# Patient Record
Sex: MALE | Race: WHITE | NOT HISPANIC OR LATINO | Employment: OTHER | URBAN - METROPOLITAN AREA
[De-identification: names, ages, dates, MRNs, and addresses within clinical notes are randomized per-mention and may not be internally consistent; named-entity substitution may affect disease eponyms.]

---

## 2017-01-04 ENCOUNTER — TRANSFERRED RECORDS (OUTPATIENT)
Dept: HEALTH INFORMATION MANAGEMENT | Facility: CLINIC | Age: 67
End: 2017-01-04

## 2017-01-07 ENCOUNTER — TELEPHONE (OUTPATIENT)
Dept: NURSING | Facility: CLINIC | Age: 67
End: 2017-01-07

## 2017-01-07 NOTE — TELEPHONE ENCOUNTER
"Call Type: Triage Call    Presenting Problem: \"I had a colonoscopy on Wednesday and I haven't  had a bowel movement yet.\"  It's < 72 hrs since colonoscopy. I asked  Juwan to give it a little more time.  I also encouraged activity,  fluids and fiber in diet.  Triage Note:  Guideline Title: Constipation  Recommended Disposition: Provide Home/Self Care  Original Inclination: Wanted to speak with a nurse  Override Disposition:  Intended Action: Follow advice given  Physician Contacted: No  All other situations ?  YES  New onset of persistent constipation AND history of diabetes, stroke, multiple  sclerosis, Parkinson's or other chronic illness ? NO  Passing red, black or tarry material from rectum AND onset of new signs and  symptoms of hypovolemia ? NO  Scant rectal bleeding (bright red blood on toilet tissue or drops of blood in  toilet water) when straining at stool and not previously evaluated ? NO  No bowel movement for one week and unresponsive to home care ? NO  Associated with severe rectal pain ? NO  Nausea and vomiting ? NO  Recurrent episodes of severe constipation alternating with episodes of diarrhea  AND has not been evaluated by provider ? NO  Recent change in bowel habits lasting more than one week (frequency, amount,  timing, straining, rectal fullness) ? NO  Overuse of laxatives or enemas ? NO  Constipation AND visible hemorrhoids ? NO  Increased hardness of stool or difficulty in moving bowels ? NO  History of diverticulosis AND fever of 101.5 or higher ? NO  Related to change in diet, decreased activity, or increased stress ? NO  Constant abdominal pain or abdominal pain present for more than 3-4 hours ? NO  Poor bowel habits (does not respond to or suppresses urge to have BM) ? NO  Taking narcotic pain medication OR other medication that increases risk of  constipation (antacids [calcium or aluminum], diuretics, anticholinergics,  calcium channel blockers, iron supplements, antidepressants) ? " NO  Persistent constipation associated with tiredness, fatigue, weight gain, or cold  intolerance ? NO  Rectal bleeding (blood in or on the stool, more than scant; black tarry stools) ?  NO  Constipation began within one week of starting new prescription, nonprescription  or alternative medicine/therapy ? NO  Repeated episodes of constipation requiring home treatment within last 3 months or  longer ? NO  Pain or feeling of pressure with bowel movement at least twice within last week  AND not previously evaluated or not responding to provider recommended treatment  ? NO  Debilitated or bedridden patient having uncontrolled bowel movements AND has  history of stool impaction ? NO  History of fecal impaction within the past six months ? NO  Recent surgery, trauma, childbirth or GI procedure (such as barium enema) and  cautionary symptoms are lasting longer than defined in provider specified  discharge information ? NO  Physician Instructions:  Care Advice:

## 2017-01-16 PROBLEM — D12.4 BENIGN NEOPLASM OF DESCENDING COLON: Status: ACTIVE | Noted: 2017-01-16

## 2017-01-18 DIAGNOSIS — R91.8 PULMONARY NODULES: ICD-10-CM

## 2017-01-18 DIAGNOSIS — I10 HYPERTENSION GOAL BP (BLOOD PRESSURE) < 140/90: Primary | Chronic | ICD-10-CM

## 2017-01-23 RX ORDER — LISINOPRIL 10 MG/1
TABLET ORAL
Qty: 90 TABLET | Refills: 0 | Status: SHIPPED | OUTPATIENT
Start: 2017-01-23 | End: 2017-02-09

## 2017-01-23 NOTE — TELEPHONE ENCOUNTER
lisinopril - historical     Last Written Prescription Date: 1/20/2016  Last Fill Quantity: n/a, # refills: n/a  [place] [facility] or [facility] prescribing provider: 5/24/2016       POTASSIUM   Date Value Ref Range Status   08/21/2015 4.3 3.4 - 5.3 mmol/L Final     CREATININE   Date Value Ref Range Status   08/21/2015 0.83 0.66 - 1.25 mg/dL Final     BP Readings from Last 3 Encounters:   01/04/17 118/86   05/24/16 112/78   05/20/16 126/78     Routing refill request to provider for review/approval because:  Medication is reported/historical  Marisela Hubbard RN

## 2017-01-23 NOTE — TELEPHONE ENCOUNTER
Chart reviewed.  Rx sent to pt's preferred pharmacy.  Will need to be seen - pharmacy to inform Juwan.  Also ordered repeat chest CT to continue surveilance of pulmonary nodules.    [facility] [address] [facility], [place] - [address]    Julio Guidry MD

## 2017-01-25 ENCOUNTER — HOSPITAL ENCOUNTER (OUTPATIENT)
Dept: CT IMAGING | Facility: CLINIC | Age: 67
Discharge: HOME | End: 2017-01-25
Attending: FAMILY MEDICINE | Admitting: FAMILY MEDICINE
Payer: MEDICARE

## 2017-01-25 DIAGNOSIS — R91.8 PULMONARY NODULES: ICD-10-CM

## 2017-01-25 PROCEDURE — 71250 CT THORAX DX C-: CPT

## 2017-01-26 ENCOUNTER — MYC MEDICAL ADVICE (OUTPATIENT)
Dept: FAMILY MEDICINE | Facility: CLINIC | Age: 67
End: 2017-01-26

## 2017-01-26 NOTE — TELEPHONE ENCOUNTER
Please see encounter below along with patient's MyChart message. Please advise. Thank you.  Kaylee Akins RN, BSN  [facility]

## 2017-01-26 NOTE — TELEPHONE ENCOUNTER
Reason for Call:  Juwan is having problems with his computer and is unable to see if there was a response to his [employer] message. I advised he would need to see Dr Guidry for an appointment. Juwan said he is having a lot of anxiety because he spoke with a doctor that said they have found more nodules on his lungs. He is feeling like he can't calm down. Please call to advise.    Best phone number to reach pt at is: [phone]  Ok to leave a message with medical info? yes    Pharmacy preferred (if calling for a refill): [facility]  Patient Representative

## 2017-01-27 ENCOUNTER — TELEPHONE (OUTPATIENT)
Dept: FAMILY MEDICINE | Facility: CLINIC | Age: 67
End: 2017-01-27

## 2017-01-27 ENCOUNTER — MYC MEDICAL ADVICE (OUTPATIENT)
Dept: FAMILY MEDICINE | Facility: CLINIC | Age: 67
End: 2017-01-27

## 2017-01-27 ENCOUNTER — TELEPHONE (OUTPATIENT)
Dept: SURGERY | Facility: CLINIC | Age: 67
End: 2017-01-27

## 2017-01-27 DIAGNOSIS — F41.9 ANXIETY: Primary | ICD-10-CM

## 2017-01-27 NOTE — TELEPHONE ENCOUNTER
Dr. Guidry see this note and please advise--    Juwan called back asking for a couple of pills to help him calm down. I had discussed with him this morning as below. He said he is not sure how to do it. I explained to him again and then he said he did do it but has not heard back. He says he is getting really worked up and needs something to calm him. He was very polite and said it was not a big deal I informed him we want to help him and Dr. Guidry probably just has not gotten to the evisit yet--I informed Juwan I will send Dr. Guidry a message to let him of our discussion and that Juwan is not sure if his evisit worked or not.    Patient satisfied with response. Justa Naqvi R.N.

## 2017-01-27 NOTE — TELEPHONE ENCOUNTER
Dr. Guidry please see below from Juwan. Are you available today for an e-visit?. Juwan has also called so I'm going to reach out to him now via phone.   Justa Naqvi R.N.

## 2017-01-27 NOTE — TELEPHONE ENCOUNTER
Mychart encounter reply in another mychart encounter and I also called patient and left detailed message regarding evisit and how to do and how to make an appointment with [facility] Thoracic surgery.See other encounters for further details. Justa Naqvi R.N.

## 2017-01-27 NOTE — TELEPHONE ENCOUNTER
Juwan called me back and I advised him how to start an e-visit with Dr. Guidry regarding the medications he is requesting. Also informed him to call [facility] thoracic surgery to schedule appointment. He says this has been done and they are reviewing his information and will call him Thursday to schedule appointment. Juwan said he will set up evisit when he gets home. Justa Naqvi R.N.

## 2017-01-27 NOTE — TELEPHONE ENCOUNTER
I called Juwan and left a message and a mychart message also regarding how to follow up for nodules. [facility] thoracic surgery--phone number and website given. Advised Dr. Guidry wants him to follow up with an evisit for his medication renewal. I left instructions how to do this and advised him if he had further questions to call our office. Justa Naqvi R.N.

## 2017-01-27 NOTE — TELEPHONE ENCOUNTER
Reason for Call: Patient called because he had some questions about his nodules and was wondering if anything was set up of him at the  or if he needed to set something up.     Best phone number to reach pt at is: [phone]  Ok to leave a message with medical info? YES    Pharmacy preferred (if calling for a refill): [facility]  [employer] [facility]-Patient Representative

## 2017-01-27 NOTE — TELEPHONE ENCOUNTER
Juwan called today as recommended by his PCP for follow up of a large GGO in the RUL of his lung.      Originally had CT chest for chest pain in Jan 2016 which noted a 3 cm GGO in the right lung apex.  Follow up CT in April 2016 showed no change.  Most recent CT on 1/25/2017 showed slight interval enlargement (now 3.8 cm).    Patient is a former smoker with a 40 pack year history, quit in 1985.  PMH significant for PAULINO, HTN, GERD.    I recommended to Juwan that we review his CT scan at our pulmonary nodule conference next Friday.  I will call him with our recommendations after that.  He agrees with plan and has my contact information in case he has questions.

## 2017-01-30 ENCOUNTER — PRE VISIT (OUTPATIENT)
Dept: SURGERY | Facility: CLINIC | Age: 67
End: 2017-01-30

## 2017-01-30 RX ORDER — ALPRAZOLAM 0.5 MG
0.5 TABLET ORAL 3 TIMES DAILY PRN
Qty: 10 TABLET | Refills: 0 | Status: SHIPPED | OUTPATIENT
Start: 2017-01-30 | End: 2017-02-24

## 2017-01-30 NOTE — TELEPHONE ENCOUNTER
E-visit never started by Juwan.  I'll authorize a few Xanax for him to take.  The requested prescription(s) has/have been approved and has/have been printed and signed. This was forwarded to the patient care pool to fax to the patient's preferred pharmacy.  Please call Juwan to let him know we've done this.    [facility] [address] [facility], [place] - [address]    Jr Julio Guidry

## 2017-01-30 NOTE — TELEPHONE ENCOUNTER
1.  Date/reason for appt: 17 - Pulmonary Nodules  2.  Referring provider: Dr Julio Guidry    3.  Call to patient (Yes / No - short description): Yes - unable to reach patient, called clinic - scheduled with Amber from clinic - she will contact pt  4.  Previous care at / records requested from: [facility] - rec in Epic  5.  Recent Imagin17 CT chest - in Epic  *PFT appt prior to appt with Dr Desai on 17 - confirmed with patient*

## 2017-02-03 DIAGNOSIS — R91.1 LUNG NODULE: Primary | ICD-10-CM

## 2017-02-08 NOTE — PROGRESS NOTES
THORACIC SURGERY - NEW PATIENT OFFICE VISIT      Dear Dr. Zheng,    I saw Mr. Latham in consultation for the evaluation and treatment of a right lung apex GGO    HPI  Mr. Juwan Latham is a 66 year old male with incidentally found ground glass opacities in right lung apex during chest pain work up in January 2016. There has been a slight interval increase in size since that time. Denies chest pain, shortness of breath or weight loss.    Previsit Tests   CT chest 1/25/2017: slightly enlarging ground glass opacities of right lung apex     PFT 2/9/2017: FEV1/FVC 80%, FEV1 108%, DLCO 133%    PMH  Hypertension  Sleep apnea   Anxiety    ETOH 2 beers daily  TOB previously smoked 2ppd for 20 years    Physical examination  BMI 29  Non-contributory    From a personal perspective, he lives with two roommates who he has been friends with since high school. His son has passed away, however he has a grandchild. He works in construction. Stays very active, enrolled in a gym and tries to walk 3 miles daily.     IMPRESSION    66 year old year-old male with incidentally found right lung apex ground glass opacity.    PLAN  I spent a total of 30 minutes with Mr. Latham  more than 50% of which were spent in counseling, coordination of care, and face-to-face time. I reviewed the plan as follows:  Procedure planned: Procedure planned: RIGHT VATS/ Laparoscopic RUL wedge resection with mediastinal lymph node dissection, with possible thoracotomy.  I reviewed the indications, risks, and benefits of the procedure with Mr. Juwan Latham. We discussed the intraoperative risks of bleeding and injury to vital organs, potential postoperative complications including, but not limited to, major respiratory events, arrhythmia, bleeding, infection, reoperation, and death. I explained the anticipated hospital course (+/- 3-5 days) and postoperative recovery including pain control, chest drain management, and variable degrees of dyspnea (or  need for supplemental oxygen) and fatigue that tend to get better with time..  1. Necessary Preop Tests & Appointments:   -PAC  -Discuss with radiology regarding solid componenets of th elesion  2. Regional Anesthesia Plan: paravertebral catheter  3. Anticoagulation Plan: post op s/c heparin  4. Smoking Cessation: n/a    All questions were answered and the patient and present family were in agreement with the plan.  I appreciate the opportunity to participate in the care of your patient and will keep you updated.  Sincerely,

## 2017-02-09 ENCOUNTER — OFFICE VISIT (OUTPATIENT)
Dept: SURGERY | Facility: CLINIC | Age: 67
End: 2017-02-09
Attending: STUDENT IN AN ORGANIZED HEALTH CARE EDUCATION/TRAINING PROGRAM
Payer: MEDICARE

## 2017-02-09 VITALS
BODY MASS INDEX: 29.4 KG/M2 | HEART RATE: 64 BPM | HEIGHT: 68 IN | SYSTOLIC BLOOD PRESSURE: 127 MMHG | WEIGHT: 194 LBS | TEMPERATURE: 97.7 F | RESPIRATION RATE: 16 BRPM | DIASTOLIC BLOOD PRESSURE: 72 MMHG | OXYGEN SATURATION: 96 %

## 2017-02-09 DIAGNOSIS — R91.1 LUNG NODULE: Primary | ICD-10-CM

## 2017-02-09 DIAGNOSIS — R91.1 LUNG NODULE: ICD-10-CM

## 2017-02-09 PROCEDURE — 99212 OFFICE O/P EST SF 10 MIN: CPT | Mod: ZF

## 2017-02-09 ASSESSMENT — PAIN SCALES - GENERAL: PAINLEVEL: NO PAIN (0)

## 2017-02-09 NOTE — MR AVS SNAPSHOT
After Visit Summary   2/9/2017    Juwan Latham    MRN: [id number]           Patient Information     Date Of Birth          1950        Visit Information        Provider Department      2/9/2017 11:00 AM Maria A Desai MD [facility]        Today's Diagnoses     Lung nodule    -  1       Follow-ups after your visit        Additional Services     PAC Visit Referral (For [facility] Only)       Does this visit require an Anesthesia consult?  ERAS    H&P done by:  Other (Specify): PAC      Please be aware that coverage of these services is subject to the terms and limitations of your health insurance plan.  Call member services at your health plan with any benefit or coverage questions.      Please bring the following to your appointment:  >>   Any x-rays, CTs or MRIs which have been performed.  Contact the facility where they were done to arrange for  prior to your scheduled appointment.  Any new CT, MRI or other procedures ordered by your specialist must be performed at a [place] facility or coordinated by your clinic's referral office.    >>   List of current medications  >>   This referral request   >>   Any documents/labs given to you for this referral                  Your next 10 appointments already scheduled     Mar 03, 2017  1:00 PM CST   New Sleep Patient with Torey Vidales MD   [facility] ([facility]    [address] [facility] [address] [place] [id number] [phone]              Who to contact     If you have questions or need follow up information about today's clinic visit or your schedule please contact [facility]CER [facility] directly at [phone].  Normal or non-critical lab and imaging results will be communicated to you by MyChart, letter or phone within 4 business days after the clinic has received the results. If you do not hear from us within 7 days, please contact the clinic through [employer]  "or phone. If you have a critical or abnormal lab result, we will notify you by phone as soon as possible.  Submit refill requests through [employer] or call your pharmacy and they will forward the refill request to us. Please allow 3 business days for your refill to be completed.          Additional Information About Your Visit        [employer] Information     [employer] gives you secure access to your electronic health record. If you see a primary care provider, you can also send messages to your care team and make appointments. If you have questions, please call your primary care clinic.  If you do not have a primary care provider, please call [phone] and they will assist you.        Care EveryWhere ID     This is your Care EveryWhere ID. This could be used by other organizations to access your [place] medical records  [id number]        Your Vitals Were     Pulse Temperature Respirations Height Pulse Oximetry BMI (Body Mass Index)    64 97.7  F (36.5  C) (Oral) 16 1.721 m (5' 7.75\") 96% 29.72 kg/m2       Blood Pressure from Last 3 Encounters:   02/09/17 127/72   01/04/17 118/86   05/24/16 112/78    Weight from Last 3 Encounters:   02/09/17 88 kg (194 lb)   01/04/17 86.2 kg (190 lb)   05/24/16 87.1 kg (192 lb)              We Performed the Following     PAC Visit Referral (For [facility] Only)          Today's Medication Changes          These changes are accurate as of: 2/9/17 11:59 PM.  If you have any questions, ask your nurse or doctor.               These medicines have changed or have updated prescriptions.        Dose/Directions    lisinopril 10 MG tablet   Commonly known as:  PRINIVIL/ZESTRIL   This may have changed:  Another medication with the same name was removed. Continue taking this medication, and follow the directions you see here.   Changed by:  Julio Guidry Jr., MD        Refills:  3         Stop taking these medicines if you haven't already. Please contact your care team if you have questions.  "    sertraline 50 MG tablet   Commonly known as:  ZOLOFT   Stopped by:  Maria A Desai MD                    Primary Care Provider Office Phone # Fax #    Julio Guidry Jr., [phone]       [facility] [address] [facility] [address]        Thank you!     Thank you for choosing [facility]  for your care. Our goal is always to provide you with excellent care. Hearing back from our patients is one way we can continue to improve our services. Please take a few minutes to complete the written survey that you may receive in the mail after your visit with us. Thank you!             Your Updated Medication List - Protect others around you: Learn how to safely use, store and throw away your medicines at www.disposemymeds.org.          This list is accurate as of: 2/9/17 11:59 PM.  Always use your most recent med list.                   Brand Name Dispense Instructions for use    ALPRAZolam 0.5 MG tablet    XANAX    10 tablet    Take 1 tablet (0.5 mg) by mouth 3 times daily as needed for anxiety       lisinopril 10 MG tablet    PRINIVIL/ZESTRIL

## 2017-02-09 NOTE — LETTER
2/9/2017      RE: Juwan Latham  [address]  [place] [address]       THORACIC SURGERY - NEW PATIENT OFFICE VISIT      Dear Dr. Zheng,    I saw Mr. Latham in consultation for the evaluation and treatment of a right lung apex GGO    HPI  Mr. Juwan Latham is a 66 year old male with incidentally found ground glass opacities in right lung apex during chest pain work up in January 2016. There has been a slight interval increase in size since that time. Denies chest pain, shortness of breath or weight loss.    Previsit Tests   CT chest 1/25/2017: slightly enlarging ground glass opacities of right lung apex     PFT 2/9/2017: FEV1/FVC 80%, FEV1 108%, DLCO 133%    PMH  Hypertension  Sleep apnea   Anxiety    ETOH 2 beers daily  TOB previously smoked 2ppd for 20 years    Physical examination  BMI 29  Non-contributory    From a personal perspective, he lives with two roommates who he has been friends with since high school. His son has passed away, however he has a grandchild. He works in construction. Stays very active, enrolled in a gym and tries to walk 3 miles daily.     IMPRESSION    66 year old year-old male with incidentally found right lung apex ground glass opacity.    PLAN  I spent a total of 30 minutes with Mr. Latham  more than 50% of which were spent in counseling, coordination of care, and face-to-face time. I reviewed the plan as follows:  Procedure planned: Procedure planned: RIGHT VATS/ Laparoscopic RUL wedge resection with mediastinal lymph node dissection, with possible thoracotomy.  I reviewed the indications, risks, and benefits of the procedure with Mr. Juwan Latham. We discussed the intraoperative risks of bleeding and injury to vital organs, potential postoperative complications including, but not limited to, major respiratory events, arrhythmia, bleeding, infection, reoperation, and death. I explained the anticipated hospital course (+/- 3-5 days) and postoperative recovery  including pain control, chest drain management, and variable degrees of dyspnea (or need for supplemental oxygen) and fatigue that tend to get better with time..  1. Necessary Preop Tests & Appointments:   -PAC  -Discuss with radiology regarding solid componenets of th elesion  2. Regional Anesthesia Plan: paravertebral catheter  3. Anticoagulation Plan: post op s/c heparin  4. Smoking Cessation: n/a    All questions were answered and the patient and present family were in agreement with the plan.  I appreciate the opportunity to participate in the care of your patient and will keep you updated.  Sincerely,    Maria A Desai MD

## 2017-02-09 NOTE — Clinical Note
2/9/2017       RE: Juwan Latham  [address]  [place] [address]     Dear Colleague,    Thank you for referring your patient, Juwan Latham, to the [facility]. Please see a copy of my visit note below.    THORACIC SURGERY - NEW PATIENT OFFICE VISIT      Dear  ***,    I saw Mr. Lahtam at  *** s request in consultation for the evaluation and treatment of a ***.     HPI  M***. Juwan Latham is a 66 year old year-old male referred to the thoracic surgery clinic for enlarging ground glass opacities in her right lung apex.  Lesion was and incidental finding on workup for chest pain back in januray of 2016 and there has been an interval increase in size.    Previsit Tests     PMH    Past Medical History   Diagnosis Date     Seasonal allergies      spring and fall     Calculus of kidney 2005, 2012     Congenital single kidney      Hypertension      Anxiety      Sleep apnea      no cpap        ETOH***  TOB***    Physical examination  BMI 29  ***    From a personal perspective, ***    IMPRESSION No diagnosis found.   66 year old year-old male with ***    PLAN  I spent a total of *** minutes with Mr. Latham and ***, more than 50% of which were spent in counseling, coordination of care, and face-to-face time. I reviewed the plan as follows:  1.) Procedure planned: ***.  Necessary Preop Tests & Appointments: ***  Regional Anesthesia Plan: ***  Anticoagulation Plan: ***  Smoking Cessation: ***    All expressed questions were answered and all parties present were in agreement with the plan.  I appreciate the opportunity to participate in the care of your patient and will keep you updated.  Sincerely,    Again, thank you for allowing me to participate in the care of your patient.      Sincerely,    Maria A Desai MD

## 2017-02-09 NOTE — NURSING NOTE
"Juwan Latham is a 66 year old male who presents for:  Chief Complaint   Patient presents with     Oncology Clinic Visit     Pulmonary Nodules         Initial Vitals:  /72 mmHg  Pulse 64  Temp(Src) 97.7  F (36.5  C) (Oral)  Resp 16  Ht 1.721 m (5' 7.75\")  Wt 87.998 kg (194 lb)  BMI 29.71 kg/m2  SpO2 96% Estimated body mass index is 29.71 kg/(m^2) as calculated from the following:    Height as of this encounter: 1.721 m (5' 7.75\").    Weight as of this encounter: 87.998 kg (194 lb).. Body surface area is 2.05 meters squared. BP completed using cuff size: regular  No Pain (0) No LMP for male patient. Allergies and medications reviewed.     Medications: Medication refills not needed today.  Pharmacy [facility] entered into [employer]: [id number] [place], [place] - [address]    Comments:     6 minutes for nursing intake (face to face time)   Mary Ruffin CMA          "

## 2017-02-15 ENCOUNTER — TELEPHONE (OUTPATIENT)
Dept: SURGERY | Facility: CLINIC | Age: 67
End: 2017-02-15

## 2017-02-15 NOTE — TELEPHONE ENCOUNTER
Call to Juwan to let him know that Dr. Desai has discussed his case with our chest radiologist, Dr. Macedo who cannot rule out the possibility that there could be some solid components to the area of concern (because the cuts are only 1.5 mm rather than 1 mm). Dr. Desai spoke with Dr. Mcguire and they are in agreement that the best plan of action would be to remove the area surgically. The plan would be to start laparoscopically with the possibility of needing to do thoracoscopic entry as well. He should plan on 3-5 days in the hospital following surgery-this range is dependent on his lung healing up from surgery so the chest tube can be removed, his bowel and bladder resuming normal function following anesthesia and good pain management. We would have him see our PAC clinic to discuss pain mangagement during the entire surgical period and for them to do his pre-op H&P.    Juwan is in agreement with this plan and will await a call with appointment information for PAC and surgery.

## 2017-02-24 ENCOUNTER — OFFICE VISIT (OUTPATIENT)
Dept: SURGERY | Facility: CLINIC | Age: 67
End: 2017-02-24

## 2017-02-24 ENCOUNTER — ANESTHESIA EVENT (OUTPATIENT)
Dept: SURGERY | Facility: CLINIC | Age: 67
DRG: 165 | End: 2017-02-24
Payer: MEDICARE

## 2017-02-24 ENCOUNTER — ALLIED HEALTH/NURSE VISIT (OUTPATIENT)
Dept: SURGERY | Facility: CLINIC | Age: 67
End: 2017-02-24

## 2017-02-24 VITALS
RESPIRATION RATE: 16 BRPM | SYSTOLIC BLOOD PRESSURE: 158 MMHG | TEMPERATURE: 97.6 F | DIASTOLIC BLOOD PRESSURE: 85 MMHG | OXYGEN SATURATION: 98 % | HEART RATE: 58 BPM | BODY MASS INDEX: 29.24 KG/M2 | WEIGHT: 192.9 LBS | HEIGHT: 68 IN

## 2017-02-24 DIAGNOSIS — F41.9 ANXIETY: ICD-10-CM

## 2017-02-24 DIAGNOSIS — R91.1 INCIDENTAL LUNG NODULE, > 3MM AND < 8MM: ICD-10-CM

## 2017-02-24 DIAGNOSIS — Z01.818 PRE-OP EXAMINATION: Primary | ICD-10-CM

## 2017-02-24 LAB
ALBUMIN SERPL-MCNC: 4 G/DL (ref 3.4–5)
ALP SERPL-CCNC: 56 U/L (ref 40–150)
ALT SERPL W P-5'-P-CCNC: 69 U/L (ref 0–70)
ANION GAP SERPL CALCULATED.3IONS-SCNC: 8 MMOL/L (ref 3–14)
AST SERPL W P-5'-P-CCNC: 46 U/L (ref 0–45)
BILIRUB SERPL-MCNC: 1 MG/DL (ref 0.2–1.3)
BUN SERPL-MCNC: 11 MG/DL (ref 7–30)
CALCIUM SERPL-MCNC: 9 MG/DL (ref 8.5–10.1)
CHLORIDE SERPL-SCNC: 104 MMOL/L (ref 94–109)
CO2 SERPL-SCNC: 28 MMOL/L (ref 20–32)
CREAT SERPL-MCNC: 0.83 MG/DL (ref 0.66–1.25)
ERYTHROCYTE [DISTWIDTH] IN BLOOD BY AUTOMATED COUNT: 12.5 % (ref 10–15)
GFR SERPL CREATININE-BSD FRML MDRD: ABNORMAL ML/MIN/1.7M2
GLUCOSE SERPL-MCNC: 96 MG/DL (ref 70–99)
HBA1C MFR BLD: 5.5 % (ref 4.3–6)
HCT VFR BLD AUTO: 47.3 % (ref 40–53)
HGB BLD-MCNC: 15.8 G/DL (ref 13.3–17.7)
MCH RBC QN AUTO: 31.7 PG (ref 26.5–33)
MCHC RBC AUTO-ENTMCNC: 33.4 G/DL (ref 31.5–36.5)
MCV RBC AUTO: 95 FL (ref 78–100)
PLATELET # BLD AUTO: 164 10E9/L (ref 150–450)
POTASSIUM SERPL-SCNC: 4.2 MMOL/L (ref 3.4–5.3)
PROT SERPL-MCNC: 7.3 G/DL (ref 6.8–8.8)
RBC # BLD AUTO: 4.98 10E12/L (ref 4.4–5.9)
SODIUM SERPL-SCNC: 140 MMOL/L (ref 133–144)
WBC # BLD AUTO: 5 10E9/L (ref 4–11)

## 2017-02-24 ASSESSMENT — LIFESTYLE VARIABLES: TOBACCO_USE: 1

## 2017-02-24 NOTE — MR AVS SNAPSHOT
After Visit Summary   2/24/2017    Juwan Latham    MRN: [id number]           Patient Information     Date Of Birth          1950        Visit Information        Provider Department      2/24/2017 8:30 AM Rn, [facility]        Care Instructions      Using an Incentive Spirometer  Soon after your surgery, a nurse or therapist will teach you breathing exercises. These keep your lungs clear, strengthen your breathing muscles, and help prevent complications.  The exercises include doing a deep-breathing exercise using a device called an incentive spirometer.  To do these exercises, you will breathe in through your mouth and not your nose. The incentive spirometer only works correctly if you breathe in through your mouth.  Four steps to clear lungs     Deep breathing expands the lungs, aids circulation, and helps prevent pneumonia.   1. Exhale normally.    Relax and breathe out.  2. Place your lips tightly around the mouthpiece.    Make sure the device is upright and not tilted.  3. Inhale as much air as you can through the mouthpiece (don't breath through your nose).    Inhale slowly and deeply.    Hold your breath long enough to keep the balls or disk raised for at least 3 seconds.    If you re inhaling too quickly, your device may make a tone. If you hear this tone, inhale more slowly.  4. Repeat the exercise regularly.    Do this exercise every hour while you're awake, or as your health care provider instructs.    You will also be taught coughing exercises and be asked to do them regularly on your own.    9437-5317 [employer]. [address] [place] [address]. All rights reserved. This information is not intended as a substitute for professional medical care. Always follow your healthcare professional's instructions.  AFTER YOUR SURGERY  Breathing exercises   Breathing exercises help you recover faster. Take deep breaths and let the air  out slowly. This will:     Help you wake up after surgery.    Help prevent complications like pneumonia.  Preventing complications will help you go home sooner.   We may give you a breathing device (incentive spirometer) to encourage you to breathe deeply.   Nausea and vomiting   You may feel sick to your stomach after surgery; if so, let your nurse know.    Pain control:  After surgery, you may have pain. Our goal is to help you manage your pain. Pain medicine will help you feel comfortable enough to do activities that will help you heal.  These activities may include breathing exercises, walking and physical therapy.   To help your health care team treat your pain we will ask: 1) If you have pain  2) where it is located 3) describe your pain in your words  Methods of pain control include medications given by mouth, vein or by nerve block for some surgeries.  We may give you a pain control pump that will:  1) Deliver the medicine through a tube placed in your vein  2) Control the amount of medicine you receive  3) Allow you to push a button to deliver a dose of pain medicine  Sequential Compression Device (SCD) or Pneumo Boots:  You may need to wear SCD S on your legs or feet. These are wraps connected to a machine that pumps in air and releases it. The repeated pumping helps prevent blood clots from forming.     Enhanced Recovery After Surgery     This is a team effort, including you, to get you back on your feet, eating and drinking normally and out of the hospital as quickly as possible.  The goals are: 1) NO INFECTIONS and   2) RETURN TO NORMAL DIET    How can we achieve these goals?  1) STAY ACTIVE: Walk every day before your surgery; try to increase the amount every day.  Walk after surgery as much as you can-the nurses will help you.  Walking speeds healing and gets you home quicker, you heal better at home and have less risk of infection.     2) INCENTIVE SPIROMETER: Practice your incentive spirometer 4  times per day with 5-10 repetitions each time.  Using the incentive spirometer can strengthen your muscles between your ribs and help you have a strong cough after surgery.  A more effective cough can help prevent problems with your lungs.    3) STAY HYDRATED: Drink clear liquids up until 2 hours before your surgery. We would like you to purchase a drink such as Gatorade.  Drink this before bedtime and on the way into the hospital, drink between 8-12 ounces or until you feel hydrated.  Keeping well hydrated leads to your veins being plump, you wake up faster, and you are less likely to be nauseated. Start drinking water as soon as you can after surgery and advance to clear liquids and food as tolerated.  IV fluids contain salt, drinking fluids will minimize the amount of IV fluids you need and decrease the amount of salt you get.     4) PAIN MANAGEMENT: If we minimize the amount of opioids and narcotics, and use regional blocks (which numb the area where your surgery is) along with oral pain medications; you will have less side effects of nausea and constipation. Narcotics can slow down your bowels and cause you to stay in the hospital longer.     Our goal is to keep you comfortable; eating and drinking normally and back home safely.       Preparing for Your Surgery      Name:  Juwan Latham   MRN:  [id number]   :  1950   Today's Date:  2017     Arriving for surgery:  Surgery date:  17  Surgery time: 1220 PM  Arrival time:  1020 AM  Please come to:       [facility] [address] [place] [address]  [address]    [facility] parking is available in front of the hospital from 5:15 am to 8:00 pm    -  Stop at the Information Desk in the lobby    -   Inform the information person that you are here for surgery. An escort to 3c will be provided. If you would not like an escort, please proceed to 3C on the 3rd floor. [phone]     What can I eat or  drink?  -  You may have solid food or milk products until 8 hours prior to your surgery. 0400 AM Friday  -  You may have water, apple juice or 7up/Sprite until 2 hours prior to your surgery. 1020 AM    Which medicines can I take?  -  Do NOT take these medications in the morning, the day of surgery:  HOLD LISINOPRIL THE AM OF SURGERY.    -  Please take these medications the day of surgery:  XANAX AS NEEDED, TYLENOL AS NEEDED FOR PAIN.    How do I prepare myself?  -  Take two showers: one the night before surgery; and one the morning of surgery.         Use Scrubcare or Hibiclens to wash from neck down.  You may use your own shampoo and conditioner. No other hair products.   -  Do NOT use lotion, powder, deodorant, or antiperspirant the day of your surgery.  -  Do NOT wear any makeup, fingernail polish or jewelry.  -  Begin using Incentive Spirometer 1 week prior to surgery.  Use 4 times per day, up to 5-10 breaths each time.  Bring Incentive Spirometer to hospital.  -Do not bring your own medications to the hospital, except for inhalers and eye drops.  -  Bring your ID and insurance card.    Questions or Concerns:  If you have questions or concerns, please call the  Preoperative Assessment Center, Monday-Friday 7AM-7PM:  [phone]                        Follow-ups after your visit        Your next 10 appointments already scheduled     Feb 24, 2017  9:00 AM CST   (Arrive by 8:45 AM)   PAC EVALUATION with  Pac Kit 5   [facility] ([facility])    [address] [place] [id number] [phone]            Feb 24, 2017 10:00 AM CST   (Arrive by 9:45 AM)   PAC Anesthesia Consult with  Pac Anesthesiologist   [facility] ([facility])    [address] [place] [id number] [phone]            Feb 24, 2017 10:15 AM CST   LAB with  LAB   [facility]    [address] [facility]  1st Floor  [facility] [address] [phone]           Patient must bring picture ID.  Patient should be prepared to give a urine specimen  Please do not eat 10-12 hours before your appointment if you are coming in fasting for labs on lipids, cholesterol, or glucose (sugar).  Pregnant women should follow their Care Team instructions. Water with medications is okay. Do not drink coffee or other fluids.   If you have concerns about taking  your medications, please ask at office or if scheduling via [employer], send a message by clicking on Secure Messaging, Message Your Care Team.            Mar 03, 2017   Procedure with Maria A Desai MD   [facility] [place], Same Day Surgery (--)    [address] [facility] [address] [phone]              Future tests that were ordered for you today     Open Future Orders        Priority Expected Expires Ordered    ABO/Rh type and screen Routine 2/24/2017 3/26/2017 2/24/2017    CBC with platelets Routine 2/24/2017 3/26/2017 2/24/2017    Comprehensive metabolic panel Routine 2/24/2017 3/26/2017 2/24/2017    Hemoglobin A1c Routine 2/24/2017 3/26/2017 2/24/2017            Who to contact     Please call your clinic at [phone] to:    Ask questions about your health    Make or cancel appointments    Discuss your medicines    Learn about your test results    Speak to your doctor   If you have compliments or concerns about an experience at your clinic, or if you wish to file a complaint, please contact [facility] Patient Relations at [phone] or email us at [email or web address]         Additional Information About Your Visit        [employer] Information     [employer] gives you secure access to your electronic health record. If you see a primary care provider, you can also send messages to your care team and make appointments. If you have questions, please call your primary care clinic.  If  you do not have a primary care provider, please call [phone] and they will assist you.      [employer] is an electronic gateway that provides easy, online access to your medical records. With [employer], you can request a clinic appointment, read your test results, renew a prescription or communicate with your care team.     To access your existing account, please contact your [facility] or call [phone] for assistance.        Care EveryWhere ID     This is your Care EveryWhere ID. This could be used by other organizations to access your [place] medical records  [id number]         Blood Pressure from Last 3 Encounters:   02/09/17 127/72   01/04/17 118/86   05/24/16 112/78    Weight from Last 3 Encounters:   02/09/17 88 kg (194 lb)   01/04/17 86.2 kg (190 lb)   05/24/16 87.1 kg (192 lb)              Today, you had the following     No orders found for display       Primary Care Provider Office Phone # Fax #    Julio Guidry Jr., [phone]       [facility] [address] [facility] [address]        Thank you!     Thank you for choosing [facility] PREOPERATIVE ASSESSMENT [place]  for your care. Our goal is always to provide you with excellent care. Hearing back from our patients is one way we can continue to improve our services. Please take a few minutes to complete the written survey that you may receive in the mail after your visit with us. Thank you!             Your Updated Medication List - Protect others around you: Learn how to safely use, store and throw away your medicines at www.disposemymeds.org.          This list is accurate as of: 2/24/17  8:42 AM.  Always use your most recent med list.                   Brand Name Dispense Instructions for use    ALPRAZolam 0.5 MG tablet    XANAX    10 tablet    Take 1 tablet (0.5 mg) by mouth 3 times daily as needed for anxiety       lisinopril 10 MG tablet    PRINIVIL/ZESTRIL

## 2017-02-24 NOTE — H&P
Pre-Operative H & P     CC:  Preoperative exam to assess for increased cardiopulmonary risk while undergoing surgery and anesthesia.    Date of Encounter: 2/24/2017  Primary Care Physician:  Julio Guidry Jr.    HPI  Juwan Latham is a 66 year old male who presents for pre-operative H & P in preparation for  Laparoscopic Wedge Resection, Mediastinal Node Excision Possible Thoracoscopic; Flexible Bronchoscopy with Dr. Desai and Dr. Mcguire on 3/3/2017 at [facility] under general anesthesia with ERAS protocol.  Mr. Latham was undergoing a chest pain work up January 2016 when a 3 cm ground glass opacities in the right lung apex was incidentally found on CT scan.  Follow up CT in April 2016 showed no change.  CT scan on 1/25/2017 showed a slight interval enlargement (3.0 to 3.8 cm).  Mr. Latham's case was presented at the lung nodule conference on 1/27/17.  The recommendation was surgical excision as on imaging, the GGO appeared to be more adenocarcinoma in situ.  Additionally the yield for an IR biopsy of this GGO was thought to be very low.  Mr. Latham has a 40 pack year smoking history quitting in 1985.      Mr. Latham presents to PAC clinic and denies any chest pain, dypnea, cough, sore throat or fevers.  He does report night sweats that he has had on and off over the past year.      PAC referral for risk assessment and optimization of anesthesia with comorbid conditions of:  PAULINO; GERD; HTN; and smoking history.     History is obtained from the patient and electronic health record.     Past Medical History  Past Medical History   Diagnosis Date     Anxiety      Calculus of kidney 2005, 2012     Congenital single kidney      Hypertension      Seasonal allergies      spring and fall     Sleep apnea      no cpap       Past Surgical History  Past Surgical History   Procedure Laterality Date     Laser holmium lithotripsy ureter(s), insert stent, combined   2012     Procedure: COMBINED CYSTOSCOPY, URETEROSCOPY, LASER HOLMIUM LITHOTRIPSY URETER(S), INSERT STENT;  Cystoscopy, Right Ureteroscopy, Stone Extraction, Holmium Laser, Double J Stent Placement;  Surgeon: Nicolás Blackwell MD;  Location: [facility] OR     Colonoscopy N/A 2015     Procedure: COLONOSCOPY;  Surgeon: Murphy Jesus MD;  Location: [facility] GI     Esophagoscopy, gastroscopy, duodenoscopy (egd), combined N/A 2016     Procedure: COMBINED ESOPHAGOSCOPY, GASTROSCOPY, DUODENOSCOPY (EGD), BIOPSY SINGLE OR MULTIPLE;  Surgeon: Murphy Jesus MD;  Location:  GI       Hx of Blood transfusions/reactions: denies     Hx of abnormal bleeding or anti-platelet use: denies    Menstrual history: No LMP for male patient.    Steroid use in the last year: denies    Personal or FH with difficulty with Anesthesia:   PONV      Prior to Admission Medications  Current Outpatient Prescriptions   Medication Sig Dispense Refill     ALPRAZolam (XANAX) 0.5 MG tablet Take 1 tablet (0.5 mg) by mouth 3 times daily as needed for anxiety 10 tablet 0     lisinopril (PRINIVIL,ZESTRIL) 10 MG tablet   3       Allergies  No Known Allergies    Social History  Social History     Social History     Marital status:      Spouse name: N/A     Number of children: 1     Years of education: N/A     Occupational History     Remodeling Self      Other     Social History Main Topics     Smoking status: Former Smoker     Packs/day: 2.00     Years: 20.00     Types: Cigarettes     Quit date: 1985     Smokeless tobacco: Never Used     Alcohol use Yes      Comment: avg 3 beers per night     Drug use: No     Sexual activity: Yes     Partners: Female     Other Topics Concern     Caffeine Concern No     Sleep Concern Yes     middle/late insomnia     Exercise Yes     Seat Belt Yes     Parent/Sibling W/ Cabg, Mi Or Angioplasty Before 65f 55m? No     Social History Narrative    Dad  at age 86 from complications after hip surgery.    Mom  " at age 68 from heart condition    Siblings:  Three sisters in good health.  Brother with diabetes and overweight.        One son    One granddaughter    Occupation:  remodeling       Family History  Family History   Problem Relation Age of Onset     HEART DISEASE Mother      DIABETES Brother 65     Type 2     DIABETES Maternal Grandmother      Cancer - colorectal Brother 70     Prostate Cancer Brother 74     Hypertension No family hx of      Lipids No family hx of      ROS/MED HX    ENT/Pulmonary:     (+)sleep apnea (Patient could not tolerate the mask.), tobacco use (Quit in .  40 pack years.), Past use doesn't use CPAP , . .    Neurologic:  - neg neurologic ROS     Cardiovascular:     (+) hypertension-range: 120's/70's, ---. : . . . :. . Previous cardiac testing Echodate:results:date: results:ECG reviewed date: results: date: results:          METS/Exercise Tolerance: Comment: Works out every other day  - runs 3 miles at a time. >4 METS   Hematologic:  - neg hematologic  ROS       Musculoskeletal:  - neg musculoskeletal ROS       GI/Hepatic:     (+) GERD (GERD occurs occassionally and will take an OTC mediation.) Asymptomatic on medication, liver disease (\"fatty liver\"),       Renal/Genitourinary:  - ROS Renal section negative   (+) Nephrolithiasis ,       Endo:  - neg endo ROS       Psychiatric:     (+) psychiatric history anxiety      Infectious Disease:  - neg infectious disease ROS       Malignancy:      - no malignancy   Other:    (+) No chance of pregnancy C-spine cleared: N/A, no H/O Chronic Pain,no other significant disability          Temp: 97.6  F (36.4  C) Temp src: Oral BP: 158/85 Pulse: 58   Resp: 16 SpO2: 98 %         192 lbs 14.4 oz  5' 8\"   Body mass index is 29.33 kg/(m^2).       Physical Exam  Constitutional: Awake, alert, cooperative, npparent distress, and appears stated age and somewhat anxious.  Eyes: Pupils equal, round and reactive to light, extra ocular muscles intact, " sclera clear, conjunctiva normal.  HENT: Normocephalic, oral pharynx with moist mucus membranes, dentition in poor repair with multiple missing . No goiter appreciated.   Respiratory: Clear to auscultation bilaterally, no crackles or wheezing.  Cardiovascular: Regular rate and rhythm, normal S1 and S2, and no murmur noted.  Carotids +2, no bruits. No edema. Palpable pulses to radial  DP and PT arteries.   GI: Normal bowel sounds, soft, non-distended, non-tender, no masses palpated, no hepatosplenomegaly.    Lymph/Hematologic: No cervical lymphadenopathy and no supraclavicular lymphadenopathy.  Genitourinary:  na  Skin: Warm and dry.  No rashes at anticipated surgical site.   Musculoskeletal: Full ROM of neck. There is no redness, warmth, or swelling of the joints. Gross motor strength is normal.    Neurologic: Awake, alert, oriented to name, place and time. Cranial nerves II-XII are grossly intact. Gait is normal.   Neuropsychiatric: Calm, cooperative. Normal affect.     Labs: (personally reviewed)  Lab Results   Component Value Date    WBC 5.0 02/24/2017     Lab Results   Component Value Date    RBC 4.98 02/24/2017     Lab Results   Component Value Date    HGB 15.8 02/24/2017     Lab Results   Component Value Date    HCT 47.3 02/24/2017     Lab Results   Component Value Date    MCV 95 02/24/2017     Lab Results   Component Value Date    MCH 31.7 02/24/2017     Lab Results   Component Value Date    MCHC 33.4 02/24/2017     Lab Results   Component Value Date    RDW 12.5 02/24/2017     Lab Results   Component Value Date     02/24/2017       Last Basic Metabolic Panel:  Lab Results   Component Value Date     02/24/2017      Lab Results   Component Value Date    POTASSIUM 4.2 02/24/2017     Lab Results   Component Value Date    CHLORIDE 104 02/24/2017     Lab Results   Component Value Date    JOANNE 9.0 02/24/2017     Lab Results   Component Value Date    CO2 28 02/24/2017     Lab Results   Component Value  Date    BUN 11 02/24/2017     Lab Results   Component Value Date    CR 0.83 02/24/2017     Lab Results   Component Value Date    GLC 96 02/24/2017       Lab Results   Component Value Date    AST 46 02/24/2017     Lab Results   Component Value Date    ALT 69 02/24/2017     No results found for: BILICONJ   Lab Results   Component Value Date    BILITOTAL 1.0 02/24/2017     Lab Results   Component Value Date    ALBUMIN 4.0 02/24/2017     Lab Results   Component Value Date    PROTTOTAL 7.3 02/24/2017      Lab Results   Component Value Date    ALKPHOS 56 02/24/2017       Procedures  EKG 2/24/17 :  NSR 62 bpm     PFT's  2/9/2017: FEV1/FVC 80%, FEV1 108%, DLCO 133%    Stress Echocardiogram 1/13/2016  Interpretation Summary  Normal resting wall motion and no stress-induced wall motion abnormality.  THIS IS A NORMAL STRESS ECHOCARDIOGRAM.    CT CHEST WITHOUT CONTRAST 1/25/2017 11:14 AM  IMPRESSION:  1. Slight interval enlargement of the groundglass opacity right lung  apex when compared to prior exam. Additional follow-up in one year  recommended to confirm interval growth. No solid components are  currently evident in this area. Consultation with thoracic surgery  could be performed for further assessment as indolent malignancy such  as bronchoalveolar carcinoma can present this way. No other  groundglass opacities in either lung. No enlarged lymph nodes.  2. Fatty infiltration of the liver.    CT CHEST WITHOUT CONTRAST 4/6/2016 9:04 AM  IMPRESSION: No interval change in a 3 cm groundglass opacity in the  right lung apex or a tiny 0.3 cm nodule in the right lower lobe since  1/28/2016. A followup CT scan could be considered in nine months for  re-evaluation.    CT CHEST WITH CONTRAST 1/28/2016 8:55 AM  IMPRESSION:   1. Ill-defined 3 cm groundglass opacity in the right lung apex. This  is nonspecific with possibilities including alveolitis and fibrosis.  There is also a tiny 0.3 cm indeterminate nodule in the right  "lower  lobe. Recommend comparison with prior chest CT, if available. If not  available, a followup noncontrast CT scan could be considered in three  months for reevaluation.   2. No other findings suspicious for active pulmonary disease.      ASSESSMENT and PLAN  Juwan Latham is a 66 year old male scheduled to undergo Laparoscopic Wedge Resection, Mediastinal Node Excision Possible Thoracoscopic; Flexible Bronchoscopy with Dr. Desai and Dr. Mcguire on 3/3/2017 at [facility] under general anesthesia.      ERAS protocol    Pre-operative consideration:   Cardiology - RCRI : No serious cardiac risks.  0.4 % risk of major adverse cardiac event.       HTN - Hold ACE I DOS       Normal stress echocardiogram with EF 55-60% on 1/2016  Pulmonary - Lung nodule as outlined in HPI       PAULINO - Does not use CPAP as could not tolerate mask.       40 pack year smoking history.  Quit 1985       PFT's  2/9/2017: FEV1/FVC 80%, FEV1 108%, DLCO 133%  GI - Known PONV,  PONV score = 2.  If > 2, anti-emetic intervention recommended  Hepatic - history of elevated LFT's.  Hepatitis C is negative.  Patient reports \"fatty liver\".  LFT's WNL today.  Renal - history of kidney stones, s/p lithotripsy 2012.  No complaints today.    HEENT - Airway: appears feasible  Neuro - Anxiety, take antianxiety as prescribed  Intermittent night sweats over past year.  Worked up by PCP, Dr. Julio Guidry 5/24/2016, etiology unclear.    Anesthesia considerations:  Refer to PAC assessment in anesthesia records  Functional Status: Excellent, METS>4  PAULINO # of risks 3/8 = Intermediate risk  VTE risk: 0.5%    Arrival time, NPO, shower and medication instructions provided by nursing staff today.  Preparing For Your Surgery handout given.  Patient was discussed with Dr. Melvin.  I spent 20 minutes face to face with patient, assessing, examining, and educating.    GLORIA Aaron CNP  Preoperative Assessment " [facility]  Phone: [phone]  Fax: [phone]

## 2017-02-24 NOTE — ANESTHESIA PREPROCEDURE EVALUATION
"  Anesthesia Evaluation     . Pt has had prior anesthetic. Type: General    History of anesthetic complications  - PONV    ROS/MED HX    ENT/Pulmonary:     (+)sleep apnea (Patient could not tolerate the mask.), tobacco use (Quit in 1985.  40 pack years.), Past use doesn't use CPAP , . .    Neurologic:  - neg neurologic ROS     Cardiovascular:     (+) hypertension-range: 120's/70's, ---. : . . . :. . Previous cardiac testing Echodate:results:date: results:ECG reviewed date: results: date: results:          METS/Exercise Tolerance: Comment: Works out every other day  - runs 3 miles at a time. >4 METS   Hematologic:  - neg hematologic  ROS       Musculoskeletal:  - neg musculoskeletal ROS       GI/Hepatic:     (+) GERD (GERD occurs occassionally and will take an OTC mediation.) Asymptomatic on medication, liver disease (\"fatty liver\"),       Renal/Genitourinary:  - ROS Renal section negative   (+) Nephrolithiasis ,       Endo:  - neg endo ROS       Psychiatric:     (+) psychiatric history anxiety      Infectious Disease:  - neg infectious disease ROS       Malignancy:      - no malignancy   Other:    (+) No chance of pregnancy C-spine cleared: N/A, no H/O Chronic Pain,no other significant disability              Physical Exam  Normal systems: cardiovascular and pulmonary    Airway   Mallampati: I  TM distance: >3 FB  Neck ROM: full    Dental   (+) missing and caps  Comment: Dentition in poor repair with multiple missing molars.    Cardiovascular   Rhythm and rate: regular and normal      Pulmonary    breath sounds clear to auscultation    Other findings: Lab Results      Component                Value               Date                      WBC                      5.0                 02/24/2017            Lab Results      Component                Value               Date                      RBC                      4.98                02/24/2017            Lab Results      Component                Value               " Date                      HGB                      15.8                02/24/2017            Lab Results      Component                Value               Date                      HCT                      47.3                02/24/2017            Lab Results      Component                Value               Date                      MCV                      95                  02/24/2017            Lab Results      Component                Value               Date                      MCH                      31.7                02/24/2017            Lab Results      Component                Value               Date                      MCHC                     33.4                02/24/2017            Lab Results      Component                Value               Date                      RDW                      12.5                02/24/2017            Lab Results      Component                Value               Date                      PLT                      164                 02/24/2017              Last Basic Metabolic Panel:  Lab Results      Component                Value               Date                      NA                       140                 02/24/2017             Lab Results      Component                Value               Date                      POTASSIUM                4.2                 02/24/2017            Lab Results      Component                Value               Date                      CHLORIDE                 104                 02/24/2017            Lab Results      Component                Value               Date                      JOANNE                      9.0                 02/24/2017            Lab Results      Component                Value               Date                      CO2                      28                  02/24/2017            Lab Results      Component                Value               Date                      BUN                      11                   02/24/2017            Lab Results      Component                Value               Date                      CR                       0.83                02/24/2017            Lab Results      Component                Value               Date                      GLC                      96                  02/24/2017              Lab Results      Component                Value               Date                      AST                      46                  02/24/2017            Lab Results      Component                Value               Date                      ALT                      69                  02/24/2017            No results found for: BILICONJ   Lab Results      Component                Value               Date                      BILITOTAL                1.0                 02/24/2017            Lab Results      Component                Value               Date                      ALBUMIN                  4.0                 02/24/2017            Lab Results      Component                Value               Date                      PROTTOTAL                7.3                 02/24/2017             Lab Results      Component                Value               Date                      ALKPHOS                  56                  02/24/2017                Procedures  EKG 2/24/17 :  NSR 62 bpm     PFT's  2/9/2017: FEV1/FVC 80%, FEV1 108%, DLCO 133%    Stress Echocardiogram 1/13/2016  Interpretation Summary  Normal resting wall motion and no stress-induced wall motion abnormality.  THIS IS A NORMAL STRESS ECHOCARDIOGRAM.    CT CHEST WITHOUT CONTRAST 1/25/2017 11:14 AM  IMPRESSION:  1. Slight interval enlargement of the groundglass opacity right lung  apex when compared to prior exam. Additional follow-up in one year  recommended to confirm interval growth. No solid components are  currently evident in this area. Consultation with thoracic surgery  could be performed for further  assessment as indolent malignancy such  as bronchoalveolar carcinoma can present this way. No other  groundglass opacities in either lung. No enlarged lymph nodes.  2. Fatty infiltration of the liver.  CT CHEST WITHOUT CONTRAST 4/6/2016 9:04 AM  IMPRESSION: No interval change in a 3 cm groundglass opacity in the  right lung apex or a tiny 0.3 cm nodule in the right lower lobe since  1/28/2016. A followup CT scan could be considered in nine months for  re-evaluation.  CT CHEST WITH CONTRAST 1/28/2016 8:55 AM  IMPRESSION:   1. Ill-defined 3 cm groundglass opacity in the right lung apex. This  is nonspecific with possibilities including alveolitis and fibrosis.  There is also a tiny 0.3 cm indeterminate nodule in the right lower  lobe. Recommend comparison with prior chest CT, if available. If not  available, a followup noncontrast CT scan could be considered in three  months for reevaluation.   2. No other findings suspicious for active pulmonary disease.           PAC Discussion and Assessment    ASA Classification: 3  Case is suitable for: Pease  Anesthetic techniques and relevant risks discussed: GA with regional block for post-op pain control  Invasive monitoring and risk discussed: No  Types:   Possibility and Risk of blood transfusion discussed: Yes  NPO instructions given:   Additional anesthetic preparation and risks discussed:   Needs early admission to pre-op area:   Other:     PAC Resident/NP Anesthesia Assessment:  Juwan Latham is a 66 year old male scheduled for Laparoscopic Wedge Resection, Mediastinal Node Excision Possible Thoracoscopic; Flexible Bronchoscopy with Dr. Desai and Dr. Mcguire on 3/3/2017 at [facility] under general anesthesia with ERAS protocol.  Mr. Latham was undergoing a chest pain work up January 2016 when 3 cm ground glass opacities in the right lung apex was incidentally found on CT scan.  Follow up CT in April 2016 showed no change.   "CT scan on 1/25/2017 showed a slight interval enlargement (3.0 to 3.8 cm).  Mr. Latham's case was presented at the lung nodule conference on 1/27/17.  The recommendation was surgical excision as on imaging, the GGO appears more adenocarcinoma in situ.  Additionally the yield for an IR biopsy of this GGO was thought to be very low.  Mr. Latham has a 40 pack year smoking history quitting in 1985.    Mr. Latham presents to PAC clinic and denies any chest pain, dypnea, cough, sore throat or fever.  He does report night sweats that he has had on and off over the past year.      PAC referral for risk assessment and optimization of anesthesia with comorbid conditions of:  PAULINO; GERD; HTN; and smoking history.    Pre-operative consideration:   Cardiology - RCRI : No serious cardiac risks.  0.4 % risk of major adverse cardiac event.       HTN - Hold ACE I DOS       Normal stress echocardiogram with EF 55-60% on 1/2016  Pulmonary - Lung nodule as outlined in HPI       PAULINO - Does not use CPAP as could not tolerate mask.       40 pack year smoking history.  Quit 1985       PFT's  2/9/2017: FEV1/FVC 80%, FEV1 108%, DLCO 133%  GI - Known PONV,  PONV score = 2.  If > 2, anti-emetic intervention recommended  Hepatic - history of elevated LFT's.  Hepatitis C is negative.  Patient reports \"fatty liver\".  Today LFT's WNL.  Renal - history of kidney stones, s/p lithotripsy 2012.  No complaints today.    HEENT - Airway: appears feasible  Neuro - Anxiety, take antianxiety as prescribed  Intermittent night sweats over past year.  Worked up by PCP, Dr. Julio Guidry 5/24/2016.  Etiology unclear.    Anesthesia considerations:  Refer to PAC assessment in anesthesia records  Functional Status: Excellent, METS>4  PAULINO # of risks 3/8 = Intermediate risk  VTE risk: 0.5%    Arrival time, NPO, shower and medication instructions provided by nursing staff today.  Preparing For Your Surgery handout given.  Patient was discussed with Dr." "Olegario.  I spent 20 minutes face to face with patient, assessing, examining, and educating.        Reviewed and Signed by PAC Mid-Level Provider/Resident  Mid-Level Provider/Resident: Janae CASTRO CNP  Date: 2/24/17  Time: 9:48 am    Attending Anesthesiologist Anesthesia Assessment:  STAFF:  66 y.o. man with lung nodule disease for broch and wedge resection  by Dr. Mcguire using general anesthesia.   History summarized above.  Relevant issues are:  # Former smoker, now quit.  Adequate PFT's.  # Appropriate exercise tolerance, plus Normal stress echocardiogram with EF 55-60% on 1/2016  # Patient has \"night sweats\" of unclear etiology  #ERAS protocol  Instructions given and questions answered.   Final plans by anesthesiology team on DOS.   ---rcp      Reviewed and Signed by PAC Anesthesiologist  Anesthesiologist: rosalie  Date: 2/24  Time: 0930  Pass/Fail: Pass  Disposition:     PAC Pharmacist Assessment:        Pharmacist:   Date:   Time:      Anesthesia Plan      History & Physical Review  History and physical reviewed and following examination; no interval change.    ASA Status:  3 .    NPO Status:  > 6 hours    Plan for General and ETT with Intravenous induction. Maintenance will be Balanced.    PONV prophylaxis:  Ondansetron (or other 5HT-3) and Dexamethasone or Solumedrol  Additional equipment: Double Lumen ETT and 2nd IV      Postoperative Care  Postoperative pain management:  Oral pain medications and Multi-modal analgesia.      Consents  Anesthetic plan, risks, benefits and alternatives discussed with:  Patient..                          .  "

## 2017-02-24 NOTE — TELEPHONE ENCOUNTER
Reason for Call:  Medication or medication refill:    Do you use a [facility]?  Name of the pharmacy and phone number for the current request:  [facility] [address]    Name of the medication requested: ALPRAZolam (XANAX) 0.5 MG tablet    Other request:     Can we leave a detailed message on this number? YES    Phone number patient can be reached at: [phone]    Best Time:     Call taken on 2/24/2017 at 12:20 PM by Marisela Myers

## 2017-02-24 NOTE — PATIENT INSTRUCTIONS
Using an Incentive Spirometer  Soon after your surgery, a nurse or therapist will teach you breathing exercises. These keep your lungs clear, strengthen your breathing muscles, and help prevent complications.  The exercises include doing a deep-breathing exercise using a device called an incentive spirometer.  To do these exercises, you will breathe in through your mouth and not your nose. The incentive spirometer only works correctly if you breathe in through your mouth.  Four steps to clear lungs     Deep breathing expands the lungs, aids circulation, and helps prevent pneumonia.   1. Exhale normally.    Relax and breathe out.  2. Place your lips tightly around the mouthpiece.    Make sure the device is upright and not tilted.  3. Inhale as much air as you can through the mouthpiece (don't breath through your nose).    Inhale slowly and deeply.    Hold your breath long enough to keep the balls or disk raised for at least 3 seconds.    If you re inhaling too quickly, your device may make a tone. If you hear this tone, inhale more slowly.  4. Repeat the exercise regularly.    Do this exercise every hour while you're awake, or as your health care provider instructs.    You will also be taught coughing exercises and be asked to do them regularly on your own.    4646-6992 [employer]. [address] [place] [address], [place] [address]. All rights reserved. This information is not intended as a substitute for professional medical care. Always follow your healthcare professional's instructions.  AFTER YOUR SURGERY  Breathing exercises   Breathing exercises help you recover faster. Take deep breaths and let the air out slowly. This will:     Help you wake up after surgery.    Help prevent complications like pneumonia.  Preventing complications will help you go home sooner.   We may give you a breathing device (incentive spirometer) to encourage you to breathe deeply.   Nausea and vomiting   You may feel sick to  your stomach after surgery; if so, let your nurse know.    Pain control:  After surgery, you may have pain. Our goal is to help you manage your pain. Pain medicine will help you feel comfortable enough to do activities that will help you heal.  These activities may include breathing exercises, walking and physical therapy.   To help your health care team treat your pain we will ask: 1) If you have pain  2) where it is located 3) describe your pain in your words  Methods of pain control include medications given by mouth, vein or by nerve block for some surgeries.  We may give you a pain control pump that will:  1) Deliver the medicine through a tube placed in your vein  2) Control the amount of medicine you receive  3) Allow you to push a button to deliver a dose of pain medicine  Sequential Compression Device (SCD) or Pneumo Boots:  You may need to wear SCD S on your legs or feet. These are wraps connected to a machine that pumps in air and releases it. The repeated pumping helps prevent blood clots from forming.     Enhanced Recovery After Surgery     This is a team effort, including you, to get you back on your feet, eating and drinking normally and out of the hospital as quickly as possible.  The goals are: 1) NO INFECTIONS and   2) RETURN TO NORMAL DIET    How can we achieve these goals?  1) STAY ACTIVE: Walk every day before your surgery; try to increase the amount every day.  Walk after surgery as much as you can-the nurses will help you.  Walking speeds healing and gets you home quicker, you heal better at home and have less risk of infection.     2) INCENTIVE SPIROMETER: Practice your incentive spirometer 4 times per day with 5-10 repetitions each time.  Using the incentive spirometer can strengthen your muscles between your ribs and help you have a strong cough after surgery.  A more effective cough can help prevent problems with your lungs.    3) STAY HYDRATED: Drink clear liquids up until 2 hours before  your surgery. We would like you to purchase a drink such as Gatorade.  Drink this before bedtime and on the way into the hospital, drink between 8-12 ounces or until you feel hydrated.  Keeping well hydrated leads to your veins being plump, you wake up faster, and you are less likely to be nauseated. Start drinking water as soon as you can after surgery and advance to clear liquids and food as tolerated.  IV fluids contain salt, drinking fluids will minimize the amount of IV fluids you need and decrease the amount of salt you get.     4) PAIN MANAGEMENT: If we minimize the amount of opioids and narcotics, and use regional blocks (which numb the area where your surgery is) along with oral pain medications; you will have less side effects of nausea and constipation. Narcotics can slow down your bowels and cause you to stay in the hospital longer.     Our goal is to keep you comfortable; eating and drinking normally and back home safely.       Preparing for Your Surgery      Name:  Juwan Latham   MRN:  [id number]   :  1950   Today's Date:  2017     Arriving for surgery:  Surgery date:  17  Surgery time: 1220 PM  Arrival time:  1020 AM  Please come to:       [facility] [address] [place] [address]    -   parking is available in front of the hospital from 5:15 am to 8:00 pm    -  Stop at the Information Desk in the lobby    -   Inform the information person that you are here for surgery. An escort to  will be provided. If you would not like an escort, please proceed to 3C on the 3rd floor. [phone]     What can I eat or drink?  -  You may have solid food or milk products until 8 hours prior to your surgery. 0400 AM Friday  -  You may have water, apple juice or 7up/Sprite until 2 hours prior to your surgery. 1020 AM    Which medicines can I take?  -  Do NOT take these medications in the morning, the day of surgery:  HOLD  LISINOPRIL THE AM OF SURGERY.    -  Please take these medications the day of surgery:  XANAX AS NEEDED, TYLENOL AS NEEDED FOR PAIN.    How do I prepare myself?  -  Take two showers: one the night before surgery; and one the morning of surgery.         Use Scrubcare or Hibiclens to wash from neck down.  You may use your own shampoo and conditioner. No other hair products.   -  Do NOT use lotion, powder, deodorant, or antiperspirant the day of your surgery.  -  Do NOT wear any makeup, fingernail polish or jewelry.  -  Begin using Incentive Spirometer 1 week prior to surgery.  Use 4 times per day, up to 5-10 breaths each time.  Bring Incentive Spirometer to hospital.  -Do not bring your own medications to the hospital, except for inhalers and eye drops.  -  Bring your ID and insurance card.    Questions or Concerns:  If you have questions or concerns, please call the  Preoperative Assessment Center, Monday-Friday 7AM-7PM:  [phone]

## 2017-02-25 RX ORDER — CELECOXIB 100 MG/1
200 CAPSULE ORAL ONCE
Status: CANCELLED | OUTPATIENT
Start: 2017-02-25 | End: 2017-02-25

## 2017-02-25 RX ORDER — CHLORHEXIDINE GLUCONATE ORAL RINSE 1.2 MG/ML
15 SOLUTION DENTAL ONCE
Status: CANCELLED | OUTPATIENT
Start: 2017-02-25 | End: 2017-02-25

## 2017-02-25 RX ORDER — GABAPENTIN 300 MG/1
300 CAPSULE ORAL ONCE
Status: CANCELLED | OUTPATIENT
Start: 2017-02-25 | End: 2017-02-25

## 2017-02-25 RX ORDER — ACETAMINOPHEN 325 MG/1
975 TABLET ORAL ONCE
Status: CANCELLED | OUTPATIENT
Start: 2017-02-25 | End: 2017-02-25

## 2017-02-27 LAB — INTERPRETATION ECG - MUSE: NORMAL

## 2017-02-27 RX ORDER — ALPRAZOLAM 0.5 MG
0.5 TABLET ORAL 3 TIMES DAILY PRN
Qty: 10 TABLET | Refills: 0 | Status: SHIPPED | OUTPATIENT
Start: 2017-02-27 | End: 2017-03-28

## 2017-02-27 NOTE — TELEPHONE ENCOUNTER
Xanax      Last Written Prescription Date:  1/30/17  Last Fill Quantity: 10,   # refills: 0  Last Office Visit with [facility] prescribing provider: 5/24/16  Future Office visit:       Routing refill request to provider for review/approval because:  Drug not on the [facility] refill protocol or controlled substance  Kaylee Akins RN, BSN  [facility]

## 2017-03-03 ENCOUNTER — ANESTHESIA (OUTPATIENT)
Dept: SURGERY | Facility: CLINIC | Age: 67
DRG: 165 | End: 2017-03-03
Payer: MEDICARE

## 2017-03-03 ENCOUNTER — HOSPITAL ENCOUNTER (INPATIENT)
Facility: CLINIC | Age: 67
LOS: 2 days | Discharge: HOME | DRG: 165 | End: 2017-03-05
Attending: STUDENT IN AN ORGANIZED HEALTH CARE EDUCATION/TRAINING PROGRAM | Admitting: STUDENT IN AN ORGANIZED HEALTH CARE EDUCATION/TRAINING PROGRAM
Payer: MEDICARE

## 2017-03-03 ENCOUNTER — APPOINTMENT (OUTPATIENT)
Dept: GENERAL RADIOLOGY | Facility: CLINIC | Age: 67
DRG: 165 | End: 2017-03-03
Attending: STUDENT IN AN ORGANIZED HEALTH CARE EDUCATION/TRAINING PROGRAM
Payer: MEDICARE

## 2017-03-03 DIAGNOSIS — R91.1 LUNG NODULE: Primary | ICD-10-CM

## 2017-03-03 DIAGNOSIS — C34.90 NON-SMALL CELL LUNG CANCER (NSCLC) (H): ICD-10-CM

## 2017-03-03 LAB
ABO + RH BLD: NORMAL
ABO + RH BLD: NORMAL
BLD GP AB SCN SERPL QL: NORMAL
BLOOD BANK CMNT PATIENT-IMP: NORMAL
SPECIMEN EXP DATE BLD: NORMAL

## 2017-03-03 PROCEDURE — 07T74ZZ RESECTION OF THORAX LYMPHATIC, PERCUTANEOUS ENDOSCOPIC APPROACH: ICD-10-PCS | Performed by: STUDENT IN AN ORGANIZED HEALTH CARE EDUCATION/TRAINING PROGRAM

## 2017-03-03 PROCEDURE — 88331 PATH CONSLTJ SURG 1 BLK 1SPC: CPT | Performed by: STUDENT IN AN ORGANIZED HEALTH CARE EDUCATION/TRAINING PROGRAM

## 2017-03-03 PROCEDURE — 25000566 ZZH SEVOFLURANE, EA 15 MIN: Performed by: STUDENT IN AN ORGANIZED HEALTH CARE EDUCATION/TRAINING PROGRAM

## 2017-03-03 PROCEDURE — 25000125 ZZHC RX 250: Performed by: ANESTHESIOLOGY

## 2017-03-03 PROCEDURE — 36000064 ZZH SURGERY LEVEL 4 EA 15 ADDTL MIN - UMMC: Performed by: STUDENT IN AN ORGANIZED HEALTH CARE EDUCATION/TRAINING PROGRAM

## 2017-03-03 PROCEDURE — 25000128 H RX IP 250 OP 636: Performed by: NURSE ANESTHETIST, CERTIFIED REGISTERED

## 2017-03-03 PROCEDURE — 25000132 ZZH RX MED GY IP 250 OP 250 PS 637: Mod: GY | Performed by: NURSE PRACTITIONER

## 2017-03-03 PROCEDURE — A9270 NON-COVERED ITEM OR SERVICE: HCPCS | Mod: GY | Performed by: STUDENT IN AN ORGANIZED HEALTH CARE EDUCATION/TRAINING PROGRAM

## 2017-03-03 PROCEDURE — A9270 NON-COVERED ITEM OR SERVICE: HCPCS | Mod: GY | Performed by: NURSE PRACTITIONER

## 2017-03-03 PROCEDURE — 25000125 ZZHC RX 250: Performed by: NURSE ANESTHETIST, CERTIFIED REGISTERED

## 2017-03-03 PROCEDURE — 71000015 ZZH RECOVERY PHASE 1 LEVEL 2 EA ADDTL HR: Performed by: STUDENT IN AN ORGANIZED HEALTH CARE EDUCATION/TRAINING PROGRAM

## 2017-03-03 PROCEDURE — 25000128 H RX IP 250 OP 636: Performed by: ANESTHESIOLOGY

## 2017-03-03 PROCEDURE — 25800025 ZZH RX 258: Performed by: ANESTHESIOLOGY

## 2017-03-03 PROCEDURE — 88332 PATH CONSLTJ SURG EA ADD BLK: CPT | Performed by: STUDENT IN AN ORGANIZED HEALTH CARE EDUCATION/TRAINING PROGRAM

## 2017-03-03 PROCEDURE — 25000128 H RX IP 250 OP 636: Performed by: STUDENT IN AN ORGANIZED HEALTH CARE EDUCATION/TRAINING PROGRAM

## 2017-03-03 PROCEDURE — 37000008 ZZH ANESTHESIA TECHNICAL FEE, 1ST 30 MIN: Performed by: STUDENT IN AN ORGANIZED HEALTH CARE EDUCATION/TRAINING PROGRAM

## 2017-03-03 PROCEDURE — 81445 SO NEO GSAP 5-50DNA/DNA&RNA: CPT | Performed by: STUDENT IN AN ORGANIZED HEALTH CARE EDUCATION/TRAINING PROGRAM

## 2017-03-03 PROCEDURE — C9399 UNCLASSIFIED DRUGS OR BIOLOG: HCPCS | Performed by: NURSE ANESTHETIST, CERTIFIED REGISTERED

## 2017-03-03 PROCEDURE — 27210995 ZZH RX 272: Performed by: STUDENT IN AN ORGANIZED HEALTH CARE EDUCATION/TRAINING PROGRAM

## 2017-03-03 PROCEDURE — 12000003 ZZH R&B CRITICAL UMMC

## 2017-03-03 PROCEDURE — 25000132 ZZH RX MED GY IP 250 OP 250 PS 637: Mod: GY | Performed by: STUDENT IN AN ORGANIZED HEALTH CARE EDUCATION/TRAINING PROGRAM

## 2017-03-03 PROCEDURE — 0BBC4ZZ EXCISION OF RIGHT UPPER LUNG LOBE, PERCUTANEOUS ENDOSCOPIC APPROACH: ICD-10-PCS | Performed by: STUDENT IN AN ORGANIZED HEALTH CARE EDUCATION/TRAINING PROGRAM

## 2017-03-03 PROCEDURE — 40000940 XR CHEST PORT 1 VW

## 2017-03-03 PROCEDURE — 0BJ08ZZ INSPECTION OF TRACHEOBRONCHIAL TREE, VIA NATURAL OR ARTIFICIAL OPENING ENDOSCOPIC: ICD-10-PCS | Performed by: STUDENT IN AN ORGANIZED HEALTH CARE EDUCATION/TRAINING PROGRAM

## 2017-03-03 PROCEDURE — 25000125 ZZHC RX 250: Performed by: STUDENT IN AN ORGANIZED HEALTH CARE EDUCATION/TRAINING PROGRAM

## 2017-03-03 PROCEDURE — 27210794 ZZH OR GENERAL SUPPLY STERILE: Performed by: STUDENT IN AN ORGANIZED HEALTH CARE EDUCATION/TRAINING PROGRAM

## 2017-03-03 PROCEDURE — 37000009 ZZH ANESTHESIA TECHNICAL FEE, EACH ADDTL 15 MIN: Performed by: STUDENT IN AN ORGANIZED HEALTH CARE EDUCATION/TRAINING PROGRAM

## 2017-03-03 PROCEDURE — 88377 M/PHMTRC ALYS ISHQUANT/SEMIQ: CPT | Performed by: STUDENT IN AN ORGANIZED HEALTH CARE EDUCATION/TRAINING PROGRAM

## 2017-03-03 PROCEDURE — 71000014 ZZH RECOVERY PHASE 1 LEVEL 2 FIRST HR: Performed by: STUDENT IN AN ORGANIZED HEALTH CARE EDUCATION/TRAINING PROGRAM

## 2017-03-03 PROCEDURE — 40000171 ZZH STATISTIC PRE-PROCEDURE ASSESSMENT III: Performed by: STUDENT IN AN ORGANIZED HEALTH CARE EDUCATION/TRAINING PROGRAM

## 2017-03-03 PROCEDURE — 36000062 ZZH SURGERY LEVEL 4 1ST 30 MIN - UMMC: Performed by: STUDENT IN AN ORGANIZED HEALTH CARE EDUCATION/TRAINING PROGRAM

## 2017-03-03 PROCEDURE — 00000159 ZZHCL STATISTIC H-SEND OUTS PREP: Performed by: STUDENT IN AN ORGANIZED HEALTH CARE EDUCATION/TRAINING PROGRAM

## 2017-03-03 PROCEDURE — 88307 TISSUE EXAM BY PATHOLOGIST: CPT | Performed by: STUDENT IN AN ORGANIZED HEALTH CARE EDUCATION/TRAINING PROGRAM

## 2017-03-03 PROCEDURE — 25000128 H RX IP 250 OP 636: Performed by: CLINICAL NURSE SPECIALIST

## 2017-03-03 RX ORDER — PROCHLORPERAZINE MALEATE 5 MG
5 TABLET ORAL EVERY 6 HOURS PRN
Status: DISCONTINUED | OUTPATIENT
Start: 2017-03-03 | End: 2017-03-05 | Stop reason: HOSPADM

## 2017-03-03 RX ORDER — ONDANSETRON 2 MG/ML
INJECTION INTRAMUSCULAR; INTRAVENOUS PRN
Status: DISCONTINUED | OUTPATIENT
Start: 2017-03-03 | End: 2017-03-03

## 2017-03-03 RX ORDER — NALOXONE HYDROCHLORIDE 0.4 MG/ML
.1-.4 INJECTION, SOLUTION INTRAMUSCULAR; INTRAVENOUS; SUBCUTANEOUS
Status: DISCONTINUED | OUTPATIENT
Start: 2017-03-03 | End: 2017-03-03 | Stop reason: HOSPADM

## 2017-03-03 RX ORDER — ONDANSETRON 2 MG/ML
4 INJECTION INTRAMUSCULAR; INTRAVENOUS EVERY 6 HOURS PRN
Status: DISCONTINUED | OUTPATIENT
Start: 2017-03-03 | End: 2017-03-04

## 2017-03-03 RX ORDER — ACETAMINOPHEN 325 MG/1
650 TABLET ORAL EVERY 6 HOURS SCHEDULED
Status: DISCONTINUED | OUTPATIENT
Start: 2017-03-04 | End: 2017-03-05 | Stop reason: HOSPADM

## 2017-03-03 RX ORDER — LIDOCAINE 40 MG/G
CREAM TOPICAL
Status: DISCONTINUED | OUTPATIENT
Start: 2017-03-03 | End: 2017-03-05 | Stop reason: HOSPADM

## 2017-03-03 RX ORDER — CEFAZOLIN SODIUM 2 G/100ML
2 INJECTION, SOLUTION INTRAVENOUS
Status: COMPLETED | OUTPATIENT
Start: 2017-03-03 | End: 2017-03-03

## 2017-03-03 RX ORDER — HYDROMORPHONE HYDROCHLORIDE 1 MG/ML
.3-.5 INJECTION, SOLUTION INTRAMUSCULAR; INTRAVENOUS; SUBCUTANEOUS EVERY 5 MIN PRN
Status: DISCONTINUED | OUTPATIENT
Start: 2017-03-03 | End: 2017-03-03 | Stop reason: HOSPADM

## 2017-03-03 RX ORDER — NALOXONE HYDROCHLORIDE 0.4 MG/ML
.1-.4 INJECTION, SOLUTION INTRAMUSCULAR; INTRAVENOUS; SUBCUTANEOUS
Status: DISCONTINUED | OUTPATIENT
Start: 2017-03-03 | End: 2017-03-05 | Stop reason: HOSPADM

## 2017-03-03 RX ORDER — DEXAMETHASONE SODIUM PHOSPHATE 4 MG/ML
INJECTION, SOLUTION INTRA-ARTICULAR; INTRALESIONAL; INTRAMUSCULAR; INTRAVENOUS; SOFT TISSUE PRN
Status: DISCONTINUED | OUTPATIENT
Start: 2017-03-03 | End: 2017-03-03

## 2017-03-03 RX ORDER — SODIUM CHLORIDE, SODIUM LACTATE, POTASSIUM CHLORIDE, CALCIUM CHLORIDE 600; 310; 30; 20 MG/100ML; MG/100ML; MG/100ML; MG/100ML
INJECTION, SOLUTION INTRAVENOUS CONTINUOUS
Status: DISCONTINUED | OUTPATIENT
Start: 2017-03-03 | End: 2017-03-03 | Stop reason: HOSPADM

## 2017-03-03 RX ORDER — LIDOCAINE HYDROCHLORIDE 20 MG/ML
INJECTION, SOLUTION INFILTRATION; PERINEURAL PRN
Status: DISCONTINUED | OUTPATIENT
Start: 2017-03-03 | End: 2017-03-03

## 2017-03-03 RX ORDER — PROPOFOL 10 MG/ML
INJECTION, EMULSION INTRAVENOUS PRN
Status: DISCONTINUED | OUTPATIENT
Start: 2017-03-03 | End: 2017-03-03

## 2017-03-03 RX ORDER — CEFAZOLIN SODIUM 1 G/3ML
1 INJECTION, POWDER, FOR SOLUTION INTRAMUSCULAR; INTRAVENOUS SEE ADMIN INSTRUCTIONS
Status: DISCONTINUED | OUTPATIENT
Start: 2017-03-03 | End: 2017-03-03 | Stop reason: HOSPADM

## 2017-03-03 RX ORDER — ALPRAZOLAM 0.5 MG
0.5 TABLET ORAL 3 TIMES DAILY PRN
Status: DISCONTINUED | OUTPATIENT
Start: 2017-03-03 | End: 2017-03-05 | Stop reason: HOSPADM

## 2017-03-03 RX ORDER — PROCHLORPERAZINE 25 MG
12.5 SUPPOSITORY, RECTAL RECTAL EVERY 12 HOURS PRN
Status: DISCONTINUED | OUTPATIENT
Start: 2017-03-03 | End: 2017-03-05 | Stop reason: HOSPADM

## 2017-03-03 RX ORDER — GABAPENTIN 300 MG/1
300 CAPSULE ORAL ONCE
Status: COMPLETED | OUTPATIENT
Start: 2017-03-03 | End: 2017-03-03

## 2017-03-03 RX ORDER — EPHEDRINE SULFATE 50 MG/ML
INJECTION, SOLUTION INTRAMUSCULAR; INTRAVENOUS; SUBCUTANEOUS PRN
Status: DISCONTINUED | OUTPATIENT
Start: 2017-03-03 | End: 2017-03-03

## 2017-03-03 RX ORDER — SODIUM CHLORIDE, SODIUM LACTATE, POTASSIUM CHLORIDE, CALCIUM CHLORIDE 600; 310; 30; 20 MG/100ML; MG/100ML; MG/100ML; MG/100ML
INJECTION, SOLUTION INTRAVENOUS CONTINUOUS PRN
Status: DISCONTINUED | OUTPATIENT
Start: 2017-03-03 | End: 2017-03-03

## 2017-03-03 RX ORDER — BUPIVACAINE HYDROCHLORIDE AND EPINEPHRINE 2.5; 5 MG/ML; UG/ML
INJECTION, SOLUTION EPIDURAL; INFILTRATION; INTRACAUDAL; PERINEURAL PRN
Status: DISCONTINUED | OUTPATIENT
Start: 2017-03-03 | End: 2017-03-03 | Stop reason: HOSPADM

## 2017-03-03 RX ORDER — ACETAMINOPHEN 325 MG/1
975 TABLET ORAL ONCE
Status: COMPLETED | OUTPATIENT
Start: 2017-03-03 | End: 2017-03-03

## 2017-03-03 RX ORDER — FLUMAZENIL 0.1 MG/ML
0.2 INJECTION, SOLUTION INTRAVENOUS
Status: DISCONTINUED | OUTPATIENT
Start: 2017-03-03 | End: 2017-03-03 | Stop reason: HOSPADM

## 2017-03-03 RX ORDER — ACETAMINOPHEN 10 MG/ML
1000 INJECTION, SOLUTION INTRAVENOUS ONCE
Status: COMPLETED | OUTPATIENT
Start: 2017-03-03 | End: 2017-03-03

## 2017-03-03 RX ORDER — ONDANSETRON 4 MG/1
4 TABLET, ORALLY DISINTEGRATING ORAL EVERY 30 MIN PRN
Status: DISCONTINUED | OUTPATIENT
Start: 2017-03-03 | End: 2017-03-03 | Stop reason: HOSPADM

## 2017-03-03 RX ORDER — ONDANSETRON 4 MG/1
4 TABLET, ORALLY DISINTEGRATING ORAL EVERY 6 HOURS PRN
Status: DISCONTINUED | OUTPATIENT
Start: 2017-03-03 | End: 2017-03-04

## 2017-03-03 RX ORDER — FENTANYL CITRATE 50 UG/ML
25-50 INJECTION, SOLUTION INTRAMUSCULAR; INTRAVENOUS
Status: DISCONTINUED | OUTPATIENT
Start: 2017-03-03 | End: 2017-03-03 | Stop reason: HOSPADM

## 2017-03-03 RX ORDER — OXYCODONE HCL 5 MG/5 ML
5 SOLUTION, ORAL ORAL EVERY 4 HOURS PRN
Status: DISCONTINUED | OUTPATIENT
Start: 2017-03-03 | End: 2017-03-05

## 2017-03-03 RX ORDER — CELECOXIB 200 MG/1
200 CAPSULE ORAL ONCE
Status: COMPLETED | OUTPATIENT
Start: 2017-03-03 | End: 2017-03-03

## 2017-03-03 RX ORDER — AMOXICILLIN 250 MG
1-2 CAPSULE ORAL 2 TIMES DAILY
Status: DISCONTINUED | OUTPATIENT
Start: 2017-03-03 | End: 2017-03-05 | Stop reason: HOSPADM

## 2017-03-03 RX ORDER — CHLORHEXIDINE GLUCONATE ORAL RINSE 1.2 MG/ML
15 SOLUTION DENTAL ONCE
Status: COMPLETED | OUTPATIENT
Start: 2017-03-03 | End: 2017-03-03

## 2017-03-03 RX ORDER — ONDANSETRON 2 MG/ML
4 INJECTION INTRAMUSCULAR; INTRAVENOUS EVERY 30 MIN PRN
Status: DISCONTINUED | OUTPATIENT
Start: 2017-03-03 | End: 2017-03-03 | Stop reason: HOSPADM

## 2017-03-03 RX ADMIN — FENTANYL CITRATE 50 MCG: 50 INJECTION, SOLUTION INTRAMUSCULAR; INTRAVENOUS at 12:19

## 2017-03-03 RX ADMIN — CEFAZOLIN SODIUM 1 G: 2 INJECTION, SOLUTION INTRAVENOUS at 12:32

## 2017-03-03 RX ADMIN — ROCURONIUM BROMIDE 10 MG: 10 INJECTION INTRAVENOUS at 13:47

## 2017-03-03 RX ADMIN — ROCURONIUM BROMIDE 50 MG: 10 INJECTION INTRAVENOUS at 10:13

## 2017-03-03 RX ADMIN — FENTANYL CITRATE 50 MCG: 50 INJECTION, SOLUTION INTRAMUSCULAR; INTRAVENOUS at 12:27

## 2017-03-03 RX ADMIN — HYDROMORPHONE HYDROCHLORIDE 0.5 MG: 10 INJECTION, SOLUTION INTRAMUSCULAR; INTRAVENOUS; SUBCUTANEOUS at 16:45

## 2017-03-03 RX ADMIN — HYDROMORPHONE HYDROCHLORIDE: 10 INJECTION, SOLUTION INTRAMUSCULAR; INTRAVENOUS; SUBCUTANEOUS at 17:20

## 2017-03-03 RX ADMIN — ROCURONIUM BROMIDE 10 MG: 10 INJECTION INTRAVENOUS at 13:12

## 2017-03-03 RX ADMIN — MIDAZOLAM HYDROCHLORIDE 2 MG: 1 INJECTION, SOLUTION INTRAMUSCULAR; INTRAVENOUS at 10:00

## 2017-03-03 RX ADMIN — PHENYLEPHRINE HYDROCHLORIDE 100 MCG: 10 INJECTION, SOLUTION INTRAMUSCULAR; INTRAVENOUS; SUBCUTANEOUS at 14:47

## 2017-03-03 RX ADMIN — HYDROMORPHONE HYDROCHLORIDE 0.2 MG: 1 INJECTION, SOLUTION INTRAMUSCULAR; INTRAVENOUS; SUBCUTANEOUS at 15:56

## 2017-03-03 RX ADMIN — CEFAZOLIN SODIUM 1 G: 2 INJECTION, SOLUTION INTRAVENOUS at 14:32

## 2017-03-03 RX ADMIN — FENTANYL CITRATE 50 MCG: 50 INJECTION, SOLUTION INTRAMUSCULAR; INTRAVENOUS at 11:07

## 2017-03-03 RX ADMIN — PROCHLORPERAZINE EDISYLATE 5 MG: 5 INJECTION INTRAMUSCULAR; INTRAVENOUS at 19:50

## 2017-03-03 RX ADMIN — HYDROMORPHONE HYDROCHLORIDE 0.5 MG: 10 INJECTION, SOLUTION INTRAMUSCULAR; INTRAVENOUS; SUBCUTANEOUS at 17:21

## 2017-03-03 RX ADMIN — FENTANYL CITRATE 50 MCG: 50 INJECTION, SOLUTION INTRAMUSCULAR; INTRAVENOUS at 16:34

## 2017-03-03 RX ADMIN — ROCURONIUM BROMIDE 10 MG: 10 INJECTION INTRAVENOUS at 11:07

## 2017-03-03 RX ADMIN — Medication 5 MG: at 12:12

## 2017-03-03 RX ADMIN — FENTANYL CITRATE 50 MCG: 50 INJECTION, SOLUTION INTRAMUSCULAR; INTRAVENOUS at 11:02

## 2017-03-03 RX ADMIN — FENTANYL CITRATE 50 MCG: 50 INJECTION, SOLUTION INTRAMUSCULAR; INTRAVENOUS at 16:42

## 2017-03-03 RX ADMIN — CELECOXIB 200 MG: 200 CAPSULE ORAL at 08:23

## 2017-03-03 RX ADMIN — PHENYLEPHRINE HYDROCHLORIDE 100 MCG: 10 INJECTION, SOLUTION INTRAMUSCULAR; INTRAVENOUS; SUBCUTANEOUS at 14:24

## 2017-03-03 RX ADMIN — FENTANYL CITRATE 50 MCG: 50 INJECTION, SOLUTION INTRAMUSCULAR; INTRAVENOUS at 16:55

## 2017-03-03 RX ADMIN — HYDROMORPHONE HYDROCHLORIDE 0.3 MG: 1 INJECTION, SOLUTION INTRAMUSCULAR; INTRAVENOUS; SUBCUTANEOUS at 14:42

## 2017-03-03 RX ADMIN — LIDOCAINE HYDROCHLORIDE 100 MG: 20 INJECTION, SOLUTION INFILTRATION; PERINEURAL at 10:12

## 2017-03-03 RX ADMIN — PHENYLEPHRINE HYDROCHLORIDE 100 MCG: 10 INJECTION, SOLUTION INTRAMUSCULAR; INTRAVENOUS; SUBCUTANEOUS at 13:47

## 2017-03-03 RX ADMIN — ONDANSETRON 4 MG: 2 INJECTION INTRAMUSCULAR; INTRAVENOUS at 14:43

## 2017-03-03 RX ADMIN — ROCURONIUM BROMIDE 10 MG: 10 INJECTION INTRAVENOUS at 14:29

## 2017-03-03 RX ADMIN — OXYCODONE HYDROCHLORIDE 5 MG: 5 SOLUTION ORAL at 22:47

## 2017-03-03 RX ADMIN — SUGAMMADEX 200 MG: 100 INJECTION, SOLUTION INTRAVENOUS at 15:50

## 2017-03-03 RX ADMIN — PROPOFOL 50 MG: 10 INJECTION, EMULSION INTRAVENOUS at 10:20

## 2017-03-03 RX ADMIN — SODIUM CHLORIDE, POTASSIUM CHLORIDE, SODIUM LACTATE AND CALCIUM CHLORIDE: 600; 310; 30; 20 INJECTION, SOLUTION INTRAVENOUS at 14:36

## 2017-03-03 RX ADMIN — PHENYLEPHRINE HYDROCHLORIDE 100 MCG: 10 INJECTION, SOLUTION INTRAMUSCULAR; INTRAVENOUS; SUBCUTANEOUS at 13:08

## 2017-03-03 RX ADMIN — ACETAMINOPHEN 975 MG: 325 TABLET, FILM COATED ORAL at 08:23

## 2017-03-03 RX ADMIN — HYDROMORPHONE HYDROCHLORIDE 0.5 MG: 10 INJECTION, SOLUTION INTRAMUSCULAR; INTRAVENOUS; SUBCUTANEOUS at 17:50

## 2017-03-03 RX ADMIN — HYDROMORPHONE HYDROCHLORIDE 0.5 MG: 10 INJECTION, SOLUTION INTRAMUSCULAR; INTRAVENOUS; SUBCUTANEOUS at 17:07

## 2017-03-03 RX ADMIN — ACETAMINOPHEN 1000 MG: 10 INJECTION, SOLUTION INTRAVENOUS at 16:15

## 2017-03-03 RX ADMIN — CHLORHEXIDINE GLUCONATE 15 ML: 1.2 RINSE ORAL at 08:24

## 2017-03-03 RX ADMIN — GABAPENTIN 300 MG: 300 CAPSULE ORAL at 08:23

## 2017-03-03 RX ADMIN — ROCURONIUM BROMIDE 10 MG: 10 INJECTION INTRAVENOUS at 15:10

## 2017-03-03 RX ADMIN — PROPOFOL 150 MG: 10 INJECTION, EMULSION INTRAVENOUS at 10:12

## 2017-03-03 RX ADMIN — SODIUM CHLORIDE, POTASSIUM CHLORIDE, SODIUM LACTATE AND CALCIUM CHLORIDE: 600; 310; 30; 20 INJECTION, SOLUTION INTRAVENOUS at 10:00

## 2017-03-03 RX ADMIN — DEXAMETHASONE SODIUM PHOSPHATE 6 MG: 4 INJECTION, SOLUTION INTRAMUSCULAR; INTRAVENOUS at 12:18

## 2017-03-03 RX ADMIN — ROCURONIUM BROMIDE 20 MG: 10 INJECTION INTRAVENOUS at 12:13

## 2017-03-03 RX ADMIN — FENTANYL CITRATE 50 MCG: 50 INJECTION, SOLUTION INTRAMUSCULAR; INTRAVENOUS at 17:01

## 2017-03-03 RX ADMIN — CEFAZOLIN SODIUM 2 G: 2 INJECTION, SOLUTION INTRAVENOUS at 10:35

## 2017-03-03 RX ADMIN — ONDANSETRON 4 MG: 2 INJECTION INTRAMUSCULAR; INTRAVENOUS at 18:43

## 2017-03-03 RX ADMIN — FENTANYL CITRATE 100 MCG: 50 INJECTION, SOLUTION INTRAMUSCULAR; INTRAVENOUS at 10:20

## 2017-03-03 RX ADMIN — ROCURONIUM BROMIDE 20 MG: 10 INJECTION INTRAVENOUS at 11:02

## 2017-03-03 NOTE — ANESTHESIA POSTPROCEDURE EVALUATION
Patient: Juwan Latham    Procedure(s):  Flexible Bronchoscopy, Laparoscopic Right Upper Lobe Wedge Resection, Mediastinal Node Dissection - Wound Class: I-Clean   - Wound Class: II-Clean Contaminated    Diagnosis:Lung Nodule   Diagnosis Additional Information: No value filed.    Anesthesia Type:  General, ETT    Note:  Anesthesia Post Evaluation    Patient location during evaluation: bedside  Patient participation: Able to fully participate in evaluation  Level of consciousness: awake  Pain management: adequate  Airway patency: patent  Cardiovascular status: acceptable  Respiratory status: acceptable  Hydration status: acceptable  PONV: controlled     Anesthetic complications: None          Last vitals:  Vitals:    03/03/17 1645 03/03/17 1700 03/03/17 1715   BP: 156/88 148/83    Pulse:      Resp: 12 12 12   Temp: 36.4  C (97.6  F)     SpO2: 98% 95%          Electronically Signed By: Vik Trejo MD, MD  March 3, 2017  5:24 PM

## 2017-03-03 NOTE — IP AVS SNAPSHOT
[address] [address]    Phone:  [phone]                                       After Visit Summary   3/3/2017    Juwan Latham    MRN: [id number]           After Visit Summary Signature Page     I have received my discharge instructions, and my questions have been answered. I have discussed any challenges I see with this plan with the nurse or doctor.    ..........................................................................................................................................  Patient/Patient Representative Signature      ..........................................................................................................................................  Patient Representative Print Name and Relationship to Patient    ..................................................               ................................................  Date                                            Time    ..........................................................................................................................................  Reviewed by Signature/Title    ...................................................              ..............................................  Date                                                            Time

## 2017-03-03 NOTE — PROGRESS NOTES
"SURGERY POST-OP CHECK NOTE  3/3/2017 5:52 PM     Patient: uJwan Latham  MRN: [id number]    Subjective  No acute events postoperatively. Pain well controlled. Denies shortness of breath. Patient reports discomfort in chest from chest tube. No other complaints.      Objective  /83  Pulse 55  Temp 97.6  F (36.4  C) (Oral)  Resp 12  Ht 1.727 m (5' 7.99\")  Wt 87.8 kg (193 lb 9 oz)  SpO2 98%  BMI 29.44 kg/m2  General: AAOx4, NAD, lying in bed, appears comfortable but diaphoretic  CV: regular rate and rhythm  Pulm: breathing comfortably on 4L NC, chest   Abd: soft, appropriately tender to palpation, non-distended, incisions c/d/i with Dermabond  Extremities: warm, well-perfused without edema  Neuro: facial movement grossly symmetric, moving all extremities spontaneously without apparent deficit    UOP postop:  None recorded      Assessment/Plan  Juwan Latham is a 66 year old male POD#0 s/p right wedge resection for GOO, doing well.     Neuro: home Xanax prn     - Pain: dilaudid PCA, Tylenol liquid   CV: HDS, no issues  Pulm: satting on 4L NC, encourage IS     - chest tube placed to water seal in PACU, will get CXR in am and likely pull tomorrow am  FEN/GI:      - IVF: none     - Diet: CLD     - Bowel regimen: senna-colace     - Nausea control: prn anti-emetics  Renal: overnight MD will follow up on UOP  ID: afebrile, no leukocytosis, no abx  PPx: OOB, IS, SCDs, Lovenox to start in am, Protonix  Dispo: 7B when appropriate to discharge from PACU per Anesthesia      - - - - - - - - - - - - - - - - - -  Kayla Green MD  General Surgery PGY-1      ADDENDUM:  Evaluated patient at bedside around 10:30PM. Patient reports that pain continues to be an issue and he forgets to use PCA. Otherwise denies headache, chest pain, or shortness of breath. Has not urinated yet. Non labored respirations on 4L. Chest tube with minimal SS output, no air leak. Ordered liquid oxycodone to supplement pain regimen. Encourage void and " recheck bladder scan in 1-2 hours if still hasn't voided.    Mar Sherman MD  General Surgery PGY-1

## 2017-03-03 NOTE — PROGRESS NOTES
Spoke with Thorjennifer resident about indicating a laterality for the surgical approach on the Order for informed consent. Resident indicated that surgical approach will be through the abdomen.

## 2017-03-03 NOTE — ANESTHESIA CARE TRANSFER NOTE
Patient: Juwan Latham    Procedure(s):  Flexible Bronchoscopy, Laparoscopic Right Upper Lobe Wedge Resection, Mediastinal Node Dissection - Wound Class: I-Clean   - Wound Class: II-Clean Contaminated    Diagnosis: Lung Nodule   Diagnosis Additional Information: No value filed.    Anesthesia Type:   General, ETT     Note:  Airway :Face Mask  Patient transferred to:PACU  Comments: Oropharynx suctioned. spont resp. Sitting upright. Extubated. Exchanging well. To PACU. Report to RN at bedside.       Vitals: (Last set prior to Anesthesia Care Transfer)    CRNA VITALS  3/3/2017 1545 - 3/3/2017 1630      3/3/2017             Pulse: 73    Ht Rate: 74    SpO2: 99 %    Resp Rate (observed): 18    Resp Rate (set): 10    EKG: Sinus rhythm                Electronically Signed By: GLORIA Sanchez CRNA  March 3, 2017  4:30 PM

## 2017-03-03 NOTE — IP AVS SNAPSHOT
MRN:[id number]                      After Visit Summary   3/3/2017    Juwan Latham    MRN: [id number]           Thank you!     Thank you for choosing [facility] for your care. Our goal is always to provide you with excellent care. Hearing back from our patients is one way we can continue to improve our services. Please take a few minutes to complete the written survey that you may receive in the mail after you visit with us. Thank you!        Patient Information     Date Of Birth          1950        About your hospital stay     You were admitted on:  March 3, 2017 You last received care in the:  [address] [facility]    You were discharged on:  March 5, 2017        Reason for your hospital stay       You had a wedge resection (removal of part of the lung) for a ground glass opacity (a spot on your lung).                  Who to Call     For medical emergencies, please call 911.  For non-urgent questions about your medical care, please call your primary care provider or clinic, [phone]  For questions related to your surgery, please call your surgery clinic        Attending Provider     Provider Specialty    Maria A Desai MD Thoracic Diseases       Primary Care Provider Office Phone # Fax #    Julio Guidry Jr., [phone]       [facility] [place] [address] [address]        After Care Instructions     Activity       See discharge instructions.            Diet       You may eat a regular diet.            Discharge Instructions       THORACIC SURGERY DISCHARGE INSTRUCTIONS    ACTIVITY  If your plans upon discharge include prolonged periods of sitting (i.e a lengthy car or plane ride), it is highly beneficial to get up and walk at least once per hour to help prevent swelling and blood clots.     Activity as tolerated, no strenous activity until seen in follow-up, no lifting greater than 20 pounds for the next 6 weeks.      DRESSINGS/INCISIONS  You  may remove chest tube dressing tomorrow (3/6/2016) and bandage the site at your own discretion thereafter.  Small amounts of leakage are normal for 2-3 days after removal.  Feel free to call with questions.    You may get incisions wet 2 days after operation. Do not submerge, soak, or scrub incision or swim until seen in follow-up. You have surgical superglue (Dermabond) on your incisions; this will flake off on it's own over time.       OTHER INSTRUCTIONS  Take incentive spirometer home for continued frequent use    Stay hydrated. Take over the counter fiber (metamucil or benefiber) and stool softeners (Miralax, docusate or senna) if becoming constipated. You have also been prescribed a stool softener that you should take while taking narcotics.    No driving while taking narcotic pain medication.    Transition to ibuprofen or tylenol/acetaminophen for pain control. Do not take tylenol/acetaminophen and acetaminophen containing narcotic (e.g., percocet or vicodin) at the same time. If you have known ulcer problems, or kidney trouble (elevated creatinine) do not take the ibuprofen.      WHEN TO CALL    Call the numbers below for fever greater than 101.5, chills, increased size of incision, red skin around incision, vision changes, muscle strength changes, sensation changes, shortness of breath, or other concerns.    In emergencies, call 911    For other Questions or Concerns;   A.) During weekday working hours (Monday through Friday 8am to 4:30pm)   call [phone] and ask to speak to a clinical nurse specialist.     B.) At nights (after 4:30pm), on weekends, or if urgent call [phone] and   tell the   I would like to page job code [phone], the thoracic surgery   fellow on call, please.                   Follow-up Appointments     Adult [facility] Follow-up and recommended labs and tests       1.) Follow up with Julio Guidry Jr. in 1-2 weeks to review how you are doing after surgery.  2.)  "Follow up with a Clinical Nurse Specialist (CNS) with Thoracic Surgery will be arranged in 1 month. You will have a chest x-ray prior to this appointment. The Thoracic office will contact you to set up the appointments.  You may call [phone] if you wish to schedule before you are contacted. Please call if you have not heard regarding this appointment within 3 business days of discharge.                  Future tests that were ordered for you     Phosphorus           XR Chest 2 Views       Please schedule prior to appointment with Thoracic Surgery office.                  Pending Results     Date and Time Order Name Status Description    3/3/2017 1258 Surgical pathology exam Preliminary             Statement of Approval     Ordered          03/05/17 0716  I have reviewed and agree with all the recommendations and orders detailed in this document.  EFFECTIVE NOW     Approved and electronically signed by:  Kayla Green MD             Admission Information     Date & Time Provider Department Dept. Phone    3/3/2017 Maria A Desai MD [address] [facility] [place] [phone]      Your Vitals Were     Blood Pressure Pulse Temperature Respirations Height Weight    130/83 (BP Location: Right arm) 74 96  F (35.6  C) (Oral) 16 1.727 m (5' 7.99\") 87.8 kg (193 lb 9 oz)    Pulse Oximetry BMI (Body Mass Index)                95% 29.44 kg/m2          [facility] Information     [facility] gives you secure access to your electronic health record. If you see a primary care provider, you can also send messages to your care team and make appointments. If you have questions, please call your primary care clinic.  If you do not have a primary care provider, please call [phone] and they will assist you.        Care EveryWhere ID     This is your Care EveryWhere ID. This could be used by other organizations to access your [facility] medical records  [id number]           Review of your medicines      START taking        Dose / " Directions    acetaminophen 325 MG tablet   Commonly known as:  TYLENOL        Dose:  650 mg   Take 2 tablets (650 mg) by mouth every 6 hours Do not take more than 4,000 mg of acetaminophen (Tylenol) from all sources to prevent liver damage.   Quantity:  100 tablet   Refills:  1       ondansetron 4 MG ODT tab   Commonly known as:  ZOFRAN-ODT        Dose:  4-8 mg   Take 1-2 tablets (4-8 mg) by mouth every 6 hours as needed for nausea   Quantity:  60 tablet   Refills:  1       oxyCODONE 5 MG IR tablet   Commonly known as:  ROXICODONE        Dose:  5-10 mg   Take 1-2 tablets (5-10 mg) by mouth every 4 hours as needed for moderate to severe pain   Quantity:  20 tablet   Refills:  0       phosphorus tablet 250 mg 250 MG per tablet   Commonly known as:  K PHOS NEUTRAL        Dose:  500 mg   Take 2 tablets (500 mg) by mouth 3 times daily Take 2 tablets in the evening on Jose 3/5 and in the morning on Monday 3/6. Get your phosphorus level rechecked on Monday 3/6 at the [facility].   Quantity:  4 tablet   Refills:  0       senna-docusate 8.6-50 MG per tablet   Commonly known as:  SENOKOT-S;PERICOLACE        Dose:  1-2 tablet   Take 1-2 tablets by mouth 2 times daily   Quantity:  120 tablet   Refills:  3         CONTINUE these medicines which have NOT CHANGED        Dose / Directions    ALPRAZolam 0.5 MG tablet   Commonly known as:  XANAX   Used for:  Anxiety        Dose:  0.5 mg   Take 1 tablet (0.5 mg) by mouth 3 times daily as needed for anxiety   Quantity:  10 tablet   Refills:  0       lisinopril 10 MG tablet   Commonly known as:  PRINIVIL/ZESTRIL        Refills:  3            Where to get your medicines      These medications were sent to [facility] [place], [place] - [address]  [address] [address]     Phone:  [phone]     acetaminophen 325 MG tablet    ondansetron 4 MG ODT tab    phosphorus tablet 250 mg 250 MG per tablet    senna-docusate 8.6-50 MG per tablet          Some of these will need a paper prescription and others can be bought over the counter. Ask your nurse if you have questions.     Bring a paper prescription for each of these medications     oxyCODONE 5 MG IR tablet                Protect others around you: Learn how to safely use, store and throw away your medicines at www.disposemymeds.org.             Medication List: This is a list of all your medications and when to take them. Check marks below indicate your daily home schedule. Keep this list as a reference.      Medications           Morning Afternoon Evening Bedtime As Needed    acetaminophen 325 MG tablet   Commonly known as:  TYLENOL   Take 2 tablets (650 mg) by mouth every 6 hours Do not take more than 4,000 mg of acetaminophen (Tylenol) from all sources to prevent liver damage.   Last time this was given:  650 mg on 3/5/2017  2:00 PM                                ALPRAZolam 0.5 MG tablet   Commonly known as:  XANAX   Take 1 tablet (0.5 mg) by mouth 3 times daily as needed for anxiety                                lisinopril 10 MG tablet   Commonly known as:  PRINIVIL/ZESTRIL   Last time this was given:  10 mg on 3/5/2017  9:18 AM                                ondansetron 4 MG ODT tab   Commonly known as:  ZOFRAN-ODT   Take 1-2 tablets (4-8 mg) by mouth every 6 hours as needed for nausea   Last time this was given:  4 mg on 3/5/2017 12:30 PM                                oxyCODONE 5 MG IR tablet   Commonly known as:  ROXICODONE   Take 1-2 tablets (5-10 mg) by mouth every 4 hours as needed for moderate to severe pain                                phosphorus tablet 250 mg 250 MG per tablet   Commonly known as:  K PHOS NEUTRAL   Take 2 tablets (500 mg) by mouth 3 times daily Take 2 tablets in the evening on Jose 3/5 and in the morning on Monday 3/6. Get your phosphorus level rechecked on Monday 3/6 at the [facility].   Last time this was given:  500 mg on 3/5/2017  2:24 PM                                 senna-docusate 8.6-50 MG per tablet   Commonly known as:  SENOKOT-S;PERICOLACE   Take 1-2 tablets by mouth 2 times daily   Last time this was given:  2 tablets on 3/5/2017  9:19 AM

## 2017-03-03 NOTE — BRIEF OP NOTE
[facility], [place]    Brief Operative Note    Pre-operative diagnosis: Lung Nodule   Post-operative diagnosis Same  Procedure: Procedure(s):  Flexible Bronchoscopy, Laparoscopic Right Upper Lobe Wedge Resection, Mediastinal Node Dissection - Wound Class: I-Clean   - Wound Class: II-Clean Contaminated  Surgeon: Surgeon(s) and Role:     * Maria A Desai MD - Primary     * Angelo Mcguire MD - Assisting     * Mauricio Kelly MD - Resident - Assisting  Anesthesia: Combined General with Block   Estimated blood loss: 20cc  Drains: 24F chest tube  Specimens:   ID Type Source Tests Collected by Time Destination   A : Right Upper Lobe Wedge (Stitch marks lesion) Tissue Lung SURGICAL PATHOLOGY EXAM Maria A Desai MD 3/3/2017 12:58 PM    B : 4R Lymph Node Tissue Chest SURGICAL PATHOLOGY EXAM Maria A Desai MD 3/3/2017  1:48 PM    C : 7 Lymph Node Tissue Chest SURGICAL PATHOLOGY EXAM Angelo Mcguire MD 3/3/2017  2:23 PM      Findings:   None.  Complications: None.  Implants: 24 F chest tube

## 2017-03-04 ENCOUNTER — APPOINTMENT (OUTPATIENT)
Dept: GENERAL RADIOLOGY | Facility: CLINIC | Age: 67
DRG: 165 | End: 2017-03-04
Attending: STUDENT IN AN ORGANIZED HEALTH CARE EDUCATION/TRAINING PROGRAM
Payer: MEDICARE

## 2017-03-04 LAB
CREAT SERPL-MCNC: 0.69 MG/DL (ref 0.66–1.25)
ERYTHROCYTE [DISTWIDTH] IN BLOOD BY AUTOMATED COUNT: 13.1 % (ref 10–15)
GFR SERPL CREATININE-BSD FRML MDRD: NORMAL ML/MIN/1.7M2
HCT VFR BLD AUTO: 46.5 % (ref 40–53)
HGB BLD-MCNC: 15.2 G/DL (ref 13.3–17.7)
MCH RBC QN AUTO: 32 PG (ref 26.5–33)
MCHC RBC AUTO-ENTMCNC: 32.7 G/DL (ref 31.5–36.5)
MCV RBC AUTO: 98 FL (ref 78–100)
PLATELET # BLD AUTO: 147 10E9/L (ref 150–450)
RBC # BLD AUTO: 4.75 10E12/L (ref 4.4–5.9)
WBC # BLD AUTO: 10.6 10E9/L (ref 4–11)

## 2017-03-04 PROCEDURE — 85027 COMPLETE CBC AUTOMATED: CPT | Performed by: SURGERY

## 2017-03-04 PROCEDURE — 25000132 ZZH RX MED GY IP 250 OP 250 PS 637: Mod: GY | Performed by: STUDENT IN AN ORGANIZED HEALTH CARE EDUCATION/TRAINING PROGRAM

## 2017-03-04 PROCEDURE — A9270 NON-COVERED ITEM OR SERVICE: HCPCS | Mod: GY | Performed by: SURGERY

## 2017-03-04 PROCEDURE — 25000132 ZZH RX MED GY IP 250 OP 250 PS 637: Mod: GY | Performed by: THORACIC SURGERY (CARDIOTHORACIC VASCULAR SURGERY)

## 2017-03-04 PROCEDURE — 71020 XR CHEST 2 VW: CPT | Mod: 76

## 2017-03-04 PROCEDURE — 25000132 ZZH RX MED GY IP 250 OP 250 PS 637: Mod: GY | Performed by: SURGERY

## 2017-03-04 PROCEDURE — A9270 NON-COVERED ITEM OR SERVICE: HCPCS | Mod: GY | Performed by: THORACIC SURGERY (CARDIOTHORACIC VASCULAR SURGERY)

## 2017-03-04 PROCEDURE — 71020 XR CHEST 2 VW: CPT

## 2017-03-04 PROCEDURE — 74000 XR ABDOMEN PORT F1 VW: CPT

## 2017-03-04 PROCEDURE — A9270 NON-COVERED ITEM OR SERVICE: HCPCS | Mod: GY | Performed by: STUDENT IN AN ORGANIZED HEALTH CARE EDUCATION/TRAINING PROGRAM

## 2017-03-04 PROCEDURE — 25000128 H RX IP 250 OP 636: Performed by: STUDENT IN AN ORGANIZED HEALTH CARE EDUCATION/TRAINING PROGRAM

## 2017-03-04 PROCEDURE — 82565 ASSAY OF CREATININE: CPT | Performed by: SURGERY

## 2017-03-04 PROCEDURE — 12000008 ZZH R&B INTERMEDIATE UMMC

## 2017-03-04 PROCEDURE — 25000128 H RX IP 250 OP 636: Performed by: SURGERY

## 2017-03-04 PROCEDURE — 36415 COLL VENOUS BLD VENIPUNCTURE: CPT | Performed by: SURGERY

## 2017-03-04 RX ORDER — POLYETHYLENE GLYCOL 3350 17 G/17G
17 POWDER, FOR SOLUTION ORAL ONCE
Status: COMPLETED | OUTPATIENT
Start: 2017-03-04 | End: 2017-03-04

## 2017-03-04 RX ORDER — ONDANSETRON 4 MG/1
4-8 TABLET, ORALLY DISINTEGRATING ORAL EVERY 6 HOURS PRN
Status: DISCONTINUED | OUTPATIENT
Start: 2017-03-04 | End: 2017-03-05 | Stop reason: HOSPADM

## 2017-03-04 RX ORDER — BISACODYL 10 MG
10 SUPPOSITORY, RECTAL RECTAL ONCE
Status: COMPLETED | OUTPATIENT
Start: 2017-03-04 | End: 2017-03-04

## 2017-03-04 RX ORDER — ONDANSETRON 2 MG/ML
4-8 INJECTION INTRAMUSCULAR; INTRAVENOUS EVERY 6 HOURS PRN
Status: DISCONTINUED | OUTPATIENT
Start: 2017-03-04 | End: 2017-03-05 | Stop reason: HOSPADM

## 2017-03-04 RX ORDER — HYDROMORPHONE HYDROCHLORIDE 1 MG/ML
.3-.5 INJECTION, SOLUTION INTRAMUSCULAR; INTRAVENOUS; SUBCUTANEOUS
Status: DISCONTINUED | OUTPATIENT
Start: 2017-03-04 | End: 2017-03-05 | Stop reason: HOSPADM

## 2017-03-04 RX ADMIN — OXYCODONE HYDROCHLORIDE 5 MG: 5 SOLUTION ORAL at 05:03

## 2017-03-04 RX ADMIN — POLYETHYLENE GLYCOL 3350 17 G: 17 POWDER, FOR SOLUTION ORAL at 11:20

## 2017-03-04 RX ADMIN — PROCHLORPERAZINE EDISYLATE 5 MG: 5 INJECTION INTRAMUSCULAR; INTRAVENOUS at 17:09

## 2017-03-04 RX ADMIN — BISACODYL 10 MG: 10 SUPPOSITORY RECTAL at 11:20

## 2017-03-04 RX ADMIN — ONDANSETRON 4 MG: 2 INJECTION INTRAMUSCULAR; INTRAVENOUS at 05:57

## 2017-03-04 RX ADMIN — DOCUSATE SODIUM 286 ML: 50 LIQUID ORAL at 15:42

## 2017-03-04 RX ADMIN — ONDANSETRON 4 MG: 2 INJECTION INTRAMUSCULAR; INTRAVENOUS at 11:20

## 2017-03-04 RX ADMIN — PROCHLORPERAZINE EDISYLATE 5 MG: 5 INJECTION INTRAMUSCULAR; INTRAVENOUS at 08:32

## 2017-03-04 RX ADMIN — ENOXAPARIN SODIUM 40 MG: 40 INJECTION SUBCUTANEOUS at 08:28

## 2017-03-04 RX ADMIN — ACETAMINOPHEN 650 MG: 325 TABLET, FILM COATED ORAL at 19:37

## 2017-03-04 RX ADMIN — SENNOSIDES AND DOCUSATE SODIUM 1 TABLET: 8.6; 5 TABLET ORAL at 08:32

## 2017-03-04 RX ADMIN — OXYCODONE HYDROCHLORIDE 5 MG: 5 SOLUTION ORAL at 19:37

## 2017-03-04 RX ADMIN — SENNOSIDES AND DOCUSATE SODIUM 2 TABLET: 8.6; 5 TABLET ORAL at 19:37

## 2017-03-04 ASSESSMENT — PAIN DESCRIPTION - DESCRIPTORS: DESCRIPTORS: DISCOMFORT

## 2017-03-04 NOTE — PLAN OF CARE
"Problem: Goal Outcome Summary  Goal: Goal Outcome Summary  Outcome: No Change  /68 (BP Location: Right arm)  Pulse 55  Temp 96.3  F (35.7  C) (Oral)  Resp 14  Ht 1.727 m (5' 7.99\")  Wt 87.8 kg (193 lb 9 oz)  SpO2 99%  BMI 29.44 kg/m2   Afebrile, lethargic, arouses to voice, Ox4, c/o pain not relieved with PCA dilaudid, Dr. Sherman ordered oxy as needed,  Bladder scanned 528cc, MD notified, ordered to recheck after 0000, if pt unable to void, +BS, chest tube is water seal, site is intact, no air leak noted, 15cc of bloody output, lap sites are intact, covered with liquid bandage, had pt dangle at bed, tolerated well, encouraged use of IS, cont with POC      "

## 2017-03-04 NOTE — PROGRESS NOTES
THORACIC SURGERY PROGRESS NOTE  03/04/2017    Patient: Juwan Latham  MRN: [id number]    Subjective  No acute overnight events. Pain a little worse overnight and oxycodone added. On 4L NC. Using IS. Has not ambulating much or had BM yet. No other complaints.      Objective  Temp:  [95.9  F (35.5  C)-98.2  F (36.8  C)] 96.1  F (35.6  C)  Pulse:  [55] 55  Heart Rate:  [60-71] 65  Resp:  [12-16] 16  BP: (106-172)/(57-96) 106/61  SpO2:  [95 %-100 %] 98 %    General: AAOx4, NAD, lying in bed, appears comfortable  CV: regular rate and rhythm  Pulm: breathing comfortably on 4L NC, chest tube to water seal without air leak  Abd: soft, appropriately tender to palpation, non-distended, incisions c/d/i with Dermabond  Extremities: warm, well-perfused without edema  Neuro: facial movement grossly symmetric, moving all extremities spontaneously without apparent deficit    I/O last 3 completed shifts:  In: 1300 [I.V.:1300]  Out: 130 [Urine:110; Blood:20] + 650ml urine this am    Labs:  CBC, BMP pending    Imaging:  CXR 3/4/2017 with small right apical PTX unchanged/slightly improved (official read pending)      Assessment & Plan  Juwan Latham is a 66 year old male s/p right wedge resection for GOO 3/3/2017.    Neuro: home Xanax prn     - Pain: dilaudid PCA, oxycodone po prn, Tylenol scheduled   CV: HDS, no issues  Pulm: satting on 4L NC, encourage IS, wean O2     - R chest tube to water seal, will await final read of CXR and discussion with staff but may pull chest tube today  FEN/GI:      - IVF: none     - Diet: regular diet  - Bowel regimen: senna-colace  - Nausea control: prn anti-emetics  Renal: UOPA  ID: afebrile, no abx  PPx: OOB, IS, SCDs, Lovenox  Dispo: 7B      Patient seen and discussed with fellow and will be discussed with staff.  - - - - - - - - - - - - - - - - - -  Kayla Green MD  General Surgery PGY-1

## 2017-03-04 NOTE — PLAN OF CARE
Problem: Individualization  Goal: Patient Preferences  Afeb, vitals stable, 02 sats 99-98% on 4LPM/nc, states pain is tolerable, cont on dilaudid pca and oxycodone prn, needs encouragement and reminding to push button, abd/chest incisions dry, intact and dermabonded, CT to water seal, 0 output, drsg dry and intact, CXR done, belly soft, positive bowel sounds, lungs diminished, using IS to 750ml., 0000 voided 100cc, bladder scanned for 460cc, HO informed, 0330, voided 550cc jd urine, c/o nausea, zofran with relief, iv at tko with pca, appeared to rest/sleep between cares, trying to wean 02, now at 3LPM/nc, sats remain in mid90's

## 2017-03-04 NOTE — PROGRESS NOTES
Paged by nursing regarding belly pain and distension. Saw patient. He is POD 1 from RUL wedge resection. He reports feeling like he has to go to the bathroom. He feels bloated with abdominal discomfort. He has only gotten out of bed once for a short time since surgery. He denies chest pain/SOB.     vitals wnl  Afebrile  Wbc 10.6  Distended, tender to palpation, non peritonitc    Has received suppository, miralax, senna, enema.    Likely constipated. Discussed with fellow, recommends another enema. Will get AXR and possibly enema afterwards.    Son Koch MD  General Surgery  [phone]

## 2017-03-05 VITALS
WEIGHT: 193.56 LBS | TEMPERATURE: 96 F | SYSTOLIC BLOOD PRESSURE: 130 MMHG | HEART RATE: 74 BPM | RESPIRATION RATE: 16 BRPM | BODY MASS INDEX: 29.34 KG/M2 | HEIGHT: 68 IN | DIASTOLIC BLOOD PRESSURE: 83 MMHG | OXYGEN SATURATION: 95 %

## 2017-03-05 LAB
ANION GAP SERPL CALCULATED.3IONS-SCNC: 4 MMOL/L (ref 3–14)
BUN SERPL-MCNC: 14 MG/DL (ref 7–30)
CALCIUM SERPL-MCNC: 8.8 MG/DL (ref 8.5–10.1)
CHLORIDE SERPL-SCNC: 106 MMOL/L (ref 94–109)
CO2 SERPL-SCNC: 28 MMOL/L (ref 20–32)
CREAT SERPL-MCNC: 0.74 MG/DL (ref 0.66–1.25)
GFR SERPL CREATININE-BSD FRML MDRD: ABNORMAL ML/MIN/1.7M2
GLUCOSE SERPL-MCNC: 114 MG/DL (ref 70–99)
MAGNESIUM SERPL-MCNC: 2.2 MG/DL (ref 1.6–2.3)
PHOSPHATE SERPL-MCNC: 1.1 MG/DL (ref 2.5–4.5)
PLATELET # BLD AUTO: 142 10E9/L (ref 150–450)
POTASSIUM SERPL-SCNC: 3.8 MMOL/L (ref 3.4–5.3)
SODIUM SERPL-SCNC: 138 MMOL/L (ref 133–144)

## 2017-03-05 PROCEDURE — A9270 NON-COVERED ITEM OR SERVICE: HCPCS | Mod: GY | Performed by: STUDENT IN AN ORGANIZED HEALTH CARE EDUCATION/TRAINING PROGRAM

## 2017-03-05 PROCEDURE — 25000128 H RX IP 250 OP 636: Performed by: SURGERY

## 2017-03-05 PROCEDURE — 84100 ASSAY OF PHOSPHORUS: CPT | Performed by: SURGERY

## 2017-03-05 PROCEDURE — 25000132 ZZH RX MED GY IP 250 OP 250 PS 637: Mod: GY | Performed by: STUDENT IN AN ORGANIZED HEALTH CARE EDUCATION/TRAINING PROGRAM

## 2017-03-05 PROCEDURE — A9270 NON-COVERED ITEM OR SERVICE: HCPCS | Mod: GY | Performed by: SURGERY

## 2017-03-05 PROCEDURE — 80048 BASIC METABOLIC PNL TOTAL CA: CPT | Performed by: SURGERY

## 2017-03-05 PROCEDURE — 36415 COLL VENOUS BLD VENIPUNCTURE: CPT | Performed by: SURGERY

## 2017-03-05 PROCEDURE — 25000132 ZZH RX MED GY IP 250 OP 250 PS 637: Mod: GY | Performed by: SURGERY

## 2017-03-05 PROCEDURE — 83735 ASSAY OF MAGNESIUM: CPT | Performed by: SURGERY

## 2017-03-05 PROCEDURE — 85049 AUTOMATED PLATELET COUNT: CPT | Performed by: SURGERY

## 2017-03-05 PROCEDURE — 25000125 ZZHC RX 250: Performed by: SURGERY

## 2017-03-05 RX ORDER — OXYCODONE HYDROCHLORIDE 5 MG/1
5-10 TABLET ORAL EVERY 4 HOURS PRN
Status: DISCONTINUED | OUTPATIENT
Start: 2017-03-05 | End: 2017-03-05 | Stop reason: HOSPADM

## 2017-03-05 RX ORDER — BISACODYL 10 MG
10 SUPPOSITORY, RECTAL RECTAL DAILY PRN
Status: DISCONTINUED | OUTPATIENT
Start: 2017-03-05 | End: 2017-03-05 | Stop reason: HOSPADM

## 2017-03-05 RX ORDER — ALUMINA, MAGNESIA, AND SIMETHICONE 2400; 2400; 240 MG/30ML; MG/30ML; MG/30ML
30 SUSPENSION ORAL EVERY 4 HOURS PRN
Status: DISCONTINUED | OUTPATIENT
Start: 2017-03-05 | End: 2017-03-05 | Stop reason: HOSPADM

## 2017-03-05 RX ORDER — AMOXICILLIN 250 MG
1-2 CAPSULE ORAL 2 TIMES DAILY
Qty: 120 TABLET | Refills: 3 | Status: SHIPPED | OUTPATIENT
Start: 2017-03-05 | End: 2017-03-14

## 2017-03-05 RX ORDER — ONDANSETRON 4 MG/1
4-8 TABLET, ORALLY DISINTEGRATING ORAL EVERY 6 HOURS PRN
Qty: 60 TABLET | Refills: 1 | Status: SHIPPED | OUTPATIENT
Start: 2017-03-05 | End: 2017-03-14

## 2017-03-05 RX ORDER — LISINOPRIL 10 MG/1
10 TABLET ORAL DAILY
Status: DISCONTINUED | OUTPATIENT
Start: 2017-03-05 | End: 2017-03-05 | Stop reason: HOSPADM

## 2017-03-05 RX ORDER — OXYCODONE HYDROCHLORIDE 5 MG/1
5-10 TABLET ORAL EVERY 4 HOURS PRN
Qty: 20 TABLET | Refills: 0 | Status: SHIPPED | OUTPATIENT
Start: 2017-03-05 | End: 2017-03-14

## 2017-03-05 RX ORDER — ACETAMINOPHEN 325 MG/1
650 TABLET ORAL EVERY 6 HOURS
Qty: 100 TABLET | Refills: 1 | Status: SHIPPED
Start: 2017-03-05 | End: 2018-06-26

## 2017-03-05 RX ADMIN — ONDANSETRON 4 MG: 4 TABLET, ORALLY DISINTEGRATING ORAL at 12:30

## 2017-03-05 RX ADMIN — OXYCODONE HYDROCHLORIDE 5 MG: 5 SOLUTION ORAL at 05:40

## 2017-03-05 RX ADMIN — ONDANSETRON 4 MG: 4 TABLET, ORALLY DISINTEGRATING ORAL at 10:59

## 2017-03-05 RX ADMIN — ENOXAPARIN SODIUM 40 MG: 40 INJECTION SUBCUTANEOUS at 09:19

## 2017-03-05 RX ADMIN — PROCHLORPERAZINE MALEATE 5 MG: 5 TABLET, FILM COATED ORAL at 08:03

## 2017-03-05 RX ADMIN — SENNOSIDES AND DOCUSATE SODIUM 2 TABLET: 8.6; 5 TABLET ORAL at 09:19

## 2017-03-05 RX ADMIN — LISINOPRIL 10 MG: 10 TABLET ORAL at 09:18

## 2017-03-05 RX ADMIN — ACETAMINOPHEN 650 MG: 325 TABLET, FILM COATED ORAL at 09:18

## 2017-03-05 RX ADMIN — DIBASIC SODIUM PHOSPHATE, MONOBASIC POTASSIUM PHOSPHATE AND MONOBASIC SODIUM PHOSPHATE 500 MG: 852; 155; 130 TABLET ORAL at 14:24

## 2017-03-05 RX ADMIN — ONDANSETRON 4 MG: 2 INJECTION INTRAMUSCULAR; INTRAVENOUS at 04:26

## 2017-03-05 RX ADMIN — ACETAMINOPHEN 650 MG: 325 TABLET, FILM COATED ORAL at 14:00

## 2017-03-05 RX ADMIN — ACETAMINOPHEN 650 MG: 325 TABLET, FILM COATED ORAL at 01:14

## 2017-03-05 RX ADMIN — ALUMINUM HYDROXIDE, MAGNESIUM HYDROXIDE, AND DIMETHICONE 30 ML: 400; 400; 40 SUSPENSION ORAL at 05:04

## 2017-03-05 RX ADMIN — OXYCODONE HYDROCHLORIDE 5 MG: 5 SOLUTION ORAL at 01:14

## 2017-03-05 ASSESSMENT — PAIN DESCRIPTION - DESCRIPTORS: DESCRIPTORS: SORE

## 2017-03-05 NOTE — PLAN OF CARE
Problem: Individualization  Goal: Patient Preferences  Afeb, vitals stable, 02 95% on room air, states pain is tolerable, oxycodone x2 with some relief and scheduled tylenol, did c/o nausea, zofran with relief, c/o heart burn, maalox given, up to bathroom voiding in adequate amts, no stools this shift, abd/chest incisions dry, intact and dermabonded, CT site drsg dry and intact, lungs clear, diminished, belly round with positive bowel sounds, appeared to rest in short intervals, possible dc later today

## 2017-03-05 NOTE — PLAN OF CARE
"Problem: Goal Outcome Summary  Goal: Goal Outcome Summary  Outcome: Improving  /75 (BP Location: Right arm)  Pulse 55  Temp 97.7  F (36.5  C) (Oral)  Resp 16  Ht 1.727 m (5' 7.99\")  Wt 87.8 kg (193 lb 9 oz)  SpO2 93%  BMI 29.44 kg/m2   Afebrile, manageable pain, PCA dilaudid d/c, oral oxy and scheduled tyl given as per orders, stool softener, miralax, pink lady enema given, ambulation promoted x4, 2 BMs, this PM, voiding adequately not saving, c/o nausea zofran and compazine given with relief, lap sites are intact, CT site is intact covered with gauze tape. Cont with POC      "

## 2017-03-05 NOTE — PLAN OF CARE
Problem: Goal Outcome Summary  Goal: Goal Outcome Summary  Outcome: Adequate for Discharge Date Met:  03/05/17  Patient is afebrile, alert and oriented with stable vital sings. Ambulated during shift. Bowel movement during shift. IV removed. Phosphorous replacement therapy initiated due to low levels of 1.1. Complained of nausea in the morning, compazine administered with relief. Tolerated small breakfest and small lunch. Educated patient on slowly advancing diet and controlling nausea. Discharge orders reviewed with patient. All questions answered as appropriate.

## 2017-03-05 NOTE — DISCHARGE SUMMARY
THORACIC SURGERY DISCHARGE SUMMARY    NAME: Juwan Latham   MRN: [id number]   : 1950     DATE OF ADMISSION: 3/3/2017   DATE OF DISCHARGE: 3/5/2017    PRE/POSTOPERATIVE DIAGNOSES: Right upper lobe ground-glass opacity.     PROCEDURES PERFORMED:   1. Flexible bronchoscopy  2. Laparoscopic right upper lobe wedge resection  3. Laparoscopic-assisted thoracoscopic mediastinal lymph node dissection.     PATHOLOGY RESULTS:   **PRELIMINARY**   INTRAOPERATIVE DIAGNOSIS   PRELIMINARY INTRAOPERATIVE FROZEN SECTION DIAGNOSIS:   A. Lung wedge, right upper lobe, wedge resection:   -Adenocarcinoma, predominantly lepidic type (AFS1)   -Stapled margin uninvolved by adenocarcinoma (perpendicular sections,   AFS2)     CULTURE RESULTS: None    INTRAOPERATIVE COMPLICATIONS: None    POSTOPERATIVE COMPLICATIONS: None    DRAINS/TUBES PRESENT AT DISCHARGE: dressing changes (chest tube site with dressing still in place)    HOSPITAL COURSE: Juwan Latham is a 66 year old male who on 3/3/2017 underwent the above-named procedures.  He tolerated the operation well and postoperatively was transferred to the general post-surgical unit.  The remainder of his course was essentially uncomplicated.  His chest tube was removed on 3/4/2017 without event.  Prior to discharge, his pain was controlled well, he was able to perform ADLs and ambulate independently without difficulty, and had full return of bowel and bladder function.  On 3/5/2017, he was discharged to home in stable condition.    DAY OF DISCHARGE PHYSICAL EXAM:  Temp:  [96.2  F (35.7  C)-98.4  F (36.9  C)] 97.8  F (36.6  C)  Pulse:  [69-74] 74  Heart Rate:  [63-77] 77  Resp:  [16] 16  BP: (128-162)/(58-84) 162/78  SpO2:  [93 %-98 %] 94 %    General: AAOx4, NAD, lying in bed, appears comfortable  CV: regular rate and rhythm  Pulm: breathing comfortably on room air, prior chest tube site with dressing c/d/i, other incisions c/d/i with Dermabond  Abd: soft, appropriately tender to  palpation, non-distended, incisions c/d/i with Dermabond  Extremities: warm, well-perfused without edema  Neuro: facial movement grossly symmetric, moving all extremities spontaneously without apparent deficit    DISCHARGE INSTRUCTIONS:    Discharge Procedure Orders  XR Chest 2 Views   Standing Status: Future  Standing Exp. Date: 03/05/18   Order Comments: Please schedule prior to appointment with Thoracic Surgery office.   Order Specific Question Answer Comments   Reason for exam: f/u 1 month post right apical wedge resection      Phosphorus   Standing Status: Future  Standing Exp. Date: 05/04/17     Reason for your hospital stay   Order Comments: You had a wedge resection (removal of part of the lung) for a ground glass opacity (a spot on your lung).     Adult [facility] Follow-up and recommended labs and tests   Order Comments: 1.) Follow up with Julio Guidry Jr. in 1-2 weeks to review how you are doing after surgery.  2.) Follow up with a Clinical Nurse Specialist (CNS) with Thoracic Surgery will be arranged in 1 month. You will have a chest x-ray prior to this appointment. The Thoracic office will contact you to set up the appointments.  You may call [phone] if you wish to schedule before you are contacted. Please call if you have not heard regarding this appointment within 3 business days of discharge.     Activity   Order Comments: See discharge instructions.   Order Specific Question Answer Comments   Is discharge order? Yes      Discharge Instructions   Order Comments: THORACIC SURGERY DISCHARGE INSTRUCTIONS    ACTIVITY  If your plans upon discharge include prolonged periods of sitting (i.e a lengthy car or plane ride), it is highly beneficial to get up and walk at least once per hour to help prevent swelling and blood clots.     Activity as tolerated, no strenous activity until seen in follow-up, no lifting greater than 20 pounds for the next 6 weeks.      DRESSINGS/INCISIONS  You may remove  chest tube dressing tomorrow (3/6/2016) and bandage the site at your own discretion thereafter.  Small amounts of leakage are normal for 2-3 days after removal.  Feel free to call with questions.    You may get incisions wet 2 days after operation. Do not submerge, soak, or scrub incision or swim until seen in follow-up. You have surgical superglue (Dermabond) on your incisions; this will flake off on it's own over time.       OTHER INSTRUCTIONS  Take incentive spirometer home for continued frequent use    Stay hydrated. Take over the counter fiber (metamucil or benefiber) and stool softeners (Miralax, docusate or senna) if becoming constipated. You have also been prescribed a stool softener that you should take while taking narcotics.    No driving while taking narcotic pain medication.    Transition to ibuprofen or tylenol/acetaminophen for pain control. Do not take tylenol/acetaminophen and acetaminophen containing narcotic (e.g., percocet or vicodin) at the same time. If you have known ulcer problems, or kidney trouble (elevated creatinine) do not take the ibuprofen.      WHEN TO CALL    Call the numbers below for fever greater than 101.5, chills, increased size of incision, red skin around incision, vision changes, muscle strength changes, sensation changes, shortness of breath, or other concerns.    In emergencies, call 911    For other Questions or Concerns;  A.) During weekday working hours (Monday through Friday 8am to 4:30pm)  call [phone] and ask to speak to a clinical nurse specialist.    B.) At nights (after 4:30pm), on weekends, or if urgent call [phone] and  tell the   I would like to page job code [phone], the thoracic surgery  fellow on call, please.      Diet   Order Comments: You may eat a regular diet.   Order Specific Question Answer Comments   Is discharge order? Yes          DISCHARGE MEDICATIONS:   Current Discharge Medication List      START taking these medications     Details   oxyCODONE (ROXICODONE) 5 MG IR tablet Take 1-2 tablets (5-10 mg) by mouth every 4 hours as needed for moderate to severe pain  Qty: 20 tablet, Refills: 0    Associated Diagnoses: Lung nodule      acetaminophen (TYLENOL) 325 MG tablet Take 2 tablets (650 mg) by mouth every 6 hours Do not take more than 4,000 mg of acetaminophen (Tylenol) from all sources to prevent liver damage.  Qty: 100 tablet, Refills: 1    Associated Diagnoses: Lung nodule      ondansetron (ZOFRAN-ODT) 4 MG ODT tab Take 1-2 tablets (4-8 mg) by mouth every 6 hours as needed for nausea  Qty: 60 tablet, Refills: 1    Associated Diagnoses: Lung nodule      senna-docusate (SENOKOT-S;PERICOLACE) 8.6-50 MG per tablet Take 1-2 tablets by mouth 2 times daily  Qty: 120 tablet, Refills: 3    Associated Diagnoses: Lung nodule      phosphorus tablet 250 mg (K PHOS NEUTRAL) 250 MG per tablet Take 2 tablets (500 mg) by mouth 3 times daily Take 2 tablets in the evening on Jose 3/5 and in the morning on Monday 3/6. Get your phosphorus level rechecked on Monday 3/6 at the [facility].  Qty: 4 tablet, Refills: 0    Associated Diagnoses: Lung nodule         CONTINUE these medications which have NOT CHANGED    Details   ALPRAZolam (XANAX) 0.5 MG tablet Take 1 tablet (0.5 mg) by mouth 3 times daily as needed for anxiety  Qty: 10 tablet, Refills: 0    Associated Diagnoses: Anxiety      lisinopril (PRINIVIL,ZESTRIL) 10 MG tablet Refills: 3             HOME HEALTH CARE:  Not needed    - - - - - - - - - - - - - - - - - -  Kayla Green MD  General Surgery PGY-1

## 2017-03-05 NOTE — PROGRESS NOTES
THORACIC SURGERY PROGRESS NOTE  03/05/2017    Patient: Juwan Latham  MRN: [id number]    Subjective  No acute overnight events. Was distended and had no BMs yesterday; was given suppository and enema with 2 BMs yesterday, feeling better this morning and doesn't really want another suppository. Dilaudid PCA stopped overnight.    Feeling like he might like to go home today, but is still nauseous this morning. No vomiting, is eating a regular diet and will try some toast this am. No other complaints.      Objective  Temp: [96.2  F (35.7  C)-98.4  F (36.9  C)] 97.8  F (36.6  C)  Pulse: [69-74] 74  Heart Rate: [63-77] 77  Resp: [16] 16  BP: (128-162)/(58-84) 162/78  SpO2: [93 %-98 %] 94 %     General: AAOx4, NAD, lying in bed, appears comfortable  CV: regular rate and rhythm  Pulm: breathing comfortably on room air, prior chest tube site with dressing c/d/i, other incisions c/d/i with Dermabond  Abd: soft, appropriately tender to palpation, non-distended, incisions c/d/i with Dermabond  Extremities: warm, well-perfused without edema  Neuro: facial movement grossly symmetric, moving all extremities spontaneously without apparent deficit       I/O last 3 completed shifts:  In: 100 [P.O.:100]  Out: -     Labs:  BMP, Mg, Phos pending    Imaging:  CXR 3/4/2017 with small right apical PTX unchanged      Assessment & Plan  Juwan Latham is a 66 year old male s/p right wedge resection for GOO 3/3/2017.    Neuro: home Xanax prn     - Pain: oxycodone po prn, dilaudid IV prn, Tylenol scheduled   CV: HDS, no issues  Pulm: satting on room air, encourage IS, chest tube pulled 3/4  FEN/GI:      - IVF: none     - Diet: regular diet  - Bowel regimen: senna-colace, suppository daily prn, electrolytes pending since ROBF has been slow  - Nausea control: prn anti-emetics  Renal: UOPA  ID: afebrile, no abx  PPx: OOB, IS, SCDs, Lovenox  Dispo: 7B, discharge later today vs tomorrow pending control of patient's nausea      Patient seen and  discussed with fellow and will be discussed with staff.  - - - - - - - - - - - - - - - - - -  Kayla Green MD  General Surgery PGY-1

## 2017-03-05 NOTE — OP NOTE
Please refer to Dr. Maria A Desai's operative report for further details.      DATE OF SURGERY:  2017.        DIAGNOSIS:  Right upper lobe ground-glass opacity.      PROCEDURE PERFORMED:  Laparoscopic-assisted thoracoscopic mediastinal lymph node dissection.      ANESTHESIA:  General.      ESTIMATED BLOOD LOSS:  20 mL.      COMPLICATIONS:  None immediate.          SURGEONS:  Dr. Maria A Desai and Dr. Annette Mcguire.      Dictating as cosurgeon with Dr. Desai.  Please refer to her operative report for details.  I primarily participated in the lymph node dissection since this was challenging and there was no qualified resident support for the procedure.  This required 2 staff surgeons to be performed.      DESCRIPTION OF PROCEDURE:  Once Dr. Desai completed the wedge resection, I helped with every step of the dissection of the lymph nodes in station 4R making sure we completely emptied out the right peritracheal space as well as dissection of the subcarinal space also mostly emptying it clearly identifying the elroy, the right mainstem, the left mainstem and the esophagus.  All lymph nodes were sent for permanent pathology.         ANNETTE MCGUIRE MD             D: 2017 16:10   T: 2017 20:22   MT:       Name:     RJ BACON   MRN:      [id number]        Account:        [id number]   :      1950           Procedure Date: 2017      Document: [id number]

## 2017-03-06 ENCOUNTER — TELEPHONE (OUTPATIENT)
Dept: FAMILY MEDICINE | Facility: CLINIC | Age: 67
End: 2017-03-06

## 2017-03-06 ENCOUNTER — CARE COORDINATION (OUTPATIENT)
Dept: CARDIOLOGY | Facility: CLINIC | Age: 67
End: 2017-03-06

## 2017-03-06 ENCOUNTER — TELEPHONE (OUTPATIENT)
Dept: SURGERY | Facility: CLINIC | Age: 67
End: 2017-03-06

## 2017-03-06 NOTE — TELEPHONE ENCOUNTER
Attempted to contact patient to ensure he had gotten his Phosphorus level rechecked (it was 1.1 yesterday prior to discharge and he was given oral repletion with instructions - and order placed - to get level rechecked today). No answer, left voicemail to call the hospital and have them paget the Thoracic Surgery resident on call.    - - - - - - - - - - - - - - - - - -  Kayla Green MD  General Surgery PGY-1

## 2017-03-06 NOTE — TELEPHONE ENCOUNTER
ED / Discharge Outreach Protocol    Patient Contact    Attempt # 1    Was call answered?  No.  Left message on voicemail with information to call me back.Justa Naqvi R.N.

## 2017-03-06 NOTE — TELEPHONE ENCOUNTER
Patient discharged from [facility] for lung nodule on 3/5/2017.    Please contact patient for follow up; no appointment scheduled at this time.    ER / IP visits: 0 / 1    Care Coordination: BRIAN Adorno Loberg  -[facility]

## 2017-03-06 NOTE — PROGRESS NOTES
"[facility]  \"Hello, my name is Kaley Dorsey , and I am calling from the [facility].  I want to check in and see how you are doing, after leaving the hospital.  You may also receive a call from your Care Coordinator (care team), but I want to make sure you don t have any urgent needs.  I have a couple questions to review with you:     Post-Discharge Outreach                                                    Juwan Latham is a 66 year old male     Follow-up Appointments           [facility] Follow-up and recommended labs and tests       1.) Follow up with Julio Guidry Jr. in 1-2 weeks to review how you are doing after surgery.  2.) Follow up with a Clinical Nurse Specialist (CNS) with Thoracic Surgery will be arranged in 1 month. You will have a chest x-ray prior to this appointment. The Thoracic office will contact you to set up the appointments.                 Care Team:    Patient Care Team       Relationship Specialty Notifications Start End    Julio Guidry Jr., MD PCP - General [facility]  1/28/16     Phone: [phone] Fax: [phone]         [place] [id number]            Transition of Care Review                                                      Patient was contacted by an RN for post DC follow up so no duplicate post DC follow up call will be made, message was left for patient to call back      Telephone Encounter  Encounter Date: 3/6/2017  Justa Naqvi, RN        ED / Discharge Outreach Protocol     Patient Contact     Attempt # 1     Was call answered? No. Left message on voicemail with information to call me back.Justa Naqvi R.N.                           Plan                                                      Thanks for your time.  Your Care Coordinator may follow-up within the next couple days.  In the meantime if you have questions, concerns or problems call your care team.  "       Kaley Dorsey

## 2017-03-07 NOTE — TELEPHONE ENCOUNTER
ED / Discharge Outreach Protocol    Patient Contact    Attempt # 2    Was call answered?  No.  Left message on voicemail with information to call me back.    Kaylee Akins RN, BSN  [facility]

## 2017-03-08 LAB — COPATH REPORT: NORMAL

## 2017-03-08 NOTE — OP NOTE
DATE OF PROCEDURE:  03/03/2017.      PREOPERATIVE DIAGNOSIS:  Right upper lobe ground-glass opacity.      POSTOPERATIVE DIAGNOSIS:  Right upper lobe ground-glass opacity.      PROCEDURE PERFORMED:  Laparoscopic right upper lobe wedge resection and mediastinal lymph node dissection.      SURGEON:  Maria A Desai MD      ASSISTANT SURGEON:  Mauricio Kelly MD      COSURGEON:  Angelo Mcguire MD      DESCRIPTION OF PROCEDURE:  After obtaining informed consent, the patient was brought to the operating room and laid supine on the operating table.  After induction of general anesthesia and introduction of a double-lumen endotracheal tube, a flexible bronchoscopy was performed.  There were no significant endobronchial findings.  After this, the patient was positioned in a lazy lateral position with the right side tilted up.  The abdomen and the chest were prepped and draped in a sterile manner.  A subxiphoid incision was made and the abdomen was entered using the open technique with a 12 mm port.  After insufflation, a 5 mm port was made supraumbilically and 2 other 5 mm ports in the right subcostal margin and right chest wall in the anterior diaphragm, and used cautery to enter the chest cavity transdiaphragmatically with the 5 mm long ports.  Prior to inserting the ports trandiaphragmatically, #2 Tycron pledgeted sutures were placed around to tack the pedicle.  The subsegmental port was then tunneled in the retrosternal plane into the right chest cavity.  Using these 3 ports the nodularity in the right upper lobe was identified.  This was completely palpable and clearly the site of the nodule was found.  Using a combination of blue and purple loads on the Endo-RAIZA stapler, a large wedge of the right upper lobe was taken.  This was sent off for frozen section pathology.  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  We then proceeded to remove lymph nodes from station from level 4 and from level 7.   These were sent off for permanent pathology.  Following this, the chest cavity was inspected for hemostasis.  The ports were pulled back into the abdomen after a 24-Kinyarwanda chest tube had been inserted into the right pleural cavity and tunneled through the subxiphoid port.  The defects in the diaphragm were then closed with a pledgeted Tycron sutures.  The subxiphoid port was closed in layers with 0 Vicryl for the fascia and 3-0 Vicryl and 4-0 Monocryl for the subcutaneous layers.  The other port sites were also closed.  The patient was then recovered and transferred back to the recovery room in stable condition.         MANJU VIGIL MD             D: 2017 23:16   T: 2017 03:56   MT: [email or web address]      Name:     RJ BACON   MRN:      [id number]        Account:        [id number]   :      1950           Procedure Date: 2017      Document: [id number]

## 2017-03-08 NOTE — TELEPHONE ENCOUNTER
"ED/Discharge Protocol    \"Hi, my name is Justa Naqvi, a registered nurse, and I am calling on behalf of Dr. Guidry's office at [facility].  I am calling to follow up and see how things are going for you after your recent visit.\"    \"I see that you were in the (ER/UC/IP) on 3/3-3/5/17.    How are you doing now that you are home?\" this is hard, I'm still not feeling so good.\"    Is patient experiencing symptoms that may require a hospital visit?  No.    Discharge Instructions    \"Let's review your discharge instructions.  What is/are the follow-up recommendations?  Pt. Response: Okay--d/c instructions reviewed with patient.     \"Were you instructed to make a follow-up appointment?\"  Pt. Response: Yes.  Has appointment been made?   No.  \"Can I help you schedule that appointment?\" I'm not feeling well enough to come in now. But I want to come in.\" Appointment scheduled.  Unable to make within 7 days due to no appointment available within the 7 day follow up with PCP. Scheduled next available appointment able to with Dr. Guidry.     \"When you see the provider, I would recommend that you bring your discharge instructions with you.    Medications    \"How many new medications are you on since your hospitalization/ED visit?\"    ---5 medications given. Pain meds (pt done taking) prn meds, K+ supplement.   \"How many of your current medicines changed (dose, timing, name, etc.) while you were in the hospital/ED visit?\"   0-1  \"Do you have questions about your medications?\"   Yes, he says he is off of all meds given at d/c. He is constipated. Advised to re-start senokot and increase activity as this may provide some relief. He is not taking anything for pain but is uncomfortable. He is taking occasional Advil. Advised he can try plain Tylenol every 4 hours as needed. I encouraged him to take something before bed to help with sleep.   \"Were you newly diagnosed with heart failure, COPD, diabetes or did you have a heart " "attack?\"   No  For patients on insulin: \"Did you start on insulin in the hospital or did you have your insulin dose changed?\"   No    Medication reconciliation completed? Yes    Was MTM referral placed (*Make sure to put transitions as reason for referral)?   No    Call Summary    \"Do you have any questions or concerns about your condition or care plan at the moment?\"    Yes Constipation--see above  Triage nurse advice given: Advised to take pain medication to help increase activity and sleep. Advised OTC Melatonin can be tried to promote sleep.  Patient incisions are still sore but he says skin is not red and there does not appear to be any infections. He says he is using incentive spirometer as directed and can get it to 1000. I encouraged him to continue to use this!!! Also he is coughing up some clear phlegm in the mornings. Patient is afebrile. Getting up and increasing activity and starting to feel better but still not feeling as well as he would wish. Informed him to call us back with any questions he has between now and his follow up visit. Or to call if he feels he is feeling worse.      Patient was in ER 0/1 in the past year (assess appropriateness of ER visits.)      \"If you have questions or things don't continue to improve, we encourage you contact us through the main clinic number,  [phone].  Even if the clinic is not open, triage nurses are available 24/7 to help you.     We would like you to know that our clinic has extended hours (provide information).  We also have urgent care (provide details on closest location and hours/contact info)\"      \"Thank you for your time and take care!\"          "

## 2017-03-13 PROBLEM — R91.1 LUNG NODULE: Status: RESOLVED | Noted: 2017-03-03 | Resolved: 2017-03-13

## 2017-03-13 PROBLEM — C34.11 MALIGNANT NEOPLASM OF UPPER LOBE OF RIGHT LUNG (H): Status: ACTIVE | Noted: 2017-03-13

## 2017-03-13 LAB
DLCOUNC-%PRED-PRE: 133 %
DLCOUNC-PRE: 35.1 ML/MIN/MMHG
DLCOUNC-PRED: 26.3 ML/MIN/MMHG
ERV-%PRED-PRE: 63 %
ERV-PRE: 0.54 L
ERV-PRED: 0.85 L
EXPTIME-PRE: 9.02 SEC
FEF2575-%PRED-PRE: 135 %
FEF2575-PRE: 3.46 L/SEC
FEF2575-PRED: 2.55 L/SEC
FEFMAX-%PRED-PRE: 109 %
FEFMAX-PRE: 9.07 L/SEC
FEFMAX-PRED: 8.31 L/SEC
FEV1-%PRED-PRE: 108 %
FEV1-PRE: 3.41 L
FEV1FEV6-PRE: 82 %
FEV1FEV6-PRED: 78 %
FEV1FVC-PRE: 80 %
FEV1FVC-PRED: 77 %
FEV1SVC-PRE: 81 %
FEV1SVC-PRED: 71 %
FIFMAX-PRE: 6.5 L/SEC
FRCPLETH-%PRED-PRE: 68 %
FRCPLETH-PRE: 2.43 L
FRCPLETH-PRED: 3.52 L
FVC-%PRED-PRE: 103 %
FVC-PRE: 4.25 L
FVC-PRED: 4.1 L
IC-%PRED-PRE: 100 %
IC-PRE: 3.66 L
IC-PRED: 3.63 L
RVPLETH-%PRED-PRE: 76 %
RVPLETH-PRE: 1.89 L
RVPLETH-PRED: 2.46 L
TLCPLETH-%PRED-PRE: 91 %
TLCPLETH-PRE: 6.08 L
TLCPLETH-PRED: 6.67 L
VA-%PRED-PRE: 85 %
VA-PRE: 5.44 L
VC-%PRED-PRE: 93 %
VC-PRE: 4.2 L
VC-PRED: 4.48 L

## 2017-03-13 NOTE — PROGRESS NOTES
Mr. Latham,    -All of your labs are normal.  Your lung tests are all normal.    If you have further questions about the interpretation of your labs, [email or web address] is a good website to check out for further information.    Please contact the clinic if you have additional questions.  Thank you and we'll see you tomorrow.    Sincerely,    Julio Guidry MD

## 2017-03-14 ENCOUNTER — TEAM CONFERENCE (OUTPATIENT)
Dept: SURGERY | Facility: CLINIC | Age: 67
End: 2017-03-14

## 2017-03-14 ENCOUNTER — OFFICE VISIT (OUTPATIENT)
Dept: FAMILY MEDICINE | Facility: CLINIC | Age: 67
End: 2017-03-14
Payer: COMMERCIAL

## 2017-03-14 VITALS
DIASTOLIC BLOOD PRESSURE: 70 MMHG | WEIGHT: 187 LBS | TEMPERATURE: 98.3 F | OXYGEN SATURATION: 96 % | BODY MASS INDEX: 28.34 KG/M2 | HEART RATE: 77 BPM | SYSTOLIC BLOOD PRESSURE: 118 MMHG | HEIGHT: 68 IN

## 2017-03-14 DIAGNOSIS — C34.11 MALIGNANT NEOPLASM OF UPPER LOBE OF RIGHT LUNG (H): Primary | ICD-10-CM

## 2017-03-14 DIAGNOSIS — I10 HYPERTENSION GOAL BP (BLOOD PRESSURE) < 140/90: Chronic | ICD-10-CM

## 2017-03-14 LAB
BASOPHILS # BLD AUTO: 0 10E9/L (ref 0–0.2)
BASOPHILS NFR BLD AUTO: 0.2 %
DIFFERENTIAL METHOD BLD: NORMAL
EOSINOPHIL # BLD AUTO: 0.2 10E9/L (ref 0–0.7)
EOSINOPHIL NFR BLD AUTO: 3.1 %
ERYTHROCYTE [DISTWIDTH] IN BLOOD BY AUTOMATED COUNT: 12.5 % (ref 10–15)
HCT VFR BLD AUTO: 45 % (ref 40–53)
HGB BLD-MCNC: 15.3 G/DL (ref 13.3–17.7)
LYMPHOCYTES # BLD AUTO: 1.1 10E9/L (ref 0.8–5.3)
LYMPHOCYTES NFR BLD AUTO: 16.7 %
MCH RBC QN AUTO: 31.9 PG (ref 26.5–33)
MCHC RBC AUTO-ENTMCNC: 34 G/DL (ref 31.5–36.5)
MCV RBC AUTO: 94 FL (ref 78–100)
MONOCYTES # BLD AUTO: 0.8 10E9/L (ref 0–1.3)
MONOCYTES NFR BLD AUTO: 12.2 %
NEUTROPHILS # BLD AUTO: 4.3 10E9/L (ref 1.6–8.3)
NEUTROPHILS NFR BLD AUTO: 67.8 %
PHOSPHATE SERPL-MCNC: 3.2 MG/DL (ref 2.5–4.5)
PLATELET # BLD AUTO: 258 10E9/L (ref 150–450)
RBC # BLD AUTO: 4.79 10E12/L (ref 4.4–5.9)
WBC # BLD AUTO: 6.4 10E9/L (ref 4–11)

## 2017-03-14 PROCEDURE — G0009 ADMIN PNEUMOCOCCAL VACCINE: HCPCS | Performed by: FAMILY MEDICINE

## 2017-03-14 PROCEDURE — 84100 ASSAY OF PHOSPHORUS: CPT | Performed by: FAMILY MEDICINE

## 2017-03-14 PROCEDURE — 85025 COMPLETE CBC W/AUTO DIFF WBC: CPT | Performed by: FAMILY MEDICINE

## 2017-03-14 PROCEDURE — 90670 PCV13 VACCINE IM: CPT | Performed by: FAMILY MEDICINE

## 2017-03-14 PROCEDURE — 36415 COLL VENOUS BLD VENIPUNCTURE: CPT | Performed by: FAMILY MEDICINE

## 2017-03-14 PROCEDURE — 99495 TRANSJ CARE MGMT MOD F2F 14D: CPT | Performed by: FAMILY MEDICINE

## 2017-03-14 NOTE — PATIENT INSTRUCTIONS
You should hear from thoracic surgery about scheduling a PET scan and MRI of your brain to ensure there's no cancer anywhere else.  You will also follow up with thoracic surgery as they dictate.

## 2017-03-14 NOTE — MR AVS SNAPSHOT
After Visit Summary   3/14/2017    Juwan Latham    MRN: [id number]           Patient Information     Date Of Birth          1950        Visit Information        Provider Department      3/14/2017 1:20 PM Julio Guidry Jr., MD [facility]        Today's Diagnoses     Malignant neoplasm of upper lobe of right lung (H)    -  1    Hypertension goal BP (blood pressure) < 140/90          Care Instructions    You should hear from thoracic surgery about scheduling a PET scan and MRI of your brain to ensure there's no cancer anywhere else.  You will also follow up with thoracic surgery as they dictate.        Follow-ups after your visit        Follow-up notes from your care team     Return if symptoms worsen or fail to improve.      Your next 10 appointments already scheduled     Mar 16, 2017  9:30 AM CDT   Return Sleep Patient with Owen Davis MD   [facility] ([facility]    [address]  [facility] [address]   [phone]            Mar 21, 2017 10:30 AM CDT   PE NPET ONCOLOGY (EYES TO THIGHS) with RHPET1   [facility] PET/CT ([facility])    [address]  [address]  [facility] [id number] [phone]           Tell your doctor:   If there is any chance you may be pregnant or if you are breastfeeding.   If you have problems lying in small spaces (claustrophobia). If you do, your doctor may give you medicine to help you relax. If you have diabetes:   Have your exam early in the morning. Your blood glucose will go up as the day goes by.   Your glucose level must be 180 or less at the start of the exam. Please take any medicines you need to ensure this blood glucose level. 24 hours before your scan: Don t do any heavy exercise. (No jogging, aerobics or other workouts.) Exercise will make your pictures less accurate. 6 hours before your scan:   Stop all food and liquids  (except water).   Do not chew gum or suck on mints.   If you need to take medicine with food, you may take it with a few crackers.  Please call your Imaging Department at your exam site with any questions.            Mar 21, 2017  1:00 PM CDT   MR BRAIN W/O & W CONTRAST with RHMR1   [facility] MRI ([facility])    [address]  [facility] [address]   [phone]           Take your medicines as usual, unless your doctor tells you not to. Bring a list of your current medicines to your exam (including vitamins, minerals and over-the-counter drugs).  You will be given intravenous contrast for this exam. To prepare:   The day before your exam, drink extra fluids at least six 8-ounce glasses (unless your doctor tells you to restrict your fluids).   Have a blood test (creatinine test) within 30 days of your exam. Go to your clinic or Diagnostic Imaging Department for this test.  The MRI machine uses a strong magnet. Please wear clothes without metal (snaps, zippers). A sweatsuit works well, or we may give you a hospital gown.  Please remove any body piercings and hair extensions before you arrive. You will also remove watches, jewelry, hairpins, wallets, dentures, partial dental plates and hearing aids. You may wear contact lenses, and you may be able to wear your rings. We have a safe place to keep your personal items, but it is safer to leave them at home.   **IMPORTANT** THE INSTRUCTIONS BELOW ARE ONLY FOR THOSE PATIENTS WHO HAVE BEEN TOLD THEY WILL RECEIVE SEDATION OR GENERAL ANESTHESIA DURING THEIR MRI PROCEDURE:  IF YOU WILL RECEIVE SEDATION (take medicine to help you relax during your exam):   You must get the medicine from your doctor before you arrive. Bring the medicine to the exam. Do not take it at home.   Arrive one hour early. Bring someone who can take you home after the test. Your medicine will make you sleepy. After the exam, you may not drive, take a bus or take a taxi by  yourself.   No eating 8 hours before your exam. You may have clear liquids up until 4 hours before your exam. (Clear liquids include water, clear tea, black coffee and fruit juice without pulp.)  IF YOU WILL RECEIVE ANESTHESIA (be asleep for your exam):   Arrive 1 1/2 hours early. Bring someone who can take you home after the test. You may not drive, take a bus or take a taxi by yourself.   No eating 8 hours before your exam. You may have clear liquids up until 4 hours before your exam. (Clear liquids include water, clear tea, black coffee and fruit juice without pulp.)  Please call the Imaging Department at your exam site with any questions.              Future tests that were ordered for you today     Open Future Orders        Priority Expected Expires Ordered    NPET Oncology (Eyes to Thighs) Routine  6/12/2017 3/14/2017    MRI Brain w & w/o contrast Routine  6/12/2017 3/14/2017            Who to contact     If you have questions or need follow up information about today's clinic visit or your schedule please contact [facility] directly at [phone].  Normal or non-critical lab and imaging results will be communicated to you by [employer], letter or phone within 4 business days after the clinic has received the results. If you do not hear from us within 7 days, please contact the clinic through [employer] or phone. If you have a critical or abnormal lab result, we will notify you by phone as soon as possible.  Submit refill requests through [employer] or call your pharmacy and they will forward the refill request to us. Please allow 3 business days for your refill to be completed.          Additional Information About Your Visit        [employer] Information     [employer] gives you secure access to your electronic health record. If you see a primary care provider, you can also send messages to your care team and make appointments. If you have questions, please call your primary care clinic.  If you do not have  "a primary care provider, please call [phone] and they will assist you.        Care EveryWhere ID     This is your Care EveryWhere ID. This could be used by other organizations to access your [facility] medical records  [id number]        Your Vitals Were     Pulse Temperature Height Pulse Oximetry BMI (Body Mass Index)       77 98.3  F (36.8  C) (Oral) 5' 8\" (1.727 m) 96% 28.43 kg/m2        Blood Pressure from Last 3 Encounters:   03/14/17 118/70   03/05/17 130/83   02/24/17 158/85    Weight from Last 3 Encounters:   03/14/17 187 lb (84.8 kg)   03/03/17 193 lb 9 oz (87.8 kg)   02/24/17 192 lb 14.4 oz (87.5 kg)              Today, you had the following     No orders found for display       Primary Care Provider Office Phone # Fax #    Julio Guidry Jr., [phone]       [facility] [address] [facility] [address]        Thank you!     Thank you for choosing [facility]  for your care. Our goal is always to provide you with excellent care. Hearing back from our patients is one way we can continue to improve our services. Please take a few minutes to complete the written survey that you may receive in the mail after your visit with us. Thank you!             Your Updated Medication List - Protect others around you: Learn how to safely use, store and throw away your medicines at www.disposemymeds.org.          This list is accurate as of: 3/14/17  2:02 PM.  Always use your most recent med list.                   Brand Name Dispense Instructions for use    acetaminophen 325 MG tablet    TYLENOL    100 tablet    Take 2 tablets (650 mg) by mouth every 6 hours Do not take more than 4,000 mg of acetaminophen (Tylenol) from all sources to prevent liver damage.       ALPRAZolam 0.5 MG tablet    XANAX    10 tablet    Take 1 tablet (0.5 mg) by mouth 3 times daily as needed for anxiety       lisinopril 10 MG tablet    PRINIVIL/ZESTRIL         phosphorus tablet 250 mg 250 MG per " tablet    K PHOS NEUTRAL    4 tablet    Take 2 tablets (500 mg) by mouth 3 times daily Take 2 tablets in the evening on Jose 3/5 and in the morning on Monday 3/6. Get your phosphorus level rechecked on Monday 3/6 at the [facility].

## 2017-03-14 NOTE — NURSING NOTE
"Chief Complaint   Patient presents with     Hospital F/U     Initial /70 (BP Location: Right arm, Patient Position: Chair, Cuff Size: Adult Large)  Pulse 77  Temp 98.3  F (36.8  C) (Oral)  Ht 5' 8\" (1.727 m)  Wt 187 lb (84.8 kg)  SpO2 96%  BMI 28.43 kg/m2 Estimated body mass index is 28.43 kg/(m^2) as calculated from the following:    Height as of this encounter: 5' 8\" (1.727 m).    Weight as of this encounter: 187 lb (84.8 kg).  BP completed using cuff size large right Arm  Kristyn Edmond CMA    "

## 2017-03-14 NOTE — TELEPHONE ENCOUNTER
Thoracic Tumor Conference      Patient Name: Juwan Latham    Reason for conference discussion (brief overview): 66 year old male with newly diagnosed adenocarcinoma of the RUL. He is S/P a wedge resection where all LN were negative and the margins were negative.     Specific Question:  Proceed with completion lobectomy versus surveillance?    Pertinent Histology:  Minimally invasive well differentiated adenocarcinoma    Referring Physician: Dr. Mcguire    The patient's case was presented at the multidisciplinary conference for the above noted reason.        There was a consensus recommendation for the following actions:     The group recommends a PET/CT and brain MRI to complete staging and if those are negative, the recommendation would be surveillance with chest CT.    Case Lead:  Carla Faulkner    Interventional Radiology Staff Present: N/A

## 2017-03-14 NOTE — PROGRESS NOTES
"  SUBJECTIVE:                                                    Juwan Latham is a 66 year old male who presents to clinic today for the following health issues:        Hospital Follow-up Visit:    Hospital/Nursing Home/IP Rehab Facility: [facility]  Date of Admission: 3-3-17   Date of Discharge: 3-5-17  Reason(s) for Admission: thoracic surgery - laparoscopic wedge resection lung            Problems taking medications regularly:  None       Medication changes since discharge: None       Problems adhering to non-medication therapy:  None    Summary of hospitalization:  [facility] discharge summary reviewed  Diagnostic Tests/Treatments reviewed.  Follow up needed: thoracic surgery  Other Healthcare Providers Involved in Patient s Care:         Surgical follow-up appointment - thoracic surgery  Update since discharge: improved.     Post Discharge Medication Reconciliation: discharge medications reconciled, continue medications without change.  Plan of care communicated with patient     Coding guidelines for this visit:  Type of Medical   Decision Making Face-to-Face Visit       within 7 Days of discharge Face-to-Face Visit        within 14 days of discharge   Moderate Complexity [id number]   High Complexity [id number]                Problem list and histories reviewed & adjusted, as indicated.  Additional history: as documented    Labs reviewed in EPIC    ROS:  Constitutional, HEENT, cardiovascular, pulmonary, gi and gu systems are negative, except as otherwise noted.    OBJECTIVE:                                                    /70 (BP Location: Right arm, Patient Position: Chair, Cuff Size: Adult Large)  Pulse 77  Temp 98.3  F (36.8  C) (Oral)  Ht 5' 8\" (1.727 m)  Wt 187 lb (84.8 kg)  SpO2 96%  BMI 28.43 kg/m2  Body mass index is 28.43 kg/(m^2).  GENERAL: healthy, alert and no distress  NECK: no adenopathy, no asymmetry, masses, or scars and thyroid normal to " palpation  RESP: lungs clear to auscultation - no rales, rhonchi or wheezes  CV: regular rate and rhythm, normal S1 S2, no S3 or S4, no murmur, click or rub, no peripheral edema and peripheral pulses strong  ABDOMEN: soft, nontender, without hepatosplenomegaly or masses, bowel sounds normal and well-healed incisions  MS: no gross musculoskeletal defects noted, no edema    Diagnostic Test Results:  Results for orders placed or performed in visit on 03/14/17   Phosphorus   Result Value Ref Range    Phosphorus 3.2 2.5 - 4.5 mg/dL   CBC with platelets differential   Result Value Ref Range    WBC 6.4 4.0 - 11.0 10e9/L    RBC Count 4.79 4.4 - 5.9 10e12/L    Hemoglobin 15.3 13.3 - 17.7 g/dL    Hematocrit 45.0 40.0 - 53.0 %    MCV 94 78 - 100 fl    MCH 31.9 26.5 - 33.0 pg    MCHC 34.0 31.5 - 36.5 g/dL    RDW 12.5 10.0 - 15.0 %    Platelet Count 258 150 - 450 10e9/L    Diff Method Automated Method     % Neutrophils 67.8 %    % Lymphocytes 16.7 %    % Monocytes 12.2 %    % Eosinophils 3.1 %    % Basophils 0.2 %    Absolute Neutrophil 4.3 1.6 - 8.3 10e9/L    Absolute Lymphocytes 1.1 0.8 - 5.3 10e9/L    Absolute Monocytes 0.8 0.0 - 1.3 10e9/L    Absolute Eosinophils 0.2 0.0 - 0.7 10e9/L    Absolute Basophils 0.0 0.0 - 0.2 10e9/L        ASSESSMENT/PLAN:                                                            1. Malignant neoplasm of upper lobe of right lung (H)  Juwan was a bit confused by his pathology report, understanding that he did not have lung cancer.  I clarified for him that he indeed does have lung cancer, but that the margins that were removed were free of malignancy.  Furthermore, I advised him that his case had been reviewed today at the [facility] board at [facility].  The thought is that he needs a bit more radiographic analysis to ensure there is no evidence of metastatic disease; this will take the form of an MRI of the brain and a PET scan to be completed in about six weeks.  If these are clear, the plan is follow  "up with thoracic surgery every six months with concurrent CT scan of the chest.  No further surgery (which would be a complete right upper lobectomy) nor radiation or chemotherapy would be required.  Juwan was quite relieved to hear this and that at this point, it \"looks like we got it all\", though I emphasized that we always hesitate to use this phrase given the unpredictable nature of recurrence of malignancy.  I did recheck his phosphorus today which was low at his last check and this returned normal. His CBC is also normal.  - Phosphorus  - CBC with platelets differential  - PNEUMOCOCCAL CONJ VACCINE 13 VALENT IM (PREVNAR 13)    2. Hypertension goal BP (blood pressure) < 140/90  Blood pressure at goal. Continue lisinopril.  - Albumin Random Urine Quantitative; Future    See Patient Instructions    Jr Julio Guidry MD  [facility]      "

## 2017-03-15 NOTE — PROGRESS NOTES
Mr. Latham,    -Normal red blood cell (hgb) levels, normal white blood cell count and normal platelet levels.  -Phosphorus is now normal.      If you have further questions about the interpretation of your labs, labtestsonline.org is a good website to check out for further information.    Please contact the clinic if you have additional questions.  Thank you.    Sincerely,    Julio Guidry MD

## 2017-03-16 ENCOUNTER — OFFICE VISIT (OUTPATIENT)
Dept: SLEEP MEDICINE | Facility: CLINIC | Age: 67
End: 2017-03-16
Payer: COMMERCIAL

## 2017-03-16 VITALS
HEIGHT: 68 IN | SYSTOLIC BLOOD PRESSURE: 145 MMHG | BODY MASS INDEX: 28.33 KG/M2 | OXYGEN SATURATION: 95 % | HEART RATE: 70 BPM | WEIGHT: 186.95 LBS | DIASTOLIC BLOOD PRESSURE: 85 MMHG | RESPIRATION RATE: 12 BRPM

## 2017-03-16 DIAGNOSIS — G47.33 OSA (OBSTRUCTIVE SLEEP APNEA): Primary | ICD-10-CM

## 2017-03-16 DIAGNOSIS — F45.8 BRUXISM: ICD-10-CM

## 2017-03-16 DIAGNOSIS — E66.3 OVERWEIGHT: ICD-10-CM

## 2017-03-16 LAB — COPATH REPORT: NORMAL

## 2017-03-16 PROCEDURE — 99214 OFFICE O/P EST MOD 30 MIN: CPT | Performed by: INTERNAL MEDICINE

## 2017-03-16 NOTE — PATIENT INSTRUCTIONS
MY TREATMENT INFORMATION FOR SLEEP APNEA-  Juwan Latham    MY CONTACT NUMBERS ARE:  [phone]  DOCTOR : Owen [facility]  SLEEP CENTER :  [place]  CPAP EQUIPMENT     You have sleep apnea, auto-CPAP is prescribed for you.    You will be provided with an  auto-titrating CPAP with a pressure range of 5-15 cmH2O with heated humidity to limit nasal congestion. Adjust the heat level on humidifier to find a setting that prevents dry nose but does not cause condensation in the hose or mask. Use distilled water in the humidifier.    The CPAP has a ramp function that starts the pressure lower than your prescribed pressure and gradually increases it over a number of minutes.  This may make it easier to fall asleep.                  Try to use the CPAP every-night, all night, at least 7-8 hours each night.  Daytime naps are not advised, but use CPAP if taking naps. Many insurances require that we prove you are using the CPAP at least 4 hours on at least 70% of nights over a 30 day period. We have 90 days to meet those criteria.       Patient was advised not to drive if drowsy or sleepy.                Discussed weight management and the impact of weight gain on sleep apnea.  Let me know if you snore or feel the pressure is too high.    You can get new supplies (mask, hose and filter) for your CPAP every 3-6 months, covered by insurance. You do not need to get supplies that often, but they are available if you would like them.  You may exchange the mask once within the first month if you feel the initial mask does not fit well.  Contact your medical equipment provider for equipment issues.  Please let me know if you have any return of snoring, daytime sleepiness or poor sleep quality. We will want to make sure your CPAP is adequately treating your apnea.  There is a website called CPAP.com that has accessories that may make CPAP use easier. Please visit it at your convenience.    Our phone number is  "[phone]    Follow-up 4-6 weeks after PAP usage.  Bring your CPAP machine with you to the follow up appointment.      Frequently asked questions:  1. What is Obstructive Sleep Apnea (PAULINO)? PAULINO is the most common type of sleep apnea. Apnea literally means, \"without breath.\" It is characterized by repetitive pauses in breathing, despite continued effort to breathe, and is usually associated with a reduction in blood oxygen saturation. Apneas can last 10 to over 60 seconds. It is caused by narrowing or collapse of the upper airway as muscles relax during sleep. Severity of sleep apnea is determined by frequency of breathing events and their effect on your sleep and oxygen levels determined during sleep testing.   2. What are the consequences of PAULINO? Symptoms include: daytime sleepiness- possibly increasing the risk of falling asleep while driving, unrefreshing/restless sleep, snoring, insomnia, waking frequently to urinate, waking with heartburn or reflux, reduced concentration and memory, and morning headaches. Other health consequences may include development of high blood pressure and other cardiovascular disease in persons who are susceptible. Untreated PAULINO  can contribute to heart disease, stroke and diabetes.   3. What are the treatment options? In most situations, sleep apnea is a lifelong disease that must be managed with daily therapy. Medications are not effective for sleep apnea and surgery is generally not performed until other therapies have been tried. Therapy is usually tailored to the individual patient based on many factors including your wishes as well as severity of sleep apnea and severity of obesity. Continuous Positive Airway (CPAP) is the most reliable treatment. An oral device to hold your jaw forward is usually      IF I HAVE SLEEP APNEA.....  WHERE CAN I FIND MORE INFORMATION?    American Academy of Sleep Medicine Patient information on sleep " disorders:  http://yoursleep.aasmnet.org    THINGS I SHOULD REMEMBER  In most situations, sleep apnea is a lifelong disease that must be managed with daily therapy. Untreated disease, when severe, may result in an increased risk for an array of problems from heart disease to mood changes, car accidents and shorter lifespan.    CPAP-  WHY AND HOW?                                    Continuous positive airway pressure, or CPAP, is the most effective treatment for obstructive sleep apnea. A decision to use CPAP is a major step forward in the pursuit of a healthier life. The successful use of CPAP will help you breathe easier, sleep better and live healthier. Using CPAP can be a positive experience if you keep these lorenzana points in mind:  1. Commitment  CPAP is not a quick fix for your problem. It involves a long-term commitment to improve your sleep and your health.    2. Communication  Stay in close communication with both your sleep doctor and your CPAP supplier. Ask lots of questions and seek help when you need it.    3. Consistency  Use CPAP all night, every night and for every nap. You will receive the maximum health benefits from CPAP when you use it every time that you sleep. This will also make it easier for your body to adjust to the treatment.    4. Correction  The first machine and mask that you try may not be the best ones for you. Work with your sleep doctor and your CPAP supplier to make corrections to your equipment selection. Ask about trying a different type of machine or mask if you have ongoing problems. Make sure that your mask is a good fit and learn to use your equipment properly.    5. Challenge  Tell a family member or close friend to ask you each morning if you used your CPAP the previous night. Have someone to challenge you to give it your best effort.    6. Connection   Your adjustment to CPAP will be easier if you are able to connect with others who use the same treatment. Ask your sleep  "doctor if there is a support group in your area for people who have sleep apnea, or look for one on the Internet.    7. Comfort   Increase your level of comfort by using a saline spray, decongestant or heated humidifier if CPAP irritates your nose, mouth or throat. Use your unit's \"ramp\" setting to slowly get used to the air pressure level. There may be soft pads you can buy that will fit over your mask straps. Look on www.CPAP.com for accessories such as these straps, a pillow contoured for side-sleeping with CPAP, longer hoses, hose covers to reduce condensation, or stands to keep the hose out of your way.                                                              8. Cleaning   Clean your mask, tubing and headgear on a regular basis. Put this time in your schedule so that you don't forget to do it. Check and replace the filters for your CPAP unit and humidifier.    9. Completion   Although you are never finished with CPAP therapy, you should reward yourself by celebrating the completion of your first month of treatment. Expect this first month to be your hardest period of adjustment. It will involve some trial and error as you find the machine, mask and pressure settings that are right for you.    10. Continuation  After your first month of treatment, continue to make a daily commitment to use your CPAP all night, every night and for every nap.    CPAP-Tips to starting with success:  Begin using your CPAP for short periods of time during the day while you watch TV or read.    Use CPAP every night and for every nap. Using it less often reduces the health benefits and makes it harder for your body to get used to it.    Newer CPAP models are virtually silent; however, if you find the sound of your CPAP machine to be bothersome, place the unit under your bed to dampen the sound.     Make small adjustments to your mask, tubing, straps and headgear until you get the right fit. Tightening the mask may actually worsen " "the leak.  If it leaves significant marks on your face or irritates the bridge of your nose, it may not be the best mask for you.  Speak with the person who supplied the mask and consider trying other masks.    Use a saline nasal spray to ease mild nasal congestion. Neti-Pot or saline nasal rinses may also help. Nasal gel sprays can help reduce nasal dryness.  Biotene mouthwash can be helpful to protect your teeth if you experience frequent dry mouth.  Dry mouth may be a sign of air escaping out of your mouth or out of the mask in the case of a full face mask.  Speak with your provider if you expect that is the case.     Take a nasal decongestant to relieve more severe nasal or sinus congestion.  Do not use Afrin (oxymetazoline) nasal spray more than 3 days in a row.  Speak with your sleep doctor if your nasal congestion is chronic.    Use a heated humidifier that fits your CPAP model to enhance your breathing comfort. Adjust the heat setting up if you get a dry nose or throat, down if you get condensation in the hose or mask.  Position the CPAP lower than you so that any condensation in the hose drains back into the machine rather than towards the mask.    Try a system that uses nasal pillows if traditional masks give you problems.    Clean your mask, tubing and headgear once a week. Make sure the equipment dries fully.    Regularly check and replace the filters for your CPAP unit and humidifier.    Work closely with your sleep doctor and your CPAP supplier to make sure that you have the machine, mask and air pressure setting that works best for you.    MASK DESENSITIZATION:    you are experiencing some anxiety about trying a PAP mask or breathing with pressurized air try the following steps in sequence to get used to PAP. This is called \"desensitization\".    1.  Wear mask (disconnected from the device) for 60 minutes daily awake and use a distraction: Sit and watch TV, read, or listen to music with the mask on. " Take the mask off and put it back on, as needed. This can be in a chair in the living room, for example.    2.  When you can use the mask without taking it off for 60 minutes and without anxiety, then proceed to step 3.    3.  Attach the mask to the PAP device, and switch the unit  on  and practice breathing through the mask for one hour while watching television, reading or performing some other sedentary, distracting activity.     4.  Once you are comfortable wearing PAP for 30-60 minutes without anxiety while distracted with an activity, try to use it in bed. You can continue distraction in bed by watching TV, reading, listening to music or an audio book, etc.  The main goal is to  not think about using PAP  but pay attention to relaxing.    5. Use the PAP during scheduled one-hour naps at home.    6. Use PAP during initial 3-4 hours of nocturnal sleep.    7. Use PAP through an entire night of sleep.    Your BMI is Body mass index is 28.43 kg/(m^2).  Weight management is a personal decision.  If you are interested in exploring weight loss strategies, the following discussion covers the approaches that may be successful. Body mass index (BMI) is one way to tell whether you are at a healthy weight, overweight, or obese. It measures your weight in relation to your height.  A BMI of 18.5 to 24.9 is in the healthy range. A person with a BMI of 25 to 29.9 is considered overweight, and someone with a BMI of 30 or greater is considered obese. More than two-thirds of [place] adults are considered overweight or obese.  Being overweight or obese increases the risk for further weight gain. Excess weight may lead to heart disease and diabetes.  Creating and following plans for healthy eating and physical activity may help you improve your health.  Weight control is part of healthy lifestyle and includes exercise, emotional health, and healthy eating habits. Careful eating habits lifelong are the mainstay of weight control.  Though there are significant health benefits from weight loss, long-term weight loss with diet alone may be very difficult to achieve- studies show long-term success with dietary management in less than 10% of people. Attaining a healthy weight may be especially difficult to achieve in those with severe obesity. In some cases, medications, devices and surgical management might be considered.  What can you do?  If you are overweight or obese and are interested in methods for weight loss, you should discuss this with your provider.     Consider reducing daily calorie intake by 500 calories.     Keep a food journal.     Avoiding skipping meals, consider cutting portions instead.    Diet combined with exercise helps maintain muscle while optimizing fat loss. Strength training is particularly important for building and maintaining muscle mass. Exercise helps reduce stress, increase energy, and improves fitness. Increasing exercise without diet control, however, may not burn enough calories to loose weight.       Start walking three days a week 10-20 minutes at a time    Work towards walking thirty minutes five days a week     Eventually, increase the speed of your walking for 1-2 minutes at time    In addition, we recommend that you review healthy lifestyles and methods for weight loss available through the National Institutes of Health patient information sites:  http://win.niddk.nih.gov/publications/index.htm    And look into health and wellness programs that may be available through your health insurance provider, employer, local community center, or [employer]ina club.

## 2017-03-16 NOTE — MR AVS SNAPSHOT
After Visit Summary   3/16/2017    Juwan Latham    MRN: [id number]           Patient Information     Date Of Birth          1950        Visit Information        Provider Department      3/16/2017 9:30 AM Owen Davis MD [facility]        Today's Diagnoses     PAULINO (obstructive sleep apnea)    -  1      Care Instructions    MY TREATMENT INFORMATION FOR SLEEP APNEA-  Juwan Latham    MY CONTACT NUMBERS ARE:  [phone]  DOCTOR : Owen MCMILLAN. Ryan  SLEEP CENTER :  [place]  CPAP EQUIPMENT     You have sleep apnea, auto-CPAP is prescribed for you.    You will be provided with an  auto-titrating CPAP with a pressure range of 5-15 cmH2O with heated humidity to limit nasal congestion. Adjust the heat level on humidifier to find a setting that prevents dry nose but does not cause condensation in the hose or mask. Use distilled water in the humidifier.    The CPAP has a ramp function that starts the pressure lower than your prescribed pressure and gradually increases it over a number of minutes.  This may make it easier to fall asleep.                  Try to use the CPAP every-night, all night, at least 7-8 hours each night.  Daytime naps are not advised, but use CPAP if taking naps. Many insurances require that we prove you are using the CPAP at least 4 hours on at least 70% of nights over a 30 day period. We have 90 days to meet those criteria.       Patient was advised not to drive if drowsy or sleepy.                Discussed weight management and the impact of weight gain on sleep apnea.  Let me know if you snore or feel the pressure is too high.    You can get new supplies (mask, hose and filter) for your CPAP every 3-6 months, covered by insurance. You do not need to get supplies that often, but they are available if you would like them.  You may exchange the mask once within the first month if you feel the initial mask does not fit well.  Contact your  "medical equipment provider for equipment issues.  Please let me know if you have any return of snoring, daytime sleepiness or poor sleep quality. We will want to make sure your CPAP is adequately treating your apnea.  There is a website called CPAP.com that has accessories that may make CPAP use easier. Please visit it at your convenience.    Our phone number is [phone]    Follow-up 4-6 weeks after PAP usage.  Bring your CPAP machine with you to the follow up appointment.      Frequently asked questions:  1. What is Obstructive Sleep Apnea (PAULINO)? PAULINO is the most common type of sleep apnea. Apnea literally means, \"without breath.\" It is characterized by repetitive pauses in breathing, despite continued effort to breathe, and is usually associated with a reduction in blood oxygen saturation. Apneas can last 10 to over 60 seconds. It is caused by narrowing or collapse of the upper airway as muscles relax during sleep. Severity of sleep apnea is determined by frequency of breathing events and their effect on your sleep and oxygen levels determined during sleep testing.   2. What are the consequences of PAULINO? Symptoms include: daytime sleepiness- possibly increasing the risk of falling asleep while driving, unrefreshing/restless sleep, snoring, insomnia, waking frequently to urinate, waking with heartburn or reflux, reduced concentration and memory, and morning headaches. Other health consequences may include development of high blood pressure and other cardiovascular disease in persons who are susceptible. Untreated PAULINO  can contribute to heart disease, stroke and diabetes.   3. What are the treatment options? In most situations, sleep apnea is a lifelong disease that must be managed with daily therapy. Medications are not effective for sleep apnea and surgery is generally not performed until other therapies have been tried. Therapy is usually tailored to the individual patient based on many factors including your " wishes as well as severity of sleep apnea and severity of obesity. Continuous Positive Airway (CPAP) is the most reliable treatment. An oral device to hold your jaw forward is usually      IF I HAVE SLEEP APNEA.....  WHERE CAN I FIND MORE INFORMATION?    American Academy of Sleep Medicine Patient information on sleep disorders:  http://yoursleep.aasmnet.org    THINGS I SHOULD REMEMBER  In most situations, sleep apnea is a lifelong disease that must be managed with daily therapy. Untreated disease, when severe, may result in an increased risk for an array of problems from heart disease to mood changes, car accidents and shorter lifespan.    CPAP-  WHY AND HOW?                                    Continuous positive airway pressure, or CPAP, is the most effective treatment for obstructive sleep apnea. A decision to use CPAP is a major step forward in the pursuit of a healthier life. The successful use of CPAP will help you breathe easier, sleep better and live healthier. Using CPAP can be a positive experience if you keep these lorenzana points in mind:  1. Commitment  CPAP is not a quick fix for your problem. It involves a long-term commitment to improve your sleep and your health.    2. Communication  Stay in close communication with both your sleep doctor and your CPAP supplier. Ask lots of questions and seek help when you need it.    3. Consistency  Use CPAP all night, every night and for every nap. You will receive the maximum health benefits from CPAP when you use it every time that you sleep. This will also make it easier for your body to adjust to the treatment.    4. Correction  The first machine and mask that you try may not be the best ones for you. Work with your sleep doctor and your CPAP supplier to make corrections to your equipment selection. Ask about trying a different type of machine or mask if you have ongoing problems. Make sure that your mask is a good fit and learn to use your equipment  "properly.    5. Challenge  Tell a family member or close friend to ask you each morning if you used your CPAP the previous night. Have someone to challenge you to give it your best effort.    6. Connection   Your adjustment to CPAP will be easier if you are able to connect with others who use the same treatment. Ask your sleep doctor if there is a support group in your area for people who have sleep apnea, or look for one on the Internet.    7. Comfort   Increase your level of comfort by using a saline spray, decongestant or heated humidifier if CPAP irritates your nose, mouth or throat. Use your unit's \"ramp\" setting to slowly get used to the air pressure level. There may be soft pads you can buy that will fit over your mask straps. Look on www.CPAP.com for accessories such as these straps, a pillow contoured for side-sleeping with CPAP, longer hoses, hose covers to reduce condensation, or stands to keep the hose out of your way.                                                              8. Cleaning   Clean your mask, tubing and headgear on a regular basis. Put this time in your schedule so that you don't forget to do it. Check and replace the filters for your CPAP unit and humidifier.    9. Completion   Although you are never finished with CPAP therapy, you should reward yourself by celebrating the completion of your first month of treatment. Expect this first month to be your hardest period of adjustment. It will involve some trial and error as you find the machine, mask and pressure settings that are right for you.    10. Continuation  After your first month of treatment, continue to make a daily commitment to use your CPAP all night, every night and for every nap.    CPAP-Tips to starting with success:  Begin using your CPAP for short periods of time during the day while you watch TV or read.    Use CPAP every night and for every nap. Using it less often reduces the health benefits and makes it harder for " your body to get used to it.    Newer CPAP models are virtually silent; however, if you find the sound of your CPAP machine to be bothersome, place the unit under your bed to dampen the sound.     Make small adjustments to your mask, tubing, straps and headgear until you get the right fit. Tightening the mask may actually worsen the leak.  If it leaves significant marks on your face or irritates the bridge of your nose, it may not be the best mask for you.  Speak with the person who supplied the mask and consider trying other masks.    Use a saline nasal spray to ease mild nasal congestion. Neti-Pot or saline nasal rinses may also help. Nasal gel sprays can help reduce nasal dryness.  Biotene mouthwash can be helpful to protect your teeth if you experience frequent dry mouth.  Dry mouth may be a sign of air escaping out of your mouth or out of the mask in the case of a full face mask.  Speak with your provider if you expect that is the case.     Take a nasal decongestant to relieve more severe nasal or sinus congestion.  Do not use Afrin (oxymetazoline) nasal spray more than 3 days in a row.  Speak with your sleep doctor if your nasal congestion is chronic.    Use a heated humidifier that fits your CPAP model to enhance your breathing comfort. Adjust the heat setting up if you get a dry nose or throat, down if you get condensation in the hose or mask.  Position the CPAP lower than you so that any condensation in the hose drains back into the machine rather than towards the mask.    Try a system that uses nasal pillows if traditional masks give you problems.    Clean your mask, tubing and headgear once a week. Make sure the equipment dries fully.    Regularly check and replace the filters for your CPAP unit and humidifier.    Work closely with your sleep doctor and your CPAP supplier to make sure that you have the machine, mask and air pressure setting that works best for you.    MASK DESENSITIZATION:    you are  "experiencing some anxiety about trying a PAP mask or breathing with pressurized air try the following steps in sequence to get used to PAP. This is called \"desensitization\".    1.  Wear mask (disconnected from the device) for 60 minutes daily awake and use a distraction: Sit and watch TV, read, or listen to music with the mask on. Take the mask off and put it back on, as needed. This can be in a chair in the living room, for example.    2.  When you can use the mask without taking it off for 60 minutes and without anxiety, then proceed to step 3.    3.  Attach the mask to the PAP device, and switch the unit  on  and practice breathing through the mask for one hour while watching television, reading or performing some other sedentary, distracting activity.     4.  Once you are comfortable wearing PAP for 30-60 minutes without anxiety while distracted with an activity, try to use it in bed. You can continue distraction in bed by watching TV, reading, listening to music or an audio book, etc.  The main goal is to  not think about using PAP  but pay attention to relaxing.    5. Use the PAP during scheduled one-hour naps at home.    6. Use PAP during initial 3-4 hours of nocturnal sleep.    7. Use PAP through an entire night of sleep.    Your BMI is Body mass index is 28.43 kg/(m^2).  Weight management is a personal decision.  If you are interested in exploring weight loss strategies, the following discussion covers the approaches that may be successful. Body mass index (BMI) is one way to tell whether you are at a healthy weight, overweight, or obese. It measures your weight in relation to your height.  A BMI of 18.5 to 24.9 is in the healthy range. A person with a BMI of 25 to 29.9 is considered overweight, and someone with a BMI of 30 or greater is considered obese. More than two-thirds of American adults are considered overweight or obese.  Being overweight or obese increases the risk for further weight gain. Excess " weight may lead to heart disease and diabetes.  Creating and following plans for healthy eating and physical activity may help you improve your health.  Weight control is part of healthy lifestyle and includes exercise, emotional health, and healthy eating habits. Careful eating habits lifelong are the mainstay of weight control. Though there are significant health benefits from weight loss, long-term weight loss with diet alone may be very difficult to achieve- studies show long-term success with dietary management in less than 10% of people. Attaining a healthy weight may be especially difficult to achieve in those with severe obesity. In some cases, medications, devices and surgical management might be considered.  What can you do?  If you are overweight or obese and are interested in methods for weight loss, you should discuss this with your provider.     Consider reducing daily calorie intake by 500 calories.     Keep a food journal.     Avoiding skipping meals, consider cutting portions instead.    Diet combined with exercise helps maintain muscle while optimizing fat loss. Strength training is particularly important for building and maintaining muscle mass. Exercise helps reduce stress, increase energy, and improves fitness. Increasing exercise without diet control, however, may not burn enough calories to loose weight.       Start walking three days a week 10-20 minutes at a time    Work towards walking thirty minutes five days a week     Eventually, increase the speed of your walking for 1-2 minutes at time    In addition, we recommend that you review healthy lifestyles and methods for weight loss available through the National Institutes of Health patient information sites:  http://win.niddk.nih.gov/publications/index.htm    And look into health and wellness programs that may be available through your health insurance provider, employer, local community center, or ondina club.              Follow-ups after  your visit        Your next 10 appointments already scheduled     Mar 21, 2017  1:00 PM CDT   MR BRAIN W/O & W CONTRAST with RHMR1   [facility] ([facility])    [address]  [facility] [id number] [phone]           Take your medicines as usual, unless your doctor tells you not to. Bring a list of your current medicines to your exam (including vitamins, minerals and over-the-counter drugs).  You will be given intravenous contrast for this exam. To prepare:   The day before your exam, drink extra fluids at least six 8-ounce glasses (unless your doctor tells you to restrict your fluids).   Have a blood test (creatinine test) within 30 days of your exam. Go to your clinic or Diagnostic Imaging Department for this test.  The MRI machine uses a strong magnet. Please wear clothes without metal (snaps, zippers). A sweatsuit works well, or we may give you a hospital gown.  Please remove any body piercings and hair extensions before you arrive. You will also remove watches, jewelry, hairpins, wallets, dentures, partial dental plates and hearing aids. You may wear contact lenses, and you may be able to wear your rings. We have a safe place to keep your personal items, but it is safer to leave them at home.   **IMPORTANT** THE INSTRUCTIONS BELOW ARE ONLY FOR THOSE PATIENTS WHO HAVE BEEN TOLD THEY WILL RECEIVE SEDATION OR GENERAL ANESTHESIA DURING THEIR MRI PROCEDURE:  IF YOU WILL RECEIVE SEDATION (take medicine to help you relax during your exam):   You must get the medicine from your doctor before you arrive. Bring the medicine to the exam. Do not take it at home.   Arrive one hour early. Bring someone who can take you home after the test. Your medicine will make you sleepy. After the exam, you may not drive, take a bus or take a taxi by yourself.   No eating 8 hours before your exam. You may have clear liquids up until 4 hours before your exam. (Clear liquids include water, clear tea,  black coffee and fruit juice without pulp.)  IF YOU WILL RECEIVE ANESTHESIA (be asleep for your exam):   Arrive 1 1/2 hours early. Bring someone who can take you home after the test. You may not drive, take a bus or take a taxi by yourself.   No eating 8 hours before your exam. You may have clear liquids up until 4 hours before your exam. (Clear liquids include water, clear tea, black coffee and fruit juice without pulp.)  Please call the Imaging Department at your exam site with any questions.            Apr 11, 2017 10:30 AM CDT   PE NPET ONCOLOGY (EYES TO THIGHS) with RHPET1   [facility] PET/CT ([facility])    [address]   [facility] [address]  [facility] [address] [phone]           Tell your doctor:   If there is any chance you may be pregnant or if you are breastfeeding.   If you have problems lying in small spaces (claustrophobia). If you do, your doctor may give you medicine to help you relax. If you have diabetes:   Have your exam early in the morning. Your blood glucose will go up as the day goes by.   Your glucose level must be 180 or less at the start of the exam. Please take any medicines you need to ensure this blood glucose level. 24 hours before your scan: Don t do any heavy exercise. (No jogging, aerobics or other workouts.) Exercise will make your pictures less accurate. 6 hours before your scan:   Stop all food and liquids (except water).   Do not chew gum or suck on mints.   If you need to take medicine with food, you may take it with a few crackers.  Please call your Imaging Department at your exam site with any questions.              Who to contact     If you have questions or need follow up information about today's clinic visit or your schedule please contact [facility] directly at [phone].  Normal or non-critical lab and imaging results will be communicated to you by MyChart, letter or phone within 4 business days after the  "clinic has received the results. If you do not hear from us within 7 days, please contact the clinic through [employer] or phone. If you have a critical or abnormal lab result, we will notify you by phone as soon as possible.  Submit refill requests through [employer] or call your pharmacy and they will forward the refill request to us. Please allow 3 business days for your refill to be completed.          Additional Information About Your Visit        [employer] Information     [employer] gives you secure access to your electronic health record. If you see a primary care provider, you can also send messages to your care team and make appointments. If you have questions, please call your primary care clinic.  If you do not have a primary care provider, please call [phone] and they will assist you.        Care EveryWhere ID     This is your Care EveryWhere ID. This could be used by other organizations to access your [facility] medical records  [id number]        Your Vitals Were     Pulse Respirations Height Pulse Oximetry BMI (Body Mass Index)       70 12 1.727 m (5' 7.99\") 95% 28.43 kg/m2        Blood Pressure from Last 3 Encounters:   03/16/17 145/85   03/14/17 118/70   03/05/17 130/83    Weight from Last 3 Encounters:   03/16/17 84.8 kg (186 lb 15.2 oz)   03/14/17 84.8 kg (187 lb)   03/03/17 87.8 kg (193 lb 9 oz)              We Performed the Following     Comprehensive DME        Primary Care Provider Office Phone # Fax #    Julio Guidry Jr., [phone]       [facility] [address] [facility] [address]        Thank you!     Thank you for choosing [facility]  for your [place]. Our goal is always to provide you with excellent care. Hearing back from our patients is one way we can continue to improve our services. Please take a few minutes to complete the written survey that you may receive in the mail after your visit with us. Thank you!             Your Updated " Medication List - Protect others around you: Learn how to safely use, store and throw away your medicines at www.disposemymeds.org.          This list is accurate as of: 3/16/17 10:02 AM.  Always use your most recent med list.                   Brand Name Dispense Instructions for use    acetaminophen 325 MG tablet    TYLENOL    100 tablet    Take 2 tablets (650 mg) by mouth every 6 hours Do not take more than 4,000 mg of acetaminophen (Tylenol) from all sources to prevent liver damage.       ALPRAZolam 0.5 MG tablet    XANAX    10 tablet    Take 1 tablet (0.5 mg) by mouth 3 times daily as needed for anxiety       lisinopril 10 MG tablet    PRINIVIL/ZESTRIL         phosphorus tablet 250 mg 250 MG per tablet    K PHOS NEUTRAL    4 tablet    Take 2 tablets (500 mg) by mouth 3 times daily Take 2 tablets in the evening on Jose 3/5 and in the morning on Monday 3/6. Get your phosphorus level rechecked on Monday 3/6 at the [facility].

## 2017-03-16 NOTE — PROGRESS NOTES
[facility]  [facility] Medicine Follow-up  March 16, 2017      Name: Juwan Latham MRN# [id number]   Age: 66 year old YOB: 1950   Date of visit: March 16, 2017  Primary care provider: Julio Guidry Jr.  Home clinic: [facility]           Assessment and Plan:     1. Obstructive Sleep Apnea:  Discussed with patient his last sleep study and treatment options, he wants to retry PAP therapy again, so will prescribe Auto-PAP 5-15 cmH2O in addition to weight loss and avoiding sleeping supine position. Patient was advised to use PAP therapy for at least 7-8 hours and during naps (naps are not recommended).  Instructions given. Follow up 4-6 weeks after PAP use.    2.  Maintenance insomnia: secondary to untreated PAULINO, restart PAP therapy    3. Bruxism, follow dentist for dentist for mouth guard    4. Overweight: Encouraged patient to lose weight. Counseled regarding weight loss through diet modification and increased physical activity. Patient was given nstuctions of weight loss and advised to follow up her PCP for further weight loss interventions. Weight loss instructions given.    Orders Placed This Encounter   Procedures     Comprehensive DME       Summary Counseling:  New sleep schedule recommendation: given    Check out [email or web address]    All questions were answered.  The patient indicates understanding of the above issues and agrees with the plan set forth.           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 He was diagnosed mild PAULINO in 5/2013, and was prescribed auto-CPAP 5-15 cmH2O but he was unable to tolerated it. The machine was reclaimed because he never met the compliance goals. He had a hard time falling asleep with it. He did  not like having something on his face. so he was referred to oral appliance on 2016 but he did not gotten it because his insurance was not covering it.  He snores and tired and sleepy during the day. He denies apnea and gasping. He had also insomnia, improving, sleeping 6 hours a night but wakes up several times a night.      PREVIOUS SLEEP STUDIES:  Date: 2013  AHI: 14.6/hr  Intervention: oral device  Lowest O 2 sat: 82%    [place] Sleepiness Scale score today:     SLEEP-WAKE SCHEDULE:   Bed partner: Yes  Bedtime work-days:9  pm  Bedtime non-workdays: 10 pm  Sleep latency: 30 mins  Nocturnal awakenings: 3 times  Reason for awakenings: bathroom  OOB/Final awakenin am  Patient's estimated TST: 6 hrs  Daytime naps?  Spontaneous: Yes  Planned: Yes    Other sleep problems: maintenance insomnia, bruxism    Drowsy driving / near incidents: No    Medications that affect sleep: None            Medications:     Current Outpatient Prescriptions   Medication Sig     acetaminophen (TYLENOL) 325 MG tablet Take 2 tablets (650 mg) by mouth every 6 hours Do not take more than 4,000 mg of acetaminophen (Tylenol) from all sources to prevent liver damage.     phosphorus tablet 250 mg (K PHOS NEUTRAL) 250 MG per tablet Take 2 tablets (500 mg) by mouth 3 times daily Take 2 tablets in the evening on Jose 3/5 and in the morning on Monday 3/6. Get your phosphorus level rechecked on Monday 3/6 at the [facility].     ALPRAZolam (XANAX) 0.5 MG tablet Take 1 tablet (0.5 mg) by mouth 3 times daily as needed for anxiety     lisinopril (PRINIVIL,ZESTRIL) 10 MG tablet      No current facility-administered medications for this visit.         Allergies   Allergen Reactions     Percocet [Oxycodone-Acetaminophen] GI Disturbance     Severe constipation            Past Medical History:     Past Medical History   Diagnosis Date     Anxiety      Calculus of kidney ,      Congenital single kidney      Hypertension      Seasonal  "allergies      spring and fall     Sleep apnea      no cpap             Past Surgical History:      Past Surgical History   Procedure Laterality Date     Laser holmium lithotripsy ureter(s), insert stent, combined  9/11/2012     Procedure: COMBINED CYSTOSCOPY, URETEROSCOPY, LASER HOLMIUM LITHOTRIPSY URETER(S), INSERT STENT;  Cystoscopy, Right Ureteroscopy, Stone Extraction, Holmium Laser, Double J Stent Placement;  Surgeon: Nicolás Blackwell MD;  Location: [facility] OR     Colonoscopy N/A 2/11/2015     Procedure: COLONOSCOPY;  Surgeon: Murphy Jesus MD;  Location: [facility] GI     Esophagoscopy, gastroscopy, duodenoscopy (egd), combined N/A 5/20/2016     Procedure: COMBINED ESOPHAGOSCOPY, GASTROSCOPY, DUODENOSCOPY (EGD), BIOPSY SINGLE OR MULTIPLE;  Surgeon: Murphy Jesus MD;  Location: [facility] GI     Laparoscopic wedge resection lung Right 3/3/2017     Procedure: LAPAROSCOPIC WEDGE RESECTION LUNG;  Surgeon: Maria A Desai MD;  Location: [facility] OR     Bronchoscopy flexible N/A 3/3/2017     Procedure: BRONCHOSCOPY FLEXIBLE;  Surgeon: Maria A Desai MD;  Location: [facility] OR       Social History   Substance Use Topics     Smoking status: Former Smoker     Packs/day: 2.00     Years: 20.00     Types: Cigarettes     Quit date: 1/1/1985     Smokeless tobacco: Never Used     Alcohol use Yes      Comment: avg 3 beers per night       Family History   Problem Relation Age of Onset     HEART DISEASE Mother      DIABETES Brother 65     Type 2     DIABETES Maternal Grandmother      Cancer - colorectal Brother 70     Prostate Cancer Brother 74     Hypertension No family hx of      Lipids No family hx of             Physical Examination:   /85 (BP Location: Right arm, Patient Position: Chair, Cuff Size: Adult Regular)  Pulse 70  Resp 12  Ht 1.727 m (5' 7.99\")  Wt 84.8 kg (186 lb 15.2 oz)  SpO2 95%  BMI 28.43 kg/m2     GENERAL:  No acute distress.   SKIN:  Dry and warm.  There is no rash, lesions, ulcers or juandice at area of skin " examined.  HEENT:  Head without trauma.  Pupils round, reactive. Mallampati score: III. Clear nasal passages. No enlarged or erythematous inferior turbinates. No deviated septum.  Oropharynx: No high arched palate. No pharyngeal erythema or exudates. Elongated uvula.  Tonsils: no hypertrophy  Tongue: no macroglossia or contouring  No lateral narrowing  Dentition: fair, effects of bruxism  Dentures: None  NECK:  Supple.  There is no cervical adenopathy, no thyromegaly. No jugular venous distension.  LUNGS:  Normal respiratory effort. Lungs reveal decreased breath sounds at bases. No rales or wheezes.  HEART:  Regular rate and rhythm.  No murmur, gallops or rubs auscultated.  ABDOMEN:  Soft, bowel sounds positive.  There is no tenderness or guarding.   EXTREMITIES: No edema. Normal range of motion. No calf swelling or tenderness.  NEUROLOGIC:  Alert and oriented x3.  There is no focal deficit appreciated.           Data: All pertinent previous laboratory data reviewed     No results found for: PH, PHARTERIAL, PO2, [id number], SAT, PCO2, HCO3, BASEEXCESS, RISHI, BEB  Lab Results   Component Value Date    TSH 1.42 05/24/2016    TSH 1.24 08/21/2015     Lab Results   Component Value Date     (H) 03/05/2017    GLC 96 02/24/2017     Lab Results   Component Value Date    HGB 15.3 03/14/2017    HGB 15.2 03/04/2017     Lab Results   Component Value Date    BUN 14 03/05/2017    BUN 11 02/24/2017    CR 0.74 03/05/2017    CR 0.69 03/04/2017     No results found for: ABDELRAHMAN      Patient is discharged from sleep clinic and instructions given.        Copy to: Julio Guidry Jr., MD 3/16/2017     [facility]  [id number] [place] [id number]      Total time spent with patient: 30 minutes with this pt today in which 15 minutes was spent in counseling/coordination of care and going over sleep test results.

## 2017-03-16 NOTE — NURSING NOTE
"Chief Complaint   Patient presents with     RECHECK     f/u sleep options, cpap given back, did not get dental device       Initial /85 (BP Location: Right arm, Patient Position: Chair, Cuff Size: Adult Regular)  Pulse 70  Resp 12  Ht 1.727 m (5' 7.99\")  Wt 84.8 kg (186 lb 15.2 oz)  SpO2 95%  BMI 28.43 kg/m2 Estimated body mass index is 28.43 kg/(m^2) as calculated from the following:    Height as of this encounter: 1.727 m (5' 7.99\").    Weight as of this encounter: 84.8 kg (186 lb 15.2 oz).  Medication Reconciliation: complete     Natalie Espinosa CNA, Clinical Coordinator  "

## 2017-03-17 ENCOUNTER — DOCUMENTATION ONLY (OUTPATIENT)
Dept: SLEEP MEDICINE | Facility: CLINIC | Age: 67
End: 2017-03-17

## 2017-03-17 DIAGNOSIS — I10 HYPERTENSION GOAL BP (BLOOD PRESSURE) < 140/90: Chronic | ICD-10-CM

## 2017-03-17 PROCEDURE — 82043 UR ALBUMIN QUANTITATIVE: CPT | Performed by: FAMILY MEDICINE

## 2017-03-17 NOTE — PROGRESS NOTES
SPOKE WITH PATIENT ABOUT ORDER [facility] REC'D FROM DR. BURDICK. PATIENT WAS SETUP ON APAP 3/31/2015, BUT IT WAS RETURNED DUE TO NOT MEETING COMPLIANCE GOALS. PATIENT WILL HAVE TO REDO THE SLEEP STUDY (TITRATION STUDY) IN ORDER FOR NEW PAP TO BE BILLED. PATIENT WAS GIVEN SCHEDULING NUMBER FOR [facility].

## 2017-03-18 LAB
CREAT UR-MCNC: 97 MG/DL
MICROALBUMIN UR-MCNC: 5 MG/L
MICROALBUMIN/CREAT UR: 5.38 MG/G CR (ref 0–17)

## 2017-03-18 NOTE — PROGRESS NOTES
Mr. Latham,    -Microalbumin (urine protein) test is normal.  ADVISE: recheck annually    If you have further questions about the interpretation of your labs, [email or web address] is a good website to check out for further information.    Please contact the clinic if you have additional questions.  Thank you.    Sincerely,    Julio Guidry MD

## 2017-03-20 NOTE — PROGRESS NOTES
In order to receive new PAP machine as amita prior machine was reclaimed by his insurance, insurance wants repeat sleep study, to determine the presence of sleep apnea and titration, so will order split study. Will follow up sleep results and follow up in clinic. Patient was notified for the issue.

## 2017-03-21 ENCOUNTER — TELEPHONE (OUTPATIENT)
Dept: FAMILY MEDICINE | Facility: CLINIC | Age: 67
End: 2017-03-21

## 2017-03-21 DIAGNOSIS — C34.11 MALIGNANT NEOPLASM OF UPPER LOBE OF RIGHT LUNG (H): Primary | ICD-10-CM

## 2017-03-21 NOTE — TELEPHONE ENCOUNTER
We can do IV conscious sedation that will be more powerful than a benzodiazepine like Valium, but not as sedating as putting him completely to sleep.  This is similar to the anesthesia we use for a colonoscopy.  I've re-submitted the MRI request for Suzy who should be contacting Juwan.  Please call patient to update him on our plan.    Julio Guidry MD

## 2017-03-21 NOTE — TELEPHONE ENCOUNTER
Patient calling to report that he went for an MRI of his brain today and found out that he is claustrophobic. He was unable to complete the exam. He states that even if we gave him medication to calm him he does not think he can complete the scan. He would like to know what MD LAURIE would suggest he do next.    Patient can be reached at [phone] (detailed voicemail if OK). Please advise. Thank you.    Kaylee Akins RN, BSN  [facility]

## 2017-03-22 NOTE — TELEPHONE ENCOUNTER
I spoke with Juwan and gave him below information. He is willing to try this and thinks it will work for him. I advised him they will be calling him to schedule. I asked him to call us in a few days if he has not gotten a call to schedule so we can get him a number to call to schedule. He agreed. Justa Naqvi R.N.

## 2017-03-23 ENCOUNTER — TELEPHONE (OUTPATIENT)
Dept: GENERAL RADIOLOGY | Facility: CLINIC | Age: 67
End: 2017-03-23

## 2017-03-23 NOTE — TELEPHONE ENCOUNTER
Pt contacted and informed of need for H@P prior to MRI on Monday with IV sedation.  Pt will attempt to contact office to complete or we could reschedule appointment if needed.  Pt informed that we  Will do our best to complete exam with light sedation if approved otherwise anesthesia general would need to be ordered and pt rescheduled.  Pt extremely anxious about MRI due to recent panic attack with scan.

## 2017-03-24 ENCOUNTER — OFFICE VISIT (OUTPATIENT)
Dept: FAMILY MEDICINE | Facility: CLINIC | Age: 67
End: 2017-03-24
Payer: COMMERCIAL

## 2017-03-24 VITALS
OXYGEN SATURATION: 98 % | TEMPERATURE: 98.3 F | BODY MASS INDEX: 28.34 KG/M2 | SYSTOLIC BLOOD PRESSURE: 118 MMHG | HEIGHT: 68 IN | DIASTOLIC BLOOD PRESSURE: 78 MMHG | WEIGHT: 187 LBS | HEART RATE: 80 BPM

## 2017-03-24 DIAGNOSIS — F40.240 CLAUSTROPHOBIA: ICD-10-CM

## 2017-03-24 DIAGNOSIS — Z01.818 PREOP GENERAL PHYSICAL EXAM: Primary | ICD-10-CM

## 2017-03-24 DIAGNOSIS — E66.3 OVERWEIGHT (BMI 25.0-29.9): ICD-10-CM

## 2017-03-24 DIAGNOSIS — C34.11 MALIGNANT NEOPLASM OF UPPER LOBE OF RIGHT LUNG (H): ICD-10-CM

## 2017-03-24 DIAGNOSIS — I10 HYPERTENSION GOAL BP (BLOOD PRESSURE) < 140/90: Chronic | ICD-10-CM

## 2017-03-24 PROCEDURE — 99214 OFFICE O/P EST MOD 30 MIN: CPT | Performed by: PHYSICIAN ASSISTANT

## 2017-03-24 NOTE — MR AVS SNAPSHOT
After Visit Summary   3/24/2017    Juwan Latham    MRN: [id number]           Patient Information     Date Of Birth          1950        Visit Information        Provider Department      3/24/2017 11:40 AM Loyda Herrera PA-C [facility]        Today's Diagnoses     Preop general physical exam    -  1    Claustrophobia        Hypertension goal BP (blood pressure) < 140/90        Malignant neoplasm of upper lobe of right lung (H)        Overweight (BMI 25.0-29.9)          Care Instructions      Before Your Surgery      Call your surgeon if there is any change in your health. This includes signs of a cold or flu (such as a sore throat, runny nose, cough, rash or fever).    Do not smoke, drink alcohol or take over the counter medicine (unless your surgeon or primary care doctor tells you to) for the 24 hours before and after surgery.    If you take prescribed drugs: Follow your doctor s orders about which medicines to take and which to stop until after surgery.    Eating and drinking prior to surgery: follow the instructions from your surgeon    Take a shower or bath the night before surgery. Use the soap your surgeon gave you to gently clean your skin. If you do not have soap from your surgeon, use your regular soap. Do not shave or scrub the surgery site.  Wear clean pajamas and have clean sheets on your bed.         Follow-ups after your visit        Your next 10 appointments already scheduled     Mar 27, 2017 10:00 AM CDT   MR BRAIN W/O CONTRAST with RHMR1,  IMAGING NURSE   [facility] ([facility])    [address] [facility] [id number] [phone]           Take your medicines as usual, unless your doctor tells you not to. Bring a list of your current medicines to your exam (including vitamins, minerals and over-the-counter drugs). Also bring the results of similar scans you may have had.  Please remove any body piercings and hair  extensions before you arrive.  Follow your doctor s orders. If you do not, we may have to postpone your exam.  You will not have contrast for this exam. You do not need to do anything special to prepare.  The MRI machine uses a strong magnet. Please wear clothes without metal (snaps, zippers). A sweatsuit works well, or we may give you a hospital gown.   **IMPORTANT** THE INSTRUCTIONS BELOW ARE ONLY FOR THOSE PATIENTS WHO HAVE BEEN TOLD THEY WILL RECEIVE SEDATION OR GENERAL ANESTHESIA DURING THEIR MRI PROCEDURE:  IF YOU WILL RECEIVE SEDATION (take medicine to help you relax during your exam):   You must get the medicine from your doctor before you arrive. Bring the medicine to the exam. Do not take it at home.   Arrive one hour early. Bring someone who can take you home after the test. Your medicine will make you sleepy. After the exam, you may not drive, take a bus or take a taxi by yourself.   No eating 8 hours before your exam. You may have clear liquids up until 4 hours before your exam. (Clear liquids include water, clear tea, black coffee and fruit juice without pulp.)  IF YOU WILL RECEIVE ANESTHESIA (be asleep for your exam):   Arrive 1 1/2 hours early. Bring someone who can take you home after the test. You may not drive, take a bus or take a taxi by yourself.   No eating 8 hours before your exam. You may have clear liquids up until 4 hours before your exam. (Clear liquids include water, clear tea, black coffee and fruit juice without pulp.)   You will spend four to five hours in the recovery room.  Please call the Imaging Department at your exam site with any questions.            Apr 04, 2017  8:30 PM CDT   PSG Split with BED 5 SH SLEEP   [facility] ([facility])    [address] [address]   [phone]            Apr 11, 2017 10:30 AM CDT   PE NPET ONCOLOGY (EYES TO THIGHS) with RHPET1   [facility] PET/CT ([facility])    [address]  [address]  [facility] [address]   [phone]           Tell your doctor:   If there is any chance you may be pregnant or if you are breastfeeding.   If you have problems lying in small spaces (claustrophobia). If you do, your doctor may give you medicine to help you relax. If you have diabetes:   Have your exam early in the morning. Your blood glucose will go up as the day goes by.   Your glucose level must be 180 or less at the start of the exam. Please take any medicines you need to ensure this blood glucose level. 24 hours before your scan: Don t do any heavy exercise. (No jogging, aerobics or other workouts.) Exercise will make your pictures less accurate. 6 hours before your scan:   Stop all food and liquids (except water).   Do not chew gum or suck on mints.   If you need to take medicine with food, you may take it with a few crackers.  Please call your Imaging Department at your exam site with any questions.            Apr 12, 2017  2:00 PM CDT   Return Sleep Patient with Owen Davis MD   [facility]    [address]  [place] [place] [id number] [phone]              Who to contact     If you have questions or need follow up information about today's clinic visit or your schedule please contact [facility] directly at [phone].  Normal or non-critical lab and imaging results will be communicated to you by MyChart, letter or phone within 4 business days after the clinic has received the results. If you do not hear from us within 7 days, please contact the clinic through MyChart or phone. If you have a critical or abnormal lab result, we will notify you by phone as soon as possible.  Submit refill requests through [employer] or call your pharmacy and they will forward the refill request to us. Please allow 3 business days for your refill to be completed.          Additional  "Information About Your Visit        MyChart Information     [email or web address] gives you secure access to your electronic health record. If you see a primary care provider, you can also send messages to your care team and make appointments. If you have questions, please call your primary care clinic.  If you do not have a primary care provider, please call [phone] and they will assist you.        Care EveryWhere ID     This is your Care EveryWhere ID. This could be used by other organizations to access your [place] medical records  [id number]        Your Vitals Were     Pulse Temperature Height Pulse Oximetry BMI (Body Mass Index)       80 98.3  F (36.8  C) (Oral) 5' 8\" (1.727 m) 98% 28.43 kg/m2        Blood Pressure from Last 3 Encounters:   03/24/17 118/78   03/16/17 145/85   03/14/17 118/70    Weight from Last 3 Encounters:   03/24/17 187 lb (84.8 kg)   03/16/17 186 lb 15.2 oz (84.8 kg)   03/14/17 187 lb (84.8 kg)              Today, you had the following     No orders found for display       Primary Care Provider Office Phone # Fax #    Julio Guidry Jr., [phone]       [facility] [address] [address]        Thank you!     Thank you for choosing [facility]  for your care. Our goal is always to provide you with excellent care. Hearing back from our patients is one way we can continue to improve our services. Please take a few minutes to complete the written survey that you may receive in the mail after your visit with us. Thank you!             Your Updated Medication List - Protect others around you: Learn how to safely use, store and throw away your medicines at www.disposemymeds.org.          This list is accurate as of: 3/24/17  4:24 PM.  Always use your most recent med list.                   Brand Name Dispense Instructions for use    acetaminophen 325 MG tablet    TYLENOL    100 tablet    Take 2 tablets (650 mg) by mouth every 6 hours Do not take " more than 4,000 mg of acetaminophen (Tylenol) from all sources to prevent liver damage.       ALPRAZolam 0.5 MG tablet    XANAX    10 tablet    Take 1 tablet (0.5 mg) by mouth 3 times daily as needed for anxiety       lisinopril 10 MG tablet    PRINIVIL/ZESTRIL         phosphorus tablet 250 mg 250 MG per tablet    K PHOS NEUTRAL    4 tablet    Take 2 tablets (500 mg) by mouth 3 times daily Take 2 tablets in the evening on Jose 3/5 and in the morning on Monday 3/6. Get your phosphorus level rechecked on Monday 3/6 at the [facility].

## 2017-03-24 NOTE — PROGRESS NOTES
[facility]  [address]  [place] [address]  [phone]  Dept: [phone]    PRE-OP EVALUATION:  Today's date: 3/24/2017    Juwan Latham (: 1950) presents for pre-operative evaluation assessment as requested by  .  He requires evaluation and anesthesia risk assessment prior to undergoing surgery/procedure for treatment of claustrophobia.  Proposed procedure: MRI of Brain    Date of Surgery/ Procedure: 3/27/2017  Time of Surgery/ Procedure: 9 am  Hospital/Surgical Facility: [facility]  Fax number for surgical facility:   Primary Physician: Julio Guidry Jr.  Type of Anesthesia Anticipated: conscious sedation    Patient has a Health Care Directive or Living Will:  NO    1. NO - Do you have a history of heart attack, stroke, stent, bypass or surgery on an artery in the head, neck, heart or legs?  2. NO - Do you ever have any pain or discomfort in your chest?  3. NO - Do you have a history of  Heart Failure?  4. NO - Are you troubled by shortness of breath when: walking on the level, up a slight hill or at night?  5. NO - Do you currently have a cold, bronchitis or other respiratory infection?  6. NO - Do you have a cough, shortness of breath or wheezing?  7. NO - Do you sometimes get pains in the calves of your legs when you walk?  8. NO - Do you or anyone in your family have previous history of blood clots?  9. NO - Do you or does anyone in your family have a serious bleeding problem such as prolonged bleeding following surgeries or cuts?  10. NO - Have you ever had problems with anemia or been told to take iron pills?  11. NO - Have you had any abnormal blood loss such as black, tarry or bloody stools, or abnormal vaginal bleeding?  12. NO - Have you ever had a blood transfusion?  13. NO - Have you or any of your relatives ever had problems with anesthesia?  14. NO - Do you have sleep apnea, excessive snoring or daytime drowsiness?  15. NO - Do you have any  prosthetic heart valves?  16. NO - Do you have prosthetic joints?  17. NO - Is there any chance that you may be pregnant?      HPI:                                                      Brief HPI related to upcoming procedure: Juwan is a 67 yo former smoking male (quit 1980's) here today for pre-op. Saw his PCP last year for a sensation in his R chest and underwent CXR finding an incidental lung nodule. He tells me they repeated his CXR and saw his nodule grew in size. Thus, underwent laparoscopic wedge resection of this back in 2/2017 showing Non-small cell lung cancer. Margins were removed and free from any malignancy. To ensure he has no further metastatic disease they recommended further evaluation with PET and brain MRI. He tried to go for brain MRI, but then felt claustrophic so is here today for pre-op. His PET is scheduled for 4/11.    See problem list for active medical problems.  Problems all longstanding and stable, except as noted/documented.  See ROS for pertinent symptoms related to these conditions.                                                                                                      MEDICAL HISTORY:                                                      Patient Active Problem List    Diagnosis Date Noted     Overweight (BMI 25.0-29.9) 03/24/2017     Priority: Medium     Malignant neoplasm of upper lobe of right lung (H) 03/13/2017     Priority: Medium     Adenocarcinoma       Benign neoplasm of descending colon 01/16/2017     Priority: Medium     Seen on colonoscopy 1/4/2017       Seasonal allergies      Priority: Medium     spring and fall       Hypertension goal BP (blood pressure) < 140/90 04/28/2015     Priority: Medium     GERD (gastroesophageal reflux disease) 01/27/2015     Priority: Medium     PAULINO (obstructive sleep apnea) 05/24/2013     Priority: Medium     Anxiety 12/12/2011     Priority: Medium     History of colonic polyps 06/22/2011     Priority: Medium     Problem list name  updated by automated process. Provider to review       Advanced directives, counseling/discussion 06/03/2011     Priority: Medium     Advance Directive Problem List Overview:   Name Relationship Phone    Primary Health Care Agent            Alternative Health Care Agent          Discussed advance care planning with patient; information given to patient to review. 6/3/2011     6/7/11 Follow up to [employer] referral call placed. Patient would like to review document provided at visit, establish an agent and then will schedule facilitation if needed. Steffanie Leal LPN/Advanced Healthcare Planning Facilitator            CARDIOVASCULAR SCREENING; LDL GOAL LESS THAN 160 10/31/2010     Priority: Medium     Allergic rhinitis due to other allergen 05/28/2004     Priority: Medium     Tu score is 9 on 6-3-11        Past Medical History:   Diagnosis Date     Anxiety      Calculus of kidney 2005, 2012     Congenital single kidney      Hypertension      Seasonal allergies     spring and fall     Sleep apnea     no cpap     Past Surgical History:   Procedure Laterality Date     BRONCHOSCOPY FLEXIBLE N/A 3/3/2017    Procedure: BRONCHOSCOPY FLEXIBLE;  Surgeon: Maria A Desai MD;  Location: [facility] OR     COLONOSCOPY N/A 2/11/2015    Procedure: COLONOSCOPY;  Surgeon: Murphy Jesus MD;  Location:  GI     ESOPHAGOSCOPY, GASTROSCOPY, DUODENOSCOPY (EGD), COMBINED N/A 5/20/2016    Procedure: COMBINED ESOPHAGOSCOPY, GASTROSCOPY, DUODENOSCOPY (EGD), BIOPSY SINGLE OR MULTIPLE;  Surgeon: Murphy Jesus MD;  Location:  GI     LAPAROSCOPIC WEDGE RESECTION LUNG Right 3/3/2017    Procedure: LAPAROSCOPIC WEDGE RESECTION LUNG;  Surgeon: Maria A Desai MD;  Location: [facility] OR     LASER HOLMIUM LITHOTRIPSY URETER(S), INSERT STENT, COMBINED  9/11/2012    Procedure: COMBINED CYSTOSCOPY, URETEROSCOPY, LASER HOLMIUM LITHOTRIPSY URETER(S), INSERT STENT;  Cystoscopy, Right Ureteroscopy, Stone Extraction, Holmium Laser, Double J Stent  "Placement;  Surgeon: Nicolás Blackwell MD;  Location:  OR     Current Outpatient Prescriptions   Medication Sig Dispense Refill     acetaminophen (TYLENOL) 325 MG tablet Take 2 tablets (650 mg) by mouth every 6 hours Do not take more than 4,000 mg of acetaminophen (Tylenol) from all sources to prevent liver damage. 100 tablet 1     ALPRAZolam (XANAX) 0.5 MG tablet Take 1 tablet (0.5 mg) by mouth 3 times daily as needed for anxiety 10 tablet 0     lisinopril (PRINIVIL,ZESTRIL) 10 MG tablet   3     phosphorus tablet 250 mg (K PHOS NEUTRAL) 250 MG per tablet Take 2 tablets (500 mg) by mouth 3 times daily Take 2 tablets in the evening on Jose 3/5 and in the morning on Monday 3/6. Get your phosphorus level rechecked on Monday 3/6 at the [facility]. (Patient not taking: Reported on 3/24/2017) 4 tablet 0     OTC products: none    Allergies   Allergen Reactions     Percocet [Oxycodone-Acetaminophen] GI Disturbance     Severe constipation      Latex Allergy: NO    Social History   Substance Use Topics     Smoking status: Former Smoker     Packs/day: 2.00     Years: 20.00     Types: Cigarettes     Quit date: 1/1/1985     Smokeless tobacco: Never Used     Alcohol use Yes      Comment: avg 3 beers per night     History   Drug Use No       REVIEW OF SYSTEMS:                                                    C: NEGATIVE for fever, chills, change in weight  E/M: NEGATIVE for ear, mouth and throat problems  R: NEGATIVE for significant cough or SOB  CV: NEGATIVE for chest pain, palpitations or peripheral edema    EXAM:                                                    /78 (BP Location: Right arm, Cuff Size: Adult Large)  Pulse 80  Temp 98.3  F (36.8  C) (Oral)  Ht 5' 8\" (1.727 m)  Wt 187 lb (84.8 kg)  SpO2 98%  BMI 28.43 kg/m2  GENERAL APPEARANCE: healthy, alert and no distress  HENT: ear canals and TM's normal and nose and mouth without ulcers or lesions  RESP: lungs clear to auscultation - no rales, rhonchi or " wheezes  CV: regular rate and rhythm, normal S1 S2, no S3 or S4 and no murmur, click or rub   ABDOMEN: soft, nontender, no HSM or masses and bowel sounds normal. Does show me his well-healed incisional scars from previous.   NEURO: Normal strength and tone, sensory exam grossly normal, mentation intact and speech normal    DIAGNOSTICS:                                                    No labs or EKG required for low risk surgery, but did have EKG 2/24 showing NSR without any ST segment changes or LVH.      Recent Labs   Lab Test  03/14/17   1414  03/05/17   0905  03/04/17   0859  02/24/17   0955   09/11/12   1058   HGB  15.3   --   15.2  15.8   < >   --    PLT  258  142*  147*  164   < >   --    INR   --    --    --    --    --   1.03   NA   --   138   --   140   < >   --    POTASSIUM   --   3.8   --   4.2   < >   --    CR   --   0.74  0.69  0.83   < >   --    A1C   --    --    --   5.5   --    --     < > = values in this interval not displayed.      Results for orders placed or performed in visit on 03/17/17   Albumin Random Urine Quantitative   Result Value Ref Range    Creatinine Urine 97 mg/dL    Albumin Urine mg/L 5 mg/L    Albumin Urine mg/g Cr 5.38 0 - 17 mg/g Cr     Component      Latest Ref Rng & Units 3/14/2017   WBC      4.0 - 11.0 10e9/L 6.4   RBC Count      4.4 - 5.9 10e12/L 4.79   Hemoglobin      13.3 - 17.7 g/dL 15.3   Hematocrit      40.0 - 53.0 % 45.0   MCV      78 - 100 fl 94   MCH      26.5 - 33.0 pg 31.9   MCHC      31.5 - 36.5 g/dL 34.0   RDW      10.0 - 15.0 % 12.5   Platelet Count      150 - 450 10e9/L 258   Diff Method       Automated Method   % Neutrophils      % 67.8   % Lymphocytes      % 16.7   % Monocytes      % 12.2   % Eosinophils      % 3.1   % Basophils      % 0.2   Absolute Neutrophil      1.6 - 8.3 10e9/L 4.3   Absolute Lymphocytes      0.8 - 5.3 10e9/L 1.1   Absolute Monocytes      0.0 - 1.3 10e9/L 0.8   Absolute Eosinophils      0.0 - 0.7 10e9/L 0.2   Absolute Basophils      0.0  - 0.2 10e9/L 0.0     Component      Latest Ref Rng & Units 3/5/2017   Sodium      133 - 144 mmol/L 138   Potassium      3.4 - 5.3 mmol/L 3.8   Chloride      94 - 109 mmol/L 106   Carbon Dioxide      20 - 32 mmol/L 28   Anion Gap      3 - 14 mmol/L 4   Glucose      70 - 99 mg/dL 114 (H)   Urea Nitrogen      7 - 30 mg/dL 14   Creatinine      0.66 - 1.25 mg/dL 0.74   GFR Estimate      >60 mL/min/1.7m2 >90 . . .   GFR Estimate If Black      >60 mL/min/1.7m2 >90 . . .   Calcium      8.5 - 10.1 mg/dL 8.8   Nl hgb a1c in 2/24 as well: 5.5  IMPRESSION:                                                    Reason for surgery/procedure: R/o any metastatic disease for NSCLC  Diagnosis/reason for consult: pre-op, NSCLC, HTN, overweight, claustrophobia    The proposed surgical procedure is considered LOW risk.    REVISED CARDIAC RISK INDEX  The patient has the following serious cardiovascular risks for perioperative complications such as (MI, PE, VFib and 3  AV Block):  No serious cardiac risks  INTERPRETATION: 0 risks: Class I (very low risk - 0.4% complication rate)    The patient has the following additional risks for perioperative complications:  No identified additional risks      ICD-10-CM    1. Preop general physical exam Z01.818    2. Claustrophobia F40.240    3. Hypertension goal BP (blood pressure) < 140/90 I10    4. Malignant neoplasm of upper lobe of right lung (H) C34.11    5. Overweight (BMI 25.0-29.9) E66.3        RECOMMENDATIONS:                                                        APPROVAL GIVEN to proceed with proposed procedure, without further diagnostic evaluation.     Signed Electronically by: Loyda Herrera PA-C    Copy of this evaluation report is provided to requesting physician.    [facility] Preop Guidelines

## 2017-03-24 NOTE — NURSING NOTE
"Chief Complaint   Patient presents with     Pre-Op Exam       Initial /78 (BP Location: Right arm, Cuff Size: Adult Large)  Pulse 80  Temp 98.3  F (36.8  C) (Oral)  Ht 5' 8\" (1.727 m)  Wt 187 lb (84.8 kg)  SpO2 98%  BMI 28.43 kg/m2 Estimated body mass index is 28.43 kg/(m^2) as calculated from the following:    Height as of this encounter: 5' 8\" (1.727 m).    Weight as of this encounter: 187 lb (84.8 kg).  Medication Reconciliation: complete    "

## 2017-03-27 ENCOUNTER — HOSPITAL ENCOUNTER (OUTPATIENT)
Dept: MRI IMAGING | Facility: CLINIC | Age: 67
Discharge: HOME | End: 2017-03-27
Attending: FAMILY MEDICINE | Admitting: FAMILY MEDICINE
Payer: MEDICARE

## 2017-03-27 VITALS — DIASTOLIC BLOOD PRESSURE: 78 MMHG | SYSTOLIC BLOOD PRESSURE: 133 MMHG | HEART RATE: 79 BPM | OXYGEN SATURATION: 99 %

## 2017-03-27 DIAGNOSIS — C34.11 MALIGNANT NEOPLASM OF UPPER LOBE OF RIGHT LUNG (H): ICD-10-CM

## 2017-03-27 DIAGNOSIS — D18.00 CAVERNOUS ANGIOMA: Primary | ICD-10-CM

## 2017-03-27 LAB — COPATH REPORT: NORMAL

## 2017-03-27 PROCEDURE — A9585 GADOBUTROL INJECTION: HCPCS | Performed by: FAMILY MEDICINE

## 2017-03-27 PROCEDURE — 25500064 ZZH RX 255 OP 636: Performed by: FAMILY MEDICINE

## 2017-03-27 PROCEDURE — 70553 MRI BRAIN STEM W/O & W/DYE: CPT

## 2017-03-27 PROCEDURE — 25000128 H RX IP 250 OP 636

## 2017-03-27 PROCEDURE — 25000125 ZZHC RX 250

## 2017-03-27 RX ORDER — NALOXONE HYDROCHLORIDE 0.4 MG/ML
.1-.4 INJECTION, SOLUTION INTRAMUSCULAR; INTRAVENOUS; SUBCUTANEOUS
Status: DISCONTINUED | OUTPATIENT
Start: 2017-03-27 | End: 2017-03-28 | Stop reason: HOSPADM

## 2017-03-27 RX ORDER — GADOBUTROL 604.72 MG/ML
10 INJECTION INTRAVENOUS ONCE
Status: COMPLETED | OUTPATIENT
Start: 2017-03-27 | End: 2017-03-27

## 2017-03-27 RX ORDER — FLUMAZENIL 0.1 MG/ML
0.2 INJECTION, SOLUTION INTRAVENOUS
Status: DISCONTINUED | OUTPATIENT
Start: 2017-03-27 | End: 2017-03-28 | Stop reason: HOSPADM

## 2017-03-27 RX ORDER — FENTANYL CITRATE 50 UG/ML
INJECTION, SOLUTION INTRAMUSCULAR; INTRAVENOUS
Status: COMPLETED
Start: 2017-03-27 | End: 2017-03-27

## 2017-03-27 RX ORDER — FENTANYL CITRATE 50 UG/ML
25-50 INJECTION, SOLUTION INTRAMUSCULAR; INTRAVENOUS EVERY 5 MIN PRN
Status: DISCONTINUED | OUTPATIENT
Start: 2017-03-27 | End: 2017-03-28 | Stop reason: HOSPADM

## 2017-03-27 RX ADMIN — GADOBUTROL 8 ML: 604.72 INJECTION INTRAVENOUS at 09:29

## 2017-03-27 RX ADMIN — MIDAZOLAM 2 MG: 1 INJECTION INTRAMUSCULAR; INTRAVENOUS at 09:41

## 2017-03-27 RX ADMIN — FENTANYL CITRATE 100 MCG: 50 INJECTION, SOLUTION INTRAMUSCULAR; INTRAVENOUS at 09:42

## 2017-03-27 RX ADMIN — FENTANYL CITRATE 100 MCG: 50 INJECTION INTRAMUSCULAR; INTRAVENOUS at 09:42

## 2017-03-27 NOTE — PROGRESS NOTES
Patient discharged to home following MRI with conscious sedation. All discharge criteria were met and patient discharged to home ambulatory with family in stable condition.

## 2017-03-27 NOTE — PROGRESS NOTES
MRI with conscious sedation completed without difficulty, patient tolerated well. Fentanyl 100 mcg and versed 2 mg total given. To holding room for recovery phase of care per radiology protocol.

## 2017-03-28 DIAGNOSIS — F41.9 ANXIETY: ICD-10-CM

## 2017-03-28 RX ORDER — ALPRAZOLAM 0.5 MG
0.5 TABLET ORAL 3 TIMES DAILY PRN
Qty: 10 TABLET | Refills: 0 | Status: SHIPPED | OUTPATIENT
Start: 2017-03-28 | End: 2017-08-16

## 2017-03-28 NOTE — PROGRESS NOTES
"Mr. Latham,    Here's your MRI report from your brain.  Again, it shows a \"cavernous angioma\" which is a blood vessel that's enlarged.  Different than an aneurysm, though.  I've referred you to [facility]: [facility] [place] [phone]   [email or web address]    Please call them and make an appointment to get this cavernous angioma evaluated further.  Miguel Barry and Av are the two neurosurgeons that see most of our patients here in [place].      If you have further questions about the interpretation of your labs, [email or web address] is a good website to check out for further information.    Please contact the clinic if you have additional questions.  Thank you and good to talk with you today.    Sincerely,    Julio Guidry MD    "

## 2017-03-28 NOTE — TELEPHONE ENCOUNTER
Xanax      Last Written Prescription Date:  2/27/17  Last Fill Quantity: 10,   # refills: 0  Last Office Visit with [facility] prescribing provider: 3/24/17  Future Office visit:       Routing refill request to provider for review/approval because:  Drug not on the [facility] refill protocol or controlled substance  Kaylee Akins RN, BSN  [facility]

## 2017-03-28 NOTE — TELEPHONE ENCOUNTER
Reason for Call: Patient called to request a refill for his ALPRAZolam (XANAX) 0.5 MG tablet.     Best phone number to reach pt at is: [phone]  Ok to leave a message with medical info? YES    Pharmacy preferred (if calling for a refill): Alise Burks  [employer] [facility]-Patient Representative

## 2017-03-28 NOTE — TELEPHONE ENCOUNTER
Juwan's anxiety has expectedly increased with the finding of a cavernous angioma on his head MRI.  Is being referred to neurosurgery for further evaluation of a probable benign finding that will not require surgery.  I'll refill his Xanax, however, given this diagnosis.  The requested prescription(s) has/have been approved and has/have been printed and signed. This was forwarded to the patient care pool to fax to the patient's preferred pharmacy.  [facility] [address] [facility], [place] - [address] [facility] [address]    Jr Julio Guidry

## 2017-04-03 ENCOUNTER — OFFICE VISIT (OUTPATIENT)
Dept: NEUROSURGERY | Facility: CLINIC | Age: 67
End: 2017-04-03
Attending: NEUROLOGICAL SURGERY
Payer: COMMERCIAL

## 2017-04-03 VITALS
RESPIRATION RATE: 96 BRPM | TEMPERATURE: 97.9 F | HEIGHT: 68 IN | HEART RATE: 64 BPM | WEIGHT: 186 LBS | SYSTOLIC BLOOD PRESSURE: 145 MMHG | DIASTOLIC BLOOD PRESSURE: 82 MMHG | BODY MASS INDEX: 28.19 KG/M2

## 2017-04-03 DIAGNOSIS — Q28.3 CONGENITAL ANOMALY OF CEREBROVASCULAR SYSTEM: Primary | ICD-10-CM

## 2017-04-03 PROCEDURE — 99204 OFFICE O/P NEW MOD 45 MIN: CPT | Performed by: NEUROLOGICAL SURGERY

## 2017-04-03 PROCEDURE — 99211 OFF/OP EST MAY X REQ PHY/QHP: CPT | Performed by: NEUROLOGICAL SURGERY

## 2017-04-03 NOTE — MR AVS SNAPSHOT
After Visit Summary   4/3/2017    Juwan Latham    MRN: [id number]           Patient Information     Date Of Birth          1950        Visit Information        Provider Department      4/3/2017 9:20 AM Kwabena Mcelroy MD [facility]        Today's Diagnoses     Congenital anomaly of cerebrovascular system    -  1       Follow-ups after your visit        Your next 10 appointments already scheduled     Apr 04, 2017  8:30 PM CDT   PSG Split with BED 5 SH SLEEP   [facility] ([facility])    [address]  [facility] [address]   [phone]            Apr 07, 2017 12:30 PM CDT   (Arrive by 12:15 PM)   XR CHEST 2 VIEWS with [email or web address]   [facility] ([facility])    [address]  [address] [facility] [id number] [phone]           Please bring a list of your current medicines to your exam. (Include vitamins, minerals and over-thecounter medicines.) Leave your valuables at home.  Tell your doctor if there is a chance you may be pregnant.  You do not need to do anything special for this exam.            Apr 07, 2017  1:00 PM CDT   (Arrive by 12:45 PM)   Return Visit with GLORIA Rodriguez [facility] ([facility])    [address]  [address] [facility] [id number] [phone]            Apr 11, 2017 10:30 AM CDT   PE NPET ONCOLOGY (EYES TO THIGHS) with [id number]   [facility]/CT ([facility])    [address]  [address]  [facility] [id number] [phone]           Tell your doctor:   If there is any chance you may be pregnant or if you are breastfeeding.   If you have problems lying in small spaces (claustrophobia). If you do, your doctor may give you medicine to help you relax. If you have diabetes:   Have your exam early in the morning. Your blood  glucose will go up as the day goes by.   Your glucose level must be 180 or less at the start of the exam. Please take any medicines you need to ensure this blood glucose level. 24 hours before your scan: Don t do any heavy exercise. (No jogging, aerobics or other workouts.) Exercise will make your pictures less accurate. 6 hours before your scan:   Stop all food and liquids (except water).   Do not chew gum or suck on mints.   If you need to take medicine with food, you may take it with a few crackers.  Please call your Imaging Department at your exam site with any questions.            Apr 12, 2017  2:00 PM CDT   Return Sleep Patient with Owen Davis MD   [facility] ([facility])    [address] [place] [address] [phone]              Who to contact     If you have questions or need follow up information about today's clinic visit or your schedule please contact [facility] directly at [phone].  Normal or non-critical lab and imaging results will be communicated to you by [employer], letter or phone within 4 business days after the clinic has received the results. If you do not hear from us within 7 days, please contact the clinic through [employer] or phone. If you have a critical or abnormal lab result, we will notify you by phone as soon as possible.  Submit refill requests through [employer] or call your pharmacy and they will forward the refill request to us. Please allow 3 business days for your refill to be completed.          Additional Information About Your Visit        [employer] Information     [employer] gives you secure access to your electronic health record. If you see a primary care provider, you can also send messages to your care team and make appointments. If you have questions, please call your primary care clinic.  If you do not have a primary care provider, please call [phone] and they  "will assist you.        Care EveryWhere ID     This is your Care EveryWhere ID. This could be used by other organizations to access your [facility] medical records  [id number]        Your Vitals Were     Pulse Temperature Respirations Height BMI (Body Mass Index)       64 97.9  F (36.6  C) (Oral) 96 1.721 m (5' 7.75\") 28.49 kg/m2        Blood Pressure from Last 3 Encounters:   04/03/17 145/82   03/27/17 133/78   03/24/17 118/78    Weight from Last 3 Encounters:   04/03/17 84.4 kg (186 lb)   03/24/17 84.8 kg (187 lb)   03/16/17 84.8 kg (186 lb 15.2 oz)              Today, you had the following     No orders found for display       Primary Care Provider Office Phone # Fax #    Julio Guidry Jr., [phone]       [facility] [address] [facility] [address]        Thank you!     Thank you for choosing [facility]  for your care. Our goal is always to provide you with excellent care. Hearing back from our patients is one way we can continue to improve our services. Please take a few minutes to complete the written survey that you may receive in the mail after your visit with us. Thank you!             Your Updated Medication List - Protect others around you: Learn how to safely use, store and throw away your medicines at www.disposemymeds.org.          This list is accurate as of: 4/3/17 10:04 AM.  Always use your most recent med list.                   Brand Name Dispense Instructions for use    acetaminophen 325 MG tablet    TYLENOL    100 tablet    Take 2 tablets (650 mg) by mouth every 6 hours Do not take more than 4,000 mg of acetaminophen (Tylenol) from all sources to prevent liver damage.       ALPRAZolam 0.5 MG tablet    XANAX    10 tablet    Take 1 tablet (0.5 mg) by mouth 3 times daily as needed for anxiety       lisinopril 10 MG tablet    PRINIVIL/ZESTRIL         phosphorus tablet 250 mg 250 MG per tablet    K PHOS NEUTRAL    4 tablet    Take 2 " tablets (500 mg) by mouth 3 times daily Take 2 tablets in the evening on Jose 3/5 and in the morning on Monday 3/6. Get your phosphorus level rechecked on Monday 3/6 at the [facility].

## 2017-04-03 NOTE — PROGRESS NOTES
66-year-old male presents for evaluation of incidental finding of right frontal cavernoma.  He recently underwent surgery for resection of lung cancer.  Screening MRI of the brain was performed to evaluate for brain metastases.  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  Denies headaches or seizures.         Past Medical History:   Diagnosis Date     Anxiety      Calculus of kidney 2005, 2012     Congenital single kidney      Hypertension      Seasonal allergies     spring and fall     Sleep apnea     no cpap     Past Surgical History:   Procedure Laterality Date     BRONCHOSCOPY FLEXIBLE N/A 3/3/2017    Procedure: BRONCHOSCOPY FLEXIBLE;  Surgeon: Maria A Desai MD;  Location: [facility]     COLONOSCOPY N/A 2/11/2015    Procedure: COLONOSCOPY;  Surgeon: Murphy eJsus MD;  Location: [facility]     ESOPHAGOSCOPY, GASTROSCOPY, DUODENOSCOPY (EGD), COMBINED N/A 5/20/2016    Procedure: COMBINED ESOPHAGOSCOPY, GASTROSCOPY, DUODENOSCOPY (EGD), BIOPSY SINGLE OR MULTIPLE;  Surgeon: Murphy Jesus MD;  Location: [facility]     LAPAROSCOPIC WEDGE RESECTION LUNG Right 3/3/2017    Procedure: LAPAROSCOPIC WEDGE RESECTION LUNG;  Surgeon: Maria A Desai MD;  Location: [facility]     LASER HOLMIUM LITHOTRIPSY URETER(S), INSERT STENT, COMBINED  9/11/2012    Procedure: COMBINED CYSTOSCOPY, URETEROSCOPY, LASER HOLMIUM LITHOTRIPSY URETER(S), INSERT STENT;  Cystoscopy, Right Ureteroscopy, Stone Extraction, Holmium Laser, Double J Stent Placement;  Surgeon: Nicolás Blackwell MD;  Location: [facility]     Social History     Social History     Marital status:      Spouse name: N/A     Number of children: 1     Years of education: N/A     Occupational History     Remodeling Self      Other     Social History Main Topics     Smoking status: Former Smoker     Packs/day: 2.00     Years: 20.00     Types: Cigarettes     Quit date: 1/1/1985     Smokeless tobacco: Never Used     Alcohol  "use Yes      Comment: avg 3 beers per night     Drug use: No     Sexual activity: Yes     Partners: Female     Other Topics Concern     Caffeine Concern No     Sleep Concern Yes     middle/late insomnia     Exercise Yes     Seat Belt Yes     Parent/Sibling W/ Cabg, Mi Or Angioplasty Before 65f 55m? No     Social History Narrative    Dad  at age 86 from complications after hip surgery.    Mom  at age 68 from heart condition    Siblings:  Three sisters in good health.  Brother with diabetes and overweight.        One son    One granddaughter    Occupation:  remodeling     Family History   Problem Relation Age of Onset     HEART DISEASE Mother      DIABETES Brother 65     Type 2     DIABETES Maternal Grandmother      Cancer - colorectal Brother 70     Prostate Cancer Brother 74     Hypertension No family hx of      Lipids No family hx of         ROS: 10 point ROS neg other than the symptoms noted above in the HPI.    Physical Exam  /82 (BP Location: Right arm, Cuff Size: Adult Regular)  Pulse 64  Temp 97.9  F (36.6  C) (Oral)  Resp (!) 96  Ht 1.721 m (5' 7.75\")  Wt 84.4 kg (186 lb)  BMI 28.49 kg/m2  HEENT:  Normocephalic, atraumatic.  PERRLA.  EOM s intact.  Visual fields full to gross exam  Neck:  Supple, non-tender, without lymphadenopathy.  Heart:  No peripheral edema  Lungs:  No SOB  Abdomen:  Non-distended.   Skin:  Warm and dry.  Extremities:  No edema, cyanosis or clubbing.    NEUROLOGICAL EXAMINATION:     Mental status:  Alert and Oriented x 3, speech is fluent.  Cranial nerves:  II-XII intact.   Motor:    Shoulder Abduction:  Right:  5/5   Left:  5/5  Biceps:                      Right:  5/5   Left:  5/5  Triceps:                     Right:  5/5   Left:  5/5  Wrist Extensors:       Right:  5/5   Left:  5/5  Wrist Flexors:           Right:  5/5   Left:  5/5  interosseus :            Right:  5/5   Left:  5/5   Hip Flexor:                Right: 5/5  Left:  5/5  Hip Adductor:            "  Right:  5/5  Left:  5/5  Hip Abductor:             Right:  5/5  Left:  5/5  Gastroc Soleus:        Right:  5/5  Left:  5/5  Tib/Ant:                      Right:  5/5  Left:  5/5  EHL:                     Right:  5/5  Left:  5/5  Sensation:  Intact  Reflexes:  Negative Babinski.  Negative Clonus.  Negative Mehta's.  Coordination:  Smooth finger to nose testing.   Negative pronator drift.  Smooth tandem walking.    A/P:  66-year-old male presents for evaluation of incidental finding of right frontal cavernoma    I had a lengthy discussion with the patient, reviewing the history, symptoms and imaging  We discussed the natural history of cavernoma's, including the 1-2% per year risk of bleeding or seizures  Having said that, at this time I recommend conservative management given that this is asymptomatic  We will repeat an MRI in six months to assess for changes

## 2017-04-03 NOTE — NURSING NOTE
"Juwan Latham is a 66 year old male who presents for:  Chief Complaint   Patient presents with     Neurologic Problem     referral from Dr. Guidry, for cavernous anginoma        Initial Vitals:  There were no vitals taken for this visit. Estimated body mass index is 28.43 kg/(m^2) as calculated from the following:    Height as of 3/24/17: 5' 8\" (1.727 m).    Weight as of 3/24/17: 187 lb (84.8 kg).. There is no height or weight on file to calculate BSA. BP completed using cuff size: regular  Data Unavailable    Do you feel safe in your environment?  Yes  Do you need any refills today? No    Nursing Comments: referral from Dr. Guidry, for cavernous anginoma.  Patient rates his pain today as 0      5 min. nursing intake time  Tamanna Rothman CMA, AAS      Discharge plan:   See providers dictation   2 min. nursing discharge time  Tamanna Rothman CMA, AAS       "

## 2017-04-04 ENCOUNTER — THERAPY VISIT (OUTPATIENT)
Dept: SLEEP MEDICINE | Facility: CLINIC | Age: 67
End: 2017-04-04
Payer: COMMERCIAL

## 2017-04-04 DIAGNOSIS — G47.33 OSA (OBSTRUCTIVE SLEEP APNEA): ICD-10-CM

## 2017-04-04 PROCEDURE — 95810 POLYSOM 6/> YRS 4/> PARAM: CPT | Performed by: INTERNAL MEDICINE

## 2017-04-04 NOTE — MR AVS SNAPSHOT
After Visit Summary   4/4/2017    Juwan Latham    MRN: [id number]           Patient Information     Date Of Birth          1950        Visit Information        Provider Department      4/4/2017 8:30 PM BED 5  SLEEP [facility] [place]        Today's Diagnoses     PAULINO (obstructive sleep apnea)          Care Instructions    Diagnostic PSG completed per provider order.  Patient did not meet criteria for PAP therapy.        Follow-ups after your visit        Your next 10 appointments already scheduled     Apr 07, 2017 12:30 PM CDT   (Arrive by 12:15 PM)   XR CHEST 2 VIEWS with [email or web address]   [facility] ([facility] [place])    [address]  [address] [facility] [id number] [phone]           Please bring a list of your current medicines to your exam. (Include vitamins, minerals and over-thecounter medicines.) Leave your valuables at home.  Tell your doctor if there is a chance you may be pregnant.  You do not need to do anything special for this exam.            Apr 07, 2017  1:00 PM CDT   (Arrive by 12:45 PM)   Return Visit with GLORIA Rodriguez [facility] ([facility] [place])    [address] [place] [id number] [phone]            Apr 11, 2017 10:30 AM CDT   PE NPET ONCOLOGY (EYES TO THIGHS) with [email or web address]   [facility] PET/CT ([facility])    [address]  [address]  [facility] [id number] [phone]           Tell your doctor:   If there is any chance you may be pregnant or if you are breastfeeding.   If you have problems lying in small spaces (claustrophobia). If you do, your doctor may give you medicine to help you relax. If you have diabetes:   Have your exam early in the morning. Your blood glucose will go up as the day goes by.   Your glucose level must be 180 or less at the start of the exam. Please take any  medicines you need to ensure this blood glucose level. 24 hours before your scan: Don t do any heavy exercise. (No jogging, aerobics or other workouts.) Exercise will make your pictures less accurate. 6 hours before your scan:   Stop all food and liquids (except water).   Do not chew gum or suck on mints.   If you need to take medicine with food, you may take it with a few crackers.  Please call your Imaging Department at your exam site with any questions.            Apr 12, 2017  2:00 PM CDT   Return Sleep Patient with Owen Davis MD   [facility] ([facility])    [address]  [facility] [address] [phone]              Who to contact     If you have questions or need follow up information about today's clinic visit or your schedule please contact [facility] directly at [phone].  Normal or non-critical lab and imaging results will be communicated to you by [employer], letter or phone within 4 business days after the clinic has received the results. If you do not hear from us within 7 days, please contact the clinic through [employer] or phone. If you have a critical or abnormal lab result, we will notify you by phone as soon as possible.  Submit refill requests through "[employer] or call your pharmacy and they will forward the refill request to us. Please allow 3 business days for your refill to be completed.          Additional Information About Your Visit        [employer] Information     "[employer] gives you secure access to your electronic health record. If you see a primary care provider, you can also send messages to your care team and make appointments. If you have questions, please call your primary care clinic.  If you do not have a primary care provider, please call [phone] and they will assist you.        Care EveryWhere ID     This is your Care EveryWhere ID. This could be used by other organizations to  access your [facility] medical records  [id number]         Blood Pressure from Last 3 Encounters:   04/03/17 145/82   03/27/17 133/78   03/24/17 118/78    Weight from Last 3 Encounters:   04/03/17 84.4 kg (186 lb)   03/24/17 84.8 kg (187 lb)   03/16/17 84.8 kg (186 lb 15.2 oz)              We Performed the Following     Comprehensive Sleep Study        Primary Care Provider Office Phone # Fax #    Julio Guidry Jr., [phone]       [facility] [address]  [place] [address]        Thank you!     Thank you for choosing [facility]  for your care. Our goal is always to provide you with excellent care. Hearing back from our patients is one way we can continue to improve our services. Please take a few minutes to complete the written survey that you may receive in the mail after your visit with us. Thank you!             Your Updated Medication List - Protect others around you: Learn how to safely use, store and throw away your medicines at www.disposemymeds.org.          This list is accurate as of: 4/4/17 11:59 PM.  Always use your most recent med list.                   Brand Name Dispense Instructions for use    acetaminophen 325 MG tablet    TYLENOL    100 tablet    Take 2 tablets (650 mg) by mouth every 6 hours Do not take more than 4,000 mg of acetaminophen (Tylenol) from all sources to prevent liver damage.       ALPRAZolam 0.5 MG tablet    XANAX    10 tablet    Take 1 tablet (0.5 mg) by mouth 3 times daily as needed for anxiety       lisinopril 10 MG tablet    PRINIVIL/ZESTRIL         phosphorus tablet 250 mg 250 MG per tablet    K PHOS NEUTRAL    4 tablet    Take 2 tablets (500 mg) by mouth 3 times daily Take 2 tablets in the evening on Jose 3/5 and in the morning on Monday 3/6. Get your phosphorus level rechecked on Monday 3/6 at the [facility].

## 2017-04-05 ENCOUNTER — TELEPHONE (OUTPATIENT)
Dept: FAMILY MEDICINE | Facility: CLINIC | Age: 67
End: 2017-04-05

## 2017-04-05 NOTE — TELEPHONE ENCOUNTER
Spoke with patient, states he is having fatigue which started prior to lung nodule surgery. Went to sleep clinic yesterday for evaluation and is awaiting results. Results are available, routing to PCP to review.   Ruth Jiménez RN   [facility] - Triage

## 2017-04-05 NOTE — PROCEDURES
" SLEEP STUDY INTERPRETATION  POLYSOMNOGRAPHY REPORT      Patient: Juwan Latham  YOB: 1950  Study Date: 4/4/2017  MRN: [id number]  Referring Provider: MD Chao, Julio Eaton  Ordering Provider: MD Davis Abdullahi    Indications for Polysomnography: The patient is a 66 y old Male who is 5' 7\" and weighs 186.0 lbs.  His BMI is 29.2, Cary sleepiness scale 0.0 and neck size is 0.0.  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  A diagnostic polysomnogram was performed to re-evaluate for sleep apnea, PLMS and hypoxemia.    Polysomnogram Data:  A full night polysomnogram recorded the standard physiologic parameters including EEG, EOG, EMG, ECG, nasal and oral airflow.  Respiratory parameters of chest and abdominal movements were recorded with respiratory inductance plethysmography.  Oxygen saturation was recorded by pulse oximetry.      Sleep Architecture:  All stages of sleep were achieved except N3. There was marked sleep fragmentation and poor sleep apnea secondary to sleep disordered breathing and periodic leg movements.  The total recording time of the polysomnogram was 509.3 minutes.  The total sleep time was 331.0 minutes.  Sleep latency was increased at 72.3 minutes without the use of a sleep aid.  REM latency was 112.0 minutes.  Arousal index was increased at 38.2 arousals per hour.  Sleep efficiency was decreased at 65.0%.  Wake after sleep onset was 101.5 minutes.  The patient spent 13.4% of total sleep time in Stage N1, 67.2% in Stage N2, non applicable in Stages N3, and 19.3% in REM.  Time in REM supine was 0 minutes as there was no supine sleep during sleep study.    Respiration: Moderate obstructive sleep apnea without sleep related hypoxemia, predominantly at REM sleep, No supine sleep which may underestimate the severity of sleep disordered breathing.    Events - The polysomnogram revealed a presence of 19 " obstructive, 0 central, and 0 mixed apneas resulting in an apnea index of 3.4 events per hour.  There were 83 hypopneas resulting in a hypopnea index of 15.0 events per hour.  The combined apnea/hypopnea index was 18.5 events per hour.  The REM AHI was 30.9 events per hour.  The supine AHI was non applicable.  The RERA index was 0.4 events per hour.   The RDI was 18.9 events per hour.    Snoring - was reported as moderate to loud.    Respiratory rate and pattern - was notable for normal respiratory rate and pattern.    Sustained Sleep Associated Hypoventilation - Transcutaneous carbon dioxide monitoring was not used.    Sleep Associated Hypoxemia - (Greater than 5 minutes O2 sat below 89%) was not present.  Baseline oxygen saturation was 94.9%. Lowest oxygen saturation was 84.6%.  Time spent less than or equal to 88% was 0.5 minutes.  Time spent less than or equal to 89% was 1.1 minutes.  18.9 0.4 18.5     Movement Activity: Severe periodic leg movements during the first half of sleep study. There was no abnormal nocturnal behavior.    Periodic Limb Activity - There were 267 PLMs during the entire study. The PLM index was 48.4 movements per hour.  The PLM Arousal Index was 2.0 per hour.    REM EMG Activity - Excessive transient / sustained muscle activity was not present.    Nocturnal Behavior - Abnormal sleep related behaviors were not noted during NREM / REM sleep.     Bruxism - None apparent.    Cardiac Summary: Normal sinus rhythm throughout the night.  The average pulse rate was 61.1 bpm.  The minimum pulse rate was 48.9 bpm while the maximum pulse rate was 117.9 bpm. The rhythm is normal sinus. Arrhythmias were not noted.  Cardiac Comments: Normal Sinus Rhythm      Assessment:     Obstructive Sleep Apnea G47.33    Periodic Limb Movement Disorder G47.61      Recommendations:    Based on the presence of moderate obstructive sleep apnea and excessive daytime sleepiness, treatment could be empirically initiated  with Auto-titrating PAP therapy with a range of 8 - 16 cmH2O. Recommend clinical follow up with sleep management team.    Re-evaluation and pharmacologic therapy management of restless legs syndrome and Ferritin level in next clinic visit. The presence of periodic limb movements may be also due to untreated sleep disordered breathing.    Advise regarding the risks of drowsy driving.    Suggest optimizing sleep schedule and avoiding sleep deprivation.    Weight management (if BMI > 30).    Follow up primary care doctor as scheduled.          _____________________________________   electronically signed by:  BRODY BURDICK MD (4/5/17)   CC:  Julio Guidry MD          Range(%) Time in range (min) Time in range (%) Time in or below range (min) Time in or below range (%)   0.0 - 89.0 1.1 0.2% 1.1 0.2%   0.0 - 88.0 0.5 0.1% 0.5 0.1%      18.5 - 30.9

## 2017-04-07 ENCOUNTER — OFFICE VISIT (OUTPATIENT)
Dept: SURGERY | Facility: CLINIC | Age: 67
End: 2017-04-07
Attending: CLINICAL NURSE SPECIALIST
Payer: COMMERCIAL

## 2017-04-07 VITALS
RESPIRATION RATE: 16 BRPM | DIASTOLIC BLOOD PRESSURE: 69 MMHG | OXYGEN SATURATION: 97 % | SYSTOLIC BLOOD PRESSURE: 117 MMHG | WEIGHT: 188.5 LBS | HEART RATE: 68 BPM | TEMPERATURE: 97.1 F | BODY MASS INDEX: 28.87 KG/M2

## 2017-04-07 DIAGNOSIS — C34.11 MALIGNANT NEOPLASM OF UPPER LOBE OF RIGHT LUNG (H): Primary | ICD-10-CM

## 2017-04-07 PROBLEM — D18.02 CAVERNOUS HEMANGIOMA OF BRAIN (H): Status: ACTIVE | Noted: 2017-04-07

## 2017-04-07 PROCEDURE — 99212 OFFICE O/P EST SF 10 MIN: CPT

## 2017-04-07 ASSESSMENT — PAIN SCALES - GENERAL: PAINLEVEL: NO PAIN (0)

## 2017-04-07 NOTE — PATIENT INSTRUCTIONS
Get scheduled PET scan 4/11, we will review and contact you.   If negative, will recommend repeating chest CT in 2 months (3 months post-op) and then surveillance scans q 6 months x 2 years, then annually if unchanged.  Call our office if concerns/questions arise.

## 2017-04-07 NOTE — NURSING NOTE
"Juwan Latham is a 66 year old male who presents for:  Chief Complaint   Patient presents with     Oncology Clinic Visit     post op        Initial Vitals:  /69  Pulse 68  Temp 97.1  F (36.2  C) (Oral)  Resp 16  Wt 85.5 kg (188 lb 8 oz)  SpO2 97%  BMI 28.87 kg/m2 Estimated body mass index is 28.87 kg/(m^2) as calculated from the following:    Height as of 4/3/17: 1.721 m (5' 7.75\").    Weight as of this encounter: 85.5 kg (188 lb 8 oz).. Body surface area is 2.02 meters squared. BP completed using cuff size: regular  No Pain (0) No LMP for male patient. Allergies and medications reviewed.     Medications: MEDICATION REFILLS NEEDED TODAY.  Pharmacy name entered into [facility]: [id number] - [place], [place] - [id number]    Comments: Patient is not having any pain today.     6 minutes for nursing intake (face to face time)   Genoveva Gonzalez CMA       "

## 2017-04-07 NOTE — LETTER
"4/7/2017       RE: Juwan Latham  [address]  [facility] [address]     Dear Colleague,    Thank you for referring your patient, Juwan Latham, to the [facility]. Please see a copy of my visit note below.    REASON FOR VISIT:  Post-operative evaluation    PROCEDURES PERFORMED:  Laparoscopic right upper lobe wedge resection and mediastinal lymph node dissection.     DATE ABOVE PROCEDURES PERFORMED:  3/3/2017    SURGEON:  Dr. Maria A Desai    History of Present Illness:  Patient is a 66 year old male who had an incidentally found GGO of the right lung apex.  He underwent further workup and ultimately had the above surgery, discharging home 2 days post op with no complications.    Assessment:   Mr. Latham is here alone today.  He states that he is not taking any pain medications, having stopped a few days after surgery due to constipation and not liking how it made him feel.   He complains of fatigue but states he had this prior to surgery as well.   He goes to the gym daily and walks 2+ miles but needs to nap several times.   He has been evaluated by a sleep doctor and continues to be worked up for his sleep issues.   He feels he doesn't sleep well but it is unrelated to pain or surgery.       His CXR was reviewed and appears to have no fluid collection, no pneumothorax.   It has not been formally read by radiologist yet.  Incisions are clean and dry, no erythema noted.  Lungs are clear to ausculation.       I reviewed his pathology with him.   He is scheduled for PET scan next Tues AM at [facility].   He had a brain MRI on 3/27 that showed a 1.7 x 1.9 x 1.8 cm cavernous angioma in the inferior medial right frontal lobe.   He states that he saw Dr. Mcelroy, a neurosurgeon, last week who advised conservative treatment \"given that he is asymptomatic\".   Mr. Latham states today that he forgot to mention his headaches to Dr. Mcelroy and he worries about this finding.   He states that he has had a " general headache with nausea x 3 weeks, denies vision disturbance, said it does not favor either side but hold both temples and gestures across the top of his head and said it hurts all the time.   It does not wake him during the night.   He tried acetaminophen but it didn't change it.  Denies headaches in past.   He admits he has a lot of anxiety.          He expressed a lot of concern over the possibility of having further surgery (completion lobectomy) if PET scan indicates any further areas of concern.   He said he didn't think he'd get through the last surgery.   I explained that surveillance CT scans may be recommendation and that we would wait to see the PET findings before making any recommendations.    Plan:   Watch for formal CXR reading, call him is needed.  Review PET scan 4/11 and discuss at next Thoracic Tumor Conference.   I told him I would send a message to Dr. Mcelroy to let him know about his headaches and ask him to call you to discuss further.    Someone from our department will contact him later next week to review PET results, etc.   He verbalized agreement with this plan.   I gave him our office # to call if questions arise.    Total time:   30 minutes    GLORIA Gustafson-CNS  Dept of Thoracic Surgery

## 2017-04-07 NOTE — PROGRESS NOTES
"REASON FOR VISIT:  Post-operative evaluation    PROCEDURES PERFORMED:  Laparoscopic right upper lobe wedge resection and mediastinal lymph node dissection.     DATE ABOVE PROCEDURES PERFORMED:  3/3/2017    SURGEON:  Dr. Maria A Desai    History of Present Illness:  Patient is a 66 year old male who had an incidentally found GGO of the right lung apex.  He underwent further workup and ultimately had the above surgery, discharging home 2 days post op with no complications.    Assessment:   Mr. Latham is here alone today.  He states that he is not taking any pain medications, having stopped a few days after surgery due to constipation and not liking how it made him feel.   He complains of fatigue but states he had this prior to surgery as well.   He goes to the gym daily and walks 2+ miles but needs to nap several times.   He has been evaluated by a sleep doctor and continues to be worked up for his sleep issues.   He feels he doesn't sleep well but it is unrelated to pain or surgery.       His CXR was reviewed and appears to have no fluid collection, no pneumothorax.   It has not been formally read by radiologist yet.  Incisions are clean and dry, no erythema noted.  Lungs are clear to ausculation.       I reviewed his pathology with him.   He is scheduled for PET scan next Tues AM at [facility].   He had a brain MRI on 3/27 that showed a 1.7 x 1.9 x 1.8 cm cavernous angioma in the inferior medial right frontal lobe.   He states that he saw Dr. Mcelroy, a neurosurgeon, last week who advised conservative treatment \"given that he is asymptomatic\".   Mr. Latham states today that he forgot to mention his headaches to Dr. Mcelroy and he worries about this finding.   He states that he has had a general headache with nausea x 3 weeks, denies vision disturbance, said it does not favor either side but hold both temples and gestures across the top of his head and said it hurts all the time.   It does not wake him during the night.   " He tried acetaminophen but it didn't change it.  Denies headaches in past.   He admits he has a lot of anxiety.          He expressed a lot of concern over the possibility of having further surgery (completion lobectomy) if PET scan indicates any further areas of concern.   He said he didn't think he'd get through the last surgery.   I explained that surveillance CT scans may be recommendation and that we would wait to see the PET findings before making any recommendations.    Plan:   Watch for formal CXR reading, call him is needed.  Review PET scan 4/11 and discuss at next Thoracic Tumor Conference.   I told him I would send a message to Dr. Mcelroy to let him know about his headaches and ask him to call you to discuss further.    Someone from our department will contact him later next week to review PET results, etc.   He verbalized agreement with this plan.   I gave him our office # to call if questions arise.    Total time:   30 minutes    GLORIA Gustafson-CNS  Dept of Thoracic Surgery

## 2017-04-07 NOTE — MR AVS SNAPSHOT
After Visit Summary   4/7/2017    Juwan Latham    MRN: [id number]           Patient Information     Date Of Birth          1950        Visit Information        Provider Department      4/7/2017 1:00 PM Lanie Felder APRN [facility]        Today's Diagnoses     Malignant neoplasm of upper lobe of right lung (H)    -  1      Care Instructions    Get scheduled PET scan 4/11, we will review and contact you.   If negative, will recommend repeating chest CT in 2 months (3 months post-op) and then surveillance scans q 6 months x 2 years, then annually if unchanged.  Call our office if concerns/questions arise.        Follow-ups after your visit        Follow-up notes from your care team     Return in about 3 months (around 7/7/2017), or if symptoms worsen or fail to improve.      Your next 10 appointments already scheduled     Apr 11, 2017 10:30 AM CDT   PE NPET ONCOLOGY (EYES TO THIGHS) with [id number]   [facility] PET/CT ([facility])    [address]  [address]  [facility] [address] [phone]           Tell your doctor:   If there is any chance you may be pregnant or if you are breastfeeding.   If you have problems lying in small spaces (claustrophobia). If you do, your doctor may give you medicine to help you relax. If you have diabetes:   Have your exam early in the morning. Your blood glucose will go up as the day goes by.   Your glucose level must be 180 or less at the start of the exam. Please take any medicines you need to ensure this blood glucose level. 24 hours before your scan: Don t do any heavy exercise. (No jogging, aerobics or other workouts.) Exercise will make your pictures less accurate. 6 hours before your scan:   Stop all food and liquids (except water).   Do not chew gum or suck on mints.   If you need to take medicine with food, you may take it with a few crackers.  Please call your Imaging Department at your  exam site with any questions.            Apr 12, 2017  2:00 PM CDT   Return Sleep Patient with Owen Davis MD   [facility] ([facility])    [address] [facility] [address] [place] [address] [phone]              Who to contact     If you have questions or need follow up information about today's clinic visit or your schedule please contact [facility] directly at [phone].  Normal or non-critical lab and imaging results will be communicated to you by NIMBOXXhart, letter or phone within 4 business days after the clinic has received the results. If you do not hear from us within 7 days, please contact the clinic through MiNOWirelesst or phone. If you have a critical or abnormal lab result, we will notify you by phone as soon as possible.  Submit refill requests through [employer]Rest[employer] or call your pharmacy and they will forward the refill request to us. Please allow 3 business days for your refill to be completed.          Additional Information About Your Visit        [employer] Information     [employer] gives you secure access to your electronic health record. If you see a primary care provider, you can also send messages to your care team and make appointments. If you have questions, please call your primary care clinic.  If you do not have a primary care provider, please call [phone] and they will assist you.        Care EveryWhere ID     This is your Care EveryWhere ID. This could be used by other organizations to access your [facility] records  [id number]        Your Vitals Were     Pulse Temperature Respirations Pulse Oximetry BMI (Body Mass Index)       68 97.1  F (36.2  C) (Oral) 16 97% 28.87 kg/m2        Blood Pressure from Last 3 Encounters:   04/07/17 117/69   04/03/17 145/82   03/27/17 133/78    Weight from Last 3 Encounters:   04/07/17 85.5 kg (188 lb 8 oz)   04/03/17 84.4 kg (186 lb)   03/24/17 84.8 kg (187 lb)               Today, you had the following     No orders found for display       Primary Care Provider Office Phone # Fax #    Julio Guidry Jr., [phone]       [facility] [address] [facility] [facility] [address]        Thank you!     Thank you for choosing [facility]  for your care. Our goal is always to provide you with excellent care. Hearing back from our patients is one way we can continue to improve our services. Please take a few minutes to complete the written survey that you may receive in the mail after your visit with us. Thank you!             Your Updated Medication List - Protect others around you: Learn how to safely use, store and throw away your medicines at www.disposemymeds.org.          This list is accurate as of: 4/7/17  1:44 PM.  Always use your most recent med list.                   Brand Name Dispense Instructions for use    acetaminophen 325 MG tablet    TYLENOL    100 tablet    Take 2 tablets (650 mg) by mouth every 6 hours Do not take more than 4,000 mg of acetaminophen (Tylenol) from all sources to prevent liver damage.       ALPRAZolam 0.5 MG tablet    XANAX    10 tablet    Take 1 tablet (0.5 mg) by mouth 3 times daily as needed for anxiety       lisinopril 10 MG tablet    PRINIVIL/ZESTRIL         phosphorus tablet 250 mg 250 MG per tablet    K PHOS NEUTRAL    4 tablet    Take 2 tablets (500 mg) by mouth 3 times daily Take 2 tablets in the evening on Jose 3/5 and in the morning on Monday 3/6. Get your phosphorus level rechecked on Monday 3/6 at the [facility].

## 2017-04-07 NOTE — TELEPHONE ENCOUNTER
Was diagnosed with obstructive sleep apnea and will be undergoing trial of CPAP to treat this.  At this point, I'm assuming the obstructive sleep apnea is the cause of the fatigue.    Julio Guidry MD

## 2017-04-11 ENCOUNTER — HOSPITAL ENCOUNTER (OUTPATIENT)
Dept: PET IMAGING | Facility: CLINIC | Age: 67
Discharge: HOME | End: 2017-04-11
Attending: CLINICAL NURSE SPECIALIST | Admitting: CLINICAL NURSE SPECIALIST
Payer: COMMERCIAL

## 2017-04-11 DIAGNOSIS — C34.11 MALIGNANT NEOPLASM OF UPPER LOBE OF RIGHT LUNG (H): ICD-10-CM

## 2017-04-11 PROCEDURE — 74177 CT ABD & PELVIS W/CONTRAST: CPT

## 2017-04-11 PROCEDURE — A9552 F18 FDG: HCPCS

## 2017-04-11 PROCEDURE — 34300033 ZZH RX 343

## 2017-04-11 PROCEDURE — 25500064 ZZH RX 255 OP 636

## 2017-04-11 PROCEDURE — 71260 CT THORAX DX C+: CPT

## 2017-04-11 RX ORDER — IOPAMIDOL 755 MG/ML
20-135 INJECTION, SOLUTION INTRAVASCULAR ONCE
Status: COMPLETED | OUTPATIENT
Start: 2017-04-11 | End: 2017-04-11

## 2017-04-11 RX ADMIN — FLUDEOXYGLUCOSE F-18 15.22 MCI.: 500 INJECTION, SOLUTION INTRAVENOUS at 11:14

## 2017-04-11 RX ADMIN — IOPAMIDOL 100 ML: 755 INJECTION, SOLUTION INTRAVENOUS at 11:14

## 2017-04-12 ENCOUNTER — OFFICE VISIT (OUTPATIENT)
Dept: SLEEP MEDICINE | Facility: CLINIC | Age: 67
End: 2017-04-12
Payer: COMMERCIAL

## 2017-04-12 VITALS
WEIGHT: 188.49 LBS | BODY MASS INDEX: 28.57 KG/M2 | SYSTOLIC BLOOD PRESSURE: 124 MMHG | RESPIRATION RATE: 16 BRPM | OXYGEN SATURATION: 96 % | HEART RATE: 60 BPM | HEIGHT: 68 IN | DIASTOLIC BLOOD PRESSURE: 73 MMHG

## 2017-04-12 DIAGNOSIS — G47.61 PLMD (PERIODIC LIMB MOVEMENT DISORDER): ICD-10-CM

## 2017-04-12 DIAGNOSIS — G47.33 OSA (OBSTRUCTIVE SLEEP APNEA): Primary | ICD-10-CM

## 2017-04-12 PROCEDURE — 99214 OFFICE O/P EST MOD 30 MIN: CPT | Performed by: INTERNAL MEDICINE

## 2017-04-12 NOTE — PATIENT INSTRUCTIONS
MY TREATMENT INFORMATION FOR SLEEP APNEA-  Juwan MICHAEL Yeison    MY CONTACT NUMBERS ARE:  [phone]  DOCTOR : Owen WEEMS [facility]  SLEEP CENTER :  [place]  CPAP EQUIPMENT     You have sleep apnea, CPAP is prescribed for you.    You will be provided with an auto-titrating CPAP with a pressure range of 8-16 cmH2O with heated humidity to limit nasal congestion. Adjust the heat level on humidifier to find a setting that prevents dry nose but does not cause condensation in the hose or mask. Use distilled water in the humidifier.    The CPAP has a ramp function that starts the pressure lower than your prescribed pressure and gradually increases it over a number of minutes.  This may make it easier to fall asleep.                  Try to use the CPAP every-night, all night, at least 7-8 hours each night.  Daytime naps are not advised, but use CPAP if taking naps. Many insurances require that we prove you are using the CPAP at least 4 hours on at least 70% of nights over a 30 day period. We have 90 days to meet those criteria.       Patient was advised not to drive if drowsy or sleepy.                Discussed weight management and the impact of weight gain on sleep apnea.  Let me know if you snore or feel the pressure is too high.    You can get new supplies (mask, hose and filter) for your CPAP every 3-6 months, covered by insurance. You do not need to get supplies that often, but they are available if you would like them.  You may exchange the mask once within the first month if you feel the initial mask does not fit well.  Contact your medical equipment provider for equipment issues.  Please let me know if you have any return of snoring, daytime sleepiness or poor sleep quality. We will want to make sure your CPAP is adequately treating your apnea.  There is a website called CPAP.com that has accessories that may make CPAP use easier. Please visit it at your convenience.    Our phone number is [phone]     "Follow-up 4-6 weeks after PAP usage.  Bring your CPAP machine with you to the follow up appointment.      Frequently asked questions:  1. What is Obstructive Sleep Apnea (PAULINO)? PAULINO is the most common type of sleep apnea. Apnea literally means, \"without breath.\" It is characterized by repetitive pauses in breathing, despite continued effort to breathe, and is usually associated with a reduction in blood oxygen saturation. Apneas can last 10 to over 60 seconds. It is caused by narrowing or collapse of the upper airway as muscles relax during sleep. Severity of sleep apnea is determined by frequency of breathing events and their effect on your sleep and oxygen levels determined during sleep testing.   2. What are the consequences of PAULINO? Symptoms include: daytime sleepiness- possibly increasing the risk of falling asleep while driving, unrefreshing/restless sleep, snoring, insomnia, waking frequently to urinate, waking with heartburn or reflux, reduced concentration and memory, and morning headaches. Other health consequences may include development of high blood pressure and other cardiovascular disease in persons who are susceptible. Untreated PAULINO  can contribute to heart disease, stroke and diabetes.   3. What are the treatment options? In most situations, sleep apnea is a lifelong disease that must be managed with daily therapy. Medications are not effective for sleep apnea and surgery is generally not performed until other therapies have been tried. Therapy is usually tailored to the individual patient based on many factors including your wishes as well as severity of sleep apnea and severity of obesity. Continuous Positive Airway (CPAP) is the most reliable treatment. An oral device to hold your jaw forward is usually      IF I HAVE SLEEP APNEA.....  WHERE CAN I FIND MORE INFORMATION?    American Academy of Sleep Medicine Patient information on sleep disorders:  http://yoursleep.aasmnet.org    THINGS I SHOULD " REMEMBER  In most situations, sleep apnea is a lifelong disease that must be managed with daily therapy. Untreated disease, when severe, may result in an increased risk for an array of problems from heart disease to mood changes, car accidents and shorter lifespan.    CPAP-  WHY AND HOW?                                    Continuous positive airway pressure, or CPAP, is the most effective treatment for obstructive sleep apnea. A decision to use CPAP is a major step forward in the pursuit of a healthier life. The successful use of CPAP will help you breathe easier, sleep better and live healthier. Using CPAP can be a positive experience if you keep these lorenzana points in mind:  1. Commitment  CPAP is not a quick fix for your problem. It involves a long-term commitment to improve your sleep and your health.    2. Communication  Stay in close communication with both your sleep doctor and your CPAP supplier. Ask lots of questions and seek help when you need it.    3. Consistency  Use CPAP all night, every night and for every nap. You will receive the maximum health benefits from CPAP when you use it every time that you sleep. This will also make it easier for your body to adjust to the treatment.    4. Correction  The first machine and mask that you try may not be the best ones for you. Work with your sleep doctor and your CPAP supplier to make corrections to your equipment selection. Ask about trying a different type of machine or mask if you have ongoing problems. Make sure that your mask is a good fit and learn to use your equipment properly.    5. Challenge  Tell a family member or close friend to ask you each morning if you used your CPAP the previous night. Have someone to challenge you to give it your best effort.    6. Connection   Your adjustment to CPAP will be easier if you are able to connect with others who use the same treatment. Ask your sleep doctor if there is a support group in your area for people who  "have sleep apnea, or look for one on the Internet.    7. Comfort   Increase your level of comfort by using a saline spray, decongestant or heated humidifier if CPAP irritates your nose, mouth or throat. Use your unit's \"ramp\" setting to slowly get used to the air pressure level. There may be soft pads you can buy that will fit over your mask straps. Look on www.CPAP.com for accessories such as these straps, a pillow contoured for side-sleeping with CPAP, longer hoses, hose covers to reduce condensation, or stands to keep the hose out of your way.                                                              8. Cleaning   Clean your mask, tubing and headgear on a regular basis. Put this time in your schedule so that you don't forget to do it. Check and replace the filters for your CPAP unit and humidifier.    9. Completion   Although you are never finished with CPAP therapy, you should reward yourself by celebrating the completion of your first month of treatment. Expect this first month to be your hardest period of adjustment. It will involve some trial and error as you find the machine, mask and pressure settings that are right for you.    10. Continuation  After your first month of treatment, continue to make a daily commitment to use your CPAP all night, every night and for every nap.    CPAP-Tips to starting with success:  Begin using your CPAP for short periods of time during the day while you watch TV or read.    Use CPAP every night and for every nap. Using it less often reduces the health benefits and makes it harder for your body to get used to it.    Newer CPAP models are virtually silent; however, if you find the sound of your CPAP machine to be bothersome, place the unit under your bed to dampen the sound.     Make small adjustments to your mask, tubing, straps and headgear until you get the right fit. Tightening the mask may actually worsen the leak.  If it leaves significant marks on your face or " "irritates the bridge of your nose, it may not be the best mask for you.  Speak with the person who supplied the mask and consider trying other masks.    Use a saline nasal spray to ease mild nasal congestion. Neti-Pot or saline nasal rinses may also help. Nasal gel sprays can help reduce nasal dryness.  Biotene mouthwash can be helpful to protect your teeth if you experience frequent dry mouth.  Dry mouth may be a sign of air escaping out of your mouth or out of the mask in the case of a full face mask.  Speak with your provider if you expect that is the case.     Take a nasal decongestant to relieve more severe nasal or sinus congestion.  Do not use Afrin (oxymetazoline) nasal spray more than 3 days in a row.  Speak with your sleep doctor if your nasal congestion is chronic.    Use a heated humidifier that fits your CPAP model to enhance your breathing comfort. Adjust the heat setting up if you get a dry nose or throat, down if you get condensation in the hose or mask.  Position the CPAP lower than you so that any condensation in the hose drains back into the machine rather than towards the mask.    Try a system that uses nasal pillows if traditional masks give you problems.    Clean your mask, tubing and headgear once a week. Make sure the equipment dries fully.    Regularly check and replace the filters for your CPAP unit and humidifier.    Work closely with your sleep doctor and your CPAP supplier to make sure that you have the machine, mask and air pressure setting that works best for you.        MASK DESENSITIZATION:    If you are experiencing some anxiety about trying a PAP mask or breathing with pressurized air try the following steps in sequence to get used to PAP. This is called \"desensitization\".    1.  Wear mask (disconnected from the device) for 60 minutes daily awake and use a distraction: Sit and watch TV, read, or listen to music with the mask on. Take the mask off and put it back on, as needed. This " can be in a chair in the living room, for example.    2.  When you can use the mask without taking it off for 60 minutes and without anxiety, then proceed to step 3.    3.  Attach the mask to the PAP device, and switch the unit  on  and practice breathing through the mask for one hour while watching television, reading or performing some other sedentary, distracting activity.     4.  Once you are comfortable wearing PAP for 30-60 minutes without anxiety while distracted with an activity, try to use it in bed. You can continue distraction in bed by watching TV, reading, listening to music or an audio book, etc.  The main goal is to  not think about using PAP  but pay attention to relaxing.    5. Use the PAP during scheduled one-hour naps at home.    6. Use PAP during initial 3-4 hours of nocturnal sleep.    7. Use PAP through an entire night of sleep.    Weight Loss:    Weight management is a personal decision.  If you are interested in exploring weight loss strategies, the following discussion covers the impact on weight loss on sleep apnea and the approaches that may be successful.    Weight loss decreases severity of sleep apnea in most people with obesity. For those with mild obesity who have developed snoring with weight gain, even 15-30 pound weight loss can improve and occasionally eliminate sleep apnea.  Structured and life-long dietary and health habits are necessary to lose weight and keep healthier weight levels.     Though there may be significant health benefits from weight loss, long-term weight loss is very difficult to achieve- studies show success with dietary management in less than 10% of people. In addition, substantial weight loss may require years of dietary control and may be difficult if patients have severe obesity. In these cases, surgical management may be considered.  Finally, older individuals who have tolerated obesity without health complications may be less likely to benefit from  weight loss strategies.    Your BMI is There is no height or weight on file to calculate BMI.  Body mass index (BMI) is one way to tell whether you are at a healthy weight, overweight, or obese. It measures your weight in relation to your height.  A BMI of 18.5 to 24.9 is in the healthy range. A person with a BMI of 25 to 29.9 is considered overweight, and someone with a BMI of 30 or greater is considered obese. More than two-thirds of American adults are considered overweight or obese.  Being overweight or obese increases the risk for further weight gain. Excess weight may lead to heart disease and diabetes.  Creating and following plans for healthy eating and physical activity may help you improve your health.  Weight control is part of healthy lifestyle and includes exercise, emotional health, and healthy eating habits. Careful eating habits lifelong are the mainstay of weight control. Though there are significant health benefits from weight loss, long-term weight loss with diet alone may be very difficult to achieve- studies show long-term success with dietary management in less than 10% of people. Attaining a healthy weight may be especially difficult to achieve in those with severe obesity. In some cases, medications, devices and surgical management might be considered.  What can you do?  If you are overweight or obese and are interested in methods for weight loss, you should discuss this with your provider.     Consider reducing daily calorie intake by 500 calories.     Keep a food journal.     Avoiding skipping meals, consider cutting portions instead.    Diet combined with exercise helps maintain muscle while optimizing fat loss. Strength training is particularly important for building and maintaining muscle mass. Exercise helps reduce stress, increase energy, and improves fitness. Increasing exercise without diet control, however, may not burn enough calories to loose weight.       Start walking three days  a week 10-20 minutes at a time    Work towards walking thirty minutes five days a week     Eventually, increase the speed of your walking for 1-2 minutes at time    In addition, we recommend that you review healthy lifestyles and methods for weight loss available through the National Institutes of Health patient information sites:  http://win.niddk.nih.gov/publications/index.htm    And look into health and wellness programs that may be available through your health insurance provider, employer, local community center, or ondina club.    Weight management plan: Patient was referred to their PCP to discuss a diet and exercise plan.     Your blood pressure was checked while you were in clinic today.  Please read the guidelines below about what these numbers mean and what you should do about them.  Your systolic blood pressure is the top number.  This is the pressure when the heart is pumping.  Your diastolic blood pressure is the bottom number.  This is the pressure in between beats.  If your systolic blood pressure is less than 120 and your diastolic blood pressure is less than 80, then your blood pressure is normal. There is nothing more that you need to do about it  If your systolic blood pressure is 120-139 or your diastolic blood pressure is 80-89, your blood pressure may be higher than it should be.  You should have your blood pressure re-checked within a year by a primary care provider.  If your systolic blood pressure is 140 or greater or your diastolic blood pressure is 90 or greater, you may have high blood pressure.  High blood pressure is treatable, but if left untreated over time it can put you at risk for heart attack, stroke, or kidney failure.  You should have your blood pressure re-checked by a primary care provider within the next four weeks.    3

## 2017-04-12 NOTE — PROGRESS NOTES
Sleep Study Follow-Up Visit:    Date on this visit: 4/12/2017    ASSESSMENT / PLAN:    PAULINO (obstructive sleep apnea)  Discussed with patient the recent sleep study and treatment options, we recommend Auto-PAP 8-16 cmH2O in addition to weight loss and avoiding sleeping supine position. Patient is recommended to improve his sleep-wake schedule and good sleep hygiene. Patient was advised to use PAP therapy for at least 7-8 hours and during naps (naps are not recommended).  Instructions given. Follow up 4-6 weeks after PAP use.    PLMD (periodic limb movement disorder): patient denies RLS symptoms but he moves his legs, no treatment is needed.    All questions were answered.  The patient indicates understanding of the above issues and agrees with the plan set forth.    Orders Placed This Encounter   Procedures     Comprehensive DME       He will follow up with me in about 4-6 week(s).       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 He has restless sleep and daytime sleepiness.    PSG:   Sleep latency 72.3 minutes without sleep aid.    REM achieved.   REM latency 112 minutes.    Sleep efficiency 65%. Total sleep time 331 minutes.  Sleep architecture:  Stage 1, 13.4% (5%), stage 2, 67.2% (45-55%), stage 3, N/A% (15-20%), stage REM, 19.3% (20-25%).    AHI was 18.5/hour.   RDI 18.9/hour.    REM AHI 30.9/hour.    Supine AHI N/A..    Lowest O2 saturation:84.6%  S/He spent 1.1 minutes below 89% SpO2.   Periodic Limb Movement Index 48.4/hour.       These findings were reviewed with the patient and copy of the sleep study result was given.    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      Problem List:  Patient Active Problem List    Diagnosis Date Noted     Overweight (BMI 25.0-29.9) 03/24/2017     Priority: Medium     Malignant neoplasm of upper lobe of right lung (H) 03/13/2017     Priority: Medium     Adenocarcinoma       Seasonal allergies      Priority: Medium     spring and fall       Hypertension goal BP (blood pressure) < 140/90 04/28/2015     Priority: Medium     Cavernous hemangioma of brain (H) 04/07/2017     Found incidentally on MRI of brain which was ordered to look for mets from lung tumor  Neurosurgery consult with Dr. Mcelroy in March 2017 - recommend repeat MRI in September 2017 to assess for any changes       Benign neoplasm of descending colon 01/16/2017     Seen on colonoscopy 1/4/2017       GERD (gastroesophageal reflux disease) 01/27/2015     PAULINO (obstructive sleep apnea) 05/24/2013     Anxiety 12/12/2011     History of colonic polyps 06/22/2011     Problem list name updated by automated process. Provider to review       Advanced directives, counseling/discussion 06/03/2011     Advance Directive Problem List Overview:   Name Relationship Phone    Primary Health Care Agent            Alternative Health Care Agent          Discussed advance care planning with patient; information given to patient to review. 6/3/2011     6/7/11 Follow up to [facility] referral call placed. Patient would like to review document provided at visit, establish an agent and then will schedule facilitation if needed. Steffanie Leal LPN/Advanced Healthcare Planning Facilitator            CARDIOVASCULAR SCREENING; LDL GOAL LESS THAN 160 10/31/2010     Allergic rhinitis due to other allergen 05/28/2004     Tu score is 9 on 6-3-11               Medications:     Current Outpatient Prescriptions   Medication Sig     ALPRAZolam (XANAX) 0.5 MG tablet Take 1 tablet (0.5 mg) by mouth 3 times daily as needed for anxiety     acetaminophen (TYLENOL)  "325 MG tablet Take 2 tablets (650 mg) by mouth every 6 hours Do not take more than 4,000 mg of acetaminophen (Tylenol) from all sources to prevent liver damage.     phosphorus tablet 250 mg (K PHOS NEUTRAL) 250 MG per tablet Take 2 tablets (500 mg) by mouth 3 times daily Take 2 tablets in the evening on Jose 3/5 and in the morning on Monday 3/6. Get your phosphorus level rechecked on Monday 3/6 at the [facility].     lisinopril (PRINIVIL,ZESTRIL) 10 MG tablet      No current facility-administered medications for this visit.           PHYSICAL EXAMINATION:  /73  Pulse 60  Resp 16  Ht 1.721 m (5' 7.76\")  Wt 85.5 kg (188 lb 7.9 oz)  SpO2 96%  BMI 28.87 kg/m2          Data: All pertinent previous laboratory data reviewed     No results found for: PH, PHARTERIAL, PO2, [id number], SAT, PCO2, HCO3, BASEEXCESS, RISHI, BEB  Lab Results   Component Value Date    TSH 1.42 05/24/2016    TSH 1.24 08/21/2015     Lab Results   Component Value Date     (H) 03/05/2017    GLC 96 02/24/2017     Lab Results   Component Value Date    HGB 15.3 03/14/2017    HGB 15.2 03/04/2017     Lab Results   Component Value Date    BUN 14 03/05/2017    BUN 11 02/24/2017    CR 0.74 03/05/2017    CR 0.69 03/04/2017     No results found for: ABDELRAHMAN     Twenty-five minutes spent with patient, all of which were spent face-to-face counseling, consulting, coordinating plan of care, going over sleep test results and chart review.          Owen Davis MD 4/12/2017   [facility]  [address], [place] [address] [phone] Clinic        Copy to: Julio Guidry Jr.    "

## 2017-04-12 NOTE — MR AVS SNAPSHOT
After Visit Summary   4/12/2017    Juwan Latham    MRN: [id number]           Patient Information     Date Of Birth          1950        Visit Information        Provider Department      4/12/2017 2:00 PM Owen Davis MD [facility] [place]        Today's Diagnoses     PAULINO (obstructive sleep apnea)    -  1    PLMD (periodic limb movement disorder)          Care Instructions    MY TREATMENT INFORMATION FOR SLEEP APNEA-  Juwan Latham    MY CONTACT NUMBERS ARE:  [phone]  DOCTOR : Owen Davis  SLEEP CENTER :  [place]  CPAP EQUIPMENT     You have sleep apnea, CPAP is prescribed for you.    You will be provided with an auto-titrating CPAP with a pressure range of 8-16 cmH2O with heated humidity to limit nasal congestion. Adjust the heat level on humidifier to find a setting that prevents dry nose but does not cause condensation in the hose or mask. Use distilled water in the humidifier.    The CPAP has a ramp function that starts the pressure lower than your prescribed pressure and gradually increases it over a number of minutes.  This may make it easier to fall asleep.                  Try to use the CPAP every-night, all night, at least 7-8 hours each night.  Daytime naps are not advised, but use CPAP if taking naps. Many insurances require that we prove you are using the CPAP at least 4 hours on at least 70% of nights over a 30 day period. We have 90 days to meet those criteria.       Patient was advised not to drive if drowsy or sleepy.                Discussed weight management and the impact of weight gain on sleep apnea.  Let me know if you snore or feel the pressure is too high.    You can get new supplies (mask, hose and filter) for your CPAP every 3-6 months, covered by insurance. You do not need to get supplies that often, but they are available if you would like them.  You may exchange the mask once within the first month if you feel the initial  "mask does not fit well.  Contact your medical equipment provider for equipment issues.  Please let me know if you have any return of snoring, daytime sleepiness or poor sleep quality. We will want to make sure your CPAP is adequately treating your apnea.  There is a website called CPAP.com that has accessories that may make CPAP use easier. Please visit it at your convenience.    Our phone number is [phone]    Follow-up 4-6 weeks after PAP usage.  Bring your CPAP machine with you to the follow up appointment.      Frequently asked questions:  1. What is Obstructive Sleep Apnea (PAULINO)? PAULINO is the most common type of sleep apnea. Apnea literally means, \"without breath.\" It is characterized by repetitive pauses in breathing, despite continued effort to breathe, and is usually associated with a reduction in blood oxygen saturation. Apneas can last 10 to over 60 seconds. It is caused by narrowing or collapse of the upper airway as muscles relax during sleep. Severity of sleep apnea is determined by frequency of breathing events and their effect on your sleep and oxygen levels determined during sleep testing.   2. What are the consequences of PAULINO? Symptoms include: daytime sleepiness- possibly increasing the risk of falling asleep while driving, unrefreshing/restless sleep, snoring, insomnia, waking frequently to urinate, waking with heartburn or reflux, reduced concentration and memory, and morning headaches. Other health consequences may include development of high blood pressure and other cardiovascular disease in persons who are susceptible. Untreated PAULINO  can contribute to heart disease, stroke and diabetes.   3. What are the treatment options? In most situations, sleep apnea is a lifelong disease that must be managed with daily therapy. Medications are not effective for sleep apnea and surgery is generally not performed until other therapies have been tried. Therapy is usually tailored to the individual patient " based on many factors including your wishes as well as severity of sleep apnea and severity of obesity. Continuous Positive Airway (CPAP) is the most reliable treatment. An oral device to hold your jaw forward is usually      IF I HAVE SLEEP APNEA.....  WHERE CAN I FIND MORE INFORMATION?    American Academy of Sleep Medicine Patient information on sleep disorders:  http://yoursleep.aasmnet.org    THINGS I SHOULD REMEMBER  In most situations, sleep apnea is a lifelong disease that must be managed with daily therapy. Untreated disease, when severe, may result in an increased risk for an array of problems from heart disease to mood changes, car accidents and shorter lifespan.    CPAP-  WHY AND HOW?                                    Continuous positive airway pressure, or CPAP, is the most effective treatment for obstructive sleep apnea. A decision to use CPAP is a major step forward in the pursuit of a healthier life. The successful use of CPAP will help you breathe easier, sleep better and live healthier. Using CPAP can be a positive experience if you keep these lorenzana points in mind:  1. Commitment  CPAP is not a quick fix for your problem. It involves a long-term commitment to improve your sleep and your health.    2. Communication  Stay in close communication with both your sleep doctor and your CPAP supplier. Ask lots of questions and seek help when you need it.    3. Consistency  Use CPAP all night, every night and for every nap. You will receive the maximum health benefits from CPAP when you use it every time that you sleep. This will also make it easier for your body to adjust to the treatment.    4. Correction  The first machine and mask that you try may not be the best ones for you. Work with your sleep doctor and your CPAP supplier to make corrections to your equipment selection. Ask about trying a different type of machine or mask if you have ongoing problems. Make sure that your mask is a good fit and learn  "to use your equipment properly.    5. Challenge  Tell a family member or close friend to ask you each morning if you used your CPAP the previous night. Have someone to challenge you to give it your best effort.    6. Connection   Your adjustment to CPAP will be easier if you are able to connect with others who use the same treatment. Ask your sleep doctor if there is a support group in your area for people who have sleep apnea, or look for one on the Internet.    7. Comfort   Increase your level of comfort by using a saline spray, decongestant or heated humidifier if CPAP irritates your nose, mouth or throat. Use your unit's \"ramp\" setting to slowly get used to the air pressure level. There may be soft pads you can buy that will fit over your mask straps. Look on www.CPAP.com for accessories such as these straps, a pillow contoured for side-sleeping with CPAP, longer hoses, hose covers to reduce condensation, or stands to keep the hose out of your way.                                                              8. Cleaning   Clean your mask, tubing and headgear on a regular basis. Put this time in your schedule so that you don't forget to do it. Check and replace the filters for your CPAP unit and humidifier.    9. Completion   Although you are never finished with CPAP therapy, you should reward yourself by celebrating the completion of your first month of treatment. Expect this first month to be your hardest period of adjustment. It will involve some trial and error as you find the machine, mask and pressure settings that are right for you.    10. Continuation  After your first month of treatment, continue to make a daily commitment to use your CPAP all night, every night and for every nap.    CPAP-Tips to starting with success:  Begin using your CPAP for short periods of time during the day while you watch TV or read.    Use CPAP every night and for every nap. Using it less often reduces the health benefits and " makes it harder for your body to get used to it.    Newer CPAP models are virtually silent; however, if you find the sound of your CPAP machine to be bothersome, place the unit under your bed to dampen the sound.     Make small adjustments to your mask, tubing, straps and headgear until you get the right fit. Tightening the mask may actually worsen the leak.  If it leaves significant marks on your face or irritates the bridge of your nose, it may not be the best mask for you.  Speak with the person who supplied the mask and consider trying other masks.    Use a saline nasal spray to ease mild nasal congestion. Neti-Pot or saline nasal rinses may also help. Nasal gel sprays can help reduce nasal dryness.  Biotene mouthwash can be helpful to protect your teeth if you experience frequent dry mouth.  Dry mouth may be a sign of air escaping out of your mouth or out of the mask in the case of a full face mask.  Speak with your provider if you expect that is the case.     Take a nasal decongestant to relieve more severe nasal or sinus congestion.  Do not use Afrin (oxymetazoline) nasal spray more than 3 days in a row.  Speak with your sleep doctor if your nasal congestion is chronic.    Use a heated humidifier that fits your CPAP model to enhance your breathing comfort. Adjust the heat setting up if you get a dry nose or throat, down if you get condensation in the hose or mask.  Position the CPAP lower than you so that any condensation in the hose drains back into the machine rather than towards the mask.    Try a system that uses nasal pillows if traditional masks give you problems.    Clean your mask, tubing and headgear once a week. Make sure the equipment dries fully.    Regularly check and replace the filters for your CPAP unit and humidifier.    Work closely with your sleep doctor and your CPAP supplier to make sure that you have the machine, mask and air pressure setting that works best for you.        MASK  "DESENSITIZATION:    If you are experiencing some anxiety about trying a PAP mask or breathing with pressurized air try the following steps in sequence to get used to PAP. This is called \"desensitization\".    1.  Wear mask (disconnected from the device) for 60 minutes daily awake and use a distraction: Sit and watch TV, read, or listen to music with the mask on. Take the mask off and put it back on, as needed. This can be in a chair in the living room, for example.    2.  When you can use the mask without taking it off for 60 minutes and without anxiety, then proceed to step 3.    3.  Attach the mask to the PAP device, and switch the unit  on  and practice breathing through the mask for one hour while watching television, reading or performing some other sedentary, distracting activity.     4.  Once you are comfortable wearing PAP for 30-60 minutes without anxiety while distracted with an activity, try to use it in bed. You can continue distraction in bed by watching TV, reading, listening to music or an audio book, etc.  The main goal is to  not think about using PAP  but pay attention to relaxing.    5. Use the PAP during scheduled one-hour naps at home.    6. Use PAP during initial 3-4 hours of nocturnal sleep.    7. Use PAP through an entire night of sleep.    Weight Loss:    Weight management is a personal decision.  If you are interested in exploring weight loss strategies, the following discussion covers the impact on weight loss on sleep apnea and the approaches that may be successful.    Weight loss decreases severity of sleep apnea in most people with obesity. For those with mild obesity who have developed snoring with weight gain, even 15-30 pound weight loss can improve and occasionally eliminate sleep apnea.  Structured and life-long dietary and health habits are necessary to lose weight and keep healthier weight levels.     Though there may be significant health benefits from weight loss, long-term " weight loss is very difficult to achieve- studies show success with dietary management in less than 10% of people. In addition, substantial weight loss may require years of dietary control and may be difficult if patients have severe obesity. In these cases, surgical management may be considered.  Finally, older individuals who have tolerated obesity without health complications may be less likely to benefit from weight loss strategies.    Your BMI is There is no height or weight on file to calculate BMI.  Body mass index (BMI) is one way to tell whether you are at a healthy weight, overweight, or obese. It measures your weight in relation to your height.  A BMI of 18.5 to 24.9 is in the healthy range. A person with a BMI of 25 to 29.9 is considered overweight, and someone with a BMI of 30 or greater is considered obese. More than two-thirds of American adults are considered overweight or obese.  Being overweight or obese increases the risk for further weight gain. Excess weight may lead to heart disease and diabetes.  Creating and following plans for healthy eating and physical activity may help you improve your health.  Weight control is part of healthy lifestyle and includes exercise, emotional health, and healthy eating habits. Careful eating habits lifelong are the mainstay of weight control. Though there are significant health benefits from weight loss, long-term weight loss with diet alone may be very difficult to achieve- studies show long-term success with dietary management in less than 10% of people. Attaining a healthy weight may be especially difficult to achieve in those with severe obesity. In some cases, medications, devices and surgical management might be considered.  What can you do?  If you are overweight or obese and are interested in methods for weight loss, you should discuss this with your provider.     Consider reducing daily calorie intake by 500 calories.     Keep a food journal.      Avoiding skipping meals, consider cutting portions instead.    Diet combined with exercise helps maintain muscle while optimizing fat loss. Strength training is particularly important for building and maintaining muscle mass. Exercise helps reduce stress, increase energy, and improves fitness. Increasing exercise without diet control, however, may not burn enough calories to loose weight.       Start walking three days a week 10-20 minutes at a time    Work towards walking thirty minutes five days a week     Eventually, increase the speed of your walking for 1-2 minutes at time    In addition, we recommend that you review healthy lifestyles and methods for weight loss available through the National Institutes of Health patient information sites:  http://win.niddk.nih.gov/publications/index.htm    And look into health and wellness programs that may be available through your health insurance provider, employer, local community center, or ondina club.    Weight management plan: Patient was referred to their PCP to discuss a diet and exercise plan.     Your blood pressure was checked while you were in clinic today.  Please read the guidelines below about what these numbers mean and what you should do about them.  Your systolic blood pressure is the top number.  This is the pressure when the heart is pumping.  Your diastolic blood pressure is the bottom number.  This is the pressure in between beats.  If your systolic blood pressure is less than 120 and your diastolic blood pressure is less than 80, then your blood pressure is normal. There is nothing more that you need to do about it  If your systolic blood pressure is 120-139 or your diastolic blood pressure is 80-89, your blood pressure may be higher than it should be.  You should have your blood pressure re-checked within a year by a primary care provider.  If your systolic blood pressure is 140 or greater or your diastolic blood pressure is 90 or greater, you may  have high blood pressure.  High blood pressure is treatable, but if left untreated over time it can put you at risk for heart attack, stroke, or kidney failure.  You should have your blood pressure re-checked by a primary care provider within the next four weeks.    3        Follow-ups after your visit        Your next 10 appointments already scheduled     Jul 07, 2017  1:00 PM CDT   (Arrive by 12:45 PM)   Return Visit with GLORIA Rodriguez [facility] ([facility])    [address]  [address] [place] [address] [phone]              Who to contact     If you have questions or need follow up information about today's clinic visit or your schedule please contact [facility] directly at [phone].  Normal or non-critical lab and imaging results will be communicated to you by MyChart, letter or phone within 4 business days after the clinic has received the results. If you do not hear from us within 7 days, please contact the clinic through [facility] or phone. If you have a critical or abnormal lab result, we will notify you by phone as soon as possible.  Submit refill requests through [employer] or call your pharmacy and they will forward the refill request to us. Please allow 3 business days for your refill to be completed.          Additional Information About Your Visit        [facility] Information     [employer] gives you secure access to your electronic health record. If you see a primary care provider, you can also send messages to your care team and make appointments. If you have questions, please call your primary care clinic.  If you do not have a primary care provider, please call [phone] and they will assist you.        Care EveryWhere ID     This is your Care EveryWhere ID. This could be used by other organizations to access your [facility] medical records  [id number]        Your Vitals Were     Pulse  "Respirations Height Pulse Oximetry BMI (Body Mass Index)       60 16 1.721 m (5' 7.76\") 96% 28.87 kg/m2        Blood Pressure from Last 3 Encounters:   04/12/17 124/73   04/07/17 117/69   04/03/17 145/82    Weight from Last 3 Encounters:   04/12/17 85.5 kg (188 lb 7.9 oz)   04/07/17 85.5 kg (188 lb 8 oz)   04/03/17 84.4 kg (186 lb)              We Performed the Following     Comprehensive DME        Primary Care Provider Office Phone # Fax #    Julio Guidry Jr., [phone]       [facility] [address] [address]        Thank you!     Thank you for choosing [facility]  for your care. Our goal is always to provide you with excellent care. Hearing back from our patients is one way we can continue to improve our services. Please take a few minutes to complete the written survey that you may receive in the mail after your visit with us. Thank you!             Your Updated Medication List - Protect others around you: Learn how to safely use, store and throw away your medicines at www.disposemymeds.org.          This list is accurate as of: 4/12/17  2:26 PM.  Always use your most recent med list.                   Brand Name Dispense Instructions for use    acetaminophen 325 MG tablet    TYLENOL    100 tablet    Take 2 tablets (650 mg) by mouth every 6 hours Do not take more than 4,000 mg of acetaminophen (Tylenol) from all sources to prevent liver damage.       ALPRAZolam 0.5 MG tablet    XANAX    10 tablet    Take 1 tablet (0.5 mg) by mouth 3 times daily as needed for anxiety       lisinopril 10 MG tablet    PRINIVIL/ZESTRIL         phosphorus tablet 250 mg 250 MG per tablet    K PHOS NEUTRAL    4 tablet    Take 2 tablets (500 mg) by mouth 3 times daily Take 2 tablets in the evening on Jose 3/5 and in the morning on Monday 3/6. Get your phosphorus level rechecked on Monday 3/6 at the [facility].         "

## 2017-04-12 NOTE — NURSING NOTE
"Chief Complaint   Patient presents with     RECHECK     F/u Psg 4/4       Initial /73  Pulse 60  Resp 16  Ht 1.721 m (5' 7.76\")  Wt 85.5 kg (188 lb 7.9 oz)  SpO2 96%  BMI 28.87 kg/m2 Estimated body mass index is 28.87 kg/(m^2) as calculated from the following:    Height as of this encounter: 1.721 m (5' 7.76\").    Weight as of this encounter: 85.5 kg (188 lb 7.9 oz).  Medication Reconciliation: complete         Sissy Oscar LPN/MA  "

## 2017-04-14 ENCOUNTER — DOCUMENTATION ONLY (OUTPATIENT)
Dept: SLEEP MEDICINE | Facility: CLINIC | Age: 67
End: 2017-04-14

## 2017-04-14 NOTE — PROGRESS NOTES
Patient was offered choice of vendor and chose [facility].  Patient Juwan Latham was set up at [facility] on April 14, 2017. Patient received a Resmed AirSense 10 Auto. Pressures were set at 8-16 cm H2O.   Patient s ramp is 5 cm H2O for Auto and FLEX/EPR is EPR.  Patient received a Resmed Mask name: MIRAGE FX   Nasal mask Size Standard, heated tubing and heated humidifier.  Patient is enrolled in the STM Program and does need to meet compliance. Patient has a follow up on 5/23/2017 with Dr. Davis.    Crissy Headley

## 2017-04-21 ENCOUNTER — DOCUMENTATION ONLY (OUTPATIENT)
Dept: SLEEP MEDICINE | Facility: CLINIC | Age: 67
End: 2017-04-21

## 2017-04-21 NOTE — PROGRESS NOTES
3 DAY STM VISIT    Patient contacted for 3 day STM visit  Subjective measures:  Pt feels it is still a bit of a struggle, however he slept greater than four hours with it last night.  He feels that it is sometimes out of sync with his breathing.     Current settings:  EPAP Min Auto CPAP: 8.0 (The minimum allowable pressure in cmH2O)       EPAP Max Auto CPAP: 16.0 (The maximum allowable pressure in cmH2O)       Assessment: NIghtly usage, most nights under four hours.      Action plan: Pt to have f/u 14 day STM visit. Turned off EPR for comfort. Pt was instructed to call back if he continues to struggle with pressure issue.

## 2017-04-27 ENCOUNTER — TELEPHONE (OUTPATIENT)
Dept: SURGERY | Facility: CLINIC | Age: 67
End: 2017-04-27

## 2017-04-27 DIAGNOSIS — C34.11 MALIGNANT NEOPLASM OF UPPER LOBE OF RIGHT LUNG (H): Primary | ICD-10-CM

## 2017-04-27 NOTE — TELEPHONE ENCOUNTER
Call to Juwan regarding his PET. There does not appear to be any residual disease in the lung. Pathology showed clear margins and all sampled lymph nodes were negative for malignancy. We will continue with lung cancer surveillance every 6 months for 2 years with chest CT and annually thereafter.  There are two hypermetabolic calcified lesions in the right hepatic lobe. We are asking Dr. Go Oh, to review the PET and offer his thought about the possible etiology and opinion on wether to sample these lesions or not. I will call Juwan once I hear back from him.  There is a small area of hypermetabolism in the prostate. I will refer him to Urology for further work up and evaluation of this finding.    Juwan is in agreement with all of the above and has no questions or concerns at this time.

## 2017-04-28 ENCOUNTER — TEAM CONFERENCE (OUTPATIENT)
Dept: SURGERY | Facility: CLINIC | Age: 67
End: 2017-04-28

## 2017-04-28 ENCOUNTER — TELEPHONE (OUTPATIENT)
Dept: SURGERY | Facility: CLINIC | Age: 67
End: 2017-04-28

## 2017-04-28 DIAGNOSIS — C34.11 MALIGNANT NEOPLASM OF UPPER LOBE OF RIGHT LUNG (H): Primary | ICD-10-CM

## 2017-04-28 NOTE — TELEPHONE ENCOUNTER
Pulmonary Nodule Conference      Patient Name: Juwan Latham    Reason for conference discussion (brief overview): 66 year old male with well differentiated adenocarcinoma of the lung. He is s/p wedge resection. Pathology showed clear margins and negative lymph nodes. Brain MRI shows cavernous hemangioma for which he is seeing neurosurgery. PET/CT shows multiple areas of hypermetabolic activity. There is an area of hypermetabolism in the prostate and he will be seeing urology for evaluation of this. There are two calcified hypermetabolic lesions along the right hepatic lobe that are new compared to January 2017.     Specific Question:  Are these liver lesions concerning and if so, are they amenable to be biopsied in IR?    Pertinent Histology:  Well differentiated adenocarcinoma    Referring Physician: Dr. Maria A Desai    The patient's case was presented at the multidisciplinary conference for the above noted reason.        There was a consensus recommendation for the following actions:     The new calcified hypermetabolic lesions along the edge of the right hepatic lobe of the liver are likely an inflammatory finding related to the surgery (his surgery was laparoscopic and trans-diaphragmatic). These areas should be monitored as part of his lung cancer surveillance visits.    Case Lead:  Carla Faulnker    Interventional Radiology Staff Present: Cas East MD

## 2017-04-28 NOTE — TELEPHONE ENCOUNTER
Call to Juwan to discuss the consensus from this morning's conference. He is please to hear that everyone feels the liver findings are inflammation from his surgery. He is in agreement with the plan to continue to watch this area.    He is still waiting for a call with an appointment to see Urology for the prostate findings.

## 2017-05-01 ENCOUNTER — PRE VISIT (OUTPATIENT)
Dept: UROLOGY | Facility: CLINIC | Age: 67
End: 2017-05-01

## 2017-05-01 ENCOUNTER — DOCUMENTATION ONLY (OUTPATIENT)
Dept: SLEEP MEDICINE | Facility: CLINIC | Age: 67
End: 2017-05-01

## 2017-05-01 NOTE — PROGRESS NOTES
14 DAY STM VISIT    Subjective measures:  Pt not yet feeling benefit from therapy.  He finds the mask comfortable, only issues is with dryness of the mouth.    Assessment: Pt not meeting objective benchmarks for compliance and leak due to mouth opening.  Patient failing following subjective benchmarks: dryness or soreness  Action plan: Pt to have 30 day STM visit.  Increased humidity remotely to a setting of 5.  Advised how to increase humidity going forward on device.    Device settings:    EPAP Min Auto CPAP: 8.0 (The minimum allowable pressure in cmH2O)    EPAP Max Auto CPAP: 16.0 (The maximum allowable pressure in cmH2O)    Target  (95% of Target) 14 day average (Resmed): 12.3cm H20      Objective measures: 14 day rolling measure     % compliance greater than four hours rolling average 14 days: 28.5 %     95% OF Leak in litres Rolling Average 14 Days: 34.03 lpm last data upload        AHI Rolling Average 14 Day: 1.14  last data upload        Time mask on face 14 day average: 172 min         Objective measure goal  Compliance   Goal >70%  Leak   Goal < 10%  AHI  Goal < 5  Usage  Goal >240

## 2017-05-08 ENCOUNTER — TELEPHONE (OUTPATIENT)
Dept: FAMILY MEDICINE | Facility: CLINIC | Age: 67
End: 2017-05-08

## 2017-05-08 NOTE — TELEPHONE ENCOUNTER
Reason for Call: pt wants to discuss fatigue and PSA test     Detailed comments: Pt says he has been having the fatigue for a long while. He also wants to know what is numbers are for his PSA test.     Phone Number Patient can be reached at: Cell number on file:    Telephone Information:   Mobile [phone]       Best Time: anytime     Can we leave a detailed message on this number? YES    Call taken on 5/8/2017 at 3:45 PM by Brittney Andersen

## 2017-05-09 ENCOUNTER — OFFICE VISIT (OUTPATIENT)
Dept: FAMILY MEDICINE | Facility: CLINIC | Age: 67
End: 2017-05-09
Payer: COMMERCIAL

## 2017-05-09 VITALS
BODY MASS INDEX: 29.98 KG/M2 | HEIGHT: 67 IN | SYSTOLIC BLOOD PRESSURE: 116 MMHG | HEART RATE: 72 BPM | OXYGEN SATURATION: 98 % | DIASTOLIC BLOOD PRESSURE: 64 MMHG | WEIGHT: 191 LBS | TEMPERATURE: 98.8 F

## 2017-05-09 DIAGNOSIS — F33.0 MAJOR DEPRESSIVE DISORDER, RECURRENT EPISODE, MILD (H): ICD-10-CM

## 2017-05-09 DIAGNOSIS — R53.83 FATIGUE, UNSPECIFIED TYPE: Primary | ICD-10-CM

## 2017-05-09 PROCEDURE — 99213 OFFICE O/P EST LOW 20 MIN: CPT | Performed by: FAMILY MEDICINE

## 2017-05-09 RX ORDER — BUPROPION HYDROCHLORIDE 150 MG/1
150 TABLET ORAL EVERY MORNING
Qty: 30 TABLET | Refills: 1 | Status: SHIPPED | OUTPATIENT
Start: 2017-05-09 | End: 2017-07-03

## 2017-05-09 ASSESSMENT — ANXIETY QUESTIONNAIRES
GAD7 TOTAL SCORE: 1
1. FEELING NERVOUS, ANXIOUS, OR ON EDGE: NOT AT ALL
7. FEELING AFRAID AS IF SOMETHING AWFUL MIGHT HAPPEN: NOT AT ALL
2. NOT BEING ABLE TO STOP OR CONTROL WORRYING: NOT AT ALL
3. WORRYING TOO MUCH ABOUT DIFFERENT THINGS: NOT AT ALL
6. BECOMING EASILY ANNOYED OR IRRITABLE: SEVERAL DAYS
IF YOU CHECKED OFF ANY PROBLEMS ON THIS QUESTIONNAIRE, HOW DIFFICULT HAVE THESE PROBLEMS MADE IT FOR YOU TO DO YOUR WORK, TAKE CARE OF THINGS AT HOME, OR GET ALONG WITH OTHER PEOPLE: NOT DIFFICULT AT ALL
5. BEING SO RESTLESS THAT IT IS HARD TO SIT STILL: NOT AT ALL

## 2017-05-09 ASSESSMENT — PATIENT HEALTH QUESTIONNAIRE - PHQ9: 5. POOR APPETITE OR OVEREATING: NOT AT ALL

## 2017-05-09 NOTE — MR AVS SNAPSHOT
After Visit Summary   5/9/2017    Juwan Latham    MRN: [id number]           Patient Information     Date Of Birth          1950        Visit Information        Provider Department      5/9/2017 2:20 PM Julio Guidry Jr., MD [facility] [place]        Today's Diagnoses     Fatigue, unspecified type    -  1    Major depressive disorder, recurrent episode, mild (H)           Follow-ups after your visit        Follow-up notes from your care team     Return in about 4 weeks (around 6/6/2017) for recheck depression.      Your next 10 appointments already scheduled     May 23, 2017 10:30 AM CDT   Return Sleep Patient with Owen Davis MD   [facility] ([facility])    [address]  [facility] [address]   [phone]            Jarred 15, 2017  9:00 AM CDT   (Arrive by 8:45 AM)   New Patient Visit with Paulo Agarwal MD   [facility] ([facility])    [address] [place] [address]  4th Floor  [facility] [id number] [phone]            Oct 18, 2017 11:20 AM CDT   (Arrive by 11:05 AM)   CT CHEST W/O CONTRAST with UCCT1   [facility] ([facility])    [address] [place] [id number] [phone]           Please bring any scans or X-rays taken at other hospitals, if similar tests were done. Also bring a list of your medicines, including vitamins, minerals and over-the-counter drugs. It is safest to leave personal items at home.  Be sure to tell your doctor:   If you have any allergies.   If there s any chance you are pregnant.   If you are breastfeeding.   If you have any special needs.  You do not need to do anything special to prepare.  Please wear loose clothing, such as a sweat suit or jogging clothes. Avoid snaps, zippers and other metal. We may ask you to undress and put on  "[facility].            Oct 18, 2017 12:15 PM CDT   (Arrive by 12:00 PM)   Return Visit with GLORIA Arechiga [facility] ([facility])    [address] [facility]  [address] [facility] [address] [phone]              Who to contact     If you have questions or need follow up information about today's clinic visit or your schedule please contact [facility] [place] directly at [phone].  Normal or non-critical lab and imaging results will be communicated to you by [employer], letter or phone within 4 business days after the clinic has received the results. If you do not hear from us within 7 days, please contact the clinic through [employer] or phone. If you have a critical or abnormal lab result, we will notify you by phone as soon as possible.  Submit refill requests through [employer] or call your pharmacy and they will forward the refill request to us. Please allow 3 business days for your refill to be completed.          Additional Information About Your Visit        [employer] Information     [employer] gives you secure access to your electronic health record. If you see a primary care provider, you can also send messages to your care team and make appointments. If you have questions, please call your primary care clinic.  If you do not have a primary care provider, please call [phone] and they will assist you.        Care EveryWhere ID     This is your Care EveryWhere ID. This could be used by other organizations to access your [place] medical records  [id number]        Your Vitals Were     Pulse Temperature Height Pulse Oximetry BMI (Body Mass Index)       72 98.8  F (37.1  C) (Oral) 5' 7\" (1.702 m) 98% 29.91 kg/m2        Blood Pressure from Last 3 Encounters:   05/09/17 116/64   04/12/17 124/73   04/07/17 117/69    Weight from Last 3 Encounters:   05/09/17 191 lb (86.6 kg)   04/12/17 188 lb 7.9 oz (85.5 kg)   04/07/17 188 lb 8 oz " (85.5 kg)              Today, you had the following     No orders found for display         Today's Medication Changes          These changes are accurate as of: 5/9/17  2:45 PM.  If you have any questions, ask your nurse or doctor.               Start taking these medicines.        Dose/Directions    buPROPion 150 MG 24 hr tablet   Commonly known as:  WELLBUTRIN XL   Used for:  Major depressive disorder, recurrent episode, mild (H)   Started by:  Julio Guidry Jr., MD        Dose:  150 mg   Take 1 tablet (150 mg) by mouth every morning   Quantity:  30 tablet   Refills:  1            Where to get your medicines      These medications were sent to [id number] - [place], [place] - [address] [place] [address]  [address], [place] [address]     Phone:  [phone]     buPROPion 150 MG 24 hr tablet                Primary Care Provider Office Phone # Fax #    Julio Guidry Jr., [phone]       [facility] [address]  [place] [address]        Thank you!     Thank you for choosing [facility]  for your care. Our goal is always to provide you with excellent care. Hearing back from our patients is one way we can continue to improve our services. Please take a few minutes to complete the written survey that you may receive in the mail after your visit with us. Thank you!             Your Updated Medication List - Protect others around you: Learn how to safely use, store and throw away your medicines at www.disposemymeds.org.          This list is accurate as of: 5/9/17  2:45 PM.  Always use your most recent med list.                   Brand Name Dispense Instructions for use    acetaminophen 325 MG tablet    TYLENOL    100 tablet    Take 2 tablets (650 mg) by mouth every 6 hours Do not take more than 4,000 mg of acetaminophen (Tylenol) from all sources to prevent liver damage.       ALPRAZolam 0.5 MG tablet    XANAX    10 tablet    Take  1 tablet (0.5 mg) by mouth 3 times daily as needed for anxiety       buPROPion 150 MG 24 hr tablet    WELLBUTRIN XL    30 tablet    Take 1 tablet (150 mg) by mouth every morning       lisinopril 10 MG tablet    PRINIVIL/ZESTRIL         order for DME      Equipment ordered: RESMED Auto PAP Mask type: Nasal Settings: 8-16 CM H20  : CPAP       phosphorus tablet 250 mg 250 MG per tablet    K PHOS NEUTRAL    4 tablet    Take 2 tablets (500 mg) by mouth 3 times daily Take 2 tablets in the evening on Jose 3/5 and in the morning on Monday 3/6. Get your phosphorus level rechecked on Monday 3/6 at the [facility].

## 2017-05-09 NOTE — PROGRESS NOTES
SUBJECTIVE:                                                    Juwan Latham is a 66 year old male who presents to clinic today for the following health issues:       Followup:    Reason for visit: Fatigue   Current Status:   Please see message below along with 5/1/17 encounter from Sleep Medicine and 4/5/17 telephone encounter. Also, reviewed with MD LAURIE. Patient is not being followed by our clinic for his PSA's - he will need to contact Urology if he is wondering about recent results. Although, it looks like Urology referral was just placed on 4/27/17 but Surgery - unclear if patient has scheduled with them yet. Patient has been reporting fatigue for some time now. If still concerned, we should see him in clinic to discuss further.     I spoke with patient and he advises that he has a follow up appointment with Urology next month, but was advised by family members that he should ask about having a PSA test done. I advised patient that Urology will likely test his PSA when he sees them next month. Patient then stated that he was waiting for confirmation about his appointment seeming confused and stated he did not seem to write anything down. I advised patient of the phone number at which he can reach Urology and also gave him appointment information that is available in Epic.     As far as his continued fatigue, patient reports that he has been following up with the sleep clinic, but admits that he only wears his CPAP about 4 hours a night. He states that he finds it hard to believe that sleep apnea could be the only reason he feels so tired. Advised patient that we should see him in clinic for follow up on his symptoms. Patient was agreeable with this. Appointment scheduled with MD LAURIE for today at 2:20pm.     Patient verbalized understanding and agrees with plan.     Will call back if further questions or concerns.     Kaylee Akins RN, BSN           Problem list and histories reviewed & adjusted, as  "indicated.  Additional history: as documented    Labs reviewed in EPIC    Reviewed and updated as needed this visit by clinical staff       Reviewed and updated as needed this visit by Provider         ROS:  Constitutional, HEENT, cardiovascular, pulmonary, gi and gu systems are negative, except as otherwise noted.    OBJECTIVE:                                                    /64  Pulse 72  Temp 98.8  F (37.1  C) (Oral)  Ht 5' 7\" (1.702 m)  Wt 191 lb (86.6 kg)  SpO2 98%  BMI 29.91 kg/m2  Body mass index is 29.91 kg/(m^2).  GENERAL: healthy, alert and no distress  NECK: no adenopathy, no asymmetry, masses, or scars and thyroid normal to palpation  RESP: lungs clear to auscultation - no rales, rhonchi or wheezes  CV: regular rate and rhythm, normal S1 S2, no S3 or S4, no murmur, click or rub, no peripheral edema and peripheral pulses strong  ABDOMEN: soft, nontender, no hepatosplenomegaly, no masses and bowel sounds normal  MS: no gross musculoskeletal defects noted, no edema  PSYCH: mentation appears normal, affect normal/bright    Diagnostic Test Results:  none      ASSESSMENT/PLAN:                                                            1. Fatigue, unspecified type  Juwan is right upper lobe lung cancer certainly could explain some of his fatigue but he actually tells me he feels fairly good following this surgical removal. Review his labs shows that he's had a recent metabolic panel, CBC and thyroid that all returned normal. His PHQ-9 is mildly abnormal so I'm questioning whether some depression may actually be contributing to his fatigue.    2. Major depressive disorder, recurrent episode, mild (H)  Juwan has a history of depression as well as anxiety in the past. He feels his depression is very well managed but when I offered him the possibility of starting an antidepressant that may give him a little bit more energy was very enthusiastic about that. We will therefore start him on Wellbutrin XL " 150 mg daily. He is advised, and side effect profile and the first 10 days of therapy. Like to see him back in 4-6 weeks to see how he is doing. Would repeat his PHQ-9 at that visit.  - buPROPion (WELLBUTRIN XL) 150 MG 24 hr tablet; Take 1 tablet (150 mg) by mouth every morning  Dispense: 30 tablet; Refill: 1    See Patient Instructions    Julio Guidry Jr, MD  [facility] [place]

## 2017-05-09 NOTE — TELEPHONE ENCOUNTER
Please see message below along with 5/1/17 encounter from Sleep Medicine and 4/5/17 telephone encounter. Also, reviewed with MD LAURIE. Patient is not being followed by our clinic for his PSA's - he will need to contact Urology if he is wondering about recent results. Although, it looks like Urology referral was just placed on 4/27/17 but Surgery - unclear if patient has scheduled with them yet. Patient has been reporting fatigue for some time now. If still concerned, we should see him in clinic to discuss further.    I spoke with patient and he advises that he has a follow up appointment with Urology next month, but was advised by family members that he should ask about having a PSA test done. I advised patient that Urology will likely test his PSA when he sees them next month. Patient then stated that he was waiting for confirmation about his appointment seeming confused and stated he did not seem to write anything down. I advised patient of the phone number at which he can reach Urology and also gave him appointment information that is available in Epic.    As far as his continued fatigue, patient reports that he has been following up with the sleep clinic, but admits that he only wears his CPAP about 4 hours a night. He states that he finds it hard to believe that sleep apnea could be the only reason he feels so tired. Advised patient that we should see him in clinic for follow up on his symptoms. Patient was agreeable with this. Appointment scheduled with MD LAURIE for today at 2:20pm.    Patient verbalized understanding and agrees with plan.    Will call back if further questions or concerns.    Kaylee Akins, RN, BSN

## 2017-05-09 NOTE — NURSING NOTE
"Chief Complaint   Patient presents with     RECHECK       Initial /64  Pulse 72  Temp 98.8  F (37.1  C) (Oral)  Ht 5' 7\" (1.702 m)  Wt 191 lb (86.6 kg)  SpO2 98%  BMI 29.91 kg/m2 Estimated body mass index is 29.91 kg/(m^2) as calculated from the following:    Height as of this encounter: 5' 7\" (1.702 m).    Weight as of this encounter: 191 lb (86.6 kg).  Medication Reconciliation: complete  "

## 2017-05-10 ASSESSMENT — ANXIETY QUESTIONNAIRES: GAD7 TOTAL SCORE: 1

## 2017-05-10 ASSESSMENT — PATIENT HEALTH QUESTIONNAIRE - PHQ9: SUM OF ALL RESPONSES TO PHQ QUESTIONS 1-9: 7

## 2017-05-15 ENCOUNTER — DOCUMENTATION ONLY (OUTPATIENT)
Dept: SLEEP MEDICINE | Facility: CLINIC | Age: 67
End: 2017-05-15

## 2017-05-16 NOTE — PROGRESS NOTES
30 DAY STM VISIT    Subjective measures:   Patient things are going okay he just received a new mask today.    Assessment: Pt not meeting objective benchmarks for compliance.  Patient failing following subjective benchmarks: not feeling benefit from therapy.  Action plan: Pt to have 6 month STM visit and 2 week re-check.  Patient has a follow up visit with Dr. Davis on 5/23/2017.   Device settings:    EPAP Min Auto CPAP: 8.0 (The minimum allowable pressure in cmH2O)    EPAP Max Auto CPAP: 16.0 (The maximum allowable pressure in cmH2O)    Target EPAP (95% of Target) 14 day average (Resmed): 13.3cm H20    Objective measures: 14 day rolling measures      % compliance greater than four hours rolling average 14 days: 42.8 %     95% OF Leak in litres Rolling Average 14 Days: 55.2 lpm  last  upload     AHI Rolling Average 14 Day: 1.65 last  upload     Time mask on face 14 day average: 215 min        Objective measure goal  Compliance   Goal >70%  Leak   Goal < 10%  AHI  Goal < 5  Usage  Goal >240

## 2017-05-18 ENCOUNTER — DOCUMENTATION ONLY (OUTPATIENT)
Dept: SLEEP MEDICINE | Facility: CLINIC | Age: 67
End: 2017-05-18

## 2017-05-18 NOTE — PROGRESS NOTES
PATIENT WAS SEEN FOR MASK FITTING TODAY DUE TO MOUTH  BREATHING AND HE WAS A PREVIOUS FFM USER. DISPENSED SIMPLUS FFM IN A MEDIUM TO PATIENT AND ADVICE HIM TO CALL IF ANY QUESTIONS OR CONCERNS HE IS NOT MEETING BENCH LIN FOR COMPLIANCE.

## 2017-05-23 ENCOUNTER — OFFICE VISIT (OUTPATIENT)
Dept: SLEEP MEDICINE | Facility: CLINIC | Age: 67
End: 2017-05-23
Payer: COMMERCIAL

## 2017-05-23 VITALS
DIASTOLIC BLOOD PRESSURE: 70 MMHG | HEART RATE: 71 BPM | RESPIRATION RATE: 18 BRPM | HEIGHT: 67 IN | SYSTOLIC BLOOD PRESSURE: 120 MMHG | WEIGHT: 190.92 LBS | OXYGEN SATURATION: 94 % | BODY MASS INDEX: 29.97 KG/M2

## 2017-05-23 DIAGNOSIS — G47.33 OSA (OBSTRUCTIVE SLEEP APNEA): Primary | ICD-10-CM

## 2017-05-23 DIAGNOSIS — E66.3 OVERWEIGHT: ICD-10-CM

## 2017-05-23 PROCEDURE — 99214 OFFICE O/P EST MOD 30 MIN: CPT | Performed by: INTERNAL MEDICINE

## 2017-05-23 NOTE — PATIENT INSTRUCTIONS
MY TREATMENT INFORMATION FOR SLEEP APNEA-  Juwan Latham    MY CONTACT NUMBERS ARE:  [phone]  DOCTOR : Owen WEEMS [facility]  SLEEP CENTER :  [place]  CPAP EQUIPMENT     Your sleep apnea is controlled with auto-CPAP.   Continue use of CPAP for 7- 8 hours a night.  For mouth breathing , use chin strap if needed to relieve mouth leak  You have to meet compliance as below.    Objective measure goal  Compliance  Goal >70%  Leak   Goal < 10%  AHI  Goal < 5 events per hour   Usage  Goal >420 minutes       Follow up in 2 months.        Your BMI is Body mass index is 29.89 kg/(m^2).  Weight management is a personal decision.  If you are interested in exploring weight loss strategies, the following discussion covers the approaches that may be successful. Body mass index (BMI) is one way to tell whether you are at a healthy weight, overweight, or obese. It measures your weight in relation to your height.  A BMI of 18.5 to 24.9 is in the healthy range. A person with a BMI of 25 to 29.9 is considered overweight, and someone with a BMI of 30 or greater is considered obese. More than two-thirds of American adults are considered overweight or obese.  Being overweight or obese increases the risk for further weight gain. Excess weight may lead to heart disease and diabetes.  Creating and following plans for healthy eating and physical activity may help you improve your health.  Weight control is part of healthy lifestyle and includes exercise, emotional health, and healthy eating habits. Careful eating habits lifelong are the mainstay of weight control. Though there are significant health benefits from weight loss, long-term weight loss with diet alone may be very difficult to achieve- studies show long-term success with dietary management in less than 10% of people. Attaining a healthy weight may be especially difficult to achieve in those with severe obesity. In some cases, medications, devices and surgical management might  be considered.  What can you do?  If you are overweight or obese and are interested in methods for weight loss, you should discuss this with your provider.     Consider reducing daily calorie intake by 500 calories.     Keep a food journal.     Avoiding skipping meals, consider cutting portions instead.    Diet combined with exercise helps maintain muscle while optimizing fat loss. Strength training is particularly important for building and maintaining muscle mass. Exercise helps reduce stress, increase energy, and improves fitness. Increasing exercise without diet control, however, may not burn enough calories to loose weight.       Start walking three days a week 10-20 minutes at a time    Work towards walking thirty minutes five days a week     Eventually, increase the speed of your walking for 1-2 minutes at time    In addition, we recommend that you review healthy lifestyles and methods for weight loss available through the National Institutes of Health patient information sites:  http://win.niddk.nih.gov/publications/index.htm    And look into health and wellness programs that may be available through your health insurance provider, employer, local community center, or ondina club.    Weight management plan: Patient was referred to their PCP to discuss a diet and exercise plan.     Your blood pressure was checked while you were in clinic today.  Please read the guidelines below about what these numbers mean and what you should do about them.  Your systolic blood pressure is the top number.  This is the pressure when the heart is pumping.  Your diastolic blood pressure is the bottom number.  This is the pressure in between beats.  If your systolic blood pressure is less than 120 and your diastolic blood pressure is less than 80, then your blood pressure is normal. There is nothing more that you need to do about it  If your systolic blood pressure is 120-139 or your diastolic blood pressure is 80-89, your blood  pressure may be higher than it should be.  You should have your blood pressure re-checked within a year by a primary care provider.  If your systolic blood pressure is 140 or greater or your diastolic blood pressure is 90 or greater, you may have high blood pressure.  High blood pressure is treatable, but if left untreated over time it can put you at risk for heart attack, stroke, or kidney failure.  You should have your blood pressure re-checked by a primary care provider within the next four weeks.

## 2017-05-23 NOTE — PROGRESS NOTES
[facility] Medicine Follow-up  May 23, 2017      Name: Juwan Latham MRN# [id number]   Age: 66 year old YOB: 1950   Date of visit: May 23, 2017  Primary care provider: Julio Guidry Jr.  Home clinic: [facility] [place]             Assessment and Plan:     1. Obstructive Sleep Apnea:  - Non compliance of PAP use but improving.  - Clinical response: moderate, interface issues, improving with full face. Advised to use chin strap for mouth leak/mouth breathing.  - Recommend using CPAP every night for at least 7-8 hours each night  - Daytime naps are not advised, but use CPAP if taking naps  - Patient was advised not to drive if drowsy or sleepy.  - Follow up in sleep clinic in 2 months    2. Overweight: Encouraged patient to lose weight. Counseled regarding weight loss through diet modification and increased physical activity. Patient was given nstuctions of weight loss and advised to follow up her PCP for further weight loss interventions. Weight loss instructions given.    No orders of the defined types were placed in this encounter.      Summary Counseling:  New sleep schedule recommendation: given    Check out [email or web address].[email or web address].org/    All questions were answered.  The patient indicates understanding of the above issues and agrees with the plan set forth.           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 He was diagnosed moderate sleep apnea REM predominant on 4/4/17 and was prescribed auto-CPAP and was setup on April 14, 2017. Patient received a Resmed AirSense 10 Auto. Pressures were set at 8-16 cm H2O. Patient s ramp is 5 cm H2O for Auto and FLEX/EPR is EPR. Patient received a Resmed Mask name: MIRAGE FX Nasal mask Size  Standard, heated tubing and heated humidifier. Patient is enrolled in the STM Program and does need to meet compliance.   Today, patient is using his PAP most of the night but for few hours. When he turns and rolls the mask moves and leaks and that wakes him up at night. He is mouth breather with mouth leak. He was given chin strap but not using. His mask was changed to full face. He increased his use of CPAP use and last night he used 8 hrs.       PREVIOUS IN-LAB SLEEP STUDIES:  Date: 4/4/17  Sleep latency 72.3 minutes without sleep aid.   REM achieved. REM latency 112 minutes.   Sleep efficiency 65%. Total sleep time 331 minutes.  Sleep architecture: Stage 1, 13.4% (5%), stage 2, 67.2% (45-55%), stage 3, N/A% (15-20%), stage REM, 19.3% (20-25%).   AHI was 18.5/hour.   RDI 18.9/hour.   REM AHI 30.9/hour.   Supine AHI N/A..   Lowest O2 saturation:84.6%  S/He spent 1.1 minutes below 89% SpO2.   Periodic Limb Movement Index 48.4/hour.      CPAP Compliance:  Dates: 422/ /2017- 5/21 /2017       15 % >4hour use   Days used: 25/30  Average daily use (days used): 3.5 hrs  Leak 95 percentile: 40L/min  Residual AHI: 1.4/hr  Pressure:  8-16 cmH2O  95% percentile pressure: 13.5 cmH2O    Portis Sleepiness Scale score today: 6/24  Pre-PAP Portis Sleepiness Scale score: 8/24    PAP machine: ResMed    Interface:  Mask: full face  Chin strap: No  Leak: No  Humidity: Yes    Difficulties with therapy:    [-] Difficulty tolerating the pressure  [-] Epistaxis:   [x] Nasal congestion with nasal mask  [x] Dry mouth: with nasal mask  [x] Mouth breathing:   [-] Pain/skin breakdown:     Improvements noted with CPAP:   [-] waking up more refreshed  [-] sleeping better with less arousals  [-] nocturia improved   [-] improved energy level during the day    SLEEP-WAKE SCHEDULE: unchanged    Other sleep problems: None     Drowsy driving / near incidents: No    Medications that affect sleep: Wellbutrin and xanax as needed             Medications:     Current Outpatient Prescriptions   Medication Sig     buPROPion (WELLBUTRIN XL) 150 MG 24 hr tablet Take 1 tablet (150 mg) by mouth every morning     order for DME Equipment ordered: RESMED Auto PAP Mask type: Nasal Settings: 8-16 CM H20  : CPAP     ALPRAZolam (XANAX) 0.5 MG tablet Take 1 tablet (0.5 mg) by mouth 3 times daily as needed for anxiety     acetaminophen (TYLENOL) 325 MG tablet Take 2 tablets (650 mg) by mouth every 6 hours Do not take more than 4,000 mg of acetaminophen (Tylenol) from all sources to prevent liver damage.     phosphorus tablet 250 mg (K PHOS NEUTRAL) 250 MG per tablet Take 2 tablets (500 mg) by mouth 3 times daily Take 2 tablets in the evening on Jose 3/5 and in the morning on Monday 3/6. Get your phosphorus level rechecked on Monday 3/6 at the [facility].     lisinopril (PRINIVIL,ZESTRIL) 10 MG tablet      No current facility-administered medications for this visit.         Allergies   Allergen Reactions     Percocet [Oxycodone-Acetaminophen] GI Disturbance     Severe constipation            Past Medical History:     Past Medical History:   Diagnosis Date     Anxiety      Calculus of kidney 2005, 2012     Congenital single kidney      Hypertension      Seasonal allergies     spring and fall     Sleep apnea     no cpap             Past Surgical History:      Past Surgical History:   Procedure Laterality Date     BRONCHOSCOPY FLEXIBLE N/A 3/3/2017    Procedure: BRONCHOSCOPY FLEXIBLE;  Surgeon: Maria A Desai MD;  Location: [facility] OR     COLONOSCOPY N/A 2/11/2015    Procedure: COLONOSCOPY;  Surgeon: Murphy Jesus MD;  Location:  GI     ESOPHAGOSCOPY, GASTROSCOPY, DUODENOSCOPY (EGD), COMBINED N/A 5/20/2016    Procedure: COMBINED ESOPHAGOSCOPY, GASTROSCOPY, DUODENOSCOPY (EGD), BIOPSY SINGLE OR MULTIPLE;  Surgeon: Murphy Jesus MD;  Location:  GI     LAPAROSCOPIC WEDGE RESECTION LUNG Right 3/3/2017    Procedure: LAPAROSCOPIC WEDGE RESECTION LUNG;  Surgeon: Lloyd  "Maria A DANGELO MD;  Location: [facility] OR     LASER HOLMIUM LITHOTRIPSY URETER(S), INSERT STENT, COMBINED  9/11/2012    Procedure: COMBINED CYSTOSCOPY, URETEROSCOPY, LASER HOLMIUM LITHOTRIPSY URETER(S), INSERT STENT;  Cystoscopy, Right Ureteroscopy, Stone Extraction, Holmium Laser, Double J Stent Placement;  Surgeon: Nicolás Blackwell MD;  Location:  OR       Social History   Substance Use Topics     Smoking status: Former Smoker     Packs/day: 2.00     Years: 20.00     Types: Cigarettes     Quit date: 1/1/1985     Smokeless tobacco: Never Used     Alcohol use Yes      Comment: avg 3 beers per night       Family History   Problem Relation Age of Onset     HEART DISEASE Mother      DIABETES Brother 65     Type 2     DIABETES Maternal Grandmother      Cancer - colorectal Brother 70     Prostate Cancer Brother 74     Hypertension No family hx of      Lipids No family hx of             Physical Examination:   /70  Pulse 71  Resp 18  Ht 1.702 m (5' 7.01\")  Wt 86.6 kg (190 lb 14.7 oz)  SpO2 94%  BMI 29.89 kg/m2            Data: All pertinent previous laboratory data reviewed     No results found for: PH, PHARTERIAL, PO2, [id number], SAT, PCO2, HCO3, BASEEXCESS, RISHI, BEB  Lab Results   Component Value Date    TSH 1.42 05/24/2016    TSH 1.24 08/21/2015     Lab Results   Component Value Date     (H) 03/05/2017    GLC 96 02/24/2017     Lab Results   Component Value Date    HGB 15.3 03/14/2017    HGB 15.2 03/04/2017     Lab Results   Component Value Date    BUN 14 03/05/2017    BUN 11 02/24/2017    CR 0.74 03/05/2017    CR 0.69 03/04/2017     No results found for: ABDELRAHMAN      He will follow up with me in about 2 month(s).         Copy to: Julio Guidry Jr., MD 5/23/2017     [facility]  [address] [place] [address]       Twenty-five minutes spent with patient, all of which were spent face-to-face counseling, consulting, coordinating plan of care and " going over PAP download/sleep study and chart review.

## 2017-05-23 NOTE — NURSING NOTE
"Chief Complaint   Patient presents with     RECHECK     f/u pap       Initial /70  Pulse 71  Resp 18  Ht 1.702 m (5' 7.01\")  Wt 86.6 kg (190 lb 14.7 oz)  SpO2 94%  BMI 29.89 kg/m2 Estimated body mass index is 29.89 kg/(m^2) as calculated from the following:    Height as of this encounter: 1.702 m (5' 7.01\").    Weight as of this encounter: 86.6 kg (190 lb 14.7 oz).  Medication Reconciliation: complete         Sissy Oscar LPN/MA  "

## 2017-05-26 ENCOUNTER — TELEPHONE (OUTPATIENT)
Dept: FAMILY MEDICINE | Facility: CLINIC | Age: 67
End: 2017-05-26

## 2017-05-26 ENCOUNTER — TELEPHONE (OUTPATIENT)
Dept: SURGERY | Facility: CLINIC | Age: 67
End: 2017-05-26

## 2017-05-26 NOTE — TELEPHONE ENCOUNTER
"Call to Juwan (at the request of our triage nurse). He states he feels like he has \"liquid on his lungs\". He denies SOB or difficulty breathing it is more of a wet cough. He is afebrile. Reports that what he is coughing up is clear. He has been using his CPAP but does not feel that he is getting quality sleep and is back to being tired after he started using the CPAP. He tried to send Dr. Guidry a message via My Chart but he is having trouble logging on.  I suggested he call Dr. Guidry's office since he can't access My Chart.    His symptoms could be related to allergies given that he is not coughing up anything concerning and he is afebrile. I suggested he also contact the person who helped him get started with the CPAP to make sure that it is set up correctly and that he is cleaning it properly as this could also be causing his symptoms.     Juwan is in agreement with these recommendations and will contact Dr. Guidry's office.  "

## 2017-05-26 NOTE — TELEPHONE ENCOUNTER
Name of caller: Juwan  Relationship of Patient: Self    Reason for Call: Patient called to speak with Dr. Guidry or the nurse and stated his lungs feel like they are filling up with liquid. Patient would not let me transfer him to speak with nurse directly, only wanted me to send a message.     Best phone number to reach pt at is: [phone]  Ok to leave a message with medical info? Yes    Pharmacy preferred (if calling for a refill): [facility]

## 2017-05-26 NOTE — TELEPHONE ENCOUNTER
When Juwan lays down he  feels like his lungs fill up and he coughs. Coughs intermittently when up, coughing clear sputum. This has been happening for one month with no improvement. No fever, or no other symptoms other than he feels a bit tired and fatigued. Denies SOB. Appointment made for 5/30/17. Advised to be seen urgently or emergently over the weekend if symptoms worsen. If he begins to feel SOB I advised him to go to ED. Patient aware of when to seek medical assistance with his symptoms. Justa Naqvi R.N.

## 2017-05-30 ENCOUNTER — OFFICE VISIT (OUTPATIENT)
Dept: FAMILY MEDICINE | Facility: CLINIC | Age: 67
End: 2017-05-30

## 2017-05-30 VITALS
SYSTOLIC BLOOD PRESSURE: 122 MMHG | BODY MASS INDEX: 30.13 KG/M2 | HEIGHT: 67 IN | HEART RATE: 61 BPM | OXYGEN SATURATION: 97 % | TEMPERATURE: 98.8 F | WEIGHT: 192 LBS | DIASTOLIC BLOOD PRESSURE: 78 MMHG

## 2017-05-30 DIAGNOSIS — Z53.9 ERRONEOUS ENCOUNTER--DISREGARD: Primary | ICD-10-CM

## 2017-05-30 NOTE — NURSING NOTE
"Chief Complaint   Patient presents with     Fatigue     Cough       Initial /78  Pulse 61  Temp 98.8  F (37.1  C) (Oral)  Ht 5' 7\" (1.702 m)  Wt 192 lb (87.1 kg)  SpO2 97%  BMI 30.07 kg/m2 Estimated body mass index is 30.07 kg/(m^2) as calculated from the following:    Height as of this encounter: 5' 7\" (1.702 m).    Weight as of this encounter: 192 lb (87.1 kg).  Medication Reconciliation: complete    "

## 2017-05-30 NOTE — MR AVS SNAPSHOT
After Visit Summary   5/30/2017    Juwan Latham    MRN: [id number]           Patient Information     Date Of Birth          1950        Visit Information        Provider Department      5/30/2017 11:20 AM Loyda Herrera PA-C [facility] [place]        Today's Diagnoses     ERRONEOUS ENCOUNTER--DISREGARD    -  1       Follow-ups after your visit        Your next 10 appointments already scheduled     Jarred 15, 2017  9:00 AM CDT   (Arrive by 8:45 AM)   New Patient Visit with Paulo Agarwal MD   [facility] ([facility] [place])    [address]  4th Floor  [place] [facility] [id number] [phone]            Jul 24, 2017  8:30 AM CDT   Return Sleep Patient with Owen Davis MD   [facility] ([facility])    [address]  [facility] [id number] [phone]            Oct 18, 2017 11:20 AM CDT   (Arrive by 11:05 AM)   CT CHEST W/O CONTRAST with UCCT1   [facility] CT ([facility] [place])    [address]  1st Floor  [place] [id number] [phone]           Please bring any scans or X-rays taken at other hospitals, if similar tests were done. Also bring a list of your medicines, including vitamins, minerals and over-the-counter drugs. It is safest to leave personal items at home.  Be sure to tell your doctor:   If you have any allergies.   If there s any chance you are pregnant.   If you are breastfeeding.   If you have any special needs.  You do not need to do anything special to prepare.  Please wear loose clothing, such as a sweat suit or jogging clothes. Avoid snaps, zippers and other metal. We may ask you to undress and put on a hospital gown.            Oct 18, 2017 12:15 PM CDT   (Arrive by 12:00 PM)   Return Visit with GLORIA Arechiga [facility]  "Clinic ([facility] [place])    [address]  2nd Floor  [facility] [address] [phone]              Who to contact     If you have questions or need follow up information about today's clinic visit or your schedule please contact [facility] [place] directly at [phone].  Normal or non-critical lab and imaging results will be communicated to you by MyChart, letter or phone within 4 business days after the clinic has received the results. If you do not hear from us within 7 days, please contact the clinic through MyChart or phone. If you have a critical or abnormal lab result, we will notify you by phone as soon as possible.  Submit refill requests through [employer] or call your pharmacy and they will forward the refill request to us. Please allow 3 business days for your refill to be completed.          Additional Information About Your Visit        MyChart Information     [employer] gives you secure access to your electronic health record. If you see a primary care provider, you can also send messages to your care team and make appointments. If you have questions, please call your primary care clinic.  If you do not have a primary care provider, please call [phone] and they will assist you.        Care EveryWhere ID     This is your Care EveryWhere ID. This could be used by other organizations to access your [place] medical records  [id number]        Your Vitals Were     Pulse Temperature Height Pulse Oximetry BMI (Body Mass Index)       61 98.8  F (37.1  C) (Oral) 5' 7\" (1.702 m) 97% 30.07 kg/m2        Blood Pressure from Last 3 Encounters:   05/30/17 122/78   05/23/17 120/70   05/09/17 116/64    Weight from Last 3 Encounters:   05/30/17 192 lb (87.1 kg)   05/23/17 190 lb 14.7 oz (86.6 kg)   05/09/17 191 lb (86.6 kg)              Today, you had the following     No orders found for display       Primary Care Provider Office Phone # Fax #    Julio Guidry Jr., MD " [phone]       [facility] [address] [facility] [address]        Thank you!     Thank you for choosing [facility]  for your care. Our goal is always to provide you with excellent care. Hearing back from our patients is one way we can continue to improve our services. Please take a few minutes to complete the written survey that you may receive in the mail after your visit with us. Thank you!             Your Updated Medication List - Protect others around you: Learn how to safely use, store and throw away your medicines at www.disposemymeds.org.          This list is accurate as of: 5/30/17 11:59 PM.  Always use your most recent med list.                   Brand Name Dispense Instructions for use    acetaminophen 325 MG tablet    TYLENOL    100 tablet    Take 2 tablets (650 mg) by mouth every 6 hours Do not take more than 4,000 mg of acetaminophen (Tylenol) from all sources to prevent liver damage.       ALPRAZolam 0.5 MG tablet    XANAX    10 tablet    Take 1 tablet (0.5 mg) by mouth 3 times daily as needed for anxiety       buPROPion 150 MG 24 hr tablet    WELLBUTRIN XL    30 tablet    Take 1 tablet (150 mg) by mouth every morning       lisinopril 10 MG tablet    PRINIVIL/ZESTRIL         order for DME      Equipment ordered: RESMED Auto PAP Mask type: Nasal Settings: 8-16 CM H20  : CPAP       phosphorus tablet 250 mg 250 MG per tablet    K PHOS NEUTRAL    4 tablet    Take 2 tablets (500 mg) by mouth 3 times daily Take 2 tablets in the evening on Jose 3/5 and in the morning on Monday 3/6. Get your phosphorus level rechecked on Monday 3/6 at the [facility].

## 2017-06-06 ENCOUNTER — PRE VISIT (OUTPATIENT)
Dept: UROLOGY | Facility: CLINIC | Age: 67
End: 2017-06-06

## 2017-06-12 DIAGNOSIS — I10 HYPERTENSION GOAL BP (BLOOD PRESSURE) < 140/90: Primary | Chronic | ICD-10-CM

## 2017-06-12 NOTE — TELEPHONE ENCOUNTER
lisinopril (PRINIVIL/ZESTRIL) 10 MG tablet      Last Written Prescription Date: 1/23/2017  Last Fill Quantity: 90 tablet, # refills: 0  Last Office Visit with G, P or [facility] prescribing provider: 5/9/2017       Potassium   Date Value Ref Range Status   03/05/2017 3.8 3.4 - 5.3 mmol/L Final     Creatinine   Date Value Ref Range Status   03/05/2017 0.74 0.66 - 1.25 mg/dL Final     BP Readings from Last 3 Encounters:   05/30/17 122/78   05/23/17 120/70   05/09/17 116/64

## 2017-06-13 RX ORDER — LISINOPRIL 10 MG/1
10 TABLET ORAL DAILY
Qty: 90 TABLET | Refills: 3 | Status: SHIPPED | OUTPATIENT
Start: 2017-06-13 | End: 2017-07-17

## 2017-06-13 NOTE — TELEPHONE ENCOUNTER
Routing refill request to provider for review/approval because:  A break in medication  Carla Faye RN  [facility]

## 2017-06-13 NOTE — TELEPHONE ENCOUNTER
Chart reviewed.  Rx sent to pt's preferred pharmacy.    [facility] [address] [facility] [place] [address]    Julio Guidry MD

## 2017-06-15 ENCOUNTER — DOCUMENTATION ONLY (OUTPATIENT)
Dept: OTHER | Facility: CLINIC | Age: 67
End: 2017-06-15

## 2017-06-15 DIAGNOSIS — Z71.89 ADVANCED DIRECTIVES, COUNSELING/DISCUSSION: Chronic | ICD-10-CM

## 2017-07-03 ENCOUNTER — OFFICE VISIT (OUTPATIENT)
Dept: FAMILY MEDICINE | Facility: CLINIC | Age: 67
End: 2017-07-03
Payer: COMMERCIAL

## 2017-07-03 VITALS
SYSTOLIC BLOOD PRESSURE: 118 MMHG | TEMPERATURE: 97.6 F | DIASTOLIC BLOOD PRESSURE: 82 MMHG | HEART RATE: 62 BPM | WEIGHT: 193 LBS | OXYGEN SATURATION: 98 % | BODY MASS INDEX: 30.29 KG/M2 | HEIGHT: 67 IN

## 2017-07-03 DIAGNOSIS — J31.0 CHRONIC RHINITIS, UNSPECIFIED TYPE: ICD-10-CM

## 2017-07-03 DIAGNOSIS — R07.89 CHEST HEAVINESS: Primary | ICD-10-CM

## 2017-07-03 DIAGNOSIS — F33.0 MAJOR DEPRESSIVE DISORDER, RECURRENT EPISODE, MILD (H): ICD-10-CM

## 2017-07-03 PROCEDURE — 99214 OFFICE O/P EST MOD 30 MIN: CPT | Performed by: FAMILY MEDICINE

## 2017-07-03 RX ORDER — BUPROPION HYDROCHLORIDE 300 MG/1
300 TABLET ORAL EVERY MORNING
Qty: 90 TABLET | Refills: 1 | Status: SHIPPED | OUTPATIENT
Start: 2017-07-03 | End: 2017-08-09

## 2017-07-03 RX ORDER — FLUTICASONE PROPIONATE 50 MCG
1-2 SPRAY, SUSPENSION (ML) NASAL DAILY
Qty: 1 BOTTLE | Refills: 11 | Status: SHIPPED | OUTPATIENT
Start: 2017-07-03 | End: 2018-01-11

## 2017-07-03 NOTE — LETTER
My Depression Action Plan  Name: Juwan Latham   Date of Birth 1950  Date: 7/3/2017    My doctor: Julio Guidry Jr.   My clinic: [facility] [place]  [address]  [place] [address] [phone]          GREEN    ZONE   Good Control    What it looks like:     Things are going generally well. You have normal up s and down s. You may even feel depressed from time to time, but bad moods usually last less than a day.   What you need to do:  1. Continue to care for yourself (see self care plan)  2. Check your depression survival kit and update it as needed  3. Follow your physician s recommendations including any medication.  4. Do not stop taking medication unless you consult with your physician first.           YELLOW         ZONE Getting Worse    What it looks like:     Depression is starting to interfere with your life.     It may be hard to get out of bed; you may be starting to isolate yourself from others.    Symptoms of depression are starting to last most all day and this has happened for several days.     You may have suicidal thoughts but they are not constant.   What you need to do:     1. Call your care team, your response to treatment will improve if you keep your care team informed of your progress. Yellow periods are signs an adjustment may need to be made.     2. Continue your self-care, even if you have to fake it!    3. Talk to someone in your support network    4. Open up your depression survival kit           RED    ZONE Medical Alert - Get Help    What it looks like:     Depression is seriously interfering with your life.     You may experience these or other symptoms: You can t get out of bed most days, can t work or engage in other necessary activities, you have trouble taking care of basic hygiene, or basic responsibilities, thoughts of suicide or death that will not go away, self-injurious behavior.     What you need to do:  1. Call your care team and  request a same-day appointment. If they are not available (weekends or after hours) call your local crisis line, emergency room or 911.      Electronically signed by: Julio Guidry Jr, July 3, 2017    Depression Self Care Plan / Survival Kit    Self-Care for Depression  Here s the deal. Your body and mind are really not as separate as most people think.  What you do and think affects how you feel and how you feel influences what you do and think. This means if you do things that people who feel good do, it will help you feel better.  Sometimes this is all it takes.  There is also a place for medication and therapy depending on how severe your depression is, so be sure to consult with your medical provider and/ or Behavioral Health Consultant if your symptoms are worsening or not improving.     In order to better manage my stress, I will:    Exercise  Get some form of exercise, every day. This will help reduce pain and release endorphins, the  feel good  chemicals in your brain. This is almost as good as taking antidepressants!  This is not the same as joining a gym and then never going! (they count on that by the way ) It can be as simple as just going for a walk or doing some gardening, anything that will get you moving.      Hygiene   Maintain good hygiene (Get out of bed in the morning, Make your bed, Brush your teeth, Take a shower, and Get dressed like you were going to work, even if you are unemployed).  If your clothes don't fit try to get ones that do.    Diet  I will strive to eat foods that are good for me, drink plenty of water, and avoid excessive sugar, caffeine, alcohol, and other mood-altering substances.  Some foods that are helpful in depression are: complex carbohydrates, B vitamins, flaxseed, fish or fish oil, fresh fruits and vegetables.    Psychotherapy  I agree to participate in Individual Therapy (if recommended).    Medication  If prescribed medications, I agree to take them.  Missing  doses can result in serious side effects.  I understand that drinking alcohol, or other illicit drug use, may cause potential side effects.  I will not stop my medication abruptly without first discussing it with my provider.    Staying Connected With Others  I will stay in touch with my friends, family members, and my primary care provider/team.    Use your imagination  Be creative.  We all have a creative side; it doesn t matter if it s oil painting, sand castles, or mud pies! This will also kick up the endorphins.    Witness Beauty  (AKA stop and smell the roses) Take a look outside, even in mid-winter. Notice colors, textures. Watch the squirrels and birds.     Service to others  Be of service to others.  There is always someone else in need.  By helping others we can  get out of ourselves  and remember the really important things.  This also provides opportunities for practicing all the other parts of the program.    Humor  Laugh and be silly!  Adjust your TV habits for less news and crime-drama and more comedy.    Control your stress  Try breathing deep, massage therapy, biofeedback, and meditation. Find time to relax each day.     My support system    Clinic Contact:  Phone number:    Contact 1:  Phone number:    Contact 2:  Phone number:    Latter-day/:  Phone number:    Therapist:  Phone number:    Local crisis center:    Phone number:    Other community support:  Phone number:

## 2017-07-03 NOTE — PROGRESS NOTES
"  SUBJECTIVE:                                                    Juwan Latham is a 66 year old male who presents to clinic today for the following health issues:      CHEST PAIN     Onset: 2 months    Description:   Location:  right side  Character: heavy but states it is NOT pain or pressure   Radiation: none  Duration: constant     Intensity: mild    Progression of Symptoms:  same    Accompanying Signs & Symptoms:  Shortness of breath: no  Sweating: no  Nausea/vomiting: no  Lightheadedness: YES  Palpitations: no  Fever/Chills: no  Cough: no  Heartburn: no    History:   Family history of heart disease YES- mother  Tobacco use: YES- former    Precipitating factors:   Worse with exertion: YES- pt states working on a project will need to rest for 3 days after  Worse with deep breaths :  no  Related to food: no    Alleviating factors:  Sleep/ rest        Juwan had a fairly thorough workup for cardiac chest pain in January 2016 with a stress echo that was normal.  He then had an EKG about four months ago that was unchanged.  He also had a right upper lobe lung cancer that was treated with a wedge resection earlier this year.      Lastly, notes persistent drainage down his posterior throat.  At times this chronic rhinitis will cause his throat to get sore.  He also has a history of GERD.  He's not tried anything directly for his persistent nasal drainage, but would like to try something.    Problem list and histories reviewed & adjusted, as indicated.  Additional history: as documented    Labs reviewed in EPIC    Reviewed and updated as needed this visit by clinical staff  Allergies  Meds  Med Hx       Reviewed and updated as needed this visit by Provider         ROS:  Constitutional, HEENT, cardiovascular, pulmonary, gi and gu systems are negative, except as otherwise noted.    OBJECTIVE:     /82  Pulse 62  Temp 97.6  F (36.4  C) (Oral)  Ht 5' 7\" (1.702 m)  Wt 193 lb (87.5 kg)  SpO2 98%  BMI 30.23 " kg/m2  Body mass index is 30.23 kg/(m^2).  GENERAL: healthy, alert and no distress  EYES: Eyes grossly normal to inspection, PERRL and conjunctivae and sclerae normal  HENT: ear canals and TM's normal, nose and mouth without ulcers or lesions  NECK: no adenopathy, no asymmetry, masses, or scars and thyroid normal to palpation  RESP: lungs clear to auscultation - no rales, rhonchi or wheezes  CV: regular rate and rhythm, normal S1 S2, no S3 or S4, no murmur, click or rub, no peripheral edema and peripheral pulses strong  ABDOMEN: soft, nontender, no hepatosplenomegaly, no masses and bowel sounds normal  MS: no gross musculoskeletal defects noted, no edema  PSYCH: judgement and insight intact, appearance well groomed and maybe a little anxious?    Diagnostic Test Results:  none     ASSESSMENT/PLAN:             1. Chest heaviness  I think this is non-cardiac.  We reviewed Juwan's stress test and other cardiac testing together today.  There's no exertional component to his pain.  He's reassured about this today.    2. Major depressive disorder, recurrent episode, mild (H)  One of his main complaints is a lack of energy.  We discussed that perhaps this as well as his chest heaviness could be an indication of an undertreated depression.  He's on Wellbutrin 150 mg daily and when I offered that going up to 300 mg of Wellbutrin might give him a little more pep, he advised me that he'd like to try this.  Follow up in four weeks.   - buPROPion (WELLBUTRIN XL) 300 MG 24 hr tablet; Take 1 tablet (300 mg) by mouth every morning  Dispense: 90 tablet; Refill: 1    3. Chronic rhinitis, unspecified type  Will try nasal steroid to see if this improves his chronic postnasal drainage.   - fluticasone (FLONASE) 50 MCG/ACT spray; Spray 1-2 sprays into both nostrils daily  Dispense: 1 Bottle; Refill: 11    Over 25 minutes spent with patient today, greater than 50% in face to face counseling.   See Patient Instructions    Julio Guidry  MD [place]  [facility]

## 2017-07-03 NOTE — NURSING NOTE
"Chief Complaint   Patient presents with     Fatigue     Chest Pain       Initial /82  Pulse 62  Temp 97.6  F (36.4  C) (Oral)  Ht 5' 7\" (1.702 m)  Wt 193 lb (87.5 kg)  SpO2 98%  BMI 30.23 kg/m2 Estimated body mass index is 30.23 kg/(m^2) as calculated from the following:    Height as of this encounter: 5' 7\" (1.702 m).    Weight as of this encounter: 193 lb (87.5 kg).  Medication Reconciliation: complete  "

## 2017-07-03 NOTE — MR AVS SNAPSHOT
After Visit Summary   7/3/2017    Juwan Latham    MRN: [id number]           Patient Information     Date Of Birth          1950        Visit Information        Provider Department      7/3/2017 2:00 PM Julio Guidry Jr., MD [facility] [place]        Today's Diagnoses     Chest heaviness    -  1    Major depressive disorder, recurrent episode, mild (H)        Chronic rhinitis, unspecified type           Follow-ups after your visit        Follow-up notes from your care team     Return in about 4 weeks (around 7/31/2017) for recheck chest heaviness, fatigue and chronic nasal drip.      Your next 10 appointments already scheduled     Jul 24, 2017  8:30 AM CDT   Return Sleep Patient with Owen Davis MD   [facility] ([facility])    [address]  [place] [address]   [phone]            Aug 03, 2017  1:20 PM CDT   (Arrive by 1:05 PM)   New Patient Visit with Paulo Agarwal MD   [facility] ([facility])    [address] [place] [address]  4th Floor  [facility] [id number] [phone]            Oct 18, 2017 11:20 AM CDT   (Arrive by 11:05 AM)   CT CHEST W/O CONTRAST with UCCT1   [facility] ([facility])    [address] [place] [address]  1st Floor  [facility] [id number] [phone]           Please bring any scans or X-rays taken at other hospitals, if similar tests were done. Also bring a list of your medicines, including vitamins, minerals and over-the-counter drugs. It is safest to leave personal items at home.  Be sure to tell your doctor:   If you have any allergies.   If there s any chance you are pregnant.   If you are breastfeeding.   If you have any special needs.  You do not need to do anything special to prepare.  Please wear loose clothing, such as a sweat suit or jogging clothes.  "Avoid snaps, zippers and other metal. We may ask you to undress and put on a hospital gown.            Oct 18, 2017 12:15 PM CDT   (Arrive by 12:00 PM)   Return Visit with GLORIA Arechiga [facility] ([facility])    [address]  2nd Floor  [place] [facility] [address] [phone]              Who to contact     If you have questions or need follow up information about today's clinic visit or your schedule please contact [facility] [place] directly at [phone].  Normal or non-critical lab and imaging results will be communicated to you by [employer], letter or phone within 4 business days after the clinic has received the results. If you do not hear from us within 7 days, please contact the clinic through [employer] or phone. If you have a critical or abnormal lab result, we will notify you by phone as soon as possible.  Submit refill requests through [employer] or call your pharmacy and they will forward the refill request to us. Please allow 3 business days for your refill to be completed.          Additional Information About Your Visit        [employer] Information     [employer] gives you secure access to your electronic health record. If you see a primary care provider, you can also send messages to your care team and make appointments. If you have questions, please call your primary care clinic.  If you do not have a primary care provider, please call [phone] and they will assist you.        Care EveryWhere ID     This is your Care EveryWhere ID. This could be used by other organizations to access your [place] medical records  [id number]        Your Vitals Were     Pulse Temperature Height Pulse Oximetry BMI (Body Mass Index)       62 97.6  F (36.4  C) (Oral) 5' 7\" (1.702 m) 98% 30.23 kg/m2        Blood Pressure from Last 3 Encounters:   07/03/17 118/82   05/30/17 122/78   05/23/17 120/70    Weight from Last 3 Encounters:   07/03/17 " 193 lb (87.5 kg)   05/30/17 192 lb (87.1 kg)   05/23/17 190 lb 14.7 oz (86.6 kg)              Today, you had the following     No orders found for display         Today's Medication Changes          These changes are accurate as of: 7/3/17  2:37 PM.  If you have any questions, ask your nurse or doctor.               Start taking these medicines.        Dose/Directions    fluticasone 50 MCG/ACT spray   Commonly known as:  FLONASE   Used for:  Chronic rhinitis, unspecified type   Started by:  Julio Guidry Jr., MD        Dose:  1-2 spray   Spray 1-2 sprays into both nostrils daily   Quantity:  1 Bottle   Refills:  11         These medicines have changed or have updated prescriptions.        Dose/Directions    buPROPion 300 MG 24 hr tablet   Commonly known as:  WELLBUTRIN XL   This may have changed:    - medication strength  - how much to take   Used for:  Major depressive disorder, recurrent episode, mild (H)   Changed by:  Julio Guidry Jr., MD        Dose:  300 mg   Take 1 tablet (300 mg) by mouth every morning   Quantity:  90 tablet   Refills:  1            Where to get your medicines      These medications [facility] - [place], [place] - [address] AT [address]  [address], [place] [address]     Phone:  [phone]     buPROPion 300 MG 24 hr tablet    fluticasone 50 MCG/ACT spray                Primary Care Provider Office Phone # Fax #    Julio Guidry Jr., [phone]       [facility] [place] [address]  [place] [address]        Equal Access to Services     [facility] AH: Hadii aad ku hadasho Soomaali, waaxda luqadaha, qaybta kaalmada adeegyada, waxay carlos escoto. So [facility] [phone].    ATENCIÓN: Si habla español, tiene a gunn disposición servicios gratuitos de asistencia lingüística. Llame al [phone].    We comply with applicable federal civil rights laws and [place] laws. We do not  discriminate on the basis of race, color, national origin, age, disability sex, sexual orientation or gender identity.            Thank you!     [facility] you [facility]  [facility] your [facility]. Our goal is always to provide you with excellent care. Hearing back from our patients is one way we can continue to improve our services. Please take a few minutes to complete the written survey that you may receive in the mail after your visit with us. Thank you!             Your Updated Medication List - Protect others around you: Learn how to safely use, store and throw away your medicines at www.disposemymeds.org.          This list is accurate as of: 7/3/17  2:37 PM.  Always use your most recent med list.                   Brand Name Dispense Instructions for use Diagnosis    acetaminophen 325 MG tablet    TYLENOL    100 tablet    Take 2 tablets (650 mg) by mouth every 6 hours Do not take more than 4,000 mg of acetaminophen (Tylenol) from all sources to prevent liver damage.    Lung nodule       ALPRAZolam 0.5 MG tablet    XANAX    10 tablet    Take 1 tablet (0.5 mg) by mouth 3 times daily as needed for anxiety    Anxiety       buPROPion 300 MG 24 hr tablet    WELLBUTRIN XL    90 tablet    Take 1 tablet (300 mg) by mouth every morning    Major depressive disorder, recurrent episode, mild (H)       fluticasone 50 MCG/ACT spray    FLONASE    1 Bottle    Spray 1-2 sprays into both nostrils daily    Chronic rhinitis, unspecified type       lisinopril 10 MG tablet    PRINIVIL/ZESTRIL    90 tablet    Take 1 tablet (10 mg) by mouth daily    Hypertension goal BP (blood pressure) < 140/90       order for DME      Equipment ordered: RESMED Auto PAP Mask type: Nasal Settings: 8-16 CM H20  : CPAP        phosphorus tablet 250 mg 250 MG per tablet    K PHOS NEUTRAL    4 tablet    Take 2 tablets (500 mg) by mouth 3 times daily Take 2 tablets in the evening on Jose 3/5 and in the morning on Monday 3/6. Get your  phosphorus level rechecked on Monday 3/6 at the [facility].    Lung nodule

## 2017-07-10 ENCOUNTER — TELEPHONE (OUTPATIENT)
Dept: FAMILY MEDICINE | Facility: CLINIC | Age: 67
End: 2017-07-10

## 2017-07-10 DIAGNOSIS — R07.0 THROAT PAIN: Primary | ICD-10-CM

## 2017-07-10 NOTE — TELEPHONE ENCOUNTER
"The back of his throat feels like he is getting a cold.  I just feel \"crappy\".      Changed his Rx to 300 mg Wellbutrin and his fatigue has not changed.   Work a little and then have to rest     Started flonase one week ago- feels slightly better  Feels like he has a bubble in his throat.  No GERD symptoms.  I feel like their is \"congestion\" on my right lung.     I am really worried about this.  Is this related to his lung surgery?      Per Dr Guidry,  Refer to [facility].  Juwan asked me to send phone number to sj Agarwal RN- Triage [employer]    "

## 2017-07-10 NOTE — TELEPHONE ENCOUNTER
Reason for call:  Patient reporting a symptom    Symptom or request: Feels like right lung is full. Can feel in throat. Really nauseated right now.    Duration (how long have symptoms been present): 2 weeks.    Have you been treated for this before? Yes    Phone Number patient can be reached at:  Cell number on file:    Telephone Information:   Mobile [phone]     Best Time:  Anytime    Can we leave a detailed message on this number:  YES    Call taken on 7/10/2017 at 9:26 AM by Francheska Chen

## 2017-07-13 ENCOUNTER — TELEPHONE (OUTPATIENT)
Dept: FAMILY MEDICINE | Facility: CLINIC | Age: 67
End: 2017-07-13

## 2017-07-13 ENCOUNTER — HOSPITAL ENCOUNTER (EMERGENCY)
Facility: CLINIC | Age: 67
Discharge: HOME | End: 2017-07-13
Attending: EMERGENCY MEDICINE | Admitting: EMERGENCY MEDICINE
Payer: MEDICARE

## 2017-07-13 ENCOUNTER — APPOINTMENT (OUTPATIENT)
Dept: CT IMAGING | Facility: CLINIC | Age: 67
End: 2017-07-13
Attending: EMERGENCY MEDICINE
Payer: MEDICARE

## 2017-07-13 VITALS
RESPIRATION RATE: 13 BRPM | TEMPERATURE: 98 F | SYSTOLIC BLOOD PRESSURE: 140 MMHG | HEART RATE: 70 BPM | DIASTOLIC BLOOD PRESSURE: 88 MMHG | OXYGEN SATURATION: 99 %

## 2017-07-13 DIAGNOSIS — R53.81 MALAISE AND FATIGUE: ICD-10-CM

## 2017-07-13 DIAGNOSIS — R91.8 LUNG MASS: ICD-10-CM

## 2017-07-13 DIAGNOSIS — R07.89 ATYPICAL CHEST PAIN: ICD-10-CM

## 2017-07-13 DIAGNOSIS — R53.83 MALAISE AND FATIGUE: ICD-10-CM

## 2017-07-13 LAB
ALBUMIN SERPL-MCNC: 3.8 G/DL (ref 3.4–5)
ALBUMIN UR-MCNC: NEGATIVE MG/DL
ALP SERPL-CCNC: 60 U/L (ref 40–150)
ALT SERPL W P-5'-P-CCNC: 57 U/L (ref 0–70)
ANION GAP SERPL CALCULATED.3IONS-SCNC: 6 MMOL/L (ref 3–14)
APPEARANCE UR: CLEAR
AST SERPL W P-5'-P-CCNC: 35 U/L (ref 0–45)
BASOPHILS # BLD AUTO: 0 10E9/L (ref 0–0.2)
BASOPHILS NFR BLD AUTO: 0.4 %
BILIRUB SERPL-MCNC: 0.6 MG/DL (ref 0.2–1.3)
BILIRUB UR QL STRIP: NEGATIVE
BUN SERPL-MCNC: 9 MG/DL (ref 7–30)
CALCIUM SERPL-MCNC: 8.9 MG/DL (ref 8.5–10.1)
CHLORIDE SERPL-SCNC: 105 MMOL/L (ref 94–109)
CO2 SERPL-SCNC: 27 MMOL/L (ref 20–32)
COLOR UR AUTO: COLORLESS
CREAT SERPL-MCNC: 0.9 MG/DL (ref 0.66–1.25)
DIFFERENTIAL METHOD BLD: ABNORMAL
EOSINOPHIL # BLD AUTO: 0.1 10E9/L (ref 0–0.7)
EOSINOPHIL NFR BLD AUTO: 1.9 %
ERYTHROCYTE [DISTWIDTH] IN BLOOD BY AUTOMATED COUNT: 12.6 % (ref 10–15)
GFR SERPL CREATININE-BSD FRML MDRD: 85 ML/MIN/1.7M2
GLUCOSE SERPL-MCNC: 115 MG/DL (ref 70–99)
GLUCOSE UR STRIP-MCNC: NEGATIVE MG/DL
HCT VFR BLD AUTO: 47.4 % (ref 40–53)
HGB BLD-MCNC: 16.1 G/DL (ref 13.3–17.7)
HGB UR QL STRIP: NEGATIVE
IMM GRANULOCYTES # BLD: 0.1 10E9/L (ref 0–0.4)
IMM GRANULOCYTES NFR BLD: 1.1 %
INTERPRETATION ECG - MUSE: NORMAL
KETONES UR STRIP-MCNC: NEGATIVE MG/DL
LEUKOCYTE ESTERASE UR QL STRIP: NEGATIVE
LIPASE SERPL-CCNC: 163 U/L (ref 73–393)
LYMPHOCYTES # BLD AUTO: 0.7 10E9/L (ref 0.8–5.3)
LYMPHOCYTES NFR BLD AUTO: 12.5 %
MCH RBC QN AUTO: 31.8 PG (ref 26.5–33)
MCHC RBC AUTO-ENTMCNC: 34 G/DL (ref 31.5–36.5)
MCV RBC AUTO: 94 FL (ref 78–100)
MONOCYTES # BLD AUTO: 0.5 10E9/L (ref 0–1.3)
MONOCYTES NFR BLD AUTO: 9.3 %
NEUTROPHILS # BLD AUTO: 4 10E9/L (ref 1.6–8.3)
NEUTROPHILS NFR BLD AUTO: 74.8 %
NITRATE UR QL: NEGATIVE
NRBC # BLD AUTO: 0 10*3/UL
NRBC BLD AUTO-RTO: 0 /100
PH UR STRIP: 7 PH (ref 5–7)
PLATELET # BLD AUTO: 154 10E9/L (ref 150–450)
POTASSIUM SERPL-SCNC: 4 MMOL/L (ref 3.4–5.3)
PROT SERPL-MCNC: 7.3 G/DL (ref 6.8–8.8)
RBC # BLD AUTO: 5.07 10E12/L (ref 4.4–5.9)
RBC #/AREA URNS AUTO: <1 /HPF (ref 0–2)
SODIUM SERPL-SCNC: 138 MMOL/L (ref 133–144)
SP GR UR STRIP: 1 (ref 1–1.03)
TROPONIN I SERPL-MCNC: NORMAL UG/L (ref 0–0.04)
TSH SERPL DL<=0.005 MIU/L-ACNC: 1.82 MU/L (ref 0.4–4)
URN SPEC COLLECT METH UR: ABNORMAL
UROBILINOGEN UR STRIP-MCNC: 0 MG/DL (ref 0–2)
WBC # BLD AUTO: 5.4 10E9/L (ref 4–11)
WBC #/AREA URNS AUTO: <1 /HPF (ref 0–2)

## 2017-07-13 PROCEDURE — 25000128 H RX IP 250 OP 636: Performed by: EMERGENCY MEDICINE

## 2017-07-13 PROCEDURE — 81001 URINALYSIS AUTO W/SCOPE: CPT | Performed by: EMERGENCY MEDICINE

## 2017-07-13 PROCEDURE — 25000125 ZZHC RX 250: Performed by: EMERGENCY MEDICINE

## 2017-07-13 PROCEDURE — A9270 NON-COVERED ITEM OR SERVICE: HCPCS | Mod: GY | Performed by: EMERGENCY MEDICINE

## 2017-07-13 PROCEDURE — 25000132 ZZH RX MED GY IP 250 OP 250 PS 637: Mod: GY | Performed by: EMERGENCY MEDICINE

## 2017-07-13 PROCEDURE — 84443 ASSAY THYROID STIM HORMONE: CPT | Performed by: EMERGENCY MEDICINE

## 2017-07-13 PROCEDURE — 71260 CT THORAX DX C+: CPT

## 2017-07-13 PROCEDURE — 84484 ASSAY OF TROPONIN QUANT: CPT | Performed by: EMERGENCY MEDICINE

## 2017-07-13 PROCEDURE — 85025 COMPLETE CBC W/AUTO DIFF WBC: CPT | Performed by: EMERGENCY MEDICINE

## 2017-07-13 PROCEDURE — 99285 EMERGENCY DEPT VISIT HI MDM: CPT | Mod: 25

## 2017-07-13 PROCEDURE — 83690 ASSAY OF LIPASE: CPT | Performed by: EMERGENCY MEDICINE

## 2017-07-13 PROCEDURE — 80053 COMPREHEN METABOLIC PANEL: CPT | Performed by: EMERGENCY MEDICINE

## 2017-07-13 RX ORDER — IOPAMIDOL 755 MG/ML
500 INJECTION, SOLUTION INTRAVASCULAR ONCE
Status: COMPLETED | OUTPATIENT
Start: 2017-07-13 | End: 2017-07-13

## 2017-07-13 RX ORDER — ASPIRIN 81 MG/1
162 TABLET, CHEWABLE ORAL ONCE
Status: COMPLETED | OUTPATIENT
Start: 2017-07-13 | End: 2017-07-13

## 2017-07-13 RX ORDER — NITROGLYCERIN 0.4 MG/1
0.4 TABLET SUBLINGUAL ONCE
Status: COMPLETED | OUTPATIENT
Start: 2017-07-13 | End: 2017-07-13

## 2017-07-13 RX ADMIN — ASPIRIN 81 MG 162 MG: 81 TABLET ORAL at 10:45

## 2017-07-13 RX ADMIN — IOPAMIDOL 71 ML: 755 INJECTION, SOLUTION INTRAVENOUS at 11:35

## 2017-07-13 RX ADMIN — SODIUM CHLORIDE 86 ML: 9 INJECTION, SOLUTION INTRAVENOUS at 11:36

## 2017-07-13 RX ADMIN — NITROGLYCERIN 0.4 MG: 0.4 TABLET SUBLINGUAL at 10:46

## 2017-07-13 RX ADMIN — LIDOCAINE HYDROCHLORIDE 30 ML: 20 SOLUTION ORAL; TOPICAL at 11:03

## 2017-07-13 ASSESSMENT — ENCOUNTER SYMPTOMS
CONSTIPATION: 1
FREQUENCY: 0
DIFFICULTY URINATING: 0
VOMITING: 0
DYSURIA: 0
BLOOD IN STOOL: 0
FATIGUE: 1
HEMATURIA: 0
BACK PAIN: 0
NAUSEA: 1
COUGH: 0

## 2017-07-13 NOTE — ED AVS SNAPSHOT
[facility] Emergency Department    [address] [place] [address]    Phone:  [phone]    Fax:  [phone]                                       Juwan Latham   MRN: [id number]    Department:  [facility] Emergency Department   Date of Visit:  7/13/2017           Patient Information     Date Of Birth          1950        Your diagnoses for this visit were:     Atypical chest pain     Malaise and fatigue     Lung mass        You were seen by Dave Mosqueda MD.        Discharge Instructions       Please make an appointment to follow up with Dr. Guidry early next week for a recheck and consider further testing.    Return to the emergency department with any new or worsening symptoms such as worsening chest pain no shortness of breath fever cough black bloody stools nausea vomiting.    Continue current follow-up schedule for your prostate and in for another PET scan in October for the lung mass    Discharge Instructions  Chest Pain    You have been seen today for chest pain or discomfort.  At this time, your doctor has found no signs that your chest pain is due to a serious or life-threatening condition, (or you have declined more testing and/or admission to the hospital). However, sometimes there is a serious problem that does not show up right away. Your evaluation today may not be complete and you may need further testing and evaluation.     You need to follow-up with your regular doctor within 3 days.    Return to the Emergency Department if:    Your chest pain changes, gets worse, starts to happen more often, or comes with less activity.    You are short of breath.    You get very weak or tired.    You pass out or faint.    You have any new symptoms, like fever, cough, numb legs, or you cough up blood.    You have anything else that worries you.    Until you follow-up with your regular doctor please do the following:    Take one aspirin daily  unless you have an allergy or are told not to by your doctor.    If a stress test appointment has been made, go to the appointment.    If you have questions, contact your regular doctor.    If your doctor today has told you to follow-up with your regular doctor, it is very important that you make an appointment with your clinic and go to the appointment.  If you do not follow-up with your primary doctor, it may result in missing an important development which could result in permanent injury or disability and/or lasting pain.  If there is any problem keeping your appointment, call your doctor or return to the Emergency Department.    If you were given a prescription for medicine here today, be sure to read all of the information (including the package insert) that comes with your prescription.  This will include important information about the medicine, its side effects, and any warnings that you need to know about.  The pharmacist who fills the prescription can provide more information and answer questions you may have about the medicine.  If you have questions or concerns that the pharmacist cannot address, please call or return to the Emergency Department.       Remember that you can always come back to the Emergency Department if you are not able to see your regular doctor in the amount of time listed above, if you get any new symptoms, or if there is anything that worries you.          Weakness (Uncertain Cause)  Based on your exam today, the exact cause of your weakness is not certain. However, your weakness does not seem to be a sign of a serious illness at this time. Keep an eye on your symptoms and get medical advice as instructed below.  Home care    Rest at home today. Do not over-exert yourself.    Take any medicine as prescribed.    For the next few days, drink extra fluids (unless your healthcare provider wants you to restrict fluids for other reasons). Do not skip meals.  Follow-up care  Follow up  with your healthcare provider or as advised.  When to seek medical advice  Call your healthcare provider for any of the following    Worsening of your symptoms    Symptoms don't start getting better within 2 days    Fever of 100.4  F (38  C) or higher, or as directed by your healthcare provider     Call 911  Get emergency medical care for any of these:    Chest, arm, neck, jaw or upper back pain    Trouble breathing    Numbness or weakness of the face, one arm or one leg    Slurred speech, confusion, trouble speaking, walking or seeing    Blood in vomit or stool (black or red color)    Loss of consciousness  Date Last Reviewed: 6/10/2015    6745-6663 [employer]. [address] [place] [address]. All rights reserved. This information is not intended as a substitute for professional medical care. Always follow your healthcare professional's instructions.             Future Appointments        Provider Department Dept Phone Center    7/24/2017 8:30 AM Owen Davis MD [facility] [phone]  SLEEP    8/3/2017 1:20 PM Paulo Agarwal MD [facility] [phone] [facility]    10/18/2017 11:20 AM [facility] ROOM 1 [facility] [phone] [facility]    10/18/2017 12:15 PM GLORIA rAechiga [facility] [phone] [facility]      24 Hour Appointment Hotline       To make an appointment at any [facility], call [phone] ([phone]). If you don't have a family doctor or clinic, we will help you find one. [facility] are conveniently located to serve the needs of you and your family.             Review of your medicines      Our records show that you are taking the medicines listed below. If these are incorrect, please call your family doctor or clinic.        Dose / Directions Last dose taken    acetaminophen 325 MG tablet   Commonly  known as:  TYLENOL   Dose:  650 mg   Quantity:  100 tablet        Take 2 tablets (650 mg) by mouth every 6 hours Do not take more than 4,000 mg of acetaminophen (Tylenol) from all sources to prevent liver damage.   Refills:  1        ALPRAZolam 0.5 MG tablet   Commonly known as:  XANAX   Dose:  0.5 mg   Quantity:  10 tablet        Take 1 tablet (0.5 mg) by mouth 3 times daily as needed for anxiety   Refills:  0        buPROPion 300 MG 24 hr tablet   Commonly known as:  WELLBUTRIN XL   Dose:  300 mg   Quantity:  90 tablet        Take 1 tablet (300 mg) by mouth every morning   Refills:  1        fluticasone 50 MCG/ACT spray   Commonly known as:  FLONASE   Dose:  1-2 spray   Quantity:  1 Bottle        Spray 1-2 sprays into both nostrils daily   Refills:  11        lisinopril 10 MG tablet   Commonly known as:  PRINIVIL/ZESTRIL   Dose:  10 mg   Quantity:  90 tablet        Take 1 tablet (10 mg) by mouth daily   Refills:  3        order for DME        Equipment ordered: RESMED Auto PAP Mask type: Nasal Settings: 8-16 CM H20  : CPAP   Refills:  0        phosphorus tablet 250 mg 250 MG per tablet   Commonly known as:  K PHOS NEUTRAL   Dose:  500 mg   Quantity:  4 tablet        Take 2 tablets (500 mg) by mouth 3 times daily Take 2 tablets in the evening on Jose 3/5 and in the morning on Monday 3/6. Get your phosphorus level rechecked on Monday 3/6 at the [facility].   Refills:  0                Procedures and tests performed during your visit     CBC with platelets differential    CT Chest Pulmonary Embolism w Contrast    Comprehensive metabolic panel    EKG 12 lead    Lipase    TSH with free T4 reflex    Troponin I    UA with Microscopic      Orders Needing Specimen Collection     None      Pending Results     Date and Time Order Name Status Description    7/13/2017 1019 CT Chest Pulmonary Embolism w Contrast Preliminary             Pending Culture Results     No orders found from 7/11/2017 to 7/14/2017.            Pending  Results Instructions     If you had any lab results that were not finalized at the time of your Discharge, you can call the ED Lab Result RN at [phone]. You will be contacted by this team for any positive Lab results or changes in treatment. The nurses are available 7 days a week from 10A to 6:30P.  You can leave a message 24 hours per day and they will return your call.        Test Results From Your Hospital Stay        7/13/2017 10:45 AM      Component Results     Component Value Ref Range & Units Status    WBC 5.4 4.0 - 11.0 10e9/L Final    RBC Count 5.07 4.4 - 5.9 10e12/L Final    Hemoglobin 16.1 13.3 - 17.7 g/dL Final    Hematocrit 47.4 40.0 - 53.0 % Final    MCV 94 78 - 100 fl Final    MCH 31.8 26.5 - 33.0 pg Final    MCHC 34.0 31.5 - 36.5 g/dL Final    RDW 12.6 10.0 - 15.0 % Final    Platelet Count 154 150 - 450 10e9/L Final    Diff Method Automated Method  Final    % Neutrophils 74.8 % Final    % Lymphocytes 12.5 % Final    % Monocytes 9.3 % Final    % Eosinophils 1.9 % Final    % Basophils 0.4 % Final    % Immature Granulocytes 1.1 % Final    Nucleated RBCs 0 0 /100 Final    Absolute Neutrophil 4.0 1.6 - 8.3 10e9/L Final    Absolute Lymphocytes 0.7 (L) 0.8 - 5.3 10e9/L Final    Absolute Monocytes 0.5 0.0 - 1.3 10e9/L Final    Absolute Eosinophils 0.1 0.0 - 0.7 10e9/L Final    Absolute Basophils 0.0 0.0 - 0.2 10e9/L Final    Abs Immature Granulocytes 0.1 0 - 0.4 10e9/L Final    Absolute Nucleated RBC 0.0  Final         7/13/2017 11:05 AM      Component Results     Component Value Ref Range & Units Status    Sodium 138 133 - 144 mmol/L Final    Potassium 4.0 3.4 - 5.3 mmol/L Final    Chloride 105 94 - 109 mmol/L Final    Carbon Dioxide 27 20 - 32 mmol/L Final    Anion Gap 6 3 - 14 mmol/L Final    Glucose 115 (H) 70 - 99 mg/dL Final    Urea Nitrogen 9 7 - 30 mg/dL Final    Creatinine 0.90 0.66 - 1.25 mg/dL Final    GFR Estimate 85 >60 mL/min/1.7m2 Final    Non  GFR Calc    GFR Estimate If  Black >90   GFR Calc   >60 mL/min/1.7m2 Final    Calcium 8.9 8.5 - 10.1 mg/dL Final    Bilirubin Total 0.6 0.2 - 1.3 mg/dL Final    Albumin 3.8 3.4 - 5.0 g/dL Final    Protein Total 7.3 6.8 - 8.8 g/dL Final    Alkaline Phosphatase 60 40 - 150 U/L Final    ALT 57 0 - 70 U/L Final    AST 35 0 - 45 U/L Final         7/13/2017 11:05 AM      Component Results     Component Value Ref Range & Units Status    Lipase 163 73 - 393 U/L Final         7/13/2017 11:05 AM      Component Results     Component Value Ref Range & Units Status    Troponin I ES  0.000 - 0.045 ug/L Final    <0.015  The 99th percentile for upper reference range is 0.045 ug/L.  Troponin values in   the range of 0.045 - 0.120 ug/L may be associated with risks of adverse   clinical events.           7/13/2017 11:56 AM      Narrative     CT CHEST PULMONARY EMBOLISM WITH CONTRAST  7/13/2017 11:45 AM      HISTORY: Right anterior chest pain.  History of lung cancer with right  lung wedge resection.    TECHNIQUE: CT chest with intravenous contrast using the pulmonary  embolus protocol. Radiation dose for this scan was reduced using  automated exposure control, adjustment of the mA and/or kV according  to patient size, or iterative reconstruction technique. 71 mL  Isovue-370.    COMPARISON: PET/CT 4/11/2017.    FINDINGS: Evaluation of the pulmonary arterial system shows no  evidence of embolus. There is no aortic aneurysm or dissection. The  heart size is normal. There is no mediastinal, hilar or axillary lymph  node enlargement. There are again postoperative changes in the right  upper lobe medially. Soft tissue mass associated with these  postoperative changes has decreased in size since the previous exam,  now measuring 2.5 x 2.0 cm (previously 2.8 x 2.3 cm). There is mild  dependent atelectasis bilaterally. The lungs are otherwise clear. No  pleural effusion. No pneumothorax. Images through the upper abdomen  unremarkable for fatty infiltration  of the liver.        Impression     IMPRESSION:  1. There is no pulmonary embolus, aortic aneurysm or dissection.  2. Postoperative changes in the right upper lobe with a persistent  soft tissue mass or lung consolidation which is decreasing in size.  3. Fatty infiltration of the liver.         7/13/2017 11:00 AM      Component Results     Component Value Ref Range & Units Status    Color Urine Colorless  Final    Appearance Urine Clear  Final    Glucose Urine Negative NEG mg/dL Final    Bilirubin Urine Negative NEG Final    Ketones Urine Negative NEG mg/dL Final    Specific Gravity Urine 1.001 (L) 1.003 - 1.035 Final    Blood Urine Negative NEG Final    pH Urine 7.0 5.0 - 7.0 pH Final    Protein Albumin Urine Negative NEG mg/dL Final    Urobilinogen mg/dL 0.0 0.0 - 2.0 mg/dL Final    Nitrite Urine Negative NEG Final    Leukocyte Esterase Urine Negative NEG Final    Source Midstream Urine  Final    WBC Urine <1 0 - 2 /HPF Final    RBC Urine <1 0 - 2 /HPF Final         7/13/2017 11:10 AM      Component Results     Component Value Ref Range & Units Status    TSH 1.82 0.40 - 4.00 mU/L Final                Clinical Quality Measure: Blood Pressure Screening     Your blood pressure was checked while you were in the emergency department today. The last reading we obtained was  BP: 141/84 . Please read the guidelines below about what these numbers mean and what you should do about them.  If your systolic blood pressure (the top number) is less than 120 and your diastolic blood pressure (the bottom number) is less than 80, then your blood pressure is normal. There is nothing more that you need to do about it.  If your systolic blood pressure (the top number) is 120-139 or your diastolic blood pressure (the bottom number) is 80-89, your blood pressure may be higher than it should be. You should have your blood pressure rechecked within a year by a primary care provider.  If your systolic blood pressure (the top number) is 140  or greater or your diastolic blood pressure (the bottom number) is 90 or greater, you may have high blood pressure. High blood pressure is treatable, but if left untreated over time it can put you at risk for heart attack, stroke, or kidney failure. You should have your blood pressure rechecked by a primary care provider within the next 4 weeks.  If your provider in the emergency department today gave you specific instructions to follow-up with your doctor or provider even sooner than that, you should follow that instruction and not wait for up to 4 weeks for your follow-up visit.        Thank you for choosing [facility]       Thank you for choosing [facility] for your care. Our goal is always to provide you with excellent care. Hearing back from our patients is one way we can continue to improve our services. Please take a few minutes to complete the written survey that you may receive in the mail after you visit with us. Thank you!        [employer] Information     [facility] gives you secure access to your electronic health record. If you see a primary care provider, you can also send messages to your care team and make appointments. If you have questions, please call your primary care clinic.  If you do not have a primary care provider, please call [phone] and they will assist you.        Care EveryWhere ID     This is your Care EveryWhere ID. This could be used by other organizations to access your [facility] medical records  [id number]        Equal Access to Services     CHAMP SNIDER : Hadii carolyn Obrien, waaxda luqadaha, qaybta kaalmada loren, mynor escoto. So [facility] [phone].    ATENCIÓN: Si habla español, tiene a gunn disposición servicios gratuitos de asistencia lingüística. Llame al [phone].    We comply with applicable federal civil rights laws and [place] laws. We do not discriminate on the basis of race, color, national origin, age, disability sex, sexual  orientation or gender identity.            After Visit Summary       This is your record. Keep this with you and show to your community pharmacist(s) and doctor(s) at your next visit.

## 2017-07-13 NOTE — TELEPHONE ENCOUNTER
I have spoken with Dr. Guidry and he will call [facility] to speak with Dr. Mosqueda. Justa Naqvi R.N.

## 2017-07-13 NOTE — TELEPHONE ENCOUNTER
Reason for call:  Patient reporting a symptom    Symptom or request: The patient called saying he thinks his lung cancer is back. He is not feeling well, nauseated. He says he can feel it in his chest. He wants to talk to a nurse or Dr. Guidry as soon as possible.    Duration (how long have symptoms been present): A couple of weeks. It gets worse all the time.    Have you been treated for this before? Yes    Phone Number patient can be reached at:  Cell phone [phone]    Best Time:  Anytime    Can we leave a detailed message on this number:  YES    Call taken on 7/13/2017 at 9:07 AM by Francheska Chen

## 2017-07-13 NOTE — ED AVS SNAPSHOT
[facility] [place] Emergency Department    [address]    [facility] [address]    Phone:  [phone]    Fax:  [phone]                                       Juwan Latham   MRN: [id number]    Department:  [facility] [place] Emergency Department   Date of Visit:  7/13/2017           After Visit Summary Signature Page     I have received my discharge instructions, and my questions have been answered. I have discussed any challenges I see with this plan with the nurse or doctor.    ..........................................................................................................................................  Patient/Patient Representative Signature      ..........................................................................................................................................  Patient Representative Print Name and Relationship to Patient    ..................................................               ................................................  Date                                            Time    ..........................................................................................................................................  Reviewed by Signature/Title    ...................................................              ..............................................  Date                                                            Time

## 2017-07-13 NOTE — TELEPHONE ENCOUNTER
Dr. Guidry I have held an appointment Monday for Juwan, would you like to follow up then or push it out a day or two more?    Thanks, Justa Naqvi R.N.      Patient has been d/c from ED to home. Justa Naqvi R.N.

## 2017-07-13 NOTE — TELEPHONE ENCOUNTER
I called and spoke with Juwan, he was really nauseated during our call so I offered to call him back a little later and informed him if he feels more up to talking he can call back and I will take his call if I am available. He would like to do this. He defiantly wants a chest x ray. I am holding 1:20 Monday for Juwan with Dr. Guidry until we can talk further. Justa Naqvi R.N.

## 2017-07-13 NOTE — TELEPHONE ENCOUNTER
Call received on patient from [facility] ED--they will not speak to RN and want on call physician for Dr. Guidry. I am trying to get a hold of Dr. Guidry or on call physician to speak with ED. Justa Naqvi R.N.

## 2017-07-13 NOTE — ED PROVIDER NOTES
"  History     Chief Complaint:  Chest Pain    DYLAN Latham is a 66 year old male with prostate cancer and a history of lung cancer who presents to the emergency department today for evaluation of chest pain. Of note, the patient had a laparoscopic wedge resection of the right upper lobe of his lung on 03/03/17 to remove a malignant neoplasm and states that he has had chest pain in this area of his chest for the past week. He also notes that he has been nauseous and has had gradually worsening fatigue. He states that he used to easily be able to walk two or three miles on the treadmill before the cancer, but that if he did so today he would be fatigued for the rest of the day. Additionally, the patient notes difficulty swallowing and states that it feels like he has a \"bubble\" in his throat. He reports that eating helps alleviate his chest pain but that the pain is no better or worse with laying down, standing up, pressing on the area, or taking deep breaths. He states that the pain does not radiate to his back. He reports that the nasal drainage he had a few days ago has resolved with the use of a nasal spray. The patient also states that he took nausea medication today which helped with his nausea. He reports that he has been constipated today but denies any blood in his stool. He denies cough, vomiting, urinary symptoms, leg swelling, or recent travel. The patient states that he is concerned that his cancer has returned. Of note, the patient reports that his next check up scan for his lung cancer is in October but that he has a check up on 08/03/17 for his prostate cancer.    Allergies:  Percocet [Oxycodone-Acetaminophen]     Medications:    Wellbutrin  Flonase  Lisinopril  Xanax  Tylenol  Phosphorous    Past Medical History:    Anxiety   Calculus of kidney   Congenital single kidney   Hypertension   Seasonal allergies  Sleep apnea     Past Surgical History:    Bronchoscopy Flexible  Esophagoscopy, " gastroscopy, duodenoscopy, combined  Laparoscopic wedge resection lung  Laser holmium lithotripsy ureter(s), insert stent, combined    Family History:    Mother: Heart Disease  Brother: Type 2 Diabetes, colorectal cancer, prostate cancer  Maternal Grandmother: Diabetes    Social History:  The patient was accompanied to the ED by his sister.  Smoking Status: Former Smoker  Smokeless Tobacco: Never Used  Alcohol Use: Positive average 3 beers per night  Marital Status:       Review of Systems   Constitutional: Positive for fatigue.   Respiratory: Negative for cough.    Cardiovascular: Positive for chest pain. Negative for leg swelling.   Gastrointestinal: Positive for constipation and nausea. Negative for blood in stool and vomiting.   Genitourinary: Negative for decreased urine volume, difficulty urinating, dysuria, frequency, hematuria and urgency.   Musculoskeletal: Negative for back pain.   All other systems reviewed and are negative.    Physical Exam     Vitals:  Patient Vitals for the past 24 hrs:   BP Temp Temp src Pulse Heart Rate Resp SpO2   07/13/17 1228 140/88 - - - 75 - 98 %   07/13/17 1215 141/84 - - - 70 13 97 %   07/13/17 1200 130/82 - - - 66 12 97 %   07/13/17 1115 119/83 - - - - 27 97 %   07/13/17 1100 125/78 - - - 68 12 94 %   07/13/17 1057 - - - - 79 10 95 %   07/13/17 1052 - - - - 103 17 95 %   07/13/17 1045 - - - - 68 - -   07/13/17 1015 155/90 - - - 74 - 97 %   07/13/17 1010 (!) 158/95 - - - - - (!) 89 %   07/13/17 0959 (!) 172/91 98  F (36.7  C) Oral 70 - 20 98 %      Physical Exam   HENT:   Right Ear: External ear normal.   Left Ear: External ear normal.   Nose: Nose normal.   Eyes: Conjunctivae and lids are normal.   Neck: Neck supple. No tracheal deviation present.   Cardiovascular: Regular rhythm and intact distal pulses.    Pulmonary/Chest: Breath sounds normal. No respiratory distress. He has no wheezes. He has no rales. He exhibits tenderness (no reproducible).   Abdominal: Soft.  There is no tenderness. There is no rebound and no guarding.   Musculoskeletal:   No peripheral edema   Neurological: He is alert. No cranial nerve deficit. He exhibits normal muscle tone. Coordination normal.   MAEE, no gross focal motor or sensory deficit   Skin: Skin is warm and dry. He is not diaphoretic.   Psychiatric: He has a normal mood and affect.   Nursing note and vitals reviewed.    Emergency Department Course     ECG:  ECG taken at 1009, ECG read at 1015  Normal sinus rhythm  Normal ECG  Rate 68 bpm. WI interval 148 ms. QRS duration 88 ms. QT/QTc 382/406 ms. P-R-T axes -28 -8 58.    Imaging:  Radiology findings were communicated with the patient who voiced understanding of the findings.    CT Chest Pulmonary Embolism w Contrast  1. There is no pulmonary embolus, aortic aneurysm or dissection.  2. Postoperative changes in the right upper lobe with a persistent  soft tissue mass or lung consolidation which is decreasing in size.  3. Fatty infiltration of the liver.    Reading per radiology    Laboratory:  Laboratory findings were communicated with the patient who voiced understanding of the findings.    UA with Microscopic: Specific Gravity 1.001 (L)  CBC: WBC 5.4, HGB 16.1,   CMP: Glucose 115 (H) o/w WNL (Creatinine 0.90)  Lipase: 163  Troponin (Collected 1025): <0.015  TSH: 1.82    Interventions:  1045 Aspirin 162 mg PO  1046 Nitrostat 0.4 mg SL  1103 GI Cocktail 30 mLs PO    Emergency Department Course:  Nursing notes and vitals reviewed.  I performed an exam of the patient as documented above.   IV was inserted and blood was drawn for laboratory testing, results above.  The patient was sent for a CT Chest Pulmonary Embolism w Contrast while in the emergency department, results above.   1231 I spoke with Dr. Guidry, the patient's primary care physician from [facility], regarding patient's presentation, findings, and plan of care.  I discussed the treatment plan with the patient.  They expressed understanding of this plan and consented to discharge. They will be discharged home with instructions for care and follow up. In addition, the patient will return to the emergency department if their symptoms persist, worsen, if new symptoms arise or if there is any concern.  All questions were answered.  I personally reviewed the ECG, imaging, and laboratory results with the patient and answered all related questions prior to discharge.    Impression & Plan      Medical Decision Making:  Juwan Latham is a 66 year old gentleman with a history of lung cancer status post wedge resection here with a week of right-sided chest discomfort, generalized weakness, and fatigue. Differential here includes ACS, PE, most likely dissection pneumothorax, CHF with symptomatic pleural effusion, symptomatic anemia, hypothyroid state, certainly could be musculoskeletal, gastroesophageal in nature. Will do an ECG, routine blood work, CT scan of the chest.    Patient remained stable here in the emergency department. His overall workup was unremarkable for a significant cardiopulmonary process. Troponin was negative, which given his week of constant symptoms make an occlusive coronary process quite unlikely. His CT scan showed no PE, dissection, pneumonia, pleural effusion. He does have a soft tissue mass which overall has decreased in size from his previous PET scan. He was made aware of this and for follow up he has a PET scan in October. There was no obvious cause for his feeling of fatigue in terms of a metabolic derangement, symptomatic anemia, hypothyroid state. I think he is safe and stable for discharge home and to follow up with his primary doctor early next week. I spoke with his primary care physician, Dr. Guidry, to discuss his plan of care. The patient and his family were comfortable and in agreement with that plan.    Diagnosis:    ICD-10-CM    1. Atypical chest pain R07.89    2. Malaise and fatigue R53.81      R53.83    3. Lung mass R91.8      Disposition:   The patient is discharged to home.    Scribe Disclosure:  I, Tank Nogueira, am serving as a scribe at 10:02 AM on 7/13/2017 to document services personally performed by Dave Mosqueda MD, based on my observations and the provider's statements to me.    [facility] EMERGENCY DEPARTMENT       Dave Mosqueda MD  07/13/17 1129

## 2017-07-13 NOTE — ED NOTES
Patient with a history of lung cancer presents with right sided chest pain for the past week. He is concerned the cancer has returned. He also endorses some fatigue. He is alert and oriented, ABCs intact at this time.

## 2017-07-13 NOTE — DISCHARGE INSTRUCTIONS
Please make an appointment to follow up with Dr. Guidry early next week for a recheck and consider further testing.    Return to the emergency department with any new or worsening symptoms such as worsening chest pain no shortness of breath fever cough black bloody stools nausea vomiting.    Continue current follow-up schedule for your prostate and in for another PET scan in October for the lung mass    Discharge Instructions  Chest Pain    You have been seen today for chest pain or discomfort.  At this time, your doctor has found no signs that your chest pain is due to a serious or life-threatening condition, (or you have declined more testing and/or admission to the hospital). However, sometimes there is a serious problem that does not show up right away. Your evaluation today may not be complete and you may need further testing and evaluation.     You need to follow-up with your regular doctor within 3 days.    Return to the Emergency Department if:    Your chest pain changes, gets worse, starts to happen more often, or comes with less activity.    You are short of breath.    You get very weak or tired.    You pass out or faint.    You have any new symptoms, like fever, cough, numb legs, or you cough up blood.    You have anything else that worries you.    Until you follow-up with your regular doctor please do the following:    Take one aspirin daily unless you have an allergy or are told not to by your doctor.    If a stress test appointment has been made, go to the appointment.    If you have questions, contact your regular doctor.    If your doctor today has told you to follow-up with your regular doctor, it is very important that you make an appointment with your clinic and go to the appointment.  If you do not follow-up with your primary doctor, it may result in missing an important development which could result in permanent injury or disability and/or lasting pain.  If there is any problem keeping your  appointment, call your doctor or return to the Emergency Department.    If you were given a prescription for medicine here today, be sure to read all of the information (including the package insert) that comes with your prescription.  This will include important information about the medicine, its side effects, and any warnings that you need to know about.  The pharmacist who fills the prescription can provide more information and answer questions you may have about the medicine.  If you have questions or concerns that the pharmacist cannot address, please call or return to the Emergency Department.       Remember that you can always come back to the Emergency Department if you are not able to see your regular doctor in the amount of time listed above, if you get any new symptoms, or if there is anything that worries you.          Weakness (Uncertain Cause)  Based on your exam today, the exact cause of your weakness is not certain. However, your weakness does not seem to be a sign of a serious illness at this time. Keep an eye on your symptoms and get medical advice as instructed below.  Home care    Rest at home today. Do not over-exert yourself.    Take any medicine as prescribed.    For the next few days, drink extra fluids (unless your healthcare provider wants you to restrict fluids for other reasons). Do not skip meals.  Follow-up care  Follow up with your healthcare provider or as advised.  When to seek medical advice  Call your healthcare provider for any of the following    Worsening of your symptoms    Symptoms don't start getting better within 2 days    Fever of 100.4  F (38  C) or higher, or as directed by your healthcare provider     Call 911  Get emergency medical care for any of these:    Chest, arm, neck, jaw or upper back pain    Trouble breathing    Numbness or weakness of the face, one arm or one leg    Slurred speech, confusion, trouble speaking, walking or seeing    Blood in vomit or stool  (black or red color)    Loss of consciousness  Date Last Reviewed: 6/10/2015    4777-9545 [employer]. [address] [place] [address], [place] [address]. All rights reserved. This information is not intended as a substitute for professional medical care. Always follow your healthcare professional's instructions.

## 2017-07-13 NOTE — ED NOTES
"Patient rates chest discomfort as 3/10 after GI cocktail, no change. \"It's pretty much gone, but I'm still fatigued\".  Ready for CT.  "

## 2017-07-14 NOTE — TELEPHONE ENCOUNTER
Looks like from Dr. Mosqueda's documentation that a Follow up on Monday would be fine.  If that doesn't work for Juwan, I can certainly see him later in the week.  Please call patient to confirm appointment.    Julio Guidry MD

## 2017-07-17 ENCOUNTER — OFFICE VISIT (OUTPATIENT)
Dept: FAMILY MEDICINE | Facility: CLINIC | Age: 67
End: 2017-07-17
Payer: COMMERCIAL

## 2017-07-17 ENCOUNTER — CARE COORDINATION (OUTPATIENT)
Dept: CARE COORDINATION | Facility: CLINIC | Age: 67
End: 2017-07-17

## 2017-07-17 VITALS
BODY MASS INDEX: 30.38 KG/M2 | TEMPERATURE: 98.6 F | DIASTOLIC BLOOD PRESSURE: 70 MMHG | HEART RATE: 64 BPM | OXYGEN SATURATION: 97 % | WEIGHT: 194 LBS | SYSTOLIC BLOOD PRESSURE: 130 MMHG

## 2017-07-17 DIAGNOSIS — I10 HYPERTENSION GOAL BP (BLOOD PRESSURE) < 140/90: Chronic | ICD-10-CM

## 2017-07-17 DIAGNOSIS — F41.9 ANXIETY: ICD-10-CM

## 2017-07-17 DIAGNOSIS — G47.33 OSA (OBSTRUCTIVE SLEEP APNEA): ICD-10-CM

## 2017-07-17 DIAGNOSIS — R53.82 CHRONIC FATIGUE: Primary | ICD-10-CM

## 2017-07-17 PROCEDURE — 99214 OFFICE O/P EST MOD 30 MIN: CPT | Performed by: FAMILY MEDICINE

## 2017-07-17 NOTE — MR AVS SNAPSHOT
After Visit Summary   7/17/2017    Juwan Latham    MRN: [id number]           Patient Information     Date Of Birth          1950        Visit Information        Provider Department      7/17/2017 1:20 PM Julio Guidry Jr., MD [facility] [place]        Today's Diagnoses     Chronic fatigue    -  1    PAULINO (obstructive sleep apnea)        Hypertension goal BP (blood pressure) < 140/90           Follow-ups after your visit        Follow-up notes from your care team     Return in about 2 weeks (around 7/31/2017) for Routine Visit.      Your next 10 appointments already scheduled     Jul 24, 2017  8:30 AM CDT   [place] Patient with Owen Davis [place]   [facility] [place] [place]    [address]  [facility] [address]   [phone]            Aug 03, 2017  1:20 PM CDT   (Arrive by 1:05 PM)   New Patient Visit with Paulo Agarwal MD   [facility] ([facility])    [address] [place] [address]  4th [facility] [id number] [phone]            Oct 18, 2017 11:20 AM CDT   (Arrive by 11:05 AM)   CT CHEST W/O CONTRAST with UCCT1   [facility] ([facility])    [address] [place] [id number] [phone]           Please bring any scans or X-rays taken at other hospitals, if similar tests were done. Also bring a list of your medicines, including vitamins, minerals and over-the-counter drugs. It is safest to leave personal items at home.  Be sure to tell your doctor:   If you have any allergies.   If there s any chance you are pregnant.   If you are breastfeeding.   If you have any special needs.  You do not need to do anything special to prepare.  Please wear loose clothing, such as a sweat suit or jogging clothes. Avoid snaps, zippers and other metal. We may ask you to  undress and put on a hospital gown.            Oct 18, 2017 12:15 PM CDT   (Arrive by 12:00 PM)   Return Visit with GLORIA [facility] ([facility])    [address] [facility]  2nd [facility] [address] [phone]              Who to contact     If you have questions or need follow up information about today's clinic visit or your schedule please contact [facility] [place] directly at [phone].  Normal or non-critical lab and imaging results will be communicated to you by [employer], letter or phone within 4 business days after the clinic has received the results. If you do not hear from us within 7 days, please contact the clinic through [employer] or phone. If you have a critical or abnormal lab result, we will notify you by phone as soon as possible.  Submit refill requests through [employer] or call your pharmacy and they will forward the refill request to us. Please allow 3 business days for your refill to be completed.          Additional Information About Your Visit        [employer] Information     [employer] gives you secure access to your electronic health record. If you see a primary care provider, you can also send messages to your care team and make appointments. If you have questions, please call your primary care clinic.  If you do not have a primary care provider, please call [phone] and they will assist you.        Care EveryWhere ID     This is your Care EveryWhere ID. This could be used by other organizations to access your [place] medical records  [id number]        Your Vitals Were     Pulse Temperature Pulse Oximetry BMI (Body Mass Index)          64 98.6  F (37  C) (Oral) 97% 30.38 kg/m2         Blood Pressure from Last 3 Encounters:   07/17/17 130/70   07/13/17 140/88   07/03/17 118/82    Weight from Last 3 Encounters:   07/17/17 194 lb (88 kg)   07/03/17 193 lb (87.5 kg)   05/30/17 192 lb (87.1 kg)               Today, you had the following     No orders found for display       Primary Care Provider Office Phone # Fax #    Julio Guidry Jr., [phone]       [facility] [address]  [place] [address]        Equal Access to Services     RITAAMANDA SALOMONSARBJIT : Hadii aad ku hadasho Soomaali, waaxda luqadaha, qaybta kaalmada adeegyada, waxay idiin hayaan adeeg khmoisés lathumeme ah. So [facility] [phone].    ATENCIÓN: Si habla español, tiene a gunn disposición servicios gratuitos de asistencia lingüística. Llame al [phone].    We comply with applicable federal civil rights laws and [place] laws. We do not discriminate on the basis of race, color, national origin, age, disability sex, sexual orientation or gender identity.            Thank you!     Thank you for choosing [facility]  for your care. Our goal is always to provide you with excellent care. Hearing back from our patients is one way we can continue to improve our services. Please take a few minutes to complete the written survey that you may receive in the mail after your visit with us. Thank you!             Your Updated Medication List - Protect others around you: Learn how to safely use, store and throw away your medicines at www.disposemymeds.org.          This list is accurate as of: 7/17/17  2:03 PM.  Always use your most recent med list.                   Brand Name Dispense Instructions for use Diagnosis    acetaminophen 325 MG tablet    TYLENOL    100 tablet    Take 2 tablets (650 mg) by mouth every 6 hours Do not take more than 4,000 mg of acetaminophen (Tylenol) from all sources to prevent liver damage.    Lung nodule       ALPRAZolam 0.5 MG tablet    XANAX    10 tablet    Take 1 tablet (0.5 mg) by mouth 3 times daily as needed for anxiety    Anxiety       buPROPion 300 MG 24 hr tablet    WELLBUTRIN XL    90 tablet    Take 1 tablet (300 mg) by mouth every morning    Major depressive disorder, recurrent episode,  mild (H)       fluticasone 50 MCG/ACT spray    FLONASE    1 Bottle    Spray 1-2 sprays into both nostrils daily    Chronic rhinitis, unspecified type       order for DME      Equipment ordered: RESMED Auto PAP Mask type: Nasal Settings: 8-16 CM H20  : CPAP

## 2017-07-17 NOTE — LETTER
[facility] Home  Complex Care Plan  About Me  Patient Name:  Juwan Latham    YOB: 1950  Age:   66 year old   Seven MRN: [id number] Telephone Information:     Home Phone [phone]   Mobile [phone]       Address:    [address] [place] [address] Email address:  [email or web address]      Emergency Contact(s)  Name Relationship Lgl Grd Work Phone Home Phone Mobile Phone   1. RADHA VELOZ Sister No none [phone]   2. NIELS PARK* Relative No              Primary language:  English     needed? No   [facility]:  [phone] op. 1  Other communication barriers: No  Preferred Method of Communication:  Phone  Current living arrangement: I live in a private home  Mobility Status/ Medical Equipment: Independent  Other information to know about me:    Health Maintenance  Health Maintenance Reviewed: Up to date    My Access Plan  Medical Emergency 911   Primary Clinic Line [facility]- [phone]   24 Hour Appointment Line [phone] or  [phone] ([phone]) (toll-free)   24 Hour Nurse Line [phone] (toll-free)   Preferred Urgent Care Other   Preferred Hospital [facility]  [phone]   Preferred Pharmacy [facility] - [place] - [address] AT [facility] [address]     Behavioral Health Crisis Line The National Suicide Prevention Lifeline at 1-511.135.8844 or 911     My Care Team Members  Patient Care Team       Relationship Specialty Notifications Start End    Julio Guidry Jr., MD PCP - General Family Practice  1/28/16     Phone: [phone] Fax: [phone]         [facility] [address] [facility] [address]    Paulo Agarwal MD MD Urology  5/1/17     Phone: [phone] Fax: [phone]         [address] [address]    Love Mcdonald, RN Registered Nurse Oncology  5/1/17      Phone: [phone] Pager: [phone]        Suzy Flores, RN Clinic Care Coordinator Nurse  7/17/17     Phone: [phone] Fax: [phone]             My Care Plans  Self Management and Treatment Plan  Goals and (Comments)  Goal #1: I will work on managing my stress over the next couple of months.      20% of goal reached    Action Plans on File: Depression  Advance Care Plans/Directives Type:        My Medical and Care Information  Problem List   Patient Active Problem List   Diagnosis     Allergic rhinitis due to other allergen     CARDIOVASCULAR SCREENING; LDL GOAL LESS THAN 160     Advance Care Planning     History of colonic polyps     Anxiety     PAULINO (obstructive sleep apnea)     GERD (gastroesophageal reflux disease)     Hypertension goal BP (blood pressure) < 140/90     Seasonal allergies     Benign neoplasm of descending colon     Malignant neoplasm of upper lobe of right lung (H)     Overweight (BMI 25.0-29.9)     Cavernous hemangioma of brain (H)     Major depressive disorder, recurrent episode, mild (H)      Current Medications and Allergies:  See printed Medication Report.    Care Coordination Start Date: 07/17/17   Frequency of Care Coordination:     Form Last Updated: 07/19/2017

## 2017-07-17 NOTE — NURSING NOTE
"Chief Complaint   Patient presents with     ER F/U       Initial /70  Pulse 64  Temp 98.6  F (37  C) (Oral)  Wt 194 lb (88 kg)  SpO2 97%  BMI 30.38 kg/m2 Estimated body mass index is 30.38 kg/(m^2) as calculated from the following:    Height as of 7/3/17: 5' 7\" (1.702 m).    Weight as of this encounter: 194 lb (88 kg).  Medication Reconciliation: complete  "

## 2017-07-17 NOTE — PROGRESS NOTES
Clinic Care Coordination Contact  OUTREACH    Referral Information:  Referral Source: PCP  Reason for Contact: ED f/u  Care Conference: No     Universal Utilization:   ED Visits in last year: 1  Hospital visits in last year: 1  Last PCP appointment: 07/17/17  Missed Appointments: 0  Concerns: anxiety management  Multiple Providers or Specialists: no    Clinical Concerns:  Current Medical Concerns: Pt reports that he is doing well, has had no further chest pain.  Huddle with MD, feel that it may be anxiety/panic attack although pt does not think that was the case.  He reports that he has had panic attacks in his past and this was not a similar event.  Pt does report that he is not sleeping well, uses his CPAP and sometimes is awakened but turning and dislodging the mask. Discussed good sleep hygiene practices, pt will work on this. Pt has had cardiac work up, Lexiscan performed and MD reports that pt should be clear for at least 2 years with a negative result.  No other concerns at this time.    Current Behavioral Concerns: anxiety    Education Provided to patient: sleep hygiene and good self care   Clinical Pathway Name: None    Medication Management:  Pt reports compliance, no concerns     Functional Status:  Mobility Status: Independent   Transportation: drives      Psychosocial:  Current living arrangement: I live in a private home that I own with 2 roommates.  Financial/Insurance: no concerns, pt still works outside the home     Resources and Interventions:  Current Resources: none   Advanced Care Plans/Directives on file: No      Goals:   Goal 1 Statement: I will work on managing my stress over the next couple of months.  Goal 1 Progression Percent: 20%  Goal 1 Progression Date: 07/17/17   Upcoming appointment:  (NA)     Plan: Will follow up with pt in the next couple of weeks.  Pt in agreement with plan. Updated MD with above.

## 2017-07-17 NOTE — LETTER
Health Care Home - Access Care Plan    About Me  Patient Name:  Juwan Latham    YOB: 1950  Age:                            66 year old   Seven MRN:         [id number] Telephone Information:     Home Phone [phone]   Mobile [phone]       Address:    [address] [place] [address] Email address:  [email or web address]      Emergency Contact(s)  Name Relationship Lgl Grd Work Phone Home Phone Mobile Phone   1. RADHA VELOZ Sister No none [phone]   2. NIELS PARK* Relative No                Health Maintenance:      My Access Plan  Medical Emergency 915   Questions or concerns during clinic hours Primary Clinic Line, I will call the clinic directly:     24 Hour Appointment Line [phone] or  [phone])  (toll free)   24 Hour Nurse Line [phone] (toll free)   Questions or concerns outside clinic hours 24 Hour Appointment Line, I will call the after-hours on-call line:   [facility] [phone] or [phone] ([phone]) (toll-free)   Preferred Urgent Care     Preferred Hospital     Preferred Pharmacy [facility] - [place], [place] - [address] [facility] [address]     Behavioral Health Crisis Line The National Suicide Prevention Lifeline at 1-718.931.4634 or 911     My Care Team Members  Patient Care Team       Relationship Specialty Notifications Start End    Julio Guidry Jr., MD PCP - General Family Practice  1/28/16     Phone: [phone] Fax: [phone]         [facility] [address] [facility] [address]    Weight, Paulo Wiley MD MD Urology  5/1/17     Phone: [phone] Fax: [phone]         [facility] [address] [facility] [address]    Love Mcdonald, RN Registered Nurse Oncology  5/1/17     Phone: [phone] Pager: [phone]        Suzy Flores RN Clinic Care Coordinator Nurse  7/17/17     Phone: [phone] Fax: [phone]             My Medical and Care Information  Problem List   Patient Active Problem List   Diagnosis     Allergic rhinitis due to other allergen     CARDIOVASCULAR SCREENING; LDL GOAL LESS THAN 160     Advance Care Planning     History of colonic polyps     Anxiety     PAULINO (obstructive sleep apnea)     GERD (gastroesophageal reflux disease)     Hypertension goal BP (blood pressure) < 140/90     Seasonal allergies     Benign neoplasm of descending colon     Malignant neoplasm of upper lobe of right lung (H)     Overweight (BMI 25.0-29.9)     Cavernous hemangioma of brain (H)     Major depressive disorder, recurrent episode, mild (H)      Current Medications and Allergies:  See printed Medication Report

## 2017-07-17 NOTE — PROGRESS NOTES
"  SUBJECTIVE:                                                    Juwan Latham is a 66 year old male who presents to clinic today for the following health issues:      ED/UC Followup:    Facility:  [facility]   Date of visit: 7/13/17  Reason for visit: Chest pain and fatigue   Current Status: feeling a bit better        Juwan reports he was seen in the emergency department \"because I was very sick.\" He advises me that he was mainly fatigued. He had a CT scan of his chest that was, for the most part, unremarkable as well as normal complete metabolic panel, UA, TSH and CBC. He reports he is feeling a little bit better now, but continues to complain of fatigue. He is also stopped taking his lisinopril based on the advice of some friends and family members who are concerned that side effects are what is causing marked to be fatigued. He has not noted any improvement in his fatigue since stopping the lisinopril 4 days ago.    He also reports he has not seen a significant improvement in his energy levels following the increase of Wellbutrin from 150 mg to 300 mg that occurred about 2 weeks ago.    Problem list and histories reviewed & adjusted, as indicated.  Additional history: as documented    BP Readings from Last 3 Encounters:   07/17/17 130/70   07/13/17 140/88   07/03/17 118/82    Wt Readings from Last 3 Encounters:   07/17/17 194 lb (88 kg)   07/03/17 193 lb (87.5 kg)   05/30/17 192 lb (87.1 kg)                  Labs reviewed in EPIC    Reviewed and updated as needed this visit by clinical staff       Reviewed and updated as needed this visit by Provider         ROS:  Constitutional, HEENT, cardiovascular, pulmonary, gi and gu systems are negative, except as otherwise noted.    OBJECTIVE:     /70  Pulse 64  Temp 98.6  F (37  C) (Oral)  Wt 194 lb (88 kg)  SpO2 97%  BMI 30.38 kg/m2  Body mass index is 30.38 kg/(m^2).  GENERAL: healthy, alert and no distress  NEURO: Normal strength and tone, mentation " intact and speech normal  PSYCH: mentation appears normal, affect normal/bright    Diagnostic Test Results:  none     ASSESSMENT/PLAN:             1. Chronic fatigue  Fatigue remains an ongoing problem. I'm concerned this is indicative of an underlying mental health disorder such as undertreated anxiety or depression. Juwan feels that this is not likely the case, but admits that he's had a fairly extensive workup thus far that has been unrevealing.    2. PAULINO (obstructive sleep apnea)  He does have obstructive sleep apnea but is dutifully wearing his CPAP mask. He reports this is not very comfortable and is disruptive when he turns over at night. He has had a recent follow-up with our sleep physicians to ensure adequate fit of his mask.    3. Hypertension goal BP (blood pressure) < 140/90  Blood pressure is at goal today off of lisinopril. Given this, I have advised that we simply keep him off of his lisinopril for the time being.    Juwan has a follow-up appointment with me in about 2 weeks to reassess his fatigue in response to the elevated Wellbutrin dosage. We can recheck his blood pressure at that time and if it's still elevated, consider restarting his lisinopril. I would also consider starting an SSRI if he continues to complain of fatigue to see if boosting his serotonin may improve his fatigue levels. His evaluation otherwise has been negative thus far.      See Patient Instructions    Julio Guidry Jr, MD  [facility]

## 2017-07-25 ENCOUNTER — PRE VISIT (OUTPATIENT)
Dept: UROLOGY | Facility: CLINIC | Age: 67
End: 2017-07-25

## 2017-07-25 NOTE — TELEPHONE ENCOUNTER
New patient consult for abnormal imaging results of prostate.  Records available in [facility].

## 2017-08-03 ENCOUNTER — OFFICE VISIT (OUTPATIENT)
Dept: UROLOGY | Facility: CLINIC | Age: 67
End: 2017-08-03

## 2017-08-03 VITALS
SYSTOLIC BLOOD PRESSURE: 143 MMHG | DIASTOLIC BLOOD PRESSURE: 84 MMHG | BODY MASS INDEX: 30.01 KG/M2 | HEIGHT: 68 IN | WEIGHT: 198 LBS | HEART RATE: 60 BPM

## 2017-08-03 DIAGNOSIS — Z12.5 SCREENING FOR PROSTATE CANCER: ICD-10-CM

## 2017-08-03 DIAGNOSIS — R93.7 ABNORMAL FINDINGS ON DIAGNOSTIC IMAGING OF OTHER PARTS OF MUSCULOSKELETAL SYSTEM: ICD-10-CM

## 2017-08-03 DIAGNOSIS — Z12.5 SCREENING FOR PROSTATE CANCER: Primary | ICD-10-CM

## 2017-08-03 RX ORDER — UBIDECARENONE 75 MG
50 CAPSULE ORAL DAILY
COMMUNITY
End: 2018-01-11

## 2017-08-03 ASSESSMENT — PAIN SCALES - GENERAL: PAINLEVEL: NO PAIN (0)

## 2017-08-03 NOTE — LETTER
8/3/2017       RE: Juwan Latham  [address]  [facility] [address]     Dear Colleague,    Thank you for referring your patient, Juwan Latham, to the [facility] UROLOGY AND INST FOR PROSTATE AND UROLOGIC CANCERS at [facility]. Please see a copy of my visit note below.          Chief Complaint:    Hypermetabolism of prostate on PET           Consult or Referral:     Mr. Juwan Latham is a 66 year old male seen in consultation from Dr. Faulkner.         History of Present Illness:    Juwan Latham is a very pleasant 66 year old male with a history of lung cancer who presents with hypermetabolism of the prostate on PET. Patient was treated for a R-sided apical lung cancer with wedge resection on 3/3/17 that showed a T1a adenocarcinoma. Follow-up PET imaging on 4/11/17 was significant for a small hypermetabolic focus within the prostate gland anteriorly near the apex.    Patient had a PSA of 3.90 in 2004 and had no further PSA testing. He has no urinary symptoms and denies hematuria, incontinence, voiding dysfunction, and dysuria. He states that he has a strong stream. Of note, he has a horseshoe kidney and has a history of kidney stones. He states that he has intermittent left-sided back pain and believes that he may have another kidney stone that is forming. He denies weight loss and bone pain.         Past Medical History:     Past Medical History:   Diagnosis Date     Anxiety      Calculus of kidney 2005, 2012     Congenital single kidney      Hypertension      Seasonal allergies     spring and fall     Sleep apnea     no cpap            Past Surgical History:     Past Surgical History:   Procedure Laterality Date     BRONCHOSCOPY FLEXIBLE N/A 3/3/2017    Procedure: BRONCHOSCOPY FLEXIBLE;  Surgeon: Maria A Desai MD;  Location: [facility] OR     COLONOSCOPY N/A 2/11/2015    Procedure: COLONOSCOPY;  Surgeon: Murphy Jesus MD;  Location:  GI     ESOPHAGOSCOPY,  GASTROSCOPY, DUODENOSCOPY (EGD), COMBINED N/A 5/20/2016    Procedure: COMBINED ESOPHAGOSCOPY, GASTROSCOPY, DUODENOSCOPY (EGD), BIOPSY SINGLE OR MULTIPLE;  Surgeon: Murphy Jesus MD;  Location: [facility] GI     LAPAROSCOPIC WEDGE RESECTION LUNG Right 3/3/2017    Procedure: LAPAROSCOPIC WEDGE RESECTION LUNG;  Surgeon: Maria A Desai MD;  Location: [facility] OR     LASER HOLMIUM LITHOTRIPSY URETER(S), INSERT STENT, COMBINED  9/11/2012    Procedure: COMBINED CYSTOSCOPY, URETEROSCOPY, LASER HOLMIUM LITHOTRIPSY URETER(S), INSERT STENT;  Cystoscopy, Right Ureteroscopy, Stone Extraction, Holmium Laser, Double J Stent Placement;  Surgeon: Nicolás Blackwell MD;  Location: [facility] OR            Medications     Current Outpatient Prescriptions   Medication     ALFALFA PO     cyanocolbalamin (VITAMIN  B-12) 100 MCG tablet     VITAMIN D, CHOLECALCIFEROL, PO     buPROPion (WELLBUTRIN XL) 300 MG 24 hr tablet     fluticasone (FLONASE) 50 MCG/ACT spray     order for DME     ALPRAZolam (XANAX) 0.5 MG tablet     acetaminophen (TYLENOL) 325 MG tablet     No current facility-administered medications for this visit.             Family History:     Family History   Problem Relation Age of Onset     HEART DISEASE Mother      DIABETES Brother 65     Type 2     DIABETES Maternal Grandmother      Cancer - colorectal Brother 70     Prostate Cancer Brother 74     Hypertension No family hx of      Lipids No family hx of             Social History:     Social History     Social History     Marital status:      Spouse name: N/A     Number of children: 1     Years of education: N/A     Occupational History     Remodeling Self      Other     Social History Main Topics     Smoking status: Former Smoker     Packs/day: 2.00     Years: 20.00     Types: Cigarettes     Quit date: 1/1/1985     Smokeless tobacco: Never Used     Alcohol use Yes      Comment: avg 3 beers per night     Drug use: No     Sexual activity: Yes     Partners: Female     Other Topics  Concern     Caffeine Concern No     Sleep Concern Yes     middle/late insomnia     Exercise Yes     Seat Belt Yes     Parent/Sibling W/ Cabg, Mi Or Angioplasty Before 65f 55m? No     Social History Narrative    Dad  at age 86 from complications after hip surgery.    Mom  at age 68 from heart condition    Siblings:  Three sisters in good health.  Brother with diabetes and overweight.        One son    One granddaughter    Occupation:  remodeling            Allergies:   Percocet [oxycodone-acetaminophen]         Review of Systems:  From intake questionnaire     Negative 14 system review except as noted on HPI, nurse's note.      Labs and Pathology:    I reviewed all applicable laboratory and pathology data and went over findings with patient  Significant for   Lab Results   Component Value Date    CR 0.90 2017    CR 0.74 2017    CR 0.69 2017    CR 0.83 2017    CR 0.83 2015    CR 0.81 07/10/2015    CR 0.89 2013    CR 0.96 2012    CR 1.20 10/29/2005    CR 1.00 10/29/2005     PSA (11/3/2004): 3.90      Imaging:    I reviewed all applicable imaging and went over findings with patient.  Significant for PET/CT on 2017:  IMPRESSION:  1. Nodular soft tissue thickening adjacent to postoperative changes in  the right upper lobe of the lung demonstrates mild associated  hypermetabolic activity. This finding could be related to  postoperative healing; however, recurrent neoplasm is not entirely  excluded from this appearance. Continued attention to this finding at  followup is recommended.  2. Two hypermetabolic calcifications are noted along the lateral  aspect of the right hepatic lobe, and are new since 2017. There  is also new ill-defined hypermetabolic soft tissue thickening with  associated irregular calcification in the anterior abdominal wall just  inferior to the xiphoid process. These areas of hypermetabolic  activity and calcification are of uncertain  "etiology and would be an  unusual appearance for metastatic disease. These findings could  possibly be possibly related to the recent surgery. Please clinically  correlate. Continued attention to these findings at followup is  recommended. Ultrasound-guided tissue sampling may be possible in the  region of hypermetabolic activity inferior to the xiphoid process.  3. Small right pleural effusion.  4. Horseshoe kidney.  5. Small hypermetabolic focus within the prostate gland anteriorly  near the apex. Prostate malignancy could have this appearance, and  urologic consultation is recommended.      Outside and Past Medical records:    I spent 10 minutes reviewing outside and past medical records.       Standardized Questionnaire:      Deferred         Assessment and Plan:   Assessment:   Juwan Latham is a very pleasant 66 year old male with a history of lung cancer who presents with hypermetabolism of the prostate on PET.    Plan:  -Will check PSA today and call patient with results. Discussed the next step with the patient depending on the results. Patient expressed understanding and agrees with the plans below.   -If PSA is elevated, will call and schedule prostate biopsy   -If PSA is normal, will f/u in 6 months with repeat PSA        Orders  Orders Placed This Encounter   Procedures     PSA total and free     F/U:   After PSA result is back    Jesus Arroyo MS4    Scribe Disclosure:   I, Jesus Arroyo, am serving as a scribe; to document services personally performed by Dr. Agarwal based on data collection and the provider's statements to me.     Attestation:  This patient was seen and evaluated by me, with the medical student serving as scribe.  I have reviewed the note above and agree.  The physical exam was performed by me and the pertinant details are outlined below.          Physical Exam:     Patient is a 66 year old  male   Vitals: Blood pressure 143/84, pulse 60, height 1.727 m (5' 8\"), weight 89.8 kg " (198 lb).  General Appearance Adult: Alert, no acute distress, oriented  HENT: throat/mouth:normal, good dentition  Neck: No adenopathy,masses or thyromegaly  Lungs: no respiratory distress, or pursed lip breathing  Heart: No obvious jugular venous distension present  Abdomen: soft, nontender, no organomegaly or masses, Body mass index is 30.11 kg/(m^2).Lymphatics: No cervical or supraclavicular adenopathy  Musculoskeltal: extremities normal, no peripheral edema  Skin: no suspicious lesions or rashes  Neuro: Alert, oriented, speech and mentation normal  Psych: affect and mood normal  Gait: Normal  : BEN anodular, symmetric    Assessment: PET avid area in prostate  Plan:Will get PSA  If elevated, will schedule for biopsy of prostate    Addendum  PSA 19, will schedule for biopsy.  Again, thank you for allowing me to participate in the care of your patient.      Sincerely,    Paulo Agarwal MD

## 2017-08-03 NOTE — PROGRESS NOTES
Chief Complaint:    Hypermetabolism of prostate on PET           Consult or Referral:     Mr. Juwan Latham is a 66 year old male seen in consultation from Dr. Faulkner.         History of Present Illness:    Juwan Latham is a very pleasant 66 year old male with a history of lung cancer who presents with hypermetabolism of the prostate on PET. Patient was treated for a R-sided apical lung cancer with wedge resection on 3/3/17 that showed a T1a adenocarcinoma. Follow-up PET imaging on 4/11/17 was significant for a small hypermetabolic focus within the prostate gland anteriorly near the apex.    Patient had a PSA of 3.90 in 2004 and had no further PSA testing. He has no urinary symptoms and denies hematuria, incontinence, voiding dysfunction, and dysuria. He states that he has a strong stream. Of note, he has a horseshoe kidney and has a history of kidney stones. He states that he has intermittent left-sided back pain and believes that he may have another kidney stone that is forming. He denies weight loss and bone pain.           Past Medical History:     Past Medical History:   Diagnosis Date     Anxiety      Calculus of kidney 2005, 2012     Congenital single kidney      Hypertension      Seasonal allergies     spring and fall     Sleep apnea     no cpap            Past Surgical History:     Past Surgical History:   Procedure Laterality Date     BRONCHOSCOPY FLEXIBLE N/A 3/3/2017    Procedure: BRONCHOSCOPY FLEXIBLE;  Surgeon: Maria A Desai MD;  Location: [facility] OR     COLONOSCOPY N/A 2/11/2015    Procedure: COLONOSCOPY;  Surgeon: Murphy Jesus MD;  Location:  GI     ESOPHAGOSCOPY, GASTROSCOPY, DUODENOSCOPY (EGD), COMBINED N/A 5/20/2016    Procedure: COMBINED ESOPHAGOSCOPY, GASTROSCOPY, DUODENOSCOPY (EGD), BIOPSY SINGLE OR MULTIPLE;  Surgeon: Murphy Jesus MD;  Location:  GI     LAPAROSCOPIC WEDGE RESECTION LUNG Right 3/3/2017    Procedure: LAPAROSCOPIC WEDGE RESECTION LUNG;  Surgeon: Lloyd  Maria A DANGELO MD;  Location: [facility] OR     LASER HOLMIUM LITHOTRIPSY URETER(S), INSERT STENT, COMBINED  2012    Procedure: COMBINED CYSTOSCOPY, URETEROSCOPY, LASER HOLMIUM LITHOTRIPSY URETER(S), INSERT STENT;  Cystoscopy, Right Ureteroscopy, Stone Extraction, Holmium Laser, Double J Stent Placement;  Surgeon: Nicolás Blackwell MD;  Location: [facility] OR            Medications     Current Outpatient Prescriptions   Medication     ALFALFA PO     cyanocolbalamin (VITAMIN  B-12) 100 MCG tablet     VITAMIN D, CHOLECALCIFEROL, PO     buPROPion (WELLBUTRIN XL) 300 MG 24 hr tablet     fluticasone (FLONASE) 50 MCG/ACT spray     order for DME     ALPRAZolam (XANAX) 0.5 MG tablet     acetaminophen (TYLENOL) 325 MG tablet     No current facility-administered medications for this visit.             Family History:     Family History   Problem Relation Age of Onset     HEART DISEASE Mother      DIABETES Brother 65     Type 2     DIABETES Maternal Grandmother      Cancer - colorectal Brother 70     Prostate Cancer Brother 74     Hypertension No family hx of      Lipids No family hx of             Social History:     Social History     Social History     Marital status:      Spouse name: N/A     Number of children: 1     Years of education: N/A     Occupational History     Remodeling Self      Other     Social History Main Topics     Smoking status: Former Smoker     Packs/day: 2.00     Years: 20.00     Types: Cigarettes     Quit date: 1985     Smokeless tobacco: Never Used     Alcohol use Yes      Comment: avg 3 beers per night     Drug use: No     Sexual activity: Yes     Partners: Female     Other Topics Concern     Caffeine Concern No     Sleep Concern Yes     middle/late insomnia     Exercise Yes     Seat Belt Yes     Parent/Sibling W/ Cabg, Mi Or Angioplasty Before 65f 55m? No     Social History Narrative    Dad  at age 86 from complications after hip surgery.    Mom  at age 68 from heart condition    Siblings:   Three sisters in good health.  Brother with diabetes and overweight.        One son    One granddaughter    Occupation:  remodeling            Allergies:   Percocet [oxycodone-acetaminophen]         Review of Systems:  From intake questionnaire     Negative 14 system review except as noted on HPI, nurse's note.        Labs and Pathology:    I reviewed all applicable laboratory and pathology data and went over findings with patient  Significant for   Lab Results   Component Value Date    CR 0.90 07/13/2017    CR 0.74 03/05/2017    CR 0.69 03/04/2017    CR 0.83 02/24/2017    CR 0.83 08/21/2015    CR 0.81 07/10/2015    CR 0.89 06/25/2013    CR 0.96 09/08/2012    CR 1.20 10/29/2005    CR 1.00 10/29/2005     PSA (11/3/2004): 3.90        Imaging:    I reviewed all applicable imaging and went over findings with patient.  Significant for PET/CT on 4/11/2017:  IMPRESSION:  1. Nodular soft tissue thickening adjacent to postoperative changes in  the right upper lobe of the lung demonstrates mild associated  hypermetabolic activity. This finding could be related to  postoperative healing; however, recurrent neoplasm is not entirely  excluded from this appearance. Continued attention to this finding at  followup is recommended.  2. Two hypermetabolic calcifications are noted along the lateral  aspect of the right hepatic lobe, and are new since 1/25/2017. There  is also new ill-defined hypermetabolic soft tissue thickening with  associated irregular calcification in the anterior abdominal wall just  inferior to the xiphoid process. These areas of hypermetabolic  activity and calcification are of uncertain etiology and would be an  unusual appearance for metastatic disease. These findings could  possibly be possibly related to the recent surgery. Please clinically  correlate. Continued attention to these findings at followup is  recommended. Ultrasound-guided tissue sampling may be possible in the  region of  "hypermetabolic activity inferior to the xiphoid process.  3. Small right pleural effusion.  4. Horseshoe kidney.  5. Small hypermetabolic focus within the prostate gland anteriorly  near the apex. Prostate malignancy could have this appearance, and  urologic consultation is recommended.      Outside and Past Medical records:    I spent 10 minutes reviewing outside and past medical records.       Standardized Questionnaire:      Deferred         Assessment and Plan:     Assessment:   Juwan Latham is a very pleasant 66 year old male with a history of lung cancer who presents with hypermetabolism of the prostate on PET.    Plan:  -Will check PSA today and call patient with results. Discussed the next step with the patient depending on the results. Patient expressed understanding and agrees with the plans below.   -If PSA is elevated, will call and schedule prostate biopsy   -If PSA is normal, will f/u in 6 months with repeat PSA        Orders  Orders Placed This Encounter   Procedures     PSA total and free       F/U:   After PSA result is back    Jesus Arroyo MS4    Scribe Disclosure:   I, Jesus Arroyo, am serving as a scribe; to document services personally performed by Dr. Agarwal based on data collection and the provider's statements to me.     Attestation:  This patient was seen and evaluated by me, with the medical student serving as scribe.  I have reviewed the note above and agree.  The physical exam was performed by me and the pertinant details are outlined below.          Physical Exam:     Patient is a 66 year old  male   Vitals: Blood pressure 143/84, pulse 60, height 1.727 m (5' 8\"), weight 89.8 kg (198 lb).  General Appearance Adult: Alert, no acute distress, oriented  HENT: throat/mouth:normal, good dentition  Neck: No adenopathy,masses or thyromegaly  Lungs: no respiratory distress, or pursed lip breathing  Heart: No obvious jugular venous distension present  Abdomen: soft, nontender, no " organomegaly or masses, Body mass index is 30.11 kg/(m^2).Lymphatics: No cervical or supraclavicular adenopathy  Musculoskeltal: extremities normal, no peripheral edema  Skin: no suspicious lesions or rashes  Neuro: Alert, oriented, speech and mentation normal  Psych: affect and mood normal  Gait: Normal  : BEN anodular, symmetric    Assessment: PET avid area in prostate  Plan:Will get PSA  If elevated, will schedule for biopsy of prostate    Addendum  PSA 19, will schedule for biopsy.    Paulo Agarwal MD  Department of Urology  [facility]

## 2017-08-03 NOTE — NURSING NOTE
Chief Complaint   Patient presents with     Consult     abnormal imaging finding- possible prostate cancer     Madelaine Ramirez RN

## 2017-08-03 NOTE — PATIENT INSTRUCTIONS
PSA today.  Based on the results, we may proceed with a prostate biopsy.    It was a pleasure meeting with you today.  Thank you for allowing me and my team the privilege of caring for you today.  YOU are the reason we are here, and I truly hope we provided you with the excellent service you deserve.  Please let us know if there is anything else we can do for you so that we can be sure you are leaving completely satisfied with your care experience.      Eloina Murillo, [facility]            [place] for Prostate and Urologic Cancers  Preparation for Prostate Ultrasound and Biopsy    You have been scheduled for a prostate ultrasound with biopsies. A lubricated probe will be inserted into your rectum by your doctor. This equipment uses sound waves to produce an image or picture of the inside of the prostate gland. They will be able to measure the size of your prostate, look for any abnormalities in anatomy, and target any areas that may look suspicious for cancer before taking the samples (biopsies).  During this procedure the prostate will be injected with a numbing medication. This medication will not make you sleepy nor affect your ability to drive.    How to Prepare for the Procedure      On the day of the procedure eat and drink normally. Please do not fast or skip meals on the day of your procedure.      Please bring a list of your current medications to your appointment and take all regular medications as normal.      Do not take any of the following medications 7 days before your procedure:  - Anacin  - Bufferin  - Excedrin  - Ibuprofen  - Ecotrin   - Any other aspirin based products.   - Motrin  - Naprosyn  - Feldene  - Plavix  - Any other anti-inflammatory   medications      If you are taking any anticoagulation medications such as Coumadin, Jantoven, Warfarin, special instructions will need to be discussed.      You will be given an antibiotic the day of the procedure in the clinic.  *If patient had ? 2  prostate biopsies in the last 2 years, Gentamicin 80mg IM and Cipro 500 mg will be administered in the clinic prior to procedure*      You will need to purchase one Fleets enema (or equivalent).  This can be purchased at any pharmacy or grocery store and will be found in the laxative section. We ask that you give the enema to yourself approximately 2 hours before your appointment time.       The procedure takes about 30-45 minutes and will be done in the office. After the procedure you will be sent home with instructions.    Please call [facility]rology/[facility] with any questions or concerns at [phone], press option # 3 to speak with a nurse.      As recommended by the American Urological Association Education and Research, Inc. article of 2007.

## 2017-08-03 NOTE — MR AVS SNAPSHOT
After Visit Summary   8/3/2017    Juwan Latham    MRN: [id number]           Patient Information     Date Of Birth          1950        Visit Information        Provider Department      8/3/2017 1:20 PM Weight, Paulo Wiley MD [facility] and [facility] Prostate and Urologic C[facility]        Today's Diagnoses     Screening for prostate cancer    -  1    Abnormal findings on diagnostic imaging of other parts of musculoskeletal system           Care Instructions    PSA today.  Based on the results, we may proceed with a prostate biopsy.    It was a pleasure meeting with you today.  Thank you for allowing me and my team the privilege of caring for you today.  YOU are the reason we are here, and I truly hope we provided you with the excellent service you deserve.  Please let us know if there is anything else we can do for you so that we can be sure you are leaving completely satisfied with your care experience.      Eloina Murillo, [facility]            [place] for Prostate and Urologic Cancers  Preparation for Prostate Ultrasound and Biopsy    You have been scheduled for a prostate ultrasound with biopsies. A lubricated probe will be inserted into your rectum by your doctor. This equipment uses sound waves to produce an image or picture of the inside of the prostate gland. They will be able to measure the size of your prostate, look for any abnormalities in anatomy, and target any areas that may look suspicious for cancer before taking the samples (biopsies).  During this procedure the prostate will be injected with a numbing medication. This medication will not make you sleepy nor affect your ability to drive.    How to Prepare for the Procedure      On the day of the procedure eat and drink normally. Please do not fast or skip meals on the day of your procedure.      Please bring a list of your current medications to your appointment and take all regular medications as normal.      Do not take  any of the following medications 7 days before your procedure:  - Anacin  - Bufferin  - Excedrin  - Ibuprofen  - Ecotrin   - Any other aspirin based products.   - Motrin  - Naprosyn  - Feldene  - Plavix  - Any other anti-inflammatory   medications      If you are taking any anticoagulation medications such as Coumadin, Jantoven, Warfarin, special instructions will need to be discussed.      You will be given an antibiotic the day of the procedure in the clinic.  *If patient had ? 2 prostate biopsies in the last 2 years, Gentamicin 80mg IM and Cipro 500 mg will be administered in the clinic prior to procedure*      You will need to purchase one Fleets enema (or equivalent).  This can be purchased at any pharmacy or grocery store and will be found in the laxative section. We ask that you give the enema to yourself approximately 2 hours before your appointment time.       The procedure takes about 30-45 minutes and will be done in the office. After the procedure you will be sent home with instructions.    Please call Urology/[place] for Prostate [facility] with any questions or concerns at [phone], press option # 3 to speak with a nurse.      As recommended by the American Urological Association Education and Research, Inc. article of 2007.          Follow-ups after your visit        Your next 10 appointments already scheduled     Oct 18, 2017 11:20 AM CDT   (Arrive by 11:05 AM)   CT CHEST W/O CONTRAST with UCCT1   [facility] ([facility])    [address] [address] [phone]           Please bring any scans or X-rays taken at other hospitals, if similar tests were done. Also bring a list of your medicines, including vitamins, minerals and over-the-counter drugs. It is safest to leave personal items at home.  Be sure to tell your doctor:   If you have any allergies.   If there s any chance you are pregnant.   If you are breastfeeding.   If  you have any special needs.  You do not need to do anything special to prepare.  Please wear loose clothing, such as a sweat suit or jogging clothes. Avoid snaps, zippers and other metal. We may ask you to undress and put on a hospital gown.            Oct 18, 2017 12:15 PM CDT   (Arrive by 12:00 PM)   Return Visit with GLORIA Arechiga [facility] ([facility])    [address]  [address]  [facility] [address] [phone]            Jan 29, 2018  9:00 AM CST   Return Sleep Patient with Owen Davis MD   [facility] ([facility])    [address]  [facility] [address] [phone]              Future tests that were ordered for you today     Open Future Orders        Priority Expected Expires Ordered    PSA total and free Routine  8/3/2018 8/3/2017            Who to contact     Please call your clinic at [phone] to:    Ask questions about your health    Make or cancel appointments    Discuss your medicines    Learn about your test results    Speak to your doctor   If you have compliments or concerns about an experience at your clinic, or if you wish to file a complaint, please contact [facility] Patient Relations at [phone] or email us at [email or web address]         Additional Information About Your Visit        Data Marketplacehart Information     [employer]NO[employer] gives you secure access to your electronic health record. If you see a primary care provider, you can also send messages to your care team and make appointments. If you have questions, please call your primary care clinic.  If you do not have a primary care provider, please call [phone] and they will assist you.      INNO[employer] is an electronic gateway that provides easy, online access to your medical records. With [employer], you can request a clinic appointment, read your test  "results, renew a prescription or communicate with your care team.     To access your existing account, please contact your [facility] or call [phone] for assistance.        Care EveryWhere ID     This is your Care EveryWhere ID. This could be used by other organizations to access your [place] medical records  [id number]        Your Vitals Were     Pulse Height BMI (Body Mass Index)             60 1.727 m (5' 8\") 30.11 kg/m2          Blood Pressure from Last 3 Encounters:   08/03/17 143/84   07/17/17 130/70   07/13/17 140/88    Weight from Last 3 Encounters:   08/03/17 89.8 kg (198 lb)   07/17/17 88 kg (194 lb)   07/03/17 87.5 kg (193 lb)               Primary Care Provider Office Phone # Fax #    Julio Guidry Jr., [phone]       [facility] [address] [address]        Equal Access to Services     CHAMP SNIDER : Hadii aad ku hadasho Soomaali, waaxda luqadaha, qaybta kaalmada adeegyada, waxay idiin hayaan adeeg kharash la'oliven . So [facility] [place] [phone].    ATENCIÓN: Si habla español, tiene a gunn disposición servicios gratuitos de asistencia lingüística. Jeanette al [phone].    We comply with applicable federal civil rights laws and [place] laws. We do not discriminate on the basis of race, color, national origin, age, disability sex, sexual orientation or gender identity.            Thank you!     Thank you for choosing [facility] UROLOGY AND [facility] FOR PROSTATE AND UROLOGIC CANCERS  for your care. Our goal is always to provide you with excellent care. Hearing back from our patients is one way we can continue to improve our services. Please take a few minutes to complete the written survey that you may receive in the mail after your visit with us. Thank you!             Your Updated Medication List - Protect others around you: Learn how to safely use, store and throw away your medicines at www.disposemymeds.org.          This list is " accurate as of: 8/3/17  2:58 PM.  Always use your most recent med list.                   Brand Name Dispense Instructions for use Diagnosis    acetaminophen 325 MG tablet    TYLENOL    100 tablet    Take 2 tablets (650 mg) by mouth every 6 hours Do not take more than 4,000 mg of acetaminophen (Tylenol) from all sources to prevent liver damage.    Lung nodule       ALFALFA PO      Take by mouth daily        ALPRAZolam 0.5 MG tablet    XANAX    10 tablet    Take 1 tablet (0.5 mg) by mouth 3 times daily as needed for anxiety    Anxiety       buPROPion 300 MG 24 hr tablet    WELLBUTRIN XL    90 tablet    Take 1 tablet (300 mg) by mouth every morning    Major depressive disorder, recurrent episode, mild (H)       cyanocolbalamin 100 MCG tablet    vitamin  B-12     Take 50 mcg by mouth daily        fluticasone 50 MCG/ACT spray    FLONASE    1 Bottle    Spray 1-2 sprays into both nostrils daily    Chronic rhinitis, unspecified type       order for DME      Equipment ordered: RESMED Auto PAP Mask type: Nasal Settings: 8-16 CM H20  : CPAP        VITAMIN D (CHOLECALCIFEROL) PO      Take 1,000 Units by mouth daily

## 2017-08-05 LAB
PSA FREE MFR SERPL: 7 %
PSA FREE SERPL-MCNC: 1.3 NG/ML
PSA SERPL-MCNC: 19.6 NG/ML

## 2017-08-09 ENCOUNTER — TELEPHONE (OUTPATIENT)
Dept: FAMILY MEDICINE | Facility: CLINIC | Age: 67
End: 2017-08-09

## 2017-08-09 NOTE — TELEPHONE ENCOUNTER
I spoke with Juwan about his recent diagnosis of elevated PSA. I confirmed with him that I thought prostate biopsy was indicated and that Dr. Agarwal was very capable physician to accomplish this. This is being scheduled at the [facility] for 8/31/17. I also reassured Juwan that I thought it was okay for us to wait for this period of time before doing his biopsy.  Juwan advised me that the fatigue really didn't get any better with the Wellbutrin and that he perhaps had some side effects of shaking with this so he therefore stopped it. Medication list is updated.    Juwan tells me he feels more reassured after speaking with me and plans to proceed with biopsy of the prostate on 8/31/17 as scheduled.    Julio Guidry MD

## 2017-08-09 NOTE — TELEPHONE ENCOUNTER
Juwan says he had a PSA test done and needs a biopsy. This was done through his urologist. He is saying he needs a biopsy and urology wants to do it at the . But he is asking if he can have it done somewhere closer. I told him I can have Dr. Guidry offer an option because I am not sure if there is another location to do this closer. However he also said he was wondering if he should have it done at [facility].     He is also questioning why he didn't have a PSA done before. He said his family wants to know. He thought he had discussed this with Dr. Guidry in the past but does not remember why. I informed him I will have Dr. Guidry review his questions and we will get back to him. He says that is fine and he can be reached at the listed number in Epic.     Dr. Guidry, please advise. Justa Naqvi R.N.

## 2017-08-16 ENCOUNTER — TELEPHONE (OUTPATIENT)
Dept: UROLOGY | Facility: CLINIC | Age: 67
End: 2017-08-16

## 2017-08-16 NOTE — TELEPHONE ENCOUNTER
----- Message from Mignon Mirza RN sent at 8/16/2017 10:05 AM CDT -----  Regarding: RE: POSSIBLE SOONER APPT  We will see him at his scheduled appt for a biopsy.   No need to be seen sooner  Mignon    ----- Message -----     From: Sandra Nogueira LPN     Sent: 8/16/2017  10:02 AM       To: Mignon Mirza RN  Subject: FW: POSSIBLE SOONER APPT                         Weight patient  sandra  ----- Message -----     From: Lety Oropeza     Sent: 8/16/2017   9:22 AM       To: Urology & [facility] Triage-  Subject: POSSIBLE SOONER APPT                             Per call Filemon at [facility] to see if PT can get in sooner.  PT suffers from anxiety and has been in the ER with a panic attack recently.  With that, he is currently experiencing symptoms so they are hoping to get him in sooner.  Please call Filemon at [phone] and let her know if this is possible.    Thank you,  Lety    Please DO NOT send this message and/or reply back to sender.  Call Center Representatives DO NOT respond to messages.

## 2017-08-16 NOTE — TELEPHONE ENCOUNTER
----- Message from Lety Oropeza sent at 8/16/2017  9:22 AM CDT -----  Regarding: POSSIBLE SOONER APPT  Per call Filemon at [facility] to see if PT can get in sooner.  PT suffers from anxiety and has been in the ER with a panic attack recently.  With that, he is currently experiencing symptoms so they are hoping to get him in sooner.  Please call Filemon at [phone] and let her know if this is possible.    Thank you,  Lety    Please DO NOT send this message and/or reply back to sender.  Call Center Representatives DO NOT respond to messages.

## 2017-08-16 NOTE — PROGRESS NOTES
Xanax      Last Written Prescription Date: 3/28/17  Last Fill Quantity: 10,  # refills: 0   Last Office Visit with [facility], [facility] or [facility] prescribing provider: 7/17/17        Routing refill request to provider for review/approval because:  Drug not on the [facility], [facility] or  [facility] refill protocol or controlled substance.    See below note regarding anxiety. Is out of medication. Requesting refill.   Justa Naqvi R.N.

## 2017-08-16 NOTE — TELEPHONE ENCOUNTER
[facility]ed [facility] about his appointment it is in appropriate time. Jacquelyn Nogueira LPN Staff Nurse

## 2017-08-16 NOTE — PROGRESS NOTES
Clinic Care Coordination Contact  OUTREACH    Referral Information:  Referral Source: PCP  Reason for Contact: follow up  Care Conference: No     Universal Utilization:   ED Visits in last year: 1  Hospital visits in last year: 1  Last PCP appointment: 07/17/17  Missed Appointments: 0  Concerns: anxiety management  Multiple Providers or Specialists: no    Clinical Concerns:  Current Medical Concerns: Placed call to pt to follow up on anxiety concerns.  Pt reports that he has been doing really well and managing his stress until this week.  Pt has a prostate biopsy scheduled for Aug 31st and is very concerned about what it will show.  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 Informed pt that this is likely very slow growing, and offered to see if test could be moved up. Pt stated that he would be very interested in getting it done sooner if possible.  Writer assured pt that she will look into that for him.  Pt also reports that he is out of the xanax prescribed by his PCP and requested a refill if able. Placed call to the [facility] urology clinic, spoke with Jacquelyn.  She stated that Dr. Agarwal is only in clinic on Thursdays, so there is not a sooner appointment.  The providers do their own biopsies, so trying another MD is not an option for this. Jacquelyn stated that she could call pt if there is a cancellation prior to his appointment.  Also discussed his concern regarding the spreading of this.  She confirmed that it is likely a very slow growing tumor and stated that she would call pt and reassure him that waiting for appointment will not adversely affect him.  Current Behavioral Concerns: anxiety    Education Provided to patient: see above   Clinical Pathway Name: None    Medication Management:  No concerns     Functional Status:  Mobility Status: Independent     Transportation: no concerns      Psychosocial:  Current  living arrangement:: I live in a private home  Financial/Insurance: no concerns     Resources and Interventions:  Current Resources: none   Advanced Care Plans/Directives on file: No        Goals:   Goal 1 Statement: I will work on managing my stress over the next couple of months.  Goal 1 Progression Percent: 30%  Goal 1 Progression Date: 08/16/17   Upcoming appointment:  (NA)     Plan: Will plan to follow up with pt on xanax refill and again after his result appointment with Dr. Agarwal on 09/07.  Pt in agreement with plan.

## 2017-08-17 RX ORDER — ALPRAZOLAM 0.5 MG
0.5 TABLET ORAL 3 TIMES DAILY PRN
Qty: 10 TABLET | Refills: 0 | Status: SHIPPED | OUTPATIENT
Start: 2017-08-17 | End: 2017-08-28

## 2017-08-17 NOTE — PROGRESS NOTES
Below is noted.  Separate refill encounter is in place for refill of Xanax.  Will close encounter.    Julio Guidry MD

## 2017-08-28 DIAGNOSIS — F41.9 ANXIETY: ICD-10-CM

## 2017-08-28 RX ORDER — ALPRAZOLAM 0.5 MG
0.5 TABLET ORAL 3 TIMES DAILY PRN
Qty: 10 TABLET | Refills: 0 | Status: SHIPPED | OUTPATIENT
Start: 2017-08-28 | End: 2017-09-12

## 2017-08-28 NOTE — TELEPHONE ENCOUNTER
ALPRAZolam (XANAX) 0.5 MG tablet      Last Written Prescription Date:  8/17/2017  Last Fill Quantity: 10 tablet,   # refills: 0  Last Office Visit with [facility], [facility] or [facility] prescribing provider: 7/17/2017  Future Office visit:       Routing refill request to provider for review/approval because:  Drug not on the [facility], [facility] or [facility] refill protocol or controlled substance

## 2017-08-28 NOTE — TELEPHONE ENCOUNTER
The requested prescription(s) has/have been approved and has/have been printed and signed. This was forwarded to the patient care pool to fax to the patient's preferred pharmacy.  [facility] [address], [place] - [address] AT [facility] [address]    Julio Guidry Jr

## 2017-08-31 ENCOUNTER — OFFICE VISIT (OUTPATIENT)
Dept: UROLOGY | Facility: CLINIC | Age: 67
End: 2017-08-31

## 2017-08-31 VITALS
SYSTOLIC BLOOD PRESSURE: 146 MMHG | BODY MASS INDEX: 30.01 KG/M2 | DIASTOLIC BLOOD PRESSURE: 86 MMHG | HEIGHT: 68 IN | WEIGHT: 198 LBS | HEART RATE: 71 BPM

## 2017-08-31 DIAGNOSIS — R97.20 ELEVATED PROSTATE SPECIFIC ANTIGEN (PSA): Primary | ICD-10-CM

## 2017-08-31 DIAGNOSIS — Z12.5 SCREENING FOR PROSTATE CANCER: ICD-10-CM

## 2017-08-31 ASSESSMENT — PAIN SCALES - GENERAL: PAINLEVEL: NO PAIN (0)

## 2017-08-31 NOTE — PATIENT INSTRUCTIONS
Follow up with Dr. Agarwal to discuss results of prostate biopsy.    It was a pleasure meeting with you today.  Thank you for allowing me and my team the privilege of caring for you today.  YOU are the reason we are here, and I truly hope we provided you with the excellent service you deserve.  Please let us know if there is anything else we can do for you so that we can be sure you are leaving completely satisfied with your care experience.      Eloina Murillo, [facility]        [facility] for Prostate and Urologic Cancers  Precautions Following a Prostate Biopsy    There are four conditions that you should watch for after a prostate biopsy:    1. Excessive pain  2. Bleeding irregularities (passing numerous  dime sized  clots or if your urine looks like cranberry juice)  3. Fever of 100 degrees or more  4. If you are unable to urinate        If any of these occur, call the Urology Clinic during normal business hours (M-F, 8:00-4:30) at [phone].  If you experience a problem after normal business hours, call our 24-hour phone number at [phone] and ask for the Urology Resident on call to be paged.      If you experience any discomfort following the biopsy, you may take Tylenol.  DO NOT TAKE ASPIRIN unless specified by your physician.   If the discomfort becomes severe or uncontrolled by medication, contact the Urology Clinic or Urology Resident (after normal business hours).      Do not be alarmed if you have some blood in your stool, in your urine, or ejaculate (semen).  This occurrence is normal and may last up to three (3) or four (4) days, usually intermittently.  Blood in the ejaculate (semen) may last several weeks, up to about a dozen ejaculations.  The blood in your ejaculate may appear as brown streaks, blood tinged, and immediately following a biopsy, it may appear bright red.      If you run a fever above 100 degrees, call the Urology Clinic or Urology Resident (after normal business hours) immediately.   If you are unable to reach your physician or the Resident on call, go to the nearest emergency room.  Explain that you have had a transrectal biopsy of your prostate and what problems you are experiencing.        You should attempt to urinate following your biopsy before you leave the clinic.  If you are unable to urinate four (4) to six (6) hours after you leave the clinic, you will need to contact the Urology Clinic or the Resident on call.  If you are unable to reach your physician or the Resident on call, go to the nearest emergency room.            If you have any questions or concerns after your biopsy, feel free to contact the Urology Clinic at [phone] during M-F, 8:00-5pm business hours.  If you need to speak with someone after normal business hours, call [phone] and ask for the Resident on call to be paged.

## 2017-08-31 NOTE — MR AVS SNAPSHOT
After Visit Summary   8/31/2017    Juwan Latham    MRN: [id number]           Patient Information     Date Of Birth          1950        Visit Information        Provider Department      8/31/2017 2:20 PM Paulo Agarwal MD [facility] and [facility] for Prostate and Urologic Cancers        Care Instructions    Follow up with Dr. Agarwal to discuss results of prostate biopsy.    It was a pleasure meeting with you today.  Thank you for allowing me and my team the privilege of caring for you today.  YOU are the reason we are here, and I truly hope we provided you with the excellent service you deserve.  Please let us know if there is anything else we can do for you so that we can be sure you are leaving completely satisfied with your care experience.      Eloina Murillo, [facility] for Prostate and Urologic Cancers  Precautions Following a Prostate Biopsy    There are four conditions that you should watch for after a prostate biopsy:    1. Excessive pain  2. Bleeding irregularities (passing numerous  dime sized  clots or if your urine looks like cranberry juice)  3. Fever of 100 degrees or more  4. If you are unable to urinate        If any of these occur, call the Urology Clinic during normal business hours (M-F, 8:00-4:30) at [phone].  If you experience a problem after normal business hours, call our 24-hour phone number at [phone] and ask for the Urology Resident on call to be paged.      If you experience any discomfort following the biopsy, you may take Tylenol.  DO NOT TAKE ASPIRIN unless specified by your physician.   If the discomfort becomes severe or uncontrolled by medication, contact the Urology Clinic or Urology Resident (after normal business hours).      Do not be alarmed if you have some blood in your stool, in your urine, or ejaculate (semen).  This occurrence is normal and may last up to three (3) or four (4) days, usually intermittently.  Blood in  the ejaculate (semen) may last several weeks, up to about a dozen ejaculations.  The blood in your ejaculate may appear as brown streaks, blood tinged, and immediately following a biopsy, it may appear bright red.      If you run a fever above 100 degrees, call the Urology Clinic or Urology Resident (after normal business hours) immediately.  If you are unable to reach your physician or the Resident on call, go to the nearest emergency room.  Explain that you have had a transrectal biopsy of your prostate and what problems you are experiencing.        You should attempt to urinate following your biopsy before you leave the clinic.  If you are unable to urinate four (4) to six (6) hours after you leave the clinic, you will need to contact the Urology Clinic or the Resident on call.  If you are unable to reach your physician or the Resident on call, go to the nearest emergency room.            If you have any questions or concerns after your biopsy, feel free to contact the Urology Clinic at [phone] during M-F, 8:00-5pm business hours.  If you need to speak with someone after normal business hours, call [phone] and ask for the Resident on call to be paged.            Follow-ups after your visit        Your next 10 appointments already scheduled     Sep 07, 2017  1:20 PM CDT   (Arrive by 1:05 PM)   Return Visit with Paulo Agarwal MD   [facility] and [facility] for Prostate and Urologic Cancers ([facility])    [address] [place] [address]  4th Floor  [facility] [id number] [phone]            Oct 18, 2017 11:20 AM CDT   (Arrive by 11:05 AM)   CT CHEST W/O CONTRAST with UCCT1   [facility] ([facility])    [address]  1st Floor  [facility] [id number] [phone]           Please bring any scans or X-rays taken at other hospitals, if similar tests were done. Also bring a list of your medicines, including vitamins,  minerals and over-the-counter drugs. It is safest to leave personal items at home.  Be sure to tell your doctor:   If you have any allergies.   If there s any chance you are pregnant.   If you are breastfeeding.   If you have any special needs.  You do not need to do anything special to prepare.  Please wear loose clothing, such as a sweat suit or jogging clothes. Avoid snaps, zippers and other metal. We may ask you to undress and put on a hospital gown.            Oct 18, 2017 12:15 PM CDT   (Arrive by 12:00 PM)   Return Visit with GLORIA Arechiga [facility] ([facility])    [address]  2nd Floor  [facility] [address] [phone]            Jan 29, 2018  9:00 AM CST   Return Sleep Patient with Owen Davis MD   [facility] ([facility])    [address]  [facility] [address] [phone]              Who to contact     Please call your clinic at [phone] to:    Ask questions about your health    Make or cancel appointments    Discuss your medicines    Learn about your test results    Speak to your doctor   If you have compliments or concerns about an experience at your clinic, or if you wish to file a complaint, please contact [facility] Patient Relations at [phone] or email us at [email or web address]         Additional Information About Your Visit        [employer] Information     [employer] gives you secure access to your electronic health record. If you see a primary care provider, you can also send messages to your care team and make appointments. If you have questions, please call your primary care clinic.  If you do not have a primary care provider, please call [phone] and they will assist you.      [employer] is an electronic gateway that provides easy, online access to your medical records. With [employer], you  "can request a clinic appointment, read your test results, renew a prescription or communicate with your care team.     To access your existing account, please contact your [facility] or call [phone] for assistance.        Care EveryWhere ID     This is your Care EveryWhere ID. This could be used by other organizations to access your Counselor medical records  [id number]        Your Vitals Were     Pulse Height BMI (Body Mass Index)             71 1.727 m (5' 8\") 30.11 kg/m2          Blood Pressure from Last 3 Encounters:   08/31/17 146/86   08/03/17 143/84   07/17/17 130/70    Weight from Last 3 Encounters:   08/31/17 89.8 kg (198 lb)   08/03/17 89.8 kg (198 lb)   07/17/17 88 kg (194 lb)              Today, you had the following     No orders found for display       Primary Care Provider Office Phone # Fax #    Julio Guidry Jr., [phone] [address]  [place] [address]        Equal Access to Services     : Hadii aad ku hadasho Soomaali, waaxda luqadaha, qaybta kaalmada adeegyada, waxay idiin hayraúl mosley . So [facility] [phone].    ATENCIÓN: Si habla español, tiene a gunn disposición servicios gratuitos de asistencia lingüística. Llame al [phone].    We comply with applicable federal civil rights laws and [place] laws. We do not discriminate on the basis of race, color, national origin, age, disability sex, sexual orientation or gender identity.            Thank you!     Thank you for choosing [facility] UROLOGY AND [facility] FOR PROSTATE AND UROLOGIC CANCERS  for your care. Our goal is always to provide you with excellent care. Hearing back from our patients is one way we can continue to improve our services. Please take a few minutes to complete the written survey that you may receive in the mail after your visit with us. Thank you!             Your Updated Medication List - Protect others around you: Learn how to " safely use, store and throw away your medicines at www.disposemymeds.org.          This list is accurate as of: 8/31/17  3:50 PM.  Always use your most recent med list.                   Brand Name Dispense Instructions for use Diagnosis    acetaminophen 325 MG tablet    TYLENOL    100 tablet    Take 2 tablets (650 mg) by mouth every 6 hours Do not take more than 4,000 mg of acetaminophen (Tylenol) from all sources to prevent liver damage.    Lung nodule       ALFALFA PO      Take by mouth daily        ALPRAZolam 0.5 MG tablet    XANAX    10 tablet    Take 1 tablet (0.5 mg) by mouth 3 times daily as needed for anxiety    Anxiety       cyanocolbalamin 100 MCG tablet    vitamin  B-12     Take 50 mcg by mouth daily        fluticasone 50 MCG/ACT spray    FLONASE    1 Bottle    Spray 1-2 sprays into both nostrils daily    Chronic rhinitis, unspecified type       order for DME      Equipment ordered: RESMED Auto PAP Mask type: Nasal Settings: 8-16 CM H20  : CPAP        VITAMIN D (CHOLECALCIFEROL) PO      Take 1,000 Units by mouth daily

## 2017-08-31 NOTE — LETTER
8/31/2017     RE: Juwan Latham  [address]  [place] [address]     Dear Colleague,    Thank you for referring your patient, Juwan Latham, to the [facility]OLOGY AND [facility] FOR PROSTATE AND UROLOGIC CANCERS at [facility]. Please see a copy of my visit note below.    Juwan Latham is here for a transrectal US guided biopsy of the prostate for a significant risk of potentially lethal prostate cancer.    The risks, benefits, alternatives, and personnel involved in the procudure were discussed.  All questions were answered.  A written informed consent was obtained.      PSA 18  PET scan shows hot prostate    An enema was completed and 500 mg of Cipro was given prior to the biopsy.  After obtaining informed consent patient was placed in lateral decubitus position.  The ultrasound probe was placed in the rectum.  The prostate was numbed using ultrasound guidance with 1% lidocaine 5 mls along each nerve bundle.  The volume was measured and estimated to be 35.2 grams.  US images were used to guide the biopsies of the prostate.  12 cores were taken with 6 on each side, 2 at the base,  2 at the midgland and  2 at the apex.  The patient tolerated the procedure well.      We will follow up with the results in 7-10 days and contact patient with these results.        Paulo Agarwal MD  Department of Urology Staff  [facility]

## 2017-08-31 NOTE — PROGRESS NOTES
Juwan Latham is here for a transrectal US guided biopsy of the prostate for a significant risk of potentially lethal prostate cancer.    The risks, benefits, alternatives, and personnel involved in the procudure were discussed.  All questions were answered.  A written informed consent was obtained.      PSA 18  PET scan shows hot prostate    An enema was completed and 500 mg of Cipro was given prior to the biopsy.  After obtaining informed consent patient was placed in lateral decubitus position.  The ultrasound probe was placed in the rectum.  The prostate was numbed using ultrasound guidance with 1% lidocaine 5 mls along each nerve bundle.  The volume was measured and estimated to be 35.2 grams.  US images were used to guide the biopsies of the prostate.  12 cores were taken with 6 on each side, 2 at the base,  2 at the midgland and  2 at the apex.  The patient tolerated the procedure well.      We will follow up with the results in 7-10 days and contact patient with these results.        Paulo Agarwal MD  Department of Urology Staff  [facility]

## 2017-09-05 ENCOUNTER — PRE VISIT (OUTPATIENT)
Dept: UROLOGY | Facility: CLINIC | Age: 67
End: 2017-09-05

## 2017-09-05 LAB — COPATH REPORT: NORMAL

## 2017-09-07 ENCOUNTER — OFFICE VISIT (OUTPATIENT)
Dept: UROLOGY | Facility: CLINIC | Age: 67
End: 2017-09-07

## 2017-09-07 DIAGNOSIS — C61 MALIGNANT NEOPLASM OF PROSTATE (H): Primary | ICD-10-CM

## 2017-09-07 NOTE — PATIENT INSTRUCTIONS
Schedule appointment with Radiation Oncology (Dr. Lopez). Call Romana if you don't hear from her [phone]     Follow up with Dr. Agarwal to discuss options in 6 weeks.    It was a pleasure meeting with you today.  Thank you for allowing me and my team the privilege of caring for you today.  YOU are the reason we are here, and I truly hope we provided you with the excellent service you deserve.  Please let us know if there is anything else we can do for you so that we can be sure you are leaving completely satisfied with your care experience.      EULA Beatty

## 2017-09-07 NOTE — MR AVS SNAPSHOT
After Visit Summary   9/7/2017    Juwan Latham    MRN: [id number]           Patient Information     Date Of Birth          1950        Visit Information        Provider Department      9/7/2017 1:20 PM Paulo gAarwal MD [facility] and [facility] for Prostate and Urologic Cancers        Care Instructions    Schedule appointment with Radiation Oncology (Dr. Lopez). Call Romana if you don't hear from her [phone]     Follow up with Dr. Agarwal to discuss options in 6 weeks.    It was a pleasure meeting with you today.  Thank you for allowing me and my team the privilege of caring for you today.  YOU are the reason we are here, and I truly hope we provided you with the excellent service you deserve.  Please let us know if there is anything else we can do for you so that we can be sure you are leaving completely satisfied with your care experience.      Eloina Murillo ALEX          Follow-ups after your visit        Your next 10 appointments already scheduled     Oct 18, 2017 11:20 AM CDT   (Arrive by 11:05 AM)   CT CHEST W/O CONTRAST with UCCT1   [facility] CT ([facility])    [address] [address] [phone]           Please bring any scans or X-rays taken at other hospitals, if similar tests were done. Also bring a list of your medicines, including vitamins, minerals and over-the-counter drugs. It is safest to leave personal items at home.  Be sure to tell your doctor:   If you have any allergies.   If there s any chance you are pregnant.   If you are breastfeeding.   If you have any special needs.  You do not need to do anything special to prepare.  Please wear loose clothing, such as a sweat suit or jogging clothes. Avoid snaps, zippers and other metal. We may ask you to undress and put on a hospital gown.            Oct 18, 2017 12:15 PM CDT   (Arrive by 12:00 PM)   Return Visit with Carla Boo  GLORIA Faulkner [facility] ([facility])    [address]  2nd [address]  [facility] [id number] [phone]            Oct 19, 2017  9:20 AM CDT   (Arrive by 9:05 AM)   Return Visit with Paulo Agarwal MD   [facility] and [facility] for Prostate and Urologic Cancers ([facility])    [address]  4th [address]  [facility] [id number] [phone]            Jan 29, 2018  9:00 AM CST   Return Sleep Patient with Owen Davis MD   [facility] ([facility]    [address]  [facility] [address] [phone]              Who to contact     Please call your clinic at [phone] to:    Ask questions about your health    Make or cancel appointments    Discuss your medicines    Learn about your test results    Speak to your doctor   If you have compliments or concerns about an experience at your clinic, or if you wish to file a complaint, please contact [facility] Patient Relations at [phone] or email us at [email or web address]         Additional Information About Your Visit        [employer] Information     [employer] gives you secure access to your electronic health record. If you see a primary care provider, you can also send messages to your care team and make appointments. If you have questions, please call your primary care clinic.  If you do not have a primary care provider, please call [phone] and they will assist you.      [employer] is an electronic gateway that provides easy, online access to your medical records. With [employer], you can request a clinic appointment, read your test results, renew a prescription or communicate with your care team.     To access your existing account, please contact your [facility] Clinic or call [phone] for assistance.        Care  EveryWhere ID     This is your Care EveryWhere ID. This could be used by other organizations to access your [place] medical records  [id number]         Blood Pressure from Last 3 Encounters:   08/31/17 146/86   08/03/17 143/84   07/17/17 130/70    Weight from Last 3 Encounters:   08/31/17 89.8 kg (198 lb)   08/03/17 89.8 kg (198 lb)   07/17/17 88 kg (194 lb)              Today, you had the following     No orders found for display       Primary Care Provider Office Phone # Fax #    Julio Guidry Jr., [phone] [address]  [place] [address]        Equal Access to Services     [facility] HospitalSARBJIT : Hadii carolyn pleitez hadgatoo Sosergio, waaxda luqadaha, qaybta kaalmada adeegyada, mynor mosley . So [facility] [phone].    ATENCIÓN: Si habla español, tiene a gunn disposición servicios gratuitos de asistencia lingüística. Llame al [phone].    We comply with applicable federal civil rights laws and [place] laws. We do not discriminate on the basis of race, color, national origin, age, disability sex, sexual orientation or gender identity.            Thank you!     Thank you for choosing [facility]OLOG[facility] AND [facility] FOR PROSTATE AND UROLOGIC CANCERS  for your care. Our goal is always to provide you with excellent care. Hearing back from our patients is one way we can continue to improve our services. Please take a few minutes to complete the written survey that you may receive in the mail after your visit with us. Thank you!             Your Updated Medication List - Protect others around you: Learn how to safely use, store and throw away your medicines at www.disposemymeds.org.          This list is accurate as of: 9/7/17  1:26 PM.  Always use your most recent med list.                   Brand Name Dispense Instructions for use Diagnosis    acetaminophen 325 MG tablet    TYLENOL    100 tablet    Take 2 tablets (650 mg) by mouth every 6 hours Do not take more than 4,000 mg  of acetaminophen (Tylenol) from all sources to prevent liver damage.    Lung nodule       ALFALFA PO      Take by mouth daily        ALPRAZolam 0.5 MG tablet    XANAX    10 tablet    Take 1 tablet (0.5 mg) by mouth 3 times daily as needed for anxiety    Anxiety       cyanocolbalamin 100 MCG tablet    vitamin  B-12     Take 50 mcg by mouth daily        fluticasone 50 MCG/ACT spray    FLONASE    1 Bottle    Spray 1-2 sprays into both nostrils daily    Chronic rhinitis, unspecified type       order for DME      Equipment ordered: RESMED Auto PAP Mask type: Nasal Settings: 8-16 CM H20  : CPAP        VITAMIN D (CHOLECALCIFEROL) PO      Take 1,000 Units by mouth daily

## 2017-09-07 NOTE — PROGRESS NOTES
Chief Complaint:   Prostate cancer            Consult or Referral:     Mr. Juwan Latham is a 66 year old male seen in consultation from Dr. Faulkner.         History of Present Illness:    Juwan Latham is a very pleasant 66 year old male with a history of prostate cancer and lung cancer. He underwent a prostate biopsy on 8/31/2017 and returns today to discuss the results.     Today he reports fatigue. He states he is voiding fine and denies any urinary symptoms.       Grade Group: 1 (involves 3 cores), 2 (involves 4 cores)  Biopsy [place] Score was 3+3 (involves 3 cores), 3 + 4 (involves 4 cores)  Pathologic Stage T1c.            Past Medical History:     Past Medical History:   Diagnosis Date     Anxiety      Calculus of kidney 2005, 2012     Congenital single kidney      Hypertension      Seasonal allergies     spring and fall     Sleep apnea     no cpap            Past Surgical History:     Past Surgical History:   Procedure Laterality Date     BRONCHOSCOPY FLEXIBLE N/A 3/3/2017    Procedure: BRONCHOSCOPY FLEXIBLE;  Surgeon: Maria A Desai MD;  Location: [facility]     COLONOSCOPY N/A 2/11/2015    Procedure: COLONOSCOPY;  Surgeon: Murphy Jesus MD;  Location:  GI     ESOPHAGOSCOPY, GASTROSCOPY, DUODENOSCOPY (EGD), COMBINED N/A 5/20/2016    Procedure: COMBINED ESOPHAGOSCOPY, GASTROSCOPY, DUODENOSCOPY (EGD), BIOPSY SINGLE OR MULTIPLE;  Surgeon: Murphy Jesus MD;  Location:  GI     LAPAROSCOPIC WEDGE RESECTION LUNG Right 3/3/2017    Procedure: LAPAROSCOPIC WEDGE RESECTION LUNG;  Surgeon: Maria A Desai MD;  Location: [facility] OR     LASER HOLMIUM LITHOTRIPSY URETER(S), INSERT STENT, COMBINED  9/11/2012    Procedure: COMBINED CYSTOSCOPY, URETEROSCOPY, LASER HOLMIUM LITHOTRIPSY URETER(S), INSERT STENT;  Cystoscopy, Right Ureteroscopy, Stone Extraction, Holmium Laser, Double J Stent Placement;  Surgeon: Nicolás Blackwell MD;  Location:  OR            Medications     Current Outpatient Prescriptions    Medication     ALPRAZolam (XANAX) 0.5 MG tablet     ALFALFA PO     cyanocolbalamin (VITAMIN  B-12) 100 MCG tablet     VITAMIN D, CHOLECALCIFEROL, PO     fluticasone (FLONASE) 50 MCG/ACT spray     order for DME     acetaminophen (TYLENOL) 325 MG tablet     No current facility-administered medications for this visit.             Family History:     Family History   Problem Relation Age of Onset     HEART DISEASE Mother      DIABETES Brother 65     Type 2     DIABETES Maternal Grandmother      Cancer - colorectal Brother 70     Prostate Cancer Brother 74     Hypertension No family hx of      Lipids No family hx of             Social History:     Social History     Social History     Marital status:      Spouse name: N/A     Number of children: 1     Years of education: N/A     Occupational History     Remodeling Self      Other     Social History Main Topics     Smoking status: Former Smoker     Packs/day: 2.00     Years: 20.00     Types: Cigarettes     Quit date: 1985     Smokeless tobacco: Never Used     Alcohol use Yes      Comment: avg 3 beers per night     Drug use: No     Sexual activity: Yes     Partners: Female     Other Topics Concern     Caffeine Concern No     Sleep Concern Yes     middle/late insomnia     Exercise Yes     Seat Belt Yes     Parent/Sibling W/ Cabg, Mi Or Angioplasty Before 65f 55m? No     Social History Narrative    Dad  at age 86 from complications after hip surgery.    Mom  at age 68 from heart condition    Siblings:  Three sisters in good health.  Brother with diabetes and overweight.        One son    One granddaughter    Occupation:  remodeling            Allergies:   Percocet [oxycodone-acetaminophen]         Review of Systems:  From intake questionnaire     Negative 14 system review except as noted on HPI, nurse's note.        Labs and Pathology:    I reviewed all applicable laboratory and pathology data and went over findings with patient  Significant for  "  Lab Results   Component Value Date    CR 0.90 07/13/2017    CR 0.74 03/05/2017    CR 0.69 03/04/2017    CR 0.83 02/24/2017    CR 0.83 08/21/2015    CR 0.81 07/10/2015    CR 0.89 06/25/2013    CR 0.96 09/08/2012    CR 1.20 10/29/2005    CR 1.00 10/29/2005     PSA (11/3/2004): 3.90      Outside and Past Medical records:    I spent 10 minutes reviewing outside and past medical records.       Standardized Questionnaire:      Deferred         Physical Exam:     Patient is a 66 year old  male   Vitals: Blood pressure 143/84, pulse 60, height 1.727 m (5' 8\"), weight 89.8 kg (198 lb).  General Appearance Adult: Alert, no acute distress, oriented  HENT: throat/mouth:normal, good dentition  Neck: No adenopathy,masses or thyromegaly  Lungs: no respiratory distress, or pursed lip breathing  Heart: No obvious jugular venous distension present  Abdomen: soft, nontender, no organomegaly or masses, Body mass index is 30.11 kg/(m^2).  Lymphatics: No cervical or supraclavicular adenopathy  Musculoskeltal: extremities normal, no peripheral edema  Skin: no suspicious lesions or rashes  Neuro: Alert, oriented, speech and mentation normal  Psych: affect and mood normal  Gait: Normal  : BEN anodular, symmetric         Assessment and Plan:     We had an extensive discussion about the significance of localized, prostate cancer.      We discussed the options for treatment of a localized prostate cancer including active surveillance, brachytherapy, external beam radiation therapy, and surgical extirpation.    Furthermore, we discussed the relative merits of robotic assisted radical prostatectomy  vs. open radical prostatectomy.  We also discussed the advantages and disadvantages and roles of open surgery vs. laparoscopic (and Da Gurvinder assisted) surgery. The anticipated post-operative course was explained, including an anticipated 1 day hospital stay.    We also discussed the option of Prolaris testing prior to proceeding with any " treatment. If the results are demonstrate lower than expected risk, we will plan to try active surveillance. Patient has decided he would like to proceed with this option.    - Refer to radiation oncologist after test for a discussion of radiation options.    The risks, benefits, alternatives, and personnel involved in the procudure were discussed.  All questions were answered.  A written informed consent will be finalized on the morning of the procedure.    Scribe Disclosure:   I,Сергей Shaikh, am serving as a scribe; to document services personally performed by Paulo Agarwal MD based on data collection and the provider's statements to me.     Paulo Agarwal MD  Department of Urology  [facility]    Attestation:  This patient was seen and evaluated by me, with a scribe taking notes.  I have reviewed the note above and agree.  The physical exam was performed by me and the pertinant details are outlined below.     Patient is a 66 year old  male   Vitals: There were no vitals taken for this visit.  General Appearance Adult: Alert, no acute distress, oriented      Assessment:Intermediate risk prostate cancer     Plan:  Referral to rad onc for discussion    Prolaris test for risk stratification    Paulo Agarwal MD  Department of Urology  [facility]

## 2017-09-07 NOTE — LETTER
9/7/2017       RE: Juwan Latham  [address]  [place] [facility] [address]     Dear Colleague,    Thank you for referring your patient, Juwan Latham, to the [facility] UROLOGY AND INST FOR PROSTATE AND UROLOGIC CANCERS at [facility]. Please see a copy of my visit note below.          Chief Complaint:   Prostate cancer            Consult or Referral:     Mr. Juwan Latham is a 66 year old male seen in consultation from Dr. Faulkner.         History of Present Illness:    Juwan Latham is a very pleasant 66 year old male with a history of prostate cancer and lung cancer. He underwent a prostate biopsy on 8/31/2017 and returns today to discuss the results.     Today he reports fatigue. He states he is voiding fine and denies any urinary symptoms.       Grade Group: 1 (involves 3 cores), 2 (involves 4 cores)  Biopsy [place] Score was 3+3 (involves 3 cores), 3 + 4 (involves 4 cores)  Pathologic Stage T1c.            Past Medical History:     Past Medical History:   Diagnosis Date     Anxiety      Calculus of kidney 2005, 2012     Congenital single kidney      Hypertension      Seasonal allergies     spring and fall     Sleep apnea     no cpap            Past Surgical History:     Past Surgical History:   Procedure Laterality Date     BRONCHOSCOPY FLEXIBLE N/A 3/3/2017    Procedure: BRONCHOSCOPY FLEXIBLE;  Surgeon: Maria A Desai MD;  [facility]     COLONOSCOPY N/A 2/11/2015    Procedure: COLONOSCOPY;  Surgeon: Murphy Jesus MD;  Location:  GI     ESOPHAGOSCOPY, GASTROSCOPY, DUODENOSCOPY (EGD), COMBINED N/A 5/20/2016    Procedure: COMBINED ESOPHAGOSCOPY, GASTROSCOPY, DUODENOSCOPY (EGD), BIOPSY SINGLE OR MULTIPLE;  Surgeon: Murphy Jesus MD;  Location:  GI     LAPAROSCOPIC WEDGE RESECTION LUNG Right 3/3/2017    Procedure: LAPAROSCOPIC WEDGE RESECTION LUNG;  Surgeon: Maria A Desai MD;  Location: [facility]     LASER HOLMIUM LITHOTRIPSY URETER(S), INSERT STENT,  COMBINED  2012    Procedure: COMBINED CYSTOSCOPY, URETEROSCOPY, LASER HOLMIUM LITHOTRIPSY URETER(S), INSERT STENT;  Cystoscopy, Right Ureteroscopy, Stone Extraction, Holmium Laser, Double J Stent Placement;  Surgeon: Nicolás Blackwell MD;  Location:  OR            Medications     Current Outpatient Prescriptions   Medication     ALPRAZolam (XANAX) 0.5 MG tablet     ALFALFA PO     cyanocolbalamin (VITAMIN  B-12) 100 MCG tablet     VITAMIN D, CHOLECALCIFEROL, PO     fluticasone (FLONASE) 50 MCG/ACT spray     order for DME     acetaminophen (TYLENOL) 325 MG tablet     No current facility-administered medications for this visit.             Family History:     Family History   Problem Relation Age of Onset     HEART DISEASE Mother      DIABETES Brother 65     Type 2     DIABETES Maternal Grandmother      Cancer - colorectal Brother 70     Prostate Cancer Brother 74     Hypertension No family hx of      Lipids No family hx of             Social History:     Social History     Social History     Marital status:      Spouse name: N/A     Number of children: 1     Years of education: N/A     Occupational History     Remodeling Self      Other     Social History Main Topics     Smoking status: Former Smoker     Packs/day: 2.00     Years: 20.00     Types: Cigarettes     Quit date: 1985     Smokeless tobacco: Never Used     Alcohol use Yes      Comment: avg 3 beers per night     Drug use: No     Sexual activity: Yes     Partners: Female     Other Topics Concern     Caffeine Concern No     Sleep Concern Yes     middle/late insomnia     Exercise Yes     Seat Belt Yes     Parent/Sibling W/ Cabg, Mi Or Angioplasty Before 65f 55m? No     Social History Narrative    Dad  at age 86 from complications after hip surgery.    Mom  at age 68 from heart condition    Siblings:  Three sisters in good health.  Brother with diabetes and overweight.        One son    One granddaughter    Occupation:  remodeling  "           Allergies:   Percocet [oxycodone-acetaminophen]         Review of Systems:  From intake questionnaire     Negative 14 system review except as noted on HPI, nurse's note.        Labs and Pathology:    I reviewed all applicable laboratory and pathology data and went over findings with patient  Significant for   Lab Results   Component Value Date    CR 0.90 07/13/2017    CR 0.74 03/05/2017    CR 0.69 03/04/2017    CR 0.83 02/24/2017    CR 0.83 08/21/2015    CR 0.81 07/10/2015    CR 0.89 06/25/2013    CR 0.96 09/08/2012    CR 1.20 10/29/2005    CR 1.00 10/29/2005     PSA (11/3/2004): 3.90      Outside and Past Medical records:    I spent 10 minutes reviewing outside and past medical records.       Standardized Questionnaire:      Deferred         Physical Exam:     Patient is a 66 year old  male   Vitals: Blood pressure 143/84, pulse 60, height 1.727 m (5' 8\"), weight 89.8 kg (198 lb).  General Appearance Adult: Alert, no acute distress, oriented  HENT: throat/mouth:normal, good dentition  Neck: No adenopathy,masses or thyromegaly  Lungs: no respiratory distress, or pursed lip breathing  Heart: No obvious jugular venous distension present  Abdomen: soft, nontender, no organomegaly or masses, Body mass index is 30.11 kg/(m^2).  Lymphatics: No cervical or supraclavicular adenopathy  Musculoskeltal: extremities normal, no peripheral edema  Skin: no suspicious lesions or rashes  Neuro: Alert, oriented, speech and mentation normal  Psych: affect and mood normal  Gait: Normal  : BEN anodular, symmetric         Assessment and Plan:     We had an extensive discussion about the significance of localized, prostate cancer.      We discussed the options for treatment of a localized prostate cancer including active surveillance, brachytherapy, external beam radiation therapy, and surgical extirpation.    Furthermore, we discussed the relative merits of robotic assisted radical prostatectomy  vs. open radical " prostatectomy.  We also discussed the advantages and disadvantages and roles of open surgery vs. laparoscopic (and Da Gurvinder assisted) surgery. The anticipated post-operative course was explained, including an anticipated 1 day hospital stay.    We also discussed the option of Prolaris testing prior to proceeding with any treatment. If the results are demonstrate lower than expected risk, we will plan to try active surveillance. Patient has decided he would like to proceed with this option.    - Refer to radiation oncologist after test for a discussion of radiation options.    The risks, benefits, alternatives, and personnel involved in the procudure were discussed.  All questions were answered.  A written informed consent will be finalized on the morning of the procedure.    Scribe Disclosure:   IСергей, am serving as a scribe; to document services personally performed by Paulo Agarwal MD based on data collection and the provider's statements to me.     Paulo Agarwal MD  Department of Urology  [facility]    Attestation:  This patient was seen and evaluated by me, with a scribe taking notes.  I have reviewed the note above and agree.  The physical exam was performed by me and the pertinant details are outlined below.     Patient is a 66 year old  male   Vitals: There were no vitals taken for this visit.  General Appearance Adult: Alert, no acute distress, oriented      Assessment:Intermediate risk prostate cancer     Plan:  Referral to rad onc for discussion    Prolaris test for risk stratification    Paulo Agarwal MD  Department of Urology  [facility]

## 2017-09-07 NOTE — NURSING NOTE
"Chief Complaint   Patient presents with     RECHECK     Follow up- discuss results of prostate biopsy       Initial There were no vitals taken for this visit. Estimated body mass index is 30.11 kg/(m^2) as calculated from the following:    Height as of 8/31/17: 1.727 m (5' 8\").    Weight as of 8/31/17: 89.8 kg (198 lb).  Medication Reconciliation: complete     EULA Beatty    "

## 2017-09-08 ENCOUNTER — CARE COORDINATION (OUTPATIENT)
Dept: CARE COORDINATION | Facility: CLINIC | Age: 67
End: 2017-09-08

## 2017-09-08 DIAGNOSIS — F41.9 ANXIETY: ICD-10-CM

## 2017-09-08 PROBLEM — C61 MALIGNANT NEOPLASM OF PROSTATE (H): Status: ACTIVE | Noted: 2017-09-08

## 2017-09-08 NOTE — PROGRESS NOTES
Clinic Care Coordination Contact  [facility]/Voicemail    Referral Source: PCP  Clinical Data: Care Coordinator Outreach  Outreach attempted x 1.  Left message on voicemail with call back information and requested return call.  Plan: Care Coordinator will try to reach patient again in 1-2 business days.

## 2017-09-11 NOTE — PROGRESS NOTES
Clinic Care Coordination Contact  OUTREACH    Referral Information:  Referral Source: PCP  Reason for Contact: follow up  Care Conference: No     Universal Utilization:   ED Visits in last year: 1  Hospital visits in last year: 1  Last PCP appointment: 07/17/17  Missed Appointments: 0  Concerns: anxiety management  Multiple Providers or Specialists: urology    Clinical Concerns:  Current Medical Concerns: Pt has seen Dr. Agarwal post biopsy, is deciding between radiation and surgical intervention.  He will see radiation oncology on 09/19, and will make a final decision following that appointment.  Pt reports that he is leaning toward surgery as it will be difficult for him to do daily radiation for 6-8  Weeks.  Pt is awaiting Polaris testing results as well.  These are not available in EPIC on chart review today.  Pt reports that his anxiety has been manageable for him, has been taking xanax only once per day, less than daily.  He reports that he is sleeping fairly well most nights.  Pt denies any other concerns at this time. He has requested a refill on the xanax, reports having about 3 tablets left.  Informed pt that writer will discuss with PCP and let him know if he is not in agreement on refilling.  Pt verbalized understanding.    Current Behavioral Concerns: Anxiety, managed with medication    Education Provided to patient: continued sleep hygiene practices as discussed previously, discussed Polaris testing briefly    Clinical Pathway Name: None    Medication Management:  No new concerns, reports compliance     Functional Status:  Mobility Status: Independent   Transportation: no concerns      Psychosocial:  Current living arrangement: I live in a private home  Financial/Insurance: no concerns     Resources and Interventions:  Current Resources:  none   Advanced Care Plans/Directives on file:: No        Goals:   Goal 1 Statement: I will work on managing my stress over the next couple of months.  Goal 1  Progression Percent: 70%  Goal 1 Progression Date: 09/08/17     Upcoming appointment:  (NA)     Plan: Discussed pt request with Dr. Guidry, he is in agreement to refill for pt.  Will plan outreach again following appointment on 09/19 with radiation oncology.  Pt in agreement with plan.

## 2017-09-12 RX ORDER — ALPRAZOLAM 0.5 MG
0.5 TABLET ORAL 3 TIMES DAILY PRN
Qty: 10 TABLET | Refills: 0 | Status: SHIPPED | OUTPATIENT
Start: 2017-09-12 | End: 2017-09-26

## 2017-09-12 NOTE — PROGRESS NOTES
Filemon mentioned to me yesterday that she thought Juwan would benefit from a refill of his alprazolam.  The requested prescription(s) has/have been approved and has/have been printed and signed. This was forwarded to the patient care pool to fax to the patient's preferred pharmacy.  [id number] - [place], [place] - [id number]    Julio Guidry Jr

## 2017-09-20 ENCOUNTER — CARE COORDINATION (OUTPATIENT)
Dept: CARE COORDINATION | Facility: CLINIC | Age: 67
End: 2017-09-20

## 2017-09-25 LAB — LAB SCANNED RESULT: NORMAL

## 2017-09-26 ENCOUNTER — OFFICE VISIT (OUTPATIENT)
Dept: RADIATION ONCOLOGY | Facility: CLINIC | Age: 67
End: 2017-09-26
Attending: RADIOLOGY
Payer: MEDICARE

## 2017-09-26 VITALS
WEIGHT: 199.6 LBS | BODY MASS INDEX: 30.25 KG/M2 | SYSTOLIC BLOOD PRESSURE: 147 MMHG | HEART RATE: 80 BPM | HEIGHT: 68 IN | DIASTOLIC BLOOD PRESSURE: 80 MMHG

## 2017-09-26 DIAGNOSIS — F41.9 ANXIETY: ICD-10-CM

## 2017-09-26 DIAGNOSIS — C61 MALIGNANT NEOPLASM OF PROSTATE (H): Primary | ICD-10-CM

## 2017-09-26 PROCEDURE — 99214 OFFICE O/P EST MOD 30 MIN: CPT | Performed by: RADIOLOGY

## 2017-09-26 RX ORDER — ALPRAZOLAM 0.5 MG
0.5 TABLET ORAL 3 TIMES DAILY PRN
Qty: 10 TABLET | Refills: 0 | Status: SHIPPED | OUTPATIENT
Start: 2017-09-26 | End: 2017-10-13

## 2017-09-26 ASSESSMENT — ENCOUNTER SYMPTOMS
WEIGHT LOSS: 0
DEPRESSION: 1
BLURRED VISION: 0
NECK PAIN: 0
SHORTNESS OF BREATH: 0
HEMATURIA: 0
ABDOMINAL PAIN: 0
CONSTIPATION: 0
DIZZINESS: 0
BACK PAIN: 0
NAUSEA: 0
DYSURIA: 0
NERVOUS/ANXIOUS: 1
WHEEZING: 0
SORE THROAT: 0
VOMITING: 0
CHILLS: 0
DIARRHEA: 0
FEVER: 0
HEARTBURN: 0
COUGH: 0
FREQUENCY: 0

## 2017-09-26 NOTE — PROGRESS NOTES
RADIATION ONCOLOGY CONSULT NOTE  Date of Visit: Sep 26, 2017    Juwan Latham  MRN: [id number]  : 1950    Juwan Latham is being seen today for initial consultation at the request of Dr. Paulo Agarwal for consideration of radiation therapy for Unfavorable Intermediate Risk cT1c N0 M0, [place] 3+4=7, PSA 19.6 adenocarcinoma of the prostate. All pertinent labs, imaging, and pathology findings have been reviewed.     HISTORY OF PRESENT ILLNESS:  Mr. Latham is a 66 year old gentleman with PMHx of congenital solitary kidney and lung cancer, with a new diagnosis of prostate cancer. In brief, the patient was incidentally discovered to have a right lung apical opacity during a workup for chest pain in 2016. This opacity slowly enlarged over several serial surveillance CT scans, so he underwent VATS-assisted RUL wedge resection and mediastinal LN dissection on 3/3/2017. Ultimately, he was determined to have localized and minimally invasive well differentiated adenocarcinoma of the lung, pT1a N0 M0.    A PET scan completed on 2017 (after the patient s wedge resection) failed to show any evidence of metastatic disease, but instead revealed a small hypermetabolic focus in the anterior prostate gland near the apex (SUV 6.5). The prostate volume was estimated at 35.2g. A urology consultation was placed and the patient was seen in the office of Dr. Paulo Agarwal on 2017. Prior to this development, the patient s most recent PSA was in , and was found to be 3.9. A repeat PSA proved elevated at 19.6 and the patient subsequently underwent TRUS-guided prostate biopsy on 2017. Pathology from this procedure ([id number]) revealed [place] 3+4 disease in 4/12 cores (% involvement) and German 3+3 disease in 3/12 cores (20-95% involvement). A Prolaris test for further risk stratification was ordered, and has revealed a score of 4.2; this equates to more aggressive disease with a 10 year  PCS-mortality risk of 8.7% and 10 year metastasis risk of 5.4% with conservative management.    Today the patient states that he is doing generally well. His AUA score is 4/35, with minor contributions coming from urinary frequency and nocturia. He denies recent weight loss, back pain, chest pain, shortness breath, constipation, diarrhea or pain in any other site. The remainder the view of systems is otherwise unremarkable.    CHEMOTHERAPY HISTORY: None    PAST RADIATION THERAPY HISTORY: None    PAST MEDICAL HISTORY:  Past Medical History:   Diagnosis Date     Anxiety      Calculus of kidney 2005, 2012     Congenital single kidney      Hx of cancer of lung 03/2017    wedge resection     Hypertension      Prostate cancer (H) 08/2017     Seasonal allergies     spring and fall     Sleep apnea     no cpap     PAST SURGICAL HISTORY:  Past Surgical History:   Procedure Laterality Date     BRONCHOSCOPY FLEXIBLE N/A 3/3/2017    Procedure: BRONCHOSCOPY FLEXIBLE;  Surgeon: Maria A Desai MD;  Location: [facility]     COLONOSCOPY N/A 2/11/2015    Procedure: COLONOSCOPY;  Surgeon: Murphy Jesus MD;  Location: [facility]     ESOPHAGOSCOPY, GASTROSCOPY, DUODENOSCOPY (EGD), COMBINED N/A 5/20/2016    Procedure: COMBINED ESOPHAGOSCOPY, GASTROSCOPY, DUODENOSCOPY (EGD), BIOPSY SINGLE OR MULTIPLE;  Surgeon: Murphy Jesus MD;  Location: [facility]     LAPAROSCOPIC WEDGE RESECTION LUNG Right 3/3/2017    Procedure: LAPAROSCOPIC WEDGE RESECTION LUNG;  Surgeon: Maria A Desai MD;  Location: [facility] OR     LASER HOLMIUM LITHOTRIPSY URETER(S), INSERT STENT, COMBINED  9/11/2012    Procedure: COMBINED CYSTOSCOPY, URETEROSCOPY, LASER HOLMIUM LITHOTRIPSY URETER(S), INSERT STENT;  Cystoscopy, Right Ureteroscopy, Stone Extraction, Holmium Laser, Double J Stent Placement;  Surgeon: Nicolás Blackwell MD;  Location: [facility] OR     ALLERGIES:  Allergies as of 09/26/2017 - Bigg as Reviewed 09/26/2017   Allergen Reaction Noted     Percocet [oxycodone-acetaminophen] GI  Disturbance 2017     MEDICATIONS:  Current Outpatient Prescriptions   Medication Sig Dispense Refill     LISINOPRIL PO Take 10 mg by mouth       ALPRAZolam (XANAX) 0.5 MG tablet Take 1 tablet (0.5 mg) by mouth 3 times daily as needed for anxiety 10 tablet 0     ALFALFA PO Take by mouth daily       cyanocolbalamin (VITAMIN  B-12) 100 MCG tablet Take 50 mcg by mouth daily       VITAMIN D, CHOLECALCIFEROL, PO Take 1,000 Units by mouth daily       fluticasone (FLONASE) 50 MCG/ACT spray Spray 1-2 sprays into both nostrils daily 1 Bottle 11     order for DME Equipment ordered: RESMED Auto PAP Mask type: Nasal Settings: 8-16 CM H20  : CPAP       acetaminophen (TYLENOL) 325 MG tablet Take 2 tablets (650 mg) by mouth every 6 hours Do not take more than 4,000 mg of acetaminophen (Tylenol) from all sources to prevent liver damage. 100 tablet 1      FAMILY HISTORY:  Family History   Problem Relation Age of Onset     HEART DISEASE Mother      DIABETES Brother 65     Type 2     DIABETES Maternal Grandmother      Cancer - colorectal Brother 70     Hypertension No family hx of      Lipids No family hx of      SOCIAL HISTORY:  Social History     Social History     Marital status:      Spouse name: N/A     Number of children: 1     Years of education: N/A     Occupational History     Remodeling Self      Other     Social History Main Topics     Smoking status: Former Smoker     Packs/day: 2.00     Years: 20.00     Types: Cigarettes     Quit date: 1985     Smokeless tobacco: Never Used     Alcohol use Yes      Comment: avg 3 beers per night     Drug use: No     Sexual activity: Yes     Partners: Female     Other Topics Concern     Caffeine Concern No     Sleep Concern Yes     middle/late insomnia     Exercise Yes     Seat Belt Yes     Parent/Sibling W/ Cabg, Mi Or Angioplasty Before 65f 55m? No     Social History Narrative    Dad  at age 86 from complications after hip surgery.    Mom  at age 68 from heart  "condition    Siblings:  Three sisters in good health.  Brother with diabetes and overweight.        One son    One granddaughter    Occupation:  remodeling     REVIEW OF SYSTEMS: A 10 point review of systems was obtained. Pertinent findings are noted in the HPI and are otherwise unremarkable.     PHYSICAL EXAM:  VITALS: /80  Pulse 80  Ht 1.727 m (5' 8\")  Wt 90.5 kg (199 lb 9.6 oz)  BMI 30.35 kg/m2  GEN: Appears well, alert, oriented, and in NAD  HEENT: EOMI, normal conjunctiva, MMM, no thrush  NECK: Supple, full ROM, no cervical or clavicular lymphadenopathy  CV: RRR, no murmurs/rubs/gallops, warm and well-perfused  RESP: CTAB, no wheezes/rales/rhonchi, breathing comfortably on room air  ABDOMEN: Soft, NT, ND, bowel sounds present  : BEN unremarkable  SKIN: Normal color and turgor  NEURO: No focal deficits, CN 3-12 grossly intact, normal and symmetric motor and sensory exam in bilateral upper and lower extremities, normal gait  PSYCH: Appropriate mood and affect    ECOG PERFORMANCE STATUS: 0    PACEMAKER: None    PREGNANCY STATUS: N/A    PSA  19.6  8/3/2017  3.90 11/3/2004    IMPRESSION:  Mr. Latham is a 66 year old gentleman with a solitary kidney and Unfavorable Intermediate Risk cT1c N0 M0, German 3+4=7, PSA 19.6 adenocarcinoma of the prostate. Lopez table analysis shows that the patient's disease has a 35.7% probability of being organ confined. The risk of MARTITA is 46%, SVI is 12.5% and LNI is 5.8%. His Prolaris score is 4.2. The patient has already been seen in consultation with Dr. Agarwal, who recommended definitive treatment.    RECOMMENDATION:   We essentially agree with Dr. Agarwal's assessment and plan. Given the clinical features of the patient's prostate cancer, we also recommend that he undergo definitive treatment. The patient is relatively young and has minimal symptoms symptomatology. He is a candidate for either brachytherapy, external beam radiation therapy, or prostatectomy. " After careful discussion, and understanding the risks and benefits associated with each treatment modality, the patient has ultimately elected to pursue prostatectomy. Consistent with this decision, the patient expressed concern about the possible adverse side effects of short term ADT (which we would have recommended if he were to undergo EBRT), and was emboldened by the fact he would be able to receive salvage radiation therapy in the event that he were to have disease recurrence after surgery.    We explained our rationale, as well as the logistics, risks, benefits, and alternatives to prostatectomy. We encouraged Mr. Latham and his sister to ask questions, which we answered to the best of our ability. We will forward our recommendations to Dr. Agarwal's office, and were closely with the urology team to prepare and schedule the patient for surgery.    The patient was seen and discussed with staff, Dr. Lopez. Thank you for involving us in the care of this patient.  Please feel free to contact us with questions or concerns at any time.    Remington Cali MD-PhD PGY-2  Radiation Oncology Resident, [facility]      I saw the patient with the resident.  I agree with the resident's note and plan of care.      KAIT Lopez M.D.  Department of Radiation Oncology  [facility]

## 2017-09-26 NOTE — MR AVS SNAPSHOT
After Visit Summary   9/26/2017    Juwan Latham    MRN: [id number]           Patient Information     Date Of Birth          1950        Visit Information        Provider Department      9/26/2017 10:00 AM Jordin Lopez MD Radiation Oncology Clinic        Today's Diagnoses     Malignant neoplasm of prostate (H)    -  1       Follow-ups after your visit        Your next 10 appointments already scheduled     Oct 18, 2017 11:20 AM CDT   (Arrive by 11:05 AM)   CT CHEST W/O CONTRAST with UCCT1   [facility] [place] [facility] ([facility])    [address]  [address] [facility] [id number] [phone]           Please bring any scans or X-rays taken at other hospitals, if similar tests were done. Also bring a list of your medicines, including vitamins, minerals and over-the-counter drugs. It is safest to leave personal items at home.  Be sure to tell your doctor:   If you have any allergies.   If there s any chance you are pregnant.   If you are breastfeeding.   If you have any special needs.  You do not need to do anything special to prepare.  Please wear loose clothing, such as a sweat suit or jogging clothes. Avoid snaps, zippers and other metal. We may ask you to undress and put on a hospital gown.            Oct 18, 2017 12:15 PM CDT   (Arrive by 12:00 PM)   Return Visit with GLORIA Arechiga [facility] ([facility])    [address] [place] [address]  [address] [facility] [id number] [phone]            Oct 19, 2017  9:20 AM CDT   (Arrive by 9:05 AM)   Return Visit with Paulo Agarwal MD   [facility] ([facility])    [address]  4th Floor  [facility] [id number] [phone]            Jan 29, 2018  9:00 AM CST   Return Sleep Patient with Owen Davis MD   [facility]    [address]  [facility] [id number] [phone]              Who to contact     Please call your clinic at [phone] to:    Ask questions about your health    Make or cancel appointments    Discuss your medicines    Learn about your test results    Speak to your doctor   If you have compliments or concerns about an experience at your clinic, or if you wish to file a complaint, please contact [facility] Patient Relations at [phone] or email us at [email or web address]         Additional Information About Your Visit        Implanet Information     Implanet gives you secure access to your electronic health record. If you see a primary care provider, you can also send messages to your care team and make appointments. If you have questions, please call your primary care clinic.  If you do not have a primary care provider, please call [phone] and they will assist you.      Implanet is an electronic gateway that provides easy, online access to your medical records. With Implanet, you can request a clinic appointment, read your test results, renew a prescription or communicate with your care team.     To access your existing account, please contact your [facility] or call [phone] for assistance.        Care EveryWhere ID     This is your Care EveryWhere ID. This could be used by other organizations to access your [place] medical records  [id number]        Your Vitals Were     Pulse Height BMI (Body Mass Index)             80 1.727 m (5' 8\") 30.35 kg/m2          Blood Pressure from Last 3 Encounters:   09/26/17 147/80   08/31/17 146/86   08/03/17 143/84    Weight from Last 3 Encounters:   09/26/17 90.5 kg (199 lb 9.6 oz)   08/31/17 89.8 kg (198 lb)   08/03/17 89.8 kg (198 lb)              Today, you had the following     No orders found for display         Where to get " your medicines      Some of these will need a paper prescription and others can be bought over the counter.  Ask your nurse if you have questions.     Bring a paper prescription for each of these medications     ALPRAZolam 0.5 MG tablet          Primary Care Provider Office Phone # Fax #    Julio Guidry Jr., [phone] [address]  [place] [address]        Equal Access to Services     [facility] HospitalSARBJIT : Hadii aad ku hadasho Soomaali, waaxda luqadaha, qaybta kaalmada adeegyada, waxay idiin hayaan adeeg kharash la'aan ah. So [facility] [phone].    ATENCIÓN: Si habla espconnor, tiene a gunn disposición servicios gratuitos de asistencia lingüística. [facility] [phone].    We comply with applicable federal civil rights laws and [place] laws. We do not discriminate on the basis of race, color, national origin, age, disability, sex, sexual orientation, or gender identity.            Thank you!     Thank you for choosing RADIATION ONCOLOGY CLINIC  for your care. Our goal is always to provide you with excellent care. Hearing back from our patients is one way we can continue to improve our services. Please take a few minutes to complete the written survey that you may receive in the mail after your visit with us. Thank you!             Your Updated Medication List - Protect others around you: Learn how to safely use, store and throw away your medicines at www.disposemymeds.org.          This list is accurate as of: 9/26/17 11:59 PM.  Always use your most recent med list.                   Brand Name Dispense Instructions for use Diagnosis    acetaminophen 325 MG tablet    TYLENOL    100 tablet    Take 2 tablets (650 mg) by mouth every 6 hours Do not take more than 4,000 mg of acetaminophen (Tylenol) from all sources to prevent liver damage.    Lung nodule       ALFALFA PO      Take by mouth daily        ALPRAZolam 0.5 MG tablet    XANAX    10 tablet    Take 1 tablet (0.5 mg) by mouth 3 times daily  as needed for anxiety    Anxiety       cyanocolbalamin 100 MCG tablet    vitamin  B-12     Take 50 mcg by mouth daily        fluticasone 50 MCG/ACT spray    FLONASE    1 Bottle    Spray 1-2 sprays into both nostrils daily    Chronic rhinitis, unspecified type       LISINOPRIL PO      Take 10 mg by mouth        order for DME      Equipment ordered: RESMED Auto PAP Mask type: Nasal Settings: 8-16 CM H20  : CPAP        VITAMIN D (CHOLECALCIFEROL) PO      Take 1,000 Units by mouth daily

## 2017-09-26 NOTE — LETTER
2017       RE: Juwan Latham  [address]  [place] [address]     Dear Colleague,    Thank you for referring your patient, Juwan Latham, to the [facility]. Please see a copy of my visit note below.      HPI    INITIAL PATIENT ASSESSMENT    Diagnosis: prostate cancer    Prior radiation therapy: None    Prior chemotherapy: None    Prior hormonal therapy:No    Pain Eval:  Denies    Psychosocial  Living arrangements: with roommates  Fall Risk: independent   referral needs: Not needed    Advanced Directive: Yes - Location: chart  Implantable Cardiac Device? No    Onset of menarche:   LMP: No LMP for male patient.  Onset of menopause:   Abnormal vaginal bleeding/discharge:   Are you pregnant?   Reproductive note: 1 son who has since passed    Nurse face-to-face time: Level 3:  10 min face to face time  Review of Systems   Constitutional: Positive for malaise/fatigue. Negative for chills, fever and weight loss.   HENT: Positive for hearing loss. Negative for congestion, ear discharge, ear pain, nosebleeds, sore throat and tinnitus.    Eyes: Negative for blurred vision.   Respiratory: Negative for cough, shortness of breath and wheezing.    Cardiovascular: Negative for chest pain and leg swelling.   Gastrointestinal: Negative for abdominal pain, constipation, diarrhea, heartburn, nausea and vomiting.   Genitourinary: Negative for dysuria, frequency, hematuria and urgency.   Musculoskeletal: Negative for back pain and neck pain.   Skin: Negative for itching and rash.   Neurological: Negative for dizziness.   Endo/Heme/Allergies: Positive for environmental allergies.   Psychiatric/Behavioral: Positive for depression. The patient is nervous/anxious.                  RADIATION ONCOLOGY CONSULT NOTE  Date of Visit: Sep 26, 2017    Juwan Latham  MRN: [id number]  : 1950    Juwan Latham is being seen today for initial consultation at the request of Dr. Paulo Wiley  Stefano for consideration of radiation therapy for Unfavorable Intermediate Risk cT1c N0 M0, German 3+4=7, PSA 19.6 adenocarcinoma of the prostate. All pertinent labs, imaging, and pathology findings have been reviewed.     HISTORY OF PRESENT ILLNESS:  Mr. Latham is a 66 year old gentleman with PMHx of congenital solitary kidney and lung cancer, with a new diagnosis of prostate cancer. In brief, the patient was incidentally discovered to have a right lung apical opacity during a workup for chest pain in 1/2016. This opacity slowly enlarged over several serial surveillance CT scans, so he underwent VATS-assisted RUL wedge resection and mediastinal LN dissection on 3/3/2017. Ultimately, he was determined to have localized and minimally invasive well differentiated adenocarcinoma of the lung, pT1a N0 M0.    A PET scan completed on 4/11/2017 (after the patient s wedge resection) failed to show any evidence of metastatic disease, but instead revealed a small hypermetabolic focus in the anterior prostate gland near the apex (SUV 6.5). The prostate volume was estimated at 35.2g. A urology consultation was placed and the patient was seen in the office of Dr. Paulo Agarwal on 8/03/2017. Prior to this development, the patient s most recent PSA was in 2004, and was found to be 3.9. A repeat PSA proved elevated at 19.6 and the patient subsequently underwent TRUS-guided prostate biopsy on 8/31/2017. Pathology from this procedure ([id number]) revealed German 3+4 disease in 4/12 cores (% involvement) and German 3+3 disease in 3/12 cores (20-95% involvement). A Prolaris test for further risk stratification was ordered, and has revealed a score of 4.2; this equates to more aggressive disease with a 10 year PCS-mortality risk of 8.7% and 10 year metastasis risk of 5.4% with conservative management.    Today the patient states that he is doing generally well. His AUA score is 4/35, with minor contributions coming from  urinary frequency and nocturia. He denies recent weight loss, back pain, chest pain, shortness breath, constipation, diarrhea or pain in any other site. The remainder the view of systems is otherwise unremarkable.    CHEMOTHERAPY HISTORY: None    PAST RADIATION THERAPY HISTORY: None    PAST MEDICAL HISTORY:  Past Medical History:   Diagnosis Date     Anxiety      Calculus of kidney 2005, 2012     Congenital single kidney      Hx of cancer of lung 03/2017    wedge resection     Hypertension      Prostate cancer (H) 08/2017     Seasonal allergies     spring and fall     Sleep apnea     no cpap     PAST SURGICAL HISTORY:  Past Surgical History:   Procedure Laterality Date     BRONCHOSCOPY FLEXIBLE N/A 3/3/2017    Procedure: BRONCHOSCOPY FLEXIBLE;  Surgeon: Maria A Desai MD;  Location: [facility]     COLONOSCOPY N/A 2/11/2015    Procedure: COLONOSCOPY;  Surgeon: Murphy Jesus MD;  Location: [facility]     ESOPHAGOSCOPY, GASTROSCOPY, DUODENOSCOPY (EGD), COMBINED N/A 5/20/2016    Procedure: COMBINED ESOPHAGOSCOPY, GASTROSCOPY, DUODENOSCOPY (EGD), BIOPSY SINGLE OR MULTIPLE;  Surgeon: Murphy Jesus MD;  Location: [facility]     LAPAROSCOPIC WEDGE RESECTION LUNG Right 3/3/2017    Procedure: LAPAROSCOPIC WEDGE RESECTION LUNG;  Surgeon: Maria A Desai MD;  Location: [facility]     LASER HOLMIUM LITHOTRIPSY URETER(S), INSERT STENT, COMBINED  9/11/2012    Procedure: COMBINED CYSTOSCOPY, URETEROSCOPY, LASER HOLMIUM LITHOTRIPSY URETER(S), INSERT STENT;  Cystoscopy, Right Ureteroscopy, Stone Extraction, Holmium Laser, Double J Stent Placement;  Surgeon: Nicolás Blackwell MD;  Location: [facility]     ALLERGIES:  Allergies as of 09/26/2017 - Bigg as Reviewed 09/26/2017   Allergen Reaction Noted     Percocet [oxycodone-acetaminophen] GI Disturbance 03/02/2017     MEDICATIONS:  Current Outpatient Prescriptions   Medication Sig Dispense Refill     LISINOPRIL PO Take 10 mg by mouth       ALPRAZolam (XANAX) 0.5 MG tablet Take 1 tablet (0.5 mg) by  mouth 3 times daily as needed for anxiety 10 tablet 0     ALFALFA PO Take by mouth daily       cyanocolbalamin (VITAMIN  B-12) 100 MCG tablet Take 50 mcg by mouth daily       VITAMIN D, CHOLECALCIFEROL, PO Take 1,000 Units by mouth daily       fluticasone (FLONASE) 50 MCG/ACT spray Spray 1-2 sprays into both nostrils daily 1 Bottle 11     order for DME Equipment ordered: RESMED Auto PAP Mask type: Nasal Settings: 8-16 CM H20  : CPAP       acetaminophen (TYLENOL) 325 MG tablet Take 2 tablets (650 mg) by mouth every 6 hours Do not take more than 4,000 mg of acetaminophen (Tylenol) from all sources to prevent liver damage. 100 tablet 1      FAMILY HISTORY:  Family History   Problem Relation Age of Onset     HEART DISEASE Mother      DIABETES Brother 65     Type 2     DIABETES Maternal Grandmother      Cancer - colorectal Brother 70     Hypertension No family hx of      Lipids No family hx of      SOCIAL HISTORY:  Social History     Social History     Marital status:      Spouse name: N/A     Number of children: 1     Years of education: N/A     Occupational History     Remodeling Self      Other     Social History Main Topics     Smoking status: Former Smoker     Packs/day: 2.00     Years: 20.00     Types: Cigarettes     Quit date: 1985     Smokeless tobacco: Never Used     Alcohol use Yes      Comment: avg 3 beers per night     Drug use: No     Sexual activity: Yes     Partners: Female     Other Topics Concern     Caffeine Concern No     Sleep Concern Yes     middle/late insomnia     Exercise Yes     Seat Belt Yes     Parent/Sibling W/ Cabg, Mi Or Angioplasty Before 65f 55m? No     Social History Narrative    Dad  at age 86 from complications after hip surgery.    Mom  at age 68 from heart condition    Siblings:  Three sisters in good health.  Brother with diabetes and overweight.        One son    One granddaughter    Occupation:  remodeling     REVIEW OF SYSTEMS: A 10 point review of  "systems was obtained. Pertinent findings are noted in the HPI and are otherwise unremarkable.     PHYSICAL EXAM:  VITALS: /80  Pulse 80  Ht 1.727 m (5' 8\")  Wt 90.5 kg (199 lb 9.6 oz)  BMI 30.35 kg/m2  GEN: Appears well, alert, oriented, and in NAD  HEENT: EOMI, normal conjunctiva, MMM, no thrush  NECK: Supple, full ROM, no cervical or clavicular lymphadenopathy  CV: RRR, no murmurs/rubs/gallops, warm and well-perfused  RESP: CTAB, no wheezes/rales/rhonchi, breathing comfortably on room air  ABDOMEN: Soft, NT, ND, bowel sounds present  : BEN unremarkable  SKIN: Normal color and turgor  NEURO: No focal deficits, CN 3-12 grossly intact, normal and symmetric motor and sensory exam in bilateral upper and lower extremities, normal gait  PSYCH: Appropriate mood and affect    ECOG PERFORMANCE STATUS: 0    PACEMAKER: None    PREGNANCY STATUS: N/A    PSA  19.6  8/3/2017  3.90 11/3/2004    IMPRESSION:  Mr. Latham is a 66 year old gentleman with a solitary kidney and Unfavorable Intermediate Risk cT1c N0 M0, German 3+4=7, PSA 19.6 adenocarcinoma of the prostate. Lopez table analysis shows that the patient's disease has a 35.7% probability of being organ confined. The risk of MRATITA is 46%, SVI is 12.5% and LNI is 5.8%. His Prolaris score is 4.2. The patient has already been seen in consultation with Dr. Agarwal, who recommended definitive treatment.    RECOMMENDATION:   We essentially agree with Dr. Agarwal's assessment and plan. Given the clinical features of the patient's prostate cancer, we also recommend that he undergo definitive treatment. The patient is relatively young and has minimal symptoms symptomatology. He is a candidate for either brachytherapy, external beam radiation therapy, or prostatectomy. After careful discussion, and understanding the risks and benefits associated with each treatment modality, the patient has ultimately elected to pursue prostatectomy. Consistent with this decision, the " patient expressed concern about the possible adverse side effects of short term ADT (which we would have recommended if he were to undergo EBRT), and was emboldened by the fact he would be able to receive salvage radiation therapy in the event that he were to have disease recurrence after surgery.    We explained our rationale, as well as the logistics, risks, benefits, and alternatives to prostatectomy. We encouraged Mr. Latham and his sister to ask questions, which we answered to the best of our ability. We will forward our recommendations to Dr. Agarwal's office, and were closely with the urology team to prepare and schedule the patient for surgery.    The patient was seen and discussed with staff, Dr. Lopez. Thank you for involving us in the care of this patient.  Please feel free to contact us with questions or concerns at any time.    Remington Cali MD-PhD PGY-2  Radiation Oncology Resident, [facility]      I saw the patient with the resident.  I agree with the resident's note and plan of care.      KAIT Lopez M.D.  Department of Radiation Oncology  [facility] [place]

## 2017-09-26 NOTE — PROGRESS NOTES
HPI    INITIAL PATIENT ASSESSMENT    Diagnosis: prostate cancer    Prior radiation therapy: None    Prior chemotherapy: None    Prior hormonal therapy:No    Pain Eval:  Denies    Psychosocial  Living arrangements: with roommates  Fall Risk: independent   referral needs: Not needed    Advanced Directive: Yes - Location: chart  Implantable Cardiac Device? No    Onset of menarche:   LMP: No LMP for male patient.  Onset of menopause:   Abnormal vaginal bleeding/discharge:   Are you pregnant?   Reproductive note: 1 son who has since passed    Nurse face-to-face time: Level 3:  10 min face to face time  Review of Systems   Constitutional: Positive for malaise/fatigue. Negative for chills, fever and weight loss.   HENT: Positive for hearing loss. Negative for congestion, ear discharge, ear pain, nosebleeds, sore throat and tinnitus.    Eyes: Negative for blurred vision.   Respiratory: Negative for cough, shortness of breath and wheezing.    Cardiovascular: Negative for chest pain and leg swelling.   Gastrointestinal: Negative for abdominal pain, constipation, diarrhea, heartburn, nausea and vomiting.   Genitourinary: Negative for dysuria, frequency, hematuria and urgency.   Musculoskeletal: Negative for back pain and neck pain.   Skin: Negative for itching and rash.   Neurological: Negative for dizziness.   Endo/Heme/Allergies: Positive for environmental allergies.   Psychiatric/Behavioral: Positive for depression. The patient is nervous/anxious.

## 2017-09-26 NOTE — TELEPHONE ENCOUNTER
The requested prescription(s) has/have been approved and has/have been printed and signed. This was forwarded to the patient care pool to fax to the patient's preferred pharmacy.  [facility] [address] [facility], [place] - [address] AT [facility] [address]    Julio Guidry Jr

## 2017-09-26 NOTE — TELEPHONE ENCOUNTER
ALPRAZolam (XANAX) 0.5 MG tablet      Last Written Prescription Date:  9/12/2017  Last Fill Quantity: 10 tablet,   # refills: 0  Last Office Visit with [facility] or [facility] prescribing provider: 7/17/2017  Future Office visit:       Routing refill request to provider for review/approval because:  Drug not on the [facility] or [facility] refill protocol or controlled substance

## 2017-10-02 ENCOUNTER — MYC MEDICAL ADVICE (OUTPATIENT)
Dept: UROLOGY | Facility: CLINIC | Age: 67
End: 2017-10-02

## 2017-10-03 ENCOUNTER — TELEPHONE (OUTPATIENT)
Dept: UROLOGY | Facility: CLINIC | Age: 67
End: 2017-10-03

## 2017-10-03 NOTE — TELEPHONE ENCOUNTER
Patient requesting orders for his upcoming prostate surgery.  Saw radiation specialist and told to go forward with surgery. Jacquelyn Nogueira LPN Staff Nurse

## 2017-10-04 DIAGNOSIS — C61 MALIGNANT NEOPLASM OF PROSTATE (H): Primary | ICD-10-CM

## 2017-10-04 RX ORDER — CEFAZOLIN SODIUM 1 G/3ML
1 INJECTION, POWDER, FOR SOLUTION INTRAMUSCULAR; INTRAVENOUS SEE ADMIN INSTRUCTIONS
Status: CANCELLED | OUTPATIENT
Start: 2017-10-04

## 2017-10-13 ENCOUNTER — OFFICE VISIT (OUTPATIENT)
Dept: FAMILY MEDICINE | Facility: CLINIC | Age: 67
End: 2017-10-13
Payer: COMMERCIAL

## 2017-10-13 ENCOUNTER — PRE VISIT (OUTPATIENT)
Dept: UROLOGY | Facility: CLINIC | Age: 67
End: 2017-10-13

## 2017-10-13 VITALS
DIASTOLIC BLOOD PRESSURE: 70 MMHG | HEIGHT: 68 IN | OXYGEN SATURATION: 98 % | HEART RATE: 88 BPM | TEMPERATURE: 98.4 F | SYSTOLIC BLOOD PRESSURE: 132 MMHG | WEIGHT: 197 LBS | BODY MASS INDEX: 29.86 KG/M2

## 2017-10-13 DIAGNOSIS — Z01.818 PREOP GENERAL PHYSICAL EXAM: Primary | ICD-10-CM

## 2017-10-13 DIAGNOSIS — F41.9 ANXIETY: ICD-10-CM

## 2017-10-13 PROCEDURE — 99214 OFFICE O/P EST MOD 30 MIN: CPT | Performed by: FAMILY MEDICINE

## 2017-10-13 RX ORDER — ALPRAZOLAM 0.5 MG
0.5 TABLET ORAL 3 TIMES DAILY PRN
Qty: 20 TABLET | Refills: 0 | Status: SHIPPED | OUTPATIENT
Start: 2017-10-13 | End: 2017-11-16

## 2017-10-13 NOTE — TELEPHONE ENCOUNTER
Prostate cancer follow up.  Discuss upcoming surgery.  Records available in [facility].

## 2017-10-13 NOTE — MR AVS SNAPSHOT
After Visit Summary   10/13/2017    Juwan Latham    MRN: [id number]           Patient Information     Date Of Birth          1950        Visit Information        Provider Department      10/13/2017 10:00 AM Asad White,  [facility] [place]        Today's Diagnoses     Preop general physical exam    -  1    Anxiety          Care Instructions      Before Your Surgery      Call your surgeon if there is any change in your health. This includes signs of a cold or flu (such as a sore throat, runny nose, cough, rash or fever).    Do not smoke, drink alcohol or take over the counter medicine (unless your surgeon or primary care doctor tells you to) for the 24 hours before and after surgery.    If you take prescribed drugs: Follow your doctor s orders about which medicines to take and which to stop until after surgery.    Eating and drinking prior to surgery: follow the instructions from your surgeon    Take a shower or bath the night before surgery. Use the soap your surgeon gave you to gently clean your skin. If you do not have soap from your surgeon, use your regular soap. Do not shave or scrub the surgery site.  Wear clean pajamas and have clean sheets on your bed.           Follow-ups after your visit        Your next 10 appointments already scheduled     Oct 19, 2017  9:20 AM CDT   (Arrive by 9:05 AM)   Return Visit with Paulo Agarwal MD   [facility] ([facility])    [address] [place] [address]  4th [facility] [id number] [phone]            Nov 15, 2017 11:40 AM CST   (Arrive by 11:25 AM)   CT CHEST W/O CONTRAST with UCCT1   [facility] ([facility])    [address] [place] [id number] [phone]           Please bring any scans or X-rays taken at other hospitals, if similar tests were done. Also bring a list of your  medicines, including vitamins, minerals and over-the-counter drugs. It is safest to leave personal items at home.  Be sure to tell your doctor:   If you have any allergies.   If there s any chance you are pregnant.   If you are breastfeeding.   If you have any special needs.  You do not need to do anything special to prepare.  Please wear loose clothing, such as a sweat suit or jogging clothes. Avoid snaps, zippers and other metal. We may ask you to undress and put on a hospital gown.            Nov 15, 2017 12:15 PM CST   (Arrive by 12:00 PM)   Return Visit with GLORIA Arechiga [facility] ([facility])    [address] [facility]  2nd Floor  [facility] [address] [phone]            Dec 01, 2017   Procedure with Paulo Agarwal MD   [facility] (--)    [address] [facility] [id number] [phone]            Jan 29, 2018  9:00 AM CST   Return Sleep Patient with Owen Davis MD   [facility] ([facility])    [address]  [facility] [address] [phone]              Who to contact     If you have questions or need follow up information about today's clinic visit or your schedule please contact [facility] [place] directly at [phone].  Normal or non-critical lab and imaging results will be communicated to you by MyChart, letter or phone within 4 business days after the clinic has received the results. If you do not hear from us within 7 days, please contact the clinic through MyChart or phone. If you have a critical or abnormal lab result, we will notify you by phone as soon as possible.  Submit refill requests through [employer] or call your pharmacy and they will forward the refill request to us. Please allow 3 business days for your refill to be completed.          Additional Information About Your Visit        MyChart  "Information     Ori gives you secure access to your electronic health record. If you see a primary care provider, you can also send messages to your care team and make appointments. If you have questions, please call your primary care clinic.  If you do not have a primary care provider, please call [phone] and they will assist you.        Care EveryWhere ID     This is your Care EveryWhere ID. This could be used by other organizations to access your [facility] medical records  [id number]        Your Vitals Were     Pulse Temperature Height Pulse Oximetry BMI (Body Mass Index)       88 98.4  F (36.9  C) (Oral) 5' 8\" (1.727 m) 98% 29.95 kg/m2        Blood Pressure from Last 3 Encounters:   10/13/17 132/70   09/26/17 147/80   08/31/17 146/86    Weight from Last 3 Encounters:   10/13/17 197 lb (89.4 kg)   09/26/17 199 lb 9.6 oz (90.5 kg)   08/31/17 198 lb (89.8 kg)              Today, you had the following     No orders found for display         Where to get your medicines      Some of these will need a paper prescription and others can be bought over the counter.  Ask your nurse if you have questions.     Bring a paper prescription for each of these medications     ALPRAZolam 0.5 MG tablet          Primary Care Provider Office Phone # Fax #    Julio Guidry Jr., [phone] [address]  [place] [address]        Equal Access to Services     AMANDA [facility]: Hadii aad ku hadasho Sosoleali, waaxda luqadaha, qaybta kaalmada adeegyada, mynor escoto. So [facility] [phone].    ATENCIÓN: Si habla español, tiene a gunn disposición servicios gratuitos de asistencia lingüística. Llmckay al [phone].    We comply with applicable federal civil rights laws and [place] laws. We do not discriminate on the basis of race, color, national origin, age, disability, sex, sexual orientation, or gender identity.            Thank you!     Thank you for choosing [facility] [place]  for your care. Our goal is always to provide you with excellent care. Hearing back from our patients is one way we can continue to improve our services. Please take a few minutes to complete the written survey that you may receive in the mail after your visit with us. Thank you!             Your Updated Medication List - Protect others around you: Learn how to safely use, store and throw away your medicines at www.disposemymeds.org.          This list is accurate as of: 10/13/17 10:24 AM.  Always use your most recent med list.                   Brand Name Dispense Instructions for use Diagnosis    acetaminophen 325 MG tablet    TYLENOL    100 tablet    Take 2 tablets (650 mg) by mouth every 6 hours Do not take more than 4,000 mg of acetaminophen (Tylenol) from all sources to prevent liver damage.    Lung nodule       ALFALFA PO      Take by mouth daily        ALPRAZolam 0.5 MG tablet    XANAX    20 tablet    Take 1 tablet (0.5 mg) by mouth 3 times daily as needed for anxiety    Anxiety       cyanocolbalamin 100 MCG tablet    vitamin  B-12     Take 50 mcg by mouth daily        fluticasone 50 MCG/ACT spray    FLONASE    1 Bottle    Spray 1-2 sprays into both nostrils daily    Chronic rhinitis, unspecified type       LISINOPRIL PO      Take 10 mg by mouth        order for DME      Equipment ordered: RESMED Auto PAP Mask type: Nasal Settings: 8-16 CM H20  : CPAP        VITAMIN D (CHOLECALCIFEROL) PO      Take 1,000 Units by mouth daily

## 2017-10-13 NOTE — NURSING NOTE
"Chief Complaint   Patient presents with     Pre-Op Exam       Initial /70  Pulse 88  Temp 98.4  F (36.9  C) (Oral)  Ht 5' 8\" (1.727 m)  Wt 197 lb (89.4 kg)  SpO2 98%  BMI 29.95 kg/m2 Estimated body mass index is 29.95 kg/(m^2) as calculated from the following:    Height as of this encounter: 5' 8\" (1.727 m).    Weight as of this encounter: 197 lb (89.4 kg).  Medication Reconciliation: complete    "

## 2017-10-13 NOTE — PROGRESS NOTES
"[facility]  [address] [facility] [id number] [phone]  Dept: [phone]    PRE-OP EVALUATION:  Today's date: 10/13/2017    Juwan Latham (: 1950) presents for pre-procedural evaluation assessment as requested by Dr. Agarwal.  He requires evaluation and anesthesia risk assessment prior to undergoing surgery/procedure for PET scan - \"eyes to thighs scan\" to look for any sign of cancer metastasis.       Date of Surgery/ Procedure: 10/18/2017  Time of Surgery/ Procedure: AM  Hospital/Surgical Facility: [facility]  Fax number for surgical facility:   Primary Physician: Julio Guidry Jr.  Type of Anesthesia Anticipated: Sedation    Patient has a Health Care Directive or Living Will:  NO    1. NO - Do you have a history of heart attack, stroke, stent, bypass or surgery on an artery in the head, neck, heart or legs?  2. NO - Do you ever have any pain or discomfort in your chest?  3. NO - Do you have a history of  Heart Failure?  4. NO - Are you troubled by shortness of breath when: walking on the level, up a slight hill or at night?  5. NO - Do you currently have a cold, bronchitis or other respiratory infection?  6. NO - Do you have a cough, shortness of breath or wheezing?  7. NO - Do you sometimes get pains in the calves of your legs when you walk?  8. NO - Do you or anyone in your family have previous history of blood clots?  9. NO - Do you or does anyone in your family have a serious bleeding problem such as prolonged bleeding following surgeries or cuts?  10. NO - Have you ever had problems with anemia or been told to take iron pills?  11. NO - Have you had any abnormal blood loss such as black, tarry or bloody stools, or abnormal vaginal bleeding?  12. NO - Have you ever had a blood transfusion?  13. NO - Have you or any of your relatives ever had problems with anesthesia?  14. YES - Do you have sleep apnea, excessive snoring or daytime drowsiness?  15. NO - Do you have any " prosthetic heart valves?  16. NO - Do you have prosthetic joints?  17. NO - Is there any chance that you may be pregnant?        HPI:                                                      Brief HPI related to upcoming procedure: Juwan Latham is a 66 year old male with recent history of adenocarcinoma of right upper lobe that was resected this past March. Newly diagnosed with prostate adenocarcinoma and will be undergoing prostatectomy in December. When he last had PET scan in April, he states the procedure had to be stopped due to his claustrophobia. They are planning on repeating this scan on 10/18/17 and will be giving him sedation for the imaging.    SLEEP PROBLEM - Patient has a longstanding history of sleep apnea -- uses CPAP at night.                                                          .    MEDICAL HISTORY:                                                    Patient Active Problem List    Diagnosis Date Noted     Malignant neoplasm of prostate (H) 09/08/2017     Priority: Medium     Major depressive disorder, recurrent episode, mild (H) 05/09/2017     Priority: Medium     Cavernous hemangioma of brain (H) 04/07/2017     Priority: Medium     Found incidentally on MRI of brain which was ordered to look for mets from lung tumor  Neurosurgery consult with Dr. Mcelroy in March 2017 - recommend repeat MRI in September 2017 to assess for any changes       Overweight (BMI 25.0-29.9) 03/24/2017     Priority: Medium     Malignant neoplasm of upper lobe of right lung (H) 03/13/2017     Priority: Medium     Adenocarcinoma       Benign neoplasm of descending colon 01/16/2017     Priority: Medium     Seen on colonoscopy 1/4/2017       Seasonal allergies      Priority: Medium     spring and fall       Hypertension goal BP (blood pressure) < 140/90 04/28/2015     Priority: Medium     GERD (gastroesophageal reflux disease) 01/27/2015     Priority: Medium     PAULINO (obstructive sleep apnea) 05/24/2013     Priority: Medium      Anxiety 12/12/2011     Priority: Medium     History of colonic polyps 06/22/2011     Priority: Medium     Problem list name updated by automated process. Provider to review       Advance Care Planning 06/03/2011     Priority: Medium     Discussed advance care planning with patient; information given to patient to review. 6/3/2011   6/7/11 Follow up to [facility] [employer] referral call placed. Patient would like to review document provided at visit, establish an agent and then will schedule facilitation if needed. Steffanie Leal LPN/[facility] Planning Facilitator        CARDIOVASCULAR SCREENING; LDL GOAL LESS THAN 160 10/31/2010     Priority: Medium     Allergic rhinitis due to other allergen 05/28/2004     Priority: Medium     Tu score is 9 on 6-3-11        Past Medical History:   Diagnosis Date     Anxiety      Calculus of kidney 2005, 2012     Congenital single kidney      Hx of cancer of lung 03/2017    wedge resection     Hypertension      Prostate cancer (H) 08/2017     Seasonal allergies     spring and fall     Sleep apnea     no cpap     Past Surgical History:   Procedure Laterality Date     BRONCHOSCOPY FLEXIBLE N/A 3/3/2017    Procedure: BRONCHOSCOPY FLEXIBLE;  Surgeon: Maria A Desai MD;  Location: [facility]     COLONOSCOPY N/A 2/11/2015    Procedure: COLONOSCOPY;  Surgeon: Murphy Jesus MD;  Location: [facility] GI     ESOPHAGOSCOPY, GASTROSCOPY, DUODENOSCOPY (EGD), COMBINED N/A 5/20/2016    Procedure: COMBINED ESOPHAGOSCOPY, GASTROSCOPY, DUODENOSCOPY (EGD), BIOPSY SINGLE OR MULTIPLE;  Surgeon: Murphy Jesus MD;  Location: [facility]     LAPAROSCOPIC WEDGE RESECTION LUNG Right 3/3/2017    Procedure: LAPAROSCOPIC WEDGE RESECTION LUNG;  Surgeon: Maria A Desai MD;  Location: [facility] OR     LASER HOLMIUM LITHOTRIPSY URETER(S), INSERT STENT, COMBINED  9/11/2012    Procedure: COMBINED CYSTOSCOPY, URETEROSCOPY, LASER HOLMIUM LITHOTRIPSY URETER(S), INSERT STENT;  Cystoscopy, Right Ureteroscopy, Stone Extraction,  "Holmium Laser, Double J Stent Placement;  Surgeon: Nicolás Blackwell MD;  Location:  OR     Current Outpatient Prescriptions   Medication Sig Dispense Refill     LISINOPRIL PO Take 10 mg by mouth       ALPRAZolam (XANAX) 0.5 MG tablet Take 1 tablet (0.5 mg) by mouth 3 times daily as needed for anxiety 10 tablet 0     ALFALFA PO Take by mouth daily       cyanocolbalamin (VITAMIN  B-12) 100 MCG tablet Take 50 mcg by mouth daily       VITAMIN D, CHOLECALCIFEROL, PO Take 1,000 Units by mouth daily       fluticasone (FLONASE) 50 MCG/ACT spray Spray 1-2 sprays into both nostrils daily 1 Bottle 11     order for DME Equipment ordered: RESMED Auto PAP Mask type: Nasal Settings: 8-16 CM H20  : CPAP       acetaminophen (TYLENOL) 325 MG tablet Take 2 tablets (650 mg) by mouth every 6 hours Do not take more than 4,000 mg of acetaminophen (Tylenol) from all sources to prevent liver damage. 100 tablet 1     OTC products: vitamin B12, vitamin D    Allergies   Allergen Reactions     Percocet [Oxycodone-Acetaminophen] GI Disturbance     Severe constipation      Latex Allergy: NO    Social History   Substance Use Topics     Smoking status: Former Smoker     Packs/day: 2.00     Years: 20.00     Types: Cigarettes     Quit date: 1/1/1985     Smokeless tobacco: Never Used     Alcohol use Yes      Comment: avg 3 beers per night     History   Drug Use No       REVIEW OF SYSTEMS:                                                    C: NEGATIVE for fever, chills, change in weight  EYES: NEGATIVE for vision changes or irritation  E/M: NEGATIVE for ear, mouth and throat problems  R: NEGATIVE for significant cough or SOB  CV: NEGATIVE for chest pain, palpitations or peripheral edema  : negative for dysuria, hematuria, decreased urinary stream; is starting to notice some urinary frequency    EXAM:                                                    /70  Pulse 88  Temp 98.4  F (36.9  C) (Oral)  Ht 5' 8\" (1.727 m)  Wt 197 lb (89.4 kg)  " SpO2 98%  BMI 29.95 kg/m2  GENERAL APPEARANCE: healthy, alert and no distress  HENT: ear canals and TM's normal and nose and mouth without ulcers or lesions  RESP: lungs clear to auscultation - no rales, rhonchi or wheezes  CV: regular rate and rhythm, normal S1 S2, no S3 or S4 and no murmur, click or rub   ABDOMEN: soft, nontender, no HSM or masses and bowel sounds normal  NEURO: Normal strength and tone, sensory exam grossly normal, mentation intact and speech normal    DIAGNOSTICS:                                                    No labs or EKG required for low risk surgery (cataract, skin procedure, breast biopsy, etc)    Recent Labs   Lab Test  07/13/17   1025  03/14/17   1414  03/05/17   0905   02/24/17   0955   09/11/12   1058   HGB  16.1  15.3   --    < >  15.8   < >   --    PLT  154  258  142*   < >  164   < >   --    INR   --    --    --    --    --    --   1.03   NA  138   --   138   --   140   < >   --    POTASSIUM  4.0   --   3.8   --   4.2   < >   --    CR  0.90   --   0.74   < >  0.83   < >   --    A1C   --    --    --    --   5.5   --    --     < > = values in this interval not displayed.        IMPRESSION:                                                    Diagnosis/reason for consult: planning for sedation during PET scan due to patient's claustrophobia.    The proposed surgical procedure is considered LOW risk.    REVISED CARDIAC RISK INDEX  The patient has the following serious cardiovascular risks for perioperative complications such as (MI, PE, VFib and 3  AV Block):  No serious cardiac risks  INTERPRETATION: 0 risks: Class I (very low risk - 0.4% complication rate)    The patient has the following additional risks for periprocedural complications:  No identified additional risks      ICD-10-CM    1. Preop general physical exam Z01.818    2. Anxiety F41.9 ALPRAZolam (XANAX) 0.5 MG tablet       RECOMMENDATIONS:                                                        Obstructive Sleep Apnea (or  suspected sleep apnea)  Advise monitoring for sleep related oxygen desaturations due to history of PAULINO    Hypertension:  Generally fairly well-controlled on lisinopril. Okay for him to hold medications on day of imaging procedure. Can take afterwards.    APPROVAL GIVEN to proceed with proposed procedure, without further diagnostic evaluation       Signed Electronically by: Asad White DO    Copy of this evaluation report is provided to requesting physician.    [facility] Preop Guidelines

## 2017-10-18 ENCOUNTER — MYC MEDICAL ADVICE (OUTPATIENT)
Dept: UROLOGY | Facility: CLINIC | Age: 67
End: 2017-10-18

## 2017-10-19 ENCOUNTER — ALLIED HEALTH/NURSE VISIT (OUTPATIENT)
Dept: UROLOGY | Facility: CLINIC | Age: 67
End: 2017-10-19

## 2017-10-19 ENCOUNTER — OFFICE VISIT (OUTPATIENT)
Dept: UROLOGY | Facility: CLINIC | Age: 67
End: 2017-10-19

## 2017-10-19 VITALS
BODY MASS INDEX: 29.18 KG/M2 | HEART RATE: 61 BPM | DIASTOLIC BLOOD PRESSURE: 77 MMHG | SYSTOLIC BLOOD PRESSURE: 139 MMHG | HEIGHT: 69 IN | WEIGHT: 197 LBS

## 2017-10-19 DIAGNOSIS — C61 MALIGNANT NEOPLASM OF PROSTATE (H): Primary | ICD-10-CM

## 2017-10-19 ASSESSMENT — PAIN SCALES - GENERAL: PAINLEVEL: NO PAIN (0)

## 2017-10-19 NOTE — LETTER
"10/19/2017       RE: Juwan Latham  [address]  [facility] [place] [address]     Dear Colleague,    Thank you for referring your patient, Juwan Latham, to the [facility]OLOGY AND INST FOR PROSTATE AND UROLOGIC CANCERS at [facility]. Please see a copy of my visit note below.    PROSTATE CANCER SURVEILLANCE Now wants to pursue treatment     Juwan Latham is a very pleasant 66 year old male who presents with a history of prostate cancer.     Initial PSA: 19.6 (8/3/17)    Grade Group:1 (involves 3 cores), 2 (involves 4 cores)  Biopsy German Score was 3+3 (involves 3 cores), 3+4 (involves 4 cores)      Physical Exam:    Vitals: /77  Pulse 61  Ht 1.753 m (5' 9\")  Wt 89.4 kg (197 lb)  BMI 29.09 kg/m2  General Appearance Adult:     Assessment:  65 yo M w/ prostate cancer     Plan:    We once again discussed the significance of localized, prostate cancer.      Patient is scheduled for robot-assisted prostatectomy on 12/1/2017.     The risks, benefits, alternatives, and personnel involved in the procudure were discussed.  All questions were answered.  A written informed consent will be finalized on the morning of the procedure.      Scribe Disclosure:   I,Сергей Shaikh, am serving as a scribe; to document services personally performed by Paulo Agarwal MD based on data collection and the provider's statements to me.     Paulo Agarwal MD  Department of Urology  [facility]      Attestation:  This patient was seen and evaluated by me, with a scribe taking notes.  I have reviewed the note above and agree.  The physical exam was performed by me and the pertinant details are outlined below.     Patient is a 66 year old  male   Vitals: Blood pressure 139/77, pulse 61, height 1.753 m (5' 9\"), weight 89.4 kg (197 lb).  General Appearance Adult: Alert, no acute distress, oriented    Assessment:  65 yo M w/ prostate cancer     Plan:    We once again " discussed the significance of localized, prostate cancer.      Patient is scheduled for robot-assisted prostatectomy on 12/1/2017.     The risks, benefits, alternatives, and personnel involved in the procudure were discussed.  All questions were answered.  A written informed consent will be finalized on the morning of the procedure.      Again, thank you for allowing me to participate in the care of your patient.      Sincerely,    Paulo Agarwal MD

## 2017-10-19 NOTE — MR AVS SNAPSHOT
After Visit Summary   10/19/2017    Juwan Latham    MRN: [id number]           Patient Information     Date Of Birth          1950        Visit Information        Provider Department      10/19/2017 10:00 AM Nurse, [facility] Inst for Prostate and Urologic Cancers        Today's Diagnoses     Malignant neoplasm of prostate (H)    -  1       Follow-ups after your visit        Your next 10 appointments already scheduled     Nov 15, 2017 11:40 AM CST   (Arrive by 11:25 AM)   CT CHEST W/O CONTRAST with UCCT1   [facility] [place] [facility] ([facility] [place])    [address]  [address] [facility] [id number] [phone]           Please bring any scans or X-rays taken at other hospitals, if similar tests were done. Also bring a list of your medicines, including vitamins, minerals and over-the-counter drugs. It is safest to leave personal items at home.  Be sure to tell your doctor:   If you have any allergies.   If there s any chance you are pregnant.   If you are breastfeeding.   If you have any special needs.  You do not need to do anything special to prepare.  Please wear loose clothing, such as a sweat suit or jogging clothes. Avoid snaps, zippers and other metal. We may ask you to undress and put on a hospital gown.            Nov 15, 2017 12:15 PM CST   (Arrive by 12:00 PM)   Return Visit with GLORIA Arechiga [facility] ([facility] [place])    [address] [id number] [phone]            Dec 01, 2017   Procedure with Paulo Agarwal MD   [facility] (--)    [address] [facility] [address]   [phone]            Jan 29, 2018  9:00 AM CST   Return Sleep Patient with Owen Davis MD   [facility] ([facility])    [address]  [facility] [address] [phone]              Who to contact     Please call your clinic at [phone] to:    Ask questions about your health    Make or cancel appointments    Discuss your medicines    Learn about your test results    Speak to your doctor   If you have compliments or concerns about an experience at your clinic, or if you wish to file a complaint, please contact [facility] Patient Relations at [phone] or email us at [email or web address]         Additional Information About Your Visit        [employer] [facility] [employer] Information     [employer] gives you secure access to your electronic health record. If you see a primary care provider, you can also send messages to your care team and make appointments. If you have questions, please call your primary care clinic.  If you do not have a primary care provider, please call [phone] and they will assist you.      [employer] is an electronic gateway that provides easy, online access to your medical records. With [employer], you can request a clinic appointment, read your test results, renew a prescription or communicate with your care team.     To access your existing account, please contact your [facility] or call [phone] for assistance.        Care EveryWhere ID     This is your Care EveryWhere ID. This could be used by other organizations to access your [place] medical records  [id number]         Blood Pressure from Last 3 Encounters:   10/19/17 139/77   10/13/17 132/70   09/26/17 147/80    Weight from Last 3 Encounters:   10/19/17 89.4 kg (197 lb)   10/13/17 89.4 kg (197 lb)   09/26/17 90.5 kg (199 lb 9.6 oz)              Today, you had the following     No orders found for display       Primary Care Provider Office Phone # Fax #    Julio Guidry Jr., [phone] [address]  [place] [address]        Equal Access to Services     CHAMP DHILLON: Rohan  carolyn Obrien, waelyda luqadaha, qaybta kaalmitzy pimentel, mynor oscarin hayaameme ramirezkellen orestesmoisés laAlphonseraúl tigist. So [facility] [phone].    ATENCIÓN: Si habla bel, tiene a gunn disposición servicios gratuitos de asistencia lingüística. Caitliname al [phone].    We comply with applicable federal civil rights laws and [place] laws. We do not discriminate on the basis of race, color, national origin, age, disability, sex, sexual orientation, or gender identity.            Thank you!     Thank you for choosing [facility] UROLOGY AND [facility] FOR PROSTATE AND UROLOGIC CANCERS  for your care. Our goal is always to provide you with excellent care. Hearing back from our patients is one way we can continue to improve our services. Please take a few minutes to complete the written survey that you may receive in the mail after your visit with us. Thank you!             Your Updated Medication List - Protect others around you: Learn how to safely use, store and throw away your medicines at www.disposemymeds.org.          This list is accurate as of: 10/19/17 10:20 AM.  Always use your most recent med list.                   Brand Name Dispense Instructions for use Diagnosis    acetaminophen 325 MG tablet    TYLENOL    100 tablet    Take 2 tablets (650 mg) by mouth every 6 hours Do not take more than 4,000 mg of acetaminophen (Tylenol) from all sources to prevent liver damage.    Lung nodule       ALFALFA PO      Take by mouth daily        ALPRAZolam 0.5 MG tablet    XANAX    20 tablet    Take 1 tablet (0.5 mg) by mouth 3 times daily as needed for anxiety    Anxiety       cyanocolbalamin 100 MCG tablet    vitamin  B-12     Take 50 mcg by mouth daily        fluticasone 50 MCG/ACT spray    FLONASE    1 Bottle    Spray 1-2 sprays into both nostrils daily    Chronic rhinitis, unspecified type       LISINOPRIL PO      Take 10 mg by mouth        order for DME      Equipment ordered: RESMED Auto PAP Mask type: Nasal Settings: 8-16 CM H20  :  CPAP        VITAMIN D (CHOLECALCIFEROL) PO      Take 1,000 Units by mouth daily

## 2017-10-19 NOTE — NURSING NOTE
Pre Op Teaching Flowsheet       Pre and Post op Patient Education  Relevant Diagnosis:  Prostate cancer  Surgical procedure:  Robotic Radical Prostatectomy and Pelvic Lymph Node Dissection  Teaching Topic:  Pre and post op teaching  Person Involved in teaching: Juwan Latham    Motivation Level:  Asks Questions: Yes  Eager to Learn:  Yes  Cooperative: Yes  Receptive (willing/able to accept information):  Yes    Patient demonstrates understanding of the following:  Date of surgery:  12/17/17  Location of surgery:  [place]  History and Physical and any other testing necessary prior to surgery: Yes  Required time line for completion of History and Physical and any pre-op testing: Yes    Patient demonstrates understanding of the following:  Pre-op bowel prep:  None needed  Pre-op showering/scrub information with PCMX Soap: Yes  Blood thinner medications discussed and when to stop (if applicable):  Yes      Infection Prevention:   Patient demonstrates understanding of the following:  Surgical procedure site care taught: at time of discharge  Signs and symptoms of infection taught:  Yes      Post-op follow-up:  Discussed how to contact the hospital, nurse, and clinic scheduling staff if necessary.    Instructional materials used/given/mailed:  [place] [facility] Booklet, post op teaching sheet, Map, Soap, and arrival/location information.    Surgical instructions packet given to patient in office:  Yes.

## 2017-10-19 NOTE — NURSING NOTE
"Chief Complaint   Patient presents with     RECHECK     Prostate cancer follow up       Initial Ht 1.753 m (5' 9\")  Wt 89.4 kg (197 lb)  BMI 29.09 kg/m2 Estimated body mass index is 29.09 kg/(m^2) as calculated from the following:    Height as of this encounter: 1.753 m (5' 9\").    Weight as of this encounter: 89.4 kg (197 lb).  Medication Reconciliation: complete     EULA Beatty    "

## 2017-10-19 NOTE — PROGRESS NOTES
"PROSTATE CANCER SURVEILLANCE Now wants to pursue treatment     Juwan Latham is a very pleasant 66 year old male who presents with a history of prostate cancer.     Initial PSA: 19.6 (8/3/17)    Grade Group:1 (involves 3 cores), 2 (involves 4 cores)  Biopsy German Score was 3+3 (involves 3 cores), 3+4 (involves 4 cores)      Physical Exam:    Vitals: /77  Pulse 61  Ht 1.753 m (5' 9\")  Wt 89.4 kg (197 lb)  BMI 29.09 kg/m2  General Appearance Adult:     Assessment:  67 yo M w/ prostate cancer     Plan:    We once again discussed the significance of localized, prostate cancer.      Patient is scheduled for robot-assisted prostatectomy on 12/1/2017.     The risks, benefits, alternatives, and personnel involved in the procudure were discussed.  All questions were answered.  A written informed consent will be finalized on the morning of the procedure.      Scribe Disclosure:   IСергей, am serving as a scribe; to document services personally performed by Paulo Agarwal MD based on data collection and the provider's statements to me.     Paulo Agarwal MD  Department of Urology  [facility]      Attestation:  This patient was seen and evaluated by me, with a scribe taking notes.  I have reviewed the note above and agree.  The physical exam was performed by me and the pertinant details are outlined below.     Patient is a 66 year old  male   Vitals: Blood pressure 139/77, pulse 61, height 1.753 m (5' 9\"), weight 89.4 kg (197 lb).  General Appearance Adult: Alert, no acute distress, oriented    Assessment:  67 yo M w/ prostate cancer     Plan:    We once again discussed the significance of localized, prostate cancer.      Patient is scheduled for robot-assisted prostatectomy on 12/1/2017.     The risks, benefits, alternatives, and personnel involved in the procudure were discussed.  All questions were answered.  A written informed consent will be finalized on the morning of " the procedure.        Paulo Agarwal MD  Department of Urology  [facility]

## 2017-10-19 NOTE — MR AVS SNAPSHOT
After Visit Summary   10/19/2017    Juwan Latham    MRN: [id number]           Patient Information     Date Of Birth          1950        Visit Information        Provider Department      10/19/2017 9:20 AM Weight, Paulo Wiley MD [facility] for Prostate and Urologic Cancers        Today's Diagnoses     Malignant neoplasm of prostate (H)    -  1      Care Instructions    Follow up in 3 months with Labs.    It was a pleasure meeting with you today.  Thank you for allowing me and my team the privilege of caring for you today.  YOU are the reason we are here, and I truly hope we provided you with the excellent service you deserve.  Please let us know if there is anything else we can do for you so that we can be sure you are leaving completely satisfied with your care experience.              Follow-ups after your visit        Your next 10 appointments already scheduled     Nov 15, 2017 11:40 AM CST   (Arrive by 11:25 AM)   CT CHEST W/O CONTRAST with UCCT1   [facility] CT ([facility])    [address] [address] [phone]           Please bring any scans or X-rays taken at other hospitals, if similar tests were done. Also bring a list of your medicines, including vitamins, minerals and over-the-counter drugs. It is safest to leave personal items at home.  Be sure to tell your doctor:   If you have any allergies.   If there s any chance you are pregnant.   If you are breastfeeding.   If you have any special needs.  You do not need to do anything special to prepare.  Please wear loose clothing, such as a sweat suit or jogging clothes. Avoid snaps, zippers and other metal. We may ask you to undress and put on a hospital gown.            Nov 15, 2017 12:15 PM CST   (Arrive by 12:00 PM)   Return Visit with GLORIA Arechiga [facility] ([facility]  [facility])    [address]  2nd Floor  [facility] [address] [phone]            Dec 01, 2017   Procedure with Paulo Agarwal MD   [facility] PeriOp Services (--)    [address]  [facility] [address]   [phone]            Dec 13, 2017  1:30 PM CST   Return Visit with Paulo Agarwal MD   [facility] ([facility])    [facility]  [address]  [facility] [address] [phone]            Jan 29, 2018  9:00 AM CST   Return Sleep Patient with Owen Davis MD   [facility] ([facility]    [address]  [facility] [address] [phone]              Who to contact     Please call your clinic at [phone] to:    Ask questions about your health    Make or cancel appointments    Discuss your medicines    Learn about your test results    Speak to your doctor   If you have compliments or concerns about an experience at your clinic, or if you wish to file a complaint, please contact [facility] Patient Relations at [phone] or email us at [email or web address]         Additional Information About Your Visit        MyChart Information     CitalDoct gives you secure access to your electronic health record. If you see a primary care provider, you can also send messages to your care team and make appointments. If you have questions, please call your primary care clinic.  If you do not have a primary care provider, please call [phone] and they will assist you.      sofatutor is an electronic gateway that provides easy, online access to your medical records. With sofatutor, you can request a clinic appointment, read your test results, renew a prescription or communicate with your care team.     To access your existing account, please contact your [facility]  "or call [phone] for assistance.        Care EveryWhere ID     This is your Care EveryWhere ID. This could be used by other organizations to access your [place] medical records  [id number]        Your Vitals Were     Pulse Height BMI (Body Mass Index)             61 1.753 m (5' 9\") 29.09 kg/m2          Blood Pressure from Last 3 Encounters:   10/19/17 139/77   10/13/17 132/70   09/26/17 147/80    Weight from Last 3 Encounters:   10/19/17 89.4 kg (197 lb)   10/13/17 89.4 kg (197 lb)   09/26/17 90.5 kg (199 lb 9.6 oz)              Today, you had the following     No orders found for display       Primary Care Provider Office Phone # Fax #    Julio Guidry Jr., [phone] [address]  [place] [address]        Equal Access to Services     [facility]: Hadii aad ku hadasho Soomaali, waaxda luqadaha, qaybta kaalmada adeegyada, mynor mosley . So [facility] [phone].    ATENCIÓN: Si habla español, tiene a gunn disposición servicios gratuitos de asistencia lingüística. Llame al [phone].    We comply with applicable federal civil rights laws and [place] laws. We do not discriminate on the basis of race, color, national origin, age, disability, sex, sexual orientation, or gender identity.            Thank you!     Thank you for choosing [facility] UROLOGY AND [facility] FOR PROSTATE AND UROLOGIC CANCERS  for your care. Our goal is always to provide you with excellent care. Hearing back from our patients is one way we can continue to improve our services. Please take a few minutes to complete the written survey that you may receive in the mail after your visit with us. Thank you!             Your Updated Medication List - Protect others around you: Learn how to safely use, store and throw away your medicines at www.disposemymeds.org.          This list is accurate as of: 10/19/17 11:59 PM.  Always use your most recent med list.                   Brand Name Dispense " Instructions for use Diagnosis    acetaminophen 325 MG tablet    TYLENOL    100 tablet    Take 2 tablets (650 mg) by mouth every 6 hours Do not take more than 4,000 mg of acetaminophen (Tylenol) from all sources to prevent liver damage.    Lung nodule       ALFALFA PO      Take by mouth daily        ALPRAZolam 0.5 MG tablet    XANAX    20 tablet    Take 1 tablet (0.5 mg) by mouth 3 times daily as needed for anxiety    Anxiety       cyanocolbalamin 100 MCG tablet    vitamin  B-12     Take 50 mcg by mouth daily        fluticasone 50 MCG/ACT spray    FLONASE    1 Bottle    Spray 1-2 sprays into both nostrils daily    Chronic rhinitis, unspecified type       LISINOPRIL PO      Take 10 mg by mouth        order for DME      Equipment ordered: RESMED Auto PAP Mask type: Nasal Settings: 8-16 CM H20  : CPAP        VITAMIN D (CHOLECALCIFEROL) PO      Take 1,000 Units by mouth daily

## 2017-10-19 NOTE — PATIENT INSTRUCTIONS
Follow up in 3 months with Labs.    It was a pleasure meeting with you today.  Thank you for allowing me and my team the privilege of caring for you today.  YOU are the reason we are here, and I truly hope we provided you with the excellent service you deserve.  Please let us know if there is anything else we can do for you so that we can be sure you are leaving completely satisfied with your care experience.

## 2017-11-15 ENCOUNTER — OFFICE VISIT (OUTPATIENT)
Dept: SURGERY | Facility: CLINIC | Age: 67
End: 2017-11-15
Attending: CLINICAL NURSE SPECIALIST
Payer: MEDICARE

## 2017-11-15 DIAGNOSIS — C34.11 MALIGNANT NEOPLASM OF UPPER LOBE OF RIGHT LUNG (H): Primary | ICD-10-CM

## 2017-11-15 PROCEDURE — 99212 OFFICE O/P EST SF 10 MIN: CPT | Mod: ZF

## 2017-11-15 NOTE — LETTER
11/15/2017       RE: Juwan Latham  [address]  [facility] [address]     Dear Colleague,    Thank you for referring your patient, Juwan Latham, to the [facility]. Please see a copy of my visit note below.    THORACIC SURGERY FOLLOW UP VISIT 11/15/17    Dear Dr. Julio Guidry.    I saw Mr. Latham in follow-up today. The clinical summary follows:     PRE/POSTOP DIAGNOSIS   NSCLC   PROCEDURE   1. Flexible bronchoscopy  2. Laparoscopic right upper lobe wedge resection  3. Laparoscopic-assisted thoracoscopic mediastinal lymph node dissection.     DATE OF PROCEDURE  03/03/2017    HISTOPATHOLOGY   Minimally invasive well differentiated adenocarcinoma.  Surgical Margins negative. Lymph Nodes negative  Tumor Size 1.5 cm.  [id number]    COMPLICATIONS  None    INTERVAL STUDIES  Chest CT 11/15/17: Final report not available at time of note.  Post operative changes with a persistent right-sided soft tissue mass/consolidation--again decreased in size.         CT Chest Pulmonary Angiogram 07/13/2017: personally reviewed.  Significant report finding included: No PE. Post op changes in right lung with a persistent soft tissue mass/consolidation that is decreasing in size.       PET Scan 04/11/17: Personally reviewed  Significant findings reported included: two hypermetabolic calcifications along the lateral aspect of the right hepatic lobe, new since 01/25/2017 and a small hypermetabolic focus within the prostate.          ETOH: 3 beers nightly  TOB: Former Smoker quit 1985  BMI: There is no height or weight on file to calculate BMI.    SUBJECTIVE   The patient denies complaints or symptoms related to right upper lobe wedge resection. However, since his last visit in April 2017, he was diagnosed with prostate cancer and has been under the care of Dr. Agarwal.  His is scheduled for a Davinci Prostatectomy on 12/01/2017 and as expected has many concerns about this new diagnosis.      From a personal  perspective, the patient is here by himself.     IMPRESSION (C34.11) Malignant neoplasm of upper lobe of right lung (H)     66 year-old male with NSCLC s/p right upper lobe wedge resection.     PLAN  I spent a total of 20 minutes with Mr. Juwan Latham  more than 50% of which were spent in counseling, coordination of care, and face-to-face time. I reviewed the plan as follows:  1. NSCLC: S/P right upper lobe wedge resection of minimally invasive well differentiated adenocarcinoma. Stage [id number].   After his resection, his case was discussed in multidisciplinary Nodule and Thoracic Conferences and based on images obtained and the patient's pathologic staging, ongoing CT surveillance was recommended.  The patient's most recent CT scan done 11/15/17 does not appear to have any concerning findings for disease reoccurrence. The post operative changes with a soft tissue mass/consolidation in the right lung again appears to be decreased in size when compared with the two previous scans.  However the final report was not available at the time of this note.   The patient should follow up with a chest CT in 6 mo for ongoing surveillance of this lung cancer.  I will contact the patient if there are any changes to the recommendations for ongoing care based on the final radiology report.  2. Prostate Cancer. Diagnosed 08/31/17. He has been under the care of Dr. Agarwal for this. He is scheduled for a Davicni Prostatectomy on 12/31/17. Defer ongoing recommendations and management.     All questions were answered and the patient and present family were in agreement with the plan.  I appreciate the opportunity to participate in the care of your patient and will keep you updated.  Sincerely,  GLORIA Casey, CNP  Thoracic and Forgut Surgery  [facility]

## 2017-11-15 NOTE — MR AVS SNAPSHOT
After Visit Summary   11/15/2017    Juwan Latham    MRN: [id number]           Patient Information     Date Of Birth          1950        Visit Information        Provider Department      11/15/2017 12:15 PM Demetra Lott APRN CNP [facility]        Today's Diagnoses     Malignant neoplasm of upper lobe of right lung (H)    -  1       Follow-ups after your visit        Follow-up notes from your care team     Return in about 6 months (around 5/15/2018).      Your next 10 appointments already scheduled     Dec 01, 2017   Procedure with Paulo Agarwal MD   [facility] PeriOp Services (--)    [address]  [facility] [address]   [phone]            Dec 13, 2017  1:30 PM CST   Return Visit with Paulo Agarwal MD   [facility] ([facility])    [facility]  [address]  [facility] [id number] [phone]            Jan 29, 2018  9:00 AM CST   Return Sleep Patient with Owen Davis MD   [facility] ([facility])    [address]  [facility] [address] [phone]              Future tests that were ordered for you today     Open Future Orders        Priority Expected Expires Ordered    CT Chest w/o contrast Routine  11/15/2018 11/15/2017            Who to contact     If you have questions or need follow up information about today's clinic visit or your schedule please contact [facility] CAN[facility]ER [facility] directly at [phone].  Normal or non-critical lab and imaging results will be communicated to you by MyChart, letter or phone within 4 business days after the clinic has received the results. If you do not hear from us within 7 days, please contact the clinic through MyChart or phone. If you have a critical or abnormal lab result, we will notify you by phone as soon as  possible.  Submit refill requests through BetterPet or call your pharmacy and they will forward the refill request to us. Please allow 3 business days for your refill to be completed.          Additional Information About Your Visit        Frogmetricshart Information     BetterPet gives you secure access to your electronic health record. If you see a primary care provider, you can also send messages to your care team and make appointments. If you have questions, please call your primary care clinic.  If you do not have a primary care provider, please call [phone] and they will assist you.        Care EveryWhere ID     This is your Care EveryWhere ID. This could be used by other organizations to access your [place] medical records  [id number]         Blood Pressure from Last 3 Encounters:   10/19/17 139/77   10/13/17 132/70   09/26/17 147/80    Weight from Last 3 Encounters:   10/19/17 89.4 kg (197 lb)   10/13/17 89.4 kg (197 lb)   09/26/17 90.5 kg (199 lb 9.6 oz)               Primary Care Provider Office Phone # Fax #    Julio Guidry Jr., [phone] [address]  [place] [address]        Equal Access to Services     [facility]: Hadii aad ku hadasho Soomaali, waaxda luqadaha, qaybta kaalmada adeegyada, mynor escoto. So [facility] [phone].    ATENCIÓN: Si habla español, tiene a gunn disposición servicios gratuitos de asistencia lingüística. Jeanette al [phone].    We comply with applicable federal civil rights laws and [place] laws. We do not discriminate on the basis of race, color, national origin, age, disability, sex, sexual orientation, or gender identity.            Thank you!     Thank you for choosing [facility] CANCER [facility]  for your care. Our goal is always to provide you with excellent care. Hearing back from our patients is one way we can continue to improve our services. Please take a few minutes to complete the written survey that you  may receive in the mail after your visit with us. Thank you!             Your Updated Medication List - Protect others around you: Learn how to safely use, store and throw away your medicines at www.disposemymeds.org.          This list is accurate as of: 11/15/17  3:32 PM.  Always use your most recent med list.                   Brand Name Dispense Instructions for use Diagnosis    acetaminophen 325 MG tablet    TYLENOL    100 tablet    Take 2 tablets (650 mg) by mouth every 6 hours Do not take more than 4,000 mg of acetaminophen (Tylenol) from all sources to prevent liver damage.    Lung nodule       ALFALFA PO      Take by mouth daily        ALPRAZolam 0.5 MG tablet    XANAX    20 tablet    Take 1 tablet (0.5 mg) by mouth 3 times daily as needed for anxiety    Anxiety       cyanocolbalamin 100 MCG tablet    vitamin  B-12     Take 50 mcg by mouth daily        fluticasone 50 MCG/ACT spray    FLONASE    1 Bottle    Spray 1-2 sprays into both nostrils daily    Chronic rhinitis, unspecified type       LISINOPRIL PO      Take 10 mg by mouth        order for DME      Equipment ordered: RESMED Auto PAP Mask type: Nasal Settings: 8-16 CM H20  : CPAP        VITAMIN D (CHOLECALCIFEROL) PO      Take 1,000 Units by mouth daily

## 2017-11-15 NOTE — PROGRESS NOTES
THORACIC SURGERY FOLLOW UP VISIT 11/15/17    Dear Dr. Julio Guidry.    I saw Mr. Latham in follow-up today. The clinical summary follows:     PRE/POSTOP DIAGNOSIS   NSCLC   PROCEDURE   1. Flexible bronchoscopy  2. Laparoscopic right upper lobe wedge resection  3. Laparoscopic-assisted thoracoscopic mediastinal lymph node dissection.     DATE OF PROCEDURE  03/03/2017    HISTOPATHOLOGY   Minimally invasive well differentiated adenocarcinoma.  Surgical Margins negative. Lymph Nodes negative  Tumor Size 1.5 cm.  [id number]    COMPLICATIONS  None    INTERVAL STUDIES  Chest CT 11/15/17: Final report not available at time of note.  Post operative changes with a persistent right-sided soft tissue mass/consolidation--again decreased in size.         CT Chest Pulmonary Angiogram 07/13/2017: personally reviewed.  Significant report finding included: No PE. Post op changes in right lung with a persistent soft tissue mass/consolidation that is decreasing in size.       PET Scan 04/11/17: Personally reviewed  Significant findings reported included: two hypermetabolic calcifications along the lateral aspect of the right hepatic lobe, new since 01/25/2017 and a small hypermetabolic focus within the prostate.          ETOH: 3 beers nightly  TOB: Former Smoker quit 1985  BMI: There is no height or weight on file to calculate BMI.    SUBJECTIVE   The patient denies complaints or symptoms related to right upper lobe wedge resection. However, since his last visit in April 2017, he was diagnosed with prostate cancer and has been under the care of Dr. Agarwal.  His is scheduled for a Davinci Prostatectomy on 12/01/2017 and as expected has many concerns about this new diagnosis.      From a personal perspective, the patient is here by himself.     IMPRESSION (C34.11) Malignant neoplasm of upper lobe of right lung (H)     66 year-old male with NSCLC s/p right upper lobe wedge resection.     PLAN  I spent a total of 20 minutes with   Juwan Latham  more than 50% of which were spent in counseling, coordination of care, and face-to-face time. I reviewed the plan as follows:  1. NSCLC: S/P right upper lobe wedge resection of minimally invasive well differentiated adenocarcinoma. Stage [id number].   After his resection, his case was discussed in multidisciplinary Nodule and Thoracic Conferences and based on images obtained and the patient's pathologic staging, ongoing CT surveillance was recommended.  The patient's most recent CT scan done 11/15/17 does not appear to have any concerning findings for disease reoccurrence. The post operative changes with a soft tissue mass/consolidation in the right lung again appears to be decreased in size when compared with the two previous scans.  However the final report was not available at the time of this note.   The patient should follow up with a chest CT in 6 mo for ongoing surveillance of this lung cancer.  I will contact the patient if there are any changes to the recommendations for ongoing care based on the final radiology report.  2. Prostate Cancer. Diagnosed 08/31/17. He has been under the care of Dr. Agarwal for this. He is scheduled for a Davicni Prostatectomy on 12/31/17. Defer ongoing recommendations and management.     All questions were answered and the patient and present family were in agreement with the plan.  I appreciate the opportunity to participate in the care of your patient and will keep you updated.  Sincerely,  GLORIA Casey, CNP  Thoracic and Forgut Surgery  [facility]

## 2017-11-15 NOTE — NURSING NOTE
"Oncology Rooming Note    November 15, 2017 11:49 AM   Juwan Latham is a 66 year old male who presents for:    No chief complaint on file.    Initial Vitals: There were no vitals taken for this visit. Estimated body mass index is 29.09 kg/(m^2) as calculated from the following:    Height as of 10/19/17: 1.753 m (5' 9\").    Weight as of 10/19/17: 89.4 kg (197 lb). There is no height or weight on file to calculate BSA.  Data Unavailable Comment: Data Unavailable   No LMP for male patient.  Allergies reviewed: Yes  Medications reviewed: Yes    Medications: Medication refills not needed today.  Pharmacy name entered into Gaming for Good: [id number] [place] - [address] AT [facility] [address]    Clinical concerns: results  jensen was notified.    7  minutes for nursing intake (face to face time)     Silvina Duarte MA                "

## 2017-11-16 ENCOUNTER — TELEPHONE (OUTPATIENT)
Dept: FAMILY MEDICINE | Facility: CLINIC | Age: 67
End: 2017-11-16

## 2017-11-16 DIAGNOSIS — F41.9 ANXIETY: ICD-10-CM

## 2017-11-16 NOTE — TELEPHONE ENCOUNTER
Xanax      Last Written Prescription Date:  10/13/17  Last Fill Quantity: 20,   # refills: 0  Future Office visit:    Next 5 appointments (look out 90 days)     Dec 13, 2017  1:30 PM CST   Return Visit with Paulo Agarwal MD   [facility] ([facility])    [facility]  [address]  [facility] [address]   [phone]                   Routing refill request to provider for review/approval because:  Drug not on the FM, [facility] or [facility] refill protocol or controlled substance  Kaylee Akins RN, BSN  [facility]

## 2017-11-16 NOTE — TELEPHONE ENCOUNTER
Patient calling. He is wondering if he needs another xray prior to having his prostate surgery. Patient is scheduled for a prostatectomy on 12/1/17. I asked patient if he is to have a pre-op for this procedure and he said he did not know. Advised that he will need to contact his Urologist to get clarification on what he needs to do prior to surgery and patient stated that he does not know how to get a hold of them.    Per 10/19/17 encounter from Urology nurse - note indicates need for pre-op exam. I placed a call to Dr. Agarwal's office and spoke with triage RN, Madelaine. She reports that patient just needs the pre-op physical completed prior to surgery.    Spoke with patient and advised of above. Pre-op scheduled for 11/29/17 with MD LAURIE.  Kaylee Akins RN, BSN  [facility]

## 2017-11-17 NOTE — TELEPHONE ENCOUNTER
I have pended the requested refill(s).  Please have one of the other MDs print and sign the controlled substance prescription(s) in my absence.     Julio Guidry MD

## 2017-11-20 RX ORDER — ALPRAZOLAM 0.5 MG
0.5 TABLET ORAL 3 TIMES DAILY PRN
Qty: 20 TABLET | Refills: 0 | Status: SHIPPED | OUTPATIENT
Start: 2017-11-20 | End: 2017-12-07

## 2017-11-21 NOTE — PROGRESS NOTES
Clinic Care Coordination Contact  OUTREACH    Referral Information:  Referral Source: PCP  Reason for Contact: follow up  Care Conference: No     Universal Utilization:   ED Visits in last year: 1  Hospital visits in last year: 1  Last PCP appointment: 07/17/17  Missed Appointments: 0  Concerns: anxiety management  Multiple Providers or Specialists: urology    Clinical Concerns:  Current Medical Concerns: Pt reports that he is doing well, feels that his anxiety is well under control.  He did just receive a refill on his xanax, and is hoping to be able to keep it at a minimum.  He will be coming in for a pre-op visit on 11/29.  Pt denies any other concerns at this time.      Current Behavioral Concerns: anxiety-managed    Education Provided to patient: ongoing clinic CC   Clinical Pathway Name: None    Medication Management:  No new concerns     Functional Status:  Mobility Status: Independent   Transportation: no concerns      Psychosocial:  Current living arrangement: I live in a private home  Financial/Insurance: no concerns     Resources and Interventions:  Current Resources:none   Advanced Care Plans/Directives on file: No      Goals:   Goal 1 Statement: I will work on managing my stress over the next couple of months.  Goal 1 Supportive Steps: Pt to work on good sleep hygiene and self care.  Goal 1 Progression Percent: 80%  Goal 1 Progression Date: 11/21/17   Upcoming appointment:  (NA)     Plan: Will plan to follow up with pt following his procedure, he will call if any needs arise prior to that time.

## 2017-11-29 ENCOUNTER — OFFICE VISIT (OUTPATIENT)
Dept: FAMILY MEDICINE | Facility: CLINIC | Age: 67
End: 2017-11-29
Payer: COMMERCIAL

## 2017-11-29 VITALS
BODY MASS INDEX: 29.47 KG/M2 | TEMPERATURE: 98.2 F | HEIGHT: 69 IN | WEIGHT: 199 LBS | SYSTOLIC BLOOD PRESSURE: 132 MMHG | HEART RATE: 59 BPM | OXYGEN SATURATION: 97 % | DIASTOLIC BLOOD PRESSURE: 72 MMHG

## 2017-11-29 DIAGNOSIS — C61 MALIGNANT NEOPLASM OF PROSTATE (H): ICD-10-CM

## 2017-11-29 DIAGNOSIS — Z01.818 PREOP GENERAL PHYSICAL EXAM: Primary | ICD-10-CM

## 2017-11-29 DIAGNOSIS — I10 HYPERTENSION GOAL BP (BLOOD PRESSURE) < 140/90: Chronic | ICD-10-CM

## 2017-11-29 DIAGNOSIS — C34.11 MALIGNANT NEOPLASM OF UPPER LOBE OF RIGHT LUNG (H): ICD-10-CM

## 2017-11-29 PROCEDURE — 99214 OFFICE O/P EST MOD 30 MIN: CPT | Performed by: FAMILY MEDICINE

## 2017-11-29 ASSESSMENT — ANXIETY QUESTIONNAIRES
1. FEELING NERVOUS, ANXIOUS, OR ON EDGE: SEVERAL DAYS
2. NOT BEING ABLE TO STOP OR CONTROL WORRYING: MORE THAN HALF THE DAYS
3. WORRYING TOO MUCH ABOUT DIFFERENT THINGS: SEVERAL DAYS
GAD7 TOTAL SCORE: 9
7. FEELING AFRAID AS IF SOMETHING AWFUL MIGHT HAPPEN: NOT AT ALL
IF YOU CHECKED OFF ANY PROBLEMS ON THIS QUESTIONNAIRE, HOW DIFFICULT HAVE THESE PROBLEMS MADE IT FOR YOU TO DO YOUR WORK, TAKE CARE OF THINGS AT HOME, OR GET ALONG WITH OTHER PEOPLE: SOMEWHAT DIFFICULT
5. BEING SO RESTLESS THAT IT IS HARD TO SIT STILL: MORE THAN HALF THE DAYS
6. BECOMING EASILY ANNOYED OR IRRITABLE: SEVERAL DAYS

## 2017-11-29 ASSESSMENT — PATIENT HEALTH QUESTIONNAIRE - PHQ9
SUM OF ALL RESPONSES TO PHQ QUESTIONS 1-9: 5
5. POOR APPETITE OR OVEREATING: MORE THAN HALF THE DAYS

## 2017-11-29 NOTE — PROGRESS NOTES
[facility]  [address] [address] [phone]  Dept: [phone]    PRE-OP EVALUATION:  Today's date: 2017    Juwan Latham (: 1950) presents for pre-operative evaluation assessment as requested by Dr. Agarwal.  He requires evaluation and anesthesia risk assessment prior to undergoing surgery/procedure for treatment of Prostate .  Proposed procedure: Robotic Assisted Radical Prostetectomy and Pelvic Lymph Node Dissection      Date of Surgery/ Procedure: 17  Time of Surgery/ Procedure: 7:30 AM   Hospital/Surgical Facility: [facility]  Fax number for surgical facility: [facility]  Primary Physician: Julio Guidry Jr.  Type of Anesthesia Anticipated: General    Patient has a Health Care Directive or Living Will:  YES     1. NO - Do you have a history of heart attack, stroke, stent, bypass or surgery on an artery in the head, neck, heart or legs?  2. NO - Do you ever have any pain or discomfort in your chest?  3. NO - Do you have a history of  Heart Failure?  4. NO - Are you troubled by shortness of breath when: walking on the level, up a slight hill or at night?  5. NO - Do you currently have a cold, bronchitis or other respiratory infection?  6. NO - Do you have a cough, shortness of breath or wheezing?  7. NO - Do you sometimes get pains in the calves of your legs when you walk?  8. NO - Do you or anyone in your family have previous history of blood clots?  9. NO - Do you or does anyone in your family have a serious bleeding problem such as prolonged bleeding following surgeries or cuts?  10. NO - Have you ever had problems with anemia or been told to take iron pills?  11. NO - Have you had any abnormal blood loss such as black, tarry or bloody stools, or abnormal vaginal bleeding?  12. NO - Have you ever had a blood transfusion?  13. NO - Have you or any of your relatives ever had problems with anesthesia?  14. YES - Do you have sleep apnea, excessive snoring or  daytime drowsiness? Has a CPAP machine   15. NO - Do you have any prosthetic heart valves?  16. NO - Do you have prosthetic joints?  17. NO - Is there any chance that you may be pregnant?    HPI:                                                      Brief HPI related to upcoming procedure:     Prostate cancer- Pt is having prostatectomy on 12/1/17 with Dr. Agarwal at [facility] secondary to prostate cancer. He last saw Dr. Agarwal on 11/19/17, had elevated PSA of 18, testing showed inflammation of his prostate, cancer was localized only to his prostate. Biopsy German Score was 3+3 (involves 3 cores), 3+4 (involves 4 cores). Pt ast had EKG this past July.     Left lower quadrant abdominal pain- His left hip pain has resolved. He has had pain localized to left lower quadrant of his abdomen for past 2 days. Pain is worse when he bends over.    Lung cancer- His lung cancer is improving, he notes his breathing is better. Pt saw nurse practitioner from oncology and pulmonary division for bronchoscopy on He saw thoracic surgeon on 11/15/17, did CT scan that did not show concurrence of his lung cancer, will have another chest CT in May 2018 for reevaluation.    Left 4th trigger finger- Pt has left 4th trigger finger, exacerbated with lifting weights.      See problem list for active medical problems.  Problems all longstanding and stable, except as noted/documented.  See ROS for pertinent symptoms related to these conditions.                                                                                                  .    MEDICAL HISTORY:                                                    Patient Active Problem List    Diagnosis Date Noted     Malignant neoplasm of prostate (H) 09/08/2017     Priority: Medium     Major depressive disorder, recurrent episode, mild (H) 05/09/2017     Priority: Medium     Cavernous hemangioma of brain (H) 04/07/2017     Priority: Medium     Found incidentally on MRI of brain which was ordered  to look for mets from lung tumor  Neurosurgery consult with Dr. Mcelroy in March 2017 - recommend repeat MRI in September 2017 to assess for any changes       Overweight (BMI 25.0-29.9) 03/24/2017     Priority: Medium     Malignant neoplasm of upper lobe of right lung (H) 03/13/2017     Priority: Medium     Adenocarcinoma       Benign neoplasm of descending colon 01/16/2017     Priority: Medium     Seen on colonoscopy 1/4/2017       Seasonal allergies      Priority: Medium     spring and fall       Hypertension goal BP (blood pressure) < 140/90 04/28/2015     Priority: Medium     GERD (gastroesophageal reflux disease) 01/27/2015     Priority: Medium     PAULINO (obstructive sleep apnea) 05/24/2013     Priority: Medium     Anxiety 12/12/2011     Priority: Medium     History of colonic polyps 06/22/2011     Priority: Medium     Problem list name updated by automated process. Provider to review       Advance Care Planning 06/03/2011     Priority: Medium     Discussed advance care planning with patient; information given to patient to review. 6/3/2011   6/7/11 Follow up to [facility] referral call placed. Patient would like to review document provided at visit, establish an agent and then will schedule facilitation if needed. Steffanie Leal LPN/Advanced Healthcare Planning Facilitator        CARDIOVASCULAR SCREENING; LDL GOAL LESS THAN 160 10/31/2010     Priority: Medium     Allergic rhinitis due to other allergen 05/28/2004     Priority: Medium     Tu score is 9 on 6-3-11        Past Medical History:   Diagnosis Date     Anxiety      Arthritis     fingers, hips     Calculus of kidney 2005, 2012     Congenital single kidney      Gastroesophageal reflux disease      Hx of cancer of lung 03/2017    wedge resection     Hypertension      Prostate cancer (H) 08/2017     Seasonal allergies     spring and fall     Sleep apnea     cpap     Past Surgical History:   Procedure Laterality Date     BRONCHOSCOPY FLEXIBLE N/A  3/3/2017    Procedure: BRONCHOSCOPY FLEXIBLE;  Surgeon: Maria A Desai MD;  Location: [facility] OR     COLONOSCOPY N/A 2/11/2015    Procedure: COLONOSCOPY;  Surgeon: Murphy Jesus MD;  Location:  GI     ESOPHAGOSCOPY, GASTROSCOPY, DUODENOSCOPY (EGD), COMBINED N/A 5/20/2016    Procedure: COMBINED ESOPHAGOSCOPY, GASTROSCOPY, DUODENOSCOPY (EGD), BIOPSY SINGLE OR MULTIPLE;  Surgeon: Murphy Jesus MD;  Location: [facility] GI     LAPAROSCOPIC WEDGE RESECTION LUNG Right 3/3/2017    Procedure: LAPAROSCOPIC WEDGE RESECTION LUNG;  Surgeon: Maria A Desai MD;  Location: [facility] OR     LASER HOLMIUM LITHOTRIPSY URETER(S), INSERT STENT, COMBINED  9/11/2012    Procedure: COMBINED CYSTOSCOPY, URETEROSCOPY, LASER HOLMIUM LITHOTRIPSY URETER(S), INSERT STENT;  Cystoscopy, Right Ureteroscopy, Stone Extraction, Holmium Laser, Double J Stent Placement;  Surgeon: Nicolás Blackwell MD;  Location:  OR     Current Outpatient Prescriptions   Medication Sig Dispense Refill     B Complex-C (SUPER B COMPLEX PO) Take 1 tablet by mouth daily       ALPRAZolam (XANAX) 0.5 MG tablet Take 1 tablet (0.5 mg) by mouth 3 times daily as needed for anxiety 20 tablet 0     LISINOPRIL PO Take 10 mg by mouth daily        ALFALFA PO Take by mouth daily       cyanocolbalamin (VITAMIN  B-12) 100 MCG tablet Take 50 mcg by mouth daily       VITAMIN D, CHOLECALCIFEROL, PO Take 1,000 Units by mouth daily       fluticasone (FLONASE) 50 MCG/ACT spray Spray 1-2 sprays into both nostrils daily 1 Bottle 11     order for DME Equipment ordered: RESMED Auto PAP Mask type: Nasal Settings: 8-16 CM H20  : CPAP       acetaminophen (TYLENOL) 325 MG tablet Take 2 tablets (650 mg) by mouth every 6 hours Do not take more than 4,000 mg of acetaminophen (Tylenol) from all sources to prevent liver damage. 100 tablet 1     OTC products: None, except as noted above    Allergies   Allergen Reactions     Percocet [Oxycodone-Acetaminophen] GI Disturbance     Severe constipation      Latex  "Allergy: NO    Social History   Substance Use Topics     Smoking status: Former Smoker     Packs/day: 2.00     Years: 20.00     Types: Cigarettes     Quit date: 1/1/1985     Smokeless tobacco: Never Used     Alcohol use Yes      Comment: avg 3 beers per night     History   Drug Use No       REVIEW OF SYSTEMS:                                                    Constitutional, neuro, ENT, endocrine, pulmonary, cardiac, gastrointestinal, genitourinary, musculoskeletal, integument and psychiatric systems are negative, except as otherwise noted.    This document serves as a record of the services and decisions personally performed and made by Julio Guidry MD. It was created on his behalf by Mark Pennington, a trained medical scribe. The creation of this document is based on the provider's statements to the medical scribe.  Mark Pennington 2:02 PM November 29, 2017    EXAM:                                                    /72  Pulse 59  Temp 98.2  F (36.8  C) (Oral)  Ht 1.753 m (5' 9\")  Wt 90.3 kg (199 lb)  SpO2 97%  BMI 29.39 kg/m2    GENERAL APPEARANCE: healthy, alert and no distress     EYES: EOMI,  PERRL     HENT: ear canals and TM's normal and nose and mouth without ulcers or lesions     NECK: no adenopathy, no asymmetry, masses, or scars and thyroid normal to palpation     RESP: lungs clear to auscultation - no rales, rhonchi or wheezes     CV: regular rates and rhythm, normal S1 S2, no S3 or S4 and no murmur, click or rub     ABDOMEN:  soft, nontender, no HSM or masses and bowel sounds normal     MS: extremities normal- no gross deformities noted, no evidence of inflammation in joints, FROM in all extremities.     SKIN: no suspicious lesions or rashes     NEURO: Normal strength and tone, sensory exam grossly normal, mentation intact and speech normal     PSYCH: mentation appears normal. and affect normal/bright     LYMPHATICS: No axillary, cervical, or supraclavicular nodes    DIAGNOSTICS:         "                                              EKG: Not indicated due to non-vascular surgery and last ekg on 7/13/17 (within 30 days for CAD history or last year for cardiac risk factors)    Labs Resulted Today:   No results found for this or any previous visit (from the past 24 hour(s)).    Recent Labs   Lab Test  07/13/17   1025  03/14/17   1414  03/05/17   0905   02/24/17   0955   09/11/12   1058   HGB  16.1  15.3   --    < >  15.8   < >   --    PLT  154  258  142*   < >  164   < >   --    INR   --    --    --    --    --    --   1.03   NA  138   --   138   --   140   < >   --    POTASSIUM  4.0   --   3.8   --   4.2   < >   --    CR  0.90   --   0.74   < >  0.83   < >   --    A1C   --    --    --    --   5.5   --    --     < > = values in this interval not displayed.     IMPRESSION:                                                    Reason for surgery/procedure: Malignant neoplasm of prostate.  Diagnosis/reason for consult: Evaluation and anesthesia risk assessment. HTN, non-small cell lung cancer, anxiety.    The proposed surgical procedure is considered INTERMEDIATE risk.    REVISED CARDIAC RISK INDEX  The patient has the following serious cardiovascular risks for perioperative complications such as (MI, PE, VFib and 3  AV Block):  No serious cardiac risks  INTERPRETATION: 0 risks: Class I (very low risk - 0.4% complication rate)    The patient has the following additional risks for perioperative complications:  No identified additional risks      ICD-10-CM    1. Preop general physical exam Z01.818 Basic metabolic panel  (Ca, Cl, CO2, Creat, Gluc, K, Na, BUN)     CBC with platelets differential   2. Malignant neoplasm of prostate (H) C61 Basic metabolic panel  (Ca, Cl, CO2, Creat, Gluc, K, Na, BUN)     CBC with platelets differential   3. Malignant neoplasm of upper lobe of right lung (H) C34.11    4. Hypertension goal BP (blood pressure) < 140/90 I10 Basic metabolic panel  (Ca, Cl, CO2, Creat, Gluc, K, Na, BUN)        RECOMMENDATIONS:                                                      --Consult hospital rounder / IM to assist post-op medical management    --Patient is to take all scheduled medications on the day of surgery EXCEPT for modifications listed below.    APPROVAL GIVEN to proceed with proposed procedure, without further diagnostic evaluation     Signed Electronically by: Julio Guidry Jr, MD    Copy of this evaluation report is provided to requesting physician.    [facility] Preop Guidelines    The information in this document, created by the medical scribe for me, accurately reflects the services I personally performed and the decisions made by me. I have reviewed and approved this document for accuracy prior to leaving the patient care area.  November 29, 2017 2:03 PM

## 2017-11-29 NOTE — MR AVS SNAPSHOT
After Visit Summary   11/29/2017    Juwan Latham    MRN: [id number]           Patient Information     Date Of Birth          1950        Visit Information        Provider Department      11/29/2017 1:20 PM Julio Guidry Jr., MD [facility]        Today's Diagnoses     Preop general physical exam    -  1    Malignant neoplasm of prostate (H)        Malignant neoplasm of upper lobe of right lung (H)        Hypertension goal BP (blood pressure) < 140/90          Care Instructions      Before Your Surgery      Call your surgeon if there is any change in your health. This includes signs of a cold or flu (such as a sore throat, runny nose, cough, rash or fever).    Do not smoke, drink alcohol or take over the counter medicine (unless your surgeon or primary care doctor tells you to) for the 24 hours before and after surgery.    If you take prescribed drugs: Follow your doctor s orders about which medicines to take and which to stop until after surgery.    Eating and drinking prior to surgery: follow the instructions from your surgeon    Take a shower or bath the night before surgery. Use the soap your surgeon gave you to gently clean your skin. If you do not have soap from your surgeon, use your regular soap. Do not shave or scrub the surgery site.  Wear clean pajamas and have clean sheets on your bed.           Follow-ups after your visit        Follow-up notes from your care team     Return in about 3 weeks (around 12/20/2017) for follow up of LLQ abdominal pain and trigger finger.      Your next 10 appointments already scheduled     Dec 01, 2017   Procedure with Paulo Agarwal MD   [facility] (--)    [address] [facility] [address]   [phone]            Dec 13, 2017  1:30 PM CST   Return Visit with Paulo Agarwal MD   [facility] ([facility])    [facility]  [address]  [address]  [facility] [address]   [phone]            Jan 29, 2018  9:00 AM CST   Return Sleep Patient with Owen Davis MD   [facility] [place]    [address]  [facility] [id number] [phone]              Future tests that were ordered for you today     Open Future Orders        Priority Expected Expires Ordered    Basic metabolic panel  (Ca, Cl, CO2, Creat, Gluc, K, Na, BUN) Routine 11/30/2017 11/29/2018 11/29/2017            Who to contact     If you have questions or need follow up information about today's clinic visit or your schedule please contact [facility] [place] directly at [phone].  Normal or non-critical lab and imaging results will be communicated to you by MyChart, letter or phone within 4 business days after the clinic has received the results. If you do not hear from us within 7 days, please contact the clinic through GetMeMediahart or phone. If you have a critical or abnormal lab result, we will notify you by phone as soon as possible.  Submit refill requests through Doodle or call your pharmacy and they will forward the refill request to us. Please allow 3 business days for your refill to be completed.          Additional Information About Your Visit        Doodle Information     Doodle gives you secure access to your electronic health record. If you see a primary care provider, you can also send messages to your care team and make appointments. If you have questions, please call your primary care clinic.  If you do not have a primary care provider, please call [phone] and they will assist you.        Care EveryWhere ID     This is your Care EveryWhere ID. This could be used by other organizations to access your [facility] medical records  [id number]        Your Vitals Were     Pulse Temperature Height Pulse Oximetry BMI (Body Mass Index)       59 98.2  F (36.8  C) (Oral) 5' 9\" " (1.753 m) 97% 29.39 kg/m2        Blood Pressure from Last 3 Encounters:   11/29/17 132/72   10/19/17 139/77   10/13/17 132/70    Weight from Last 3 Encounters:   11/29/17 199 lb (90.3 kg)   10/19/17 197 lb (89.4 kg)   10/13/17 197 lb (89.4 kg)               Primary Care Provider Office Phone # Fax #    Julio Guidry Jr., [phone] [address]  [place] [address]        Equal Access to Services     [facility]: Hadii aad ku hadasho Soomaali, waaxda luqadaha, qaybta kaalmada adeegyada, mynor gonzalez hayoliven adekellen mosley . So [facility] [phone].    ATENCIÓN: Si habla español, tiene a gunn disposición servicios gratuitos de asistencia lingüística. [facility] [phone].    We comply with applicable federal civil rights laws and [place] laws. We do not discriminate on the basis of race, color, national origin, age, disability, sex, sexual orientation, or gender identity.            Thank you!     Thank you for choosing [facility]  for your care. Our goal is always to provide you with excellent care. Hearing back from our patients is one way we can continue to improve our services. Please take a few minutes to complete the written survey that you may receive in the mail after your visit with us. Thank you!             Your Updated Medication List - Protect others around you: Learn how to safely use, store and throw away your medicines at www.disposemymeds.org.          This list is accurate as of: 11/29/17  2:06 PM.  Always use your most recent med list.                   Brand Name Dispense Instructions for use Diagnosis    acetaminophen 325 MG tablet    TYLENOL    100 tablet    Take 2 tablets (650 mg) by mouth every 6 hours Do not take more than 4,000 mg of acetaminophen (Tylenol) from all sources to prevent liver damage.    Lung nodule       ALFALFA PO      Take by mouth daily        ALPRAZolam 0.5 MG tablet    XANAX    20 tablet    Take 1 tablet (0.5 mg) by mouth 3  times daily as needed for anxiety    Anxiety       cyanocolbalamin 100 MCG tablet    vitamin  B-12     Take 50 mcg by mouth daily        fluticasone 50 MCG/ACT spray    FLONASE    1 Bottle    Spray 1-2 sprays into both nostrils daily    Chronic rhinitis, unspecified type       LISINOPRIL PO      Take 10 mg by mouth daily        order for DME      Equipment ordered: RESMED Auto PAP Mask type: Nasal Settings: 8-16 CM H20  : CPAP        SUPER B COMPLEX PO      Take 1 tablet by mouth daily        VITAMIN D (CHOLECALCIFEROL) PO      Take 1,000 Units by mouth daily

## 2017-11-29 NOTE — NURSING NOTE
"Chief Complaint   Patient presents with     Pre-Op Exam       Initial /72  Pulse 59  Temp 98.2  F (36.8  C) (Oral)  Ht 5' 9\" (1.753 m)  Wt 199 lb (90.3 kg)  SpO2 97%  BMI 29.39 kg/m2 Estimated body mass index is 29.39 kg/(m^2) as calculated from the following:    Height as of this encounter: 5' 9\" (1.753 m).    Weight as of this encounter: 199 lb (90.3 kg).  Medication Reconciliation: complete  "

## 2017-11-30 ENCOUNTER — HOSPITAL ENCOUNTER (OUTPATIENT)
Dept: LAB | Facility: CLINIC | Age: 67
Discharge: HOME | DRG: 707 | End: 2017-11-30
Attending: UROLOGY | Admitting: UROLOGY
Payer: MEDICARE

## 2017-11-30 DIAGNOSIS — Z01.812 PRE-OPERATIVE LABORATORY EXAMINATION: ICD-10-CM

## 2017-11-30 DIAGNOSIS — I10 HYPERTENSION GOAL BP (BLOOD PRESSURE) < 140/90: Chronic | ICD-10-CM

## 2017-11-30 LAB
ABO + RH BLD: NORMAL
ABO + RH BLD: NORMAL
ANION GAP SERPL CALCULATED.3IONS-SCNC: 4 MMOL/L (ref 3–14)
BLD GP AB SCN SERPL QL: NORMAL
BLOOD BANK CMNT PATIENT-IMP: NORMAL
BUN SERPL-MCNC: 11 MG/DL (ref 7–30)
CALCIUM SERPL-MCNC: 8.9 MG/DL (ref 8.5–10.1)
CHLORIDE SERPL-SCNC: 103 MMOL/L (ref 94–109)
CO2 SERPL-SCNC: 29 MMOL/L (ref 20–32)
CREAT SERPL-MCNC: 0.88 MG/DL (ref 0.66–1.25)
GFR SERPL CREATININE-BSD FRML MDRD: 86 ML/MIN/1.7M2
GLUCOSE SERPL-MCNC: 116 MG/DL (ref 70–99)
HGB BLD-MCNC: 16.2 G/DL (ref 13.3–17.7)
POTASSIUM SERPL-SCNC: 4.2 MMOL/L (ref 3.4–5.3)
SODIUM SERPL-SCNC: 136 MMOL/L (ref 133–144)
SPECIMEN EXP DATE BLD: NORMAL

## 2017-11-30 ASSESSMENT — ANXIETY QUESTIONNAIRES: GAD7 TOTAL SCORE: 9

## 2017-11-30 NOTE — PROGRESS NOTES
Mr. Latham,    -Kidney function is normal (Cr, GFR), Sodium is normal, Potassium is normal, Calcium is normal, Glucose is normal (diabetes screening test).     If you have further questions about the interpretation of your labs, labtestsonline.[email or web address] is a good website to check out for further information.    Please contact the clinic if you have additional questions.  Thank you and good luck with your surgery, Juwan.    Sincerely,    Julio Guidry MD

## 2017-11-30 NOTE — H&P (VIEW-ONLY)
[facility]  [address] [address] [phone]  Dept: [phone]    PRE-OP EVALUATION:  Today's date: 2017    Juwan Latham (: 1950) presents for pre-operative evaluation assessment as requested by Dr. Agarwal.  He requires evaluation and anesthesia risk assessment prior to undergoing surgery/procedure for treatment of Prostate .  Proposed procedure: Robotic Assisted Radical Prostetectomy and Pelvic Lymph Node Dissection      Date of Surgery/ Procedure: 17  Time of Surgery/ Procedure: 7:30 AM   Hospital/Surgical Facility: [facility]  Fax number for surgical facility: [facility]  Primary Physician: Julio Guidry Jr.  Type of Anesthesia Anticipated: General    Patient has a Health Care Directive or Living Will:  YES     1. NO - Do you have a history of heart attack, stroke, stent, bypass or surgery on an artery in the head, neck, heart or legs?  2. NO - Do you ever have any pain or discomfort in your chest?  3. NO - Do you have a history of  Heart Failure?  4. NO - Are you troubled by shortness of breath when: walking on the level, up a slight hill or at night?  5. NO - Do you currently have a cold, bronchitis or other respiratory infection?  6. NO - Do you have a cough, shortness of breath or wheezing?  7. NO - Do you sometimes get pains in the calves of your legs when you walk?  8. NO - Do you or anyone in your family have previous history of blood clots?  9. NO - Do you or does anyone in your family have a serious bleeding problem such as prolonged bleeding following surgeries or cuts?  10. NO - Have you ever had problems with anemia or been told to take iron pills?  11. NO - Have you had any abnormal blood loss such as black, tarry or bloody stools, or abnormal vaginal bleeding?  12. NO - Have you ever had a blood transfusion?  13. NO - Have you or any of your relatives ever had problems with anesthesia?  14. YES - Do you have sleep apnea, excessive snoring or  daytime drowsiness? Has a CPAP machine   15. NO - Do you have any prosthetic heart valves?  16. NO - Do you have prosthetic joints?  17. NO - Is there any chance that you may be pregnant?    HPI:                                                      Brief HPI related to upcoming procedure:     Prostate cancer- Pt is having prostatectomy on 12/1/17 with Dr. Agarwal at [facility] secondary to prostate cancer. He last saw Dr. Agarwal on 11/19/17, had elevated PSA of 18, testing showed inflammation of his prostate, cancer was localized only to his prostate. Biopsy German Score was 3+3 (involves 3 cores), 3+4 (involves 4 cores). Pt ast had EKG this past July.     Left lower quadrant abdominal pain- His left hip pain has resolved. He has had pain localized to left lower quadrant of his abdomen for past 2 days. Pain is worse when he bends over.    Lung cancer- His lung cancer is improving, he notes his breathing is better. Pt saw nurse practitioner from oncology and pulmonary division for bronchoscopy on He saw thoracic surgeon on 11/15/17, did CT scan that did not show concurrence of his lung cancer, will have another chest CT in May 2018 for reevaluation.    Left 4th trigger finger- Pt has left 4th trigger finger, exacerbated with lifting weights.      See problem list for active medical problems.  Problems all longstanding and stable, except as noted/documented.  See ROS for pertinent symptoms related to these conditions.                                                                                                  .    MEDICAL HISTORY:                                                    Patient Active Problem List    Diagnosis Date Noted     Malignant neoplasm of prostate (H) 09/08/2017     Priority: Medium     Major depressive disorder, recurrent episode, mild (H) 05/09/2017     Priority: Medium     Cavernous hemangioma of brain (H) 04/07/2017     Priority: Medium     Found incidentally on MRI of brain which was ordered  to look for mets from lung tumor  Neurosurgery consult with Dr. Mcelroy in March 2017 - recommend repeat MRI in September 2017 to assess for any changes       Overweight (BMI 25.0-29.9) 03/24/2017     Priority: Medium     Malignant neoplasm of upper lobe of right lung (H) 03/13/2017     Priority: Medium     Adenocarcinoma       Benign neoplasm of descending colon 01/16/2017     Priority: Medium     Seen on colonoscopy 1/4/2017       Seasonal allergies      Priority: Medium     spring and fall       Hypertension goal BP (blood pressure) < 140/90 04/28/2015     Priority: Medium     GERD (gastroesophageal reflux disease) 01/27/2015     Priority: Medium     PAULINO (obstructive sleep apnea) 05/24/2013     Priority: Medium     Anxiety 12/12/2011     Priority: Medium     History of colonic polyps 06/22/2011     Priority: Medium     Problem list name updated by automated process. Provider to review       Advance Care Planning 06/03/2011     Priority: Medium     Discussed advance care planning with patient; information given to patient to review. 6/3/2011   6/7/11 Follow up to [facility] referral call placed. Patient would like to review document provided at visit, establish an agent and then will schedule facilitation if needed. Steffanie Leal LPN/Advanced Healthcare Planning Facilitator        CARDIOVASCULAR SCREENING; LDL GOAL LESS THAN 160 10/31/2010     Priority: Medium     Allergic rhinitis due to other allergen 05/28/2004     Priority: Medium     Tu score is 9 on 6-3-11        Past Medical History:   Diagnosis Date     Anxiety      Arthritis     fingers, hips     Calculus of kidney 2005, 2012     Congenital single kidney      Gastroesophageal reflux disease      Hx of cancer of lung 03/2017    wedge resection     Hypertension      Prostate cancer (H) 08/2017     Seasonal allergies     spring and fall     Sleep apnea     cpap     Past Surgical History:   Procedure Laterality Date     BRONCHOSCOPY FLEXIBLE N/A  3/3/2017    Procedure: BRONCHOSCOPY FLEXIBLE;  Surgeon: Maria A Desai MD;  Location: [facility] OR     COLONOSCOPY N/A 2/11/2015    Procedure: COLONOSCOPY;  Surgeon: Murphy Jesus MD;  Location:  GI     ESOPHAGOSCOPY, GASTROSCOPY, DUODENOSCOPY (EGD), COMBINED N/A 5/20/2016    Procedure: COMBINED ESOPHAGOSCOPY, GASTROSCOPY, DUODENOSCOPY (EGD), BIOPSY SINGLE OR MULTIPLE;  Surgeon: Murphy Jesus MD;  Location: [facility] GI     LAPAROSCOPIC WEDGE RESECTION LUNG Right 3/3/2017    Procedure: LAPAROSCOPIC WEDGE RESECTION LUNG;  Surgeon: Maria A Desai MD;  Location: [facility] OR     LASER HOLMIUM LITHOTRIPSY URETER(S), INSERT STENT, COMBINED  9/11/2012    Procedure: COMBINED CYSTOSCOPY, URETEROSCOPY, LASER HOLMIUM LITHOTRIPSY URETER(S), INSERT STENT;  Cystoscopy, Right Ureteroscopy, Stone Extraction, Holmium Laser, Double J Stent Placement;  Surgeon: Nicolás Blackwell MD;  Location:  OR     Current Outpatient Prescriptions   Medication Sig Dispense Refill     B Complex-C (SUPER B COMPLEX PO) Take 1 tablet by mouth daily       ALPRAZolam (XANAX) 0.5 MG tablet Take 1 tablet (0.5 mg) by mouth 3 times daily as needed for anxiety 20 tablet 0     LISINOPRIL PO Take 10 mg by mouth daily        ALFALFA PO Take by mouth daily       cyanocolbalamin (VITAMIN  B-12) 100 MCG tablet Take 50 mcg by mouth daily       VITAMIN D, CHOLECALCIFEROL, PO Take 1,000 Units by mouth daily       fluticasone (FLONASE) 50 MCG/ACT spray Spray 1-2 sprays into both nostrils daily 1 Bottle 11     order for DME Equipment ordered: RESMED Auto PAP Mask type: Nasal Settings: 8-16 CM H20  : CPAP       acetaminophen (TYLENOL) 325 MG tablet Take 2 tablets (650 mg) by mouth every 6 hours Do not take more than 4,000 mg of acetaminophen (Tylenol) from all sources to prevent liver damage. 100 tablet 1     OTC products: None, except as noted above    Allergies   Allergen Reactions     Percocet [Oxycodone-Acetaminophen] GI Disturbance     Severe constipation      Latex  "Allergy: NO    Social History   Substance Use Topics     Smoking status: Former Smoker     Packs/day: 2.00     Years: 20.00     Types: Cigarettes     Quit date: 1/1/1985     Smokeless tobacco: Never Used     Alcohol use Yes      Comment: avg 3 beers per night     History   Drug Use No       REVIEW OF SYSTEMS:                                                    Constitutional, neuro, ENT, endocrine, pulmonary, cardiac, gastrointestinal, genitourinary, musculoskeletal, integument and psychiatric systems are negative, except as otherwise noted.    This document serves as a record of the services and decisions personally performed and made by Julio Guidry MD. It was created on his behalf by Mark Pennington, a trained medical scribe. The creation of this document is based on the provider's statements to the medical scribe.  Mark Pennington 2:02 PM November 29, 2017    EXAM:                                                    /72  Pulse 59  Temp 98.2  F (36.8  C) (Oral)  Ht 1.753 m (5' 9\")  Wt 90.3 kg (199 lb)  SpO2 97%  BMI 29.39 kg/m2    GENERAL APPEARANCE: healthy, alert and no distress     EYES: EOMI,  PERRL     HENT: ear canals and TM's normal and nose and mouth without ulcers or lesions     NECK: no adenopathy, no asymmetry, masses, or scars and thyroid normal to palpation     RESP: lungs clear to auscultation - no rales, rhonchi or wheezes     CV: regular rates and rhythm, normal S1 S2, no S3 or S4 and no murmur, click or rub     ABDOMEN:  soft, nontender, no HSM or masses and bowel sounds normal     MS: extremities normal- no gross deformities noted, no evidence of inflammation in joints, FROM in all extremities.     SKIN: no suspicious lesions or rashes     NEURO: Normal strength and tone, sensory exam grossly normal, mentation intact and speech normal     PSYCH: mentation appears normal. and affect normal/bright     LYMPHATICS: No axillary, cervical, or supraclavicular nodes    DIAGNOSTICS:         "                                              EKG: Not indicated due to non-vascular surgery and last ekg on 7/13/17 (within 30 days for CAD history or last year for cardiac risk factors)    Labs Resulted Today:   No results found for this or any previous visit (from the past 24 hour(s)).    Recent Labs   Lab Test  07/13/17   1025  03/14/17   1414  03/05/17   0905   02/24/17   0955   09/11/12   1058   HGB  16.1  15.3   --    < >  15.8   < >   --    PLT  154  258  142*   < >  164   < >   --    INR   --    --    --    --    --    --   1.03   NA  138   --   138   --   140   < >   --    POTASSIUM  4.0   --   3.8   --   4.2   < >   --    CR  0.90   --   0.74   < >  0.83   < >   --    A1C   --    --    --    --   5.5   --    --     < > = values in this interval not displayed.     IMPRESSION:                                                    Reason for surgery/procedure: Malignant neoplasm of prostate.  Diagnosis/reason for consult: Evaluation and anesthesia risk assessment. HTN, non-small cell lung cancer, anxiety.    The proposed surgical procedure is considered INTERMEDIATE risk.    REVISED CARDIAC RISK INDEX  The patient has the following serious cardiovascular risks for perioperative complications such as (MI, PE, VFib and 3  AV Block):  No serious cardiac risks  INTERPRETATION: 0 risks: Class I (very low risk - 0.4% complication rate)    The patient has the following additional risks for perioperative complications:  No identified additional risks      ICD-10-CM    1. Preop general physical exam Z01.818 Basic metabolic panel  (Ca, Cl, CO2, Creat, Gluc, K, Na, BUN)     CBC with platelets differential   2. Malignant neoplasm of prostate (H) C61 Basic metabolic panel  (Ca, Cl, CO2, Creat, Gluc, K, Na, BUN)     CBC with platelets differential   3. Malignant neoplasm of upper lobe of right lung (H) C34.11    4. Hypertension goal BP (blood pressure) < 140/90 I10 Basic metabolic panel  (Ca, Cl, CO2, Creat, Gluc, K, Na, BUN)        RECOMMENDATIONS:                                                      --Consult hospital rounder / IM to assist post-op medical management    --Patient is to take all scheduled medications on the day of surgery EXCEPT for modifications listed below.    APPROVAL GIVEN to proceed with proposed procedure, without further diagnostic evaluation     Signed Electronically by: Julio Guidry Jr, MD    Copy of this evaluation report is provided to requesting physician.    [place] Preop Guidelines    The information in this document, created by the medical scribe for me, accurately reflects the services I personally performed and the decisions made by me. I have reviewed and approved this document for accuracy prior to leaving the patient care area.  November 29, 2017 2:03 PM

## 2017-12-01 ENCOUNTER — ANESTHESIA (OUTPATIENT)
Dept: SURGERY | Facility: CLINIC | Age: 67
DRG: 707 | End: 2017-12-01
Payer: MEDICARE

## 2017-12-01 ENCOUNTER — ANESTHESIA EVENT (OUTPATIENT)
Dept: SURGERY | Facility: CLINIC | Age: 67
DRG: 707 | End: 2017-12-01
Payer: MEDICARE

## 2017-12-01 ENCOUNTER — SURGERY (OUTPATIENT)
Age: 67
End: 2017-12-01
Payer: COMMERCIAL

## 2017-12-01 ENCOUNTER — HOSPITAL ENCOUNTER (INPATIENT)
Facility: CLINIC | Age: 67
LOS: 3 days | Discharge: HOME | DRG: 707 | End: 2017-12-04
Attending: UROLOGY | Admitting: UROLOGY
Payer: MEDICARE

## 2017-12-01 DIAGNOSIS — C61 MALIGNANT NEOPLASM OF PROSTATE (H): Primary | ICD-10-CM

## 2017-12-01 LAB
ANION GAP SERPL CALCULATED.3IONS-SCNC: 10 MMOL/L (ref 3–14)
BUN SERPL-MCNC: 12 MG/DL (ref 7–30)
CALCIUM SERPL-MCNC: 8.2 MG/DL (ref 8.5–10.1)
CHLORIDE SERPL-SCNC: 105 MMOL/L (ref 94–109)
CO2 SERPL-SCNC: 24 MMOL/L (ref 20–32)
CREAT SERPL-MCNC: 0.84 MG/DL (ref 0.66–1.25)
ERYTHROCYTE [DISTWIDTH] IN BLOOD BY AUTOMATED COUNT: 12.4 % (ref 10–15)
GFR SERPL CREATININE-BSD FRML MDRD: >90 ML/MIN/1.7M2
GLUCOSE SERPL-MCNC: 144 MG/DL (ref 70–99)
HCT VFR BLD AUTO: 44.1 % (ref 40–53)
HGB BLD-MCNC: 14.9 G/DL (ref 13.3–17.7)
MCH RBC QN AUTO: 31.8 PG (ref 26.5–33)
MCHC RBC AUTO-ENTMCNC: 33.8 G/DL (ref 31.5–36.5)
MCV RBC AUTO: 94 FL (ref 78–100)
PLATELET # BLD AUTO: 174 10E9/L (ref 150–450)
POTASSIUM SERPL-SCNC: 4.6 MMOL/L (ref 3.4–5.3)
RBC # BLD AUTO: 4.68 10E12/L (ref 4.4–5.9)
SODIUM SERPL-SCNC: 139 MMOL/L (ref 133–144)
WBC # BLD AUTO: 11.4 10E9/L (ref 4–11)

## 2017-12-01 PROCEDURE — 8E0W4CZ ROBOTIC ASSISTED PROCEDURE OF TRUNK REGION, PERCUTANEOUS ENDOSCOPIC APPROACH: ICD-10-PCS | Performed by: UROLOGY

## 2017-12-01 PROCEDURE — 0VBQ4ZZ EXCISION OF BILATERAL VAS DEFERENS, PERCUTANEOUS ENDOSCOPIC APPROACH: ICD-10-PCS | Performed by: UROLOGY

## 2017-12-01 PROCEDURE — 25000128 H RX IP 250 OP 636: Performed by: STUDENT IN AN ORGANIZED HEALTH CARE EDUCATION/TRAINING PROGRAM

## 2017-12-01 PROCEDURE — 88309 TISSUE EXAM BY PATHOLOGIST: CPT | Mod: 26 | Performed by: UROLOGY

## 2017-12-01 PROCEDURE — 55866 LAPS SURG PRST8ECT RPBIC RAD: CPT | Mod: GC | Performed by: UROLOGY

## 2017-12-01 PROCEDURE — 25000125 ZZHC RX 250: Performed by: UROLOGY

## 2017-12-01 PROCEDURE — 88305 TISSUE EXAM BY PATHOLOGIST: CPT | Performed by: UROLOGY

## 2017-12-01 PROCEDURE — 38571 LAPAROSCOPY LYMPHADENECTOMY: CPT | Mod: 51 | Performed by: UROLOGY

## 2017-12-01 PROCEDURE — 37000009 ZZH ANESTHESIA TECHNICAL FEE, EACH ADDTL 15 MIN: Performed by: UROLOGY

## 2017-12-01 PROCEDURE — 37000008 ZZH ANESTHESIA TECHNICAL FEE, 1ST 30 MIN: Performed by: UROLOGY

## 2017-12-01 PROCEDURE — 0VT34ZZ RESECTION OF BILATERAL SEMINAL VESICLES, PERCUTANEOUS ENDOSCOPIC APPROACH: ICD-10-PCS | Performed by: UROLOGY

## 2017-12-01 PROCEDURE — 85027 COMPLETE CBC AUTOMATED: CPT | Performed by: STUDENT IN AN ORGANIZED HEALTH CARE EDUCATION/TRAINING PROGRAM

## 2017-12-01 PROCEDURE — 27210794 ZZH OR GENERAL SUPPLY STERILE: Performed by: UROLOGY

## 2017-12-01 PROCEDURE — 12000000 ZZH R&B MED SURG/OB

## 2017-12-01 PROCEDURE — 25000128 H RX IP 250 OP 636: Performed by: UROLOGY

## 2017-12-01 PROCEDURE — 25000128 H RX IP 250 OP 636: Performed by: NURSE ANESTHETIST, CERTIFIED REGISTERED

## 2017-12-01 PROCEDURE — 25000128 H RX IP 250 OP 636: Performed by: ANESTHESIOLOGY

## 2017-12-01 PROCEDURE — 36415 COLL VENOUS BLD VENIPUNCTURE: CPT | Performed by: STUDENT IN AN ORGANIZED HEALTH CARE EDUCATION/TRAINING PROGRAM

## 2017-12-01 PROCEDURE — 88309 TISSUE EXAM BY PATHOLOGIST: CPT | Performed by: UROLOGY

## 2017-12-01 PROCEDURE — 88307 TISSUE EXAM BY PATHOLOGIST: CPT | Mod: 26 | Performed by: UROLOGY

## 2017-12-01 PROCEDURE — 40000306 ZZH STATISTIC PRE PROC ASSESS II: Performed by: UROLOGY

## 2017-12-01 PROCEDURE — S0020 INJECTION, BUPIVICAINE HYDRO: HCPCS | Performed by: UROLOGY

## 2017-12-01 PROCEDURE — 36000087 ZZH SURGERY LEVEL 8 EA 15 ADDTL MIN: Performed by: UROLOGY

## 2017-12-01 PROCEDURE — 71000013 ZZH RECOVERY PHASE 1 LEVEL 1 EA ADDTL HR: Performed by: UROLOGY

## 2017-12-01 PROCEDURE — A9270 NON-COVERED ITEM OR SERVICE: HCPCS | Mod: GY | Performed by: STUDENT IN AN ORGANIZED HEALTH CARE EDUCATION/TRAINING PROGRAM

## 2017-12-01 PROCEDURE — 88331 PATH CONSLTJ SURG 1 BLK 1SPC: CPT | Mod: 26 | Performed by: UROLOGY

## 2017-12-01 PROCEDURE — 88331 PATH CONSLTJ SURG 1 BLK 1SPC: CPT | Performed by: UROLOGY

## 2017-12-01 PROCEDURE — 25000125 ZZHC RX 250: Performed by: NURSE ANESTHETIST, CERTIFIED REGISTERED

## 2017-12-01 PROCEDURE — 88307 TISSUE EXAM BY PATHOLOGIST: CPT | Performed by: UROLOGY

## 2017-12-01 PROCEDURE — 0VT04ZZ RESECTION OF PROSTATE, PERCUTANEOUS ENDOSCOPIC APPROACH: ICD-10-PCS | Performed by: UROLOGY

## 2017-12-01 PROCEDURE — 80048 BASIC METABOLIC PNL TOTAL CA: CPT | Performed by: STUDENT IN AN ORGANIZED HEALTH CARE EDUCATION/TRAINING PROGRAM

## 2017-12-01 PROCEDURE — 36000085 ZZH SURGERY LEVEL 8 1ST 30 MIN: Performed by: UROLOGY

## 2017-12-01 PROCEDURE — 25000564 ZZH DESFLURANE, EA 15 MIN: Performed by: UROLOGY

## 2017-12-01 PROCEDURE — 71000012 ZZH RECOVERY PHASE 1 LEVEL 1 FIRST HR: Performed by: UROLOGY

## 2017-12-01 PROCEDURE — 25000132 ZZH RX MED GY IP 250 OP 250 PS 637: Mod: GY | Performed by: STUDENT IN AN ORGANIZED HEALTH CARE EDUCATION/TRAINING PROGRAM

## 2017-12-01 RX ORDER — NEOMYCIN/BACITRACIN/POLYMYXINB 3.5-400-5K
OINTMENT (GRAM) TOPICAL 2 TIMES DAILY
Status: DISCONTINUED | OUTPATIENT
Start: 2017-12-01 | End: 2017-12-04 | Stop reason: HOSPADM

## 2017-12-01 RX ORDER — ACETAMINOPHEN 325 MG/1
975 TABLET ORAL EVERY 8 HOURS
Status: DISCONTINUED | OUTPATIENT
Start: 2017-12-01 | End: 2017-12-01

## 2017-12-01 RX ORDER — OXYCODONE HYDROCHLORIDE 5 MG/1
5 TABLET ORAL EVERY 4 HOURS PRN
Qty: 30 TABLET | Refills: 0 | Status: SHIPPED | OUTPATIENT
Start: 2017-12-01 | End: 2017-12-01

## 2017-12-01 RX ORDER — DIMENHYDRINATE 50 MG/ML
25 INJECTION, SOLUTION INTRAMUSCULAR; INTRAVENOUS
Status: DISCONTINUED | OUTPATIENT
Start: 2017-12-01 | End: 2017-12-04 | Stop reason: HOSPADM

## 2017-12-01 RX ORDER — KETAMINE HYDROCHLORIDE 10 MG/ML
INJECTION, SOLUTION INTRAMUSCULAR; INTRAVENOUS PRN
Status: DISCONTINUED | OUTPATIENT
Start: 2017-12-01 | End: 2017-12-01

## 2017-12-01 RX ORDER — ALPRAZOLAM 0.5 MG
0.5 TABLET ORAL 3 TIMES DAILY PRN
Status: CANCELLED | OUTPATIENT
Start: 2017-12-01

## 2017-12-01 RX ORDER — ACETAMINOPHEN 325 MG/1
650 TABLET ORAL EVERY 4 HOURS PRN
Status: DISCONTINUED | OUTPATIENT
Start: 2017-12-01 | End: 2017-12-04 | Stop reason: HOSPADM

## 2017-12-01 RX ORDER — CEFAZOLIN SODIUM 2 G/100ML
2 INJECTION, SOLUTION INTRAVENOUS
Status: COMPLETED | OUTPATIENT
Start: 2017-12-01 | End: 2017-12-01

## 2017-12-01 RX ORDER — LABETALOL HYDROCHLORIDE 5 MG/ML
10 INJECTION, SOLUTION INTRAVENOUS
Status: DISCONTINUED | OUTPATIENT
Start: 2017-12-01 | End: 2017-12-04 | Stop reason: HOSPADM

## 2017-12-01 RX ORDER — CEFAZOLIN SODIUM 1 G/3ML
1 INJECTION, POWDER, FOR SOLUTION INTRAMUSCULAR; INTRAVENOUS SEE ADMIN INSTRUCTIONS
Status: DISCONTINUED | OUTPATIENT
Start: 2017-12-01 | End: 2017-12-01 | Stop reason: HOSPADM

## 2017-12-01 RX ORDER — LIDOCAINE 40 MG/G
CREAM TOPICAL
Status: DISCONTINUED | OUTPATIENT
Start: 2017-12-01 | End: 2017-12-04 | Stop reason: HOSPADM

## 2017-12-01 RX ORDER — KETOROLAC TROMETHAMINE 15 MG/ML
15 INJECTION, SOLUTION INTRAMUSCULAR; INTRAVENOUS EVERY 6 HOURS
Status: COMPLETED | OUTPATIENT
Start: 2017-12-01 | End: 2017-12-02

## 2017-12-01 RX ORDER — LIDOCAINE HYDROCHLORIDE 10 MG/ML
INJECTION, SOLUTION INFILTRATION; PERINEURAL PRN
Status: DISCONTINUED | OUTPATIENT
Start: 2017-12-01 | End: 2017-12-01

## 2017-12-01 RX ORDER — LIDOCAINE 40 MG/G
CREAM TOPICAL
Status: DISCONTINUED | OUTPATIENT
Start: 2017-12-01 | End: 2017-12-01 | Stop reason: HOSPADM

## 2017-12-01 RX ORDER — FLUTICASONE PROPIONATE 50 MCG
1 SPRAY, SUSPENSION (ML) NASAL DAILY
Status: DISCONTINUED | OUTPATIENT
Start: 2017-12-01 | End: 2017-12-04 | Stop reason: HOSPADM

## 2017-12-01 RX ORDER — BUPIVACAINE HYDROCHLORIDE 2.5 MG/ML
INJECTION, SOLUTION INFILTRATION; PERINEURAL PRN
Status: DISCONTINUED | OUTPATIENT
Start: 2017-12-01 | End: 2017-12-01 | Stop reason: HOSPADM

## 2017-12-01 RX ORDER — HEPARIN SODIUM 5000 [USP'U]/.5ML
5000 INJECTION, SOLUTION INTRAVENOUS; SUBCUTANEOUS EVERY 8 HOURS
Status: DISCONTINUED | OUTPATIENT
Start: 2017-12-02 | End: 2017-12-03

## 2017-12-01 RX ORDER — FENTANYL CITRATE 50 UG/ML
INJECTION, SOLUTION INTRAMUSCULAR; INTRAVENOUS PRN
Status: DISCONTINUED | OUTPATIENT
Start: 2017-12-01 | End: 2017-12-01

## 2017-12-01 RX ORDER — PROPOFOL 10 MG/ML
INJECTION, EMULSION INTRAVENOUS CONTINUOUS PRN
Status: DISCONTINUED | OUTPATIENT
Start: 2017-12-01 | End: 2017-12-01

## 2017-12-01 RX ORDER — HYDROMORPHONE HYDROCHLORIDE 1 MG/ML
.3-.5 INJECTION, SOLUTION INTRAMUSCULAR; INTRAVENOUS; SUBCUTANEOUS
Status: DISCONTINUED | OUTPATIENT
Start: 2017-12-01 | End: 2017-12-04 | Stop reason: HOSPADM

## 2017-12-01 RX ORDER — TOLTERODINE 4 MG/1
4 CAPSULE, EXTENDED RELEASE ORAL DAILY
Status: DISCONTINUED | OUTPATIENT
Start: 2017-12-01 | End: 2017-12-04 | Stop reason: HOSPADM

## 2017-12-01 RX ORDER — NEOSTIGMINE METHYLSULFATE 1 MG/ML
VIAL (ML) INJECTION PRN
Status: DISCONTINUED | OUTPATIENT
Start: 2017-12-01 | End: 2017-12-01

## 2017-12-01 RX ORDER — ACETAMINOPHEN 325 MG/1
650 TABLET ORAL EVERY 4 HOURS PRN
Status: DISCONTINUED | OUTPATIENT
Start: 2017-12-04 | End: 2017-12-01

## 2017-12-01 RX ORDER — PROPOFOL 10 MG/ML
INJECTION, EMULSION INTRAVENOUS PRN
Status: DISCONTINUED | OUTPATIENT
Start: 2017-12-01 | End: 2017-12-01

## 2017-12-01 RX ORDER — ONDANSETRON 4 MG/1
4 TABLET, ORALLY DISINTEGRATING ORAL EVERY 6 HOURS PRN
Status: DISCONTINUED | OUTPATIENT
Start: 2017-12-01 | End: 2017-12-04 | Stop reason: HOSPADM

## 2017-12-01 RX ORDER — HYDRALAZINE HYDROCHLORIDE 20 MG/ML
2.5-5 INJECTION INTRAMUSCULAR; INTRAVENOUS EVERY 10 MIN PRN
Status: DISCONTINUED | OUTPATIENT
Start: 2017-12-01 | End: 2017-12-04 | Stop reason: HOSPADM

## 2017-12-01 RX ORDER — CALCIUM CARBONATE 500 MG/1
1000 TABLET, CHEWABLE ORAL 4 TIMES DAILY PRN
Status: DISCONTINUED | OUTPATIENT
Start: 2017-12-01 | End: 2017-12-04 | Stop reason: HOSPADM

## 2017-12-01 RX ORDER — ONDANSETRON 2 MG/ML
4 INJECTION INTRAMUSCULAR; INTRAVENOUS EVERY 6 HOURS PRN
Status: DISCONTINUED | OUTPATIENT
Start: 2017-12-01 | End: 2017-12-04 | Stop reason: HOSPADM

## 2017-12-01 RX ORDER — DEXAMETHASONE SODIUM PHOSPHATE 4 MG/ML
INJECTION, SOLUTION INTRA-ARTICULAR; INTRALESIONAL; INTRAMUSCULAR; INTRAVENOUS; SOFT TISSUE PRN
Status: DISCONTINUED | OUTPATIENT
Start: 2017-12-01 | End: 2017-12-01

## 2017-12-01 RX ORDER — GLYCOPYRROLATE 0.2 MG/ML
INJECTION, SOLUTION INTRAMUSCULAR; INTRAVENOUS PRN
Status: DISCONTINUED | OUTPATIENT
Start: 2017-12-01 | End: 2017-12-01

## 2017-12-01 RX ORDER — HYDROCODONE BITARTRATE AND ACETAMINOPHEN 5; 325 MG/1; MG/1
1-2 TABLET ORAL EVERY 4 HOURS PRN
Status: DISCONTINUED | OUTPATIENT
Start: 2017-12-01 | End: 2017-12-03

## 2017-12-01 RX ORDER — ONDANSETRON 4 MG/1
4 TABLET, ORALLY DISINTEGRATING ORAL EVERY 30 MIN PRN
Status: DISCONTINUED | OUTPATIENT
Start: 2017-12-01 | End: 2017-12-01

## 2017-12-01 RX ORDER — POLYETHYLENE GLYCOL 3350 17 G/17G
1 POWDER, FOR SOLUTION ORAL DAILY
Qty: 510 G | Refills: 1 | COMMUNITY
Start: 2017-12-01 | End: 2018-01-11

## 2017-12-01 RX ORDER — OXYCODONE HYDROCHLORIDE 5 MG/1
5-10 TABLET ORAL EVERY 4 HOURS PRN
Status: DISCONTINUED | OUTPATIENT
Start: 2017-12-01 | End: 2017-12-01

## 2017-12-01 RX ORDER — ONDANSETRON 2 MG/ML
INJECTION INTRAMUSCULAR; INTRAVENOUS PRN
Status: DISCONTINUED | OUTPATIENT
Start: 2017-12-01 | End: 2017-12-01

## 2017-12-01 RX ORDER — FLUTICASONE PROPIONATE 50 MCG
1-2 SPRAY, SUSPENSION (ML) NASAL DAILY
Status: CANCELLED | OUTPATIENT
Start: 2017-12-01

## 2017-12-01 RX ORDER — SODIUM CHLORIDE, SODIUM LACTATE, POTASSIUM CHLORIDE, CALCIUM CHLORIDE 600; 310; 30; 20 MG/100ML; MG/100ML; MG/100ML; MG/100ML
INJECTION, SOLUTION INTRAVENOUS CONTINUOUS
Status: DISCONTINUED | OUTPATIENT
Start: 2017-12-01 | End: 2017-12-01 | Stop reason: HOSPADM

## 2017-12-01 RX ORDER — SODIUM CHLORIDE 9 MG/ML
INJECTION, SOLUTION INTRAVENOUS CONTINUOUS
Status: DISCONTINUED | OUTPATIENT
Start: 2017-12-01 | End: 2017-12-04 | Stop reason: HOSPADM

## 2017-12-01 RX ORDER — ONDANSETRON 2 MG/ML
4 INJECTION INTRAMUSCULAR; INTRAVENOUS EVERY 30 MIN PRN
Status: DISCONTINUED | OUTPATIENT
Start: 2017-12-01 | End: 2017-12-01

## 2017-12-01 RX ORDER — ALPRAZOLAM 0.5 MG
0.5 TABLET ORAL 3 TIMES DAILY PRN
Status: DISCONTINUED | OUTPATIENT
Start: 2017-12-02 | End: 2017-12-03

## 2017-12-01 RX ORDER — NEOMYCIN/BACITRACIN/POLYMYXINB 3.5-400-5K
OINTMENT (GRAM) TOPICAL
Status: DISCONTINUED | OUTPATIENT
Start: 2017-12-01 | End: 2017-12-04 | Stop reason: HOSPADM

## 2017-12-01 RX ORDER — LISINOPRIL 10 MG/1
10 TABLET ORAL DAILY
Status: DISCONTINUED | OUTPATIENT
Start: 2017-12-02 | End: 2017-12-04 | Stop reason: HOSPADM

## 2017-12-01 RX ORDER — LISINOPRIL 10 MG/1
10 TABLET ORAL DAILY
Status: CANCELLED | OUTPATIENT
Start: 2017-12-02

## 2017-12-01 RX ORDER — SODIUM CHLORIDE, SODIUM LACTATE, POTASSIUM CHLORIDE, CALCIUM CHLORIDE 600; 310; 30; 20 MG/100ML; MG/100ML; MG/100ML; MG/100ML
INJECTION, SOLUTION INTRAVENOUS CONTINUOUS
Status: DISCONTINUED | OUTPATIENT
Start: 2017-12-01 | End: 2017-12-04 | Stop reason: HOSPADM

## 2017-12-01 RX ORDER — AMOXICILLIN 250 MG
1-2 CAPSULE ORAL 2 TIMES DAILY
Status: DISCONTINUED | OUTPATIENT
Start: 2017-12-01 | End: 2017-12-04 | Stop reason: HOSPADM

## 2017-12-01 RX ORDER — FENTANYL CITRATE 50 UG/ML
25-50 INJECTION, SOLUTION INTRAMUSCULAR; INTRAVENOUS
Status: DISCONTINUED | OUTPATIENT
Start: 2017-12-01 | End: 2017-12-01

## 2017-12-01 RX ORDER — NALOXONE HYDROCHLORIDE 0.4 MG/ML
.1-.4 INJECTION, SOLUTION INTRAMUSCULAR; INTRAVENOUS; SUBCUTANEOUS
Status: DISCONTINUED | OUTPATIENT
Start: 2017-12-01 | End: 2017-12-04 | Stop reason: HOSPADM

## 2017-12-01 RX ORDER — HYDROCODONE BITARTRATE AND ACETAMINOPHEN 5; 325 MG/1; MG/1
1-2 TABLET ORAL EVERY 4 HOURS PRN
Qty: 30 TABLET | Refills: 0 | Status: SHIPPED | OUTPATIENT
Start: 2017-12-01 | End: 2018-01-11

## 2017-12-01 RX ADMIN — SENNOSIDES AND DOCUSATE SODIUM 1 TABLET: 8.6; 5 TABLET ORAL at 23:41

## 2017-12-01 RX ADMIN — SODIUM CHLORIDE, POTASSIUM CHLORIDE, SODIUM LACTATE AND CALCIUM CHLORIDE: 600; 310; 30; 20 INJECTION, SOLUTION INTRAVENOUS at 08:09

## 2017-12-01 RX ADMIN — ROCURONIUM BROMIDE 10 MG: 10 INJECTION INTRAVENOUS at 11:04

## 2017-12-01 RX ADMIN — ROCURONIUM BROMIDE 40 MG: 10 INJECTION INTRAVENOUS at 07:39

## 2017-12-01 RX ADMIN — KETAMINE HCL-NACL SOLN PREF SY 50 MG/5ML-0.9% (10MG/ML) 50 MG: 10 SOLUTION PREFILLED SYRINGE at 08:23

## 2017-12-01 RX ADMIN — ROCURONIUM BROMIDE 10 MG: 10 INJECTION INTRAVENOUS at 09:42

## 2017-12-01 RX ADMIN — ROCURONIUM BROMIDE 10 MG: 10 INJECTION INTRAVENOUS at 08:33

## 2017-12-01 RX ADMIN — SODIUM CHLORIDE, POTASSIUM CHLORIDE, SODIUM LACTATE AND CALCIUM CHLORIDE: 600; 310; 30; 20 INJECTION, SOLUTION INTRAVENOUS at 07:33

## 2017-12-01 RX ADMIN — KETOROLAC TROMETHAMINE 15 MG: 15 INJECTION, SOLUTION INTRAMUSCULAR; INTRAVENOUS at 16:02

## 2017-12-01 RX ADMIN — ACETAMINOPHEN 975 MG: 325 TABLET ORAL at 17:03

## 2017-12-01 RX ADMIN — ROCURONIUM BROMIDE 10 MG: 10 INJECTION INTRAVENOUS at 07:50

## 2017-12-01 RX ADMIN — TOLTERODINE 4 MG: 4 CAPSULE, EXTENDED RELEASE ORAL at 17:06

## 2017-12-01 RX ADMIN — ROCURONIUM BROMIDE 10 MG: 10 INJECTION INTRAVENOUS at 10:37

## 2017-12-01 RX ADMIN — HYDROMORPHONE HYDROCHLORIDE 1 MG: 1 INJECTION, SOLUTION INTRAMUSCULAR; INTRAVENOUS; SUBCUTANEOUS at 07:55

## 2017-12-01 RX ADMIN — KETOROLAC TROMETHAMINE 15 MG: 15 INJECTION, SOLUTION INTRAMUSCULAR; INTRAVENOUS at 23:41

## 2017-12-01 RX ADMIN — Medication 0.3 MG: at 20:40

## 2017-12-01 RX ADMIN — CEFAZOLIN SODIUM 2 G: 2 INJECTION, SOLUTION INTRAVENOUS at 07:33

## 2017-12-01 RX ADMIN — BUPIVACAINE HYDROCHLORIDE 30 ML: 2.5 INJECTION, SOLUTION INFILTRATION; PERINEURAL at 12:20

## 2017-12-01 RX ADMIN — SODIUM CHLORIDE, POTASSIUM CHLORIDE, SODIUM LACTATE AND CALCIUM CHLORIDE: 600; 310; 30; 20 INJECTION, SOLUTION INTRAVENOUS at 13:02

## 2017-12-01 RX ADMIN — ROCURONIUM BROMIDE 10 MG: 10 INJECTION INTRAVENOUS at 11:00

## 2017-12-01 RX ADMIN — GLYCOPYRROLATE 0.4 MG: 0.2 INJECTION, SOLUTION INTRAMUSCULAR; INTRAVENOUS at 12:28

## 2017-12-01 RX ADMIN — Medication 3 MG: at 12:28

## 2017-12-01 RX ADMIN — MIDAZOLAM HYDROCHLORIDE 2 MG: 1 INJECTION, SOLUTION INTRAMUSCULAR; INTRAVENOUS at 07:33

## 2017-12-01 RX ADMIN — DEXAMETHASONE SODIUM PHOSPHATE 4 MG: 4 INJECTION, SOLUTION INTRA-ARTICULAR; INTRALESIONAL; INTRAMUSCULAR; INTRAVENOUS; SOFT TISSUE at 07:39

## 2017-12-01 RX ADMIN — LIDOCAINE HYDROCHLORIDE 50 MG: 10 INJECTION, SOLUTION INFILTRATION; PERINEURAL at 07:39

## 2017-12-01 RX ADMIN — ONDANSETRON 4 MG: 2 INJECTION INTRAMUSCULAR; INTRAVENOUS at 07:39

## 2017-12-01 RX ADMIN — ONDANSETRON 4 MG: 2 INJECTION INTRAMUSCULAR; INTRAVENOUS at 20:36

## 2017-12-01 RX ADMIN — PROPOFOL 200 MG: 10 INJECTION, EMULSION INTRAVENOUS at 07:39

## 2017-12-01 RX ADMIN — SODIUM CHLORIDE: 9 INJECTION, SOLUTION INTRAVENOUS at 16:06

## 2017-12-01 RX ADMIN — GLYCOPYRROLATE 0.2 MG: 0.2 INJECTION, SOLUTION INTRAMUSCULAR; INTRAVENOUS at 07:39

## 2017-12-01 RX ADMIN — PROPOFOL 50 MCG/KG/MIN: 10 INJECTION, EMULSION INTRAVENOUS at 07:50

## 2017-12-01 RX ADMIN — FENTANYL CITRATE 100 MCG: 50 INJECTION, SOLUTION INTRAMUSCULAR; INTRAVENOUS at 07:39

## 2017-12-01 ASSESSMENT — ACTIVITIES OF DAILY LIVING (ADL)
ADLS_ACUITY_SCORE: 11
ADLS_ACUITY_SCORE: 10

## 2017-12-01 ASSESSMENT — ENCOUNTER SYMPTOMS
STRIDOR: 0
DYSRHYTHMIAS: 0
SEIZURES: 0

## 2017-12-01 ASSESSMENT — COPD QUESTIONNAIRES: COPD: 0

## 2017-12-01 ASSESSMENT — LIFESTYLE VARIABLES: TOBACCO_USE: 1

## 2017-12-01 ASSESSMENT — PAIN DESCRIPTION - DESCRIPTORS: DESCRIPTORS: STABBING

## 2017-12-01 NOTE — PROGRESS NOTES
SPIRITUAL HEALTH SERVICES Progress Note  [facility] introduced me to his sister Noel. She indicated she attends [employer]. Juwan attended [employer]. We prayed according to Juwan's expressed concerns.   remains available for patient and family as needed.          Asad Torres  Chaplain Resident  [phone]

## 2017-12-01 NOTE — OP NOTE
DATE OF SURGERY 12/01/17  PRE-PROCEDURE DIAGNOSIS: Prostate cancer   POST-PROCEDURE DIAGNOSIS: Prostate cancer   PROCEDURES PERFORMED:   1. Robotic-assisted laparoscopic radical prostatectomy  2. Bilateral pelvic lymph node dissection   SURGEON: Paulo Agarwal MD  ASSISTANTS: Meg Villarreal MD  ANESTHESIA: General endotracheal  SPECIMENS: Prostate, seminal vesicles, bilateral pelvic lymph nodes   DRAINS/TUBES: 20 Libyan Breen catheter (15 mL in balloon), 15 Libyan Jorge drain  EBL: 150 mL  INDICATIONS FOR PROCEDURE:Juwan Latham is an 66 year old year old male with German 3+4 prostate cancer. Pre-operative prostate size was 35.6 grams by TRUS and PSA was 19.6. After a discussion of all risks, benefits, and alternatives, including radiation therapy, the patient elected to proceed with robotic prostatectomy.   DESCRIPTION OF PROCEDURE: After informed consent was obtained, the patient was brought into the operating room, properly identified and placed in the supine position on the table. General anesthesia was induced, endotracheal intubation established, and IV antibiotics administered. All pressure points were carefully padded and he was secured to the table with velcro and a pink pad. A tilt test was performed.   Once the positioning was completed, we proceeded with shaving, prepping and draping the abdomen in the usual sterile fashion. A time out was performed to confirm the correct patient and procedure. A 16 Libyan Breen catheter was placed in the bladder in a sterile manner on the field.  An 12 mm  incision was made at the edge of the umbilius, and a Veress needle introduced into the abdomen through this incision. Once the abdomen was accessed, positioning was confirmed using a saline drop test, and the abdomen was insufflated with low opening pressures. Once the abdomen was completely insufflated, additional 8 mm robotic ports, 2 on the right and 1 on the left, were placed and a left-sided  assistant port lateral to the robot arm port and main camera were placed under vision. The patient was then placed in steep Trendelenburg, and the robot was docked.  We made a transverse incision posterior to the seminal vesicles and mobilized the rectum away from the posterior aspect of the prostate. The vas deferens on either side were identified and divided with electrocautery and the seminal vesicles were carefully dissected out. Once the seminal vesicles and vas deferens were disshected all the way down to the prostate and Denonvillier's fascia was incised, attention was turned to bringing the bladder down off the anterior wall of the abdomen. Incisions were made parallel to the urachus on either side and carried all the way up to the umbilicus and the urachus was divided horizontally. We then dropped down the bladder from the anterior abdominal wall and the rest of the bladder was mobilized away from the pelvic sidewalls on both sides just medial to the internal inguinal rings. The endopelvic fascia was identified and incised on both sides.   We then transversely incised the anterior bladder neck at the base of the prostate until we were able to incise and identify the Breen catheter. The catheter was then pulled out through this opening in the urethra to provide upward traction on the prostate. The posterior bladder neck was then divided. The prostatic pedicles were then divided using absorbable clips and the neurovascular bundles were then  off the left all the way to the apex and also  posteriorly off the prostate.   The dorsal vein complex was then sharply divided and oversewn using a 3-0 running V-Loc suture. We then divided the urethra distal to the prostatic apex after carefully dissecting out the apex and taking care to ensure that there was no capsular incision. The periurethral fibers were then divided as well including the rectourethralis muscles. Once the prostate was completely  resected, it was set aside in a 10 mm Endocatch bag. We then carefully inspected for and confirmed hemostasis.   We then performed a pelvic lymph node dissection removing justin tissue between the external iliac vein, the pelvic floor, and the inguinal ligament. The obturator nerves were identified and protected bilaterally. We determined that bladder neck reconstruction was not required. We completed our vesicourethral anastomosis using two 2-0 Monocryl sutures with UR-6 needles, the tail ends of which had been tied together. Both sutures were first placed at the 6 o'clock position through the bladder neck and taken through the posterior urethral plate and up the left side of the urethra and bladder to the 11 o'clock position. We then brought the right sided anastomotic suture similarly to the 1 o'clock position on the urthra where it was then reverse thrown through to the bladder side to tie the suture across the anastomosis to the opposing stitch.   A 20 Honduran final Breen catheter was then placed and the bladder was irrigated. 15 cc of sterile water were used to inflate the balloon. We tested the bladder by instilling 120 cc of saline. There was no clear evidence of leak.   A 15 Honduran Jorge drain was placed in the pelvis through the left robotic port and secured to the skin with 3-0 Nylon. We then undocked the robot and removed all the ports. The camera port incision was enlarged circumferentially around the umbilicus and the specimen retrieved through this incision and passed off the field. The fascia was reapproximated using  0 Vicryl sutures on a UR-6 needle. The skin of was closed using an interrupted subcuticular stitch with 4-0 Monocryl and Dermabond. 0.25% bupivicaine was injected for local analgesia.     The patient tolerated the procedure well.

## 2017-12-01 NOTE — IP AVS SNAPSHOT
[place] [address] [facility]    [address]    [facility] [address]    Phone:  [phone]    Fax:  [phone]                                       After Visit Summary   12/1/2017    Juwan Latham    MRN: [id number]           After Visit Summary Signature Page     I have received my discharge instructions, and my questions have been answered. I have discussed any challenges I see with this plan with the nurse or doctor.    ..........................................................................................................................................  Patient/Patient Representative Signature      ..........................................................................................................................................  Patient Representative Print Name and Relationship to Patient    ..................................................               ................................................  Date                                            Time    ..........................................................................................................................................  Reviewed by Signature/Title    ...................................................              ..............................................  Date                                                            Time

## 2017-12-01 NOTE — IP AVS SNAPSHOT
MRN:[id number]                      After Visit Summary   12/1/2017    Juwan Latham    MRN: [id number]           Thank you!     Thank you for choosing [facility] for your care. Our goal is always to provide you with excellent care. Hearing back from our patients is one way we can continue to improve our services. Please take a few minutes to complete the written survey that you may receive in the mail after you visit. If you would like to speak to someone directly about your visit please contact Patient Relations at [phone]. Thank you!          Patient Information     Date Of Birth          1950        Designated Caregiver       Most Recent Value    Caregiver    Will someone help with your care after discharge? no      About your hospital stay     You were admitted on:  December 1, 2017 You last received care in the:  [place] [address] [facility]    You were discharged on:  December 4, 2017        Reason for your hospital stay       You had surgery to remove your prostate for prostate cancer.                  Who to Call     For medical emergencies, please call 911.  For non-urgent questions about your medical care, please call your primary care provider or clinic, [phone]  For questions related to your surgery, please call your surgery clinic        Attending Provider     Provider Specialty    Weight, Paulo Wiley MD Urology       Primary Care Provider Office Phone # Fax #    Julio Guidry Jr., [phone]       When to contact your care team       - Call or return sooner than your regularly scheduled visit if you develop any of the following: fever (greater than 101.5), uncontrolled pain, uncontrolled nausea or vomiting, as well as increased redness, swelling, or drainage from your wound.                  After Care Instructions     Activity       Activity  - No strenuous exercise for 6 weeks.  - No lifting, pushing, pulling more  than 10 pounds for 6 weeks.   - Do not strain with bowel movements.  - Do not drive until you can press the brake pedal quickly and fully without pain.   - Do not operate a motor vehicle while taking narcotic pain medications.            Diet       Follow this diet upon discharge: Regular diet.            Discharge Instructions       Medications  1. PAIN:   - Use over the counter acetaminophen (tylenol) or ibuprofen (motrin) for pain, follow the instructions on the bottle.   - do not take more than 4,000mg of Tylenol (acetaminophen) from all sources in any 24 hour period.  - Do not take more than 3200 mg of ibuprofen (motrin) from all sources in any 24 hour period.  - hydrocodone-acetaminophen 5-325mg - 30 pills are prescribed for your breakthrough postoperative pain. Use this as second line if acetaminophen or ibuprofen are ineffective.   - Transition from narcotic pain medications to tylenol (acetaminophen) as you are able.  Wean yourself off all pain medications as you are able.  - Narcotics can make you constipated.  Take over the counter fiber (metamucil or benefiber) and stool softeners (miralax, docusate or senna) while taking narcotic pain medications, but stop if you develop diarrhea.  - No driving or operating machinery while taking narcotic pain medications   2. Antibiotic: none            Tubes and drains       URINATION  - Blood tinged urine is expected after urologic procedures.   - Drink plenty of fluids to keep urine clear.  - If you were discharged with a catheter, keep it secured to your leg at all times. If catheter becomes clogged, call the numbers below immediately.            Wound care and dressings       Incisions  - You may shower and get incisions wet starting 48 hrs after surgery.  - Do not scrub incisions or submerge wounds for 2 weeks or until seen in follow-up.   - Remove wound dressing 48 hours after surgery.   - If purple dermabond glue was used, avoid applying any lotions or  ointments.   - If steri-strips were used, they will fall off on their own.   - Leave incision open to air. Cover with gauze only if needed for comfort or to protect clothing from drainage.   - The stitches do not need to be removed, they will dissolve on their own.                  Follow-up Appointments     Follow-up and recommended labs and tests        Follow-up with Dr. Agarwal for catheter removal in 1 week as scheduled.                  Your next 10 appointments already scheduled     Dec 13, 2017  1:30 PM CST   Return Visit with Paulo Agarwal MD   [facility] ([facility])    [facility]  [address]  [facility] [address]   [phone]            Jan 29, 2018  9:00 AM CST   Return Sleep Patient with Owen Davis MD   [facility] ([facility]    [address]  [facility] [address]   [phone]              Pending Results     Date and Time Order Name Status Description    12/1/2017 1012 Surgical pathology exam In process             Statement of Approval     Ordered          12/04/17 0553  I have reviewed and agree with all the recommendations and orders detailed in this document.  EFFECTIVE NOW     Approved and electronically signed by:  Meg Villarreal MD           12/03/17 0906  I have reviewed and agree with all the recommendations and orders detailed in this document.  EFFECTIVE NOW     Approved and electronically signed by:  Nicolás Blackwell MD           12/02/17 1503  I have reviewed and agree with all the recommendations and orders detailed in this document.  EFFECTIVE NOW     Approved and electronically signed by:  Nicolás Blackwell MD           12/02/17 1438  I have reviewed and agree with all the recommendations and orders detailed in this document.  EFFECTIVE NOW     Approved and electronically signed by:  Nicolás Blackwell MD   "         12/02/17 0942  I have reviewed and agree with all the recommendations and orders detailed in this document.  EFFECTIVE NOW     Approved and electronically signed by:  Nicolás Blackwell MD             Admission Information     Date & Time Provider Department Dept. Phone    12/1/2017 Weight, Paulo Wiley MD [place] [address] [facility] [phone]      Your Vitals Were     Blood Pressure Pulse Temperature Respirations Height Weight    125/78 (BP Location: Left arm) 96 98.3  F (36.8  C) (Oral) 15 1.727 m (5' 8\") 89.8 kg (198 lb)    Pulse Oximetry BMI (Body Mass Index)                95% 30.11 kg/m2          MyChart Information     [employer] gives you secure access to your electronic health record. If you see a primary care provider, you can also send messages to your care team and make appointments. If you have questions, please call your primary care clinic.  If you do not have a primary care provider, please call [phone] and they will assist you.        Care EveryWhere ID     This is your Care EveryWhere ID. This could be used by other organizations to access your [place] medical records  [id number]        Equal Access to Services     CHAMP SNIDER : Hadii carolyn Obrien, ekaterina reyes, qaobdulia pimentel, mynor mosley . So [facility] [phone].    ATENCIÓN: Si habla español, tiene a gunn disposición servicios gratuitos de asistencia lingüística. Caitliname al [phone].    We comply with applicable federal civil rights laws and [place] laws. We do not discriminate on the basis of race, color, national origin, age, disability, sex, sexual orientation, or gender identity.               Review of your medicines      START taking        Dose / Directions    HYDROcodone-acetaminophen 5-325 MG per tablet   Commonly known as:  NORCO   Used for:  Malignant neoplasm of prostate (H)        Dose:  1-2 tablet   Take 1-2 tablets by mouth every 4 hours as needed " for moderate to severe pain maximum 6 tablet(s) per day   Quantity:  30 tablet   Refills:  0       ibuprofen 400 MG tablet   Commonly known as:  ADVIL/MOTRIN   Used for:  Malignant neoplasm of prostate (H)        Dose:  400 mg   Take 1 tablet (400 mg) by mouth every 8 hours   Quantity:  30 tablet   Refills:  0       ondansetron 4 MG tablet   Commonly known as:  ZOFRAN   Used for:  Malignant neoplasm of prostate (H)        Dose:  4 mg   Take 1 tablet (4 mg) by mouth every 12 hours as needed for nausea   Quantity:  20 tablet   Refills:  0       polyethylene glycol powder   Commonly known as:  MIRALAX   Used for:  Malignant neoplasm of prostate (H)        Dose:  1 capful   Take 17 g (1 capful) by mouth daily   Quantity:  510 g   Refills:  1       sulfamethoxazole-trimethoprim 400-80 MG per tablet   Commonly known as:  BACTRIM/SEPTRA   Used for:  Malignant neoplasm of prostate (H)        Dose:  1 tablet   Take 1 tablet by mouth daily   Quantity:  14 tablet   Refills:  0       traMADol 50 MG tablet   Commonly known as:  ULTRAM   Used for:  Malignant neoplasm of prostate (H)        Dose:  50 mg   Take 1 tablet (50 mg) by mouth every 8 hours as needed for pain maximum 4 tablet(s) per day   Quantity:  20 tablet   Refills:  0         CONTINUE these medicines which have NOT CHANGED        Dose / Directions    acetaminophen 325 MG tablet   Commonly known as:  TYLENOL   Used for:  Lung nodule        Dose:  650 mg   Take 2 tablets (650 mg) by mouth every 6 hours Do not take more than 4,000 mg of acetaminophen (Tylenol) from all sources to prevent liver damage.   Quantity:  100 tablet   Refills:  1       ALFALFA PO        Take by mouth daily   Refills:  0       ALPRAZolam 0.5 MG tablet   Commonly known as:  XANAX   Used for:  Anxiety        Dose:  0.5 mg   Take 1 tablet (0.5 mg) by mouth 3 times daily as needed for anxiety   Quantity:  20 tablet   Refills:  0       cyanocolbalamin 100 MCG tablet   Commonly known as:  vitamin   B-12        Dose:  50 mcg   Take 50 mcg by mouth daily   Refills:  0       fluticasone 50 MCG/ACT spray   Commonly known as:  FLONASE   Used for:  Chronic rhinitis, unspecified type        Dose:  1-2 spray   Spray 1-2 sprays into both nostrils daily   Quantity:  1 Bottle   Refills:  11       LISINOPRIL PO        Dose:  10 mg   Take 10 mg by mouth daily   Refills:  0       order for DME        Equipment ordered: RESMED Auto PAP Mask type: Nasal Settings: 8-16 CM H20  : CPAP   Refills:  0       SUPER B COMPLEX PO        Dose:  1 tablet   Take 1 tablet by mouth daily   Refills:  0       VITAMIN D (CHOLECALCIFEROL) PO        Dose:  1000 Units   Take 1,000 Units by mouth daily   Refills:  0            Where to get your medicines      These medications were sent to [facility] [address] [address] [address]     Phone:  [phone]     ibuprofen 400 MG tablet    ondansetron 4 MG tablet    sulfamethoxazole-trimethoprim 400-80 MG per tablet         Some of these will need a paper prescription and others can be bought over the counter. Ask your nurse if you have questions.     Bring a paper prescription for each of these medications     HYDROcodone-acetaminophen 5-325 MG per tablet    traMADol 50 MG tablet       You don't need a prescription for these medications     polyethylene glycol powder               ANTIBIOTIC INSTRUCTION     You've Been Prescribed an Antibiotic - Now What?  Your healthcare team thinks that you or your loved one might have an infection. Some infections can be treated with antibiotics, which are powerful, life-saving drugs. Like all medications, antibiotics have side effects and should only be used when necessary. There are some important things you should know about your antibiotic treatment.      Your healthcare team may run tests before you start taking an antibiotic.    Your team may take samples (e.g., from your blood, urine  or other areas) to run tests to look for bacteria. These test can be important to determine if you need an antibiotic at all and, if you do, which antibiotic will work best.      Within a few days, your healthcare team might change or even stop your antibiotic.    Your team may start you on an antibiotic while they are working to find out what is making you sick.    Your team might change your antibiotic because test results show that a different antibiotic would be better to treat your infection.    In some cases, once your team has more information, they learn that you do not need an antibiotic at all. They may find out that you don't have an infection, or that the antibiotic you're taking won't work against your infection. For example, an infection caused by a virus can't be treated with antibiotics. Staying on an antibiotic when you don't need it is more likely to be harmful than helpful.      You may experience side effects from your antibiotic.    Like all medications, antibiotics have side effects. Some of these can be serious.    Let you healthcare team know if you have any known allergies when you are admitted to the hospital.    One significant side effect of nearly all antibiotics is the risk of severe and sometimes deadly diarrhea caused by Clostridium difficile (C. Difficile). This occurs when a person takes antibiotics because some good germs are destroyed. Antibiotic use allows C. diificile to take over, putting patients at high risk for this serious infection.    As a patient or caregiver, it is important to understand your or your loved one's antibiotic treatment. It is especially important for caregivers to speak up when patients can't speak for themselves. Here are some important questions to ask your healthcare team.    What infection is this antibiotic treating and how do you know I have that infection?    What side effects might occur from this antibiotic?    How long will I need to take this  antibiotic?    Is it safe to take this antibiotic with other medications or supplements (e.g., vitamins) that I am taking?     Are there any special directions I need to know about taking this antibiotic? For example, should I take it with food?    How will I be monitored to know whether my infection is responding to the antibiotic?    What tests may help to make sure the right antibiotic is prescribed for me?      Information provided by:  www.cdc.gov/getsmart  U.S. Department of Health and Human Services  Centers for disease Control and Prevention  National Center for Emerging and Zoonotic Infectious Diseases  Division of Healthcare Quality Promotion         Protect others around you: Learn how to safely use, store and throw away your medicines at www.disposemymeds.org.             Medication List: This is a list of all your medications and when to take them. Check marks below indicate your daily home schedule. Keep this list as a reference.      Medications           Morning Afternoon Evening Bedtime As Needed    acetaminophen 325 MG tablet   Commonly known as:  TYLENOL   Take 2 tablets (650 mg) by mouth every 6 hours Do not take more than 4,000 mg of acetaminophen (Tylenol) from all sources to prevent liver damage.   Last time this was given:  650 mg on 12/4/2017  3:12 AM   Next Dose Due:  Any time                                    ALFALFA PO   Take by mouth daily                                   ALPRAZolam 0.5 MG tablet   Commonly known as:  XANAX   Take 1 tablet (0.5 mg) by mouth 3 times daily as needed for anxiety                                   cyanocolbalamin 100 MCG tablet   Commonly known as:  vitamin  B-12   Take 50 mcg by mouth daily                                   fluticasone 50 MCG/ACT spray   Commonly known as:  FLONASE   Spray 1-2 sprays into both nostrils daily                                   HYDROcodone-acetaminophen 5-325 MG per tablet   Commonly known as:  NORCO   Take 1-2 tablets by  mouth every 4 hours as needed for moderate to severe pain maximum 6 tablet(s) per day   Last time this was given:  1 tablet on 12/3/2017  8:32 AM                                   ibuprofen 400 MG tablet   Commonly known as:  ADVIL/MOTRIN   Take 1 tablet (400 mg) by mouth every 8 hours   Last time this was given:  400 mg on 12/4/2017  8:28 AM   Next Dose Due:  4:30pm on 12/4                                         LISINOPRIL PO   Take 10 mg by mouth daily   Last time this was given:  10 mg on 12/4/2017  8:30 AM   Next Dose Due:  12/5, morning                                   ondansetron 4 MG tablet   Commonly known as:  ZOFRAN   Take 1 tablet (4 mg) by mouth every 12 hours as needed for nausea   Next Dose Due:  No earlier than 12/4, 9:30 pm                                   order for DME   Equipment ordered: RESMED Auto PAP Mask type: Nasal Settings: 8-16 CM H20  : CPAP                                polyethylene glycol powder   Commonly known as:  MIRALAX   Take 17 g (1 capful) by mouth daily                                   sulfamethoxazole-trimethoprim 400-80 MG per tablet   Commonly known as:  BACTRIM/SEPTRA   Take 1 tablet by mouth daily                                   SUPER B COMPLEX PO   Take 1 tablet by mouth daily                                   traMADol 50 MG tablet   Commonly known as:  ULTRAM   Take 1 tablet (50 mg) by mouth every 8 hours as needed for pain maximum 4 tablet(s) per day   Last time this was given:  50 mg on 12/3/2017  5:52 AM                                   VITAMIN D (CHOLECALCIFEROL) PO   Take 1,000 Units by mouth daily                                             More Information        Discharge Instructions for Radical Prostatectomy  You had a procedure called radical prostatectomy (removal of the entire prostate and surrounding tissues). This sheet will help you know what to do following surgery.  Activity    Don t drive until your healthcare provider says it s OK. This is  usually after your catheter is removed and you are no longer taking pain medicine.    For the first 2 weeks after surgery, limit physical activity. This will allow your body to rest and heal.    Talk to your healthcare provider before going back to your normal activity level.    Don t lift anything heavier than 10 pounds until your healthcare provider says it s OK.    Avoid long car rides.    Avoid climbing stairs and strenuous exercise. Don t mow the lawn or use a vacuum .    Take naps if you feel tired.  Home care    Avoid constipation:    Eat fruits, vegetables, and whole grains.    Unless directed otherwise, drink 6 glasses to 8 glasses of water a day (enough to keep your urine light colored). This will also help keep a healthy flow of urine.    Use a laxative or a stool softener if your healthcare provider says it s OK.    Take care of your catheter. Ask for an information sheet and training before leaving the hospital:    Keep the catheter well secured.    Use either leg bags or external (straight drainage) bags, or both.    Empty your bag when it s half full. You may notice some blood in the bag. This is normal after surgery and while the catheter is in place.    Use plain soap and water to wash the catheter and the head of your penis daily, or more often if needed.    Return to your normal diet.    Shower as usual.    Be sure to finish the antibiotics that your doctor prescribed.    Be sure to take pain medicine if needed and as prescribed.    Consider wearing sweat pants while you have the catheter. They may be more comfortable than other pants.  Follow-up  Make a follow-up appointment as directed.  When to call your healthcare provider  Call your healthcare provider right away if you have any of the following:    Fever above 100.4 F (38 C) or shaking chills    Heavy bleeding, clots, or bright red blood from the catheter    Catheter that falls out or stops draining    Foul-smelling discharge from  your catheter    Redness, swelling, warmth, or pain at your incision site    Drainage, pus, or bleeding from your incision    Trouble breathing    Hives or rash    Nausea and vomiting    Diarrhea   Date Last Reviewed: 1/1/2017 2000-2017 [employer]. [address] [place] [address]. All rights reserved. This information is not intended as a substitute for professional medical care. Always follow your healthcare professional's instructions.                Discharge Instructions: Caring for Your Indwelling Urinary Catheter  You have been discharged with an indwelling urinary catheter (also called a Breen catheter). A catheter is a thin, flexible tube. An indwelling urinary catheter has two parts. The first part is a tube that drains urine from your bladder. The second part is a bag or other device that collects the urine.  The most important thing to remember is that you want to prevent infection. Always wash your hands before handling your catheter bag or tubing.  Draining the bedside bag    Wash your hands with soap and clean, running water or use an alcohol-based hand  that contains at least 60% alcohol.    Hold the drainage tube over a toilet or measuring container.    Unclamp the tube and let the bag drain.    Don t touch the tip of the drainage tube or let it touch the toilet or container.  Cleaning the drainage tube    When the bag is empty, clean the tip of the drainage tube with an alcohol wipe.    Clamp the tube.    Reinsert the tube into the pocket on the drainage bag.  Cleaning your skin and tubing    Clean the skin near the catheter with soap and water.    Wash your genital area from front to back.    Wash the catheter tubing. Always wash the catheter in the direction away from your body.    You will be told when and how to change your bag and tubing.    Don t try to remove the catheter by yourself.    You may shower with the catheter in place.  Emptying a leg  bag    Wash your hands.    Remove the stopper on the bag.    Drain the bag into the toilet or a measuring container. Don t let the tip of the drainage tube touch anything, including your fingers.    Clean the tip of the drainage tube with alcohol.    Replace the stopper.  Follow-up care  Make a follow-up appointment as directed by your healthcare provider  When to call your healthcare provider  Call your healthcare provider right away if you have any of the following:    Chills or fever above 100.4 F (38 C)    Leakage around the catheter insertion site    Increased spasms (uncontrollable twitching) in your legs, abdomen, or bladder. Occasional mild spasms are normal.    Burning in the urinary tract, penis, or genital area    Nausea and vomiting    Aching in the lower back    Cloudy or bloody (pink or red) urine, sediment or mucus in the urine, or foul-smelling urine   Date Last Reviewed: 1/1/2017 2000-2017 [employer]. [address] [place] [address]. All rights reserved. This information is not intended as a substitute for professional medical care. Always follow your healthcare professional's instructions.                Discharge Instructions: Caring for Your Leg Bag  You are going home with a urinary catheter and collection device (drainage bag) in place. One type of collection device is called a leg bag. This is a smaller drainage bag that you can wear on your leg to collect urine during the day. The bag can fit under your clothing. You can move around with greater ease when using a leg bag instead of a larger collection bag.  You were shown how to care for your catheter in the hospital. This sheet will help you remember those steps when you are at home.  Home care    Wash your hands thoroughly before and after you care for your catheter or collection device.    Gather your supplies:    Alcohol wipes    Soap and water    Towel and washcloth    Leg strap and leg bag    Use soap and  water to wash the area where your catheter enters your body. Rinse well.    Secure the bag soriano to your leg:    Position the leg band high on your thigh with the product label pointing away from your leg.    Stretch the leg band in place and fasten.    Place the catheter tubing over the bag and secure it. You may secure it with a Velcro tab or other method, depending on the product you use. Be sure to leave enough loop in the catheter above the leg band to avoid pulling on the tube.    Every 4 to 6 hours, reposition the band to prevent pressure from the elastic on your leg. You can do this by changing the bag to the other leg or by raising or lowering the leg band.    Wash the band as frequently as needed. You can hand wash and dry the leg band.    Place the bag in the bag soriano.    Clean the urine bag end of the catheter and your catheter port with an alcohol wipe.    Place a towel under the bag and port to keep urine from dripping onto your leg.    Before connecting the outlet valve at the bottom of the bag to the catheter, make sure that it is firmly closed. Flip the valve upward toward the bag; it needs to snap firmly in place. Be sure not to tug on the tubing. Be gentle.    Attach the urine bag to the end of the catheter; insert the connector snugly into the catheter port. You can avoid dribbling urine by bending the catheter tubing just below the tip and holding it while you disconnect it from the catheter. Be careful to keep the tip clean while connecting the leg bag tubing to the catheter--this keeps germs from getting into the system.    Drain the bag when it is full. To drain the bag, flip the clamp downward. Direct the flexible outlet tube to control the flow of urine. You don t have to disconnect the leg bag from the catheter to empty it. Raise your leg up to the edge of the toilet to reach the leg bag. Then you can empty the bag directly into the toilet. This way, you won t need to bend over, which  may be uncomfortable.    Keep the leg bag clean. Rinse daily with equal parts water and vinegar to reduce odor and keep the bag free of germs.    Remember to keep the drainage bag below the level of your bladder for proper drainage.  Follow-up  Make a follow-up appointment as directed by your healthcare provider.  When to call your healthcare provider  Call your healthcare provider right away if you have any of the following:    Redness, swelling, or warmth around the catheter entry site    Pus draining from your catheter entry site or into the catheter tubing and bag    Blood, clots, or floating debris in the urine    Nausea and vomiting    Shaking chills    Fever above 100.4 F (38 C)    Pain that is not relieved by medicine     Catheter that falls out or is dislodged   Date Last Reviewed: 1/1/2017 2000-2017 [employer]. [address] [place], [place] [id number]. All rights reserved. This information is not intended as a substitute for professional medical care. Always follow your healthcare professional's instructions.                Polyethylene Glycol 3350 Oral solution  What is this medicine?  POLYETHYLENE GLYCOL 3350 (nahomy ee ETH i bhavana; GLYE col) powder is a laxative used to treat constipation. It increases the amount of water in the stool. Bowel movements become easier and more frequent.  This medicine may be used for other purposes; ask your health care provider or pharmacist if you have questions.  What should I tell my health care provider before I take this medicine?  They need to know if you have any of these conditions:    a history of blockage of the stomach or intestine    current abdomen distension or pain    difficulty swallowing    diverticulitis, ulcerative colitis, or other chronic bowel disease    phenylketonuria    an unusual or allergic reaction to polyethylene glycol, other medicines, dyes, or preservatives    pregnant or trying to get pregnant    breast-feeding  How should I  use this medicine?  Take this medicine by mouth. The bottle has a measuring cap that is marked with a line. Pour the powder into the cap up to the marked line (the dose is about 1 heaping tablespoon). Add the powder in the cap to a full glass (4 to 8 ounces or 120 to 240 ml) of water, juice, soda, coffee or tea. Mix the powder well. Drink the solution. Take exactly as directed. Do not take your medicine more often than directed.  Talk to your pediatrician regarding the use of this medicine in children. Special care may be needed.  Overdosage: If you think you have taken too much of this medicine contact a poison control center or emergency room at once.  NOTE: This medicine is only for you. Do not share this medicine with others.  What if I miss a dose?  If you miss a dose, take it as soon as you can. If it is almost time for your next dose, take only that dose. Do not take double or extra doses.  What may interact with this medicine?  Interactions are not expected.  This list may not describe all possible interactions. Give your health care provider a list of all the medicines, herbs, non-prescription drugs, or dietary supplements you use. Also tell them if you smoke, drink alcohol, or use illegal drugs. Some items may interact with your medicine.  What should I watch for while using this medicine?  Do not use for more than 2 weeks without advice from your doctor or health care professional. It can take 2 to 4 days to have a bowel movement and to experience improvement in constipation. See your health care professional for any changes in bowel habits, including constipation, that are severe or last longer than three weeks.  Always take this medicine with plenty of water.  What side effects may I notice from receiving this medicine?  Side effects that you should report to your doctor or health care professional as soon as possible:    diarrhea    difficulty breathing    itching of the skin, hives, or skin  rash    severe bloating, pain, or distension of the stomach    vomiting  Side effects that usually do not require medical attention (report to your doctor or health care professional if they continue or are bothersome):    bloating or gas    lower abdominal discomfort or cramps    nausea  This list may not describe all possible side effects. Call your doctor for medical advice about side effects. You may report side effects to FDA at 9-615-GYN-1083.  Where should I keep my medicine?  Keep out of the reach of children.  Store between 15 and 30 degrees C (59 and 86 degrees F). Throw away any unused medicine after the expiration date.  NOTE:This sheet is a summary. It may not cover all possible information. If you have questions about this medicine, talk to your doctor, pharmacist, or health care provider. Copyright  2016 [employer]                Patient Education    Hydrocodone Bitartrate, Acetaminophen Oral capsule    Hydrocodone Bitartrate, Acetaminophen Oral solution    Hydrocodone Bitartrate, Acetaminophen Oral tablet  Hydrocodone Bitartrate, Acetaminophen Oral tablet  What is this medicine?  ACETAMINOPHEN; HYDROCODONE (a set a FABRICIO ad fen; shruti droe KOE done) is a pain reliever. It is used to treat moderate to severe pain.  This medicine may be used for other purposes; ask your health care provider or pharmacist if you have questions.  What should I tell my health care provider before I take this medicine?  They need to know if you have any of these conditions:    brain tumor    Crohn's disease, inflammatory bowel disease, or ulcerative colitis    drug abuse or addiction    head injury    heart or circulation problems    if you often drink alcohol    kidney disease or problems going to the bathroom    liver disease    lung disease, asthma, or breathing problems    an unusual or allergic reaction to acetaminophen, hydrocodone, other opioid analgesics, other medicines, foods, dyes, or preservatives    pregnant  or trying to get pregnant    breast-feeding  How should I use this medicine?  Take this medicine by mouth. Swallow it with a full glass of water. Follow the directions on the prescription label. If the medicine upsets your stomach, take the medicine with food or milk. Do not take more than you are told to take.  Talk to your pediatrician regarding the use of this medicine in children. This medicine is not approved for use in children.  Patients over 65 years may have a stronger reaction and need a smaller dose.  Overdosage: If you think you have taken too much of this medicine contact a poison control center or emergency room at once.  NOTE: This medicine is only for you. Do not share this medicine with others.  What if I miss a dose?  If you miss a dose, take it as soon as you can. If it is almost time for your next dose, take only that dose. Do not take double or extra doses.  What may interact with this medicine?    alcohol    antihistamines    isoniazid    medicines for depression, anxiety, or psychotic disturbances    medicines for sleep    muscle relaxants    naltrexone    narcotic medicines (opiates) for pain    phenobarbital    ritonavir    tramadol  This list may not describe all possible interactions. Give your health care provider a list of all the medicines, herbs, non-prescription drugs, or dietary supplements you use. Also tell them if you smoke, drink alcohol, or use illegal drugs. Some items may interact with your medicine.  What should I watch for while using this medicine?  Tell your doctor or health care professional if your pain does not go away, if it gets worse, or if you have new or a different type of pain. You may develop tolerance to the medicine. Tolerance means that you will need a higher dose of the medicine for pain relief. Tolerance is normal and is expected if you take the medicine for a long time.  Do not suddenly stop taking your medicine because you may develop a severe reaction.  Your body becomes used to the medicine. This does NOT mean you are addicted. Addiction is a behavior related to getting and using a drug for a non-medical reason. If you have pain, you have a medical reason to take pain medicine. Your doctor will tell you how much medicine to take. If your doctor wants you to stop the medicine, the dose will be slowly lowered over time to avoid any side effects.  You may get drowsy or dizzy when you first start taking the medicine or change doses. Do not drive, use machinery, or do anything that may be dangerous until you know how the medicine affects you. Stand or sit up slowly.  There are different types of narcotic medicines (opiates) for pain. If you take more than one type at the same time, you may have more side effects. Give your health care provider a list of all medicines you use. Your doctor will tell you how much medicine to take. Do not take more medicine than directed. Call emergency for help if you have problems breathing.  The medicine will cause constipation. Try to have a bowel movement at least every 2 to 3 days. If you do not have a bowel movement for 3 days, call your doctor or health care professional.  Too much acetaminophen can be very dangerous. Do not take Tylenol (acetaminophen) or medicines that contain acetaminophen with this medicine. Many non-prescription medicines contain acetaminophen. Always read the labels carefully.  What side effects may I notice from receiving this medicine?  Side effects that you should report to your doctor or health care professional as soon as possible:    allergic reactions like skin rash, itching or hives, swelling of the face, lips, or tongue    breathing problems    confusion    feeling faint or lightheaded, falls    stomach pain    yellowing of the eyes or skin  Side effects that usually do not require medical attention (report to your doctor or health care professional if they continue or are bothersome):    nausea,  vomiting    stomach upset  This list may not describe all possible side effects. Call your doctor for medical advice about side effects. You may report side effects to FDA at 7-904-SAP-0513.  Where should I keep my medicine?  Keep out of the reach of children. This medicine can be abused. Keep your medicine in a safe place to protect it from theft. Do not share this medicine with anyone. Selling or giving away this medicine is dangerous and against the law.  Store at room temperature between 15 and 30 degrees C (59 and 86 degrees F). Protect from light. Keep container tightly closed.  Throw away any unused medicine after the expiration date. Discard unused medicine and used packaging carefully. Pets and children can be harmed if they find used or lost packages.  NOTE: This sheet is a summary. It may not cover all possible information. If you have questions about this medicine, talk to your doctor, pharmacist, or health care provider.  NOTE:This sheet is a summary. It may not cover all possible information. If you have questions about this medicine, talk to your doctor, pharmacist, or health care provider. Copyright  2016 [employer]

## 2017-12-01 NOTE — ANESTHESIA PREPROCEDURE EVALUATION
Anesthesia Evaluation     . Pt has had prior anesthetic. Type: General    No history of anesthetic complications          ROS/MED HX    ENT/Pulmonary:     (+)sleep apnea, allergic rhinitis, tobacco use, Past use uses CPAP , . .   (-) asthma, COPD and recent URI   Neurologic:     (+)other neuro cavernous hemangioma of brain-asymptomatic   (-) seizures and CVA   Cardiovascular:     (+) Dyslipidemia, hypertension----. : . . . :. . Previous cardiac testing date:results:date: results:ECG reviewed date:7/17 results:NSR date: results:         (-) CAD, arrhythmias and valvular problems/murmurs   METS/Exercise Tolerance:     Hematologic: Comments: Lab Test        11/30/17 07/13/17 03/14/17 03/05/17 03/04/17      --          09/11/12                       1027          1025          1414          0905          0859           --           1058          WBC           --          5.4          6.4           --          10.6           < >         --           HGB          16.2         16.1         15.3          --          15.2           < >         --           MCV           --          94           94            --          98             < >         --           PLT           --          154          258          142*         147*           < >         --           INR           --           --           --           --           --           --          1.03           < > = values in this interval not displayed.                  Lab Test        11/30/17 07/13/17 03/05/17                       1027          1025          0905          NA           136          138          138           POTASSIUM    4.2          4.0          3.8           CHLORIDE     103          105          106           CO2          29           27           28            BUN          11           9            14            CR           0.88         0.90         0.74          ANIONGAP     4            6            4             JOANNE           8.9          8.9          8.8           GLC          116*         115*         114*         - neg hematologic  ROS       Musculoskeletal:  - neg musculoskeletal ROS       GI/Hepatic:     (+) GERD Asymptomatic on medication, Other GI/Hepatic fatty liver     (-) hepatitis and liver disease   Renal/Genitourinary:     (+) chronic renal disease, type: CRI, Nephrolithiasis , Pt does not require dialysis, Pt has no history of transplant,       Endo:  - neg endo ROS    (-) Type I DM, Type II DM, thyroid disease, chronic steroid usage and obesity   Psychiatric:     (+) psychiatric history depression and anxiety      Infectious Disease:  - neg infectious disease ROS       Malignancy:   (+) Malignancy History of Prostate and Lung          Other:    - neg other ROS                 Physical Exam  Normal systems: cardiovascular, pulmonary and dental    Airway   Mallampati: II  TM distance: >3 FB  Neck ROM: full    Dental     Cardiovascular   Rhythm and rate: regular and normal  (-) no friction rub, no systolic click and no murmur    Pulmonary    breath sounds clear to auscultation(-) no rhonchi, no decreased breath sounds, no wheezes, no rales and no stridor                    Anesthesia Plan      History & Physical Review  History and physical reviewed and following examination; no interval change.    ASA Status:  3 .    NPO Status:  > 8 hours    Plan for General and ETT with Intravenous induction. Maintenance will be Balanced.    PONV prophylaxis:  Ondansetron (or other 5HT-3) and Dexamethasone or Solumedrol       Postoperative Care  Postoperative pain management:  IV analgesics.      Consents  Anesthetic plan, risks, benefits and alternatives discussed with:  Patient or representative, Patient and Sibling..                          .

## 2017-12-01 NOTE — ANESTHESIA POSTPROCEDURE EVALUATION
Patient: Juwan Latham    Procedure(s):  Robotic Assisted Radical Prostatectomy and Pelvic Lymph Node Dissection  - Wound Class: II-Clean Contaminated    Diagnosis:Prostate Cancer   Diagnosis Additional Information: Prostate cancer     Anesthesia Type:  General, ETT    Note:  Anesthesia Post Evaluation    Patient location during evaluation: PACU  Patient participation: Able to fully participate in evaluation  Level of consciousness: awake  Pain management: adequate  Airway patency: patent  Cardiovascular status: acceptable  Respiratory status: acceptable  Hydration status: acceptable  PONV: controlled     Anesthetic complications: None          Last vitals:  Vitals:    12/01/17 1310 12/01/17 1315 12/01/17 1320   BP: 141/90     Pulse:      Resp: 11 12 18   Temp:      SpO2: 99% 99% 99%         Electronically Signed By: Car Markham MD  December 1, 2017  1:27 PM

## 2017-12-01 NOTE — ANESTHESIA CARE TRANSFER NOTE
Patient: Juwan Latham    Procedure(s):  Robotic Assisted Radical Prostatectomy and Pelvic Lymph Node Dissection  - Wound Class: II-Clean Contaminated    Diagnosis: Prostate Cancer   Diagnosis Additional Information: No value filed.    Anesthesia Type:   General, ETT     Note:  Airway :Face Mask  Patient transferred to:PACU  Handoff Report: Identifed the Patient, Identified the Reponsible Provider, Reviewed the pertinent medical history, Discussed the surgical course, Reviewed Intra-OP anesthesia mangement and issues during anesthesia, Set expectations for post-procedure period and Allowed opportunity for questions and acknowledgement of understanding      Vitals: (Last set prior to Anesthesia Care Transfer)    CRNA VITALS  12/1/2017 1211 - 12/1/2017 1249      12/1/2017             Pulse: 79    SpO2: 94 %    Resp Rate (observed): (!)  2                Electronically Signed By: GLORIA Moe CRNA  December 1, 2017  12:49 PM

## 2017-12-01 NOTE — DISCHARGE SUMMARY
[facility] Discharge Summary    Patient: Juwan Latham    MRN: [id number]   : 1950         Date of Admission:  2017   Date of Discharge::  2017  1:11 PM  Admitting Physician:  Paulo Agarwal*  Discharge Physician:  Meg Villarreal             Admission Diagnoses:   Prostate Cancer   Prostate cancer (H)  Past Medical History:   Diagnosis Date     Anxiety      Arthritis     fingers, hips     Calculus of kidney ,      Congenital single kidney      Gastroesophageal reflux disease      Hx of cancer of lung 2017    wedge resection     Hypertension      Prostate cancer (H) 2017     Seasonal allergies     spring and fall     Sleep apnea     cpap             Discharge Diagnosis:   Prostate Cancer      Past Medical History:   Diagnosis Date     Anxiety      Arthritis     fingers, hips     Calculus of kidney ,      Congenital single kidney      Gastroesophageal reflux disease      Hx of cancer of lung 2017    wedge resection     Hypertension      Prostate cancer (H) 2017     Seasonal allergies     spring and      Sleep apnea     cpap       Prostate cancer - Final pathological diagnosis as described in corresponding pathology report           Procedures:   Procedure(s): DAVINCI PROSTATECTOMY               Medications Prior to Admission:     Prescriptions Prior to Admission   Medication Sig Dispense Refill Last Dose     B Complex-C (SUPER B COMPLEX PO) Take 1 tablet by mouth daily   2017 at Unknown time     ALPRAZolam (XANAX) 0.5 MG tablet Take 1 tablet (0.5 mg) by mouth 3 times daily as needed for anxiety 20 tablet 0 2017 at Unknown time     LISINOPRIL PO Take 10 mg by mouth daily    2017 at Unknown time     ALFALFA PO Take by mouth daily   Past Week at Unknown time     VITAMIN D, CHOLECALCIFEROL, PO Take 1,000 Units by mouth daily   2017 at Unknown time     acetaminophen (TYLENOL) 325 MG tablet Take 2 tablets (650 mg) by mouth  every 6 hours Do not take more than 4,000 mg of acetaminophen (Tylenol) from all sources to prevent liver damage. 100 tablet 1 Past Week at Unknown time     cyanocolbalamin (VITAMIN  B-12) 100 MCG tablet Take 50 mcg by mouth daily   More than a month at Unknown time     fluticasone (FLONASE) 50 MCG/ACT spray Spray 1-2 sprays into both nostrils daily 1 Bottle 11 More than a month at Unknown time     order for DME Equipment ordered: RESMED Auto PAP Mask type: Nasal Settings: 8-16 CM H20  : CPAP   Taking             Discharge Medications:     Current Discharge Medication List      START taking these medications    Details   polyethylene glycol (MIRALAX) powder Take 17 g (1 capful) by mouth daily  Qty: 510 g, Refills: 1    Associated Diagnoses: Malignant neoplasm of prostate (H)      HYDROcodone-acetaminophen (NORCO) 5-325 MG per tablet Take 1-2 tablets by mouth every 4 hours as needed for moderate to severe pain maximum 6 tablet(s) per day  Qty: 30 tablet, Refills: 0    Associated Diagnoses: Malignant neoplasm of prostate (H)         CONTINUE these medications which have NOT CHANGED    Details   B Complex-C (SUPER B COMPLEX PO) Take 1 tablet by mouth daily      ALPRAZolam (XANAX) 0.5 MG tablet Take 1 tablet (0.5 mg) by mouth 3 times daily as needed for anxiety  Qty: 20 tablet, Refills: 0    Associated Diagnoses: Anxiety      LISINOPRIL PO Take 10 mg by mouth daily       ALFALFA PO Take by mouth daily      VITAMIN D, CHOLECALCIFEROL, PO Take 1,000 Units by mouth daily      acetaminophen (TYLENOL) 325 MG tablet Take 2 tablets (650 mg) by mouth every 6 hours Do not take more than 4,000 mg of acetaminophen (Tylenol) from all sources to prevent liver damage.  Qty: 100 tablet, Refills: 1    Associated Diagnoses: Lung nodule      cyanocolbalamin (VITAMIN  B-12) 100 MCG tablet Take 50 mcg by mouth daily      fluticasone (FLONASE) 50 MCG/ACT spray Spray 1-2 sprays into both nostrils daily  Qty: 1 Bottle, Refills: 11     Associated Diagnoses: Chronic rhinitis, unspecified type      order for DME Equipment ordered: RESMED Auto PAP Mask type: Nasal Settings: 8-16 CM H20  : CPAP            none       Consultations:   Consultation during this admission received:           Brief History of Illness:       Juwan Latham is an 66 year old year old male with German 3+4 prostate cancer. Pre-operative prostate size was 35.6 grams by TRUS and PSA was 19.6. After a discussion of all risks, benefits, and alternatives, including radiation therapy, the patient elected to proceed with robotic prostatectomy.        Hospital Course:   The patient's hospital course was unremarkable.  Juwan Latham recovered as anticipated and experienced no post-operative complications.    On POD#3 he was ambulating without assitance, tolerating the discharge diet, had pain controlled with PO medications to go home with, and requiring no IV medications or fluids. Juwan Latham was discharged home with appropriate contact information, follow-up and instructions as seen below and in the discharge paperwork.       Discharge Instructions and Follow-Up:   Discharge diet: Regular   Discharge activity: - No strenuous exercise for 4 weeks.  - No lifting, pushing, pulling more than 15 pounds for 4 weeks.   - Do not strain with bowel movements.  - Do not drive until you can press the brake pedal quickly and fully without pain.   - Do not operate a motor vehicle while taking narcotic pain medications.      Discharge follow-up: - Follow up with   as scheduled  - Call your primary care provider to touch base regarding your recent admission.   - Call or return sooner than your regularly scheduled visit if you develop any of the following:  fever, uncontrolled pain, uncontrolled nausea or vomiting, as well as increased redness, swelling, or drainage from your wound.  You may call the Urology Clinic during daytime hours at [phone].  If after hours, call [phone] and  ask to speak with the Urology resident on call.    Medications - Do not take any additional Tylenol (acetaminophen) while using Percocet or Vicodin.  - Do not take more than 4,000mg of Tylenol (acetaminophen) in any 24 hour period, as this can cause liver damage.  - Take stool softeners such as Senna while you are using narcotics, but stop if you develop diarrhea.  - Wean yourself off of narcotic pain medications.   Wound care: - You may shower and get incisions wet starting 48 hrs after surgery.  - Do not scrub incisions or submerge wounds (aka, bath, pool, hot tub, ect.) for 2 weeks.   - Remove wound dressing 48 hours after surgery.   - If purple dermabond glue was used, avoid applying any lotions or ointments.   - If steri-strips were used, they will fall off on their own.   - Leave incision open to air.  Cover with gauze only if needed for comfort or to protect clothing from drainage.              Discharge Disposition:   Discharged to home      Attestation: I have reviewed today's vital signs, notes, medications, labs and imaging.    Meg Villarreal  December 1, 2017

## 2017-12-01 NOTE — PHARMACY-ADMISSION MEDICATION HISTORY
Admission medication history interview status for this patient is complete. See [facility] admission navigator for allergy information, prior to admission medications and immunization status.     Med rec completed by pre admitting RN KEMAR Arzola        Prior to Admission medications    Medication Sig Last Dose Taking? Auth Provider   oxyCODONE IR (ROXICODONE) 5 MG tablet Take 1 tablet (5 mg) by mouth every 4 hours as needed for pain maximum 6 tablet(s) per day  Yes Weight, Paulo Wiley MD   polyethylene glycol (MIRALAX) powder Take 17 g (1 capful) by mouth daily  Yes Weight, Paulo Wiley MD   B Complex-C (SUPER B COMPLEX PO) Take 1 tablet by mouth daily 11/30/2017 at Unknown time Yes Reported, Patient   ALPRAZolam (XANAX) 0.5 MG tablet Take 1 tablet (0.5 mg) by mouth 3 times daily as needed for anxiety 11/30/2017 at Unknown time Yes Asad White, DO   LISINOPRIL PO Take 10 mg by mouth daily  11/30/2017 at Unknown time Yes Reported, Patient   ALFALFA PO Take by mouth daily Past Week at Unknown time Yes Reported, Patient   VITAMIN D, CHOLECALCIFEROL, PO Take 1,000 Units by mouth daily 11/30/2017 at Unknown time Yes Reported, Patient   acetaminophen (TYLENOL) 325 MG tablet Take 2 tablets (650 mg) by mouth every 6 hours Do not take more than 4,000 mg of acetaminophen (Tylenol) from all sources to prevent liver damage. Past Week at Unknown time Yes Kayla Green MD   cyanocolbalamin (VITAMIN  B-12) 100 MCG tablet Take 50 mcg by mouth daily More than a month at Unknown time  Reported, Patient   fluticasone (FLONASE) 50 MCG/ACT spray Spray 1-2 sprays into both nostrils daily More than a month at Unknown time  Julio Guidry Jr., MD   order for DME Equipment ordered: RESMED Auto PAP Mask type: Nasal Settings: 8-16 CM H20  : CPAP   Reported, Patient

## 2017-12-02 LAB
ANION GAP SERPL CALCULATED.3IONS-SCNC: 7 MMOL/L (ref 3–14)
BUN SERPL-MCNC: 15 MG/DL (ref 7–30)
CALCIUM SERPL-MCNC: 8.4 MG/DL (ref 8.5–10.1)
CHLORIDE SERPL-SCNC: 107 MMOL/L (ref 94–109)
CO2 SERPL-SCNC: 26 MMOL/L (ref 20–32)
CREAT FLD-MCNC: 0.9 MG/DL
CREAT SERPL-MCNC: 0.92 MG/DL (ref 0.66–1.25)
ERYTHROCYTE [DISTWIDTH] IN BLOOD BY AUTOMATED COUNT: 12.6 % (ref 10–15)
GFR SERPL CREATININE-BSD FRML MDRD: 82 ML/MIN/1.7M2
GLUCOSE SERPL-MCNC: 105 MG/DL (ref 70–99)
HCT VFR BLD AUTO: 39.4 % (ref 40–53)
HGB BLD-MCNC: 13.2 G/DL (ref 13.3–17.7)
MCH RBC QN AUTO: 31.9 PG (ref 26.5–33)
MCHC RBC AUTO-ENTMCNC: 33.5 G/DL (ref 31.5–36.5)
MCV RBC AUTO: 95 FL (ref 78–100)
PLATELET # BLD AUTO: 159 10E9/L (ref 150–450)
POTASSIUM SERPL-SCNC: 4.4 MMOL/L (ref 3.4–5.3)
RBC # BLD AUTO: 4.14 10E12/L (ref 4.4–5.9)
SODIUM SERPL-SCNC: 140 MMOL/L (ref 133–144)
SPECIMEN SOURCE FLD: NORMAL
WBC # BLD AUTO: 7.2 10E9/L (ref 4–11)

## 2017-12-02 PROCEDURE — 25000132 ZZH RX MED GY IP 250 OP 250 PS 637: Mod: GY | Performed by: STUDENT IN AN ORGANIZED HEALTH CARE EDUCATION/TRAINING PROGRAM

## 2017-12-02 PROCEDURE — A9270 NON-COVERED ITEM OR SERVICE: HCPCS | Mod: GY | Performed by: STUDENT IN AN ORGANIZED HEALTH CARE EDUCATION/TRAINING PROGRAM

## 2017-12-02 PROCEDURE — 12000000 ZZH R&B MED SURG/OB

## 2017-12-02 PROCEDURE — 85027 COMPLETE CBC AUTOMATED: CPT | Performed by: STUDENT IN AN ORGANIZED HEALTH CARE EDUCATION/TRAINING PROGRAM

## 2017-12-02 PROCEDURE — 36415 COLL VENOUS BLD VENIPUNCTURE: CPT | Performed by: STUDENT IN AN ORGANIZED HEALTH CARE EDUCATION/TRAINING PROGRAM

## 2017-12-02 PROCEDURE — 80048 BASIC METABOLIC PNL TOTAL CA: CPT | Performed by: STUDENT IN AN ORGANIZED HEALTH CARE EDUCATION/TRAINING PROGRAM

## 2017-12-02 PROCEDURE — 25000125 ZZHC RX 250: Performed by: STUDENT IN AN ORGANIZED HEALTH CARE EDUCATION/TRAINING PROGRAM

## 2017-12-02 PROCEDURE — 82570 ASSAY OF URINE CREATININE: CPT | Performed by: STUDENT IN AN ORGANIZED HEALTH CARE EDUCATION/TRAINING PROGRAM

## 2017-12-02 PROCEDURE — 25000128 H RX IP 250 OP 636: Performed by: STUDENT IN AN ORGANIZED HEALTH CARE EDUCATION/TRAINING PROGRAM

## 2017-12-02 RX ORDER — TRAMADOL HYDROCHLORIDE 50 MG/1
50 TABLET ORAL EVERY 8 HOURS PRN
Qty: 20 TABLET | Refills: 0 | Status: SHIPPED | OUTPATIENT
Start: 2017-12-02 | End: 2018-01-11

## 2017-12-02 RX ORDER — TRAMADOL HYDROCHLORIDE 50 MG/1
50 TABLET ORAL EVERY 6 HOURS PRN
Status: DISCONTINUED | OUTPATIENT
Start: 2017-12-02 | End: 2017-12-04 | Stop reason: HOSPADM

## 2017-12-02 RX ORDER — SULFAMETHOXAZOLE AND TRIMETHOPRIM 400; 80 MG/1; MG/1
1 TABLET ORAL DAILY
Qty: 14 TABLET | Refills: 0 | Status: SHIPPED | OUTPATIENT
Start: 2017-12-02 | End: 2018-01-11

## 2017-12-02 RX ADMIN — HEPARIN SODIUM 5000 UNITS: 5000 INJECTION, SOLUTION INTRAVENOUS; SUBCUTANEOUS at 08:58

## 2017-12-02 RX ADMIN — TOLTERODINE 4 MG: 4 CAPSULE, EXTENDED RELEASE ORAL at 08:58

## 2017-12-02 RX ADMIN — SODIUM CHLORIDE: 9 INJECTION, SOLUTION INTRAVENOUS at 01:40

## 2017-12-02 RX ADMIN — Medication 0.3 MG: at 22:13

## 2017-12-02 RX ADMIN — SENNOSIDES AND DOCUSATE SODIUM 2 TABLET: 8.6; 5 TABLET ORAL at 21:14

## 2017-12-02 RX ADMIN — ONDANSETRON 4 MG: 2 INJECTION INTRAMUSCULAR; INTRAVENOUS at 22:21

## 2017-12-02 RX ADMIN — HYDROCODONE BITARTRATE AND ACETAMINOPHEN 1 TABLET: 5; 325 TABLET ORAL at 21:14

## 2017-12-02 RX ADMIN — ACETAMINOPHEN 650 MG: 325 TABLET, FILM COATED ORAL at 08:58

## 2017-12-02 RX ADMIN — ONDANSETRON 4 MG: 4 TABLET, ORALLY DISINTEGRATING ORAL at 16:11

## 2017-12-02 RX ADMIN — ACETAMINOPHEN 650 MG: 325 TABLET, FILM COATED ORAL at 19:26

## 2017-12-02 RX ADMIN — KETOROLAC TROMETHAMINE 15 MG: 15 INJECTION, SOLUTION INTRAMUSCULAR; INTRAVENOUS at 12:25

## 2017-12-02 RX ADMIN — SODIUM CHLORIDE: 9 INJECTION, SOLUTION INTRAVENOUS at 11:14

## 2017-12-02 RX ADMIN — KETOROLAC TROMETHAMINE 15 MG: 15 INJECTION, SOLUTION INTRAMUSCULAR; INTRAVENOUS at 07:34

## 2017-12-02 RX ADMIN — LISINOPRIL 10 MG: 10 TABLET ORAL at 12:24

## 2017-12-02 RX ADMIN — HEPARIN SODIUM 5000 UNITS: 5000 INJECTION, SOLUTION INTRAVENOUS; SUBCUTANEOUS at 17:47

## 2017-12-02 RX ADMIN — SENNOSIDES AND DOCUSATE SODIUM 2 TABLET: 8.6; 5 TABLET ORAL at 08:58

## 2017-12-02 RX ADMIN — BACITRACIN ZINC, NEOMYCIN, POLYMYXIN B: 400; 3.5; 5 OINTMENT TOPICAL at 21:14

## 2017-12-02 ASSESSMENT — ACTIVITIES OF DAILY LIVING (ADL)
ADLS_ACUITY_SCORE: 9
ADLS_ACUITY_SCORE: 10
ADLS_ACUITY_SCORE: 9
ADLS_ACUITY_SCORE: 10

## 2017-12-02 NOTE — PROGRESS NOTES
Patient discharged to home, VINOD out, dressing C/D/I, donovan cath in place, WDL, IV out, site C/D/I. Belongings and three filled Rx with patient at time of discharge. Instructions given, questions answered.

## 2017-12-02 NOTE — PLAN OF CARE
Problem: Surgery Nonspecified (Adult)  Goal: Signs and Symptoms of Listed Potential Problems Will be Absent, Minimized or Managed (Surgery Nonspecified)  Signs and symptoms of listed potential problems will be absent, minimized or managed by discharge/transition of care (reference Surgery Nonspecified (Adult) CPG).   Outcome: No Change  VSS on 3 LPM NC. C/O abd pain this shift, nausea. Scheduled toradol, prn dilaudid IV given with improvement, prn zofran given with relief. BS+, pt reports passing gas. On clear liquid diet, tolerating overnight with PO meds. PIV with IVF at 100 ml/hr. Breen in place with good UO. C/O pain, pulling at meatus, offered catheter cares, pt refused. Bed alarms in use, pt not attempting to exit bed. Alert and oriented, able to make needs known. Continue to monitor.

## 2017-12-02 NOTE — PROGRESS NOTES
VSS. VINOD minimal. Urine clear  Hb 13.2  VINOD creat 0.9  Abdomen soft, incisions OK  P: pull VINOD       Advance diet then home later today unless continued nausea

## 2017-12-02 NOTE — PLAN OF CARE
Problem: Surgery Nonspecified (Adult)  Goal: Signs and Symptoms of Listed Potential Problems Will be Absent, Minimized or Managed (Surgery Nonspecified)  Signs and symptoms of listed potential problems will be absent, minimized or managed by discharge/transition of care (reference Surgery Nonspecified (Adult) CPG).  Outcome: No Change  Pt came from surgery 1500.  C/o of some back pain, given tylenol and toradol with improvement. Lap sites covered with dermabond, VINOD drain in place with dark bloody output.  Felt as though needed to have bowel movement. Ambulated to bathroom.  Catheter in place with tea colored urine. VSS.

## 2017-12-03 LAB — GLUCOSE BLDC GLUCOMTR-MCNC: 94 MG/DL (ref 70–99)

## 2017-12-03 PROCEDURE — A9270 NON-COVERED ITEM OR SERVICE: HCPCS | Mod: GY | Performed by: STUDENT IN AN ORGANIZED HEALTH CARE EDUCATION/TRAINING PROGRAM

## 2017-12-03 PROCEDURE — 25000128 H RX IP 250 OP 636: Performed by: ANESTHESIOLOGY

## 2017-12-03 PROCEDURE — 12000000 ZZH R&B MED SURG/OB

## 2017-12-03 PROCEDURE — 25000125 ZZHC RX 250: Performed by: STUDENT IN AN ORGANIZED HEALTH CARE EDUCATION/TRAINING PROGRAM

## 2017-12-03 PROCEDURE — 25000132 ZZH RX MED GY IP 250 OP 250 PS 637: Mod: GY | Performed by: STUDENT IN AN ORGANIZED HEALTH CARE EDUCATION/TRAINING PROGRAM

## 2017-12-03 PROCEDURE — 00000146 ZZHCL STATISTIC GLUCOSE BY METER IP

## 2017-12-03 PROCEDURE — A9270 NON-COVERED ITEM OR SERVICE: HCPCS | Mod: GY | Performed by: UROLOGY

## 2017-12-03 PROCEDURE — 25000132 ZZH RX MED GY IP 250 OP 250 PS 637: Mod: GY | Performed by: HOSPITALIST

## 2017-12-03 PROCEDURE — 25000132 ZZH RX MED GY IP 250 OP 250 PS 637: Mod: GY | Performed by: UROLOGY

## 2017-12-03 PROCEDURE — A9270 NON-COVERED ITEM OR SERVICE: HCPCS | Mod: GY | Performed by: HOSPITALIST

## 2017-12-03 PROCEDURE — 25000128 H RX IP 250 OP 636: Performed by: STUDENT IN AN ORGANIZED HEALTH CARE EDUCATION/TRAINING PROGRAM

## 2017-12-03 RX ORDER — ONDANSETRON 4 MG/1
4 TABLET, FILM COATED ORAL EVERY 12 HOURS PRN
Qty: 20 TABLET | Refills: 0 | Status: SHIPPED | OUTPATIENT
Start: 2017-12-03 | End: 2018-01-11

## 2017-12-03 RX ORDER — IBUPROFEN 400 MG/1
400 TABLET, FILM COATED ORAL EVERY 8 HOURS
Status: DISCONTINUED | OUTPATIENT
Start: 2017-12-03 | End: 2017-12-04 | Stop reason: HOSPADM

## 2017-12-03 RX ORDER — IBUPROFEN 400 MG/1
400 TABLET, FILM COATED ORAL EVERY 8 HOURS
Qty: 30 TABLET | Refills: 0 | Status: SHIPPED | OUTPATIENT
Start: 2017-12-03 | End: 2018-01-11

## 2017-12-03 RX ADMIN — LISINOPRIL 10 MG: 10 TABLET ORAL at 08:32

## 2017-12-03 RX ADMIN — ONDANSETRON 4 MG: 4 TABLET, ORALLY DISINTEGRATING ORAL at 13:07

## 2017-12-03 RX ADMIN — ACETAMINOPHEN 650 MG: 325 TABLET, FILM COATED ORAL at 19:27

## 2017-12-03 RX ADMIN — PROCHLORPERAZINE EDISYLATE 5 MG: 5 INJECTION INTRAMUSCULAR; INTRAVENOUS at 08:33

## 2017-12-03 RX ADMIN — SENNOSIDES AND DOCUSATE SODIUM 2 TABLET: 8.6; 5 TABLET ORAL at 22:24

## 2017-12-03 RX ADMIN — HEPARIN SODIUM 5000 UNITS: 5000 INJECTION, SOLUTION INTRAVENOUS; SUBCUTANEOUS at 00:57

## 2017-12-03 RX ADMIN — ACETAMINOPHEN 650 MG: 325 TABLET, FILM COATED ORAL at 01:30

## 2017-12-03 RX ADMIN — TRAMADOL HYDROCHLORIDE 50 MG: 50 TABLET, COATED ORAL at 00:57

## 2017-12-03 RX ADMIN — TOLTERODINE 4 MG: 4 CAPSULE, EXTENDED RELEASE ORAL at 08:32

## 2017-12-03 RX ADMIN — Medication: at 07:18

## 2017-12-03 RX ADMIN — TRAMADOL HYDROCHLORIDE 50 MG: 50 TABLET, COATED ORAL at 05:52

## 2017-12-03 RX ADMIN — IBUPROFEN 400 MG: 400 TABLET ORAL at 17:50

## 2017-12-03 RX ADMIN — ACETAMINOPHEN 650 MG: 325 TABLET, FILM COATED ORAL at 05:37

## 2017-12-03 RX ADMIN — ONDANSETRON 4 MG: 4 TABLET, ORALLY DISINTEGRATING ORAL at 19:06

## 2017-12-03 RX ADMIN — SENNOSIDES AND DOCUSATE SODIUM 2 TABLET: 8.6; 5 TABLET ORAL at 06:37

## 2017-12-03 RX ADMIN — ONDANSETRON 4 MG: 4 TABLET, ORALLY DISINTEGRATING ORAL at 05:37

## 2017-12-03 RX ADMIN — HYDROCODONE BITARTRATE AND ACETAMINOPHEN 1 TABLET: 5; 325 TABLET ORAL at 08:32

## 2017-12-03 RX ADMIN — HEPARIN SODIUM 5000 UNITS: 5000 INJECTION, SOLUTION INTRAVENOUS; SUBCUTANEOUS at 08:32

## 2017-12-03 ASSESSMENT — ACTIVITIES OF DAILY LIVING (ADL)
ADLS_ACUITY_SCORE: 9

## 2017-12-03 NOTE — PROGRESS NOTES
VSS. C/o nausea, pain  Alert and oriented,  Seems anxious - won't take Xanax  Abdomen soft, incisions OK  Urine clear  A: stable  P: ambulate QID       Tylenol and ibuprofen for pain. Tramadol only if needed       Home this afternoon probably

## 2017-12-03 NOTE — PLAN OF CARE
Problem: Patient Care Overview  Goal: Plan of Care/Patient Progress Review  A/Ox4, A1, VSS, LS clear, BS X1, BM X1, donovan patent and draining, abd incisions WDL, c/o pain, PRN oxy given X1 with relief, c/o nausea, IV compazine and PO zofran given with limit relief. PT states he does not fee comfortable discharging to home today, urology aware. [place] D/Jay, scheduled ibuprofen added this shift. Plan is possible discharge to home tomorrow. POC reviewed with patient, questions answered.

## 2017-12-03 NOTE — PLAN OF CARE
Problem: Patient Care Overview  Goal: Plan of Care/Patient Progress Review  Orientation: A/O x4, calls appropriately  VS stable, afebrile, reports pain to abdominal area and incision sites-noted to be red, swollen, and single site bleeding and new dressing applied. Utlilized PRN tylenol and tramadol which were effective.  LS: clear and equal  GI: + Flatus - BM. Denies N/V. BS active, abdomen distended  : Adequate urine output from donovan catheter, new securement applied due to pain and positioning  Diet: regular  PIV: saline locked  Skin: lap sites noted above, scrotal edema  Activity: SBA. Pt slept comfortably throughout shift after PRN tramadol.  BGs: 94 (post-op)  Disposition: Expected discharge today pending pain management and surgery release.

## 2017-12-03 NOTE — PROGRESS NOTES
MD Paged  Pt having pain, has taken Tylenol and one Norco. Has a prescription of Tramadol to d/c. Pt had to stay due to nausea. Possible to get Tramadol for here?

## 2017-12-04 ENCOUNTER — TELEPHONE (OUTPATIENT)
Dept: FAMILY MEDICINE | Facility: CLINIC | Age: 67
End: 2017-12-04

## 2017-12-04 VITALS
HEART RATE: 96 BPM | RESPIRATION RATE: 15 BRPM | WEIGHT: 198 LBS | SYSTOLIC BLOOD PRESSURE: 125 MMHG | TEMPERATURE: 98.3 F | OXYGEN SATURATION: 95 % | BODY MASS INDEX: 30.01 KG/M2 | DIASTOLIC BLOOD PRESSURE: 78 MMHG | HEIGHT: 68 IN

## 2017-12-04 LAB — PLATELET # BLD AUTO: 197 10E9/L (ref 150–450)

## 2017-12-04 PROCEDURE — A9270 NON-COVERED ITEM OR SERVICE: HCPCS | Mod: GY | Performed by: STUDENT IN AN ORGANIZED HEALTH CARE EDUCATION/TRAINING PROGRAM

## 2017-12-04 PROCEDURE — 25000125 ZZHC RX 250: Performed by: STUDENT IN AN ORGANIZED HEALTH CARE EDUCATION/TRAINING PROGRAM

## 2017-12-04 PROCEDURE — 25000132 ZZH RX MED GY IP 250 OP 250 PS 637: Mod: GY | Performed by: UROLOGY

## 2017-12-04 PROCEDURE — 25000132 ZZH RX MED GY IP 250 OP 250 PS 637: Mod: GY | Performed by: STUDENT IN AN ORGANIZED HEALTH CARE EDUCATION/TRAINING PROGRAM

## 2017-12-04 PROCEDURE — 36415 COLL VENOUS BLD VENIPUNCTURE: CPT | Performed by: STUDENT IN AN ORGANIZED HEALTH CARE EDUCATION/TRAINING PROGRAM

## 2017-12-04 PROCEDURE — A9270 NON-COVERED ITEM OR SERVICE: HCPCS | Mod: GY | Performed by: UROLOGY

## 2017-12-04 PROCEDURE — 85049 AUTOMATED PLATELET COUNT: CPT | Performed by: STUDENT IN AN ORGANIZED HEALTH CARE EDUCATION/TRAINING PROGRAM

## 2017-12-04 RX ADMIN — IBUPROFEN 400 MG: 400 TABLET ORAL at 01:09

## 2017-12-04 RX ADMIN — ONDANSETRON 4 MG: 4 TABLET, ORALLY DISINTEGRATING ORAL at 03:13

## 2017-12-04 RX ADMIN — ACETAMINOPHEN 650 MG: 325 TABLET, FILM COATED ORAL at 03:12

## 2017-12-04 RX ADMIN — IBUPROFEN 400 MG: 400 TABLET ORAL at 08:28

## 2017-12-04 RX ADMIN — ONDANSETRON 4 MG: 4 TABLET, ORALLY DISINTEGRATING ORAL at 09:34

## 2017-12-04 RX ADMIN — TOLTERODINE 4 MG: 4 CAPSULE, EXTENDED RELEASE ORAL at 08:28

## 2017-12-04 RX ADMIN — BACITRACIN ZINC, NEOMYCIN, POLYMYXIN B: 400; 3.5; 5 OINTMENT TOPICAL at 08:33

## 2017-12-04 RX ADMIN — LISINOPRIL 10 MG: 10 TABLET ORAL at 08:30

## 2017-12-04 ASSESSMENT — ACTIVITIES OF DAILY LIVING (ADL)
ADLS_ACUITY_SCORE: 9

## 2017-12-04 ASSESSMENT — PAIN DESCRIPTION - DESCRIPTORS: DESCRIPTORS: CRAMPING

## 2017-12-04 NOTE — TELEPHONE ENCOUNTER
Reason for Call:  Medication or medication refill:    Do you use a [facility]?  Name of the pharmacy and phone number for the current request:  [facility]     Name of the medication requested: Norco    Other request: Patient says he just had surgery. He says he didn't get any [place] from his surgeon and wants it prescribed. I asked if he wanted to make a hospital followup appointment and he declined.    Can we leave a detailed message on this number? YES    Phone number patient can be reached at: Home number on file [phone] (home)    Best Time: Anytime    Call taken on 12/4/2017 at 1:55 PM by Francheska Chen

## 2017-12-04 NOTE — TELEPHONE ENCOUNTER
See encounter below. Patient needs to call and consult with his surgeon's office if current pain medications are not working. Please call patient and advise that he needs to follow up with his surgeon. Thank you.  Kaylee Akins RN, BSN  [facility]

## 2017-12-04 NOTE — PROGRESS NOTES
Pt to D/C to home.  Pt provided with d/c instructions, including new medications, when medications were last given, and when to take them again.  Pt also informed to f/u with urology in 1 week.  Pt verbalized understanding of all d/c and f/u instructions.  All questions were answered at this time.  Copy of paperwork sent with pt.  Medication x 4 sent with pt.  Family to provide transport.  All personal belongings sent with pt.     Flu Vaccine: Denied- reaction  Pneumovax: Recieved PTA      NO NARCOTIC SCRIPT OR MED SENT WITH PT; script not in chart, [facility] pharmacy did not have it; PACU did not have it. Pt stated he did not think he would need the Norco, ok with discharging without it.

## 2017-12-04 NOTE — TELEPHONE ENCOUNTER
Called number below and spoke with pt.  He states he is really okay, but when he left the hospital he was not ready to be DC'd.  They did not fill this when he was DC'd.  He just wants to have this on hand in case he needs it.  Tonight is his first night home.  He just wants to make sure he has it if he needs it rather than waiting until he needs it and then not be able to get it.  Can we fill this for him?  He will  at the .  He states Garett Mcclendon will pick this up on his behalf.  Jayna Rider

## 2017-12-04 NOTE — TELEPHONE ENCOUNTER
After an additional conversation with pt, he has tramadol and was told these meds act the same per RN.  He does not want the Norco any longer as it constipates him and he does not want that.  He was told if his pain increases or the tramadol does not help to call his surgeon and notify them of this.  If they choose to defer prescribing to us, he can call and we will see what we can do for him.  Jayna Rider

## 2017-12-04 NOTE — PLAN OF CARE
Problem: Patient Care Overview  Goal: Plan of Care/Patient Progress Review  Outcome: Improving  Pt alert and oriented. SBA to independent in room. Pt encouraged to be independent. No appetite. VSS. CMS intact. Abdominal inc look good. C/O nausea, taking PO zofran. 5/10 pain. No narcotics for pt. Bowel active. Passing gas.   Plan to dc tomorrow.

## 2017-12-04 NOTE — PROGRESS NOTES
Urology Progress Note  12/04/2017  Admit Date: 12/1/2017    Interval History:    FABRICE overnight ,+BM, +Flatus, tolerating reg diet, ambulating, Pain controlled, some nausea, no vomiting.  Patient concerned about ability to get in and out of bed.     Physical Exam:    Temp:  [96.3  F (35.7  C)-97.9  F (36.6  C)] 96.3  F (35.7  C)  Heart Rate:  [62-88] 88  Resp:  [16-18] 16  BP: (138-144)/(82-84) 140/84  SpO2:  [95 %-96 %] 96 %    I/O last 3 completed shifts:  In: 803 [P.O.:800; I.V.:3]  Out: 1850 [Urine:1850]    UOP 1850/-      Gen: NAD, lying comfortably in bed  Pulm: NC  Abdomen: soft, ATTP, nondistended, wound c/d/i, matthew removed  : Donovan with clear urine,   Extremities: without cyanosis or edema  Neuro: no focal deficits     Labs:    CMP  Recent Labs  Lab 12/02/17  0720 12/01/17  1316 11/30/17  1027   POTASSIUM 4.4 4.6 4.2   * 144* 116*   BUN 15 12 11   CR 0.92 0.84 0.88   GFRESTIMATED 82 >90 86     CBC  Recent Labs  Lab 12/02/17  0720 12/01/17  1316 11/30/17  1027   WBC 7.2 11.4*  --    HGB 13.2* 14.9 16.2    174  --        Assessment:  Juwan Latham is a 66 year old male, POD#3 s/p RALP for Prostate Cancer     Today:    Pain: continue apap, ibuprofen, oxycodone prn    Diet: regular    : continue donovan catheter upon discharge    PPX: SCDs, SQH    Dispo: likely dc today.     Patient seen on rounds with resident team, plan to be discussed with Dr. Bolivar Villarreal MD  PGY-5 Urology

## 2017-12-04 NOTE — PLAN OF CARE
Problem: Patient Care Overview  Goal: Plan of Care/Patient Progress Review  Orientation: A/O x4  VS stable, afebrile, reports minimal pain to abd area with schedueld ibuprofen and PRN tylenol effective, PRN zofran given x1   LS: clear and equal  GI: + Flatus - BM. Denies N/V. BS active  : Adequate urine output.   Diet: reegular  PIV: saline locked  Skin: incisions CDI with bruising noted and liquid bandage closure.  Activity: Independent. Pt slept comfortably throughout shift.   Disposition: Expected discharge today.

## 2017-12-05 LAB — COPATH REPORT: NORMAL

## 2017-12-07 ENCOUNTER — CARE COORDINATION (OUTPATIENT)
Dept: CARE COORDINATION | Facility: CLINIC | Age: 67
End: 2017-12-07

## 2017-12-07 ENCOUNTER — NURSE TRIAGE (OUTPATIENT)
Dept: NURSING | Facility: CLINIC | Age: 67
End: 2017-12-07

## 2017-12-07 DIAGNOSIS — I10 HYPERTENSION GOAL BP (BLOOD PRESSURE) < 140/90: Chronic | ICD-10-CM

## 2017-12-07 DIAGNOSIS — F41.9 ANXIETY: ICD-10-CM

## 2017-12-07 RX ORDER — ALPRAZOLAM 0.5 MG
0.5 TABLET ORAL 3 TIMES DAILY PRN
Qty: 20 TABLET | Refills: 0 | Status: SHIPPED | OUTPATIENT
Start: 2017-12-07 | End: 2017-12-21

## 2017-12-07 RX ORDER — LISINOPRIL 10 MG/1
10 TABLET ORAL DAILY
Qty: 90 TABLET | Refills: 3 | Status: CANCELLED | OUTPATIENT
Start: 2017-12-07

## 2017-12-07 NOTE — TELEPHONE ENCOUNTER
Xanax      Last Written Prescription Date: 11/20/17  Last Fill Quantity: 20,  # refills: 0   Last Office Visit with [facility], [facility] or [facility] prescribing provider: 11/29/17                                         Next 5 appointments (look out 90 days)     Dec 13, 2017  1:30 PM CST   Return Visit with Paulo Agarwal MD   [facility] ([facility])    [facility]  [id number] [place] [address]  [facility] [address]  [facility] [id number] [phone]                Routing request to DO MANDA in MD LAURIE's absence.  Kaylee Akins, RN, BSN  [facility]

## 2017-12-07 NOTE — PROGRESS NOTES
Clinic Care Coordination Contact  OUTREACH    Referral Information:  Referral Source: PCP  Reason for Contact: follow up  Care Conference: No     Universal Utilization:   ED Visits in last year: 1  Hospital visits in last year: 1  Last PCP appointment: 11/29/17  Missed Appointments: 0  Concerns: anxiety management  Multiple Providers or Specialists: urology    Clinical Concerns:  Current Medical Concerns: Placed follow up call to pt following his procedure last week. He reports that his urine is pink-tinged, and that he had one small clot present but reports that his making good urine. He has his catheter in place and will go in next week to have it removed.  He has had some mild pain that is typically managed with tylenol, discussed use of hydrocodone if needed. He has had continued anxiety around this procedure and requests a refill on anxiolytic. Denies any other concerns at this time.  Current Behavioral Concerns: anxiety, managed     Education Provided to patient: post op cares, ongoing clinic CC   Clinical Pathway Name: None    Medication Management:  Pt reports that he needs a refill on alprazolam.  Discussed with Dr. White, he will send in re-order for pt.     Functional Status:  Mobility Status: Independent  Equipment Currently Used at Home: none  Transportation: no concerns      Psychosocial:  Current living arrangement:: I live in a private home  Financial/Insurance: no concerns     Resources and Interventions:  Current Resources: none   Advanced Care Plans/Directives on file:: No        Goals:   Goal 1 Statement: I will work on managing my stress over the next couple of months.  Goal 1 Supportive Steps: Pt to work on good sleep hygiene and self care.  Goal 1 Progression Percent: 90%  Goal 1 Progression Date: 12/07/17     Upcoming appointment:  (NA)     Plan: Will follow up with pt in the next couple of weeks.  Pt in agreement with plan.

## 2017-12-07 NOTE — TELEPHONE ENCOUNTER
lisinopril      Last Written Prescription Date: 6/13/17  Last Fill Quantity: 90, # refills: 3  Last Office Visit with [facility], [facility] or [facility] prescribing provider: 11/29/17  Next 5 appointments (look out 90 days)     Dec 13, 2017  1:30 PM CST   Return Visit with Paulo Agarwal MD   [facility] ([facility])    [facility]  [address]  [facility] [id number] [phone]                Patient has refills available through pharmacy.  Kaylee Akins, RN, BSN  [facility]

## 2017-12-08 NOTE — TELEPHONE ENCOUNTER
"  Reason for Disposition    Caller has URGENT question and triager unable to answer question    Additional Information    Negative: Chest pain    Negative: Difficulty breathing    Negative: Surgical incision symptoms and questions    Negative: [1] Discomfort (pain, burning or stinging) when passing urine AND [2] male    Negative: [1] Discomfort (pain, burning or stinging) when passing urine AND [2] female    Negative: Constipation    Negative: Calf pain    Negative: Dizziness is severe, or persists > 24 hours after surgery    Negative: Pain, redness, swelling, or pus at IV Site    Negative: Symptoms arising from use of a urinary catheter (Breen or Coude)    Negative: Cast problems or questions    Negative: Medication question    Negative: [1] Widespread rash AND [2] bright red, sunburn-like    Negative: [1] SEVERE headache AND [2] after spinal (epidural) anesthesia    Negative: [1] Vomiting AND [2] persists > 4 hours    Negative: [1] Vomiting AND [2] abdomen looks much more swollen than usual    Negative: [1] Drinking very little AND [2] dehydration suspected (e.g., no urine > 12 hours, very dry mouth, very lightheaded)    Negative: Patient sounds very sick or weak to the triager    Negative: Sounds like a serious complication to the triager    Negative: Fever > 100.5 F (38.1 C)    Negative: [1] SEVERE post-op pain (e.g., excruciating, pain scale 8-10) AND [2] not controlled with pain medications    Answer Assessment - Initial Assessment Questions  1. SYMPTOM: \"What's the main symptom you're concerned about?\" (e.g., pain, fever, vomiting)      Pain, feels like he needs to pee all the time, and now blood in urine red and brown, no clots  2. ONSET: \"When did ________  start?\"      2 hours ago  3. SURGERY: \"What surgery was performed?\"      12/01/17  4. DATE of SURGERY: \"When was surgery performed?\"       Radical prostatectomy   5. ANESTHESIA: \" What type of anesthesia did you have?\" (e.g., general, spinal, epidural, " "local)      general  6. PAIN: \"Is there any pain?\" If so, ask: \"How bad is it?\"  (Scale 1-10; or mild, moderate, severe)      Moderate to severe  7. FEVER: \"Do you have a fever?\" If so, ask: \"What is your temperature, how was it measured, and when did it start?\"      Denies fever  8. VOMITING: \"Is there any vomiting?\" If yes, ask: \"How many times?\"      Denies vomiting  9. BLEEDING: \"Is there any bleeding?\" If so, ask: \"How much?\" and \"Where?\"      Red and brown blood in urine, no clots  10. OTHER SYMPTOMS: \"Do you have any other symptoms?\" (e.g., drainage from wound, painful urination, constipation)        Painful, and severe urgency.    Protocols used: POST-OP SYMPTOMS AND QUESTIONS-ADULT-      Paged on call Urology provider via page violeta Morris at 7:51 pm to Call pt at [phone] regarding pain and blood in urine, post op symptoms.     Merline Martin, RN, BSN  [facility]    "

## 2017-12-13 ENCOUNTER — ONCOLOGY VISIT (OUTPATIENT)
Dept: ONCOLOGY | Facility: CLINIC | Age: 67
End: 2017-12-13
Attending: UROLOGY
Payer: MEDICARE

## 2017-12-13 VITALS
RESPIRATION RATE: 16 BRPM | HEART RATE: 63 BPM | TEMPERATURE: 96.9 F | WEIGHT: 193 LBS | BODY MASS INDEX: 29.25 KG/M2 | DIASTOLIC BLOOD PRESSURE: 66 MMHG | SYSTOLIC BLOOD PRESSURE: 116 MMHG | HEIGHT: 68 IN | OXYGEN SATURATION: 98 %

## 2017-12-13 DIAGNOSIS — C61 MALIGNANT NEOPLASM OF PROSTATE (H): Primary | ICD-10-CM

## 2017-12-13 PROCEDURE — 99211 OFF/OP EST MAY X REQ PHY/QHP: CPT

## 2017-12-13 PROCEDURE — 99024 POSTOP FOLLOW-UP VISIT: CPT | Performed by: UROLOGY

## 2017-12-13 ASSESSMENT — PAIN SCALES - GENERAL: PAINLEVEL: MILD PAIN (3)

## 2017-12-13 NOTE — NURSING NOTE
"Oncology Rooming Note    December 13, 2017 1:37 PM   Juwan Latham is a 67 year old male who presents for:    Chief Complaint   Patient presents with     Oncology Clinic Visit     Follow up     Initial Vitals: /66  Pulse 63  Temp 96.9  F (36.1  C) (Oral)  Resp 16  Ht 1.727 m (5' 8\")  Wt 87.5 kg (193 lb)  SpO2 98%  BMI 29.35 kg/m2 Estimated body mass index is 29.35 kg/(m^2) as calculated from the following:    Height as of this encounter: 1.727 m (5' 8\").    Weight as of this encounter: 87.5 kg (193 lb). Body surface area is 2.05 meters squared.  Mild Pain (3) Comment: Data Unavailable   No LMP for male patient.  Allergies reviewed: Yes  Medications reviewed: Yes    Medications: Medication refills not needed today.  Pharm[facility] into [facility]: [facility] [address] [place] [address] - [address] [facility] [address]    Clinical concerns: follow up     8 minutes for nursing intake (face to face time)     oRmana Gross CMA     DISCHARGE PLAN:  Next appointments: See patient instruction section  Departure Mode: Ambulatory  Accompanied by: self  0 minutes for nursing discharge (face to face time)   Romana Gross CMA                  "

## 2017-12-13 NOTE — LETTER
12/13/2017         RE: Juwan Latham  [address] [address] [facility] [address]        Dear Colleague,    Thank you for referring your patient, Juwan Latham, to the [facility]. Please see a copy of my visit note below.    Prostate Cancer Follow up after RRP     Juwan Latham is a very pleasant 67 year old male who presents with a history of prostate cancer.    Initial PSA:19.6  Pathologic Perry Score was 4 + 5  Grade Group:5  Biopsy German Score was 3 + 4  Pathologic Stage T2bc, N1, Mx.   Positive Margins: Yes Location:right side additional margin taken and negative  Number of Lymph Nodes Removed: 5  Number of Positive Lymph Nodes: 1  Patient is status post robotic radical prostatectomy on 12/1/2017.    Risk Group: High    Predicted progression free probability at 10 years: ~10  Predicted Cancer specific survival at 15 years: 80-90%  (J Clin Oncol. 2005 Oct 1;23(28):7002-12.  Http://nomograms.[facility].org/Prostate/PostRadicalProstatectomy.aspx)    There is no evidence of disease recurrence to date.    Physical Exam    Abd is soft, non-distended, incisions are healing well, no evidence of hernias    Tolerating a regular diet, urine is clear in the donovan    Trial of Void today was successful    PSA  19.6 pre op    We went over pathology results      Assessment [id number] prostate cancer s/p robotic radical prostatectomy on 12/1/2017 with  no evidence of disease, but substantially higher risk than previously believed    plan      Referral to PT for pelvic floor rehab      High risk for prostate cancer would refer to medical onc for adjuvant radiation and possible hormone treatment.  Lives in [place] so would probably get treatment here.      We discussed the anticipated time course for recovery of continence and the risk of PSA recurrence and the potential need for future treatment for the prostate cancer.      Follow up in 2 months    PSA (tumor marker)    Clinic Exam      Paulo  MD Stefano  Department of Urology  [facility]            Again, thank you for allowing me to participate in the care of your patient.        Sincerely,        Paulo Agarwal MD

## 2017-12-13 NOTE — PROGRESS NOTES
Prostate Cancer Follow up after RRP     Juwan Latham is a very pleasant 67 year old male who presents with a history of prostate cancer.    Initial PSA:19.6  Pathologic German Score was 4 + 5  Grade Group:5  Biopsy Trenton Score was 3 + 4  Pathologic Stage T2bc, N1, Mx.   Positive Margins: Yes Location:right side additional margin taken and negative  Number of Lymph Nodes Removed: 5  Number of Positive Lymph Nodes: 1  Patient is status post robotic radical prostatectomy on 12/1/2017.    Risk Group: High    Predicted progression free probability at 10 years: ~10  Predicted Cancer specific survival at 15 years: 80-90%  (J Clin Oncol. 2005 Oct 1;23(28):7006-12.  Http://nomograms.[facility].org/Prostate/PostRadicalProstatectomy.aspx)    There is no evidence of disease recurrence to date.    Physical Exam    Abd is soft, non-distended, incisions are healing well, no evidence of hernias    Tolerating a regular diet, urine is clear in the donovan    Trial of Void today was successful    PSA  19.6 pre op    We went over pathology results      Assessment [id number] prostate cancer s/p robotic radical prostatectomy on 12/1/2017 with  no evidence of disease, but substantially higher risk than previously believed    plan      Referral to PT for pelvic floor rehab      High risk for prostate cancer would refer to medical onc for adjuvant radiation and possible hormone treatment.  Lives in [place] so would probably get treatment here.      We discussed the anticipated time course for recovery of continence and the risk of PSA recurrence and the potential need for future treatment for the prostate cancer.      Follow up in 2 months    PSA (tumor marker)    Clinic Exam      Paulo Agarwal MD  Department of Urology  [facility]

## 2017-12-13 NOTE — PATIENT INSTRUCTIONS
PT pelvic floor referral  Scheduled on 12/29/17 @ 3:45 pm    Radiation oncology referral  Radiation Oncology will be calling Juwan either tomorrow or Friday to schedule. Fior HARRELL    Follow up with 2 months with PSA  Scheduled at [facility]. Fior HARRELL  AVS given to patient

## 2017-12-13 NOTE — NURSING NOTE
Approx 100 cc of  Normal Saline  instilled into the bladder via catheter/syringe.  Catheter then removed with out difficulty   Patient voided approx 115 cc's concentrated  Urine out  Patient tolerated procedure well   Tiffanie BEARDEN MA  Teaching done with patient verbally as to where to go or call  if unable to urinate post catheter removal-Done by Charlotte De La PazRN)

## 2017-12-13 NOTE — MR AVS SNAPSHOT
After Visit Summary   12/13/2017    Juwan Latham    MRN: [id number]           Patient Information     Date Of Birth          1950        Visit Information        Provider Department      12/13/2017 1:30 PM Weight, Paulo Wiley MD [facility] Oncology [facility]      Today's Diagnoses     Malignant neoplasm of prostate (H)    -  1      Care Instructions    PT pelvic floor referral  Scheduled on 12/29/17 @ 3:45 pm    Radiation oncology referral  Radiation Oncology will be calling Juwan either tomorrow or Friday to schedule. Fior HARRELL    Follow up with 2 months with PSA  Scheduled at [facility]. Fior HARRELL  AVS given to patient              Follow-ups after your visit        Additional Services     [facility] Physical Therapy Referral       **This order will print in the [facility] Scheduling Office**    Physical Therapy, Hand Therapy and Chiropractic Care are available through:    *[facility]  *[facility]  *[facility]    Call one number to schedule at any of the above locations: [phone].    Your provider has referred you to: Pelvic Floor physical therapy    Indication/Reason for Referral: robotic radical prostatectomy hx  Onset of Illness: 12/2017  Therapy Orders: Evaluate and Treat  Special Programs: Men's Health: Pelvic Floor Weakness/Incontinence -       [place]      Additional Comments for the Therapist or Chiropractor: none    Please be aware that coverage of these services is subject to the terms and limitations of your health insurance plan.  Call member services at your health plan with any benefit or coverage questions.      Please bring the following to your appointment:    *Your personal calendar for scheduling future appointments  *Comfortable clothing                  Your next 10 appointments already scheduled     Dec 29, 2017  4:00 PM CST   (Arrive by 3:45 PM)   [facility] Men's Health with Nino Cunningham, PT    [facility] ([facility] [place]  )    [address]  [address]  [facility] [address]   [phone]            Jan 29, 2018  9:00 AM CST   Return Sleep Patient with Owen Davis MD   [facility] ([facility])    [address]  [facility] [address]   [phone]            Feb 07, 2018  9:00 AM CST   LAB with [facility]   [facility] ([facility]    [address]  [facility] [address]   [phone]           Please do not eat 10-12 hours before your appointment if you are coming in fasting for labs on lipids, cholesterol, or glucose (sugar). This does not apply to pregnant women. Water, hot tea and black coffee (with nothing added) are okay. Do not drink other fluids, diet soda or chew gum.            Feb 14, 2018  2:30 PM CST   Return Visit with Paulo Agarwal MD   [facility] ([facility])    [facility]  [address]  [facility] [address] [phone]              Who to contact     If you have questions or need follow up information about today's clinic visit or your schedule please contact [facility] directly at [phone].  Normal or non-critical lab and imaging results will be communicated to you by MyChart, letter or phone within 4 business days after the clinic has received the results. If you do not hear from us within 7 days, please contact the clinic through [employer] or phone. If you have a critical or abnormal lab result, we will notify you by phone as soon as possible.  Submit refill requests through [employer] or call your pharmacy and they will forward the refill request to us. Please allow 3 business days for your refill to be completed.          Additional Information About Your Visit        [employer] Information     [employer] gives you secure access to your electronic health record. If  "you see a primary care provider, you can also send messages to your care team and make appointments. If you have questions, please call your primary care clinic.  If you do not have a primary care provider, please call [phone] and they will assist you.        Care EveryWhere ID     This is your Care EveryWhere ID. This could be used by other organizations to access your [facility] medical records  [id number]        Your Vitals Were     Pulse Temperature Respirations Height Pulse Oximetry BMI (Body Mass Index)    63 96.9  F (36.1  C) (Oral) 16 1.727 m (5' 8\") 98% 29.35 kg/m2       Blood Pressure from Last 3 Encounters:   12/13/17 116/66   12/04/17 125/78   11/29/17 132/72    Weight from Last 3 Encounters:   12/13/17 87.5 kg (193 lb)   12/01/17 89.8 kg (198 lb)   11/29/17 90.3 kg (199 lb)              We Performed the Following     MAMADOU Physical Therapy Referral        Primary Care Provider Office Phone # Fax #    Julio Guidry Jr., [phone] [address]  [place] [address]        Equal Access to Services     AMANDA SNIDER : Hadii aad ku hadasho Sosoleali, waaxda luqadaha, qaybta kaalmada adeegyada, mynor escoto. So [facility] [phone].    ATENCIÓN: Si habla español, tiene a gunn disposición servicios gratuitos de asistencia lingüística. [facility] [phone].    We comply with applicable federal civil rights laws and [place] laws. We do not discriminate on the basis of race, color, national origin, age, disability, sex, sexual orientation, or gender identity.            Thank you!     Thank you for choosing [facility]  for your care. Our goal is always to provide you with excellent care. Hearing back from our patients is one way we can continue to improve our services. Please take a few minutes to complete the written survey that you may receive in the mail after your visit with us. Thank you!             Your Updated Medication " List - Protect others around you: Learn how to safely use, store and throw away your medicines at www.disposemymeds.org.          This list is accurate as of: 12/13/17  3:14 PM.  Always use your most recent med list.                   Brand Name Dispense Instructions for use Diagnosis    acetaminophen 325 MG tablet    TYLENOL    100 tablet    Take 2 tablets (650 mg) by mouth every 6 hours Do not take more than 4,000 mg of acetaminophen (Tylenol) from all sources to prevent liver damage.    Lung nodule       ALFALFA PO      Take by mouth daily        ALPRAZolam 0.5 MG tablet    XANAX    20 tablet    Take 1 tablet (0.5 mg) by mouth 3 times daily as needed for anxiety    Anxiety       cyanocolbalamin 100 MCG tablet    vitamin  B-12     Take 50 mcg by mouth daily        fluticasone 50 MCG/ACT spray    FLONASE    1 Bottle    Spray 1-2 sprays into both nostrils daily    Chronic rhinitis, unspecified type       HYDROcodone-acetaminophen 5-325 MG per tablet    NORCO    30 tablet    Take 1-2 tablets by mouth every 4 hours as needed for moderate to severe pain maximum 6 tablet(s) per day    Malignant neoplasm of prostate (H)       ibuprofen 400 MG tablet    ADVIL/MOTRIN    30 tablet    Take 1 tablet (400 mg) by mouth every 8 hours    Malignant neoplasm of prostate (H)       LISINOPRIL PO      Take 10 mg by mouth daily        ondansetron 4 MG tablet    ZOFRAN    20 tablet    Take 1 tablet (4 mg) by mouth every 12 hours as needed for nausea    Malignant neoplasm of prostate (H)       order for DME      Equipment ordered: RESMED Auto PAP Mask type: Nasal Settings: 8-16 CM H20  : CPAP        polyethylene glycol powder    MIRALAX    510 g    Take 17 g (1 capful) by mouth daily    Malignant neoplasm of prostate (H)       sulfamethoxazole-trimethoprim 400-80 MG per tablet    BACTRIM/SEPTRA    14 tablet    Take 1 tablet by mouth daily    Malignant neoplasm of prostate (H)       SUPER B COMPLEX PO      Take 1 tablet by mouth daily         traMADol 50 MG tablet    ULTRAM    20 tablet    Take 1 tablet (50 mg) by mouth every 8 hours as needed for pain maximum 4 tablet(s) per day    Malignant neoplasm of prostate (H)       VITAMIN D (CHOLECALCIFEROL) PO      Take 1,000 Units by mouth daily

## 2017-12-14 ENCOUNTER — TELEPHONE (OUTPATIENT)
Dept: ONCOLOGY | Facility: CLINIC | Age: 67
End: 2017-12-14

## 2017-12-14 ENCOUNTER — TELEPHONE (OUTPATIENT)
Dept: UROLOGY | Facility: CLINIC | Age: 67
End: 2017-12-14

## 2017-12-14 ENCOUNTER — HOSPITAL ENCOUNTER (EMERGENCY)
Facility: CLINIC | Age: 67
Discharge: HOME | End: 2017-12-14
Attending: EMERGENCY MEDICINE | Admitting: EMERGENCY MEDICINE
Payer: MEDICARE

## 2017-12-14 VITALS
RESPIRATION RATE: 18 BRPM | SYSTOLIC BLOOD PRESSURE: 135 MMHG | DIASTOLIC BLOOD PRESSURE: 77 MMHG | TEMPERATURE: 97.4 F | OXYGEN SATURATION: 100 %

## 2017-12-14 DIAGNOSIS — R33.9 URINARY RETENTION: ICD-10-CM

## 2017-12-14 LAB
ALBUMIN UR-MCNC: 30 MG/DL
APPEARANCE UR: ABNORMAL
BACTERIA #/AREA URNS HPF: ABNORMAL /HPF
BILIRUB UR QL STRIP: NEGATIVE
COLOR UR AUTO: YELLOW
GLUCOSE UR STRIP-MCNC: NEGATIVE MG/DL
HGB UR QL STRIP: ABNORMAL
KETONES UR STRIP-MCNC: NEGATIVE MG/DL
LEUKOCYTE ESTERASE UR QL STRIP: NEGATIVE
MUCOUS THREADS #/AREA URNS LPF: PRESENT /LPF
NITRATE UR QL: NEGATIVE
PH UR STRIP: 5 PH (ref 5–7)
RBC #/AREA URNS AUTO: 11 /HPF (ref 0–2)
SOURCE: ABNORMAL
SP GR UR STRIP: 1.02 (ref 1–1.03)
UROBILINOGEN UR STRIP-MCNC: 4 MG/DL (ref 0–2)
WBC #/AREA URNS AUTO: 5 /HPF (ref 0–2)

## 2017-12-14 PROCEDURE — 51702 INSERT TEMP BLADDER CATH: CPT

## 2017-12-14 PROCEDURE — 96374 THER/PROPH/DIAG INJ IV PUSH: CPT

## 2017-12-14 PROCEDURE — 96361 HYDRATE IV INFUSION ADD-ON: CPT

## 2017-12-14 PROCEDURE — 25000128 H RX IP 250 OP 636: Performed by: EMERGENCY MEDICINE

## 2017-12-14 PROCEDURE — 99285 EMERGENCY DEPT VISIT HI MDM: CPT | Mod: 25

## 2017-12-14 PROCEDURE — 81001 URINALYSIS AUTO W/SCOPE: CPT | Performed by: EMERGENCY MEDICINE

## 2017-12-14 RX ORDER — HYDROMORPHONE HYDROCHLORIDE 1 MG/ML
0.5 INJECTION, SOLUTION INTRAMUSCULAR; INTRAVENOUS; SUBCUTANEOUS ONCE
Status: COMPLETED | OUTPATIENT
Start: 2017-12-14 | End: 2017-12-14

## 2017-12-14 RX ADMIN — SODIUM CHLORIDE 500 ML: 9 INJECTION, SOLUTION INTRAVENOUS at 09:00

## 2017-12-14 RX ADMIN — Medication 0.5 MG: at 09:00

## 2017-12-14 ASSESSMENT — ENCOUNTER SYMPTOMS
DIFFICULTY URINATING: 1
NAUSEA: 0
FEVER: 0
VOMITING: 0
ABDOMINAL PAIN: 1

## 2017-12-14 NOTE — ED PROVIDER NOTES
"  History     Chief Complaint:  Urinary Retention and Prostate Problem    HPI   Juwan Latham is a 67 year old male, with a history of prostate cancer amongst others as noted below and recent prostatectomy, who presents alone to the emergency department for evaluation of urinary retention and prostate problem. The patient had the Davinci Prostatectomy on 12/1, performed by Dr. Agarwal, and had his catheter removed yesterday. He reports that since the catheter removal, he was unable to void completely and only able to get \"dribbles\" out. The patient also reports pain in his lower abdomen and suprapubic area, but notes this may be due to his inability to void completely. He denies any nausea, vomiting or fever. To note, the patient took some pain medication prior to arrival, but is unable to report which one.     Allergies:  Percocet     Medications:    Xanax  Ibuprofen  Zofran  Bactrim  Tramadol  Norco  Miralax  Super B complex  Lisinopri  Alfalfa  Vitamin B12  Vitamin D  Tylenol     Past Medical History:    Anxiety  Arthritis  Calculus of kidney  Congenital single kidney  GERD  Lung cancer  Prostate cancer  Sleep apnea  Seasonal allergies    Past Surgical History:    Bronchoscopy flexible  Colonoscopy  Davinci prostatectomy  EGD, combined  Laparoscopic wedge resection lung  Laser holmium lithotripsy ureter(s), insert stent, combined    Family History:    Heart disease  Diabetes  Colorectal cancer    Social History:  The patient was accompanied to the ED alone.  Smoking Status: Former  Smokeless Tobacco: No  Alcohol Use: Yes   Marital Status:   [4]     Review of Systems   Constitutional: Negative for fever.   Gastrointestinal: Positive for abdominal pain. Negative for nausea and vomiting.   Genitourinary: Positive for decreased urine volume and difficulty urinating.   All other systems reviewed and are negative.    Physical Exam   Vitals:  Patient Vitals for the past 24 hrs:   BP Temp Temp src Heart Rate " Resp SpO2   12/14/17 1045 135/77 - - - - 100 %   12/14/17 1030 128/79 - - - - 94 %   12/14/17 1015 133/71 - - - - (!) 88 %   12/14/17 1000 135/74 - - - - 91 %   12/14/17 0945 136/77 - - - - 95 %   12/14/17 0930 146/81 - - - - 100 %   12/14/17 0915 133/79 - - - - 97 %   12/14/17 0906 - - - - - 99 %   12/14/17 0900 144/86 - - - - -   12/14/17 0833 (!) 171/109 97.4  F (36.3  C) Oral 94 28 99 %      Physical Exam  Constitutional: The patient is oriented to person, place, and time. Alert and cooperative.  HENT:   Right Ear: External ear normal.   Left Ear: External ear normal.   Nose: Nose normal.    Eyes: Conjunctivae, EOM and lids are normal.   Neck: Trachea normal. Normal range of motion. Neck supple.   Cardiovascular: Normal rate, regular rhythm, normal heart sounds, and intact distal pulses.    Pulmonary/Chest: Effort normal and breath sounds equal bilaterally. No crackles or wheezing.   Abdominal: Soft. Mild tenderness to palpation across the low abdomen and suprapubic region. No rebound and no guarding. Incision sites with mild surrounding ecchymosis. No surrounding erythema or purulent drainage.   Musculoskeletal: Normal range of motion.  No extremity tenderness or edema.  Neurological: Alert and Oriented. Moves all extremities equally.   Skin: Skin is dry. No rash noted.        Emergency Department Course     Laboratory:  Laboratory findings were communicated with the patient who voiced understanding of the findings.  UA with Microscopic: Urine Blood Small (A), Protein Albumin Urine 30 (A), Urobilinogen mg/dL 4.0 (H), WBC/HPF 5 (H), RBC/HPF 11 (H), Bacteria Few (A), Mucous Urine Present (A) o/w WNL     Interventions:  0900 0.9% NaCl Bolus 500 mL IV  0900 Dilaudid 0.5 mg IV     Emergency Department Course:  Nursing notes and vitals reviewed.  I performed an exam of the patient as documented above.   The patient provided a urine sample here in the emergency department. This was sent for laboratory testing,  findings above.     9:06 AM: I spoke with Dr. Agarwal of the Urology service regarding patient's presentation, findings, and plan of care.   10:18 AM: I reassessed the patient who notes he is feeling much better after the donovan.   10:35 AM: I spoke with Dr. Agarwal of the Urology service regarding patient's presentation, findings, and plan of care. He recommended the patient follow up with him in the next week.   10:51 AM: I discussed plan of care with patient. They are agreeable to discharge and follow up next week with urologist.     I discussed the treatment plan with the patient. They expressed understanding of this plan and consented to discharge. They will be discharged home with instructions for care and follow up. In addition, the patient will return to the emergency department if their symptoms persist, worsen, if new symptoms arise or if there is any concern.  All questions were answered.     I personally reviewed the laboratory results with the Patient and answered all related questions prior to discharge.    Impression & Plan      Medical Decision Making:  Juwan Latham is a 67 year old male, with a history of prostate cancer status post recent radical prostatectomy, who presents to the emergency department for evaluation of urinary retention. Upon presentation in the ED, the patient is non-toxic appearing. He is hypertensive, but vitals otherwise are within normal limits and stable. Throughout his ED stay, his blood pressure did improve to within normal limits. On exam, he is well appearing. He is alert and with a non-focal neurologic exam. Cardiopulmonary is unremarkable. On abdominal examination, he does endorse diffuse lower abdominal tenderness with palpation. The incision sites are clean, dry, and intact. There is no surrounding erythema or purulent drainage. The rest of his exam is as mentioned above. The patient reports that he had the donovan catheter removed yesterday and since that time, he has  been unable to fully void. He notes that he feels that he has to urinate, but cannot. Bladder scan was performed and does demonstrate 516 cc's of fluid in the bladder. Given the patient's recent radical prostatectomy, the patient was discussed with Dr. Aagrwal of urology. He did recommend a donovan catheter placement with a 16 Austrian donovan. This was performed and the patient tolerated this well without difficulty. Following this, the patient's urine drained freely and the patient notes complete resolution of his symptoms. Urinalysis is not particularly concerning for urinary tract infection. I do feel that the patient's urinary retention is secondary to his recent procedure and donovan catheter removal yesterday. I did discuss with Dr. Agarwal that the donovan catheter was placed without difficulty. He notes the patient can be discharged to home with donovan catheter in place and follow up in his clinic next week. I discussed this thoroughly with the patient and he notes understanding and is in agreement with this plan. Return instructions were given. He was stable/improved at the time of discharge.      Diagnosis:    ICD-10-CM    1. Urinary retention R33.9         Disposition:   Discharged.     Discharge Medications:  New Prescriptions    No medications on file       Scribe Disclosure:  Letitia MCMILLAN, am serving as a scribe at 8:49 AM on 12/14/2017 to document services personally performed by Jayna Youssef MD, based on my observations and the provider's statements to me.  12/14/2017   [facility] EMERGENCY DEPARTMENT       Jayna Youssef MD  12/14/17 1984

## 2017-12-14 NOTE — ED NOTES
Prostate removed recently. Catheter removed yesterday. Unable to urinate since-small amounts. Severe pain. abcs intact.

## 2017-12-14 NOTE — ED AVS SNAPSHOT
[facility] Emergency Department    [address]    [facility] [address]    Phone:  [phone]    Fax:  [phone]                                       Juwan Latham   MRN: [id number]    Department:  [facility] Emergency Department   Date of Visit:  12/14/2017           After Visit Summary Signature Page     I have received my discharge instructions, and my questions have been answered. I have discussed any challenges I see with this plan with the nurse or doctor.    ..........................................................................................................................................  Patient/Patient Representative Signature      ..........................................................................................................................................  Patient Representative Print Name and Relationship to Patient    ..................................................               ................................................  Date                                            Time    ..........................................................................................................................................  Reviewed by Signature/Title    ...................................................              ..............................................  Date                                                            Time

## 2017-12-14 NOTE — DISCHARGE INSTRUCTIONS
Please follow up with Dr. Agarwal next week. Please call his clinic to schedule this appointment.     Please return to the ED if your symptoms worsen or if you develop new or concerning symptoms.         Urinary Retention (Male)  Urinary retention is the medical term for difficulty or inability to pass urine, even though your bladder is full.  Causes  The most common cause of urinary retention in men is the bladder outlet being blocked. This can be due to an enlarged prostate gland or a bladder infection. Certain medicines can also cause this problem. This condition is more likely to occur as men get older.    This condition is treated by insertion of a catheter into the bladder to drain the urine. This provides immediate relief. The catheter may need to remain in place for a few days to prevent a recurrence. The catheter has a balloon on the tip which was inflated after insertion. This prevents the catheter from falling out.  Symptoms  Common symptoms of urinary retention include:    Pain (not experienced by everyone)    Frequent urination    Feeling that the bladder is still full after urinating    Incontinence (not being able to control the release of urine)    Swollen abdomen  Treatment  This condition is treated by inserting a tube (catheter) into the bladder to drain the urine. This provides immediate relief. The catheter may need to stay in place for a few days. The catheter has a balloon on the tip, which is inflated after insertion. This prevents the catheter from falling out.  Home care    If you were given antibiotics, take them until they are used up, or your healthcare provider tells you to stop. It is important to finish the antibiotics even though you feel better. This is to make sure your infection has cleared.    If a catheter was left in place, it is important to keep bacteria from getting into the collection bag. Do not disconnect the catheter from the collection bag.    Use a leg band to secure  the drainage tube, so it does not pull on the catheter. Drain the collection bag when it becomes full using the drain spout at the bottom of the bag.    Do not pull on or try to remove your catheter. This will injure your urethra. The catheter must be removed by a healthcare provider.  Follow-up care  Follow up with your healthcare provider, or as advised.  If a catheter was left in place, it can usually be removed within 3 to 7 days. Some conditions require the catheter to stay in longer. Your healthcare provider will tell you when to return to have the catheter removed.  When to seek medical advice  Call your healthcare provider right away if any of these occur:    Fever of 100.4 F (38 C) or higher, or as directed by your healthcare provider    Bladder or lower-abdominal pain or fullness    Abdominal swelling, nausea, vomiting, or back pain    Blood or urine leakage around the catheter    Bloody urine coming from the catheter (if a new symptom)    Weakness, dizziness, or fainting    Confusion or change in usual level of alertness    If a catheter was left in place, return if:    Catheter falls out    Catheter stops draining for 6 hours  Date Last Reviewed: 7/26/2015 2000-2017 [employer]. [address] [place] [address]. All rights reserved. This information is not intended as a substitute for professional medical care. Always follow your healthcare professional's instructions.          Breen Catheter Care    A Breen catheter is a rubber tube that is placed through the urethra (opening where urine comes out) and into the bladder. This helps drain urine from the bladder. There is a small balloon on the end of the tube that is inflated after insertion. This keeps the catheter from sliding out of the bladder.  A Breen catheter is used to treat urinary retention (unable to pass urine). It is also used when there is incontinence (loss of bladder control).  Home care    Finish taking any  prescribed antibiotic even if you are feeling better before then.    It is important to keep bacteria from getting into the collection bag. Do not disconnect the catheter from the collection bag.    Use a leg band to secure the drainage tube, so it does not pull on the catheter. Drain the collection bag when it becomes full using the drain spout at the bottom of the bag.    Do not try to pull or remove your catheter. This will injure your urethra. It must be removed by your healthcare provider or nurse.  Follow-up care  Follow up with your healthcare provider as advised for repeat urine testing and catheter removal or replacement.  When to seek medical advice  Call your healthcare provider right away if any of these occur:    Fever of 100.4 F (38 C) or higher, or as directed by your healthcare provider    Bladder pain or fullness    Abdominal swelling, nausea or vomiting, or back pain    Blood or urine leakage around the catheter    Bloody urine coming from the catheter (if a new symptom)    Catheter falls out    Catheter stops draining for 6 hours    Weakness, dizziness, or fainting  Date Last Reviewed: 10/1/2016    0328-7518 [employer]. [address] [place] [address], [place] [address]. All rights reserved. This information is not intended as a substitute for professional medical care. Always follow your healthcare professional's instructions.

## 2017-12-14 NOTE — TELEPHONE ENCOUNTER
Return call to pt who states he is wondering if it is okay to wait two months until he starts radiation?  Pt states he is a little worried about getting started with radiation right away to take care of the cancer.  Pt informed he will need time to heal and pt states Dr Agarwal also discussed this as well.  Informed pt that Radiation therapy will be calling him in the next few days to schedule consult and they will review treatment plan and time frame.  Pt also was in ED this morning for urinary retention and donovan catheter placed again.  Pt states he is feeling much better since catheter replaced.  Pt will need to follow up with Dr Agarwal in the next week and will make sure appt is scheduled. MD is not at [facility] until January so will wait to hear from the Urology clinic at the  regarding schedule.    Marquita Auguste, RN, BSN

## 2017-12-14 NOTE — TELEPHONE ENCOUNTER
Juwan was in the ED last night and had a cathater replaced after he had it taken out yesterday afternoon 12/13/17 by Dr. Agarwal.  Juwan was instructed to follow up with his doctor in a week.  Dr Agarwal isn't at our clinic until after the first of the year.  I have left a message with Love to call back and see if he can get scheduled at the [facility].  Please call Juwan and let him know when he is scheduled. Fior HARRELL

## 2017-12-14 NOTE — ED AVS SNAPSHOT
[facility]    [address]    [facility] [address]    Phone:  [phone]    Fax:  [phone]                                       Juwan Latham   MRN: [id number]    Department:  [facility]   Date of Visit:  12/14/2017           Patient Information     Date Of Birth          1950        Your diagnoses for this visit were:     Urinary retention        You were seen by Jayna Youssef MD.      Follow-up Information     Schedule an appointment as soon as possible for a visit with Paulo Agarwal MD.    Specialty:  Urology    Contact information:    [facility] [address]  [phone]          Discharge Instructions       Please follow up with Dr. Agarwal next week. Please call his clinic to schedule this appointment.     Please return to the ED if your symptoms worsen or if you develop new or concerning symptoms.         Urinary Retention (Male)  Urinary retention is the medical term for difficulty or inability to pass urine, even though your bladder is full.  Causes  The most common cause of urinary retention in men is the bladder outlet being blocked. This can be due to an enlarged prostate gland or a bladder infection. Certain medicines can also cause this problem. This condition is more likely to occur as men get older.    This condition is treated by insertion of a catheter into the bladder to drain the urine. This provides immediate relief. The catheter may need to remain in place for a few days to prevent a recurrence. The catheter has a balloon on the tip which was inflated after insertion. This prevents the catheter from falling out.  Symptoms  Common symptoms of urinary retention include:    Pain (not experienced by everyone)    Frequent urination    Feeling that the bladder is still full after urinating    Incontinence (not being able to control the release of urine)    Swollen  abdomen  Treatment  This condition is treated by inserting a tube (catheter) into the bladder to drain the urine. This provides immediate relief. The catheter may need to stay in place for a few days. The catheter has a balloon on the tip, which is inflated after insertion. This prevents the catheter from falling out.  Home care    If you were given antibiotics, take them until they are used up, or your healthcare provider tells you to stop. It is important to finish the antibiotics even though you feel better. This is to make sure your infection has cleared.    If a catheter was left in place, it is important to keep bacteria from getting into the collection bag. Do not disconnect the catheter from the collection bag.    Use a leg band to secure the drainage tube, so it does not pull on the catheter. Drain the collection bag when it becomes full using the drain spout at the bottom of the bag.    Do not pull on or try to remove your catheter. This will injure your urethra. The catheter must be removed by a healthcare provider.  Follow-up care  Follow up with your healthcare provider, or as advised.  If a catheter was left in place, it can usually be removed within 3 to 7 days. Some conditions require the catheter to stay in longer. Your healthcare provider will tell you when to return to have the catheter removed.  When to seek medical advice  Call your healthcare provider right away if any of these occur:    Fever of 100.4 F (38 C) or higher, or as directed by your healthcare provider    Bladder or lower-abdominal pain or fullness    Abdominal swelling, nausea, vomiting, or back pain    Blood or urine leakage around the catheter    Bloody urine coming from the catheter (if a new symptom)    Weakness, dizziness, or fainting    Confusion or change in usual level of alertness    If a catheter was left in place, return if:    Catheter falls out    Catheter stops draining for 6 hours  Date Last Reviewed: 7/26/2015     1191-6315 [employer]. [address] [place] [address]. All rights reserved. This information is not intended as a substitute for professional medical care. Always follow your healthcare professional's instructions.          Breen Catheter Care    A Breen catheter is a rubber tube that is placed through the urethra (opening where urine comes out) and into the bladder. This helps drain urine from the bladder. There is a small balloon on the end of the tube that is inflated after insertion. This keeps the catheter from sliding out of the bladder.  A Breen catheter is used to treat urinary retention (unable to pass urine). It is also used when there is incontinence (loss of bladder control).  Home care    Finish taking any prescribed antibiotic even if you are feeling better before then.    It is important to keep bacteria from getting into the collection bag. Do not disconnect the catheter from the collection bag.    Use a leg band to secure the drainage tube, so it does not pull on the catheter. Drain the collection bag when it becomes full using the drain spout at the bottom of the bag.    Do not try to pull or remove your catheter. This will injure your urethra. It must be removed by your healthcare provider or nurse.  Follow-up care  Follow up with your healthcare provider as advised for repeat urine testing and catheter removal or replacement.  When to seek medical advice  Call your healthcare provider right away if any of these occur:    Fever of 100.4 F (38 C) or higher, or as directed by your healthcare provider    Bladder pain or fullness    Abdominal swelling, nausea or vomiting, or back pain    Blood or urine leakage around the catheter    Bloody urine coming from the catheter (if a new symptom)    Catheter falls out    Catheter stops draining for 6 hours    Weakness, dizziness, or fainting  Date Last Reviewed: 10/1/2016    3984-7600 [employer]. [address] [place] [address],  [place] [address]. All rights reserved. This information is not intended as a substitute for professional medical care. Always follow your healthcare professional's instructions.          Future Appointments        Provider Department Dept Phone Center    12/29/2017 4:00 PM Nino Cunningham PT [facility] [place] [phone] [facility]    1/29/2018 9:00 AM Owen Davis MD [facility] [place] [phone] [facility]    2/7/2018 9:00 AM [facility] [phone] [facility]    2/14/2018 2:30 PM Paulo Agarwal MD [facility] [phone] [facility]      24 Hour Appointment Hotline       To make an appointment at any [facility], call [phone] ([phone]). If you don't have a family doctor or clinic, we will help you find one. [facility] are conveniently located to serve the needs of you and your family.             Review of your medicines      Our records show that you are taking the medicines listed below. If these are incorrect, please call your family doctor or clinic.        Dose / Directions Last dose taken    acetaminophen 325 MG tablet   Commonly known as:  TYLENOL   Dose:  650 mg   Quantity:  100 tablet        Take 2 tablets (650 mg) by mouth every 6 hours Do not take more than 4,000 mg of acetaminophen (Tylenol) from all sources to prevent liver damage.   Refills:  1        ALFALFA PO        Take by mouth daily   Refills:  0        ALPRAZolam 0.5 MG tablet   Commonly known as:  XANAX   Dose:  0.5 mg   Quantity:  20 tablet        Take 1 tablet (0.5 mg) by mouth 3 times daily as needed for anxiety   Refills:  0        cyanocolbalamin 100 MCG tablet   Commonly known as:  vitamin  B-12   Dose:  50 mcg        Take 50 mcg by mouth daily   Refills:  0        fluticasone 50 MCG/ACT spray   Commonly known as:  FLONASE   Dose:  1-2 spray   Quantity:  1 Bottle        Spray 1-2 sprays into both nostrils daily    Refills:  11        HYDROcodone-acetaminophen 5-325 MG per tablet   Commonly known as:  NORCO   Dose:  1-2 tablet   Quantity:  30 tablet        Take 1-2 tablets by mouth every 4 hours as needed for moderate to severe pain maximum 6 tablet(s) per day   Refills:  0        ibuprofen 400 MG tablet   Commonly known as:  ADVIL/MOTRIN   Dose:  400 mg   Quantity:  30 tablet        Take 1 tablet (400 mg) by mouth every 8 hours   Refills:  0        LISINOPRIL PO   Dose:  10 mg        Take 10 mg by mouth daily   Refills:  0        ondansetron 4 MG tablet   Commonly known as:  ZOFRAN   Dose:  4 mg   Quantity:  20 tablet        Take 1 tablet (4 mg) by mouth every 12 hours as needed for nausea   Refills:  0        order for DME        Equipment ordered: RESMED Auto PAP Mask type: Nasal Settings: 8-16 CM H20  : CPAP   Refills:  0        polyethylene glycol powder   Commonly known as:  MIRALAX   Dose:  1 capful   Quantity:  510 g        Take 17 g (1 capful) by mouth daily   Refills:  1        sulfamethoxazole-trimethoprim 400-80 MG per tablet   Commonly known as:  BACTRIM/SEPTRA   Dose:  1 tablet   Quantity:  14 tablet        Take 1 tablet by mouth daily   Refills:  0        SUPER B COMPLEX PO   Dose:  1 tablet        Take 1 tablet by mouth daily   Refills:  0        traMADol 50 MG tablet   Commonly known as:  ULTRAM   Dose:  50 mg   Quantity:  20 tablet        Take 1 tablet (50 mg) by mouth every 8 hours as needed for pain maximum 4 tablet(s) per day   Refills:  0        VITAMIN D (CHOLECALCIFEROL) PO   Dose:  1000 Units        Take 1,000 Units by mouth daily   Refills:  0                Procedures and tests performed during your visit     UA with Microscopic      Orders Needing Specimen Collection     None      Pending Results     No orders found from 12/12/2017 to 12/15/2017.            Pending Culture Results     No orders found from 12/12/2017 to 12/15/2017.            Pending Results Instructions     If you had any lab  results that were not finalized at the time of your Discharge, you can call the ED Lab Result RN at [phone]. You will be contacted by this team for any positive Lab results or changes in treatment. The nurses are available 7 days a week from 10A to 6:30P.  You can leave a message 24 hours per day and they will return your call.        Test Results From Your Hospital Stay        12/14/2017 10:16 AM      Component Results     Component Value Ref Range & Units Status    Color Urine Yellow  Final    Appearance Urine Slightly Cloudy  Final    Glucose Urine Negative NEG^Negative mg/dL Final    Bilirubin Urine Negative NEG^Negative Final    Ketones Urine Negative NEG^Negative mg/dL Final    Specific Gravity Urine 1.016 1.003 - 1.035 Final    Blood Urine Small (A) NEG^Negative Final    pH Urine 5.0 5.0 - 7.0 pH Final    Protein Albumin Urine 30 (A) NEG^Negative mg/dL Final    Urobilinogen mg/dL 4.0 (H) 0.0 - 2.0 mg/dL Final    Nitrite Urine Negative NEG^Negative Final    Leukocyte Esterase Urine Negative NEG^Negative Final    Source Catheterized Urine  Final    WBC Urine 5 (H) 0 - 2 /HPF Final    RBC Urine 11 (H) 0 - 2 /HPF Final    Bacteria Urine Few (A) NEG^Negative /HPF Final    Mucous Urine Present (A) NEG^Negative /LPF Final                Clinical Quality Measure: Blood Pressure Screening     Your blood pressure was checked while you were in the emergency department today. The last reading we obtained was  BP: 135/77 . Please read the guidelines below about what these numbers mean and what you should do about them.  If your systolic blood pressure (the top number) is less than 120 and your diastolic blood pressure (the bottom number) is less than 80, then your blood pressure is normal. There is nothing more that you need to do about it.  If your systolic blood pressure (the top number) is 120-139 or your diastolic blood pressure (the bottom number) is 80-89, your blood pressure may be higher than it should be.  You should have your blood pressure rechecked within a year by a primary care provider.  If your systolic blood pressure (the top number) is 140 or greater or your diastolic blood pressure (the bottom number) is 90 or greater, you may have high blood pressure. High blood pressure is treatable, but if left untreated over time it can put you at risk for heart attack, stroke, or kidney failure. You should have your blood pressure rechecked by a primary care provider within the next 4 weeks.  If your provider in the emergency department today gave you specific instructions to follow-up with your doctor or provider even sooner than that, you should follow that instruction and not wait for up to 4 weeks for your follow-up visit.        Thank you for choosing [facility]       Thank you for choosing [facility] for your care. Our goal is always to provide you with excellent care. Hearing back from our patients is one way we can continue to improve our services. Please take a few minutes to complete the written survey that you may receive in the mail after you visit with us. Thank you!        [facility] Information     [facility] gives you secure access to your electronic health record. If you see a primary care provider, you can also send messages to your care team and make appointments. If you have questions, please call your primary care clinic.  If you do not have a primary care provider, please call [phone] and they will assist you.        Care EveryWhere ID     This is your Care EveryWhere ID. This could be used by other organizations to access your [facility] medical records  [id number]        Equal Access to Services     CHAMP SNIDER : Hadii carolyn Obrien, waaxda lujayda, qaybta riteshalmamynor gamboa . So [facility] [phone].    ATENCIÓN: Si habla español, tiene a gunn disposición servicios gratuitos de asistencia lingüística. Llame al [phone].    We comply with applicable  federal civil rights laws and [place] laws. We do not discriminate on the basis of race, color, national origin, age, disability, sex, sexual orientation, or gender identity.            After Visit Summary       This is your record. Keep this with you and show to your community pharmacist(s) and doctor(s) at your next visit.

## 2017-12-14 NOTE — TELEPHONE ENCOUNTER
Pt calling and states he had surgery by Dr. Agarwal and had his cath removed yesterday and pt is calling and stating that he is in extream pain.  Pt states his pain is in his kidneys.  I suggested he go to the ER.  I told the pt we do not do pain management here.  Pt will go to the ED.  DEVON Shah CMA

## 2017-12-15 ENCOUNTER — CARE COORDINATION (OUTPATIENT)
Dept: CARE COORDINATION | Facility: CLINIC | Age: 67
End: 2017-12-15

## 2017-12-15 NOTE — PROGRESS NOTES
Clinic Care Coordination Contact  OUTREACH    Referral Information:  Referral Source: PCP  Reason for Contact: follow up  Care Conference: No     Universal Utilization:   ED Visits in last year: 1  Hospital visits in last year: 1  Last PCP appointment: 11/29/17  Missed Appointments: 0  Concerns: anxiety management  Multiple Providers or Specialists: urology    Clinical Concerns:  Current Medical Concerns: Pt seen in ED yesterday for urinary retention post donovan removal in clinic the day before. Pt reports that he was barely able to void anything, is much improved now that the donovan was replaced.  He reports good UOP, clear jd in color.  He has f/u apt with Dr. Agarwal next Thursday to remove it again.  He reports that he is feeling a little down because he was informed that they didn't get all the cancer with the surgery and now he will have to complete radiation therapy as well.  Notes reviewed from urology RN today, he should be getting a call in the next few days to set up consultation with radiation therapy.  He reports that his friend brought him a couple of marijuana brownies the other day, and stated that was the best he felt in a long time.  Cautioned pt against taking the alprazolam if he is going to eat the brownies.  Pt verbalized understanding. He declines interest in cancer support groups or antidepressants at this time.  Encouraged pt to get out and not isolate himself as he stated that he sleeps a lot right now.  He stated that he didn't want to isolate himself and hadn't thought about it like that, will work at getting out more.  Current Behavioral Concerns: see above    Education Provided to patient: ongoing clinic CC   Clinical Pathway Name: None    Medication Management:  No concerns     Functional Status:  Mobility Status: Independent  Equipment Currently Used at Home: none  Transportation: no concerns      Psychosocial:  Current living arrangement: I live in a private  home  Financial/Insurance: no concerns     Resources and Interventions:  Current Resources: none  Advanced Care Plans/Directives on file: No        Goals:   Goal 1 Statement: I will work on managing my stress over the next couple of months.  Goal 1 Supportive Steps: Pt to work on good sleep hygiene and self care.  Goal 1 Progression Percent: 90%  Goal 1 Progression Date: 12/07/17       Upcoming appointment:  (NA)     Plan: Will plan outreach to pt next week to check in after urology appointment.  Pt in agreement and will call with any needs prior to that time.

## 2017-12-18 ENCOUNTER — CARE COORDINATION (OUTPATIENT)
Dept: CARE COORDINATION | Facility: CLINIC | Age: 67
End: 2017-12-18

## 2017-12-18 ENCOUNTER — PRE VISIT (OUTPATIENT)
Dept: UROLOGY | Facility: CLINIC | Age: 67
End: 2017-12-18

## 2017-12-18 NOTE — PROGRESS NOTES
Clinic Care Coordination Contact  Care Team Conversations    Received call from pt, he is concerned that he has not yet heard from radiology. Reviewed notes with him and informed him that he should be getting a call to schedule with them this week.  Pt stated that he has a lot of anxiety about waiting a couple of months to start radiation and is concerned that the cancer will grow in that time.  Assured pt that he was told this is a very slow growing cancer and that it would not be something he would need urgently.  Offered to discuss with Dr. Guidry, pt agreeable to his input.  Huddle with MD, he reiterated that pt will be ok to wait for the call from radiology since Dr. Agarwal had told pt this was not an aggressive cancer.  Placed call back to pt and pt stated that he was reassured at this time.  Will plan to check in with pt end of week and if he has not received a call yet at that time, writer will assist in getting through to clinic for scheduling.  Pt in agreement with plan.

## 2017-12-21 ENCOUNTER — OFFICE VISIT (OUTPATIENT)
Dept: UROLOGY | Facility: CLINIC | Age: 67
End: 2017-12-21
Payer: COMMERCIAL

## 2017-12-21 VITALS
HEIGHT: 68 IN | HEART RATE: 66 BPM | BODY MASS INDEX: 28.66 KG/M2 | SYSTOLIC BLOOD PRESSURE: 124 MMHG | WEIGHT: 189.1 LBS | DIASTOLIC BLOOD PRESSURE: 72 MMHG

## 2017-12-21 DIAGNOSIS — F41.9 ANXIETY: ICD-10-CM

## 2017-12-21 DIAGNOSIS — C61 MALIGNANT NEOPLASM OF PROSTATE (H): Primary | ICD-10-CM

## 2017-12-21 ASSESSMENT — PAIN SCALES - GENERAL: PAINLEVEL: MILD PAIN (3)

## 2017-12-21 NOTE — TELEPHONE ENCOUNTER
Last Written Prescription Date:  12/7/17  Last Fill Quantity: 20,  # refills: 0   Last Office Visit with G, P or [facility] prescribing provider:  11/29/17   Future Office Visit:    Next 5 appointments (look out 90 days)     Feb 14, 2018  2:30 PM CST   Return Visit with Paulo Agarwal MD   [facility] ([facility])    [facility]  [address]  [facility] [address] [phone]

## 2017-12-21 NOTE — MR AVS SNAPSHOT
After Visit Summary   12/21/2017    Juwan Latham    MRN: [id number]           Patient Information     Date Of Birth          1950        Visit Information        Provider Department      12/21/2017 1:00 PM Paulo Agarwal MD [facility] and [facility]        Today's Diagnoses     Malignant neoplasm of prostate (H)    -  1      Care Instructions    Follow up with your primary doctor.    It was a pleasure meeting with you today.  Thank you for allowing me and my team the privilege of caring for you today.  YOU are the reason we are here, and I truly hope we provided you with the excellent service you deserve.  Please let us know if there is anything else we can do for you so that we can be sure you are leaving completely satisfied with your care experience.              Follow-ups after your visit        Your next 10 appointments already scheduled     Jan 06, 2018 10:40 AM CST   [facility] with Nino Cunningham PT   [facility] ([facility]  )    [address]  [address]  [facility] [address]   [phone]            Jan 29, 2018  9:00 AM CST   Return Sleep Patient with Owen Davis MD   [facility] ([facility])    [address]  [facility] [address] [phone]            Feb 07, 2018  9:00 AM CST   LAB with SV LAB   [facility] ([facility])    [address] [id number] [phone]           Please do not eat 10-12 hours before your appointment if you are coming in fasting for labs on lipids, cholesterol, or glucose (sugar). This does not apply to pregnant women. Water, hot tea and black coffee (with nothing added) are okay. Do not drink other fluids, diet soda or chew gum.            Feb 14, 2018  2:30 PM CST   Return Visit with Paulo Agarwal MD   [facility]  Hospital)    [facility] [address]  [address]  [place] [address]   [phone]            May 18, 2018  2:00 PM CDT   (Arrive by 1:45 PM)   CT CHEST W/O CONTRAST with UCCT2   [facility] ([facility])    [address]  1st Floor  [facility] [id number] [phone]           Please bring any scans or X-rays taken at other hospitals, if similar tests were done. Also bring a list of your medicines, including vitamins, minerals and over-the-counter drugs. It is safest to leave personal items at home.  Be sure to tell your doctor:   If you have any allergies.   If there s any chance you are pregnant.   If you are breastfeeding.   If you have any special needs.  You do not need to do anything special to prepare.  Please wear loose clothing, such as a sweat suit or jogging clothes. Avoid snaps, zippers and other metal. We may ask you to undress and put on a hospital gown.            May 18, 2018  3:00 PM CDT   (Arrive by 2:45 PM)   Return Visit with GLORIA Linton CNP   [facility] ([facility])    [address]  [address]  [facility] [address] [phone]              Who to contact     Please call your clinic at [phone] to:    Ask questions about your health    Make or cancel appointments    Discuss your medicines    Learn about your test results    Speak to your doctor   If you have compliments or concerns about an experience at your clinic, or if you wish to file a complaint, please contact [facility] Patient Relations at [phone] or email us at [email or web address]         Additional Information About Your Visit        MyChart Information     [employer] gives you secure access to your electronic health record. If you see a primary care provider, you can also send messages to your care team and make appointments. If you  "have questions, please call your primary care clinic.  If you do not have a primary care provider, please call [phone] and they will assist you.      [employer] is an electronic gateway that provides easy, online access to your medical records. With [employer], you can request a clinic appointment, read your test results, renew a prescription or communicate with your care team.     To access your existing account, please contact your [facility] or call [phone] for assistance.        Care EveryWhere ID     This is your Care EveryWhere ID. This could be used by other organizations to access your [place] medical records  [id number]        Your Vitals Were     Pulse Height BMI (Body Mass Index)             66 1.727 m (5' 8\") 28.75 kg/m2          Blood Pressure from Last 3 Encounters:   12/22/17 133/67   12/21/17 124/72   12/14/17 135/77    Weight from Last 3 Encounters:   12/21/17 85.8 kg (189 lb 1.6 oz)   12/13/17 87.5 kg (193 lb)   12/01/17 89.8 kg (198 lb)              Today, you had the following     No orders found for display         Where to get your medicines      Some of these will need a paper prescription and others can be bought over the counter.  Ask your nurse if you have questions.     Bring a paper prescription for each of these medications     ALPRAZolam 0.5 MG tablet          Primary Care Provider Office Phone # Fax #    Julio Guidry Jr., [phone] [address]  [place] [address]        Equal Access to Services     AMANDA SNIDER AH: Hadii aad ku hadasho Soomaali, waaxda luqadaha, qaybta kaalmada adeegyada, mynor mosley . So [facility] [phone].    ATENCIÓN: Si habla español, tiene a gunn disposición servicios gratuitos de asistencia lingüística. Llame al [phone].    We comply with applicable federal civil rights laws and [place] laws. We do not discriminate on the basis of race, color, national origin, " age, disability, sex, sexual orientation, or gender identity.            Thank you!     Thank you for choosing [facility] UROLOGY AND [facility] FOR PROSTATE AND UROLOGIC CANCERS  for your care. Our goal is always to provide you with excellent care. Hearing back from our patients is one way we can continue to improve our services. Please take a few minutes to complete the written survey that you may receive in the mail after your visit with us. Thank you!             Your Updated Medication List - Protect others around you: Learn how to safely use, store and throw away your medicines at www.disposemymeds.org.          This list is accurate as of: 12/21/17 11:59 PM.  Always use your most recent med list.                   Brand Name Dispense Instructions for use Diagnosis    acetaminophen 325 MG tablet    TYLENOL    100 tablet    Take 2 tablets (650 mg) by mouth every 6 hours Do not take more than 4,000 mg of acetaminophen (Tylenol) from all sources to prevent liver damage.    Lung nodule       ALFALFA PO      Take by mouth daily        ALPRAZolam 0.5 MG tablet    XANAX    20 tablet    Take 1 tablet (0.5 mg) by mouth 3 times daily as needed for anxiety    Anxiety       cyanocolbalamin 100 MCG tablet    vitamin  B-12     Take 50 mcg by mouth daily        fluticasone 50 MCG/ACT spray    FLONASE    1 Bottle    Spray 1-2 sprays into both nostrils daily    Chronic rhinitis, unspecified type       HYDROcodone-acetaminophen 5-325 MG per tablet    NORCO    30 tablet    Take 1-2 tablets by mouth every 4 hours as needed for moderate to severe pain maximum 6 tablet(s) per day    Malignant neoplasm of prostate (H)       ibuprofen 400 MG tablet    ADVIL/MOTRIN    30 tablet    Take 1 tablet (400 mg) by mouth every 8 hours    Malignant neoplasm of prostate (H)       LISINOPRIL PO      Take 10 mg by mouth daily        ondansetron 4 MG tablet    ZOFRAN    20 tablet    Take 1 tablet (4 mg) by mouth every 12 hours as needed for nausea     Malignant neoplasm of prostate (H)       order for DME      Equipment ordered: RESMED Auto PAP Mask type: Nasal Settings: 8-16 CM H20  : CPAP        polyethylene glycol powder    MIRALAX    510 g    Take 17 g (1 capful) by mouth daily    Malignant neoplasm of prostate (H)       sulfamethoxazole-trimethoprim 400-80 MG per tablet    BACTRIM/SEPTRA    14 tablet    Take 1 tablet by mouth daily    Malignant neoplasm of prostate (H)       SUPER B COMPLEX PO      Take 1 tablet by mouth daily        traMADol 50 MG tablet    ULTRAM    20 tablet    Take 1 tablet (50 mg) by mouth every 8 hours as needed for pain maximum 4 tablet(s) per day    Malignant neoplasm of prostate (H)       VITAMIN D (CHOLECALCIFEROL) PO      Take 1,000 Units by mouth daily

## 2017-12-21 NOTE — PROGRESS NOTES
"Prostate Cancer Follow up after RRP     Juwan Latham is a very pleasant 67 year old male who presents with a history of prostate cancer.    Initial PSA:19.6  Pathologic German Score was 4 + 5  Grade Group:5  Biopsy German Score was 3 + 4  Pathologic Stage T2bc, N1, Mx.   Positive Margins: Yes Location:right side additional margin taken and negative  Number of Lymph Nodes Removed: 5  Number of Positive Lymph Nodes: 1  Patient is status post robotic radical prostatectomy on 12/1/2017.    Risk Group: High    Predicted progression free probability at 10 years: ~10  Predicted Cancer specific survival at 15 years: 80-90%  (J Clin Oncol. 2005 Oct 1;23(28):7001-12.  Http://nomograms.[facility].org/Prostate/PostRadicalProstatectomy.aspx)    There is no evidence of disease recurrence to date.    Physical Exam:  Vitals: /72  Pulse 66  Ht 1.727 m (5' 8\")  Wt 85.8 kg (189 lb 1.6 oz)  BMI 28.75 kg/m2  General Appearance Adult: A&O in NAD     Abd is soft, non-distended, incisions are healing well, no evidence of hernias.     He is tolerating a regular diet.     He has been in urinary retention since the procedure. Still has his Breen catheter - urine is clear in the bag.     Today he notes left flank pain that he noticed prior to surgery.     Trial of Void today was successful    PSA  19.6 pre op    Assessment: [id number] prostate cancer s/p robotic radical prostatectomy on 12/1/2017 with  no evidence of disease, but substantially higher risk than previously believed    Plan:      Remove Breen catheter today.       High risk for prostate cancer would refer to medical onc for adjuvant radiation and   possible hormone treatment.  Lives in [place] so would probably get treatment here. Possibly start in Feb/March 2018. Re-check PSA prior to starting radiation therapy.       We discussed the anticipated time course for recovery of continence and the risk of PSA recurrence and the potential need for future treatment for the " "prostate cancer.    Monitor flank pain.       Follow up in 3 months    PSA (tumor marker)    Clinic Exam    Scribe Disclosure:   I,Сергей Shaikh, am serving as a scribe; to document services personally performed by Paulo Agarwal MD based on data collection and the provider's statements to me.       Paulo Agarwal MD  Department of Urology  [facility]    Attestation:  This patient was seen and evaluated by me, with a scribe taking notes.  I have reviewed the note above and agree.  The physical exam was performed by me and the pertinant details are outlined below.     Patient is a 67 year old  male   Vitals: Blood pressure 124/72, pulse 66, height 1.727 m (5' 8\"), weight 85.8 kg (189 lb 1.6 oz).  General Appearance Adult: Alert, no acute distress, oriented    Assessment: [id number] Grade Group 5 prostate cancer s/p robotic radical prostatectomy on 12/1/2017 with  no evidence of disease, but substantially higher risk than previously believed given positive lymph node and grade group 5    Plan:      Remove Breen catheter today.       High risk for prostate cancer would refer to medical onc for adjuvant radiation and   possible hormone treatment.  Lives in [place] so would probably get treatment here. Possibly start in Feb/March 2018. Re-check PSA prior to starting radiation therapy.       We discussed the anticipated time course for recovery of continence and the risk of PSA recurrence and the potential need for future treatment for the prostate cancer.    Monitor flank pain.       Follow up in 3 months    PSA (tumor marker)    Clinic Exam    Paulo Agarwal MD  Department of Urology  [facility]          "

## 2017-12-21 NOTE — PATIENT INSTRUCTIONS
Follow up with your primary doctor.    It was a pleasure meeting with you today.  Thank you for allowing me and my team the privilege of caring for you today.  YOU are the reason we are here, and I truly hope we provided you with the excellent service you deserve.  Please let us know if there is anything else we can do for you so that we can be sure you are leaving completely satisfied with your care experience.

## 2017-12-21 NOTE — LETTER
"12/21/2017       RE: Juwan Latham  [address]  [facility] [address]     Dear Colleague,    Thank you for referring your patient, Juwan Latham, to the [facility] UROLOGY AND [facility] FOR PROSTATE AND UROLOGIC CANCERS at [facility]. Please see a copy of my visit note below.    Prostate Cancer Follow up after RRP     Juwan Latham is a very pleasant 67 year old male who presents with a history of prostate cancer.    Initial PSA:19.6  Pathologic German Score was 4 + 5  Grade Group:5  Biopsy German Score was 3 + 4  Pathologic Stage T2bc, N1, Mx.   Positive Margins: Yes Location:right side additional margin taken and negative  Number of Lymph Nodes Removed: 5  Number of Positive Lymph Nodes: 1  Patient is status post robotic radical prostatectomy on 12/1/2017.    Risk Group: High    Predicted progression free probability at 10 years: ~10  Predicted Cancer specific survival at 15 years: 80-90%  (J Clin Oncol. 2005 Oct 1;23(28):4193-12.  Http://nomograms.[facility].org/Prostate/PostRadicalProstatectomy.aspx)    There is no evidence of disease recurrence to date.    Physical Exam:  Vitals: /72  Pulse 66  Ht 1.727 m (5' 8\")  Wt 85.8 kg (189 lb 1.6 oz)  BMI 28.75 kg/m2  General Appearance Adult: A&O in NAD     Abd is soft, non-distended, incisions are healing well, no evidence of hernias.     He is tolerating a regular diet.     He has been in urinary retention since the procedure. Still has his Breen catheter - urine is clear in the bag.     Today he notes left flank pain that he noticed prior to surgery.     Trial of Void today was successful    PSA  19.6 pre op    Assessment: [id number] prostate cancer s/p robotic radical prostatectomy on 12/1/2017 with  no evidence of disease, but substantially higher risk than previously believed    Plan:      Remove Breen catheter today.       High risk for prostate cancer would refer to medical onc for adjuvant radiation and   possible " "hormone treatment.  Lives in [place] so would probably get treatment here. Possibly start in Feb/March 2018. Re-check PSA prior to starting radiation therapy.       We discussed the anticipated time course for recovery of continence and the risk of PSA recurrence and the potential need for future treatment for the prostate cancer.    Monitor flank pain.       Follow up in 3 months    PSA (tumor marker)    Clinic Exam    Scribe Disclosure:   I,Сергей Shaikh, am serving as a scribe; to document services personally performed by Paulo Agarwal MD based on data collection and the provider's statements to me.       Paulo Agarwal MD  Department of Urology  [facility]    Attestation:  This patient was seen and evaluated by me, with a scribe taking notes.  I have reviewed the note above and agree.  The physical exam was performed by me and the pertinant details are outlined below.     Patient is a 67 year old  male   Vitals: Blood pressure 124/72, pulse 66, height 1.727 m (5' 8\"), weight 85.8 kg (189 lb 1.6 oz).  General Appearance Adult: Alert, no acute distress, oriented    Assessment: [id number] Grade Group 5 prostate cancer s/p robotic radical prostatectomy on 12/1/2017 with  no evidence of disease, but substantially higher risk than previously believed given positive lymph node and grade group 5    Plan:      Remove Breen catheter today.       High risk for prostate cancer would refer to medical onc for adjuvant radiation and   possible hormone treatment.  Lives in [place] so would probably get treatment here. Possibly start in Feb/March 2018. Re-check PSA prior to starting radiation therapy.       We discussed the anticipated time course for recovery of continence and the risk of PSA recurrence and the potential need for future treatment for the prostate cancer.    Monitor flank pain.       Follow up in 3 months    PSA (tumor marker)    Clinic Exam    Paulo Agarwal MD  Department of " Urology  [facility]

## 2017-12-21 NOTE — NURSING NOTE
Chief Complaint   Patient presents with     RECHECK     Prostate cancer follow up/hospital follow up for retention.      Princess Davis

## 2017-12-22 ENCOUNTER — HOSPITAL ENCOUNTER (EMERGENCY)
Facility: CLINIC | Age: 67
Discharge: HOME | End: 2017-12-22
Attending: EMERGENCY MEDICINE | Admitting: EMERGENCY MEDICINE
Payer: MEDICARE

## 2017-12-22 VITALS
RESPIRATION RATE: 14 BRPM | TEMPERATURE: 97 F | DIASTOLIC BLOOD PRESSURE: 67 MMHG | HEART RATE: 107 BPM | SYSTOLIC BLOOD PRESSURE: 133 MMHG | OXYGEN SATURATION: 95 %

## 2017-12-22 DIAGNOSIS — R33.9 URINARY RETENTION: ICD-10-CM

## 2017-12-22 LAB
ALBUMIN SERPL-MCNC: 4.1 G/DL (ref 3.4–5)
ALBUMIN UR-MCNC: NEGATIVE MG/DL
ALP SERPL-CCNC: 62 U/L (ref 40–150)
ALT SERPL W P-5'-P-CCNC: 49 U/L (ref 0–70)
ANION GAP SERPL CALCULATED.3IONS-SCNC: 16 MMOL/L (ref 6–17)
APPEARANCE UR: ABNORMAL
AST SERPL W P-5'-P-CCNC: 37 U/L (ref 0–45)
BASOPHILS # BLD AUTO: 0 10E9/L (ref 0–0.2)
BASOPHILS NFR BLD AUTO: 0.4 %
BILIRUB DIRECT SERPL-MCNC: 0.2 MG/DL (ref 0–0.2)
BILIRUB SERPL-MCNC: 0.7 MG/DL (ref 0.2–1.3)
BILIRUB UR QL STRIP: NEGATIVE
BUN SERPL-MCNC: 14 MG/DL (ref 7–30)
CA-I BLD-SCNC: 4.6 MG/DL (ref 4.4–5.2)
CHLORIDE BLD-SCNC: 104 MMOL/L (ref 94–109)
CO2 BLD-SCNC: 19 MMOL/L (ref 20–32)
COLOR UR AUTO: YELLOW
CREAT BLD-MCNC: 1 MG/DL (ref 0.66–1.25)
DIFFERENTIAL METHOD BLD: NORMAL
EOSINOPHIL # BLD AUTO: 0.1 10E9/L (ref 0–0.7)
EOSINOPHIL NFR BLD AUTO: 1.8 %
ERYTHROCYTE [DISTWIDTH] IN BLOOD BY AUTOMATED COUNT: 12.3 % (ref 10–15)
GFR SERPL CREATININE-BSD FRML MDRD: 75 ML/MIN/1.7M2
GLUCOSE BLD-MCNC: 103 MG/DL (ref 70–99)
GLUCOSE UR STRIP-MCNC: NEGATIVE MG/DL
HCT VFR BLD AUTO: 42 % (ref 40–53)
HCT VFR BLD CALC: 44 %PCV (ref 40–53)
HGB BLD CALC-MCNC: 15 G/DL (ref 13.3–17.7)
HGB BLD-MCNC: 14.2 G/DL (ref 13.3–17.7)
HGB UR QL STRIP: NEGATIVE
IMM GRANULOCYTES # BLD: 0 10E9/L (ref 0–0.4)
IMM GRANULOCYTES NFR BLD: 0.6 %
KETONES UR STRIP-MCNC: NEGATIVE MG/DL
LEUKOCYTE ESTERASE UR QL STRIP: NEGATIVE
LYMPHOCYTES # BLD AUTO: 1 10E9/L (ref 0.8–5.3)
LYMPHOCYTES NFR BLD AUTO: 14.5 %
MCH RBC QN AUTO: 31.1 PG (ref 26.5–33)
MCHC RBC AUTO-ENTMCNC: 33.8 G/DL (ref 31.5–36.5)
MCV RBC AUTO: 92 FL (ref 78–100)
MONOCYTES # BLD AUTO: 0.7 10E9/L (ref 0–1.3)
MONOCYTES NFR BLD AUTO: 9.9 %
MUCOUS THREADS #/AREA URNS LPF: PRESENT /LPF
NEUTROPHILS # BLD AUTO: 5.1 10E9/L (ref 1.6–8.3)
NEUTROPHILS NFR BLD AUTO: 72.8 %
NITRATE UR QL: NEGATIVE
NRBC # BLD AUTO: 0 10*3/UL
NRBC BLD AUTO-RTO: 0 /100
PH UR STRIP: 6 PH (ref 5–7)
PLATELET # BLD AUTO: 333 10E9/L (ref 150–450)
POTASSIUM BLD-SCNC: 4.1 MMOL/L (ref 3.4–5.3)
PROT SERPL-MCNC: 7.8 G/DL (ref 6.8–8.8)
RBC # BLD AUTO: 4.56 10E12/L (ref 4.4–5.9)
RBC #/AREA URNS AUTO: 11 /HPF (ref 0–2)
SODIUM BLD-SCNC: 139 MMOL/L (ref 133–144)
SOURCE: ABNORMAL
SP GR UR STRIP: 1.01 (ref 1–1.03)
SQUAMOUS #/AREA URNS AUTO: <1 /HPF (ref 0–1)
UROBILINOGEN UR STRIP-MCNC: 0 MG/DL (ref 0–2)
WBC # BLD AUTO: 7.1 10E9/L (ref 4–11)
WBC #/AREA URNS AUTO: 2 /HPF (ref 0–2)

## 2017-12-22 PROCEDURE — 51702 INSERT TEMP BLADDER CATH: CPT

## 2017-12-22 PROCEDURE — 25000128 H RX IP 250 OP 636: Performed by: EMERGENCY MEDICINE

## 2017-12-22 PROCEDURE — 51798 US URINE CAPACITY MEASURE: CPT

## 2017-12-22 PROCEDURE — 85014 HEMATOCRIT: CPT

## 2017-12-22 PROCEDURE — 96376 TX/PRO/DX INJ SAME DRUG ADON: CPT | Mod: 59

## 2017-12-22 PROCEDURE — 99285 EMERGENCY DEPT VISIT HI MDM: CPT | Mod: 25

## 2017-12-22 PROCEDURE — 96374 THER/PROPH/DIAG INJ IV PUSH: CPT

## 2017-12-22 PROCEDURE — 81001 URINALYSIS AUTO W/SCOPE: CPT | Performed by: EMERGENCY MEDICINE

## 2017-12-22 PROCEDURE — 80076 HEPATIC FUNCTION PANEL: CPT | Performed by: EMERGENCY MEDICINE

## 2017-12-22 PROCEDURE — 85025 COMPLETE CBC W/AUTO DIFF WBC: CPT | Performed by: EMERGENCY MEDICINE

## 2017-12-22 PROCEDURE — 80047 BASIC METABLC PNL IONIZED CA: CPT

## 2017-12-22 RX ORDER — LIDOCAINE 40 MG/G
CREAM TOPICAL
Status: DISCONTINUED | OUTPATIENT
Start: 2017-12-22 | End: 2017-12-22 | Stop reason: HOSPADM

## 2017-12-22 RX ORDER — HYDROMORPHONE HYDROCHLORIDE 1 MG/ML
0.5 INJECTION, SOLUTION INTRAMUSCULAR; INTRAVENOUS; SUBCUTANEOUS
Status: DISCONTINUED | OUTPATIENT
Start: 2017-12-22 | End: 2017-12-22 | Stop reason: HOSPADM

## 2017-12-22 RX ORDER — ALPRAZOLAM 0.5 MG
0.5 TABLET ORAL 3 TIMES DAILY PRN
Qty: 20 TABLET | Refills: 0 | Status: SHIPPED | OUTPATIENT
Start: 2017-12-22 | End: 2018-01-22

## 2017-12-22 RX ADMIN — HYDROMORPHONE HYDROCHLORIDE 0.5 MG: 1 INJECTION, SOLUTION INTRAMUSCULAR; INTRAVENOUS; SUBCUTANEOUS at 11:54

## 2017-12-22 RX ADMIN — HYDROMORPHONE HYDROCHLORIDE 0.5 MG: 1 INJECTION, SOLUTION INTRAMUSCULAR; INTRAVENOUS; SUBCUTANEOUS at 12:20

## 2017-12-22 ASSESSMENT — ENCOUNTER SYMPTOMS
ABDOMINAL PAIN: 1
DIAPHORESIS: 1
FEVER: 0
BACK PAIN: 1

## 2017-12-22 NOTE — ED AVS SNAPSHOT
[facility] Department    [address]    [facility] [address]    Phone:  [phone]    Fax:  [phone]                                       Juwan Latham   MRN: [id number]    Department:  [facility] Emergency Department   Date of Visit:  12/22/2017           After Visit Summary Signature Page     I have received my discharge instructions, and my questions have been answered. I have discussed any challenges I see with this plan with the nurse or doctor.    ..........................................................................................................................................  Patient/Patient Representative Signature      ..........................................................................................................................................  Patient Representative Print Name and Relationship to Patient    ..................................................               ................................................  Date                                            Time    ..........................................................................................................................................  Reviewed by Signature/Title    ...................................................              ..............................................  Date                                                            Time

## 2017-12-22 NOTE — ED PROVIDER NOTES
History     Chief Complaint:  Urinary Retention      DYLAN Latham is a 67 year old male with a history of prostate cancer who presents with urinary retention. On 12/1 the patient underwent a DaVinci Prostatectomy and on 12/14 presented here for evaluation of urinary retention and suprapubic pain at which time a catheter was placed. Yesterday the catheter was removed and he was able to urinate normally this morning. Then today at 0900 patient felt that he needed to void but was unable to. He also complains of pain in the small of his back and diffuse abdominal pain. Patient does not have a fever, though notes he feels hot and diaphoretic. Patient denies all other complaints.     Allergies:  Percocet [Oxycodone-Acetaminophen]      Medications:    Xanax  Ultram  Miralax  Lisinopril  Vitamin D  Flonase    Past Medical History:    Anxiety  Arthritis  Calculus of Kidney  Congenital Single Kidney  GERD  Lung Cancer  Hypertension  Prostate Cancer  Seasonal Allergies  Sleep Apnea  Colonic Polyps  PAULINO  Allergic Rhinitis  Depression  Cavernous Hemangioma of Brain  Overweight  Benign Neoplasm of Descending Colon    Past Surgical History:    Bronchoscopy Flexible  Colonoscopy  DaVinci Prostatectomy  EGD, Combined.   Laparoscopic Wedge Resection Lung  Laser Holmium Lithotripsy Ureters, Insert Stent, Combined    Family History:    Heart Disease  Diabetes  Colorectal Cancer    Social History:  Presents with male    Tobacco use: former 2.00 ppd smoker for 20 years, QD: 1/1/1985  Alcohol use: Yes, 3 beers per night  PCP: Julio Guidry Jr    Marital Status:        Review of Systems   Constitutional: Positive for diaphoresis. Negative for fever.   Gastrointestinal: Positive for abdominal pain.   Genitourinary:        Urinary retention   Musculoskeletal: Positive for back pain.   All other systems reviewed and are negative.    Physical Exam     Patient Vitals for the past 24 hrs:   BP Temp Temp src Pulse  Resp SpO2   12/22/17 1330 116/70 - - - - 97 %   12/22/17 1300 103/75 - - - - 99 %   12/22/17 1230 124/81 - - - - 99 %   12/22/17 1200 131/72 - - - - 98 %   12/22/17 1103 (!) 183/100 97  F (36.1  C) Temporal 107 14 100 %      Physical Exam    Nursing note and vitals reviewed.    Constitutional:Moaning in pain, Appears extremely uncomfortable.          HENT:    Mouth/Throat: Oropharynx is without swelling or erythema. Oral mucosa moist.    Eyes: Conjunctivae are normal. No scleral icterus.    Neck: Neck supple.   Cardiovascular: Normal rate, regular rhythm and intact distal pulses.    Pulmonary/Chest: Effort normal and breath sounds normal.   Abdominal:  Abdomen is distended and diffusely tender.  Musculoskeletal:  No edema, No calf tenderness  Neurological:Alert. Coordination normal.   Skin: Skin is warm and dry.   Psychiatric: Normal mood and affect.     Emergency Department Course   Laboratory:  CBC: WNL (WBC 7.1, HGB 14.2, )    ISTAT BMP: Glucose 103 (H), CO2 19 (L) o/w WNL (Creatinine 1.0)  Hepatic Panel: WNL    Interventions:  1154: Dilaudid 0.5 mg IV  1220: Dilaudid 0.5 mg IV    Emergency Department Course:  Past medical records, nursing notes, and vitals reviewed. Bladder scan performed without signs of retention.   1145: I performed an exam of the patient and obtained history, as documented above.  IV inserted and blood drawn.   Above interventions provided.   The patient was received a POC abdominal ultrasound while in the emergency department, findings above.  Bladder distension noted. RN to place catheter.   1240: I rechecked the patient who feels much improved after catheter placement.   1330: Patients blood pressure is 116/70. Heart rate was 65.   1409: I spoke to an Dr. Quinonez's RN, from urology, regarding the patient.   I personally reviewed the laboratory results with the Patient and answered all related questions prior to discharge.  1413: I rechecked the patient. Findings and plan explained  to the Patient. Patient discharged home with instructions regarding supportive care, medications, and reasons to return. The importance of close follow-up was reviewed.        Impression & Plan    Medical Decision Making:  Juwan Latham is a 67 year old male who presents for evaluation of abdominal pain and decreased urinary output.  I considered a broad differential including diverticulitis, aneurysm, urinary retention, ureterolithiasis, UTI, pyelonephritis, neurologic causes (MS, cauda equina,etc), colitis, etc.  The history and exam are consistent with acute urinary retention and this is confirmed after donovan catheter placement.  A urinalysis was obtained and was only positive for some red blood cells.  I consulted with Dr. Quinonez of urology who felt that due to concerns of the time course of his retention after surgery 3 weeks ago. He felt that no further evaluation was warranted at this time and he will arrange for follow up early next week for evaluation and possible catheter removal. Will send home with catheter and have patient follow up with urology in 3-5 days.   Patient is stable for discharge home with reliable adult. .      Diagnosis:    ICD-10-CM    1. Urinary retention R33.9        Disposition:  Discharged to home with plan as outlined.     Discharge Medications:  None      12/22/2017   [facility] EMERGENCY DEPARTMENT  Olga MCMILLAN am serving as a scribe at 11:45 AM on 12/22/2017 to document services personally performed by Ricarda Mccloud MD based on my observations and the provider's statements to me.       Ricarda Mccloud MD  12/23/17 2016

## 2017-12-22 NOTE — ED NOTES
Catheter teaching and cleaning provided. Pt given leg bag to put on at home. Pt states he is used to doing them by himself. Sterile technique reviewed again with pt.

## 2017-12-22 NOTE — TELEPHONE ENCOUNTER
The requested prescription(s) has/have been approved and has/have been printed and signed. This was forwarded to the patient care pool to fax to the patient's preferred pharmacy.  [facility] [address] [facility], [place] - [address] [facility] [phone]    Julio Guidry Jr

## 2017-12-22 NOTE — PROGRESS NOTES
Clinic Care Coordination Contact  [facility]/Voicemail    Referral Source: PCP  Clinical Data: Care Coordinator Outreach  Outreach attempted x 1.  Left message on voicemail with call back information and requested return call.  Plan: Care Coordinator will try to reach patient again in 1-2 business days.

## 2017-12-22 NOTE — TELEPHONE ENCOUNTER
Routing refill request to provider for review/approval because:  Drug not on the [facility] refill protocol     Ruth Jiménez RN   [facility] - Triage

## 2017-12-22 NOTE — DISCHARGE INSTRUCTIONS
.    Breen Catheter Care    A Breen catheter is a rubber tube that is placed through the urethra (opening where urine comes out) and into the bladder. This helps drain urine from the bladder. There is a small balloon on the end of the tube that is inflated after insertion. This keeps the catheter from sliding out of the bladder.  A Breen catheter is used to treat urinary retention (unable to pass urine). It is also used when there is incontinence (loss of bladder control).  Home care    Finish taking any prescribed antibiotic even if you are feeling better before then.    It is important to keep bacteria from getting into the collection bag. Do not disconnect the catheter from the collection bag.    Use a leg band to secure the drainage tube, so it does not pull on the catheter. Drain the collection bag when it becomes full using the drain spout at the bottom of the bag.    Do not try to pull or remove your catheter. This will injure your urethra. It must be removed by your healthcare provider or nurse.  Follow-up care  Follow up with your healthcare provider as advised for repeat urine testing and catheter removal or replacement.  When to seek medical advice  Call your healthcare provider right away if any of these occur:    Fever of 100.4 F (38 C) or higher, or as directed by your healthcare provider    Bladder pain or fullness    Abdominal swelling, nausea or vomiting, or back pain    Blood or urine leakage around the catheter    Bloody urine coming from the catheter (if a new symptom)    Catheter falls out    Catheter stops draining for 6 hours    Weakness, dizziness, or fainting  Date Last Reviewed: 10/1/2016    [id number] [employer]. [address] [place] [address], [place] [address]. All rights reserved. This information is not intended as a substitute for professional medical care. Always follow your healthcare professional's instructions.          Urinary Retention (Male)  Urinary retention is the  medical term for difficulty or inability to pass urine, even though your bladder is full.  Causes  The most common cause of urinary retention in men is the bladder outlet being blocked. This can be due to an enlarged prostate gland or a bladder infection. Certain medicines can also cause this problem. This condition is more likely to occur as men get older.    This condition is treated by insertion of a catheter into the bladder to drain the urine. This provides immediate relief. The catheter may need to remain in place for a few days to prevent a recurrence. The catheter has a balloon on the tip which was inflated after insertion. This prevents the catheter from falling out.  Symptoms  Common symptoms of urinary retention include:    Pain (not experienced by everyone)    Frequent urination    Feeling that the bladder is still full after urinating    Incontinence (not being able to control the release of urine)    Swollen abdomen  Treatment  This condition is treated by inserting a tube (catheter) into the bladder to drain the urine. This provides immediate relief. The catheter may need to stay in place for a few days. The catheter has a balloon on the tip, which is inflated after insertion. This prevents the catheter from falling out.  Home care    If you were given antibiotics, take them until they are used up, or your healthcare provider tells you to stop. It is important to finish the antibiotics even though you feel better. This is to make sure your infection has cleared.    If a catheter was left in place, it is important to keep bacteria from getting into the collection bag. Do not disconnect the catheter from the collection bag.    Use a leg band to secure the drainage tube, so it does not pull on the catheter. Drain the collection bag when it becomes full using the drain spout at the bottom of the bag.    Do not pull on or try to remove your catheter. This will injure your urethra. The catheter must be  removed by a healthcare provider.  Follow-up care  Follow up with your healthcare provider, or as advised.  If a catheter was left in place, it can usually be removed within 3 to 7 days. Some conditions require the catheter to stay in longer. Your healthcare provider will tell you when to return to have the catheter removed.  When to seek medical advice  Call your healthcare provider right away if any of these occur:    Fever of 100.4 F (38 C) or higher, or as directed by your healthcare provider    Bladder or lower-abdominal pain or fullness    Abdominal swelling, nausea, vomiting, or back pain    Blood or urine leakage around the catheter    Bloody urine coming from the catheter (if a new symptom)    Weakness, dizziness, or fainting    Confusion or change in usual level of alertness    If a catheter was left in place, return if:    Catheter falls out    Catheter stops draining for 6 hours  Date Last Reviewed: 7/26/2015 2000-2017 [employer]. [address] [place] [address], [place] [address]. All rights reserved. This information is not intended as a substitute for professional medical care. Always follow your healthcare professional's instructions.

## 2017-12-22 NOTE — ED AVS SNAPSHOT
[facility] Emergency Department    [address]    [facility] [address]    Phone:  [phone]    Fax:  [phone]                                       Juwan Latham   MRN: [id number]    Department:  [facility] Emergency Department   Date of Visit:  12/22/2017           Patient Information     Date Of Birth          1950        Your diagnoses for this visit were:     Urinary retention        You were seen by Ricarda Mccloud MD.      Follow-up Information     Follow up with Paulo Agarwal MD In 4 days.    Specialty:  Urology    Why:  Dr. Agarwal's  will with appointment time.     Contact information:    [address] [facility] [address] [phone]          Discharge Instructions       .    Breen Catheter Care    A Breen catheter is a rubber tube that is placed through the urethra (opening where urine comes out) and into the bladder. This helps drain urine from the bladder. There is a small balloon on the end of the tube that is inflated after insertion. This keeps the catheter from sliding out of the bladder.  A Breen catheter is used to treat urinary retention (unable to pass urine). It is also used when there is incontinence (loss of bladder control).  Home care    Finish taking any prescribed antibiotic even if you are feeling better before then.    It is important to keep bacteria from getting into the collection bag. Do not disconnect the catheter from the collection bag.    Use a leg band to secure the drainage tube, so it does not pull on the catheter. Drain the collection bag when it becomes full using the drain spout at the bottom of the bag.    Do not try to pull or remove your catheter. This will injure your urethra. It must be removed by your healthcare provider or nurse.  Follow-up care  Follow up with your healthcare provider as advised for repeat urine testing and catheter removal or replacement.  When to seek  medical advice  Call your healthcare provider right away if any of these occur:    Fever of 100.4 F (38 C) or higher, or as directed by your healthcare provider    Bladder pain or fullness    Abdominal swelling, nausea or vomiting, or back pain    Blood or urine leakage around the catheter    Bloody urine coming from the catheter (if a new symptom)    Catheter falls out    Catheter stops draining for 6 hours    Weakness, dizziness, or fainting  Date Last Reviewed: 10/1/2016    2153-7330 [employer]. [address] [place] [address]. All rights reserved. This information is not intended as a substitute for professional medical care. Always follow your healthcare professional's instructions.          Urinary Retention (Male)  Urinary retention is the medical term for difficulty or inability to pass urine, even though your bladder is full.  Causes  The most common cause of urinary retention in men is the bladder outlet being blocked. This can be due to an enlarged prostate gland or a bladder infection. Certain medicines can also cause this problem. This condition is more likely to occur as men get older.    This condition is treated by insertion of a catheter into the bladder to drain the urine. This provides immediate relief. The catheter may need to remain in place for a few days to prevent a recurrence. The catheter has a balloon on the tip which was inflated after insertion. This prevents the catheter from falling out.  Symptoms  Common symptoms of urinary retention include:    Pain (not experienced by everyone)    Frequent urination    Feeling that the bladder is still full after urinating    Incontinence (not being able to control the release of urine)    Swollen abdomen  Treatment  This condition is treated by inserting a tube (catheter) into the bladder to drain the urine. This provides immediate relief. The catheter may need to stay in place for a few days. The catheter has a balloon on  the tip, which is inflated after insertion. This prevents the catheter from falling out.  Home care    If you were given antibiotics, take them until they are used up, or your healthcare provider tells you to stop. It is important to finish the antibiotics even though you feel better. This is to make sure your infection has cleared.    If a catheter was left in place, it is important to keep bacteria from getting into the collection bag. Do not disconnect the catheter from the collection bag.    Use a leg band to secure the drainage tube, so it does not pull on the catheter. Drain the collection bag when it becomes full using the drain spout at the bottom of the bag.    Do not pull on or try to remove your catheter. This will injure your urethra. The catheter must be removed by a healthcare provider.  Follow-up care  Follow up with your healthcare provider, or as advised.  If a catheter was left in place, it can usually be removed within 3 to 7 days. Some conditions require the catheter to stay in longer. Your healthcare provider will tell you when to return to have the catheter removed.  When to seek medical advice  Call your healthcare provider right away if any of these occur:    Fever of 100.4 F (38 C) or higher, or as directed by your healthcare provider    Bladder or lower-abdominal pain or fullness    Abdominal swelling, nausea, vomiting, or back pain    Blood or urine leakage around the catheter    Bloody urine coming from the catheter (if a new symptom)    Weakness, dizziness, or fainting    Confusion or change in usual level of alertness    If a catheter was left in place, return if:    Catheter falls out    Catheter stops draining for 6 hours  Date Last Reviewed: 7/26/2015 2000-2017 [employer]. [address] [place] [address], [place] [address]. All rights reserved. This information is not intended as a substitute for professional medical care. Always follow your healthcare professional's  instructions.          Future Appointments        Provider Department Dept Phone Center    12/29/2017 4:00 PM Nino Cunningham, PT [facility] [place] [phone] [facility] [place]    1/29/2018 9:00 AM Owen Davis MD [facility] [place] [phone] [facility]    2/7/2018 9:00 AM [facility] [phone] [place] [facility]    2/14/2018 2:30 PM Paulo Agarwal MD [facility] [phone] [facility]      24 Hour Appointment Hotline       To make an appointment at any [facility], call [phone] ([phone]). If you don't have a family doctor or clinic, we will help you find one. [facility] are conveniently located to serve the needs of you and your family.             Review of your medicines      Our records show that you are taking the medicines listed below. If these are incorrect, please call your family doctor or clinic.        Dose / Directions Last dose taken    acetaminophen 325 MG tablet   Commonly known as:  TYLENOL   Dose:  650 mg   Quantity:  100 tablet        Take 2 tablets (650 mg) by mouth every 6 hours Do not take more than 4,000 mg of acetaminophen (Tylenol) from all sources to prevent liver damage.   Refills:  1        ALFALFA PO        Take by mouth daily   Refills:  0        ALPRAZolam 0.5 MG tablet   Commonly known as:  XANAX   Dose:  0.5 mg   Quantity:  20 tablet        Take 1 tablet (0.5 mg) by mouth 3 times daily as needed for anxiety   Refills:  0        cyanocolbalamin 100 MCG tablet   Commonly known as:  vitamin  B-12   Dose:  50 mcg        Take 50 mcg by mouth daily   Refills:  0        fluticasone 50 MCG/ACT spray   Commonly known as:  FLONASE   Dose:  1-2 spray   Quantity:  1 Bottle        Spray 1-2 sprays into both nostrils daily   Refills:  11        HYDROcodone-acetaminophen 5-325 MG per tablet   Commonly known as:  NORCO   Dose:  1-2 tablet   Quantity:  30 tablet        Take 1-2 tablets by  mouth every 4 hours as needed for moderate to severe pain maximum 6 tablet(s) per day   Refills:  0        ibuprofen 400 MG tablet   Commonly known as:  ADVIL/MOTRIN   Dose:  400 mg   Quantity:  30 tablet        Take 1 tablet (400 mg) by mouth every 8 hours   Refills:  0        LISINOPRIL PO   Dose:  10 mg        Take 10 mg by mouth daily   Refills:  0        ondansetron 4 MG tablet   Commonly known as:  ZOFRAN   Dose:  4 mg   Quantity:  20 tablet        Take 1 tablet (4 mg) by mouth every 12 hours as needed for nausea   Refills:  0        order for DME        Equipment ordered: RESMED Auto PAP Mask type: Nasal Settings: 8-16 CM H20  : CPAP   Refills:  0        polyethylene glycol powder   Commonly known as:  MIRALAX   Dose:  1 capful   Quantity:  510 g        Take 17 g (1 capful) by mouth daily   Refills:  1        sulfamethoxazole-trimethoprim 400-80 MG per tablet   Commonly known as:  BACTRIM/SEPTRA   Dose:  1 tablet   Quantity:  14 tablet        Take 1 tablet by mouth daily   Refills:  0        SUPER B COMPLEX PO   Dose:  1 tablet        Take 1 tablet by mouth daily   Refills:  0        traMADol 50 MG tablet   Commonly known as:  ULTRAM   Dose:  50 mg   Quantity:  20 tablet        Take 1 tablet (50 mg) by mouth every 8 hours as needed for pain maximum 4 tablet(s) per day   Refills:  0        VITAMIN D (CHOLECALCIFEROL) PO   Dose:  1000 Units        Take 1,000 Units by mouth daily   Refills:  0                Procedures and tests performed during your visit     Bladder scan    CBC with platelets differential    Hepatic panel    ISTAT Basic Met ICa HCT POCT    ISTAT Chem 8    POC US ABDOMEN LIMITED    Peripheral IV catheter    UA with Microscopic      Orders Needing Specimen Collection     None      Pending Results     Date and Time Order Name Status Description    12/22/2017 1154 POC US ABDOMEN LIMITED In process             Pending Culture Results     No orders found from 12/20/2017 to 12/23/2017.             Pending Results Instructions     If you had any lab results that were not finalized at the time of your Discharge, you can call the ED Lab Result RN at [phone]. You will be contacted by this team for any positive Lab results or changes in treatment. The nurses are available 7 days a week from 10A to 6:30P.  You can leave a message 24 hours per day and they will return your call.        Test Results From Your Hospital Stay        12/22/2017 12:13 PM      Component Results     Component Value Ref Range & Units Status    WBC 7.1 4.0 - 11.0 10e9/L Final    RBC Count 4.56 4.4 - 5.9 10e12/L Final    Hemoglobin 14.2 13.3 - 17.7 g/dL Final    Hematocrit 42.0 40.0 - 53.0 % Final    MCV 92 78 - 100 fl Final    MCH 31.1 26.5 - 33.0 pg Final    MCHC 33.8 31.5 - 36.5 g/dL Final    RDW 12.3 10.0 - 15.0 % Final    Platelet Count 333 150 - 450 10e9/L Final    Diff Method Automated Method  Final    % Neutrophils 72.8 % Final    % Lymphocytes 14.5 % Final    % Monocytes 9.9 % Final    % Eosinophils 1.8 % Final    % Basophils 0.4 % Final    % Immature Granulocytes 0.6 % Final    Nucleated RBCs 0 0 /100 Final    Absolute Neutrophil 5.1 1.6 - 8.3 10e9/L Final    Absolute Lymphocytes 1.0 0.8 - 5.3 10e9/L Final    Absolute Monocytes 0.7 0.0 - 1.3 10e9/L Final    Absolute Eosinophils 0.1 0.0 - 0.7 10e9/L Final    Absolute Basophils 0.0 0.0 - 0.2 10e9/L Final    Abs Immature Granulocytes 0.0 0 - 0.4 10e9/L Final    Absolute Nucleated RBC 0.0  Final         12/22/2017  1:23 PM      Component Results     Component Value Ref Range & Units Status    Color Urine Yellow  Final    Appearance Urine Slightly Cloudy  Final    Glucose Urine Negative NEG^Negative mg/dL Final    Bilirubin Urine Negative NEG^Negative Final    Ketones Urine Negative NEG^Negative mg/dL Final    Specific Gravity Urine 1.014 1.003 - 1.035 Final    Blood Urine Negative NEG^Negative Final    pH Urine 6.0 5.0 - 7.0 pH Final    Protein Albumin Urine Negative NEG^Negative  mg/dL Final    Urobilinogen mg/dL 0.0 0.0 - 2.0 mg/dL Final    Nitrite Urine Negative NEG^Negative Final    Leukocyte Esterase Urine Negative NEG^Negative Final    Source Catheterized Urine  Final    WBC Urine 2 0 - 2 /HPF Final    RBC Urine 11 (H) 0 - 2 /HPF Final    Squamous Epithelial /HPF Urine <1 0 - 1 /HPF Final    Mucous Urine Present (A) NEG^Negative /LPF Final         12/22/2017 12:30 PM      Component Results     Component Value Ref Range & Units Status    Bilirubin Direct 0.2 0.0 - 0.2 mg/dL Final    Bilirubin Total 0.7 0.2 - 1.3 mg/dL Final    Albumin 4.1 3.4 - 5.0 g/dL Final    Protein Total 7.8 6.8 - 8.8 g/dL Final    Alkaline Phosphatase 62 40 - 150 U/L Final    ALT 49 0 - 70 U/L Final    AST 37 0 - 45 U/L Final         12/22/2017 11:54 AM      Result not yet available     Exam Begun         12/22/2017 12:06 PM      Component Results     Component Value Ref Range & Units Status    Sodium 139 133 - 144 mmol/L Final    Potassium 4.1 3.4 - 5.3 mmol/L Final    Chloride 104 94 - 109 mmol/L Final    Total CO2 19 (L) 20 - 32 mmol/L Final    Anion Gap 16 6 - 17 mmol/L Final    Glucose 103 (H) 70 - 99 mg/dL Final    Urea Nitrogen 14 7 - 30 mg/dL Final    Creatinine 1.0 0.66 - 1.25 mg/dL Final    GFR Estimate 75 >60 mL/min/1.7m2 Final    GFR Estimate If Black >90 >60 mL/min/1.7m2 Final    Calcium Ionized 4.6 4.4 - 5.2 mg/dL Final    Hemoglobin 15.0 13.3 - 17.7 g/dL Final    Hematocrit - POCT 44 40.0 - 53.0 %PCV Final                Clinical Quality Measure: Blood Pressure Screening     Your blood pressure was checked while you were in the emergency department today. The last reading we obtained was  BP: 116/70 . Please read the guidelines below about what these numbers mean and what you should do about them.  If your systolic blood pressure (the top number) is less than 120 and your diastolic blood pressure (the bottom number) is less than 80, then your blood pressure is normal. There is nothing more that you  need to do about it.  If your systolic blood pressure (the top number) is 120-139 or your diastolic blood pressure (the bottom number) is 80-89, your blood pressure may be higher than it should be. You should have your blood pressure rechecked within a year by a primary care provider.  If your systolic blood pressure (the top number) is 140 or greater or your diastolic blood pressure (the bottom number) is 90 or greater, you may have high blood pressure. High blood pressure is treatable, but if left untreated over time it can put you at risk for heart attack, stroke, or kidney failure. You should have your blood pressure rechecked by a primary care provider within the next 4 weeks.  If your provider in the emergency department today gave you specific instructions to follow-up with your doctor or provider even sooner than that, you should follow that instruction and not wait for up to 4 weeks for your follow-up visit.        Thank you for choosing [facility]       Thank you for choosing [facility] for your care. Our goal is always to provide you with excellent care. Hearing back from our patients is one way we can continue to improve our services. Please take a few minutes to complete the written survey that you may receive in the mail after you visit with us. Thank you!        [employer] Information     [employer] gives you secure access to your electronic health record. If you see a primary care provider, you can also send messages to your care team and make appointments. If you have questions, please call your primary care clinic.  If you do not have a primary care provider, please call [phone] and they will assist you.        Care EveryWhere ID     This is your Care EveryWhere ID. This could be used by other organizations to access your [facility] medical records  [id number]        Equal Access to Services     CHAMP SNIDER : Rohan Obrien, ekaterina reyes, mynor chauhan  willard mosley ah. So [facility] [phone].    ATENCIÓN: Si habla español, tiene a gunn disposición servicios gratuitos de asistencia lingüística. Llame al [phone].    We comply with applicable federal civil rights laws and [place] laws. We do not discriminate on the basis of race, color, national origin, age, disability, sex, sexual orientation, or gender identity.            After Visit Summary       This is your record. Keep this with you and show to your community pharmacist(s) and doctor(s) at your next visit.

## 2017-12-22 NOTE — ED NOTES
Patient had donovan catheter removed yesterday.  Today unable to urinate again.      ABCs intact.  Alert and oriented x 3.

## 2017-12-26 NOTE — PROGRESS NOTES
Clinic Care Coordination Contact  OUTREACH    Referral Information:  Referral Source: PCP  Reason for Contact: ED follow up  Care Conference: No     Universal Utilization:   ED Visits in last year: 1  Hospital visits in last year: 1  Last PCP appointment: 11/29/17  Missed Appointments: 0  Concerns: anxiety management  Multiple Providers or Specialists: urology    Clinical Concerns:  Current Medical Concerns: Pt was seen in ED 12/22 for urinary retention, donovan catheter replaced again.  Pt reports that he has had good UOP, clear jd in color with donovan in place.  He still has some pain, has been taking ibuprofen for it. He denies using his norco as he was told this could make the retention worse. He does still report some leakage around catheter with straining and sitting.  Assured pt that this leakage with strain is normal. Has appointment with Dr. Agarwal tomorrow again for catheter removal. Encouraged pt to discuss his ongoing pain and retention with Dr. Agarwal for closer management.    Current Behavioral Concerns: anxiety, managed with medication    Education Provided to patient: see above   Clinical Pathway Name: None    Medication Management:  No new concerns, discussed taking ibuprofen around the clock for pain management.  Pt verbalized understanding.     Functional Status:  Mobility Status: Independent  Equipment Currently Used at Home: none  Transportation: no concerns      Psychosocial:  Current living arrangement: I live in a private home  Financial/Insurance: no concerns     Resources and Interventions:  Current Resources: none   Advanced Care Plans/Directives on file:: No        Goals:   Goal 1 Statement: I will work on managing my stress over the next couple of months.  Goal 1 Supportive Steps: Pt to work on good sleep hygiene and self care.  Goal 1 Progression Percent: 90%  Goal 1 Progression Date: 12/26/17   Upcoming appointment:  (NA)     Plan: Will f/u with pt end of week to check on his ability  to void and see if he has his appointment for radiology set up.

## 2017-12-27 ENCOUNTER — ALLIED HEALTH/NURSE VISIT (OUTPATIENT)
Dept: UROLOGY | Facility: CLINIC | Age: 67
End: 2017-12-27
Payer: COMMERCIAL

## 2017-12-27 DIAGNOSIS — R33.9 URINARY RETENTION: Primary | ICD-10-CM

## 2017-12-27 NOTE — MR AVS SNAPSHOT
After Visit Summary   12/27/2017    Juwan Latham    MRN: [id number]           Patient Information     Date Of Birth          1950        Visit Information        Provider Department      12/27/2017 9:40 AM Nurse, [facility] Urologic C[facility]        Today's Diagnoses     Urinary retention    -  1       Follow-ups after your visit        Your next 10 appointments already scheduled     Dec 29, 2017  4:00 PM CST   (Arrive by 3:45 PM)   [facility] with Nino Cunningham, PT   [facility]SET[facility]CHADW[facility] PT ([facility]  )    [address]  [address]  [place] [address]   [phone]            Jan 29, 2018  9:00 AM CST   Return Sleep Patient with Owen Davis MD   [facility] ([facility])    [address]  [place] [address]   [phone]            Feb 07, 2018  9:00 AM CST   LAB with [facility]   [facility] ([facility])    [address] [address] [phone]           Please do not eat 10-12 hours before your appointment if you are coming in fasting for labs on lipids, cholesterol, or glucose (sugar). This does not apply to pregnant women. Water, hot tea and black coffee (with nothing added) are okay. Do not drink other fluids, diet soda or chew gum.            Feb 14, 2018  2:30 PM CST   Return Visit with Paulo Agarwal MD   [facility] ([facility])    [facility]  [address]  [place] [address] [phone]              Who to contact     Please call your clinic at [phone] to:    Ask questions about your health    Make or cancel appointments    Discuss your medicines    Learn about your test results    Speak to your doctor   If you have compliments or concerns about an experience at your clinic, or if you wish to file a complaint,  please contact [facility] Patient Relations at [phone] or email us at [email or web address]         Additional Information About Your Visit        [employer] Information     [employer] gives you secure access to your electronic health record. If you see a primary care provider, you can also send messages to your care team and make appointments. If you have questions, please call your primary care clinic.  If you do not have a primary care provider, please call [phone] and they will assist you.      [employer] is an electronic gateway that provides easy, online access to your medical records. With [employer], you can request a clinic appointment, read your test results, renew a prescription or communicate with your care team.     To access your existing account, please contact your [facility] or call [phone] for assistance.        Care EveryWhere ID     This is your Care EveryWhere ID. This could be used by other organizations to access your [place] medical records  [id number]         Blood Pressure from Last 3 Encounters:   12/22/17 133/67   12/21/17 124/72   12/14/17 135/77    Weight from Last 3 Encounters:   12/21/17 85.8 kg (189 lb 1.6 oz)   12/13/17 87.5 kg (193 lb)   12/01/17 89.8 kg (198 lb)              We Performed the Following     POST-VOID RESIDUAL BLADDER SCAN        Primary Care Provider Office Phone # Fax #    Julio Guidry Jr., [phone] [address]  [place] [address]        Equal Access to Services     CHAMP SNIDER AH: Hadii aad ku hadasho Soomaali, waaxda luqadaha, qaybta kaalmada adeegyada, mynor phipps'raúl escoto. So [facility] [phone].    ATENCIÓN: Si habla español, tiene a gunn disposición servicios gratuitos de asistencia lingüística. Llame al [phone].    We comply with applicable federal civil rights laws and [place] laws. We do not discriminate on the basis of race,  color, national origin, age, disability, sex, sexual orientation, or gender identity.            Thank you!     Thank you for choosing [facility] AND [facility] FOR PROSTATE AND UROLOGIC CANCERS  for your care. Our goal is always to provide you with excellent care. Hearing back from our patients is one way we can continue to improve our services. Please take a few minutes to complete the written survey that you may receive in the mail after your visit with us. Thank you!             Your Updated Medication List - Protect others around you: Learn how to safely use, store and throw away your medicines at www.disposemymeds.org.          This list is accurate as of: 12/27/17  9:52 AM.  Always use your most recent med list.                   Brand Name Dispense Instructions for use Diagnosis    acetaminophen 325 MG tablet    TYLENOL    100 tablet    Take 2 tablets (650 mg) by mouth every 6 hours Do not take more than 4,000 mg of acetaminophen (Tylenol) from all sources to prevent liver damage.    Lung nodule       ALFALFA PO      Take by mouth daily        ALPRAZolam 0.5 MG tablet    XANAX    20 tablet    Take 1 tablet (0.5 mg) by mouth 3 times daily as needed for anxiety    Anxiety       cyanocolbalamin 100 MCG tablet    vitamin  B-12     Take 50 mcg by mouth daily        fluticasone 50 MCG/ACT spray    FLONASE    1 Bottle    Spray 1-2 sprays into both nostrils daily    Chronic rhinitis, unspecified type       HYDROcodone-acetaminophen 5-325 MG per tablet    NORCO    30 tablet    Take 1-2 tablets by mouth every 4 hours as needed for moderate to severe pain maximum 6 tablet(s) per day    Malignant neoplasm of prostate (H)       ibuprofen 400 MG tablet    ADVIL/MOTRIN    30 tablet    Take 1 tablet (400 mg) by mouth every 8 hours    Malignant neoplasm of prostate (H)       LISINOPRIL PO      Take 10 mg by mouth daily        ondansetron 4 MG tablet    ZOFRAN    20 tablet    Take 1 tablet (4 mg) by mouth every 12 hours as  needed for nausea    Malignant neoplasm of prostate (H)       order for DME      Equipment ordered: RESMED Auto PAP Mask type: Nasal Settings: 8-16 CM H20  : CPAP        polyethylene glycol powder    MIRALAX    510 g    Take 17 g (1 capful) by mouth daily    Malignant neoplasm of prostate (H)       sulfamethoxazole-trimethoprim 400-80 MG per tablet    BACTRIM/SEPTRA    14 tablet    Take 1 tablet by mouth daily    Malignant neoplasm of prostate (H)       SUPER B COMPLEX PO      Take 1 tablet by mouth daily        traMADol 50 MG tablet    ULTRAM    20 tablet    Take 1 tablet (50 mg) by mouth every 8 hours as needed for pain maximum 4 tablet(s) per day    Malignant neoplasm of prostate (H)       VITAMIN D (CHOLECALCIFEROL) PO      Take 1,000 Units by mouth daily

## 2017-12-27 NOTE — PROGRESS NOTES
Juwan Latham comes into clinic today at the request of Dr. Paulo Agarwal Ordering Provider for TOV (trial of void).    Juwan Latham presented today for a trial of void.   Approximately 150 mL of normal saline instilled into bladder via catheter.  Patient stated He had urge to urinate and catheter was removed without difficulty.  Patient was given a urinal to measure urine output.  Patient voided approximately 120 mL of yellow urine.  PVR was 75 ml.  Patient did tolerate procedure well.   Ciprofloxacin 500 mg given per protocol.  Teaching done with patient verbally as where to call or go if pain, fever, or unable to urinate post catheter removal.    This service provided today was under the supervising provider of the day Anali Soto PA-C, who was available if needed.    Madelaine Ramirez RN  12/27/2017  9:24 AM

## 2017-12-28 DIAGNOSIS — C61 PROSTATE CANCER (H): Primary | ICD-10-CM

## 2017-12-28 NOTE — PROGRESS NOTES
Clinic Care Coordination Contact  Care Team Conversations    Received call from pt reporting that he had his catheter removed 12/26 and has been voiding well since then, but has only had 2 very small stools in the past few days, is feeling very constipated.  He reports that he has been taking miralax since the previous evening without success and requests advice on something else to try.  Discussed with PCP, pt can try mag citrate and see if that relieves it for pt.  Pt verbalized understanding and will try half a bottle to start, and second half if no results from the first half.  Will follow up with pt in the next 2-3 business days.  Pt will call with any new/continuing concerns prior to that time.

## 2017-12-29 ENCOUNTER — THERAPY VISIT (OUTPATIENT)
Dept: PHYSICAL THERAPY | Facility: CLINIC | Age: 67
End: 2017-12-29
Payer: MEDICARE

## 2017-12-29 DIAGNOSIS — N39.46 MIXED INCONTINENCE URGE AND STRESS (MALE)(FEMALE): ICD-10-CM

## 2017-12-29 DIAGNOSIS — M99.05 SOMATIC DYSFUNCTION OF PELVIS REGION: Primary | ICD-10-CM

## 2017-12-29 PROCEDURE — 97110 THERAPEUTIC EXERCISES: CPT | Mod: GP | Performed by: PHYSICAL THERAPIST

## 2017-12-29 PROCEDURE — G8987 SELF CARE CURRENT STATUS: HCPCS | Mod: GP | Performed by: PHYSICAL THERAPIST

## 2017-12-29 PROCEDURE — 97161 PT EVAL LOW COMPLEX 20 MIN: CPT | Mod: GP | Performed by: PHYSICAL THERAPIST

## 2017-12-29 PROCEDURE — G8988 SELF CARE GOAL STATUS: HCPCS | Mod: GP | Performed by: PHYSICAL THERAPIST

## 2017-12-29 PROCEDURE — 97112 NEUROMUSCULAR REEDUCATION: CPT | Mod: GP | Performed by: PHYSICAL THERAPIST

## 2017-12-29 NOTE — LETTER
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 The history is provided by the patient. No  was used.   Juwan Latham is a 67 year old male with a incontinence condition.  Condition occurred with:  After surgery.  Condition occurred: other.  This is a new condition  17 Robotic Assisted Radical Prostatectomy and Pelvic Lymph Node Dissection. Pt had difficulty with urinary retention so catheter was not removed until 17. Pt referred by MD for physical therapy on 17.   Urination:  Do you feel the sensation of your bladder filling? No  How many pads per day are you using? 8 Depends  Do you leak on the way to the bathroom or with a strong urge to void? Yes  Do you leak with cough,sneeze, jumping, running? Yes  How long can you delay the need to urinate? 1 - 2 minutes.   How many times do you get up to urinate at night? 2  Can you stop the flow of urine when on the toilet? No  Is the volume of urine passed usually: medium. (8sec rule= 250ml with average bladder storing 400-600ml)  Do you strain to pass urine? No  Do you have a slow or hesitant urinary stream? Yes  Do you have difficulty initiating the urine stream? No  Fluid intake(one glass is 8oz or one cup) 4   glasses/day, 2 caffinated glasses/day  0 alcohol glasses/day.  Patient reports  pain:  Groin.    Pain is described as aching (aching) and is intermittent and reported as 1/10.   Pain is the same all the time.  Symptoms are exacerbated by laughing, walking, stress, coughing and sneezing (transfers) and relieved by rest.  Since onset symptoms are unchanged.  Special testing: US see chart.  Previous treatment: none.    General health as reported by patient is good.  Pertinent medical history includes:  High blood pressure.  Medical allergies: none.  Other surgeries include:  Cancer surgery (prostate).  Current medications:  High blood pressure medication and sleep medication.  Current occupation is retired.      Red flags:  None as reported by the patient.  Juwan Latham   : 1950    Objective:  System  Pelvic Dysfunction Evaluation:    Flexibility:  normal  External Assessment:    Skin Condition:  Normal  Scars:  Well healed  Internal Assessment:    Sensory Exam:  Normal  Contraction/Grade:  Weak squeeze, 2 second hold (2)  Accessory Muscle use-Abdominals:  Yes  Accessory Muscle use-Gluteals:  Yes  Accessory Muscle use-Adductors:  Yes  Symmetry of Contraction Response:  Symmetrical  SEMG Biofeedback:    Equipment:  Biofeedback  Suraface electrode placement--Perianal:  Bilateral  Baseline EMG PM:  4 mV  Peak pelvic muscle contraction:  7mV  EMG interpretation to fatigue:  Less than3 seconds  Position:  Supine       Assessment/Plan:    Patient is a 67 year old male with pelvic complaints.    Patient has the following significant findings with corresponding treatment plan.                Diagnosis 1:  Post op incontinence  Pain -  self management, education and home program  Decreased strength - therapeutic exercise, therapeutic activities and home program  Impaired muscle performance - biofeedback, neuro re-education and home program    Therapy Evaluation Codes:   1) History comprised of:   Personal factors that impact the plan of care:      None.    Comorbidity factors that impact the plan  of care are:      High blood pressure and Weakness.     Medications impacting care: High blood pressure and Sleep.  2) Examination of Body Systems comprised of:   Body structures and functions that impact the plan of care:      Pelvis.   Activity limitations that impact the plan of care are:      Lifting, Sitting, Squatting/kneeling, Walking, Stress incontinence and transfers.  3) Clinical presentation characteristics are:   Stable/Uncomplicated.  4) Decision-Making    Low complexity using standardized patient assessment instrument and/or measureable assessment of functional outcome.  Cumulative Therapy Evaluation is: Low complexity.  Previous and current functional limitations:  (See Goal Flow Sheet for this information)    Short term and Long term goals: (See Goal Flow Sheet for this information)   Juwan Latham   : 1950    Communication ability:  Patient appears to be able to clearly communicate and understand verbal and written communication and follow directions correctly.  Treatment Explanation - The following has been discussed with the patient:   RX ordered/plan of care  Anticipated outcomes  Possible risks and side effects  This patient would benefit from PT intervention to resume normal activities.   Rehab potential is good.    Frequency:  1 X week, once daily  Duration:  for 8 weeks  Discharge Plan:  Achieve all LTG.  Independent in home treatment program.  Reach maximal therapeutic benefit.          Caregiver Signature/Credentials _____________________________ Date ________           Treating Provider: Nino Cunningham, PT  I have reviewed and certified the need for these services and plan of treatment while under my care.        PHYSICIAN'S SIGNATURE:   ______________________________________ Date___________                       Paulo Agarwal MD    Certification period:  Beginning of Cert date period: 17 to  End of Cert period date: 18     Functional Level Progress Report:  "Please see attached \"Goal Flow sheet for Functional level.\"    ____X____ Continue Services or       ________ DC Services                Service dates: From  SOC Date: 12/29/17 date to present                         "

## 2017-12-29 NOTE — PROGRESS NOTES
[place] [facility] Initial Evaluation  Subjective:  Patient is a 67 year old male presenting with rehab pelvic hpi. The history is provided by the patient. No  was used.   Juwan Latham is a 67 year old male with a incontinence condition.  Condition occurred with:  After surgery.  Condition occurred: other.  This is a new condition  12-1-17 Robotic Assisted Radical Prostatectomy and Pelvic Lymph Node Dissection. Pt had difficulty with urinary retention so catheter was not removed until 12-27-17. Pt referred by MD for physical therapy on 12-13-17.   Urination:  Do you feel the sensation of your bladder filling? No  How many pads per day are you using? 8 Depends  Do you leak on the way to the bathroom or with a strong urge to void? Yes  Do you leak with cough,sneeze, jumping, running? Yes  How long can you delay the need to urinate? 1 - 2 minutes.   How many times do you get up to urinate at night? 2  Can you stop the flow of urine when on the toilet? No  Is the volume of urine passed usually: medium. (8sec rule= 250ml with average bladder storing 400-600ml)  Do you strain to pass urine? No  Do you have a slow or hesitant urinary stream? Yes  Do you have difficulty initiating the urine stream? No  Fluid intake(one glass is 8oz or one cup) 4   glasses/day, 2 caffinated glasses/day  0 alcohol glasses/day.  .    Patient reports pain:  Groin.    Pain is described as aching (aching) and is intermittent and reported as 1/10.   Pain is the same all the time.  Symptoms are exacerbated by laughing, walking, stress, coughing and sneezing (transfers) and relieved by rest.  Since onset symptoms are unchanged.  Special testing: US see chart.  Previous treatment: none.    General health as reported by patient is good.  Pertinent medical history includes:  High blood pressure.  Medical allergies: none.  Other surgeries include:  Cancer surgery (prostate).  Current medications:  High blood pressure  medication and sleep medication.  Current occupation is retired.            Red flags:  None as reported by the patient.                        Objective:  System                                 Pelvic Dysfunction Evaluation:        Flexibility:  normal              External Assessment:    Skin Condition:  Normal  Scars:  Well healed          Internal Assessment:    Sensory Exam:  Normal  Contraction/Grade:  Weak squeeze, 2 second hold (2)  Accessory Muscle use-Abdominals:  Yes  Accessory Muscle use-Gluteals:  Yes  Accessory Muscle use-Adductors:  Yes  Symmetry of Contraction Response:  Symmetrical  SEMG Biofeedback:    Equipment:  Biofeedback    Suraface electrode placement--Perianal:  Bilateral  Baseline EMG PM:  4 mV    Peak pelvic muscle contraction:  7mV    EMG interpretation to fatigue:  Less than3 seconds  Position:  Supine                     General     ROS    Assessment/Plan:    Patient is a 67 year old male with pelvic complaints.    Patient has the following significant findings with corresponding treatment plan.                Diagnosis 1:  Post op incontinence  Pain -  self management, education and home program  Decreased strength - therapeutic exercise, therapeutic activities and home program  Impaired muscle performance - biofeedback, neuro re-education and home program    Therapy Evaluation Codes:   1) History comprised of:   Personal factors that impact the plan of care:      None.    Comorbidity factors that impact the plan of care are:      High blood pressure and Weakness.     Medications impacting care: High blood pressure and Sleep.  2) Examination of Body Systems comprised of:   Body structures and functions that impact the plan of care:      Pelvis.   Activity limitations that impact the plan of care are:      Lifting, Sitting, Squatting/kneeling, Walking, Stress incontinence and transfers.  3) Clinical presentation characteristics are:   Stable/Uncomplicated.  4) Decision-Making    Low  complexity using standardized patient assessment instrument and/or measureable assessment of functional outcome.  Cumulative Therapy Evaluation is: Low complexity.    Previous and current functional limitations:  (See Goal Flow Sheet for this information)    Short term and Long term goals: (See Goal Flow Sheet for this information)     Communication ability:  Patient appears to be able to clearly communicate and understand verbal and written communication and follow directions correctly.  Treatment Explanation - The following has been discussed with the patient:   RX ordered/plan of care  Anticipated outcomes  Possible risks and side effects  This patient would benefit from PT intervention to resume normal activities.   Rehab potential is good.    Frequency:  1 X week, once daily  Duration:  for 8 weeks  Discharge Plan:  Achieve all LTG.  Independent in home treatment program.  Reach maximal therapeutic benefit.    Please refer to the daily flowsheet for treatment today, total treatment time and time spent performing 1:1 timed codes.

## 2018-01-06 ENCOUNTER — THERAPY VISIT (OUTPATIENT)
Dept: PHYSICAL THERAPY | Facility: CLINIC | Age: 68
End: 2018-01-06
Payer: MEDICARE

## 2018-01-06 DIAGNOSIS — M99.05 SOMATIC DYSFUNCTION OF PELVIS REGION: ICD-10-CM

## 2018-01-06 DIAGNOSIS — N39.46 MIXED INCONTINENCE URGE AND STRESS (MALE)(FEMALE): ICD-10-CM

## 2018-01-06 PROCEDURE — 97112 NEUROMUSCULAR REEDUCATION: CPT | Mod: GP | Performed by: PHYSICAL THERAPIST

## 2018-01-06 PROCEDURE — 97110 THERAPEUTIC EXERCISES: CPT | Mod: GP | Performed by: PHYSICAL THERAPIST

## 2018-01-06 NOTE — PROGRESS NOTES
Subjective:  HPI                    Objective:  System    Physical Exam    General     ROS    Assessment/Plan:    SUBJECTIVE  Subjective changes as noted by pt:  Pt notes continued generalized fatigue but feels pelvic floor muscle strength is improving     Current pain level: 1/10     Changes in function:  Pt reports decreased leakage     Adverse reaction to treatment or activity:  None    OBJECTIVE  Changes in objective findings:  Pt demonstrates improved endurance with Kegel contraction. Pt able to sustain contraction to 3-4 seconds. Progressed to sitting exercises        ASSESSMENT  Juwan continues to require intervention to meet STG and LTG's: PT  Patient's symptoms are resolving.  Response to therapy has shown an improvement in  strength and incontinence  Progress made towards STG/LTG?  Yes,     PLAN  Current treatment program is being advanced to more complex exercises.    PTA/ATC plan:  N/A    Please refer to the daily flowsheet for treatment today, total treatment time and time spent performing 1:1 timed codes.

## 2018-01-08 ENCOUNTER — PRE VISIT (OUTPATIENT)
Dept: UROLOGY | Facility: CLINIC | Age: 68
End: 2018-01-08

## 2018-01-08 NOTE — TELEPHONE ENCOUNTER
ED follow up- urinary retention. S/P Robotic Assisted Radical Prostatectomy and Pelvic Lymph Node Dissection on 12-1-17.  Records available in [facility].

## 2018-01-11 ENCOUNTER — OFFICE VISIT (OUTPATIENT)
Dept: UROLOGY | Facility: CLINIC | Age: 68
End: 2018-01-11
Payer: COMMERCIAL

## 2018-01-11 VITALS
DIASTOLIC BLOOD PRESSURE: 81 MMHG | HEIGHT: 68 IN | WEIGHT: 193.1 LBS | HEART RATE: 67 BPM | SYSTOLIC BLOOD PRESSURE: 142 MMHG | BODY MASS INDEX: 29.27 KG/M2

## 2018-01-11 DIAGNOSIS — R97.20 ELEVATED PROSTATE SPECIFIC ANTIGEN (PSA): Primary | ICD-10-CM

## 2018-01-11 DIAGNOSIS — C61 PROSTATE CANCER (H): ICD-10-CM

## 2018-01-11 LAB — PSA SERPL-MCNC: 0.03 UG/L (ref 0–4)

## 2018-01-11 ASSESSMENT — PAIN SCALES - GENERAL: PAINLEVEL: NO PAIN (0)

## 2018-01-11 NOTE — PROGRESS NOTES
"Prostate Cancer Follow up after RRP     Juwan Latham is a very pleasant 67 year old male who presents with a history of prostate cancer.    Initial PSA:19.6  Pathologic German Score was 4 + 5  Grade Group:5  Biopsy German Score was 3 + 4  Pathologic Stage T2bc, N1, Mx.   Positive Margins: Yes Location:right side additional margin taken and negative  Number of Lymph Nodes Removed: 5  Number of Positive Lymph Nodes: 1  Patient is status post robotic radical prostatectomy on 12/1/2017.    Risk Group: High    Predicted progression free probability at 10 years: ~10  Predicted Cancer specific survival at 15 years: 80-90%  (J Clin Oncol. 2005 Oct 1;23(28):7002-12.  Http://nomograms.[facility].org/Prostate/PostRadicalProstatectomy.aspx)    There is no evidence of disease recurrence to date.    Physical Exam:  Vitals: /81  Pulse 67  Ht 1.727 m (5' 8\")  Wt 87.6 kg (193 lb 1.6 oz)  BMI 29.36 kg/m2  General Appearance Adult: A&O in NAD     Abd is soft, non-distended, incisions are healing well, no evidence of hernias.     He is tolerating a regular diet.     Today he reports urinary symptoms, primarily incontinence. He is wearing 8-9 pads per day. He is attending pelvic floor rehab and is doing Kegel's regularly. He notes some improvement in urinary control and reports a strong stream. He was recently seen in the ER for urinary retention on 12/22/17, but states he is doing much better since then. He continues to note mild left flank pain that was noticed prior to surgery, but this is not very bothersome to him.     PSA   Date Value Ref Range Status   01/11/2018 0.03 0 - 4 ug/L Final     Comment:     Assay Method:  Chemiluminescence using Siemens Vista analyzer         PSA  19.6 pre op    Assessment: [id number] prostate cancer s/p robotic radical prostatectomy on 12/1/2017 with  no evidence of disease, but substantially higher risk than previously believed    Plan:        We discussed the anticipated time course for " "recovery of continence and the risk of PSA recurrence and the potential need for future treatment for the prostate cancer.    Continue to monitor flank pain.       PSA (tumor marker) today     Reschedule visit with [facility]     Follow up in 3 months after the completion of radiation       Scribe Disclosure:   I,Tiffanymariela ArmandoNeel, am serving as a scribe; to document services personally performed by Paulo Agarwal MD based on data collection and the provider's statements to me.       Paulo Agarwal MD  Department of Urology  [facility]    Attestation:  This patient was seen and evaluated by me, with a scribe taking notes.  I have reviewed the note above and agree.  The physical exam was performed by me and the pertinant details are outlined below.     Patient is a 67 year old  male   Vitals: Blood pressure 142/81, pulse 67, height 1.727 m (5' 8\"), weight 87.6 kg (193 lb 1.6 oz).  General Appearance Adult: Alert, no acute distress, oriented    Assessment: [id number] Grade Group 5 prostate cancer s/p robotic radical prostatectomy on 12/1/2017 with  no evidence of disease, but substantially higher risk than previously believed given positive lymph node and grade group 5    Plan:    High risk for prostate cancer would refer to rad onc for adjuvant radiation and possible hormone treatment.  Lives in [place] so would probably get treatment here. Possibly start in Feb/March 2018. Re-check PSA prior to starting radiation therapy.       Very high risk for recurrence and I would recommend adjuvant radiation      We discussed the anticipated time course for recovery of continence and the risk of PSA recurrence and the potential need for future treatment for the prostate cancer.    Monitor flank pain.       Follow up in 3 months    PSA (tumor marker)    Clinic Exam    Paulo Agarwal MD  Department of Urology  [facility]        "

## 2018-01-11 NOTE — MR AVS SNAPSHOT
After Visit Summary   1/11/2018    Juwan Latham    MRN: [id number]           Patient Information     Date Of Birth          1950        Visit Information        Provider Department      1/11/2018 9:00 AM Paulo Agarwal MD [facility] Urology and [facility] for Prostate and Urologic Cancers        Care Instructions    Blood work today.    Schedule appointment with Radiation Oncology (Dr. Lopez).    Follow up with Dr. Agarwal in 3 months with PSA prior to appointment.    It was a pleasure meeting with you today.  Thank you for allowing me and my team the privilege of caring for you today.  YOU are the reason we are here, and I truly hope we provided you with the excellent service you deserve.  Please let us know if there is anything else we can do for you so that we can be sure you are leaving completely satisfied with your care experience.      Eloina Murillo ALEX          Follow-ups after your visit        Your next 10 appointments already scheduled     Jan 11, 2018 10:30 AM CST   LAB with [facility] ([facility])    [address] [place] [address] [phone]           Please do not eat 10-12 hours before your appointment if you are coming in fasting for labs on lipids, cholesterol, or glucose (sugar). This does not apply to pregnant women. Water, hot tea and black coffee (with nothing added) are okay. Do not drink other fluids, diet soda or chew gum.            Noel 15, 2018  9:50 AM CST   [facility] with Nino Cunningham, PT   [facility] ([facility]  )    [address]  [address]  [place] [address]   [phone]            Jan 29, 2018  9:00 AM CST   Return Sleep Patient with Owen Davis MD   [facility] ([facility])    [address]  [place] [id number] [phone]            May 18, 2018  2:00 PM CDT   (Arrive by 1:45 PM)    CT CHEST W/O CONTRAST with UCCT2   [facility] ([facility])    [address]  1st Floor  [place] [address] [phone]           Please bring any scans or X-rays taken at other hospitals, if similar tests were done. Also bring a list of your medicines, including vitamins, minerals and over-the-counter drugs. It is safest to leave personal items at home.  Be sure to tell your doctor:   If you have any allergies.   If there s any chance you are pregnant.   If you are breastfeeding.   If you have any special needs.  You do not need to do anything special to prepare.  Please wear loose clothing, such as a sweat suit or jogging clothes. Avoid snaps, zippers and other metal. We may ask you to undress and put on a hospital gown.            May 18, 2018  3:00 PM CDT   (Arrive by 2:45 PM)   Return Visit with GLORIA Linton CNP   [facility] ([facility])    [address]  [address]  [place] [address] [phone]              Who to contact     Please call your clinic at [phone] to:    Ask questions about your health    Make or cancel appointments    Discuss your medicines    Learn about your test results    Speak to your doctor   If you have compliments or concerns about an experience at your clinic, or if you wish to file a complaint, please contact [facility] Patient Relations at [phone] or email us at [email or web address]         Additional Information About Your Visit        Ori Information     [employer] gives you secure access to your electronic health record. If you see a primary care provider, you can also send messages to your care team and make appointments. If you have questions, please call your primary care clinic.  If you do not have a primary care provider, please call [phone] and they will assist you.      Ori is an  "electronic gateway that provides easy, online access to your medical records. With [employer], you can request a clinic appointment, read your test results, renew a prescription or communicate with your care team.     To access your existing account, please contact your [facility] or call [phone] for assistance.        Care EveryWhere ID     This is your Care EveryWhere ID. This could be used by other organizations to access your [place] medical records  [id number]        Your Vitals Were     Pulse Height BMI (Body Mass Index)             67 1.727 m (5' 8\") 29.36 kg/m2          Blood Pressure from Last 3 Encounters:   01/11/18 142/81   12/22/17 133/67   12/21/17 124/72    Weight from Last 3 Encounters:   01/11/18 87.6 kg (193 lb 1.6 oz)   12/21/17 85.8 kg (189 lb 1.6 oz)   12/13/17 87.5 kg (193 lb)              Today, you had the following     No orders found for display         Today's Medication Changes          These changes are accurate as of: 1/11/18 10:18 AM.  If you have any questions, ask your nurse or doctor.               Stop taking these medicines if you haven't already. Please contact your care team if you have questions.     cyanocolbalamin 100 MCG tablet   Commonly known as:  vitamin  B-12   Stopped by:  Paulo Agarwal MD           fluticasone 50 MCG/ACT spray   Commonly known as:  FLONASE   Stopped by:  Paulo Agarwal MD           HYDROcodone-acetaminophen 5-325 MG per tablet   Commonly known as:  NORCO   Stopped by:  Paulo Agarwal MD           ibuprofen 400 MG tablet   Commonly known as:  ADVIL/MOTRIN   Stopped by:  Paulo Agarwal MD           ondansetron 4 MG tablet   Commonly known as:  ZOFRAN   Stopped by:  Paulo Agarwal MD           order for DME   Stopped by:  Paulo Agarwal MD           polyethylene glycol powder   Commonly known as:  MIRALAX   Stopped by:  Paulo Agarwal MD     "       sulfamethoxazole-trimethoprim 400-80 MG per tablet   Commonly known as:  BACTRIM/SEPTRA   Stopped by:  Paulo Agarwal MD           traMADol 50 MG tablet   Commonly known as:  ULTRAM   Stopped by:  Paulo Agarwal MD           VITAMIN D (CHOLECALCIFEROL) PO   Stopped by:  Paulo Agarwal MD                    Primary Care Provider Office Phone # Fax #    Julio W Chao Eaton [phone] [address]  [place] [address]        Equal Access to Services     [facility]: Hadii aad ku hadasho Soomaali, waaxda luqadaha, qaybta kaalmada adeegyada, waxay idiin hayaan adeeg kharash lathun . So [facility] [phone].    ATENCIÓN: Si habla español, tiene a gunn disposición servicios gratuitos de asistencia lingüística. Ll[facility] [phone].    We comply with applicable federal civil rights laws and [place] laws. We do not discriminate on the basis of race, color, national origin, age, disability, sex, sexual orientation, or gender identity.            Thank you!     Thank you for choosing [facility] UROLOGY AND [facility] FOR PROSTATE AND UROLOGIC CANCERS  for your care. Our goal is always to provide you with excellent care. Hearing back from our patients is one way we can continue to improve our services. Please take a few minutes to complete the written survey that you may receive in the mail after your visit with us. Thank you!             Your Updated Medication List - Protect others around you: Learn how to safely use, store and throw away your medicines at www.disposemymeds.org.          This list is accurate as of: 1/11/18 10:18 AM.  Always use your most recent med list.                   Brand Name Dispense Instructions for use Diagnosis    acetaminophen 325 MG tablet    TYLENOL    100 tablet    Take 2 tablets (650 mg) by mouth every 6 hours Do not take more than 4,000 mg of acetaminophen (Tylenol) from all sources to prevent liver damage.    Lung nodule        ALFALFA PO      Take by mouth daily        ALPRAZolam 0.5 MG tablet    XANAX    20 tablet    Take 1 tablet (0.5 mg) by mouth 3 times daily as needed for anxiety    Anxiety       LISINOPRIL PO      Take 10 mg by mouth daily        SUPER B COMPLEX PO      Take 1 tablet by mouth daily

## 2018-01-11 NOTE — LETTER
"1/11/2018       RE: Juwan Latham  [address]  [place] [address]     Dear Colleague,    Thank you for referring your patient, Juwan Latham, to the [facility] UROLOGY AND INST FOR PROSTATE AND UROLOGIC CANCERS at [facility]. Please see a copy of my visit note below.    Prostate Cancer Follow up after RRP     Juwan Latham is a very pleasant 67 year old male who presents with a history of prostate cancer.    Initial PSA:19.6  Pathologic German Score was 4 + 5  Grade Group:5  Biopsy [place] Score was 3 + 4  Pathologic Stage T2bc, N1, Mx.   Positive Margins: Yes Location:right side additional margin taken and negative  Number of Lymph Nodes Removed: 5  Number of Positive Lymph Nodes: 1  Patient is status post robotic radical prostatectomy on 12/1/2017.    Risk Group: High    Predicted progression free probability at 10 years: ~10  Predicted Cancer specific survival at 15 years: 80-90%  (J Clin Oncol. 2005 Oct 1;23(28):7243-12.  Http://nomograms.[facility].org/Prostate/PostRadicalProstatectomy.aspx)    There is no evidence of disease recurrence to date.    Physical Exam:  Vitals: /81  Pulse 67  Ht 1.727 m (5' 8\")  Wt 87.6 kg (193 lb 1.6 oz)  BMI 29.36 kg/m2  General Appearance Adult: A&O in NAD     Abd is soft, non-distended, incisions are healing well, no evidence of hernias.     He is tolerating a regular diet.     Today he reports urinary symptoms, primarily incontinence. He is wearing 8-9 pads per day. He is attending pelvic floor rehab and is doing Kegel's regularly. He notes some improvement in urinary control and reports a strong stream. He was recently seen in the ER for urinary retention on 12/22/17, but states he is doing much better since then. He continues to note mild left flank pain that was noticed prior to surgery, but this is not very bothersome to him.     PSA   Date Value Ref Range Status   01/11/2018 0.03 0 - 4 ug/L Final     Comment:     Assay " "Method:  Chemiluminescence using Siemens Vista analyzer         PSA  19.6 pre op    Assessment: [id number] prostate cancer s/p robotic radical prostatectomy on 12/1/2017 with  no evidence of disease, but substantially higher risk than previously believed    Plan:        We discussed the anticipated time course for recovery of continence and the risk of PSA recurrence and the potential need for future treatment for the prostate cancer.    Continue to monitor flank pain.       PSA (tumor marker) today     Reschedule visit with [facility]     Follow up in 3 months after the completion of radiation       Scribe Disclosure:   I,Сергей Shaikh, am serving as a scribe; to document services personally performed by Paulo Agarwal MD based on data collection and the provider's statements to me.     Attestation:  This patient was seen and evaluated by me, with a scribe taking notes.  I have reviewed the note above and agree.  The physical exam was performed by me and the pertinant details are outlined below.     Patient is a 67 year old  male   Vitals: Blood pressure 142/81, pulse 67, height 1.727 m (5' 8\"), weight 87.6 kg (193 lb 1.6 oz).  General Appearance Adult: Alert, no acute distress, oriented    Assessment: [id number] Grade Group 5 prostate cancer s/p robotic radical prostatectomy on 12/1/2017 with  no evidence of disease, but substantially higher risk than previously believed given positive lymph node and grade group 5    Plan:    High risk for prostate cancer would refer to rad onc for adjuvant radiation and possible hormone treatment.  Lives in [place] so would probably get treatment here. Possibly start in Feb/March 2018. Re-check PSA prior to starting radiation therapy.       Very high risk for recurrence and I would recommend adjuvant radiation      We discussed the anticipated time course for recovery of continence and the risk of PSA recurrence and the potential need for future treatment for the prostate " cancer.    Monitor flank pain.       Follow up in 3 months    PSA (tumor marker)    Clinic Exam    Paulo Agarwal MD  Department of Urology  [facility]

## 2018-01-11 NOTE — PATIENT INSTRUCTIONS
Blood work today.    Schedule appointment with Radiation Oncology (Dr. Lopez).    Follow up with Dr. Agarwal in 3 months with PSA prior to appointment.    It was a pleasure meeting with you today.  Thank you for allowing me and my team the privilege of caring for you today.  YOU are the reason we are here, and I truly hope we provided you with the excellent service you deserve.  Please let us know if there is anything else we can do for you so that we can be sure you are leaving completely satisfied with your care experience.      EULA Beatty

## 2018-01-11 NOTE — NURSING NOTE
"Chief Complaint   Patient presents with     RECHECK     ED follow up- urinary retention. S/P Robotic Assisted Radical Prostatectomy and Pelvic Lymph Node Dissection on 12-1-17.         Initial Ht 1.727 m (5' 8\") Estimated body mass index is 28.75 kg/(m^2) as calculated from the following:    Height as of 12/21/17: 1.727 m (5' 8\").    Weight as of 12/21/17: 85.8 kg (189 lb 1.6 oz).  Medication Reconciliation: complete     Eloina MURRAYA    "

## 2018-01-15 ENCOUNTER — THERAPY VISIT (OUTPATIENT)
Dept: PHYSICAL THERAPY | Facility: CLINIC | Age: 68
End: 2018-01-15
Payer: MEDICARE

## 2018-01-15 DIAGNOSIS — N39.46 MIXED INCONTINENCE URGE AND STRESS (MALE)(FEMALE): ICD-10-CM

## 2018-01-15 DIAGNOSIS — M99.05 SOMATIC DYSFUNCTION OF PELVIS REGION: ICD-10-CM

## 2018-01-15 PROCEDURE — 97110 THERAPEUTIC EXERCISES: CPT | Mod: GP | Performed by: PHYSICAL THERAPIST

## 2018-01-15 PROCEDURE — 97112 NEUROMUSCULAR REEDUCATION: CPT | Mod: GP | Performed by: PHYSICAL THERAPIST

## 2018-01-15 NOTE — PROGRESS NOTES
Subjective:  HPI                    Objective:  System    Physical Exam    General     ROS    Assessment/Plan:    SUBJECTIVE  Subjective changes as noted by pt:  Pt reports increased strength in pelvic floor muscles     Current pain level: 0/10     Changes in function:  Pt notes increased control at night. He can delay the need to urinate longer at night     Adverse reaction to treatment or activity:  None    OBJECTIVE  Changes in objective findings:  Practiced Kegel with sit to stand. Pt has difficulty holding Kegel when rising from a chair.         ASSESSMENT  Juwan continues to require intervention to meet STG and LTG's: PT  Patient's symptoms are resolving.  Response to therapy has shown an improvement in  strength and incontinence  Progress made towards STG/LTG?  Yes,     PLAN  Current treatment program is being advanced to more complex exercises.    PTA/ATC plan:  N/A    Please refer to the daily flowsheet for treatment today, total treatment time and time spent performing 1:1 timed codes.

## 2018-01-17 ENCOUNTER — TRANSFERRED RECORDS (OUTPATIENT)
Dept: HEALTH INFORMATION MANAGEMENT | Facility: CLINIC | Age: 68
End: 2018-01-17

## 2018-01-18 PROBLEM — C61 MALIGNANT NEOPLASM OF PROSTATE (H): Status: RESOLVED | Noted: 2017-09-08 | Resolved: 2018-01-18

## 2018-01-18 PROBLEM — C61 PROSTATE CANCER (H): Status: ACTIVE | Noted: 2017-12-01

## 2018-01-22 DIAGNOSIS — F41.9 ANXIETY: ICD-10-CM

## 2018-01-22 RX ORDER — ALPRAZOLAM 0.5 MG
0.5 TABLET ORAL 3 TIMES DAILY PRN
Qty: 20 TABLET | Refills: 0 | Status: SHIPPED | OUTPATIENT
Start: 2018-01-22 | End: 2018-02-28

## 2018-01-22 NOTE — TELEPHONE ENCOUNTER
Xanax      Last Written Prescription Date:  12/22/17  Last Fill Quantity: 20,   # refills: 0  Last Office Visit: 11/29/17  Future Office visit:       Routing refill request to provider for review/approval because:  Drug not on the [facility], [facility] or [facility] refill protocol or controlled substance  Kaylee Akins RN, BSN  [facility]

## 2018-01-23 ENCOUNTER — THERAPY VISIT (OUTPATIENT)
Dept: PHYSICAL THERAPY | Facility: CLINIC | Age: 68
End: 2018-01-23
Payer: MEDICARE

## 2018-01-23 DIAGNOSIS — M99.05 SOMATIC DYSFUNCTION OF PELVIS REGION: ICD-10-CM

## 2018-01-23 DIAGNOSIS — N39.46 MIXED INCONTINENCE URGE AND STRESS (MALE)(FEMALE): ICD-10-CM

## 2018-01-23 PROCEDURE — 97110 THERAPEUTIC EXERCISES: CPT | Mod: GP | Performed by: PHYSICAL THERAPIST

## 2018-01-23 PROCEDURE — 97112 NEUROMUSCULAR REEDUCATION: CPT | Mod: GP | Performed by: PHYSICAL THERAPIST

## 2018-01-23 NOTE — PROGRESS NOTES
Subjective:  HPI                    Objective:  System    Physical Exam    General     ROS    Assessment/Plan:    SUBJECTIVE  Subjective changes as noted by pt:  Pt reports increased strength pelvic floor musculature     Current pain level: 0/10     Changes in function:  Pt reports decreased leakage and pt can delay need to urinate during the day longer     Adverse reaction to treatment or activity:  None    OBJECTIVE  Changes in objective findings:  Pt demonstrated a good contraction of the pelvic floor muscles in supine, sitting and sit to stand. Pt has difficulty maintaining a contraction while standing.         ASSESSMENT  Juwan continues to require intervention to meet STG and LTG's: PT  Patient's symptoms are resolving.  Response to therapy has shown an improvement in  strength and incontinence  Progress made towards STG/LTG?  Yes,     PLAN  Current treatment program is being advanced to more complex exercises.    PTA/ATC plan:  N/A    Please refer to the daily flowsheet for treatment today, total treatment time and time spent performing 1:1 timed codes.

## 2018-01-29 ENCOUNTER — OFFICE VISIT (OUTPATIENT)
Dept: SLEEP MEDICINE | Facility: CLINIC | Age: 68
End: 2018-01-29
Payer: COMMERCIAL

## 2018-01-29 VITALS
SYSTOLIC BLOOD PRESSURE: 119 MMHG | WEIGHT: 193 LBS | DIASTOLIC BLOOD PRESSURE: 76 MMHG | BODY MASS INDEX: 29.25 KG/M2 | HEIGHT: 68 IN | RESPIRATION RATE: 18 BRPM | OXYGEN SATURATION: 97 % | HEART RATE: 56 BPM

## 2018-01-29 DIAGNOSIS — G47.33 OSA (OBSTRUCTIVE SLEEP APNEA): Primary | ICD-10-CM

## 2018-01-29 PROCEDURE — 99214 OFFICE O/P EST MOD 30 MIN: CPT | Performed by: INTERNAL MEDICINE

## 2018-01-29 NOTE — NURSING NOTE
"Chief Complaint   Patient presents with     RECHECK     f/u cpap       Initial /76  Pulse 56  Resp 18  Ht 1.727 m (5' 8\")  Wt 87.5 kg (193 lb)  SpO2 97%  BMI 29.35 kg/m2 Estimated body mass index is 29.35 kg/(m^2) as calculated from the following:    Height as of this encounter: 1.727 m (5' 8\").    Weight as of this encounter: 87.5 kg (193 lb).  Medication Reconciliation: complete         Sissy Oscar LPN/SHALA  "

## 2018-01-29 NOTE — MR AVS SNAPSHOT
After Visit Summary   1/29/2018    Juwan Latham    MRN: [id number]           Patient Information     Date Of Birth          1950        Visit Information        Provider Department      1/29/2018 9:00 AM Owen Davis MD [facility]        Today's Diagnoses     PAULINO (obstructive sleep apnea)    -  1      Care Instructions    MY TREATMENT INFORMATION FOR SLEEP APNEA-  Juwan Latham    MY CONTACT NUMBERS ARE:  [phone]  DOCTOR : Owen Davis  SLEEP CENTER :  [place]  CPAP EQUIPMENT     Your sleep apnea is controlled with CPAP.   Continue use of CPAP:  - Recommend using CPAP every night for at least 7-8 hours each night  - Daytime naps are not advised, but use CPAP if taking naps.    Objective measure goal  Compliance  Goal >70% (preferrably 100%)  Leak   Goal < 10% (less than 24 L/min)  AHI  Goal < 5 events per hour   Usage  Goal 7-8 hours.      Patient was advised not to drive if drowsy or sleepy.      Follow up in 6 months.          Your BMI is Body mass index is 29.35 kg/(m^2).  Weight management is a personal decision.  If you are interested in exploring weight loss strategies, the following discussion covers the approaches that may be successful. Body mass index (BMI) is one way to tell whether you are at a healthy weight, overweight, or obese. It measures your weight in relation to your height.  A BMI of 18.5 to 24.9 is in the healthy range. A person with a BMI of 25 to 29.9 is considered overweight, and someone with a BMI of 30 or greater is considered obese. More than two-thirds of American adults are considered overweight or obese.  Being overweight or obese increases the risk for further weight gain. Excess weight may lead to heart disease and diabetes.  Creating and following plans for healthy eating and physical activity may help you improve your health.  Weight control is part of healthy lifestyle and includes exercise, emotional health,  and healthy eating habits. Careful eating habits lifelong are the mainstay of weight control. Though there are significant health benefits from weight loss, long-term weight loss with diet alone may be very difficult to achieve- studies show long-term success with dietary management in less than 10% of people. Attaining a healthy weight may be especially difficult to achieve in those with severe obesity. In some cases, medications, devices and surgical management might be considered.  What can you do?  If you are overweight or obese and are interested in methods for weight loss, you should discuss this with your provider.     Consider reducing daily calorie intake by 500 calories.     Keep a food journal.     Avoiding skipping meals, consider cutting portions instead.    Diet combined with exercise helps maintain muscle while optimizing fat loss. Strength training is particularly important for building and maintaining muscle mass. Exercise helps reduce stress, increase energy, and improves fitness. Increasing exercise without diet control, however, may not burn enough calories to loose weight.       Start walking three days a week 10-20 minutes at a time    Work towards walking thirty minutes five days a week     Eventually, increase the speed of your walking for 1-2 minutes at time    In addition, we recommend that you review healthy lifestyles and methods for weight loss available through the National Institutes of Health patient information sites:  http://win.niddk.nih.gov/publications/index.htm    And look into health and wellness programs that may be available through your health insurance provider, employer, local community center, or ondina club.    Weight management plan: Patient was referred to their PCP to discuss a diet and exercise plan.     Your blood pressure was checked while you were in clinic today.  Please read the guidelines below about what these numbers mean and what you should do about  them.  Your systolic blood pressure is the top number.  This is the pressure when the heart is pumping.  Your diastolic blood pressure is the bottom number.  This is the pressure in between beats.  If your systolic blood pressure is less than 120 and your diastolic blood pressure is less than 80, then your blood pressure is normal. There is nothing more that you need to do about it  If your systolic blood pressure is 120-139 or your diastolic blood pressure is 80-89, your blood pressure may be higher than it should be.  You should have your blood pressure re-checked within a year by a primary care provider.  If your systolic blood pressure is 140 or greater or your diastolic blood pressure is 90 or greater, you may have high blood pressure.  High blood pressure is treatable, but if left untreated over time it can put you at risk for heart attack, stroke, or kidney failure.  You should have your blood pressure re-checked by a primary care provider within the next four weeks.              Follow-ups after your visit        Your next 10 appointments already scheduled     Feb 02, 2018 11:30 AM CST   [facility] with Nino Cunningham PT   MAMADOU LISSETTE PIERSON PT ([facility]  )    [address] [address] [address]   [phone]            May 18, 2018  2:00 PM CDT   (Arrive by 1:45 PM)   CT CHEST W/O CONTRAST with UCCT2   [facility] ([facility])    [address] [id number] [phone]           Please bring any scans or X-rays taken at other hospitals, if similar tests were done. Also bring a list of your medicines, including vitamins, minerals and over-the-counter drugs. It is safest to leave personal items at home.  Be sure to tell your doctor:   If you have any allergies.   If there s any chance you are pregnant.   If you are breastfeeding.   If you have any special needs.  You do not need to do anything special to  "prepare.  Please wear loose clothing, such as a sweat suit or jogging clothes. Avoid snaps, zippers and other metal. We may ask you to undress and put on a hospital gown.            May 18, 2018  3:00 PM CDT   (Arrive by 2:45 PM)   Return Visit with GLORIA Linton [facility] ([facility])    [address]  [address]  [facility] [address] [phone]              Who to contact     If you have questions or need follow up information about today's clinic visit or your schedule please contact [facility] directly at [phone].  Normal or non-critical lab and imaging results will be communicated to you by Clear Standardshart, letter or phone within 4 business days after the clinic has received the results. If you do not hear from us within 7 days, please contact the clinic through Bonterat or phone. If you have a critical or abnormal lab result, we will notify you by phone as soon as possible.  Submit refill requests through Afluenta or call your pharmacy and they will forward the refill request to us. Please allow 3 business days for your refill to be completed.          Additional Information About Your Visit        A[employer]enta Information     [employer] gives you secure access to your electronic health record. If you see a primary care provider, you can also send messages to your care team and make appointments. If you have questions, please call your primary care clinic.  If you do not have a primary care provider, please call [phone] and they will assist you.        Care EveryWhere ID     This is your Care EveryWhere ID. This could be used by other organizations to access your [facility] medical records  [id number]        Your Vitals Were     Pulse Respirations Height Pulse Oximetry BMI (Body Mass Index)       56 18 1.727 m (5' 8\") 97% 29.35 kg/m2        Blood Pressure from Last 3 Encounters:   01/29/18 119/76   01/11/18 " 142/81   12/22/17 133/67    Weight from Last 3 Encounters:   01/29/18 87.5 kg (193 lb)   01/11/18 87.6 kg (193 lb 1.6 oz)   12/21/17 85.8 kg (189 lb 1.6 oz)              We Performed the Following     Comprehensive DME        Primary Care Provider Office Phone # Fax #    Julio Guidry Jr., [phone] [address] [place] [address]        Equal Access to Services     CHAMP SNIDER : Hadii aad ku hadasho Soomaali, waaxda luqadaha, qaybta kaalmada adeegyada, waxay idiin hayaan adeeg kharash lapily . So [facility] [phone].    ATENCIÓN: Si habla español, tiene a gunn disposición servicios gratuitos de asistencia lingüística. [facility] [phone].    We comply with applicable federal civil rights laws and [place] laws. We do not discriminate on the basis of race, color, national origin, age, disability, sex, sexual orientation, or gender identity.            Thank you!     Thank you for choosing [facility]  for your care. Our goal is always to provide you with excellent care. Hearing back from our patients is one way we can continue to improve our services. Please take a few minutes to complete the written survey that you may receive in the mail after your visit with us. Thank you!             Your Updated Medication List - Protect others around you: Learn how to safely use, store and throw away your medicines at www.disposemymeds.org.          This list is accurate as of 1/29/18  9:12 AM.  Always use your most recent med list.                   Brand Name Dispense Instructions for use Diagnosis    acetaminophen 325 MG tablet    TYLENOL    100 tablet    Take 2 tablets (650 mg) by mouth every 6 hours Do not take more than 4,000 mg of acetaminophen (Tylenol) from all sources to prevent liver damage.    Lung nodule       ALFALFA PO      Take by mouth daily        ALPRAZolam 0.5 MG tablet    XANAX    20 tablet    Take 1 tablet (0.5 mg) by mouth 3 times daily as needed  for anxiety    Anxiety       LISINOPRIL PO      Take 10 mg by mouth daily        SUPER B COMPLEX PO      Take 1 tablet by mouth daily

## 2018-01-29 NOTE — PROGRESS NOTES
[facility]  January 29, 2018      Name: Juwan Latham MRN# [id number]   Age: 67 year old YOB: 1950   Date of visit: January 29, 2018  Primary care provider: Julio Guidry Jr.         Assessment and Plan:     1. Obstructive Sleep Apnea:  - Non compliance of PAP use.  - Clinical response: fair  - Mouth breather, he uses full face.  - Recommend using CPAP every night for at least 7-8 hours each night  - Daytime naps are not advised, but use CPAP if taking naps  - Patient was advised not to drive if drowsy or sleepy.  - Follow up in sleep clinic in 6 months.      Orders Placed This Encounter   Procedures     Comprehensive DME       Summary Counseling:  New sleep schedule recommendation: given    Check out [email or web address].aasmnet.org/    All questions were answered.  The patient indicates understanding of the above issues and agrees with the plan set forth.           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 Please see H&P for his presenting sleep symptoms for additional details.  Patient is diagnosed sleep apnea on 5/19/13 and had repeat sleep study on 4/4/17 and was prescribed auto-CPAP pressure of 8-16 cmH2O, and was setup at PAP therapy on 4/14/17.   He/she is using his PAP therapy and has good benefits.  He denies snoring with CPAP machine use.  His first visit, he was not compliant of his PAP use but one repeat sleep study done and was re-educated PAP use and on 7/26/17 virtual visit he was 80% compliant of it's PAP use. He was recently diagnosed prostate cancer s/p surgery on 7/17 and starting radiation this week.  Today, he sporadically using his CPAP due to his medical condition related to prostate  cancer and going to bathroom every 2 hours and his compliance again is low at 30% and missed 7 days use for the last 1 month.         PREVIOUS SLEEP STUDIES:  PS17  Sleep latency 72.3 minutes without sleep aid.    REM achieved.   REM latency 112 minutes.    Sleep efficiency 65%. Total sleep time 331 minutes.  Sleep architecture:  Stage 1, 13.4% (5%), stage 2, 67.2% (45-55%), stage 3, N/A% (15-20%), stage REM, 19.3% (20-25%).    AHI was 18.5/hour.   RDI 18.9/hour.    REM AHI 30.9/hour.    Supine AHI N/A..    Lowest O2 saturation:84.6%  S/He spent 1.1 minutes below 89% SpO2.   Periodic Limb Movement Index 48.4/hour.       Date: 2013  AHI: 14.6/hr  Intervention: oral device  Lowest O 2 sat: 82%    CPAP Compliance:  Dates:  2017- 2018       33 % >4hour use   Days used:   Average daily use (days used): 3.17 hrs  Leak 95 percentile: 18.5L/min  Residual AHI: 1.6/hr  Pressure:  8.16 cmH2O  95% percentile pressure: 12.1 cmH2O    [place] Sleepiness Scale score today:  Pre-PAP [place] Sleepiness Scale score: /24    PAP machine: ResMed    Interface:  Mask: full face  Chin strap: No  Leak: very little, puts very tight  Humidity: Yes    Difficulties with therapy:    [-] Difficulty tolerating the pressure  [-] Epistaxis:   [-] Nasal congestion   [x] Dry mouth:  [x] Mouth breathing:   [-] Pain/skin breakdown:     Improvements noted with CPAP:   [-] waking up more refreshed  [-] sleeping better with less arousals  [+] nocturia improved   [-] improved energy level during the day    SLEEP-WAKE SCHEDULE: unchanged    Other sleep problems: None     Drowsy driving / near incidents: No    Medications that affect sleep: Xanax prn            Medications:     Current Outpatient Prescriptions   Medication Sig     ALPRAZolam (XANAX) 0.5 MG tablet Take 1 tablet (0.5 mg) by mouth 3 times daily as needed for anxiety     B Complex-C (SUPER B COMPLEX PO) Take 1 tablet by mouth daily     LISINOPRIL PO Take 10 mg by  mouth daily      ALFALFA PO Take by mouth daily     acetaminophen (TYLENOL) 325 MG tablet Take 2 tablets (650 mg) by mouth every 6 hours Do not take more than 4,000 mg of acetaminophen (Tylenol) from all sources to prevent liver damage.     No current facility-administered medications for this visit.         Allergies   Allergen Reactions     Percocet [Oxycodone-Acetaminophen] GI Disturbance     Severe constipation            Past Medical History:     Past Medical History:   Diagnosis Date     Anxiety      Arthritis     fingers, hips     Calculus of kidney 2005, 2012     Congenital single kidney      Gastroesophageal reflux disease      Hx of cancer of lung 03/2017    wedge resection     Hypertension      Prostate cancer (H) 08/2017     Seasonal allergies     spring and fall     Sleep apnea     cpap             Past Surgical History:      Past Surgical History:   Procedure Laterality Date     BRONCHOSCOPY FLEXIBLE N/A 3/3/2017    Procedure: BRONCHOSCOPY FLEXIBLE;  Surgeon: Maria A Desai MD;  Location: [facility] OR     COLONOSCOPY N/A 2/11/2015    Procedure: COLONOSCOPY;  Surgeon: Murphy Jesus MD;  Location:  GI     DAVINCI PROSTATECTOMY N/A 12/1/2017    Procedure: DAVINCI PROSTATECTOMY;  Robotic Assisted Radical Prostatectomy and Pelvic Lymph Node Dissection ;  Surgeon: Paulo Agarwal MD;  Location:  OR     ESOPHAGOSCOPY, GASTROSCOPY, DUODENOSCOPY (EGD), COMBINED N/A 5/20/2016    Procedure: COMBINED ESOPHAGOSCOPY, GASTROSCOPY, DUODENOSCOPY (EGD), BIOPSY SINGLE OR MULTIPLE;  Surgeon: Murphy Jesus MD;  Location:  GI     LAPAROSCOPIC WEDGE RESECTION LUNG Right 3/3/2017    Procedure: LAPAROSCOPIC WEDGE RESECTION LUNG;  Surgeon: Maria A Desai MD;  Location: [facility] OR     LASER HOLMIUM LITHOTRIPSY URETER(S), INSERT STENT, COMBINED  9/11/2012    Procedure: COMBINED CYSTOSCOPY, URETEROSCOPY, LASER HOLMIUM LITHOTRIPSY URETER(S), INSERT STENT;  Cystoscopy, Right Ureteroscopy, Stone Extraction, Holmium  Laser, Double J Stent Placement;  Surgeon: Nicolás Blackwell MD;  Location:  OR       Social History   Substance Use Topics     Smoking status: Former Smoker     Packs/day: 2.00     Years: 20.00     Types: Cigarettes     Quit date: 1/1/1985     Smokeless tobacco: Never Used     Alcohol use Yes      Comment: avg 3 beers per night       Family History   Problem Relation Age of Onset     HEART DISEASE Mother      DIABETES Brother 65     Type 2     DIABETES Maternal Grandmother      Cancer - colorectal Brother 70     Hypertension No family hx of      Lipids No family hx of             Physical Examination:   There were no vitals taken for this visit.            Data: All pertinent previous laboratory data reviewed     No results found for: PH, PHARTERIAL, PO2, [id number], SAT, PCO2, HCO3, BASEEXCESS, RISHI, BEB  Lab Results   Component Value Date    TSH 1.82 07/13/2017    TSH 1.42 05/24/2016     Lab Results   Component Value Date     (H) 12/22/2017     (H) 12/02/2017     Lab Results   Component Value Date    HGB 15.0 12/22/2017    HGB 14.2 12/22/2017     Lab Results   Component Value Date    BUN 14 12/22/2017    BUN 15 12/02/2017    CR 0.92 12/02/2017    CR 0.84 12/01/2017     No results found for: ABDELRAHMAN      He will follow up with me in about 6 month(s).         Copy to: Julio Guidry Jr., MD 1/29/2018     [facility]  [id number] [place] [id number]       Twenty-five minutes spent with patient, all of which were spent face-to-face counseling, consulting, coordinating plan of care and going over PAP download/sleep study and chart review.

## 2018-01-31 ENCOUNTER — OFFICE VISIT (OUTPATIENT)
Dept: FAMILY MEDICINE | Facility: CLINIC | Age: 68
End: 2018-01-31
Payer: COMMERCIAL

## 2018-01-31 VITALS
TEMPERATURE: 98.3 F | BODY MASS INDEX: 29.7 KG/M2 | SYSTOLIC BLOOD PRESSURE: 110 MMHG | OXYGEN SATURATION: 98 % | WEIGHT: 196 LBS | HEIGHT: 68 IN | HEART RATE: 60 BPM | DIASTOLIC BLOOD PRESSURE: 62 MMHG

## 2018-01-31 DIAGNOSIS — G47.33 OSA (OBSTRUCTIVE SLEEP APNEA): ICD-10-CM

## 2018-01-31 DIAGNOSIS — F41.9 ANXIETY: Primary | ICD-10-CM

## 2018-01-31 DIAGNOSIS — C61 PROSTATE CANCER (H): ICD-10-CM

## 2018-01-31 PROCEDURE — 99214 OFFICE O/P EST MOD 30 MIN: CPT | Performed by: FAMILY MEDICINE

## 2018-01-31 NOTE — NURSING NOTE
"Chief Complaint   Patient presents with     Lung Nodule       Initial /62  Pulse 60  Temp 98.3  F (36.8  C) (Oral)  Ht 5' 8\" (1.727 m)  Wt 196 lb (88.9 kg)  SpO2 98%  BMI 29.8 kg/m2 Estimated body mass index is 29.8 kg/(m^2) as calculated from the following:    Height as of this encounter: 5' 8\" (1.727 m).    Weight as of this encounter: 196 lb (88.9 kg).  Medication Reconciliation: complete  "

## 2018-01-31 NOTE — MR AVS SNAPSHOT
After Visit Summary   1/31/2018    Juwan Latham    MRN: [id number]           Patient Information     Date Of Birth          1950        Visit Information        Provider Department      1/31/2018 3:40 PM Julio Guidry Jr., MD [facility] [place]        Today's Diagnoses     Anxiety    -  1    Prostate cancer (H)           Follow-ups after your visit        Follow-up notes from your care team     Return in about 4 weeks (around 2/28/2018) for recheck anxiety.      Your next 10 appointments already scheduled     Feb 02, 2018 11:30 AM CST   [facility] with Nino Cunningham, PT   [facility] LISSETTE PIERSON PT ([facility]  )    [address]  [address]  [place] [facility] [address]   [phone]            May 18, 2018  2:00 PM CDT   (Arrive by 1:45 PM)   CT CHEST W/O CONTRAST with UCCT2   [facility] [place] [facility] ([facility])    [address]  1st [address]  [place] [address] [phone]           Please bring any scans or X-rays taken at other hospitals, if similar tests were done. Also bring a list of your medicines, including vitamins, minerals and over-the-counter drugs. It is safest to leave personal items at home.  Be sure to tell your doctor:   If you have any allergies.   If there s any chance you are pregnant.   If you are breastfeeding.   If you have any special needs.  You do not need to do anything special to prepare.  Please wear loose clothing, such as a sweat suit or jogging clothes. Avoid snaps, zippers and other metal. We may ask you to undress and put on a hospital gown.            May 18, 2018  3:00 PM CDT   (Arrive by 2:45 PM)   Return Visit with GLORIA Linton CNP   [facility] ([facility] [place])    [address]  [address]  [place] [address] [phone]              Who to contact     If you have questions or need follow up information about today's clinic  "visit or your schedule please contact [facility] [place] directly at [phone].  Normal or non-critical lab and imaging results will be communicated to you by MyChart, letter or phone within 4 business days after the clinic has received the results. If you do not hear from us within 7 days, please contact the clinic through [employer]One[employer] or phone. If you have a critical or abnormal lab result, we will notify you by phone as soon as possible.  Submit refill requests through BRAIN or call your pharmacy and they will forward the refill request to us. Please allow 3 business days for your refill to be completed.          Additional Information About Your Visit        IgnitionOne[employer] Information     BRAIN gives you secure access to your electronic health record. If you see a primary care provider, you can also send messages to your care team and make appointments. If you have questions, please call your primary care clinic.  If you do not have a primary care provider, please call [phone] and they will assist you.        Care EveryWhere ID     This is your Care EveryWhere ID. This could be used by other organizations to access your [place] medical records  [id number]        Your Vitals Were     Pulse Temperature Height Pulse Oximetry BMI (Body Mass Index)       60 98.3  F (36.8  C) (Oral) 5' 8\" (1.727 m) 98% 29.8 kg/m2        Blood Pressure from Last 3 Encounters:   01/31/18 110/62   01/29/18 119/76   01/11/18 142/81    Weight from Last 3 Encounters:   01/31/18 196 lb (88.9 kg)   01/29/18 193 lb (87.5 kg)   01/11/18 193 lb 1.6 oz (87.6 kg)              Today, you had the following     No orders found for display         Today's Medication Changes          These changes are accurate as of 1/31/18  4:23 PM.  If you have any questions, ask your nurse or doctor.               Start taking these medicines.        Dose/Directions    sertraline 50 MG tablet   Commonly known as:  ZOLOFT   Used for:  Anxiety   Started by:  " Julio Guidry Jr., MD        Take 1/2 tablet (25 mg) for 1-2 weeks, then increase to 1 tablet orally daily   Quantity:  30 tablet   Refills:  0            Where to get your medicines      These medications were sent to [facility] - [place], [place] - [address] AT [facility] [address]  [address] [address]     Phone:  [phone]     sertraline 50 MG tablet                Primary Care Provider Office Phone # Fax #    Julio Guidry Jr., [phone] [address]  [place] [address]        Equal Access to Services     [facility]: Hadii aad ku hadasho Soomaali, waaxda luqadaha, qaybta kaalmada adeegyada, mynor mosley . So [facility] [phone].    ATENCIÓN: Si habla español, tiene a gunn disposición servicios gratuitos de asistencia lingüística. Llame al [phone].    We comply with applicable federal civil rights laws and [place] laws. We do not discriminate on the basis of race, color, national origin, age, disability, sex, sexual orientation, or gender identity.            Thank you!     Thank you for choosing [facility]  for your care. Our goal is always to provide you with excellent care. Hearing back from our patients is one way we can continue to improve our services. Please take a few minutes to complete the written survey that you may receive in the mail after your visit with us. Thank you!             Your Updated Medication List - Protect others around you: Learn how to safely use, store and throw away your medicines at www.disposemymeds.org.          This list is accurate as of 1/31/18  4:23 PM.  Always use your most recent med list.                   Brand Name Dispense Instructions for use Diagnosis    acetaminophen 325 MG tablet    TYLENOL    100 tablet    Take 2 tablets (650 mg) by mouth every 6 hours Do not take more than 4,000 mg of acetaminophen (Tylenol) from all sources to prevent liver  damage.    Lung nodule       ALFALFA PO      Take by mouth daily        ALPRAZolam 0.5 MG tablet    XANAX    20 tablet    Take 1 tablet (0.5 mg) by mouth 3 times daily as needed for anxiety    Anxiety       LISINOPRIL PO      Take 10 mg by mouth daily        sertraline 50 MG tablet    ZOLOFT    30 tablet    Take 1/2 tablet (25 mg) for 1-2 weeks, then increase to 1 tablet orally daily    Anxiety       SUPER B COMPLEX PO      Take 1 tablet by mouth daily

## 2018-01-31 NOTE — PROGRESS NOTES
"  SUBJECTIVE:   Juwan Latham is a 67 year old male who presents to clinic today for the following health issues:    CT of his chest from 11/15/17:  1. Postoperative changes in the right upper lobe with a persistent soft tissue mass or lung consolidation which is decreasing in size.   2. Hepatic steatosis.   3. Unchanged sub-5 mm nodule in the left lower lobe, compared to CT dated 1/28/2016. I have personally reviewed the examination and initial interpretation and I agree with the findings. JULIUS BENAVIDES MD      Chest pain/\"gurgling\"- Pt feels like his lungs are filling with fluid. He has hx of lung cancer diagnosed in March of 2017, had surgery to treat this. He was seen by Oncology 2-3 months ago, CT showed nothing new, see above for CT scan findings done on 11/15/17. Pt denies cough, fever, chest pain or SOB. He notes he occasionally feels gurgling sensation in right side of his chest, worse when he is laying down. Pt had chili yesterday and experienced acid reflux symptoms. He notes certain foods do not exacerbate his chest gurgling. Pt has occasionally rhinorrhea. Pt has family hx of heart disease.    Anxiety- Pt has tried Effexor and Zoloft in 2016 and 2017 respectively. He does not remember why we stopped these, he thinks he had allergic response to Effexor which is consistent what is on file. He is unsure though why we stopped Zoloft on February 9th 2017. He still takes Xanax, does not exacerbate his chest symptoms. He takes 0.5 tablet as needed, helps him calm done.    Prostate cancer- Pt did first radiation therapy today with Dr. Agarwal for his prostate cancer, did not have any problems, has 38 more days to go with radiation.      Problem list and histories reviewed & adjusted, as indicated.  Additional history: as documented    BP Readings from Last 3 Encounters:   01/31/18 110/62   01/29/18 119/76   01/11/18 142/81    Wt Readings from Last 3 Encounters:   01/31/18 88.9 kg (196 lb)   01/29/18 87.5 " "kg (193 lb)   01/11/18 87.6 kg (193 lb 1.6 oz)        Reviewed and updated as needed this visit by clinical staff  Allergies  Meds  Med Hx       Reviewed and updated as needed this visit by Provider       ROS:  Constitutional, HEENT, cardiovascular, pulmonary, gi and gu systems are negative, except as otherwise noted.    This document serves as a record of the services and decisions personally performed and made by Julio Guidry MD. It was created on his behalf by Mark Pennington, a trained medical scribe. The creation of this document is based on the provider's statements to the medical scribe.  Mark Pennington 4:06 PM January 31, 2018    OBJECTIVE:     /62  Pulse 60  Temp 98.3  F (36.8  C) (Oral)  Ht 1.727 m (5' 8\")  Wt 88.9 kg (196 lb)  SpO2 98%  BMI 29.8 kg/m2  Body mass index is 29.8 kg/(m^2).  GENERAL: healthy, alert and no distress  HENT: ear canals and TM's normal, nose and mouth without ulcers or lesions  NECK: no adenopathy, no asymmetry, masses, or scars and thyroid normal to palpation  RESP: lungs clear to auscultation - no rales, rhonchi or wheezes  CV: regular rate and rhythm, normal S1 S2, no S3 or S4, no murmur, click or rub, no peripheral edema and peripheral pulses strong  PSYCH: mentation appears normal, affect normal/bright    Diagnostic Test Results:  none     ASSESSMENT/PLAN:     (F41.9) Anxiety  (primary encounter diagnosis)  Comment: Discussed with pt given his CT scan done on 11/15/17 did not show any significant findings or significant changes from his previous CT scan done on 1/28/16, recommended we simply continue to monitor his symptoms. I also discussed with pt that I am curious if his anxiety is contributing to his chest symptoms. He has been on Zoloft in 2017 but is unclear why we stopped this. Recommended we restart his Zoloft which he agreed to do; discussed nausea, HA's, and dizziness as possible side effects for first 10 days of being on it. However, I discussed " with him giving Zoloft at least 2-3 weeks to be effective. Will have him follow up in 1 month to recheck his anxiety and chest symptoms; if his anxiety is improving, will increase his Zoloft dose. Advised him to follow up immediately with ED if his chest symptoms worsen, or if he develops onset of SOB with his chest symptoms.  Plan: sertraline (ZOLOFT) 50 MG tablet        Follow up in 1 month for recheck.    (C61) Prostate cancer (H)  Comment: Pt following up to do radiation therapy with Dr. Agarwal (urologic oncology) for his prostate cancer, had first radiation therapy today.  Plan: Follow up if needed.    (G47.33) PAULINO (obstructive sleep apnea)  Comment: Stable, pt has CPAP machine he uses frequently, advised him to continue to use.  Plan: Follow up if needed.    The information in this document, created by the medical scribe for me, accurately reflects the services I personally performed and the decisions made by me. I have reviewed and approved this document for accuracy prior to leaving the patient care area.  January 31, 2018 4:06 PM    Julio Guidry Jr, MD  [facility]

## 2018-02-09 ENCOUNTER — CARE COORDINATION (OUTPATIENT)
Dept: CARE COORDINATION | Facility: CLINIC | Age: 68
End: 2018-02-09

## 2018-02-09 NOTE — PROGRESS NOTES
"Clinic Care Coordination Contact  Care Team Conversations    Pt called writer to discuss his recent OV with PCP.  He is concerned with a feeling of fullness in his chest that he can't quite describe, but thinks that it is similar to the feeling he had when he was diagnosed with CA previously.  He reports that it has become more prominent to him in the past week or so, and he would like to follow up with the surgeon that did his previous surgery (note wedge resection 03/17).  Reviewed chart with pt and provided him with contact information to Dr. Desai's office as he said he was advised to contact them with any future concerns.  Discussed speaking with PCP again as well since he feels that this \"feeling\" of fullness seems to be getting worse.  He is in agreement with speaking with PCP first unless it seems to get worse again next week (PCP out of office next week). He will contact Dr. Desai at that time if he feels it is warranted.  Writer will check in with him end of next week.  Pt in agreement with plan.      "

## 2018-02-12 ENCOUNTER — TELEPHONE (OUTPATIENT)
Dept: SURGERY | Facility: CLINIC | Age: 68
End: 2018-02-12

## 2018-02-12 NOTE — TELEPHONE ENCOUNTER
"Voicemail received on Monday 2/12/18:   I would like to talk to Carla or \"Shobha\".  I had lung cancer last year and I think it has returned.  Please call me at [phone]    Note in patient's chart he has already spoken to his PCP and was asked to call Dr. Desai's office.    Person calling: Patient  Juwan Latham    Reason for call: symptoms: Not listed in Voicemail    Action taken: routed this encounter to the following provider(s):  aLnie Felder NP, Carla Faulkner NP, Eliza Lott NP and Dr. Maria A Desai    "

## 2018-02-13 ENCOUNTER — TELEPHONE (OUTPATIENT)
Dept: SURGERY | Facility: CLINIC | Age: 68
End: 2018-02-13

## 2018-02-13 NOTE — TELEPHONE ENCOUNTER
"Call placed to Juwan as he contacted triage concerned that he thinks his lung cancer has returned.    Juwan reports that he feels like there is something in his right lung-unable to describe the sensation other than to say it feels \"like something is in there-kind of heavy feeling\". He says this sensation started about 3 weeks ago. He says the sensation is more pronounced when he is laying down and it is worse in the afternoon.    He denies SOB, cough, unexplained fatigue (currently going through radiation therapy for prostate cancer), and denies decrease in appetite or weight loss. He is afebrile.     He is currently scheduled to see Eliza in May with a chest CT. He would feel better if we move these appointments up because he says the symptoms he is having now were similar to before when he was diagnosed with lung cancer.    I will have the schedulers call to get his appointments moved up.  "

## 2018-02-15 ENCOUNTER — CARE COORDINATION (OUTPATIENT)
Dept: CARE COORDINATION | Facility: CLINIC | Age: 68
End: 2018-02-15

## 2018-02-15 NOTE — PROGRESS NOTES
Clinic Care Coordination Contact  OUTREACH    Referral Information:  Referral Source: PCP  Reason for Contact: follow up  Care Conference: No     Universal Utilization:   ED Visits in last year: 1  Hospital visits in last year: 1  Last PCP appointment: 01/31/18  Missed Appointments: 0  Concerns: anxiety management  Multiple Providers or Specialists: urology    Clinical Concerns:  Current Medical Concerns: Pt reports that he is still having the intermittent feeling of tightness in his chest, has discussed with Dr. Desai's office and has CT and f/u visit planned with him on 03/09. He reports that he is starting to see improvement in urination and only has intermittent dribbling.  He denies any pain or new symptoms.  He reports that he is managing his stress levels and that his anxiety is a little better with having a CT planned to check his chest.  No other new concerns on today's call.     Current Behavioral Concerns: none    Education Provided to patient: anxiety management, upcoming testing, ongoing CC support   Clinical Pathway Name: None    Medication Management:  No concerns     Functional Status:  Mobility Status: Independent   Transportation: no concerns      Psychosocial:  Current living arrangement: I live in a private home  Financial/Insurance: no concerns     Resources and Interventions:  Current Resources: no concerns   Advanced Care Plans/Directives on file: No        Goals:   Goal 1 Statement: I will work on managing my stress over the next couple of months.  Goal 1 Supportive Steps: Pt to work on good sleep hygiene and self care.  Goal 1 Progression Percent: 80%  Goal 1 Progression Date: 02/09/18     Upcoming appointment:  (NA)     Plan: Will follow up with pt after CT scan in March, he will call with any concerns prior to that time.  Pt in agreement with plan.  Routing note to PCP to inform of upcoming CT scan.

## 2018-02-22 ENCOUNTER — TELEPHONE (OUTPATIENT)
Dept: FAMILY MEDICINE | Facility: CLINIC | Age: 68
End: 2018-02-22

## 2018-02-22 NOTE — TELEPHONE ENCOUNTER
See telephone encounter below. I spoke with patient:    GERD/Heartburn  Onset: one week of reflux and nausea has been for a couple of weeks.    Description:     Burning in chest: YES, sometimes up into throat    Intensity: mild to moderate    Progression of Symptoms: same and intermittent    Accompanying Signs & Symptoms:  Does it feel like food gets stuck: no  Nausea: YES  Vomiting (bloody?): no  Abdominal Pain: no  Black-Tarry stools: no  Bloody stools: no    History:   Previous ulcers: no    Precipitating factors:   Caffeine use: no  Alcohol use: no  NSAID/Aspirin use: YES- occasional OTC pain reliever - but not sure of the name  Tobacco use: no  Worse with eating.    Alleviating factors:  None    Therapies Tried and outcome: omeprazole 20mg that he picked up OTC - he does not think this is helping - symptoms seem to be worse later in the day, when laying down, and after eating.    Patient is wondering if this is something he needs to be concerned with or if there is something else he should be doing. Of note, he also reported toward the end of our call that he started sertraline a couple of weeks ago around the time this started (although, he states that he had issues with nausea prior to starting med, but seems to be worse now).    Please advise. Thank you.  Kaylee Akins RN, BSN  [facility]

## 2018-02-22 NOTE — TELEPHONE ENCOUNTER
Reason for Call:  Other call back    Detailed comments: Pt is wanting to talk to nurse regarding nausea and acid reflux    Phone Number Patient can be reached at: Home number on file [phone] (home)    Best Time: anytime    Can we leave a detailed message on this number? YES    Call taken on 2/22/2018 at 2:16 PM by Maria M Gibbs

## 2018-02-23 ENCOUNTER — HOSPITAL ENCOUNTER (EMERGENCY)
Facility: CLINIC | Age: 68
Discharge: HOME | End: 2018-02-23
Attending: EMERGENCY MEDICINE | Admitting: EMERGENCY MEDICINE
Payer: MEDICARE

## 2018-02-23 ENCOUNTER — APPOINTMENT (OUTPATIENT)
Dept: CT IMAGING | Facility: CLINIC | Age: 68
End: 2018-02-23
Attending: EMERGENCY MEDICINE
Payer: MEDICARE

## 2018-02-23 VITALS — OXYGEN SATURATION: 97 % | SYSTOLIC BLOOD PRESSURE: 140 MMHG | DIASTOLIC BLOOD PRESSURE: 78 MMHG | HEART RATE: 69 BPM

## 2018-02-23 DIAGNOSIS — C67.9 MALIGNANT NEOPLASM OF URINARY BLADDER, UNSPECIFIED SITE (H): ICD-10-CM

## 2018-02-23 DIAGNOSIS — R10.32 ABDOMINAL PAIN, LEFT LOWER QUADRANT: ICD-10-CM

## 2018-02-23 DIAGNOSIS — R33.9 URINARY RETENTION: ICD-10-CM

## 2018-02-23 LAB
ALBUMIN UR-MCNC: NEGATIVE MG/DL
ANION GAP SERPL CALCULATED.3IONS-SCNC: 7 MMOL/L (ref 3–14)
APPEARANCE UR: ABNORMAL
BACTERIA #/AREA URNS HPF: ABNORMAL /HPF
BASOPHILS # BLD AUTO: 0 10E9/L (ref 0–0.2)
BASOPHILS NFR BLD AUTO: 0.3 %
BILIRUB UR QL STRIP: NEGATIVE
BUN SERPL-MCNC: 14 MG/DL (ref 7–30)
CALCIUM SERPL-MCNC: 9.2 MG/DL (ref 8.5–10.1)
CHLORIDE SERPL-SCNC: 106 MMOL/L (ref 94–109)
CO2 SERPL-SCNC: 23 MMOL/L (ref 20–32)
COLOR UR AUTO: YELLOW
CREAT SERPL-MCNC: 0.84 MG/DL (ref 0.66–1.25)
DIFFERENTIAL METHOD BLD: ABNORMAL
EOSINOPHIL # BLD AUTO: 0.1 10E9/L (ref 0–0.7)
EOSINOPHIL NFR BLD AUTO: 1.3 %
ERYTHROCYTE [DISTWIDTH] IN BLOOD BY AUTOMATED COUNT: 12.8 % (ref 10–15)
GFR SERPL CREATININE-BSD FRML MDRD: >90 ML/MIN/1.7M2
GLUCOSE SERPL-MCNC: 98 MG/DL (ref 70–99)
GLUCOSE UR STRIP-MCNC: NEGATIVE MG/DL
HCT VFR BLD AUTO: 42.7 % (ref 40–53)
HGB BLD-MCNC: 15.1 G/DL (ref 13.3–17.7)
HGB UR QL STRIP: ABNORMAL
IMM GRANULOCYTES # BLD: 0 10E9/L (ref 0–0.4)
IMM GRANULOCYTES NFR BLD: 0.8 %
KETONES UR STRIP-MCNC: 20 MG/DL
LEUKOCYTE ESTERASE UR QL STRIP: NEGATIVE
LYMPHOCYTES # BLD AUTO: 0.2 10E9/L (ref 0.8–5.3)
LYMPHOCYTES NFR BLD AUTO: 6.2 %
MCH RBC QN AUTO: 31.3 PG (ref 26.5–33)
MCHC RBC AUTO-ENTMCNC: 35.4 G/DL (ref 31.5–36.5)
MCV RBC AUTO: 88 FL (ref 78–100)
MONOCYTES # BLD AUTO: 0.4 10E9/L (ref 0–1.3)
MONOCYTES NFR BLD AUTO: 11.4 %
MUCOUS THREADS #/AREA URNS LPF: PRESENT /LPF
NEUTROPHILS # BLD AUTO: 3.1 10E9/L (ref 1.6–8.3)
NEUTROPHILS NFR BLD AUTO: 80 %
NITRATE UR QL: NEGATIVE
NRBC # BLD AUTO: 0 10*3/UL
NRBC BLD AUTO-RTO: 0 /100
PH UR STRIP: 7 PH (ref 5–7)
PLATELET # BLD AUTO: 128 10E9/L (ref 150–450)
POTASSIUM SERPL-SCNC: 3.9 MMOL/L (ref 3.4–5.3)
RBC # BLD AUTO: 4.83 10E12/L (ref 4.4–5.9)
RBC #/AREA URNS AUTO: 7 /HPF (ref 0–2)
SODIUM SERPL-SCNC: 136 MMOL/L (ref 133–144)
SOURCE: ABNORMAL
SP GR UR STRIP: 1.01 (ref 1–1.03)
UROBILINOGEN UR STRIP-MCNC: 0 MG/DL (ref 0–2)
WBC # BLD AUTO: 3.9 10E9/L (ref 4–11)
WBC #/AREA URNS AUTO: <1 /HPF (ref 0–2)

## 2018-02-23 PROCEDURE — 81001 URINALYSIS AUTO W/SCOPE: CPT | Performed by: EMERGENCY MEDICINE

## 2018-02-23 PROCEDURE — 51702 INSERT TEMP BLADDER CATH: CPT

## 2018-02-23 PROCEDURE — 80048 BASIC METABOLIC PNL TOTAL CA: CPT | Performed by: EMERGENCY MEDICINE

## 2018-02-23 PROCEDURE — 85025 COMPLETE CBC W/AUTO DIFF WBC: CPT | Performed by: EMERGENCY MEDICINE

## 2018-02-23 PROCEDURE — 74176 CT ABD & PELVIS W/O CONTRAST: CPT

## 2018-02-23 PROCEDURE — 99283 EMERGENCY DEPT VISIT LOW MDM: CPT

## 2018-02-23 RX ORDER — SODIUM CHLORIDE 9 MG/ML
1000 INJECTION, SOLUTION INTRAVENOUS CONTINUOUS
Status: DISCONTINUED | OUTPATIENT
Start: 2018-02-23 | End: 2018-02-23 | Stop reason: HOSPADM

## 2018-02-23 RX ORDER — TAMSULOSIN HYDROCHLORIDE 0.4 MG/1
0.4 CAPSULE ORAL DAILY
Qty: 10 CAPSULE | Refills: 0 | Status: SHIPPED | OUTPATIENT
Start: 2018-02-23 | End: 2018-03-05

## 2018-02-23 ASSESSMENT — ENCOUNTER SYMPTOMS
HEMATURIA: 0
DYSURIA: 0
ABDOMINAL PAIN: 1
NAUSEA: 0
DIFFICULTY URINATING: 1
VOMITING: 0
CHILLS: 1
FEVER: 0
FREQUENCY: 0

## 2018-02-23 NOTE — TELEPHONE ENCOUNTER
I spoke with Juwan to see how he is doing. He did go to ED and they put a catheter in. He said he is feeling much better now. He said it took 4 attempts to get it inserted. Follow up plan is he will see urology next Wednesday to have the catheter removed. He was very thankful for the follow up phone call. Justa Naqvi R.N.

## 2018-02-23 NOTE — ED PROVIDER NOTES
History     Chief Complaint:  Urinary Retention      HPI   Juwan Latham is a 67 year old male with a history of bladder cancer, status post chemotherapy, now on radiation therapy, who follows with Dr. Agarwal with urology at the [facility],  presents to the emergency department today for evaluation of lower abdominal pain and urinary retention.  He is currently doing daily radiation therapy at the radiation center here in [place].  Urination has been normal recently with no dysuria, urgency, frequency, hematuria.  He does have some ongoing lower abdominal pain that sounds fairly chronic.  He says after dinner last night he had a beer, which normally makes him urinate quite a bit, however he was only able to dribble a small amount of urine before bed.  He typically gets up 4-5 times at night to urinate.  Last night he was up several times but really unable to void.  Around 3 AM he was able to dribble a small stream of urine.  He has had progressive discomfort and fullness and urge to void overnight and finally brought him back to the ER this morning.  No objective fever, but he has had chills over the past several days.  No nausea or vomiting.  Stools have been normal.      Allergies:  Percocet [Oxycodone-Acetaminophen]    Medications:    sertraline (ZOLOFT) 50 MG tablet  ALPRAZolam (XANAX) 0.5 MG tablet  B Complex-C (SUPER B COMPLEX PO)  LISINOPRIL PO  ALFALFA PO  acetaminophen (TYLENOL) 325 MG tablet      Past Medical History:    Major depressive disorder  Anxiety   Arthritis   Calculus of kidney  Congenital single kidney   Gastroesophageal reflux disease   cancer of lung  Hypertension   Prostate cancer (H)  Seasonal allergies   Sleep apnea   Incontinence    Past Surgical History:    BRONCHOSCOPY FLEXIBLE   COLONOSCOPY   DAVINCI PROSTATECTOMY   ESOPHAGOSCOPY, GASTROSCOPY, DUODENOSCOPY (EGD), COMBINED   LAPAROSCOPIC WEDGE RESECTION LUNG   LASER HOLMIUM LITHOTRIPSY URETER(S), INSERT STENT,  COMBINED    Family History:    Heart disease  Diabetes  Colorectal cancer    Social History:  The patient was accompanied to the ED by family.  Smoking Status: Former  Smokeless Tobacco: Never  Alcohol Use: Yes  Marital Status:      Review of Systems   Constitutional: Positive for chills. Negative for fever.   Gastrointestinal: Positive for abdominal pain. Negative for nausea and vomiting.   Genitourinary: Positive for difficulty urinating. Negative for dysuria, frequency, hematuria and urgency.        Retention   All other systems reviewed and are negative.      Physical Exam   First Vitals:  SpO2: 99 %      Physical Exam  Constitutional:  Appears well-developed and well-nourished. Alert.  Mildly tremulous and appears anxious, complaining of discomfort in his lower abdomen from urinary retention. Non toxic.  HENT:   Head: Atraumatic.   Nose: Nose normal.  Mouth/Throat: Oral mucosa is clear and moist. no trismus. Pharynx normal. Tonsils symmetric. No tonsillar enlargement, erythema, or exudate.  Eyes: Conjunctivae normal. EOM normal. Pupils equal, round, and reactive to light. No scleral icterus.   Neck: Normal range of motion. Neck supple. No tracheal deviation present.   Cardiovascular: Normal rate, regular rhythm. No gallop. No friction rub. No murmur heard. Symmetric radial artery pulses   Pulmonary/Chest: Effort normal. No stridor. No respiratory distress. No wheezes. No rales. No rhonchi . No tenderness.   Abdominal: Soft. Bowel sounds normal. No distension. No mass.  After placement of his Breen catheter, we had drainage of about 425 mils of yellow urine.  No blood or sediment.  He says a lot of his pain is improved but he still has suprapubic and left lower quadrant abdominal tenderness.  No CVA tenderness. no rebound. No guarding.   : Normal circumcised penis.  Urethra is normal.  Testicles and scrotum normal.  Musculoskeletal:   RUE: Normal range of motion. No tenderness. No deformity  LUE:  Normal range of motion. No tenderness. No deformity  RLE: Normal range of motion. No edema. No tenderness. No deformity  LLE: Normal range of motion. No edema. No tenderness. No deformity  Lymph: No cervical adenopathy.   Neurological: Alert and oriented to person, place, and time. Normal strength. CN II-VII intact. No sensory deficit. GCS eye subscore is 4. GCS verbal subscore is 5. GCS motor subscore is 6. Normal coordination   Skin: Skin is warm and dry. No rash noted. No pallor. Normal capillary refill.  Psychiatric:  Normal mood. Normal affect.       Emergency Department Course   Laboratory:  Laboratory findings were communicated with the patient who voiced understanding of the findings.  CBC: WBC 3.9 (L),  (L) o/w WNL. (HGB 15.1)   BMP: AWNL (Creatinine 0.84)  UA: slightly cloudy, yellow urine, blood small, ketones 20, RBC/HPF 7 (H), mucous present o/w WNL    Emergency Department Course:  Nursing notes and vitals reviewed.  0950: I performed an exam of the patient as documented above.   1229: Patient rechecked and updated.   Findings and plan explained to the Patient. Patient discharged home with instructions regarding supportive care, medications, and reasons to return. The importance of close follow-up was reviewed. The patient was prescribed Flomax.  I personally reviewed the laboratory results with the Patient and answered all related questions prior to discharge.      Impression & Plan    Medical Decision Making:  Juwan Latham is a 67 year old male with a history of prostate cancer on his records, status post chemotherapy and surgery now with radiation therapy, who presents to the ER today with lower abdominal pain, inability to empty his bladder.  Symptoms began last night with some dribbling of urine but has had complete urinary retention since around 3 AM.  He was quite uncomfortable upon presentation here to the ER.  Our techs had some difficulty passing a 16 Mozambican coudé tip Breen catheter,  but with the nurses assistance we were able to pass a 20 Chinese coudé cath.  We got out about 450 cc of urine and the patient did have improvement in his pain.  However he did have some ongoing left lower quadrant pain.  Urinalysis showed no evidence for infection or significant hematuria to suggest kidney stone.  We did send him for laboratory workup which is reassuring and CT imaging which is negative for conditions such as colitis, diverticulitis, obstruction, perforation, abscess, bladder perforation, kidney stone.  There is no clear evidence for any radiation colitis or other complication of his therapy.  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  Etiology of his pain is not certain this time.  Overall he is feeling much better and feels safe going home.    In the setting of his retention will start him on Flomax, have him leave the Breen catheter in.  He is well versed in Breen catheter care had a couple of fullness in the past.  We will have him follow-up with his urologist at [facility] for recheck on Monday and likely Breen catheter removal.    Diagnosis:    ICD-10-CM    1. Urinary retention R33.9    2. Abdominal pain, left lower quadrant R10.32    3. Malignant neoplasm of urinary bladder, unspecified site (H) C67.9        Disposition:  discharged to home    Discharge Medications:  New Prescriptions    TAMSULOSIN (FLOMAX) 0.4 MG CAPSULE    Take 1 capsule (0.4 mg) by mouth daily for 10 doses         Scribe Disclosure:  Curtis MCMILLAN, smooth serving as a scribe at 9:23 AM on 2/23/2018 to document services personally performed by Prince Licona MD based on my observations and the provider's statements to me.     2/23/2018   [facility] EMERGENCY DEPARTMENT       Prince Licona MD  02/24/18 0704

## 2018-02-23 NOTE — ED AVS SNAPSHOT
[facility]    [address] [place] [address]    Phone:  [phone]    Fax:  [phone]                                       Juwan Latham   MRN: [id number]    Department:  [facility]   Date of Visit:  2/23/2018           Patient Information     Date Of Birth          1950        Your diagnoses for this visit were:     Urinary retention     Abdominal pain, left lower quadrant     Malignant neoplasm of urinary bladder, unspecified site (H)        You were seen by Prince Licona MD.      Follow-up Information     Please follow up.    Why:  Please recheck with your urologist on Monday      Discharge References/Attachments     ARRIAGA CATHETER, CARE (ENGLISH)    URINARY RETENTION, MALE (ENGLISH)      Future Appointments        Provider Department Dept Phone Center    3/9/2018 3:00 PM [facility] ROOM 1 [facility] [phone] [facility]    3/9/2018 4:00 PM GLORIA Bustos [facility] [phone] [facility]      24 Hour Appointment Hotline       To make an appointment at any [facility], call [phone] ([phone]). If you don't have a family doctor or clinic, we will help you find one. [facility] clinics are conveniently located to serve the needs of you and your family.             Review of your medicines      START taking        Dose / Directions Last dose taken    tamsulosin 0.4 MG capsule   Commonly known as:  FLOMAX   Dose:  0.4 mg   Quantity:  10 capsule        Take 1 capsule (0.4 mg) by mouth daily for 10 doses   Refills:  0          Our records show that you are taking the medicines listed below. If these are incorrect, please call your family doctor or clinic.        Dose / Directions Last dose taken    acetaminophen 325 MG tablet   Commonly known as:  TYLENOL   Dose:  650 mg   Quantity:  100 tablet        Take 2 tablets (650 mg) by mouth  every 6 hours Do not take more than 4,000 mg of acetaminophen (Tylenol) from all sources to prevent liver damage.   Refills:  1        ALFALFA PO        Take by mouth daily   Refills:  0        ALPRAZolam 0.5 MG tablet   Commonly known as:  XANAX   Dose:  0.5 mg   Quantity:  20 tablet        Take 1 tablet (0.5 mg) by mouth 3 times daily as needed for anxiety   Refills:  0        LISINOPRIL PO   Dose:  10 mg        Take 10 mg by mouth daily   Refills:  0        sertraline 50 MG tablet   Commonly known as:  ZOLOFT   Quantity:  30 tablet        Take 1/2 tablet (25 mg) for 1-2 weeks, then increase to 1 tablet orally daily   Refills:  0        SUPER B COMPLEX PO   Dose:  1 tablet        Take 1 tablet by mouth daily   Refills:  0                Prescriptions were sent or printed at these locations (1 Prescription)                   Other Prescriptions                Printed at Department/Unit printer (1 of 1)         tamsulosin (FLOMAX) 0.4 MG capsule                Procedures and tests performed during your visit     Basic metabolic panel    CBC with platelets differential    CT Abdomen Pelvis w/o Contrast    Continue indwelling urinary catheter (Breen)    Peripheral IV catheter    UA with Microscopic      Orders Needing Specimen Collection     None      Pending Results     No orders found from 2/21/2018 to 2/24/2018.            Pending Culture Results     No orders found from 2/21/2018 to 2/24/2018.            Pending Results Instructions     If you had any lab results that were not finalized at the time of your Discharge, you can call the ED Lab Result RN at [phone]. You will be contacted by this team for any positive Lab results or changes in treatment. The nurses are available 7 days a week from 10A to 6:30P.  You can leave a message 24 hours per day and they will return your call.        Test Results From Your Hospital Stay        2/23/2018 11:10 AM      Component Results     Component Value Ref Range & Units  Status    Color Urine Yellow  Final    Appearance Urine Slightly Cloudy  Final    Glucose Urine Negative NEG^Negative mg/dL Final    Bilirubin Urine Negative NEG^Negative Final    Ketones Urine 20 (A) NEG^Negative mg/dL Final    Specific Gravity Urine 1.013 1.003 - 1.035 Final    Blood Urine Small (A) NEG^Negative Final    pH Urine 7.0 5.0 - 7.0 pH Final    Protein Albumin Urine Negative NEG^Negative mg/dL Final    Urobilinogen mg/dL 0.0 0.0 - 2.0 mg/dL Final    Nitrite Urine Negative NEG^Negative Final    Leukocyte Esterase Urine Negative NEG^Negative Final    Source Catheterized Urine  Final    WBC Urine <1 0 - 2 /HPF Final    RBC Urine 7 (H) 0 - 2 /HPF Final    Bacteria Urine Few (A) NEG^Negative /HPF Final    Mucous Urine Present (A) NEG^Negative /LPF Final         2/23/2018 11:00 AM      Component Results     Component Value Ref Range & Units Status    WBC 3.9 (L) 4.0 - 11.0 10e9/L Final    RBC Count 4.83 4.4 - 5.9 10e12/L Final    Hemoglobin 15.1 13.3 - 17.7 g/dL Final    Hematocrit 42.7 40.0 - 53.0 % Final    MCV 88 78 - 100 fl Final    MCH 31.3 26.5 - 33.0 pg Final    MCHC 35.4 31.5 - 36.5 g/dL Final    RDW 12.8 10.0 - 15.0 % Final    Platelet Count 128 (L) 150 - 450 10e9/L Final    Diff Method Automated Method  Final    % Neutrophils 80.0 % Final    % Lymphocytes 6.2 % Final    % Monocytes 11.4 % Final    % Eosinophils 1.3 % Final    % Basophils 0.3 % Final    % Immature Granulocytes 0.8 % Final    Nucleated RBCs 0 0 /100 Final    Absolute Neutrophil 3.1 1.6 - 8.3 10e9/L Final    Absolute Lymphocytes 0.2 (L) 0.8 - 5.3 10e9/L Final    Absolute Monocytes 0.4 0.0 - 1.3 10e9/L Final    Absolute Eosinophils 0.1 0.0 - 0.7 10e9/L Final    Absolute Basophils 0.0 0.0 - 0.2 10e9/L Final    Abs Immature Granulocytes 0.0 0 - 0.4 10e9/L Final    Absolute Nucleated RBC 0.0  Final         2/23/2018 11:16 AM      Component Results     Component Value Ref Range & Units Status    Sodium 136 133 - 144 mmol/L Final     Potassium 3.9 3.4 - 5.3 mmol/L Final    Chloride 106 94 - 109 mmol/L Final    Carbon Dioxide 23 20 - 32 mmol/L Final    Anion Gap 7 3 - 14 mmol/L Final    Glucose 98 70 - 99 mg/dL Final    Urea Nitrogen 14 7 - 30 mg/dL Final    Creatinine 0.84 0.66 - 1.25 mg/dL Final    GFR Estimate >90 >60 mL/min/1.7m2 Final    Non  GFR Calc    GFR Estimate If Black >90 >60 mL/min/1.7m2 Final    African American GFR Calc    Calcium 9.2 8.5 - 10.1 mg/dL Final         2/23/2018 11:20 AM      Narrative     CT ABDOMEN PELVIS W/O CONTRAST 2/23/2018 11:13 AM    HISTORY: Left lower quadrant abdominal pain. Urinary retention.    COMPARISON: 4/11/2017    TECHNIQUE: Noncontrast abdomen and pelvis CT.  Radiation dose for this  scan was reduced using automated exposure control, adjustment of the  mA and/or kV according to patient size, or iterative reconstruction  technique.    FINDINGS: Lung bases are clear. Patchy calcification along the right  hemidiaphragm.    Within limits of noncontrast technique: Diffuse fatty infiltration of  the liver. Gallbladder, spleen, pancreas and adrenal glands appear  normal.    Horseshoe kidney. Stable left renal cyst. Tiny nonobstructive stone in  the left kidney. No evidence of hydronephrosis or hydroureter. No  evidence of intraureteral stone.    Normal caliber bowel. Appendix is visualized and normal. No free air.  No free or loculated fluid collection.    Urinary bladder is collapsed around the inflated catheter balloon.  Prostate appears to be absent. No free fluid in the pelvis. No  adenopathy.        Impression     IMPRESSION:   1. No acute abnormality.  2. Urinary bladder is collapsed around the inflated catheter balloon.  3. Horseshoe kidney with tiny nonobstructive stone on the left. No  hydronephrosis.    MEENU SÁNCHEZ MD                Clinical Quality Measure: Blood Pressure Screening     Your blood pressure was checked while you were in the emergency department today. The last  reading we obtained was  BP: 155/90 . Please read the guidelines below about what these numbers mean and what you should do about them.  If your systolic blood pressure (the top number) is less than 120 and your diastolic blood pressure (the bottom number) is less than 80, then your blood pressure is normal. There is nothing more that you need to do about it.  If your systolic blood pressure (the top number) is 120-139 or your diastolic blood pressure (the bottom number) is 80-89, your blood pressure may be higher than it should be. You should have your blood pressure rechecked within a year by a primary care provider.  If your systolic blood pressure (the top number) is 140 or greater or your diastolic blood pressure (the bottom number) is 90 or greater, you may have high blood pressure. High blood pressure is treatable, but if left untreated over time it can put you at risk for heart attack, stroke, or kidney failure. You should have your blood pressure rechecked by a primary care provider within the next 4 weeks.  If your provider in the emergency department today gave you specific instructions to follow-up with your doctor or provider even sooner than that, you should follow that instruction and not wait for up to 4 weeks for your follow-up visit.        Thank you for choosing [facility]       Thank you for choosing [facility] for your care. Our goal is always to provide you with excellent care. Hearing back from our patients is one way we can continue to improve our services. Please take a few minutes to complete the written survey that you may receive in the mail after you visit with us. Thank you!        [facility] Information     [facility] gives you secure access to your electronic health record. If you see a primary care provider, you can also send messages to your care team and make appointments. If you have questions, please call your primary care clinic.  If you do not have a primary care provider, please call  [phone] and they will assist you.        Care EveryWhere ID     This is your Care EveryWhere ID. This could be used by other organizations to access your [facility] medical records  [id number]        Equal Access to Services     CHAMP SNIDER : Rohan Obrien, wamarc reyes, qahaleighta kaalmaserjio pimentel, mynor escoto. So [facility] [phone].    ATENCIÓN: Si habla español, tiene a gunn disposición servicios gratuitos de asistencia lingüística. Llame al [phone].    We comply with applicable federal civil rights laws and [place] laws. We do not discriminate on the basis of race, color, national origin, age, disability, sex, sexual orientation, or gender identity.            After Visit Summary       This is your record. Keep this with you and show to your community pharmacist(s) and doctor(s) at your next visit.

## 2018-02-23 NOTE — ED NOTES
Pt presents with having urinary retention and has been going thru chemo for prostate cancer. ABC's intact A&ox4.

## 2018-02-23 NOTE — ED AVS SNAPSHOT
[facility] Emergency Department    [address]    [facility] [address]    Phone:  [phone]    Fax:  [phone]                                       Juwan Latham   MRN: [id number]    Department:  [facility] Emergency Department   Date of Visit:  2/23/2018           After Visit Summary Signature Page     I have received my discharge instructions, and my questions have been answered. I have discussed any challenges I see with this plan with the nurse or doctor.    ..........................................................................................................................................  Patient/Patient Representative Signature      ..........................................................................................................................................  Patient Representative Print Name and Relationship to Patient    ..................................................               ................................................  Date                                            Time    ..........................................................................................................................................  Reviewed by Signature/Title    ...................................................              ..............................................  Date                                                            Time

## 2018-02-23 NOTE — TELEPHONE ENCOUNTER
Nausea certainly could be coming from reflux disease versus side effects secondary to starting his Zoloft.  I would recommend that he increase his omeprazole to 20 mg twice daily, advising him that it may take 3-4 days before he notes a significant improvement in his symptoms after increasing his omeprazole to twice daily.  If he needs immediate relief, he could consider a trial of Maalox used an over-the-counter fashion.  If his symptoms are not any better by next week, I can absolutely see him in follow-up.  Please call patient.

## 2018-02-23 NOTE — TELEPHONE ENCOUNTER
I called patient and now he is saying he is unable to pee. Last voided this am about 3 am--about 4.5 hours ago.  I could tell he was in a lot of discomfort by his voice. He says he is getting radiation on prostrate and he sees them this morning but didn't think they could help. He will try a warm tub and I advised him if he is still unable to void he needs to proceed to ED.     Information below given.   Justa Naqvi R.N.

## 2018-02-26 ENCOUNTER — HOSPITAL ENCOUNTER (EMERGENCY)
Facility: CLINIC | Age: 68
Discharge: HOME | End: 2018-02-26
Attending: EMERGENCY MEDICINE | Admitting: EMERGENCY MEDICINE
Payer: MEDICARE

## 2018-02-26 ENCOUNTER — CARE COORDINATION (OUTPATIENT)
Dept: CARE COORDINATION | Facility: CLINIC | Age: 68
End: 2018-02-26

## 2018-02-26 VITALS — SYSTOLIC BLOOD PRESSURE: 150 MMHG | DIASTOLIC BLOOD PRESSURE: 82 MMHG | HEART RATE: 55 BPM | OXYGEN SATURATION: 94 %

## 2018-02-26 DIAGNOSIS — T83.9XXA COMPLICATION OF FOLEY CATHETER, INITIAL ENCOUNTER (H): ICD-10-CM

## 2018-02-26 DIAGNOSIS — N32.89 SPASM OF BLADDER: ICD-10-CM

## 2018-02-26 PROCEDURE — 25000132 ZZH RX MED GY IP 250 OP 250 PS 637: Mod: GY | Performed by: EMERGENCY MEDICINE

## 2018-02-26 PROCEDURE — 51798 US URINE CAPACITY MEASURE: CPT

## 2018-02-26 PROCEDURE — 99284 EMERGENCY DEPT VISIT MOD MDM: CPT

## 2018-02-26 PROCEDURE — A9270 NON-COVERED ITEM OR SERVICE: HCPCS | Mod: GY | Performed by: EMERGENCY MEDICINE

## 2018-02-26 RX ORDER — OXYBUTYNIN CHLORIDE 5 MG/1
5 TABLET ORAL ONCE
Status: COMPLETED | OUTPATIENT
Start: 2018-02-26 | End: 2018-02-26

## 2018-02-26 RX ORDER — OXYBUTYNIN CHLORIDE 5 MG/1
5 TABLET ORAL 3 TIMES DAILY
Qty: 30 TABLET | Refills: 0 | Status: SHIPPED | OUTPATIENT
Start: 2018-02-26 | End: 2018-03-09

## 2018-02-26 RX ADMIN — OXYBUTYNIN CHLORIDE 5 MG: 5 TABLET ORAL at 10:23

## 2018-02-26 ASSESSMENT — ENCOUNTER SYMPTOMS
APPETITE CHANGE: 0
CONSTIPATION: 0
DIARRHEA: 0
FEVER: 0

## 2018-02-26 NOTE — ED NOTES
Breen irrigated with 20cc of fluid. Flushed easily with fluid immediately draining in bag. Bladder scan prior to irrigation showed 3cc in bladder.

## 2018-02-26 NOTE — ED AVS SNAPSHOT
[facility] Emergency Department    [address]    [facility] [address]    Phone:  [phone]    Fax:  [phone]                                       Juwan Latham   MRN: [id number]    Department:  [facility] Emergency Department   Date of Visit:  2/26/2018           Patient Information     Date Of Birth          1950        Your diagnoses for this visit were:     Complication of Arriaga catheter, initial encounter (H)     Spasm of bladder        You were seen by Asad Maldonado MD.      Follow-up Information     Go to Urology.    Why:  at scheduled appointment        Follow up with [facility] Emergency Department.    Specialty:  EMERGENCY MEDICINE    Why:  If symptoms worsen    Contact information:    [address]  [facility] [address] [phone]      Discharge References/Attachments     (S) FLUSHING YOUR BLADDER TUBE: FOR ARRIAGA OR SUPRAPUBIC CATHETERS (ENGLISH)    ARRIAGA CATHETER, CARE (ENGLISH)      Future Appointments        Provider Department Dept Phone Center    2/28/2018 10:10 AM [facility] Nurse [facility] Urology and [facility] [phone] [facility]    3/9/2018 3:00 PM [facility] CT ROOM 1 [facility] [phone] [facility]    3/9/2018 4:00 PM GLORIA Bustos [facility] [phone] [facility]      24 Hour Appointment Hotline       To make an appointment at any [facility], call [phone] ([phone]). If you don't have a family doctor or clinic, we will help you find one. [facility] are conveniently located to serve the needs of you and your family.             Review of your medicines      START taking        Dose / Directions Last dose taken    oxybutynin 5 MG tablet   Commonly known as:  DITROPAN   Dose:  5 mg   Quantity:  30 tablet        Take 1 tablet (5 mg) by mouth 3 times daily for 10 days For bladder  spasms   Refills:  0          Our records show that you are taking the medicines listed below. If these are incorrect, please call your family doctor or clinic.        Dose / Directions Last dose taken    acetaminophen 325 MG tablet   Commonly known as:  TYLENOL   Dose:  650 mg   Quantity:  100 tablet        Take 2 tablets (650 mg) by mouth every 6 hours Do not take more than 4,000 mg of acetaminophen (Tylenol) from all sources to prevent liver damage.   Refills:  1        ALFALFA PO        Take by mouth daily   Refills:  0        ALPRAZolam 0.5 MG tablet   Commonly known as:  XANAX   Dose:  0.5 mg   Quantity:  20 tablet        Take 1 tablet (0.5 mg) by mouth 3 times daily as needed for anxiety   Refills:  0        LISINOPRIL PO   Dose:  10 mg        Take 10 mg by mouth daily   Refills:  0        sertraline 50 MG tablet   Commonly known as:  ZOLOFT   Quantity:  30 tablet        Take 1/2 tablet (25 mg) for 1-2 weeks, then increase to 1 tablet orally daily   Refills:  0        SUPER B COMPLEX PO   Dose:  1 tablet        Take 1 tablet by mouth daily   Refills:  0        tamsulosin 0.4 MG capsule   Commonly known as:  FLOMAX   Dose:  0.4 mg   Quantity:  10 capsule        Take 1 capsule (0.4 mg) by mouth daily for 10 doses   Refills:  0                Prescriptions were sent or printed at these locations (1 Prescription)                   Other Prescriptions                Printed at Department/Unit printer (1 of 1)         oxybutynin (DITROPAN) 5 MG tablet                Orders Needing Specimen Collection     None      Pending Results     No orders found from 2/24/2018 to 2/27/2018.            Pending Culture Results     No orders found from 2/24/2018 to 2/27/2018.            Pending Results Instructions     If you had any lab results that were not finalized at the time of your Discharge, you can call the ED Lab Result RN at [phone]. You will be contacted by this team for any positive Lab results or changes in  treatment. The nurses are available 7 days a week from 10A to 6:30P.  You can leave a message 24 hours per day and they will return your call.        Test Results From Your Hospital Stay               Clinical Quality Measure: Blood Pressure Screening     Your blood pressure was checked while you were in the emergency department today. The last reading we obtained was  BP: 150/82 . Please read the guidelines below about what these numbers mean and what you should do about them.  If your systolic blood pressure (the top number) is less than 120 and your diastolic blood pressure (the bottom number) is less than 80, then your blood pressure is normal. There is nothing more that you need to do about it.  If your systolic blood pressure (the top number) is 120-139 or your diastolic blood pressure (the bottom number) is 80-89, your blood pressure may be higher than it should be. You should have your blood pressure rechecked within a year by a primary care provider.  If your systolic blood pressure (the top number) is 140 or greater or your diastolic blood pressure (the bottom number) is 90 or greater, you may have high blood pressure. High blood pressure is treatable, but if left untreated over time it can put you at risk for heart attack, stroke, or kidney failure. You should have your blood pressure rechecked by a primary care provider within the next 4 weeks.  If your provider in the emergency department today gave you specific instructions to follow-up with your doctor or provider even sooner than that, you should follow that instruction and not wait for up to 4 weeks for your follow-up visit.        Thank you for choosing [facility]       Thank you for choosing [facility] for your care. Our goal is always to provide you with excellent care. Hearing back from our patients is one way we can continue to improve our services. Please take a few minutes to complete the written survey that you may receive in the mail after you  visit with us. Thank you!        [employer] Information     [employer] gives you secure access to your electronic health record. If you see a primary care provider, you can also send messages to your care team and make appointments. If you have questions, please call your primary care clinic.  If you do not have a primary care provider, please call [phone] and they will assist you.        Care EveryWhere ID     This is your Care EveryWhere ID. This could be used by other organizations to access your [facility] medical records  [id number]        Equal Access to Services     CHAMP SNIDER : Rohan solo Sosergio, wamarc ludirkadaha, qaobdulia kaalmitzy pimentel, mynor escoto. So [facility] [phone].    ATENCIÓN: Si habla español, tiene a gunn disposición servicios gratuitos de asistencia lingüística. Llame al [phone].    We comply with applicable federal civil rights laws and [place] laws. We do not discriminate on the basis of race, color, national origin, age, disability, sex, sexual orientation, or gender identity.            After Visit Summary       This is your record. Keep this with you and show to your community pharmacist(s) and doctor(s) at your next visit.

## 2018-02-26 NOTE — ED NOTES
Catheter placed on Friday and leaking.  Patient alert and oriented x3.  Airway, breathing and circulation intact.

## 2018-02-26 NOTE — PROGRESS NOTES
Clinic Care Coordination Contact  Care Team Conversations    Received notification of ED visit on 02/23, chart reviewed.  Pt currently in ED again at this time with c/o leaking around catheter that was placed for retention on 02/23.  Discussed with PCP, will follow up with pt and urology clinic post ED visit to try to make plan going forward.

## 2018-02-26 NOTE — ED NOTES
"Patient continues to have good urine output from donovan after irrigation. Reports one episode of \"spasm\" in bladder when changing positions. MD prescribed medicine for bladder spasms. MD in to see patient and discuss diagnosis, test results, and discharge plan. Patient meets discharge criteria. Discussed AVS with patient. Questions answered. Patient verbalized understanding. Patient reports being ready to go home. Patient discharged home with friend by car with all necessary information.    "

## 2018-02-26 NOTE — ED AVS SNAPSHOT
[facility] Emergency Department    [address]    [facility] [address]    Phone:  [phone]    Fax:  [phone]                                       Juwan Latham   MRN: [id number]    Department:  [facility] Emergency Department   Date of Visit:  2/26/2018           After Visit Summary Signature Page     I have received my discharge instructions, and my questions have been answered. I have discussed any challenges I see with this plan with the nurse or doctor.    ..........................................................................................................................................  Patient/Patient Representative Signature      ..........................................................................................................................................  Patient Representative Print Name and Relationship to Patient    ..................................................               ................................................  Date                                            Time    ..........................................................................................................................................  Reviewed by Signature/Title    ...................................................              ..............................................  Date                                                            Time

## 2018-02-26 NOTE — ED PROVIDER NOTES
History     Chief Complaint:  Catheter Problem    DYLAN Latham is a 67 year old male with a history of bladder cancer and prostate removal who presents with a catheter problem. The patient is status post chemotherapy and now receiving radiation therapy with Dr. Agarwal at the [facility]. He has had 3 catheters placed since removal of his prostate on 12/1/17. The patient came in to the emergency department 3 days ago for evaluation of urinary retention. A 20 Estonian coudé tip Breen catheter was placed with difficulty and 450 cc of urine was removed provided relief for his pain. Since then the patient has had bilateral side pain and states that the inserted catheter has been draining slowly but leaking around the side of the tube. The patient denies any recent fevers, loss of appetite and issues passing stool. Denies other constitutional symptoms.     Urinalysis from 2/23/18  Ketones 20, Blood small, RBC 7 (H), Bacteria few, Mucous present o/w negative    Allergies:  Percocet     Medications:    sertraline (ZOLOFT) 50 MG tablet  ALPRAZolam (XANAX) 0.5 MG tablet  B Complex-C (SUPER B COMPLEX PO)  LISINOPRIL PO  ALFALFA PO  acetaminophen (TYLENOL) 325 MG tablet    Past Medical History:    Major depressive disorder  Anxiety                        Arthritis                        Calculus of kidney  Congenital single kidney                     Gastroesophageal reflux disease                   cancer of lung  Hypertension               Prostate cancer  Seasonal allergies                  Sleep apnea                Incontinence     Past Surgical History:    BRONCHOSCOPY FLEXIBLE                       COLONOSCOPY                    DAVINCI PROSTATECTOMY                       ESOPHAGOSCOPY, GASTROSCOPY, DUODENOSCOPY (EGD), COMBINED             LAPAROSCOPIC WEDGE RESECTION LUNG              LASER HOLMIUM LITHOTRIPSY URETER(S), INSERT STENT, COMBINED     Family History:    Heart  disease  Diabetes  Colorectal cancer     Social History:  Smoking Status: Former  Smokeless Tobacco: Never  Alcohol Use: Yes  Marital Status:        Review of Systems   Constitutional: Negative for appetite change and fever.   Gastrointestinal: Negative for constipation and diarrhea.   All other systems reviewed and are negative.      Physical Exam     Patient Vitals for the past 24 hrs:   BP Pulse Heart Rate SpO2   02/26/18 1027 150/82 55 - 94 %   02/26/18 0952 (!) 137/91 57 57 99 %     Physical Exam  General: Alert, appears well-developed and well-nourished. Cooperative.     No acute distress  HEENT:  Head:  Atraumatic  Ears:  External ears are normal  Mouth/Throat:  Oropharynx is without erythema or exudate and mucous membranes are moist.  Eyes:   Conjunctivae normal and EOM are normal. No scleral icterus.  CV:  Normal rate, regular rhythm, normal heart sounds and radial pulses are 2+ and symmetric.  No murmur.  Resp:  Breath sounds are clear bilaterally    Non-labored, no retractions or accessory muscle use  GI:  Abdomen is soft, no distension, no tenderness. No rebound or guarding.  No CVA tenderness bilaterally  :  20 Nepali coude catheter appears intact and in place, no leakage around catheter. Bladder scan at bedside less than 5 cc.    Normal appearing penis, no skin abrasions or breakdown.  MS:  Normal range of motion. No edema.    Normal strength in all 4 extremities.     Back atraumatic.    No midline cervical, thoracic, or lumbar tenderness  Skin:  Warm and dry.  No rash or lesions noted.  Neuro: Alert. Normal strength.  GCS: 15  Psych:  Normal mood and affect.      Emergency Department Course     Interventions:  1023 Oxybutynin 5 mg PO    Emergency Department Course:  Nursing notes and vitals reviewed.  0924 I performed an exam of the patient as documented above.  Oral medications were administered as documented above.  1002 I reevaluated the patient and provided an update in regards to his ED  course.    Findings and plan explained to the patient. Patient discharged home with instructions regarding supportive care, medications, and reasons to return. The importance of close follow-up was reviewed.        Impression & Plan      Medical Decision Making:  Juwan Latham is a 67 year old male who presents with a Breen catheter leakage. The patient had a Breen catheter placed on 2/23, 3 days ago, for urinary retention. The patient had a 20 Chilean coude catheter and was discharged at the time. Urine specimen showed small blood and ketones but no obvious evidence of infection with no leukocyte esterase or nitrates on the sample. The patient has not had fever today. He does note that he had some mild leakage this morning around the Breen catheter as well as spasm-like symptoms to the suprapubic region as well as in the penis where the Breen is inserted through the urethra. The patient has no evidence of skin breakdown or abrasions to the head of the penis. The Breen appears to be in place. There is no visualized leakage around the Breen here in the emergency department and we did a bladder scan showing less than 5 cc of fluid in the bladder. The patient did have some Saline flushed through the Breen which had appropriate return of Saline on immediate entrance. The patient was given Oxybutynin for suspected bladder spasms. The Breen does appear to be working at this time with no additional leakage while here in the emergency department. The patient does not have fever or concerns for potential infection at this time. I did not send a repeat UA as he has had an indwelling Breen for 3 days and lower concern for infection at this time. He does have follow up with urology in 2 days. He does have radiation scheduled for today and I feel he can continue with today's procedures, no indication to delay treatment from my perspective. He certainly was encouraged to return if he develops fever, worsening symptoms or  additional leakage. As previously stated, we do not see any leakage here in the emergency department under observation while here. The patient was discharged home with return precautions and told to follow up with urology as scheduled. He was discharged home with all questions answered.    Diagnosis:    ICD-10-CM    1. Complication of Breen catheter, initial encounter (H) T83.9XXA    2. Spasm of bladder N32.89        Disposition:  Discharged.    Discharge Medications:  Discharge Medication List as of 2/26/2018 10:44 AM      START taking these medications    Details   oxybutynin (DITROPAN) 5 MG tablet Take 1 tablet (5 mg) by mouth 3 times daily for 10 days For bladder spasms, Disp-30 tablet, R-0, Local Print           Scribe Discloser:  ICarlos, am serving as a scribe on 2/26/2018 at 9:24 AM to personally document services performed by Asad Maldonado MD based on my observations and the provider's statements to me.     2/26/2018   [facility] EMERGENCY DEPARTMENT       Asad Maldonado MD  02/26/18 1539

## 2018-02-27 NOTE — PROGRESS NOTES
Clinic Care Coordination Contact  OUTREACH    Referral Information:  Referral Source: PCP  Reason for Contact: ED follow up  Care Conference: No     Universal Utilization:   ED Visits in last year: 1  Hospital visits in last year: 1  Last PCP appointment: 01/31/18  Missed Appointments: 0  Concerns: anxiety management  Multiple Providers or Specialists: urology    Clinical Concerns:  Current Medical Concerns: Pt was seen in ED 02/23 for urine retention and 02/26 with c/o leaking around catheter. Pt reports that they irrigated his cath which has greatly improved flow and leakage. Pt reports that he will see Dr. Agarwal tomorrow for catheter removal and further plans.  Pt reports that he is having good UOP today, blood tinged urine today following radiation treatment. He was told that this is likely a side effect from the radiation as well as the retention.  He has stopped taking the zoloft due to nausea it caused. Removed medication from med list.     Current Behavioral Concerns: none    Education Provided to patient: catheter cares   Clinical Pathway Name: None    Medication Management:  No concerns at this time.     Functional Status:  Mobility Status: Independent   Transportation: no concerns      Psychosocial:  Current living arrangement: I live in a private home  Financial/Insurance: no concerns     Resources and Interventions:  Current Resources: none     Advanced Care Plans/Directives on file:: No      Goals:   Goal 1 Statement: I will work on managing my stress over the next couple of months.  Goal 1 Supportive Steps: Pt to work on good sleep hygiene and self care.  Goal 1 Progression Percent: 80%  Goal 1 Progression Date: 02/09/18      Upcoming appointment:  (NA)     Plan: Will follow up with pt in tne

## 2018-02-28 ENCOUNTER — ALLIED HEALTH/NURSE VISIT (OUTPATIENT)
Dept: UROLOGY | Facility: CLINIC | Age: 68
End: 2018-02-28
Payer: COMMERCIAL

## 2018-02-28 DIAGNOSIS — C61 PROSTATE CANCER (H): Primary | ICD-10-CM

## 2018-02-28 DIAGNOSIS — F41.9 ANXIETY: ICD-10-CM

## 2018-02-28 NOTE — MR AVS SNAPSHOT
After Visit Summary   2/28/2018    Juwan Latham    MRN: [id number]           Patient Information     Date Of Birth          1950        Visit Information        Provider Department      2/28/2018 10:20 AM Nurse, [facility]        Today's Diagnoses     Prostate cancer (H)    -  1       Follow-ups after your visit        Your next 10 appointments already scheduled     Feb 28, 2018 10:20 AM CST   (Arrive by 10:05 AM)   Return Visit with  Prostate Cancer Ctr Nurse   [facility] ([facility])    [address]  4th Floor  [facility] [id number] [phone]            Mar 09, 2018  3:00 PM CST   (Arrive by 2:45 PM)   CT CHEST W/O CONTRAST with UCCT2   [facility] [place] [facility] ([facility])    [address]  1st Floor  [facility] [id number] [phone]           Please bring any scans or X-rays taken at other hospitals, if similar tests were done. Also bring a list of your medicines, including vitamins, minerals and over-the-counter drugs. It is safest to leave personal items at home.  Be sure to tell your doctor:   If you have any allergies.   If there s any chance you are pregnant.   If you are breastfeeding.  You do not need to do anything special to prepare for this exam.  Please wear loose clothing, such as a sweat suit or jogging clothes. Avoid snaps, zippers and other metal. We may ask you to undress and put on a hospital gown.            Mar 09, 2018  4:00 PM CST   (Arrive by 3:45 PM)   Return Visit with GLORIA Linton CNP   [facility] ([facility] [place])    [address]  [address]  [facility] [id number] [phone]              Who to contact     Please call your clinic at [phone] to:    Ask questions about your health    Make  or cancel appointments    Discuss your medicines    Learn about your test results    Speak to your doctor            Additional Information About Your Visit        [employer] Information     [employer] gives you secure access to your electronic health record. If you see a primary care provider, you can also send messages to your care team and make appointments. If you have questions, please call your primary care clinic.  If you do not have a primary care provider, please call [phone] and they will assist you.      [employer] is an electronic gateway that provides easy, online access to your medical records. With [employer], you can request a clinic appointment, read your test results, renew a prescription or communicate with your care team.     To access your existing account, please contact your [facility] or call [phone] for assistance.        Care EveryWhere ID     This is your Care EveryWhere ID. This could be used by other organizations to access your [place] medical records  [id number]         Blood Pressure from Last 3 Encounters:   02/26/18 150/82   02/23/18 140/78   01/31/18 110/62    Weight from Last 3 Encounters:   01/31/18 88.9 kg (196 lb)   01/29/18 87.5 kg (193 lb)   01/11/18 87.6 kg (193 lb 1.6 oz)              We Performed the Following     POST-VOID RESIDUAL BLADDER SCAN        Primary Care Provider Office Phone # Fax #    Julio Guidry Jr., [phone] [address] [facility] [address]        Equal Access to Services     CHAMP SNIDER : Hadii aad ku hadasho Soomaali, waaxda luqadaha, qaybta kaalmada adeegyada, waxay carlos mosley . So [facility] [phone].    ATENCIÓN: Si habla español, tiene a gunn disposición servicios gratuitos de asistencia lingüística. Llame al [phone].    We comply with applicable federal civil rights laws and [place] laws. We do not discriminate on the basis of race, color, national origin,  age, disability, sex, sexual orientation, or gender identity.            Thank you!     Thank you for choosing [facility]OLOG[facility] AND [facility] PROST[facility] UROLOGIC CANCERS  for your care. Our goal is always to provide you with excellent care. Hearing back from our patients is one way we can continue to improve our services. Please take a few minutes to complete the written survey that you may receive in the mail after your visit with us. Thank you!             Your Updated Medication List - Protect others around you: Learn how to safely use, store and throw away your medicines at www.disposemymeds.org.          This list is accurate as of 2/28/18 10:00 AM.  Always use your most recent med list.                   Brand Name Dispense Instructions for use Diagnosis    acetaminophen 325 MG tablet    TYLENOL    100 tablet    Take 2 tablets (650 mg) by mouth every 6 hours Do not take more than 4,000 mg of acetaminophen (Tylenol) from all sources to prevent liver damage.    Lung nodule       ALFALFA PO      Take by mouth daily        ALPRAZolam 0.5 MG tablet    XANAX    20 tablet    Take 1 tablet (0.5 mg) by mouth 3 times daily as needed for anxiety    Anxiety       LISINOPRIL PO      Take 10 mg by mouth daily        oxybutynin 5 MG tablet    DITROPAN    30 tablet    Take 1 tablet (5 mg) by mouth 3 times daily for 10 days For bladder spasms        SUPER B COMPLEX PO      Take 1 tablet by mouth daily        tamsulosin 0.4 MG capsule    FLOMAX    10 capsule    Take 1 capsule (0.4 mg) by mouth daily for 10 doses

## 2018-02-28 NOTE — PROGRESS NOTES
Chief Complaint   Patient presents with     Allied Health Visit     Urinary retention       Patient Active Problem List   Diagnosis     Allergic rhinitis due to other allergen     CARDIOVASCULAR SCREENING; LDL GOAL LESS THAN 160     Advance Care Planning     History of colonic polyps     Anxiety     PAULINO (obstructive sleep apnea)     GERD (gastroesophageal reflux disease)     Hypertension goal BP (blood pressure) < 140/90     Seasonal allergies     Benign neoplasm of descending colon     Malignant neoplasm of upper lobe of right lung (H)     Overweight (BMI 25.0-29.9)     Cavernous hemangioma of brain (H)     Major depressive disorder, recurrent episode, mild (H)     Prostate cancer (H)     Somatic dysfunction of pelvis region     Mixed incontinence urge and stress (male)(female)       Allergies   Allergen Reactions     Percocet [Oxycodone-Acetaminophen] GI Disturbance     Severe constipation       Current Outpatient Prescriptions   Medication Sig Dispense Refill     oxybutynin (DITROPAN) 5 MG tablet Take 1 tablet (5 mg) by mouth 3 times daily for 10 days For bladder spasms 30 tablet 0     tamsulosin (FLOMAX) 0.4 MG capsule Take 1 capsule (0.4 mg) by mouth daily for 10 doses 10 capsule 0     ALPRAZolam (XANAX) 0.5 MG tablet Take 1 tablet (0.5 mg) by mouth 3 times daily as needed for anxiety 20 tablet 0     B Complex-C (SUPER B COMPLEX PO) Take 1 tablet by mouth daily       LISINOPRIL PO Take 10 mg by mouth daily        ALFALFA PO Take by mouth daily       acetaminophen (TYLENOL) 325 MG tablet Take 2 tablets (650 mg) by mouth every 6 hours Do not take more than 4,000 mg of acetaminophen (Tylenol) from all sources to prevent liver damage. 100 tablet 1       Social History   Substance Use Topics     Smoking status: Former Smoker     Packs/day: 2.00     Years: 20.00     Types: Cigarettes     Quit date: 1/1/1985     Smokeless tobacco: Never Used     Alcohol use Yes      Comment: avg 3 beers per night       Juwan Latham  comes into clinic today at the request of Dr. Agarwal for PVR/catheter placement.    This service provided today was under the direct supervision of Dr. Acosta, who was available if needed.    Insertion:  16 Fr Coude tipped latex donovan catheter inserted into urethral meatus in the usual sterile fashion without difficulty.  Balloon filled with 7 mL sterile H2O.  Received 300 ml yellow urine output.   Catheter secured in place with leg strap: Yes.     One Cipro 500 mg given per protocol: No, patient already took Cipro today.     Patient did tolerate procedure well.    Patient instructed as to where to call or go for pain, fever, leakage, or decreased urine flow.       Leona Wall LPN  2/28/2018  2:11 PM

## 2018-02-28 NOTE — PROGRESS NOTES
Chief Complaint   Patient presents with     Allied Health Visit     TOV / catheter removal       Patient Active Problem List   Diagnosis     Allergic rhinitis due to other allergen     CARDIOVASCULAR SCREENING; LDL GOAL LESS THAN 160     Advance Care Planning     History of colonic polyps     Anxiety     PAULINO (obstructive sleep apnea)     GERD (gastroesophageal reflux disease)     Hypertension goal BP (blood pressure) < 140/90     Seasonal allergies     Benign neoplasm of descending colon     Malignant neoplasm of upper lobe of right lung (H)     Overweight (BMI 25.0-29.9)     Cavernous hemangioma of brain (H)     Major depressive disorder, recurrent episode, mild (H)     Prostate cancer (H)     Somatic dysfunction of pelvis region     Mixed incontinence urge and stress (male)(female)       Allergies   Allergen Reactions     Percocet [Oxycodone-Acetaminophen] GI Disturbance     Severe constipation       Current Outpatient Prescriptions   Medication Sig Dispense Refill     oxybutynin (DITROPAN) 5 MG tablet Take 1 tablet (5 mg) by mouth 3 times daily for 10 days For bladder spasms 30 tablet 0     tamsulosin (FLOMAX) 0.4 MG capsule Take 1 capsule (0.4 mg) by mouth daily for 10 doses 10 capsule 0     ALPRAZolam (XANAX) 0.5 MG tablet Take 1 tablet (0.5 mg) by mouth 3 times daily as needed for anxiety 20 tablet 0     B Complex-C (SUPER B COMPLEX PO) Take 1 tablet by mouth daily       LISINOPRIL PO Take 10 mg by mouth daily        ALFALFA PO Take by mouth daily       acetaminophen (TYLENOL) 325 MG tablet Take 2 tablets (650 mg) by mouth every 6 hours Do not take more than 4,000 mg of acetaminophen (Tylenol) from all sources to prevent liver damage. 100 tablet 1       Social History   Substance Use Topics     Smoking status: Former Smoker     Packs/day: 2.00     Years: 20.00     Types: Cigarettes     Quit date: 1/1/1985     Smokeless tobacco: Never Used     Alcohol use Yes      Comment: avg 3 beers per night       Juwan M  Yeison comes into clinic today at the request of Dr. Paulo Agarwal for TOV / catheter removal.    This service provided today was under the direct supervision of Dr. Acosta, who was available if needed.    Juwan Latham presented today for a trial of void.  Approximately 100 mL of normal saline instilled into bladder via catheter as patient was having bladder spasms and pushed most fluid back through donovan into tubing.  Patient stated he had urge to urinate and catheter was removed without difficulty.  Patient was given a urinal to measure urine output.  Patient voided approximately 100 mL of yellow urine.  PVR 0 mL.    Cipro 500 given per protocol: Yes  The following medication was given:     MEDICATION:  Ciprofloxacin  DOSE: 500 mg  LOT #: [id number]  : [employer]  EXPIRATION DATE: 12/2018  NDC#: [id number] 11 2   Was there drug waste? No      Patient did tolerate procedure well.    Teaching done with patient verbally as where to call or go if pain, fever, or unable to urinate post catheter removal.    Leona Wall LPN  2/28/2018  9:35 AM

## 2018-02-28 NOTE — TELEPHONE ENCOUNTER
ALPRAZolam (XANAX) 0.5 MG tablet      Last Written Prescription Date:  1/22/2018  Last Fill Quantity: 20 tablet,   # refills: 0  Last Office Visit: 1/31/2018  Future Office visit:       Routing refill request to provider for review/approval because:  Drug not on the FM, P or [facility] refill protocol or controlled substance      Controlled Substance Agreement not on file.

## 2018-03-01 RX ORDER — ALPRAZOLAM 0.5 MG
0.5 TABLET ORAL 3 TIMES DAILY PRN
Qty: 20 TABLET | Refills: 0 | Status: SHIPPED | OUTPATIENT
Start: 2018-03-01 | End: 2018-04-03

## 2018-03-01 NOTE — TELEPHONE ENCOUNTER
Dr. White, please see note from MD LAURIE below. Thank you.  Kaylee Akins RN, BSN  [facility]

## 2018-03-08 ENCOUNTER — TELEPHONE (OUTPATIENT)
Dept: UROLOGY | Facility: CLINIC | Age: 68
End: 2018-03-08

## 2018-03-08 DIAGNOSIS — R32 URINARY LEAKAGE: Primary | ICD-10-CM

## 2018-03-08 RX ORDER — TOLTERODINE 4 MG/1
4 CAPSULE, EXTENDED RELEASE ORAL DAILY
Qty: 30 CAPSULE | Refills: 3 | Status: ON HOLD | OUTPATIENT
Start: 2018-03-08 | End: 2018-03-25

## 2018-03-08 NOTE — PATIENT INSTRUCTIONS
MY TREATMENT INFORMATION FOR SLEEP APNEA-  Juwan Latham    MY CONTACT NUMBERS ARE:  [phone]  DOCTOR : Owen WEEMS [facility]  SLEEP CENTER :  [place]  CPAP EQUIPMENT     Your sleep apnea is controlled with CPAP.   Continue use of CPAP:  - Recommend using CPAP every night for at least 7-8 hours each night  - Daytime naps are not advised, but use CPAP if taking naps.    Objective measure goal  Compliance  Goal >70% (preferrably 100%)  Leak   Goal < 10% (less than 24 L/min)  AHI  Goal < 5 events per hour   Usage  Goal 7-8 hours.      Patient was advised not to drive if drowsy or sleepy.      Follow up in 6 months.          Your BMI is Body mass index is 29.35 kg/(m^2).  Weight management is a personal decision.  If you are interested in exploring weight loss strategies, the following discussion covers the approaches that may be successful. Body mass index (BMI) is one way to tell whether you are at a healthy weight, overweight, or obese. It measures your weight in relation to your height.  A BMI of 18.5 to 24.9 is in the healthy range. A person with a BMI of 25 to 29.9 is considered overweight, and someone with a BMI of 30 or greater is considered obese. More than two-thirds of American adults are considered overweight or obese.  Being overweight or obese increases the risk for further weight gain. Excess weight may lead to heart disease and diabetes.  Creating and following plans for healthy eating and physical activity may help you improve your health.  Weight control is part of healthy lifestyle and includes exercise, emotional health, and healthy eating habits. Careful eating habits lifelong are the mainstay of weight control. Though there are significant health benefits from weight loss, long-term weight loss with diet alone may be very difficult to achieve- studies show long-term success with dietary management in less than 10% of people. Attaining a healthy weight may be especially difficult to achieve in  those with severe obesity. In some cases, medications, devices and surgical management might be considered.  What can you do?  If you are overweight or obese and are interested in methods for weight loss, you should discuss this with your provider.     Consider reducing daily calorie intake by 500 calories.     Keep a food journal.     Avoiding skipping meals, consider cutting portions instead.    Diet combined with exercise helps maintain muscle while optimizing fat loss. Strength training is particularly important for building and maintaining muscle mass. Exercise helps reduce stress, increase energy, and improves fitness. Increasing exercise without diet control, however, may not burn enough calories to loose weight.       Start walking three days a week 10-20 minutes at a time    Work towards walking thirty minutes five days a week     Eventually, increase the speed of your walking for 1-2 minutes at time    In addition, we recommend that you review healthy lifestyles and methods for weight loss available through the National Institutes of Health patient information sites:  http://win.niddk.nih.gov/publications/index.htm    And look into health and wellness programs that may be available through your health insurance provider, employer, local community center, or ondina club.    Weight management plan: Patient was referred to their PCP to discuss a diet and exercise plan.     Your blood pressure was checked while you were in clinic today.  Please read the guidelines below about what these numbers mean and what you should do about them.  Your systolic blood pressure is the top number.  This is the pressure when the heart is pumping.  Your diastolic blood pressure is the bottom number.  This is the pressure in between beats.  If your systolic blood pressure is less than 120 and your diastolic blood pressure is less than 80, then your blood pressure is normal. There is nothing more that you need to do about it  If  your systolic blood pressure is 120-139 or your diastolic blood pressure is 80-89, your blood pressure may be higher than it should be.  You should have your blood pressure re-checked within a year by a primary care provider.  If your systolic blood pressure is 140 or greater or your diastolic blood pressure is 90 or greater, you may have high blood pressure.  High blood pressure is treatable, but if left untreated over time it can put you at risk for heart attack, stroke, or kidney failure.  You should have your blood pressure re-checked by a primary care provider within the next four weeks.       No

## 2018-03-08 NOTE — PROGRESS NOTES
"THORACIC SURGERY FOLLOW UP VISIT    Dear Dr. Julio Guidry.     I saw Mr. Latham in follow-up today. The clinical summary follows:      PREOP DIAGNOSIS   Enlarging ground glass opacity in the right upper lobe.   PROCEDURE   1. Flexible bronchoscopy  2. Laparoscopic right upper lobe wedge resection  3. Laparoscopic-assisted thoracoscopic mediastinal lymph node dissection.      DATE OF PROCEDURE  03/03/2017     HISTOPATHOLOGY   Minimally invasive well differentiated adenocarcinoma.  Surgical Margins negative. Lymph Nodes negative  Tumor Size 1.5 cm.  [id number]     COMPLICATIONS  None      INTERVAL STUDIES  CT Chest 03/09/18: No change to the RUL consolidation along the surgical margins. Sub-five mm nodules are also unchaged from previous.       ETOH: 3 beers nightly  TOB: Former Smoker (quit 1985)  BMI: Body mass index is 29.37 kg/(m^2).    SUBJECTIVE   Since his last thoracic surgery follow up, he underwent a prostatectomy (12/01/18) for prostate cancer.  He is currently undergoing radiation treatmemt for his prostate cancer.    He contacted our office concerned--reporting he felt a heavy feeling in his right chest, like something was \"in his lung\".  He is concerned as he had similar symptoms prior to being diagnosed with his lung cancer in March of 2017.  He denies any SOB, hemoptysis or unintended weight loss.     From a personal perspective, he is at his appointment with his granddaughter Cynthia.      IMPRESSION (C34.11) Malignant neoplasm of upper lobe of right lung (H)      66 year-old male with NSCLC s/p right upper lobe wedge resection 03/03/2017.     PLAN  I spent a total of 15 minutes with Mr. Juwan Latham and his granddaughter, more than 50% of which were spent in counseling, coordination of care, and face-to-face time. I reviewed the plan as follows:  1. NSCLC. S/P resection as above.  CT scan today was personally reviewed with the reading radiologist.  There is no change in the to the RUL " consolidation along the surgical margins when compared with the November 2017 scan--though decreased in size when compared with the July 2017 scan.  The small (sub-five mm) nodules also remain unchanged.   2. Prostate Cancer. Diagnosed 08/31/17. He has been under the care of Dr. Agarwal, S/P  David Prostatectomy on 12/31/17. He is currently undergoing radiation treatment.   Defer ongoing recommendations and management.   The patient will follow up with 6 months with a CT scan prior.   All questions were answered and the patient and present family were in agreement with the plan.  I appreciate the opportunity to participate in the care of your patient and will keep you updated.  Sincerely,  GLORIA Casey, CNP  Thoracic and Forgut Surgery  [facility]

## 2018-03-08 NOTE — TELEPHONE ENCOUNTER
Central Prior Authorization Team   Phone: [phone]      PA Initiation    Medication: tolterodine (DETROL LA) 4 MG 24 hr capsule-PA Initiated  Insurance Company: [employer] - Phone [phone] Fax [phone]  Pharmacy Filling the Rx: [facility] [id number] [place] [address] - [address] AT [facility] OF [address]  Filling Pharmacy Phone: [phone]  Filling Pharmacy Fax: [phone]  Start Date: 3/8/2018

## 2018-03-09 ENCOUNTER — RADIANT APPOINTMENT (OUTPATIENT)
Dept: CT IMAGING | Facility: CLINIC | Age: 68
End: 2018-03-09
Attending: NURSE PRACTITIONER
Payer: COMMERCIAL

## 2018-03-09 ENCOUNTER — OFFICE VISIT (OUTPATIENT)
Dept: SURGERY | Facility: CLINIC | Age: 68
End: 2018-03-09
Attending: NURSE PRACTITIONER
Payer: MEDICARE

## 2018-03-09 VITALS
RESPIRATION RATE: 18 BRPM | WEIGHT: 193.1 LBS | SYSTOLIC BLOOD PRESSURE: 140 MMHG | BODY MASS INDEX: 29.27 KG/M2 | HEART RATE: 78 BPM | TEMPERATURE: 98.2 F | HEIGHT: 68 IN | DIASTOLIC BLOOD PRESSURE: 83 MMHG | OXYGEN SATURATION: 95 %

## 2018-03-09 DIAGNOSIS — C34.11 MALIGNANT NEOPLASM OF UPPER LOBE OF RIGHT LUNG (H): Primary | ICD-10-CM

## 2018-03-09 DIAGNOSIS — C34.11 MALIGNANT NEOPLASM OF UPPER LOBE OF RIGHT LUNG (H): ICD-10-CM

## 2018-03-09 PROCEDURE — G0463 HOSPITAL OUTPT CLINIC VISIT: HCPCS | Mod: ZF

## 2018-03-09 ASSESSMENT — PAIN SCALES - GENERAL: PAINLEVEL: NO PAIN (0)

## 2018-03-09 NOTE — TELEPHONE ENCOUNTER
Prior Authorization Approval    Authorization Effective Date: 3/9/2018  Authorization Expiration Date:    Medication: tolterodine (DETROL LA) 4 MG 24 hr capsule-APPROVED  Approved Dose/Quantity:   Reference #: [id number]   Insurance Company: [employer] - Phone [phone] Fax [phone]  Expected CoPay: $3.35     CoPay Card Available:      Foundation Assistance Needed:    Which Pharmacy is filling the prescription (Not needed for infusion/clinic administered): [facility] [place] - [address] AT [facility] [address]  Pharmacy Notified: Yes  Patient Notified: Yes

## 2018-03-09 NOTE — LETTER
"3/9/2018       RE: Juwan Latham  [address]  [place] [address]     Dear Colleague,    Thank you for referring your patient, Juwan Latham, to the [facility]. Please see a copy of my visit note below.    THORACIC SURGERY FOLLOW UP VISIT    Dear Dr. Julio Guidry.     I saw Mr. Latham in follow-up today. The clinical summary follows:      PREOP DIAGNOSIS   Enlarging ground glass opacity in the right upper lobe.   PROCEDURE   1. Flexible bronchoscopy  2. Laparoscopic right upper lobe wedge resection  3. Laparoscopic-assisted thoracoscopic mediastinal lymph node dissection.      DATE OF PROCEDURE  03/03/2017     HISTOPATHOLOGY   Minimally invasive well differentiated adenocarcinoma.  Surgical Margins negative. Lymph Nodes negative  Tumor Size 1.5 cm.  [id number]     COMPLICATIONS  None      INTERVAL STUDIES  CT Chest 03/09/18: No change to the RUL consolidation along the surgical margins. Sub-five mm nodules are also unchaged from previous.       ETOH: 3 beers nightly  TOB: Former Smoker (quit 1985)  BMI: Body mass index is 29.37 kg/(m^2).    SUBJECTIVE   Since his last thoracic surgery follow up, he underwent a prostatectomy (12/01/18) for prostate cancer.  He is currently undergoing radiation treatmemt for his prostate cancer.    He contacted our office concerned--reporting he felt a heavy feeling in his right chest, like something was \"in his lung\".  He is concerned as he had similar symptoms prior to being diagnosed with his lung cancer in March of 2017.  He denies any SOB, hemoptysis or unintended weight loss.     From a personal perspective, he is at his appointment with his granddaughter Cynthia.      IMPRESSION (C34.11) Malignant neoplasm of upper lobe of right lung (H)      66 year-old male with NSCLC s/p right upper lobe wedge resection 03/03/2017.     PLAN  I spent a total of 15 minutes with Mr. Juwan Latham and his granddaughter, more than 50% of which were spent in " counseling, coordination of care, and face-to-face time. I reviewed the plan as follows:  1. NSCLC. S/P resection as above.  CT scan today was personally reviewed with the reading radiologist.  There is no change in the to the RUL consolidation along the surgical margins when compared with the November 2017 scan--though decreased in size when compared with the July 2017 scan.  The small (sub-five mm) nodules also remain unchanged.   2. Prostate Cancer. Diagnosed 08/31/17. He has been under the care of Dr. Agarwal, S/P  David Prostatectomy on 12/31/17. He is currently undergoing radiation treatment.   Defer ongoing recommendations and management.   The patient will follow up with 6 months with a CT scan prior.   All questions were answered and the patient and present family were in agreement with the plan.  I appreciate the opportunity to participate in the care of your patient and will keep you updated.  Sincerely,  GLORIA Casey, CNP  Thoracic and Forgut Surgery  [facility]

## 2018-03-09 NOTE — MR AVS SNAPSHOT
After Visit Summary   3/9/2018    Juwan Latham    MRN: [id number]           Patient Information     Date Of Birth          1950        Visit Information        Provider Department      3/9/2018 4:00 PM Demetra Lott APRN CNP [facility]        Today's Diagnoses     Malignant neoplasm of upper lobe of right lung (H)    -  1       Follow-ups after your visit        Follow-up notes from your care team     Return in about 6 months (around 9/9/2018).      Your next 10 appointments already scheduled     Mar 22, 2018 11:00 AM CDT   (Arrive by 10:45 AM)   Return Visit with Paulo Agarwal MD   [facility]state [facility] Urologic C[facility] ([facility] [place])    [address] [address] [phone]              Who to contact     If you have questions or need follow up information about today's clinic visit or your schedule please contact [facility] directly at [phone].  Normal or non-critical lab and imaging results will be communicated to you by MyChart, letter or phone within 4 business days after the clinic has received the results. If you do not hear from us within 7 days, please contact the clinic through "[employer]hart or phone. If you have a critical or abnormal lab result, we will notify you by phone as soon as possible.  Submit refill requests through [employer] or call your pharmacy and they will forward the refill request to us. Please allow 3 business days for your refill to be completed.          Additional Information About Your Visit        "[employer] Information     [employer] gives you secure access to your electronic health record. If you see a primary care provider, you can also send messages to your care team and make appointments. If you have questions, please call your primary care clinic.  If you do not have a primary care provider, please call [phone] and  "they will assist you.        Care EveryWhere ID     This is your Care EveryWhere ID. This could be used by other organizations to access your [place] medical records  [id number]        Your Vitals Were     Pulse Temperature Respirations Height Pulse Oximetry BMI (Body Mass Index)    78 98.2  F (36.8  C) (Oral) 18 1.727 m (5' 7.99\") 95% 29.37 kg/m2       Blood Pressure from Last 3 Encounters:   03/09/18 140/83   02/26/18 150/82   02/23/18 140/78    Weight from Last 3 Encounters:   03/09/18 87.6 kg (193 lb 1.6 oz)   01/31/18 88.9 kg (196 lb)   01/29/18 87.5 kg (193 lb)               Primary Care Provider Office Phone # Fax #    Julio Guidry Jr., [phone] [address]  [place] [address]        Equal Access to Services     AMANDA SNIDER AH: Hadii aad ku hadasho Soomaali, waaxda luqadaha, qaybta kaalmada adeegyada, waxay idiin hayaan willard mosley . So [facility] [phone].    ATENCIÓN: Si katharina staples, tiene a gunn disposición servicios gratuitos de asistencia lingüística. Caitliname al [phone].    We comply with applicable federal civil rights laws and [place] laws. We do not discriminate on the basis of race, color, national origin, age, disability, sex, sexual orientation, or gender identity.            Thank you!     Thank you for choosing [facility]  for your care. Our goal is always to provide you with excellent care. Hearing back from our patients is one way we can continue to improve our services. Please take a few minutes to complete the written survey that you may receive in the mail after your visit with us. Thank you!             Your Updated Medication List - Protect others around you: Learn how to safely use, store and throw away your medicines at www.disposemymeds.org.          This list is accurate as of 3/9/18 11:59 PM.  Always use your most recent med list.                   Brand Name Dispense Instructions for use Diagnosis    acetaminophen " 325 MG tablet    TYLENOL    100 tablet    Take 2 tablets (650 mg) by mouth every 6 hours Do not take more than 4,000 mg of acetaminophen (Tylenol) from all sources to prevent liver damage.    Lung nodule       ALFALFA PO      Take by mouth daily        ALPRAZolam 0.5 MG tablet    XANAX    20 tablet    Take 1 tablet (0.5 mg) by mouth 3 times daily as needed for anxiety    Anxiety       LISINOPRIL PO      Take 10 mg by mouth daily        oxybutynin 5 MG tablet    DITROPAN    30 tablet    Take 1 tablet (5 mg) by mouth 3 times daily for 10 days For bladder spasms        SUPER B COMPLEX PO      Take 1 tablet by mouth daily        tolterodine 4 MG 24 hr capsule    DETROL LA    30 capsule    Take 1 capsule (4 mg) by mouth daily    Urinary leakage       VITAMIN D (CHOLECALCIFEROL) PO      Take by mouth daily

## 2018-03-09 NOTE — NURSING NOTE
"Oncology Rooming Note    March 9, 2018 2:44 PM   Juwan Latham is a 67 year old male who presents for:    Chief Complaint   Patient presents with     Oncology Clinic Visit     Onc Surg F/U     Initial Vitals: /82  Pulse 78  Temp 98.2  F (36.8  C) (Oral)  Resp 18  Ht 1.727 m (5' 7.99\")  Wt 87.6 kg (193 lb 1.6 oz)  SpO2 95%  BMI 29.37 kg/m2 Estimated body mass index is 29.37 kg/(m^2) as calculated from the following:    Height as of this encounter: 1.727 m (5' 7.99\").    Weight as of this encounter: 87.6 kg (193 lb 1.6 oz). Body surface area is 2.05 meters squared.  No Pain (0) Comment: Data Unavailable   No LMP for male patient.  Allergies reviewed: Yes  Medications reviewed: Yes    Medications: Medication refills not needed today.  Pharmacy name entered into [employer]: [id number], [place] - [address]    Clinical concerns: None Eliza Lott was NOT notified.    7 minutes for nursing intake (face to face time)     Kat Anna LPN              "

## 2018-03-12 ENCOUNTER — TELEPHONE (OUTPATIENT)
Dept: UROLOGY | Facility: CLINIC | Age: 68
End: 2018-03-12

## 2018-03-12 ENCOUNTER — PRE VISIT (OUTPATIENT)
Dept: UROLOGY | Facility: CLINIC | Age: 68
End: 2018-03-12

## 2018-03-12 ENCOUNTER — ALLIED HEALTH/NURSE VISIT (OUTPATIENT)
Dept: UROLOGY | Facility: CLINIC | Age: 68
End: 2018-03-12
Payer: COMMERCIAL

## 2018-03-12 DIAGNOSIS — R33.9 URINARY RETENTION: Primary | ICD-10-CM

## 2018-03-12 NOTE — PROGRESS NOTES
Chief Complaint   Patient presents with     Allied Health Visit     donovan catheter check       Patient Active Problem List   Diagnosis     Allergic rhinitis due to other allergen     CARDIOVASCULAR SCREENING; LDL GOAL LESS THAN 160     Advance Care Planning     History of colonic polyps     Anxiety     PAULINO (obstructive sleep apnea)     GERD (gastroesophageal reflux disease)     Hypertension goal BP (blood pressure) < 140/90     Seasonal allergies     Benign neoplasm of descending colon     Malignant neoplasm of upper lobe of right lung (H)     Overweight (BMI 25.0-29.9)     Cavernous hemangioma of brain (H)     Major depressive disorder, recurrent episode, mild (H)     Prostate cancer (H)     Somatic dysfunction of pelvis region     Mixed incontinence urge and stress (male)(female)       Allergies   Allergen Reactions     Percocet [Oxycodone-Acetaminophen] GI Disturbance     Severe constipation       Current Outpatient Prescriptions   Medication Sig Dispense Refill     VITAMIN D, CHOLECALCIFEROL, PO Take by mouth daily       tolterodine (DETROL LA) 4 MG 24 hr capsule Take 1 capsule (4 mg) by mouth daily 30 capsule 3     ALPRAZolam (XANAX) 0.5 MG tablet Take 1 tablet (0.5 mg) by mouth 3 times daily as needed for anxiety (Patient not taking: Reported on 3/9/2018) 20 tablet 0     B Complex-C (SUPER B COMPLEX PO) Take 1 tablet by mouth daily       LISINOPRIL PO Take 10 mg by mouth daily        ALFALFA PO Take by mouth daily       acetaminophen (TYLENOL) 325 MG tablet Take 2 tablets (650 mg) by mouth every 6 hours Do not take more than 4,000 mg of acetaminophen (Tylenol) from all sources to prevent liver damage. 100 tablet 1       Social History   Substance Use Topics     Smoking status: Former Smoker     Packs/day: 2.00     Years: 20.00     Types: Cigarettes     Quit date: 1/1/1985     Smokeless tobacco: Never Used     Alcohol use Yes      Comment: avg 3 beers per night       Juwan Latham comes into clinic today at  the request of Dr. Paulo Agarwal for catheter check.    Catheter draining clear yellow urine. Lots of slack available for catheter. No visible skin irritation from catheter despite patient's sensitivity and discomfort. Advised patient to try AZO, which patient stated did not help at all. Patient on an anticholinergic per patient, which is also not helping. Patient requested an anti-inflammatory medication. Advised patient to try ibuprofen. Advised to push fluids. Patient will discuss this with Dr. Agarwal at upcoming appointment.    This service provided today was under the supervising provider of the day Dr. Eric Grande, who was available if needed.      Leona Wall LPN  3/12/2018  12:48 PM

## 2018-03-12 NOTE — MR AVS SNAPSHOT
After Visit Summary   3/12/2018    Juwan Latham    MRN: [id number]           Patient Information     Date Of Birth          1950        Visit Information        Provider Department      3/12/2018 1:00 PM Nurse, [facility] and [facility] for Prostate and Urologic C[facility]        Today's Diagnoses     Urinary retention    -  1       Follow-ups after your visit        Your next 10 appointments already scheduled     Mar 22, 2018 11:00 AM CDT   (Arrive by 10:45 AM)   Return Visit with Paulo Agarwal MD   [facility] and [facility] for Prostate and Urologic Cancers ([facility])    [address]  4th Floor  [facility] [id number] [phone]            Sep 14, 2018  3:00 PM CDT   (Arrive by 2:45 PM)   CT CHEST W/O CONTRAST with UCCT1   [facility] [place] [facility] ([facility])    [address]  1st Floor  [facility] [facility] [id number] [phone]           Please bring any scans or X-rays taken at other hospitals, if similar tests were done. Also bring a list of your medicines, including vitamins, minerals and over-the-counter drugs. It is safest to leave personal items at home.  Be sure to tell your doctor:   If you have any allergies.   If there s any chance you are pregnant.   If you are breastfeeding.  You do not need to do anything special to prepare for this exam.  Please wear loose clothing, such as a sweat suit or jogging clothes. Avoid snaps, zippers and other metal. We may ask you to undress and put on a hospital gown.            Sep 14, 2018  4:15 PM CDT   (Arrive by 4:00 PM)   Return Visit with GLORIA Linton CNP   [facility] ([facility] [place])    [address]  [address]  [place] [place] [facility] [address] [phone]              Who to contact     Please call your clinic at [phone] to:    Ask questions about your health    Make or  cancel appointments    Discuss your medicines    Learn about your test results    Speak to your doctor            Additional Information About Your Visit        [employer] Information     [employer] gives you secure access to your electronic health record. If you see a primary care provider, you can also send messages to your care team and make appointments. If you have questions, please call your primary care clinic.  If you do not have a primary care provider, please call [phone] and they will assist you.      [employer] is an electronic gateway that provides easy, online access to your medical records. With [employer], you can request a clinic appointment, read your test results, renew a prescription or communicate with your care team.     To access your existing account, please contact your [facility] or call [phone] for assistance.        Care EveryWhere ID     This is your Care EveryWhere ID. This could be used by other organizations to access your [facility] medical records  [id number]         Blood Pressure from Last 3 Encounters:   03/09/18 140/83   02/26/18 150/82   02/23/18 140/78    Weight from Last 3 Encounters:   03/09/18 87.6 kg (193 lb 1.6 oz)   01/31/18 88.9 kg (196 lb)   01/29/18 87.5 kg (193 lb)              Today, you had the following     No orders found for display       Primary Care Provider Office Phone # Fax #    Julio Guidry Jr., [phone] [address]  [place] [address]        Equal Access to Services     CHAMP SNIDER : Hadii aad ku hadasho Soomaali, waaxda luqadaha, qaybta kaalmada adeegyada, waxay carlos mosley . So [facility] [phone].    ATENCIÓN: Si habla español, tiene a gunn disposición servicios gratuitos de asistencia lingüística. Llame al [phone].    We comply with applicable federal civil rights laws and [place] laws. We do not discriminate on the basis of race, color, national origin, age,  disability, sex, sexual orientation, or gender identity.            Thank you!     Thank you for choosing [facility]OLOGY AND [facility] FOR PROSTATE AND UROLOGIC CANCERS  for your care. Our goal is always to provide you with excellent care. Hearing back from our patients is one way we can continue to improve our services. Please take a few minutes to complete the written survey that you may receive in the mail after your visit with us. Thank you!             Your Updated Medication List - Protect others around you: Learn how to safely use, store and throw away your medicines at www.disposemymeds.org.          This list is accurate as of 3/12/18  1:05 PM.  Always use your most recent med list.                   Brand Name Dispense Instructions for use Diagnosis    acetaminophen 325 MG tablet    TYLENOL    100 tablet    Take 2 tablets (650 mg) by mouth every 6 hours Do not take more than 4,000 mg of acetaminophen (Tylenol) from all sources to prevent liver damage.    Lung nodule       ALFALFA PO      Take by mouth daily        ALPRAZolam 0.5 MG tablet    XANAX    20 tablet    Take 1 tablet (0.5 mg) by mouth 3 times daily as needed for anxiety    Anxiety       LISINOPRIL PO      Take 10 mg by mouth daily        SUPER B COMPLEX PO      Take 1 tablet by mouth daily        tolterodine 4 MG 24 hr capsule    DETROL LA    30 capsule    Take 1 capsule (4 mg) by mouth daily    Urinary leakage       VITAMIN D (CHOLECALCIFEROL) PO      Take by mouth daily

## 2018-03-12 NOTE — TELEPHONE ENCOUNTER
Patient called and is having significant problems with his donovan catheter  He states it plugs often and leaks around the base and the end of his penis is so irritated . Wants to come in  Appointment made for today with nurse. Jacquelyn Nogueira LPN Staff Nurse

## 2018-03-16 ENCOUNTER — CARE COORDINATION (OUTPATIENT)
Dept: CARE COORDINATION | Facility: CLINIC | Age: 68
End: 2018-03-16

## 2018-03-16 DIAGNOSIS — R33.9 URINARY RETENTION: Primary | ICD-10-CM

## 2018-03-16 RX ORDER — TAMSULOSIN HYDROCHLORIDE 0.4 MG/1
0.4 CAPSULE ORAL DAILY
Qty: 30 CAPSULE | Refills: 0 | Status: ON HOLD | OUTPATIENT
Start: 2018-03-16 | End: 2018-03-25

## 2018-03-16 NOTE — PROGRESS NOTES
Clinic Care Coordination Contact  OUTREACH    Referral Information:  Referral Source: PCP  Reason for Contact: follow up  Care Conference: No     Universal Utilization:   ED Visits in last year: 1  Hospital visits in last year: 1  Last PCP appointment: 01/31/18  Missed Appointments: 0  Concerns: anxiety management  Multiple Providers or Specialists: urology    Clinical Concerns:  Current Medical Concerns: Pt reports that he is feeling about the same, continues to have his donovan catheter in place, but anticipates that it will be removed on 03/22.  He reports that he has another radiation treatment scheduled on 03/28, and is concerned that he will have the same problems at that time since he continues to have cramping right now.  Encouraged pt to discuss with urology, he stated that he did call this am and leave a message to have MD call him to discuss. Pt denies any other concerns at this time.  Current Behavioral Concerns: anxiety, managed with medication    Education Provided to patient: ongoing concerns, need for f/u with urology   Clinical Pathway Name: None    Medication Management:  No new concerns     Functional Status:  Mobility Status: Independent     Transportation: no concerns      Psychosocial:  Current living arrangement:: I live in a private home  Financial/Insurance: no concerns     Resources and Interventions:  Current Resources:  none   Advanced Care Plans/Directives on file:: No        Goals:   Goal 1 Statement: I will work on managing my stress over the next couple of months.  Goal 1 Supportive Steps: Pt to work on good sleep hygiene and self care.  Goal 1 Progression Percent: 80%  Goal 1 Progression Date: 02/27/18    Upcoming appointment:  (NA)     Plan: Will follow up with pt end of the month following upcoming procedures to check on progress. Pt in agreement with plan and will contact writer with any needs that arise prior to that time.

## 2018-03-18 ENCOUNTER — TELEPHONE (OUTPATIENT)
Dept: OTHER | Facility: CLINIC | Age: 68
End: 2018-03-18

## 2018-03-18 DIAGNOSIS — C61 MALIGNANT NEOPLASM OF PROSTATE (H): Primary | ICD-10-CM

## 2018-03-18 RX ORDER — CYCLOBENZAPRINE HCL 5 MG
5 TABLET ORAL 3 TIMES DAILY PRN
Qty: 45 TABLET | Refills: 0 | Status: ON HOLD | OUTPATIENT
Start: 2018-03-18 | End: 2018-03-25

## 2018-03-18 RX ORDER — CYCLOBENZAPRINE HCL 5 MG
5 TABLET ORAL 3 TIMES DAILY PRN
Qty: 45 TABLET | Refills: 0 | Status: SHIPPED | OUTPATIENT
Start: 2018-03-18 | End: 2018-03-18

## 2018-03-18 NOTE — TELEPHONE ENCOUNTER
"Telephone Note    Mr. Latham is a 66yo male with a history of prostate cancer and urinary retention requiring indwelling donovan catheter. He has had severe bladder spasms which have been refractory to oxybutynin, detrol, and pyridium. He was seen in clinic last week to have his catheter checked and it was found to be well positioned and draining adequately. He calls in today to report worsening of his bladder spasms, reporting that he is \"in agony\" from the ongoing spasms. Will try flexeril as he is having ongoing symptoms despite maximal anticholinergic therapy. Could also try B&O in the future but we discussed this cannot be prescribed over the phone on a weekend. Flexeril sent to [facility] in [place] per pt request. Discussed with Dr. Agarwal.     Edwardo Busch MD  "

## 2018-03-19 ENCOUNTER — ALLIED HEALTH/NURSE VISIT (OUTPATIENT)
Dept: UROLOGY | Facility: CLINIC | Age: 68
End: 2018-03-19
Payer: COMMERCIAL

## 2018-03-19 DIAGNOSIS — N32.89 BLADDER SPASM: Primary | ICD-10-CM

## 2018-03-19 NOTE — MR AVS SNAPSHOT
After Visit Summary   3/19/2018    Juwan Latham    MRN: [id number]           Patient Information     Date Of Birth          1950        Visit Information        Provider Department      3/19/2018 11:00 AM Nurse, [facility] and [facility] for Prostate and Urologic Cancers        Today's Diagnoses     Bladder spasm    -  1       Follow-ups after your visit        Your next 10 appointments already scheduled     Mar 22, 2018 11:00 AM CDT   (Arrive by 10:45 AM)   Return Visit with Paulo Agarwal MD   [facility] Urology and [facility] for Prostate and Urologic Cancers ([facility])    [address]  4th Floor  [facility] [id number] [phone]            Sep 14, 2018  3:00 PM CDT   (Arrive by 2:45 PM)   CT CHEST W/O CONTRAST with UCCT1   [facility] ([facility])    [address]  1st Floor  [facility] [id number] [phone]           Please bring any scans or X-rays taken at other hospitals, if similar tests were done. Also bring a list of your medicines, including vitamins, minerals and over-the-counter drugs. It is safest to leave personal items at home.  Be sure to tell your doctor:   If you have any allergies.   If there s any chance you are pregnant.   If you are breastfeeding.  You do not need to do anything special to prepare for this exam.  Please wear loose clothing, such as a sweat suit or jogging clothes. Avoid snaps, zippers and other metal. We may ask you to undress and put on a hospital gown.            Sep 14, 2018  4:15 PM CDT   (Arrive by 4:00 PM)   Return Visit with GLORIA Linton CNP   [facility] ([facility])    [address]  [address]  [facility] [place] [address] [phone]              Who to contact     Please call your clinic at [phone] to:    Ask questions about your health    Make or  cancel appointments    Discuss your medicines    Learn about your test results    Speak to your doctor            Additional Information About Your Visit        PinkUPharDymant Information     Zivity gives you secure access to your electronic health record. If you see a primary care provider, you can also send messages to your care team and make appointments. If you have questions, please call your primary care clinic.  If you do not have a primary care provider, please call [phone] and they will assist you.      Zivity is an electronic gateway that provides easy, online access to your medical records. With Zivity, you can request a clinic appointment, read your test results, renew a prescription or communicate with your care team.     To access your existing account, please contact your [facility] or call [phone] for assistance.        Care EveryWhere ID     This is your Care EveryWhere ID. This could be used by other organizations to access your [place] medical records  [id number]         Blood Pressure from Last 3 Encounters:   03/09/18 140/83   02/26/18 150/82   02/23/18 140/78    Weight from Last 3 Encounters:   03/09/18 87.6 kg (193 lb 1.6 oz)   01/31/18 88.9 kg (196 lb)   01/29/18 87.5 kg (193 lb)              Today, you had the following     No orders found for display         Today's Medication Changes          These changes are accurate as of 3/19/18  1:35 PM.  If you have any questions, ask your nurse or doctor.               Start taking these medicines.        Dose/Directions    opium-belladonna 30-16.2 MG per suppository   Commonly known as:  B&O SUPPRETTES   Used for:  Bladder spasm        Dose:  30 mg   Place 1 suppository rectally every 8 hours as needed for moderate pain or bladder spasms   Quantity:  30 suppository   Refills:  0            Where to get your medicines      Some of these will need a paper prescription and others can be bought over the counter.   Ask your nurse if you have questions.     Bring a paper prescription for each of these medications     opium-belladonna 30-16.2 MG per suppository                Primary Care Provider Office Phone # Fax #    Julio Guidry Jr., [phone] [address] [place] [address]        Equal Access to Services     [facility]: Hadii aad ku hadasho Soomaali, waaxda luqadaha, qaybta kaalmada adeegyada, waxay idiin hayaan adeeg khyanish laAlphonseaan . So [facility] [phone].    ATENCIÓN: Si habla español, tiene a gunn disposición servicios gratuitos de asistencia lingüística. LlEast [facility] [phone].    We comply with applicable federal civil rights laws and [place] laws. We do not discriminate on the basis of race, color, national origin, age, disability, sex, sexual orientation, or gender identity.            Thank you!     Thank you for choosing [facility] UROLOGY AND [facility] FOR PROSTATE AND UROLOGIC CANCERS  for your care. Our goal is always to provide you with excellent care. Hearing back from our patients is one way we can continue to improve our services. Please take a few minutes to complete the written survey that you may receive in the mail after your visit with us. Thank you!             Your Updated Medication List - Protect others around you: Learn how to safely use, store and throw away your medicines at www.disposemymeds.org.          This list is accurate as of 3/19/18  1:35 PM.  Always use your most recent med list.                   Brand Name Dispense Instructions for use Diagnosis    acetaminophen 325 MG tablet    TYLENOL    100 tablet    Take 2 tablets (650 mg) by mouth every 6 hours Do not take more than 4,000 mg of acetaminophen (Tylenol) from all sources to prevent liver damage.    Lung nodule       ALFALFA PO      Take by mouth daily        ALPRAZolam 0.5 MG tablet    XANAX    20 tablet    Take 1 tablet (0.5 mg) by mouth 3 times daily as needed for anxiety    Anxiety        cyclobenzaprine 5 MG tablet    FLEXERIL    45 tablet    Take 1 tablet (5 mg) by mouth 3 times daily as needed for muscle spasms    Malignant neoplasm of prostate (H)       LISINOPRIL PO      Take 10 mg by mouth daily        opium-belladonna 30-16.2 MG per suppository    B&O SUPPRETTES    30 suppository    Place 1 suppository rectally every 8 hours as needed for moderate pain or bladder spasms    Bladder spasm       SUPER B COMPLEX PO      Take 1 tablet by mouth daily        tamsulosin 0.4 MG capsule    FLOMAX    30 capsule    Take 1 capsule (0.4 mg) by mouth daily    Urinary retention       tolterodine 4 MG 24 hr capsule    DETROL LA    30 capsule    Take 1 capsule (4 mg) by mouth daily    Urinary leakage       VITAMIN D (CHOLECALCIFEROL) PO      Take by mouth daily

## 2018-03-19 NOTE — NURSING NOTE
Juwan Latham comes into clinic today at the request of Dr. Justyn Agarwal Ordering Provider for Trial of void.      Patient is S/P robotic radical prostatectomy on 12/1/2017.  He has failed 4 voiding trials since his surgery.  He has had numerous trips to the ED requiring donovan placement.   After talking with the patient today, he feels that at this time he would not be able to perform CIC due to excessive pain.  Donovan will be left in place until OV with Dr. Agarwal on 3-22-18.  Patient is requesting something to help with bladder spasm pain.  I spoke with Dr. Edwardo Busch who authorized B&O suppositories, #30 with 0 refills.  Anali Soto PA-C was able to give him the RX today.        This service provided today was under the supervising provider of the day Anali Soto PA-C, who was available if needed.    EULA Beatty

## 2018-03-22 ENCOUNTER — OFFICE VISIT (OUTPATIENT)
Dept: UROLOGY | Facility: CLINIC | Age: 68
End: 2018-03-22
Payer: COMMERCIAL

## 2018-03-22 DIAGNOSIS — R10.2 PELVIC PAIN IN MALE: Primary | ICD-10-CM

## 2018-03-22 RX ORDER — KETOROLAC TROMETHAMINE 10 MG/1
10 TABLET, FILM COATED ORAL EVERY 6 HOURS PRN
Qty: 18 TABLET | Refills: 0 | Status: ON HOLD | OUTPATIENT
Start: 2018-03-22 | End: 2018-03-25

## 2018-03-22 RX ORDER — LIDOCAINE/PRILOCAINE 2.5 %-2.5%
CREAM (GRAM) TOPICAL PRN
Qty: 30 G | Refills: 0 | Status: ON HOLD | OUTPATIENT
Start: 2018-03-22 | End: 2018-03-25

## 2018-03-22 RX ORDER — MIRABEGRON 50 MG/1
50 TABLET, EXTENDED RELEASE ORAL DAILY
Qty: 14 TABLET | Refills: 0 | Status: ON HOLD | OUTPATIENT
Start: 2018-03-22 | End: 2018-03-25

## 2018-03-22 NOTE — MR AVS SNAPSHOT
After Visit Summary   3/22/2018    Juwan Latham    MRN: [id number]           Patient Information     Date Of Birth          1950        Visit Information        Provider Department      3/22/2018 11:00 AM Paulo Agarwal MD [facility]rology and [facility] for Prostate and Urologic Cancers        Today's Diagnoses     Pelvic pain in male    -  1      Care Instructions    Follow up with Dr. Agarwal in 3 months with PSA prior.    Catheter changes every month.    It was a pleasure meeting with you today.  Thank you for allowing me and my team the privilege of caring for you today.  YOU are the reason we are here, and I truly hope we provided you with the excellent service you deserve.  Please let us know if there is anything else we can do for you so that we can be sure you are leaving completely satisfied with your care experience.              Follow-ups after your visit        Your next 10 appointments already scheduled     Apr 02, 2018  1:40 PM CDT   (Arrive by 1:25 PM)   Return Visit with  Prostate Cancer Ctr Nurse   [facility] Urology and [facility] for Prostate and Urologic Cancers ([facility])    [address] [place] [address] [phone]            Jun 18, 2018 10:00 AM CDT   LAB with SV LAB   [facility] ([facility])    [address] [address] [phone]           Please do not eat 10-12 hours before your appointment if you are coming in fasting for labs on lipids, cholesterol, or glucose (sugar). This does not apply to pregnant women. Water, hot tea and black coffee (with nothing added) are okay. Do not drink other fluids, diet soda or chew gum.            Jun 21, 2018 12:20 PM CDT   (Arrive by 12:05 PM)   Return Visit with Paulo Agarwal MD   [facility] and [facility] for Prostate and Urologic Cancers ([facility])    [address] [place] [address]  Floor  [facility] [id number] [phone]            Sep 14, 2018  3:00 PM CDT   (Arrive by 2:45 PM)   CT CHEST W/O CONTRAST with UCCT1   [facility] ([facility])    [address]  1st Floor  [facility] [id number] [phone]           Please bring any scans or X-rays taken at other hospitals, if similar tests were done. Also bring a list of your medicines, including vitamins, minerals and over-the-counter drugs. It is safest to leave personal items at home.  Be sure to tell your doctor:   If you have any allergies.   If there s any chance you are pregnant.   If you are breastfeeding.  You do not need to do anything special to prepare for this exam.  Please wear loose clothing, such as a sweat suit or jogging clothes. Avoid snaps, zippers and other metal. We may ask you to undress and put on a hospital gown.            Sep 14, 2018  4:15 PM CDT   (Arrive by 4:00 PM)   Return Visit with GLORIA Linton [facility] ([facility] [place])    [address]  [address]  [facility] [id number] [phone]              Future tests that were ordered for you today     Open Future Orders        Priority Expected Expires Ordered    PSA tumor marker Routine  3/22/2019 3/22/2018            Who to contact     Please call your clinic at [phone] to:    Ask questions about your health    Make or cancel appointments    Discuss your medicines    Learn about your test results    Speak to your doctor            Additional Information About Your Visit        Better ATM Serviceshart Information     iSpyet gives you secure access to your electronic health record. If you see a primary care provider, you can also send messages to your care team and make appointments. If you have questions, please call your primary care clinic.  If you do not have a primary care provider, please call [phone] and they will assist you.       Altar is an electronic gateway that provides easy, online access to your medical records. With Altar, you can request a clinic appointment, read your test results, renew a prescription or communicate with your care team.     To access your existing account, please contact your [facility] or call [phone] for assistance.        Care EveryWhere ID     This is your Care EveryWhere ID. This could be used by other organizations to access your [place] medical records  [id number]         Blood Pressure from Last 3 Encounters:   03/09/18 140/83   02/26/18 150/82   02/23/18 140/78    Weight from Last 3 Encounters:   03/09/18 87.6 kg (193 lb 1.6 oz)   01/31/18 88.9 kg (196 lb)   01/29/18 87.5 kg (193 lb)                 Today's Medication Changes          These changes are accurate as of 3/22/18 11:58 AM.  If you have any questions, ask your nurse or doctor.               Start taking these medicines.        Dose/Directions    ketorolac 10 MG tablet   Commonly known as:  TORADOL   Used for:  Pelvic pain in male        Dose:  10 mg   Take 1 tablet (10 mg) by mouth every 6 hours as needed for moderate pain   Quantity:  18 tablet   Refills:  0       lidocaine-prilocaine cream   Commonly known as:  EMLA   Used for:  Pelvic pain in male        Apply topically as needed for moderate pain Apply a small amount to skin prn   Quantity:  30 g   Refills:  0       mirabegron 50 MG 24 hr tablet   Commonly known as:  MYRBETRIQ   Used for:  Pelvic pain in male        Dose:  50 mg   Take 1 tablet (50 mg) by mouth daily   Quantity:  14 tablet   Refills:  0            Where to get your medicines      These medications were sent to [email or web address] [id number]  [place], [place] - [address] AT [address], [facility] [address]    Hours:  24-hours Phone:  [phone]     ketorolac 10 MG tablet    lidocaine-prilocaine cream    mirabegron 50 MG 24 hr tablet                 Primary Care Provider Office Phone # Fax #    Julio Guidry Jr., [phone] [address] [address]        Equal Access to Services     [facility] : Hadnereida leon ku sivakumaro Sosoleali, waaxda luqadaha, qaybta kaalmada loren, mynor hutchisonmeme tigist. So [facility] [phone].    ATENCIÓN: Si habla español, tiene a gunn disposición servicios gratuitos de asistencia lingüística. Llame al [phone].    We comply with applicable federal civil rights laws and [place] laws. We do not discriminate on the basis of race, color, national origin, age, disability, sex, sexual orientation, or gender identity.            Thank you!     Thank you for choosing [facility] UROLOGY AND [facility] FOR PROSTATE AND UROLOGIC CANCERS  for your care. Our goal is always to provide you with excellent care. Hearing back from our patients is one way we can continue to improve our services. Please take a few minutes to complete the written survey that you may receive in the mail after your visit with us. Thank you!             Your Updated Medication List - Protect others around you: Learn how to safely use, store and throw away your medicines at www.disposemymeds.org.          This list is accurate as of 3/22/18 11:58 AM.  Always use your most recent med list.                   Brand Name Dispense Instructions for use Diagnosis    acetaminophen 325 MG tablet    TYLENOL    100 tablet    Take 2 tablets (650 mg) by mouth every 6 hours Do not take more than 4,000 mg of acetaminophen (Tylenol) from all sources to prevent liver damage.    Lung nodule       ALFALFA PO      Take by mouth daily        ALPRAZolam 0.5 MG tablet    XANAX    20 tablet    Take 1 tablet (0.5 mg) by mouth 3 times daily as needed for anxiety    Anxiety       cyclobenzaprine 5 MG tablet    FLEXERIL    45 tablet    Take 1 tablet (5 mg) by mouth 3 times daily as needed for muscle spasms    Malignant neoplasm of prostate (H)        ketorolac 10 MG tablet    TORADOL    18 tablet    Take 1 tablet (10 mg) by mouth every 6 hours as needed for moderate pain    Pelvic pain in male       lidocaine-prilocaine cream    EMLA    30 g    Apply topically as needed for moderate pain Apply a small amount to skin prn    Pelvic pain in male       LISINOPRIL PO      Take 10 mg by mouth daily        mirabegron 50 MG 24 hr tablet    MYRBETRIQ    14 tablet    Take 1 tablet (50 mg) by mouth daily    Pelvic pain in male       opium-belladonna 30-16.2 MG per suppository    B&O SUPPRETTES    30 suppository    Place 1 suppository rectally every 8 hours as needed for moderate pain or bladder spasms    Bladder spasm       SUPER B COMPLEX PO      Take 1 tablet by mouth daily        tamsulosin 0.4 MG capsule    FLOMAX    30 capsule    Take 1 capsule (0.4 mg) by mouth daily    Urinary retention       tolterodine 4 MG 24 hr capsule    DETROL LA    30 capsule    Take 1 capsule (4 mg) by mouth daily    Urinary leakage       VITAMIN D (CHOLECALCIFEROL) PO      Take by mouth daily

## 2018-03-22 NOTE — LETTER
3/22/2018       RE: Juwan Latham  [address]  [place] [address]     Dear Colleague,    Thank you for referring your patient, Juwan Latham, to the [facility] UROLOGY AND INST FOR PROSTATE AND UROLOGIC CANCERS at [facility]. Please see a copy of my visit note below.    Prostate Cancer Follow up after RRP     Juwan Latham is a very pleasant 67 year old male who presents with a history of prostate cancer.    Initial PSA:19.6  Pathologic German Score was 4 + 5  Grade Group:5  Biopsy [place] Score was 3 + 4  Pathologic Stage T2bc, N1, Mx.   Positive Margins: Yes Location:right side additional margin taken and negative  Number of Lymph Nodes Removed: 5  Number of Positive Lymph Nodes: 1  Patient is status post robotic radical prostatectomy on 12/1/2017.    Risk Group: High    Predicted progression free probability at 10 years: ~10  Predicted Cancer specific survival at 15 years: 80-90%  (J Clin Oncol. 2005 Oct 1;23(28):6330-12.  Http://nomograms.[facility].org/Prostate/PostRadicalProstatectomy.aspx)    There is no evidence of disease recurrence to date.    Physical Exam:  Vitals: There were no vitals taken for this visit.  General Appearance Adult: A&O in NAD     Abd is soft, non-distended, incisions are healing well, no evidence of hernias.     He is tolerating a regular diet.     He is currently undergoing radiation and is scheduled to complete this in the next few weeks.     At our last visit, he reported urinary symptoms, primarily incontinence. He is wearing 8-9 pads per day. He was attending pelvic floor rehab and doing Kegel's regularly. He was seen in the ER for urinary retention on 12/22/17 which resolved initially but has since returned. He has had great difficulty voiding spontaneously and had gone over 8 hours without being able to void before requiring placement of a Breen catheter. This is currently still in place - urine  is draining well into the Breen. He  also reports penile pain. He has eliminated caffeine and EtOH from his diet.     PSA   Date Value Ref Range Status   01/11/2018 0.03 0 - 4 ug/L Final     Comment:     Assay Method:  Chemiluminescence using Siemens Vista analyzer       PSA  19.6 pre op    Assessment: [id number] prostate cancer s/p robotic radical prostatectomy on 12/1/2017 with  no evidence of disease, but substantially higher risk than previously believed. Currently undergoing radiation.    Plan:        We discussed management of urinary retention until completion of radiation. Plan to remove Donovan catheter upon completion of radiation.       Prescribe Myrbetriq and Toradol for urinary retention/management of associated pain    Recommended Emla for penile pain     PSA (tumor marker) today     Follow up in 3 months       Scribe Disclosure:   I,Сергей Shaikh, am serving as a scribe; to document services personally performed by Paulo Agarwal MD based on data collection and the provider's statements to me.       Attestation:  This patient was seen and evaluated by me, with a scribe taking notes.  I have reviewed the note above and agree.  The physical exam was performed by me and the pertinant details are outlined below.     Patient is a 67 year old  male   Vitals: There were no vitals taken for this visit.  General Appearance Adult: Alert, no acute distress, oriented    Assessment: [id number] Grade Group 5 prostate cancer s/p robotic radical prostatectomy on 12/1/2017 with  no evidence of disease, but substantially higher risk than previously believed given positive lymph node and grade group 5    Plan:    High risk for prostate cancer is getting a lot of symptoms from radiation cystitis      We discussed management of urinary retention until completion of radiation. Plan to remove Donovan catheter upon completion of radiation.     Discussed donovan removal, but he thought symptoms were worse with donovan out.      Prescribe Myrbetriq and Toradol  for urinary retention/management of associated pain    Recommended Emla for penile pain     PSA (tumor marker) today     Follow up in 3 months       Again, thank you for allowing me to participate in the care of your patient.      Sincerely,    Paulo Agarwal MD

## 2018-03-22 NOTE — NURSING NOTE
"Chief Complaint   Patient presents with     RECHECK     Follow up- urinary retention       Initial There were no vitals taken for this visit. Estimated body mass index is 29.37 kg/(m^2) as calculated from the following:    Height as of 3/9/18: 1.727 m (5' 7.99\").    Weight as of 3/9/18: 87.6 kg (193 lb 1.6 oz).  Medication Reconciliation: complete     EULA Beatty    "

## 2018-03-22 NOTE — PROGRESS NOTES
Prostate Cancer Follow up after RRP     Juwan Latham is a very pleasant 67 year old male who presents with a history of prostate cancer.    Initial PSA:19.6  Pathologic German Score was 4 + 5  Grade Group:5  Biopsy Vanduser Score was 3 + 4  Pathologic Stage T2bc, N1, Mx.   Positive Margins: Yes Location:right side additional margin taken and negative  Number of Lymph Nodes Removed: 5  Number of Positive Lymph Nodes: 1  Patient is status post robotic radical prostatectomy on 12/1/2017.    Risk Group: High    Predicted progression free probability at 10 years: ~10  Predicted Cancer specific survival at 15 years: 80-90%  (J Clin Oncol. 2005 Oct 1;23(28):7009-12.  Http://nomograms.[facility].org/Prostate/PostRadicalProstatectomy.aspx)    There is no evidence of disease recurrence to date.    Physical Exam:  Vitals: There were no vitals taken for this visit.  General Appearance Adult: A&O in NAD     Abd is soft, non-distended, incisions are healing well, no evidence of hernias.     He is tolerating a regular diet.     He is currently undergoing radiation and is scheduled to complete this in the next few weeks.     At our last visit, he reported urinary symptoms, primarily incontinence. He is wearing 8-9 pads per day. He was attending pelvic floor rehab and doing Kegel's regularly. He was seen in the ER for urinary retention on 12/22/17 which resolved initially but has since returned. He has had great difficulty voiding spontaneously and had gone over 8 hours without being able to void before requiring placement of a Breen catheter. This is currently still in place - urine  is draining well into the Breen. He also reports penile pain. He has eliminated caffeine and EtOH from his diet.     PSA   Date Value Ref Range Status   01/11/2018 0.03 0 - 4 ug/L Final     Comment:     Assay Method:  Chemiluminescence using Siemens Vista analyzer       PSA  19.6 pre op    Assessment: [id number] prostate cancer s/p robotic radical  prostatectomy on 12/1/2017 with  no evidence of disease, but substantially higher risk than previously believed. Currently undergoing radiation.    Plan:        We discussed management of urinary retention until completion of radiation. Plan to remove Donovan catheter upon completion of radiation.       Prescribe Myrbetriq and Toradol for urinary retention/management of associated pain    Recommended Emla for penile pain     PSA (tumor marker) today     Follow up in 3 months       Scribe Disclosure:   I,Сергей Shaikh, am serving as a scribe; to document services personally performed by Paulo Agarwal MD based on data collection and the provider's statements to me.       Paulo Agarwal MD  Department of Urology  [facility]    Attestation:  This patient was seen and evaluated by me, with a scribe taking notes.  I have reviewed the note above and agree.  The physical exam was performed by me and the pertinant details are outlined below.     Patient is a 67 year old  male   Vitals: There were no vitals taken for this visit.  General Appearance Adult: Alert, no acute distress, oriented    Assessment: [id number] Grade Group 5 prostate cancer s/p robotic radical prostatectomy on 12/1/2017 with  no evidence of disease, but substantially higher risk than previously believed given positive lymph node and grade group 5    Plan:    High risk for prostate cancer is getting a lot of symptoms from radiation cystitis      We discussed management of urinary retention until completion of radiation. Plan to remove Donovan catheter upon completion of radiation.     Discussed donovan removal, but he thought symptoms were worse with donovan out.      Prescribe Myrbetriq and Toradol for urinary retention/management of associated pain    Recommended Emla for penile pain     PSA (tumor marker) today     Follow up in 3 months         Paulo Agarwal MD  Department of Urology  [facility]

## 2018-03-22 NOTE — PATIENT INSTRUCTIONS
Follow up with Dr. Agarwal in 3 months with PSA prior.    Catheter changes every month.    It was a pleasure meeting with you today.  Thank you for allowing me and my team the privilege of caring for you today.  YOU are the reason we are here, and I truly hope we provided you with the excellent service you deserve.  Please let us know if there is anything else we can do for you so that we can be sure you are leaving completely satisfied with your care experience.

## 2018-03-24 ENCOUNTER — HOSPITAL ENCOUNTER (OUTPATIENT)
Facility: CLINIC | Age: 68
Setting detail: OBSERVATION
Discharge: HOME | End: 2018-03-26
Attending: EMERGENCY MEDICINE | Admitting: INTERNAL MEDICINE
Payer: MEDICARE

## 2018-03-24 ENCOUNTER — APPOINTMENT (OUTPATIENT)
Dept: CT IMAGING | Facility: CLINIC | Age: 68
End: 2018-03-24
Attending: EMERGENCY MEDICINE
Payer: MEDICARE

## 2018-03-24 ENCOUNTER — HOSPITAL ENCOUNTER (EMERGENCY)
Facility: CLINIC | Age: 68
Discharge: HOME | End: 2018-03-24
Attending: EMERGENCY MEDICINE | Admitting: EMERGENCY MEDICINE
Payer: MEDICARE

## 2018-03-24 VITALS
SYSTOLIC BLOOD PRESSURE: 132 MMHG | TEMPERATURE: 98 F | OXYGEN SATURATION: 98 % | HEART RATE: 67 BPM | DIASTOLIC BLOOD PRESSURE: 77 MMHG | RESPIRATION RATE: 20 BRPM

## 2018-03-24 DIAGNOSIS — N39.0 URINARY TRACT INFECTION WITHOUT HEMATURIA, SITE UNSPECIFIED: Primary | ICD-10-CM

## 2018-03-24 DIAGNOSIS — R10.2 PELVIC PAIN IN MALE: ICD-10-CM

## 2018-03-24 DIAGNOSIS — N32.89 BLADDER SPASM: ICD-10-CM

## 2018-03-24 DIAGNOSIS — R41.9 COGNITIVE COMPLAINTS: ICD-10-CM

## 2018-03-24 DIAGNOSIS — T83.018A URINARY CATHETER DYSFUNCTION, INITIAL ENCOUNTER (H): ICD-10-CM

## 2018-03-24 LAB
ALBUMIN UR-MCNC: 100 MG/DL
ANION GAP SERPL CALCULATED.3IONS-SCNC: 6 MMOL/L (ref 3–14)
APPEARANCE UR: ABNORMAL
BACTERIA #/AREA URNS HPF: ABNORMAL /HPF
BASOPHILS # BLD AUTO: 0 10E9/L (ref 0–0.2)
BASOPHILS NFR BLD AUTO: 0.3 %
BILIRUB UR QL STRIP: NEGATIVE
BUN SERPL-MCNC: 14 MG/DL (ref 7–30)
CA PHOS CRY #/AREA URNS HPF: ABNORMAL /HPF
CALCIUM SERPL-MCNC: 9.1 MG/DL (ref 8.5–10.1)
CAOX CRY #/AREA URNS HPF: ABNORMAL /HPF
CHLORIDE SERPL-SCNC: 107 MMOL/L (ref 94–109)
CO2 SERPL-SCNC: 26 MMOL/L (ref 20–32)
COLOR UR AUTO: YELLOW
CREAT SERPL-MCNC: 0.78 MG/DL (ref 0.66–1.25)
DIFFERENTIAL METHOD BLD: ABNORMAL
EOSINOPHIL # BLD AUTO: 0.1 10E9/L (ref 0–0.7)
EOSINOPHIL NFR BLD AUTO: 3.7 %
ERYTHROCYTE [DISTWIDTH] IN BLOOD BY AUTOMATED COUNT: 13 % (ref 10–15)
GFR SERPL CREATININE-BSD FRML MDRD: >90 ML/MIN/1.7M2
GLUCOSE SERPL-MCNC: 89 MG/DL (ref 70–99)
GLUCOSE UR STRIP-MCNC: NEGATIVE MG/DL
HCT VFR BLD AUTO: 42.5 % (ref 40–53)
HGB BLD-MCNC: 14.4 G/DL (ref 13.3–17.7)
HGB UR QL STRIP: ABNORMAL
IMM GRANULOCYTES # BLD: 0 10E9/L (ref 0–0.4)
IMM GRANULOCYTES NFR BLD: 0.8 %
KETONES UR STRIP-MCNC: 5 MG/DL
LEUKOCYTE ESTERASE UR QL STRIP: ABNORMAL
LYMPHOCYTES # BLD AUTO: 0.3 10E9/L (ref 0.8–5.3)
LYMPHOCYTES NFR BLD AUTO: 6.6 %
MCH RBC QN AUTO: 30.5 PG (ref 26.5–33)
MCHC RBC AUTO-ENTMCNC: 33.9 G/DL (ref 31.5–36.5)
MCV RBC AUTO: 90 FL (ref 78–100)
MONOCYTES # BLD AUTO: 0.6 10E9/L (ref 0–1.3)
MONOCYTES NFR BLD AUTO: 14.5 %
MUCOUS THREADS #/AREA URNS LPF: PRESENT /LPF
NEUTROPHILS # BLD AUTO: 2.8 10E9/L (ref 1.6–8.3)
NEUTROPHILS NFR BLD AUTO: 74.1 %
NITRATE UR QL: POSITIVE
NRBC # BLD AUTO: 0 10*3/UL
NRBC BLD AUTO-RTO: 0 /100
PH UR STRIP: 8 PH (ref 5–7)
PLATELET # BLD AUTO: 165 10E9/L (ref 150–450)
POTASSIUM SERPL-SCNC: 4 MMOL/L (ref 3.4–5.3)
RBC # BLD AUTO: 4.72 10E12/L (ref 4.4–5.9)
RBC #/AREA URNS AUTO: 35 /HPF (ref 0–2)
SODIUM SERPL-SCNC: 139 MMOL/L (ref 133–144)
SOURCE: ABNORMAL
SP GR UR STRIP: 1.01 (ref 1–1.03)
UROBILINOGEN UR STRIP-MCNC: 0 MG/DL (ref 0–2)
WBC # BLD AUTO: 3.8 10E9/L (ref 4–11)
WBC #/AREA URNS AUTO: 10 /HPF (ref 0–5)

## 2018-03-24 PROCEDURE — 81001 URINALYSIS AUTO W/SCOPE: CPT | Performed by: EMERGENCY MEDICINE

## 2018-03-24 PROCEDURE — 25000128 H RX IP 250 OP 636: Performed by: EMERGENCY MEDICINE

## 2018-03-24 PROCEDURE — A9270 NON-COVERED ITEM OR SERVICE: HCPCS | Mod: GY | Performed by: EMERGENCY MEDICINE

## 2018-03-24 PROCEDURE — 87086 URINE CULTURE/COLONY COUNT: CPT | Performed by: EMERGENCY MEDICINE

## 2018-03-24 PROCEDURE — 87088 URINE BACTERIA CULTURE: CPT | Performed by: EMERGENCY MEDICINE

## 2018-03-24 PROCEDURE — 87186 SC STD MICRODIL/AGAR DIL: CPT | Performed by: EMERGENCY MEDICINE

## 2018-03-24 PROCEDURE — 85025 COMPLETE CBC W/AUTO DIFF WBC: CPT | Performed by: EMERGENCY MEDICINE

## 2018-03-24 PROCEDURE — 96372 THER/PROPH/DIAG INJ SC/IM: CPT

## 2018-03-24 PROCEDURE — 99285 EMERGENCY DEPT VISIT HI MDM: CPT | Mod: 25

## 2018-03-24 PROCEDURE — 96374 THER/PROPH/DIAG INJ IV PUSH: CPT

## 2018-03-24 PROCEDURE — 25000132 ZZH RX MED GY IP 250 OP 250 PS 637: Mod: GY | Performed by: EMERGENCY MEDICINE

## 2018-03-24 PROCEDURE — 74177 CT ABD & PELVIS W/CONTRAST: CPT

## 2018-03-24 PROCEDURE — 80048 BASIC METABOLIC PNL TOTAL CA: CPT | Performed by: EMERGENCY MEDICINE

## 2018-03-24 RX ORDER — MORPHINE SULFATE 4 MG/ML
4 INJECTION, SOLUTION INTRAMUSCULAR; INTRAVENOUS ONCE
Status: COMPLETED | OUTPATIENT
Start: 2018-03-24 | End: 2018-03-24

## 2018-03-24 RX ORDER — PHENAZOPYRIDINE HYDROCHLORIDE 100 MG/1
200 TABLET, FILM COATED ORAL ONCE
Status: COMPLETED | OUTPATIENT
Start: 2018-03-24 | End: 2018-03-24

## 2018-03-24 RX ORDER — LIDOCAINE HYDROCHLORIDE 20 MG/ML
SOLUTION OROPHARYNGEAL
Qty: 15 ML | Refills: 1 | Status: SHIPPED | OUTPATIENT
Start: 2018-03-24 | End: 2018-06-26

## 2018-03-24 RX ORDER — HYDROMORPHONE HYDROCHLORIDE 1 MG/ML
0.5 INJECTION, SOLUTION INTRAMUSCULAR; INTRAVENOUS; SUBCUTANEOUS
Status: DISCONTINUED | OUTPATIENT
Start: 2018-03-24 | End: 2018-03-24 | Stop reason: HOSPADM

## 2018-03-24 RX ORDER — SENNOSIDES 8.6 MG
1 TABLET ORAL 2 TIMES DAILY
Qty: 30 TABLET | Refills: 0 | Status: SHIPPED | OUTPATIENT
Start: 2018-03-24 | End: 2018-06-26

## 2018-03-24 RX ORDER — HYDROCODONE BITARTRATE AND ACETAMINOPHEN 5; 325 MG/1; MG/1
1-2 TABLET ORAL EVERY 6 HOURS PRN
Qty: 14 TABLET | Refills: 0 | Status: ON HOLD | OUTPATIENT
Start: 2018-03-24 | End: 2018-03-25

## 2018-03-24 RX ORDER — IOPAMIDOL 755 MG/ML
500 INJECTION, SOLUTION INTRAVASCULAR ONCE
Status: COMPLETED | OUTPATIENT
Start: 2018-03-24 | End: 2018-03-24

## 2018-03-24 RX ORDER — CEPHALEXIN 500 MG/1
500 CAPSULE ORAL 3 TIMES DAILY
Qty: 21 CAPSULE | Refills: 0 | Status: ON HOLD | OUTPATIENT
Start: 2018-03-24 | End: 2018-03-25

## 2018-03-24 RX ORDER — HYDROCODONE BITARTRATE AND ACETAMINOPHEN 5; 325 MG/1; MG/1
1 TABLET ORAL ONCE
Status: COMPLETED | OUTPATIENT
Start: 2018-03-24 | End: 2018-03-24

## 2018-03-24 RX ADMIN — IOPAMIDOL 97 ML: 755 INJECTION, SOLUTION INTRAVENOUS at 14:14

## 2018-03-24 RX ADMIN — SODIUM CHLORIDE 64 ML: 9 INJECTION, SOLUTION INTRAVENOUS at 14:14

## 2018-03-24 RX ADMIN — PHENAZOPYRIDINE HYDROCHLORIDE 200 MG: 100 TABLET ORAL at 23:35

## 2018-03-24 RX ADMIN — HYDROCODONE BITARTRATE AND ACETAMINOPHEN 1 TABLET: 5; 325 TABLET ORAL at 22:53

## 2018-03-24 RX ADMIN — HYDROMORPHONE HYDROCHLORIDE 0.5 MG: 1 INJECTION, SOLUTION INTRAMUSCULAR; INTRAVENOUS; SUBCUTANEOUS at 12:49

## 2018-03-24 RX ADMIN — MORPHINE SULFATE 4 MG: 4 INJECTION INTRAVENOUS at 23:36

## 2018-03-24 ASSESSMENT — ENCOUNTER SYMPTOMS
CHILLS: 1
FEVER: 0

## 2018-03-24 NOTE — IP AVS SNAPSHOT
MRN:[id number]                      After Visit Summary   3/24/2018    Juwan Latham    MRN: [id number]           Thank you!     Thank you for choosing [facility] for your care. Our goal is always to provide you with excellent care. Hearing back from our patients is one way we can continue to improve our services. Please take a few minutes to complete the written survey that you may receive in the mail after you visit. If you would like to speak to someone directly about your visit please contact Patient Relations at [phone]. Thank you!          Patient Information     Date Of Birth          1950        About your hospital stay     You were admitted on:  March 25, 2018 You last received care in the:  [facility]    You were discharged on:  March 26, 2018        Reason for your hospital stay       Catheter occlusion, s/p exchange                  Who to Call     For medical emergencies, please call [phone].  For non-urgent questions about your medical care, please call your primary care provider or clinic, [phone]  For questions related to your surgery, please call your surgery clinic        Attending Provider     Provider Specialty    Nolberto Parry DO Emergency Medicine    Lucien White MD Internal Medicine       Primary Care Provider Office Phone # Fax #    Julio Guidry Jr., [phone]      After Care Instructions     Activity       Your activity upon discharge: activity as tolerated            Diet       Follow this diet upon discharge: Orders Placed This Encounter      Advance Diet as Tolerated: Regular Diet Adult; Regular Diet Adult                  Follow-up Appointments     Follow-up and recommended labs and tests        Follow up with primary care provider, Julio Guidry Jr, within 7 days                  Your next 10 appointments already scheduled     Apr 02, 2018  1:40 PM CDT   (Arrive by 1:25 PM)   Return  Visit with  Prostate Cancer Ctr Nurse   [facility] ([facility])    [address]  [address] [facility] [address] [phone]            Jun 18, 2018 10:00 AM CDT   LAB with SV [facility]   [facility]    [address] [place] [address] [phone]           Please do not eat 10-12 hours before your appointment if you are coming in fasting for labs on lipids, cholesterol, or glucose (sugar). This does not apply to pregnant women. Water, hot tea and black coffee (with nothing added) are okay. Do not drink other fluids, diet soda or chew gum.            Jun 21, 2018 12:20 PM CDT   (Arrive by 12:05 PM)   Return Visit with Paulo Agarwal MD   [facility] ([facility])    [address]  [address] [facility] [address] [phone]            Sep 14, 2018  3:00 PM CDT   (Arrive by 2:45 PM)   CT CHEST W/O CONTRAST with UCCT1   [facility] ([facility])    [address] [address] [phone]           Please bring any scans or X-rays taken at other hospitals, if similar tests were done. Also bring a list of your medicines, including vitamins, minerals and over-the-counter drugs. It is safest to leave personal items at home.  Be sure to tell your doctor:   If you have any allergies.   If there s any chance you are pregnant.   If you are breastfeeding.  You do not need to do anything special to prepare for this exam.  Please wear loose clothing, such as a sweat suit or jogging clothes. Avoid snaps, zippers and other metal. We may ask you to undress and put on a hospital gown.            Sep 14, 2018  4:15 PM CDT   (Arrive by 4:00 PM)   Return Visit with GLORIA Linton CNP   [facility] (  [facility])    [address]  [address]  [facility] [address] [phone]              Additional Services     Home Care OT Referral for Hospital Discharge       OT to eval and treat    Your provider has ordered home care - occupational therapy. If you have not been contacted within 2 days of your discharge please call the department phone number listed on the top of this document.            Home care nursing referral       RN    Your provider has ordered home care nursing services. If you have not been contacted within 2 days of your discharge please call the inpatient department phone number at [phone] .                  Further instructions from your care team       The  has arranged home care,  for you after you return home from the hospital.   The Home Care Agency Arranged is [facility]  The telephone number is [phone]  You will be contacted by phone within a few days after your hospital stay by the Home Care Agency to set up your first visit.     If you have not heard from the Home Care Agency within 1-2  Days after discharge from the hospital, please contact the number provided above.       Dr. Zaria Cain  Urology   [address] [place] [address]   [phone]    CYSTOSCOPY DISCHARGE INSTRUCTIONS    YOU MAY GO BACK TO YOUR NORMAL DIET AND ACTIVITY, UNLESS YOUR DOCTOR TELLS YOU NOT TO.    FOR THE NEXT TWO DAYS, YOU MAY NOTICE:    SOME BLOOD IN YOUR URINE.  SOME BURNING WHEN YOU URINATE (USE THE TOILET).  AN URGE TO URINATE MORE OFTEN.  BLADDER SPASMS.    THESE ARE NORMAL AFTER THE PROCEDURE.  THEY SHOULD GO AWAY AFTER A DAY OR TWO.  TO RELIEVE THESE PROBLEMS:     DRINK 6 TO 8 LARGE GLASSES OF WATER EACH DAY (INCLUDES DRINKS AT MEALS).  THIS WILL HELP CLEAR THE URINE.    TAKE WARM BATHS TO RELIEVE PAIN AND BLADDER SPASMS.  DO NOT ADD ANYTHING TO THE BATH WATER.    YOUR DOCTOR MAY PRESCRIBE PAIN MEDICINE.  YOU MAY ALSO TAKE  TYLENOL (ACETAMINOPHEN) FOR PAIN.    CALL YOUR SURGEON IF YOU HAVE:    A FEVER OVER 100 DEGREES FOR MORE THAN A DAY.  CHECK YOUR TEMPERATURE UNDER YOUR TONGUE.    CHILLS.    FAILURE TO URINATE (NO URINE COMES OUT WHEN YOU TRY TO USE THE TOILET).  TRY SOAKING IN A BATHTUB FULL OF WARM WATER.  IF STILL NO URINE, CALL YOUR DOCTOR.    A LOT OF BLOOD IN THE URINE, OR BLOOD CLOTS LARGER THAN A NICKEL.      PAIN IN THE BACK OR BELLY AREA (ABDOMEN).    PAIN OR SPASMS THAT ARE NOT RELIEVED BY WARM TUB BATHS AND PAIN MEDICINE.      SEVERE PAIN, BURNING OR OTHER PROBLEMS WHILE PASSING URINE.    PAIN THAT GETS WORSE AFTER TWO DAYS.          GENERAL ANESTHESIA SEDATION ADULT DISCHARGE INSTRUCTIONS   SPECIAL PRECAUTIONS FOR 24 HOURS AFTER SURGERY    IT IS NOT UNUSUAL TO FEEL LIGHT-HEADED OR FAINT, UP TO 24 HOURS AFTER SURGERY OR WHILE TAKING PAIN MEDICATION.  IF YOU HAVE THESE SYMPTOMS; SIT FOR A FEW MINUTES BEFORE STANDING AND HAVE SOMEONE ASSIST YOU WHEN YOU GET UP TO WALK OR USE THE BATHROOM.    YOU SHOULD REST AND RELAX FOR THE NEXT 24 HOURS AND YOU MUST MAKE ARRANGEMENTS TO HAVE SOMEONE STAY WITH YOU FOR AT LEAST 24 HOURS AFTER YOUR DISCHARGE.  AVOID HAZARDOUS AND STRENUOUS ACTIVITIES.  DO NOT MAKE IMPORTANT DECISIONS FOR 24 HOURS.    DO NOT DRIVE ANY VEHICLE OR OPERATE MECHANICAL EQUIPMENT FOR 24 HOURS FOLLOWING THE END OF YOUR SURGERY.  EVEN THOUGH YOU MAY FEEL NORMAL, YOUR REACTIONS MAY BE AFFECTED BY THE MEDICATION YOU HAVE RECEIVED.    DO NOT DRINK ALCOHOLIC BEVERAGES FOR 24 HOURS FOLLOWING YOUR SURGERY.    DRINK CLEAR LIQUIDS (APPLE JUICE, GINGER ALE, 7-UP, BROTH, ETC.).  PROGRESS TO YOUR REGULAR DIET AS YOU FEEL ABLE.    YOU MAY HAVE A DRY MOUTH, A SORE THROAT, MUSCLES ACHES OR TROUBLE SLEEPING.  THESE SHOULD GO AWAY AFTER 24 HOURS.    CALL YOUR DOCTOR FOR ANY OF THE FOLLOWING:  SIGNS OF INFECTION (FEVER, GROWING TENDERNESS AT THE SURGERY SITE, A LARGE AMOUNT OF DRAINAGE OR BLEEDING, SEVERE PAIN, FOUL-SMELLING  DRAINAGE, REDNESS OR SWELLING.    IT HAS BEEN OVER 8 TO 10 HOURS SINCE SURGERY AND YOU ARE STILL NOT ABLE TO URINATE (PASS WATER).       Pending Results     Date and Time Order Name Status Description    3/24/2018 1537 Urine Culture Preliminary             Statement of Approval     Ordered          03/26/18 1449  I have reviewed and agree with all the recommendations and orders detailed in this document.  EFFECTIVE NOW     Approved and electronically signed by:  Jozef Ma MD             Admission Information     Date & Time Provider Department Dept. Phone    3/24/2018 Lucien White MD [facility] [phone]      Your Vitals Were     Blood Pressure Pulse Temperature Respirations Pulse Oximetry       111/72 53 97.5  F (36.4  C) (Oral) 16 96%       MyChart Information     [facility] gives you secure access to your electronic health record. If you see a primary care provider, you can also send messages to your care team and make appointments. If you have questions, please call your primary care clinic.  If you do not have a primary care provider, please call [phone] and they will assist you.        Care EveryWhere ID     This is your Care EveryWhere ID. This could be used by other organizations to access your [facility] medical records  [id number]        Equal Access to Services     CHAMP SNIDER AH: Rohan Obrien, waelyda amy, qaybta kaalmada loren, mynor escoto. So [facility] [phone].    ATENCIÓN: Si habla español, tiene a gunn disposición servicios gratuitos de asistencia lingüística. Llame al [phone].    We comply with applicable federal civil rights laws and [place] laws. We do not discriminate on the basis of race, color, national origin, age, disability, sex, sexual orientation, or gender identity.               Review of your medicines      START taking        Dose / Directions    cephalexin 250 MG capsule   Commonly  known as:  KEFLEX   Indication:  possible UTI   Used for:  Urinary tract infection without hematuria, site unspecified        Dose:  250 mg   Take 1 capsule (250 mg) by mouth 2 times daily for 3 days   Quantity:  6 capsule   Refills:  0       mirabegron 25 MG 24 hr tablet   Commonly known as:  MYRBETRIQ   Used for:  Bladder spasm        Dose:  25 mg   Take 1 tablet (25 mg) by mouth daily for 15 days   Quantity:  15 tablet   Refills:  0       phenazopyridine 100 MG tablet   Commonly known as:  PYRIDIUM   Used for:  Urinary catheter dysfunction, initial encounter (H)        Dose:  100-200 mg   Take 1-2 tablets (100-200 mg) by mouth 3 times daily as needed for urinary tract discomfort May turn your urine orange   Quantity:  12 tablet   Refills:  0       tamsulosin 0.4 MG capsule   Commonly known as:  FLOMAX   Used for:  Urinary catheter dysfunction, initial encounter (H)        Dose:  0.4 mg   Take 1 capsule (0.4 mg) by mouth daily   Quantity:  30 capsule   Refills:  0       tolterodine 4 MG 24 hr capsule   Commonly known as:  DETROL LA   Used for:  Urinary catheter dysfunction, initial encounter (H)        Dose:  4 mg   Take 1 capsule (4 mg) by mouth daily   Quantity:  30 capsule   Refills:  1         CONTINUE these medicines which have NOT CHANGED        Dose / Directions    acetaminophen 325 MG tablet   Commonly known as:  TYLENOL   Used for:  Lung nodule        Dose:  650 mg   Take 2 tablets (650 mg) by mouth every 6 hours Do not take more than 4,000 mg of acetaminophen (Tylenol) from all sources to prevent liver damage.   Quantity:  100 tablet   Refills:  1       ALFALFA PO        Take by mouth daily   Refills:  0       ALPRAZolam 0.5 MG tablet   Commonly known as:  XANAX   Used for:  Anxiety        Dose:  0.5 mg   Take 1 tablet (0.5 mg) by mouth 3 times daily as needed for anxiety   Quantity:  20 tablet   Refills:  0       lidocaine HCl 2 % Soln        Apply thin layer to tip of penis TID PRN penile pain    Quantity:  15 mL   Refills:  1       LISINOPRIL PO        Dose:  10 mg   Take 10 mg by mouth daily   Refills:  0       sennosides 8.6 MG tablet   Commonly known as:  SENOKOT        Dose:  1 tablet   Take 1 tablet by mouth 2 times daily   Quantity:  30 tablet   Refills:  0       SUPER B COMPLEX PO        Dose:  1 tablet   Take 1 tablet by mouth daily   Refills:  0       VITAMIN D (CHOLECALCIFEROL) PO        Dose:  5000 Units   Take 5,000 Units by mouth daily   Refills:  0            Where to get your medicines      These medications were sent to [place], [place] - [address] [facility]  [address] [facility] [address]     Phone:  [phone]     cephalexin 250 MG capsule    mirabegron 25 MG 24 hr tablet    phenazopyridine 100 MG tablet    tamsulosin 0.4 MG capsule    tolterodine 4 MG 24 hr capsule                Protect others around you: Learn how to safely use, store and throw away your medicines at www.disposemymeds.org.        ANTIBIOTIC INSTRUCTION     You've Been Prescribed an Antibiotic - Now What?  Your healthcare team thinks that you or your loved one might have an infection. Some infections can be treated with antibiotics, which are powerful, life-saving drugs. Like all medications, antibiotics have side effects and should only be used when necessary. There are some important things you should know about your antibiotic treatment.      Your healthcare team may run tests before you start taking an antibiotic.    Your team may take samples (e.g., from your blood, urine or other areas) to run tests to look for bacteria. These test can be important to determine if you need an antibiotic at all and, if you do, which antibiotic will work best.      Within a few days, your healthcare team might change or even stop your antibiotic.    Your team may start you on an antibiotic while they are working to find out what is making you sick.    Your team might change your  antibiotic because test results show that a different antibiotic would be better to treat your infection.    In some cases, once your team has more information, they learn that you do not need an antibiotic at all. They may find out that you don't have an infection, or that the antibiotic you're taking won't work against your infection. For example, an infection caused by a virus can't be treated with antibiotics. Staying on an antibiotic when you don't need it is more likely to be harmful than helpful.      You may experience side effects from your antibiotic.    Like all medications, antibiotics have side effects. Some of these can be serious.    Let you healthcare team know if you have any known allergies when you are admitted to the hospital.    One significant side effect of nearly all antibiotics is the risk of severe and sometimes deadly diarrhea caused by Clostridium difficile (C. Difficile). This occurs when a person takes antibiotics because some good germs are destroyed. Antibiotic use allows C. diificile to take over, putting patients at high risk for this serious infection.    As a patient or caregiver, it is important to understand your or your loved one's antibiotic treatment. It is especially important for caregivers to speak up when patients can't speak for themselves. Here are some important questions to ask your healthcare team.    What infection is this antibiotic treating and how do you know I have that infection?    What side effects might occur from this antibiotic?    How long will I need to take this antibiotic?    Is it safe to take this antibiotic with other medications or supplements (e.g., vitamins) that I am taking?     Are there any special directions I need to know about taking this antibiotic? For example, should I take it with food?    How will I be monitored to know whether my infection is responding to the antibiotic?    What tests may help to make sure the right antibiotic is  prescribed for me?      Information provided by:  www.cdc.gov/getsmart  U.S. Department of Health and Human Services  Centers for disease Control and Prevention  National Center for Emerging and Zoonotic Infectious Diseases  Division of Healthcare Quality Promotion             Medication List: This is a list of all your medications and when to take them. Check marks below indicate your daily home schedule. Keep this list as a reference.      Medications           Morning Afternoon Evening Bedtime As Needed    acetaminophen 325 MG tablet   Commonly known as:  TYLENOL   Take 2 tablets (650 mg) by mouth every 6 hours Do not take more than 4,000 mg of acetaminophen (Tylenol) from all sources to prevent liver damage.                                ALFALFA PO   Take by mouth daily                                ALPRAZolam 0.5 MG tablet   Commonly known as:  XANAX   Take 1 tablet (0.5 mg) by mouth 3 times daily as needed for anxiety   Last time this was given:  0.5 mg on 3/25/2018 11:01 PM                                cephalexin 250 MG capsule   Commonly known as:  KEFLEX   Take 1 capsule (250 mg) by mouth 2 times daily for 3 days   Last time this was given:  250 mg on 3/26/2018  1:46 PM                                lidocaine HCl 2 % Soln   Apply thin layer to tip of penis TID PRN penile pain                                LISINOPRIL PO   Take 10 mg by mouth daily   Last time this was given:  10 mg on 3/26/2018  9:57 AM                                mirabegron 25 MG 24 hr tablet   Commonly known as:  MYRBETRIQ   Take 1 tablet (25 mg) by mouth daily for 15 days                                phenazopyridine 100 MG tablet   Commonly known as:  PYRIDIUM   Take 1-2 tablets (100-200 mg) by mouth 3 times daily as needed for urinary tract discomfort May turn your urine orange   Last time this was given:  100 mg on 3/26/2018  1:47 PM                                sennosides 8.6 MG tablet   Commonly known as:  SENOKOT   Take  1 tablet by mouth 2 times daily   Last time this was given:  1 tablet on 3/26/2018  9:56 AM                                SUPER B COMPLEX PO   Take 1 tablet by mouth daily                                tamsulosin 0.4 MG capsule   Commonly known as:  FLOMAX   Take 1 capsule (0.4 mg) by mouth daily   Last time this was given:  0.4 mg on 3/26/2018  9:55 AM                                tolterodine 4 MG 24 hr capsule   Commonly known as:  DETROL LA   Take 1 capsule (4 mg) by mouth daily   Last time this was given:  4 mg on 3/26/2018  9:54 AM                                VITAMIN D (CHOLECALCIFEROL) PO   Take 5,000 Units by mouth daily

## 2018-03-24 NOTE — DISCHARGE INSTRUCTIONS
Discharge Instructions  Urinary Tract Infection  You or your child have been diagnosed with a urinary tract infection, or UTI. The urinary tract includes the kidneys (which make urine/pee), ureters (the tubes that carry urine/pee from the kidneys to the bladder), the bladder (which stores urine/pee), and urethra (the tube that carries urine/pee out of the bladder). Urinary tract infections occur when bacteria travel up the urethra into the bladder (bladder infection) and, in some cases, from there into the kidneys (kidney infection).  Generally, every Emergency Department visit should have a follow-up clinic visit with either a primary or a specialty clinic/provider. Please follow-up as instructed by your emergency provider today.  Return to the Emergency Department if:    You or your child have severe back pain.    You or your child are vomiting (throwing up) so that you cannot take your medicine.    You or your child have a new fever (had not previously had a fever) over 101 F.    You or your child have confusion or are very weak, or feel very ill.    Your child seems much more ill, will not wake up, will not respond right, or is crying for a long time and will not calm down.    You or your child are showing signs of dehydration. These signs may include decreased urination (pee), dry mouth/gums/tongue, or decreased activity.    Follow-up with your provider:     Children under 24 months need to be seen by their regular provider within one week after a diagnosis of a UTI. It may be necessary to do some more tests to look at the child s kidney or bladder.    You should begin to feel better within 24 - 48 hours of starting your antibiotic; follow-up with your regular clinic/doctor/provider if this is not the case.    Treatment:     You will be treated with an antibiotic to kill the bacteria. We have to make an educated guess, based on what we know about common bacteria and antibiotics, as to which antibiotic will work  "for your infection. We will be correct most times but there will be some cases where the antibiotic chosen is not correct (see urine cultures below).    Take a pain medication such as acetaminophen (Tylenol ) or ibuprofen (Advil , Motrin , Nuprin ).    Phenazopyridine (Pyridium , Uristat ) is a prescription medication that numbs the bladder to reduce the burning pain of some UTIs.  The same medication is available in a non-prescription version (Azo-Standard , Urodol ). This medication will change the color of the urine and tears (usually blue or orange). If you wear contacts, do not wear them while taking this medication as they may be stained by the medication.    Urine Cultures:    If indicated, a urine culture may have been performed today. This test generally takes 24-48 hours to complete so the results are not known at this time. The results can confirm that an infection is present but also determine which antibiotic is effective for the specific bacteria that is causing the infection. If your urine culture shows that the antibiotic you were given today will not work to treat your infection, we will attempt to contact you to make arrangements to change the antibiotic. If the culture confirms that the antibiotic is effective for your infection, you will not be contacted. We often recommend follow-up with your regular physician/provider on the culture results regardless of this process.    Antibiotic Warning:     If you have been placed on antibiotics - watch for signs of allergic reaction.  These include rash, lip swelling, difficulty breathing, wheezing, and dizziness.  If you develop any of these symptoms, stop the antibiotic immediately and go to an emergency room or urgent care for evaluation.    Probiotics: If you have been given an antibiotic, you may want to also take a probiotic pill or eat yogurt with live cultures. Probiotics have \"good bacteria\" to help your intestines stay healthy. Studies have shown " that probiotics help prevent diarrhea and other intestine problems (including C. diff infection) when you take antibiotics. You can buy these without a prescription in the pharmacy section of the store.   If you were given a prescription for medicine here today, be sure to read all of the information (including the package insert) that comes with your prescription.  This will include important information about the medicine, its side effects, and any warnings that you need to know about.  The pharmacist who fills the prescription can provide more information and answer questions you may have about the medicine.  If you have questions or concerns that the pharmacist cannot address, please call or return to the Emergency Department.   Remember that you can always come back to the Emergency Department if you are not able to see your regular provider in the amount of time listed above, if you get any new symptoms, or if there is anything that worries you.  Discharge Instructions  Abdominal Pain    Abdominal pain (belly pain) can be caused by many things. Your evaluation today does not show the exact cause for your pain. Your provider today has decided that it is unlikely your pain is due to a life threatening problem, or a problem requiring surgery or hospital admission. Sometimes those problems cannot be found right away, so it is very important that you follow up as directed.  Sometimes only the changes which occur over time allow the cause of your pain to be found.    Generally, every Emergency Department visit should have a follow-up clinic visit with either a primary or a specialty clinic/provider. Please follow-up as instructed by your emergency provider today. With abdominal pain, we often recommend very close follow-up, such as the following day.    ADULTS:  Return to the Emergency Department right away if:      You get an oral temperature above 102oF or as directed by your provider.    You have blood in your  stools. This may be bright red or appear as black, tarry stools.      You keep vomiting (throwing up) or cannot drink liquids.    You see blood when you vomit.     You cannot have a bowel movement or you cannot pass gas.    Your stomach gets bloated or bigger.    Your skin or the whites of your eyes look yellow.    You faint.    You have bloody, frequent or painful urination (peeing).    You have new symptoms or anything that worries you.    CHILDREN:  Return to the Emergency Department right away if your child has any of the above-listed symptoms or the following:      Pushes your hand away or screams/cries when his/her belly is touched.    You notice your child is very fussy or weak.    Your child is very tired and is too tired to eat or drink.    Your child is dehydrated.  Signs of dehydration can be:  o Significant change in the amount of wet diapers/urine.  o Your infant or child starts to have dry mouth and lips, or no saliva (spit) or tears.    PREGNANT WOMEN:  Return to the Emergency Department right away if you have any of the above-listed symptoms or the following:      You have bleeding, leaking fluid or passing tissue from the vagina.    You have worse pain or cramping, or pain in your shoulder or back.    You have vomiting that will not stop.    You have a temperature of 100oF or more.    Your baby is not moving as much as usual.    You faint.    You get a bad headache with or without eye problems and abdominal pain.    You have a seizure.    You have unusual discharge from your vagina and abdominal pain.    Abdominal pain is pretty common during pregnancy.  Your pain may or may not be related to your pregnancy. You should follow-up closely with your OB provider so they can evaluate you and your baby.  Until you follow-up with your regular provider, do the following:       Avoid sex and do not put anything in your vagina.    Drink clear fluids.    Only take medications approved by your provider.    MORE  "INFORMATION:    Appendicitis:  A possible cause of abdominal pain in any person who still has their appendix is acute appendicitis. Appendicitis is often hard to diagnose.  Testing does not always rule out early appendicitis or other causes of abdominal pain. Close follow-up with your provider and re-evaluations may be needed to figure out the reason for your abdominal pain.    Follow-up:  It is very important that you make an appointment with your clinic and go to the appointment.  If you do not follow-up with your primary provider, it may result in missing an important development which could result in permanent injury or disability and/or lasting pain.  If there is any problem keeping your appointment, call your provider or return to the Emergency Department.    Medications:  Take your medications as directed by your provider today.  Before using over-the-counter medications, ask your provider and make sure to take the medications as directed.  If you have any questions about medications, ask your provider.    Diet:  Resume your normal diet as much as possible, but do not eat fried, fatty or spicy foods while you have pain.  Do not drink alcohol or have caffeine.  Do not smoke tobacco.    Probiotics: If you have been given an antibiotic, you may want to also take a probiotic pill or eat yogurt with live cultures. Probiotics have \"good bacteria\" to help your intestines stay healthy. Studies have shown that probiotics help prevent diarrhea (loose stools) and other intestine problems (including C. diff infection) when you take antibiotics. You can buy these without a prescription in the pharmacy section of the store.     If you were given a prescription for medicine here today, be sure to read all of the information (including the package insert) that comes with your prescription.  This will include important information about the medicine, its side effects, and any warnings that you need to know about.  The " pharmacist who fills the prescription can provide more information and answer questions you may have about the medicine.  If you have questions or concerns that the pharmacist cannot address, please call or return to the Emergency Department.       Remember that you can always come back to the Emergency Department if you are not able to see your regular provider in the amount of time listed above, if you get any new symptoms, or if there is anything that worries you.

## 2018-03-24 NOTE — IP AVS SNAPSHOT
[facility]    [address]    [facility] [address]    Phone:  [phone]    Fax:  [phone]                                       After Visit Summary   3/24/2018    Juwan Latham    MRN: [id number]           After Visit Summary Signature Page     I have received my discharge instructions, and my questions have been answered. I have discussed any challenges I see with this plan with the nurse or doctor.    ..........................................................................................................................................  Patient/Patient Representative Signature      ..........................................................................................................................................  Patient Representative Print Name and Relationship to Patient    ..................................................               ................................................  Date                                            Time    ..........................................................................................................................................  Reviewed by Signature/Title    ...................................................              ..............................................  Date                                                            Time

## 2018-03-24 NOTE — ED NOTES
Patient comes in for evaluation of catheter pain. States he had his prostate removed and is having radiation treatments. Was evaluated by PCP and prescribed several medications to help with pain, two of which he is unable to fill without prior approval from insurance. Having severe pain. ABCs intact.

## 2018-03-24 NOTE — ED PROVIDER NOTES
History     Chief Complaint:  Catheter problem    HPI:   The history is provided by the patient.      Juwan Latham is a 67 year old male with a history of lung cancer, prostate cancer, hypertension, kidney stones, and anxiety who presents to the emergency department for evaluation of a catheter problem. Of note, the patient had a prostatectomy in December 2017 without complications. He had his catheter removed eventually and found that urination was difficult. He had the catheter placed again over a month ago and began to have gradual onset pain centrally in his penis and buttock area about 2 weeks ago. He endorses that the pain will start in the morning and remain unrelieved until the evening even with medications; it then returns again in his sleep and resolves by morning. It appears this has been his course for the last 2 weeks.     He was evaluated by his urologist yesterday and was sent him with prescription for Myrbetriq and Toradol. Today the pain is far worse than yesterday so presents here for evaluation. Here in the ED the patient rates his pain a 10/10 in severity. He also notes having chills go up his chest but has been without fever. He endorses having some relief of his pain when urine leaks out the side of the tube. He denies any fevers, bowel problems, or testicular pain and has no other complaints. He is still on radiation therapy for cancer. He has not purchased the suppository as it was too expensive.     Allergies:  Percocet [Oxycodone-Acetaminophen]     Medications:    ketorolac (TORADOL) 10 MG tablet  lidocaine-prilocaine (EMLA) cream  mirabegron (MYRBETRIQ) 50 MG 24 hr tablet  opium-belladonna (B&O SUPPRETTES) 30-16.2 MG per suppository  cyclobenzaprine (FLEXERIL) 5 MG tablet  tamsulosin (FLOMAX) 0.4 MG capsule  VITAMIN D, CHOLECALCIFEROL, PO  tolterodine (DETROL LA) 4 MG 24 hr capsule  ALPRAZolam (XANAX) 0.5 MG tablet  B Complex-C (SUPER B COMPLEX PO)  LISINOPRIL PO  ALFALFA  PO  acetaminophen (TYLENOL) 325 MG tablet    Past Medical History:    Lung cancer   Anxiety   Kidney stones  Calculus of kidney   Congenital single kidney   Gastroesophageal reflux disease  HTN  GERD  Cavernous hemangioma of brain  Prostate cancer   Sleep apnea   Somatic dysfunction of pelvis region    Past Surgical History:    Bronchoscopy flexible   Colonoscopy    Davinci prostatectomy   EGD  Laparoscopic wedge resection lung   Laser holmium lithotripsy ureters, insert stent    Family History:    Mother - heart disease  Brother - DM type 2, colorectal cancer     Social History:  Presents alone    Tobacco use: former smoker quit 1985  Alcohol use: 3 beers/night  PCP: Julio Guidry Jr    Marital Status:        Review of Systems   Constitutional: Positive for chills. Negative for fever.   Genitourinary: Positive for penile pain. Negative for testicular pain.   All other systems reviewed and are negative.    Physical Exam     Patient Vitals for the past 24 hrs:   BP Temp Temp src Pulse Resp SpO2   03/24/18 1445 - - - - - 97 %   03/24/18 1430 - - - - - 97 %   03/24/18 1429 132/77 - - - - 99 %   03/24/18 1400 - - - - - 95 %   03/24/18 1345 - - - - - 96 %   03/24/18 1330 - - - - - 95 %   03/24/18 1315 - - - - - 94 %   03/24/18 1307 - - - - - 96 %   03/24/18 1300 - - - - - 91 %   03/24/18 1248 - - - - - 99 %   03/24/18 1118 124/74 98  F (36.7  C) Oral 67 20 99 %        Physical Exam    Constitutional:  Pleasant, age appropriate male who is in visible discomfort  Eyes:    Conjunctiva normal  Neck:    Supple, no meningismus.     CV:     Regular rate and rhythm.      No murmurs, rubs or gallops.     No lower extremity edema.  PULM:    Clear to auscultation bilateral.       No respiratory distress.      Good air exchange.     No rales or wheezing.  ABD:    Soft, non-distended.       No abdominal tenderness.     Bowel sounds normal.     No pulsatile masses.       No rebound, guarding or rigidity.     No CVA  tenderness.   :   Age appropriate circumcised male genitalia with donovan catheter in place     Testes non-tender and without mass     Focal area of tenderness near base of penis at the right, dorsal aspect of the penis     No inguinal hernia  MSK:     No gross deformity to all four extremities.   LYMPH:   No cervical lymphadenopathy.  NEURO:   Alert.  Good muscular tone, no atrophy.   Skin:    Warm, dry and intact.    Psych:    Mood is good and affect is appropriate.    Emergency Department Course     Imaging:  Radiographic findings were communicated with the patient who voiced understanding of the findings.     CT Abd/pelvis with contrast:  IMPRESSION:  1. Right lower pelvic fluid collection, similar to 2/23/2018.  2. Fatty liver.  3. Additional findings discussed above.     Imaging independently reviewed and agree with radiologist interpretation.      Laboratory:  CBC: WBC 3.8 (low), ow WNL (HGB 14.4, )     BMP: WNL (Creatinine 0.78)   UA with Microscopic: Ketone 5, Blood moderate, pH 8 (high), Protein albumin 100, Nitrite positive, Leukocyte moderate, WBC 10 (high), RBC 35 (high), Mucous present, Calcium oxalate few, Calcium phosphate crystals few, ow Negative     Interventions:  1249: Dilaudid 0.5 mg IV     The patient's symptoms were improved with parenteral narcotics.     Emergency Department Course:  Past medical records, nursing notes, and vitals reviewed.  1132: I performed an exam of the patient and obtained history, as documented above.     IV inserted and blood drawn. This was sent to the lab for further testing, results above.   Above interventions provided.      The patient was sent for a CT while in the emergency department, findings above.     I personally reviewed the laboratory results with the Patient and answered all related questions prior to discharge.        1542: I rechecked the patient. Findings and plan explained to the Patient. Patient discharged home with instructions regarding  supportive care, medications, and reasons to return. The importance of close follow-up was reviewed.      Impression & Plan      Medical Decision Makin-year-old male status post radical prostatectomy and indwelling Beren catheter for urinary retention presents to the ED with recurrent pelvic pain.  Abdominal examination is relatively benign but he appeared to be in significant pain.  Breen catheter is functioning well and draining.  He has no evidence of inguinal hernia or testicular tenderness to draw concern for testicular torsion or epididymitis.  Urinalysis is equivocal in which urine culture will be added on.  I will go ahead and treat for urinary tract infection in event of positive urine culture with cephalexin.  Given his recurrent nature pain over the last 1-2 weeks requiring multiple physician presentations, patient underwent CT scan to ensure there is no evidence of atypical intra-abdominal pathology.  CT scan is unremarkable with exception of a ill-defined fluid collection in the pelvis which is similar in appearance to  of this year.  I had a prolonged discussion with the radiologist and they do not see any convincing signs that would suggest abscess as there is no enlargement of the fluid collection, adjacent stranding, air within the fluid collection or thickened thickened wall.  Clinical examination is not consistent with abscess.  Patient be discharged home with Keflex, short-term analgesics and close follow-up with urologist for further investigation of the cause of his pelvic pain and possible associated urinary tract infection.    Diagnosis:    ICD-10-CM    1. Pelvic pain in male R10.2        Disposition:  Discharged to home with plan as outlined.      Discharge Medications:  New Prescriptions    CEPHALEXIN (KEFLEX) 500 MG CAPSULE    Take 1 capsule (500 mg) by mouth 3 times daily for 7 days    HYDROCODONE-ACETAMINOPHEN (NORCO) 5-325 MG PER TABLET    Take 1-2 tablets by mouth  every 6 hours as needed for moderate to severe pain    SENNOSIDES (SENOKOT) 8.6 MG TABLET    Take 1 tablet by mouth 2 times daily     Scribe Disclosure:   I, Brian Garcia, am serving as a scribe at 11:32 AM on 3/24/2018 to document services personally performed by Clayton Chávez MD based on my observations and the provider's statements to me.       Brian Garcia  3/24/2018   [facility] EMERGENCY DEPARTMENT       Clayton Chávez MD  03/24/18 5510

## 2018-03-24 NOTE — ED AVS SNAPSHOT
[facility] Emergency Department    [address]    [facility] [address]    Phone:  [phone]    Fax:  [phone]                                       Juwan Latham   MRN: [id number]    Department:  [facility] Emergency Department   Date of Visit:  3/24/2018           After Visit Summary Signature Page     I have received my discharge instructions, and my questions have been answered. I have discussed any challenges I see with this plan with the nurse or doctor.    ..........................................................................................................................................  Patient/Patient Representative Signature      ..........................................................................................................................................  Patient Representative Print Name and Relationship to Patient    ..................................................               ................................................  Date                                            Time    ..........................................................................................................................................  Reviewed by Signature/Title    ...................................................              ..............................................  Date                                                            Time

## 2018-03-24 NOTE — ED AVS SNAPSHOT
[facility] Emergency Department    [address]    [facility] [address]    Phone:  [phone]    Fax:  [phone]                                       Juwan Latham   MRN: [id number]    Department:  [facility] Emergency Department   Date of Visit:  3/24/2018           Patient Information     Date Of Birth          1950        Your diagnoses for this visit were:     Pelvic pain in male        You were seen by Clayton Chávez MD.      Follow-up Information     Follow up with Paulo Agarwal MD. Schedule an appointment as soon as possible for a visit in 3 days.    Specialty:  Urology    Contact information:    [facility] [address] [phone]          Discharge Instructions       Discharge Instructions  Urinary Tract Infection  You or your child have been diagnosed with a urinary tract infection, or UTI. The urinary tract includes the kidneys (which make urine/pee), ureters (the tubes that carry urine/pee from the kidneys to the bladder), the bladder (which stores urine/pee), and urethra (the tube that carries urine/pee out of the bladder). Urinary tract infections occur when bacteria travel up the urethra into the bladder (bladder infection) and, in some cases, from there into the kidneys (kidney infection).  Generally, every Emergency Department visit should have a follow-up clinic visit with either a primary or a specialty clinic/provider. Please follow-up as instructed by your emergency provider today.  Return to the Emergency Department if:    You or your child have severe back pain.    You or your child are vomiting (throwing up) so that you cannot take your medicine.    You or your child have a new fever (had not previously had a fever) over 101 F.    You or your child have confusion or are very weak, or feel very ill.    Your child seems much more ill, will not wake up, will not respond right, or is crying for a long time  and will not calm down.    You or your child are showing signs of dehydration. These signs may include decreased urination (pee), dry mouth/gums/tongue, or decreased activity.    Follow-up with your provider:     Children under 24 months need to be seen by their regular provider within one week after a diagnosis of a UTI. It may be necessary to do some more tests to look at the child s kidney or bladder.    You should begin to feel better within 24 - 48 hours of starting your antibiotic; follow-up with your regular clinic/doctor/provider if this is not the case.    Treatment:     You will be treated with an antibiotic to kill the bacteria. We have to make an educated guess, based on what we know about common bacteria and antibiotics, as to which antibiotic will work for your infection. We will be correct most times but there will be some cases where the antibiotic chosen is not correct (see urine cultures below).    Take a pain medication such as acetaminophen (Tylenol ) or ibuprofen (Advil , Motrin , Nuprin ).    Phenazopyridine (Pyridium , Uristat ) is a prescription medication that numbs the bladder to reduce the burning pain of some UTIs.  The same medication is available in a non-prescription version (Azo-Standard , Urodol ). This medication will change the color of the urine and tears (usually blue or orange). If you wear contacts, do not wear them while taking this medication as they may be stained by the medication.    Urine Cultures:    If indicated, a urine culture may have been performed today. This test generally takes 24-48 hours to complete so the results are not known at this time. The results can confirm that an infection is present but also determine which antibiotic is effective for the specific bacteria that is causing the infection. If your urine culture shows that the antibiotic you were given today will not work to treat your infection, we will attempt to contact you to make arrangements to  "change the antibiotic. If the culture confirms that the antibiotic is effective for your infection, you will not be contacted. We often recommend follow-up with your regular physician/provider on the culture results regardless of this process.    Antibiotic Warning:     If you have been placed on antibiotics - watch for signs of allergic reaction.  These include rash, lip swelling, difficulty breathing, wheezing, and dizziness.  If you develop any of these symptoms, stop the antibiotic immediately and go to an emergency room or urgent care for evaluation.    Probiotics: If you have been given an antibiotic, you may want to also take a probiotic pill or eat yogurt with live cultures. Probiotics have \"good bacteria\" to help your intestines stay healthy. Studies have shown that probiotics help prevent diarrhea and other intestine problems (including C. diff infection) when you take antibiotics. You can buy these without a prescription in the pharmacy section of the store.   If you were given a prescription for medicine here today, be sure to read all of the information (including the package insert) that comes with your prescription.  This will include important information about the medicine, its side effects, and any warnings that you need to know about.  The pharmacist who fills the prescription can provide more information and answer questions you may have about the medicine.  If you have questions or concerns that the pharmacist cannot address, please call or return to the Emergency Department.   Remember that you can always come back to the Emergency Department if you are not able to see your regular provider in the amount of time listed above, if you get any new symptoms, or if there is anything that worries you.  Discharge Instructions  Abdominal Pain    Abdominal pain (belly pain) can be caused by many things. Your evaluation today does not show the exact cause for your pain. Your provider today has decided " that it is unlikely your pain is due to a life threatening problem, or a problem requiring surgery or hospital admission. Sometimes those problems cannot be found right away, so it is very important that you follow up as directed.  Sometimes only the changes which occur over time allow the cause of your pain to be found.    Generally, every Emergency Department visit should have a follow-up clinic visit with either a primary or a specialty clinic/provider. Please follow-up as instructed by your emergency provider today. With abdominal pain, we often recommend very close follow-up, such as the following day.    ADULTS:  Return to the Emergency Department right away if:      You get an oral temperature above 102oF or as directed by your provider.    You have blood in your stools. This may be bright red or appear as black, tarry stools.      You keep vomiting (throwing up) or cannot drink liquids.    You see blood when you vomit.     You cannot have a bowel movement or you cannot pass gas.    Your stomach gets bloated or bigger.    Your skin or the whites of your eyes look yellow.    You faint.    You have bloody, frequent or painful urination (peeing).    You have new symptoms or anything that worries you.    CHILDREN:  Return to the Emergency Department right away if your child has any of the above-listed symptoms or the following:      Pushes your hand away or screams/cries when his/her belly is touched.    You notice your child is very fussy or weak.    Your child is very tired and is too tired to eat or drink.    Your child is dehydrated.  Signs of dehydration can be:  o Significant change in the amount of wet diapers/urine.  o Your infant or child starts to have dry mouth and lips, or no saliva (spit) or tears.    PREGNANT WOMEN:  Return to the Emergency Department right away if you have any of the above-listed symptoms or the following:      You have bleeding, leaking fluid or passing tissue from the  vagina.    You have worse pain or cramping, or pain in your shoulder or back.    You have vomiting that will not stop.    You have a temperature of 100oF or more.    Your baby is not moving as much as usual.    You faint.    You get a bad headache with or without eye problems and abdominal pain.    You have a seizure.    You have unusual discharge from your vagina and abdominal pain.    Abdominal pain is pretty common during pregnancy.  Your pain may or may not be related to your pregnancy. You should follow-up closely with your OB provider so they can evaluate you and your baby.  Until you follow-up with your regular provider, do the following:       Avoid sex and do not put anything in your vagina.    Drink clear fluids.    Only take medications approved by your provider.    MORE INFORMATION:    Appendicitis:  A possible cause of abdominal pain in any person who still has their appendix is acute appendicitis. Appendicitis is often hard to diagnose.  Testing does not always rule out early appendicitis or other causes of abdominal pain. Close follow-up with your provider and re-evaluations may be needed to figure out the reason for your abdominal pain.    Follow-up:  It is very important that you make an appointment with your clinic and go to the appointment.  If you do not follow-up with your primary provider, it may result in missing an important development which could result in permanent injury or disability and/or lasting pain.  If there is any problem keeping your appointment, call your provider or return to the Emergency Department.    Medications:  Take your medications as directed by your provider today.  Before using over-the-counter medications, ask your provider and make sure to take the medications as directed.  If you have any questions about medications, ask your provider.    Diet:  Resume your normal diet as much as possible, but do not eat fried, fatty or spicy foods while you have pain.  Do not  "drink alcohol or have caffeine.  Do not smoke tobacco.    Probiotics: If you have been given an antibiotic, you may want to also take a probiotic pill or eat yogurt with live cultures. Probiotics have \"good bacteria\" to help your intestines stay healthy. Studies have shown that probiotics help prevent diarrhea (loose stools) and other intestine problems (including C. diff infection) when you take antibiotics. You can buy these without a prescription in the pharmacy section of the store.     If you were given a prescription for medicine here today, be sure to read all of the information (including the package insert) that comes with your prescription.  This will include important information about the medicine, its side effects, and any warnings that you need to know about.  The pharmacist who fills the prescription can provide more information and answer questions you may have about the medicine.  If you have questions or concerns that the pharmacist cannot address, please call or return to the Emergency Department.       Remember that you can always come back to the Emergency Department if you are not able to see your regular provider in the amount of time listed above, if you get any new symptoms, or if there is anything that worries you.      Your next 10 appointments already scheduled     Apr 02, 2018  1:40 PM CDT   (Arrive by 1:25 PM)   Return Visit with  Prostate Cancer Ctr Nurse   [facility] ([facility])    [address] [place] [id number] [phone]            Jun 18, 2018 10:00 AM CDT   LAB with SV LAB   [facility] [place] ([facility])    [address]  [place] [id number] [phone]           Please do not eat 10-12 hours before your appointment if you are coming in fasting for labs on lipids, cholesterol, or glucose (sugar). This does not apply to pregnant women. Water, hot " tea and black coffee (with nothing added) are okay. Do not drink other fluids, diet soda or chew gum.            Jun 21, 2018 12:20 PM CDT   (Arrive by 12:05 PM)   Return Visit with Paulo Agarwal MD   [facility] ([facility])    [address]  4th [address]  [facility] [id number] [phone]            Sep 14, 2018  3:00 PM CDT   (Arrive by 2:45 PM)   CT CHEST W/O CONTRAST with UCCT1   [facility] ([facility])    [address]  [address]  [facility] [id number] [phone]           Please bring any scans or X-rays taken at other hospitals, if similar tests were done. Also bring a list of your medicines, including vitamins, minerals and over-the-counter drugs. It is safest to leave personal items at home.  Be sure to tell your doctor:   If you have any allergies.   If there s any chance you are pregnant.   If you are breastfeeding.  You do not need to do anything special to prepare for this exam.  Please wear loose clothing, such as a sweat suit or jogging clothes. Avoid snaps, zippers and other metal. We may ask you to undress and put on a hospital gown.            Sep 14, 2018  4:15 PM CDT   (Arrive by 4:00 PM)   Return Visit with GLORIA Linton CNP   [facility] ([facility])    [address]  [address]  [facility] [id number] [phone]              24 Hour Appointment Hotline       To make an appointment at any [facility] call [phone] ([phone]). If you don't have a family doctor or clinic, we will help you find one. [facility] clinics are conveniently located to serve the needs of you and your family.             Review of your medicines      START taking        Dose / Directions Last dose taken    cephALEXin 500 MG capsule   Commonly known as:  KEFLEX   Dose:  500 mg    Quantity:  21 capsule        Take 1 capsule (500 mg) by mouth 3 times daily for 7 days   Refills:  0        HYDROcodone-acetaminophen 5-325 MG per tablet   Commonly known as:  NORCO   Dose:  1-2 tablet   Quantity:  14 tablet        Take 1-2 tablets by mouth every 6 hours as needed for moderate to severe pain   Refills:  0          Our records show that you are taking the medicines listed below. If these are incorrect, please call your family doctor or clinic.        Dose / Directions Last dose taken    acetaminophen 325 MG tablet   Commonly known as:  TYLENOL   Dose:  650 mg   Quantity:  100 tablet        Take 2 tablets (650 mg) by mouth every 6 hours Do not take more than 4,000 mg of acetaminophen (Tylenol) from all sources to prevent liver damage.   Refills:  1        ALFALFA PO        Take by mouth daily   Refills:  0        ALPRAZolam 0.5 MG tablet   Commonly known as:  XANAX   Dose:  0.5 mg   Quantity:  20 tablet        Take 1 tablet (0.5 mg) by mouth 3 times daily as needed for anxiety   Refills:  0        cyclobenzaprine 5 MG tablet   Commonly known as:  FLEXERIL   Dose:  5 mg   Quantity:  45 tablet        Take 1 tablet (5 mg) by mouth 3 times daily as needed for muscle spasms   Refills:  0        ketorolac 10 MG tablet   Commonly known as:  TORADOL   Dose:  10 mg   Quantity:  18 tablet        Take 1 tablet (10 mg) by mouth every 6 hours as needed for moderate pain   Refills:  0        lidocaine-prilocaine cream   Commonly known as:  EMLA   Quantity:  30 g        Apply topically as needed for moderate pain Apply a small amount to skin prn   Refills:  0        LISINOPRIL PO   Dose:  10 mg        Take 10 mg by mouth daily   Refills:  0        mirabegron 50 MG 24 hr tablet   Commonly known as:  MYRBETRIQ   Dose:  50 mg   Quantity:  14 tablet        Take 1 tablet (50 mg) by mouth daily   Refills:  0        opium-belladonna 30-16.2 MG per suppository   Commonly known as:  B&O SUPPRETTES   Dose:  30 mg    Quantity:  30 suppository        Place 1 suppository rectally every 8 hours as needed for moderate pain or bladder spasms   Refills:  0        SUPER B COMPLEX PO   Dose:  1 tablet        Take 1 tablet by mouth daily   Refills:  0        tamsulosin 0.4 MG capsule   Commonly known as:  FLOMAX   Dose:  0.4 mg   Quantity:  30 capsule        Take 1 capsule (0.4 mg) by mouth daily   Refills:  0        tolterodine 4 MG 24 hr capsule   Commonly known as:  DETROL LA   Dose:  4 mg   Quantity:  30 capsule        Take 1 capsule (4 mg) by mouth daily   Refills:  3        VITAMIN D (CHOLECALCIFEROL) PO        Take by mouth daily   Refills:  0                Prescriptions were sent or printed at these locations (2 Prescriptions)                   Other Prescriptions                Printed at Department/Unit printer (2 of 2)         HYDROcodone-acetaminophen (NORCO) 5-325 MG per tablet               cephALEXin (KEFLEX) 500 MG capsule                Procedures and tests performed during your visit     Basic metabolic panel (BMP)    Bladder scan    CBC + differential    CT Abdomen Pelvis w Contrast    Peripheral IV catheter    UA with Microscopic      Orders Needing Specimen Collection     None      Pending Results     Date and Time Order Name Status Description    3/24/2018 1143 CT Abdomen Pelvis w Contrast Preliminary             Pending Culture Results     No orders found from 3/22/2018 to 3/25/2018.            Pending Results Instructions     If you had any lab results that were not finalized at the time of your Discharge, you can call the ED Lab Result RN at [phone]. You will be contacted by this team for any positive Lab results or changes in treatment. The nurses are available 7 days a week from 10A to 6:30P.  You can leave a message 24 hours per day and they will return your call.        Test Results From Your Hospital Stay        3/24/2018 12:33 PM      Component Results     Component Value Ref Range & Units Status     WBC 3.8 (L) 4.0 - 11.0 10e9/L Final    RBC Count 4.72 4.4 - 5.9 10e12/L Final    Hemoglobin 14.4 13.3 - 17.7 g/dL Final    Hematocrit 42.5 40.0 - 53.0 % Final    MCV 90 78 - 100 fl Final    MCH 30.5 26.5 - 33.0 pg Final    MCHC 33.9 31.5 - 36.5 g/dL Final    RDW 13.0 10.0 - 15.0 % Final    Platelet Count 165 150 - 450 10e9/L Final    Diff Method Automated Method  Final    % Neutrophils 74.1 % Final    % Lymphocytes 6.6 % Final    % Monocytes 14.5 % Final    % Eosinophils 3.7 % Final    % Basophils 0.3 % Final    % Immature Granulocytes 0.8 % Final    Nucleated RBCs 0 0 /100 Final    Absolute Neutrophil 2.8 1.6 - 8.3 10e9/L Final    Absolute Lymphocytes 0.3 (L) 0.8 - 5.3 10e9/L Final    Absolute Monocytes 0.6 0.0 - 1.3 10e9/L Final    Absolute Eosinophils 0.1 0.0 - 0.7 10e9/L Final    Absolute Basophils 0.0 0.0 - 0.2 10e9/L Final    Abs Immature Granulocytes 0.0 0 - 0.4 10e9/L Final    Absolute Nucleated RBC 0.0  Final         3/24/2018 12:53 PM      Component Results     Component Value Ref Range & Units Status    Sodium 139 133 - 144 mmol/L Final    Potassium 4.0 3.4 - 5.3 mmol/L Final    Chloride 107 94 - 109 mmol/L Final    Carbon Dioxide 26 20 - 32 mmol/L Final    Anion Gap 6 3 - 14 mmol/L Final    Glucose 89 70 - 99 mg/dL Final    Urea Nitrogen 14 7 - 30 mg/dL Final    Creatinine 0.78 0.66 - 1.25 mg/dL Final    GFR Estimate >90 >60 mL/min/1.7m2 Final    Non  GFR Calc    GFR Estimate If Black >90 >60 mL/min/1.7m2 Final    African American GFR Calc    Calcium 9.1 8.5 - 10.1 mg/dL Final         3/24/2018  3:19 PM      Component Results     Component Value Ref Range & Units Status    Color Urine Yellow  Final    Appearance Urine Slightly Cloudy  Final    Glucose Urine Negative NEG^Negative mg/dL Final    Bilirubin Urine Negative NEG^Negative Final    Ketones Urine 5 (A) NEG^Negative mg/dL Final    Specific Gravity Urine 1.009 1.003 - 1.035 Final    Blood Urine Moderate (A) NEG^Negative Final    pH  Urine 8.0 (H) 5.0 - 7.0 pH Final    Protein Albumin Urine 100 (A) NEG^Negative mg/dL Final    Urobilinogen mg/dL 0.0 0.0 - 2.0 mg/dL Final    Nitrite Urine Positive (A) NEG^Negative Final    Leukocyte Esterase Urine Moderate (A) NEG^Negative Final    Source Catheterized Urine  Final    WBC Urine 10 (H) 0 - 5 /HPF Final    RBC Urine 35 (H) 0 - 2 /HPF Final    Bacteria Urine Few (A) NEG^Negative /HPF Final    Mucous Urine Present (A) NEG^Negative /LPF Final    Calcium Oxalate Few (A) NEG^Negative /HPF Final    Calcium Phosphate Crystals Few (A) NEG^Negative /HPF Final         3/24/2018  3:14 PM      Narrative     CT ABDOMEN AND PELVIS WITH CONTRAST   3/24/2018 2:27 PM     HISTORY: Right/mid low abdominal pain; undergoing radiation for  prostate cancer.    TECHNIQUE:   97 mL Isovue-370. Radiation dose for this scan was  reduced using automated exposure control, adjustment of the mA and/or  kV according to patient size, or iterative reconstruction technique.    COMPARISON: 2/23/2018 noncontrast exam.    FINDINGS:  No evidence of diverticulitis or small bowel obstruction.  Unremarkable appendix. Fatty liver infiltration. 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 There are renal cysts. Within the right kidney on  image 39, there is a 0.7 cm lesion which is indeterminate based upon  density. However, this was also present on 10/12/2012 (previously  measured 1.1 cm with cystlike density). There is a right anterior  lower pelvic fluid collection adjacent to the iliac vessels. In  retrospect, this was present on 2/23/2018. It measures 4.7 x 2.5 x 2.6  cm and is similar to the previous exam. There is bladder wall  thickening. This is nonspecific but may well relate to radiation in  light of the history. There is mild perirectal and presacral  stranding, also likely related to radiation. Again, there is a focus  of coarse calcification at the periphery of the liver. Nothing else  acute is seen in the upper abdominal organs.          Impression     IMPRESSION:    1. Right lower pelvic fluid collection, similar to 2/23/2018.  2. Fatty liver.  3. Additional findings discussed above.                 Clinical Quality Measure: Blood Pressure Screening     Your blood pressure was checked while you were in the emergency department today. The last reading we obtained was  BP: 132/77 . Please read the guidelines below about what these numbers mean and what you should do about them.  If your systolic blood pressure (the top number) is less than 120 and your diastolic blood pressure (the bottom number) is less than 80, then your blood pressure is normal. There is nothing more that you need to do about it.  If your systolic blood pressure (the top number) is 120-139 or your diastolic blood pressure (the bottom number) is 80-89, your blood pressure may be higher than it should be. You should have your blood pressure rechecked within a year by a primary care provider.  If your systolic blood pressure (the top number) is 140 or greater or your diastolic blood pressure (the bottom number) is 90 or greater, you may have high blood pressure. High blood pressure is treatable, but if left untreated over time it can put you at risk for heart attack, stroke, or kidney failure. You should have your blood pressure rechecked by a primary care provider within the next 4 weeks.  If your provider in the emergency department today gave you specific instructions to follow-up with your doctor or provider even sooner than that, you should follow that instruction and not wait for up to 4 weeks for your follow-up visit.        Thank you for choosing [facility]       Thank you for choosing [facility] for your care. Our goal is always to provide you with excellent care. Hearing back from our patients is one way we can continue to improve our services. Please take a few minutes to complete the written survey that you may receive in the mail after you visit with us. Thank you!         [email or web address] Information     [email or web address] gives you secure access to your electronic health record. If you see a primary care provider, you can also send messages to your care team and make appointments. If you have questions, please call your primary care clinic.  If you do not have a primary care provider, please call [phone] and they will assist you.        Care EveryWhere ID     This is your Care EveryWhere ID. This could be used by other organizations to access your [facility] medical records  [id number]        Equal Access to Services     CHAMP SNIDER : Hadii carolyn shavero Sosergio, waaxda luqadaha, qaybta kaalmada loren, mynor escoto. So [facility] [phone].    ATENCIÓN: Si habla español, tiene a gunn disposición servicios gratuitos de asistencia lingüística. Llame al [phone].    We comply with applicable federal civil rights laws and [place] laws. We do not discriminate on the basis of race, color, national origin, age, disability, sex, sexual orientation, or gender identity.            After Visit Summary       This is your record. Keep this with you and show to your community pharmacist(s) and doctor(s) at your next visit.

## 2018-03-25 ENCOUNTER — SURGERY (OUTPATIENT)
Age: 68
End: 2018-03-25

## 2018-03-25 ENCOUNTER — ANESTHESIA (OUTPATIENT)
Dept: SURGERY | Facility: CLINIC | Age: 68
End: 2018-03-25
Payer: MEDICARE

## 2018-03-25 ENCOUNTER — ANESTHESIA EVENT (OUTPATIENT)
Dept: SURGERY | Facility: CLINIC | Age: 68
End: 2018-03-25
Payer: MEDICARE

## 2018-03-25 PROBLEM — R33.9 URINARY RETENTION: Status: ACTIVE | Noted: 2018-03-25

## 2018-03-25 PROBLEM — T83.9XXA FOLEY CATHETER PROBLEM (H): Status: ACTIVE | Noted: 2018-03-25

## 2018-03-25 LAB
ANION GAP SERPL CALCULATED.3IONS-SCNC: 7 MMOL/L (ref 3–14)
BASOPHILS # BLD AUTO: 0 10E9/L (ref 0–0.2)
BASOPHILS NFR BLD AUTO: 0.2 %
BUN SERPL-MCNC: 15 MG/DL (ref 7–30)
CALCIUM SERPL-MCNC: 9 MG/DL (ref 8.5–10.1)
CHLORIDE SERPL-SCNC: 104 MMOL/L (ref 94–109)
CO2 SERPL-SCNC: 25 MMOL/L (ref 20–32)
CREAT SERPL-MCNC: 0.83 MG/DL (ref 0.66–1.25)
DIFFERENTIAL METHOD BLD: ABNORMAL
EOSINOPHIL # BLD AUTO: 0.1 10E9/L (ref 0–0.7)
EOSINOPHIL NFR BLD AUTO: 2.6 %
ERYTHROCYTE [DISTWIDTH] IN BLOOD BY AUTOMATED COUNT: 13.1 % (ref 10–15)
GFR SERPL CREATININE-BSD FRML MDRD: >90 ML/MIN/1.7M2
GLUCOSE SERPL-MCNC: 105 MG/DL (ref 70–99)
HCT VFR BLD AUTO: 41.9 % (ref 40–53)
HGB BLD-MCNC: 14.1 G/DL (ref 13.3–17.7)
IMM GRANULOCYTES # BLD: 0 10E9/L (ref 0–0.4)
IMM GRANULOCYTES NFR BLD: 0.4 %
LYMPHOCYTES # BLD AUTO: 0.3 10E9/L (ref 0.8–5.3)
LYMPHOCYTES NFR BLD AUTO: 5.5 %
MCH RBC QN AUTO: 30.5 PG (ref 26.5–33)
MCHC RBC AUTO-ENTMCNC: 33.7 G/DL (ref 31.5–36.5)
MCV RBC AUTO: 91 FL (ref 78–100)
MONOCYTES # BLD AUTO: 0.7 10E9/L (ref 0–1.3)
MONOCYTES NFR BLD AUTO: 12.2 %
NEUTROPHILS # BLD AUTO: 4.4 10E9/L (ref 1.6–8.3)
NEUTROPHILS NFR BLD AUTO: 79.1 %
NRBC # BLD AUTO: 0 10*3/UL
NRBC BLD AUTO-RTO: 0 /100
PLATELET # BLD AUTO: 187 10E9/L (ref 150–450)
POTASSIUM SERPL-SCNC: 3.8 MMOL/L (ref 3.4–5.3)
RBC # BLD AUTO: 4.63 10E12/L (ref 4.4–5.9)
SODIUM SERPL-SCNC: 136 MMOL/L (ref 133–144)
WBC # BLD AUTO: 5.5 10E9/L (ref 4–11)

## 2018-03-25 PROCEDURE — A9270 NON-COVERED ITEM OR SERVICE: HCPCS | Mod: GY | Performed by: UROLOGY

## 2018-03-25 PROCEDURE — 25000132 ZZH RX MED GY IP 250 OP 250 PS 637: Mod: GY | Performed by: UROLOGY

## 2018-03-25 PROCEDURE — 36000056 ZZH SURGERY LEVEL 3 1ST 30 MIN: Performed by: UROLOGY

## 2018-03-25 PROCEDURE — 25000125 ZZHC RX 250: Mod: GY | Performed by: UROLOGY

## 2018-03-25 PROCEDURE — 96375 TX/PRO/DX INJ NEW DRUG ADDON: CPT

## 2018-03-25 PROCEDURE — 96376 TX/PRO/DX INJ SAME DRUG ADON: CPT

## 2018-03-25 PROCEDURE — A9270 NON-COVERED ITEM OR SERVICE: HCPCS | Mod: GY | Performed by: INTERNAL MEDICINE

## 2018-03-25 PROCEDURE — 25000132 ZZH RX MED GY IP 250 OP 250 PS 637: Mod: GY | Performed by: INTERNAL MEDICINE

## 2018-03-25 PROCEDURE — 37000009 ZZH ANESTHESIA TECHNICAL FEE, EACH ADDTL 15 MIN: Performed by: UROLOGY

## 2018-03-25 PROCEDURE — 85025 COMPLETE CBC W/AUTO DIFF WBC: CPT | Performed by: EMERGENCY MEDICINE

## 2018-03-25 PROCEDURE — 80048 BASIC METABOLIC PNL TOTAL CA: CPT | Performed by: EMERGENCY MEDICINE

## 2018-03-25 PROCEDURE — C1769 GUIDE WIRE: HCPCS | Performed by: UROLOGY

## 2018-03-25 PROCEDURE — 96374 THER/PROPH/DIAG INJ IV PUSH: CPT

## 2018-03-25 PROCEDURE — 36000058 ZZH SURGERY LEVEL 3 EA 15 ADDTL MIN: Performed by: UROLOGY

## 2018-03-25 PROCEDURE — 25000128 H RX IP 250 OP 636: Performed by: EMERGENCY MEDICINE

## 2018-03-25 PROCEDURE — 25000128 H RX IP 250 OP 636: Performed by: ANESTHESIOLOGY

## 2018-03-25 PROCEDURE — 99219 ZZC INITIAL OBSERVATION CARE,LEVL II: CPT | Performed by: INTERNAL MEDICINE

## 2018-03-25 PROCEDURE — 25000125 ZZHC RX 250: Performed by: UROLOGY

## 2018-03-25 PROCEDURE — 40000306 ZZH STATISTIC PRE PROC ASSESS II: Performed by: UROLOGY

## 2018-03-25 PROCEDURE — 25000125 ZZHC RX 250: Performed by: INTERNAL MEDICINE

## 2018-03-25 PROCEDURE — G0378 HOSPITAL OBSERVATION PER HR: HCPCS

## 2018-03-25 PROCEDURE — 71000012 ZZH RECOVERY PHASE 1 LEVEL 1 FIRST HR: Performed by: UROLOGY

## 2018-03-25 PROCEDURE — 27210794 ZZH OR GENERAL SUPPLY STERILE: Performed by: UROLOGY

## 2018-03-25 PROCEDURE — 37000008 ZZH ANESTHESIA TECHNICAL FEE, 1ST 30 MIN: Performed by: UROLOGY

## 2018-03-25 PROCEDURE — 25000125 ZZHC RX 250: Performed by: ANESTHESIOLOGY

## 2018-03-25 PROCEDURE — A9270 NON-COVERED ITEM OR SERVICE: HCPCS | Performed by: UROLOGY

## 2018-03-25 PROCEDURE — 27210995 ZZH RX 272: Performed by: UROLOGY

## 2018-03-25 RX ORDER — AMOXICILLIN 250 MG
2 CAPSULE ORAL 2 TIMES DAILY PRN
Status: DISCONTINUED | OUTPATIENT
Start: 2018-03-25 | End: 2018-03-26 | Stop reason: HOSPADM

## 2018-03-25 RX ORDER — TAMSULOSIN HYDROCHLORIDE 0.4 MG/1
0.4 CAPSULE ORAL DAILY
Qty: 30 CAPSULE | Refills: 0 | Status: SHIPPED | OUTPATIENT
Start: 2018-03-25 | End: 2018-06-26

## 2018-03-25 RX ORDER — NALOXONE HYDROCHLORIDE 0.4 MG/ML
.1-.4 INJECTION, SOLUTION INTRAMUSCULAR; INTRAVENOUS; SUBCUTANEOUS
Status: DISCONTINUED | OUTPATIENT
Start: 2018-03-25 | End: 2018-03-26 | Stop reason: HOSPADM

## 2018-03-25 RX ORDER — ACETAMINOPHEN 325 MG/1
650 TABLET ORAL EVERY 4 HOURS PRN
Status: DISCONTINUED | OUTPATIENT
Start: 2018-03-25 | End: 2018-03-26 | Stop reason: HOSPADM

## 2018-03-25 RX ORDER — ONDANSETRON 4 MG/1
4 TABLET, ORALLY DISINTEGRATING ORAL EVERY 6 HOURS PRN
Status: DISCONTINUED | OUTPATIENT
Start: 2018-03-25 | End: 2018-03-26 | Stop reason: HOSPADM

## 2018-03-25 RX ORDER — NALOXONE HYDROCHLORIDE 0.4 MG/ML
INJECTION, SOLUTION INTRAMUSCULAR; INTRAVENOUS; SUBCUTANEOUS PRN
Status: DISCONTINUED | OUTPATIENT
Start: 2018-03-25 | End: 2018-03-25

## 2018-03-25 RX ORDER — TAMSULOSIN HYDROCHLORIDE 0.4 MG/1
0.4 CAPSULE ORAL DAILY
Status: DISCONTINUED | OUTPATIENT
Start: 2018-03-25 | End: 2018-03-26 | Stop reason: HOSPADM

## 2018-03-25 RX ORDER — ACETAMINOPHEN 325 MG/1
325-650 TABLET ORAL EVERY 6 HOURS PRN
Status: DISCONTINUED | OUTPATIENT
Start: 2018-03-25 | End: 2018-03-26 | Stop reason: HOSPADM

## 2018-03-25 RX ORDER — ONDANSETRON 2 MG/ML
INJECTION INTRAMUSCULAR; INTRAVENOUS
Status: DISCONTINUED
Start: 2018-03-25 | End: 2018-03-25 | Stop reason: HOSPADM

## 2018-03-25 RX ORDER — ALPRAZOLAM 0.5 MG
0.5 TABLET ORAL 3 TIMES DAILY PRN
Status: DISCONTINUED | OUTPATIENT
Start: 2018-03-25 | End: 2018-03-26 | Stop reason: HOSPADM

## 2018-03-25 RX ORDER — PHENAZOPYRIDINE HYDROCHLORIDE 100 MG/1
100 TABLET, FILM COATED ORAL
Status: DISCONTINUED | OUTPATIENT
Start: 2018-03-25 | End: 2018-03-26 | Stop reason: HOSPADM

## 2018-03-25 RX ORDER — MEPERIDINE HYDROCHLORIDE 50 MG/ML
12.5 INJECTION INTRAMUSCULAR; INTRAVENOUS; SUBCUTANEOUS EVERY 5 MIN PRN
Status: DISCONTINUED | OUTPATIENT
Start: 2018-03-25 | End: 2018-03-25 | Stop reason: HOSPADM

## 2018-03-25 RX ORDER — DEXAMETHASONE SODIUM PHOSPHATE 4 MG/ML
INJECTION, SOLUTION INTRA-ARTICULAR; INTRALESIONAL; INTRAMUSCULAR; INTRAVENOUS; SOFT TISSUE PRN
Status: DISCONTINUED | OUTPATIENT
Start: 2018-03-25 | End: 2018-03-25

## 2018-03-25 RX ORDER — SENNOSIDES 8.6 MG
1 TABLET ORAL 2 TIMES DAILY
Status: DISCONTINUED | OUTPATIENT
Start: 2018-03-25 | End: 2018-03-26 | Stop reason: HOSPADM

## 2018-03-25 RX ORDER — MIRABEGRON 25 MG/1
25 TABLET, FILM COATED, EXTENDED RELEASE ORAL DAILY
Qty: 15 TABLET | Refills: 0 | Status: SHIPPED | OUTPATIENT
Start: 2018-03-25 | End: 2018-04-09

## 2018-03-25 RX ORDER — FENTANYL CITRATE 50 UG/ML
INJECTION, SOLUTION INTRAMUSCULAR; INTRAVENOUS PRN
Status: DISCONTINUED | OUTPATIENT
Start: 2018-03-25 | End: 2018-03-25

## 2018-03-25 RX ORDER — CYCLOBENZAPRINE HCL 5 MG
5 TABLET ORAL 3 TIMES DAILY PRN
Status: DISCONTINUED | OUTPATIENT
Start: 2018-03-25 | End: 2018-03-25

## 2018-03-25 RX ORDER — TOLTERODINE 4 MG/1
4 CAPSULE, EXTENDED RELEASE ORAL DAILY
Qty: 30 CAPSULE | Refills: 1 | Status: SHIPPED | OUTPATIENT
Start: 2018-03-25 | End: 2018-06-26

## 2018-03-25 RX ORDER — ALBUTEROL SULFATE 0.83 MG/ML
2.5 SOLUTION RESPIRATORY (INHALATION) EVERY 4 HOURS PRN
Status: DISCONTINUED | OUTPATIENT
Start: 2018-03-25 | End: 2018-03-25 | Stop reason: HOSPADM

## 2018-03-25 RX ORDER — HYDROCODONE BITARTRATE AND ACETAMINOPHEN 5; 325 MG/1; MG/1
1-2 TABLET ORAL EVERY 6 HOURS PRN
Status: DISCONTINUED | OUTPATIENT
Start: 2018-03-25 | End: 2018-03-26 | Stop reason: HOSPADM

## 2018-03-25 RX ORDER — AMOXICILLIN 250 MG
1 CAPSULE ORAL 2 TIMES DAILY PRN
Status: DISCONTINUED | OUTPATIENT
Start: 2018-03-25 | End: 2018-03-26 | Stop reason: HOSPADM

## 2018-03-25 RX ORDER — ONDANSETRON 2 MG/ML
INJECTION INTRAMUSCULAR; INTRAVENOUS PRN
Status: DISCONTINUED | OUTPATIENT
Start: 2018-03-25 | End: 2018-03-25

## 2018-03-25 RX ORDER — HYDROMORPHONE HYDROCHLORIDE 1 MG/ML
.3-.5 INJECTION, SOLUTION INTRAMUSCULAR; INTRAVENOUS; SUBCUTANEOUS EVERY 5 MIN PRN
Status: DISCONTINUED | OUTPATIENT
Start: 2018-03-25 | End: 2018-03-25 | Stop reason: HOSPADM

## 2018-03-25 RX ORDER — PHENAZOPYRIDINE HYDROCHLORIDE 100 MG/1
100-200 TABLET, FILM COATED ORAL 3 TIMES DAILY PRN
Qty: 12 TABLET | Refills: 0 | Status: SHIPPED | OUTPATIENT
Start: 2018-03-25 | End: 2018-04-03

## 2018-03-25 RX ORDER — PROCHLORPERAZINE 25 MG
12.5 SUPPOSITORY, RECTAL RECTAL EVERY 12 HOURS PRN
Status: DISCONTINUED | OUTPATIENT
Start: 2018-03-25 | End: 2018-03-26 | Stop reason: HOSPADM

## 2018-03-25 RX ORDER — FENTANYL CITRATE 50 UG/ML
25-50 INJECTION, SOLUTION INTRAMUSCULAR; INTRAVENOUS
Status: DISCONTINUED | OUTPATIENT
Start: 2018-03-25 | End: 2018-03-25 | Stop reason: HOSPADM

## 2018-03-25 RX ORDER — LISINOPRIL 10 MG/1
10 TABLET ORAL DAILY
Status: DISCONTINUED | OUTPATIENT
Start: 2018-03-25 | End: 2018-03-26 | Stop reason: HOSPADM

## 2018-03-25 RX ORDER — HYDROMORPHONE HYDROCHLORIDE 1 MG/ML
0.5 INJECTION, SOLUTION INTRAMUSCULAR; INTRAVENOUS; SUBCUTANEOUS ONCE
Status: COMPLETED | OUTPATIENT
Start: 2018-03-25 | End: 2018-03-25

## 2018-03-25 RX ORDER — ONDANSETRON 2 MG/ML
4 INJECTION INTRAMUSCULAR; INTRAVENOUS EVERY 6 HOURS PRN
Status: DISCONTINUED | OUTPATIENT
Start: 2018-03-25 | End: 2018-03-26 | Stop reason: HOSPADM

## 2018-03-25 RX ORDER — HYDROMORPHONE HCL/0.9% NACL/PF 0.2MG/0.2
0.2 SYRINGE (ML) INTRAVENOUS EVERY 4 HOURS PRN
Status: DISCONTINUED | OUTPATIENT
Start: 2018-03-25 | End: 2018-03-26 | Stop reason: HOSPADM

## 2018-03-25 RX ORDER — LABETALOL HYDROCHLORIDE 5 MG/ML
10 INJECTION, SOLUTION INTRAVENOUS
Status: DISCONTINUED | OUTPATIENT
Start: 2018-03-25 | End: 2018-03-25 | Stop reason: HOSPADM

## 2018-03-25 RX ORDER — SODIUM CHLORIDE, SODIUM LACTATE, POTASSIUM CHLORIDE, CALCIUM CHLORIDE 600; 310; 30; 20 MG/100ML; MG/100ML; MG/100ML; MG/100ML
INJECTION, SOLUTION INTRAVENOUS CONTINUOUS
Status: DISCONTINUED | OUTPATIENT
Start: 2018-03-25 | End: 2018-03-25 | Stop reason: HOSPADM

## 2018-03-25 RX ORDER — LIDOCAINE 40 MG/G
CREAM TOPICAL
Status: DISCONTINUED | OUTPATIENT
Start: 2018-03-25 | End: 2018-03-25 | Stop reason: HOSPADM

## 2018-03-25 RX ORDER — DIAZEPAM 10 MG/2ML
2.5 INJECTION, SOLUTION INTRAMUSCULAR; INTRAVENOUS
Status: DISCONTINUED | OUTPATIENT
Start: 2018-03-25 | End: 2018-03-25 | Stop reason: HOSPADM

## 2018-03-25 RX ORDER — CEFAZOLIN SODIUM 1 G/3ML
INJECTION, POWDER, FOR SOLUTION INTRAMUSCULAR; INTRAVENOUS PRN
Status: DISCONTINUED | OUTPATIENT
Start: 2018-03-25 | End: 2018-03-25

## 2018-03-25 RX ORDER — DIMENHYDRINATE 50 MG/ML
25 INJECTION, SOLUTION INTRAMUSCULAR; INTRAVENOUS
Status: DISCONTINUED | OUTPATIENT
Start: 2018-03-25 | End: 2018-03-25 | Stop reason: HOSPADM

## 2018-03-25 RX ORDER — PROCHLORPERAZINE MALEATE 5 MG
5 TABLET ORAL EVERY 6 HOURS PRN
Status: DISCONTINUED | OUTPATIENT
Start: 2018-03-25 | End: 2018-03-26 | Stop reason: HOSPADM

## 2018-03-25 RX ORDER — PROPOFOL 10 MG/ML
INJECTION, EMULSION INTRAVENOUS CONTINUOUS PRN
Status: DISCONTINUED | OUTPATIENT
Start: 2018-03-25 | End: 2018-03-25

## 2018-03-25 RX ORDER — ONDANSETRON 2 MG/ML
4 INJECTION INTRAMUSCULAR; INTRAVENOUS EVERY 30 MIN PRN
Status: DISCONTINUED | OUTPATIENT
Start: 2018-03-25 | End: 2018-03-25 | Stop reason: HOSPADM

## 2018-03-25 RX ORDER — ONDANSETRON 2 MG/ML
4 INJECTION INTRAMUSCULAR; INTRAVENOUS ONCE
Status: COMPLETED | OUTPATIENT
Start: 2018-03-25 | End: 2018-03-25

## 2018-03-25 RX ORDER — BISACODYL 10 MG
10 SUPPOSITORY, RECTAL RECTAL DAILY PRN
Status: DISCONTINUED | OUTPATIENT
Start: 2018-03-25 | End: 2018-03-26 | Stop reason: HOSPADM

## 2018-03-25 RX ORDER — ACETAMINOPHEN 650 MG/1
650 SUPPOSITORY RECTAL EVERY 4 HOURS PRN
Status: DISCONTINUED | OUTPATIENT
Start: 2018-03-25 | End: 2018-03-26 | Stop reason: HOSPADM

## 2018-03-25 RX ORDER — CEPHALEXIN 500 MG/1
500 CAPSULE ORAL 3 TIMES DAILY
Status: DISCONTINUED | OUTPATIENT
Start: 2018-03-25 | End: 2018-03-25

## 2018-03-25 RX ORDER — ONDANSETRON 4 MG/1
4 TABLET, ORALLY DISINTEGRATING ORAL EVERY 30 MIN PRN
Status: DISCONTINUED | OUTPATIENT
Start: 2018-03-25 | End: 2018-03-25 | Stop reason: HOSPADM

## 2018-03-25 RX ORDER — NALOXONE HYDROCHLORIDE 0.4 MG/ML
.1-.4 INJECTION, SOLUTION INTRAMUSCULAR; INTRAVENOUS; SUBCUTANEOUS
Status: ACTIVE | OUTPATIENT
Start: 2018-03-25 | End: 2018-03-26

## 2018-03-25 RX ORDER — TOLTERODINE 4 MG/1
4 CAPSULE, EXTENDED RELEASE ORAL DAILY
Status: DISCONTINUED | OUTPATIENT
Start: 2018-03-25 | End: 2018-03-26 | Stop reason: HOSPADM

## 2018-03-25 RX ADMIN — CEFAZOLIN 2 G: 1 INJECTION, POWDER, FOR SOLUTION INTRAMUSCULAR; INTRAVENOUS at 02:58

## 2018-03-25 RX ADMIN — FENTANYL CITRATE 50 MCG: 50 INJECTION, SOLUTION INTRAMUSCULAR; INTRAVENOUS at 03:04

## 2018-03-25 RX ADMIN — LISINOPRIL 10 MG: 10 TABLET ORAL at 11:35

## 2018-03-25 RX ADMIN — PROPOFOL 120 MCG/KG/MIN: 10 INJECTION, EMULSION INTRAVENOUS at 02:45

## 2018-03-25 RX ADMIN — SODIUM CHLORIDE, POTASSIUM CHLORIDE, SODIUM LACTATE AND CALCIUM CHLORIDE: 600; 310; 30; 20 INJECTION, SOLUTION INTRAVENOUS at 02:45

## 2018-03-25 RX ADMIN — ONDANSETRON 4 MG: 2 INJECTION INTRAMUSCULAR; INTRAVENOUS at 02:45

## 2018-03-25 RX ADMIN — CEPHALEXIN 250 MG: 250 CAPSULE ORAL at 13:39

## 2018-03-25 RX ADMIN — ONDANSETRON 4 MG: 4 TABLET, ORALLY DISINTEGRATING ORAL at 21:09

## 2018-03-25 RX ADMIN — MIDAZOLAM 2 MG: 1 INJECTION INTRAMUSCULAR; INTRAVENOUS at 03:00

## 2018-03-25 RX ADMIN — SODIUM CHLORIDE, POTASSIUM CHLORIDE, SODIUM LACTATE AND CALCIUM CHLORIDE: 600; 310; 30; 20 INJECTION, SOLUTION INTRAVENOUS at 04:04

## 2018-03-25 RX ADMIN — HYDROMORPHONE HYDROCHLORIDE 0.5 MG: 1 INJECTION, SOLUTION INTRAMUSCULAR; INTRAVENOUS; SUBCUTANEOUS at 00:41

## 2018-03-25 RX ADMIN — PHENAZOPYRIDINE HYDROCHLORIDE 100 MG: 100 TABLET ORAL at 13:40

## 2018-03-25 RX ADMIN — CEPHALEXIN 250 MG: 250 CAPSULE ORAL at 21:12

## 2018-03-25 RX ADMIN — ONDANSETRON 4 MG: 2 INJECTION INTRAMUSCULAR; INTRAVENOUS at 01:20

## 2018-03-25 RX ADMIN — ATROPA BELLADONNA AND OPIUM 1 SUPPOSITORY: 16.2; 3 SUPPOSITORY RECTAL at 11:39

## 2018-03-25 RX ADMIN — BISACODYL 10 MG: 10 SUPPOSITORY RECTAL at 22:51

## 2018-03-25 RX ADMIN — WATER 300 ML: 100 IRRIGANT IRRIGATION at 03:52

## 2018-03-25 RX ADMIN — SENNOSIDES 1 TABLET: 8.6 TABLET, FILM COATED ORAL at 21:12

## 2018-03-25 RX ADMIN — MAGNESIUM HYDROXIDE 30 ML: 400 SUSPENSION ORAL at 21:12

## 2018-03-25 RX ADMIN — PHENAZOPYRIDINE HYDROCHLORIDE 100 MG: 100 TABLET ORAL at 17:51

## 2018-03-25 RX ADMIN — HYDROCODONE BITARTRATE AND ACETAMINOPHEN 2 TABLET: 5; 325 TABLET ORAL at 21:11

## 2018-03-25 RX ADMIN — TAMSULOSIN HYDROCHLORIDE 0.4 MG: 0.4 CAPSULE ORAL at 09:54

## 2018-03-25 RX ADMIN — NALOXONE HYDROCHLORIDE 80 MCG: 0.4 INJECTION, SOLUTION INTRAMUSCULAR; INTRAVENOUS; SUBCUTANEOUS at 03:22

## 2018-03-25 RX ADMIN — PROPOFOL 120 MCG/KG/MIN: 10 INJECTION, EMULSION INTRAVENOUS at 03:05

## 2018-03-25 RX ADMIN — ALPRAZOLAM 0.5 MG: 0.5 TABLET ORAL at 23:01

## 2018-03-25 RX ADMIN — SODIUM CHLORIDE, POTASSIUM CHLORIDE, SODIUM LACTATE AND CALCIUM CHLORIDE: 600; 310; 30; 20 INJECTION, SOLUTION INTRAVENOUS at 02:54

## 2018-03-25 RX ADMIN — TOLTERODINE 4 MG: 4 CAPSULE, EXTENDED RELEASE ORAL at 09:55

## 2018-03-25 RX ADMIN — ATROPA BELLADONNA AND OPIUM 1 SUPPOSITORY: 16.2; 3 SUPPOSITORY RECTAL at 18:56

## 2018-03-25 RX ADMIN — SENNOSIDES AND DOCUSATE SODIUM 1 TABLET: 8.6; 5 TABLET ORAL at 09:55

## 2018-03-25 RX ADMIN — ATROPA BELLADONNA AND OPIUM 15 MG: 16.2; 3 SUPPOSITORY RECTAL at 03:48

## 2018-03-25 RX ADMIN — DEXAMETHASONE SODIUM PHOSPHATE 4 MG: 4 INJECTION, SOLUTION INTRA-ARTICULAR; INTRALESIONAL; INTRAMUSCULAR; INTRAVENOUS; SOFT TISSUE at 02:55

## 2018-03-25 RX ADMIN — LIDOCAINE HYDROCHLORIDE 1.5 ML: 20 JELLY TOPICAL at 18:47

## 2018-03-25 ASSESSMENT — ENCOUNTER SYMPTOMS
DIFFICULTY URINATING: 1
DIAPHORESIS: 1
VOMITING: 0
DYSURIA: 1
NAUSEA: 0

## 2018-03-25 NOTE — OP NOTE
March 25, 2018    PREOPERATIVE DIAGNOSIS:   1) Difficult donovan removal    POSTOPERATIVE DIAGNOSIS: same + Calcified donovan catheter    PROCEDURES PERFORMED:   1. Cystourethroscopy  2. Exchange of donovan catheter under anesthesia    STAFF SURGEON: VIRGINIA Cain MD         ASSISTANT: NETTIE Rivera MD    ANESTHESIA: General with LMA    ESTIMATED BLOOD LOSS: 1 mL.     IV FLUIDS:  see dictated anesthesia record    COMPLICATIONS: None.     SIGNIFICANT FINDINGS:   1) Calicified donovan catheter. Cutting the distal end of the catheter did not help removing it. A sensor guidewire through the balloon port however worked in removing the catheter in an uncomplicated fashion  2) Cystoscopy remarkable for mild post radiation changes and tiny debris in the bladder which was flushed out. Bladder neck widely patent.  3) Easy placement of new 18Fr [place] donovan. Not required placement over a wire and can be done at bedside in the future.    BRIEF OPERATIVE INDICATIONS: Juwan Latham  is a 67 year old old male with s/p recent robotic prostatectomy and now undergoing radiation therapy with urinary retention requiring donovan catheter. He presented with poorly controlled bladder spasms and the ED staff attempted removal of the donovan but were unable to. Given the pt's discomfort a bedside removal was not attempted and the pt was taken to the OR.    DESCRIPTION OF PROCEDURE:  After full informed voluntary consent was obtained, the patient was transported to the operating room, placed supine on the table. After adequate anesthesia was induced, they were prepped and draped in the usual sterile fashion in dorsal lithotomy position. A timeout was taken to confirm correct patient, procedure and laterality.     We began the case by confirming the balloon was empty but the catheter would not come out with gentle traction.  Hence we cut the catheter distally and this also did not seem to help.  Hence we passed a sensor guidewire through the balloon port (with  a hydrophilic tip first) and this seemed to release the balloon and catheter was removed in an uncomplicated fashion. We then performed rigid cystoscopy.  This revealed some postradiation changes at the anastomosis as expected and mild debris in the bladder which was flushed out.  Bladder neck was widely patent.  We then removed the scope and placed an 18 French [place] Donovan catheter.  10 cc of sterile water was inserted into the balloon .    Patient tolerated the procedure well.  No apparent complications. He was transported to the postanesthesia care unit in stable condition.     Plan  -Admit to Obs overnight. Dr White is the hospitalist on call  -Return for next donovan exchange in 3-4 weeks. Message sent to Love Mcdonald.      Vimal Rivera MD  Urology Resident    Addendum:    I, Zaria Cain, was present and scrubbed for the entire case.  I agree with the note as above with changes made as needed.  I spoke to the patient's sister after the case per his request.    Plan for observing to ensure pain is improved and catheter is draining    Zaria Cain MD MPH   of Urology

## 2018-03-25 NOTE — ANESTHESIA CARE TRANSFER NOTE
Patient: Juwan Latham    Procedure(s):  Cystoscopy, Catheter Exchange - Wound Class: II-Clean Contaminated    Diagnosis: Urinary Retention, Unable to remove catheter  Diagnosis Additional Information: Obstructed urinary catheter  Dr. Cain    Anesthesia Type:   MAC, General     Note:  Airway :Face Mask  Patient transferred to:PACU  Comments: Pt VSS, report to RN      Vitals: (Last set prior to Anesthesia Care Transfer)    CRNA VITALS  3/25/2018 0257 - 3/25/2018 0339      3/25/2018             EKG: NSR                Electronically Signed By: Caleb Lange MD  March 25, 2018  3:39 AM

## 2018-03-25 NOTE — CONSULTS
March 25, 2018    Referring Provider: Dr Sohan Almonte ED    Primary Care Provider: Julio Guidry Jr.    CC: Catheter malfunction/inability to remove catheter    HPI:  Juwan Latham is a 67 year old male with history of cristal 4+5 [id number] positive prostate cancer s/p RALP undergoing radiation who has had issues with urinary retention.  He has had significant pain with the catheter and presented to the ED.  It was draining well prior without hematuria.  He was just seen in clinic Thursday.    Given the significant pain with the catheter and recent infection it was decided to change the catheter.  Despite multiple attempts after the balloon was deflated the catheter was unable to be removed and we were consulted.  The patient has since been in significant pain and having nausea.  Has not had difficulties with catheter placement prior.    Past Medical History:   Diagnosis Date     Anxiety      Arthritis     fingers, hips     Calculus of kidney 2005, 2012     Congenital single kidney      Gastroesophageal reflux disease      Hx of cancer of lung 03/2017    wedge resection     Hypertension      Prostate cancer (H) 08/2017     Seasonal allergies     spring and fall     Sleep apnea     cpap       Past Surgical History:   Procedure Laterality Date     BRONCHOSCOPY FLEXIBLE N/A 3/3/2017    Procedure: BRONCHOSCOPY FLEXIBLE;  Surgeon: Maria A Desai MD;  Location: [facility] OR     COLONOSCOPY N/A 2/11/2015    Procedure: COLONOSCOPY;  Surgeon: Murphy Jesus MD;  Location:  GI     DAVINCI PROSTATECTOMY N/A 12/1/2017    Procedure: DAVINCI PROSTATECTOMY;  Robotic Assisted Radical Prostatectomy and Pelvic Lymph Node Dissection ;  Surgeon: Paulo Agarwal MD;  Location:  OR     ESOPHAGOSCOPY, GASTROSCOPY, DUODENOSCOPY (EGD), COMBINED N/A 5/20/2016    Procedure: COMBINED ESOPHAGOSCOPY, GASTROSCOPY, DUODENOSCOPY (EGD), BIOPSY SINGLE OR MULTIPLE;  Surgeon: Murphy Jesus MD;  Location:  GI     LAPAROSCOPIC  WEDGE RESECTION LUNG Right 3/3/2017    Procedure: LAPAROSCOPIC WEDGE RESECTION LUNG;  Surgeon: Maria A Desai MD;  Location: [facility] OR     LASER HOLMIUM LITHOTRIPSY URETER(S), INSERT STENT, COMBINED  2012    Procedure: COMBINED CYSTOSCOPY, URETEROSCOPY, LASER HOLMIUM LITHOTRIPSY URETER(S), INSERT STENT;  Cystoscopy, Right Ureteroscopy, Stone Extraction, Holmium Laser, Double J Stent Placement;  Surgeon: Nicolás Blackwell MD;  Location: [facility] OR     Social History     Social History     Marital status:      Spouse name: N/A     Number of children: 1     Years of education: N/A     Occupational History     Remodeling Self      Other     Social History Main Topics     Smoking status: Former Smoker     Packs/day: 2.00     Years: 20.00     Types: Cigarettes     Quit date: 1985     Smokeless tobacco: Never Used     Alcohol use Yes      Comment: avg 3 beers per night     Drug use: No     Sexual activity: Yes     Partners: Female     Other Topics Concern     Caffeine Concern No     Sleep Concern Yes     middle/late insomnia     Exercise Yes     Seat Belt Yes     Parent/Sibling W/ Cabg, Mi Or Angioplasty Before 65f 55m? No     Social History Narrative    Dad  at age 86 from complications after hip surgery.    Mom  at age 68 from heart condition    Siblings:  Three sisters in good health.  Brother with diabetes and overweight.        One son    One granddaughter    Occupation:  remodeling       Family History   Problem Relation Age of Onset     HEART DISEASE Mother      DIABETES Brother 65     Type 2     DIABETES Maternal Grandmother      Cancer - colorectal Brother 70     Hypertension No family hx of      Lipids No family hx of        Allergies   Allergen Reactions     Percocet [Oxycodone-Acetaminophen] GI Disturbance     Severe constipation       Current Facility-Administered Medications on File Prior to Encounter:  [COMPLETED] 0.9% sodium chloride BOLUS   [COMPLETED] iopamidol (ISOVUE-370)  solution 500 mL   [DISCONTINUED] HYDROmorphone (PF) (DILAUDID) injection 0.5 mg     Current Outpatient Prescriptions on File Prior to Encounter:  HYDROcodone-acetaminophen (NORCO) 5-325 MG per tablet Take 1-2 tablets by mouth every 6 hours as needed for moderate to severe pain   cephALEXin (KEFLEX) 500 MG capsule Take 1 capsule (500 mg) by mouth 3 times daily for 7 days   sennosides (SENOKOT) 8.6 MG tablet Take 1 tablet by mouth 2 times daily   lidocaine HCl 2 % SOLN Apply thin layer to tip of penis TID PRN penile pain   ketorolac (TORADOL) 10 MG tablet Take 1 tablet (10 mg) by mouth every 6 hours as needed for moderate pain   lidocaine-prilocaine (EMLA) cream Apply topically as needed for moderate pain Apply a small amount to skin prn   mirabegron (MYRBETRIQ) 50 MG 24 hr tablet Take 1 tablet (50 mg) by mouth daily   opium-belladonna (B&O SUPPRETTES) 30-16.2 MG per suppository Place 1 suppository rectally every 8 hours as needed for moderate pain or bladder spasms   cyclobenzaprine (FLEXERIL) 5 MG tablet Take 1 tablet (5 mg) by mouth 3 times daily as needed for muscle spasms   tamsulosin (FLOMAX) 0.4 MG capsule Take 1 capsule (0.4 mg) by mouth daily   VITAMIN D, CHOLECALCIFEROL, PO Take by mouth daily   tolterodine (DETROL LA) 4 MG 24 hr capsule Take 1 capsule (4 mg) by mouth daily   ALPRAZolam (XANAX) 0.5 MG tablet Take 1 tablet (0.5 mg) by mouth 3 times daily as needed for anxiety   B Complex-C (SUPER B COMPLEX PO) Take 1 tablet by mouth daily   LISINOPRIL PO Take 10 mg by mouth daily    ALFALFA PO Take by mouth daily   acetaminophen (TYLENOL) 325 MG tablet Take 2 tablets (650 mg) by mouth every 6 hours Do not take more than 4,000 mg of acetaminophen (Tylenol) from all sources to prevent liver damage.       No current facility-administered medications for this encounter.        /87  Pulse 70  Temp 96.2  F (35.7  C) (Temporal)  Resp 18  SpO2 99% No LMP for male patient. There is no height or weight on  file to calculate BMI.  He is alert and oriented in obvious discomfort.  Abdomen is mildly distended.  Catheter bag has clear yellow urine but the catheter is obviously not in the correct place    Lab Results   Component Value Date    WBC 5.5 03/25/2018     Lab Results   Component Value Date    RBC 4.63 03/25/2018     Lab Results   Component Value Date    HGB 14.1 03/25/2018     Lab Results   Component Value Date    HCT 41.9 03/25/2018     No components found for: MCT  Lab Results   Component Value Date    MCV 91 03/25/2018     Lab Results   Component Value Date    MCH 30.5 03/25/2018     Lab Results   Component Value Date    MCHC 33.7 03/25/2018     Lab Results   Component Value Date    RDW 13.1 03/25/2018     Lab Results   Component Value Date     03/25/2018       Cr 0.83    UA yesterday with blood, leuks, nitrite.  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  We discussed risks of bleeding, infection, and inability to get the catheter in requiring SP tube to decompress his bladder.  He expresses understanding and is agreeable to proceed.  He asks that I call his sister after the case as his family has gone home    To OR for cystoscopy, catheter change under anesthesia    Zaria Cain MD MPH    Urology    CC  Patient Care Team:  Julio Guidry Jr., MD as PCP - General (Family Practice)  Weight, Paulo Wiley MD as MD (Urology)  Love Mcdonald, TANNER as Registered  Nurse (Oncology)  Suzy Flores, RN as Lead Care Coordinator (Primary Care - CC)  Maria A Desai MD as MD (Thoracic Diseases)

## 2018-03-25 NOTE — ANESTHESIA POSTPROCEDURE EVALUATION
Patient: Juwan Latham    Procedure(s):  Cystoscopy, Catheter Exchange - Wound Class: II-Clean Contaminated    Diagnosis:Urinary Retention, Unable to remove catheter  Diagnosis Additional Information: Obstructed urinary catheter  Dr. Cain    Anesthesia Type:  MAC, General    Note:  Anesthesia Post Evaluation    Patient location during evaluation: PACU  Patient participation: Able to fully participate in evaluation  Level of consciousness: awake  Pain management: adequate  Airway patency: patent  Cardiovascular status: acceptable  Respiratory status: acceptable  Hydration status: acceptable  PONV: none             Last vitals:  Vitals:    03/25/18 0154 03/25/18 0200 03/25/18 0235   BP:  141/75 143/87   Pulse:      Resp:   18   Temp:   97.1  F (36.2  C)   SpO2: 99% 99% 99%         Electronically Signed By: Caleb Lange MD  March 25, 2018  3:44 AM

## 2018-03-25 NOTE — ED NOTES
Withdrew 10 ml from baloon but was unable to remove cath due to spasms . Was unable to re inflate balloon. md and rn notified

## 2018-03-25 NOTE — ANESTHESIA CARE TRANSFER NOTE
Patient: Juwan Latham    Procedure(s):  Cystoscopy, Catheter Exchange - Wound Class: II-Clean Contaminated    Diagnosis: Urinary Retention, Unable to remove catheter  Diagnosis Additional Information: Obstructed urinary catheter  Dr. Cain    Anesthesia Type:   MAC, General     Note:  Airway :Face Mask  Patient transferred to:PACU  Comments: Pt VSS, report to RN      Vitals: (Last set prior to Anesthesia Care Transfer)    CRNA VITALS  3/25/2018 0257 - 3/25/2018 0340      3/25/2018             EKG: NSR                Electronically Signed By: Caleb Lange MD  March 25, 2018  3:40 AM

## 2018-03-25 NOTE — ED NOTES
RN coated tip of penis with urojet solution at this time to minimize pt discomfort per MD verbal order

## 2018-03-25 NOTE — ED PROVIDER NOTES
"  History     Chief Complaint:  Catheter Problem      The history is provided by the patient.      Juwan Latham is a 67 year old male with a history of prostate cancer, status post prostatectomy in December 2017, who presents to the emergency department with his sister for evaluation of a catheter problem. After prostate removal in December by Dr. Agarwal at [facility], the patient reports that he had no significant problems urinating. However in January he presented with urinary retention and a donovan catheter was placed. The patient reports that he has had this same catheter in for the past month and is schedule to have it removed on 04/02 after his radiation is complete. The patient was seen by urology for difficulty with his catheter and given Toradol, Emla cream, and Myrbetriq. The patient was then seen here earlier today by Dr. Chávez for penile pain and \"muscle spasms\" in the groin area with the urge to urinate. CT scan and urinalysis were obtained, results below. He was prescribed Keflex, Norco for pain, and Senokot. He presents here now with continued \"muscle spasms\" in his groin area, penile pain, and some diaphoresis on the way here. He denies any nausea, vomiting, or testicular pain.     CT Abd/pelvis with contrast:  IMPRESSION:  1. Right lower pelvic fluid collection, similar to 2/23/2018.  2. Fatty liver.  3. Additional findings discussed above.   Imaging independently reviewed and agree with radiologist interpretation.       Laboratory:  CBC: WBC 3.8 (low), ow WNL (HGB 14.4, )     BMP: WNL (Creatinine 0.78)   UA with Microscopic: Ketone 5, Blood moderate, pH 8 (high), Protein albumin 100, Nitrite positive, Leukocyte moderate, WBC 10 (high), RBC 35 (high), Mucous present, Calcium oxalate few, Calcium phosphate crystals few, ow Negative     Allergies:  Percocet [Oxycodone-Acetaminophen]    Medications:    Norco  Keflex  Senokot  Lidocaine " solution  Toradol  Emla  Myrbetriq  Flexeril  Xanax  Lisinopril  Alfalfa    Past Medical History:    Anxiety   Arthritis   Calculus of kidney   Congenital single kidney   Gastroesophageal reflux disease   Hx of cancer of lung   Hypertension   Prostate cancer   Seasonal allergies   Sleep apnea    Past Surgical History:    Flexible bronchoscopy  Colonoscopy   Davinci prostatectomy  ECG combined  Laparoscopic wedge resection of lung  Laser holmium lithotripsy ureter, insert stent, combined    Family History:    Heart Disease  Mother  Type 2 Diabetes Brother, Maternal Grandmother  Colorectal Cancer Brother    Social History:  The patient was accompanied to the emergency department by his sister.  Smoking Status: Former Smoker (2 pack/day for 20 years. Quit 1985.)  Smokeless Tobacco: Never Used  Alcohol Use: Yes  Marital Status:      Review of Systems   Constitutional: Positive for diaphoresis.   Gastrointestinal: Negative for nausea and vomiting.   Genitourinary: Positive for difficulty urinating, dysuria and penile pain. Negative for testicular pain.   All other systems reviewed and are negative.    Physical Exam     Patient Vitals for the past 24 hrs:   BP Temp Temp src Pulse Heart Rate Resp SpO2   03/25/18 0355 119/73 97  F (36.1  C) Temporal - 66 12 93 %   03/25/18 0350 108/75 - - - 62 13 93 %   03/25/18 0345 125/74 - - - 66 25 96 %   03/25/18 0340 118/72 - - - 65 9 100 %   03/25/18 0333 121/72 97.1  F (36.2  C) Temporal - - 26 99 %   03/25/18 0235 143/87 97.1  F (36.2  C) Temporal - - 18 99 %   03/25/18 0200 141/75 - - - - - 99 %   03/25/18 0154 - - - - - - 99 %   03/25/18 0153 - - - - - - 95 %   03/25/18 0152 - - - - - - (!) 84 %   03/25/18 0151 - - - - - - (!) 86 %   03/25/18 0145 136/81 - - - - - 98 %   03/25/18 0130 123/77 - - - - - 95 %   03/25/18 0115 123/78 - - - - - 98 %   03/25/18 0100 120/77 - - - - - 97 %   03/24/18 2345 - - - - - - 97 %   03/24/18 2340 - - - - - - 99 %   03/24/18 2338 (!) 155/93  - - - - - -   03/24/18 2251 - - - 70 70 - 98 %   03/24/18 2148 146/89 96.2  F (35.7  C) Temporal 117 - 20 96 %     Physical Exam  Constitutional: Patient appears well-developed and well-nourished. Patient is in no acute distress at the time of my exam  HENT:                         Head: No external signs of trauma noted.                        Eyes: Conjunctivae are normal. Pupils are equal, round, and reactive to light.   Cardiovascular:                         Regular rate, regular rhythm and normal heart sounds.                          Exam reveals no friction rub.                          No murmur heard.  Pulmonary/Chest:                         Effort normal and breath sounds normal.                         No respiratory distress.                         There are no wheezes.                         There are no rales.   Abdominal:                         Soft.                         Bowel sounds normal.                         There is no distension.                         There is no tenderness.                         There is no rebound or guarding.   :                        Normal appearance of external male genitalia                        No penile discharge                        Circumcised                        Left testicle appears non-swollen. No left testicular tenderness. No masses palpated                        Right testicle appears non-swollen. No right testicular tenderness. No masses palpated.                        Urinary catheter noted without active leaking of urine at the time of my exam  Musculoskeletal:                         Normal range of motion.                         Normal Tone  Neurological: Patient is alert and oriented to person, place, and time.   Skin: Skin is warm and dry. Patient is not diaphoretic.     Emergency Department Course     Interventions:  2253 Norco 1 tablet PO   2335 Pyridium 200 mg PO   2336 Morphine 4 mg IM  0041 Dilaudid 0.5 mg IV     Emergency  Department Course:    Nursing notes and vitals reviewed.    I performed an exam of the patient as documented above.     1048 I spoke with Dr. Cain of urology regarding the patient's presentation, findings, and plan of care.     1105 I reevaluated the patient. His catheter is stuck and we are unable to replace it.    0042 I spoke with Dr. Cain of urology again regarding the patient's catheter situation.     0133 I discussed the treatment plan with the patient. They expressed understanding of this plan and consented to transfer to the OR for definitive intervention. I discussed the patient with Dr. Cain once more, who will have the patient go to the operating room.    I personally reviewed the physical exam results with the patient and answered all related questions prior to transfer.    Impression & Plan      Medical Decision Making:  Juwan Latham is a 67 year old male who presents to the emergency department due to pain with his urinary catheter. Please see the HPI and exam for specifics. The patient was actually seen here earlier in the day and due to his discomfort a CT abdomen pelvis was performed. This did reveal some pelvic fluid that was not significantly changed since his previous CT. Per chart review, discussion was had with radiology and they felt that this does not represent abscess. The patient was discharged to follow up in the outpatient setting. He returned due to increasing pain and spasms and leaking urine around his catheter. We attempted analgesic control in the emergency department, and after multiple discussions with urology and discussion with the patient, we did decide to try to remove the catheter and replace it. After deflating the balloon the catheter became stuck most likely in the proximal urethra. We were unable to advance or retract the catheter. Movement of this caused the patient considerable amount of pain. We were not able to blow the balloon back up for the same reason. I discussed  things with urology again and the plan will be to take the patient to the OR for further definitive care.     Impressions:    ICD-10-CM    1. Urinary catheter dysfunction, initial encounter (H) T83.018A Basic metabolic panel     Disposition:   The patient was taken to the operating room.     Scribe Disclosure:  I, Cate Land, am serving as a scribe at 10:03 PM on 3/24/2018 to document services personally performed by Nolberto Parry DO based on my observations and the provider's statements to me.    [facility] EMERGENCY DEPARTMENT       Nolberto Parry DO  03/25/18 0423

## 2018-03-25 NOTE — DISCHARGE INSTRUCTIONS
The  has arranged home care,  for you after you return home from the hospital.   The Home Care Agency Arranged is [facility]  The telephone number is [phone]  You will be contacted by phone within a few days after your hospital stay by the Home Care Agency to set up your first visit.     If you have not heard from the Home Care Agency within 1-2  Days after discharge from the hospital, please contact the number provided above.       Dr. Zaria Cain  Urology   [address] [place] [id number] [phone]    CYSTOSCOPY DISCHARGE INSTRUCTIONS    YOU MAY GO BACK TO YOUR NORMAL DIET AND ACTIVITY, UNLESS YOUR DOCTOR TELLS YOU NOT TO.    FOR THE NEXT TWO DAYS, YOU MAY NOTICE:    SOME BLOOD IN YOUR URINE.  SOME BURNING WHEN YOU URINATE (USE THE TOILET).  AN URGE TO URINATE MORE OFTEN.  BLADDER SPASMS.    THESE ARE NORMAL AFTER THE PROCEDURE.  THEY SHOULD GO AWAY AFTER A DAY OR TWO.  TO RELIEVE THESE PROBLEMS:     DRINK 6 TO 8 LARGE GLASSES OF WATER EACH DAY (INCLUDES DRINKS AT MEALS).  THIS WILL HELP CLEAR THE URINE.    TAKE WARM BATHS TO RELIEVE PAIN AND BLADDER SPASMS.  DO NOT ADD ANYTHING TO THE BATH WATER.    YOUR DOCTOR MAY PRESCRIBE PAIN MEDICINE.  YOU MAY ALSO TAKE TYLENOL (ACETAMINOPHEN) FOR PAIN.    CALL YOUR SURGEON IF YOU HAVE:    A FEVER OVER 100 DEGREES FOR MORE THAN A DAY.  CHECK YOUR TEMPERATURE UNDER YOUR TONGUE.    CHILLS.    FAILURE TO URINATE (NO URINE COMES OUT WHEN YOU TRY TO USE THE TOILET).  TRY SOAKING IN A BATHTUB FULL OF WARM WATER.  IF STILL NO URINE, CALL YOUR DOCTOR.    A LOT OF BLOOD IN THE URINE, OR BLOOD CLOTS LARGER THAN A NICKEL.      PAIN IN THE BACK OR BELLY AREA (ABDOMEN).    PAIN OR SPASMS THAT ARE NOT RELIEVED BY WARM TUB BATHS AND PAIN MEDICINE.      SEVERE PAIN, BURNING OR OTHER PROBLEMS WHILE PASSING URINE.    PAIN THAT GETS WORSE AFTER TWO DAYS.          GENERAL ANESTHESIA SEDATION ADULT DISCHARGE INSTRUCTIONS   SPECIAL PRECAUTIONS FOR 24  HOURS AFTER SURGERY    IT IS NOT UNUSUAL TO FEEL LIGHT-HEADED OR FAINT, UP TO 24 HOURS AFTER SURGERY OR WHILE TAKING PAIN MEDICATION.  IF YOU HAVE THESE SYMPTOMS; SIT FOR A FEW MINUTES BEFORE STANDING AND HAVE SOMEONE ASSIST YOU WHEN YOU GET UP TO WALK OR USE THE BATHROOM.    YOU SHOULD REST AND RELAX FOR THE NEXT 24 HOURS AND YOU MUST MAKE ARRANGEMENTS TO HAVE SOMEONE STAY WITH YOU FOR AT LEAST 24 HOURS AFTER YOUR DISCHARGE.  AVOID HAZARDOUS AND STRENUOUS ACTIVITIES.  DO NOT MAKE IMPORTANT DECISIONS FOR 24 HOURS.    DO NOT DRIVE ANY VEHICLE OR OPERATE MECHANICAL EQUIPMENT FOR 24 HOURS FOLLOWING THE END OF YOUR SURGERY.  EVEN THOUGH YOU MAY FEEL NORMAL, YOUR REACTIONS MAY BE AFFECTED BY THE MEDICATION YOU HAVE RECEIVED.    DO NOT DRINK ALCOHOLIC BEVERAGES FOR 24 HOURS FOLLOWING YOUR SURGERY.    DRINK CLEAR LIQUIDS (APPLE JUICE, GINGER ALE, 7-UP, BROTH, ETC.).  PROGRESS TO YOUR REGULAR DIET AS YOU FEEL ABLE.    YOU MAY HAVE A DRY MOUTH, A SORE THROAT, MUSCLES ACHES OR TROUBLE SLEEPING.  THESE SHOULD GO AWAY AFTER 24 HOURS.    CALL YOUR DOCTOR FOR ANY OF THE FOLLOWING:  SIGNS OF INFECTION (FEVER, GROWING TENDERNESS AT THE SURGERY SITE, A LARGE AMOUNT OF DRAINAGE OR BLEEDING, SEVERE PAIN, FOUL-SMELLING DRAINAGE, REDNESS OR SWELLING.    IT HAS BEEN OVER 8 TO 10 HOURS SINCE SURGERY AND YOU ARE STILL NOT ABLE TO URINATE (PASS WATER).

## 2018-03-25 NOTE — PROGRESS NOTES
Care Transition Initial Assessment - MANDA  Reason For Consult: discharge planning  Met with: Patient and Family    Active Problems:    Urinary retention    Breen catheter problem (H)       DATA  Lives With: other (see comments) (roommates)  Living Arrangements: house  Description of Support System: Supportive, Involved  Who is your support system?: Sibling(s)  Support Assessment: Adequate social supports, Adequate family and caregiver support.   Identified issues/concerns regarding health management: Concerns over medication costs and education regarding catheter care and medication set up.       Resources List: Home Care     Quality Of Family Relationships: supportive, helpful, involved       ASSESSMENT  Cognitive Status:  awake, oriented and confused  Concerns to be addressed: Patient says his insurance does not pay for the Opium + Belladonna suppositiry. SW called pharmacy and they verified it was not covered. Patient is on [place] and [place]. Both plans will not cover the cost. Urology was paged and they will send him home on another medication.  SW spoke with RN about his confusion with his medication. It would be best to set up home care for med mgmt and training along with catheter cares.  Discussed with patient the need for home care services after discharge. The patient was given a list of home care agencies. Patient has chosen [facility]. The criteria for homebound status was explained as applicable. Home care was described to the patient as skilled, intermittent service. Length and frequency of home care visits will be determined by the home care agency.          PLAN  Financial costs for the patient includes none  Patient given options and choices for discharge yes .  Patient/family is agreeable to the plan?  Yes:   Patient Goals and Preferences: to discharge home with home care.   Patient anticipates discharging to:  [facility].    DEANNA Lyles  [phone]

## 2018-03-25 NOTE — ANESTHESIA PREPROCEDURE EVALUATION
Anesthesia Evaluation     . Pt has had prior anesthetic. Type: General and MAC    No history of anesthetic complications          ROS/MED HX    ENT/Pulmonary:     (+)sleep apnea, doesn't use CPAP , . .    Neurologic:  - neg neurologic ROS     Cardiovascular:     (+) Dyslipidemia, hypertension----. : . . . :. .       METS/Exercise Tolerance:     Hematologic:  - neg hematologic  ROS       Musculoskeletal:   (+) arthritis, , , -       GI/Hepatic:     (+) GERD Asymptomatic on medication,       Renal/Genitourinary: Comment: Hx of single kidney    (+) Nephrolithiasis , Other Renal/ Genitourinary, long term catheter placement post prostatectomy      Endo:  - neg endo ROS       Psychiatric:     (+) psychiatric history anxiety      Infectious Disease:  - neg infectious disease ROS       Malignancy:   (+) Malignancy History of Prostate  Prostate CA Remission status post Surgery and Radiation,         Other:    - neg other ROS                 Physical Exam  Normal systems: cardiovascular, pulmonary and dental    Airway   Mallampati: II  TM distance: >3 FB  Neck ROM: full    Dental     Cardiovascular   Rhythm and rate: regular and normal      Pulmonary    breath sounds clear to auscultation    Other findings: Lab Test        03/25/18 03/24/18 02/23/18      --          09/11/12                       0035          1213          1050           --           1058          WBC          5.5          3.8*         3.9*           < >         --           HGB          14.1         14.4         15.1           < >         --           MCV          91           90           88             < >         --           PLT          187          165          128*           < >         --           INR           --           --           --           --          1.03           < > = values in this interval not displayed.                  Lab Test        03/25/18 03/24/18 02/23/18                       0035          1213           1050          NA           136          139          136           POTASSIUM    3.8          4.0          3.9           CHLORIDE     104          107          106           CO2          25           26           23            BUN          15           14           14            CR           0.83         0.78         0.84          ANIONGAP     7            6            7             JOANNE          9.0          9.1          9.2           GLC          105*         89           98                     ECG  Sinus rhythm  Normal ECG  When compared with ECG of 24-FEB-2017 08:17,  No significant change was found                Anesthesia Plan      History & Physical Review  History and physical reviewed and following examination; no interval change.    ASA Status:  3 .    NPO Status:  > 4 hours    Plan for MAC with Intravenous induction. Maintenance will be TIVA.  Reason for MAC:  Extreme anxiety (QS) and Deep or markedly invasive procedure (G8)  PONV prophylaxis:  Ondansetron (or other 5HT-3) and Dexamethasone or Solumedrol  GA back up      Postoperative Care  Postoperative pain management:  IV analgesics, Oral pain medications and Multi-modal analgesia.      Consents  Anesthetic plan, risks, benefits and alternatives discussed with:  Patient.  Use of blood products discussed: No .   .                          .

## 2018-03-25 NOTE — ED NOTES
Bladder scan showed 0mL in bladder. Yellow urine returned in leg bag without any visible kinks in line

## 2018-03-25 NOTE — PHARMACY-ADMISSION MEDICATION HISTORY
Admission medication history interview status for this patient is complete. See EPIC admission navigator for allergy information, prior to admission medications and immunization status.     Medication history interview source(s):Patient  Medication history resources (including written lists, pill bottles, clinic record):None  Primary pharmacy:[facility] (Discharge meds to [facility])    Changes made to PTA medication list:  Added: None   Deleted: Keflex, cyclobenzaprine, Norco, ketorolac, emla cream, mirabegron, opium-belladonna suppositories, tamsulosin, tolterodine   Changed: none    Actions taken by pharmacist (provider contacted, etc):None     Additional medication history information:None    Medication reconciliation/reorder completed by provider prior to medication history? Yes    Do you take OTC medications (eg tylenol, ibuprofen, fish oil, eye/ear drops, etc)? Yes    Prior to Admission medications    Medication Sig Last Dose Taking? Auth Provider   sennosides (SENOKOT) 8.6 MG tablet Take 1 tablet by mouth 2 times daily 3/23/2018 Yes Clayton Chávez MD   lidocaine HCl 2 % SOLN Apply thin layer to tip of penis TID PRN penile pain  Yes Clayton Chávez MD   VITAMIN D, CHOLECALCIFEROL, PO Take 5,000 Units by mouth daily  3/23/2018 Yes Reported, Patient   ALPRAZolam (XANAX) 0.5 MG tablet Take 1 tablet (0.5 mg) by mouth 3 times daily as needed for anxiety 3/23/2018 Yes Asad White, DO   B Complex-C (SUPER B COMPLEX PO) Take 1 tablet by mouth daily 3/23/2018 Yes Reported, Patient   LISINOPRIL PO Take 10 mg by mouth daily  3/23/2018 Yes Reported, Patient   ALFALFA PO Take by mouth daily 3/23/2018 Yes Reported, Patient   acetaminophen (TYLENOL) 325 MG tablet Take 2 tablets (650 mg) by mouth every 6 hours Do not take more than 4,000 mg of acetaminophen (Tylenol) from all sources to prevent liver damage. Past Week at Unknown time Yes Kayla Green MD

## 2018-03-25 NOTE — ED NOTES
Bladder spazms and peeing around donovan cath.    Pt A&O x 3, CMS x 3, ABCD's adequate in triage

## 2018-03-25 NOTE — PROGRESS NOTES
Urology Progress Note    No acute overnight events. Somewhat somnolent but improving.    AVSS, 3L    UOP: 1.2L since placement overnight    NAD  RRR  Breathing non-labored  Abdomen soft, NT/ND  Donovan with jd urine      A/P:   --Ok to discharge from urologic standpoint. Keep donovan in place at discharge. Recommend flomax, detrol, B&O suppositories (q8h) and pyridium. Pt will be contacted about follow up appointment. Message sent to Kaley Clark.    Patient seen on rounds with Dr Cain.     Vimal Rivera, PGY-5

## 2018-03-25 NOTE — PLAN OF CARE
Problem: Patient Care Overview  Goal: Plan of Care/Patient Progress Review  Aroused easily from sleep. Is [place]. Has had sleep apnea setting off Capnography. Is on 3 LPM O2 via NC. Breen catheter patient for straw colored urine. Rated penis pain as 4. Quickly falls asleep. No PO intake.

## 2018-03-25 NOTE — H&P
[facility]  History and Physical   Hospitalist Service    Lucien White MD    Juwan Latham MRN# [id number]   YOB: 1950 Age: 67 year old      Date of Admission:  3/24/2018           Assessment and Plan:   Juwan Latham is a 67-year-old male with history of obstructive sleep apnea, prostate cancer, indwelling Breen catheter, hypertension, lung cancer, GERD, solitary horseshoe kidney, renal stone disease, and anxiety.  He presented to the emergency department on 3/24/18 because of Breen catheter occlusion.  Vital signs were stable except for tachycardia.  Basic metabolic panel and CBC were unremarkable.  Urinalysis showed 10 white blood cells, 35 red blood cells, moderate leukocyte esterase, and positive nitrite.  Breen catheter was not able to be changed in the emergency department.  Urology was consulted and came in and took Juwan to the operating room for catheter change under anesthesia.  The procedure went well.  After surgery he was a bit sleepy and the hour was late so I was asked to admit him to observation for monitoring after surgery.  César is feeling much better complaining only of having a sore penis after this procedure tonight.    Problem list:    1.  Occluded Breen catheter status post catheter change in the operating room.  Admit to observation.  Tylenol and Norco will be available for pain as needed.  Keflex has been ordered for possible UTI.  3 of urology will see later today.  He can likely discharge home later today.  BNO suppositories will be available for spasms.    2.  Possible urinary tract infection.  Continue Keflex.  Follow urine culture.    3.  Prostate cancer.    4.  Hypertension.  Continue prior to admission lisinopril.    5.  History of solitary horseshoe kidney.    Full code  Ambulate for DVT prophylaxis  Disposition: Admit to observation.  Possible discharge home later today.       Code Status:   Full Code         Primary Care Physician:   Chao  Julio SONI JrEmperatriz [phone]         Chief Complaint:   Occluded Breen catheter and sore penis    History is obtained from Juwan, Dr. Rivera, and the medical record         History of Present Illness:   Juwan Latham is a 67-year-old male with history of obstructive sleep apnea, prostate cancer, indwelling Breen catheter, hypertension, lung cancer, GERD, solitary horseshoe kidney, renal stone disease, and anxiety.  He presented to the emergency department on 3/24/18 because of Breen catheter occlusion.  Vital signs were stable except for tachycardia.  Basic metabolic panel and CBC were unremarkable.  Urinalysis showed 10 white blood cells, 35 red blood cells, moderate leukocyte esterase, and positive nitrite.  Breen catheter was not able to be changed in the emergency department.  Urology was consulted and came in and took Juwan to the operating room for catheter change under anesthesia.  The procedure went well.  After surgery he was a bit sleepy and the hour was late so I was asked to admit him to observation for monitoring after surgery.  César is feeling much better complaining only of having a sore penis after this procedure tonight.           Past Medical History:     Patient Active Problem List   Diagnosis     Allergic rhinitis due to other allergen     CARDIOVASCULAR SCREENING; LDL GOAL LESS THAN 160     Advance Care Planning     History of colonic polyps     Anxiety     PAULINO (obstructive sleep apnea)     GERD (gastroesophageal reflux disease)     Hypertension goal BP (blood pressure) < 140/90     Seasonal allergies     Benign neoplasm of descending colon     Malignant neoplasm of upper lobe of right lung (H)     Overweight (BMI 25.0-29.9)     Cavernous hemangioma of brain (H)     Major depressive disorder, recurrent episode, mild (H)     Prostate cancer (H)     Somatic dysfunction of pelvis region     Mixed incontinence urge and stress (male)(female)     Urinary retention     Breen catheter problem (H)       Past Medical History:   Diagnosis Date     Anxiety      Arthritis     fingers, hips     Calculus of kidney 2005, 2012     Congenital single kidney      Gastroesophageal reflux disease      Hx of cancer of lung 03/2017    wedge resection     Hypertension      Prostate cancer (H) 08/2017     Seasonal allergies     spring and fall     Sleep apnea     cpap             Past Surgical History:     Past Surgical History:   Procedure Laterality Date     BRONCHOSCOPY FLEXIBLE N/A 3/3/2017    Procedure: BRONCHOSCOPY FLEXIBLE;  Surgeon: Maria A Desai MD;  Location: [facility] OR     COLONOSCOPY N/A 2/11/2015    Procedure: COLONOSCOPY;  Surgeon: Murphy Jesus MD;  Location:  GI     DAVINCI PROSTATECTOMY N/A 12/1/2017    Procedure: DAVINCI PROSTATECTOMY;  Robotic Assisted Radical Prostatectomy and Pelvic Lymph Node Dissection ;  Surgeon: Paulo Agarwal MD;  Location:  OR     ESOPHAGOSCOPY, GASTROSCOPY, DUODENOSCOPY (EGD), COMBINED N/A 5/20/2016    Procedure: COMBINED ESOPHAGOSCOPY, GASTROSCOPY, DUODENOSCOPY (EGD), BIOPSY SINGLE OR MULTIPLE;  Surgeon: Murphy Jesus MD;  Location:  GI     LAPAROSCOPIC WEDGE RESECTION LUNG Right 3/3/2017    Procedure: LAPAROSCOPIC WEDGE RESECTION LUNG;  Surgeon: Maria A Desai MD;  Location: [facility] OR     LASER HOLMIUM LITHOTRIPSY URETER(S), INSERT STENT, COMBINED  9/11/2012    Procedure: COMBINED CYSTOSCOPY, URETEROSCOPY, LASER HOLMIUM LITHOTRIPSY URETER(S), INSERT STENT;  Cystoscopy, Right Ureteroscopy, Stone Extraction, Holmium Laser, Double J Stent Placement;  Surgeon: Nicolás Blackwell MD;  Location:  OR            Home Medications:     Prior to Admission medications    Medication Sig Last Dose Taking? Auth Provider   HYDROcodone-acetaminophen (NORCO) 5-325 MG per tablet Take 1-2 tablets by mouth every 6 hours as needed for moderate to severe pain   Clayton Chávez MD   cephALEXin (KEFLEX) 500 MG capsule Take 1 capsule (500 mg) by mouth 3 times daily for 7 days    Clayton Chávez MD   sennosides (SENOKOT) 8.6 MG tablet Take 1 tablet by mouth 2 times daily   Clayton Chávez MD   lidocaine HCl 2 % SOLN Apply thin layer to tip of penis TID PRN penile pain   Clayton Chávez MD   ketorolac (TORADOL) 10 MG tablet Take 1 tablet (10 mg) by mouth every 6 hours as needed for moderate pain   Weight, Paulo Wiley MD   lidocaine-prilocaine (EMLA) cream Apply topically as needed for moderate pain Apply a small amount to skin prn   Weight, Paulo Wiley MD   mirabegron (MYRBETRIQ) 50 MG 24 hr tablet Take 1 tablet (50 mg) by mouth daily   Weight, Paulo Wiley MD   opium-belladonna (B&O SUPPRETTES) 30-16.2 MG per suppository Place 1 suppository rectally every 8 hours as needed for moderate pain or bladder spasms   Anali Soto PA-C   cyclobenzaprine (FLEXERIL) 5 MG tablet Take 1 tablet (5 mg) by mouth 3 times daily as needed for muscle spasms   Weight, Paulo Wiley MD   tamsulosin (FLOMAX) 0.4 MG capsule Take 1 capsule (0.4 mg) by mouth daily   Weight, Paulo Wiley MD   VITAMIN D, CHOLECALCIFEROL, PO Take by mouth daily   Reported, Patient   tolterodine (DETROL LA) 4 MG 24 hr capsule Take 1 capsule (4 mg) by mouth daily   Weight, Paulo Wiley MD   ALPRAZolam (XANAX) 0.5 MG tablet Take 1 tablet (0.5 mg) by mouth 3 times daily as needed for anxiety   Asad White, DO   B Complex-C (SUPER B COMPLEX PO) Take 1 tablet by mouth daily   Reported, Patient   LISINOPRIL PO Take 10 mg by mouth daily    Reported, Patient   ALFALFA PO Take by mouth daily   Reported, Patient   acetaminophen (TYLENOL) 325 MG tablet Take 2 tablets (650 mg) by mouth every 6 hours Do not take more than 4,000 mg of acetaminophen (Tylenol) from all sources to prevent liver damage.   Kayla Green MD            Allergies:     Allergies   Allergen Reactions     Percocet [Oxycodone-Acetaminophen] GI Disturbance     Severe constipation            Social History:      Social History   Substance Use Topics     Smoking status: Former Smoker     Packs/day: 2.00     Years: 20.00     Types: Cigarettes     Quit date: 1/1/1985     Smokeless tobacco: Never Used     Alcohol use Yes      Comment: avg 3 beers per night             Family History:     Family History   Problem Relation Age of Onset     HEART DISEASE Mother      DIABETES Brother 65     Type 2     DIABETES Maternal Grandmother      Cancer - colorectal Brother 70     Hypertension No family hx of      Lipids No family hx of               Review of Systems:   The 10 point Review of Systems is negative other than as noted in the HPI.           Physical Exam:   Blood pressure 114/72, pulse 58, temperature 97.5  F (36.4  C), temperature source Oral, resp. rate 15, SpO2 96 %.  0 lbs 0 oz      GENERAL: Pleasant and cooperative. No acute distress.  EYES: Pupils equal and round. No scleral erythema or icterus.  ENT: External ears are normal without deformity. Posterior oropharynx is without erythem, swelling, or exudate.  NECK: Supple. No masses or swelling. No tenderness. Thyroid is normal without mass or tenderness.  CHEST: Clear to auscultation. Normal breath sounds. No retractions.   CV: Regular rate and rhythm. No JVD. Pulses normal.  ABDOMEN: Bowel sounds present. No tenderness. No masses or hernia.  EXTREMETIES: No clubbing, cyanosis, or ischemia.  SKIN: Warm and dry to touch. No wounds or rashes.  NEUROLOGIC: Strength and sensation are normal. Deep tendon reflexes are normal. Cranial nerves are normal.             Data:   All new lab and imaging data was reviewed.     Results for orders placed or performed during the hospital encounter of 03/24/18 (from the past 24 hour(s))   CBC with platelets differential   Result Value Ref Range    WBC 5.5 4.0 - 11.0 10e9/L    RBC Count 4.63 4.4 - 5.9 10e12/L    Hemoglobin 14.1 13.3 - 17.7 g/dL    Hematocrit 41.9 40.0 - 53.0 %    MCV 91 78 - 100 fl    MCH 30.5 26.5 - 33.0 pg    MCHC 33.7  31.5 - 36.5 g/dL    RDW 13.1 10.0 - 15.0 %    Platelet Count 187 150 - 450 10e9/L    Diff Method Automated Method     % Neutrophils 79.1 %    % Lymphocytes 5.5 %    % Monocytes 12.2 %    % Eosinophils 2.6 %    % Basophils 0.2 %    % Immature Granulocytes 0.4 %    Nucleated RBCs 0 0 /100    Absolute Neutrophil 4.4 1.6 - 8.3 10e9/L    Absolute Lymphocytes 0.3 (L) 0.8 - 5.3 10e9/L    Absolute Monocytes 0.7 0.0 - 1.3 10e9/L    Absolute Eosinophils 0.1 0.0 - 0.7 10e9/L    Absolute Basophils 0.0 0.0 - 0.2 10e9/L    Abs Immature Granulocytes 0.0 0 - 0.4 10e9/L    Absolute Nucleated RBC 0.0    Basic metabolic panel   Result Value Ref Range    Sodium 136 133 - 144 mmol/L    Potassium 3.8 3.4 - 5.3 mmol/L    Chloride 104 94 - 109 mmol/L    Carbon Dioxide 25 20 - 32 mmol/L    Anion Gap 7 3 - 14 mmol/L    Glucose 105 (H) 70 - 99 mg/dL    Urea Nitrogen 15 7 - 30 mg/dL    Creatinine 0.83 0.66 - 1.25 mg/dL    GFR Estimate >90 >60 mL/min/1.7m2    GFR Estimate If Black >90 >60 mL/min/1.7m2    Calcium 9.0 8.5 - 10.1 mg/dL

## 2018-03-26 ENCOUNTER — TELEPHONE (OUTPATIENT)
Dept: UROLOGY | Facility: CLINIC | Age: 68
End: 2018-03-26

## 2018-03-26 VITALS
DIASTOLIC BLOOD PRESSURE: 72 MMHG | HEART RATE: 53 BPM | RESPIRATION RATE: 16 BRPM | OXYGEN SATURATION: 96 % | TEMPERATURE: 97.5 F | SYSTOLIC BLOOD PRESSURE: 111 MMHG

## 2018-03-26 PROCEDURE — 25000128 H RX IP 250 OP 636: Performed by: INTERNAL MEDICINE

## 2018-03-26 PROCEDURE — A9270 NON-COVERED ITEM OR SERVICE: HCPCS | Mod: GY | Performed by: INTERNAL MEDICINE

## 2018-03-26 PROCEDURE — A9270 NON-COVERED ITEM OR SERVICE: HCPCS | Mod: GY | Performed by: UROLOGY

## 2018-03-26 PROCEDURE — 99217 ZZC OBSERVATION CARE DISCHARGE: CPT | Performed by: INTERNAL MEDICINE

## 2018-03-26 PROCEDURE — 25000132 ZZH RX MED GY IP 250 OP 250 PS 637: Mod: GY | Performed by: UROLOGY

## 2018-03-26 PROCEDURE — 25000125 ZZHC RX 250: Mod: GY | Performed by: UROLOGY

## 2018-03-26 PROCEDURE — 25000132 ZZH RX MED GY IP 250 OP 250 PS 637: Mod: GY | Performed by: INTERNAL MEDICINE

## 2018-03-26 PROCEDURE — G0378 HOSPITAL OBSERVATION PER HR: HCPCS

## 2018-03-26 PROCEDURE — 40000894 ZZH STATISTIC OT IP EVAL DEFER: Performed by: STUDENT IN AN ORGANIZED HEALTH CARE EDUCATION/TRAINING PROGRAM

## 2018-03-26 RX ADMIN — ATROPA BELLADONNA AND OPIUM 1 SUPPOSITORY: 16.2; 3 SUPPOSITORY RECTAL at 03:17

## 2018-03-26 RX ADMIN — PHENAZOPYRIDINE HYDROCHLORIDE 100 MG: 100 TABLET ORAL at 13:47

## 2018-03-26 RX ADMIN — SENNOSIDES 1 TABLET: 8.6 TABLET, FILM COATED ORAL at 09:56

## 2018-03-26 RX ADMIN — SENNOSIDES AND DOCUSATE SODIUM 1 TABLET: 8.6; 5 TABLET ORAL at 16:10

## 2018-03-26 RX ADMIN — TOLTERODINE 4 MG: 4 CAPSULE, EXTENDED RELEASE ORAL at 09:54

## 2018-03-26 RX ADMIN — TAMSULOSIN HYDROCHLORIDE 0.4 MG: 0.4 CAPSULE ORAL at 09:55

## 2018-03-26 RX ADMIN — CEPHALEXIN 250 MG: 250 CAPSULE ORAL at 13:46

## 2018-03-26 RX ADMIN — SENNOSIDES AND DOCUSATE SODIUM 1 TABLET: 8.6; 5 TABLET ORAL at 13:47

## 2018-03-26 RX ADMIN — LISINOPRIL 10 MG: 10 TABLET ORAL at 09:57

## 2018-03-26 RX ADMIN — PHENAZOPYRIDINE HYDROCHLORIDE 100 MG: 100 TABLET ORAL at 09:57

## 2018-03-26 RX ADMIN — Medication 0.2 MG: at 01:09

## 2018-03-26 RX ADMIN — CEPHALEXIN 250 MG: 250 CAPSULE ORAL at 06:27

## 2018-03-26 RX ADMIN — BISACODYL 10 MG: 10 SUPPOSITORY RECTAL at 16:11

## 2018-03-26 NOTE — PLAN OF CARE
Problem: Patient Care Overview  Goal: Plan of Care/Patient Progress Review  Outcome: No Change  VSS. Voiding. Complaining of bladder spasms; norco given. SL'd.

## 2018-03-26 NOTE — PLAN OF CARE
Problem: Patient Care Overview  Goal: Plan of Care/Patient Progress Review  C/o small amount discomfort at tip of penis, using warm pack.  Bladder spasms x 2 but refusing b&o supp until has bm.  Pericolace and MOM given x 1; no results yet.  Took prune juice and encouraged extra fluids.  Instructed how to preform catheter care with wipes, pt preformed twice independently x 2 and showered.  Urine orangish red.  Down to radiation at 1200.  Cont with plan of care.

## 2018-03-26 NOTE — PROGRESS NOTES
Patient seen and examined. 68 y/o male patient with h/o PAULINO,prostate cancer, indwelling catheter, hypertension,lung cancer, solitary horseshoe kidney, kidney stones,GERD, admitted for donovan cathter occlusion. Urology consulted and had cathter exchange under anesthesia. Patient has bladder spasm. Urology recommended Flomax, Detrol, pyridium. Patient doesn't have a good family support evaluated by . He will need home RN for medication and cathter management. He appears to be forgetful and repeatedly asking same question. Needs OT for cognitive evaluation prior to discharge.Will discharge in am.

## 2018-03-26 NOTE — PLAN OF CARE
Problem: Patient Care Overview  Goal: Plan of Care/Patient Progress Review  OT: Consult received for cognitive assessment, per chart review pt admitted with donovan catheter occlusion, pt has an indwelling catheter; Pt has history of Lung and Prostate CA, upon attempt of session pt preparing for Radiation treatment session, will re-attempt later in day as schedule allows and pending pt's medical plan (discharge)    PM: re-attempted session, pt and supportive sister present in room, educated on role of OT, pt and sister both report pt has no difficulties or problems with mobility or ADL/IADLs; since he has been undergoing radiation he has noticed general decrease in activity but does not feel weak; pt lives in house with roommates described as close friends, they are retired and have been assisting with household IADLs such as yardwork/snow. Pt's sister lives nearby as well as other family members including a nephew who works in the medical field. Pt and sister report pt does not have any memory deficits or other cognitive issues, they just reported difficulty with medications as he just had several medical appointments last week and had changes in medications including new medications and reported difficulty managing all the changes.  At this time there does not appear to be a need for skilled IP OT assessment, SW has set-up home RN to assist with medications, would also recommend home OT for further assessment of pt's safety in his home setting, discussed with pt, sister, and updated RN, all in agreement. Will complete order at this time.    Discharge Planner OT   Patient plan for discharge: return home  Current status: SBA with nursing for mobility and self cares  Barriers to return to prior living situation: none anticipated  Recommendations for discharge: return home with home care services of RN and OT  Rationale for recommendations: assist for set-up of medications at home to improve management since amount of  medications have changed and increased       Entered by: Neha Pandey 03/26/2018 3:17 PM

## 2018-03-26 NOTE — DISCHARGE SUMMARY
[facility]    Discharge Summary  Hospitalist    Date of Admission:  3/24/2018  Date of Discharge:  3/26/2018  Discharging Provider: Jozef Ma MD  Date of Service (when I saw the patient): 03/26/18    Discharge Diagnoses      1.  Occluded Breen catheter status post catheter change in the operating room     2.  Possible urinary tract infection     3.  Prostate cancer.     4.  Hypertension     5.  History of solitary horseshoe kidney.    History of Present Illness   Juwan Latham is an 67 year old male who presented with indwelling cathter came for     Hospital Course   Juwan Latham is a 67-year-old male with history of obstructive sleep apnea, prostate cancer, indwelling Breen catheter, hypertension, lung cancer, GERD, solitary horseshoe kidney, renal stone disease, and anxiety.  He presented to the emergency department on 3/24/18 because of Breen catheter occlusion.  Vital signs were stable except for tachycardia.  Basic metabolic panel and CBC were unremarkable.  Urinalysis showed 10 white blood cells, 35 red blood cells, moderate leukocyte esterase, and positive nitrite.  Breen catheter was not able to be changed in the emergency department.  Urology was consulted and came in and took Juwan to the operating room for catheter change under anesthesia.  The procedure went well.  After surgery he was a bit sleepy and the hour was late so I was asked to admit him to observation for monitoring after surgery.  Patient was admitted for close monitoring.  He continued to have bladder spasm.  He was given B&O for bladder spasm.  Problem list:     1.  Occluded Breen catheter status post catheter change under anesthesia by urology.   Tylenol and Norco will be available for pain as needed.  Keflex has been ordered for possible UTI.  3 of urology will see later today.  He can likely discharge home later today.  He was given B&O for bladder spasm.  His insurance does not cover B&O.  Urology  recommended Detrol LA, Flomax, Pyridium.  Urology also gave verbal order to the nurse for mirabegron on discharge.  Order was placed.  Patient was advised to follow-up as outpatient.  He was discharged in stable condition.     2.  Possible urinary tract infection.  Urine culture grew staph epidermidis.  She was initially started on Keflex which she will continue for 3 days.     3.  Prostate cancer.  Patient received radiation today.  He was recommended to continue follow-up as outpatient.     4.  Hypertension.  Continued on prior to admission lisinopril.     5.  History of solitary horseshoe kidney.  Patient remained stable during hospital course.  He was discharged home with a plan to follow-up as outpatient.    Significant Results and Procedures   Results for orders placed or performed during the hospital encounter of 03/24/18   CT Abdomen Pelvis w Contrast    Narrative    CT ABDOMEN AND PELVIS WITH CONTRAST   3/24/2018 2:27 PM     HISTORY: Right/mid low abdominal pain; undergoing radiation for  prostate cancer.    TECHNIQUE:   97 mL Isovue-370. Radiation dose for this scan was  reduced using automated exposure control, adjustment of the mA and/or  kV according to patient size, or iterative reconstruction technique.    COMPARISON: 2/23/2018 noncontrast exam.    FINDINGS:  No evidence of diverticulitis or small bowel obstruction.  Unremarkable appendix. Fatty liver infiltration. 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 There are renal cysts. Within the right kidney on  image 39, there is a 0.7 cm lesion which is indeterminate based upon  density. However, this was also present on 10/12/2012 (previously  measured 1.1 cm with cystlike density). There is a right anterior  lower pelvic fluid collection adjacent to the iliac vessels. In  retrospect, this was present on 2/23/2018. It measures 4.7 x 2.5 x 2.6  cm and is similar to the previous exam. There is bladder wall  thickening. This is nonspecific but may well relate to  radiation in  light of the history. There is mild perirectal and presacral  stranding, also likely related to radiation. Again, there is a focus  of coarse calcification at the periphery of the liver. Nothing else  acute is seen in the upper abdominal organs.       Impression    IMPRESSION:    1. Right lower pelvic fluid collection, similar to 2/23/2018.  2. Fatty liver.  3. Additional findings discussed above.     JOSLYN ABRAHAM MD         Pending Results   None  Code Status   Full Code       Primary Care Physician   Julio Guidry Jr        Discharge Disposition   Discharged to home  Condition at discharge: Stable    Consultations This Hospital Stay   UROLOGY IP CONSULT  SOCIAL WORK IP CONSULT  OCCUPATIONAL THERAPY ADULT IP CONSULT    Time Spent on this Encounter   I, Jozef Ma MD, personally saw the patient today and spent greater than 30 minutes discharging this patient.    Discharge Orders     Home care nursing referral     Home Care OT Referral for Hospital Discharge     Reason for your hospital stay   Catheter occlusion, s/p exchange     Follow-up and recommended labs and tests    Follow up with primary care provider, Julio Guidry Jr, within 7 days     Activity   Your activity upon discharge: activity as tolerated     MD face to face encounter   Documentation of Face to Face and Certification for Home Health Services    I certify that patient: Juwan Latham is under my care and that I, or a nurse practitioner or physician's assistant working with me, had a face-to-face encounter that meets the physician face-to-face encounter requirements with this patient on: 3/26/2018.    This encounter with the patient was in whole, or in part, for the following medical condition, which is the primary reason for home health care: for medication management, catheter care, cognitive evaluation.    I certify that, based on my findings, the following services are medically necessary home health services:  Nursing and Occupational Therapy.    My clinical findings support the need for the above services because: Nurse is needed: To assess medication, donovan cathter care     Further, I certify that my clinical findings support that this patient is homebound (i.e. absences from home require considerable and taxing effort and are for medical reasons or Taoism services or infrequently or of short duration when for other reasons) because: Patient has prostate cancer, has indwelling cathter, deconditioned.     Based on the above findings. I certify that this patient is confined to the home and needs intermittent skilled nursing care, physical therapy and/or speech therapy.  The patient is under my care, and I have initiated the establishment of the plan of care.  This patient will be followed by a physician who will periodically review the plan of care.  Physician/Provider to provide follow up care: Julio Guidry Jr.    Attending hospital physician (the Medicare certified [employer] provider): Jozef Ma MD  Physician Signature: See electronic signature associated with these discharge orders.  Date: 3/26/2018     Diet   Follow this diet upon discharge: Orders Placed This Encounter     Advance Diet as Tolerated: Regular Diet Adult; Regular Diet Adult       Discharge Medications   Current Discharge Medication List      START taking these medications    Details   phenazopyridine (PYRIDIUM) 100 MG tablet Take 1-2 tablets (100-200 mg) by mouth 3 times daily as needed for urinary tract discomfort May turn your urine orange  Qty: 12 tablet, Refills: 0    Associated Diagnoses: Urinary catheter dysfunction, initial encounter (H)      cephalexin (KEFLEX) 250 MG capsule Take 1 capsule (250 mg) by mouth 2 times daily for 3 days  Qty: 6 capsule, Refills: 0    Associated Diagnoses: Urinary tract infection without hematuria, site unspecified      mirabegron (MYRBETRIQ) 25 MG 24 hr tablet Take 1 tablet (25 mg) by mouth daily  for 15 days  Qty: 15 tablet, Refills: 0    Associated Diagnoses: Bladder spasm         CONTINUE these medications which have CHANGED    Details   tolterodine (DETROL LA) 4 MG 24 hr capsule Take 1 capsule (4 mg) by mouth daily  Qty: 30 capsule, Refills: 1    Associated Diagnoses: Urinary catheter dysfunction, initial encounter (H)      tamsulosin (FLOMAX) 0.4 MG capsule Take 1 capsule (0.4 mg) by mouth daily  Qty: 30 capsule, Refills: 0    Associated Diagnoses: Urinary catheter dysfunction, initial encounter (H)         CONTINUE these medications which have NOT CHANGED    Details   sennosides (SENOKOT) 8.6 MG tablet Take 1 tablet by mouth 2 times daily  Qty: 30 tablet, Refills: 0    Comments: Use while taking hydrocodone      lidocaine HCl 2 % SOLN Apply thin layer to tip of penis TID PRN penile pain  Qty: 15 mL, Refills: 1      VITAMIN D, CHOLECALCIFEROL, PO Take 5,000 Units by mouth daily       ALPRAZolam (XANAX) 0.5 MG tablet Take 1 tablet (0.5 mg) by mouth 3 times daily as needed for anxiety  Qty: 20 tablet, Refills: 0    Associated Diagnoses: Anxiety      B Complex-C (SUPER B COMPLEX PO) Take 1 tablet by mouth daily      LISINOPRIL PO Take 10 mg by mouth daily       ALFALFA PO Take by mouth daily      acetaminophen (TYLENOL) 325 MG tablet Take 2 tablets (650 mg) by mouth every 6 hours Do not take more than 4,000 mg of acetaminophen (Tylenol) from all sources to prevent liver damage.  Qty: 100 tablet, Refills: 1    Associated Diagnoses: Lung nodule         STOP taking these medications       HYDROcodone-acetaminophen (NORCO) 5-325 MG per tablet Comments:   Reason for Stopping:         opium-belladonna (B&O SUPPRETTES) 30-16.2 MG per suppository Comments:   Reason for Stopping:             # Pain Assessment:  Current Pain Score 3/26/2018   Patient currently in pain? sleeping: patient not able to self report   Pain score (0-10) -   Pain location -   Pain descriptors -   Juwan s pain level was assessed and he  currently denies pain.      Allergies   Allergies   Allergen Reactions     Percocet [Oxycodone-Acetaminophen] GI Disturbance     Severe constipation     Data   Most Recent 3 CBC's:  Recent Labs   Lab Test  03/25/18   0035  03/24/18   1213  02/23/18   1050   WBC  5.5  3.8*  3.9*   HGB  14.1  14.4  15.1   MCV  91  90  88   PLT  187  165  128*      Most Recent 3 BMP's:  Recent Labs   Lab Test  03/25/18   0035  03/24/18   1213  02/23/18   1050   NA  136  139  136   POTASSIUM  3.8  4.0  3.9   CHLORIDE  104  107  106   CO2  25  26  23   BUN  15  14  14   CR  0.83  0.78  0.84   ANIONGAP  7  6  7   JOANNE  9.0  9.1  9.2   GLC  105*  89  98     Most Recent 2 LFT's:  Recent Labs   Lab Test  12/22/17   1150  07/13/17   1025   AST  37  35   ALT  49  57   ALKPHOS  62  60   BILITOTAL  0.7  0.6     Most Recent INR's and Anticoagulation Dosing History:  Anticoagulation Dose History     Recent Dosing and Labs Latest Ref Rng & Units 10/28/2005 9/11/2012    INR 0.86 - 1.14 0.96 1.03        Most Recent 3 Troponin's:  Recent Labs   Lab Test  07/13/17   1025  06/25/13   1605  06/25/13   1334  06/25/13   1331   TROPI  <0.015  The 99th percentile for upper reference range is 0.045 ug/L.  Troponin values in   the range of 0.045 - 0.120 ug/L may be associated with risks of adverse   clinical events.    <0.012  <0.012   --    TROPONIN   --    --    --   0.00     Most Recent Cholesterol Panel:  Recent Labs   Lab Test  05/24/16   1020   CHOL  185   LDL  118*   HDL  47   TRIG  98     Most Recent 6 Bacteria Isolates From Any Culture (See EPIC Reports for Culture Details):  Recent Labs   Lab Test  03/24/18   1318  09/08/12   0805  08/30/12   1601   CULT  >100,000 colonies/mL  urogenital kevin    50,000 to 100,000 colonies/mL  Staphylococcus epidermidis  Susceptibility testing in progress  *  No growth  No growth     Most Recent TSH, T4 and A1c Labs:  Recent Labs   Lab Test  07/13/17   1025  02/24/17   0955   TSH  1.82   --    A1C   --   5.5

## 2018-03-26 NOTE — PROGRESS NOTES
RT Note      Patient refused CPAP. Explain he does not wear it at home.       Chelsy Acevedo, RRT

## 2018-03-26 NOTE — PLAN OF CARE
Problem: Patient Care Overview  Goal: Plan of Care/Patient Progress Review  Outcome: No Change  VSS, afebrile. Patient c/o bladder spasms and penile pain. PO medications given with no relief. PRN order for dilaudid received. Dilaudid given x1 with good relief. B&O supp x1. 525 orange urine output this shift. OT to assess patient today. Potential discharge today. Will continue to monitor.

## 2018-03-26 NOTE — PLAN OF CARE
Problem: Patient Care Overview  Goal: Plan of Care/Patient Progress Review  Outcome: No Change  Afebrile, VSS, lungs clear, IV saline locked, pt eating and drinking well. Breen draining good amt of orange colored urine. Pt c/o bladder spasms intermittently today, B&O suppository given q 8 hr, pt did rest t/o afternoon after suppository given. Also given pyridium for pain/burning. Pt also on Detrol and Flomax per urology. Pt c/o pain at tip of penis, uro jet ordered and  applied per pt. On keflex po. Pt at times repeating questions today, poor historian for meds he has been taking at home. Sister here and expressing some concerns with pt not following medication orders at home or comprehending his medical needs/cares. Does carry on appropriate conversations. Social work involved and home care nurse ordered after discharge. Sister and pt also concerned pt unable to get insurance coverage for B&O suppositories for home use. Dr. Rivera called and pt will not need this medication once discharged. OT consult ordered for cognition evaluation prior to discharge. Will continue to monitor pain needs.

## 2018-03-27 LAB
BACTERIA SPEC CULT: ABNORMAL
BACTERIA SPEC CULT: ABNORMAL
Lab: ABNORMAL
SPECIMEN SOURCE: ABNORMAL

## 2018-03-27 NOTE — PROGRESS NOTES
AVS printed and reviewed with patient and family. Discharge education and teaching complete. In understanding with plan for home care. No further questions or concerns.

## 2018-03-27 NOTE — PLAN OF CARE
Problem: Patient Care Overview  Goal: Plan of Care/Patient Progress Review  Outcome: Adequate for Discharge Date Met: 03/26/18  Vitally stable. C/o occasional bladder spasms and penile pain. Urinary catheter patent. Producing good amounts of urine. Patient had a small BM and requested to go home.

## 2018-03-28 ENCOUNTER — PATIENT OUTREACH (OUTPATIENT)
Dept: CARE COORDINATION | Facility: CLINIC | Age: 68
End: 2018-03-28

## 2018-03-28 NOTE — PROGRESS NOTES
Clinic Care Coordination Contact  Care Team Conversations    Placed ED follow up call to pt, pt stated that he is very groggy at this time, requested call back tomorrow.  Will attempt outreach per pt request.

## 2018-03-29 NOTE — PROGRESS NOTES
Clinic Care Coordination Contact    OUTREACH    Referral Information:       Primary Diagnosis: Oncology    Chief Complaint   Patient presents with     Clinic Care Coordination - [facility]     RN        Universal Utilization:   Utilization    Last refreshed: 3/29/2018  9:12 AM:  No Show Count (past year) 4       Last refreshed: 3/29/2018  9:12 AM:  ED visits 6       Last refreshed: 3/29/2018  9:12 AM:  Hospital admissions 2          Current as of: 3/29/2018  9:12 AM             Clinical Concerns:  Current Medical Concerns: Pt hospitalized at [facility] 03/24-03/26 with donovan catheter occlusion, catheter changed under anesthesia by urology.  Pt reports that he continues to have some bladder spasms and pain which is relieved by oxycodone.  He is finishing out his keflex, reports that he has one more day of his prescription left. Pt reports that he is still having some constipation and reports that he is taking prune juice for this, not having much relief.  Encouraged pt to start taking his miralax again to help with this.  Pt reports that he is not getting any exercise, encouraged him to start increasing this by walking in his yard and gradually increasing as this will help with his constipation as well. Pt had last radiology appointment today, has follow up next week for RN visit at the urology clinic. Discussed upcoming appointment next week with urology, he stated that he thinks it will be a waste to go to this.  Read notes for apt and encouraged pt go, informed him that his next apt with MD is not until end of June and due to him anticipating donovan removal in the next 3-4 weeks, should at the minimum call and speak with the nurse regarding this apt prior to cancelling.  Pt stated that he would do so. He denies any other concerns at this time.   Current Behavioral Concerns: anxiety, managed    Education Provided to patient: follow up needs, ongoing clinic CC support, bowel program   Clinical Pathway Name: [facility]  [facility]  Maintenance Reviewed: Due/Overdue     Medication Management:  Pt reports compliance, no new concerns     Functional Status:  Transportation means:: Accessible car     Psychosocial:   Financial/Insurance: no new concerns     Resources and Interventions:  Current Resources:none      Goals:   Goals        Lifestyle    Increase physical activity     Notes - Note created  3/29/2018  2:40 PM by Suzy Flores, RN    I will start increasing my activity over the next few weeks to help with my constipation.  As of today's date 3/29/2018 goal is met at 0 - 25%.   Goal Status:  Active            Outreach Frequency: 2 weeks  Future Appointments              In 4 days Nurse,  [facility], [facility]    In 2 months [facility]    In 2 months Weight, Paulo Wiley MD [facility], [facility]    In 5 months [facility], [facility]    In 5 months Demetra Lott APRN CNP [facility], [facility]           Plan: Will plan to follow up with pt and check on urology appointment in the next couple of weeks and see how his exercise is progressing at that time.  Pt in agreement with plan.

## 2018-04-02 ENCOUNTER — TELEPHONE (OUTPATIENT)
Dept: FAMILY MEDICINE | Facility: CLINIC | Age: 68
End: 2018-04-02

## 2018-04-02 NOTE — TELEPHONE ENCOUNTER
Please call patient and advise him to call his Urologist's office to report this. They have been seeing him for his catheter care. Thank you.  Kaylee Akins RN, BSN  [facility]

## 2018-04-02 NOTE — TELEPHONE ENCOUNTER
Called number below for [facility] and left detailed message that pt will need to check with Urologist to address this concern.  Jayna Rider

## 2018-04-02 NOTE — TELEPHONE ENCOUNTER
Reason for Call:  Other FYI  Home care reporting pt having penial spasms     Detailed comments: Pt does have catheter and is able to tolerate.  Wondering if anything should be done    Phone Number Patient can be reached at: Other phone number:  [phone] *    Best Time: anytime    Can we leave a detailed message on this number? YES    Call taken on 4/2/2018 at 12:41 PM by Maria M Gibbs

## 2018-04-03 DIAGNOSIS — F41.9 ANXIETY: ICD-10-CM

## 2018-04-03 DIAGNOSIS — T83.018A URINARY CATHETER DYSFUNCTION, INITIAL ENCOUNTER (H): ICD-10-CM

## 2018-04-03 RX ORDER — ALPRAZOLAM 0.5 MG
0.5 TABLET ORAL 3 TIMES DAILY PRN
Qty: 20 TABLET | Refills: 0 | Status: SHIPPED | OUTPATIENT
Start: 2018-04-03 | End: 2018-06-26

## 2018-04-03 NOTE — TELEPHONE ENCOUNTER
Rx faxed to [facility] in  [address].  Called pt at [phone] and let him know.  Also gave him details about need for an appt.  He will call back to make an appt.  Jayna Rider

## 2018-04-03 NOTE — TELEPHONE ENCOUNTER
Refill signed. It appears that when Juwan saw Dr. Guidry in January, Zoloft was restarted and he was supposed to have follow up 4 weeks later. Zoloft was then discontinued from med list on 2/27/18 as Juwan was having nausea due to the medication. I would recommend he make follow up appointment with Dr. Guidry to discuss other medication options to help with ongoing anxiety.    Asad White,   4/3/2018 12:33 PM

## 2018-04-03 NOTE — TELEPHONE ENCOUNTER
ALPRAZolam (XANAX) 0.5 MG tablet      Last Written Prescription Date:  3/1/2018  Last Fill Quantity: 20 tablet,   # refills: 0  Last Office Visit: 1/31/2018 Guidry  Future Office visit:       Routing refill request to provider for review/approval because:  Drug not on the [facility], [facility] or [facility] refill protocol or controlled substance

## 2018-04-04 ENCOUNTER — TELEPHONE (OUTPATIENT)
Dept: UROLOGY | Facility: CLINIC | Age: 68
End: 2018-04-04

## 2018-04-04 NOTE — TELEPHONE ENCOUNTER
Patient called with complaining about his bladder spasms and the pain with his donovan catheter   I tried to reassure and tell him his treatment is soon over and the donovan can be pulled.  Message sent to dr clemons. Jacquelyn Nogueira LPN Staff Nurse

## 2018-04-06 ENCOUNTER — ALLIED HEALTH/NURSE VISIT (OUTPATIENT)
Dept: UROLOGY | Facility: CLINIC | Age: 68
End: 2018-04-06
Payer: COMMERCIAL

## 2018-04-06 ENCOUNTER — TELEPHONE (OUTPATIENT)
Dept: UROLOGY | Facility: CLINIC | Age: 68
End: 2018-04-06

## 2018-04-06 DIAGNOSIS — R33.9 URINARY RETENTION: Primary | ICD-10-CM

## 2018-04-06 NOTE — MR AVS SNAPSHOT
After Visit Summary   4/6/2018    Juwan Latham    MRN: [id number]           Patient Information     Date Of Birth          1950        Visit Information        Provider Department      4/6/2018 2:20 PM Nurse,  [facility]rolog[facility]        Today's Diagnoses     Urinary retention    -  1       Follow-ups after your visit        Your next 10 appointments already scheduled     Apr 19, 2018 10:20 AM CDT   (Arrive by 10:05 AM)   Return Visit with  Pro[facility] C[facility] Nurse   [facility]rolog[facility] ([facility])    [address] [place] [address]   [phone]            Jun 18, 2018 10:00 AM CDT   LAB with  LAB   [facility]    [address] [facility] [id number] [phone]           Please do not eat 10-12 hours before your appointment if you are coming in fasting for labs on lipids, cholesterol, or glucose (sugar). This does not apply to pregnant women. Water, hot tea and black coffee (with nothing added) are okay. Do not drink other fluids, diet soda or chew gum.            Jun 21, 2018 12:20 PM CDT   (Arrive by 12:05 PM)   Return Visit with Paulo Agarwal MD   [facility]rolog[facility] ([facility])    [address] [place] [id number] [phone]            Sep 14, 2018  3:00 PM CDT   (Arrive by 2:45 PM)   CT CHEST W/O CONTRAST with UCCT1   [facility] ([facility])    [address] [place] [id number] [phone]           Please bring any scans or X-rays taken at other hospitals, if similar tests were done. Also bring a list of your medicines, including vitamins, minerals and over-the-counter drugs. It is safest to leave personal items at  home.  Be sure to tell your doctor:   If you have any allergies.   If there s any chance you are pregnant.   If you are breastfeeding.  You do not need to do anything special to prepare for this exam.  Please wear loose clothing, such as a sweat suit or jogging clothes. Avoid snaps, zippers and other metal. We may ask you to undress and put on a hospital gown.            Sep 14, 2018  4:15 PM CDT   (Arrive by 4:00 PM)   Return Visit with GLORIA Linton [facility] ([facility])    [address]  [address]  [facility] [address] [phone]              Future tests that were ordered for you today     Open Future Orders        Priority Expected Expires Ordered    Routine UA with micro reflex to culture Routine 4/6/2018 4/6/2019 4/6/2018            Who to contact     Please call your clinic at [phone] to:    Ask questions about your health    Make or cancel appointments    Discuss your medicines    Learn about your test results    Speak to your doctor            Additional Information About Your Visit        [employer] Information     [employer] gives you secure access to your electronic health record. If you see a primary care provider, you can also send messages to your care team and make appointments. If you have questions, please call your primary care clinic.  If you do not have a primary care provider, please call [phone] and they will assist you.      [employer] is an electronic gateway that provides easy, online access to your medical records. With [employer], you can request a clinic appointment, read your test results, renew a prescription or communicate with your care team.     To access your existing account, please contact your [facility] or call [phone] for assistance.        Care EveryWhere ID     This is your Care EveryWhere ID. This could be used by other organizations to access your  [place] medical records  [id number]         Blood Pressure from Last 3 Encounters:   03/26/18 111/72   03/24/18 132/77   03/09/18 140/83    Weight from Last 3 Encounters:   03/09/18 87.6 kg (193 lb 1.6 oz)   01/31/18 88.9 kg (196 lb)   01/29/18 87.5 kg (193 lb)               Primary Care Provider Office Phone # Fax #    Julio Guidry Jr., [phone] [address] [facility] [facility] [address]        Goals        Lifestyle    Increase physical activity     Notes - Note created  3/29/2018  2:40 PM by Suzy Flores, RN    I will start increasing my activity over the next few weeks to help with my constipation.  As of today's date 3/29/2018 goal is met at 0 - 25%.   Goal Status:  Active          Equal Access to Services     [facility]: Hadii aad maik hadasho Soomaali, waaxda luqadaha, qaybta kaalmada adeegyada, mynor mosley . So [facility] [phone].    ATENCIÓN: Si habla español, tiene a gunn disposición servicios gratuitos de asistencia lingüística. Llame al [phone].    We comply with applicable federal civil rights laws and [place] laws. We do not discriminate on the basis of race, color, national origin, age, disability, sex, sexual orientation, or gender identity.            Thank you!     Thank you for choosing [facility] UROLOG[facility] AND [facility] FOR PROSTATE AND UROLOGIC CANCERS  for your care. Our goal is always to provide you with excellent care. Hearing back from our patients is one way we can continue to improve our services. Please take a few minutes to complete the written survey that you may receive in the mail after your visit with us. Thank you!             Your Updated Medication List - Protect others around you: Learn how to safely use, store and throw away your medicines at www.disposemymeds.org.          This list is accurate as of 4/6/18  5:09 PM.  Always use your most recent med list.                   Brand Name Dispense Instructions for use Diagnosis     acetaminophen 325 MG tablet    TYLENOL    100 tablet    Take 2 tablets (650 mg) by mouth every 6 hours Do not take more than 4,000 mg of acetaminophen (Tylenol) from all sources to prevent liver damage.    Lung nodule       ALFALFA PO      Take by mouth daily        ALPRAZolam 0.5 MG tablet    XANAX    20 tablet    Take 1 tablet (0.5 mg) by mouth 3 times daily as needed for anxiety    Anxiety       lidocaine HCl 2 % Soln     15 mL    Apply thin layer to tip of penis TID PRN penile pain        LISINOPRIL PO      Take 10 mg by mouth daily        mirabegron 25 MG 24 hr tablet    MYRBETRIQ    15 tablet    Take 1 tablet (25 mg) by mouth daily for 15 days    Bladder spasm       phenazopyridine 100 MG tablet    PYRIDIUM    12 tablet    Take 1-2 tablets (100-200 mg) by mouth 3 times daily as needed for urinary tract discomfort May turn your urine orange    Urinary catheter dysfunction, initial encounter (H)       sennosides 8.6 MG tablet    SENOKOT    30 tablet    Take 1 tablet by mouth 2 times daily        SUPER B COMPLEX PO      Take 1 tablet by mouth daily        tamsulosin 0.4 MG capsule    FLOMAX    30 capsule    Take 1 capsule (0.4 mg) by mouth daily    Urinary catheter dysfunction, initial encounter (H)       tolterodine 4 MG 24 hr capsule    DETROL LA    30 capsule    Take 1 capsule (4 mg) by mouth daily    Urinary catheter dysfunction, initial encounter (H)       VITAMIN D (CHOLECALCIFEROL) PO      Take 5,000 Units by mouth daily

## 2018-04-06 NOTE — NURSING NOTE
Juwan Latham comes into clinic today at the request of self Ordering Provider for CIC (clean intermittent catheterization) .    Patient demonstrated CIC using a 14 Lao coude tipped catheter.    Patient was instructed to self catheterize every 4 hours.    Patient was sent home with supplies.    This service provided today was under the supervising provider of the day Dr. Latham, who was available if needed.    The following medication was given:     MEDICATION: Ciprofloxacin   ROUTE: PO  SITE: mouth  DOSE: 500 mg  LOT #: [id number]  :  [employer]  EXPIRATION DATE:  10-19  NDC#: 003 [id number] 3       Princess Davis

## 2018-04-06 NOTE — TELEPHONE ENCOUNTER
Patient called in today to report that he is still having so much pain with the catheter. We went over everything again, he has two choices, keep the catheter or come and learn to self cath.  He has been in retention and is continue with his radiation.  Patient has tried multiple medications for this and nothing is helping. At this time, he has decided that he will come in and be taught to self cath.

## 2018-04-13 ENCOUNTER — PATIENT OUTREACH (OUTPATIENT)
Dept: CARE COORDINATION | Facility: CLINIC | Age: 68
End: 2018-04-13

## 2018-04-13 NOTE — LETTER
[facility]  Complex Care Plan  About Me  Patient Name:  Juwan Latham    YOB: 1950  Age:     67 year old   [facility] MRN:   [id number] Telephone Information:    Home Phone [phone]   Mobile [phone]       Address:    [address] [place] [address] Email address:  [email or web address]      Emergency Contact(s)  Name Relationship Lgl Grd Work Phone Home Phone Mobile Phone   1. RADHA VELOZ Sister No none [phone]   2. NIELS PARK* Relative No              Primary language:  English     needed? No   [facility] [employer] [facility]:  [phone] op. 1  Other communication barriers:    Preferred Method of Communication:  Mail  Current living arrangement: I live in a private home  Mobility Status/ Medical Equipment: Independent    Health Maintenance  Health Maintenance Reviewed: Due/Overdue     My Access Plan  Medical Emergency 911   Primary Clinic Line [facility] [phone]   24 Hour Appointment Line [phone] or  [phone]) (toll-free)   24 Hour Nurse Line [phone] (toll-free)   Preferred Urgent Care Other   Preferred Hospital [facility]  [phone]   Preferred Pharmacy [facility] - [place], [place] - [address] AT [facility] [address]     Behavioral Health Crisis Line The National Suicide Prevention Lifeline at 1-309.125.3112 or 911     My Care Team Members    Patient Care Team       Relationship Specialty Notifications Start End    Julio Guidry Jr., MD PCP - General Family Practice  1/28/16     Phone: [phone] Fax: [phone] [address] [facility] [address]    Stefano, Paulo Wiley MD MD Urology  5/1/17     Phone: [phone] Fax: [phone] [address] [facility] [address]    Love Mcdonald, RN Registered Nurse Oncology  5/1/17     Phone: [phone] Pager: [phone]        Suzy Flores RN  Lead Care Coordinator Primary Care - CC  7/17/17     Phone: [phone] Fax: [phone]        Maria A Desai MD MD Thoracic Diseases  2/9/18     Phone: [phone] Fax: [phone] [address] [facility] [address]            My Care Plans  Self Management and Treatment Plan  Goals and (Comments)  Goals        Lifestyle    Increase physical activity     Notes - Note edited  4/19/2018  1:51 PM by Suzy Flores RN    I will start increasing my activity over the next few weeks to help with my constipation.  As of today's date 3/29/2018 goal is met at 0 - 25%.   Goal Status:  Active  As of today's date 4/19/2018 goal is met at 26 - 50%.   Goal Status:  Active                 Action Plans on File:      Depression      Advance Care Plans/Directives Type:        My Medical and Care Information  Problem List   Patient Active Problem List   Diagnosis     Allergic rhinitis due to other allergen     CARDIOVASCULAR SCREENING; LDL GOAL LESS THAN 160     Advance Care Planning     History of colonic polyps     Anxiety     PAULINO (obstructive sleep apnea)     GERD (gastroesophageal reflux disease)     Hypertension goal BP (blood pressure) < 140/90     Seasonal allergies     Benign neoplasm of descending colon     Malignant neoplasm of upper lobe of right lung (H)     Overweight (BMI 25.0-29.9)     Cavernous hemangioma of brain (H)     Major depressive disorder, recurrent episode, mild (H)     Prostate cancer (H)     Urinary retention     Breen catheter problem (H)      Current Medications and Allergies:  See printed Medication Report.    Care Coordination Start Date: 7/17/2017   Frequency of Care Coordination: 2 weeks   Form Last Updated: 04/19/2018

## 2018-04-13 NOTE — PROGRESS NOTES
Clinic Care Coordination Contact  [facility]/Voicemail       Clinical Data: Care Coordinator Outreach  Outreach attempted x 1.  Left message on voicemail with call back information and requested return call.  Plan: Care Coordinator will try to reach patient again in 1-2 business days.

## 2018-04-14 RX ORDER — PHENAZOPYRIDINE HYDROCHLORIDE 100 MG/1
100-200 TABLET, FILM COATED ORAL 3 TIMES DAILY PRN
Qty: 12 TABLET | Refills: 0 | Status: SHIPPED | OUTPATIENT
Start: 2018-04-14 | End: 2018-06-26

## 2018-04-18 PROBLEM — N39.46 MIXED INCONTINENCE URGE AND STRESS (MALE)(FEMALE): Status: RESOLVED | Noted: 2017-12-30 | Resolved: 2018-04-18

## 2018-04-18 PROBLEM — M99.05 SOMATIC DYSFUNCTION OF PELVIS REGION: Status: RESOLVED | Noted: 2017-12-30 | Resolved: 2018-04-18

## 2018-04-18 NOTE — PROGRESS NOTES
Subjective:SUBJECTIVE  Subjective changes as noted by pt:  Pt reports increased strength pelvic floor musculature     Current pain level: 0/10     Changes in function:  Pt reports decreased leakage and pt can delay need to urinate during the day longer     Adverse reaction to treatment or activity:  None     OBJECTIVE  Changes in objective findings:  Pt demonstrated a good contraction of the pelvic floor muscles in supine, sitting and sit to stand. Pt has difficulty maintaining a contraction while standing.   HPI                    Objective:  System    Physical Exam    General     ROS    Assessment/Plan:    ASSESSMENT/PLAN  Updated problem list and treatment plan: Diagnosis 1:  Post op incontinence  Impaired muscle performance - biofeedback, neuro re-education and home program  Progress toward STG/LTGs have been made:  Yes,   Assessment of Progress: The patient's condition is improving.  Self Management Plans:  Patient has been instructed in a home treatment program.  I have re-evaluated this patient and find that the nature, scope, duration and intensity of the therapy is appropriate for the medical condition of the patient.  Juwan continues to require the following intervention to meet STG and LT's:  PT    Recommendations:  Pt has not returned for treatment since 1-23-18. Pt discharged at this time.      Please refer to the daily flowsheet for treatment today, total treatment time and time spent performing 1:1 timed codes.

## 2018-04-19 NOTE — PROGRESS NOTES
Clinic Care Coordination Contact    OUTREACH    Referral Information:  Referral Source: PCP    Primary Diagnosis: Oncology    Chief Complaint   Patient presents with     Clinic Care Coordination - Follow-up     RN        Universal Utilization:   Utilization    Last refreshed: 4/19/2018 12:22 PM:  No Show Count (past year) 4       Last refreshed: 4/19/2018 12:22 PM:  ED visits 6       Last refreshed: 4/19/2018 12:22 PM:  Hospital admissions 2          Current as of: 4/19/2018 12:22 PM           Clinical Concerns:  Current Medical Concerns:  Pt reports that he is doing well, had donovan removed and has been voiding normally, no further pain or retention.  Pt reports that he is done with radiation at this time, will have follow up PSA testing to monitor. Pt not experiencing any constipation problems at this time.  He denies any other concerns at this time.     Current Behavioral Concerns: anxiety-managed    Education Provided to patient: ongoing clinic CC   Clinical Pathway Name: None  Health Maintenance Reviewed: Due/Overdue     Medication Management:  No concerns     Functional Status:  Mobility Status: Independent  Equipment Currently Used at Home: none  Transportation:  Transportation means: Regular car     Psychosocial:  Current living arrangement: I live in a private home   Financial/Insurance: no concerns     Resources and Interventions:  Current Resources: none  Advanced Care Plans/Directives on file:: No        Goals:   Goals        Lifestyle    Increase physical activity     Notes - Note edited  4/19/2018  1:51 PM by Suzy Flores, RN    I will start increasing my activity over the next few weeks to help with my constipation.  As of today's date 3/29/2018 goal is met at 0 - 25%.   Goal Status:  Active  As of today's date 4/19/2018 goal is met at 26 - 50%.   Goal Status:  Active                Outreach Frequency: 2 weeks  Future Appointments              In 2 months  LAB [facility] [place], [place]  CLINI    In 2 months Weight, Paulo Wiley MD [facility] and Inst for Prostate and Urologic Cancers, [facility]    In 4 months UCCT1 [facility] CT, [facility]    In 4 months Demetra Lott APRN CNP [facility], [facility]           Plan:  Will plan to check in with pt in the next month, will consider closing to clinic CC at that time if no new needs identified.  Pt in agreement with plan.

## 2018-05-09 ENCOUNTER — TELEPHONE (OUTPATIENT)
Dept: FAMILY MEDICINE | Facility: CLINIC | Age: 68
End: 2018-05-09

## 2018-05-09 DIAGNOSIS — H57.9 EYE PROBLEM: Primary | ICD-10-CM

## 2018-05-09 NOTE — TELEPHONE ENCOUNTER
"Reason for Call: Request for an order or referral:    Order or referral being requested: Pt called in and would like a referral to an eye Dr. He says there is something on his eye \"looks like little ulcers\"    Date needed: as soon as possible    Has the patient been seen by the PCP for this problem? NO    Additional comments:     Phone number Patient can be reached at:  Home number on file [phone] (home)    Best Time:      Can we leave a detailed message on this number?  YES    Call taken on 5/9/2018 at 4:30 PM by Marisela Myers  "

## 2018-05-10 NOTE — TELEPHONE ENCOUNTER
No insurance referral needed. Patient can just call and schedule appt.  Thanks    Ivonne Velasquez  Referral Coordinator

## 2018-05-10 NOTE — TELEPHONE ENCOUNTER
Referral ordered.  I'd highly recommend my colleague Dr. Asad Harrison at [facility].  He sees patients in [place] 1-2 days per week.  Please call Shweta Guidry MD

## 2018-05-10 NOTE — TELEPHONE ENCOUNTER
Routing to referral coordinator to ensure nothing needs to be submitted to insurance for this.  Please route back to me to call pt once done.  Jayna Rider

## 2018-05-11 ENCOUNTER — TRANSFERRED RECORDS (OUTPATIENT)
Dept: HEALTH INFORMATION MANAGEMENT | Facility: CLINIC | Age: 68
End: 2018-05-11

## 2018-05-21 ENCOUNTER — TELEPHONE (OUTPATIENT)
Dept: FAMILY MEDICINE | Facility: CLINIC | Age: 68
End: 2018-05-21

## 2018-05-21 PROBLEM — H33.012 RETINAL DETACHMENT OF LEFT EYE WITH SINGLE BREAK: Status: ACTIVE | Noted: 2018-05-21

## 2018-05-21 NOTE — TELEPHONE ENCOUNTER
Juwna has been feeling ill x one week.  Nausea and fatigue. He also had a detached retina and this was fixed but the eye surgeon said this isn't related.    He is wondering if his cancer is back??    Appt with SR Shobha Agarwal RN- Triage [employer]

## 2018-05-22 ENCOUNTER — OFFICE VISIT (OUTPATIENT)
Dept: FAMILY MEDICINE | Facility: CLINIC | Age: 68
End: 2018-05-22
Payer: COMMERCIAL

## 2018-05-22 ENCOUNTER — PATIENT OUTREACH (OUTPATIENT)
Dept: CARE COORDINATION | Facility: CLINIC | Age: 68
End: 2018-05-22

## 2018-05-22 VITALS
SYSTOLIC BLOOD PRESSURE: 124 MMHG | WEIGHT: 191 LBS | TEMPERATURE: 98.4 F | DIASTOLIC BLOOD PRESSURE: 70 MMHG | BODY MASS INDEX: 29.05 KG/M2 | HEART RATE: 69 BPM | OXYGEN SATURATION: 98 %

## 2018-05-22 DIAGNOSIS — R11.0 NAUSEA: Primary | ICD-10-CM

## 2018-05-22 DIAGNOSIS — R53.83 FATIGUE, UNSPECIFIED TYPE: ICD-10-CM

## 2018-05-22 DIAGNOSIS — H33.012 RETINAL DETACHMENT OF LEFT EYE WITH SINGLE BREAK: ICD-10-CM

## 2018-05-22 DIAGNOSIS — C61 PROSTATE CANCER (H): ICD-10-CM

## 2018-05-22 DIAGNOSIS — G47.33 OSA (OBSTRUCTIVE SLEEP APNEA): ICD-10-CM

## 2018-05-22 LAB
ALBUMIN SERPL-MCNC: 3.8 G/DL (ref 3.4–5)
ALP SERPL-CCNC: 63 U/L (ref 40–150)
ALT SERPL W P-5'-P-CCNC: 38 U/L (ref 0–70)
ANION GAP SERPL CALCULATED.3IONS-SCNC: 7 MMOL/L (ref 3–14)
AST SERPL W P-5'-P-CCNC: 24 U/L (ref 0–45)
BILIRUB SERPL-MCNC: 0.9 MG/DL (ref 0.2–1.3)
BUN SERPL-MCNC: 13 MG/DL (ref 7–30)
CALCIUM SERPL-MCNC: 9.3 MG/DL (ref 8.5–10.1)
CHLORIDE SERPL-SCNC: 104 MMOL/L (ref 94–109)
CO2 SERPL-SCNC: 27 MMOL/L (ref 20–32)
CREAT SERPL-MCNC: 0.84 MG/DL (ref 0.66–1.25)
ERYTHROCYTE [DISTWIDTH] IN BLOOD BY AUTOMATED COUNT: 13.6 % (ref 10–15)
GFR SERPL CREATININE-BSD FRML MDRD: >90 ML/MIN/1.7M2
GLUCOSE SERPL-MCNC: 97 MG/DL (ref 70–99)
HCT VFR BLD AUTO: 42.3 % (ref 40–53)
HGB BLD-MCNC: 14.2 G/DL (ref 13.3–17.7)
MCH RBC QN AUTO: 31.9 PG (ref 26.5–33)
MCHC RBC AUTO-ENTMCNC: 33.6 G/DL (ref 31.5–36.5)
MCV RBC AUTO: 95 FL (ref 78–100)
PLATELET # BLD AUTO: 193 10E9/L (ref 150–450)
POTASSIUM SERPL-SCNC: 4.7 MMOL/L (ref 3.4–5.3)
PROT SERPL-MCNC: 7.6 G/DL (ref 6.8–8.8)
RBC # BLD AUTO: 4.45 10E12/L (ref 4.4–5.9)
SODIUM SERPL-SCNC: 138 MMOL/L (ref 133–144)
TSH SERPL DL<=0.005 MIU/L-ACNC: 1.77 MU/L (ref 0.4–4)
WBC # BLD AUTO: 6.2 10E9/L (ref 4–11)

## 2018-05-22 PROCEDURE — 84443 ASSAY THYROID STIM HORMONE: CPT | Performed by: FAMILY MEDICINE

## 2018-05-22 PROCEDURE — 36415 COLL VENOUS BLD VENIPUNCTURE: CPT | Performed by: FAMILY MEDICINE

## 2018-05-22 PROCEDURE — 85027 COMPLETE CBC AUTOMATED: CPT | Performed by: FAMILY MEDICINE

## 2018-05-22 PROCEDURE — 80053 COMPREHEN METABOLIC PANEL: CPT | Performed by: FAMILY MEDICINE

## 2018-05-22 PROCEDURE — 99214 OFFICE O/P EST MOD 30 MIN: CPT | Performed by: FAMILY MEDICINE

## 2018-05-22 RX ORDER — PREDNISOLONE ACETATE 10 MG/ML
1 SUSPENSION/ DROPS OPHTHALMIC
COMMUNITY
Start: 2018-05-12 | End: 2018-06-26

## 2018-05-22 RX ORDER — FLAVORING AGENT
POWDER (GRAM) MISCELLANEOUS
COMMUNITY
End: 2018-06-26

## 2018-05-22 RX ORDER — CYCLOBENZAPRINE HCL 5 MG
TABLET ORAL
Refills: 0 | COMMUNITY
Start: 2018-03-28 | End: 2018-06-26

## 2018-05-22 RX ORDER — OFLOXACIN 3 MG/ML
1 SOLUTION/ DROPS OPHTHALMIC
COMMUNITY
Start: 2018-05-12 | End: 2018-08-01

## 2018-05-22 RX ORDER — IBUPROFEN 400 MG/1
400 TABLET, FILM COATED ORAL
COMMUNITY
End: 2018-06-26

## 2018-05-22 RX ORDER — ATROPINE SULFATE 10 MG/ML
1 SOLUTION/ DROPS OPHTHALMIC
COMMUNITY
Start: 2018-05-12 | End: 2018-08-01

## 2018-05-22 RX ORDER — ONDANSETRON 4 MG/1
4-8 TABLET, ORALLY DISINTEGRATING ORAL
Qty: 20 TABLET | Refills: 1 | Status: SHIPPED | OUTPATIENT
Start: 2018-05-22 | End: 2018-06-26

## 2018-05-22 RX ORDER — MIRABEGRON 50 MG/1
TABLET, FILM COATED, EXTENDED RELEASE ORAL
Refills: 0 | COMMUNITY
Start: 2018-03-23 | End: 2018-06-26

## 2018-05-22 ASSESSMENT — ANXIETY QUESTIONNAIRES
6. BECOMING EASILY ANNOYED OR IRRITABLE: SEVERAL DAYS
3. WORRYING TOO MUCH ABOUT DIFFERENT THINGS: NOT AT ALL
7. FEELING AFRAID AS IF SOMETHING AWFUL MIGHT HAPPEN: NOT AT ALL
IF YOU CHECKED OFF ANY PROBLEMS ON THIS QUESTIONNAIRE, HOW DIFFICULT HAVE THESE PROBLEMS MADE IT FOR YOU TO DO YOUR WORK, TAKE CARE OF THINGS AT HOME, OR GET ALONG WITH OTHER PEOPLE: SOMEWHAT DIFFICULT
1. FEELING NERVOUS, ANXIOUS, OR ON EDGE: MORE THAN HALF THE DAYS
GAD7 TOTAL SCORE: 3
5. BEING SO RESTLESS THAT IT IS HARD TO SIT STILL: NOT AT ALL
GAD7 TOTAL SCORE: 3
2. NOT BEING ABLE TO STOP OR CONTROL WORRYING: NOT AT ALL
6. BECOMING EASILY ANNOYED OR IRRITABLE: SEVERAL DAYS
3. WORRYING TOO MUCH ABOUT DIFFERENT THINGS: NOT AT ALL
IF YOU CHECKED OFF ANY PROBLEMS ON THIS QUESTIONNAIRE, HOW DIFFICULT HAVE THESE PROBLEMS MADE IT FOR YOU TO DO YOUR WORK, TAKE CARE OF THINGS AT HOME, OR GET ALONG WITH OTHER PEOPLE: SOMEWHAT DIFFICULT
2. NOT BEING ABLE TO STOP OR CONTROL WORRYING: NOT AT ALL
7. FEELING AFRAID AS IF SOMETHING AWFUL MIGHT HAPPEN: NOT AT ALL
5. BEING SO RESTLESS THAT IT IS HARD TO SIT STILL: NOT AT ALL
1. FEELING NERVOUS, ANXIOUS, OR ON EDGE: MORE THAN HALF THE DAYS

## 2018-05-22 ASSESSMENT — PATIENT HEALTH QUESTIONNAIRE - PHQ9
5. POOR APPETITE OR OVEREATING: NOT AT ALL
5. POOR APPETITE OR OVEREATING: NOT AT ALL

## 2018-05-22 NOTE — PROGRESS NOTES
SUBJECTIVE:   Juwan Latham is a 67 year old male who presents to clinic today for the following health issues:    Acute Illness   Acute illness concerns: Nausea   Onset: 1 wk    Fever: no    Chills/Sweats: no    Headache (location?): YES    Sinus Pressure:no    Conjunctivitis:  no    Ear Pain: no    Rhinorrhea: no    Congestion: no    Sore Throat: no     Cough: no    Wheeze: no    Decreased Appetite: no    Nausea: YES, having a couple beers in the evening seems to help    Vomiting: no    Diarrhea:  no    Dysuria/Freq.: no    Fatigue/Achiness: YES- body aches, fatigue sleeps off and on all day      Sick/Strep Exposure: no     Therapies Tried and outcome: Tums no relief     Nausea- For the past 1 week pt has been nauseous. He notes when he has 2 beers every evening before going to bed, this helps with nausea. He notes he has been having increased fatigue as well. He does not think his symptoms are related to anxiety or depression. Pt has been on Sertraline and Effexor in the past but did not tolerate either of these. Pt has not had emesis with his nausea. He has tried taking Tums but this did not help. His bowel movements have not changed, and his appetite has remained stable. He has been sleeping excessively but has not been using his CPAP machine. Pt is currently taking vitamin D supplements.  He tries to get out and walk but his hips are starting to give him pain. Pt has been out of work since November 2017.     Prostate cancer- He occasionally has nocturia. Pt does not currently have his catheter in place. He is done with radiation; he completed this 1 month go. He is not currently on Lupron. Pt is following up with Dr. Agarwal for his prostate cancer; last saw him 2 months ago.    Left retinal detachment- Pt is having surgery on his left eye secondary to retinal detachment of his left eye with single break.        Problem list and histories reviewed & adjusted, as indicated.  Additional history: as  documented    Reviewed and updated as needed this visit by clinical staff  Tobacco  Allergies  Meds  Med Hx  Soc Hx      Reviewed and updated as needed this visit by Provider       ROS:  Constitutional, HEENT, cardiovascular, pulmonary, gi and gu systems are negative, except as otherwise noted.    This document serves as a record of the services and decisions personally performed and made by Julio Guidry MD. It was created on his behalf by Mark Pennington, a trained medical scribe. The creation of this document is based on the provider's statements to the medical scribe.  Mark Pennington 9:56 AM May 22, 2018    OBJECTIVE:     /70  Pulse 69  Temp 98.4  F (36.9  C) (Oral)  Wt 86.6 kg (191 lb)  SpO2 98%  BMI 29.05 kg/m2  Body mass index is 29.05 kg/(m^2).  GENERAL: healthy, alert and no distress  NECK: no adenopathy, no asymmetry, masses, or scars and thyroid normal to palpation  RESP: lungs clear to auscultation - no rales, rhonchi or wheezes  CV: regular rate and rhythm, normal S1 S2, no S3 or S4, no murmur, click or rub, no peripheral edema and peripheral pulses strong  ABDOMEN: soft, nontender, no hepatosplenomegaly, no masses and bowel sounds normal  PSYCH: mentation appears normal, affect normal/bright    Diagnostic Test Results:  No results found for this or any previous visit (from the past 24 hour(s)).    ASSESSMENT/PLAN:     (R11.0) Nausea  (primary encounter diagnosis)  Comment: Will prescribe Zofran to see if this helps with his nausea and also draw blood work for further evaluation; will check liver function given he has been drinking 2 beers per night to help with his nausea.  I still think this may be a somatic symptom reflective of an underlying anxiety/depression problem.  Plan: CBC with platelets, TSH with free T4 reflex,         Comprehensive metabolic panel (BMP + Alb, Alk         Phos, ALT, AST, Total. Bili, TP), ondansetron         (ZOFRAN ODT) 4 MG ODT tab        Follow up  based on labs.    (R53.83) Fatigue, unspecified type  Comment: Discussed with pt that I am suspicious that his fatigue may be secondary to underlying anxiety and depression. However, pt persistently denied today the possibility of underlying anxiety/depression contributing to his fatigue. Therefore, will draw blood work for further evaluation. Discussed with pt that Vitamin D deficiency may also lead to increased fatigue, therefore, will check this as well.  Plan: CBC with platelets, TSH with free T4 reflex,         Comprehensive metabolic panel (BMP + Alb, Alk         Phos, ALT, AST, Total. Bili, TP), 25         Hydroxyvitamin D2 and D3        Follow up based on labs.    (H33.012) Retinal detachment of left eye with single break  Comment: Pt has upcoming surgery secondary to his left retinal detachment.  Plan: Follow up if needed.    (G47.33) PAULINO (obstructive sleep apnea)  Comment: Pt has not been using his CPAP machine every night. Advised pt to use his CPAP machine every night in order to help reduce daytime drowsiness and to hopefully help improve his fatigue.  Plan: Follow up if needed.    (C61) Prostate cancer (H)  Comment: This is currently stable. Pt is following up with Dr. Agarwal, and recently completed radiation. His last PSA was good; will continue to do yearly PSA check.  Plan: Follow up if needed.    The information in this document, created by the medical scribe for me, accurately reflects the services I personally performed and the decisions made by me. I have reviewed and approved this document for accuracy prior to leaving the patient care area.  May 22, 2018 9:56 AM    Julio Guidry Jr, MD  [facility]

## 2018-05-22 NOTE — PROGRESS NOTES
Mr. Latham,    -Normal red blood cell (hgb) levels, normal white blood cell count and normal platelet levels.    If you have further questions about the interpretation of your labs, [email or web address] is a good website to check out for further information.    Please contact the clinic if you have additional questions.  Thank you.    Sincerely,    Julio Guidry MD

## 2018-05-22 NOTE — PROGRESS NOTES
Clinic Care Coordination Contact    Clinic Care Coordination Contact  OUTREACH    Referral Information:  Referral Source: PCP    Primary Diagnosis: Oncology    Chief Complaint   Patient presents with     Clinic Care Coordination - Follow-up     RN        Dwight Utilization:   Clinic Utilization  Difficulty keeping appointments: No  Utilization    Last refreshed: 5/22/2018 12:44 PM:  No Show Count (past year) 4       Last refreshed: 5/22/2018 12:44 PM:  ED visits 6       Last refreshed: 5/22/2018 12:44 PM:  Hospital admissions 2          Current as of: 5/22/2018 12:44 PM           Clinical Concerns:  Current Medical Concerns:  Pt reports that he has been doing very well, has not had any further urinary problems.  Pt reports that he did have a detached retina, but is starting to have his vision return, denies any questions or needs at this time, will follow up as ordered from that provider.  Discussed writer leaving clinic, pt denies any needs for care coordination at this time.  He will reach out to clinic if any arise.     Current Behavioral Concerns: none    Education Provided to patient: future CC needs   Pain  Chronic pain (GOAL):: No  Health Maintenance Reviewed: Due/Overdue   Clinical Pathway: None    Medication Management:  No concerns at this time, reports compliance     Functional Status:independent     Living Situation: lives in a private home       Diet/Exercise/Sleep:  Diet:: Regular  Inadequate nutrition (GOAL):: No  Food Insecurity: No  Tube Feeding: No  Exercise:: Currently not exercising  Inadequate activity/exercise (GOAL):: No  Significant changes in sleep pattern (GOAL): Yes    Transportation:  Transportation concerns (GOAL):: No  Transportation means:: Regular car     Psychosocial:   Financial/Insurance:   Financial/Insurance concerns (GOAL):: No     Resources and Interventions:  Current Resources: none       Future Appointments              In 3 weeks  LAB [facility] [place], [place]  MICHELLEI    In 1 month Paulo Agarwal MD [facility] Prostate and Urologic Cancers, [facility]    In 1 month Julio Guidry Jr., MD [facility]    In 3 months UCCT1 [facility] Imaging Center CT, [facility]    In 3 months Demetra Lott APRN [facility], [facility]          Plan: Will close to clinic CC at this time.  Pt denied need for new CC follow up at this time, but will reach out if new needs arise.

## 2018-05-22 NOTE — PROGRESS NOTES
Mr. Latham,    -Liver and gallbladder tests are normal. (ALT,AST, Alk phos, bilirubin), kidney function is normal (Cr, GFR), Sodium is normal, Potassium is normal, Calcium is normal, Glucose is normal (diabetes screening test).   -TSH (thyroid stimulating hormone) level is normal which indicates normal thyroid function.    If you have further questions about the interpretation of your labs, [email or web address] is a good website to check out for further information.    Please contact the clinic if you have additional questions.  Thank you.    Sincerely,    Julio Guidry MD

## 2018-05-22 NOTE — MR AVS SNAPSHOT
After Visit Summary   5/22/2018    Juwan Latham    MRN: [id number]           Patient Information     Date Of Birth          1950        Visit Information        Provider Department      5/22/2018 9:20 AM Julio Guidry Jr., MD [facility] [place]        Today's Diagnoses     Nausea    -  1    Fatigue, unspecified type        Retinal detachment of left eye with single break        PAULINO (obstructive sleep apnea)        Prostate cancer (H)           Follow-ups after your visit        Follow-up notes from your care team     Return in about 5 weeks (around 6/26/2018) for Prostate cancer & detached retina recheck.      Your next 10 appointments already scheduled     Jun 18, 2018 10:00 AM CDT   LAB with SV LAB   [facility] [place] ([facility] [place])    [address]  [place] [id number] [phone]           Please do not eat 10-12 hours before your appointment if you are coming in fasting for labs on lipids, cholesterol, or glucose (sugar). This does not apply to pregnant women. Water, hot tea and black coffee (with nothing added) are okay. Do not drink other fluids, diet soda or chew gum.            Jun 21, 2018 12:20 PM CDT   (Arrive by 12:05 PM)   Return Visit with Paulo Agarwal MD   [facility] for Prostate and Urologic Cancers ([facility])    [address] [place] [address]  4th [facility] [id number] [phone]            Sep 14, 2018  3:00 PM CDT   CT CHEST W/O CONTRAST with UCCT1   [facility] Imaging Center CT ([facility])    [address] [place] [id number] [phone]           Please bring any scans or X-rays taken at other hospitals, if similar tests were done. Also bring a list of your medicines, including vitamins, minerals and over-the-counter drugs. It is safest to leave personal items at home.  Be sure to tell your doctor:   If you have any allergies.    If there s any chance you are pregnant.   If you are breastfeeding.  You do not need to do anything special to prepare for this exam.  Please wear loose clothing, such as a sweat suit or jogging clothes. Avoid snaps, zippers and other metal. We may ask you to undress and put on a hospital gown.            Sep 14, 2018  4:15 PM CDT   (Arrive by 4:00 PM)   Return Visit with GLORIA Linton [facility] ([facility] [place])    [address]  [address]  [facility] [address] [phone]              Future tests that were ordered for you today     Open Future Orders        Priority Expected Expires Ordered    25 Hydroxyvitamin D2 and D3 Routine 7/3/2018 5/22/2019 5/22/2018            Who to contact     If you have questions or need follow up information about today's clinic visit or your schedule please contact [facility] [place] directly at [phone].  Normal or non-critical lab and imaging results will be communicated to you by [email or web address], letter or phone within 4 business days after the clinic has received the results. If you do not hear from us within 7 days, please contact the clinic through Edu[employer] or phone. If you have a critical or abnormal lab result, we will notify you by phone as soon as possible.  Submit refill requests through [employer] or call your pharmacy and they will forward the refill request to us. Please allow 3 business days for your refill to be completed.          Additional Information About Your Visit        [employer] Information     [employer] gives you secure access to your electronic health record. If you see a primary care provider, you can also send messages to your care team and make appointments. If you have questions, please call your primary care clinic.  If you do not have a primary care provider, please call [phone] and they will assist you.        Care EveryWhere ID     This is your Care EveryWhere ID. This  could be used by other organizations to access your [place] medical records  [id number]        Your Vitals Were     Pulse Temperature Pulse Oximetry BMI (Body Mass Index)          69 98.4  F (36.9  C) (Oral) 98% 29.05 kg/m2         Blood Pressure from Last 3 Encounters:   05/22/18 124/70   03/26/18 111/72   03/24/18 132/77    Weight from Last 3 Encounters:   05/22/18 191 lb (86.6 kg)   03/09/18 193 lb 1.6 oz (87.6 kg)   01/31/18 196 lb (88.9 kg)              We Performed the Following     CBC with platelets     Comprehensive metabolic panel (BMP + Alb, Alk Phos, ALT, AST, Total. Bili, TP)     TSH with free T4 reflex          Today's Medication Changes          These changes are accurate as of 5/22/18 10:16 AM.  If you have any questions, ask your nurse or doctor.               Start taking these medicines.        Dose/Directions    ondansetron 4 MG ODT tab   Commonly known as:  ZOFRAN ODT   Used for:  Nausea   Started by:  Julio Guidry Jr., MD        Dose:  4-8 mg   Take 1-2 tablets (4-8 mg) by mouth 3 times daily (before meals)   Quantity:  20 tablet   Refills:  1            Where to get your medicines      These medications were sent to [id number] - [place], [place] - [address] AT [facility] &  [address] [address]     Phone:  [phone]     ondansetron 4 MG ODT tab                Primary Care Provider Office Phone # Fax #    Julio Guidry Jr., [phone] [address]  [place] [address]        Goals        Lifestyle    Increase physical activity     Notes - Note edited  4/19/2018  1:51 PM by Suzy Flores, RN    I will start increasing my activity over the next few weeks to help with my constipation.  As of today's date 3/29/2018 goal is met at 0 - 25%.   Goal Status:  Active  As of today's date 4/19/2018 goal is met at 26 - 50%.   Goal Status:  Active            Equal Access to Services     [facility]: Rohan pleitez  germania Obrien, waaxda luqadaha, qaybta kaalmada loren, mynor hutchisonmeme tigist. So [facility] [phone].    ATENCIÓN: Si xiaola bel, tiene a gunn disposición servicios gratuitos de asistencia lingüística. Jeanette al [phone].    We comply with applicable federal civil rights laws and [place] laws. We do not discriminate on the basis of race, color, national origin, age, disability, sex, sexual orientation, or gender identity.            Thank you!     Thank you for choosing [facility] [place]  for your care. Our goal is always to provide you with excellent care. Hearing back from our patients is one way we can continue to improve our services. Please take a few minutes to complete the written survey that you may receive in the mail after your visit with us. Thank you!             Your Updated Medication List - Protect others around you: Learn how to safely use, store and throw away your medicines at www.disposemymeds.org.          This list is accurate as of 5/22/18 10:16 AM.  Always use your most recent med list.                   Brand Name Dispense Instructions for use Diagnosis    acetaminophen 325 MG tablet    TYLENOL    100 tablet    Take 2 tablets (650 mg) by mouth every 6 hours Do not take more than 4,000 mg of acetaminophen (Tylenol) from all sources to prevent liver damage.    Lung nodule       Alfalfa Flavor Powd           ALFALFA PO      Take by mouth daily        ALPRAZolam 0.5 MG tablet    XANAX    20 tablet    Take 1 tablet (0.5 mg) by mouth 3 times daily as needed for anxiety    Anxiety       atropine 1 % ophthalmic solution      Apply 1 drop to eye        cyclobenzaprine 5 MG tablet    FLEXERIL     TK 1 T PO TID PRF MUSCLE SPASMS        ibuprofen 400 MG tablet    ADVIL/MOTRIN     Take 400 mg by mouth        lidocaine HCl 2 % Soln     15 mL    Apply thin layer to tip of penis TID PRN penile pain        LISINOPRIL PO      Take 10 mg by mouth daily        MYRBETRIQ 50 MG  24 hr tablet   Generic drug:  mirabegron      TK 1 TABLET PO DAILY        ofloxacin 0.3 % ophthalmic solution    OCUFLOX     Apply 1 drop to eye        ondansetron 4 MG ODT tab    ZOFRAN ODT    20 tablet    Take 1-2 tablets (4-8 mg) by mouth 3 times daily (before meals)    Nausea       phenazopyridine 100 MG tablet    PYRIDIUM    12 tablet    Take 1-2 tablets (100-200 mg) by mouth 3 times daily as needed for urinary tract discomfort May turn your urine orange    Urinary catheter dysfunction, initial encounter (H)       prednisoLONE acetate 1 % ophthalmic susp    PRED FORTE     Apply 1 drop to eye        sennosides 8.6 MG tablet    SENOKOT    30 tablet    Take 1 tablet by mouth 2 times daily        tamsulosin 0.4 MG capsule    FLOMAX    30 capsule    Take 1 capsule (0.4 mg) by mouth daily    Urinary catheter dysfunction, initial encounter (H)       tolterodine 4 MG 24 hr capsule    DETROL LA    30 capsule    Take 1 capsule (4 mg) by mouth daily    Urinary catheter dysfunction, initial encounter (H)       VITAMIN B COMPLEX PO      Take 1 tablet by mouth        VITAMIN D (CHOLECALCIFEROL) PO      Take 5,000 Units by mouth daily

## 2018-05-23 ASSESSMENT — PATIENT HEALTH QUESTIONNAIRE - PHQ9: SUM OF ALL RESPONSES TO PHQ QUESTIONS 1-9: 7

## 2018-05-23 ASSESSMENT — ANXIETY QUESTIONNAIRES: GAD7 TOTAL SCORE: 3

## 2018-06-18 ENCOUNTER — PRE VISIT (OUTPATIENT)
Dept: UROLOGY | Facility: CLINIC | Age: 68
End: 2018-06-18

## 2018-06-18 DIAGNOSIS — R10.2 PELVIC PAIN IN MALE: ICD-10-CM

## 2018-06-18 LAB — PSA SERPL-MCNC: <0.01 UG/L (ref 0–4)

## 2018-06-18 PROCEDURE — 36415 COLL VENOUS BLD VENIPUNCTURE: CPT | Performed by: UROLOGY

## 2018-06-18 PROCEDURE — 84153 ASSAY OF PSA TOTAL: CPT | Performed by: UROLOGY

## 2018-06-21 ENCOUNTER — OFFICE VISIT (OUTPATIENT)
Dept: UROLOGY | Facility: CLINIC | Age: 68
End: 2018-06-21
Payer: COMMERCIAL

## 2018-06-21 VITALS
BODY MASS INDEX: 29.98 KG/M2 | HEIGHT: 67 IN | DIASTOLIC BLOOD PRESSURE: 80 MMHG | WEIGHT: 191 LBS | SYSTOLIC BLOOD PRESSURE: 152 MMHG | HEART RATE: 67 BPM

## 2018-06-21 DIAGNOSIS — R10.31 RLQ ABDOMINAL PAIN: Primary | ICD-10-CM

## 2018-06-21 ASSESSMENT — PAIN SCALES - GENERAL: PAINLEVEL: MILD PAIN (2)

## 2018-06-21 NOTE — PROGRESS NOTES
"Prostate Cancer Follow up after RRP     Juwan Latham is a very pleasant 67 year old male who presents with a history of prostate cancer.    Initial PSA:19.6  Pathologic German Score was 4 + 5  Grade Group:5  Biopsy German Score was 3 + 4  Pathologic Stage T2bc, N1, Mx.   Positive Margins: Yes Location:right side additional margin taken and negative  Number of Lymph Nodes Removed: 5  Number of Positive Lymph Nodes: 1  Patient is status post robotic radical prostatectomy on 12/1/2017.    Risk Group: High  Got adjuvant radiation, completed 2018    Predicted progression free probability at 10 years: ~10  Predicted Cancer specific survival at 15 years: 80-90%  (J Clin Oncol. 2005 Oct 1;23(28):7000-12.  Http://nomograms.[facility].org/Prostate/PostRadicalProstatectomy.aspx)    There is no evidence of disease recurrence to date.    Some abdominal, RLQ pain, maybe ingunial    Physical Exam:  Vitals: /80  Pulse 67  Ht 1.702 m (5' 7\")  Wt 86.6 kg (191 lb)  BMI 29.91 kg/m2  General Appearance Adult: A&O in NAD     Abd is soft, non-distended, incisions are healing well, no evidence of hernias, some fullness over the right external ring    He is tolerating a regular diet.     He is currently undergoing radiation and is scheduled to complete this in the next few weeks.     At our last visit, he reported urinary symptoms, primarily incontinence. He is wearing 1 pad at night and sometimes during the day. He was attending pelvic floor rehab and doing Kegel's regularly, no leakage now    PSA   Date Value Ref Range Status   06/18/2018 <0.01 0 - 4 ug/L Final     Comment:     Assay Method:  Chemiluminescence using Siemens Vista analyzer       PSA  19.6 pre op    Assessment: [id number] prostate cancer s/p robotic radical prostatectomy on 12/1/2017 with  no evidence of disease, but substantially higher risk than previously believed. Currently undergoing radiation.    Plan:        We discussed management of urinary retention until " completion of radiation. Plan to remove Breen catheter upon completion of radiation.      PSA (tumor marker) today     Follow up in 3 months   CT scan to evaluate for abdominal pain and nausea.    Pualo Agarwal MD  Department of Urology  [facility]    Orders Placed This Encounter   Procedures     CT Abdomen Pelvis w/Contrast

## 2018-06-21 NOTE — MR AVS SNAPSHOT
After Visit Summary   6/21/2018    Juwan Latham    MRN: [id number]           Patient Information     Date Of Birth          1950        Visit Information        Provider Department      6/21/2018 12:20 PM Paulo Agarwal MD [facility] and [facility] for Prostate and Urologic Cancers        Today's Diagnoses     RLQ abdominal pain    -  1      Care Instructions    Schedule appointment for CT scan.    Follow up with Dr. Agarwal in 3 months with PSA prior to appointment.    It was a pleasure meeting with you today.  Thank you for allowing me and my team the privilege of caring for you today.  YOU are the reason we are here, and I truly hope we provided you with the excellent service you deserve.  Please let us know if there is anything else we can do for you so that we can be sure you are leaving completely satisfied with your care experience.        Eloina Murillo ALEX          Follow-ups after your visit        Your next 10 appointments already scheduled     Jun 26, 2018  1:20 PM CDT   SHORT with Julio Guidry Jr., MD   [facility] ([facility])    [address] [id number] [phone]            Sep 14, 2018  3:00 PM CDT   CT CHEST W/O CONTRAST with UCCT1   [facility] Imaging Center CT ([facility])    [address] [address] [phone]           Please bring any scans or X-rays taken at other hospitals, if similar tests were done. Also bring a list of your medicines, including vitamins, minerals and over-the-counter drugs. It is safest to leave personal items at home.  Be sure to tell your doctor:   If you have any allergies.   If there s any chance you are pregnant.   If you are breastfeeding.  You do not need to do anything special to prepare for this exam.  Please wear loose clothing, such as a sweat suit or jogging clothes. Avoid snaps, zippers and other metal. We may ask  "you to undress and put on a hospital gown.            Sep 14, 2018  4:15 PM CDT   (Arrive by 4:00 PM)   Return Visit with GLORIA Linton [facility] ([facility])    [address]  [address]  [facility] [address] [phone]              Who to contact     Please call your clinic at [phone] to:    Ask questions about your health    Make or cancel appointments    Discuss your medicines    Learn about your test results    Speak to your doctor            Additional Information About Your Visit        [employer] Information     [employer] gives you secure access to your electronic health record. If you see a primary care provider, you can also send messages to your care team and make appointments. If you have questions, please call your primary care clinic.  If you do not have a primary care provider, please call [phone] and they will assist you.      [employer] is an electronic gateway that provides easy, online access to your medical records. With [employer], you can request a clinic appointment, read your test results, renew a prescription or communicate with your care team.     To access your existing account, please contact your [facility] or call [phone] for assistance.        Care EveryWhere ID     This is your Care EveryWhere ID. This could be used by other organizations to access your [place] medical records  [id number]        Your Vitals Were     Pulse Height BMI (Body Mass Index)             67 1.702 m (5' 7\") 29.91 kg/m2          Blood Pressure from Last 3 Encounters:   06/24/18 127/72   06/21/18 152/80   05/22/18 124/70    Weight from Last 3 Encounters:   06/21/18 86.6 kg (191 lb)   05/22/18 86.6 kg (191 lb)   03/09/18 87.6 kg (193 lb 1.6 oz)               Primary Care Provider Office Phone # Fax #    Julio Guidry Jr., [phone]       [address] " [address]        Equal Access to Services     [facility]: Hadii carolyn pleitez germania Obrien, waelyda luqadaha, qaybta kaalmada jamesedserjio, mynor carlos carlisleyanimariam escoto. So [facility] [phone].    ATENCIÓN: Si habla español, tiene a gunn disposición servicios gratuitos de asistencia lingüística. Llame al [phone].    We comply with applicable federal civil rights laws and [place] laws. We do not discriminate on the basis of race, color, national origin, age, disability, sex, sexual orientation, or gender identity.            Thank you!     Thank you for choosing [facility] UROLOGY AND [facility] FOR PROSTATE AND UROLOGIC CANCERS  for your care. Our goal is always to provide you with excellent care. Hearing back from our patients is one way we can continue to improve our services. Please take a few minutes to complete the written survey that you may receive in the mail after your visit with us. Thank you!             Your Updated Medication List - Protect others around you: Learn how to safely use, store and throw away your medicines at www.disposemymeds.org.          This list is accurate as of 6/21/18 11:59 PM.  Always use your most recent med list.                   Brand Name Dispense Instructions for use Diagnosis    acetaminophen 325 MG tablet    TYLENOL    100 tablet    Take 2 tablets (650 mg) by mouth every 6 hours Do not take more than 4,000 mg of acetaminophen (Tylenol) from all sources to prevent liver damage.    Lung nodule       Alfalfa Flavor Powd           ALFALFA PO      Take by mouth daily        ALPRAZolam 0.5 MG tablet    XANAX    20 tablet    Take 1 tablet (0.5 mg) by mouth 3 times daily as needed for anxiety    Anxiety       atropine 1 % ophthalmic solution      Apply 1 drop to eye        cyclobenzaprine 5 MG tablet    FLEXERIL     TK 1 T PO TID PRF MUSCLE SPASMS        ibuprofen 400 MG tablet    ADVIL/MOTRIN     Take 400 mg by mouth        lidocaine HCl 2 % Soln     15 mL    Apply thin layer to  tip of penis TID PRN penile pain        LISINOPRIL PO      Take 10 mg by mouth daily        MYRBETRIQ 50 MG 24 hr tablet   Generic drug:  mirabegron      TK 1 TABLET PO DAILY        ofloxacin 0.3 % ophthalmic solution    OCUFLOX     Apply 1 drop to eye        ondansetron 4 MG ODT tab    ZOFRAN ODT    20 tablet    Take 1-2 tablets (4-8 mg) by mouth 3 times daily (before meals)    Nausea       phenazopyridine 100 MG tablet    PYRIDIUM    12 tablet    Take 1-2 tablets (100-200 mg) by mouth 3 times daily as needed for urinary tract discomfort May turn your urine orange    Urinary catheter dysfunction, initial encounter (H)       prednisoLONE acetate 1 % ophthalmic susp    PRED FORTE     Apply 1 drop to eye        sennosides 8.6 MG tablet    SENOKOT    30 tablet    Take 1 tablet by mouth 2 times daily        tamsulosin 0.4 MG capsule    FLOMAX    30 capsule    Take 1 capsule (0.4 mg) by mouth daily    Urinary catheter dysfunction, initial encounter (H)       tolterodine 4 MG 24 hr capsule    DETROL LA    30 capsule    Take 1 capsule (4 mg) by mouth daily    Urinary catheter dysfunction, initial encounter (H)       VITAMIN B COMPLEX PO      Take 1 tablet by mouth        VITAMIN D (CHOLECALCIFEROL) PO      Take 5,000 Units by mouth daily

## 2018-06-21 NOTE — NURSING NOTE
Chief Complaint   Patient presents with     RECHECK     Prostate cancer/retention follow up.     EULA Beatty

## 2018-06-21 NOTE — LETTER
"6/21/2018       RE: Juwan Latham  [address]  [facility] [address]     Dear Colleague,    Thank you for referring your patient, Juwan Latham, to the [facility] UROLOGY AND INST FOR PROSTATE AND UROLOGIC CANCERS at [facility]. Please see a copy of my visit note below.    Prostate Cancer Follow up after RRP     Juwan Latham is a very pleasant 67 year old male who presents with a history of prostate cancer.    Initial PSA:19.6  Pathologic German Score was 4 + 5  Grade Group:5  Biopsy Vicksburg Score was 3 + 4  Pathologic Stage T2bc, N1, Mx.   Positive Margins: Yes Location:right side additional margin taken and negative  Number of Lymph Nodes Removed: 5  Number of Positive Lymph Nodes: 1  Patient is status post robotic radical prostatectomy on 12/1/2017.    Risk Group: High  Got adjuvant radiation, completed 2018    Predicted progression free probability at 10 years: ~10  Predicted Cancer specific survival at 15 years: 80-90%  (J Clin Oncol. 2005 Oct 1;23(28):7008-12.  Http://nomograms.[facility].org/Prostate/PostRadicalProstatectomy.aspx)    There is no evidence of disease recurrence to date.    Some abdominal, RLQ pain, maybe ingunial    Physical Exam:  Vitals: /80  Pulse 67  Ht 1.702 m (5' 7\")  Wt 86.6 kg (191 lb)  BMI 29.91 kg/m2  General Appearance Adult: A&O in NAD     Abd is soft, non-distended, incisions are healing well, no evidence of hernias, some fullness over the right external ring    He is tolerating a regular diet.     He is currently undergoing radiation and is scheduled to complete this in the next few weeks.     At our last visit, he reported urinary symptoms, primarily incontinence. He is wearing 1 pad at night and sometimes during the day. He was attending pelvic floor rehab and doing Kegel's regularly, no leakage now    PSA   Date Value Ref Range Status   06/18/2018 <0.01 0 - 4 ug/L Final     Comment:     Assay Method:  Chemiluminescence using " Siemens Vista analyzer       PSA  19.6 pre op    Assessment: [id number] prostate cancer s/p robotic radical prostatectomy on 12/1/2017 with  no evidence of disease, but substantially higher risk than previously believed. Currently undergoing radiation.    Plan:        We discussed management of urinary retention until completion of radiation. Plan to remove Breen catheter upon completion of radiation.      PSA (tumor marker) today     Follow up in 3 months   CT scan to evaluate for abdominal pain and nausea.    Paulo Agarwal MD  Department of Urology  [facility]    Orders Placed This Encounter   Procedures     CT Abdomen Pelvis w/Contrast

## 2018-06-21 NOTE — PATIENT INSTRUCTIONS
Schedule appointment for CT scan.    Follow up with Dr. Agarwal in 3 months with PSA prior to appointment.    It was a pleasure meeting with you today.  Thank you for allowing me and my team the privilege of caring for you today.  YOU are the reason we are here, and I truly hope we provided you with the excellent service you deserve.  Please let us know if there is anything else we can do for you so that we can be sure you are leaving completely satisfied with your care experience.        EULA Beatty

## 2018-06-24 ENCOUNTER — HOSPITAL ENCOUNTER (OUTPATIENT)
Dept: CT IMAGING | Facility: CLINIC | Age: 68
Discharge: HOME | End: 2018-06-24
Attending: UROLOGY | Admitting: UROLOGY
Payer: MEDICARE

## 2018-06-24 ENCOUNTER — APPOINTMENT (OUTPATIENT)
Dept: ULTRASOUND IMAGING | Facility: CLINIC | Age: 68
End: 2018-06-24
Attending: INTERNAL MEDICINE
Payer: MEDICARE

## 2018-06-24 ENCOUNTER — HOSPITAL ENCOUNTER (EMERGENCY)
Facility: CLINIC | Age: 68
Discharge: HOME | End: 2018-06-24
Attending: INTERNAL MEDICINE | Admitting: INTERNAL MEDICINE
Payer: MEDICARE

## 2018-06-24 ENCOUNTER — MYC MEDICAL ADVICE (OUTPATIENT)
Dept: FAMILY MEDICINE | Facility: CLINIC | Age: 68
End: 2018-06-24

## 2018-06-24 ENCOUNTER — APPOINTMENT (OUTPATIENT)
Dept: GENERAL RADIOLOGY | Facility: CLINIC | Age: 68
End: 2018-06-24
Attending: INTERNAL MEDICINE
Payer: MEDICARE

## 2018-06-24 ENCOUNTER — NURSE TRIAGE (OUTPATIENT)
Dept: NURSING | Facility: CLINIC | Age: 68
End: 2018-06-24

## 2018-06-24 VITALS
TEMPERATURE: 98.1 F | DIASTOLIC BLOOD PRESSURE: 72 MMHG | SYSTOLIC BLOOD PRESSURE: 127 MMHG | RESPIRATION RATE: 14 BRPM | OXYGEN SATURATION: 98 % | HEART RATE: 78 BPM

## 2018-06-24 DIAGNOSIS — R60.0 EDEMA OF RIGHT LOWER EXTREMITY: ICD-10-CM

## 2018-06-24 DIAGNOSIS — R10.31 ABDOMINAL PAIN, RIGHT LOWER QUADRANT: ICD-10-CM

## 2018-06-24 DIAGNOSIS — R10.31 RLQ ABDOMINAL PAIN: ICD-10-CM

## 2018-06-24 LAB
ALBUMIN SERPL-MCNC: 3.3 G/DL (ref 3.4–5)
ALBUMIN UR-MCNC: NEGATIVE MG/DL
ALP SERPL-CCNC: 60 U/L (ref 40–150)
ALT SERPL W P-5'-P-CCNC: 24 U/L (ref 0–70)
ANION GAP SERPL CALCULATED.3IONS-SCNC: 6 MMOL/L (ref 3–14)
APPEARANCE UR: CLEAR
AST SERPL W P-5'-P-CCNC: 20 U/L (ref 0–45)
BASOPHILS # BLD AUTO: 0 10E9/L (ref 0–0.2)
BASOPHILS NFR BLD AUTO: 0.2 %
BILIRUB SERPL-MCNC: 0.4 MG/DL (ref 0.2–1.3)
BILIRUB UR QL STRIP: NEGATIVE
BUN SERPL-MCNC: 13 MG/DL (ref 7–30)
CALCIUM SERPL-MCNC: 8.7 MG/DL (ref 8.5–10.1)
CHLORIDE SERPL-SCNC: 105 MMOL/L (ref 94–109)
CO2 SERPL-SCNC: 27 MMOL/L (ref 20–32)
COLOR UR AUTO: COLORLESS
CREAT SERPL-MCNC: 0.71 MG/DL (ref 0.66–1.25)
DIFFERENTIAL METHOD BLD: ABNORMAL
EOSINOPHIL # BLD AUTO: 0.1 10E9/L (ref 0–0.7)
EOSINOPHIL NFR BLD AUTO: 1.1 %
ERYTHROCYTE [DISTWIDTH] IN BLOOD BY AUTOMATED COUNT: 12.6 % (ref 10–15)
GFR SERPL CREATININE-BSD FRML MDRD: >90 ML/MIN/1.7M2
GLUCOSE SERPL-MCNC: 106 MG/DL (ref 70–99)
GLUCOSE UR STRIP-MCNC: NEGATIVE MG/DL
HCT VFR BLD AUTO: 38.1 % (ref 40–53)
HGB BLD-MCNC: 12.6 G/DL (ref 13.3–17.7)
HGB UR QL STRIP: NEGATIVE
IMM GRANULOCYTES # BLD: 0 10E9/L (ref 0–0.4)
IMM GRANULOCYTES NFR BLD: 0.6 %
KETONES UR STRIP-MCNC: NEGATIVE MG/DL
LEUKOCYTE ESTERASE UR QL STRIP: NEGATIVE
LYMPHOCYTES # BLD AUTO: 0.4 10E9/L (ref 0.8–5.3)
LYMPHOCYTES NFR BLD AUTO: 5.4 %
MCH RBC QN AUTO: 31.7 PG (ref 26.5–33)
MCHC RBC AUTO-ENTMCNC: 33.1 G/DL (ref 31.5–36.5)
MCV RBC AUTO: 96 FL (ref 78–100)
MONOCYTES # BLD AUTO: 0.7 10E9/L (ref 0–1.3)
MONOCYTES NFR BLD AUTO: 10.1 %
NEUTROPHILS # BLD AUTO: 5.3 10E9/L (ref 1.6–8.3)
NEUTROPHILS NFR BLD AUTO: 82.6 %
NITRATE UR QL: NEGATIVE
NRBC # BLD AUTO: 0 10*3/UL
NRBC BLD AUTO-RTO: 0 /100
PH UR STRIP: 8 PH (ref 5–7)
PLATELET # BLD AUTO: 237 10E9/L (ref 150–450)
POTASSIUM SERPL-SCNC: 3.9 MMOL/L (ref 3.4–5.3)
PROT SERPL-MCNC: 7 G/DL (ref 6.8–8.8)
RBC # BLD AUTO: 3.97 10E12/L (ref 4.4–5.9)
SODIUM SERPL-SCNC: 138 MMOL/L (ref 133–144)
SOURCE: ABNORMAL
SP GR UR STRIP: 1.02 (ref 1–1.03)
TROPONIN I SERPL-MCNC: <0.015 UG/L (ref 0–0.04)
UROBILINOGEN UR STRIP-MCNC: 0 MG/DL (ref 0–2)
WBC # BLD AUTO: 6.5 10E9/L (ref 4–11)

## 2018-06-24 PROCEDURE — 93005 ELECTROCARDIOGRAM TRACING: CPT

## 2018-06-24 PROCEDURE — 25000128 H RX IP 250 OP 636: Performed by: UROLOGY

## 2018-06-24 PROCEDURE — 99285 EMERGENCY DEPT VISIT HI MDM: CPT | Mod: 25

## 2018-06-24 PROCEDURE — 84484 ASSAY OF TROPONIN QUANT: CPT | Performed by: INTERNAL MEDICINE

## 2018-06-24 PROCEDURE — 85025 COMPLETE CBC W/AUTO DIFF WBC: CPT | Performed by: INTERNAL MEDICINE

## 2018-06-24 PROCEDURE — 71046 X-RAY EXAM CHEST 2 VIEWS: CPT

## 2018-06-24 PROCEDURE — 80053 COMPREHEN METABOLIC PANEL: CPT | Performed by: INTERNAL MEDICINE

## 2018-06-24 PROCEDURE — 74177 CT ABD & PELVIS W/CONTRAST: CPT

## 2018-06-24 PROCEDURE — 81003 URINALYSIS AUTO W/O SCOPE: CPT | Performed by: INTERNAL MEDICINE

## 2018-06-24 PROCEDURE — 25000125 ZZHC RX 250: Performed by: UROLOGY

## 2018-06-24 PROCEDURE — 93971 EXTREMITY STUDY: CPT | Mod: RT

## 2018-06-24 RX ORDER — IOPAMIDOL 755 MG/ML
96 INJECTION, SOLUTION INTRAVASCULAR ONCE
Status: COMPLETED | OUTPATIENT
Start: 2018-06-24 | End: 2018-06-24

## 2018-06-24 RX ADMIN — IOPAMIDOL 96 ML: 755 INJECTION, SOLUTION INTRAVENOUS at 11:07

## 2018-06-24 RX ADMIN — SODIUM CHLORIDE 69 ML: 9 INJECTION, SOLUTION INTRAVENOUS at 11:08

## 2018-06-24 ASSESSMENT — PAIN DESCRIPTION - DESCRIPTORS: DESCRIPTORS: ACHING

## 2018-06-24 ASSESSMENT — ENCOUNTER SYMPTOMS
NAUSEA: 1
COUGH: 0
SHORTNESS OF BREATH: 0
ABDOMINAL PAIN: 1

## 2018-06-24 NOTE — DISCHARGE INSTRUCTIONS
Coping with Edema  What is edema?  Edema is the build-up of fluid in the body, which causes swelling. Swelling most commonly occurs in the feet, ankles, lower legs or hands.  Swelling can occur in the belly or chest may be a sign of a more severe problem.  Certain medicines or conditions can make the swelling worse.  Symptoms include:    Feet and lower legs get larger when you sit or walk.    Hands feel tight when you make a fist.    When you push on the skin, skin stays dented.    Shiny, tight skin.    Fast weight gain.  How is it treated?  Your care team may give you a medicine to reduce the swelling.  They may also suggest that you meet with a dietitian. He or she can help with food choices to reduce the swelling.  What can I do about the swelling?    Place your feet above your heart 3 times a day: Sit with your feet up on a stool with a pillow. Sit on the bed or couch with two pillows under your feet.    Do not stand for long periods of time.    Wear loose-fitting clothes.    Do not cross your legs.    Reduce the salt in your diet.   These foods are high in salt:  ? Chips, soup  ? Frozen meals, TV dinners  ? Uriostegui, lunch meat, ham  ? Sauces (soy, canned spaghetti sauce)    Walk or do other exercise.    Wear compression stockings.    Drink water as normal.    Weigh yourself every day at the same time to keep track of weight gain.  When should I call my care team?  Call your care team if:    You have a hard time breathing.    You gained 5 pounds or more in 1 week.    Your hands or feet feel cold when you touch them.    You are peeing very little or not at all.    Swelling is moving up your arms or legs.    Your tongue is swelling.    You cannot eat for more than a day  If you have any side effects, call us. We can help you manage these problems.  For more information,  see:  www.chemocare.com  www.cancer.org/treatment/treatmentsandsideeffects/physicalsideeffects/dealingwithsymptomsathome  www.cancer.gov/cancertopics/coping/chemotherapy-and-you  For informational purposes only. Not to replace the advice of your health care provider.  Copyright   2014 [employer] [facility]. All rights reserved. [employer] [id number] - REV 03/16.

## 2018-06-24 NOTE — ED AVS SNAPSHOT
[facility] Emergency Department    [address]    [facility] [address]    Phone:  [phone]    Fax:  [phone]                                       Juwan Latham   MRN: [id number]    Department:  [facility] Emergency Department   Date of Visit:  6/24/2018           After Visit Summary Signature Page     I have received my discharge instructions, and my questions have been answered. I have discussed any challenges I see with this plan with the nurse or doctor.    ..........................................................................................................................................  Patient/Patient Representative Signature      ..........................................................................................................................................  Patient Representative Print Name and Relationship to Patient    ..................................................               ................................................  Date                                            Time    ..........................................................................................................................................  Reviewed by Signature/Title    ...................................................              ..............................................  Date                                                            Time

## 2018-06-24 NOTE — ED AVS SNAPSHOT
[facility] Emergency Department    [address] [place] [address]    Phone:  [phone]    Fax:  [phone]                                       Juwan Latham   MRN: [id number]    Department:  [facility] Emergency Department   Date of Visit:  6/24/2018           Patient Information     Date Of Birth          1950        Your diagnoses for this visit were:     Edema of right lower extremity     Abdominal pain, right lower quadrant        You were seen by Eloina Vazquez MD.      Follow-up Information     Follow up with Julio Guidry Jr., MD In 2 days.    Specialties:  Family Practice, Obstetrics    Contact information:    [address] [place] [address]  [phone]          Discharge Instructions       Coping with Edema  What is edema?  Edema is the build-up of fluid in the body, which causes swelling. Swelling most commonly occurs in the feet, ankles, lower legs or hands.  Swelling can occur in the belly or chest may be a sign of a more severe problem.  Certain medicines or conditions can make the swelling worse.  Symptoms include:    Feet and lower legs get larger when you sit or walk.    Hands feel tight when you make a fist.    When you push on the skin, skin stays dented.    Shiny, tight skin.    Fast weight gain.  How is it treated?  Your care team may give you a medicine to reduce the swelling.  They may also suggest that you meet with a dietitian. He or she can help with food choices to reduce the swelling.  What can I do about the swelling?    Place your feet above your heart 3 times a day: Sit with your feet up on a stool with a pillow. Sit on the bed or couch with two pillows under your feet.    Do not stand for long periods of time.    Wear loose-fitting clothes.    Do not cross your legs.    Reduce the salt in your diet.   These foods are high in salt:  ? Chips, soup  ? Frozen meals, TV dinners  ? Uriostegui, lunch meat, ham  ? Sauces  (soy, canned spaghetti sauce)    Walk or do other exercise.    Wear compression stockings.    Drink water as normal.    Weigh yourself every day at the same time to keep track of weight gain.  When should I call my care team?  Call your care team if:    You have a hard time breathing.    You gained 5 pounds or more in 1 week.    Your hands or feet feel cold when you touch them.    You are peeing very little or not at all.    Swelling is moving up your arms or legs.    Your tongue is swelling.    You cannot eat for more than a day  If you have any side effects, call us. We can help you manage these problems.  For more information, see:  www.chemocare.com  www.cancer.org/treatment/treatmentsandsideeffects/physicalsideeffects/dealingwithsymptomsathome  www.cancer.gov/cancertopics/coping/chemotherapy-and-you  For informational purposes only. Not to replace the advice of your health care provider.  Copyright   2014 [employer]. All rights reserved. [employer] [id number] - REV 03/16.      Your next 10 appointments already scheduled     Jun 26, 2018  1:20 PM CDT   SHORT with Julio Guidry Jr., MD   [facility]    [address] [address] [phone]            Sep 14, 2018  3:00 PM CDT   CT CHEST W/O CONTRAST with UCCT1   [facility] ([facility])    [address] [address] [phone]           Please bring any scans or X-rays taken at other hospitals, if similar tests were done. Also bring a list of your medicines, including vitamins, minerals and over-the-counter drugs. It is safest to leave personal items at home.  Be sure to tell your doctor:   If you have any allergies.   If there s any chance you are pregnant.   If you are breastfeeding.  You do not need to do anything special to prepare for this exam.  Please wear loose clothing, such as a sweat suit or jogging clothes.  Avoid snaps, zippers and other metal. We may ask you to undress and put on a hospital gown.            Sep 14, 2018  4:15 PM CDT   (Arrive by 4:00 PM)   Return Visit with GLORIA Lintno [facility] ([facility])    [address]  [address]  [facility] [address] [phone]              24 Hour Appointment Hotline       To make an appointment at any [facility], call [phone] ([phone]). If you don't have a family doctor or clinic, we will help you find one. [facility] are conveniently located to serve the needs of you and your family.             Review of your medicines      Our records show that you are taking the medicines listed below. If these are incorrect, please call your family doctor or clinic.        Dose / Directions Last dose taken    acetaminophen 325 MG tablet   Commonly known as:  TYLENOL   Dose:  650 mg   Quantity:  100 tablet        Take 2 tablets (650 mg) by mouth every 6 hours Do not take more than 4,000 mg of acetaminophen (Tylenol) from all sources to prevent liver damage.   Refills:  1        Alfalfa Flavor Powd        Refills:  0        ALFALFA PO        Take by mouth daily   Refills:  0        ALPRAZolam 0.5 MG tablet   Commonly known as:  XANAX   Dose:  0.5 mg   Quantity:  20 tablet        Take 1 tablet (0.5 mg) by mouth 3 times daily as needed for anxiety   Refills:  0        atropine 1 % ophthalmic solution   Dose:  1 drop        Apply 1 drop to eye   Refills:  0        cyclobenzaprine 5 MG tablet   Commonly known as:  FLEXERIL        TK 1 T PO TID PRF MUSCLE SPASMS   Refills:  0        ibuprofen 400 MG tablet   Commonly known as:  ADVIL/MOTRIN   Dose:  400 mg        Take 400 mg by mouth   Refills:  0        lidocaine HCl 2 % Soln   Quantity:  15 mL        Apply thin layer to tip of penis TID PRN penile pain   Refills:  1        LISINOPRIL PO   Dose:  10 mg        Take 10 mg by mouth daily    Refills:  0        MYRBETRIQ 50 MG 24 hr tablet   Generic drug:  mirabegron        TK 1 TABLET PO DAILY   Refills:  0        ofloxacin 0.3 % ophthalmic solution   Commonly known as:  OCUFLOX   Dose:  1 drop        Apply 1 drop to eye   Refills:  0        ondansetron 4 MG ODT tab   Commonly known as:  ZOFRAN ODT   Dose:  4-8 mg   Quantity:  20 tablet        Take 1-2 tablets (4-8 mg) by mouth 3 times daily (before meals)   Refills:  1        phenazopyridine 100 MG tablet   Commonly known as:  PYRIDIUM   Dose:  100-200 mg   Quantity:  12 tablet        Take 1-2 tablets (100-200 mg) by mouth 3 times daily as needed for urinary tract discomfort May turn your urine orange   Refills:  0        prednisoLONE acetate 1 % ophthalmic susp   Commonly known as:  PRED FORTE   Dose:  1 drop        Apply 1 drop to eye   Refills:  0        sennosides 8.6 MG tablet   Commonly known as:  SENOKOT   Dose:  1 tablet   Quantity:  30 tablet        Take 1 tablet by mouth 2 times daily   Refills:  0        tamsulosin 0.4 MG capsule   Commonly known as:  FLOMAX   Dose:  0.4 mg   Quantity:  30 capsule        Take 1 capsule (0.4 mg) by mouth daily   Refills:  0        tolterodine 4 MG 24 hr capsule   Commonly known as:  DETROL LA   Dose:  4 mg   Quantity:  30 capsule        Take 1 capsule (4 mg) by mouth daily   Refills:  1        VITAMIN B COMPLEX PO   Dose:  1 tablet        Take 1 tablet by mouth   Refills:  0        VITAMIN D (CHOLECALCIFEROL) PO   Dose:  5000 Units        Take 5,000 Units by mouth daily   Refills:  0                Procedures and tests performed during your visit     CBC with platelets differential    Comprehensive metabolic panel    EKG 12-lead, tracing only    Troponin I    UA reflex to Microscopic and Culture    US Lower Extremity Venous Duplex Right    XR Chest 2 Views      Orders Needing Specimen Collection     None      Pending Results     Date and Time Order Name Status Description    6/24/2018 1316 XR Chest 2 Views  Preliminary     6/24/2018 1316 EKG 12-lead, tracing only Preliminary     6/24/2018 1042 CT Abdomen Pelvis w/Contrast Preliminary             Pending Culture Results     No orders found from 6/22/2018 to 6/25/2018.            Pending Results Instructions     If you had any lab results that were not finalized at the time of your Discharge, you can call the ED Lab Result RN at [phone]. You will be contacted by this team for any positive Lab results or changes in treatment. The nurses are available 7 days a week from 10A to 6:30P.  You can leave a message 24 hours per day and they will return your call.        Test Results From Your Hospital Stay        6/24/2018  2:27 PM      Component Results     Component Value Ref Range & Units Status    WBC 6.5 4.0 - 11.0 10e9/L Final    RBC Count 3.97 (L) 4.4 - 5.9 10e12/L Final    Hemoglobin 12.6 (L) 13.3 - 17.7 g/dL Final    Hematocrit 38.1 (L) 40.0 - 53.0 % Final    MCV 96 78 - 100 fl Final    MCH 31.7 26.5 - 33.0 pg Final    MCHC 33.1 31.5 - 36.5 g/dL Final    RDW 12.6 10.0 - 15.0 % Final    Platelet Count 237 150 - 450 10e9/L Final    Diff Method Automated Method  Final    % Neutrophils 82.6 % Final    % Lymphocytes 5.4 % Final    % Monocytes 10.1 % Final    % Eosinophils 1.1 % Final    % Basophils 0.2 % Final    % Immature Granulocytes 0.6 % Final    Nucleated RBCs 0 0 /100 Final    Absolute Neutrophil 5.3 1.6 - 8.3 10e9/L Final    Absolute Lymphocytes 0.4 (L) 0.8 - 5.3 10e9/L Final    Absolute Monocytes 0.7 0.0 - 1.3 10e9/L Final    Absolute Eosinophils 0.1 0.0 - 0.7 10e9/L Final    Absolute Basophils 0.0 0.0 - 0.2 10e9/L Final    Abs Immature Granulocytes 0.0 0 - 0.4 10e9/L Final    Absolute Nucleated RBC 0.0  Final         6/24/2018  2:50 PM      Component Results     Component Value Ref Range & Units Status    Sodium 138 133 - 144 mmol/L Final    Potassium 3.9 3.4 - 5.3 mmol/L Final    Chloride 105 94 - 109 mmol/L Final    Carbon Dioxide 27 20 - 32 mmol/L Final     Anion Gap 6 3 - 14 mmol/L Final    Glucose 106 (H) 70 - 99 mg/dL Final    Urea Nitrogen 13 7 - 30 mg/dL Final    Creatinine 0.71 0.66 - 1.25 mg/dL Final    GFR Estimate >90 >60 mL/min/1.7m2 Final    Non  GFR Calc    GFR Estimate If Black >90 >60 mL/min/1.7m2 Final    African American GFR Calc    Calcium 8.7 8.5 - 10.1 mg/dL Final    Bilirubin Total 0.4 0.2 - 1.3 mg/dL Final    Albumin 3.3 (L) 3.4 - 5.0 g/dL Final    Protein Total 7.0 6.8 - 8.8 g/dL Final    Alkaline Phosphatase 60 40 - 150 U/L Final    ALT 24 0 - 70 U/L Final    AST 20 0 - 45 U/L Final         6/24/2018  2:30 PM      Narrative     VENOUS ULTRASOUND RIGHT LEG  6/24/2018 2:26 PM     HISTORY: swelling;     COMPARISON: None.    FINDINGS:  Examination of the deep veins with graded compression and  color flow Doppler with spectral wave form analysis shows no evidence  of thrombus in the common femoral vein, femoral vein, popliteal vein  or calf veins.  There is no venous insufficiency.        Impression     IMPRESSION: No evidence of deep venous thrombosis.    JAZMINE VELÁSQUEZ MD         6/24/2018  2:31 PM      Component Results     Component Value Ref Range & Units Status    Color Urine Colorless  Final    Appearance Urine Clear  Final    Glucose Urine Negative NEG^Negative mg/dL Final    Bilirubin Urine Negative NEG^Negative Final    Ketones Urine Negative NEG^Negative mg/dL Final    Specific Gravity Urine 1.018 1.003 - 1.035 Final    Blood Urine Negative NEG^Negative Final    pH Urine 8.0 (H) 5.0 - 7.0 pH Final    Protein Albumin Urine Negative NEG^Negative mg/dL Final    Urobilinogen mg/dL 0.0 0.0 - 2.0 mg/dL Final    Nitrite Urine Negative NEG^Negative Final    Leukocyte Esterase Urine Negative NEG^Negative Final    Source Midstream Urine  Final         6/24/2018  2:48 PM      Narrative     CHEST TWO VIEWS  6/24/2018 2:28 PM     HISTORY: Chest and abdominal pain.    COMPARISON: 4/7/2017.        Impression     IMPRESSION: The previously seen  minimal linear scarring in the right  lung apex is less prominent. Otherwise no active cardiopulmonary  disease or change since the prior exam.         6/24/2018  2:50 PM      Component Results     Component Value Ref Range & Units Status    Troponin I ES <0.015 0.000 - 0.045 ug/L Final    The 99th percentile for upper reference range is 0.045 ug/L.  Troponin values   in the range of 0.045 - 0.120 ug/L may be associated with risks of adverse   clinical events.                  Clinical Quality Measure: Blood Pressure Screening     Your blood pressure was checked while you were in the emergency department today. The last reading we obtained was  BP: 127/72 . Please read the guidelines below about what these numbers mean and what you should do about them.  If your systolic blood pressure (the top number) is less than 120 and your diastolic blood pressure (the bottom number) is less than 80, then your blood pressure is normal. There is nothing more that you need to do about it.  If your systolic blood pressure (the top number) is 120-139 or your diastolic blood pressure (the bottom number) is 80-89, your blood pressure may be higher than it should be. You should have your blood pressure rechecked within a year by a primary care provider.  If your systolic blood pressure (the top number) is 140 or greater or your diastolic blood pressure (the bottom number) is 90 or greater, you may have high blood pressure. High blood pressure is treatable, but if left untreated over time it can put you at risk for heart attack, stroke, or kidney failure. You should have your blood pressure rechecked by a primary care provider within the next 4 weeks.  If your provider in the emergency department today gave you specific instructions to follow-up with your doctor or provider even sooner than that, you should follow that instruction and not wait for up to 4 weeks for your follow-up visit.        Thank you for choosing [facility]        Thank you for choosing [facility] for your care. Our goal is always to provide you with excellent care. Hearing back from our patients is one way we can continue to improve our services. Please take a few minutes to complete the written survey that you may receive in the mail after you visit with us. Thank you!        [employer] Information     [employer] gives you secure access to your electronic health record. If you see a primary care provider, you can also send messages to your care team and make appointments. If you have questions, please call your primary care clinic.  If you do not have a primary care provider, please call [phone] and they will assist you.        Care EveryWhere ID     This is your Care EveryWhere ID. This could be used by other organizations to access your [facility] medical records  [id number]        Equal Access to Services     CHAMP SNIDER : Rohan Obrien, ekaterina reyes, miriam pimentel, mynor escoto. So [facility] [place] [phone].    ATENCIÓN: Si habla español, tiene a gunn disposición servicios gratuitos de asistencia lingüística. Llame al [phone].    We comply with applicable federal civil rights laws and [place] laws. We do not discriminate on the basis of race, color, national origin, age, disability, sex, sexual orientation, or gender identity.            After Visit Summary       This is your record. Keep this with you and show to your community pharmacist(s) and doctor(s) at your next visit.

## 2018-06-24 NOTE — ED NOTES
Patient continues to report some groin pain and leg swelling. Alert and oriented. Denies shortness of breath, fever/chill. MD in to see patient and discuss diagnosis, test results, and discharge plan. Patient meets discharge criteria. Discussed AVS with patient. Questions answered. Patient verbalized understanding. Patient reports being ready to go home. Patient discharged home by car with all necessary information.

## 2018-06-24 NOTE — ED TRIAGE NOTES
Patient presents to the ED with right upper leg swelling. Symptoms x 3 days. Also reports has had some right groin pain for the past couple weeks. States had an xray done of that area earlier today at [facility] but does not know the results yet.

## 2018-06-24 NOTE — ED PROVIDER NOTES
History     Chief Complaint:  Leg Swelling and Groin Pain    HPI   Juwan Latham is a 67 year old male who presents to the emergency department today for evaluation of leg swelling and groin pain. The patient reports he has been experiencing some lower abdominal pain for a few weeks. He states he went to his appointment with his prostate doctor who ordered a CAT scan. He reports he got the CAT scan today, which was unremarkable. He also notes some leg swelling which he has noticed for the past few days. He notes he has been feeling nauseous for a while. He denies any recent injury, history of blood clots, taking any hormone medications, chest pain, shortness of breath, coughing up blood.    Cardiac/PE/DVT Risk Factors:  History of hypertension - positive  History of diabetes - negative   Family history of diabetes  History of smoking - negative  Personal history of PE/DVT - negative  Family history of PE/DVT - negative   Family history of heart complications - positive  Cancer - prostate cancer    CT ABDOMEN AND PELVIS WITH CONTRAST 6/24/2018 11:17 AM  1. No significant changes since the prior exam and no acute-appearing  abnormality in the abdomen or pelvis.  2. Stable small fluid collection right anterior lower pelvis which may  be a postoperative seroma or lymphocele.  3. Horseshoe kidney and fatty infiltration of the liver again noted.  Reading per radiology       Allergies:  Percocet      Medications:    Alprazolam   Cyclobenzaprine   LISINOPRIL     MYRBETRIQ    Ondansetron   Phenazopyridine    Prednisolone     Sennosides     Tamsulosin     Tolterodine       Past Medical History:    Anxiety   Arthritis   Calculus of kidney   Congenital single kidney   Gastroesophageal reflux disease   Hx of cancer of lung   Hypertension   Prostate cancer   Seasonal allergies   Sleep apnea     Past Surgical History:    Ureter stents placement  Wedge resection, lung    Family History:    Mother: heart disease  Brother:  diabetes, colorectal cancer  Maternal grandmother: diabetes    Social History:  Smoking Status: Former Smoker   Packs/day: 2   Years: 20   Type :cigarettes   Quit date: 01/01/1985  Smokeless Tobacco: Never Used  Alcohol Use: Positive   Marital Status:        Review of Systems   Respiratory: Negative for cough and shortness of breath.    Cardiovascular: Positive for leg swelling. Negative for chest pain.   Gastrointestinal: Positive for abdominal pain and nausea.   All other systems reviewed and are negative.      Physical Exam     Patient Vitals for the past 24 hrs:   BP Temp Pulse Heart Rate Resp SpO2   06/24/18 1510 - - - 63 14 98 %   06/24/18 1500 - - - 63 11 97 %   06/24/18 1450 - - - 68 20 100 %   06/24/18 1445 - - - - - 98 %   06/24/18 1440 - - - 65 17 95 %   06/24/18 1400 - - - - - 99 %   06/24/18 1330 127/72 - - - - 98 %   06/24/18 1315 128/80 - - - - 97 %   06/24/18 1309 143/85 98.1  F (36.7  C) 78 - 16 96 %         Physical Exam   Constitutional: He is cooperative.   HENT:   Right Ear: Tympanic membrane normal.   Left Ear: Tympanic membrane normal.   Mouth/Throat: Oropharynx is clear and moist and mucous membranes are normal.   Eyes: Conjunctivae are normal.   Neck: Normal range of motion.   Cardiovascular: Regular rhythm and normal heart sounds.    Pulmonary/Chest: Effort normal and breath sounds normal.   Abdominal: Soft. Normal appearance and bowel sounds are normal. There is tenderness in the right lower quadrant. There is no rebound and no guarding.   Musculoskeletal: Normal range of motion. He exhibits edema.   Edema of right leg. No erythema, induration, tenderness   Lymphadenopathy:     He has no cervical adenopathy.   Neurological: He is alert.   Skin: Skin is warm and dry.   Psychiatric: He has a normal mood and affect.       Emergency Department Course     ECG:  ECG taken at 1331  Normal sinus rhythm  Normal ECG  Rate 69 bpm. PA interval 148 ms. QRS duration 86 ms. QT/QTc 374/400 ms.  P-R-T axes 43 57 23.    Imaging:  Radiology findings were communicated with the patient who voiced understanding of the findings.    XR Chest 2 Views   The previously seen minimal linear scarring in the right  lung apex is less prominent. Otherwise no active cardiopulmonary  disease or change since the prior exam.  Reading per radiology    US Lower Extremity Venous Duplex Right   No evidence of deep venous thrombosis.  JAZMINE VELÁSQUEZ MD  Reading per radiology    Laboratory:  Laboratory findings were communicated with the patient who voiced understanding of the findings.    UA with reflex to microscopic and culture: pH 8(H), o/w WNL  CBC: WBC 6.5, HGB 12.6(L),   CMP: glucose 106 (H), albumin 3.3(L) o/w WNL (Creatinine 0.371)  Troponin (Collected 1357): <0.015     Emergency Department Course:    1307 Nursing notes and vitals reviewed.    1310 I performed an exam of the patient as documented above.     1357 IV was inserted and blood was drawn for laboratory testing, results above.     1400 The patient provided a urine sample here in the emergency department. This was sent for laboratory testing, findings above.     1414 The patient was sent for a lower extremity US while in the emergency department, results above.      1424 The patient was sent for a chest x-ray while in the emergency department, results above.      1538 I personally reviewed the laboratory and imaging results with the patient and answered all related questions prior to discharge.    Impression & Plan      Medical Decision Making:  Juwan Latham is a 67 year old male who presents to the emergency department today for evaluation of right leg swelling as well as ongoing right lower quadrant pain and fatigue and malaise.  After thorough evaluation the cause of his symptoms remains unclear.  As noted he had a CT scan as an outpatient today.  This does not show any evidence of pelvic mass causing compression of the iliac vessels or other definite  etiology for his symptoms.  I did discuss with him the small seroma noted though this appears stable or improved from previous and I doubt if it is the cause of his symptoms.  Ultrasound shows no evidence of DVT.  There is no evidence of renal insufficiency, cardiac dysfunction, or other identifiable cause of edema or malaise.  With reassuring evaluation here and we can pursue further diagnosis as an outpatient.  I have advised patient to schedule follow-up with primary care within 2-3 days, return if worse or new symptoms.    Diagnosis:     ICD-10-CM    1. Edema of right lower extremity R60.0    2. Abdominal pain, right lower quadrant R10.31      Disposition:   The patient is discharged to home.     Discharge Medications:  No discharge medications     Scribe Disclosure:  I, Brittney Klein, am serving as a scribe at 1:09 PM on 6/24/2018 to document services personally performed by Eloina Vazquez MD based on my observations and the provider's statements to me.     [facility] EMERGENCY DEPARTMENT       Eloina Vazquez MD  06/24/18 7296

## 2018-06-24 NOTE — TELEPHONE ENCOUNTER
Caller has been having lower right abd pain. Now his right leg is swelling from the groin down to the foot. Advised to be seen in ED now for evaluation. Patient voices understanding and is in agreement with this plan.     Jacquelyn Carlos RN  [facility] RN  Lung Nodule and ED Lab Result F/u RN    Reason for Disposition    [1] Thigh, calf, or ankle swelling AND [2] only 1 side    Additional Information    Negative: Severe difficulty breathing (e.g., struggling for each breath, speaks in single words)    Negative: Looks like a broken bone or dislocated joint (e.g., crooked or deformed)    Negative: Sounds like a life-threatening emergency to the triager    Negative: Chest pain    Negative: Followed a leg injury    Negative: [1] Small area of swelling AND [2] followed an insect bite to the area    Negative: Swelling of one ankle joint    Negative: Swelling of knee is main symptom    Negative: Pregnant    Negative: Postpartum (< 1 month since delivery)    Negative: Difficulty breathing at rest    Negative: Entire foot is cool or blue in comparison to other side    Negative: [1] Can't walk or can barely walk AND [2] new onset    Negative: [1] Difficulty breathing with exertion (e.g., walking) AND [2] new onset or worsening    Negative: [1] Red area or streak AND [2] fever    Negative: [1] Swelling is painful to touch AND [2] fever    Negative: [1] Cast on leg or ankle AND [2] now increased pain    Negative: Patient sounds very sick or weak to the triager    Negative: SEVERE leg swelling (e.g., swelling extends above knee, entire leg is swollen, weeping fluid)    Negative: [1] Red area or streak [2] large (> 2 in. or 5 cm)    Negative: [1] Thigh or calf pain AND [2] only 1 side AND [3] present > 1 hour    Protocols used: LEG SWELLING AND EDEMA-ADULT-

## 2018-06-25 LAB — INTERPRETATION ECG - MUSE: NORMAL

## 2018-06-26 ENCOUNTER — OFFICE VISIT (OUTPATIENT)
Dept: FAMILY MEDICINE | Facility: CLINIC | Age: 68
End: 2018-06-26
Payer: COMMERCIAL

## 2018-06-26 VITALS
WEIGHT: 194.4 LBS | OXYGEN SATURATION: 96 % | BODY MASS INDEX: 30.51 KG/M2 | DIASTOLIC BLOOD PRESSURE: 75 MMHG | SYSTOLIC BLOOD PRESSURE: 130 MMHG | HEART RATE: 67 BPM | HEIGHT: 67 IN

## 2018-06-26 DIAGNOSIS — C34.11 MALIGNANT NEOPLASM OF UPPER LOBE OF RIGHT LUNG (H): ICD-10-CM

## 2018-06-26 DIAGNOSIS — F33.0 MAJOR DEPRESSIVE DISORDER, RECURRENT EPISODE, MILD (H): Primary | ICD-10-CM

## 2018-06-26 DIAGNOSIS — R53.83 FATIGUE, UNSPECIFIED TYPE: ICD-10-CM

## 2018-06-26 PROBLEM — R33.9 URINARY RETENTION: Status: RESOLVED | Noted: 2018-03-25 | Resolved: 2018-06-26

## 2018-06-26 PROCEDURE — 36415 COLL VENOUS BLD VENIPUNCTURE: CPT | Performed by: FAMILY MEDICINE

## 2018-06-26 PROCEDURE — 99214 OFFICE O/P EST MOD 30 MIN: CPT | Performed by: FAMILY MEDICINE

## 2018-06-26 PROCEDURE — 86618 LYME DISEASE ANTIBODY: CPT | Performed by: FAMILY MEDICINE

## 2018-06-26 RX ORDER — PAROXETINE 10 MG/1
10 TABLET, FILM COATED ORAL AT BEDTIME
Qty: 30 TABLET | Refills: 1 | Status: SHIPPED | OUTPATIENT
Start: 2018-06-26 | End: 2018-08-15

## 2018-06-26 RX ORDER — ACETAMINOPHEN 325 MG/1
650 TABLET ORAL EVERY 6 HOURS
Qty: 100 TABLET | Refills: 1 | Status: SHIPPED | OUTPATIENT
Start: 2018-06-26 | End: 2019-05-07

## 2018-06-26 NOTE — MR AVS SNAPSHOT
After Visit Summary   6/26/2018    Juwan Latham    MRN: [id number]           Patient Information     Date Of Birth          1950        Visit Information        Provider Department      6/26/2018 1:20 PM Julio Guidry Jr., MD [facility] [place]        Today's Diagnoses     Major depressive disorder, recurrent episode, mild (H)    -  1    Malignant neoplasm of upper lobe of right lung (H)        Fatigue, unspecified type           Follow-ups after your visit        Follow-up notes from your care team     Return in about 4 weeks (around 7/24/2018).      Your next 10 appointments already scheduled     Sep 14, 2018  3:00 PM CDT   CT CHEST W/O CONTRAST with UCCT1   [facility] ([facility])    [address]  [address]  [place] [place] [address] [phone]           Please bring any scans or X-rays taken at other hospitals, if similar tests were done. Also bring a list of your medicines, including vitamins, minerals and over-the-counter drugs. It is safest to leave personal items at home.  Be sure to tell your doctor:   If you have any allergies.   If there s any chance you are pregnant.   If you are breastfeeding.  You do not need to do anything special to prepare for this exam.  Please wear loose clothing, such as a sweat suit or jogging clothes. Avoid snaps, zippers and other metal. We may ask you to undress and put on a hospital gown.            Sep 14, 2018  4:15 PM CDT   (Arrive by 4:00 PM)   Return Visit with GLORIA Linton [facility] ([facility])    [address]  [address]  [place] [address] [phone]              Who to contact     If you have questions or need follow up information about today's clinic visit or your schedule please contact [facility] [place] directly at [phone].  Normal or non-critical lab and imaging results will be  "communicated to you by tagWALLEThart, letter or phone within 4 business days after the clinic has received the results. If you do not hear from us within 7 days, please contact the clinic through Webchutney or phone. If you have a critical or abnormal lab result, we will notify you by phone as soon as possible.  Submit refill requests through Web[employer] or call your pharmacy and they will forward the refill request to us. Please allow 3 business days for your refill to be completed.          Additional Information About Your Visit        Web[employer]ney Information     [employer] gives you secure access to your electronic health record. If you see a primary care provider, you can also send messages to your care team and make appointments. If you have questions, please call your primary care clinic.  If you do not have a primary care provider, please call [phone] and they will assist you.        Care EveryWhere ID     This is your Care EveryWhere ID. This could be used by other organizations to access your [facility] medical records  [id number]        Your Vitals Were     Pulse Height Pulse Oximetry BMI (Body Mass Index)          67 5' 7\" (1.702 m) 96% 30.45 kg/m2         Blood Pressure from Last 3 Encounters:   06/26/18 130/75   06/24/18 127/72   06/21/18 152/80    Weight from Last 3 Encounters:   06/26/18 194 lb 6.4 oz (88.2 kg)   06/21/18 191 lb (86.6 kg)   05/22/18 191 lb (86.6 kg)              We Performed the Following     Lyme Disease Valerie with reflex to WB Serum          Today's Medication Changes          These changes are accurate as of 6/26/18  2:03 PM.  If you have any questions, ask your nurse or doctor.               Start taking these medicines.        Dose/Directions    PARoxetine 10 MG tablet   Commonly known as:  PAXIL   Used for:  Fatigue, unspecified type   Started by:  Julio Guidry Jr., MD        Dose:  10 mg   Take 1 tablet (10 mg) by mouth At Bedtime   Quantity:  30 tablet   Refills:  1            Where to " get your medicines      These medications were sent to [id number] - [place], [place] - [address] [place] [address]  [address], [place] [address]     Phone:  [phone]     PARoxetine 10 MG tablet         Some of these will need a paper prescription and others can be bought over the counter.  Ask your nurse if you have questions.     Bring a paper prescription for each of these medications     acetaminophen 325 MG tablet                Primary Care Provider Office Phone # Fax #    Julio Guidry Jr., [phone] [address]  [place] [address]        Equal Access to Services     [facility]: Hadii aad ku hadasho Soomaali, waaxda luqadaha, qaybta kaalmada adeegyada, mynor mosley . So [facility] [phone].    ATENCIÓN: Si habla español, tiene a gunn disposición servicios gratuitos de asistencia lingüística. Llame al [phone].    We comply with applicable federal civil rights laws and [place] laws. We do not discriminate on the basis of race, color, national origin, age, disability, sex, sexual orientation, or gender identity.            Thank you!     Thank you for choosing [facility]  for your care. Our goal is always to provide you with excellent care. Hearing back from our patients is one way we can continue to improve our services. Please take a few minutes to complete the written survey that you may receive in the mail after your visit with us. Thank you!             Your Updated Medication List - Protect others around you: Learn how to safely use, store and throw away your medicines at www.disposemymeds.org.          This list is accurate as of 6/26/18  2:03 PM.  Always use your most recent med list.                   Brand Name Dispense Instructions for use Diagnosis    acetaminophen 325 MG tablet    TYLENOL    100 tablet    Take 2 tablets (650 mg) by mouth every 6 hours Do not take more than 4,000 mg of  acetaminophen (Tylenol) from all sources to prevent liver damage.    Malignant neoplasm of upper lobe of right lung (H)       atropine 1 % ophthalmic solution      Apply 1 drop to eye        LISINOPRIL PO      Take 10 mg by mouth daily        ofloxacin 0.3 % ophthalmic solution    OCUFLOX     Apply 1 drop to eye        PARoxetine 10 MG tablet    PAXIL    30 tablet    Take 1 tablet (10 mg) by mouth At Bedtime    Fatigue, unspecified type

## 2018-06-26 NOTE — PROGRESS NOTES
SUBJECTIVE:   Juwan Latham is a 67 year old male who presents to clinic today for the following health issues:    Hospital Follow-up Visit:    Hospital/Nursing Home/IP Rehab Facility: [facility]  Date of Admission: 06/24/2018  Date of Discharge: 06/24/2018  Reason(s) for Admission: Edema of right lower extremity and abdomina pain, right lower quad.   Pt state that he feels discomfort in the abdominal area             Problems taking medications regularly:  None       Medication changes since discharge: None       Problems adhering to non-medication therapy:  None  Summary of hospitalization:  [facility] discharge summary reviewed  Diagnostic Tests/Treatments reviewed.  Follow up needed: Urology.  Other Healthcare Providers Involved in Patient s Care:         Specialist appointment - Urology and Oncology  Update since discharge: stable.    Post Discharge Medication Reconciliation: discharge medications reconciled and changed, per note/orders (see AVS).  Plan of care communicated with patient     Coding guidelines for this visit:  Type of Medical   Decision Making Face-to-Face Visit       within 7 Days of discharge Face-to-Face Visit        within 14 days of discharge   Moderate Complexity 74706 [id number]   High Complexity 52143 14137          [facility] discharge:  Juwan Latham is a 67 year old male who presents to the emergency department today for evaluation of right leg swelling as well as ongoing right lower quadrant pain and fatigue and malaise.  After thorough evaluation the cause of his symptoms remains unclear.  As noted he had a CT scan as an outpatient today.  This does not show any evidence of pelvic mass causing compression of the iliac vessels or other definite etiology for his symptoms.  I did discuss with him the small seroma noted though this appears stable or improved from previous and I doubt if it is the cause of his symptoms.  Ultrasound shows no evidence of DVT.   There is no evidence of renal insufficiency, cardiac dysfunction, or other identifiable cause of edema or malaise.  With reassuring evaluation here and we can pursue further diagnosis as an outpatient.  I have advised patient to schedule follow-up with primary care within 2-3 days, return if worse or new symptoms.      Today's visit:    Pt thinks he is too tired and lazy to have just anxiety as the cause of these symptoms. He still has nausea, and does not feel well. Pt tried taking Zofran from last visit but this did not help with his nausea. Pt raises concern about Lyme's disease; he was bit by a tic awhile ago, and had the bullseye prateek. He sought medical attention and pt notes they were able to treat him in time. Pt has been feeling fatigued for almost 1.5 years and does not know what is contributing to his symptoms and is concerned about this. Pt is wondering if myself and Dr. Agarwal can get together and discuss what is contributing to his fatigue and nausea. Pt notes he is tired when he goes to bed at night, but he gets up at 2 am to urinate. He does state that he is able to eventually fall back asleep after he urinates. Pt thinks he has felt good 2 days out of the last 3 months. Pt is willing to try new medications to try to help with his symptoms. Pt is sleeping frequently throughout the day; he takes 2 hour naps throughout the day.    Pt ran out of vitamin B and Alfalfa, and does not know if these are doing him any good. Pt notes he has been trying to eat a lot of green leafy foods. He does not eat fast food 3 or more days per week. He takes OTC Tylenol intermittently to help with pain. He is not taking Ibuprofen.    He's frustrated by his leg edema, persistent nausea & fatigue and RLQ abdominal pain that no one can seem to figure out.  He's had an extensive evaluation thus far without a clear diagnosis.  This has included multiple blood tests, a CT of the abdomen & pelvis, ultrasound of his right leg and  "chest x-ray.  He had a stress echo two years ago that was normal and EKG two days ago was also normal with negative troponin.  PET scan in April 2017 showed RUL lung activity (likely from previous surgery) and hypermetabolic focus in the prostate which prompted a prostate cancer diagnosis and treatment recently.        Problem list and histories reviewed & adjusted, as indicated.  Additional history: as documented    Reviewed and updated as needed this visit by clinical staff  Tobacco  Allergies  Meds  Med Hx  Surg Hx  Fam Hx  Soc Hx      Reviewed and updated as needed this visit by Provider       ROS:  Constitutional, HEENT, cardiovascular, pulmonary, gi and gu systems are negative, except as otherwise noted.    This document serves as a record of the services and decisions personally performed and made by Julio Guidry MD. It was created on his behalf by Mark Pennington, a trained medical scribe. The creation of this document is based on the provider's statements to the medical scribe.  Mark Pennington 1:29 PM June 26, 2018    OBJECTIVE:     /75 (BP Location: Right arm, Patient Position: Chair, Cuff Size: Adult Regular)  Pulse 67  Ht 1.702 m (5' 7\")  Wt 88.2 kg (194 lb 6.4 oz)  SpO2 96%  BMI 30.45 kg/m2  Body mass index is 30.45 kg/(m^2).  GENERAL: healthy, alert and no distress.    NECK: no adenopathy, no asymmetry, masses, or scars and thyroid normal to palpation  RESP: lungs clear to auscultation - no rales, rhonchi or wheezes  CV: regular rate and rhythm, normal S1 S2, no S3 or S4, no murmur, click or rub, no peripheral pulses strong. 2+ pitting edema to the knee in right LE, no edema in left LE.  ABDOMEN: soft, nontender, no hepatosplenomegaly, no masses and bowel sounds normal  PSYCH: mentation appears normal, affect is a little flat - he rarely smiles.  Eye contact is poor.    Diagnostic Test Results:  No results found for this or any previous visit (from the past 24 " hour(s)).    ASSESSMENT/PLAN:     (F33.0) Major depressive disorder, recurrent episode, mild (H)  (primary encounter diagnosis)  Comment: Juwan is understandably frustrated at the persistence of his symptoms as outlined above.  He has ideas that perhaps this is a kidney stone, Lyme disease or something else no one has yet thougth of.  Reassured pt that kidney stone is not likely given CT scan did not show this and he did not have any blood in his urine. Further workup from his hospitalization ruled out hernia, blood clot, diverticulitis, hepatitis, pancreatitis, and renal insufficiency. In addition, discussed with pt that his hemoglobin, thyroid levels, and vitamin D levels have been good which suggests that these are not contributing to his fatigue. CXR yesterday did not show fluid accumulating in his lungs, he is not wheezing, and does not have chest pain or SOB; therefore, taking this into account along with his recent imaging, I discussed with pt that underlying cardiovascular concern is not likely contributing to his fatigue or nausea.  I also do not think any of his medications are contributing to his fatigue or nausea; discussed with pt that most common side effect with lisinopril is dry cough which he denied today. I discussed with pt that I can send him to general internist for a 2nd opinion if he desires; he declined this.  Discussed with pt that given the extensive workup he has had that have been negative for the most part, I once again believe his anxiety and depression are contributing to his symptoms. Recommended we try putting him on Paxil; this will also help him sleep at night. Discussed with pt that it can take 4-6 weeks for this to be effective. Furthermore, discussed with pt that nausea and lightheadedness are common side effects with Paxil, but they should resolve after 10 days of being on it. Discussed with pt that Paxil increases serotonin levels and my hope is that this will improve his  fatigue, nausea, and maybe even his abdominal pain. He has tried several other SSRI's and Wellbutrin, each without lasting relief of his symptoms.  Lastly, given his diet is well for the most part, I endorsed to pt that I do not necessarily think he has to take his Alfalfa or vitamin B supplements. I would rather him take less pharmaceuticals if he can; pt agreed to discontinue both of these. In the interim, per patient's request, will screen for Lyme's disease as he has hx of tick bite that was previously treated.  Plan: Lyme Disease Valerie with reflex to WB Serum        Follow up in 1 month for recheck.    (C34.11) Malignant neoplasm of upper lobe of right lung (H)  Comment: Pt has been following oncology for this. Per radiology and per SHASTA Lott, CT scan of his chest done on 3/9/18 did not show any change to the RUL consolidation along the surgical margins when compared with the November 2017 scan--though decreased in size when compared with the July 2017 scan. The small (sub-five mm) nodules also remain unchanged. Pt was directed to follow up with oncology in 6 months with repeat CT scan done prior to this visit.  Plan: acetaminophen (TYLENOL) 325 MG tablet        Follow up if needed.    (R53.83) Fatigue, unspecified type  Comment: See above. My hope is that Paxil will help improve his fatigue.  Plan: PARoxetine (PAXIL) 10 MG tablet        Follow up in 1 month for recheck    The information in this document, created by the medical scribe for me, accurately reflects the services I personally performed and the decisions made by me. I have reviewed and approved this document for accuracy prior to leaving the patient care area.  June 26, 2018 1:29 PM    Julio Guidry Jr, MD  [facility]

## 2018-06-27 LAB — B BURGDOR IGG+IGM SER QL: 0.09 (ref 0–0.89)

## 2018-06-27 NOTE — PROGRESS NOTES
Mr. Latham,    -All of your labs are normal.  No evidence of Lyme disease.    If you have further questions about the interpretation of your labs, labtestsonline.org is a good website to check out for further information.    Please contact the clinic if you have additional questions.  Thank you.    Sincerely,    Julio Guidry MD

## 2018-07-05 ENCOUNTER — MYC MEDICAL ADVICE (OUTPATIENT)
Dept: FAMILY MEDICINE | Facility: CLINIC | Age: 68
End: 2018-07-05

## 2018-07-05 NOTE — TELEPHONE ENCOUNTER
Message handled by Nurse Triage with [employer] - provider name: SR GARCIA     See Mychart response.    Anali Amezcua RN    [facility] Triage  .

## 2018-07-06 ENCOUNTER — MYC MEDICAL ADVICE (OUTPATIENT)
Dept: FAMILY MEDICINE | Facility: CLINIC | Age: 68
End: 2018-07-06

## 2018-07-06 DIAGNOSIS — R19.00 MASS OF PELVIS: ICD-10-CM

## 2018-07-06 DIAGNOSIS — R60.0 UNILATERAL EDEMA OF LOWER EXTREMITY: Primary | ICD-10-CM

## 2018-07-06 NOTE — TELEPHONE ENCOUNTER
JUANIS Echeverria keeps opening up new [employer] messages with each message.  Both Anali and I reiterated your message but he is still replying.    Evisit?  Shobha Agarwal RN- Triage [employer]

## 2018-07-06 NOTE — TELEPHONE ENCOUNTER
I'm not sure what else I can do for Juwan at this point.  He's had a very thorough evaluation for his nausea over the past two years.  Anti-emetics don't seem to help and none of th extensive testing we've done has revealed the underlying problem.  The Paxil he was started on will take a minimum of 4 weeks to work, so he will need to be patient.      Please call patient.    Julio Guidry MD

## 2018-07-13 ENCOUNTER — TRANSFERRED RECORDS (OUTPATIENT)
Dept: HEALTH INFORMATION MANAGEMENT | Facility: CLINIC | Age: 68
End: 2018-07-13

## 2018-07-13 DIAGNOSIS — R10.84 ABDOMINAL PAIN, GENERALIZED: Primary | ICD-10-CM

## 2018-07-13 DIAGNOSIS — R11.0 NAUSEA: ICD-10-CM

## 2018-07-15 ENCOUNTER — MYC MEDICAL ADVICE (OUTPATIENT)
Dept: FAMILY MEDICINE | Facility: CLINIC | Age: 68
End: 2018-07-15

## 2018-07-15 DIAGNOSIS — R60.0 EDEMA OF RIGHT LOWER EXTREMITY: Primary | ICD-10-CM

## 2018-07-16 RX ORDER — FUROSEMIDE 20 MG
20 TABLET ORAL 2 TIMES DAILY
Qty: 6 TABLET | Refills: 0 | Status: SHIPPED | OUTPATIENT
Start: 2018-07-16 | End: 2018-08-01

## 2018-07-17 ENCOUNTER — TELEPHONE (OUTPATIENT)
Dept: FAMILY MEDICINE | Facility: CLINIC | Age: 68
End: 2018-07-17

## 2018-07-17 ENCOUNTER — OFFICE VISIT (OUTPATIENT)
Dept: FAMILY MEDICINE | Facility: CLINIC | Age: 68
End: 2018-07-17
Payer: COMMERCIAL

## 2018-07-17 VITALS
SYSTOLIC BLOOD PRESSURE: 120 MMHG | OXYGEN SATURATION: 98 % | WEIGHT: 190 LBS | HEART RATE: 73 BPM | DIASTOLIC BLOOD PRESSURE: 70 MMHG | BODY MASS INDEX: 29.76 KG/M2 | TEMPERATURE: 97.3 F

## 2018-07-17 DIAGNOSIS — Z23 NEED FOR PROPHYLACTIC VACCINATION AGAINST STREPTOCOCCUS PNEUMONIAE (PNEUMOCOCCUS): ICD-10-CM

## 2018-07-17 DIAGNOSIS — I10 HYPERTENSION GOAL BP (BLOOD PRESSURE) < 140/90: Chronic | ICD-10-CM

## 2018-07-17 DIAGNOSIS — R53.83 FATIGUE, UNSPECIFIED TYPE: Primary | ICD-10-CM

## 2018-07-17 DIAGNOSIS — D64.9 NORMOCYTIC ANEMIA: ICD-10-CM

## 2018-07-17 DIAGNOSIS — R60.0 EDEMA OF RIGHT LOWER EXTREMITY: ICD-10-CM

## 2018-07-17 LAB
BASOPHILS # BLD AUTO: 0 10E9/L (ref 0–0.2)
BASOPHILS NFR BLD AUTO: 0.2 %
DIFFERENTIAL METHOD BLD: ABNORMAL
EOSINOPHIL # BLD AUTO: 0.1 10E9/L (ref 0–0.7)
EOSINOPHIL NFR BLD AUTO: 1.6 %
ERYTHROCYTE [DISTWIDTH] IN BLOOD BY AUTOMATED COUNT: 12.8 % (ref 10–15)
HCT VFR BLD AUTO: 41.9 % (ref 40–53)
HGB BLD-MCNC: 13.7 G/DL (ref 13.3–17.7)
LYMPHOCYTES # BLD AUTO: 0.6 10E9/L (ref 0.8–5.3)
LYMPHOCYTES NFR BLD AUTO: 7.7 %
MCH RBC QN AUTO: 30.9 PG (ref 26.5–33)
MCHC RBC AUTO-ENTMCNC: 32.7 G/DL (ref 31.5–36.5)
MCV RBC AUTO: 95 FL (ref 78–100)
MONOCYTES # BLD AUTO: 0.8 10E9/L (ref 0–1.3)
MONOCYTES NFR BLD AUTO: 9.7 %
NEUTROPHILS # BLD AUTO: 6.6 10E9/L (ref 1.6–8.3)
NEUTROPHILS NFR BLD AUTO: 80.8 %
PLATELET # BLD AUTO: 262 10E9/L (ref 150–450)
RBC # BLD AUTO: 4.43 10E12/L (ref 4.4–5.9)
WBC # BLD AUTO: 8.2 10E9/L (ref 4–11)

## 2018-07-17 PROCEDURE — 82043 UR ALBUMIN QUANTITATIVE: CPT | Performed by: FAMILY MEDICINE

## 2018-07-17 PROCEDURE — 85025 COMPLETE CBC W/AUTO DIFF WBC: CPT | Performed by: FAMILY MEDICINE

## 2018-07-17 PROCEDURE — 99214 OFFICE O/P EST MOD 30 MIN: CPT | Performed by: FAMILY MEDICINE

## 2018-07-17 PROCEDURE — 84155 ASSAY OF PROTEIN SERUM: CPT | Performed by: FAMILY MEDICINE

## 2018-07-17 PROCEDURE — 36415 COLL VENOUS BLD VENIPUNCTURE: CPT | Performed by: FAMILY MEDICINE

## 2018-07-17 RX ORDER — LOSARTAN POTASSIUM 25 MG/1
25 TABLET ORAL DAILY
Qty: 30 TABLET | Refills: 1 | Status: SHIPPED | OUTPATIENT
Start: 2018-07-17 | End: 2018-08-01

## 2018-07-17 NOTE — Clinical Note
Justyn - please review my visit with Juwan today; you did a DaVinci prostatectomy on him in December 2017.   I'm concerned the fluid collection in his right pelvis seen on his CT is causing his RLE edema.  Do you evaluate/treat this?  Should we send him to general surgery?  Interventional radiology?  Any insights you have are appreciated.  --Yoav

## 2018-07-17 NOTE — MR AVS SNAPSHOT
After Visit Summary   7/17/2018    Juwan Latham    MRN: [id number]           Patient Information     Date Of Birth          1950        Visit Information        Provider Department      7/17/2018 3:40 PM Julio Guidry Jr., MD [facility] [place]        Today's Diagnoses     Fatigue, unspecified type    -  1    Normocytic anemia        Edema of right lower extremity        Need for prophylactic vaccination against Streptococcus pneumoniae (pneumococcus)        Hypertension goal BP (blood pressure) < 140/90           Follow-ups after your visit        Follow-up notes from your care team     Return in about 4 weeks (around 8/14/2018).      Your next 10 appointments already scheduled     Jul 31, 2018 11:00 AM CDT   Office Visit with Julio Guidry Jr., MD   [facility] ([facility])    [address] [address] [phone]           Bring a current list of meds and any records pertaining to this visit. For Physicals, please bring immunization records and any forms needing to be filled out. Please arrive 10 minutes early to complete paperwork.            Sep 14, 2018  3:00 PM CDT   CT CHEST W/O CONTRAST with UCCT1   [facility] ([facility])    [address]  1st [address]  [facility] [place] [address] [phone]           Please bring any scans or X-rays taken at other hospitals, if similar tests were done. Also bring a list of your medicines, including vitamins, minerals and over-the-counter drugs. It is safest to leave personal items at home.  Be sure to tell your doctor:   If you have any allergies.   If there s any chance you are pregnant.   If you are breastfeeding.  You do not need to do anything special to prepare for this exam.  Please wear loose clothing, such as a sweat suit or jogging clothes. Avoid snaps, zippers and other metal. We may ask you to undress and put on a hospital gown.             Sep 14, 2018  4:15 PM CDT   (Arrive by 4:00 PM)   Return Visit with [facility] ([facility])    [address]  [address] [place] [address] [phone]              Who to contact     If you have questions or need follow up information about today's clinic visit or your schedule please contact [facility] [place] directly at [phone].  Normal or non-critical lab and imaging results will be communicated to you by [employer], letter or phone within 4 business days after the clinic has received the results. If you do not hear from us within 7 days, please contact the clinic through [employer] or phone. If you have a critical or abnormal lab result, we will notify you by phone as soon as possible.  Submit refill requests through [employer] or call your pharmacy and they will forward the refill request to us. Please allow 3 business days for your refill to be completed.          Additional Information About Your Visit        [employer] Information     [employer] gives you secure access to your electronic health record. If you see a primary care provider, you can also send messages to your care team and make appointments. If you have questions, please call your primary care clinic.  If you do not have a primary care provider, please call [phone] and they will assist you.        Care EveryWhere ID     This is your Care EveryWhere ID. This could be used by other organizations to access your [place] medical records  [id number]        Your Vitals Were     Pulse Temperature Pulse Oximetry BMI (Body Mass Index)          73 97.3  F (36.3  C) (Tympanic) 98% 29.76 kg/m2         Blood Pressure from Last 3 Encounters:   07/17/18 120/70   06/26/18 130/75   06/24/18 127/72    Weight from Last 3 Encounters:   07/17/18 190 lb (86.2 kg)   06/26/18 194 lb 6.4 oz (88.2 kg)   06/21/18 191 lb (86.6 kg)              We Performed the Following      Albumin Random Urine Quantitative with Creat Ratio     CBC with platelets differential     Protein, total          Today's Medication Changes          These changes are accurate as of 7/17/18  4:41 PM.  If you have any questions, ask your nurse or doctor.               Start taking these medicines.        Dose/Directions    losartan 25 MG tablet   Commonly known as:  COZAAR   Used for:  Hypertension goal BP (blood pressure) < 140/90   Started by:  Julio Guidry Jr., MD        Dose:  25 mg   Take 1 tablet (25 mg) by mouth daily   Quantity:  30 tablet   Refills:  1         Stop taking these medicines if you haven't already. Please contact your care team if you have questions.     LISINOPRIL PO   Stopped by:  Julio Guidry Jr., MD                Where to get your medicines      These medications were sent to [id number]  [place], [place] - [address] [place] [address]  [address] [address]     Phone:  [phone]     losartan 25 MG tablet                Primary Care Provider Office Phone # Fax #    Julio Guidry Jr., [phone] [address]  [place] [address]        Equal Access to Services     [facility] AH: Hadii aad ku hadasho Soomaali, waaxda luqadaha, qaybta kaalmada adeegyada, waxay idiin hayaan adeeg kharamariam la'aan ah. So [facility] [phone].    ATENCIÓN: Si habla español, tiene a gunn disposición servicios gratuitos de asistencia lingüística. Llame al [phone].    We comply with applicable federal civil rights laws and [place] laws. We do not discriminate on the basis of race, color, national origin, age, disability, sex, sexual orientation, or gender identity.            Thank you!     Thank you for choosing [facility]  for your care. Our goal is always to provide you with excellent care. Hearing back from our patients is one way we can continue to improve our services. Please take a few minutes to complete  the written survey that you may receive in the mail after your visit with us. Thank you!             Your Updated Medication List - Protect others around you: Learn how to safely use, store and throw away your medicines at www.disposemymeds.org.          This list is accurate as of 7/17/18  4:41 PM.  Always use your most recent med list.                   Brand Name Dispense Instructions for use Diagnosis    acetaminophen 325 MG tablet    TYLENOL    100 tablet    Take 2 tablets (650 mg) by mouth every 6 hours Do not take more than 4,000 mg of acetaminophen (Tylenol) from all sources to prevent liver damage.    Malignant neoplasm of upper lobe of right lung (H)       atropine 1 % ophthalmic solution      Apply 1 drop to eye        furosemide 20 MG tablet    LASIX    6 tablet    Take 1 tablet (20 mg) by mouth 2 times daily    Edema of right lower extremity       losartan 25 MG tablet    COZAAR    30 tablet    Take 1 tablet (25 mg) by mouth daily    Hypertension goal BP (blood pressure) < 140/90       ofloxacin 0.3 % ophthalmic solution    OCUFLOX     Apply 1 drop to eye        PARoxetine 10 MG tablet    PAXIL    30 tablet    Take 1 tablet (10 mg) by mouth At Bedtime    Fatigue, unspecified type

## 2018-07-17 NOTE — TELEPHONE ENCOUNTER
Called pt.  There is no where to work him in tomorrow but offered appt for today at 340.  Pt agreed.  Jayna Rider

## 2018-07-17 NOTE — PROGRESS NOTES
SUBJECTIVE:   Juwan Latham is a 67 year old male who presents to clinic today for the following health issues:      Right LE edema- Pt notes his right LE edema has gotten worse since last OV, malika. Pt has had right lower pelvic fluid collection of pelvis since 3/2018. CT scan on 3/24/18. He notes his right LE edema is constant. Pt notes his edema is not painful and     Fatigue and nausea- Pt notes he is still having constant fatigue and nausea. Pt notes he has not eaten lunch and thinks this may be contributing to the fatigue he is experiencing today. Pt notes he has stopped taking his lisinopril the last few days because he thought his nausea was secondary to his lisinopril. Pt has been taking his Paroxetine in the morning.    Pt wants to wait until next clinic visit to receive pneumonia vaccine.        Problem list and histories reviewed & adjusted, as indicated.  Additional history: as documented    Reviewed and updated as needed this visit by clinical staff  Tobacco  Allergies  Meds       Reviewed and updated as needed this visit by Provider       ROS:  Constitutional, HEENT, cardiovascular, pulmonary, gi and gu systems are negative, except as otherwise noted.    This document serves as a record of the services and decisions personally performed and made by Julio Guidry MD. It was created on his behalf by Mark Pennington, a trained medical scribe. The creation of this document is based on the provider's statements to the medical scribe.  Mark Pennington 4:13 PM July 17, 2018    OBJECTIVE:     /70 (BP Location: Right arm, Patient Position: Chair, Cuff Size: Adult Regular)  Pulse 73  Temp 97.3  F (36.3  C) (Tympanic)  Wt 86.2 kg (190 lb)  SpO2 98%  BMI 29.76 kg/m2  Body mass index is 29.76 kg/(m^2).  GENERAL: healthy, alert and no distress  NECK: no adenopathy, no asymmetry, masses, or scars and thyroid normal to palpation  RESP: lungs clear to auscultation - no rales, rhonchi or  wheezes  CV: regular rate and rhythm, normal S1 S2, no S3 or S4, no murmur, click or rub, peripheral pulses strong. 2+ pitting edema to the knee in his right LE. No edema in his left LE. Left calf circumference: 36.5 cm. Right calf circumference: 42.5 cm. See image below.    Diagnostic Test Results:  Results for orders placed or performed in visit on 07/17/18 (from the past 24 hour(s))   CBC with platelets differential   Result Value Ref Range    WBC 8.2 4.0 - 11.0 10e9/L    RBC Count 4.43 4.4 - 5.9 10e12/L    Hemoglobin 13.7 13.3 - 17.7 g/dL    Hematocrit 41.9 40.0 - 53.0 %    MCV 95 78 - 100 fl    MCH 30.9 26.5 - 33.0 pg    MCHC 32.7 31.5 - 36.5 g/dL    RDW 12.8 10.0 - 15.0 %    Platelet Count 262 150 - 450 10e9/L    Diff Method Automated Method     % Neutrophils 80.8 %    % Lymphocytes 7.7 %    % Monocytes 9.7 %    % Eosinophils 1.6 %    % Basophils 0.2 %    Absolute Neutrophil 6.6 1.6 - 8.3 10e9/L    Absolute Lymphocytes 0.6 (L) 0.8 - 5.3 10e9/L    Absolute Monocytes 0.8 0.0 - 1.3 10e9/L    Absolute Eosinophils 0.1 0.0 - 0.7 10e9/L    Absolute Basophils 0.0 0.0 - 0.2 10e9/L         ASSESSMENT/PLAN:     (R53.83) Fatigue, unspecified type  (primary encounter diagnosis)  Comment: Pt has been taking his Paroxetine in the morning. I discussed with pt that Paroxetine is known to cause fatigue and is a common side effect with it; therefore, I advised pt to take his Paroxetine at night. Furthermore, discussed with pt that it can take 4-6 weeks for this medication to be fully effective. Furthermore, discussed with pt that nausea and lightheadedness are common side effects with Paxil, but they should resolve after 10 days of being on it. My hope is that by taking his Paroxetine at night, he will have less fatigue throughout the day. Will also recheck his hemoglobin given it was previously low- his CBC came back normal today.  Plan: CBC with platelets differential        Follow up based on labs.    (D64.9) Normocytic  anemia  Comment: Given his previous CBC showed low RBC count, low hemoglobin, and low hematocrit, will repeat CBC today- this showed his RBC, hemoglobin, and hematocrit have returned to within normal range.  Plan: CBC with platelets differential        Follow up based on labs.    (R60.0) Edema of right lower extremity  Comment: May consider referring him to vascular clinic given the dramatic difference in swelling between his right and left lower extremity today in clinic; see image above. CT scan from 2/2018 showed right anterior lower pelvic fluid collection adjacent to the iliac vessels; CT scan on 3/24/18 again confirmed this fluid collection. Therefore, I am concerned that this fluid collection is obstructing blood flow in his iliac and femoral veins back to his heart. Therefore, discussed with pt that I want to reach out to his urologist Dr. Agarwal for further recommendations. He may recommend interventional radiology in order to drain the fluid in his right lower pelvic region. After I hear back from Dr. Agarwal, I will reach out to pt to inform him of our plan.  Plan: Protein, total        Will reach out to Dr. Agarwal for further advice on how to proceed.  Interventional radiology to drain fluid collection?    (Z23) Need for prophylactic vaccination against Streptococcus pneumoniae (pneumococcus)  Comment: Pt wants to wait until next clinic visit to receive his PPSV23.  Plan: Follow up if needed.    (I10) Hypertension goal BP (blood pressure) < 140/90  Comment: Pt stopped taking his lisinopril due to being concerned that it was contributing to his nausea. I discussed with pt that nausea is not a common side effect with lisinopril; however, per patient's request, will switch him to losartan to control his BP. In the interim, will recheck his kidney function.  Plan: Albumin Random Urine Quantitative with Creat         Ratio, losartan (COZAAR) 25 MG tablet        Follow up based on labs.    The information in  this document, created by the medical scribe for me, accurately reflects the services I personally performed and the decisions made by me. I have reviewed and approved this document for accuracy prior to leaving the patient care area.  July 17, 2018 4:13 PM    Julio uGidry Jr, MD  [facility]

## 2018-07-17 NOTE — TELEPHONE ENCOUNTER
Pt would like to be worked into Dr. Guidry's schedule for tomorrow July 18th.  He declined to see some else and can only come in that day.  He states it is for his left leg follow up visit.    Please call to advise.

## 2018-07-18 LAB
CREAT UR-MCNC: 5 MG/DL
MICROALBUMIN UR-MCNC: <5 MG/L
MICROALBUMIN/CREAT UR: NORMAL MG/G CR (ref 0–17)
PROT SERPL-MCNC: 8 G/DL (ref 6.8–8.8)

## 2018-07-19 ENCOUNTER — TELEPHONE (OUTPATIENT)
Dept: OTHER | Facility: CLINIC | Age: 68
End: 2018-07-19

## 2018-07-19 NOTE — TELEPHONE ENCOUNTER
Pt referred to [facility] by Julio Guidry Jr., MD for right lower extremity edema.    Pt needs to be scheduled for consult with Vascular Medicine.  Will route to scheduling to coordinate an appointment at next available.    AUDRA Jones RN

## 2018-07-19 NOTE — PROGRESS NOTES
Mr. Latham,    -All of your labs are normal.    I've not heard back from Dr. Agarwal so have taken the initiative in sending you to vascular surgery to get your right leg evaluated as they are the experts in evaluating such things after we've ruled out heart problems (which we have).  You should be getting a call from them to set up an appointment to see them.    If you have further questions about the interpretation of your labs, labtestsonline.org is a good website to check out for further information.    Please contact the clinic if you have additional questions.  Thank you.    Sincerely,    Julio Guidry MD

## 2018-08-01 ENCOUNTER — HOSPITAL ENCOUNTER (OUTPATIENT)
Dept: LAB | Facility: CLINIC | Age: 68
End: 2018-08-01
Attending: INTERNAL MEDICINE
Payer: MEDICARE

## 2018-08-01 ENCOUNTER — HOSPITAL ENCOUNTER (OUTPATIENT)
Dept: CT IMAGING | Facility: CLINIC | Age: 68
Discharge: HOME | End: 2018-08-01
Attending: INTERNAL MEDICINE | Admitting: INTERNAL MEDICINE
Payer: MEDICARE

## 2018-08-01 ENCOUNTER — OFFICE VISIT (OUTPATIENT)
Dept: OTHER | Facility: CLINIC | Age: 68
End: 2018-08-01
Attending: INTERNAL MEDICINE
Payer: MEDICARE

## 2018-08-01 VITALS
SYSTOLIC BLOOD PRESSURE: 144 MMHG | DIASTOLIC BLOOD PRESSURE: 75 MMHG | BODY MASS INDEX: 30.54 KG/M2 | OXYGEN SATURATION: 98 % | WEIGHT: 195 LBS | HEART RATE: 64 BPM

## 2018-08-01 DIAGNOSIS — C34.11 MALIGNANT NEOPLASM OF UPPER LOBE OF RIGHT LUNG (H): ICD-10-CM

## 2018-08-01 DIAGNOSIS — C61 PROSTATE CANCER (H): ICD-10-CM

## 2018-08-01 DIAGNOSIS — M79.89 RIGHT LEG SWELLING: ICD-10-CM

## 2018-08-01 DIAGNOSIS — M79.89 LEG SWELLING: ICD-10-CM

## 2018-08-01 DIAGNOSIS — M79.89 RIGHT LEG SWELLING: Primary | ICD-10-CM

## 2018-08-01 DIAGNOSIS — G47.33 OSA (OBSTRUCTIVE SLEEP APNEA): Primary | ICD-10-CM

## 2018-08-01 LAB — D DIMER PPP FEU-MCNC: 1 UG/ML FEU (ref 0–0.5)

## 2018-08-01 PROCEDURE — 85379 FIBRIN DEGRADATION QUANT: CPT | Performed by: INTERNAL MEDICINE

## 2018-08-01 PROCEDURE — 25000125 ZZHC RX 250: Performed by: INTERNAL MEDICINE

## 2018-08-01 PROCEDURE — G0463 HOSPITAL OUTPT CLINIC VISIT: HCPCS

## 2018-08-01 PROCEDURE — 74178 CT ABD&PLV WO CNTR FLWD CNTR: CPT

## 2018-08-01 PROCEDURE — 36415 COLL VENOUS BLD VENIPUNCTURE: CPT | Performed by: INTERNAL MEDICINE

## 2018-08-01 PROCEDURE — 25000128 H RX IP 250 OP 636: Performed by: INTERNAL MEDICINE

## 2018-08-01 PROCEDURE — 99204 OFFICE O/P NEW MOD 45 MIN: CPT | Mod: ZP | Performed by: INTERNAL MEDICINE

## 2018-08-01 RX ORDER — LOSARTAN POTASSIUM 25 MG/1
TABLET ORAL
Refills: 1 | COMMUNITY
Start: 2018-07-17 | End: 2018-08-15

## 2018-08-01 RX ORDER — IOPAMIDOL 755 MG/ML
95 INJECTION, SOLUTION INTRAVASCULAR ONCE
Status: COMPLETED | OUTPATIENT
Start: 2018-08-01 | End: 2018-08-01

## 2018-08-01 RX ADMIN — SODIUM CHLORIDE, PRESERVATIVE FREE 67 ML: 5 INJECTION INTRAVENOUS at 16:21

## 2018-08-01 RX ADMIN — IOPAMIDOL 95 ML: 755 INJECTION, SOLUTION INTRAVENOUS at 16:21

## 2018-08-01 NOTE — MR AVS SNAPSHOT
After Visit Summary   8/1/2018    Juwan Latham    MRN: [id number]           Patient Information     Date Of Birth          1950        Visit Information        Provider Department      8/1/2018 10:20 AM Morgan Vasquez MD [facility]      Today's Diagnoses     PAULINO (obstructive sleep apnea)    -  1    Leg swelling        Malignant neoplasm of upper lobe of right lung (H)        Prostate cancer (H)           Follow-ups after your visit        Follow-up notes from your care team     Return in about 4 weeks (around 8/29/2018).      Your next 10 appointments already scheduled     Aug 15, 2018 10:00 AM CDT   Office Visit with Julio Guidry Jr., MD   [facility]    [address] [address] [phone]           Bring a current list of meds and any records pertaining to this visit. For Physicals, please bring immunization records and any forms needing to be filled out. Please arrive 10 minutes early to complete paperwork.            Sep 14, 2018  3:00 PM CDT   CT CHEST W/O CONTRAST with UCCT1   [facility] ([facility])    [address]  1st [address]  [place] [address] [phone]           Please bring any scans or X-rays taken at other hospitals, if similar tests were done. Also bring a list of your medicines, including vitamins, minerals and over-the-counter drugs. It is safest to leave personal items at home.  Be sure to tell your doctor:   If you have any allergies.   If there s any chance you are pregnant.   If you are breastfeeding.  You do not need to do anything special to prepare for this exam.  Please wear loose clothing, such as a sweat suit or jogging clothes. Avoid snaps, zippers and other metal. We may ask you to undress and put on a hospital gown.            Sep 14, 2018  4:15 PM CDT   (Arrive by 4:00  PM)   Return Visit with GLORIA Linton [facility] ([facility])    [address]  [address]  [place] [place] [facility] [address] [phone]              Future tests that were ordered for you today     Open Future Orders        Priority Expected Expires Ordered    CT Abdomen Pelvis w/o & w Contrast Routine 8/24/2018 8/1/2019 8/1/2018    D dimer quantitative Routine 8/1/2018 8/1/2019 8/1/2018            Who to contact     If you have questions or need follow up information about today's clinic visit or your schedule please contact [facility] directly at [phone].  Normal or non-critical lab and imaging results will be communicated to you by [facility] [employer], letter or phone within 4 business days after the clinic has received the results. If you do not hear from us within 7 days, please contact the clinic through Shopgat[employer] or phone. If you have a critical or abnormal lab result, we will notify you by phone as soon as possible.  Submit refill requests through Receptor or call your pharmacy and they will forward the refill request to us. Please allow 3 business days for your refill to be completed.          Additional Information About Your Visit        [facility] [employer] Information     Receptor gives you secure access to your electronic health record. If you see a primary care provider, you can also send messages to your care team and make appointments. If you have questions, please call your primary care clinic.  If you do not have a primary care provider, please call [phone] and they will assist you.        Care EveryWhere ID     This is your Care EveryWhere ID. This could be used by other organizations to access your [facility] medical records  [id number]        Your Vitals Were     Pulse Pulse Oximetry BMI (Body Mass Index)             64 98% 30.54 kg/m2          Blood Pressure from Last 3 Encounters:   08/01/18 144/75   07/17/18 120/70    06/26/18 130/75    Weight from Last 3 Encounters:   08/01/18 195 lb (88.5 kg)   07/17/18 190 lb (86.2 kg)   06/26/18 194 lb 6.4 oz (88.2 kg)               Primary Care Provider Office Phone # Fax #    Julio Guidry Jr., [phone] [address]  [place] [address]        Equal Access to Services     [facility] Center[facility]ARBJIT : Hadii aad ku hadasho Soomaali, waaxda luqadaha, qaybta kaalmada adeegyada, waxay idiin hayaan adeeg kharash la'aan ah. So [facility] [phone].    ATENCIÓN: Si xiaola espconnor, tiene a gunn disposición servicios gratuitos de asistencia lingüística. [facility] [phone].    We comply with applicable federal civil rights laws and [place] laws. We do not discriminate on the basis of race, color, national origin, age, disability, sex, sexual orientation, or gender identity.            Thank you!     Thank you for choosing [facility]  for your care. Our goal is always to provide you with excellent care. Hearing back from our patients is one way we can continue to improve our services. Please take a few minutes to complete the written survey that you may receive in the mail after your visit with us. Thank you!             Your Updated Medication List - Protect others around you: Learn how to safely use, store and throw away your medicines at www.disposemymeds.org.          This list is accurate as of 8/1/18 11:14 AM.  Always use your most recent med list.                   Brand Name Dispense Instructions for use Diagnosis    acetaminophen 325 MG tablet    TYLENOL    100 tablet    Take 2 tablets (650 mg) by mouth every 6 hours Do not take more than 4,000 mg of acetaminophen (Tylenol) from all sources to prevent liver damage.    Malignant neoplasm of upper lobe of right lung (H)       losartan 25 MG tablet    COZAAR          PARoxetine 10 MG tablet    PAXIL    30 tablet    Take 1 tablet (10 mg) by mouth At Bedtime    Fatigue, unspecified type

## 2018-08-01 NOTE — PROGRESS NOTES
Vascular Medicine Consultation     Chief Complaint   Right leg swelling    Date of Admission:  (Not on file)    Juwan Latham is a 67 year old male who was admitted on (Not on file). I was asked to see the patient for right leg swelling  Code Status    Full code    Reason for Consult   Reason for consult: I was asked by PCP to evaluate this patient for right leg swelling.    Primary Care Physician   Julio Guidry Jr      History is obtained from the patient    History of Present Illness   Juwan Latham is a 67 year old male who presents with right leg swelling, patient mentioned that he had acute right leg swelling which have been over the course of the past 2 weeks it started up in the groin and went all the way down to the dorsum of the foot, it is tender to touch in the upper medial one third of the thigh he also noticed prominence of spider veins on the anterior aspect of the thigh that they were not there in the past month, he recently had Doppler ultrasound which showed no evidence of DVT but after reviewing the ultrasound the waveforms or the venous waveform at the right CFV showed no respiratory variation and I asked the patient if he was asked to take a deep breath in and out he does not remember so there was no respiratory variation on the waveforms in addition his CT scan of the abdomen pelvis showed no evidence of any mass  It is noteworthy that the patient a year ago was diagnosed with cancer of the prostate he had prostatectomy and 37 sessions of radiotherapy in addition patient had a history of lung cancer with wedge resection the year before  Patient does not report any history of blood clotting or DVT  Patient denies any history of trauma or falls    Past Medical History   I have reviewed this patient's medical history and updated it with pertinent information if needed.   Past Medical History:   Diagnosis Date     Anxiety      Arthritis     fingers, hips     Calculus of kidney 2005, 2012      Congenital single kidney      Gastroesophageal reflux disease      Hx of cancer of lung 03/2017    wedge resection     Hypertension      Prostate cancer (H) 08/2017     Seasonal allergies     spring and fall     Sleep apnea     cpap       Past Surgical History   I have reviewed this patient's surgical history and updated it with pertinent information if needed.  Past Surgical History:   Procedure Laterality Date     BRONCHOSCOPY FLEXIBLE N/A 3/3/2017    Procedure: BRONCHOSCOPY FLEXIBLE;  Surgeon: Maria A Desai MD;  Location: [facility] OR     COLONOSCOPY N/A 2/11/2015    Procedure: COLONOSCOPY;  Surgeon: Murphy Jesus MD;  Location:  GI     COMBINED CYSTOSCOPY, RETROGRADES, URETEROSCOPY, LASER HOLMIUM LITHOTRIPSY URETER(S), INSERT STENT N/A 3/25/2018    Procedure: COMBINED CYSTOSCOPY, RETROGRADES, URETEROSCOPY, LASER HOLMIUM LITHOTRIPSY URETER(S), INSERT STENT;  Cystoscopy, Catheter Exchange under Anesthesia;  Surgeon: Zaria Cain MD;  Location:  OR     DAVINCI PROSTATECTOMY N/A 12/1/2017    Procedure: DAVINCI PROSTATECTOMY;  Robotic Assisted Radical Prostatectomy and Pelvic Lymph Node Dissection ;  Surgeon: Paulo Agarwal MD;  Location:  OR     ESOPHAGOSCOPY, GASTROSCOPY, DUODENOSCOPY (EGD), COMBINED N/A 5/20/2016    Procedure: COMBINED ESOPHAGOSCOPY, GASTROSCOPY, DUODENOSCOPY (EGD), BIOPSY SINGLE OR MULTIPLE;  Surgeon: Murphy Jesus MD;  Location:  GI     LAPAROSCOPIC WEDGE RESECTION LUNG Right 3/3/2017    Procedure: LAPAROSCOPIC WEDGE RESECTION LUNG;  Surgeon: Maria A Desai MD;  Location: [facility] OR     LASER HOLMIUM LITHOTRIPSY URETER(S), INSERT STENT, COMBINED  9/11/2012    Procedure: COMBINED CYSTOSCOPY, URETEROSCOPY, LASER HOLMIUM LITHOTRIPSY URETER(S), INSERT STENT;  Cystoscopy, Right Ureteroscopy, Stone Extraction, Holmium Laser, Double J Stent Placement;  Surgeon: Nicolás Blackwell MD;  Location:  OR       Prior to Admission Medications   Cannot display prior to  admission medications because the patient has not been admitted in this contact.     Allergies   Allergies   Allergen Reactions     Percocet [Oxycodone-Acetaminophen] GI Disturbance     Severe constipation       Social History   I have reviewed this patient's social history and updated it with pertinent information if needed. Juwan Latham  reports that he quit smoking about 33 years ago. His smoking use included Cigarettes. He has a 40.00 pack-year smoking history. He has never used smokeless tobacco. He reports that he drinks alcohol. He reports that he does not use illicit drugs.    Family History   I have reviewed this patient's family history and updated it with pertinent information if needed.   Family History   Problem Relation Age of Onset     HEART DISEASE Mother      Diabetes Brother 65     Type 2     Diabetes Maternal Grandmother      Cancer - colorectal Brother 70     Hypertension No family hx of      Lipids No family hx of        Review of Systems   The 10 point Review of Systems is negative other than noted in the HPI or here.     Physical Exam       BP: 144/75 Pulse: 64     SpO2: 98 %      Vital Signs with Ranges  Pulse:  [55-64] 64  BP: (137-144)/(75-85) 144/75  SpO2:  [98 %] 98 %  195 lbs 0 oz    Constitutional: awake, alert, cooperative, no apparent distress, and appears stated age  Eyes: Lids and lashes normal, pupils equal, round and reactive to light, extra ocular muscles intact, sclera clear, conjunctiva normal  ENT: normocepalic, without obvious abnormality, oropharynx pink and moist  Hematologic / Lymphatic: no lymphadenopathy  Respiratory: No increased work of breathing, good air exchange, clear to auscultation bilaterally, no crackles or wheezing  Cardiovascular: regular rate and rhythm, normal S1 and S2 and no murmur noted  GI: Normal bowel sounds, soft, non-distended, non-tender  Skin: no redness, warmth, or swelling, no rashes and no lesions  Musculoskeletal: There is no redness,  warmth, or swelling of the joints.  Full range of motion noted.  Motor strength is 5 out of 5 all extremities bilaterally.  Tone is normal.  Neurologic: Awake, alert, oriented to name, place and time.  Cranial nerves II-XII are grossly intact.  Motor is 5 out of 5 bilaterally.    Neuropsychiatric:  Normal affect, memory, insight.  Pulses: Palpable pulses bilateral and equal,  Nonpitting edema of both thigh and calf muscle with prominent spider veins and telangiectasia  . No carotid bruits appreciated.     Data   Most Recent 3 CBC's:  Recent Labs   Lab Test  07/17/18   1644  06/24/18   1357  05/22/18   1022   WBC  8.2  6.5  6.2   HGB  13.7  12.6*  14.2   MCV  95  96  95   PLT  262  237  193     Most Recent 3 BMP's:  Recent Labs   Lab Test  06/24/18   1357  05/22/18   1022  03/25/18   0035   NA  138  138  136   POTASSIUM  3.9  4.7  3.8   CHLORIDE  105  104  104   CO2  27  27  25   BUN  13  13  15   CR  0.71  0.84  0.83   ANIONGAP  6  7  7   JOANNE  8.7  9.3  9.0   GLC  106*  97  105*     Most Recent 2 LFT's:  Recent Labs   Lab Test  06/24/18   1357  05/22/18   1022   AST  20  24   ALT  24  38   ALKPHOS  60  63   BILITOTAL  0.4  0.9     Most Recent 3 INR's:  Recent Labs   Lab Test  09/11/12   1058   INR  1.03     Most Recent D-dimer:No lab results found.  Most Recent TSH and T4:  Recent Labs   Lab Test  05/22/18   1022   TSH  1.77     Most Recent Hemoglobin A1c:  Recent Labs   Lab Test  02/24/17   0955   A1C  5.5     Most Recent Urinalysis:  Recent Labs   Lab Test  06/24/18   1400  03/24/18   1318   08/30/12   1600   COLOR  Colorless  Yellow   < >  Yellow   APPEARANCE  Clear  Slightly Cloudy   < >  Clear   URINEGLC  Negative  Negative   < >  Negative   URINEBILI  Negative  Negative   < >  Negative   URINEKETONE  Negative  5*   < >  Negative   SG  1.018  1.009   < >  1.025   UBLD  Negative  Moderate*   < >  Small*   URINEPH  8.0*  8.0*   < >  7.0   PROTEIN  Negative  100*   < >  Negative   UROBILINOGEN   --    --    --    1.0   NITRITE  Negative  Positive*   < >  Negative   LEUKEST  Negative  Moderate*   < >  Trace*   RBCU   --   35*   < >  2-5*   WBCU   --   10*   < >  O - 2    < > = values in this interval not displayed.     Most Recent ABG:No lab results found.      Assessment & Plan   (G47.33) PAULINO (obstructive sleep apnea)  (primary encounter diagnosis)  Comment: Patient is on CPAP      (M79.89) Leg swelling  Comment: Localized right leg swelling, by reviewing the Doppler ultrasound as a mentioned before there is no respiratory variation in the right CFV waveform pattern indicating possible proximal occlusion especially in the presence of newly formed spider veins over the period of 2-4 weeks on the anterior aspect of the right thigh  Plan: D dimer quantitative, CANCELED: CT Abdomen         Pelvis w Contrast            (C34.11) Malignant neoplasm of upper lobe of right lung (H)  Comment: Stable  Plan: D dimer quantitative            (C61) Prostate cancer (H)  Comment: Stable  Plan: D dimer quantitative              Summary: We will schedule the patient for CT abdomen pelvis with and without contrast/venogram to rule out proximal occlusion in addition we will obtain a d-dimer  We will see the patient once test results are available    Morgan Vasquez MD

## 2018-08-01 NOTE — NURSING NOTE
"Juwan Latham is a 67 year old male who presents for:  Chief Complaint   Patient presents with     Consult     New - referred by Julio Guidry Jr., MD for right lower extremity edema. Records in [facility].         Vitals:    Vitals:    08/01/18 1025 08/01/18 1026   BP: 137/85 144/75   BP Location: Right arm Left arm   Patient Position: Chair Chair   Cuff Size: Adult Regular Adult Regular   Pulse: 55 64   SpO2: 98%    Weight: 195 lb (88.5 kg)        BMI:  Estimated body mass index is 30.54 kg/(m^2) as calculated from the following:    Height as of 6/26/18: 5' 7\" (1.702 m).    Weight as of this encounter: 195 lb (88.5 kg).    Pain Score:  Data Unavailable        Jazmín Hunter MA      "

## 2018-08-08 ENCOUNTER — TELEPHONE (OUTPATIENT)
Dept: UROLOGY | Facility: CLINIC | Age: 68
End: 2018-08-08

## 2018-08-08 ENCOUNTER — TELEPHONE (OUTPATIENT)
Dept: NEUROLOGY | Facility: CLINIC | Age: 68
End: 2018-08-08

## 2018-08-08 ENCOUNTER — OFFICE VISIT (OUTPATIENT)
Dept: OTHER | Facility: CLINIC | Age: 68
End: 2018-08-08
Attending: INTERNAL MEDICINE
Payer: COMMERCIAL

## 2018-08-08 VITALS
BODY MASS INDEX: 30.95 KG/M2 | DIASTOLIC BLOOD PRESSURE: 84 MMHG | HEART RATE: 56 BPM | OXYGEN SATURATION: 98 % | WEIGHT: 197.6 LBS | SYSTOLIC BLOOD PRESSURE: 149 MMHG

## 2018-08-08 DIAGNOSIS — R18.8 PELVIC FLUID COLLECTION: Primary | ICD-10-CM

## 2018-08-08 DIAGNOSIS — M79.89 RIGHT LEG SWELLING: Primary | ICD-10-CM

## 2018-08-08 DIAGNOSIS — R19.8 RIGHT PELVIC ADNEXAL FLUID COLLECTION: Primary | ICD-10-CM

## 2018-08-08 DIAGNOSIS — M79.89 LEG SWELLING: ICD-10-CM

## 2018-08-08 DIAGNOSIS — M79.604 PAIN OF RIGHT LOWER EXTREMITY: ICD-10-CM

## 2018-08-08 PROCEDURE — G0463 HOSPITAL OUTPT CLINIC VISIT: HCPCS

## 2018-08-08 PROCEDURE — 99214 OFFICE O/P EST MOD 30 MIN: CPT | Mod: ZP | Performed by: INTERNAL MEDICINE

## 2018-08-08 NOTE — NURSING NOTE
"Juwan Latham is a 67 year old male who presents for:  Chief Complaint   Patient presents with     RECHECK     follow up labs and ultrasound        Vitals:    Vitals:    08/08/18 1105   BP: 149/84   BP Location: Right arm   Patient Position: Chair   Cuff Size: Adult Large   Pulse: 56   SpO2: 98%   Weight: 197 lb 9.6 oz (89.6 kg)       BMI:  Estimated body mass index is 30.95 kg/(m^2) as calculated from the following:    Height as of 6/26/18: 5' 7\" (1.702 m).    Weight as of this encounter: 197 lb 9.6 oz (89.6 kg).    Pain Score:  Data Unavailable        Jazmín Hunter MA      "

## 2018-08-08 NOTE — PROGRESS NOTES
Vascular Medicine Progress Note     Juwan Latham is a 67 year old male who is here for CT scan of the abdomen pelvis results    Interval History   Patient still having right lower extremity swelling which is getting worse increasing, patient Doppler venous ultrasound showed no evidence of DVT  His yet his study showed no respiratory variation in the common iliac vein  CT scan of the abdomen pelvis showed evidence of fluid collection that his pressing on the right external iliac vein but there is no evidence of DVT in the proximal veins  Physical Exam       BP: 149/84 Pulse: 56     SpO2: 98 %      Vitals:    08/08/18 1105   Weight: 197 lb 9.6 oz (89.6 kg)     Vital Signs with Ranges  Pulse:  [56] 56  BP: (149)/(84) 149/84  SpO2:  [98 %] 98 %  [unfilled]    Constitutional: awake, alert, cooperative, no apparent distress, and appears stated age  Eyes: Lids and lashes normal, pupils equal, round and reactive to light, extra ocular muscles intact, sclera clear, conjunctiva normal  ENT: normocepalic, without obvious abnormality, oropharynx pink and moist  Hematologic / Lymphatic: no lymphadenopathy  Respiratory: No increased work of breathing, good air exchange, clear to auscultation bilaterally, no crackles or wheezing  Cardiovascular: regular rate and rhythm, normal S1 and S2 and no murmur noted  GI: Normal bowel sounds, soft, non-distended, non-tender  Skin: no redness, warmth, or swelling, no rashes and no lesions  Musculoskeletal: There is no redness, warmth, or swelling of the joints.  Full range of motion noted.  Motor strength is 5 out of 5 all extremities bilaterally.  Tone is normal.  Neurologic: Awake, alert, oriented to name, place and time.  Cranial nerves II-XII are grossly intact.  Motor is 5 out of 5 bilaterally.    Neuropsychiatric:  Normal affect, memory, insight.  Pulses: Same as previous exam  . No carotid bruits appreciated.     Medications         Data   No results found for this or any  previous visit (from the past 24 hour(s)).  IMPRESSION:  1. Slight interval decrease in size of right pelvic fluid collection  when compared to the previous exam.  2. No definite thrombus in the right external iliac or common iliac  veins. There does appear to be mass effect caused by the pelvic fluid  collection on the right external iliac vein. The right external iliac  vein also appears to be partially atretic especially adjacent to the  pelvic fluid collection. This may be contributing to some degree of  obstruction to venous flow in the right lower extremity as there is  diffuse edema involving the soft tissues of the right thigh.  3. Diffuse fatty infiltration of the liver.  4. Horseshoe kidney.  Assessment & Plan   No diagnosis found.      Summary: We will referred the patient back to surgery for the pelvic fluid collection    Morgan Vasquez MD

## 2018-08-08 NOTE — TELEPHONE ENCOUNTER
Health Call Center    Phone Message    May a detailed message be left on voicemail: yes    Reason for Call: Other: Anali from Dr. Vasquez's office calling for Jacquelyn, she said Dr. Vasquez ordered the CT for pt, she wants to talk with Jacquelyn. Thank you!     Action Taken: Message routed to:  [facility] ([facility] Urology

## 2018-08-08 NOTE — MR AVS SNAPSHOT
After Visit Summary   8/8/2018    Juwan Latham    MRN: [id number]           Patient Information     Date Of Birth          1950        Visit Information        Provider Department      8/8/2018 10:50 AM Morgan Vasquez MD [facility]      Today's Diagnoses     Right leg swelling    -  1    Leg swelling           Follow-ups after your visit        Your next 10 appointments already scheduled     Aug 15, 2018 10:00 AM CDT   Office Visit with Julio Guidry Jr., MD   [facility]    [address] [id number] [phone]           Bring a current list of meds and any records pertaining to this visit. For Physicals, please bring immunization records and any forms needing to be filled out. Please arrive 10 minutes early to complete paperwork.            Sep 14, 2018  3:00 PM CDT   CT CHEST W/O CONTRAST with UCCT1   [facility] ([facility])    [address]  [address]  [place] [address] [phone]           Please bring any scans or X-rays taken at other hospitals, if similar tests were done. Also bring a list of your medicines, including vitamins, minerals and over-the-counter drugs. It is safest to leave personal items at home.  Be sure to tell your doctor:   If you have any allergies.   If there s any chance you are pregnant.   If you are breastfeeding.  You do not need to do anything special to prepare for this exam.  Please wear loose clothing, such as a sweat suit or jogging clothes. Avoid snaps, zippers and other metal. We may ask you to undress and put on a hospital gown.            Sep 14, 2018  4:15 PM CDT   (Arrive by 4:00 PM)   Return Visit with GLORIA Linton [facility] ([facility])    [address]  [address]  [place]  [address] [phone]              Who to contact     If you have questions or need follow up information about today's clinic visit or your schedule please contact [facility] directly at [phone].  Normal or non-critical lab and imaging results will be communicated to you by MyChart, letter or phone within 4 business days after the clinic has received the results. If you do not hear from us within 7 days, please contact the clinic through [employer] or phone. If you have a critical or abnormal lab result, we will notify you by phone as soon as possible.  Submit refill requests through [employer] or call your pharmacy and they will forward the refill request to us. Please allow 3 business days for your refill to be completed.          Additional Information About Your Visit        [employer] Information     [employer] gives you secure access to your electronic health record. If you see a primary care provider, you can also send messages to your care team and make appointments. If you have questions, please call your primary care clinic.  If you do not have a primary care provider, please call [phone] and they will assist you.        Care EveryWhere ID     This is your Care EveryWhere ID. This could be used by other organizations to access your [place] medical records  [id number]        Your Vitals Were     Pulse Pulse Oximetry BMI (Body Mass Index)             56 98% 30.95 kg/m2          Blood Pressure from Last 3 Encounters:   08/08/18 149/84   08/01/18 144/75   07/17/18 120/70    Weight from Last 3 Encounters:   08/08/18 197 lb 9.6 oz (89.6 kg)   08/01/18 195 lb (88.5 kg)   07/17/18 190 lb (86.2 kg)              Today, you had the following     No orders found for display       Primary Care Provider Office Phone # Fax #    Julio Guidry Jr., [phone] [address]  [place] [address]        Equal Access to Services     CHAMP SNIDER : Rohan solo  Micah, ekaterina cortezadaha, qahaleighta katyler pimentel, mynor oscarin hayaan jameskellen orestesmoisés laAlphonseraúl tigist. So [facility] [phone].    ATENCIÓN: Si katharina staples, tiene a gunn disposición servicios gratuitos de asistencia lingüística. Jeanette al [phone].    We comply with applicable federal civil rights laws and [place] laws. We do not discriminate on the basis of race, color, national origin, age, disability, sex, sexual orientation, or gender identity.            Thank you!     Thank you for choosing [facility] [place]  for your care. Our goal is always to provide you with excellent care. Hearing back from our patients is one way we can continue to improve our services. Please take a few minutes to complete the written survey that you may receive in the mail after your visit with us. Thank you!             Your Updated Medication List - Protect others around you: Learn how to safely use, store and throw away your medicines at www.disposemymeds.org.          This list is accurate as of 8/8/18 11:37 AM.  Always use your most recent med list.                   Brand Name Dispense Instructions for use Diagnosis    acetaminophen 325 MG tablet    TYLENOL    100 tablet    Take 2 tablets (650 mg) by mouth every 6 hours Do not take more than 4,000 mg of acetaminophen (Tylenol) from all sources to prevent liver damage.    Malignant neoplasm of upper lobe of right lung (H)       losartan 25 MG tablet    COZAAR          PARoxetine 10 MG tablet    PAXIL    30 tablet    Take 1 tablet (10 mg) by mouth At Bedtime    Fatigue, unspecified type

## 2018-08-08 NOTE — PROGRESS NOTES
Patient had CT done, noted to have right pelvic fluid collection.  Per Dr. Chen, patient should have drain placed by IR.  Called and spoke to patient, he agrees with plan. Will set up with IR for biopsy.

## 2018-08-08 NOTE — TELEPHONE ENCOUNTER
Sent it to dr weight to review  Told patient he is out of the office for 2 weeks.  Refer back to MD that ordered the ct scan. Jacquelyn Nogueira LPN Staff Nurse

## 2018-08-08 NOTE — TELEPHONE ENCOUNTER
Called jacqueline back and spoke to dr medeiros stated patient has a fluid collection. Message sent to dr christine RN care coordinator to continue appropriate care. Jacquelyn Nogueira LPN Staff Nurse

## 2018-08-09 ENCOUNTER — MYC MEDICAL ADVICE (OUTPATIENT)
Dept: FAMILY MEDICINE | Facility: CLINIC | Age: 68
End: 2018-08-09

## 2018-08-09 NOTE — TELEPHONE ENCOUNTER
Please see My Chart message below and advise as appropriate.  Liat Antony RN  [employer] Triage

## 2018-08-10 ENCOUNTER — TELEPHONE (OUTPATIENT)
Dept: INTERVENTIONAL RADIOLOGY/VASCULAR | Facility: CLINIC | Age: 68
End: 2018-08-10

## 2018-08-14 ENCOUNTER — APPOINTMENT (OUTPATIENT)
Dept: INTERVENTIONAL RADIOLOGY/VASCULAR | Facility: CLINIC | Age: 68
End: 2018-08-14
Attending: UROLOGY
Payer: MEDICARE

## 2018-08-14 ENCOUNTER — APPOINTMENT (OUTPATIENT)
Dept: MEDSURG UNIT | Facility: CLINIC | Age: 68
End: 2018-08-14
Attending: UROLOGY
Payer: MEDICARE

## 2018-08-14 ENCOUNTER — HOSPITAL ENCOUNTER (OUTPATIENT)
Facility: CLINIC | Age: 68
Discharge: HOME | End: 2018-08-14
Attending: UROLOGY | Admitting: UROLOGY
Payer: MEDICARE

## 2018-08-14 VITALS
DIASTOLIC BLOOD PRESSURE: 79 MMHG | HEART RATE: 62 BPM | TEMPERATURE: 98.2 F | SYSTOLIC BLOOD PRESSURE: 136 MMHG | OXYGEN SATURATION: 97 % | RESPIRATION RATE: 16 BRPM

## 2018-08-14 DIAGNOSIS — R19.8 RIGHT PELVIC ADNEXAL FLUID COLLECTION: ICD-10-CM

## 2018-08-14 DIAGNOSIS — R18.8 PELVIC FLUID COLLECTION: ICD-10-CM

## 2018-08-14 DIAGNOSIS — M79.604 PAIN OF RIGHT LOWER EXTREMITY: ICD-10-CM

## 2018-08-14 LAB
BACTERIA SPEC CULT: NORMAL
BASOPHILS # BLD AUTO: 0 10E9/L (ref 0–0.2)
BASOPHILS NFR BLD AUTO: 0.2 %
DIFFERENTIAL METHOD BLD: ABNORMAL
EOSINOPHIL # BLD AUTO: 0.1 10E9/L (ref 0–0.7)
EOSINOPHIL NFR BLD AUTO: 1.1 %
ERYTHROCYTE [DISTWIDTH] IN BLOOD BY AUTOMATED COUNT: 13.5 % (ref 10–15)
HCT VFR BLD AUTO: 41.1 % (ref 40–53)
HGB BLD-MCNC: 13.2 G/DL (ref 13.3–17.7)
IMM GRANULOCYTES # BLD: 0.1 10E9/L (ref 0–0.4)
IMM GRANULOCYTES NFR BLD: 1.1 %
INR PPP: 1.09 (ref 0.86–1.14)
LYMPHOCYTES # BLD AUTO: 0.4 10E9/L (ref 0.8–5.3)
LYMPHOCYTES NFR BLD AUTO: 7.8 %
MCH RBC QN AUTO: 30.2 PG (ref 26.5–33)
MCHC RBC AUTO-ENTMCNC: 32.1 G/DL (ref 31.5–36.5)
MCV RBC AUTO: 94 FL (ref 78–100)
MONOCYTES # BLD AUTO: 0.7 10E9/L (ref 0–1.3)
MONOCYTES NFR BLD AUTO: 12.1 %
NEUTROPHILS # BLD AUTO: 4.2 10E9/L (ref 1.6–8.3)
NEUTROPHILS NFR BLD AUTO: 77.7 %
NRBC # BLD AUTO: 0 10*3/UL
NRBC BLD AUTO-RTO: 0 /100
PLATELET # BLD AUTO: 219 10E9/L (ref 150–450)
RBC # BLD AUTO: 4.37 10E12/L (ref 4.4–5.9)
SPECIMEN SOURCE: NORMAL
WBC # BLD AUTO: 5.4 10E9/L (ref 4–11)

## 2018-08-14 PROCEDURE — 25000125 ZZHC RX 250: Performed by: PHYSICIAN ASSISTANT

## 2018-08-14 PROCEDURE — 85025 COMPLETE CBC W/AUTO DIFF WBC: CPT | Performed by: RADIOLOGY

## 2018-08-14 PROCEDURE — 99152 MOD SED SAME PHYS/QHP 5/>YRS: CPT

## 2018-08-14 PROCEDURE — 25000128 H RX IP 250 OP 636: Performed by: PHYSICIAN ASSISTANT

## 2018-08-14 PROCEDURE — 85610 PROTHROMBIN TIME: CPT | Performed by: RADIOLOGY

## 2018-08-14 PROCEDURE — 27210903 ZZH KIT CR5

## 2018-08-14 PROCEDURE — 27210908 ZZH NEEDLE CR4

## 2018-08-14 PROCEDURE — 77012 CT SCAN FOR NEEDLE BIOPSY: CPT

## 2018-08-14 PROCEDURE — 40000166 ZZH STATISTIC PP CARE STAGE 1

## 2018-08-14 PROCEDURE — 10160 PNXR ASPIR ABSC HMTMA BULLA: CPT

## 2018-08-14 PROCEDURE — 87070 CULTURE OTHR SPECIMN AEROBIC: CPT | Performed by: UROLOGY

## 2018-08-14 RX ORDER — SODIUM CHLORIDE 9 MG/ML
INJECTION, SOLUTION INTRAVENOUS CONTINUOUS
Status: DISCONTINUED | OUTPATIENT
Start: 2018-08-14 | End: 2018-08-14 | Stop reason: HOSPADM

## 2018-08-14 RX ORDER — FENTANYL CITRATE 50 UG/ML
25-50 INJECTION, SOLUTION INTRAMUSCULAR; INTRAVENOUS EVERY 5 MIN PRN
Status: DISCONTINUED | OUTPATIENT
Start: 2018-08-14 | End: 2018-08-14 | Stop reason: HOSPADM

## 2018-08-14 RX ORDER — DEXTROSE MONOHYDRATE 25 G/50ML
25-50 INJECTION, SOLUTION INTRAVENOUS
Status: DISCONTINUED | OUTPATIENT
Start: 2018-08-14 | End: 2018-08-14 | Stop reason: HOSPADM

## 2018-08-14 RX ORDER — LIDOCAINE 40 MG/G
CREAM TOPICAL
Status: DISCONTINUED | OUTPATIENT
Start: 2018-08-14 | End: 2018-08-14 | Stop reason: HOSPADM

## 2018-08-14 RX ORDER — NALOXONE HYDROCHLORIDE 0.4 MG/ML
.1-.4 INJECTION, SOLUTION INTRAMUSCULAR; INTRAVENOUS; SUBCUTANEOUS
Status: DISCONTINUED | OUTPATIENT
Start: 2018-08-14 | End: 2018-08-14 | Stop reason: HOSPADM

## 2018-08-14 RX ORDER — NICOTINE POLACRILEX 4 MG
15-30 LOZENGE BUCCAL
Status: DISCONTINUED | OUTPATIENT
Start: 2018-08-14 | End: 2018-08-14 | Stop reason: HOSPADM

## 2018-08-14 RX ORDER — FLUMAZENIL 0.1 MG/ML
0.2 INJECTION, SOLUTION INTRAVENOUS
Status: DISCONTINUED | OUTPATIENT
Start: 2018-08-14 | End: 2018-08-14 | Stop reason: HOSPADM

## 2018-08-14 RX ORDER — CEFAZOLIN SODIUM 2 G/100ML
2 INJECTION, SOLUTION INTRAVENOUS
Status: COMPLETED | OUTPATIENT
Start: 2018-08-14 | End: 2018-08-14

## 2018-08-14 RX ADMIN — LIDOCAINE HYDROCHLORIDE 8 ML: 10 INJECTION, SOLUTION EPIDURAL; INFILTRATION; INTRACAUDAL; PERINEURAL at 17:41

## 2018-08-14 RX ADMIN — CEFAZOLIN SODIUM 2 G: 2 INJECTION, SOLUTION INTRAVENOUS at 11:51

## 2018-08-14 RX ADMIN — FENTANYL CITRATE 100 MCG: 50 INJECTION, SOLUTION INTRAMUSCULAR; INTRAVENOUS at 17:24

## 2018-08-14 RX ADMIN — SODIUM CHLORIDE: 9 INJECTION, SOLUTION INTRAVENOUS at 11:51

## 2018-08-14 NOTE — PROGRESS NOTES
Patient Name: Juwan Latham  Medical Record Number: [id number]  Today's Date: 8/14/2018    Procedure: Right Lower Quadrant Aspiration of Pelvic Fluid Collection  Proceduralist: Dr. Bayron Mitchell    Sedation start time: 1711  Sedation end time: 1731  Sedation medications administered: Fentanyl 100 mcg, Versed 2 mg  Total sedation time: 20 minutes    Procedure start time: 1711  Puncture time: 1716  Procedure end time: 1731    Report given to: TANNER Adhikari 2A    Other Notes: Pt arrived to Main CT Room #2 from Unit [address]A. Consent confirmed with patient. Pt denies any questions or concerns regarding procedure. Pt positioned supine and monitored per protocol. After preliminary ultrasound, provider determined procedure should be done under CT guidance, pt returned to 2A to wait for CT availability. Dr. Mitchell aspirated 1 mL of pelvic fluid. Nurse sent 1 mL of fluid to lab for analysis. Pt transferred back to Unit 2A.

## 2018-08-14 NOTE — DISCHARGE INSTRUCTIONS
[facility]  Going Home after Fluid Aspiration from Pelvic   Fluid Collection with Sedation      For 24 hours:    An adult should stay with you.    Relax and take it easy.    DO NOT make any important legal decisions.    DO NOT drive or operate machines at home or at work.    Resume your regular diet and drink plenty of fluids.    CALL THE PHYSICIAN IF:    You develop nausea or vomiting    You develop hives or a rash or any unexplained itching    ADDITIONAL INSTRUCTIONS:  Change the dressing at the procedure site daily with your shower for 5 days.  Never leave a wet dressing in place.  No soaking in a hot tub, swimming pool,                                                              Or tub bath for 5 days or until the procedure site is completely healed.  Dr. Mitchell will follow up with you with a clinic appointment.  His scheduling nurse will                                                               Call you.  Also if you develop a fever over 100.5, nausea & vomiting, or just feel ill in general please call the number below and ask for the Interventional Radiologist                                                               On call.  Someone is on call 24/7.      [facility] INTERVENTIONAL RADIOLOGY DEPARTMENT        Procedure Physician: Dr. Mitchell                                Date of procedure:August 14, 2018 Tuesday        Telephone numbers:     [phone]      Monday-Friday 8:00 am to 4:30 pm                                              [phone]       After 4:30 pm Monday-Friday, Weekends    Ask for the Interventional Radiologist on call.  Someone is on call 24/7.                                                                      [facility] toll free number: [phone]  Monday-Friday 8:00 am to 4:30 pm

## 2018-08-14 NOTE — PROGRESS NOTES
1745  Returned to 2A from IR per cart accompanied by RN.  S/P aspiration of pelvic fluid collection.  Procedure site at mid lower pelvic area is FDI.  Pt denies any pain at this procedure site.  1800  Dr. Mitchell here to speak to patient.    Nutrition of turkey sand, soda, and ice cream taken well.  No nausea.  CTRN   1850  Ambulated in singh and to BR.  Tolerated activity well.  Stable on feet.  Voided without difficulty.  1900  Discharge instructions reviewed with pt.  Verbally demonstrates understanding of instructions.  1910  DC to care of friend accompanied by staff to front of hospital.  CTRN

## 2018-08-14 NOTE — BRIEF OP NOTE
Interventional Radiology Brief Post Procedure Note    Procedure: CT ABDOMEN PERITONEUM ABSCESS DRAIN W CATH PLACE    Proceduralist: Bayron Mitchell MD    Assistant: None    Time Out: Prior to the start of the procedure and with procedural staff participation, I verbally confirmed the patient s identity using two indicators, relevant allergies, that the procedure was appropriate and matched the consent or emergent situation, and that the correct equipment/implants were available. Immediately prior to starting the procedure I conducted the Time Out with the procedural staff and re-confirmed the patient s name, procedure, and site/side. (The Joint Commission universal protocol was followed.)  Yes    Medications   Medication Event Details Admin User Admin Time   fentaNYL (PF) (SUBLIMAZE) injection 25-50 mcg Medication Given Dose: 100 mcg; Route: Intravenous Kwame Vogel RN 8/14/2018  5:24 PM       Sedation: IR Nurse Monitored Care   Post Procedure Summary:  Prior to the start of the procedure and with procedural staff participation, I verbally confirmed the patient s identity using two indicators, relevant allergies, that the procedure was appropriate and matched the consent or emergent situation, and that the correct equipment/implants were available. Immediately prior to starting the procedure I conducted the Time Out with the procedural staff and re-confirmed the patient s name, procedure, and site/side. (The Joint Commission universal protocol was followed.)  Yes       Sedatives: Fentanyl and Midazolam (Versed)    Vital signs, airway and pulse oximetry were monitored and remained stable throughout the procedure and sedation was maintained until the procedure was complete.  The patient was monitored by staff until sedation discharge criteria were met.    Patient tolerance: Patient tolerated the procedure well with no immediate complications.    Time of sedation in minutes: 15 Minutes minutes from beginning  to end of physician one to one monitoring.    Findings: 18 gauge Chew advanced into right pelvic sidewall collection. Only 1 ml of thick fluid could be aspirated.     Estimated Blood Loss: None    Fluoroscopy Time:  minute(s)    SPECIMENS: Fluid and/or tissue for Gram stain and culture    Complications: 1. None     Condition: Stable    Plan: Bedrest for one hour.    Comments: See dictated procedure note for full details.    Bayron Mitchell MD

## 2018-08-14 NOTE — PROGRESS NOTES
Interventional Radiology Pre-Procedure Sedation Assessment   Time of Assessment: 1:06 PM    Expected Level: Moderate Sedation    Indication: Sedation is required for the following type of Procedure: Drain    Sedation and procedural consent: Risks, benefits and alternatives were discussed with Patient    PO Intake: Appropriately NPO for procedure    ASA Class: Class 2 - MILD SYSTEMIC DISEASE, NO ACUTE PROBLEMS, NO FUNCTIONAL LIMITATIONS.    Mallampati: Grade 1:  Soft palate, uvula, tonsillar pillars, and posterior pharyngeal wall visible    Lungs: Lungs Clear with good breath sounds bilaterally    Heart: Normal heart sounds and rate    History and physical reviewed and no updates needed. I have reviewed the lab findings, diagnostic data, medications, and the plan for sedation. I have determined this patient to be an appropriate candidate for the planned sedation and procedure and have reassessed the patient IMMEDIATELY PRIOR to sedation and procedure.    Ike Voss PA-C  Interventional Radiology  [phone]

## 2018-08-14 NOTE — IP AVS SNAPSHOT
[address] [address]                                       After Visit Summary   8/14/2018    Juwan Latham    MRN: [id number]           After Visit Summary Signature Page     I have received my discharge instructions, and my questions have been answered. I have discussed any challenges I see with this plan with the nurse or doctor.    ..........................................................................................................................................  Patient/Patient Representative Signature      ..........................................................................................................................................  Patient Representative Print Name and Relationship to Patient    ..................................................               ................................................  Date                                            Time    ..........................................................................................................................................  Reviewed by Signature/Title    ...................................................              ..............................................  Date                                                            Time

## 2018-08-14 NOTE — PROGRESS NOTES
Arrived from home for an abdominal abscess drain.  PIV placed and labs sent.  Needs consent.  Denies pain at rest; some discomfort in abdomen if he is up and about.  Resting in bed.

## 2018-08-15 ENCOUNTER — OFFICE VISIT (OUTPATIENT)
Dept: FAMILY MEDICINE | Facility: CLINIC | Age: 68
End: 2018-08-15
Payer: COMMERCIAL

## 2018-08-15 VITALS
DIASTOLIC BLOOD PRESSURE: 70 MMHG | SYSTOLIC BLOOD PRESSURE: 128 MMHG | WEIGHT: 198 LBS | BODY MASS INDEX: 31.01 KG/M2 | OXYGEN SATURATION: 98 % | TEMPERATURE: 98 F | HEART RATE: 62 BPM

## 2018-08-15 DIAGNOSIS — R53.83 FATIGUE, UNSPECIFIED TYPE: ICD-10-CM

## 2018-08-15 DIAGNOSIS — R60.0 EDEMA OF RIGHT LOWER EXTREMITY: Primary | ICD-10-CM

## 2018-08-15 DIAGNOSIS — I10 HYPERTENSION GOAL BP (BLOOD PRESSURE) < 140/90: Chronic | ICD-10-CM

## 2018-08-15 PROCEDURE — 99214 OFFICE O/P EST MOD 30 MIN: CPT | Performed by: FAMILY MEDICINE

## 2018-08-15 RX ORDER — LOSARTAN POTASSIUM 25 MG/1
25 TABLET ORAL DAILY
Qty: 90 TABLET | Refills: 3 | Status: SHIPPED | OUTPATIENT
Start: 2018-08-15 | End: 2019-03-19

## 2018-08-15 RX ORDER — PAROXETINE 10 MG/1
10 TABLET, FILM COATED ORAL AT BEDTIME
Qty: 90 TABLET | Refills: 1 | Status: SHIPPED | OUTPATIENT
Start: 2018-08-15 | End: 2019-12-10

## 2018-08-15 NOTE — MR AVS SNAPSHOT
After Visit Summary   8/15/2018    Juwan Latham    MRN: [id number]           Patient Information     Date Of Birth          1950        Visit Information        Provider Department      8/15/2018 10:00 AM Julio Guidry Jr., MD [facility] [place]        Today's Diagnoses     Edema of right lower extremity    -  1    Fatigue, unspecified type        Hypertension goal BP (blood pressure) < 140/90           Follow-ups after your visit        Follow-up notes from your care team     Return in about 6 months (around 2/15/2019).      Your next 10 appointments already scheduled     Sep 14, 2018  3:00 PM CDT   CT CHEST W/O CONTRAST with UCCT1   [facility] ([facility])    [address]  1st Floor  [place] [address] [phone]           Please bring any scans or X-rays taken at other hospitals, if similar tests were done. Also bring a list of your medicines, including vitamins, minerals and over-the-counter drugs. It is safest to leave personal items at home.  Be sure to tell your doctor:   If you have any allergies.   If there s any chance you are pregnant.   If you are breastfeeding.  You do not need to do anything special to prepare for this exam.  Please wear loose clothing, such as a sweat suit or jogging clothes. Avoid snaps, zippers and other metal. We may ask you to undress and put on a hospital gown.            Sep 14, 2018  4:15 PM CDT   (Arrive by 4:00 PM)   Return Visit with GLORIA Linton [facility] ([facility])    [address]  [address]  [place] [address] [phone]              Who to contact     If you have questions or need follow up information about today's clinic visit or your schedule please contact [facility] [place] directly at [phone].  Normal or non-critical lab and imaging results will be communicated to you by  MyChart, letter or phone within 4 business days after the clinic has received the results. If you do not hear from us within 7 days, please contact the clinic through Joob[employer] or phone. If you have a critical or abnormal lab result, we will notify you by phone as soon as possible.  Submit refill requests through Joobili or call your pharmacy and they will forward the refill request to us. Please allow 3 business days for your refill to be completed.          Additional Information About Your Visit        Joobili Information     Jo[employer]ili gives you secure access to your electronic health record. If you see a primary care provider, you can also send messages to your care team and make appointments. If you have questions, please call your primary care clinic.  If you do not have a primary care provider, please call [phone] and they will assist you.        Care EveryWhere ID     This is your Care EveryWhere ID. This could be used by other organizations to access your [facility] medical records  [id number]        Your Vitals Were     Pulse Temperature Pulse Oximetry BMI (Body Mass Index)          62 98  F (36.7  C) (Oral) 98% 31.01 kg/m2         Blood Pressure from Last 3 Encounters:   08/15/18 128/70   08/14/18 136/79   08/08/18 149/84    Weight from Last 3 Encounters:   08/15/18 198 lb (89.8 kg)   08/08/18 197 lb 9.6 oz (89.6 kg)   08/01/18 195 lb (88.5 kg)              Today, you had the following     No orders found for display         Today's Medication Changes          These changes are accurate as of 8/15/18 10:32 AM.  If you have any questions, ask your nurse or doctor.               These medicines have changed or have updated prescriptions.        Dose/Directions    losartan 25 MG tablet   Commonly known as:  COZAAR   This may have changed:    - how much to take  - how to take this  - when to take this   Used for:  Hypertension goal BP (blood pressure) < 140/90   Changed by:  Julio Guidry Jr., MD         Dose:  25 mg   Take 1 tablet (25 mg) by mouth daily   Quantity:  90 tablet   Refills:  3            Where to get your medicines      These medications were sent to [id number] - [place], [place] - [address] AT [facility] [address]  [address] [address]     Phone:  [phone]     losartan 25 MG tablet    PARoxetine 10 MG tablet                Primary Care Provider Office Phone # Fax #    Julio Guidry Jr., [phone] [address]  [place] [address]        Equal Access to Services     [facility]: Hadii aad ku hadasho Soomaali, waaxda luqadaha, qaybta kaalmada adeegyada, waxjeannie idiin hayaan adeeg camryn mosley . So [facility] [place] [facility] [phone].    ATENCIÓN: Si habla español, tiene a gunn disposición servicios gratuitos de asistencia lingüística. [facility] [phone].    We comply with applicable federal civil rights laws and [place] laws. We do not discriminate on the basis of race, color, national origin, age, disability, sex, sexual orientation, or gender identity.            Thank you!     Thank you for choosing [facility]  for your care. Our goal is always to provide you with excellent care. Hearing back from our patients is one way we can continue to improve our services. Please take a few minutes to complete the written survey that you may receive in the mail after your visit with us. Thank you!             Your Updated Medication List - Protect others around you: Learn how to safely use, store and throw away your medicines at www.disposemymeds.org.          This list is accurate as of 8/15/18 10:32 AM.  Always use your most recent med list.                   Brand Name Dispense Instructions for use Diagnosis    acetaminophen 325 MG tablet    TYLENOL    100 tablet    Take 2 tablets (650 mg) by mouth every 6 hours Do not take more than 4,000 mg of acetaminophen (Tylenol) from all sources to prevent liver damage.    Malignant neoplasm of  upper lobe of right lung (H)       losartan 25 MG tablet    COZAAR    90 tablet    Take 1 tablet (25 mg) by mouth daily    Hypertension goal BP (blood pressure) < 140/90       PARoxetine 10 MG tablet    PAXIL    90 tablet    Take 1 tablet (10 mg) by mouth At Bedtime    Fatigue, unspecified type

## 2018-08-15 NOTE — PROGRESS NOTES
SUBJECTIVE:   Juwan Latham is a 67 year old male who presents to clinic today for the following health issues:     Followup:    Facility:     Date of visit: 7/17/18  Reason for visit: Multiple issues   Current Status: Worsening.       Right LE edema- Pt saw interventional radiology 1 day ago and had the fluid collection drained in his right pelvic region. Pt notes he was told that he was only able to drain a small amount of fluid. However, pt still has swelling in his right LE and is painful. He thinks the swelling has worsened and has radiated up his right LE. His swelling is not improved in the mornings, and elevating his leg does not improve the edema. Pt denies SOB, chest pain, cough, or wheezing.    Fatigue- Pt notes he has been feeling better overall; he has been sleeping better and is not as fatigued throughout the day. Pt is still taking Paroxetine at bedtime.      Problem list and histories reviewed & adjusted, as indicated.  Additional history: as documented    Reviewed and updated as needed this visit by clinical staff  Tobacco  Allergies  Meds  Med Hx       Reviewed and updated as needed this visit by Provider       ROS:  Constitutional, HEENT, cardiovascular, pulmonary, gi and gu systems are negative, except as otherwise noted.    This document serves as a record of the services and decisions personally performed and made by Julio Guidry MD. It was created on his behalf by Mark Pennington, a trained medical scribe. The creation of this document is based on the provider's statements to the medical scribe.  Mark Pennington 10:05 AM August 15, 2018    OBJECTIVE:     /70  Pulse 62  Temp 98  F (36.7  C) (Oral)  Wt 89.8 kg (198 lb)  SpO2 98%  BMI 31.01 kg/m2  Body mass index is 31.01 kg/(m^2).  GENERAL: healthy, alert and no distress  NECK: no adenopathy, no asymmetry, masses, or scars and thyroid normal to palpation  RESP: lungs clear to auscultation - no rales, rhonchi or  wheezes  CV: regular rate and rhythm, normal S1 S2, no S3 or S4, no murmur, click or rub, peripheral pulses strong. No carotid bruits.  MS: Mildly lichenified skin on the anterior tibia of the right LE. 1+ pitting edema to the mid thigh of the right LE. Left calf circumference: 37.5 cm. Right calf circumference: 46.5 cm.    Diagnostic Test Results:  none     ASSESSMENT/PLAN:     (R60.0) Edema of right lower extremity  (primary encounter diagnosis)  Comment: US of his right LE done on 6/24/18 did not show any evidence of DVT. CT scan on 8/1/18 showed that the fluid collection in the right pelvis had decreased in size before it was drained on 8/14/18; it measured 3.4 x 1.7 cm versus 4.1 x 2.8 cm on the previous CT scan done on 6/24/18. Discussed with pt if this were CHF, we would see bilateral LE edema but his edema is unilateral and his CV exam was otherwise normal. Furthermore, given his right LE edema has not improved even after the fluid collection in his right pelvis was drained 1 day ago, suggests that this fluid collection may not be contributing to his edema. Therefore, discussed with pt that his edema may be secondary to a mass in his right LE, or may be due to lymphedema. Therefore, in order to gain a better expert opinion on this topic, I will reach out to his vascular surgeon Dr. Vasquez to explain the situation in order to see what our next step is; may consider doing an MRI for further evaluation if Dr. Vasquez thinks this would be necessary. However, if we end up referring him to lymphedema clinic, dicussed with pt that this would be done in [place]. Lastly, abscess culture is still pending from the fluid collection that was drained yesterday. Discussed with pt that given Dr. Agarwal ordered this, he should expect to hear back from Dr. Agarwal with the results.  Plan: Our follow up plan will be to have pt follow up with me in 6 months; however, I will communicate to pt our plan much sooner than 6 months  after I hear back from Dr. Vasquez.    (R53.83) Fatigue, unspecified type  Comment: His fatigue has improved; will refill his Paroxetine and have him continue to take at bedtime.  Plan: PARoxetine (PAXIL) 10 MG tablet        Follow up if needed.    (I10) Hypertension goal BP (blood pressure) < 140/90  Comment: His BP was good today (128/70); will refill his losartan and have him continue as prescribed.  Plan: losartan (COZAAR) 25 MG tablet        Follow up if needed.    The information in this document, created by the medical scribe for me, accurately reflects the services I personally performed and the decisions made by me. I have reviewed and approved this document for accuracy prior to leaving the patient care area.  August 15, 2018 10:05 AM    Julio Guidry Jr, MD  [facility]

## 2018-08-17 ENCOUNTER — MYC MEDICAL ADVICE (OUTPATIENT)
Dept: FAMILY MEDICINE | Facility: CLINIC | Age: 68
End: 2018-08-17

## 2018-08-17 NOTE — TELEPHONE ENCOUNTER
Please see my chart response to patient, review and advise.  Routing My Chart message to provider as BETH Antony RN  [employer]

## 2018-08-19 LAB
BACTERIA SPEC CULT: NO GROWTH
SPECIMEN SOURCE: NORMAL

## 2018-08-20 ENCOUNTER — MYC MEDICAL ADVICE (OUTPATIENT)
Dept: FAMILY MEDICINE | Facility: CLINIC | Age: 68
End: 2018-08-20

## 2018-08-20 NOTE — TELEPHONE ENCOUNTER
Please see Shizz[employer] message along with 8/17/18 message.  Kaylee kAins, RN, BSN  [facility]

## 2018-08-21 DIAGNOSIS — N30.40 RADIATION CYSTITIS: Primary | ICD-10-CM

## 2018-08-21 DIAGNOSIS — R60.0 EDEMA LEG: ICD-10-CM

## 2018-08-21 RX ORDER — TOLTERODINE 4 MG/1
4 CAPSULE, EXTENDED RELEASE ORAL DAILY
Qty: 30 CAPSULE | Refills: 11 | Status: SHIPPED | OUTPATIENT
Start: 2018-08-21 | End: 2018-09-20

## 2018-08-24 ENCOUNTER — HOSPITAL ENCOUNTER (OUTPATIENT)
Dept: OCCUPATIONAL THERAPY | Facility: CLINIC | Age: 68
Setting detail: THERAPIES SERIES
End: 2018-08-24
Attending: UROLOGY
Payer: MEDICARE

## 2018-08-24 DIAGNOSIS — I89.0 LYMPHEDEMA: Primary | ICD-10-CM

## 2018-08-24 PROCEDURE — G8990 OTHER PT/OT CURRENT STATUS: HCPCS | Mod: GO,CK

## 2018-08-24 PROCEDURE — 97166 OT EVAL MOD COMPLEX 45 MIN: CPT | Mod: GO

## 2018-08-24 PROCEDURE — G8991 OTHER PT/OT GOAL STATUS: HCPCS | Mod: GO,CI

## 2018-08-24 PROCEDURE — 97535 SELF CARE MNGMENT TRAINING: CPT | Mod: GO

## 2018-08-24 PROCEDURE — 40000445 ZZHC STATISTIC OT VISIT, LYMPHEDEMA

## 2018-08-24 NOTE — PROGRESS NOTES
[facility]        OUTPATIENT OCCUPATIONAL THERAPY EDEMA EVALUATION  PLAN OF TREATMENT FOR OUTPATIENT REHABILITATION  (COMPLETE FOR INITIAL CLAIMS ONLY)  Patient's Last Name, First Name, Juwan Hicks                           Provider s Name:   [facility] Medical Record No.  [id number]     Start of Care Date:  08/24/18   Onset Date:  08/21/18   Type:  OT   Medical Diagnosis:  lymphedema   Therapy Diagnosis:  lymphedema Visits from SOC:  1                                     __________________________________________________________________________________   Plan of Treatment/Functional Goals:    Manual lymph drainage, Gradient compression bandaging, Fit for compression garment, Exercises, Precautions to prevent infection / exacerbation, Education, Skin care / precautions, Home management program development        GOALS  1. Goal description: In order to improve functional mobility for activities of living, by the completion of intensive treatment,  patient and/or caregiver will:       Target date: 10/26/18  2. Goal description: tolerate gradient compression bandaging/wearing compression garments 23 hrs/day to prevent re-acccumulation of extracellular fluid for reductions needed to aide improvements in LB clothing fit and functional ambulation engagement       Target date: 10/26/18  3. Goal description: demonstrate independence in applying gradient compression bandages to build I with home management of RLE lymphedema needed to improve LB clothing fit and functional ambulation       Target date: 10/26/18  4. Goal description: demonstrate independence in performing prescribed exercises to facilate the lymph system and muscle pumping systems for max reductions needed to aide improvements in functional ambulation and LB clothing fit       Target date:  10/26/18  5. Goal description: be independent in donning/doffing, wearing schedule, and care of compression garments for I building with home management of RLE lymphedema needed to aide improvements in LB dressing/clothing fit, stair navigation, and functional ambulation engagement       Target date: 10/26/18  6. Goal description: Display total RLE volume reduction of .5L+ for reductions needed to aide ambulation and better fit LB clothing       Target date: 10/26/18     7.             8.              Treatment frequency: 3 times / week   Treatment duration: 3x/wk x 3 weeks, then 2x/wk x 1 week, then 0x/wk x 3 weeks, then 1x/wk x 1 week    Umer Chavarria                                    I CERTIFY THE NEED FOR THESE SERVICES FURNISHED UNDER        THIS PLAN OF TREATMENT AND WHILE UNDER MY CARE     (Physician co-signature of this document indicates review and certification of the therapy plan).                   Certification date from: 08/24/18       Certification date to: 10/26/18           Referring physician: Paulo Agarwal MD    Initial Assessment  See Epic Evaluation- Start of care: 08/24/18

## 2018-08-24 NOTE — PROGRESS NOTES
08/24/18 1700   Quick Adds   Quick Adds Certification   Rehab Discipline   Discipline OT   Type of Visit   Type of visit Initial Edema Evaluation   General Information   Start of care 08/24/18   Referring physician Paulo Agarwal MD    Orders Evaluate and treat as indicated   Order date 08/21/18   Medical diagnosis lymphedema; edema   Onset of illness / date of surgery 08/21/18   Edema onset 08/21/18   Affected body parts RLE  (trunk?)   Edema etiology Radiation;Surgery   Location - Radiation prostate   Radiation comments pt reports 39 rounds radiation following surgery for prostate cancer/prostate removed   Edema etiology comments Pt reports surgery for prostate cancer taking place Dec 2017 followed by radiation he estimates came to an end around March/April 2018. Pt rpeorts RLE swelling began approx June 2018 and he has been dealing with this and its increasing in size and pressure the past 2 months. CT's, MRI, and US negative for DVT or tumor obstruction. Pt has had fluid pocket drained from inguinal region when swelling started that chart reports was pushing in external iliac vein.   Pertinent history of current problem (PT: include personal factors and/or comorbidities that impact the POC; OT: include additional occupational profile info) PMH significant for cancer and HTN.   Surgical / medical history reviewed Yes   Edema special tests Ultrasound;CAT   Prior level of functional mobility I   Prior treatment Elevation   Community support Family / friend caregiver   Patient role / employment history Employed   Living environment [place] [facility]   Fall Risk Screen   Fall screen completed by OT   Have you fallen 2 or more times in the past year? No   Have you fallen and had an injury in the past year? No   Is patient a fall risk? No   System Outcome Measures   Lymphedema Life Impact Scale (score range 0-72). A higher score indicates greater impairment. 31   Subjective Report   Patient report of  symptoms heaviness; tension in skin   Patient / Family Goals   Patient / family goals statement to reduce swelling in RLE   Pain   Pain comments discomfort with tension in skin   Cognitive Status   Orientation Orientation to person, place and time   Level of consciousness Alert   Follows commands and answers questions 75% of the time   Personal safety and judgement Intact   Memory (possible memory deficits?)   Edema Exam / Assessment   Skin condition Pitting;Intact   Skin condition comments 3+ pitting foot-groin RLE   Pitting 3+   Pitting location RLE   Dorsal pedal pulse comments unable to palpate; no signs ischemia noted   Stemmer sign Positive   Girth Measurements   Girth Measurements (will obtain upon return for services)   Range of Motion   ROM comments WFL   Strength   Strength comments NT'd   Activities of Daily Living   Activities of Daily Living I   Bed Mobility   Bed mobility I   Transfers   Transfers I   Gait / Locomotion   Gait / Locomotion I   Sensory   Sensory perception comments no neuropathy reported or identified   Coordination   Coordination Gross motor coordination appropriate   Muscle Tone   Muscle tone No deficits were identified   Planned Edema Interventions   Planned edema interventions Manual lymph drainage;Gradient compression bandaging;Fit for compression garment;Exercises;Precautions to prevent infection / exacerbation;Education;Skin care / precautions;Home management program development   Clinical Impression   Criteria for skilled therapeutic intervention met Yes   Therapy diagnosis lymphedema   Influenced by the following impairments / conditions Stage 2   Assessment of Occupational Performance 3-5 Performance Deficits   Identified Performance Deficits decreased functional ambulation; discomfort in limb; decreased fit/I with LB dressing; compromised stair navigation   Clinical Decision Making (Complexity) Moderate complexity   Treatment frequency 3 times / week   Treatment duration 3x/wk  x 3 weeks, then 2x/wk x 1 week, then 0x/wk x 3 weeks, then 1x/wk x 1 week   Patient / family and/or staff in agreement with plan of care Yes   Risks and benefits of therapy have been explained Yes   Clinical impression comments Pt will benefit from skilled lymphedema services to reduce RLE lymphedema for improved walking, stair navigation, LB clothing fit, and comfort in limb with activity.   Goals   Edema Eval Goals 1;2;3;4;5;6   Goal 1   Goal identifier 1   Goal description In order to improve functional mobility for activities of living, by the completion of intensive treatment,  patient and/or caregiver will:   Target date 10/26/18   Goal 2   Goal identifier 1a   Goal description tolerate gradient compression bandaging/wearing compression garments 23 hrs/day to prevent re-acccumulation of extracellular fluid for reductions needed to aide improvements in LB clothing fit and functional ambulation engagement   Target date 10/26/18   Goal 3   Goal identifier 1b   Goal description demonstrate independence in applying gradient compression bandages to build I with home management of RLE lymphedema needed to improve LB clothing fit and functional ambulation   Target date 10/26/18   Goal 4   Goal identifier 1c   Goal description demonstrate independence in performing prescribed exercises to facilate the lymph system and muscle pumping systems for max reductions needed to aide improvements in functional ambulation and LB clothing fit   Target date 10/26/18   Goal 5   Goal identifier 1d   Goal description be independent in donning/doffing, wearing schedule, and care of compression garments for I building with home management of RLE lymphedema needed to aide improvements in LB dressing/clothing fit, stair navigation, and functional ambulation engagement   Target date 10/26/18   Goal 6   Goal identifier 2   Goal description Display total RLE volume reduction of .5L+ for reductions needed to aide ambulation and better fit LB  clothing   Target date 10/26/18   Total Evaluation Time   Total evaluation time 25   Certification   Certification date from 08/24/18   Certification date to 10/26/18   Medical Diagnosis lymphedema   Certification I certify the need for these services furnished under this plan of treatment and while under my care.  (Physician co-signature of this document indicates review and certification of the therapy plan).

## 2018-08-27 NOTE — ADDENDUM NOTE
Encounter addended by: Paulo Agarwal MD on: 8/27/2018  7:23 AM<BR>     Actions taken: Cosign clinical note with attestation

## 2018-08-29 ENCOUNTER — HOSPITAL ENCOUNTER (OUTPATIENT)
Dept: OCCUPATIONAL THERAPY | Facility: CLINIC | Age: 68
Setting detail: THERAPIES SERIES
End: 2018-08-29
Attending: UROLOGY
Payer: MEDICARE

## 2018-08-29 PROCEDURE — 97535 SELF CARE MNGMENT TRAINING: CPT | Mod: GO

## 2018-08-29 PROCEDURE — 40000445 ZZHC STATISTIC OT VISIT, LYMPHEDEMA

## 2018-08-30 ENCOUNTER — HOSPITAL ENCOUNTER (OUTPATIENT)
Dept: OCCUPATIONAL THERAPY | Facility: CLINIC | Age: 68
Setting detail: THERAPIES SERIES
End: 2018-08-30
Attending: UROLOGY
Payer: MEDICARE

## 2018-08-30 PROCEDURE — 97535 SELF CARE MNGMENT TRAINING: CPT | Mod: GO

## 2018-08-30 PROCEDURE — 40000445 ZZHC STATISTIC OT VISIT, LYMPHEDEMA

## 2018-08-31 ENCOUNTER — HOSPITAL ENCOUNTER (OUTPATIENT)
Dept: OCCUPATIONAL THERAPY | Facility: CLINIC | Age: 68
Setting detail: THERAPIES SERIES
End: 2018-08-31
Attending: UROLOGY
Payer: MEDICARE

## 2018-08-31 PROCEDURE — 97535 SELF CARE MNGMENT TRAINING: CPT | Mod: GO

## 2018-08-31 PROCEDURE — 40000445 ZZHC STATISTIC OT VISIT, LYMPHEDEMA

## 2018-09-04 ENCOUNTER — HOSPITAL ENCOUNTER (OUTPATIENT)
Dept: OCCUPATIONAL THERAPY | Facility: CLINIC | Age: 68
Setting detail: THERAPIES SERIES
End: 2018-09-04
Attending: UROLOGY
Payer: MEDICARE

## 2018-09-04 PROCEDURE — 40000445 ZZHC STATISTIC OT VISIT, LYMPHEDEMA

## 2018-09-04 PROCEDURE — 97140 MANUAL THERAPY 1/> REGIONS: CPT | Mod: GO

## 2018-09-05 ENCOUNTER — HOSPITAL ENCOUNTER (OUTPATIENT)
Dept: OCCUPATIONAL THERAPY | Facility: CLINIC | Age: 68
Setting detail: THERAPIES SERIES
End: 2018-09-05
Attending: UROLOGY
Payer: MEDICARE

## 2018-09-05 PROCEDURE — 97140 MANUAL THERAPY 1/> REGIONS: CPT | Mod: GO

## 2018-09-05 PROCEDURE — 40000445 ZZHC STATISTIC OT VISIT, LYMPHEDEMA

## 2018-09-06 ENCOUNTER — HOSPITAL ENCOUNTER (OUTPATIENT)
Dept: OCCUPATIONAL THERAPY | Facility: CLINIC | Age: 68
Setting detail: THERAPIES SERIES
End: 2018-09-06
Attending: UROLOGY
Payer: MEDICARE

## 2018-09-06 PROCEDURE — 97140 MANUAL THERAPY 1/> REGIONS: CPT | Mod: GO

## 2018-09-06 PROCEDURE — 40000445 ZZHC STATISTIC OT VISIT, LYMPHEDEMA

## 2018-09-10 ENCOUNTER — TELEPHONE (OUTPATIENT)
Dept: FAMILY MEDICINE | Facility: CLINIC | Age: 68
End: 2018-09-10

## 2018-09-10 NOTE — LETTER
9/10/2018        RE: Juwan Latham  [address]  [place] [address]        Dear Dianelys,    I am requesting a letter of medical necessity for my patient Mr. Latham who has suffered from significant lymphedema of his right lower extremity and genital area for the past several months following surgery and radiation for prostate cancer.  He has had some initial success with compression treatments delivered by our lymphedema clinic.  He now requires custom fit compression items including a thigh high stocking for his right lower extremity as well as briefs to treat his genital lymphedema.  I understand that coverage for such items is not included in his health insurance so am writing a letter of medical necessity in hope of getting coverage for these two compression garments for Mr. Latham.    You may reply to my office at the above address.  Thank you for your consideration.      Sincerely,          Julio Guidry MD

## 2018-09-10 NOTE — TELEPHONE ENCOUNTER
Name of caller: Juwan  Relationship of Patient: Self    Reason for Call: PT came into clinic because he was told he needs to have a full compression suit made and he needs orders for it.     Best phone number to reach pt at is: [phone]  Ok to leave a message with medical info? Yes    Pharmacy preferred (if calling for a refill): BRIAN Burks  Patient Representative - [facility]

## 2018-09-10 NOTE — TELEPHONE ENCOUNTER
I spoke with Juwan, per chart review Lymphedema clinic has measured patient for a compression garment so I told Juwan to contact them; as it would seem they would also order it. Juwan told me they told him to tell his doctor about it and Juwan felt that lymphedema clinic would need an order from Dr. Guidry. I informed Juwan I will let Dr. Guidry know, and also try to route my communication to the lymphedema clinic so they can directly correspond to us if they need to. Juwan in agreement with that plan.     [place] [facility] phone number is [phone] please call to speak with RN to discuss further if needed. Thank you, Justa Naqvi R.N.--[facility].

## 2018-09-11 ENCOUNTER — HOSPITAL ENCOUNTER (OUTPATIENT)
Dept: OCCUPATIONAL THERAPY | Facility: CLINIC | Age: 68
Setting detail: THERAPIES SERIES
End: 2018-09-11
Attending: UROLOGY
Payer: MEDICARE

## 2018-09-11 PROCEDURE — 40000445 ZZHC STATISTIC OT VISIT, LYMPHEDEMA

## 2018-09-11 PROCEDURE — 97535 SELF CARE MNGMENT TRAINING: CPT | Mod: GO

## 2018-09-11 NOTE — TELEPHONE ENCOUNTER
I called and left message at lymphedema clinic asking them to contact me to understand what they need.  It is not clear what is needed nor if it is to come from me or Dr. Agarwal.  Asked them to return call to my cell phone today.    Julio Guidry MD

## 2018-09-11 NOTE — TELEPHONE ENCOUNTER
Called [employer] at [phone] and asked for fax and or address where this can be sent.  This can be faxed to [phone] for [employer]

## 2018-09-11 NOTE — TELEPHONE ENCOUNTER
I was able to speak with [facility]'s lymphedema specialist Umer Chavarria who advised me that Juwan's insurance does not cover compression wear which is required to treat his lymphedema.  He requires compression stocking for his right leg (thigh high) as well as briefs as he's now had migration of fluid from his right leg to his genital region.  Umer felt Juwan would best be served with custom-fit garments for this and is requesting a letter of medical necessity to be sent to [employer]'s insurance for this.  I have written this letter and pended it.    Jayna - can you please find out where we are supposed to send a letter of medical necessity to [employer]?  Once we discover this we can print and I can sign the letter and we can send it to [facility] and await their decision.    Julio Guidry MD

## 2018-09-12 ENCOUNTER — HOSPITAL ENCOUNTER (OUTPATIENT)
Dept: OCCUPATIONAL THERAPY | Facility: CLINIC | Age: 68
Setting detail: THERAPIES SERIES
End: 2018-09-12
Attending: UROLOGY
Payer: MEDICARE

## 2018-09-12 PROCEDURE — 97140 MANUAL THERAPY 1/> REGIONS: CPT | Mod: GO

## 2018-09-12 PROCEDURE — 40000445 ZZHC STATISTIC OT VISIT, LYMPHEDEMA

## 2018-09-13 ENCOUNTER — HOSPITAL ENCOUNTER (OUTPATIENT)
Dept: OCCUPATIONAL THERAPY | Facility: CLINIC | Age: 68
Setting detail: THERAPIES SERIES
End: 2018-09-13
Attending: UROLOGY
Payer: MEDICARE

## 2018-09-13 PROCEDURE — G8991 OTHER PT/OT GOAL STATUS: HCPCS | Mod: GO,CI

## 2018-09-13 PROCEDURE — 97535 SELF CARE MNGMENT TRAINING: CPT | Mod: GO

## 2018-09-13 PROCEDURE — G8990 OTHER PT/OT CURRENT STATUS: HCPCS | Mod: GO,CK

## 2018-09-13 PROCEDURE — 40000445 ZZHC STATISTIC OT VISIT, LYMPHEDEMA

## 2018-09-13 NOTE — PROGRESS NOTES
Outpatient Occupational Therapy Progress Note     Patient: Juwan Latham  : 1950    Beginning/End Dates of Reporting Period:  18 to 2018    Referring Provider: Paulo Agarwal MD     Therapy Diagnosis: lymphedema    Client Self Report:       Objective Measurements: see flowsheet                                                        Outcome Measures (most recent score):        Goals:   Goal Identifier 1   Goal Description In order to improve functional mobility for activities of living, by the completion of intensive treatment,  patient and/or caregiver will:   Target Date 10/26/18   Date Met      Progress:     Goal Identifier 1a   Goal Description tolerate gradient compression bandaging/wearing compression garments 23 hrs/day to prevent re-acccumulation of extracellular fluid for reductions needed to aide improvements in LB clothing fit and functional ambulation engagement   Target Date 10/26/18   Date Met  18   Progress:     Goal Identifier 1b   Goal Description demonstrate independence in applying gradient compression bandages to build I with home management of RLE lymphedema needed to improve LB clothing fit and functional ambulation   Target Date 10/26/18   Date Met      Progress:     Goal Identifier 1c   Goal Description demonstrate independence in performing prescribed exercises to facilate the lymph system and muscle pumping systems for max reductions needed to aide improvements in functional ambulation and LB clothing fit   Target Date 10/26/18   Date Met  18   Progress:     Goal Identifier 1d   Goal Description be independent in donning/doffing, wearing schedule, and care of compression garments for I building with home management of RLE lymphedema needed to aide improvements in LB dressing/clothing fit, stair navigation, and functional ambulation engagement   Target Date 10/26/18   Date Met      Progress:     Goal Identifier 2   Goal Description Display total RLE  volume reduction of .5L+ for reductions needed to aide ambulation and better fit LB clothing   Target Date 10/26/18   Date Met  08/31/18 (and ongoing)   Progress:     Goal Identifier     Goal Description     Target Date     Date Met      Progress:     Goal Identifier     Goal Description     Target Date     Date Met      Progress:     Progress Toward Goals:   Progress limited due to pt barriers regarding memory deficits, [place], and home compliance. Pt has received initial skill building and education on GCB use to reduce RLE lymphedema. Pt has had a positive response to GCB's seen with far less tension in skin, pt reports he can move limb better, and walking less of a challenge; measurements indicating over 1L reduction in RLE since start services. Pt unfortunatly has struggled with some fluid migrating to genital/inguinal region. Initial attempts to reduce with biker short compression and swell spots without much success 2/2 pt incontinence at night and losing swell spot/fit of biker shorts suboptimal for compression use. Pt currently getting letter medical necessity written for hopeful coverage of custom garments for genital and RLE lymphedema. Pts supports of handouts and video were underutilized by pt at home and being [place] he struggled to utilize, setting stage for suboptimal use GCB and HEP at home. Pt improving on support use at home and self wrapping and HEP use past 2 sessions.      Plan:  Continue therapy per current plan of care.    Discharge:    Reason for Discharge: cont POC at this time    Equipment Issued:     Discharge Plan: Patient to continue home program.

## 2018-09-18 ENCOUNTER — RADIANT APPOINTMENT (OUTPATIENT)
Dept: CT IMAGING | Facility: CLINIC | Age: 68
End: 2018-09-18
Attending: NURSE PRACTITIONER
Payer: COMMERCIAL

## 2018-09-18 ENCOUNTER — OFFICE VISIT (OUTPATIENT)
Dept: SURGERY | Facility: CLINIC | Age: 68
End: 2018-09-18
Attending: CLINICAL NURSE SPECIALIST
Payer: MEDICARE

## 2018-09-18 VITALS
BODY MASS INDEX: 31.33 KG/M2 | TEMPERATURE: 98.5 F | OXYGEN SATURATION: 93 % | SYSTOLIC BLOOD PRESSURE: 135 MMHG | WEIGHT: 199.6 LBS | DIASTOLIC BLOOD PRESSURE: 72 MMHG | HEART RATE: 58 BPM | HEIGHT: 67 IN | RESPIRATION RATE: 16 BRPM

## 2018-09-18 DIAGNOSIS — C34.11 MALIGNANT NEOPLASM OF UPPER LOBE OF RIGHT LUNG (H): ICD-10-CM

## 2018-09-18 DIAGNOSIS — C34.11 MALIGNANT NEOPLASM OF UPPER LOBE OF RIGHT LUNG (H): Primary | ICD-10-CM

## 2018-09-18 PROCEDURE — G0463 HOSPITAL OUTPT CLINIC VISIT: HCPCS | Mod: ZF

## 2018-09-18 RX ORDER — FUROSEMIDE 20 MG
TABLET ORAL
Refills: 0 | COMMUNITY
Start: 2018-07-16 | End: 2019-03-19

## 2018-09-18 ASSESSMENT — PAIN SCALES - GENERAL: PAINLEVEL: NO PAIN (0)

## 2018-09-18 NOTE — PROGRESS NOTES
THORACIC SURGERY FOLLOW UP VISIT    Dear Dr. Julio Guidry,  I saw Mr. Latham in follow-up today. The clinical summary follows:     PREOP DIAGNOSIS   Enlarging ground glass opacity in the right upper lobe.    NEODJUVANT THERAPY   None    COMPLICATIONS FROM NEOADJUVANT THERAPY  N/A  PROCEDURE   Flexible bronchoscopy  Laparoscopic right upper lobe wedge resection  Laparoscopic assisted thoracoscopic mediastinal lymph node dissection    DATE OF PROCEDURE  03/03/2017    HISTOPATHOLOGY   Minimally invasive well differentiated adenocarcinoma    COMPLICATIONS  None    INTERVAL STUDIES  Chest CT-final read pending at time of clinic visit. Appears stable to my review.    ETOH 2-3 beers a night   TOB Former smoker (40 pack years). Quit 1/1/1985.  BMI 31.3    SUBJECTIVE   Juwan is doing well from a respiratory standpoint. He had a prostatectomy 12/01/18 for prostate cancer followed by radiation therapy. His biggest problem right now is the lymphedema in his right leg. He is seeing a lymphedema therapist for this but does not feel that it has worked yet.    From a personal perspective, he is here alone.    IMPRESSION (C34.11) Malignant neoplasm of upper lobe of right lung (H)  (primary encounter diagnosis)  Comment: surveillance  Plan: follow up in 6 months    67 year-old male with a history of lung cancer s/p wedge resection.    PLAN  I spent a total of 15 minutes with Mr. Juwan Latham, more than 50% of which were spent in counseling, coordination of care, and face-to-face time. I reviewed the plan as follows:  Follow up on final read from today's chest CT. If stable, will get another one in 6 months which will put him two years out from surgery. At that time we can increase the interval to annually. I will call him with the results of the chest CT.  1. Necessary Tests & Appointments: Chest CT and clinic in 6 months.  2. Pain Control Plan: N/A  3. Anticoagulation Plan: N/A  4. Smoking Cessation: N/A    All questions  were answered and the patient and present family were in agreement with the plan.  I appreciate the opportunity to participate in the care of your patient and will keep you updated.  Sincerely,

## 2018-09-18 NOTE — MR AVS SNAPSHOT
After Visit Summary   9/18/2018    Juwan Latham    MRN: [id number]           Patient Information     Date Of Birth          1950        Visit Information        Provider Department      9/18/2018 4:00 PM Carla Faulkner APRN CNS [facility]        Today's Diagnoses     Malignant neoplasm of upper lobe of right lung (H)    -  1       Follow-ups after your visit        Follow-up notes from your care team     Return in about 6 months (around 3/18/2019).      Your next 10 appointments already scheduled     Sep 19, 2018  9:30 AM CDT   Lymphedema Treatment with Umer Chavarria   [facility] Lymp[facility]ema [facility] ([facility])    [address] [id number] [phone]            Sep 20, 2018  9:30 AM CDT   Lymphedema Treatment with Umer Chavarria   [facility] ([facility])    [address] [id number] [phone]            Sep 21, 2018  9:30 AM CDT   Lymphedema Treatment with Umer Chavarria   [facility] Lymp[facility]ema [facility] ([facility])    [address] [id number] [phone]              Who to contact     If you have questions or need follow up information about today's clinic visit or your schedule please contact [facility] directly at [phone].  Normal or non-critical lab and imaging results will be communicated to you by MyChart, letter or phone within 4 business days after the clinic has received the results. If you do not hear from us within 7 days, please contact the clinic through MyChart or phone. If you have a critical or abnormal lab result, we will notify you by phone as soon as possible.  Submit refill requests through [employer] or call your pharmacy and they will forward the refill request to us. Please allow 3 business days for your refill to be completed.          Additional Information About Your Visit       "  [facility] Information     [employer] gives you secure access to your electronic health record. If you see a primary care provider, you can also send messages to your care team and make appointments. If you have questions, please call your primary care clinic.  If you do not have a primary care provider, please call [phone] and they will assist you.        Care EveryWhere ID     This is your Care EveryWhere ID. This could be used by other organizations to access your [place] medical records  [id number]        Your Vitals Were     Pulse Temperature Respirations Height Pulse Oximetry BMI (Body Mass Index)    58 98.5  F (36.9  C) (Oral) 16 1.702 m (5' 7.01\") 93% 31.25 kg/m2       Blood Pressure from Last 3 Encounters:   09/18/18 135/72   08/15/18 128/70   08/14/18 136/79    Weight from Last 3 Encounters:   09/18/18 90.5 kg (199 lb 9.6 oz)   08/15/18 89.8 kg (198 lb)   08/08/18 89.6 kg (197 lb 9.6 oz)              Today, you had the following     No orders found for display       Primary Care Provider Office Phone # Fax #    Julio Guidry Jr., [phone] [address]  [place] [address]        Equal Access to Services     [facility] : Hadii aad ku hadasho Soomaali, waaxda luqadaha, qaybta kaalmada adeegyada, waxay idiin hayaan willard mosley . So [facility] [phone].    ATENCIÓN: Si habla español, tiene a gunn disposición servicios gratuitos de asistencia lingüística. Llame al [phone].    We comply with applicable federal civil rights laws and [place] laws. We do not discriminate on the basis of race, color, national origin, age, disability, sex, sexual orientation, or gender identity.            Thank you!     Thank you for choosing [facility]  for your care. Our goal is always to provide you with excellent care. Hearing back from our patients is one way we can continue to improve our services. Please take a few minutes to complete the written survey " that you may receive in the mail after your visit with us. Thank you!             Your Updated Medication List - Protect others around you: Learn how to safely use, store and throw away your medicines at www.disposemymeds.org.          This list is accurate as of 9/18/18  4:11 PM.  Always use your most recent med list.                   Brand Name Dispense Instructions for use Diagnosis    acetaminophen 325 MG tablet    TYLENOL    100 tablet    Take 2 tablets (650 mg) by mouth every 6 hours Do not take more than 4,000 mg of acetaminophen (Tylenol) from all sources to prevent liver damage.    Malignant neoplasm of upper lobe of right lung (H)       furosemide 20 MG tablet    LASIX          losartan 25 MG tablet    COZAAR    90 tablet    Take 1 tablet (25 mg) by mouth daily    Hypertension goal BP (blood pressure) < 140/90       order for DME     1 each    1: Gradient Compression Wraps; 2: BLE 20-30 or 30-40 mm Hg compression stockings; 3: RLE custom compression stocking; 4; RLE Velcro compression garment; thigh high    Lymphedema       PARoxetine 10 MG tablet    PAXIL    90 tablet    Take 1 tablet (10 mg) by mouth At Bedtime    Fatigue, unspecified type       tolterodine 4 MG 24 hr capsule    DETROL LA    30 capsule    Take 1 capsule (4 mg) by mouth daily    Radiation cystitis

## 2018-09-18 NOTE — LETTER
9/18/2018       RE: Juwan Latham  [address]  [place] [address]     Dear Colleague,    Thank you for referring your patient, Juwan Latham, to the [facility]. Please see a copy of my visit note below.    THORACIC SURGERY FOLLOW UP VISIT    Dear Dr. Julio Guidry,  I saw Mr. Latham in follow-up today. The clinical summary follows:     PREOP DIAGNOSIS   Enlarging ground glass opacity in the right upper lobe.    NEODJUVANT THERAPY   None    COMPLICATIONS FROM NEOADJUVANT THERAPY  N/A  PROCEDURE   Flexible bronchoscopy  Laparoscopic right upper lobe wedge resection  Laparoscopic assisted thoracoscopic mediastinal lymph node dissection    DATE OF PROCEDURE  03/03/2017    HISTOPATHOLOGY   Minimally invasive well differentiated adenocarcinoma    COMPLICATIONS  None    INTERVAL STUDIES  Chest CT-final read pending at time of clinic visit. Appears stable to my review.    ETOH 2-3 beers a night   TOB Former smoker (40 pack years). Quit 1/1/1985.  BMI 31.3    SUBJECTIVE   Juwan is doing well from a respiratory standpoint. He had a prostatectomy 12/01/18 for prostate cancer followed by radiation therapy. His biggest problem right now is the lymphedema in his right leg. He is seeing a lymphedema therapist for this but does not feel that it has worked yet.    From a personal perspective, he is here alone.    IMPRESSION (C34.11) Malignant neoplasm of upper lobe of right lung (H)  (primary encounter diagnosis)  Comment: surveillance  Plan: follow up in 6 months    67 year-old male with a history of lung cancer s/p wedge resection.    PLAN  I spent a total of 15 minutes with Mr. Juwan Latham, more than 50% of which were spent in counseling, coordination of care, and face-to-face time. I reviewed the plan as follows:  Follow up on final read from today's chest CT. If stable, will get another one in 6 months which will put him two years out from surgery. At that time we can increase the  interval to annually. I will call him with the results of the chest CT.  1. Necessary Tests & Appointments: Chest CT and clinic in 6 months.  2. Pain Control Plan: N/A  3. Anticoagulation Plan: N/A  4. Smoking Cessation: N/A    All questions were answered and the patient and present family were in agreement with the plan.  I appreciate the opportunity to participate in the care of your patient and will keep you updated.  Sincerely,      GLORIA Arechiga CNS

## 2018-09-18 NOTE — NURSING NOTE
"Oncology Rooming Note    September 18, 2018 3:10 PM   Juwan Latham is a 67 year old male who presents for:    Chief Complaint   Patient presents with     Oncology Clinic Visit     6 Month F/U Prostate Ca     Initial Vitals: /72 (BP Location: Right arm, Patient Position: Sitting, Cuff Size: Adult Regular)  Pulse 58  Temp 98.5  F (36.9  C) (Oral)  Resp 16  Ht 1.702 m (5' 7.01\")  Wt 90.5 kg (199 lb 9.6 oz)  SpO2 93%  BMI 31.25 kg/m2 Estimated body mass index is 31.25 kg/(m^2) as calculated from the following:    Height as of this encounter: 1.702 m (5' 7.01\").    Weight as of this encounter: 90.5 kg (199 lb 9.6 oz). Body surface area is 2.07 meters squared.  No Pain (0) Comment: Data Unavailable   No LMP for male patient.  Allergies reviewed: Yes  Medications reviewed: Yes    Medications: Medication refills not needed today.  Pharmacy name entered into [facility]: [id number], [place] - [address] [facility] [id number]    Clinical concerns: None, Carla was NOT notified.    10 minutes for nursing intake (face to face time)     SHAUN SAMSON LPN            "

## 2018-09-19 ENCOUNTER — HOSPITAL ENCOUNTER (OUTPATIENT)
Dept: OCCUPATIONAL THERAPY | Facility: CLINIC | Age: 68
Setting detail: THERAPIES SERIES
End: 2018-09-19
Attending: UROLOGY
Payer: MEDICARE

## 2018-09-19 ENCOUNTER — TELEPHONE (OUTPATIENT)
Dept: SURGERY | Facility: CLINIC | Age: 68
End: 2018-09-19

## 2018-09-19 ENCOUNTER — TELEPHONE (OUTPATIENT)
Dept: FAMILY MEDICINE | Facility: CLINIC | Age: 68
End: 2018-09-19

## 2018-09-19 PROCEDURE — 97140 MANUAL THERAPY 1/> REGIONS: CPT | Mod: GO

## 2018-09-19 PROCEDURE — 40000445 ZZHC STATISTIC OT VISIT, LYMPHEDEMA

## 2018-09-19 NOTE — TELEPHONE ENCOUNTER
Call to Juwan to let him know the final report from his chest CT yesterday is stable. The plan remains for him to follow up in 6 months with a chest CT. There was no answer. Message left with call back information.

## 2018-09-19 NOTE — TELEPHONE ENCOUNTER
Reason for Call:  Other     Detailed comments: Maria R from [facility] is calling saying they received a fax from Dr. Guidry regarding a medical necessity for this patient's compression stockings. She says it needs to be faxed directly to the supplier and then the supplier of these compression stockings will send it straight to them. She did not leave a call back number. She just wanted us to send that letter of medical necessity to the people who will be supplying the compression stocking for this patient.    Call taken on 9/19/2018 at 3:12 PM by Francheska Chen

## 2018-09-20 ENCOUNTER — HOSPITAL ENCOUNTER (OUTPATIENT)
Dept: OCCUPATIONAL THERAPY | Facility: CLINIC | Age: 68
Setting detail: THERAPIES SERIES
End: 2018-09-20
Attending: UROLOGY
Payer: MEDICARE

## 2018-09-20 DIAGNOSIS — N30.40 RADIATION CYSTITIS: ICD-10-CM

## 2018-09-20 PROCEDURE — 40000445 ZZHC STATISTIC OT VISIT, LYMPHEDEMA

## 2018-09-20 PROCEDURE — 97140 MANUAL THERAPY 1/> REGIONS: CPT | Mod: GO

## 2018-09-20 RX ORDER — TOLTERODINE 4 MG/1
4 CAPSULE, EXTENDED RELEASE ORAL DAILY
Qty: 90 CAPSULE | Refills: 3 | Status: SHIPPED | OUTPATIENT
Start: 2018-09-20 | End: 2019-03-19

## 2018-09-20 NOTE — TELEPHONE ENCOUNTER
Routing message to Lymphedema provider.  Letter can be printed from [facility].  If you need it manually signed please let me know and this can be done when MD LAURIE is back next week.  Jayna Rider

## 2018-09-20 NOTE — PROGRESS NOTES
[facility]      OUTPATIENT OCCUPATIONAL THERAPY  PLAN OF TREATMENT FOR OUTPATIENT REHABILITATION    Patient's Last Name, First Name, M.I.                YOB: 1950  Juwan Latham                        Provider's Name  [facility] Medical Record No.  [id number]                               Onset Date: 8/21/18   Start of Care Date: 8/24/18   Type:     ___PT   _X_OT   ___SLP Medical Diagnosis: lymphedema; edema                       OT Diagnosis: lymphedema      _________________________________________________________________________________  Plan of Treatment:    Frequency/Duration: 1x/wk x 1 week, then 3x/wk x 1 week, then 0x/wk x 3 weeks, then 1x/wk x 1 week     Goals:  Goal Identifier 1   Goal Description In order to improve functional mobility for activities of living, by the completion of intensive treatment,  patient and/or caregiver will:   Target Date 10/26/18   Date Met      Progress:     Goal Identifier 1a   Goal Description tolerate gradient compression bandaging/wearing compression garments 23 hrs/day to prevent re-acccumulation of extracellular fluid for reductions needed to aide improvements in LB clothing fit and functional ambulation engagement   Target Date 10/26/18   Date Met  08/30/18   Progress:     Goal Identifier 1b   Goal Description demonstrate independence in applying gradient compression bandages to build I with home management of RLE lymphedema needed to improve LB clothing fit and functional ambulation   Target Date 10/26/18   Date Met  09/20/18   Progress:     Goal Identifier 1c   Goal Description demonstrate independence in performing prescribed exercises to facilate the lymph system and muscle pumping systems for max reductions needed to aide improvements in functional ambulation and LB clothing fit   Target Date 10/26/18   Date Met  08/31/18    Progress:     Goal Identifier 1d   Goal Description be independent in donning/doffing, wearing schedule, and care of compression garments for I building with home management of RLE lymphedema needed to aide improvements in LB dressing/clothing fit, stair navigation, and functional ambulation engagement   Target Date 10/26/18   Date Met      Progress:     Goal Identifier 2   Goal Description Display total RLE volume reduction of .5L+ for reductions needed to aide ambulation and better fit LB clothing   Target Date 10/26/18   Date Met  08/31/18 (and ongoing)   Progress:     Goal Identifier     Goal Description     Target Date     Date Met      Progress:     Goal Identifier     Goal Description     Target Date     Date Met      Progress:     Progress Toward Goals:   Progress this reporting period: Pt has built I with use of GCB's and reduced his RLE lymphedema-see flowsheet for measurements. Pt has built I with self MLD and finalizing I with edema HEP. Pt struggled initially with education and skill building 2/2 [place] and memory deficits not identified at time of evaluation. Pt unfortunetly developed lymphedema in pubic and genital region and has been exacerbated by use RLE compression. Pt starting to respond to MLD to clear pubic and genital swelling, and building efficiency with home self MLD to address region while seeking coverage for custom compression garments to address the genital and RLE lymphedema. Pt needs additional appts beyond initial cert to build skills with self MLD/edema ex's and receive therapist applied MLD to reduce genital and pubic swelling.      Certification date from 9/21/18 to 11/15/18.    Umer Chavarria I CERTIFY THE NEED FOR THESE SERVICES FURNISHED UNDER        THIS PLAN OF TREATMENT AND WHILE UNDER MY CARE     (Physician co-signature of this document indicates review and certification of the therapy plan).                Referring Provider: Paulo Agarwal

## 2018-09-21 ENCOUNTER — HOSPITAL ENCOUNTER (OUTPATIENT)
Dept: OCCUPATIONAL THERAPY | Facility: CLINIC | Age: 68
Setting detail: THERAPIES SERIES
End: 2018-09-21
Attending: UROLOGY
Payer: MEDICARE

## 2018-09-21 DIAGNOSIS — C34.11 MALIGNANT NEOPLASM OF UPPER LOBE OF RIGHT LUNG (H): Primary | ICD-10-CM

## 2018-09-21 DIAGNOSIS — I89.0 LYMPHEDEMA: Primary | ICD-10-CM

## 2018-09-21 PROCEDURE — 40000445 ZZHC STATISTIC OT VISIT, LYMPHEDEMA

## 2018-09-21 PROCEDURE — 97140 MANUAL THERAPY 1/> REGIONS: CPT | Mod: GO

## 2018-09-25 ENCOUNTER — HOSPITAL ENCOUNTER (OUTPATIENT)
Dept: OCCUPATIONAL THERAPY | Facility: CLINIC | Age: 68
Setting detail: THERAPIES SERIES
End: 2018-09-25
Attending: UROLOGY
Payer: MEDICARE

## 2018-09-25 PROCEDURE — 40000445 ZZHC STATISTIC OT VISIT, LYMPHEDEMA

## 2018-09-25 PROCEDURE — 97140 MANUAL THERAPY 1/> REGIONS: CPT | Mod: GO,KX

## 2018-09-25 NOTE — ADDENDUM NOTE
Encounter addended by: Umer Chavarria OT on: 9/25/2018  7:40 AM<BR>     Actions taken: Visit diagnoses modified,  activity accessed, Diagnosis association updated

## 2018-09-25 NOTE — TELEPHONE ENCOUNTER
JAMES Walsh called back in regards to message below.  Explained what [employer] told us that this letter needs to be submitted by the fitter who will get Juwan set up with the stockings.  Nico states Myrna is the one who will be doing this so asks we have the letter faxed to her.  He also pended a DME order for MD LAURIE to sign onff on.  Asked we fax the signed DME order and letter to Myrna at fax [phone].  Letter reprinted for signature and placed in MD basket.  Jayna Rider

## 2018-09-26 ENCOUNTER — HOSPITAL ENCOUNTER (OUTPATIENT)
Dept: OCCUPATIONAL THERAPY | Facility: CLINIC | Age: 68
Setting detail: THERAPIES SERIES
End: 2018-09-26
Attending: UROLOGY
Payer: MEDICARE

## 2018-09-26 PROCEDURE — 97140 MANUAL THERAPY 1/> REGIONS: CPT | Mod: GO,KX

## 2018-09-26 PROCEDURE — 40000445 ZZHC STATISTIC OT VISIT, LYMPHEDEMA

## 2018-09-26 NOTE — TELEPHONE ENCOUNTER
Letter signed and placed in TC inbasket to fax.  Is there a DME order I'm supposed to sign?  I couldn't locate it.    Julio Guidry MD

## 2018-09-27 ENCOUNTER — HOSPITAL ENCOUNTER (OUTPATIENT)
Dept: OCCUPATIONAL THERAPY | Facility: CLINIC | Age: 68
Setting detail: THERAPIES SERIES
End: 2018-09-27
Attending: UROLOGY
Payer: MEDICARE

## 2018-09-27 PROCEDURE — 97140 MANUAL THERAPY 1/> REGIONS: CPT | Mod: GO,KX

## 2018-09-27 PROCEDURE — 40000445 ZZHC STATISTIC OT VISIT, LYMPHEDEMA

## 2018-10-02 ENCOUNTER — HOSPITAL ENCOUNTER (OUTPATIENT)
Dept: OCCUPATIONAL THERAPY | Facility: CLINIC | Age: 68
Setting detail: THERAPIES SERIES
End: 2018-10-02
Attending: UROLOGY
Payer: MEDICARE

## 2018-10-02 PROCEDURE — 40000445 ZZHC STATISTIC OT VISIT, LYMPHEDEMA

## 2018-10-02 PROCEDURE — 97140 MANUAL THERAPY 1/> REGIONS: CPT | Mod: GO,KX

## 2018-10-03 ENCOUNTER — HOSPITAL ENCOUNTER (OUTPATIENT)
Dept: OCCUPATIONAL THERAPY | Facility: CLINIC | Age: 68
Setting detail: THERAPIES SERIES
End: 2018-10-03
Attending: UROLOGY
Payer: MEDICARE

## 2018-10-03 PROCEDURE — 40000445 ZZHC STATISTIC OT VISIT, LYMPHEDEMA

## 2018-10-03 PROCEDURE — 97140 MANUAL THERAPY 1/> REGIONS: CPT | Mod: GO

## 2018-10-04 ENCOUNTER — HOSPITAL ENCOUNTER (OUTPATIENT)
Dept: OCCUPATIONAL THERAPY | Facility: CLINIC | Age: 68
Setting detail: THERAPIES SERIES
End: 2018-10-04
Attending: UROLOGY
Payer: MEDICARE

## 2018-10-04 PROCEDURE — 97140 MANUAL THERAPY 1/> REGIONS: CPT | Mod: GO,KX

## 2018-10-04 PROCEDURE — 40000445 ZZHC STATISTIC OT VISIT, LYMPHEDEMA

## 2018-10-04 NOTE — PROGRESS NOTES
[facility]      OUTPATIENT OCCUPATIONAL THERAPY  PLAN OF TREATMENT FOR OUTPATIENT REHABILITATION    Patient's Last Name, First Name, M.I.                YOB: 1950  Juwan Latham                        Provider's Name  [facility] Medical Record No.  [id number]                               Onset Date: 8/21/18   Start of Care Date: 8/24/18   Type:     ___PT   _X_OT   ___SLP Medical Diagnosis: lymphedema                       OT Diagnosis: lymphedema      _________________________________________________________________________________  Plan of Treatment:    Frequency/Duration: 3x/wk x 1 week, then 2x/wk x 1 week, then 0x/wk x 3 weeks, then 1x/wk x 1 week     Goals:  Goal Identifier 1   Goal Description In order to improve functional mobility for activities of living, by the completion of intensive treatment,  patient and/or caregiver will:   Target Date 10/26/18   Date Met      Progress:     Goal Identifier 1a   Goal Description tolerate gradient compression bandaging/wearing compression garments 23 hrs/day to prevent re-acccumulation of extracellular fluid for reductions needed to aide improvements in LB clothing fit and functional ambulation engagement   Target Date 10/26/18   Date Met  08/30/18   Progress:     Goal Identifier 1b   Goal Description demonstrate independence in applying gradient compression bandages to build I with home management of RLE lymphedema needed to improve LB clothing fit and functional ambulation   Target Date 10/26/18   Date Met  09/20/18   Progress:     Goal Identifier 1c   Goal Description demonstrate independence in performing prescribed exercises to facilate the lymph system and muscle pumping systems for max reductions needed to aide improvements in functional ambulation and LB clothing fit   Target Date 10/26/18   Date Met  08/31/18    Progress:     Goal Identifier 1d   Goal Description be independent in donning/doffing, wearing schedule, and care of compression garments for I building with home management of RLE lymphedema needed to aide improvements in LB dressing/clothing fit, stair navigation, and functional ambulation engagement   Target Date 10/26/18   Date Met      Progress:     Goal Identifier 2   Goal Description Display total RLE volume reduction of .5L+ for reductions needed to aide ambulation and better fit LB clothing   Target Date 10/26/18   Date Met  08/31/18 (and ongoing)   Progress:     Goal Identifier     Goal Description     Target Date     Date Met      Progress:     Goal Identifier     Goal Description     Target Date     Date Met      Progress:     Progress Toward Goals:   Progress this reporting period: Pt has been seen for additional appts (see past cert) to continue to address genital and pubic lymphedema that resulted/increased at time pt benefiting from RLE compression wrapping and MLD use. Within those 2 weeks of additional treatment, pts pubic swelling has softened greatly, but has struggled to get removed and mobilized from the region. Pt had issue with penis increasing in lymphedema at times and had week long stretch where penis swelling reduced with pubic lymphedema/MLD application. Pt continues to need clinic time to finalize self MLD and means to address pubic and genital swelling. His RLE has responded very well to compression and reductions were still being made week 9/25-9/27/18. Pt recently has had issues with pain and inability to utilize GCB's as well as he had in beginning of POC, and RLE has started to refill a bit. Pt will benefit from additional appts to  Continue work to aide pubic lymphedema and genital lymphedema reductions and recpature progress initially made with GCB/wrapping use. Pt still awaiting feedback on possible covergae for custom compression garments to aide lymphedema  management.      Certification date from 9/28/18 to 11/30/18.    Umer Chavarria          I CERTIFY THE NEED FOR THESE SERVICES FURNISHED UNDER        THIS PLAN OF TREATMENT AND WHILE UNDER MY CARE .             Physician Signature               Date    X_____________________________________________________                      Referring Provider: Paulo Agarwal

## 2018-10-09 ENCOUNTER — HOSPITAL ENCOUNTER (OUTPATIENT)
Dept: OCCUPATIONAL THERAPY | Facility: CLINIC | Age: 68
Setting detail: THERAPIES SERIES
End: 2018-10-09
Attending: UROLOGY
Payer: MEDICARE

## 2018-10-09 ENCOUNTER — TELEPHONE (OUTPATIENT)
Dept: UROLOGY | Facility: CLINIC | Age: 68
End: 2018-10-09

## 2018-10-09 PROCEDURE — 97140 MANUAL THERAPY 1/> REGIONS: CPT | Mod: GO,KX

## 2018-10-09 PROCEDURE — 40000445 ZZHC STATISTIC OT VISIT, LYMPHEDEMA

## 2018-10-09 PROCEDURE — G8990 OTHER PT/OT CURRENT STATUS: HCPCS | Mod: GO,CJ

## 2018-10-09 PROCEDURE — G8991 OTHER PT/OT GOAL STATUS: HCPCS | Mod: GO,CI

## 2018-10-09 NOTE — TELEPHONE ENCOUNTER
[facility] Call Center    Phone Message    May a detailed message be left on voicemail: yes    Reason for Call: Other: Pt would like to know if there is anything more than can be done for the fluid in his leg.  He would like Dr. Agarwal to review his chart and then follow back up with pt.  Thank you!     Action Taken: Other: [facility]

## 2018-10-09 NOTE — PROGRESS NOTES
Outpatient Occupational Therapy Progress Note     Patient: Juwan Latham  : 1950    Beginning/End Dates of Reporting Period:  18 to 10/9/2018    Referring Provider: Paulo Agarwal MD     Therapy Diagnosis: lymphedema    Client Self Report:       Objective Measurements:see fowsheet                                                        Outcome Measures (most recent score): post LLIS to follow completion POC       Goals:   Goal Identifier 1   Goal Description In order to improve functional mobility for activities of living, by the completion of intensive treatment,  patient and/or caregiver will:   Target Date 10/26/18   Date Met      Progress:     Goal Identifier 1a   Goal Description tolerate gradient compression bandaging/wearing compression garments 23 hrs/day to prevent re-acccumulation of extracellular fluid for reductions needed to aide improvements in LB clothing fit and functional ambulation engagement   Target Date 10/26/18   Date Met  18   Progress:     Goal Identifier 1b   Goal Description demonstrate independence in applying gradient compression bandages to build I with home management of RLE lymphedema needed to improve LB clothing fit and functional ambulation   Target Date 10/26/18   Date Met  18   Progress:     Goal Identifier 1c   Goal Description demonstrate independence in performing prescribed exercises to facilate the lymph system and muscle pumping systems for max reductions needed to aide improvements in functional ambulation and LB clothing fit   Target Date 10/26/18   Date Met  18   Progress:     Goal Identifier 1d   Goal Description be independent in donning/doffing, wearing schedule, and care of compression garments for I building with home management of RLE lymphedema needed to aide improvements in LB dressing/clothing fit, stair navigation, and functional ambulation engagement   Target Date 10/26/18   Date Met      Progress:     Goal Identifier  2   Goal Description Display total RLE volume reduction of .5L+ for reductions needed to aide ambulation and better fit LB clothing   Target Date 10/26/18   Date Met  08/31/18 (and ongoing)   Progress:     Goal Identifier     Goal Description     Target Date     Date Met      Progress:     Goal Identifier     Goal Description     Target Date     Date Met      Progress:     Progress Toward Goals:   Progress limited due to pt response to techniques applied and utilized at home, pain not experienced earlier in POC, and lack of feedback from insurance coverage for custom garments. Pt has made progress in RLE lymphedema reductions and refined self wrapping skills over the past 10 appts (last progress note). Reduction in RLE girth can be seen in flowsheet and tissue of RLE far softer and less uncomfortable to pt per his report and noted upon palpation. Pt's pubic and genital swelling has had little change with variety of techniques employed. Pt had minor swelling in pubic region and penis prior to GCB use/wrapping RLE. Pubic swelling increased with RLE compression use and means to address via compression biker shorts and swell spots were ineffective. Pt has benefited from fibrosis work to pubic region and upper thigh but mobilization and reductions of swelling in region has not been acheivied. 2/2 insurance coverage and delays with answer from insurance regarding coverage for custom compression briefs, unable to assess utility compression briefs during POC at this time. Elastic taping trialed today as last effort to aide reduction in swelling to pubic region. Pt may also be starting to display influence of radiation fibrosis at waist line/lower abdominal region that could quite possibly be cluprit behind lack reductions and barriers to lymphatic flow from pubic region through abdomen to thoracic duct. Pt encouraged to seek  from oncology team regarding lack effective aide with pubic and genital swelling as benefit  of lymphedema services have been maximized at this time.       Plan:  Continue therapy per current plan of care.    Discharge:    Reason for Discharge: pt to be discharged after last scheduled appt and follow up visit for assessment of elastic taping utility and after obtaining custom compression brief to aide reductions and home management of pubic and genital lymphedema    Equipment Issued:     Discharge Plan: Patient to continue home program.

## 2018-10-10 ENCOUNTER — TELEPHONE (OUTPATIENT)
Dept: FAMILY MEDICINE | Facility: CLINIC | Age: 68
End: 2018-10-10

## 2018-10-10 DIAGNOSIS — R60.0 EDEMA OF RIGHT LOWER EXTREMITY: Primary | ICD-10-CM

## 2018-10-10 NOTE — TELEPHONE ENCOUNTER
Reason for call:  Patient reporting a symptom    Symptom or request: Lymphedema    Duration (how long have symptoms been present): on edmond    Have you been treated for this before? Yes    Additional comments: Pt has some questions for Dr Guidry, would not tell me    Phone Number patient can be reached at:  Home number on file [phone] (home)    Best Time:      Can we leave a detailed message on this number:  YES    Call taken on 10/10/2018 at 10:15 AM by Marisela Myers

## 2018-10-10 NOTE — TELEPHONE ENCOUNTER
I spoke to Juwan  Who has history of prostate cancer, lymphedema. Has been getting therapy for 6 weeks. Therapist was able to get leg lymphedema down but not groin/pubic area no matter what he does. He can not get it resolved and told Juwan he feels there is a block or something. Juwan is calling to ask Dr. Guidry if there another plan, or surgical plan to find/treat a blockage. Dr Paulo Agarwal at [facility] was original surgeon. Juwan said therapist said blockage may have been due to radiation or previous surgery.     I did inform Juwan Guidry is not in clinic at this time so there may be a delay in getting back to him but once he reviews this we will call him back. Juwan understands and in agreement with plan. Justa Naqvi R.N.

## 2018-10-10 NOTE — TELEPHONE ENCOUNTER
I'm at a bit of a loss in how to proceed next as I've told Juwan previously.  The only other thing I can think of doing is another study of his veins called a CT angiogram to see if there's any other problem with his veins that is causing his swelling.  It's possible this is a side effect of his radiation therapy which is now causing disruption of the flow of lymph in his leg.  There's not much that can be done for this outside of what the lymphedema clinic is doing.  If this is the case, his CT angiogram should also be normal.  There's no other study I can think of that we haven't done, no other treatment that hasn't been tried.  Perhaps Dr. Agarwal has other ideas (I see Juwan called him yesterday), but I'm all out of them.  I've placed the order for the CT angiogram so he should be hearing from [facility] radiology to schedule this.  Please call patient.     Julio Guidry MD

## 2018-10-11 ENCOUNTER — HOSPITAL ENCOUNTER (OUTPATIENT)
Dept: OCCUPATIONAL THERAPY | Facility: CLINIC | Age: 68
Setting detail: THERAPIES SERIES
End: 2018-10-11
Attending: UROLOGY
Payer: MEDICARE

## 2018-10-11 PROCEDURE — 40000445 ZZHC STATISTIC OT VISIT, LYMPHEDEMA

## 2018-10-11 PROCEDURE — 97140 MANUAL THERAPY 1/> REGIONS: CPT | Mod: GO,KX

## 2018-10-11 NOTE — TELEPHONE ENCOUNTER
Spoke with patient and advised of MD LAURIE's message below. He is open to the CT angiogram, but would also like to see what Dr. Agarwal thinks as well. He will let us know what he decides. I did give patient a heads up that radiology scheduling may be reaching out to him as orders have already been placed. Advised that he can just let them know that he is waiting for further information before scheduling.    Patient verbalized understanding and agrees with plan.    Will call back if further questions or concerns.    Kaylee Akins, RN, BSN    GIANNI routed back to MD LAURIE

## 2018-10-18 ENCOUNTER — HOSPITAL ENCOUNTER (OUTPATIENT)
Dept: CT IMAGING | Facility: CLINIC | Age: 68
Discharge: HOME | End: 2018-10-18
Attending: FAMILY MEDICINE | Admitting: FAMILY MEDICINE
Payer: MEDICARE

## 2018-10-18 DIAGNOSIS — R60.0 EDEMA OF RIGHT LOWER EXTREMITY: ICD-10-CM

## 2018-10-18 PROCEDURE — 25000128 H RX IP 250 OP 636: Performed by: RADIOLOGY

## 2018-10-18 PROCEDURE — 74174 CTA ABD&PLVS W/CONTRAST: CPT

## 2018-10-18 RX ORDER — IOPAMIDOL 755 MG/ML
500 INJECTION, SOLUTION INTRAVASCULAR ONCE
Status: COMPLETED | OUTPATIENT
Start: 2018-10-18 | End: 2018-10-18

## 2018-10-18 RX ADMIN — SODIUM CHLORIDE 80 ML: 9 INJECTION, SOLUTION INTRAVENOUS at 07:26

## 2018-10-18 RX ADMIN — IOPAMIDOL 80 ML: 755 INJECTION, SOLUTION INTRAVENOUS at 07:26

## 2018-10-23 NOTE — PROGRESS NOTES
"Mr. Latham,    Please see the results of your CT scan/venogram.  The fluid collection that was in your right pelvis is now gone, however there's now a \"soft tissue prominence\" in that spot.  It's smaller than it was previously though.  The radiologist can't see the large vein (called the external iliac vein) in the area of the soft tissue prominence.  I'm concerned that this may be compressing the vein and contributing to your swelling in your leg & pelvis.  I'm going to forward this result to the vascular surgeon you saw to see if he thinks he needs to do something about this surgically.  I'll let you know when I hear back from him.      If you have further questions about the interpretation of your labs, [email or web address] is a good website to check out for further information.    Please contact the clinic if you have additional questions.  Thank you.    Sincerely,    Julio Guidry MD    "

## 2018-10-26 ENCOUNTER — TELEPHONE (OUTPATIENT)
Dept: FAMILY MEDICINE | Facility: CLINIC | Age: 68
End: 2018-10-26

## 2018-10-26 NOTE — TELEPHONE ENCOUNTER
Spoke to Juwan, gave him information on doctor that did his eye surgery back in May.    Neha Beltran MA

## 2018-10-26 NOTE — TELEPHONE ENCOUNTER
Reason for Call:  Other who was his eye provider    Detailed comments: pt wants to know who his eye provider was within [facility] -not sure and could not find in chart    Phone Number Patient can be reached at: Cell number on file:    Telephone Information:   Mobile [phone]       Best Time: any    Can we leave a detailed message on this number? YES    Call taken on 10/26/2018 at 3:59 PM by Kenyetta Jiménez

## 2018-11-02 ENCOUNTER — MYC MEDICAL ADVICE (OUTPATIENT)
Dept: FAMILY MEDICINE | Facility: CLINIC | Age: 68
End: 2018-11-02

## 2018-11-02 NOTE — TELEPHONE ENCOUNTER
Call placed to Juwan--due to [employer] message as I wanted to clarify what he meant by his foot being dead.   Juwan says his foot is numb or has  less sensation and has been like this for quite awhile --- this is NOT a change, he says he just couldn't find the words to explain at the time.Juwan has been getting treatment for lymphedema and it has not resolving with treatment.  I advised him will get the message to Dr. Guidry. I did inform him Dr. Guidry is not available this afternoon and he may not hear back until Monday. Juwan is okay with this.     Dr. Guidry, see Juwan's message. Thank you, Justa Naqvi R.N.

## 2018-11-06 ENCOUNTER — MYC MEDICAL ADVICE (OUTPATIENT)
Dept: FAMILY MEDICINE | Facility: CLINIC | Age: 68
End: 2018-11-06

## 2018-11-06 NOTE — TELEPHONE ENCOUNTER
Please see [employer] message chain. Please advise. Thank you.  Kaylee Akins RN, BSN  [facility]

## 2018-11-06 NOTE — TELEPHONE ENCOUNTER
Patient states that he completed the six weeks referral to the lymph edema clinic. He wants to know the next steps. He can be reach at [phone]. Message forward to Dr. Agarwal and his RNCC Love Mcdonald.      Figueroa Joyner MA

## 2018-11-07 ENCOUNTER — MYC MEDICAL ADVICE (OUTPATIENT)
Dept: FAMILY MEDICINE | Facility: CLINIC | Age: 68
End: 2018-11-07

## 2018-11-07 NOTE — TELEPHONE ENCOUNTER
Dr. Guidry, please see response from Juwan which is part of previous mychart chain. Justa Naqvi R.N.

## 2018-11-07 NOTE — TELEPHONE ENCOUNTER
Per Love, patient is scheduled for 12/8 11:40. I spoke with patient and he is fine with date and time. Is active on [employer] and doesn't need a letter.

## 2018-11-09 ENCOUNTER — TRANSFERRED RECORDS (OUTPATIENT)
Dept: HEALTH INFORMATION MANAGEMENT | Facility: CLINIC | Age: 68
End: 2018-11-09

## 2018-11-16 DIAGNOSIS — R60.9 EDEMA: Primary | ICD-10-CM

## 2018-11-27 ENCOUNTER — TELEPHONE (OUTPATIENT)
Dept: FAMILY MEDICINE | Facility: CLINIC | Age: 68
End: 2018-11-27

## 2018-11-27 ENCOUNTER — OFFICE VISIT (OUTPATIENT)
Dept: FAMILY MEDICINE | Facility: CLINIC | Age: 68
End: 2018-11-27
Payer: COMMERCIAL

## 2018-11-27 VITALS
SYSTOLIC BLOOD PRESSURE: 124 MMHG | WEIGHT: 201 LBS | TEMPERATURE: 97.7 F | BODY MASS INDEX: 31.47 KG/M2 | DIASTOLIC BLOOD PRESSURE: 72 MMHG | HEART RATE: 59 BPM | OXYGEN SATURATION: 97 %

## 2018-11-27 DIAGNOSIS — B35.6 TINEA CRURIS: Primary | ICD-10-CM

## 2018-11-27 PROCEDURE — 99213 OFFICE O/P EST LOW 20 MIN: CPT | Performed by: FAMILY MEDICINE

## 2018-11-27 RX ORDER — CLOTRIMAZOLE 1 %
CREAM (GRAM) TOPICAL
Qty: 30 G | Refills: 1 | Status: SHIPPED | OUTPATIENT
Start: 2018-11-27 | End: 2019-03-19

## 2018-11-27 NOTE — PROGRESS NOTES
SUBJECTIVE:                                                    Juwan Latham is a 67 year old male who presents to clinic today for the following health issues:      Rash  Onset: 1 week    Description:   Location: both sides of groin  Character: red  Itching (Pruritis): no     Progression of Symptoms:  same    Accompanying Signs & Symptoms:  Fever: no   Body aches or joint pain: no   Sore throat symptoms: no   Recent cold symptoms: no     History:   Previous similar rash: no     Precipitating factors:   Exposure to similar rash: no   New exposures: None   Recent travel: no     Alleviating factors:  none    Therapies Tried and outcome: vaseline, OTC creams      Problem list and histories reviewed & adjusted, as indicated.  Additional history: as documented    Patient Active Problem List   Diagnosis     Allergic rhinitis due to other allergen     CARDIOVASCULAR SCREENING; LDL GOAL LESS THAN 160     Advance Care Planning     History of colonic polyps     Anxiety     PAULINO (obstructive sleep apnea)     GERD (gastroesophageal reflux disease)     Hypertension goal BP (blood pressure) < 140/90     Seasonal allergies     Benign neoplasm of descending colon     Malignant neoplasm of upper lobe of right lung (H)     Overweight (BMI 25.0-29.9)     Cavernous hemangioma of brain (H)     Major depressive disorder, recurrent episode, mild (H)     Prostate cancer (H)     Breen catheter problem (H)     Retinal detachment of left eye with single break     Past Surgical History:   Procedure Laterality Date     BRONCHOSCOPY FLEXIBLE N/A 3/3/2017    Procedure: BRONCHOSCOPY FLEXIBLE;  Surgeon: Maria A Desai MD;  Location: [facility] OR     COLONOSCOPY N/A 2/11/2015    Procedure: COLONOSCOPY;  Surgeon: Murphy Jesus MD;  Location: [facility] GI     COMBINED CYSTOSCOPY, RETROGRADES, URETEROSCOPY, LASER HOLMIUM LITHOTRIPSY URETER(S), INSERT STENT N/A 3/25/2018    Procedure: COMBINED CYSTOSCOPY, RETROGRADES, URETEROSCOPY, LASER HOLMIUM LITHOTRIPSY  URETER(S), INSERT STENT;  Cystoscopy, Catheter Exchange under Anesthesia;  Surgeon: Zaria Cain MD;  Location: [facility] OR     DAVINCI PROSTATECTOMY N/A 12/1/2017    Procedure: DAVINCI PROSTATECTOMY;  Robotic Assisted Radical Prostatectomy and Pelvic Lymph Node Dissection ;  Surgeon: Paulo Agarwal MD;  Location: [facility] OR     ESOPHAGOSCOPY, GASTROSCOPY, DUODENOSCOPY (EGD), COMBINED N/A 5/20/2016    Procedure: COMBINED ESOPHAGOSCOPY, GASTROSCOPY, DUODENOSCOPY (EGD), BIOPSY SINGLE OR MULTIPLE;  Surgeon: Murphy Jesus MD;  Location: [facility] GI     LAPAROSCOPIC WEDGE RESECTION LUNG Right 3/3/2017    Procedure: LAPAROSCOPIC WEDGE RESECTION LUNG;  Surgeon: Maria A Desai MD;  Location: [facility] OR     LASER HOLMIUM LITHOTRIPSY URETER(S), INSERT STENT, COMBINED  9/11/2012    Procedure: COMBINED CYSTOSCOPY, URETEROSCOPY, LASER HOLMIUM LITHOTRIPSY URETER(S), INSERT STENT;  Cystoscopy, Right Ureteroscopy, Stone Extraction, Holmium Laser, Double J Stent Placement;  Surgeon: Nicolás Blackwell MD;  Location: [facility] OR       Social History   Substance Use Topics     Smoking status: Former Smoker     Packs/day: 2.00     Years: 20.00     Types: Cigarettes     Quit date: 1/1/1985     Smokeless tobacco: Never Used     Alcohol use Yes      Comment: avg 3 beers per night     Family History   Problem Relation Age of Onset     HEART DISEASE Mother      Diabetes Brother 65     Type 2     Diabetes Maternal Grandmother      Cancer - colorectal Brother 70     Hypertension No family hx of      Lipids No family hx of            ROS:  Constitutional, HEENT, cardiovascular, pulmonary, gi and gu systems are negative, except as otherwise noted.    OBJECTIVE:     /72  Pulse 59  Temp 97.7  F (36.5  C) (Oral)  Wt 201 lb (91.2 kg)  SpO2 97%  BMI 31.47 kg/m2  Body mass index is 31.47 kg/(m^2).  GENERAL: healthy, alert and no distress  SKIN: erythematous rash on bilateral inguinal areas and scrotum/perineum. Satellite lesions on thighs --  consistent with tinea    Diagnostic Test Results:  none     ASSESSMENT/PLAN:   1. Tinea cruris: rash consistent with fungal etiology. Discussed skin cares, start antifungal. Return to clinic in 2 weeks if rash not improving.  - clotrimazole (LOTRIMIN) 1 % cream; Apply topically to affected area in groin twice daily for 14 days.  Dispense: 30 g; Refill: 1      Asad White DO  [facility]

## 2018-11-27 NOTE — TELEPHONE ENCOUNTER
Lymphedema crotch area peeling and irritated, purple in color.  It has been going on for a week but is looking really bad today.   His OT has been over x one month for lymphadema. OT said watch out for infection as you would end up in the hospital.    Appt made with Dr White today  Shobha Agarwal RN- Triage [employer]

## 2018-11-27 NOTE — MR AVS SNAPSHOT
After Visit Summary   11/27/2018    Juwan Latham    MRN: [id number]           Patient Information     Date Of Birth          1950        Visit Information        Provider Department      11/27/2018 11:40 AM Asad White DO [facility] [place]        Today's Diagnoses     Need for prophylactic vaccination against Streptococcus pneumoniae (pneumococcus)    -  1    Tinea cruris          Care Instructions      Jock Itch  Jock itch is a rash caused by a fungal infection. It occurs in the skin fold between the thigh and groin where it is warm and moist.  It starts as a small, red, itchy patch that grows larger in the shape of a round ring, 1- to 2- inches wide, and may cause the skin to flake. It may also spread to the scrotum or the skin that covers your testicles. This infection is treated with skin creams or oral medicine.  Home care  The following will help you care for yourself at home:    If you were prescribed a cream, it should be applied exactly as directed. Some antifungal creams are available without a prescription.    It may take a week before the fungus starts to go away and it can take about 2 to 3 weeks to completely clear. To prevent recurrence, it is important to keep using the medicine until the rash is all gone.    Wash the groin area at least once a day with soap and water. Pat dry and apply the medicine. Change to freshly laundered underwear daily.    Once the rash is gone, keep the area clean and dry to keep it from coming back. If recurrence is a problem, use a medicated antifungal powder daily in the groin area.  Prevention  The following tips may help prevent jock itch:    Don't share clothes, towels, or sports gear with others unless they have been washed.    Change underwear daily.    Keep skin clean and dry, especially after showering or swimming.    Lose weight.    Do not wear tight underwear.    Treat athlete s foot if it occurs.  Follow-up care  Follow up with  your healthcare provider as advised by our staff if the rash is not starting to get better after 10 days of treatment or if the rash continues to spread.  When to seek medical care  Get prompt medical attention if any of the following occur:    Fluid draining from the rash    Increasing redness around the rash    Rash returns soon after treatment  Date Last Reviewed: 8/1/2016 2000-2018 [employer]. [address] [place] [address]. All rights reserved. This information is not intended as a substitute for professional medical care. Always follow your healthcare professional's instructions.                Follow-ups after your visit        Follow-up notes from your care team     Return in about 2 weeks (around 12/11/2018) for rash if not improving. .      Your next 10 appointments already scheduled     Dec 08, 2018 11:40 AM CST   (Arrive by 11:25 AM)   Return Visit with Paulo Agarwal MD   [facility] for Prostate and Urologic C[facility] ([facility] [place])    [address]  4th Floor  [facility] [address] [phone]            Mar 19, 2019  3:00 PM CDT   CT CHEST W/O CONTRAST with UCCT2   [facility] [place] [facility] ([facility])    [address]  1st Floor  [facility] [address] [phone]           How do I prepare for my exam? (Food and drink instructions) No Food and Drink Restrictions.  How do I prepare for my exam? (Other instructions) You do not need to do anything special to prepare for this exam. For a sinus scan: Use your nose spray (nasal decongestant spray) as directed.  What should I wear: Please wear loose clothing, such as a sweat suit or jogging clothes. Avoid snaps, zippers and other metal. We may ask you to undress and put on a hospital gown.  How long does the exam take: Most scans take less than 20 minutes.  What should I bring: Please bring any scans or X-rays taken at other  [facility], if similar tests were done. Also bring a list of your medicines, including vitamins, minerals and over-the-counter drugs. It is safest to leave personal items at home.  Do I need a : No  is needed.  What do I need to tell my doctor? Be sure to tell your doctor: * If you have any allergies. * If there s any chance you are pregnant. * If you are breastfeeding.  What should I do after the exam: No restrictions, You may resume normal activities.  What is this test: A CT (computed tomography) scan is a series of pictures that allows us to look inside your body. The scanner creates images of the body in cross sections, much like slices of bread. This helps us see any problems more clearly.  Who should I call with questions: If you have any questions, please call the Imaging Department where you will have your exam. Directions, parking instructions, and other information is available on our website, [email or web address].            Mar 19, 2019  4:00 PM CDT   (Arrive by 3:45 PM)   Return Visit with GLORIA Arechiga [facility] ([facility])    [address] [place] [address]  [address] [place] [address] [phone]              Who to contact     If you have questions or need follow up information about today's clinic visit or your schedule please contact [facility] [place] directly at [phone].  Normal or non-critical lab and imaging results will be communicated to you by MyChart, letter or phone within 4 business days after the clinic has received the results. If you do not hear from us within 7 days, please contact the clinic through MyChart or phone. If you have a critical or abnormal lab result, we will notify you by phone as soon as possible.  Submit refill requests through [employer] or call your pharmacy and they will forward the refill request to us. Please allow 3 business days for your refill to be completed.           Additional Information About Your Visit        MyChart Information     [employer] gives you secure access to your electronic health record. If you see a primary care provider, you can also send messages to your care team and make appointments. If you have questions, please call your primary care clinic.  If you do not have a primary care provider, please call [phone] and they will assist you.        Care EveryWhere ID     This is your Care EveryWhere ID. This could be used by other organizations to access your [place] medical records  [id number]        Your Vitals Were     Pulse Temperature Pulse Oximetry BMI (Body Mass Index)          59 97.7  F (36.5  C) (Oral) 97% 31.47 kg/m2         Blood Pressure from Last 3 Encounters:   11/27/18 124/72   09/18/18 135/72   08/15/18 128/70    Weight from Last 3 Encounters:   11/27/18 201 lb (91.2 kg)   09/18/18 199 lb 9.6 oz (90.5 kg)   08/15/18 198 lb (89.8 kg)              We Performed the Following     Pneumococcal vaccine 23 valent PPSV23  (Pneumovax) [45050]     VACCINE ADMINISTRATION, INITIAL          Today's Medication Changes          These changes are accurate as of 11/27/18 12:05 PM.  If you have any questions, ask your nurse or doctor.               Start taking these medicines.        Dose/Directions    clotrimazole 1 % cream   Commonly known as:  LOTRIMIN   Used for:  Tinea cruris   Started by:  Asad White, DO        Apply topically to affected area in groin twice daily for 14 days.   Quantity:  30 g   Refills:  1            Where to get your medicines      These medications were sent to [facility] - [place], [place] - [address] AT [facility]  [address], [place] [address]     Phone:  [phone]     clotrimazole 1 % cream                Primary Care Provider Office Phone # Fax #    Julio Guidry Jr., [phone] [address]  [place] [address]        Equal Access to Services     [facility]  GAAR : Hadii aad ku hadeva Sosoleali, waaxda luqadaha, qaybta kaalmada adejuan f, mynor oscarin hayaan jamesklelen cowan dimitri . So [facility] [phone].    ATENCIÓN: Si habla español, tiene a gunn disposición servicios gratuitos de asistencia lingüística. Llame al [phone].    We comply with applicable federal civil rights laws and [place] laws. We do not discriminate on the basis of race, color, national origin, age, disability, sex, sexual orientation, or gender identity.            Thank you!     Thank you for choosing [facility]  for your care. Our goal is always to provide you with excellent care. Hearing back from our patients is one way we can continue to improve our services. Please take a few minutes to complete the written survey that you may receive in the mail after your visit with us. Thank you!             Your Updated Medication List - Protect others around you: Learn how to safely use, store and throw away your medicines at www.disposemymeds.org.          This list is accurate as of 11/27/18 12:05 PM.  Always use your most recent med list.                   Brand Name Dispense Instructions for use Diagnosis    acetaminophen 325 MG tablet    TYLENOL    100 tablet    Take 2 tablets (650 mg) by mouth every 6 hours Do not take more than 4,000 mg of acetaminophen (Tylenol) from all sources to prevent liver damage.    Malignant neoplasm of upper lobe of right lung (H)       clotrimazole 1 % cream    LOTRIMIN    30 g    Apply topically to affected area in groin twice daily for 14 days.    Tinea cruris       furosemide 20 MG tablet    LASIX          losartan 25 MG tablet    COZAAR    90 tablet    Take 1 tablet (25 mg) by mouth daily    Hypertension goal BP (blood pressure) < 140/90       order for DME     1 each    1: Gradient Compression Wraps; 2: BLE 20-30 or 30-40 mm Hg compression stockings; 3: RLE custom compression stocking; 4; RLE Velcro compression garment; thigh high    Lymphedema        order for DME     1 each    1: Custom RLE compression stocking-thigh high and include hip as able; 2: Custom compression briefs    Lymphedema       PARoxetine 10 MG tablet    PAXIL    90 tablet    Take 1 tablet (10 mg) by mouth At Bedtime    Fatigue, unspecified type       tolterodine ER 4 MG 24 hr capsule    DETROL LA    90 capsule    Take 1 capsule (4 mg) by mouth daily    Radiation cystitis

## 2018-11-27 NOTE — TELEPHONE ENCOUNTER
Reason for call:  Patient reporting a symptom    Symptom or request: The patient is calling to speak with Dr. Guidry's nurse regarding some groin pain.    Duration (how long have symptoms been present): A day    Have you been treated for this before? No    Phone Number patient can be reached at:  Cell number on file:    Telephone Information:   Mobile [phone]     Best Time:  Anytime    Can we leave a detailed message on this number:  YES    Call taken on 11/27/2018 at 9:30 AM by Francheska Chen

## 2018-11-27 NOTE — PATIENT INSTRUCTIONS
Jock Itch  Jock itch is a rash caused by a fungal infection. It occurs in the skin fold between the thigh and groin where it is warm and moist.  It starts as a small, red, itchy patch that grows larger in the shape of a round ring, 1- to 2- inches wide, and may cause the skin to flake. It may also spread to the scrotum or the skin that covers your testicles. This infection is treated with skin creams or oral medicine.  Home care  The following will help you care for yourself at home:    If you were prescribed a cream, it should be applied exactly as directed. Some antifungal creams are available without a prescription.    It may take a week before the fungus starts to go away and it can take about 2 to 3 weeks to completely clear. To prevent recurrence, it is important to keep using the medicine until the rash is all gone.    Wash the groin area at least once a day with soap and water. Pat dry and apply the medicine. Change to freshly laundered underwear daily.    Once the rash is gone, keep the area clean and dry to keep it from coming back. If recurrence is a problem, use a medicated antifungal powder daily in the groin area.  Prevention  The following tips may help prevent jock itch:    Don't share clothes, towels, or sports gear with others unless they have been washed.    Change underwear daily.    Keep skin clean and dry, especially after showering or swimming.    Lose weight.    Do not wear tight underwear.    Treat athlete s foot if it occurs.  Follow-up care  Follow up with your healthcare provider as advised by our staff if the rash is not starting to get better after 10 days of treatment or if the rash continues to spread.  When to seek medical care  Get prompt medical attention if any of the following occur:    Fluid draining from the rash    Increasing redness around the rash    Rash returns soon after treatment  Date Last Reviewed: 8/1/2016 2000-2018 [employer]. [address], [place] [address]. All rights reserved. This information is not intended as a substitute for professional medical care. Always follow your healthcare professional's instructions.

## 2018-11-29 ENCOUNTER — TELEPHONE (OUTPATIENT)
Dept: UROLOGY | Facility: CLINIC | Age: 68
End: 2018-11-29

## 2018-11-29 NOTE — TELEPHONE ENCOUNTER
[facility] Call Center    Phone Message    May a detailed message be left on voicemail: yes    Reason for Call: Order(s): Other:   Reason for requested: New therapy orders - lymphedema clinic  Date needed: 11/30  Provider name: Dr. Bates Weight      Action Taken: Message routed to:  [facility] ([facility] Urology

## 2018-12-04 ENCOUNTER — ALLIED HEALTH/NURSE VISIT (OUTPATIENT)
Dept: NURSING | Facility: CLINIC | Age: 68
End: 2018-12-04
Payer: COMMERCIAL

## 2018-12-04 DIAGNOSIS — Z23 NEED FOR PNEUMOCOCCAL VACCINATION: Primary | ICD-10-CM

## 2018-12-04 PROCEDURE — G0009 ADMIN PNEUMOCOCCAL VACCINE: HCPCS

## 2018-12-04 PROCEDURE — 90732 PPSV23 VACC 2 YRS+ SUBQ/IM: CPT

## 2018-12-08 ENCOUNTER — OFFICE VISIT (OUTPATIENT)
Dept: UROLOGY | Facility: CLINIC | Age: 68
End: 2018-12-08
Payer: COMMERCIAL

## 2018-12-08 VITALS
DIASTOLIC BLOOD PRESSURE: 70 MMHG | HEART RATE: 64 BPM | WEIGHT: 201 LBS | SYSTOLIC BLOOD PRESSURE: 124 MMHG | BODY MASS INDEX: 31.47 KG/M2

## 2018-12-08 DIAGNOSIS — R35.0 INCREASED FREQUENCY OF URINATION: Primary | ICD-10-CM

## 2018-12-08 DIAGNOSIS — C61 MALIGNANT NEOPLASM OF PROSTATE (H): ICD-10-CM

## 2018-12-08 LAB
ALBUMIN UR-MCNC: NEGATIVE MG/DL
APPEARANCE UR: CLEAR
BILIRUB UR QL STRIP: NEGATIVE
COLOR UR AUTO: ABNORMAL
GLUCOSE UR STRIP-MCNC: NEGATIVE MG/DL
HGB UR QL STRIP: ABNORMAL
HYALINE CASTS #/AREA URNS LPF: 1 /LPF (ref 0–2)
KETONES UR STRIP-MCNC: NEGATIVE MG/DL
LEUKOCYTE ESTERASE UR QL STRIP: NEGATIVE
NITRATE UR QL: NEGATIVE
PH UR STRIP: 8 PH (ref 5–7)
RBC #/AREA URNS AUTO: <1 /HPF (ref 0–2)
SOURCE: ABNORMAL
SP GR UR STRIP: 1 (ref 1–1.03)
UROBILINOGEN UR STRIP-MCNC: 0 MG/DL (ref 0–2)
WBC #/AREA URNS AUTO: <1 /HPF (ref 0–5)

## 2018-12-08 NOTE — MR AVS SNAPSHOT
After Visit Summary   12/8/2018    Juwan Latham    MRN: [id number]           Patient Information     Date Of Birth          1950        Visit Information        Provider Department      12/8/2018 11:40 AM Weight, Paulo Wiley MD [facility] for Prostate and Urologic Cancers        Today's Diagnoses     Increased frequency of urination    -  1    Malignant neoplasm of prostate (H)          Care Instructions    Follow up in 6mos with PSA prior to visit          Follow-ups after your visit        Follow-up notes from your care team     Return in about 6 months (around 6/8/2019).      Your next 10 appointments already scheduled     Mar 19, 2019  3:00 PM CDT   CT CHEST W/O CONTRAST with UCCT2   [facility] ([facility])    [address] [address] [phone]           How do I prepare for my exam? (Food and drink instructions) No Food and Drink Restrictions.  How do I prepare for my exam? (Other instructions) You do not need to do anything special to prepare for this exam. For a sinus scan: Use your nose spray (nasal decongestant spray) as directed.  What should I wear: Please wear loose clothing, such as a sweat suit or jogging clothes. Avoid snaps, zippers and other metal. We may ask you to undress and put on a hospital gown.  How long does the exam take: Most scans take less than 20 minutes.  What should I bring: Please bring any scans or X-rays taken at other hospitals, if similar tests were done. Also bring a list of your medicines, including vitamins, minerals and over-the-counter drugs. It is safest to leave personal items at home.  Do I need a : No  is needed.  What do I need to tell my doctor? Be sure to tell your doctor: * If you have any allergies. * If there s any chance you are pregnant. * If you are breastfeeding.  What should I do after the exam: No restrictions, You may resume  normal activities.  What is this test: A CT (computed tomography) scan is a series of pictures that allows us to look inside your body. The scanner creates images of the body in cross sections, much like slices of bread. This helps us see any problems more clearly.  Who should I call with questions: If you have any questions, please call the Imaging Department where you will have your exam. Directions, parking instructions, and other information is available on our website, [email or web address].            Mar 19, 2019  4:00 PM CDT   (Arrive by 3:45 PM)   Return Visit with GLORIA Arechiga [facility] ([facility])    [address]  [address]  [facility] [address] [phone]              Future tests that were ordered for you today     Open Future Orders        Priority Expected Expires Ordered    PSA tumor marker Routine  12/8/2019 12/8/2018    Routine UA with micro reflex to culture Routine  12/8/2019 12/8/2018            Who to contact     Please call your clinic at [phone] to:    Ask questions about your health    Make or cancel appointments    Discuss your medicines    Learn about your test results    Speak to your doctor            Additional Information About Your Visit        [employer] Information     [employer] gives you secure access to your electronic health record. If you see a primary care provider, you can also send messages to your care team and make appointments. If you have questions, please call your primary care clinic.  If you do not have a primary care provider, please call [phone] and they will assist you.      [employer] is an electronic gateway that provides easy, online access to your medical records. With [employer], you can request a clinic appointment, read your test results, renew a prescription or communicate with your care team.     To access your existing account, please contact your [facility] or call [phone] for assistance.        Care EveryWhere ID     This is your Care EveryWhere ID. This could be used by other organizations to access your [place] medical records  [id number]        Your Vitals Were     Pulse BMI (Body Mass Index)                64 31.47 kg/m2           Blood Pressure from Last 3 Encounters:   12/08/18 124/70   11/27/18 124/72   09/18/18 135/72    Weight from Last 3 Encounters:   12/08/18 91.2 kg (201 lb)   11/27/18 91.2 kg (201 lb)   09/18/18 90.5 kg (199 lb 9.6 oz)               Primary Care Provider Office Phone # Fax #    Julio Guidry Jr., [phone] [address]  [place] [address]        Equal Access to Services     [facility]: Hadii carolyn pleitez hadasho Soomaali, waaxda luqadaha, qaybta kaalmada adeegyada, mynor mosley . So [facility] [phone].    ATENCIÓN: Si habla español, tiene a gunn disposición servicios gratuitos de asistencia lingüística. Jeanette al [phone].    We comply with applicable federal civil rights laws and [place] laws. We do not discriminate on the basis of race, color, national origin, age, disability, sex, sexual orientation, or gender identity.            Thank you!     Thank you for choosing [facility] PROST[facility] AND UROLOGIC CANCERS  for your care. Our goal is always to provide you with excellent care. Hearing back from our patients is one way we can continue to improve our services. Please take a few minutes to complete the written survey that you may receive in the mail after your visit with us. Thank you!             Your Updated Medication List - Protect others around you: Learn how to safely use, store and throw away your medicines at www.disposemymeds.org.          This list is accurate as of 12/8/18 12:20 PM.  Always use your most recent med list.                   Brand Name Dispense Instructions for use Diagnosis    acetaminophen 325 MG tablet     TYLENOL    100 tablet    Take 2 tablets (650 mg) by mouth every 6 hours Do not take more than 4,000 mg of acetaminophen (Tylenol) from all sources to prevent liver damage.    Malignant neoplasm of upper lobe of right lung (H)       clotrimazole 1 % external cream    LOTRIMIN    30 g    Apply topically to affected area in groin twice daily for 14 days.    Tinea cruris       furosemide 20 MG tablet    LASIX          losartan 25 MG tablet    COZAAR    90 tablet    Take 1 tablet (25 mg) by mouth daily    Hypertension goal BP (blood pressure) < 140/90       order for DME     1 each    1: Gradient Compression Wraps; 2: BLE 20-30 or 30-40 mm Hg compression stockings; 3: RLE custom compression stocking; 4; RLE Velcro compression garment; thigh high    Lymphedema       order for DME     1 each    1: Custom RLE compression stocking-thigh high and include hip as able; 2: Custom compression briefs    Lymphedema       PARoxetine 10 MG tablet    PAXIL    90 tablet    Take 1 tablet (10 mg) by mouth At Bedtime    Fatigue, unspecified type       tolterodine ER 4 MG 24 hr capsule    DETROL LA    90 capsule    Take 1 capsule (4 mg) by mouth daily    Radiation cystitis

## 2018-12-08 NOTE — PROGRESS NOTES
Prostate Cancer Follow up after RRP     Juwan Latham is a very pleasant 68 year old male who presents with a history of prostate cancer.    Initial PSA:19.6  Pathologic German Score was 4 + 5  Grade Group:5  Biopsy Dexter Score was 3 + 4  Pathologic Stage T2bc, N1, Mx.   Positive Margins: Yes Location:right side additional margin taken and negative  Number of Lymph Nodes Removed: 5  Number of Positive Lymph Nodes: 1  Patient is status post robotic radical prostatectomy on 12/1/2017.    Risk Group: High  Got adjuvant radiation, completed 2018    Predicted progression free probability at 10 years: ~10  Predicted Cancer specific survival at 15 years: 80-90%  (J Clin Oncol. 2005 Oct 1;23(28):7001-12.  Http://nomograms.[facility].org/Prostate/PostRadicalProstatectomy.aspx)    There is no evidence of disease recurrence to date.    Some abdominal, RLQ pain, maybe ingunial    Physical Exam:  Vitals: /70  Pulse 64  Wt 91.2 kg (201 lb)  BMI 31.47 kg/m2  General Appearance Adult: A&O in NAD     Abd is soft, non-distended, incisions are healing well, no evidence of hernias, some fullness over the right external ring    He is tolerating a regular diet.     He is currently undergoing radiation and is scheduled to complete this in the next few weeks.     At our last visit, he reported urinary symptoms, primarily incontinence. He is wearing 1 pad at night and sometimes during the day. He was attending pelvic floor rehab and doing Kegel's regularly, no leakage now    PSA   Date Value Ref Range Status   06/18/2018 <0.01 0 - 4 ug/L Final     Comment:     Assay Method:  Chemiluminescence using Siemens Vista analyzer       PSA  19.6 pre op    Assessment: [id number] prostate cancer s/p robotic radical prostatectomy on 12/1/2017 with  no evidence of disease, but substantially higher risk than previously believed.    Plan:    Discussed several symptoms, such as foot numbness, right lower extrema edema    He had some sort of  washout/surgical exploration of a small lymphocele and this didn't change his edema in his leg.    Some urinary symptoms  Will get a UA.     PSA (tumor marker) today     Follow up in 6 months       Paulo Agarwal MD  Department of Urology  [facility]    Orders Placed This Encounter   Procedures     Routine UA with micro reflex to culture     PSA tumor marker

## 2018-12-08 NOTE — LETTER
12/8/2018       RE: Juwan Latham  [id number]  [facility] [address]     Dear Colleague,    Thank you for referring your patient, Juwan Latham, to the [facility] UROLOGY AND INST FOR PROSTATE AND UROLOGIC CANCERS at [facility]. Please see a copy of my visit note below.    Prostate Cancer Follow up after RRP     Juwan Latham is a very pleasant 68 year old male who presents with a history of prostate cancer.    Initial PSA:19.6  Pathologic German Score was 4 + 5  Grade Group:5  Biopsy Galax Score was 3 + 4  Pathologic Stage T2bc, N1, Mx.   Positive Margins: Yes Location:right side additional margin taken and negative  Number of Lymph Nodes Removed: 5  Number of Positive Lymph Nodes: 1  Patient is status post robotic radical prostatectomy on 12/1/2017.    Risk Group: High  Got adjuvant radiation, completed 2018    Predicted progression free probability at 10 years: ~10  Predicted Cancer specific survival at 15 years: 80-90%  (J Clin Oncol. 2005 Oct 1;23(28):7000-12.  Http://nomograms.[facility].org/Prostate/PostRadicalProstatectomy.aspx)    There is no evidence of disease recurrence to date.    Some abdominal, RLQ pain, maybe ingunial    Physical Exam:  Vitals: /70  Pulse 64  Wt 91.2 kg (201 lb)  BMI 31.47 kg/m2  General Appearance Adult: A&O in NAD     Abd is soft, non-distended, incisions are healing well, no evidence of hernias, some fullness over the right external ring    He is tolerating a regular diet.     He is currently undergoing radiation and is scheduled to complete this in the next few weeks.     At our last visit, he reported urinary symptoms, primarily incontinence. He is wearing 1 pad at night and sometimes during the day. He was attending pelvic floor rehab and doing Kegel's regularly, no leakage now    PSA   Date Value Ref Range Status   06/18/2018 <0.01 0 - 4 ug/L Final     Comment:     Assay Method:  Chemiluminescence using Siemens Vista  analyzer     PSA  19.6 pre op    Assessment: [id number] prostate cancer s/p robotic radical prostatectomy on 12/1/2017 with  no evidence of disease, but substantially higher risk than previously believed.    Plan:    Discussed several symptoms, such as foot numbness, right lower extrema edema    He had some sort of washout/surgical exploration of a small lymphocele and this didn't change his edema in his leg.    Some urinary symptoms  Will get a UA.     PSA (tumor marker) today     Follow up in 6 months     Paulo Agarwal MD  Department of Urology  [facility]    Orders Placed This Encounter   Procedures     Routine UA with micro reflex to culture     PSA tumor marker

## 2019-01-11 ENCOUNTER — TRANSFERRED RECORDS (OUTPATIENT)
Dept: HEALTH INFORMATION MANAGEMENT | Facility: CLINIC | Age: 69
End: 2019-01-11

## 2019-01-15 ENCOUNTER — TELEPHONE (OUTPATIENT)
Dept: FAMILY MEDICINE | Facility: CLINIC | Age: 69
End: 2019-01-15

## 2019-01-15 DIAGNOSIS — I89.0 LYMPHEDEMA: ICD-10-CM

## 2019-01-15 NOTE — TELEPHONE ENCOUNTER
Reason for Call:  Other prescription    Detailed comments: The patient is calling saying he needs a prescription for a custom compression suit submitted to his insurance. He has [employer].    Phone Number Patient can be reached at: Cell number on file:    Telephone Information:   Mobile [phone]     Best Time: Anytime    Can we leave a detailed message on this number? YES    Call taken on 1/15/2019 at 1:10 PM by Francheska Chen

## 2019-01-15 NOTE — TELEPHONE ENCOUNTER
Routing to clinic TC to get more information. It looks like there are 2 DME's in patient's med list for various compression garments - looks like these were originally sent to [employer].   TEDDY WaltonN, RN  [facility]

## 2019-01-15 NOTE — TELEPHONE ENCOUNTER
"Hi,     I don't see it being a problem for you guys to use the older order as it was never \"used\" because he never received compression. It wouldn't hurt to have a new order placed by a physician to ensure coverage though.     Thanks,     Nico Chavarria (Routing comment)   "

## 2019-01-15 NOTE — TELEPHONE ENCOUNTER
Routing to PCP and Lymphedema provider as we went round steven with this in September.  We appealed with letter of medical necessity in September and the appeal was denied.  Pt has new insurance now.  Can we use the same DME order and try to run this through his new plan or do we need to restart the whole process?  Jayna Rider

## 2019-01-16 NOTE — TELEPHONE ENCOUNTER
Vahid Walsh,    Does Juwan need a full compression suit?  If so, what mm Hg rating?  If not, does he need the same compression stockings he was written previously?  How about compression briefs as I know we discussed that for Juwan at some point as well?  If so, what mm Hg rating?  Thanks for your help with this.    Julio Guidry MD

## 2019-01-17 NOTE — TELEPHONE ENCOUNTER
The requested prescription(s) has/have been approved and has/have been printed and signed. This was forwarded to the patient care pool to fax to the patient's preferred pharmacy.       [facility]    Julio Guidry Jr

## 2019-01-18 NOTE — TELEPHONE ENCOUNTER
DME order and new ins info faxed to Myrna as she was the one who helped us last year with this.  Jayna Rider

## 2019-03-01 ENCOUNTER — PRE VISIT (OUTPATIENT)
Dept: UROLOGY | Facility: CLINIC | Age: 69
End: 2019-03-01

## 2019-03-04 ENCOUNTER — OFFICE VISIT (OUTPATIENT)
Dept: UROLOGY | Facility: CLINIC | Age: 69
End: 2019-03-04
Payer: COMMERCIAL

## 2019-03-04 VITALS
DIASTOLIC BLOOD PRESSURE: 75 MMHG | SYSTOLIC BLOOD PRESSURE: 125 MMHG | HEART RATE: 52 BPM | HEIGHT: 67 IN | WEIGHT: 208 LBS | BODY MASS INDEX: 32.65 KG/M2

## 2019-03-04 DIAGNOSIS — C61 MALIGNANT NEOPLASM OF PROSTATE (H): ICD-10-CM

## 2019-03-04 DIAGNOSIS — N39.498 OTHER URINARY INCONTINENCE: Primary | ICD-10-CM

## 2019-03-04 LAB — PSA SERPL-MCNC: <0.01 UG/L (ref 0–4)

## 2019-03-04 ASSESSMENT — PAIN SCALES - GENERAL: PAINLEVEL: NO PAIN (0)

## 2019-03-04 ASSESSMENT — MIFFLIN-ST. JEOR: SCORE: 1677.26

## 2019-03-04 NOTE — NURSING NOTE
"Chief Complaint   Patient presents with     Follow Up     Prostate F/U with PSA       Blood pressure 125/75, pulse 52, height 1.702 m (5' 7.01\"), weight 94.3 kg (208 lb). Body mass index is 32.57 kg/m .    Patient Active Problem List   Diagnosis     Allergic rhinitis due to other allergen     CARDIOVASCULAR SCREENING; LDL GOAL LESS THAN 160     Advance Care Planning     History of colonic polyps     Anxiety     PAULINO (obstructive sleep apnea)     GERD (gastroesophageal reflux disease)     Hypertension goal BP (blood pressure) < 140/90     Seasonal allergies     Benign neoplasm of descending colon     Malignant neoplasm of upper lobe of right lung (H)     Overweight (BMI 25.0-29.9)     Cavernous hemangioma of brain (H)     Major depressive disorder, recurrent episode, mild (H)     Prostate cancer (H)     Breen catheter problem (H)     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 100 tablet 1     furosemide (LASIX) 20 MG tablet   0     losartan (COZAAR) 25 MG tablet Take 1 tablet (25 mg) by mouth daily 90 tablet 3     order for DME 1: Custom RLE compression stocking-thigh high and include hip as able; 20-30 mm Hg compression  2: Custom compression briefs.  Compression as recommended by fitter. 1 each 0     order for DME 1: Gradient Compression Wraps; 2: BLE 20-30 or 30-40 mm Hg compression stockings; 3: RLE custom compression stocking; 4; RLE Velcro compression garment; thigh high 1 each o     PARoxetine (PAXIL) 10 MG tablet Take 1 tablet (10 mg) by mouth At Bedtime 90 tablet 1     clotrimazole (LOTRIMIN) 1 % cream Apply topically to affected area " in groin twice daily for 14 days. (Patient not taking: Reported on 3/4/2019) 30 g 1     tolterodine (DETROL LA) 4 MG 24 hr capsule Take 1 capsule (4 mg) by mouth daily (Patient not taking: Reported on 3/4/2019) 90 capsule 3       Social History     Tobacco Use     Smoking status: Former Smoker     Packs/day: 2.00     Years: 20.00     Pack years: 40.00     Types: Cigarettes     Last attempt to quit: 1985     Years since quittin.1     Smokeless tobacco: Never Used   Substance Use Topics     Alcohol use: Yes     Comment: avg 3 beers per night     Drug use: No       Fior Cordero LPN  3/4/2019  9:40 AM

## 2019-03-04 NOTE — PROGRESS NOTES
Urology Clinic    Paulo Agarwal MD  Date of Service: 2019     Name: Juwan Latham  MRN: [id number]  Age: 68 year old  : 1950  Referring provider: Referred Self     Assessment and Plan:  1. [id number] prostate cancer s/p robotic radical prostatectomy on 2017: with no evidence of disease.    PSA today is undetectable and I noted he looks good from a prostate cancer perspective.     2. Incontinence:     The patient states that when he coughs he is leaking and is wondering about PT therapy. The patient will reach out to PT.     This has significantly worsened after radiation combined with surgery.    3. Abdominal distension per patient:     The patient notes that he has gained weight from 193 lbs to 208 lbs recently and feels like his abdomen is distended. The patient is wondering about a colonoscopy as he has not had one for about 3 years.  I recommended talking to his primary care physician about these symptoms including smaller bowel movements recently.     --Follow up in 6 months with a PSA    Attestation:  This patient was seen and evaluated by me, with a scribe taking notes.  I have reviewed the note above and agree.  The physical exam and or any procedures were performed by me and the pertinant details are outlined below.       Paulo Agarwal MD  Department of Urology  [facility]    ______________________________________________________________________    HPI  Juwan Latham is a 68 year old male who presents with a history of prostate cancer.      Initial PSA:19.6  Pathologic Ossineke Score was 4 + 5  Grade Group:5  Biopsy German Score was 3 + 4  Pathologic Stage T2bc, N1, Mx.   Positive Margins: Yes Location:right side additional margin taken and negative  Number of Lymph Nodes Removed: 5  Number of Positive Lymph Nodes: 1  Patient is status post robotic radical prostatectomy on 2017.  s/p radiation .  Risk Group: High  Got adjuvant radiation,  "completed 2018     Predicted progression free probability at 10 years: ~10  Predicted Cancer specific survival at 15 years: 80-90%  (J Clin Oncol. 2005 Oct 1;23(28):0641-12.  Http://nomograms.[facility].org/Prostate/PostRadicalProstatectomy.aspx)     There is no evidence of disease recurrence to date.     He is tolerating a regular diet.      Today, the patient states that he has not had trouble with his incisions. The patient states that he is up from 193 to 208 lbs. The patient feels that there might be a \"blockage.\" Mr. Latham denies changes in his medications. He notes that he is having bowel movements, but that he is only defecating \"in strands.\"     The patient leaks every time he coughs (new in the last few months). He denies hematuria. He explains that he has been sleeping and feeling quite tired recently with a cough worse at night.     Review of Systems:   Pertinent items are noted in HPI or as below, remainder of complete ROS is negative.      Physical Exam:   Patient is a 68 year old  male   Vitals: Blood pressure 125/75, pulse 52, height 1.702 m (5' 7.01\"), weight 94.3 kg (208 lb).  General Appearance Adult: Alert, no acute distress, oriented  HENT: throat/mouth:normal, good dentition  Lungs: no respiratory distress, or pursed lip breathing  Heart: No obvious jugular venous distension present  Abdomen: Body mass index is 32.57 kg/m . Abd is soft, non-distended, incisions are healing well, no evidence of hernias.  Musculoskeltal: extremities normal  Skin: no visible suspicious lesions or rashes  Neuro: Alert, oriented, speech and mentation normal  Psych: affect and mood normal  Gait: Normal  : deferred    Laboratory:   I reviewed all applicable laboratory and pathology data and went over findings with patient  Significant for     Lab Results   Component Value Date    CR 0.71 06/24/2018    CR 0.84 05/22/2018    CR 0.83 03/25/2018    CR 0.78 03/24/2018    CR 0.84 02/23/2018    CR 0.92 12/02/2017    CR 0.84 " 12/01/2017    CR 0.88 11/30/2017    CR 0.90 07/13/2017    CR 0.74 03/05/2017     PSA   Date Value Ref Range Status   03/04/2019 <0.01 0 - 4 ug/L Final     Comment:     Assay Method:  Chemiluminescence using Siemens Vista analyzer   06/18/2018 <0.01 0 - 4 ug/L Final     Comment:     Assay Method:  Chemiluminescence using Siemens Vista analyzer   01/11/2018 0.03 0 - 4 ug/L Final     Comment:     Assay Method:  Chemiluminescence using Siemens Vista analyzer   11/03/2004 3.90 0 - 4 ug/L Final     Results for orders placed or performed during the hospital encounter of 03/24/18   UA with Microscopic   Result Value Ref Range    Color Urine Yellow     Appearance Urine Slightly Cloudy     Glucose Urine Negative NEG^Negative mg/dL    Bilirubin Urine Negative NEG^Negative    Ketones Urine 5 (A) NEG^Negative mg/dL    Specific Gravity Urine 1.009 1.003 - 1.035    Blood Urine Moderate (A) NEG^Negative    pH Urine 8.0 (H) 5.0 - 7.0 pH    Protein Albumin Urine 100 (A) NEG^Negative mg/dL    Urobilinogen mg/dL 0.0 0.0 - 2.0 mg/dL    Nitrite Urine Positive (A) NEG^Negative    Leukocyte Esterase Urine Moderate (A) NEG^Negative    Source Catheterized Urine     WBC Urine 10 (H) 0 - 5 /HPF    RBC Urine 35 (H) 0 - 2 /HPF    Bacteria Urine Few (A) NEG^Negative /HPF    Mucous Urine Present (A) NEG^Negative /LPF    Calcium Oxalate Few (A) NEG^Negative /HPF    Calcium Phosphate Crystals Few (A) NEG^Negative /HPF     Imaging:   I personally reviewed all applicable imaging and went over the below findings with patient.    Results for orders placed or performed during the hospital encounter of 10/18/18   CTV Abdomen Pelvis w Contrast    Narrative    CTV ABDOMEN AND PELVIS WITH CONTRAST  10/18/2018 7:42 AM     HISTORY:  Chronic right lower extremity edema, now with pelvic edema  after lymphedema treatment. Status post radical prostatectomy and  radiation therapy.  See previous CT results.  Concern for compression  of common iliac vein or distal to  this.    TECHNIQUE: CT venogram abdomen and pelvis with 80 Isovue-370 IV.  Radiation dose for this scan was reduced using automated exposure  control, adjustment of the mA and/or kV according to patient size, or  iterative reconstruction technique.     COMPARISON: 8/14/2018, 8/1/2018, 3/24/2018, 2/23/2018, 10/12/2012.    FINDINGS:   Lung bases: Lung bases are clear. No pleural effusion or pericardial  effusion.    Abdomen: The spleen, adrenal glands, gallbladder and pancreas show no  focal abnormality. No change aortic calcification in the lateral  aspect of the liver. There is diffuse low-attenuation throughout the  liver, suggesting hepatic steatosis. 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  Scattered renal cysts are unchanged. No intrahepatic or extrahepatic  biliary dilatation. No intraperitoneal free air or free fluid.    Small and large bowel are normal in caliber without evidence of  obstruction. The appendix is normal. Bladder is normal. No abdominal  or pelvic lymphadenopathy. No abdominal aortic aneurysm. Resolution of  the previously seen fluid collection in the right pelvis. There is now  persistent soft tissue prominence in this area measuring 3.2 x 2.0 cm,  previously (including the fluid collection) measuring 4.4 x 2.4 cm. At  the level of this soft tissue prominence, the right external iliac  vein is not well visualized. The remainder of the right external iliac  vein is atretic, unchanged. No definite thrombus is noted within the  bilateral common iliac, external iliac or common femoral veins. The  inferior vena cava is patent. Prominent duodenal diverticulum is again  noted.    Bones: No suspicious bony lesions.      Impression    IMPRESSION:  1. No residual fluid collection in the right pelvis. There is  persistent soft tissue prominence in this area which is decreased when  compared to the prior study.  2. No definite thrombus within the right external iliac or common  iliac veins; however, the right  external iliac vein appears partially  atretic.  3. Diffuse fatty infiltration of the liver.    LARISA PENA, DO       Scribe Disclosure:   I, Umer Gavin, am serving as a scribe to document services personally performed by Paulo Agarwal MD at this visit, based upon the provider's statements to me. All documentation has been reviewed by the aforementioned provider prior to being entered into the official medical record.     Portions of this medical record were completed by a scribe. UPON MY REVIEW AND AUTHENTICATION BY ELECTRONIC SIGNATURE, this confirms (a) I performed the applicable clinical services, and (b) the record is accurate.    Paulo Agarwal MD  Department of Urology Staff  [facility]

## 2019-03-04 NOTE — LETTER
RE: Juwan Latham  [address] [facility]  [facility] [address]     Dear Colleague,    Thank you for referring your patient, Juwan Latham, to the [facility] AND [facility] FOR PROSTATE AND UROLOGIC CANCERS at [facility]. Please see a copy of my visit note below.    Urology Clinic    Paulo Agarwal MD  Date of Service: 2019     Name: Juwan Latham  MRN: [id number]  Age: 68 year old  : 1950  Referring provider: Referred Self     Assessment and Plan:  1. [id number] prostate cancer s/p robotic radical prostatectomy on 2017: with no evidence of disease.    PSA today is undetectable and I noted he looks good from a prostate cancer perspective.     2. Incontinence:     The patient states that when he coughs he is leaking and is wondering about PT therapy. The patient will reach out to PT.     This has significantly worsened after radiation combined with surgery.    3. Abdominal distension per patient:     The patient notes that he has gained weight from 193 lbs to 208 lbs recently and feels like his abdomen is distended. The patient is wondering about a colonoscopy as he has not had one for about 3 years.  I recommended talking to his primary care physician about these symptoms including smaller bowel movements recently.     --Follow up in 6 months  with a PSA    Attestation:  This patient was seen and evaluated by me, with a scribe taking notes.  I have reviewed the note above and agree.  The physical exam and or any procedures were performed by me and the pertinant details are outlined below.     Paulo Agarwal MD  Department of Urology  [facility]    ______________________________________________________________________    HPI  Juwan Latham is a 68 year old male who presents with a history of prostate cancer.      Initial PSA:19.6  Pathologic German Score was 4 + 5  Grade Group:5  Biopsy German Score was 3 + 4  Pathologic Stage  "T2bc, N1, Mx.   Positive Margins: Yes Location:right side additional margin taken and negative  Number of Lymph Nodes Removed: 5  Number of Positive Lymph Nodes: 1  Patient is status post robotic radical prostatectomy on 12/1/2017.   s/p radiation 2018.  Risk Group: High  Got adjuvant radiation, completed 2018     Predicted progression free probability at 10 years: ~10  Predicted Cancer specific survival at 15 years: 80-90%  (J Clin Oncol. 2005 Oct 1;23(28):6244-12.  Http://nomograms.[facility].org/Prostate/PostRadicalProstatectomy.aspx)     There is no evidence of disease recurrence to date.     He is tolerating a regular diet.      Today, the patient states that he has not had trouble with his incisions. The patient states that he is up from 193 to 208 lbs. The patient feels that there might be a \"blockage.\" Mr. Latham denies changes in his medications. He notes that he is having bowel movements, but that he is only defecating \"in strands.\"     The patient leaks every time he coughs (new in the last few months). He denies hematuria. He explains that he has been sleeping and feeling quite tired recently with a cough worse at night.     Physical Exam:   Patient is a 68 year old  male   Vitals: Blood pressure 125/75, pulse 52, height 1.702 m (5' 7.01\"), weight 94.3 kg (208 lb).  General Appearance Adult: Alert, no acute distress, oriented  HENT: throat/mouth:normal, good dentition  Lungs: no respiratory distress, or pursed lip breathing  Heart: No obvious jugular venous distension present  Abdomen: Body mass index is 32.57 kg/m . Abd is soft, non-distended, incisions are healing well, no evidence of hernias.  Musculoskeltal: extremities normal  Skin: no visible suspicious lesions or rashes  Neuro: Alert, oriented, speech and mentation normal  Psych: affect and mood normal  Gait: Normal  : deferred    Laboratory:   I reviewed all applicable laboratory and pathology data and went over findings with patient  Significant " for     Lab Results   Component Value Date    CR 0.71 06/24/2018    CR 0.84 05/22/2018    CR 0.83 03/25/2018    CR 0.78 03/24/2018    CR 0.84 02/23/2018    CR 0.92 12/02/2017    CR 0.84 12/01/2017    CR 0.88 11/30/2017    CR 0.90 07/13/2017    CR 0.74 03/05/2017     PSA   Date Value Ref Range Status   03/04/2019 <0.01 0 - 4 ug/L Final     Comment:     Assay Method:  Chemiluminescence using Siemens Vista analyzer   06/18/2018 <0.01 0 - 4 ug/L Final     Comment:     Assay Method:  Chemiluminescence using Siemens Vista analyzer   01/11/2018 0.03 0 - 4 ug/L Final     Comment:     Assay Method:  Chemiluminescence using Siemens Vista analyzer   11/03/2004 3.90 0 - 4 ug/L Final     Results for orders placed or performed during the hospital encounter of 03/24/18   UA with Microscopic   Result Value Ref Range    Color Urine Yellow     Appearance Urine Slightly Cloudy     Glucose Urine Negative NEG^Negative mg/dL    Bilirubin Urine Negative NEG^Negative    Ketones Urine 5 (A) NEG^Negative mg/dL    Specific Gravity Urine 1.009 1.003 - 1.035    Blood Urine Moderate (A) NEG^Negative    pH Urine 8.0 (H) 5.0 - 7.0 pH    Protein Albumin Urine 100 (A) NEG^Negative mg/dL    Urobilinogen mg/dL 0.0 0.0 - 2.0 mg/dL    Nitrite Urine Positive (A) NEG^Negative    Leukocyte Esterase Urine Moderate (A) NEG^Negative    Source Catheterized Urine     WBC Urine 10 (H) 0 - 5 /HPF    RBC Urine 35 (H) 0 - 2 /HPF    Bacteria Urine Few (A) NEG^Negative /HPF    Mucous Urine Present (A) NEG^Negative /LPF    Calcium Oxalate Few (A) NEG^Negative /HPF    Calcium Phosphate Crystals Few (A) NEG^Negative /HPF     Imaging:   I personally reviewed all applicable imaging and went over the below findings with patient.    Results for orders placed or performed during the hospital encounter of 10/18/18   CTV Abdomen Pelvis w Contrast    Narrative    CTV ABDOMEN AND PELVIS WITH CONTRAST  10/18/2018 7:42 AM     HISTORY:  Chronic right lower extremity edema, now with  pelvic edema  after lymphedema treatment. Status post radical prostatectomy and  radiation therapy.  See previous CT results.  Concern for compression  of common iliac vein or distal to this.    TECHNIQUE: CT venogram abdomen and pelvis with 80 Isovue-370 IV.  Radiation dose for this scan was reduced using automated exposure  control, adjustment of the mA and/or kV according to patient size, or  iterative reconstruction technique.     COMPARISON: 8/14/2018, 8/1/2018, 3/24/2018, 2/23/2018, 10/12/2012.    FINDINGS:   Lung bases: Lung bases are clear. No pleural effusion or pericardial  effusion.    Abdomen: The spleen, adrenal glands, gallbladder and pancreas show no  focal abnormality. No change aortic calcification in the lateral  aspect of the liver. There is diffuse low-attenuation throughout the  liver, suggesting hepatic steatosis. 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  Scattered renal cysts are unchanged. No intrahepatic or extrahepatic  biliary dilatation. No intraperitoneal free air or free fluid.    Small and large bowel are normal in caliber without evidence of  obstruction. The appendix is normal. Bladder is normal. No abdominal  or pelvic lymphadenopathy. No abdominal aortic aneurysm. Resolution of  the previously seen fluid collection in the right pelvis. There is now  persistent soft tissue prominence in this area measuring 3.2 x 2.0 cm,  previously (including the fluid collection) measuring 4.4 x 2.4 cm. At  the level of this soft tissue prominence, the right external iliac  vein is not well visualized. The remainder of the right external iliac  vein is atretic, unchanged. No definite thrombus is noted within the  bilateral common iliac, external iliac or common femoral veins. The  inferior vena cava is patent. Prominent duodenal diverticulum is again  noted.    Bones: No suspicious bony lesions.      Impression    IMPRESSION:  1. No residual fluid collection in the right pelvis. There is  persistent soft  tissue prominence in this area which is decreased when  compared to the prior study.  2. No definite thrombus within the right external iliac or common  iliac veins; however, the right external iliac vein appears partially  atretic.  3. Diffuse fatty infiltration of the liver.    LARISA PENA DO       Scribe Disclosure:   I, Umer Gavin, am serving as a scribe to document services personally performed by Paulo Agarwal MD at this visit, based upon the provider's statements to me. All documentation has been reviewed by the aforementioned provider prior to being entered into the official medical record.     Portions of this medical record were completed by a scribe. UPON MY REVIEW AND AUTHENTICATION BY ELECTRONIC SIGNATURE, this confirms (a) I performed the applicable clinical services, and (b) the record is accurate.    Paulo Agarwal MD  Department of Urology [facility]

## 2019-03-05 NOTE — PROGRESS NOTES
Outpatient Occupational Therapy Discharge Note     Patient: Juwan Latham  : 1950    Beginning/End Dates of Reporting Period:  18 to 3/5/2019    Referring Provider: Paulo Agarwal MD     Therapy Diagnosis: lymphedema; edema    Client Self Report:       Objective Measurements: see flowsheets                                                        Outcome Measures (most recent score): no post LLIS scored; pt did not return for services       Goals:   Goal Identifier 1   Goal Description In order to improve functional mobility for activities of living, by the completion of intensive treatment,  patient and/or caregiver will:   Target Date 10/26/18   Date Met      Progress:     Goal Identifier 1a   Goal Description tolerate gradient compression bandaging/wearing compression garments 23 hrs/day to prevent re-acccumulation of extracellular fluid for reductions needed to aide improvements in LB clothing fit and functional ambulation engagement   Target Date 10/26/18   Date Met  18   Progress:     Goal Identifier 1b   Goal Description demonstrate independence in applying gradient compression bandages to build I with home management of RLE lymphedema needed to improve LB clothing fit and functional ambulation   Target Date 10/26/18   Date Met  18   Progress:     Goal Identifier 1c   Goal Description demonstrate independence in performing prescribed exercises to facilate the lymph system and muscle pumping systems for max reductions needed to aide improvements in functional ambulation and LB clothing fit   Target Date 10/26/18   Date Met  18   Progress:     Goal Identifier 1d   Goal Description be independent in donning/doffing, wearing schedule, and care of compression garments for I building with home management of RLE lymphedema needed to aide improvements in LB dressing/clothing fit, stair navigation, and functional ambulation engagement   Target Date 10/26/18   Date Met       Progress:     Goal Identifier 2   Goal Description Display total RLE volume reduction of .5L+ for reductions needed to aide ambulation and better fit LB clothing   Target Date 10/26/18   Date Met  08/31/18(and ongoing)   Progress:     Goal Identifier     Goal Description     Target Date     Date Met      Progress:     Goal Identifier     Goal Description     Target Date     Date Met      Progress:     Progress Toward Goals:   Progress limited due to limitations in pt resources and insurance coverage for compression, lack support at home, and some slight memory and cognitie deficits regarding establishment of home management. Pt was evaluated for RLE lymphedema in context prostate cancer/removal and 39 rounds radiation following surgery. Pt was introduced to GCB's and they made a significant impact on his RLE swelling, reducing limb very well, but unfortunetly the genital and pubic regions started to increase in swelling 2/2 GCB use on RLE (fluid shifting into adajacent regions). Extended time attempting to use MLD for reductions and promotion optimal tissue integrity at pubic and upper medial thigh on pts R side as tissue was fibrotic and hard. These techniques did produce short term reductions and changes but pt unable to get adequete compression shorts to maintain and further reductions in pubic/genital region. Pt trialed biker shorts that did not fit properly and may have been feeding into pubic swelling pooling superior and lateral to penis. Attempts made to submit letter medical necessity for pt and insurance denied x 2 custom compression briefs. Pt last seen when he was to explore further appeals processes or just pay out of pocket for garments. Pt emailed this writer to update his process but no return to clinic and unclear if compression briefs found or obtained. Pt to be discharged; no return for follow up services.      Plan:  Discharge from therapy.    Discharge:    Reason for Discharge: Patient has  failed to schedule further appointments.    Equipment Issued: RLE 20-30 mm Hg compression stocking thigh high obtained    Discharge Plan: Patient to continue home program.

## 2019-03-05 NOTE — ADDENDUM NOTE
Encounter addended by: Umer Chavarria OT on: 3/5/2019 8:35 AM   Actions taken: Sign clinical note, Episode resolved

## 2019-03-08 ENCOUNTER — THERAPY VISIT (OUTPATIENT)
Dept: PHYSICAL THERAPY | Facility: CLINIC | Age: 69
End: 2019-03-08
Payer: COMMERCIAL

## 2019-03-08 DIAGNOSIS — N39.498 OTHER URINARY INCONTINENCE: ICD-10-CM

## 2019-03-08 PROBLEM — N39.46 MIXED INCONTINENCE URGE AND STRESS (MALE)(FEMALE): Status: ACTIVE | Noted: 2017-12-30

## 2019-03-08 PROBLEM — M99.05 SOMATIC DYSFUNCTION OF PELVIS REGION: Status: ACTIVE | Noted: 2017-12-30

## 2019-03-08 PROCEDURE — 97161 PT EVAL LOW COMPLEX 20 MIN: CPT | Mod: GP | Performed by: PHYSICAL THERAPIST

## 2019-03-08 PROCEDURE — 97112 NEUROMUSCULAR REEDUCATION: CPT | Mod: GP | Performed by: PHYSICAL THERAPIST

## 2019-03-08 NOTE — PROGRESS NOTES
[facility] Initial Evaluation  Subjective:  12-1-17 pt underwent a robotic assisted prostatectomy followed by radiation treatments. Pt received physical therapy 12-29-17 to 1-23-18 to control incontinence. Pt was doing well until 2 months ago when he noted increased incontinence after being ill with a cold. Pt referred by MD for physical therapy on 3-4-19. Pt is using 2 pads per day at this time      The history is provided by the patient. No  was used.   Juwan Latham is a 68 year old male with a incontinence condition.    Condition occurred: other (post op).  This is a recurrent condition     Patient reports pain:  N/a.      Pain Scale: 0/10.   Pain is worse during the day.  Symptoms are exacerbated by coughing (stairs, tying shoes) Relieved by: urinating.  Since onset symptoms are gradually worsening.        General health as reported by patient is fair.  Pertinent medical history includes:  High blood pressure.  Medical allergies: none.  Other surgeries include:  Cancer surgery (prostate).  Current medications:  Anti-depressants and high blood pressure medication.    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      Barriers include:  None as reported by the patient.    Red flags:  None as reported by the patient.                        Objective:  System                                 Pelvic Dysfunction Evaluation:        Flexibility:  normal              External Assessment:    Skin Condition:  Normal  Scars:  Well healed          Internal Assessment:    Sensory Exam:  Normal  Contraction/Grade:  Good squeeze, good hold with lift, repeatable (4)  Accessory Muscle use-Abdominals:  Yes  Accessory Muscle use-Gluteals:  Yes  Accessory Muscle use-Adductors:  Yes    SEMG Biofeedback:    Equipment:  Biofeedback    Suraface electrode placement--Perianal:  Bilateral    Baseline EMG Abdominals:  5mV  Peak pelvic muscle contraction:  10mV    EMG  interpretation to fatigue:  3-5 seconds  Position:  SupineAdditional History:        Caffeine Consumption:  2 cups of coffee per day                     General     ROS    Assessment/Plan:    Patient is a 68 year old male with pelvic complaints.    Patient has the following significant findings with corresponding treatment plan.                Diagnosis 1:  Post op incontinence   Decreased strength - therapeutic exercise, therapeutic activities and home program  Impaired muscle performance - biofeedback, neuro re-education and home program    Therapy Evaluation Codes:   1) History comprised of:   Personal factors that impact the plan of care:      Time since onset of symptoms.    Comorbidity factors that impact the plan of care are:      Cancer, High blood pressure and Weakness.     Medications impacting care: Anti-depressant and High blood pressure.  2) Examination of Body Systems comprised of:   Body structures and functions that impact the plan of care:      Pelvis.   Activity limitations that impact the plan of care are:      Lifting, Stairs, Urinary incontinence and sneezing, tying shoes.  3) Clinical presentation characteristics are:   Stable/Uncomplicated.  4) Decision-Making    Low complexity using standardized patient assessment instrument and/or measureable assessment of functional outcome.  Cumulative Therapy Evaluation is: Low complexity.    Previous and current functional limitations:  (See Goal Flow Sheet for this information)    Short term and Long term goals: (See Goal Flow Sheet for this information)     Communication ability:  Patient appears to be able to clearly communicate and understand verbal and written communication and follow directions correctly.  Treatment Explanation - The following has been discussed with the patient:   RX ordered/plan of care  Anticipated outcomes  Possible risks and side effects  This patient would benefit from PT intervention to resume normal activities.   Rehab  potential is good.    Frequency:  1 X week, once daily  Duration:  for 6 weeks  Discharge Plan:  Achieve all LTG.  Independent in home treatment program.  Reach maximal therapeutic benefit.    Please refer to the daily flowsheet for treatment today, total treatment time and time spent performing 1:1 timed codes.

## 2019-03-19 ENCOUNTER — OFFICE VISIT (OUTPATIENT)
Dept: SURGERY | Facility: CLINIC | Age: 69
End: 2019-03-19
Attending: CLINICAL NURSE SPECIALIST
Payer: COMMERCIAL

## 2019-03-19 ENCOUNTER — ANCILLARY PROCEDURE (OUTPATIENT)
Dept: CT IMAGING | Facility: CLINIC | Age: 69
End: 2019-03-19
Attending: CLINICAL NURSE SPECIALIST
Payer: COMMERCIAL

## 2019-03-19 VITALS
DIASTOLIC BLOOD PRESSURE: 83 MMHG | TEMPERATURE: 98.2 F | OXYGEN SATURATION: 98 % | BODY MASS INDEX: 32.02 KG/M2 | WEIGHT: 204 LBS | SYSTOLIC BLOOD PRESSURE: 131 MMHG | HEART RATE: 65 BPM | HEIGHT: 67 IN | RESPIRATION RATE: 16 BRPM

## 2019-03-19 DIAGNOSIS — C34.11 MALIGNANT NEOPLASM OF UPPER LOBE OF RIGHT LUNG (H): Primary | ICD-10-CM

## 2019-03-19 DIAGNOSIS — C34.11 MALIGNANT NEOPLASM OF UPPER LOBE OF RIGHT LUNG (H): ICD-10-CM

## 2019-03-19 PROCEDURE — G0463 HOSPITAL OUTPT CLINIC VISIT: HCPCS | Mod: ZF

## 2019-03-19 RX ORDER — LISINOPRIL 2.5 MG/1
10 TABLET ORAL
COMMUNITY
End: 2019-05-07 | Stop reason: ALTCHOICE

## 2019-03-19 ASSESSMENT — MIFFLIN-ST. JEOR: SCORE: 1658.97

## 2019-03-19 ASSESSMENT — PAIN SCALES - GENERAL: PAINLEVEL: NO PAIN (0)

## 2019-03-19 NOTE — LETTER
3/19/2019       RE: Juwan Latham  [address]  [place] [address]     Dear Colleague,    Thank you for referring your patient, Juwan Latham, to the [facility]. Please see a copy of my visit note below.    THORACIC SURGERY FOLLOW UP VISIT    Dear Dr. Guidry,  I saw Mr. Latham in follow-up today. The clinical summary follows:     PREOP DIAGNOSIS   Enlarging ground glass opacity in the right upper lobe    NEODJUVANT THERAPY   None    COMPLICATIONS FROM NEOADJUVANT THERAPY  N/A  PROCEDURE   Laparoscopic right upper lobe wedge resection and laparoscopic assisted mediastinal lymph node dissection    DATE OF PROCEDURE  03/03/2017    HISTOPATHOLOGY   Minimally invasive well differentiated adenocarcinoma (T1aN0)    COMPLICATIONS  None    INTERVAL STUDIES  Chest CT-final read pending at time of clinic visit. To my review, I again see the area of thickness along the staple line however, I cannot determine if this has changed at all or is stable compared to prior CTs.    CT 9/2018      CT 3/2019      ETOH approximately 3 beers a night  TOB former smoker, 40 pack years, quit 1985  BMI 32.0    SUBJECTIVE   Juwan reports an increase in his fatigue. He says that nearly every day he feels like he has no energy and needs to rest. This has been going on for the last month or so. He does go to the gym but has still gained weight.     Since we last saw him, he had a detached retina that required surgical repair. This was because he lifted too much weight at the gym and the pressure caused the rupture. He does not lift weight any longer.    He had a sinus cold about a month ago and stopped using his CPAP around then. He did notice mold in his CPAP.    From a personal perspective, he is here alone. He will be heading to [place] this Thursday because his 82 year old brother passed away.    IMPRESSION (C34.11) Malignant neoplasm of upper lobe of right lung (H)  (primary encounter diagnosis)  Comment:  lung cancer follow up  Plan: follow up in 1 year with chest CT    68 year-old male with right upper lobe lung cancer s/p surgical resection.    PLAN  I spent a total of 25 minutes with . Juwan Latham, more than 50% of which were spent in counseling, coordination of care, and face-to-face time. I reviewed the plan as follows:  I will call him with the final report of his chest CT. If the radiologist says the area of fullness is stable, we will have him follow up in 1 year with a chest CT. If there is any increase in size, I will present him at our multi-disciplinary Nodule Conference this Friday to determine plan.  I instructed Juwan to either thoroughly clean his CPAP or get a new one. I would like him to wear this every night. I am thinking this may improve his daytime fatigue.   1. Necessary Tests & Appointments:   Follow up in 1 year with chest CT  Resume nightly use of CPAP  2. Pain Control Plan: N/A  3. Anticoagulation Plan: N/A  4. Smoking Cessation: N/A    All questions were answered and the patient and present family were in agreement with the plan.  I appreciate the opportunity to participate in the care of your patient and will keep you updated.  Sincerely,      Carla Faulkner, GLORIA CNS

## 2019-03-19 NOTE — PROGRESS NOTES
THORACIC SURGERY FOLLOW UP VISIT    Dear Dr. Guidry,  I saw Mr. Latham in follow-up today. The clinical summary follows:     PREOP DIAGNOSIS   Enlarging ground glass opacity in the right upper lobe    NEODJUVANT THERAPY   None    COMPLICATIONS FROM NEOADJUVANT THERAPY  N/A  PROCEDURE   Laparoscopic right upper lobe wedge resection and laparoscopic assisted mediastinal lymph node dissection    DATE OF PROCEDURE  03/03/2017    HISTOPATHOLOGY   Minimally invasive well differentiated adenocarcinoma (T1aN0)    COMPLICATIONS  None    INTERVAL STUDIES  Chest CT-final read pending at time of clinic visit. To my review, I again see the area of thickness along the staple line however, I cannot determine if this has changed at all or is stable compared to prior CTs.    CT 9/2018      CT 3/2019      ETOH approximately 3 beers a night  TOB former smoker, 40 pack years, quit 1985  BMI 32.0    SUBJECTIVE   Juwan reports an increase in his fatigue. He says that nearly every day he feels like he has no energy and needs to rest. This has been going on for the last month or so. He does go to the gym but has still gained weight.     Since we last saw him, he had a detached retina that required surgical repair. This was because he lifted too much weight at the gym and the pressure caused the rupture. He does not lift weight any longer.    He had a sinus cold about a month ago and stopped using his CPAP around then. He did notice mold in his CPAP.    From a personal perspective, he is here alone. He will be heading to [place] this Thursday because his 82 year old brother passed away.    IMPRESSION (C34.11) Malignant neoplasm of upper lobe of right lung (H)  (primary encounter diagnosis)  Comment: lung cancer follow up  Plan: follow up in 1 year with chest CT    68 year-old male with right upper lobe lung cancer s/p surgical resection.    PLAN  I spent a total of 25 minutes with Mr. Juwan Latham, more than 50% of which were spent  in counseling, coordination of care, and face-to-face time. I reviewed the plan as follows:  I will call him with the final report of his chest CT. If the radiologist says the area of fullness is stable, we will have him follow up in 1 year with a chest CT. If there is any increase in size, I will present him at our multi-disciplinary Nodule Conference this Friday to determine plan.  I instructed Juwan to either thoroughly clean his CPAP or get a new one. I would like him to wear this every night. I am thinking this may improve his daytime fatigue.   1. Necessary Tests & Appointments:   Follow up in 1 year with chest CT  Resume nightly use of CPAP  2. Pain Control Plan: N/A  3. Anticoagulation Plan: N/A  4. Smoking Cessation: N/A    All questions were answered and the patient and present family were in agreement with the plan.  I appreciate the opportunity to participate in the care of your patient and will keep you updated.  Sincerely,

## 2019-03-20 DIAGNOSIS — C34.11 MALIGNANT NEOPLASM OF UPPER LOBE OF RIGHT LUNG (H): Primary | ICD-10-CM

## 2019-03-20 DIAGNOSIS — G47.33 OSA (OBSTRUCTIVE SLEEP APNEA): Primary | ICD-10-CM

## 2019-03-27 ENCOUNTER — THERAPY VISIT (OUTPATIENT)
Dept: PHYSICAL THERAPY | Facility: CLINIC | Age: 69
End: 2019-03-27
Payer: COMMERCIAL

## 2019-03-27 DIAGNOSIS — N39.46 MIXED INCONTINENCE URGE AND STRESS (MALE)(FEMALE): ICD-10-CM

## 2019-03-27 DIAGNOSIS — M99.05 SOMATIC DYSFUNCTION OF PELVIS REGION: Primary | ICD-10-CM

## 2019-03-27 PROCEDURE — 97110 THERAPEUTIC EXERCISES: CPT | Mod: GP | Performed by: PHYSICAL THERAPIST

## 2019-03-27 PROCEDURE — 97112 NEUROMUSCULAR REEDUCATION: CPT | Mod: GP | Performed by: PHYSICAL THERAPIST

## 2019-03-27 NOTE — PROGRESS NOTES
Subjective:  HPI                    Objective:  System    Physical Exam    General     ROS    Assessment/Plan:    SUBJECTIVE  Subjective changes as noted by pt:  Pt notes increased strength.     Current pain level: 0/10     Changes in function:  Pt still notes leaking with transitional movements like sit to stand and bending to tie shoe.      Adverse reaction to treatment or activity:  None    OBJECTIVE  Changes in objective findings:  Improved Kegel in sitting. Pt still notes weakness with transfers and standing        ASSESSMENT  Juwan continues to require intervention to meet STG and LTG's: PT  Patient's symptoms are resolving.  Response to therapy has shown an improvement in  strength  Progress made towards STG/LTG?  Yes,     PLAN  Current treatment program is being advanced to more complex exercises.    PTA/ATC plan:  N/A    Please refer to the daily flowsheet for treatment today, total treatment time and time spent performing 1:1 timed codes.

## 2019-03-28 ENCOUNTER — TELEPHONE (OUTPATIENT)
Dept: SURGERY | Facility: CLINIC | Age: 69
End: 2019-03-28

## 2019-03-28 NOTE — TELEPHONE ENCOUNTER
Call to Juwan to see how his fatigue has been since using his CPAP again. Per Juwan, he has tried to use the CPAP every night but sometimes he takes it off. He does notice a small improvement with his fatigue.     I encouraged him to continue using the CPAP and I will check back in another couple of weeks to see how things are after more time.    Juwan is in agreement with this plan.

## 2019-04-18 ENCOUNTER — TRANSFERRED RECORDS (OUTPATIENT)
Dept: HEALTH INFORMATION MANAGEMENT | Facility: CLINIC | Age: 69
End: 2019-04-18

## 2019-04-24 ENCOUNTER — TELEPHONE (OUTPATIENT)
Dept: SURGERY | Facility: CLINIC | Age: 69
End: 2019-04-24

## 2019-04-24 NOTE — TELEPHONE ENCOUNTER
"Called patient ana maria Aguirre for his fatigue. He has been wearing his CPAP machine and exercising but still complained of having fatigue. He stated 4/23 was \"a pretty bad day\". I told him I will talk with Carla and see what she thinks he should do and I would give him a call back within a couple days. He agreed with this plan  "

## 2019-04-25 ENCOUNTER — TELEPHONE (OUTPATIENT)
Dept: SURGERY | Facility: CLINIC | Age: 69
End: 2019-04-25

## 2019-04-25 ENCOUNTER — TELEPHONE (OUTPATIENT)
Dept: FAMILY MEDICINE | Facility: CLINIC | Age: 69
End: 2019-04-25

## 2019-04-25 DIAGNOSIS — R53.82 CHRONIC FATIGUE: Primary | ICD-10-CM

## 2019-04-25 NOTE — TELEPHONE ENCOUNTER
Please see message from patient below along with the following message from cancer clinic from today:  Elsie Sheridan LPN     4/25/19 10:54 AM   Note      Called patient back, I talked with Carla and she advised him to see his PCP. He said he has and has done checks for thyroid, diabetes, blood work, etc and the PCP blames it on depression. I told him to maybe check into seeing a different PCP and possibly an internist PCP.           Of note, there are also additional messages related to this from 4/24/19 and 3/28/19. Please advise. Thank you.  TEDDY WaltonN, RN  [facility]

## 2019-04-25 NOTE — TELEPHONE ENCOUNTER
Called patient back, I talked with Carla and she advised him to see his PCP. He said he has and has done checks for thyroid, diabetes, blood work, etc and the PCP blames it on depression. I told him to maybe check into seeing a different PCP and possibly an internist PCP.

## 2019-04-25 NOTE — TELEPHONE ENCOUNTER
Reason for call:  Patient reporting a symptom    Symptom or request: The patient is calling to speak with a nurse regarding fatigue. He has done blood work and everything. They suggested Dr. Guidry find him an internist who may know more about the fatigue.     Phone Number patient can be reached at:  Cell number on file:    Telephone Information:   Mobile [phone]     Best Time:  Anytime    Can we leave a detailed message on this number:  YES    Call taken on 4/25/2019 at 11:19 AM by Francheska Chen

## 2019-04-26 NOTE — TELEPHONE ENCOUNTER
Juwan also has obstructive sleep apnea and last saw his sleep specialist in January 2018.  Perhaps his CPAP needs have changed and he's not getting a good night's sleep due to suboptimally treated obstructive sleep apnea?  I can refer Juwan back to his sleep physician or I can certainly send him to an internal medicine physician to further evaluate what I may be missing.  Please call patient.     Julio Guidry MD

## 2019-04-29 NOTE — TELEPHONE ENCOUNTER
Called and spoke with pt to give him some details.  He is driving so I called ad left him a VM with Dr. Arroyo's info in [place] per his request.  Jayna Rider

## 2019-04-29 NOTE — TELEPHONE ENCOUNTER
Has been wearing CPAP for 2 months and he feels it hasn't helped at all. His CPAP says he uses it 9 hours per night.  Feels he sleeps well at night but is sleeping a lot during the day.  Juwan feels something is wrong and would like to move forward with IM at this time.     Dr. Guidry please place referral.     I advised Juwan IM should be calling him to schedule. If they have not called in a few days to call us back so we can give him a number to contact them.     Justa Naqvi R.N.

## 2019-04-29 NOTE — TELEPHONE ENCOUNTER
Referral submitted.  I'd recommend Nino Flores or Fina Dahl at [place], Srinath Mcfarlane at [facility], Callum Arroyo at [place] or Leona Macias at [place].  Juwan can call [phone] to make his appointment.    Please call Juwan to let him know.    Julio Guidry MD

## 2019-05-01 ENCOUNTER — TRANSFERRED RECORDS (OUTPATIENT)
Dept: HEALTH INFORMATION MANAGEMENT | Facility: CLINIC | Age: 69
End: 2019-05-01

## 2019-05-07 ENCOUNTER — OFFICE VISIT (OUTPATIENT)
Dept: INTERNAL MEDICINE | Facility: CLINIC | Age: 69
End: 2019-05-07
Payer: COMMERCIAL

## 2019-05-07 VITALS
RESPIRATION RATE: 12 BRPM | SYSTOLIC BLOOD PRESSURE: 120 MMHG | WEIGHT: 200 LBS | HEIGHT: 67 IN | BODY MASS INDEX: 31.39 KG/M2 | HEART RATE: 61 BPM | DIASTOLIC BLOOD PRESSURE: 72 MMHG | OXYGEN SATURATION: 96 % | TEMPERATURE: 98.9 F

## 2019-05-07 DIAGNOSIS — R53.83 OTHER FATIGUE: Primary | ICD-10-CM

## 2019-05-07 DIAGNOSIS — H91.93 DECREASED HEARING OF BOTH EARS: ICD-10-CM

## 2019-05-07 LAB
CORTIS SERPL-MCNC: 6.8 UG/DL (ref 4–22)
VIT B12 SERPL-MCNC: 854 PG/ML (ref 193–986)

## 2019-05-07 PROCEDURE — 36415 COLL VENOUS BLD VENIPUNCTURE: CPT | Performed by: INTERNAL MEDICINE

## 2019-05-07 PROCEDURE — 84270 ASSAY OF SEX HORMONE GLOBUL: CPT | Performed by: INTERNAL MEDICINE

## 2019-05-07 PROCEDURE — 84403 ASSAY OF TOTAL TESTOSTERONE: CPT | Performed by: INTERNAL MEDICINE

## 2019-05-07 PROCEDURE — 82607 VITAMIN B-12: CPT | Performed by: INTERNAL MEDICINE

## 2019-05-07 PROCEDURE — 82306 VITAMIN D 25 HYDROXY: CPT | Performed by: INTERNAL MEDICINE

## 2019-05-07 PROCEDURE — 82533 TOTAL CORTISOL: CPT | Performed by: INTERNAL MEDICINE

## 2019-05-07 PROCEDURE — 99214 OFFICE O/P EST MOD 30 MIN: CPT | Performed by: INTERNAL MEDICINE

## 2019-05-07 PROCEDURE — 84443 ASSAY THYROID STIM HORMONE: CPT | Performed by: INTERNAL MEDICINE

## 2019-05-07 RX ORDER — LOSARTAN POTASSIUM 25 MG/1
25 TABLET ORAL DAILY
COMMUNITY
End: 2019-11-21

## 2019-05-07 RX ORDER — CHOLECALCIFEROL (VITAMIN D3) 25 MCG
TABLET,CHEWABLE ORAL
COMMUNITY
End: 2020-05-11

## 2019-05-07 ASSESSMENT — ANXIETY QUESTIONNAIRES
GAD7 TOTAL SCORE: 4
1. FEELING NERVOUS, ANXIOUS, OR ON EDGE: MORE THAN HALF THE DAYS
2. NOT BEING ABLE TO STOP OR CONTROL WORRYING: NOT AT ALL
IF YOU CHECKED OFF ANY PROBLEMS ON THIS QUESTIONNAIRE, HOW DIFFICULT HAVE THESE PROBLEMS MADE IT FOR YOU TO DO YOUR WORK, TAKE CARE OF THINGS AT HOME, OR GET ALONG WITH OTHER PEOPLE: NOT DIFFICULT AT ALL
6. BECOMING EASILY ANNOYED OR IRRITABLE: SEVERAL DAYS
5. BEING SO RESTLESS THAT IT IS HARD TO SIT STILL: NOT AT ALL
3. WORRYING TOO MUCH ABOUT DIFFERENT THINGS: SEVERAL DAYS
7. FEELING AFRAID AS IF SOMETHING AWFUL MIGHT HAPPEN: NOT AT ALL

## 2019-05-07 ASSESSMENT — PATIENT HEALTH QUESTIONNAIRE - PHQ9
SUM OF ALL RESPONSES TO PHQ QUESTIONS 1-9: 6
5. POOR APPETITE OR OVEREATING: NOT AT ALL

## 2019-05-07 ASSESSMENT — MIFFLIN-ST. JEOR: SCORE: 1640.82

## 2019-05-07 NOTE — NURSING NOTE
"/72   Pulse 61   Temp 98.9  F (37.2  C) (Oral)   Resp 12   Ht 1.702 m (5' 7\")   Wt 90.7 kg (200 lb)   SpO2 96%   BMI 31.32 kg/m      "

## 2019-05-07 NOTE — PROGRESS NOTES
"  SUBJECTIVE:   Juwan Latham is a 68 year old male who presents to clinic today for the following   health issues:      New Patient/Transfer of Care    Fatigue.  The patient reports that he has felt tired for the past couple of years. The patient reports that he becomes fatigued after one day of work for 5 hours.  He works with remodeling- physically active.  While he is exercising he is okay.    When he bends over, he seems to tired out.  He has had a normal thyroid 5/2018.  He has had unremarkable CMP and CBC.   Normal colonoscopy. H/o Prostate cancer (see below).  He is wearing a CPAP to help with daytime fatigue.   He does not think this is from anxiety or depression.   PHQ-9 SCORE 5/22/2018 5/22/2018 5/7/2019   PHQ-9 Total Score - - -   PHQ-9 Total Score 7 7 6     BENSON-7 SCORE 5/22/2018 5/22/2018 5/7/2019   Total Score - - -   Total Score 3 3 4         Prostate cancer. S/p prostatectomy in 12/2017 and radiation. He sees Dr. Agarwal from urology every 6 months.  Recent PSA 3/4/19 was <0.01.    Lung cancer. Diagnosed in 2017. S/p right upper lobe wedge resection and laparoscopic assisted mediastinal lymph node dissection.  He is to follow up in one year.    Cavernous hemangioma of brain.  Diagnosed by MRI in 3/2017. He was seen by neurosurgery. He has not had any worrisome headaches.      Reviewed  and updated as needed this visit by clinical staff  Tobacco  Allergies  Meds  Med Hx  Surg Hx  Fam Hx  Soc Hx        Reviewed and updated as needed this visit by Provider             ROS:  CONSTITUTIONAL: NEGATIVE for fever, chills, change in weight  RESP: NEGATIVE for significant cough or SOB  CV: NEGATIVE for chest pain, palpitations or peripheral edema      OBJECTIVE:     /72   Pulse 61   Temp 98.9  F (37.2  C) (Oral)   Resp 12   Ht 1.702 m (5' 7\")   Wt 90.7 kg (200 lb)   SpO2 96%   BMI 31.32 kg/m    Body mass index is 31.32 kg/m .  GENERAL: healthy, alert and no distress  RESP: lungs clear to " auscultation - no rales, rhonchi or wheezes  CV: regular rate and rhythm, normal S1 S2, no S3 or S4, no murmur, click or rub  Psych: normal affect    ASSESSMENT/PLAN:     (R53.83) Other fatigue  (primary encounter diagnosis)  Comment:  assess metabolic etiologies  Plan: TSH with free T4 reflex, Vitamin D Deficiency,         Vitamin B12, Cortisol, Testosterone Free and         Total        -consider checking blood pressure and/or blood glucose when having symptoms    (H91.93) Decreased hearing of both ears  Comment:   Plan: OTOLARYNGOLOGY REFERRAL            Tiffany George MD  [facility]

## 2019-05-08 LAB
DEPRECATED CALCIDIOL+CALCIFEROL SERPL-MC: 19 UG/L (ref 20–75)
TSH SERPL DL<=0.005 MIU/L-ACNC: 2.72 MU/L (ref 0.4–4)

## 2019-05-08 ASSESSMENT — ANXIETY QUESTIONNAIRES: GAD7 TOTAL SCORE: 4

## 2019-05-09 LAB
SHBG SERPL-SCNC: 41 NMOL/L (ref 11–80)
TESTOST FREE SERPL-MCNC: 5.79 NG/DL (ref 4.7–24.4)
TESTOST SERPL-MCNC: 334 NG/DL (ref 240–950)

## 2019-06-14 NOTE — PROGRESS NOTES
[facility]  [address]  [place] [address]  [phone]  Dept: [phone]    PRE-OP EVALUATION:  Today's date: 2019    Juwan Latham (: 1950) presents for pre-operative evaluation assessment as requested by eye surgeon.  He requires evaluation and anesthesia risk assessment prior to undergoing surgery/procedure for treatment of cataracts .    Proposed Surgery/ Procedure: cataract surgery  Date of Surgery/ Procedure: 19  Time of Surgery/ Procedure: 9:00am  Hospital/Surgical Facility: [facility]  Fax number for surgical facility:  Primary Physician: Julio Guidry Jr.  Type of Anesthesia Anticipated: to be determined    Patient has a Health Care Directive or Living Will:  YES    1. NO - Do you have a history of heart attack, stroke, stent, bypass or surgery on an artery in the head, neck, heart or legs?  2. NO - Do you ever have any pain or discomfort in your chest?  3. NO - Do you have a history of  Heart Failure?  4. NO - Are you troubled by shortness of breath when: walking on the level, up a slight hill or at night?  5. NO - Do you currently have a cold, bronchitis or other respiratory infection?  6. NO - Do you have a cough, shortness of breath or wheezing?  7. NO - Do you sometimes get pains in the calves of your legs when you walk?  8. NO - Do you or anyone in your family have previous history of blood clots?  9. NO - Do you or does anyone in your family have a serious bleeding problem such as prolonged bleeding following surgeries or cuts?  10. NO - Have you ever had problems with anemia or been told to take iron pills?  11. NO - Have you had any abnormal blood loss such as black, tarry or bloody stools, or abnormal vaginal bleeding?  12. NO - Have you ever had a blood transfusion?  13. NO - Have you or any of your relatives ever had problems with anesthesia?  14. YES - DO YOU HAVE SLEEP APNEA, EXCESSIVE SNORING OR DAYTIME DROWSINESS?   15.  NO - Do you have any prosthetic heart valves?  16. NO - Do you have prosthetic joints?  17. NO - Is there any chance that you may be pregnant?      HPI:     HPI related to upcoming procedure: history of left-sided cataract      See problem list for active medical problems.  Problems all longstanding and stable, except as noted/documented.  See ROS for pertinent symptoms related to these conditions.      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 Bates Weight at [facility]  Substantially higher risk than previously believed given positive lymph node and grade group 5     Given high risk for prostate cancer would refer to radiation oncology for adjuvant radiation and possible hormone treatment.  Possibly start in Feb/March 2018. Re-check PSA prior to starting radiation therapy.  Completed radiation therapy in April 2018.            Major depressive disorder, recurrent episode, mild (H) 05/09/2017     Priority: Medium     Cavernous hemangioma of brain (H) 04/07/2017     Priority: Medium     Found incidentally on MRI of brain which was ordered to look for mets from lung tumor  Neurosurgery consult with Dr. Mcelroy in March 2017 - recommend repeat MRI in September 2017 to assess for any changes       Overweight (BMI 25.0-29.9) 03/24/2017     Priority: Medium     Malignant neoplasm of upper lobe of right lung (H) 03/13/2017     Priority: Medium     Adenocarcinoma       Benign neoplasm of descending colon  01/16/2017     Priority: Medium     Seen on colonoscopy 1/4/2017       Seasonal allergies      Priority: Medium     spring and fall       Hypertension goal BP (blood pressure) < 140/90 04/28/2015     Priority: Medium     GERD (gastroesophageal reflux disease) 01/27/2015     Priority: Medium     PAULINO (obstructive sleep apnea) 05/24/2013     Priority: Medium     Anxiety 12/12/2011     Priority: Medium     History of colonic polyps 06/22/2011     Priority: Medium     Problem list name updated by automated process. Provider to review       Advance Care Planning 06/03/2011     Priority: Medium     Discussed advance care planning with patient; information given to patient to review. 6/3/2011   6/7/11 Follow up to [facility] referral call placed. Patient would like to review document provided at visit, establish an agent and then will schedule facilitation if needed. Steffanie Leal LPN/[facility] Planning Facilitator        CARDIOVASCULAR SCREENING; LDL GOAL LESS THAN 160 10/31/2010     Priority: Medium     Allergic rhinitis due to other allergen 05/28/2004     Priority: Medium     Tu score is 9 on 6-3-11        Past Medical History:   Diagnosis Date     Anxiety      Arthritis     fingers, hips     Calculus of kidney 2005, 2012     Congenital single kidney      Gastroesophageal reflux disease      Hx of cancer of lung 03/2017    wedge resection     Hypertension      Prostate cancer (H) 08/2017     Seasonal allergies     spring and fall     Sleep apnea     cpap     Past Surgical History:   Procedure Laterality Date     BRONCHOSCOPY FLEXIBLE N/A 3/3/2017    Procedure: BRONCHOSCOPY FLEXIBLE;  Surgeon: Maria A Desai MD;  Location: [facility] OR     COLONOSCOPY N/A 2/11/2015    Procedure: COLONOSCOPY;  Surgeon: Murphy Jesus MD;  Location:  GI     COMBINED CYSTOSCOPY, RETROGRADES, URETEROSCOPY, LASER HOLMIUM LITHOTRIPSY URETER(S), INSERT STENT N/A 3/25/2018    Procedure: COMBINED CYSTOSCOPY, RETROGRADES,  URETEROSCOPY, LASER HOLMIUM LITHOTRIPSY URETER(S), INSERT STENT;  Cystoscopy, Catheter Exchange under Anesthesia;  Surgeon: Zaria Cain MD;  Location: [facility] OR     DAVINCI PROSTATECTOMY N/A 12/1/2017    Procedure: DAVINCI PROSTATECTOMY;  Robotic Assisted Radical Prostatectomy and Pelvic Lymph Node Dissection ;  Surgeon: Paulo Agarwal MD;  Location: [facility] OR     ESOPHAGOSCOPY, GASTROSCOPY, DUODENOSCOPY (EGD), COMBINED N/A 5/20/2016    Procedure: COMBINED ESOPHAGOSCOPY, GASTROSCOPY, DUODENOSCOPY (EGD), BIOPSY SINGLE OR MULTIPLE;  Surgeon: Murphy Jesus MD;  Location: [facility] GI     LAPAROSCOPIC WEDGE RESECTION LUNG Right 3/3/2017    Procedure: LAPAROSCOPIC WEDGE RESECTION LUNG;  Surgeon: Maria A Desai MD;  Location: [facility] OR     LASER HOLMIUM LITHOTRIPSY URETER(S), INSERT STENT, COMBINED  9/11/2012    Procedure: COMBINED CYSTOSCOPY, URETEROSCOPY, LASER HOLMIUM LITHOTRIPSY URETER(S), INSERT STENT;  Cystoscopy, Right Ureteroscopy, Stone Extraction, Holmium Laser, Double J Stent Placement;  Surgeon: Nicolás Blackwell MD;  Location:  OR     Current Outpatient Medications   Medication Sig Dispense Refill     Cyanocobalamin (B-12) 3000 MCG LOZG        losartan (COZAAR) 25 MG tablet Take 25 mg by mouth daily       order for DME 1: Custom RLE compression stocking-thigh high and include hip as able; 20-30 mm Hg compression  2: Custom compression briefs.  Compression as recommended by fitter. 1 each 0     order for DME 1: Gradient Compression Wraps; 2: BLE 20-30 or 30-40 mm Hg compression stockings; 3: RLE custom compression stocking; 4; RLE Velcro compression garment; thigh high 1 each o     PARoxetine (PAXIL) 10 MG tablet Take 1 tablet (10 mg) by mouth At Bedtime 90 tablet 1     OTC products: None, except as noted above    Allergies   Allergen Reactions     Percocet [Oxycodone-Acetaminophen] GI Disturbance     Severe constipation      Latex Allergy: NO    Social History     Tobacco Use     Smoking status:  Former Smoker     Packs/day: 2.00     Years: 20.00     Pack years: 40.00     Types: Cigarettes     Last attempt to quit: 1985     Years since quittin.4     Smokeless tobacco: Never Used   Substance Use Topics     Alcohol use: Yes     Comment: avg 3 beers per night     History   Drug Use No       REVIEW OF SYSTEMS:   CONSTITUTIONAL: NEGATIVE for fever, chills, change in weight  INTEGUMENTARY/SKIN: NEGATIVE for worrisome rashes, moles or lesions  EYES: NEGATIVE for vision changes or irritation  ENT/MOUTH: NEGATIVE for ear, mouth and throat problems  RESP: NEGATIVE for significant cough or SOB  BREAST: NEGATIVE for masses, tenderness or discharge  CV: NEGATIVE for chest pain, palpitations or peripheral edema  GI: NEGATIVE for nausea, abdominal pain, heartburn, or change in bowel habits  : NEGATIVE for frequency, dysuria, or hematuria  MUSCULOSKELETAL: NEGATIVE for significant arthralgias or myalgia  NEURO: NEGATIVE for weakness, dizziness or paresthesias  ENDOCRINE: NEGATIVE for temperature intolerance, skin/hair changes  HEME: NEGATIVE for bleeding problems  PSYCHIATRIC: NEGATIVE for changes in mood or affect    EXAM:   There were no vitals taken for this visit.    GENERAL APPEARANCE: healthy, alert and no distress     EYES: EOMI,  PERRL     HENT: ear canals and TM's normal and nose and mouth without ulcers or lesions     NECK: no adenopathy, no asymmetry, masses, or scars and thyroid normal to palpation     RESP: lungs clear to auscultation - no rales, rhonchi or wheezes     CV: regular rates and rhythm, normal S1 S2, no S3 or S4 and no murmur, click or rub     ABDOMEN:  soft, nontender, no HSM or masses and bowel sounds normal     MS: extremities normal- no gross deformities noted, no evidence of inflammation in joints, FROM in all extremities.     SKIN: erythematous scalp with some dry crusted spots, erythematous swollen left inguinal region     NEURO: Normal strength and tone, sensory exam grossly  normal, mentation intact and speech normal     PSYCH: mentation appears normal. and affect normal/bright     LYMPHATICS: No cervical adenopathy    DIAGNOSTICS:   No labs or EKG required for low risk surgery (cataract, skin procedure, breast biopsy, etc)    Recent Labs   Lab Test 08/14/18  1136 07/17/18  1644 06/24/18  1357 05/22/18  1022  02/24/17  0955  09/11/12  1058   HGB 13.2* 13.7 12.6* 14.2   < > 15.8   < >  --     262 237 193   < > 164   < >  --    INR 1.09  --   --   --   --   --   --  1.03   NA  --   --  138 138   < > 140   < >  --    POTASSIUM  --   --  3.9 4.7   < > 4.2   < >  --    CR  --   --  0.71 0.84   < > 0.83   < >  --    A1C  --   --   --   --   --  5.5  --   --     < > = values in this interval not displayed.        IMPRESSION:   Reason for surgery/procedure: cataract  Diagnosis/reason for consult: preoperative clearance    The proposed surgical procedure is considered LOW risk.    REVISED CARDIAC RISK INDEX  The patient has the following serious cardiovascular risks for perioperative complications such as (MI, PE, VFib and 3  AV Block):  No serious cardiac risks  INTERPRETATION: 0 risks: Class I (very low risk - 0.4% complication rate)    The patient has the following additional risks for perioperative complications:  No identified additional risks      ICD-10-CM    1. Preop general physical exam Z01.818    2. Skin change: dermatitis vs actinic keratoses. Will refer to skin clinic for further evaluation. R23.9 SKIN CARE REFERRAL   3. Tinea cruris: rash in groin has come back. Improved after using clotrimazole cream. Will treat again. B35.6 clotrimazole (LOTRIMIN) 1 % external cream   4. Cataract of left eye, unspecified cataract type H26.9    5. Hypertension goal BP (blood pressure) < 140/90 I10 BASIC METABOLIC PANEL     Albumin Random Urine Quantitative with Creat Ratio       RECOMMENDATIONS:       --Patient is to take all scheduled medications on the day of surgery EXCEPT for  modifications listed below.    APPROVAL GIVEN to proceed with proposed procedure, without further diagnostic evaluation       Signed Electronically by: Asad White DO    Copy of this evaluation report is provided to requesting physician.    Seven Preop Guidelines    Revised Cardiac Risk Index

## 2019-06-17 ENCOUNTER — OFFICE VISIT (OUTPATIENT)
Dept: FAMILY MEDICINE | Facility: CLINIC | Age: 69
End: 2019-06-17
Payer: COMMERCIAL

## 2019-06-17 VITALS
BODY MASS INDEX: 32.26 KG/M2 | TEMPERATURE: 98.2 F | DIASTOLIC BLOOD PRESSURE: 74 MMHG | OXYGEN SATURATION: 96 % | WEIGHT: 206 LBS | SYSTOLIC BLOOD PRESSURE: 128 MMHG | HEART RATE: 60 BPM

## 2019-06-17 DIAGNOSIS — B35.6 TINEA CRURIS: ICD-10-CM

## 2019-06-17 DIAGNOSIS — I10 HYPERTENSION GOAL BP (BLOOD PRESSURE) < 140/90: ICD-10-CM

## 2019-06-17 DIAGNOSIS — Z01.818 PREOP GENERAL PHYSICAL EXAM: Primary | ICD-10-CM

## 2019-06-17 DIAGNOSIS — H26.9 CATARACT OF LEFT EYE, UNSPECIFIED CATARACT TYPE: ICD-10-CM

## 2019-06-17 DIAGNOSIS — R23.9 SKIN CHANGE: ICD-10-CM

## 2019-06-17 PROCEDURE — 82043 UR ALBUMIN QUANTITATIVE: CPT | Performed by: FAMILY MEDICINE

## 2019-06-17 PROCEDURE — 36415 COLL VENOUS BLD VENIPUNCTURE: CPT | Performed by: FAMILY MEDICINE

## 2019-06-17 PROCEDURE — 99207 C PAF COMPLETED  NO CHARGE: CPT | Performed by: FAMILY MEDICINE

## 2019-06-17 PROCEDURE — 80048 BASIC METABOLIC PNL TOTAL CA: CPT | Performed by: FAMILY MEDICINE

## 2019-06-17 PROCEDURE — 99214 OFFICE O/P EST MOD 30 MIN: CPT | Performed by: FAMILY MEDICINE

## 2019-06-17 RX ORDER — MULTIVIT-MIN/IRON/FOLIC ACID/K 18-600-40
1 CAPSULE ORAL DAILY
COMMUNITY
End: 2024-08-21

## 2019-06-17 RX ORDER — CLOTRIMAZOLE 1 %
CREAM (GRAM) TOPICAL
Qty: 60 G | Refills: 1 | Status: SHIPPED | OUTPATIENT
Start: 2019-06-17 | End: 2020-02-26

## 2019-06-18 ENCOUNTER — OFFICE VISIT (OUTPATIENT)
Dept: FAMILY MEDICINE | Facility: CLINIC | Age: 69
End: 2019-06-18
Payer: COMMERCIAL

## 2019-06-18 VITALS
DIASTOLIC BLOOD PRESSURE: 70 MMHG | TEMPERATURE: 98.5 F | SYSTOLIC BLOOD PRESSURE: 150 MMHG | WEIGHT: 204 LBS | BODY MASS INDEX: 31.95 KG/M2 | RESPIRATION RATE: 16 BRPM | HEART RATE: 52 BPM

## 2019-06-18 DIAGNOSIS — R21 RASH AND NONSPECIFIC SKIN ERUPTION: ICD-10-CM

## 2019-06-18 DIAGNOSIS — L57.0 AK (ACTINIC KERATOSIS): Primary | ICD-10-CM

## 2019-06-18 LAB
ANION GAP SERPL CALCULATED.3IONS-SCNC: 10 MMOL/L (ref 3–14)
BUN SERPL-MCNC: 14 MG/DL (ref 7–30)
CALCIUM SERPL-MCNC: 9.1 MG/DL (ref 8.5–10.1)
CHLORIDE SERPL-SCNC: 107 MMOL/L (ref 94–109)
CO2 SERPL-SCNC: 25 MMOL/L (ref 20–32)
CREAT SERPL-MCNC: 0.86 MG/DL (ref 0.66–1.25)
CREAT UR-MCNC: 126 MG/DL
GFR SERPL CREATININE-BSD FRML MDRD: 90 ML/MIN/{1.73_M2}
GLUCOSE SERPL-MCNC: 93 MG/DL (ref 70–99)
MICROALBUMIN UR-MCNC: 6 MG/L
MICROALBUMIN/CREAT UR: 4.37 MG/G CR (ref 0–17)
POTASSIUM SERPL-SCNC: 4.4 MMOL/L (ref 3.4–5.3)
SODIUM SERPL-SCNC: 142 MMOL/L (ref 133–144)

## 2019-06-18 PROCEDURE — 99213 OFFICE O/P EST LOW 20 MIN: CPT | Performed by: FAMILY MEDICINE

## 2019-06-18 NOTE — PROGRESS NOTES
"Subjective     Juwan Latham is a 68 year old male who presents to clinic today for the following health issues:    HPI   Rash  Onset: 3 months     Description:   Location: top of head/scalp  Character: red  Itching (Pruritis): very slight    Progression of Symptoms:  same    Accompanying Signs & Symptoms:  Fever: no   Body aches or joint pain: no   Sore throat symptoms: no   Recent cold symptoms: no     History:   Previous similar rash: no     Precipitating factors:   Exposure to similar rash: no   New exposures: None   Recent travel: no     Patient on the forehead has slight raised with surrounding maculopapular eruptions.  No redness noted.  Denies it being itchy.        Reviewed and updated as needed this visit by Provider         Review of Systems   ROS COMP: Constitutional, HEENT, cardiovascular, pulmonary, gi and gu systems are negative, except as otherwise noted.      Objective    /70   Pulse 52   Temp 98.5  F (36.9  C) (Tympanic)   Resp 16   Wt 92.5 kg (204 lb)   BMI 31.95 kg/m    Body mass index is 31.95 kg/m .  Physical Exam   GENERAL: healthy, alert and no distress  Forehead and on the scalp in the middle has few small raised macular papular eruptions.          Assessment & Plan     1. AK (actinic keratosis)  Patient has some spots on the forehead which may be underlying actinic keratosis.  Suggested treatment with liquid nitrogen which was done on most of them.  He is advised if they recur or coming back he is needs to follow-up in 2 to 3 weeks.  I did discuss sometimes they can be precancerous but if 1 of them is getting worse or more prominent he is advised to follow-up sooner.    2. Rash and nonspecific skin eruption         BMI:   Estimated body mass index is 31.95 kg/m  as calculated from the following:    Height as of 5/7/19: 1.702 m (5' 7\").    Weight as of this encounter: 92.5 kg (204 lb).                 Rommel Dang MD  [facility]      "

## 2019-07-01 ENCOUNTER — TELEPHONE (OUTPATIENT)
Dept: FAMILY MEDICINE | Facility: CLINIC | Age: 69
End: 2019-07-01

## 2019-07-01 NOTE — TELEPHONE ENCOUNTER
Reason for call:  Patient reporting a symptom    Symptom or request: Foot, numbness on right foot one side.     Duration (how long have symptoms been present): Ongoing, 3 months      Have you been treated for this before? No    Additional comments:      Phone Number patient can be reached at:  Home number on file [phone] (home)    Best Time:  Anytime     Can we leave a detailed message on this number:  YES    Call taken on 7/1/2019 at 2:22 PM by Mary Klein

## 2019-07-01 NOTE — TELEPHONE ENCOUNTER
See message below. Patient first reported this symptom back in November 2018. He was advised by Dr. Guidry at that time that we need to see him in clinic for further evaluation. Please call patient and advise of need for an appointment. Thank you.  TEDDY WaltonN, RN  [facility]

## 2019-07-08 NOTE — PROGRESS NOTES
"Subjective     Juwan Latham is a 68 year old male who presents to clinic today for the following health issues:    HPI   Concern - numbness in right foot  Onset: 7 months    Description:   Numbness on top of right foot    Intensity: moderate    Progression of Symptoms:  Worsening - spreading    Accompanying Signs & Symptoms:  none    Previous history of similar problem:   none    Precipitating factors:   Worsened by: none    Alleviating factors:  Improved by: none    Therapies Tried and outcome: none    He states that initially was just over the lateral top of his right foot but now seems to be spreading over the rest of the top. Toes don't feel very numb. Denies any tingling or pain. No history of injury or trauma. He states he had been getting legs wrapped for edema and symptoms started shortly after that. No numbness in the left foot. Denies any weakness in his lower extremities. No back or hip pain.      Erectile dysfunctoin      Duration: 1 month    Description (location/character/radiation): has difficulty keeping erections    Intensity:  moderate    Accompanying signs and symptoms: none    History (similar episodes/previous evaluation): None    Precipitating or alleviating factors: None    Therapies tried and outcome: None   Has new girlfriend that \"won't leave [him] alone.\" He is interested in trying Viagra. States his interest in sex is fine. He thought he was getting too old.    Reviewed and updated as needed this visit by Provider         Review of Systems   ROS COMP: Constitutional, HEENT, cardiovascular, pulmonary, gi and gu systems are negative, except as otherwise noted.      Objective    There were no vitals taken for this visit.  There is no height or weight on file to calculate BMI.  Physical Exam   GENERAL: healthy, alert and no distress  RESP: lungs clear to auscultation - no rales, rhonchi or wheezes  CV: regular rate and rhythm, normal S1 S2, no S3 or S4, no murmur, click or rub, no " peripheral edema and peripheral pulses strong  MS: no gross musculoskeletal defects noted, no edema  Foot exam: normal DP and PT pulses, no trophic changes or ulcerative lesions, normal sensory exam and normal monofilament exam    Diagnostic Test Results:  none         Assessment & Plan     1. Idiopathic peripheral neuropathy: unclear etiology. ? Whether he may hve had some minor nerve compression from leg wraps, but it has been persisting for quite sometime after that. He has had blood work completed recently - he is not diabetic, B12 level is normal. Other labs within normal limits. Plan to continue to monitor, consult with neurology for further evaluation and recommendations.  - NEUROLOGY ADULT REFERRAL    2. Erectile dysfunction, unspecified erectile dysfunction type: The patient desires Viagra to treat his erectile dysfunction. History and physical exam has not disclosed any obvious treatable cause of this complaint. He is informed that Viagra is usually not covered by insurance. The method of use 15-60 minutes prior to anticipated intercourse is explained. He should not use any more than 100 mg in a 24 hour period. The side effects of possible headache, flushing, dyspepsia and transient changes in vision have been explained.     The patient is not taking nitrates and denies he has access to nitrates in any form at any time. I have counseled him that taking Viagra with nitrates of any form can cause death. Additionally, Viagra serum concentrations can be increased by the following: cimetidine, erythromycin, itraconazole or ketoconazole. This patient does not take these drugs, but I have counseled him to avoid Viagra if he does take any of these.  - sildenafil (REVATIO) 20 MG tablet; Take 1-2 tablets (20-40 mg) by mouth as needed 30 minutes to 4 hours before sex  Dispense: 30 tablet; Refill: 0       Return in about 1 month (around 8/9/2019) for follow up if symptoms not improving.    Asad White,  DO  [facility]

## 2019-07-09 ENCOUNTER — OFFICE VISIT (OUTPATIENT)
Dept: FAMILY MEDICINE | Facility: CLINIC | Age: 69
End: 2019-07-09
Payer: COMMERCIAL

## 2019-07-09 VITALS
SYSTOLIC BLOOD PRESSURE: 116 MMHG | BODY MASS INDEX: 31.79 KG/M2 | DIASTOLIC BLOOD PRESSURE: 68 MMHG | TEMPERATURE: 98.4 F | WEIGHT: 203 LBS | OXYGEN SATURATION: 97 % | HEART RATE: 59 BPM

## 2019-07-09 DIAGNOSIS — G60.9 IDIOPATHIC PERIPHERAL NEUROPATHY: Primary | ICD-10-CM

## 2019-07-09 DIAGNOSIS — N52.9 ERECTILE DYSFUNCTION, UNSPECIFIED ERECTILE DYSFUNCTION TYPE: ICD-10-CM

## 2019-07-09 PROCEDURE — 99214 OFFICE O/P EST MOD 30 MIN: CPT | Performed by: FAMILY MEDICINE

## 2019-07-09 RX ORDER — SILDENAFIL CITRATE 20 MG/1
TABLET ORAL
Qty: 30 TABLET | Refills: 0 | Status: SHIPPED | OUTPATIENT
Start: 2019-07-09 | End: 2020-05-14 | Stop reason: SINTOL

## 2019-07-15 ENCOUNTER — TELEPHONE (OUTPATIENT)
Dept: FAMILY MEDICINE | Facility: CLINIC | Age: 69
End: 2019-07-15

## 2019-07-15 NOTE — LETTER
[facility]            [phone]  July 15, 2019    Juwan Latham  [address] [address]    Dear Juwan,    I hope you are doing well.       This is a reminder that you are due for a Wellness Visit (annual full physical).   So that you get your desired appointment time, please call [phone] to schedule this or you can use [employer] if you have an account. If you have had this visit completed elsewhere, or you believe you received this letter in error, please contact us at [phone].    In addition, here is a list of due or overdue Health Maintenance reminders.    Health Maintenance Due   Topic Date Due     Zoster (Shingles) Vaccine (1 of 2) 12/06/2001     Annual Wellness Visit  12/06/2016     FALL RISK ASSESSMENT  08/01/2019       Please call us at [phone] (or use [employer]) to address the above recommendations or we can discuss these reminders further at your appointment.    Have a great day!      Best Regards,  Asad White, DO

## 2019-07-15 NOTE — TELEPHONE ENCOUNTER
Needs of attention regarding:  -Wellness (Physical) Visit     Health Maintenance Topics with due status: Overdue       Topic Date Due    ZOSTER IMMUNIZATION 12/06/2001    MEDICARE ANNUAL WELLNESS VISIT 12/06/2016     Health Maintenance Topics with due status: Due Soon       Topic Date Due    FALL RISK ASSESSMENT 08/01/2019       Communication:  See Letter

## 2019-07-31 ENCOUNTER — TELEPHONE (OUTPATIENT)
Dept: FAMILY MEDICINE | Facility: CLINIC | Age: 69
End: 2019-07-31

## 2019-07-31 NOTE — TELEPHONE ENCOUNTER
Dr. White please advise how patient can get into this class. Is this offered through [employer] or other source? Thank you, Justa Naqvi R.N.

## 2019-07-31 NOTE — TELEPHONE ENCOUNTER
Patient would like some more information or needs to know what to do to sign up for the Cancer Fatigue Management class.  Dr. Angeles mentioned it at his last visit.     Please call patient at [phone]      Nanda Taylor

## 2019-08-07 NOTE — PROGRESS NOTES
Subjective:Subjective changes as noted by pt:  Pt notes increased strength.     Current pain level: 0/10     Changes in function:  Pt still notes leaking with transitional movements like sit to stand and bending to tie shoe.      Adverse reaction to treatment or activity:  None     OBJECTIVE  Changes in objective findings:  Improved Kegel in sitting. Pt still notes weakness with transfers and standing  HPI                    Objective:  System    Physical Exam    General     ROS    Assessment/Plan:    ASSESSMENT/PLAN  Updated problem list and treatment plan: Diagnosis 1:  Post op incontinence  Decreased strength - therapeutic exercise, therapeutic activities and home program  Impaired muscle performance - biofeedback, neuro re-education and home program  Progress toward STG/LTGs have been made:  Yes,   Assessment of Progress: The patient's condition is improving.  Self Management Plans:  Patient has been instructed in a home treatment program.  I have re-evaluated this patient and find that the nature, scope, duration and intensity of the therapy is appropriate for the medical condition of the patient.  Juwan continues to require the following intervention to meet STG and LT's:  PT    Recommendations:  Pt has not returned for treatment since 3-27-19. Pt discharged at this time.      Please refer to the daily flowsheet for treatment today, total treatment time and time spent performing 1:1 timed codes.

## 2019-08-08 ENCOUNTER — TRANSFERRED RECORDS (OUTPATIENT)
Dept: HEALTH INFORMATION MANAGEMENT | Facility: CLINIC | Age: 69
End: 2019-08-08

## 2019-08-08 DIAGNOSIS — C61 PROSTATE CANCER (H): Primary | ICD-10-CM

## 2019-08-08 DIAGNOSIS — R53.82 CHRONIC FATIGUE: ICD-10-CM

## 2019-08-12 ENCOUNTER — APPOINTMENT (OUTPATIENT)
Dept: GENERAL RADIOLOGY | Facility: CLINIC | Age: 69
End: 2019-08-12
Attending: EMERGENCY MEDICINE
Payer: COMMERCIAL

## 2019-08-12 ENCOUNTER — TRANSFERRED RECORDS (OUTPATIENT)
Dept: HEALTH INFORMATION MANAGEMENT | Facility: CLINIC | Age: 69
End: 2019-08-12

## 2019-08-12 ENCOUNTER — NURSE TRIAGE (OUTPATIENT)
Dept: FAMILY MEDICINE | Facility: CLINIC | Age: 69
End: 2019-08-12

## 2019-08-12 ENCOUNTER — HOSPITAL ENCOUNTER (EMERGENCY)
Facility: CLINIC | Age: 69
Discharge: HOME | End: 2019-08-12
Attending: EMERGENCY MEDICINE | Admitting: EMERGENCY MEDICINE
Payer: COMMERCIAL

## 2019-08-12 VITALS
RESPIRATION RATE: 16 BRPM | DIASTOLIC BLOOD PRESSURE: 83 MMHG | TEMPERATURE: 98.4 F | OXYGEN SATURATION: 98 % | WEIGHT: 200 LBS | SYSTOLIC BLOOD PRESSURE: 144 MMHG | HEART RATE: 62 BPM | HEIGHT: 68 IN | BODY MASS INDEX: 30.31 KG/M2

## 2019-08-12 DIAGNOSIS — R07.9 CHEST PAIN, UNSPECIFIED TYPE: ICD-10-CM

## 2019-08-12 DIAGNOSIS — R11.0 NAUSEA: ICD-10-CM

## 2019-08-12 LAB
ALBUMIN SERPL-MCNC: 3.9 G/DL (ref 3.4–5)
ALP SERPL-CCNC: 66 U/L (ref 40–150)
ALT SERPL W P-5'-P-CCNC: 84 U/L (ref 0–70)
ANION GAP SERPL CALCULATED.3IONS-SCNC: 5 MMOL/L (ref 3–14)
AST SERPL W P-5'-P-CCNC: 50 U/L (ref 0–45)
BASOPHILS # BLD AUTO: 0 10E9/L (ref 0–0.2)
BASOPHILS NFR BLD AUTO: 0.4 %
BILIRUB DIRECT SERPL-MCNC: 0.2 MG/DL (ref 0–0.2)
BILIRUB SERPL-MCNC: 0.6 MG/DL (ref 0.2–1.3)
BUN SERPL-MCNC: 14 MG/DL (ref 7–30)
CALCIUM SERPL-MCNC: 9.1 MG/DL (ref 8.5–10.1)
CHLORIDE SERPL-SCNC: 106 MMOL/L (ref 94–109)
CO2 SERPL-SCNC: 29 MMOL/L (ref 20–32)
CREAT SERPL-MCNC: 0.76 MG/DL (ref 0.66–1.25)
D DIMER PPP FEU-MCNC: 0.4 UG/ML FEU (ref 0–0.5)
DIFFERENTIAL METHOD BLD: ABNORMAL
EOSINOPHIL # BLD AUTO: 0.1 10E9/L (ref 0–0.7)
EOSINOPHIL NFR BLD AUTO: 2.2 %
ERYTHROCYTE [DISTWIDTH] IN BLOOD BY AUTOMATED COUNT: 13.1 % (ref 10–15)
GFR SERPL CREATININE-BSD FRML MDRD: >90 ML/MIN/{1.73_M2}
GLUCOSE SERPL-MCNC: 97 MG/DL (ref 70–99)
HCT VFR BLD AUTO: 47.3 % (ref 40–53)
HGB BLD-MCNC: 15.9 G/DL (ref 13.3–17.7)
IMM GRANULOCYTES # BLD: 0 10E9/L (ref 0–0.4)
IMM GRANULOCYTES NFR BLD: 0.9 %
LIPASE SERPL-CCNC: 199 U/L (ref 73–393)
LYMPHOCYTES # BLD AUTO: 0.6 10E9/L (ref 0.8–5.3)
LYMPHOCYTES NFR BLD AUTO: 14.1 %
MCH RBC QN AUTO: 32.1 PG (ref 26.5–33)
MCHC RBC AUTO-ENTMCNC: 33.6 G/DL (ref 31.5–36.5)
MCV RBC AUTO: 95 FL (ref 78–100)
MONOCYTES # BLD AUTO: 0.5 10E9/L (ref 0–1.3)
MONOCYTES NFR BLD AUTO: 11.9 %
NEUTROPHILS # BLD AUTO: 3.2 10E9/L (ref 1.6–8.3)
NEUTROPHILS NFR BLD AUTO: 70.5 %
NRBC # BLD AUTO: 0 10*3/UL
NRBC BLD AUTO-RTO: 0 /100
PLATELET # BLD AUTO: 166 10E9/L (ref 150–450)
POTASSIUM SERPL-SCNC: 4.1 MMOL/L (ref 3.4–5.3)
PROT SERPL-MCNC: 7.3 G/DL (ref 6.8–8.8)
RBC # BLD AUTO: 4.96 10E12/L (ref 4.4–5.9)
SODIUM SERPL-SCNC: 140 MMOL/L (ref 133–144)
TROPONIN I SERPL-MCNC: <0.015 UG/L (ref 0–0.04)
TROPONIN I SERPL-MCNC: <0.015 UG/L (ref 0–0.04)
WBC # BLD AUTO: 4.5 10E9/L (ref 4–11)

## 2019-08-12 PROCEDURE — 80048 BASIC METABOLIC PNL TOTAL CA: CPT | Performed by: EMERGENCY MEDICINE

## 2019-08-12 PROCEDURE — 36415 COLL VENOUS BLD VENIPUNCTURE: CPT | Performed by: EMERGENCY MEDICINE

## 2019-08-12 PROCEDURE — 96374 THER/PROPH/DIAG INJ IV PUSH: CPT

## 2019-08-12 PROCEDURE — 85025 COMPLETE CBC W/AUTO DIFF WBC: CPT | Performed by: EMERGENCY MEDICINE

## 2019-08-12 PROCEDURE — 80076 HEPATIC FUNCTION PANEL: CPT | Performed by: EMERGENCY MEDICINE

## 2019-08-12 PROCEDURE — 25000132 ZZH RX MED GY IP 250 OP 250 PS 637: Mod: GY | Performed by: EMERGENCY MEDICINE

## 2019-08-12 PROCEDURE — 71046 X-RAY EXAM CHEST 2 VIEWS: CPT

## 2019-08-12 PROCEDURE — 99285 EMERGENCY DEPT VISIT HI MDM: CPT | Mod: 25

## 2019-08-12 PROCEDURE — 83690 ASSAY OF LIPASE: CPT | Performed by: EMERGENCY MEDICINE

## 2019-08-12 PROCEDURE — 84484 ASSAY OF TROPONIN QUANT: CPT | Mod: 91 | Performed by: EMERGENCY MEDICINE

## 2019-08-12 PROCEDURE — 25000128 H RX IP 250 OP 636: Performed by: EMERGENCY MEDICINE

## 2019-08-12 PROCEDURE — 85379 FIBRIN DEGRADATION QUANT: CPT | Performed by: EMERGENCY MEDICINE

## 2019-08-12 PROCEDURE — 93005 ELECTROCARDIOGRAM TRACING: CPT

## 2019-08-12 RX ORDER — ONDANSETRON 4 MG/1
4 TABLET, ORALLY DISINTEGRATING ORAL EVERY 8 HOURS PRN
Qty: 10 TABLET | Refills: 0 | Status: SHIPPED | OUTPATIENT
Start: 2019-08-12 | End: 2019-12-05

## 2019-08-12 RX ORDER — ONDANSETRON 2 MG/ML
4 INJECTION INTRAMUSCULAR; INTRAVENOUS EVERY 30 MIN PRN
Status: DISCONTINUED | OUTPATIENT
Start: 2019-08-12 | End: 2019-08-12 | Stop reason: HOSPADM

## 2019-08-12 RX ORDER — ACETAMINOPHEN 325 MG/1
650 TABLET ORAL ONCE
Status: COMPLETED | OUTPATIENT
Start: 2019-08-12 | End: 2019-08-12

## 2019-08-12 RX ADMIN — ACETAMINOPHEN 650 MG: 325 TABLET, FILM COATED ORAL at 19:56

## 2019-08-12 RX ADMIN — ONDANSETRON HYDROCHLORIDE 4 MG: 2 INJECTION, SOLUTION INTRAMUSCULAR; INTRAVENOUS at 18:48

## 2019-08-12 ASSESSMENT — ENCOUNTER SYMPTOMS
ABDOMINAL PAIN: 0
COUGH: 0
FEVER: 0
SHORTNESS OF BREATH: 0
NAUSEA: 1
VOMITING: 0

## 2019-08-12 ASSESSMENT — MIFFLIN-ST. JEOR: SCORE: 1656.69

## 2019-08-12 NOTE — ED TRIAGE NOTES
Chest pain. Three weeks. Also with nausea. Normal EKG at [facility]. Sent here for further evaluation.     Denies shortness of breath, diaphoresis,

## 2019-08-12 NOTE — ED AVS SNAPSHOT
[facility] Emergency Department  [address]  [facility] [address]  Phone:  [phone]  Fax:  [phone]                                    Juwan Latham   MRN: [id number]    Department:  [facility] Emergency Department   Date of Visit:  8/12/2019           After Visit Summary Signature Page    I have received my discharge instructions, and my questions have been answered. I have discussed any challenges I see with this plan with the nurse or doctor.    ..........................................................................................................................................  Patient/Patient Representative Signature      ..........................................................................................................................................  Patient Representative Print Name and Relationship to Patient    ..................................................               ................................................  Date                                   Time    ..........................................................................................................................................  Reviewed by Signature/Title    ...................................................              ..............................................  Date                                               Time          22EPIC Rev 08/18

## 2019-08-12 NOTE — TELEPHONE ENCOUNTER
Please read triage questions below. Patient calling with chest discomfort, not pain. Started intermittently 3 weeks ago and has become constant. Patient describes it as discomfort/ache over sharp pain. Patient also has nausea that has been consistent for 3 weeks as well. Patient denies all other symptoms listed below in triage assessment (please review)     Past surgeries over 2 years ago: Patient has lung cancer and has surgery related to a malignant neoplasm, patient also had prostate surgery, no surgeries since.     Since patient has chest discomfort/ache that is new and has never had this before along with constant nausea the past 3 weeks advised to seek more urgent care. The discomfort is constant and started intermittent, but persistent/constant and has been increasing in severity. Advised that patient go to an urgent care to be evaluated. Patient/parent verbalized understanding and agrees with plan.    Patient will be going to urgent care in [place] since he lives in [place], [place].        Additional Information    Negative: Severe difficulty breathing (e.g., struggling for each breath, speaks in single words)    Negative: Passed out (i.e., fainted, collapsed and was not responding)    Negative: Visible sweat on face or sweat dripping down face    Negative: Sounds like a life-threatening emergency to the triager    Negative: SEVERE chest pain    Negative: Chest pain lasting longer than 5 minutes and ANY of the following:* Over 50 years old* Over 30 years old and at least one cardiac risk factor (i.e., high blood pressure, diabetes, high cholesterol, obesity, smoker or strong family history of heart disease)* Pain is crushing, pressure-like, or heavy * Took nitroglycerin and chest pain was not relieved* History of heart disease (i.e., angina, heart attack, bypass surgery, angioplasty, CHF)     More ache, discomfort, no pressure noted    Negative: Pain also present in shoulder(s) or arm(s) or jaw    Negative:  "Followed an injury to chest    Negative: Difficulty breathing    Negative: Cocaine use within last 3 days    Negative: History of prior 'blood clot' in leg or lungs (i.e., deep vein thrombosis, pulmonary embolism)    Negative: Recent illness requiring prolonged bed rest (i.e., immobilization)    Negative: Hip or leg fracture in past 2 months (e.g, or had cast on leg or ankle)    Negative: Major surgery in the past month    Negative: Recent long-distance travel with prolonged time in car, bus, plane, or train (i.e., within past 2 weeks; 6 or more hours duration)    Negative: Heart beating irregularly or very rapidly    Negative: Dizziness or lightheadedness    Negative: Coughing up blood    Chest pain lasting longer than 5 minutes     Discomfort/ache is new and has not occurred before. Discomfort is constant and started intermittent, but persistent/constant and has been increasing in severity.    Answer Assessment - Initial Assessment Questions  1. LOCATION: \"Where does it hurt?\"        Centered and too the right side of chest.   2. RADIATION: \"Does the pain go anywhere else?\" (e.g., into neck, jaw, arms, back)      none  3. ONSET: \"When did the chest pain begin?\" (Minutes, hours or days)       3 weeks. Was intermittent and now constant.    4. PATTERN \"Does the pain come and go, or has it been constant since it started?\"  \"Does it get worse with exertion?\"       Constant now. Not worse when active. When laying down can feel the discomfort.  5. DURATION: \"How long does it last\" (e.g., seconds, minutes, hours)      Still constant discomfort. Patient has acid reflux.   6. SEVERITY: \"How bad is the pain?\"  (e.g., Scale 1-10; mild, moderate, or severe)     - MILD (1-3): doesn't interfere with normal activities      - MODERATE (4-7): interferes with normal activities or awakens from sleep     - SEVERE (8-10): excruciating pain, unable to do any normal activities        Mild discomfort.  7. CARDIAC RISK FACTORS: \"Do you " "have any history of heart problems or risk factors for heart disease?\" (e.g., prior heart attack, angina; high blood pressure, diabetes, being overweight, high cholesterol, smoking, or strong family history of heart disease)      HTN, quit smoking in 1986. Denies other history. Mother's side has heart disease.   8. PULMONARY RISK FACTORS: \"Do you have any history of lung disease?\"  (e.g., blood clots in lung, asthma, emphysema, birth control pills)      Diagnosed with lung cancer 3 years ago. Denies all other things, blood clots, asthma etc.  9. CAUSE: \"What do you think is causing the chest pain?\"      unknown  10. OTHER SYMPTOMS: \"Do you have any other symptoms?\" (e.g., dizziness, nausea, vomiting, sweating, fever, difficulty breathing, cough)        Nauseated over the last 3 weeks. Denies SOB, dizziness, vomiting, sweating, cough.   11. PREGNANCY: \"Is there any chance you are pregnant?\" \"When was your last menstrual period?\"        none    Protocols used: CHEST PAIN-A-OH    Alisha Lopez RN, BSN  [facility]    "

## 2019-08-12 NOTE — ED PROVIDER NOTES
"  History     Chief Complaint:  Chest Pain    HPI   Juwan Latham is a 68 year old male with a history of HTN, lung cancer, prostate cancer, GERD who presents to the emergency department for evaluation of chest pain. The patient reports he has experienced around 3 weeks of constant chest pain, just to the right of his sternum. He states the pain has no exacerbating or alleviating factors. He further reports nausea accompanying his pain, but he denies any fever, cough, shortness of breath, hemoptysis, abdominal pain, or vomit, as well as any history of DVT, PE, or MI. He notes he has right leg lymphedema, present since fall of 2018.    Patient denies immobilization for >3 days or surgery within the previous 4 weeks, history of DVT or PE, hemoptysis, malignancy with treatment within previous 6 months or palliative therapy, or exogenous estrogen use.     Allergies:  NKDA     Medications:    Cozaar  Paxil  Revatio     Past Medical History:    Anxiety  Arthritis  Congenital single kidney  Calculus of kidney  GERD  Lung cancer  HTN  Prostate cancer  Sleep apnea  Depression    Past Surgical History:    Bronchoscopy flexible  Colonoscopy  DaVinci prostatectomy  EGD combined  Wedge resection lung  Laser Holmium lithotripsy ureter, insert stent    Family History:    Heart disease  DM2  Cancer, colorectal    Social History:  Presents with sister.  Former smoker, 40 pack years, quit 1985.  Positive for alcohol use.   Marital Status:   [4]     Review of Systems   Constitutional: Negative for fever.   Respiratory: Negative for cough and shortness of breath.    Cardiovascular: Positive for chest pain.   Gastrointestinal: Positive for nausea. Negative for abdominal pain and vomiting.   All other systems reviewed and are negative.    Physical Exam     Patient Vitals for the past 24 hrs:   BP Temp Temp src Heart Rate Resp SpO2 Height Weight   08/12/19 1515 (!) 148/90 98.4  F (36.9  C) Temporal 65 16 99 % 1.727 m (5' 8\") " 90.7 kg (200 lb)     Physical Exam  VS: Reviewed per above  HENT: Mucous membranes moist  EYES: sclera anicteric  CV: Rate as noted, regular rhythm.   RESP: Effort normal. Breath sounds are normal bilaterally.  GI: no tenderness/rebound/guarding, not distended.  NEURO: Alert, moving all extremities  MSK: No deformity of the extremities, Right leg lymphedema  SKIN: Warm and dry    Emergency Department Course   ECG:  Time: 1522  Vent. Rate 63 bpm. DE interval 154. QRS duration 88. QT/QTc 386/395. P-R-T axis 0 4 78.  Normal sinus rhythm.  Normal ECG.  T-wave in lead III is now upright compared to EKG dated 6/24/18.  Read time: 1604    Imaging:  Radiographic findings were communicated with the patient who voiced understanding of the findings.    XR Chest, 2 Views:  Postoperative changes and linear fibrosis right lung apex medially stable. No new infiltrate. No pleurla effusion. Heart size and pulmonary vascularity are normal. As per radiology.    Laboratory:  CBC: WBC: 4.5, HGB: 15.9, PLT: 166  BMP: all WNL (Creatinine: 0.76)  Hepatic panel: ALT 84 (H), AST 50 (H), o/w WNL    D dimer: 0.4    Lipase: 199    1609 Troponin I: <0.015  1829 Troponin I: <0.015    Interventions:  1848 Zofran 4 mg IV    Emergency Department Course:  Nursing notes and vitals reviewed. 1608 I performed an exam of the patient as documented above.     EKG obtained in the ED, see results above.     IV inserted. Medicine administered as documented above. Blood drawn. This was sent to the lab for further testing, results above.    The patient was sent for a XR Chest while in the emergency department, findings above.     1915 I rechecked the patient and discussed the results of his workup thus far.     1950 I rechecked and updated the patient.    Findings and plan explained to the Patient. Patient discharged home with instructions regarding supportive care, medications, and reasons to return. The importance of close follow-up was reviewed.    I  personally reviewed the laboratory results with the Patient and answered all related questions prior to discharge.    Impression & Plan      Medical Decision Making:  Juwan Latham presented to the ER with chest pain. A broad workup did not reveal a specific cause of the patient's pain. CXR did not reveal wide mediastinum and nature of pain not c/w aortic dissection. No radiographic evidence of pneumothorax nor PNA either. Hx and lack of pneumomediastinum on CXR make boerhaave's syndrome unlikely. Based on Well's and negative ddimer, PE was also deemed unlikely. ECG did not show signs of STEMI nor other specific signs of ischemia. Serial troponin testing was negative, thus no signs of acute MI. Outpatient stress echo was ordered. Plan for clinic f/u. Close return precautions discussed.     Diagnosis:    ICD-10-CM   1. Chest pain, unspecified type R07.9       Disposition:  discharged to home    Ricky MCMILLAN am serving as a scribe on 8/12/2019 at 4:02 PM to personally document services performed by Benton Iyer MD based on my observations and the provider's statements to me.     Ricky Juan  8/12/2019   [facility] EMERGENCY DEPARTMENT       Benton Iyer MD  08/12/19 0847

## 2019-08-13 LAB — INTERPRETATION ECG - MUSE: NORMAL

## 2019-08-15 ENCOUNTER — HOSPITAL ENCOUNTER (OUTPATIENT)
Dept: CARDIOLOGY | Facility: CLINIC | Age: 69
Discharge: HOME | End: 2019-08-16
Attending: EMERGENCY MEDICINE | Admitting: EMERGENCY MEDICINE
Payer: COMMERCIAL

## 2019-08-15 DIAGNOSIS — R07.9 CHEST PAIN, UNSPECIFIED TYPE: ICD-10-CM

## 2019-08-15 PROCEDURE — 93016 CV STRESS TEST SUPVJ ONLY: CPT | Performed by: INTERNAL MEDICINE

## 2019-08-15 PROCEDURE — 93321 DOPPLER ECHO F-UP/LMTD STD: CPT | Mod: 26 | Performed by: INTERNAL MEDICINE

## 2019-08-15 PROCEDURE — 93018 CV STRESS TEST I&R ONLY: CPT | Performed by: INTERNAL MEDICINE

## 2019-08-15 PROCEDURE — 93350 STRESS TTE ONLY: CPT | Mod: 26 | Performed by: INTERNAL MEDICINE

## 2019-08-15 PROCEDURE — 40000264 ECHO STRESS ECHOCARDIOGRAM

## 2019-08-15 PROCEDURE — 93325 DOPPLER ECHO COLOR FLOW MAPG: CPT | Mod: 26 | Performed by: INTERNAL MEDICINE

## 2019-08-16 PROCEDURE — 25500064 ZZH RX 255 OP 636: Performed by: INTERNAL MEDICINE

## 2019-08-16 RX ADMIN — HUMAN ALBUMIN MICROSPHERES AND PERFLUTREN 6 ML: 10; .22 INJECTION, SOLUTION INTRAVENOUS at 13:00

## 2019-08-23 ENCOUNTER — TRANSFERRED RECORDS (OUTPATIENT)
Dept: HEALTH INFORMATION MANAGEMENT | Facility: CLINIC | Age: 69
End: 2019-08-23

## 2019-08-26 ENCOUNTER — OFFICE VISIT (OUTPATIENT)
Dept: FAMILY MEDICINE | Facility: CLINIC | Age: 69
End: 2019-08-26
Payer: COMMERCIAL

## 2019-08-26 VITALS — SYSTOLIC BLOOD PRESSURE: 128 MMHG | DIASTOLIC BLOOD PRESSURE: 78 MMHG

## 2019-08-26 DIAGNOSIS — D48.5 NEOPLASM OF UNCERTAIN BEHAVIOR OF SKIN: ICD-10-CM

## 2019-08-26 DIAGNOSIS — I78.1 FACIAL TELANGIECTASIA: ICD-10-CM

## 2019-08-26 DIAGNOSIS — L82.1 SEBORRHEIC KERATOSES: ICD-10-CM

## 2019-08-26 DIAGNOSIS — L73.9 FOLLICULITIS: Primary | ICD-10-CM

## 2019-08-26 DIAGNOSIS — D22.30 COMPOUND NEVUS OF FACE: ICD-10-CM

## 2019-08-26 PROCEDURE — 88305 TISSUE EXAM BY PATHOLOGIST: CPT | Mod: TC | Performed by: FAMILY MEDICINE

## 2019-08-26 PROCEDURE — 99213 OFFICE O/P EST LOW 20 MIN: CPT | Mod: 25 | Performed by: FAMILY MEDICINE

## 2019-08-26 PROCEDURE — 11301 SHAVE SKIN LESION 0.6-1.0 CM: CPT | Performed by: FAMILY MEDICINE

## 2019-08-26 PROCEDURE — 87070 CULTURE OTHR SPECIMN AEROBIC: CPT | Performed by: FAMILY MEDICINE

## 2019-08-26 RX ORDER — DOXYCYCLINE 100 MG/1
100 CAPSULE ORAL 2 TIMES DAILY
Qty: 42 CAPSULE | Refills: 0 | Status: SHIPPED | OUTPATIENT
Start: 2019-08-26 | End: 2019-12-05

## 2019-08-26 NOTE — PROGRESS NOTES
[facility] - PRIMARY CARE SKIN    CC: scalp dryness  SUBJECTIVE:   Juwan Latham is a(n) 68 year old male who presents to clinic today because of scalp dryness.    Dry spots on the scalp began on the top of the scalp 3-4 months ago, but it is beginning to go away. Skin peeling and itchiness were noted, but he denies any pain. He has not noticed any scaling on the face nor around the nose. He typically shaves his scalp with use of a shaving cream.    Red spots on the face are of uncertain duration.    He also requests evaluation of spots on the upper trunk.    Personal Medical History  Skin Cancer: NO  Eczema Psoriasis Autoimmune   NO NO NO     Family Medical History  Skin Cancer: NO  Eczema Psoriasis Autoimmune   NO NO NO     Occupation: construction (both indoor & outdoor).    Refer to electronic medical record (EMR) for past medical history and medications.    INTEGUMENTARY/SKIN: POSITIVE for changing lesions  ROS: 14 point review of systems was negative except the symptoms listed above in the HPI.    This document serves as a record of the services and decisions personally performed and made by Maria M Mccormick MD and was created by Damon Barney, a trained medical scribe, based on personal observations and provider statements to the medical scribe.  August 26, 2019 11:51 AM   Damon Barney    OBJECTIVE:   GENERAL: healthy, alert and no distress.  SKIN: [place] Skin Type - II.  Scalp, Face, Neck, Trunk, Arms examined. The dermatoscope was used to help evaluate pigmented lesions.  Skin Pertinent Findings:  Mid-frontal scalp extending to anterior frontal scalp: scattered small pustules, inflammatory papules.    Scattered telangiectasias on the nose.    Face: Scattered benign compound nevi varying in size    Back: stuck-on appearing papules, raised, brown, coarse-textured, round lesion(s) most consistent with seborrheic keratoses    Back, 15 cm left of T10: 7 mm x 4 mm in size irregularly pigmented brown  macule. ? Atypical nevus ? Other       Diagnostic Test Results:  Wound culture pending.    ASSESSMENT:     Encounter Diagnoses   Name Primary?     Folliculitis Yes     Facial telangiectasia      Compound nevus of face      Seborrheic keratoses          PLAN:   Patient Instructions   FUTURE APPOINTMENTS  Follow up in 3-4 weeks as needed if symptoms are not resolving.    ORAL ANTIBIOTIC  Take by mouth 1 capsule/tablet of doxycycline 100 mg two time(s) a day for 3 weeks. Make sure to complete the entire antibiotic regimen as instructed to prevent development of bacterial resistance to antibiotics.    TETRACYCLINE ANTIBIOTIC INFORMATION  Minocycline and doxycycline are tetracycline antibiotics and can increase your sun sensitivity, so make sure to be vigilant in regularly applying sunscreen. Also, avoid taking these with dairy products, as calcium can bind the antibiotic, making it unavailable to your body.    SHAVING INSTRUCTIONS    Shave with just a single bladed razor.    Use an unscented shaving cream for sensitive skin when shaving.    Shave in the direction of hair growth.    SHAMPOO INSTRUCTIONS  Use one of these OTC (over-the-counter) anti-dandruff shampoos for the next 2 weeks.    Consider alternating use of shampoos with different active ingredients every 4 week(s) (active ingredients underlined):  Coal Tar shampoos : Neutrogena T/Gel,  Medicated Conditioning Coal Tar Formula Shampoo, Denorex, DHS Tar, Ionil-T, Tegrin, X-Seb T, Zetar  Zinc Pyrithione shampoos : Head & Shoulders, Denorex Advance Formula, DHS Zinc, Zincon  Salicylic Acid shampoos : Neutrogena T/Sal, Rugby Sebex Shampoo, Dermarest Psoriasis Medicated Shampoo Plus Conditioner, DHS Sal, Ionil, P&S, Sebulex, X-Seb  Selenium Sulfide shampoos : Selsun Blue shampoo  Sulfur shampoos : Sebulex  Ketoconazole : Nizoral 1%    TT: 20 minutes.  CT: 15 minutes.    The information in this document, created by the medical scribe for me, accurately reflects  the services I personally performed and the decisions made by me. I have reviewed and approved this document for accuracy prior to leaving the patient care area.  August 26, 2019 11:51 AM  Maria M Mccormick MD  [facility] [place]

## 2019-08-26 NOTE — PROCEDURES
Name : Shave Excision  Indication : Excision of tissue for pathology evaluation.  Location(s) : Back, 15 cm left of T10: 7 mm x 4 mm in size irregularly pigmented brown macule. ? Atypical nevus ? Other  Completed by : Migdalia Mccormick MD  Photo Taken : yes.  Anesthesia : Patient was anesthetized by infiltrating the area surrounding the lesion with 1% lidocaine.   epinephrine [id number] : Yes.  Note : Discussed the risk of pain, infection, scarring, hypo- or hyperpigmentation and recurrence or need for re-treatment. The benefits of treatment and alternative treatments were also discussed.    During this procedure, the universal protocol was utilized. The patient's identity was confirmed by no less than two patient identifiers, correct procedure was verified, correct site was verified and marked as applicable and a final pause was completed.    Sterile technique was used throughout the procedure. The skin was cleaned and prepped with surgical cleanser. Once adequate anesthesia was obtained, the lesion was removed with a deep scallop shave procedure. The specimen was sent to pathology.    Direct pressure and aluminum chloride and monopolar cautery was applied for hemostasis. No bleeding was present upon the completion of the procedure. The wound was coated with antibacterial ointment. A dry sterile dressing was applied. Patient tolerated the procedure well and left in satisfactory condition.    Primary provider and referring provider will be informed regarding the tissue report when it returns.

## 2019-08-26 NOTE — LETTER
8/26/2019         RE: Juawn Latham  [address]  [place] [address]        Dear Colleague,    Thank you for referring your patient, Juwan Latham, to the [facility]. Please see a copy of my visit note below.    [facility] - PRIMARY CARE SKIN    CC: scalp dryness  SUBJECTIVE:   Juwan Latham is a(n) 68 year old male who presents to clinic today because of scalp dryness.    Dry spots on the scalp began on the top of the scalp 3-4 months ago, but it is beginning to go away. Skin peeling and itchiness were noted, but he denies any pain. He has not noticed any scaling on the face nor around the nose. He typically shaves his scalp with use of a shaving cream.    Red spots on the face are of uncertain duration.    He also requests evaluation of spots on the upper trunk.    Personal Medical History  Skin Cancer: NO  Eczema Psoriasis Autoimmune   NO NO NO     Family Medical History  Skin Cancer: NO  Eczema Psoriasis Autoimmune   NO NO NO     Occupation: construction (both indoor & outdoor).    Refer to electronic medical record (EMR) for past medical history and medications.    INTEGUMENTARY/SKIN: POSITIVE for changing lesions  ROS: 14 point review of systems was negative except the symptoms listed above in the HPI.    This document serves as a record of the services and decisions personally performed and made by Maria M Mccormick MD and was created by Damon Barney, a trained medical scribe, based on personal observations and provider statements to the medical scribe.  August 26, 2019 11:51 AM   Damon Barney    OBJECTIVE:   GENERAL: healthy, alert and no distress.  SKIN: Azevedo Skin Type - II.  Scalp, Face, Neck, Trunk, Arms examined. The dermatoscope was used to help evaluate pigmented lesions.  Skin Pertinent Findings:  Mid-frontal scalp extending to anterior frontal scalp: scattered small pustules, inflammatory papules.    Scattered telangiectasias on the nose.    Face:  Scattered benign compound nevi varying in size    Back: stuck-on appearing papules, raised, brown, coarse-textured, round lesion(s) most consistent with seborrheic keratoses    Back, 15 cm left of T10: 7 mm x 4 mm in size irregularly pigmented brown macule. ? Atypical nevus ? Other       Diagnostic Test Results:  Wound culture pending.    ASSESSMENT:     Encounter Diagnoses   Name Primary?     Folliculitis Yes     Facial telangiectasia      Compound nevus of face      Seborrheic keratoses          PLAN:   Patient Instructions   FUTURE APPOINTMENTS  Follow up in 3-4 weeks as needed if symptoms are not resolving.    ORAL ANTIBIOTIC  Take by mouth 1 capsule/tablet of doxycycline 100 mg two time(s) a day for 3 weeks. Make sure to complete the entire antibiotic regimen as instructed to prevent development of bacterial resistance to antibiotics.    TETRACYCLINE ANTIBIOTIC INFORMATION  Minocycline and doxycycline are tetracycline antibiotics and can increase your sun sensitivity, so make sure to be vigilant in regularly applying sunscreen. Also, avoid taking these with dairy products, as calcium can bind the antibiotic, making it unavailable to your body.    SHAVING INSTRUCTIONS    Shave with just a single bladed razor.    Use an unscented shaving cream for sensitive skin when shaving.    Shave in the direction of hair growth.    SHAMPOO INSTRUCTIONS  Use one of these OTC (over-the-counter) anti-dandruff shampoos for the next 2 weeks.    Consider alternating use of shampoos with different active ingredients every 4 week(s) (active ingredients underlined):  Coal Tar shampoos : Neutrogena T/Gel,  Medicated Conditioning Coal Tar Formula Shampoo, Denorex, DHS Tar, Ionil-T, Tegrin, X-Seb T, Zetar  Zinc Pyrithione shampoos : Head & Shoulders, Denorex Advance Formula, DHS Zinc, Zincon  Salicylic Acid shampoos : Neutrogena T/Sal, Rugby Sebex Shampoo, Dermarest Psoriasis Medicated Shampoo Plus Conditioner, DHS Dustin, Ionil, P&S,  Sebulex, X-Seb  Selenium Sulfide shampoos : Selsun Blue shampoo  Sulfur shampoos : Sebulex  Ketoconazole : Nizoral 1%    TT: 20 minutes.  CT: 15 minutes.    The information in this document, created by the medical scribe for me, accurately reflects the services I personally performed and the decisions made by me. I have reviewed and approved this document for accuracy prior to leaving the patient care area.  August 26, 2019 11:51 AM  Maria M Mccormick MD  [facility]    Again, thank you for allowing me to participate in the care of your patient.        Sincerely,        Maria M Mccormick MD

## 2019-08-26 NOTE — PATIENT INSTRUCTIONS
FUTURE APPOINTMENTS  Follow up in 3-4 weeks as needed if symptoms are not resolving.  Follow up per pathology report.     ORAL ANTIBIOTIC  Take by mouth 1 capsule/tablet of doxycycline 100 mg two time(s) a day for 3 weeks. Make sure to complete the entire antibiotic regimen as instructed to prevent development of bacterial resistance to antibiotics.    TETRACYCLINE ANTIBIOTIC INFORMATION  Minocycline and doxycycline are tetracycline antibiotics and can increase your sun sensitivity, so make sure to be vigilant in regularly applying sunscreen. Also, avoid taking these with dairy products, as calcium can bind the antibiotic, making it unavailable to your body.    SHAVING INSTRUCTIONS    Shave with just a single bladed razor.    Use an unscented shaving cream for sensitive skin when shaving.    Shave in the direction of hair growth.    SHAMPOO INSTRUCTIONS  Use one of these OTC (over-the-counter) anti-dandruff shampoos for the next 2 weeks.    Consider alternating use of shampoos with different active ingredients every 4 week(s) (active ingredients underlined):  Coal Tar shampoos : Neutrogena T/Gel,  Medicated Conditioning Coal Tar Formula Shampoo, Denorex, DHS Tar, Ionil-T, Tegrin, X-Seb T, Zetar  Zinc Pyrithione shampoos : Head & Shoulders, Denorex Advance Formula, DHS Zinc, Zincon  Salicylic Acid shampoos : Neutrogena T/Sal, Rugby Sebex Shampoo, Dermarest Psoriasis Medicated Shampoo Plus Conditioner, DHS Sal, Ionil, P&S, Sebulex, X-Seb  Selenium Sulfide shampoos : Selsun Blue shampoo  Sulfur shampoos : Sebulex  Ketoconazole : Nizoral 1%    WOUND CARE INSTRUCTIONS  1. Wash hands before every dressing change.  2. After 24 hours, change dressing daily.  3. Wash the wound area with a mild soap, then rinse.  4. Gently pat dry with a sterile gauze or Q-tip.  5. Using a Q-tip, apply Vaseline or Aquaphor only over entire wound. Do NOT use Neosporin - as many people react to neomycin.  6. Finally, cover with a bandage  "or sterile non-stick gauze with micropore paper tape.  7. Repeat once daily until wound has healed.      Soap, water and shampoo will not hurt this area.    Do not go swimming or take baths, but showering is encouraged.    Limit use of the area where the procedure was done for a few days to allow for optimal healing.    If you experience bleeding:  Wash hands and hold firm pressure on the area for 10 minutes without checking to see if the bleeding has stopped. \"Checking\" pulls off the protective wound clot and restarts the bleeding all over again. Re-apply pressure for 10 minutes if necessary to stop bleeding.  Use additional sterile gauze and tape to maintain pressure once bleeding has stopped.  If bleeding continues, then call back to clinic at [phone].    Signs of Infection:  Infection can occur in any area where skin has been disrupted.  If you notice persistent redness, swelling, colored drainage, increasing pain, fever or other signs of infection, please call us at: [phone] and ask to have me or my colleague paged. We will call you back to discuss.    Pathology Results:  You will be notified, generally via letter or MyChart, in approximately 10 days. If there is anything we need to discuss or further treatment needed, I will call you to discuss it.    PATIENT INFORMATION : WOUNDS  During the healing process you will notice a number of changes. All wounds develop a small halo of redness surrounding the wound.  This means healing is occurring. Severe itching with extensive redness usually indicates sensitivity to the ointment or bandage tape used to dress the wound.  You should call our office if this develops.      Swelling  and/or discoloration around your surgical site is common, particularly when performed around the eye.    All wounds normally drain.  The larger the wound the more drainage there will be.  After 7-10 days, you will notice the wound beginning to shrink and new skin will begin to " grow.  The wound is healed when you can see skin has formed over the entire area.  A healed wound has a healthy, shiny look to the surface and is red to dark pink in color to normalize.  Wounds may take approximately 4-6 weeks to heal.  Larger wounds may take 6-8 weeks. After the wound is healed you may discontinue dressing changes.    You may experience a sensation of tightness as your wound heals. This is normal and will gradually subside.    Your healed wound may be sensitive to temperature changes. This sensitivity improves with time, but if you re having a lot of discomfort, try to avoid temperature extremes.    Patients frequently experience itching after their wound appears to have healed because of the continue healing under the skin.  Plain Vaseline will help relieve the itching.

## 2019-08-28 ENCOUNTER — TELEPHONE (OUTPATIENT)
Dept: FAMILY MEDICINE | Facility: CLINIC | Age: 69
End: 2019-08-28

## 2019-08-28 LAB
BACTERIA SPEC CULT: NORMAL
COPATH REPORT: NORMAL
Lab: NORMAL
SPECIMEN SOURCE: NORMAL

## 2019-08-28 NOTE — TELEPHONE ENCOUNTER
----- Message from Maria M Mccormick MD sent at 8/28/2019  2:56 PM CDT -----  Please call and let him know just normal skin kevin. Continue with same treatment plan.  Thank you,   Maria M Mccormick M.D.

## 2019-08-28 NOTE — TELEPHONE ENCOUNTER
I spoke to patient to let him know that Love Mcdonald RNNC for Dr. Paulo Agarwal. She stated that she place referral orders for patient on 11/16/2018. Patient will call to schedule his appointment with the lymphedema clinic. I let patient know that he can discuss his concerns with Dr. Agarwal on  12/8/2018 per Love Joyner MA   None

## 2019-08-28 NOTE — TELEPHONE ENCOUNTER
Patient notified of test results and providers message, patient has no further questions.    Nanda BROWNRN BSN  [facility]  [phone]

## 2019-08-29 ENCOUNTER — TELEPHONE (OUTPATIENT)
Dept: FAMILY MEDICINE | Facility: CLINIC | Age: 69
End: 2019-08-29

## 2019-08-29 NOTE — TELEPHONE ENCOUNTER
Left message on answering machine for patient to call back.      Nanda BROWNRN BSN  [facility]  [phone]

## 2019-08-29 NOTE — TELEPHONE ENCOUNTER
----- Message from Maria M Mccormick MD sent at 8/29/2019  1:53 PM CDT -----  Please call and let him know that the lesion was benign.     Thank you,   Maria M Mccormick M.D.

## 2019-08-29 NOTE — LETTER
September 4, 2019    Juwan Latham  [address]  [place] [address]        Dear Juwan,    Great news! The lesion(s) that was removed has been found to be benign (not cancer) and is called a(n) nevus. Thus, no further treatment is needed.        Thank you for allowing me to be involved in your health care and for choosing [facility].  If you have any questions or concerns please feel free to contact me at [phone].      Sincerely,      Maria M Mccormick M.D.

## 2019-08-30 NOTE — TELEPHONE ENCOUNTER
Called and LM for patient to call back in regards to biopsy results.    Linda RN-BSN-PHN  [facility]  [phone]

## 2019-09-03 NOTE — PROGRESS NOTES
Subjective:Subjective:Subjective changes as noted by pt:  Pt notes increased strength.     Current pain level: 0/10     Changes in function:  Pt still notes leaking with transitional movements like sit to stand and bending to tie shoe.      Adverse reaction to treatment or activity:  None     OBJECTIVE  Changes in objective findings:  Improved Kegel in sitting. Pt still notes weakness with transfers and standing  HPI  HPI                    Objective:  System    Physical Exam    General     ROS    Assessment/Plan:    ASSESSMENT/PLAN  Updated problem list and treatment plan: Diagnosis 1:  Post op incontinence  Decreased strength - therapeutic exercise, therapeutic activities and home program  Impaired muscle performance - biofeedback, neuro re-education and home program  Progress toward STG/LTGs have been made:  Yes,   Assessment of Progress: The patient's condition is improving.  Self Management Plans:  Patient has been instructed in a home treatment program.  I have re-evaluated this patient and find that the nature, scope, duration and intensity of the therapy is appropriate for the medical condition of the patient.  Juwan continues to require the following intervention to meet STG and LT's:  PT    Recommendations:  Pt has not returned for treatment since 3-27-19 Pt discharged at this time.      Please refer to the daily flowsheet for treatment today, total treatment time and time spent performing 1:1 timed codes.

## 2019-09-04 ENCOUNTER — TELEPHONE (OUTPATIENT)
Dept: FAMILY MEDICINE | Facility: CLINIC | Age: 69
End: 2019-09-04

## 2019-09-04 NOTE — LETTER
[facility]  [address]  [facility] [place] [facility] [place] [id number] [phone]  September 4, 2019    Juwan Latham  [address]  [place] [address]    Dear Juwan,    I care about your health and have reviewed your health plan. I have reviewed your medical conditions, medication list, and lab results and am making recommendations based on this review, to better manage your health.    You are in particular need of attention regarding:  -Wellness (Physical) Visit     I am recommending that you:  -schedule a WELLNESS (Physical) APPOINTMENT with me.   I will check fasting labs the same day - nothing to eat except water and meds for 8-10 hours prior.      Please call us at [phone] (or use [employer]) to address the above recommendations.     Thank you for trusting [facility] and we appreciate the opportunity to serve you.  We look forward to supporting your healthcare needs in the future.    Healthy Regards,    Asad White, DO

## 2019-09-04 NOTE — TELEPHONE ENCOUNTER
Needs of attention regarding:  -Wellness (Physical) Visit     Health Maintenance Topics with due status: Overdue       Topic Date Due    ZOSTER IMMUNIZATION 12/06/2001    MEDICARE ANNUAL WELLNESS VISIT 12/06/2016    FALL RISK ASSESSMENT 08/01/2019       Communication:  See Letter

## 2019-09-23 ENCOUNTER — OFFICE VISIT (OUTPATIENT)
Dept: FAMILY MEDICINE | Facility: CLINIC | Age: 69
End: 2019-09-23
Payer: COMMERCIAL

## 2019-09-23 VITALS — DIASTOLIC BLOOD PRESSURE: 80 MMHG | SYSTOLIC BLOOD PRESSURE: 138 MMHG

## 2019-09-23 DIAGNOSIS — Z09 RESOLVED CONDITION, FOLLOW-UP: Primary | ICD-10-CM

## 2019-09-23 PROCEDURE — 99212 OFFICE O/P EST SF 10 MIN: CPT | Performed by: FAMILY MEDICINE

## 2019-09-23 NOTE — PROGRESS NOTES
[facility] - PRIMARY CARE SKIN    CC: scalp dryness  SUBJECTIVE:   Juwan Latham is a(n) 68 year old male who presents to clinic today for follow up of scalp folliculitis. Dry spots on the scalp began approximately 5 months ago. He typically shaves his scalp with use of a shaving cream. Wound culture from  8/26/2019 showed normal skin kevin.    Current treatment:  Doxycycline 100 mg BID for 3 weeks  OTC anti-dandruff shampoo  Response to treatment: He has had some slight itchiness, but the visible spots seem to have resolved.    Personal Medical History  Skin Cancer: NO  Eczema Psoriasis Autoimmune   NO NO NO     Family Medical History  Skin Cancer: NO  Eczema Psoriasis Autoimmune   NO NO NO     Occupation: construction (both indoor & outdoor).    Refer to electronic medical record (EMR) for past medical history and medications.    ROS: 14 point review of systems was negative except the symptoms listed above in the HPI.    This document serves as a record of the services and decisions personally performed and made by Maria M Mccormick MD and was created by Damon Barney, a trained medical scribe, based on personal observations and provider statements to the medical scribe.  September 23, 2019 11:41 AM   Damon Barney    OBJECTIVE:   GENERAL: healthy, alert and no distress.  SKIN: Azevedo Skin Type - II.  Scalp examined. The dermatoscope was used to help evaluate pigmented lesions.  Skin Pertinent Findings:  Scalp: clear, no inflammatory papules.    ASSESSMENT:     Encounter Diagnosis   Name Primary?     Resolved condition, follow-up Yes         PLAN:   Patient Instructions   FUTURE APPOINTMENTS  Follow up as needed.    TT: 20 minutes.  CT: 15 minutes.    The information in this document, created by the medical scribe for me, accurately reflects the services I personally performed and the decisions made by me. I have reviewed and approved this document for accuracy prior to leaving the patient care  area.  September 23, 2019 11:42 AM  Maria M Mccormick MD  [facility]

## 2019-09-23 NOTE — LETTER
9/23/2019         RE: Juwan Latham  [address]  [place] [address]        Dear Colleague,    Thank you for referring your patient, Juwan Latham, to the [facility]. Please see a copy of my visit note below.    [facility] - PRIMARY CARE SKIN    CC: scalp dryness  SUBJECTIVE:   Juwan Latham is a(n) 68 year old male who presents to clinic today for follow up of scalp folliculitis. Dry spots on the scalp began approximately 5 months ago. He typically shaves his scalp with use of a shaving cream. Wound culture from  8/26/2019 showed normal skin kevin.    Current treatment:  Doxycycline 100 mg BID for 3 weeks  OTC anti-dandruff shampoo  Response to treatment: He has had some slight itchiness, but the visible spots seem to have resolved.    Personal Medical History  Skin Cancer: NO  Eczema Psoriasis Autoimmune   NO NO NO     Family Medical History  Skin Cancer: NO  Eczema Psoriasis Autoimmune   NO NO NO     Occupation: construction (both indoor & outdoor).    Refer to electronic medical record (EMR) for past medical history and medications.    ROS: 14 point review of systems was negative except the symptoms listed above in the HPI.    This document serves as a record of the services and decisions personally performed and made by Maria M Mccormick MD and was created by Damon Braney, a trained medical scribe, based on personal observations and provider statements to the medical scribe.  September 23, 2019 11:41 AM   Damon Barney    OBJECTIVE:   GENERAL: healthy, alert and no distress.  SKIN: Azevedo Skin Type - II.  Scalp examined. The dermatoscope was used to help evaluate pigmented lesions.  Skin Pertinent Findings:  Scalp: clear, no inflammatory papules.    ASSESSMENT:     Encounter Diagnosis   Name Primary?     Resolved condition, follow-up Yes         PLAN:   Patient Instructions   FUTURE APPOINTMENTS  Follow up as needed.    TT: 20 minutes.  CT: 15 minutes.    The  information in this document, created by the medical scribe for me, accurately reflects the services I personally performed and the decisions made by me. I have reviewed and approved this document for accuracy prior to leaving the patient care area.  September 23, 2019 11:42 AM  Maria M Mccormick MD  [facility]    Again, thank you for allowing me to participate in the care of your patient.        Sincerely,        Maria M Mccormick MD

## 2019-10-02 ENCOUNTER — HEALTH MAINTENANCE LETTER (OUTPATIENT)
Age: 69
End: 2019-10-02

## 2019-10-15 ENCOUNTER — TRANSFERRED RECORDS (OUTPATIENT)
Dept: HEALTH INFORMATION MANAGEMENT | Facility: CLINIC | Age: 69
End: 2019-10-15

## 2019-10-25 DIAGNOSIS — G47.33 OBSTRUCTIVE SLEEP APNEA (ADULT) (PEDIATRIC): Primary | ICD-10-CM

## 2019-10-31 ENCOUNTER — HEALTH MAINTENANCE LETTER (OUTPATIENT)
Age: 69
End: 2019-10-31

## 2019-11-21 DIAGNOSIS — I10 HYPERTENSION GOAL BP (BLOOD PRESSURE) < 140/90: Primary | Chronic | ICD-10-CM

## 2019-11-21 NOTE — TELEPHONE ENCOUNTER
"Requested Prescriptions   Pending Prescriptions Disp Refills     losartan (COZAAR) 25 MG tablet  Last Written Prescription Date:  na  Last Fill Quantity: na,  # refills: na   Last Office Visit: 9/23/2019 Maria M Mccormick MD   Future Office Visit:            Sig: Take 1 tablet (25 mg) by mouth daily       Angiotensin-II Receptors Passed - 11/21/2019 10:11 AM        Passed - Last blood pressure under 140/90 in past 12 months     BP Readings from Last 3 Encounters:   09/23/19 138/80   08/26/19 128/78   08/12/19 (!) 144/83             Passed - Recent (12 mo) or future (30 days) visit within the authorizing provider's specialty     Patient has had an office visit with the authorizing provider or a provider within the authorizing providers department within the previous 12 mos or has a future within next 30 days. See \"Patient Info\" tab in inbasket, or \"Choose Columns\" in Meds & Orders section of the refill encounter.              Passed - Medication is active on med list        Passed - Patient is age 18 or older        Passed - Normal serum creatinine on file in past 12 months     Recent Labs   Lab Test 08/12/19  1609  12/22/17  1158   CR 0.76   < >  --    CREAT  --   --  1.0    < > = values in this interval not displayed.             Passed - Normal serum potassium on file in past 12 months     Recent Labs   Lab Test 08/12/19  1609   POTASSIUM 4.1                       Routing refill request to provider for review/approval because:  Medication is reported/historical      "

## 2019-11-24 RX ORDER — LOSARTAN POTASSIUM 25 MG/1
25 TABLET ORAL DAILY
Qty: 90 TABLET | Refills: 1 | Status: SHIPPED | OUTPATIENT
Start: 2019-11-24 | End: 2021-02-15

## 2019-11-24 NOTE — TELEPHONE ENCOUNTER
Chart reviewed.  Rx sent to pt's preferred pharmacy.       [id number] - [place], [place] - [address] [facility] [address] [facility]  [facility]  [facility] [address] [place] - [place] - [address]    Julio Guidry MD

## 2019-12-05 ENCOUNTER — OFFICE VISIT (OUTPATIENT)
Dept: FAMILY MEDICINE | Facility: CLINIC | Age: 69
End: 2019-12-05
Payer: COMMERCIAL

## 2019-12-05 VITALS
WEIGHT: 210 LBS | TEMPERATURE: 98.3 F | SYSTOLIC BLOOD PRESSURE: 124 MMHG | BODY MASS INDEX: 31.93 KG/M2 | HEART RATE: 60 BPM | OXYGEN SATURATION: 98 % | DIASTOLIC BLOOD PRESSURE: 78 MMHG

## 2019-12-05 DIAGNOSIS — K21.9 GASTROESOPHAGEAL REFLUX DISEASE, ESOPHAGITIS PRESENCE NOT SPECIFIED: ICD-10-CM

## 2019-12-05 DIAGNOSIS — Z23 NEED FOR PROPHYLACTIC VACCINATION AND INOCULATION AGAINST INFLUENZA: ICD-10-CM

## 2019-12-05 DIAGNOSIS — K76.0 HEPATIC STEATOSIS: ICD-10-CM

## 2019-12-05 DIAGNOSIS — R19.5 CHANGE IN STOOL CALIBER: Primary | ICD-10-CM

## 2019-12-05 LAB
BASOPHILS # BLD AUTO: 0 10E9/L (ref 0–0.2)
BASOPHILS NFR BLD AUTO: 0.5 %
DIFFERENTIAL METHOD BLD: ABNORMAL
EOSINOPHIL # BLD AUTO: 0.1 10E9/L (ref 0–0.7)
EOSINOPHIL NFR BLD AUTO: 3 %
ERYTHROCYTE [DISTWIDTH] IN BLOOD BY AUTOMATED COUNT: 12.7 % (ref 10–15)
HCT VFR BLD AUTO: 44.1 % (ref 40–53)
HGB BLD-MCNC: 14.9 G/DL (ref 13.3–17.7)
LYMPHOCYTES # BLD AUTO: 0.7 10E9/L (ref 0.8–5.3)
LYMPHOCYTES NFR BLD AUTO: 16.4 %
MCH RBC QN AUTO: 32.4 PG (ref 26.5–33)
MCHC RBC AUTO-ENTMCNC: 33.8 G/DL (ref 31.5–36.5)
MCV RBC AUTO: 96 FL (ref 78–100)
MONOCYTES # BLD AUTO: 0.5 10E9/L (ref 0–1.3)
MONOCYTES NFR BLD AUTO: 12.4 %
NEUTROPHILS # BLD AUTO: 2.7 10E9/L (ref 1.6–8.3)
NEUTROPHILS NFR BLD AUTO: 67.7 %
PLATELET # BLD AUTO: 154 10E9/L (ref 150–450)
RBC # BLD AUTO: 4.6 10E12/L (ref 4.4–5.9)
WBC # BLD AUTO: 4 10E9/L (ref 4–11)

## 2019-12-05 PROCEDURE — G0008 ADMIN INFLUENZA VIRUS VAC: HCPCS | Performed by: FAMILY MEDICINE

## 2019-12-05 PROCEDURE — 36415 COLL VENOUS BLD VENIPUNCTURE: CPT | Performed by: FAMILY MEDICINE

## 2019-12-05 PROCEDURE — 99214 OFFICE O/P EST MOD 30 MIN: CPT | Mod: 25 | Performed by: FAMILY MEDICINE

## 2019-12-05 PROCEDURE — 85025 COMPLETE CBC W/AUTO DIFF WBC: CPT | Performed by: FAMILY MEDICINE

## 2019-12-05 PROCEDURE — 90662 IIV NO PRSV INCREASED AG IM: CPT | Performed by: FAMILY MEDICINE

## 2019-12-05 PROCEDURE — 80053 COMPREHEN METABOLIC PANEL: CPT | Performed by: FAMILY MEDICINE

## 2019-12-05 ASSESSMENT — ANXIETY QUESTIONNAIRES
GAD7 TOTAL SCORE: 5
3. WORRYING TOO MUCH ABOUT DIFFERENT THINGS: SEVERAL DAYS
7. FEELING AFRAID AS IF SOMETHING AWFUL MIGHT HAPPEN: NOT AT ALL
5. BEING SO RESTLESS THAT IT IS HARD TO SIT STILL: SEVERAL DAYS
6. BECOMING EASILY ANNOYED OR IRRITABLE: SEVERAL DAYS
IF YOU CHECKED OFF ANY PROBLEMS ON THIS QUESTIONNAIRE, HOW DIFFICULT HAVE THESE PROBLEMS MADE IT FOR YOU TO DO YOUR WORK, TAKE CARE OF THINGS AT HOME, OR GET ALONG WITH OTHER PEOPLE: NOT DIFFICULT AT ALL
1. FEELING NERVOUS, ANXIOUS, OR ON EDGE: SEVERAL DAYS
2. NOT BEING ABLE TO STOP OR CONTROL WORRYING: SEVERAL DAYS

## 2019-12-05 ASSESSMENT — PATIENT HEALTH QUESTIONNAIRE - PHQ9
5. POOR APPETITE OR OVEREATING: NOT AT ALL
SUM OF ALL RESPONSES TO PHQ QUESTIONS 1-9: 1

## 2019-12-05 NOTE — PROGRESS NOTES
"SUBJECTIVE:   Juwan Latham is a 68 year old male who presents for Preventive Visit.  {PVP to remind patient that this is not necessarily a physical exam; physical exam may or may not be done:461699::\"click delete button to remove this line now\"}  {PVP to inform patient that additional E&M charge may apply, if additional problems addressed:507339::\"click delete button to remove this line now\"}  Are you in the first 12 months of your Medicare coverage?  { :845619::\"No\"}    HPI  Do you feel safe in your environment? { :655363}    Have you ever done Advance Care Planning? (For example, a Health Directive, POLST, or a discussion with a medical provider or your loved ones about your wishes): { :583326}    {Hearing Test Done (Optional):904573}  Fall risk  { :405269}  {If any of the above assessments are answered yes, consider ordering appropriate referrals (Optional):845930::\"click delete button to remove this line now\"}  Cognitive Screening { :758969}    {Do you have sleep apnea, excessive snoring or daytime drowsiness? (Optional):601705}    Reviewed and updated as needed this visit by clinical staff         Reviewed and updated as needed this visit by Provider        Social History     Tobacco Use     Smoking status: Former Smoker     Packs/day: 2.00     Years: 20.00     Pack years: 40.00     Types: Cigarettes     Last attempt to quit: 1985     Years since quittin.9     Smokeless tobacco: Never Used   Substance Use Topics     Alcohol use: Yes     Comment: avg 3 beers per night     {Rooming Staff- Complete this question if Prescreen response is not shown below for today's visit. If you drink alcohol do you typically have >3 drinks per day or >7 drinks per week? (Optional):159030}    Alcohol Use 6/3/2011   Prescreen: >3 drinks/day or >7 drinks/week? The patient does not drink >3 drinks per day nor >7 drinks per week.   {add AUDIT responses (Optional) (A score of 7 for adult men is an indication of hazardous " "drinking; a score of 8 or more is an indication of an alcohol use disorder.  A score of 7 or more for adult women is an indication of hazardous drinking or an alchohol use disorder):705508}    {Outside tests to abstract? :253885}    {additional problems to add (Optional):379333}    Current providers sharing in care for this patient include: {Rooming staff:  Please update Care Team in Rooming Activity, refresh this note and then delete this statement}  Patient Care Team:  Julio Guidry Jr., MD as PCP - General (Family Practice)  Weight, Paulo Wiley MD as MD (Urology)  Love Mcdonald, RN as Registered Nurse (Oncology)  Maria A Desai MD as MD (Thoracic Diseases)  Asad White DO as Assigned PCP    The following health maintenance items are reviewed in Epic and correct as of today:  Health Maintenance   Topic Date Due     ZOSTER IMMUNIZATION (1 of 2) 12/06/2001     MEDICARE ANNUAL WELLNESS VISIT  12/06/2016     FALL RISK ASSESSMENT  08/01/2019     INFLUENZA VACCINE (1) 09/01/2019     PHQ-9  11/07/2019     MICROALBUMIN  06/17/2020     BMP  08/12/2020     LIPID  05/24/2021     COLONOSCOPY  01/04/2022     ADVANCE CARE PLANNING  06/15/2022     DTAP/TDAP/TD IMMUNIZATION (3 - Td) 02/28/2026     HEPATITIS C SCREENING  Completed     DEPRESSION ACTION PLAN  Completed     PNEUMOCOCCAL IMMUNIZATION 65+ HIGH/HIGHEST RISK  Completed     AORTIC ANEURYSM SCREENING (SYSTEM ASSIGNED)  Completed     IPV IMMUNIZATION  Aged Out     MENINGITIS IMMUNIZATION  Aged Out     {Chronicprobdata (optional):822721}  {Decision Support (Optional):793070}    Review of Systems  {ROS COMP (Optional):407223}    OBJECTIVE:   There were no vitals taken for this visit. Estimated body mass index is 30.41 kg/m  as calculated from the following:    Height as of 8/12/19: 1.727 m (5' 8\").    Weight as of 8/12/19: 90.7 kg (200 lb).  Physical Exam  {Exam (Optional) :226979}    {Diagnostic Test Results (Optional):015796::\"Diagnostic Test " "Results:\",\"Labs reviewed in Epic\"}    ASSESSMENT / PLAN:   {Dia Picklist:084424}    COUNSELING:  {Medicare Counselin}    Estimated body mass index is 30.41 kg/m  as calculated from the following:    Height as of 19: 1.727 m (5' 8\").    Weight as of 19: 90.7 kg (200 lb).    {Weight Management Plan (ACO) Complete if BMI is abnormal-  Ages 18-64  BMI >24.9.  Age 65+ with BMI <23 or >30 (Optional):634381}     reports that he quit smoking about 34 years ago. His smoking use included cigarettes. He has a 40.00 pack-year smoking history. He has never used smokeless tobacco.  {Tobacco Cessation -- Complete if patient is a smoker (Optional):007903}    Appropriate preventive services were discussed with this patient, including applicable screening as appropriate for cardiovascular disease, diabetes, osteopenia/osteoporosis, and glaucoma.  As appropriate for age/gender, discussed screening for colorectal cancer, prostate cancer, breast cancer, and cervical cancer. Checklist reviewing preventive services available has been given to the patient.    Reviewed patients plan of care and provided an AVS. The {CarePlan:333729} for Juwan meets the Care Plan requirement. This Care Plan has been established and reviewed with the {PATIENT, FAMILY MEMBER, CAREGIVER:876366}.    Counseling Resources:  ATP IV Guidelines  Pooled Cohorts Equation Calculator  Breast Cancer Risk Calculator  FRAX Risk Assessment  ICSI Preventive Guidelines  Dietary Guidelines for Americans,   USDA's MyPlate  ASA Prophylaxis  Lung CA Screening    Asad White DO  [facility]    Identified Health Risks:  "

## 2019-12-05 NOTE — TELEPHONE ENCOUNTER
"Requested Prescriptions   Pending Prescriptions Disp Refills     omeprazole (PRILOSEC) 20 MG DR capsule [Pharmacy Med Name: OMEPRAZOLE 20MG CAPSULES]  Last Written Prescription Date:  12/05/2019  Last Fill Quantity: 30 capsule,  # refills: 1   Last Office Visit: 12/05/2019 Asad White, DO   Future Office Visit:      90 capsule 0     Sig: TAKE 1 CAPSULE(20 MG) BY MOUTH DAILY       PPI Protocol Passed - 12/5/2019 11:09 AM        Passed - Not on Clopidogrel (unless Pantoprazole ordered)        Passed - No diagnosis of osteoporosis on record        Passed - Recent (12 mo) or future (30 days) visit within the authorizing provider's specialty     Patient has had an office visit with the authorizing provider or a provider within the authorizing providers department within the previous 12 mos or has a future within next 30 days. See \"Patient Info\" tab in inbasket, or \"Choose Columns\" in Meds & Orders section of the refill encounter.              Passed - Medication is active on med list        Passed - Patient is age 18 or older          "

## 2019-12-05 NOTE — PROGRESS NOTES
Subjective     uJwan Latham is a 68 year old male who presents to clinic today for the following health issues:    HPI   CHEST PAIN     Onset: 1 week ago    Description:   Location:  right side, in middle  Character: feels almost like a bubble  Radiation: none  Duration: waxing and waning     Intensity: moderate    Progression of Symptoms:  same    Accompanying Signs & Symptoms:  Shortness of breath: no  Sweating: no  Nausea/vomiting: no  Lightheadedness: no  Palpitations: no  Fever/Chills: no  Cough: no  Heartburn: no    History:   Family history of heart disease YES- thinks his mom may have had it  Tobacco use: no    Precipitating factors:   Worse with exertion: no  Worse with deep breaths :  no  Related to food: no    Alleviating factors:  none       Therapies Tried and outcome: none    Had similar symptoms of chest pain in Aug when he went to the ER. Discomfort is currently on right side of sternum down to RUQ. He tried taking something but can't remember what it was; it didn't relieve symptoms.    He also states he has noticed a change in the size of his stools. Sometimes really thin. History of polyps.      Patient Active Problem List   Diagnosis     Allergic rhinitis due to other allergen     CARDIOVASCULAR SCREENING; LDL GOAL LESS THAN 160     Advance Care Planning     History of colonic polyps     Anxiety     PAULINO (obstructive sleep apnea)     GERD (gastroesophageal reflux disease)     Hypertension goal BP (blood pressure) < 140/90     Seasonal allergies     Benign neoplasm of descending colon     Malignant neoplasm of upper lobe of right lung (H)     Overweight (BMI 25.0-29.9)     Cavernous hemangioma of brain (H)     Major depressive disorder, recurrent episode, mild (H)     Prostate cancer (H)     Somatic dysfunction of pelvis region     Mixed incontinence urge and stress (male)(female)     Breen catheter problem (H)     Retinal detachment of left eye with single break     Past Surgical History:    Procedure Laterality Date     BRONCHOSCOPY FLEXIBLE N/A 3/3/2017    Procedure: BRONCHOSCOPY FLEXIBLE;  Surgeon: Maria A Desai MD;  Location: [facility]     COLONOSCOPY N/A 2015    Procedure: COLONOSCOPY;  Surgeon: Murphy Jesus MD;  Location: [facility]     COLONOSCOPY N/A 2019    Procedure: COLONOSCOPY;  Surgeon: Murphy Jesus MD;  Location:  GI     COMBINED CYSTOSCOPY, RETROGRADES, URETEROSCOPY, LASER HOLMIUM LITHOTRIPSY URETER(S), INSERT STENT N/A 3/25/2018    Procedure: COMBINED CYSTOSCOPY, RETROGRADES, URETEROSCOPY, LASER HOLMIUM LITHOTRIPSY URETER(S), INSERT STENT;  Cystoscopy, Catheter Exchange under Anesthesia;  Surgeon: Zaria Cain MD;  Location:  OR     DAVINCI PROSTATECTOMY N/A 2017    Procedure: DAVINCI PROSTATECTOMY;  Robotic Assisted Radical Prostatectomy and Pelvic Lymph Node Dissection ;  Surgeon: Paulo Agarwal MD;  Location:  OR     ESOPHAGOSCOPY, GASTROSCOPY, DUODENOSCOPY (EGD), COMBINED N/A 2016    Procedure: COMBINED ESOPHAGOSCOPY, GASTROSCOPY, DUODENOSCOPY (EGD), BIOPSY SINGLE OR MULTIPLE;  Surgeon: Murphy Jesus MD;  Location:  GI     LAPAROSCOPIC WEDGE RESECTION LUNG Right 3/3/2017    Procedure: LAPAROSCOPIC WEDGE RESECTION LUNG;  Surgeon: Maria A Desai MD;  Location: [facility]     LASER HOLMIUM LITHOTRIPSY URETER(S), INSERT STENT, COMBINED  2012    Procedure: COMBINED CYSTOSCOPY, URETEROSCOPY, LASER HOLMIUM LITHOTRIPSY URETER(S), INSERT STENT;  Cystoscopy, Right Ureteroscopy, Stone Extraction, Holmium Laser, Double J Stent Placement;  Surgeon: Nicolás Blackwell MD;  Location:  OR       Social History     Tobacco Use     Smoking status: Former Smoker     Packs/day: 2.00     Years: 20.00     Pack years: 40.00     Types: Cigarettes     Last attempt to quit: 1985     Years since quittin.9     Smokeless tobacco: Never Used   Substance Use Topics     Alcohol use: Not Currently     Comment: couple  times  per  week  (beers)      Family History   Problem Relation Age of Onset     Heart Disease Mother      Diabetes Brother 65        Type 2     Diabetes Maternal Grandmother      Cancer - colorectal Brother 70     Hypertension No family hx of      Lipids No family hx of            Reviewed and updated as needed this visit by Provider         Review of Systems   ROS COMP: Constitutional, HEENT, cardiovascular, pulmonary, gi and gu systems are negative, except as otherwise noted.      Objective    /78   Pulse 60   Temp 98.3  F (36.8  C) (Oral)   Wt 95.3 kg (210 lb)   SpO2 98%   BMI 31.93 kg/m    Body mass index is 31.93 kg/m .  Physical Exam   GENERAL: healthy, alert and no distress  HENT: ear canals and TM's normal, nose and mouth without ulcers or lesions  NECK: no adenopathy and no asymmetry, masses, or scars  RESP: lungs clear to auscultation - no rales, rhonchi or wheezes  CV: regular rate and rhythm, normal S1 S2, no S3 or S4, no murmur, click or rub, no peripheral edema and peripheral pulses strong  ABDOMEN: soft, nontender, no hepatosplenomegaly, no masses and bowel sounds normal  MS: no gross musculoskeletal defects noted, no edema  PSYCH: mentation appears normal, affect normal/bright    Diagnostic Test Results:  Labs reviewed in Epic        Assessment & Plan     1. Gastroesophageal reflux disease, esophagitis presence not specified: I wonder if this is what he is experiencing in right side of chest. Does not sound cardiac in nature. Had normal stress echo in August. Will continue omeprazole, will proceed with colonoscopy as below given stool change. Will also check CMP to evaluate liver function tests (also has history of hepatic steatosis.     2. Change in stool caliber  - GASTROENTEROLOGY ADULT REF PROCEDURE ONLY    3. Hepatic steatosis  - Comprehensive metabolic panel (BMP + Alb, Alk Phos, ALT, AST, Total. Bili, TP)  - CBC with platelets and differential    4. Need for prophylactic vaccination and inoculation against  influenza  - INFLUENZA (HIGH DOSE) 3 VALENT VACCINE [72676]  - VACCINE ADMINISTRATION, INITIAL       Asad White DO  [facility]

## 2019-12-05 NOTE — TELEPHONE ENCOUNTER
Prescription approved per [facility] Refill Protocol.  TEDDY WaltonN, RN  [facility]

## 2019-12-06 LAB
ALBUMIN SERPL-MCNC: 4 G/DL (ref 3.4–5)
ALP SERPL-CCNC: 55 U/L (ref 40–150)
ALT SERPL W P-5'-P-CCNC: 133 U/L (ref 0–70)
ANION GAP SERPL CALCULATED.3IONS-SCNC: 6 MMOL/L (ref 3–14)
AST SERPL W P-5'-P-CCNC: 64 U/L (ref 0–45)
BILIRUB SERPL-MCNC: 0.5 MG/DL (ref 0.2–1.3)
BUN SERPL-MCNC: 14 MG/DL (ref 7–30)
CALCIUM SERPL-MCNC: 9.2 MG/DL (ref 8.5–10.1)
CHLORIDE SERPL-SCNC: 108 MMOL/L (ref 94–109)
CO2 SERPL-SCNC: 26 MMOL/L (ref 20–32)
CREAT SERPL-MCNC: 0.81 MG/DL (ref 0.66–1.25)
GFR SERPL CREATININE-BSD FRML MDRD: >90 ML/MIN/{1.73_M2}
GLUCOSE SERPL-MCNC: 95 MG/DL (ref 70–99)
POTASSIUM SERPL-SCNC: 4.3 MMOL/L (ref 3.4–5.3)
PROT SERPL-MCNC: 6.9 G/DL (ref 6.8–8.8)
SODIUM SERPL-SCNC: 140 MMOL/L (ref 133–144)

## 2019-12-06 ASSESSMENT — ANXIETY QUESTIONNAIRES: GAD7 TOTAL SCORE: 5

## 2019-12-11 ENCOUNTER — HOSPITAL ENCOUNTER (OUTPATIENT)
Facility: CLINIC | Age: 69
Discharge: HOME | End: 2019-12-11
Attending: INTERNAL MEDICINE | Admitting: INTERNAL MEDICINE
Payer: COMMERCIAL

## 2019-12-11 VITALS
OXYGEN SATURATION: 95 % | DIASTOLIC BLOOD PRESSURE: 82 MMHG | SYSTOLIC BLOOD PRESSURE: 127 MMHG | WEIGHT: 210 LBS | BODY MASS INDEX: 31.83 KG/M2 | HEART RATE: 58 BPM | RESPIRATION RATE: 18 BRPM | HEIGHT: 68 IN

## 2019-12-11 LAB — COLONOSCOPY: NORMAL

## 2019-12-11 PROCEDURE — 25000128 H RX IP 250 OP 636: Performed by: INTERNAL MEDICINE

## 2019-12-11 PROCEDURE — G0105 COLORECTAL SCRN; HI RISK IND: HCPCS | Performed by: INTERNAL MEDICINE

## 2019-12-11 PROCEDURE — 45378 DIAGNOSTIC COLONOSCOPY: CPT | Performed by: INTERNAL MEDICINE

## 2019-12-11 PROCEDURE — 99153 MOD SED SAME PHYS/QHP EA: CPT

## 2019-12-11 PROCEDURE — G0500 MOD SEDAT ENDO SERVICE >5YRS: HCPCS | Performed by: INTERNAL MEDICINE

## 2019-12-11 RX ORDER — ONDANSETRON 4 MG/1
4 TABLET, ORALLY DISINTEGRATING ORAL EVERY 6 HOURS PRN
Status: DISCONTINUED | OUTPATIENT
Start: 2019-12-11 | End: 2019-12-11 | Stop reason: HOSPADM

## 2019-12-11 RX ORDER — ONDANSETRON 2 MG/ML
4 INJECTION INTRAMUSCULAR; INTRAVENOUS EVERY 6 HOURS PRN
Status: DISCONTINUED | OUTPATIENT
Start: 2019-12-11 | End: 2019-12-11 | Stop reason: HOSPADM

## 2019-12-11 RX ORDER — FLUMAZENIL 0.1 MG/ML
0.2 INJECTION, SOLUTION INTRAVENOUS
Status: DISCONTINUED | OUTPATIENT
Start: 2019-12-11 | End: 2019-12-11 | Stop reason: HOSPADM

## 2019-12-11 RX ORDER — LIDOCAINE 40 MG/G
CREAM TOPICAL
Status: DISCONTINUED | OUTPATIENT
Start: 2019-12-11 | End: 2019-12-11 | Stop reason: HOSPADM

## 2019-12-11 RX ORDER — NALOXONE HYDROCHLORIDE 0.4 MG/ML
.1-.4 INJECTION, SOLUTION INTRAMUSCULAR; INTRAVENOUS; SUBCUTANEOUS
Status: DISCONTINUED | OUTPATIENT
Start: 2019-12-11 | End: 2019-12-11 | Stop reason: HOSPADM

## 2019-12-11 RX ORDER — ONDANSETRON 2 MG/ML
4 INJECTION INTRAMUSCULAR; INTRAVENOUS
Status: DISCONTINUED | OUTPATIENT
Start: 2019-12-11 | End: 2019-12-11 | Stop reason: HOSPADM

## 2019-12-11 RX ORDER — FENTANYL CITRATE 50 UG/ML
INJECTION, SOLUTION INTRAMUSCULAR; INTRAVENOUS PRN
Status: DISCONTINUED | OUTPATIENT
Start: 2019-12-11 | End: 2019-12-11 | Stop reason: HOSPADM

## 2019-12-11 ASSESSMENT — MIFFLIN-ST. JEOR: SCORE: 1697.05

## 2019-12-11 NOTE — H&P
Pre-Endoscopy History and Physical     Juwan Latham MRN# [id number]   YOB: 1950 Age: 69 year old     Date of Procedure: 12/11/2019  Primary care provider: Julio Guidry Jr.  Type of Endoscopy: Colonoscopy with possible biopsy, possible polypectomy  Reason for Procedure: change in stool size  Type of Anesthesia Anticipated: Conscious Sedation    HPI:    Juwan is a 69 year old male who will be undergoing the above procedure.      A history and physical has been performed. The patient's medications and allergies have been reviewed. The risks and benefits of the procedure and the sedation options and risks were discussed with the patient.  All questions were answered and informed consent was obtained.      He denies a personal or family history of anesthesia complications or bleeding disorders.     Patient Active Problem List   Diagnosis     Allergic rhinitis due to other allergen     CARDIOVASCULAR SCREENING; LDL GOAL LESS THAN 160     Advance Care Planning     History of colonic polyps     Anxiety     PAULINO (obstructive sleep apnea)     GERD (gastroesophageal reflux disease)     Hypertension goal BP (blood pressure) < 140/90     Seasonal allergies     Benign neoplasm of descending colon     Malignant neoplasm of upper lobe of right lung (H)     Overweight (BMI 25.0-29.9)     Cavernous hemangioma of brain (H)     Major depressive disorder, recurrent episode, mild (H)     Prostate cancer (H)     Somatic dysfunction of pelvis region     Mixed incontinence urge and stress (male)(female)     Breen catheter problem (H)     Retinal detachment of left eye with single break        Past Medical History:   Diagnosis Date     Anxiety      Arthritis     fingers, hips     Calculus of kidney 2005, 2012     Colon polyps      Congenital single kidney      Gastroesophageal reflux disease      Hx of cancer of lung 03/2017    wedge resection     Hypertension      Prostate cancer (H) 08/2017     Seasonal allergies      spring and fall     Sleep apnea     cpap        Past Surgical History:   Procedure Laterality Date     BRONCHOSCOPY FLEXIBLE N/A 3/3/2017    Procedure: BRONCHOSCOPY FLEXIBLE;  Surgeon: Maria A Desai MD;  Location: [facility]     COLONOSCOPY N/A 2015    Procedure: COLONOSCOPY;  Surgeon: Murphy Jesus MD;  Location:  GI     COMBINED CYSTOSCOPY, RETROGRADES, URETEROSCOPY, LASER HOLMIUM LITHOTRIPSY URETER(S), INSERT STENT N/A 3/25/2018    Procedure: COMBINED CYSTOSCOPY, RETROGRADES, URETEROSCOPY, LASER HOLMIUM LITHOTRIPSY URETER(S), INSERT STENT;  Cystoscopy, Catheter Exchange under Anesthesia;  Surgeon: Zaria Cain MD;  Location: [facility]     DAVINCI PROSTATECTOMY N/A 2017    Procedure: DAVINCI PROSTATECTOMY;  Robotic Assisted Radical Prostatectomy and Pelvic Lymph Node Dissection ;  Surgeon: Paulo Agarwal MD;  Location:  OR     ESOPHAGOSCOPY, GASTROSCOPY, DUODENOSCOPY (EGD), COMBINED N/A 2016    Procedure: COMBINED ESOPHAGOSCOPY, GASTROSCOPY, DUODENOSCOPY (EGD), BIOPSY SINGLE OR MULTIPLE;  Surgeon: Murphy Jesus MD;  Location:  GI     LAPAROSCOPIC WEDGE RESECTION LUNG Right 3/3/2017    Procedure: LAPAROSCOPIC WEDGE RESECTION LUNG;  Surgeon: Maria A Desai MD;  Location: [facility]     LASER HOLMIUM LITHOTRIPSY URETER(S), INSERT STENT, COMBINED  2012    Procedure: COMBINED CYSTOSCOPY, URETEROSCOPY, LASER HOLMIUM LITHOTRIPSY URETER(S), INSERT STENT;  Cystoscopy, Right Ureteroscopy, Stone Extraction, Holmium Laser, Double J Stent Placement;  Surgeon: Nicolás Blackwell MD;  Location:  OR       Social History     Tobacco Use     Smoking status: Former Smoker     Packs/day: 2.00     Years: 20.00     Pack years: 40.00     Types: Cigarettes     Last attempt to quit: 1985     Years since quittin.9     Smokeless tobacco: Never Used   Substance Use Topics     Alcohol use: Not Currently     Comment: couple  times  per  week  (beers)       Family History   Problem Relation  "Age of Onset     Heart Disease Mother      Diabetes Brother 65        Type 2     Diabetes Maternal Grandmother      Cancer - colorectal Brother 70     Hypertension No family hx of      Lipids No family hx of        Prior to Admission medications    Medication Sig Start Date End Date Taking? Authorizing Provider   clotrimazole (LOTRIMIN) 1 % external cream Apply topically to affected area in groin twice daily for 14 days. 6/17/19  Yes Asad White, DO   Cyanocobalamin (B-12) 3000 MCG LOZG    Yes Reported, Patient   losartan (COZAAR) 25 MG tablet Take 1 tablet (25 mg) by mouth daily 11/24/19  Yes Julio Guidry Jr., MD   omeprazole (PRILOSEC) 20 MG DR capsule TAKE 1 CAPSULE(20 MG) BY MOUTH DAILY 12/5/19  Yes Asad White DO   Vitamin D, Cholecalciferol, 1000 units TABS    Yes Reported, Patient   order for DME 1: Custom RLE compression stocking-thigh high and include hip as able; 20-30 mm Hg compression  2: Custom compression briefs.  Compression as recommended by luz maria. 1/17/19   Julio Guidry Jr., MD   order for DME 1: Gradient Compression Wraps; 2: BLE 20-30 or 30-40 mm Hg compression stockings; 3: RLE custom compression stocking; 4; RLE Velcro compression garment; thigh high 8/24/18   Weight, Paulo Wiley MD   sildenafil (REVATIO) 20 MG tablet Take 1-2 tablets (20-40 mg) by mouth as needed 30 minutes to 4 hours before sex 7/9/19   Asad White DO       No Known Allergies     REVIEW OF SYSTEMS:   5 point ROS negative except as noted above in HPI, including Gen., Resp., CV, GI &  system review.    PHYSICAL EXAM:   There were no vitals taken for this visit. Estimated body mass index is 31.93 kg/m  as calculated from the following:    Height as of 8/12/19: 1.727 m (5' 8\").    Weight as of 12/5/19: 95.3 kg (210 lb).   GENERAL APPEARANCE: alert, and oriented  MENTAL STATUS: alert  AIRWAY EXAM: Mallampatti Class I (visualization of the soft palate, fauces, uvula, anterior and posterior " pillars)  RESP: lungs clear to auscultation - no rales, rhonchi or wheezes  CV: regular rates and rhythm  DIAGNOSTICS:    Not indicated    IMPRESSION   ASA Class 2 - Mild systemic disease    PLAN:   Plan for Colonoscopy with possible biopsy, possible polypectomy. We discussed the risks, benefits and alternatives and the patient wished to proceed.    The above has been forwarded to the consulting provider.      Signed Electronically by: Murphy Jesus MD  December 11, 2019

## 2019-12-11 NOTE — DISCHARGE INSTRUCTIONS
DIVERTICULOSIS  You have diverticulosis. This means that small pouches have formed in the wall of the colon (large intestine). Most often, this problem causes no symptoms. But the pouches in the colon are at risk for becoming infected or inflamed. When this happens, the condition is called diverticulitis.  The doctor will talk with you about how to manage your condition. Diet changes may be all that are needed to help control diverticulosis and prevent progression to diverticulitis. If you develop diverticulitis, other treatments will likely be needed.  HOME CARE  Medications: You may be told to take fiber supplements daily. Fiber adds bulk to the stool so that it passes through the colon more easily. Stool softeners may be recommended. You may also be given medications for pain relief. Be sure to take all medications as directed.  General Care:    Eat unprocessed foods that are high in fiber. Whole grains, fruits, and vegetables are good choices.    Drink 6 to 8 glasses of water daily.    Watch for changes in your bowel movements. Tell the doctor if you notice any changes.    Begin an exercise program. Ask your doctor how to get started.    Get plenty of rest and sleep.  FOLLOW UP as advised by the doctor or our staff. Regular visits may be needed to check on your health. Be sure to keep all your appointments.  GET PROMPT MEDICAL ATTENTION if any of the following occurs:    Fever of 100.6 F (38 C) or higher, or as directed by your healthcare provider    Severe cramps in the lower left side of the abdomen or pain that is getting progressively worse.    Tenderness in the lower left side of the abdomen or worsening pain throughout the abdomen    Diarrhea or constipation that does not improve within 24 hours    Nausea and vomiting    Bleeding from the rectum      [id number] [facility], [address] [place] [address], [place] [address]. All rights reserved. This information is not intended as a substitute for professional  medical care. Always follow your healthcare professional's instructions.    Eating a High-Fiber Diet  Fiber is what gives strength and structure to plants. Most grains, beans, vegetables, and fruits contain fiber. Foods rich in fiber are often low in calories and fat, and they fill you up more. They may also reduce your risks for certain health problems. To find out the amount of fiber in canned, packaged, or frozen foods, read the  Nutrition Facts  label. It tells you how much fiber is in a serving.      Types of Fiber and Their Benefits  There are two types of fiber: insoluble and soluble. They both aid digestion and help you maintain a healthy weight.  Insoluble fiber: This is found in whole grains, cereals, certain fruits and vegetables (such as apple skin, corn, and carrots). Insoluble fiber may prevent constipation and reduce the risk of certain types of cancer.   Soluble fiber: This type of fiber is in oats, beans, and certain fruits and vegetables (such as strawberries and peas). Soluble fiber can reduce cholesterol (which may help lower the risk of heart disease), and helps control blood sugar levels.  Look for High-Fiber Foods  Whole-grain breads and cereals: Try to eat 6-8 ounces a day. Include wheat and oat bran cereals, whole-wheat muffins or toast, and corn tortillas in your meals.  Fruits: Try to eat 2 cups a day. Apples, oranges, strawberries, pears, and bananas are good sources. (Note: Fruit juice is low in fiber.)  Vegetables: Try to eat 3 cups a day. Add asparagus, carrots, broccoli, peas, and corn to your meals.  Legumes (beans): One cup of cooked lentils gives you over 15 grams of fiber. Try navy beans, lentils, and chickpeas.  Seeds:  A small handful of seeds gives you about 3 grams of fiber. Try sunflower seeds.    Keep Track of Your Fiber  A healthy diet includes 31 grams of fiber a day if you have a 2,000-calorie diet. Keep track of how much fiber you eat. Start by reading food labels. Then  eat a variety of foods high in fiber. Ask your doctor about supplemental fiber products.            [id number] [employer], [address] [facility], [place] [address]. All rights reserved. This information is not intended as a substitute for professional medical care. Always follow your healthcare professional's instructions.    HIGH FIBER DIET  Fiber is present in all fruits, vegetables, cereals and grains. Fiber passes through the body undigested. A high fiber diet helps food move through the intestinal tract. The added bulk is helpful in preventing constipation. In people with diverticulosis it serves to clean out the pouches along the colon wall while preventing new ones from forming. A high fiber diet also reduces the risk of colon cancer, decreases blood cholesterol and prevents high blood sugar in people with diabetes.    The foods listed below are high in fiber and should be included in your diet. If you are not used to high fiber foods, start with 1 or 2 foods from this list. Every 3-4 days add a new one to your diet until you are eating 4 high fiber foods per day. This should give you 20-35 Gm of fiber/day. It is also important to drink a lot of water when you are on this diet (6-8 glasses a day). Water causes the fiber to swell and increases the benefit.    FOODS HIGH IN DIETARY FIBER:  BREADS: Made with 100% whole wheat flour; reigs, wheat or rye crackers; tortillas, bran muffins  CEREALS: Whole grain cereal with bran (Chex, Raisin Bran, Corn Bran), oatmeal, rolled oats, granola, wheat flakes, brown rice  NUTS: Any nuts  FRUITS: All fresh fruits along with edible skins, (bananas, citrus fruit, mangoes, pears, prunes, raisins, apples, pineapple, apricot, melon, jams and marmalades), fruit juices (especially prune juice)  VEGETABLES: All types, preferably raw or lightly cooked: especially, celery, eggplant, potatoes, spinach, broccoli, brussel sprouts, winter squash, carrots, cauliflower, soybeans,  lentils, fresh and dried beans of all kinds  OTHER: Popcorn, any spices      [id number] [facility], [address] [place] [address], [place] [address]. All rights reserved. This information is not intended as a substitute for professional medical care. Always follow your healthcare professional's instructions.

## 2019-12-11 NOTE — LETTER
December 6, 2019      Juwan Latham  [address]  [place] [address]        Dear Juwan,     Thank you for choosing [facility]. You are scheduled for the following service(s).   Please be aware that coverage of these services is subject to the terms and limitations of your health insurance plan.  Call member services at your health plan with any benefit or coverage questions.    Date:  Wednesday, December 11, 2019             Procedure:  COLONOSCOPY  Doctor:        Dr. Murphy Jesus  Arrival Time:  07:00am *Check in at Emergency/Endoscopy desk*  Procedure Time:  07:30am      Location:   [facility]        Endoscopy Department, First Floor (Enter through ER Doors) *        [address] [place], [place] [address] [phone] or [phone] () to reschedule      MIRALAX -GATORADE  PREP  Colonoscopy is the most accurate test to detect colon polyps and colon cancer; and the only test where polyps can be removed. During this procedure, a doctor examines the lining of your large intestine and rectum through a flexible tube.   Transportation  You must arrange for a ride for the day of your procedure with a responsible adult. A taxi , Uber, etc, is not an option unless you are accompanied by a responsible adult. If you fail to arrange transportation with a responsible adult, your procedure will be cancelled and rescheduled.    Purchase the  following supplies at your local pharmacy:  - 2 (two) bisacodyl tablets: each tablet contains 5 mg.  (Dulcolax  laxative NOT Dulcolax  stool softener)   - 1 (one) 8.3 oz bottle of Polyethylene Glycol (PEG) 3350 Powder   (MiraLAX , Smooth LAX , ClearLAX  or equivalent)  - 64 oz Gatorade    Regular Gatorade, Gatorade G2 , Powerade , Powerade Zero  or Pedialyte  is acceptable. Red colored flavors are not allowed; all other colors (yellow, green, orange, purple and blue) are okay. It is also okay to buy two 2.12 oz  packets of powdered Gatorade that can be mixed with water to a total volume of 64 oz of liquid.  - 1 (one) 10 oz bottle of Magnesium Citrate (Red colored flavors are not allowed)  It is also okay for you to use a 0.5 oz package of powdered magnesium citrate (17 g) mixed with 10 oz of water.    PREPARATION FOR COLONOSCOPY  7 days before:    Discontinue fiber supplements and medications containing iron. This includes Metamucil  and Fibercon ; and multivitamins with iron.    3 days before:    Begin a low-fiber diet. A low-fiber diet helps making the cleanout more effective.     Examples of a low-fiber diet include (but are not limited to): white bread, white rice, pasta, crackers, fish, chicken, eggs, ground beef, creamy peanut butter, cooked/steamed/boiled vegetables, canned fruit, bananas, melons, milk, plain yogurt cheese, salad dressing and other condiments.     The following are not allowed on a low-fiber diet: seeds, nuts, popcorn, bran, whole wheat, corn, quinoa, raw fruits and vegetables, berries and dried fruit, beans and lentils.    For additional details on low-fiber diet, please refer to the table on the last page.    2 days before:    Continue the low-fiber diet.     Drink at least 8 glasses of water throughout the day.     Stop eating solid foods at 11:45 pm.    1 day before:    In the morning: begin a clear liquid diet (liquids you can see through).     Examples of a clear liquid diet include: water, clear broth or bouillon, Gatorade, Pedialyte or Powerade, carbonated and non-carbonated soft drinks (Sprite , 7-Up , ginger ale), strained fruit juices without pulp (apple, white grape, white cranberry), Jell-O  and popsicles.     The following are not allowed on a clear liquid diet: red liquids, alcoholic beverages, dairy products (milk, creamer, and yogurt), protein shakes, creamy broths, juice with pulp and chewing tobacco.    At noon: take 2 (two) bisacodyl tablets     At 4 (and no later than 6pm): start  drinking the Miralax-Gatorade preparation (8.3 oz of Miralax mixed with 64 oz of Gatorade in a large pitcher). Drink 1(one) 8 oz glass every 15 minutes thereafter, until the mixture is gone.    COLON CLEANSING TIPS: drink adequate amounts of fluids before and after your colon cleansing to prevent dehydration. Stay near a toilet because you will have diarrhea. Even if you are sitting on the toilet, continue to drink the cleansing solution every 15 minutes. If you feel nauseous or vomit, rinse your mouth with water, take a 15 to 30-minute-break and then continue drinking the solution. You will be uncomfortable until the stool has flushed from your colon (in about 2 to 4 hours). You may feel chilled.  Day of your procedure  You may take all of your morning medications including blood pressure medications, blood thinners (if you have not been instructed to stop these by our office), methadone, anti-seizure medications with sips of water 3 hours prior to your procedure or earlier. Do not take insulin or vitamins prior to your procedure. Continue the clear liquid diet.     4 hours prior: drink 10 oz of magnesium citrate. It may be easier to drink it with a straw.    STOP consuming all liquids after that.     Do not take anything by mouth during this time.     Allow extra time to travel to your procedure as you may need to stop and use a restroom along the way.    You are ready for the procedure, if you followed all instructions and your stool is no longer formed, but clear or yellow liquid. If you are unsure whether your colon is clean, please call our office at [phone] before you leave for your appointment.    Bring the following to your procedure:  - Insurance Card/Photo ID.   - List of current medications including over-the-counter medications and supplements.   - Your rescue inhaler if you currently use one to control asthma.      Canceling or rescheduling your appointment:   If you must cancel or reschedule your  appointment, please call [phone] as soon as possible.          COLONOSCOPY PRE-PROCEDURE CHECKLIST    If you have diabetes, ask your regular doctor for diet and medication restrictions.  If you take an anticoagulant or anti-platelet medication (such as Coumadin , Lovenox , Pradaxa , Xarelto , Eliquis , etc.), please call your primary doctor for advice on holding this medication.  If you take aspirin you may continue to do so.  If you are or may be pregnant, please discuss the risks and benefits of this procedure with your doctor.        What happens during a colonoscopy?    Plan to spend up to two hours, starting at registration time, at the endoscopy center the day of your procedure. The colonoscopy takes an average of 15 to 30 minutes. Recovery time is about 30 minutes.      Before the exam:    You will change into a gown.    Your medical history and medication list will be reviewed with you, unless that has been done over the phone prior to the procedure.     A nurse will insert an intravenous (IV) line into your hand or arm.    The doctor will meet with you and will give you a consent form to sign.  During the exam:     Medicine will be given through the IV line to help you relax.     Your heart rate and oxygen levels will be monitored. If your blood pressure is low, you may be given fluids through the IV line.     The doctor will insert a flexible hollow tube, called a colonoscope, into your rectum. The scope will be advanced slowly through the large intestine (colon).    You may have a feeling of fullness or pressure.     If an abnormal tissue or a polyp is found, the doctor may remove it through the endoscope for closer examination, or biopsy. Tissue removal is painless    After the exam:           Any tissue samples removed during the exam will be sent to a lab for evaluation. It may take 5-7 working days for you to be notified of the results.     A nurse will provide you with complete discharge  instructions before you leave the endoscopy center. Be sure to ask the nurse for specific instructions if you take blood thinners such as Aspirin, Coumadin or Plavix.     The doctor will prepare a full report for you and for the physician who referred you for the procedure.     Your doctor will talk with you about the initial results of your exam.      Medication given during the exam will prohibit you from driving for the rest of the day.     Following the exam, you may resume your normal diet. Your first meal should be light, no greasy foods. Avoid alcohol until the next day.     You may resume your regular activities the day after the procedure.         LOW-FIBER DIET    Foods RECOMMENDED Foods to AVOID   Breads, Cereal, Rice and Pasta:   White bread, rolls, biscuits, croissant and rosalio toast.   Waffles, German toast and pancakes.   White rice, noodles, pasta, macaroni and peeled cooked potatoes.   Plain crackers and saltines.   Cooked cereals: farina, cream of rice.   Cold cereals: Puffed Rice , Rice Krispies , Corn Flakes  and Special K    Breads, Cereal, Rice and Pasta:   Breads or rolls with nuts, seeds or fruit.   Whole wheat, pumpernickel, rye breads and cornbread.   Potatoes with skin, brown or wild rice, and kasha (buckwheat).     Vegetables:   Tender cooked and canned vegetables without seeds: carrots, asparagus tips, green or wax beans, pumpkin, spinach, lima beans. Vegetables:   Raw or steamed vegetables.   Vegetables with seeds.   Sauerkraut.   Winter squash, peas, broccoli, Brussel sprouts, cabbage, onions, cauliflower, baked beans, peas and corn.   Fruits:   Strained fruit juice.   Canned fruit, except pineapple.   Ripe bananas and melon. Fruits:   Prunes and prune juice.   Raw fruits.   Dried fruits: figs, dates and raisins.   Milk/Dairy:   Milk: plain or flavored.   Yogurt, custard and ice cream.   Cheese and cottage cheese Milk/Dairy:     Meat and other proteins:   ground, well-cooked tender  beef, lamb, ham, veal, pork, fish, poultry and organ meats.   Eggs.   Peanut butter without nuts. Meat and other proteins:   Tough, fibrous meats with gristle.   Dry beans, peas and lentils.   Peanut butter with nuts.   Tofu.   Fats, Snack, Sweets, Condiments and Beverages:   Margarine, butter, oils, mayonnaise, sour cream and salad dressing, plain gravy.   Sugar, hard candy, clear jelly, honey and syrup.   Spices, cooked herbs, bouillon, broth and soups made with allowed vegetable, ketchup and mustard.   Coffee, tea and carbonated drinks.   Plain cakes, cookies and pretzels.   Gelatin, plain puddings, custard, ice cream, sherbet and popsicles. Fats, Snack, Sweets, Condiments and Beverages:   Nuts, seeds and coconut.   Jam, marmalade and preserves.   Pickles, olives, relish and horseradish.   All desserts containing nuts, seeds, dried fruit and coconut; or made from whole grains or bran.   Candy made with nuts or seeds.   Popcorn.   DIRECTIONS TO THE ENDOSCOPY DEPARTMENT     From the north ([facility])  Take [address], exit on [address] [address]. Get into the left hand kristina, turn left (east), go one-half mile to [address] and turn left. Go north to the first stoplight, take a right on [address] and follow it to the Emergency entrance.    From the south ([facility])  Take [address]N to the [address]E split and exit on [address] [address]. On [address] [address], turn left (west) to [address]. Turn right (north) on [address]. Go north to the first stoplight, take a right on [address] and follow it to the Emergency entrance.    From the east via 35E ([facility])  Take [address] south to [address] [address] exit. Turn right on [address] [address]. Go west to [address]. Turn right (north) on [address]. Go to the first stoplight, take a right and follow on [address] to the Emergency entrance.    From the east via [address] ([facility])  [facility] [address]. Turn left (south) on [address] to [address]. Turn left (east) on [address] and follow it to the Emergency entrance.    From the west via [address] ([place])  Take [address] east to [address]. Turn right (south) on [address] to [address]. Turn left (east) on [address] and follow it to the Emergency entrance.        Sincerely,        [facility] Endoscopy Department

## 2019-12-16 ENCOUNTER — HEALTH MAINTENANCE LETTER (OUTPATIENT)
Age: 69
End: 2019-12-16

## 2019-12-17 DIAGNOSIS — K76.0 HEPATIC STEATOSIS: Primary | ICD-10-CM

## 2020-01-03 ENCOUNTER — TELEPHONE (OUTPATIENT)
Dept: FAMILY MEDICINE | Facility: CLINIC | Age: 70
End: 2020-01-03

## 2020-01-03 NOTE — LETTER
[facility]  [address] [id number] [phone]  January 3, 2020    Juwan Latham  [address] [place]  [facility] [address]    Dear Juwan,    I care about your health and have reviewed your health plan. I have reviewed your medical conditions, medication list, and lab results and am making recommendations based on this review, to better manage your health.    You are in particular need of attention regarding:  -Wellness (Physical) Visit     I am recommending that you:  -schedule a WELLNESS (Physical) APPOINTMENT with me.   I will check fasting labs the same day - nothing to eat except water and meds for 8-10 hours prior.    Here is a list of Health Maintenance topics that are due now or due soon:  Health Maintenance Due   Topic Date Due     ZOSTER IMMUNIZATION (1 of 2) 12/06/2001     MEDICARE ANNUAL WELLNESS VISIT  12/06/2016     FALL RISK ASSESSMENT  08/01/2019       Please call us at [phone] (or use [employer]) to address the above recommendations.     Thank you for trusting [facility] and we appreciate the opportunity to serve you.  We look forward to supporting your healthcare needs in the future.    Healthy Regards,    Asad White, DO

## 2020-01-03 NOTE — TELEPHONE ENCOUNTER
Reason for Call:  Other     Detailed comments: The patient called saying he received a letter in the mail stating he is due for his physical. He just had his physical with Dr. White on 12/05/2019. He wants to know why he received this letter.    Phone Number Patient can be reached at: Cell number on file:    Telephone Information:   Mobile [phone]     Best Time: Anytime    Can we leave a detailed message on this number? YES    Call taken on 1/3/2020 at 1:04 PM by Francheska hCen

## 2020-01-06 NOTE — TELEPHONE ENCOUNTER
Called pt and explained his appt in December was for chest pain not an annual wellness.  Appt made for 1/14/2020 for annual fasting wellness.  Jayna Rider

## 2020-01-07 ENCOUNTER — OFFICE VISIT (OUTPATIENT)
Dept: FAMILY MEDICINE | Facility: CLINIC | Age: 70
End: 2020-01-07
Payer: COMMERCIAL

## 2020-01-07 VITALS — DIASTOLIC BLOOD PRESSURE: 78 MMHG | SYSTOLIC BLOOD PRESSURE: 122 MMHG

## 2020-01-07 DIAGNOSIS — L73.9 FOLLICULITIS: Primary | ICD-10-CM

## 2020-01-07 PROCEDURE — 99213 OFFICE O/P EST LOW 20 MIN: CPT | Performed by: FAMILY MEDICINE

## 2020-01-07 RX ORDER — DOXYCYCLINE 100 MG/1
100 CAPSULE ORAL 2 TIMES DAILY
Qty: 28 CAPSULE | Refills: 0 | Status: SHIPPED | OUTPATIENT
Start: 2020-01-07 | End: 2020-01-14

## 2020-01-07 RX ORDER — TRIAMCINOLONE ACETONIDE 1 MG/G
CREAM TOPICAL 2 TIMES DAILY
Qty: 30 G | Refills: 0 | Status: SHIPPED | OUTPATIENT
Start: 2020-01-07 | End: 2020-05-14

## 2020-01-07 NOTE — PROGRESS NOTES
[facility] - PRIMARY CARE SKIN    CC: scalp folliculitis  SUBJECTIVE:   Juwan Latham is a(n) 69 year old male who presents to clinic today for follow up of scalp folliculitis beginning approximately 8 months ago.     Dry spots on the scalp began initially, and he also developed skin peeling and itchiness. He typically shaves his scalp with use of a shaving cream. He has not noticed scaling on the face. Wound culture from 8/26/2019 showed normal skin kevin. Symptoms had resolved at his last office visit on 9/23/19 with treatment of doxycycline 100 mg BID for 3 weeks and OTC Head & Shoulders shampoo.    Today he reports that symptoms had not fully cleared. He reports a flare of symptoms (scalp tenderness) in the last month, but now symptoms have been improving again. He has used various things, including a lip gloss, on the scalp. He is continuing to use a single-bladed razor to shave his scalp, which he normally uses a few times before discarding it. He is also continuing to shave with Barbasol shaving cream. He shaves his scalp approximately once a week, though flares do not seem to be associated with the timing of shaving.    He has also been using clotrimazole 1% cream on the scalp.    Personal Medical History  Skin Cancer: NO  Eczema Psoriasis Autoimmune   NO NO NO     Family Medical History  Skin Cancer: NO  Eczema Psoriasis Autoimmune   NO NO NO     Occupation: construction (both indoor & outdoor).    Refer to electronic medical record (EMR) for past medical history and medications.    ROS: 14 point review of systems was negative except the symptoms listed above in the HPI.    This document serves as a record of the services and decisions personally performed and made by Maria M Mccormick MD and was created by Damon Barney, a trained medical scribe, based on personal observations and provider statements to the medical scribe.  January 7, 2020 8:38 AM   Damon Barney    OBJECTIVE:   GENERAL: healthy, alert  and no distress.  SKIN: Azevedo Skin Type - II.  Scalp examined. The dermatoscope was used to help evaluate pigmented lesions.  Skin Pertinent Findings:  Scattered patches of erythema and some small papules with occasional kaye on the mid-frontal scalp to upper forehead.    ASSESSMENT:     Encounter Diagnosis   Name Primary?     Folliculitis Yes         PLAN:   Patient Instructions   FUTURE APPOINTMENTS  Follow up in 4 week(s).    SHAVING INSTRUCTIONS    Shave with just a single bladed razor. Discard after each use.    Use an unscented shaving cream designed for sensitive skin when shaving.    Shave in the direction of hair growth, not against the grain.    ORAL ANTIBIOTIC  Take by mouth 1 capsule/tablet of doxycycline 100 mg two time(s) a day for 14 days. Make sure to complete the entire antibiotic regimen as instructed to prevent development of bacterial resistance to antibiotics.    TOPICAL STEROID INSTRUCTIONS  Triamcinolone 0.1% cream.  Apply two times per day for 14 days. Then, use only when needed.  1. Wash hands before applying topical steroid.  2. Apply sparingly (just enough to rub in) onto affected areas of the scalp.    After the initial treatment, topical steroid may be used as needed for flare-ups but only for short-term treatment. If you are using this for prolonged periods of time to control flare-ups, return to clinic for re-evaluation of treatment.    Keep in mind to also regularly use moisturizer, as this preventative measure can help maintain your skin's natural protective moisture barrier.    Stop using the topical antifungal clotrimazole 1% cream.    TT: 20 minutes.  CT: 15 minutes.    The information in this document, created by the medical scribe for me, accurately reflects the services I personally performed and the decisions made by me. I have reviewed and approved this document for accuracy prior to leaving the patient care area.  January 7, 2020 8:38 AM  Maria M Mccormick,  MD  [facility] [place]

## 2020-01-07 NOTE — PATIENT INSTRUCTIONS
FUTURE APPOINTMENTS  Follow up in 4 week(s).    SHAVING INSTRUCTIONS    Shave with just a single bladed razor. Discard after each use.    Use an unscented shaving cream designed for sensitive skin when shaving.    Shave in the direction of hair growth, not against the grain.    ORAL ANTIBIOTIC  Take by mouth 1 capsule/tablet of doxycycline 100 mg two time(s) a day for 14 days. Make sure to complete the entire antibiotic regimen as instructed to prevent development of bacterial resistance to antibiotics.    TOPICAL STEROID INSTRUCTIONS  Triamcinolone 0.1% cream.  Apply two times per day for 14 days. Then, use only when needed.  1. Wash hands before applying topical steroid.  2. Apply sparingly (just enough to rub in) onto affected areas of the scalp.    After the initial treatment, topical steroid may be used as needed for flare-ups but only for short-term treatment. If you are using this for prolonged periods of time to control flare-ups, return to clinic for re-evaluation of treatment.    Keep in mind to also regularly use moisturizer, as this preventative measure can help maintain your skin's natural protective moisture barrier.    Stop using the topical antifungal clotrimazole 1% cream.

## 2020-01-07 NOTE — LETTER
1/7/2020         RE: Juwan Latham  [address]  [place] [address]        Dear Colleague,    Thank you for referring your patient, Juwan Latham, to the [facility] [place]. Please see a copy of my visit note below.    [facility] - PRIMARY CARE SKIN    CC: scalp folliculitis  SUBJECTIVE:   Juwan Latham is a(n) 69 year old male who presents to clinic today for follow up of scalp folliculitis beginning approximately 8 months ago.     Dry spots on the scalp began initially, and he also developed skin peeling and itchiness. He typically shaves his scalp with use of a shaving cream. He has not noticed scaling on the face. Wound culture from 8/26/2019 showed normal skin kevin. Symptoms had resolved at his last office visit on 9/23/19 with treatment of doxycycline 100 mg BID for 3 weeks and OTC Head & Shoulders shampoo.    Today he reports that symptoms had not fully cleared. He reports a flare of symptoms (scalp tenderness) in the last month, but now symptoms have been improving again. He has used various things, including a lip gloss, on the scalp. He is continuing to use a single-bladed razor to shave his scalp, which he normally uses a few times before discarding it. He is also continuing to shave with Barbasol shaving cream. He shaves his scalp approximately once a week, though flares do not seem to be associated with the timing of shaving.    He has also been using clotrimazole 1% cream on the scalp.    Personal Medical History  Skin Cancer: NO  Eczema Psoriasis Autoimmune   NO NO NO     Family Medical History  Skin Cancer: NO  Eczema Psoriasis Autoimmune   NO NO NO     Occupation: construction (both indoor & outdoor).    Refer to electronic medical record (EMR) for past medical history and medications.    ROS: 14 point review of systems was negative except the symptoms listed above in the HPI.    This document serves as a record of the services and decisions personally performed and  made by Maria M Mccormick MD and was created by Damon Barney, a trained medical scribe, based on personal observations and provider statements to the medical scribe.  January 7, 2020 8:38 AM   Damon Barney    OBJECTIVE:   GENERAL: healthy, alert and no distress.  SKIN: Azevedo Skin Type - II.  Scalp examined. The dermatoscope was used to help evaluate pigmented lesions.  Skin Pertinent Findings:  Scattered patches of erythema and some small papules with occasional kaye on the mid-frontal scalp to upper forehead.    ASSESSMENT:     Encounter Diagnosis   Name Primary?     Folliculitis Yes         PLAN:   Patient Instructions   FUTURE APPOINTMENTS  Follow up in 4 week(s).    SHAVING INSTRUCTIONS    Shave with just a single bladed razor. Discard after each use.    Use an unscented shaving cream designed for sensitive skin when shaving.    Shave in the direction of hair growth, not against the grain.    ORAL ANTIBIOTIC  Take by mouth 1 capsule/tablet of doxycycline 100 mg two time(s) a day for 14 days. Make sure to complete the entire antibiotic regimen as instructed to prevent development of bacterial resistance to antibiotics.    TOPICAL STEROID INSTRUCTIONS  Triamcinolone 0.1% cream.  Apply two times per day for 14 days. Then, use only when needed.  1. Wash hands before applying topical steroid.  2. Apply sparingly (just enough to rub in) onto affected areas of the scalp.    After the initial treatment, topical steroid may be used as needed for flare-ups but only for short-term treatment. If you are using this for prolonged periods of time to control flare-ups, return to clinic for re-evaluation of treatment.    Keep in mind to also regularly use moisturizer, as this preventative measure can help maintain your skin's natural protective moisture barrier.    Stop using the topical antifungal clotrimazole 1% cream.    TT: 20 minutes.  CT: 15 minutes.    The information in this document, created by the medical  scribe for me, accurately reflects the services I personally performed and the decisions made by me. I have reviewed and approved this document for accuracy prior to leaving the patient care area.  January 7, 2020 8:38 AM  Maria M Mccormick MD  [facility]    Again, thank you for allowing me to participate in the care of your patient.        Sincerely,        Maria M Mccormick MD

## 2020-01-14 ENCOUNTER — OFFICE VISIT (OUTPATIENT)
Dept: FAMILY MEDICINE | Facility: CLINIC | Age: 70
End: 2020-01-14
Payer: COMMERCIAL

## 2020-01-14 VITALS
DIASTOLIC BLOOD PRESSURE: 60 MMHG | WEIGHT: 208 LBS | HEIGHT: 68 IN | HEART RATE: 66 BPM | SYSTOLIC BLOOD PRESSURE: 126 MMHG | BODY MASS INDEX: 31.52 KG/M2 | OXYGEN SATURATION: 97 % | TEMPERATURE: 97.9 F

## 2020-01-14 DIAGNOSIS — R10.13 DYSPEPSIA: ICD-10-CM

## 2020-01-14 DIAGNOSIS — Z13.1 SCREENING FOR DIABETES MELLITUS: ICD-10-CM

## 2020-01-14 DIAGNOSIS — Z00.00 ENCOUNTER FOR MEDICARE ANNUAL WELLNESS EXAM: Primary | ICD-10-CM

## 2020-01-14 DIAGNOSIS — H91.90 HEARING LOSS, UNSPECIFIED HEARING LOSS TYPE, UNSPECIFIED LATERALITY: ICD-10-CM

## 2020-01-14 DIAGNOSIS — K76.0 HEPATIC STEATOSIS: ICD-10-CM

## 2020-01-14 DIAGNOSIS — Z13.6 CARDIOVASCULAR SCREENING; LDL GOAL LESS THAN 160: ICD-10-CM

## 2020-01-14 LAB
ALBUMIN SERPL-MCNC: 3.8 G/DL (ref 3.4–5)
ALP SERPL-CCNC: 57 U/L (ref 40–150)
ALT SERPL W P-5'-P-CCNC: 172 U/L (ref 0–70)
ANION GAP SERPL CALCULATED.3IONS-SCNC: 7 MMOL/L (ref 3–14)
AST SERPL W P-5'-P-CCNC: 109 U/L (ref 0–45)
BILIRUB SERPL-MCNC: 1.1 MG/DL (ref 0.2–1.3)
BUN SERPL-MCNC: 11 MG/DL (ref 7–30)
CALCIUM SERPL-MCNC: 9 MG/DL (ref 8.5–10.1)
CHLORIDE SERPL-SCNC: 105 MMOL/L (ref 94–109)
CHOLEST SERPL-MCNC: 189 MG/DL
CO2 SERPL-SCNC: 26 MMOL/L (ref 20–32)
CREAT SERPL-MCNC: 0.84 MG/DL (ref 0.66–1.25)
CRP SERPL-MCNC: 3.8 MG/L (ref 0–8)
ERYTHROCYTE [SEDIMENTATION RATE] IN BLOOD BY WESTERGREN METHOD: 6 MM/H (ref 0–20)
GFR SERPL CREATININE-BSD FRML MDRD: 90 ML/MIN/{1.73_M2}
GLUCOSE SERPL-MCNC: 101 MG/DL (ref 70–99)
HDLC SERPL-MCNC: 48 MG/DL
LDLC SERPL CALC-MCNC: 120 MG/DL
NONHDLC SERPL-MCNC: 141 MG/DL
POTASSIUM SERPL-SCNC: 4.3 MMOL/L (ref 3.4–5.3)
PROT SERPL-MCNC: 7 G/DL (ref 6.8–8.8)
SODIUM SERPL-SCNC: 137 MMOL/L (ref 133–144)
TRIGL SERPL-MCNC: 106 MG/DL

## 2020-01-14 PROCEDURE — 99214 OFFICE O/P EST MOD 30 MIN: CPT | Mod: 25 | Performed by: FAMILY MEDICINE

## 2020-01-14 PROCEDURE — 36415 COLL VENOUS BLD VENIPUNCTURE: CPT | Performed by: FAMILY MEDICINE

## 2020-01-14 PROCEDURE — 80061 LIPID PANEL: CPT | Performed by: FAMILY MEDICINE

## 2020-01-14 PROCEDURE — 80053 COMPREHEN METABOLIC PANEL: CPT | Performed by: FAMILY MEDICINE

## 2020-01-14 PROCEDURE — 85652 RBC SED RATE AUTOMATED: CPT | Performed by: FAMILY MEDICINE

## 2020-01-14 PROCEDURE — 99397 PER PM REEVAL EST PAT 65+ YR: CPT | Performed by: FAMILY MEDICINE

## 2020-01-14 PROCEDURE — 86140 C-REACTIVE PROTEIN: CPT | Performed by: FAMILY MEDICINE

## 2020-01-14 ASSESSMENT — MIFFLIN-ST. JEOR: SCORE: 1687.98

## 2020-01-14 ASSESSMENT — ACTIVITIES OF DAILY LIVING (ADL): CURRENT_FUNCTION: NO ASSISTANCE NEEDED

## 2020-01-14 NOTE — PROGRESS NOTES
"SUBJECTIVE:   Juwan Latham is a 69 year old male who presents for Preventive Visit.    Are you in the first 12 months of your Medicare coverage?  No      Healthy Habits:    In general, how would you rate your overall health?  Fair    Frequency of exercise:  6-7 days/week    Duration of exercise:  30-45 minutes    Do you usually eat at least 4 servings of fruit and vegetables a day, include whole grains    & fiber and avoid regularly eating high fat or \"junk\" foods?  Yes    Taking medications regularly:  Yes    Barriers to taking medications:  None    Medication side effects:  None    Ability to successfully perform activities of daily living:  No assistance needed    Home Safety:  No safety concerns identified    Hearing Impairment:  Feel that people are mumbling or not speaking clearly    In the past 6 months, have you been bothered by leaking of urine? Yes    In general, how would you rate your overall mental or emotional health?  Good      PHQ-2 Total Score:    Additional concerns today:  Yes    Right sided chest symptoms: this has been going on intermittently for several years. In the past, had h pylori testing which was negative. He has been put on omeprazole on several occasions with minimal to no improvement. He describes the symptoms as a bubble in his chest. He is able to exercise without any exacerbation of symptoms. No association with food. He has had 2 normal stress echocardiograms over the past several years and normal blood work (does have elevated LFTs and lfatty liver disease). In the past, chest CT did demonstrate lung nodules and he has history of non small cell lung cancer. He is followed with annual chest CT (last one in March 2019 without evidence of recurrent or metastatic disease). He is not having any shortness of breath, dyspnea, or cough. In the past he has been on antidepressant/anxiolytic medication for mood as it was thought this could be anxiety but no real improvement in symptoms. " Otherwise, denies any headaches, lightheadedness, dizziness, vision changes, palpitations, shortness of breath, dyspnea, numbness/tingling.      Do you feel safe in your environment? Yes    Have you ever done Advance Care Planning? (For example, a Health Directive, POLST, or a discussion with a medical provider or your loved ones about your wishes): Yes, patient states has an Advance Care Planning document and will bring a copy to the clinic.      Fall risk  Fallen 2 or more times in the past year?: No  Any fall with injury in the past year?: No    Cognitive Screening   1) Repeat 3 items (Leader, Season, Table)    2) Clock draw: NORMAL  3) 3 item recall: Recalls NO objects   Results: 0 items recalled: PROBABLE COGNITIVE IMPAIRMENT, **INFORM PROVIDER**    Mini-CogTM Copyright S Terrance. Licensed by the author for use in [facility]; reprinted with permission (alex@[facility]). All rights reserved.      Do you have sleep apnea, excessive snoring or daytime drowsiness?: no    Reviewed and updated as needed this visit by clinical staff  Tobacco  Allergies  Meds         Reviewed and updated as needed this visit by Provider        Social History     Tobacco Use     Smoking status: Former Smoker     Packs/day: 2.00     Years: 20.00     Pack years: 40.00     Types: Cigarettes     Last attempt to quit: 1985     Years since quittin.0     Smokeless tobacco: Never Used   Substance Use Topics     Alcohol use: Not Currently     Comment: couple  times  per  week  (beers)     If you drink alcohol do you typically have >3 drinks per day or >7 drinks per week? No    Alcohol Use 2020   Prescreen: >3 drinks/day or >7 drinks/week? No         Current providers sharing in care for this patient include:   Patient Care Team:  Julio Guidry Jr., MD as PCP - General (Family Practice)  Weight, Paulo Wiley MD as MD (Urology)  Love Mcdonadl RN as Registered Nurse (Oncology)  Maria A Desai MD as MD  "(Thoracic Diseases)  Asad White,  as Assigned PCP    The following health maintenance items are reviewed in Epic and correct as of today:  Health Maintenance   Topic Date Due     ZOSTER IMMUNIZATION (1 of 2) 12/06/2001     MEDICARE ANNUAL WELLNESS VISIT  12/06/2016     FALL RISK ASSESSMENT  08/01/2019     PHQ-9  06/05/2020     MICROALBUMIN  06/17/2020     BMP  12/05/2020     LIPID  05/24/2021     ADVANCE CARE PLANNING  06/15/2022     COLONOSCOPY  12/11/2024     DTAP/TDAP/TD IMMUNIZATION (3 - Td) 02/28/2026     HEPATITIS C SCREENING  Completed     DEPRESSION ACTION PLAN  Completed     INFLUENZA VACCINE  Completed     PNEUMOCOCCAL IMMUNIZATION 65+ HIGH/HIGHEST RISK  Completed     AORTIC ANEURYSM SCREENING (SYSTEM ASSIGNED)  Completed     IPV IMMUNIZATION  Aged Out     MENINGITIS IMMUNIZATION  Aged Out     Lab work is in process      Review of Systems  Constitutional, HEENT, cardiovascular, pulmonary, gi and gu systems are negative, except as otherwise noted.    OBJECTIVE:   /60   Pulse 66   Temp 97.9  F (36.6  C) (Oral)   Ht 1.727 m (5' 8\")   Wt 94.3 kg (208 lb)   SpO2 97%   BMI 31.63 kg/m   Estimated body mass index is 31.63 kg/m  as calculated from the following:    Height as of this encounter: 1.727 m (5' 8\").    Weight as of this encounter: 94.3 kg (208 lb).  Physical Exam  GENERAL: healthy, alert and no distress  EYES: Eyes grossly normal to inspection, PERRL and conjunctivae and sclerae normal  HENT: ear canals and TM's normal, nose and mouth without ulcers or lesions  NECK: no adenopathy and no asymmetry, masses, or scars  RESP: lungs clear to auscultation - no rales, rhonchi or wheezes  CV: regular rate and rhythm, normal S1 S2, no S3 or S4, no murmur, click or rub, no peripheral edema and peripheral pulses strong  ABDOMEN: soft, nontender, no hepatosplenomegaly, no masses and bowel sounds normal  MS: no gross musculoskeletal defects noted, no edema  PSYCH: mentation appears normal, affect " "normal/bright    Diagnostic Test Results:  Labs reviewed in Epic    ASSESSMENT / PLAN:   1. Encounter for Medicare annual wellness exam: health maintenance reviewed and updated.    2. Hearing loss, unspecified hearing loss type, unspecified laterality: refer to audiology  - AUDIOLOGY ADULT REFERRAL    3. Dyspepsia: unclear etiology for what sounds like dyspepsia - has had fairly thorough workup over the last 5-6 years without cause. Will repeat H pylori. Advise follow up with GI to see if they think this is GI related or have any additional recommendations beyond PPI therapy as this has brought minimal relief.  - CRP, inflammation  - ESR: Erythrocyte sedimentation rate  - Helicobacter pylori Antigen Stool; Future    4. Hepatic steatosis: recheck LFTs today, follow up with GI.    5. CARDIOVASCULAR SCREENING; LDL GOAL LESS THAN 160  - Lipid panel reflex to direct LDL Fasting    6. Screening for diabetes mellitus  - Comprehensive metabolic panel (BMP + Alb, Alk Phos, ALT, AST, Total. Bili, TP)    COUNSELING:  Reviewed preventive health counseling, as reflected in patient instructions       Regular exercise       Healthy diet/nutrition    Estimated body mass index is 31.63 kg/m  as calculated from the following:    Height as of this encounter: 1.727 m (5' 8\").    Weight as of this encounter: 94.3 kg (208 lb).    Weight management plan: Discussed healthy diet and exercise guidelines     reports that he quit smoking about 35 years ago. His smoking use included cigarettes. He has a 40.00 pack-year smoking history. He has never used smokeless tobacco.      Appropriate preventive services were discussed with this patient, including applicable screening as appropriate for cardiovascular disease, diabetes, osteopenia/osteoporosis, and glaucoma.  As appropriate for age/gender, discussed screening for colorectal cancer, prostate cancer, breast cancer, and cervical cancer. Checklist reviewing preventive services available has " been given to the patient.    Reviewed patients plan of care and provided an AVS. The Basic Care Plan (routine screening as documented in Health Maintenance) for Juwan meets the Care Plan requirement. This Care Plan has been established and reviewed with the Patient.    Counseling Resources:  ATP IV Guidelines  Pooled Cohorts Equation Calculator  Breast Cancer Risk Calculator  FRAX Risk Assessment  ICSI Preventive Guidelines  Dietary Guidelines for Americans, 2010  USDA's MyPlate  ASA Prophylaxis  Lung CA Screening    Asad White DO  [facility]

## 2020-01-14 NOTE — PATIENT INSTRUCTIONS
Patient Education   Personalized Prevention Plan  You are due for the preventive services outlined below.  Your care team is available to assist you in scheduling these services.  If you have already completed any of these items, please share that information with your care team to update in your medical record.  Health Maintenance Due   Topic Date Due     Zoster (Shingles) Vaccine (1 of 2) 12/06/2001     Annual Wellness Visit  12/06/2016     FALL RISK ASSESSMENT  08/01/2019

## 2020-01-16 DIAGNOSIS — R10.13 DYSPEPSIA: ICD-10-CM

## 2020-01-16 PROCEDURE — 87338 HPYLORI STOOL AG IA: CPT | Performed by: FAMILY MEDICINE

## 2020-01-17 LAB — H PYLORI AG STL QL IA: NEGATIVE

## 2020-01-18 DIAGNOSIS — Z11.59 ENCOUNTER FOR SCREENING FOR OTHER VIRAL DISEASES: ICD-10-CM

## 2020-01-18 DIAGNOSIS — R79.89 ELEVATED LFTS: Primary | ICD-10-CM

## 2020-01-18 DIAGNOSIS — R79.9 ABNORMAL FINDING OF BLOOD CHEMISTRY, UNSPECIFIED: ICD-10-CM

## 2020-01-23 ENCOUNTER — HOSPITAL ENCOUNTER (OUTPATIENT)
Dept: ULTRASOUND IMAGING | Facility: CLINIC | Age: 70
Discharge: HOME | End: 2020-01-23
Attending: FAMILY MEDICINE | Admitting: FAMILY MEDICINE
Payer: COMMERCIAL

## 2020-01-23 DIAGNOSIS — R79.89 ELEVATED LFTS: ICD-10-CM

## 2020-01-23 PROCEDURE — 76705 ECHO EXAM OF ABDOMEN: CPT

## 2020-01-31 ENCOUNTER — TRANSFERRED RECORDS (OUTPATIENT)
Dept: HEALTH INFORMATION MANAGEMENT | Facility: CLINIC | Age: 70
End: 2020-01-31

## 2020-01-31 LAB
ALT SERPL-CCNC: 163 U/L (ref 0–78)
AST SERPL-CCNC: 80 U/L (ref 0–37)
HEP C HIM: NORMAL

## 2020-02-26 ENCOUNTER — OFFICE VISIT (OUTPATIENT)
Dept: FAMILY MEDICINE | Facility: CLINIC | Age: 70
End: 2020-02-26
Payer: COMMERCIAL

## 2020-02-26 VITALS
SYSTOLIC BLOOD PRESSURE: 114 MMHG | DIASTOLIC BLOOD PRESSURE: 60 MMHG | HEART RATE: 59 BPM | BODY MASS INDEX: 30.56 KG/M2 | OXYGEN SATURATION: 97 % | WEIGHT: 201 LBS | TEMPERATURE: 97.5 F

## 2020-02-26 DIAGNOSIS — K76.0 FATTY LIVER: Primary | ICD-10-CM

## 2020-02-26 PROCEDURE — 99212 OFFICE O/P EST SF 10 MIN: CPT | Performed by: FAMILY MEDICINE

## 2020-02-26 RX ORDER — B-COMPLEX WITH VITAMIN C
250 TABLET ORAL DAILY
COMMUNITY
End: 2022-01-21

## 2020-02-26 NOTE — PROGRESS NOTES
Subjective     Juwan Latham is a 69 year old male who presents to clinic today for the following health issues:    HPI   Results - go over test results, discuss fatty liver disease    Juwan had been drinking 3 large beers daily. Past imaging demonstrated fatty liver / hepatic steatosis.  LFTs have been increasing. Saw GI who discussed alcoholic liver disease and is planning follow up in 3 months    He has completely cut out alcohol and has been really watching his diet. Down 8 lbs on clinic scale today. Still feeling fatigued which has been going on for a couple years.      Patient Active Problem List   Diagnosis     Allergic rhinitis due to other allergen     CARDIOVASCULAR SCREENING; LDL GOAL LESS THAN 160     Advance Care Planning     History of colonic polyps     Anxiety     PAULINO (obstructive sleep apnea)     GERD (gastroesophageal reflux disease)     Hypertension goal BP (blood pressure) < 140/90     Seasonal allergies     Benign neoplasm of descending colon     Malignant neoplasm of upper lobe of right lung (H)     Overweight (BMI 25.0-29.9)     Cavernous hemangioma of brain (H)     Major depressive disorder, recurrent episode, mild (H)     Prostate cancer (H)     Somatic dysfunction of pelvis region     Mixed incontinence urge and stress (male)(female)     Breen catheter problem (H)     Retinal detachment of left eye with single break     Past Surgical History:   Procedure Laterality Date     BRONCHOSCOPY FLEXIBLE N/A 3/3/2017    Procedure: BRONCHOSCOPY FLEXIBLE;  Surgeon: Maria A Desai MD;  Location: [facility] OR     COLONOSCOPY N/A 2/11/2015    Procedure: COLONOSCOPY;  Surgeon: Murphy Jesus MD;  Location:  GI     COLONOSCOPY N/A 12/11/2019    Procedure: COLONOSCOPY;  Surgeon: Muprhy Jesus MD;  Location:  GI     COMBINED CYSTOSCOPY, RETROGRADES, URETEROSCOPY, LASER HOLMIUM LITHOTRIPSY URETER(S), INSERT STENT N/A 3/25/2018    Procedure: COMBINED CYSTOSCOPY, RETROGRADES, URETEROSCOPY, LASER HOLMIUM  LITHOTRIPSY URETER(S), INSERT STENT;  Cystoscopy, Catheter Exchange under Anesthesia;  Surgeon: Zaria Cain MD;  Location: [facility] OR     DAVINCI PROSTATECTOMY N/A 2017    Procedure: DAVINCI PROSTATECTOMY;  Robotic Assisted Radical Prostatectomy and Pelvic Lymph Node Dissection ;  Surgeon: Paulo Agarwal MD;  Location: [facility] OR     ESOPHAGOSCOPY, GASTROSCOPY, DUODENOSCOPY (EGD), COMBINED N/A 2016    Procedure: COMBINED ESOPHAGOSCOPY, GASTROSCOPY, DUODENOSCOPY (EGD), BIOPSY SINGLE OR MULTIPLE;  Surgeon: Murphy Jesus MD;  Location: [facility] GI     LAPAROSCOPIC WEDGE RESECTION LUNG Right 3/3/2017    Procedure: LAPAROSCOPIC WEDGE RESECTION LUNG;  Surgeon: Maria A Desai MD;  Location: [facility] OR     LASER HOLMIUM LITHOTRIPSY URETER(S), INSERT STENT, COMBINED  2012    Procedure: COMBINED CYSTOSCOPY, URETEROSCOPY, LASER HOLMIUM LITHOTRIPSY URETER(S), INSERT STENT;  Cystoscopy, Right Ureteroscopy, Stone Extraction, Holmium Laser, Double J Stent Placement;  Surgeon: Nicolás Blackwell MD;  Location: [facility] OR       Social History     Tobacco Use     Smoking status: Former Smoker     Packs/day: 2.00     Years: 20.00     Pack years: 40.00     Types: Cigarettes     Last attempt to quit: 1985     Years since quittin.1     Smokeless tobacco: Never Used   Substance Use Topics     Alcohol use: Not Currently     Comment: couple  times  per  week  (beers)     Family History   Problem Relation Age of Onset     Heart Disease Mother      Diabetes Brother 65        Type 2     Diabetes Maternal Grandmother      Cancer - colorectal Brother 70     Hypertension No family hx of      Lipids No family hx of            Reviewed and updated as needed this visit by Provider         Review of Systems   ROS COMP: Constitutional, HEENT, cardiovascular, pulmonary, gi and gu systems are negative, except as otherwise noted.      Objective    /60   Pulse 59   Temp 97.5  F (36.4  C) (Oral)   Wt 91.2 kg (201 lb)    SpO2 97%   BMI 30.56 kg/m    Body mass index is 30.56 kg/m .  Physical Exam   GENERAL: healthy, alert and no distress  ABDOMEN: soft, non-distended, palpable liver edge  MS: no gross musculoskeletal defects noted, no edema    Diagnostic Test Results:  Labs reviewed in Epic        Assessment & Plan     1. Fatty liver: Juwan has done a really good job changing lifestyle to help with abnormal liver tests/fatty liver. Discussed that at this stage, liver damage is still reversible but if it progresses to cirrhosis, that is irreversible. He will continue abstaining from alcohol and keep healthy diet. Try to increase exercise. Following up with Dr. Keenan in 2 months.          Asad White, DO  [facility]

## 2020-03-10 ENCOUNTER — HOSPITAL ENCOUNTER (OUTPATIENT)
Dept: NUTRITION | Facility: CLINIC | Age: 70
Discharge: HOME | End: 2020-03-10
Attending: FAMILY MEDICINE | Admitting: FAMILY MEDICINE
Payer: COMMERCIAL

## 2020-03-10 VITALS — BODY MASS INDEX: 29.45 KG/M2 | WEIGHT: 193.7 LBS

## 2020-03-10 PROCEDURE — 97802 MEDICAL NUTRITION INDIV IN: CPT | Mod: GA | Performed by: DIETITIAN, REGISTERED

## 2020-03-10 NOTE — PROGRESS NOTES
"OUTPATIENT NUTRITION ASSESSMENT  REASON FOR ASSESSMENT  Juwan Latham referred by Dr. White for MNT related to   Hepatic steatosis [K76.0]  - Primary        Gastroesophageal reflux disease, esophagitis presence not specified [K21.9]         Patient accompanied by self      ASSESSMENT   Nutrition History:  - Information obtained from patient.  Patient following reduced calorie diet at home.  Patient has made drastic changes in his eating habits since learning about his liver disease.  Patient eats 2-3 meals per day and prepares his own meals.  Patient has stopped drinking Craft beer and replaced it with lemon Bubbly sparking water.  Patient reading labels for saturated fat and sodium content.  Patient was not aware of importance of looking at serving size.  Patient finds it challenging to prepare meals for one.  Patient complains of extreme fatigue.  Patient may sleep 12 hours plus take naps during the day.    Diet Recall:  Breakfast: steel cut oatmeal with cinnamon and blueberries + water + green tea (previously patient at breakfast pizza)  Lunch: large plate of salad greens with tomatoes and vinegar (may add cheese, salmon or chicken)   Dinner: stir paez with broccoli, cauliflower and carrots (chicken or salmon added)  Snack: 2-3 oz walnuts, cashews or peanuts, grapes  Beverages: 16 oz water, 2-3 Bubbly sparkling water, 6 oz green tea  Dining out: trying to limit     Exercise: Patient exercises minimum of 4 times per week (walks 1 mile on treadmill + lifts weights)     NUTRITION FOCUSED PHYSICAL ASSESSMENT (NFPA) FOR DIAGNOSING MALNUTRITION  Yes     Observed:   No nutrition-related physical findings observed    Obtained from Chart/Interdisciplinary Team:  None noted     LABS  Labs reviewed    MEDICATIONS/SUPPLEMENTS  Medications/supplements reviewed-B1, B12, vitamin D and MVI (Patient does not know dose of supplements)    ANTHROPOMETRICS   Height: 5'8\"  Weight: 193.7 lbs  BMI (kg/m2): 29.4  Weight Status:  " Overweight BMI 25-29.9  %IBW: 125%  Weight History: Patient states usual weight was 211 lbs.  In the past, patient weighed 183 lbs in his 40s and 193 lbs in his 50s.  Patient would like to weight 183 lbs again.  Wt Readings from Last 10 Encounters:   03/10/20 87.9 kg (193 lb 11.2 oz)   02/26/20 91.2 kg (201 lb)   01/14/20 94.3 kg (208 lb)   12/11/19 95.3 kg (210 lb)   12/05/19 95.3 kg (210 lb)   08/12/19 90.7 kg (200 lb)   07/09/19 92.1 kg (203 lb)   06/18/19 92.5 kg (204 lb)   06/17/19 93.4 kg (206 lb)   05/07/19 90.7 kg (200 lb)     ASSESSED NUTRITION NEEDS  Estimated Energy Needs: 5631-4246 kcals/day (17-20 Kcal/Kg)  Justification:  (overweight)  Estimated Protein Needs: 70-84 grams protein/day (1-1.2 g pro/Kg)  Justification:  (preservation of lean body mass)  Estimated Fluid Needs: 1429-5143 mL/day (25-30 mL/kg)    ASSESSED MALNUTRITION STATUS  % Weight Loss:  > 7.5% in 3 months (severe malnutrition) -intentional weight loss   % Intake:  Decreased intake does not meet criteria for malnutrition   Subcutaneous Fat Loss:  None observed  Loss of Muscle Mass:  None observed  Fluid Retention:  None noted    Malnutrition Diagnosis:  Patient does not meet two of the above criteria necessary for diagnosing malnutrition    DIAGNOSIS   Nutrition Diagnosis:  Food and nutrition-related knowledge deficit related to lack of prior expsoure as evidenced by no previous need for food and nutrition related recommendations      INTERVENTIONS   Nutrition Prescription   Recommend modified carbohydrate diet <150-165 grams for weight loss due to fatty liver disease and GERD    IMPLEMENTATION   Assessed learning needs and learning preference  Teaching Method(s) used: Booklet / Handout  Explanation  Vitamin and Mineral Supplements: per MD  Diet Education:  Provided education on weight loss, carbohydrate counting diet  Nutrition Education (Content):   a)  Discussed portion sizes, label reading, carbohydrate counting and balanced meals  with 3 meals + 1-2 snacks per day.  Encouraged patient to add modified carbohydrate to meals as patient trying to avoid most carbohydrates.  Patient's extreme fatigue could contribute to calorie and carbohydrate reduction.  Also addressed fluid needs as patient consuming 58 oz per day and recommend 70-80 oz per day.  Suggest gradual changes to diet for long term success as patient will likely not be able to continue current diet restrictions long term.  Supported patient in his weight loss goal and congratulated patient for weight loss.     b)  Provided Academy of [employer] Diet[employer] Count Your Carbs booklet  Nutrition Education (Application):   a)  Discussed current eating plans and recommended balanced carbohydrate choices at meals.   b)  Patient verbalizes understanding of diet by stating will eat balanced meals.   Anticipate fair-good compliance     GOALS  <150-165 grams carbohydrate per meal  Minimum of 70 oz fluid per day     FOLLOW UP/MONITORING   Progress towards goals will be monitored and evaluated per protocol and Practice Guidelines  Patient to call if desires follow up appointment or if has questions  RD name and number provided     Time Spent with Patient  45 minutes    Kristian Doan, RD, LD  [facility] Outpatient Dietitian  [phone] (office phone)

## 2020-03-13 ENCOUNTER — TELEPHONE (OUTPATIENT)
Dept: SURGERY | Facility: CLINIC | Age: 70
End: 2020-03-13

## 2020-03-13 NOTE — TELEPHONE ENCOUNTER
Call to Juwan to see about changing his clinic appointment with me to a telephone visit. He will get his chest CT and then go home. This will significantly reduce how long he has to be in the clinic. I will call him to review the CT. Juwan is in agreement with this.

## 2020-03-19 ENCOUNTER — ANCILLARY PROCEDURE (OUTPATIENT)
Dept: CT IMAGING | Facility: CLINIC | Age: 70
End: 2020-03-19
Attending: CLINICAL NURSE SPECIALIST
Payer: COMMERCIAL

## 2020-03-19 ENCOUNTER — VIRTUAL VISIT (OUTPATIENT)
Dept: SURGERY | Facility: CLINIC | Age: 70
End: 2020-03-19
Attending: CLINICAL NURSE SPECIALIST
Payer: COMMERCIAL

## 2020-03-19 DIAGNOSIS — C34.11 MALIGNANT NEOPLASM OF UPPER LOBE OF RIGHT LUNG (H): ICD-10-CM

## 2020-03-19 DIAGNOSIS — C34.11 MALIGNANT NEOPLASM OF UPPER LOBE OF RIGHT LUNG (H): Primary | ICD-10-CM

## 2020-03-19 PROCEDURE — 40000114 ZZH STATISTIC NO CHARGE CLINIC VISIT

## 2020-03-19 NOTE — PROGRESS NOTES
"Juwan Latham is a 69 year old male who is being evaluated via a billable telephone visit.      The patient has been notified of following:     \"This telephone visit will be conducted via a call between you and your physician/provider. We have found that certain health care needs can be provided without the need for a physical exam.  This service lets us provide the care you need with a short phone conversation.  If a prescription is necessary we can send it directly to your pharmacy.  If lab work is needed we can place an order for that and you can then stop by our lab to have the test done at a later time.    If during the course of the call the physician/provider feels a telephone visit is not appropriate, you will not be charged for this service.\"     Juwan Latham complains of  No chief complaint on file.      I have reviewed and updated the patient's Past Medical History, Social History, Family History and Medication List.    ALLERGIES  Patient has no known allergies.    No new concerns. Best contact #: [phone].    Noelle Ordonez CMA      Additional provider notes: Annual Lung Cancer Surveillance.    Assessment/Plan:  1. Malignant neoplasm of upper lobe of right lung (H)    68 yo male with right upper lobe lung cancer s/p resection. This is his annual lung cancer screening visit.     Juwan has been doing well-nothing really has changed over the last year. 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    The final report from his chest CT is not available to me however, his CT appears stable compared to the previous CT.    We will have Juwan come back for his next surveillance visit in 1 year with a chest CT. Juwan agrees with this plan and will expect a call to schedule.    Phone call duration: 10 minutes    GLORIA Arechiga CNS      "

## 2020-03-25 ENCOUNTER — TELEPHONE (OUTPATIENT)
Dept: FAMILY MEDICINE | Facility: CLINIC | Age: 70
End: 2020-03-25

## 2020-03-25 NOTE — TELEPHONE ENCOUNTER
This recent x-ray was ordered by surgery--Carla CASTRO, MEGHAN. Will route this message to her regarding discussing results with Juwan. Will also route Dr. White as GIANNI.     .Justa Naqvi R.N.

## 2020-03-26 NOTE — TELEPHONE ENCOUNTER
No further evaluation needed. This has previously been noted on other CT scans and not significant.    Asad White DO  3/26/2020 12:55 PM

## 2020-05-01 ENCOUNTER — TRANSFERRED RECORDS (OUTPATIENT)
Dept: HEALTH INFORMATION MANAGEMENT | Facility: CLINIC | Age: 70
End: 2020-05-01

## 2020-05-05 ENCOUNTER — TELEPHONE (OUTPATIENT)
Dept: FAMILY MEDICINE | Facility: CLINIC | Age: 70
End: 2020-05-05

## 2020-05-05 NOTE — TELEPHONE ENCOUNTER
Reason for Call:  Other call back    Detailed comments: Pt is calling to leave a msg for Dr. White. States he has been feeling a lot of fatigue, and heard that a shot of b12 could help. Pt is wondering what Dr. White thinks, or what else he recommends. Please call back when possible. Thank you!    Phone Number Patient can be reached at: Cell number on file:    Telephone Information:   Mobile [phone]       Best Time: any    Can we leave a detailed message on this number? YES    Call taken on 5/5/2020 at 1:29 PM by Bhavya Abbasi

## 2020-05-05 NOTE — TELEPHONE ENCOUNTER
Please see message from patient below. Ongoing fatigue is not a new symptom for patient. Would you like to order labs to see if B12 injections would be beneficial? Please advise. Thank you.  TEDDY WaltonN, RN  [facility]

## 2020-05-07 NOTE — TELEPHONE ENCOUNTER
His B12 level was checked last year and was in a normal range and so I don't think any additional B12 via injection would be of any benefit.    Asad White DO  5/6/2020 10:38 PM

## 2020-05-07 NOTE — TELEPHONE ENCOUNTER
Spoke with patient and advised of below. Patient verbalized understanding. He reports that the fatigue comes and goes, has been better the last couple of days. However, he finds that when he gets episodes of fatigue, they are lasting longer and maybe getting more frequent. Wondering if SW, DO has any other thoughts?  TEDDY WaltonN, RN  [facility]

## 2020-05-11 ENCOUNTER — TELEPHONE (OUTPATIENT)
Dept: FAMILY MEDICINE | Facility: CLINIC | Age: 70
End: 2020-05-11

## 2020-05-11 ENCOUNTER — VIRTUAL VISIT (OUTPATIENT)
Dept: FAMILY MEDICINE | Facility: CLINIC | Age: 70
End: 2020-05-11
Payer: COMMERCIAL

## 2020-05-11 DIAGNOSIS — L73.9 FOLLICULITIS: Primary | ICD-10-CM

## 2020-05-11 PROCEDURE — 99213 OFFICE O/P EST LOW 20 MIN: CPT | Mod: 95 | Performed by: FAMILY MEDICINE

## 2020-05-11 RX ORDER — MULTIPLE VITAMINS W/ MINERALS TAB 9MG-400MCG
1 TAB ORAL DAILY
COMMUNITY

## 2020-05-11 RX ORDER — DOXYCYCLINE 100 MG/1
CAPSULE ORAL
Qty: 28 CAPSULE | Refills: 0 | Status: SHIPPED | OUTPATIENT
Start: 2020-05-11 | End: 2021-02-15

## 2020-05-11 NOTE — TELEPHONE ENCOUNTER
I know this fatigue has been bothersome and going on for a couple years without finding any cause. But I don't have any other recommendations right now.     Asad White,   5/11/2020 9:39 AM

## 2020-05-11 NOTE — TELEPHONE ENCOUNTER
patient wants to be seen for folliculitis- scheduled for a virtual visit    May not be able to send photos    Nanda BROWNRN BSN  [facility]  [phone]

## 2020-05-11 NOTE — TELEPHONE ENCOUNTER
Juwan saw someone on 4/19 from Nephrology but it appears it was not through [facility]. He said they were supposed to call him back to set up testing and they did not. Nephrology referral we have on File he says is not who he saw. No OV notes from outside provider noted either to help Juwan. Previous recent appointments also reviewed. He said he will have to continue to look through paperwork to figure out who he saw as I am not able to find anything regarding this in [facility] Justa Naqvi R.N.

## 2020-05-11 NOTE — TELEPHONE ENCOUNTER
I spoke to Juwan gave below  information from Dr. White. Advised Juwan to try to take good care of himself, get lots of sleep, eat well, try to do some activity if he can. He thanked me for the call. No further questions at this time. Justa Naqvi R.N.

## 2020-05-11 NOTE — TELEPHONE ENCOUNTER
Reason for Call:  Other call back    Detailed comments: Pt is calling to speak with a nurse. Would like information for a kidney specialist. Had an appt somewhere that got canceled. Now Pt would like to follow up with them but cannot find the information.    Phone Number Patient can be reached at: Cell number on file:    Telephone Information:   Mobile [phone]       Best Time: any    Can we leave a detailed message on this number? YES    Call taken on 5/11/2020 at 3:10 PM by Bhavya Abbasi

## 2020-05-11 NOTE — PROGRESS NOTES
"Juwan Latham is a 69 year old male  who is being evaluated via a billable video visit.    The patient has been notified of following:   \"This video visit will be conducted via a call between you and your physician/provider. We have found that certain health care needs can be provided without the need for an in-person physical exam.  This service lets us provide the care you need with a video conversation.  If a prescription is necessary we can send it directly to your pharmacy.  If lab work is needed we can place an order for that and you can then stop by our lab to have the test done at a later time.  Video visits are billed at different rates depending on your insurance coverage.  Please reach out to your insurance provider with any questions.2  If during the course of the call the physician/provider feels a video visit is not appropriate, you will not be charged for this service.\"  Patient has given verbal consent for Video visit? Yes  How would you like to obtain your AVS? Ori  Patient would like the video invitation sent by: Text to cell phone: [phone]        [facility] - PRIMARY CARE SKIN    CC: scalp folliculitis   SUBJECTIVE:   Juwan Latham is a(n) 69 year old male who presents via virtual visit because of COVID-19 . . He has a history of  scalp folliculitis , previously treated with oral doxycycline 100 mg bid.     Over the past couple of months he noticed more inflamed bumps on the scalp that he scratches at .. He treated  triamcinolone 0.1% cream on the scalp  , but that made  no difference. No bleeding.   No other rash.    Personal Medical History  Skin Cancer: NO  Eczema Psoriasis Autoimmune   NO NO NO     Family Medical History  Skin Cancer: NO  Eczema Psoriasis Autoimmune   NO NO NO     Occupation: construction (both indoor & outdoor).    Refer to electronic medical record (EMR) for past medical history and medications.    ROS: 14 point review of systems was negative except the " symptoms listed above in the HPI.      OBJECTIVE:   GENERAL: healthy, alert and no distress.  SKIN: [place] Skin Type - II.  Scalp examined. The dermatoscope was used to help evaluate pigmented lesions.  Skin Pertinent Findings:       Scalp - multiple scattered excoriated papules on the frontal scalp.Bald head so visualization is adequate on virtual visit.     ASSESSMENT:     Encounter Diagnosis   Name Primary?     Folliculitis Yes         PLAN:   Patient Instructions   Doxycycline 100 mg one cap two times per day for 2 weeks  Recheck in office if not cleared    TT: 20 minutes.

## 2020-05-12 ENCOUNTER — TELEPHONE (OUTPATIENT)
Dept: FAMILY MEDICINE | Facility: CLINIC | Age: 70
End: 2020-05-12

## 2020-05-12 NOTE — TELEPHONE ENCOUNTER
I am not able to find any referral to nephrology or mention of referral to nephrology in Juwan's chart. I know he had previously seen urologist, Dr. Agarwal. In January, he was referred to audiology for hearing loss (was also referred to ENT in May 2019 for hearing loss). Again, I'm not aware of any need to see a nephrologist.    Asad White,   5/12/2020 3:00 PM

## 2020-05-12 NOTE — TELEPHONE ENCOUNTER
Reason for Call:  Other     Detailed comments: Juwan is calling to ask what kidney doctor Dr. Guidry referred him to about 3-4 months ago. He says it was out of [place]. He says he can't find it in his paperwork anywhere. He would like a call back.    Phone Number Patient can be reached at: Cell number on file:    Telephone Information:   Mobile [phone]     Best Time: Anytime    Can we leave a detailed message on this number? YES    Call taken on 5/12/2020 at 1:48 PM by Francheska Chen

## 2020-05-12 NOTE — TELEPHONE ENCOUNTER
Please see 5/11/20 message along with this message.  I am not able to find a nephrology referral for patient, nor could Justa the nurse Juwan spoke with yesterday and he was advised of this. Do you recall referring patient to Urology?  Kaylee Akins, TEDDYN, RN  [facility]

## 2020-05-12 NOTE — TELEPHONE ENCOUNTER
See below. Please call patient and advise again that we do not have this information on file for him. He may want to try and contact Urology to see if they were the ones that initiated this referral for him. Thank you.  AUDRA Walton, RN  [facility]

## 2020-05-13 ENCOUNTER — TELEPHONE (OUTPATIENT)
Dept: FAMILY MEDICINE | Facility: CLINIC | Age: 70
End: 2020-05-13

## 2020-05-13 ENCOUNTER — TRANSFERRED RECORDS (OUTPATIENT)
Dept: HEALTH INFORMATION MANAGEMENT | Facility: CLINIC | Age: 70
End: 2020-05-13

## 2020-05-13 NOTE — TELEPHONE ENCOUNTER
"Reviewed below with Juwan, will most likely need visit per my review with Dr. White.     Juwan reports he found mass this morning on this Right side groin in fold where leg creases, can feel it, but can not see it. 1/2-3/4 of inch in size he estimates. Not painful, no fever, no drainage, no other symptoms. .     Of note from an earlier discussion last week with Juwan he says he is having scan through [facility] he said it is a \"fiber scan\". He says it is for his liver. I had talked with Juwan last week and he was trying to remember his neurologist so he says he drove there today, but evidentially it was GI and not neurology.     I have scheduled face to face visit tomorrow for Juwan for further evaluation of mass. He has history of prostrate cancer. Juwan in agreement with visit. Given instructions for arrival, wearing of mask etc. No symptoms at this time.   "

## 2020-05-13 NOTE — TELEPHONE ENCOUNTER
"Reason for call:  Patient reporting a symptom    Symptom or request: Juwan is calling saying he just found a lump this morning in his groin area. Patient was asked if he wanted to do a video visit with Dr. White and he said \"no, there is nothing to show him.\" He wants a call back from the nurse.    Duration (how long have symptoms been present): Since this morning.    Have you been treated for this before? No    Phone Number patient can be reached at:  Cell number on file:    Telephone Information:   Mobile [phone]     Best Time:  Anytime    Can we leave a detailed message on this number:  YES    Call taken on 5/13/2020 at 10:22 AM by Francheska Chen    "

## 2020-05-14 ENCOUNTER — OFFICE VISIT (OUTPATIENT)
Dept: FAMILY MEDICINE | Facility: CLINIC | Age: 70
End: 2020-05-14
Payer: COMMERCIAL

## 2020-05-14 VITALS
OXYGEN SATURATION: 98 % | DIASTOLIC BLOOD PRESSURE: 60 MMHG | TEMPERATURE: 95.8 F | BODY MASS INDEX: 28.34 KG/M2 | WEIGHT: 187 LBS | HEART RATE: 55 BPM | SYSTOLIC BLOOD PRESSURE: 114 MMHG | HEIGHT: 68 IN

## 2020-05-14 DIAGNOSIS — M54.50 BILATERAL LOW BACK PAIN WITHOUT SCIATICA, UNSPECIFIED CHRONICITY: ICD-10-CM

## 2020-05-14 DIAGNOSIS — R10.2 PERINEUM PAIN, MALE: Primary | ICD-10-CM

## 2020-05-14 DIAGNOSIS — F33.0 MAJOR DEPRESSIVE DISORDER, RECURRENT EPISODE, MILD (H): ICD-10-CM

## 2020-05-14 DIAGNOSIS — N39.498 OTHER URINARY INCONTINENCE: ICD-10-CM

## 2020-05-14 DIAGNOSIS — C61 PROSTATE CANCER (H): ICD-10-CM

## 2020-05-14 PROBLEM — T83.9XXA FOLEY CATHETER PROBLEM (H): Status: RESOLVED | Noted: 2018-03-25 | Resolved: 2020-05-14

## 2020-05-14 PROCEDURE — 36415 COLL VENOUS BLD VENIPUNCTURE: CPT | Performed by: FAMILY MEDICINE

## 2020-05-14 PROCEDURE — 99214 OFFICE O/P EST MOD 30 MIN: CPT | Performed by: FAMILY MEDICINE

## 2020-05-14 PROCEDURE — 84153 ASSAY OF PSA TOTAL: CPT | Performed by: FAMILY MEDICINE

## 2020-05-14 ASSESSMENT — MIFFLIN-ST. JEOR: SCORE: 1592.73

## 2020-05-14 NOTE — PROGRESS NOTES
"Subjective   Juwan Latham is a 69 year old male who presents to clinic today for the following health issues:    HPI     Triaged 05/13/2020- 1 day ago    Juwan reports he found fullness just proximal to the right posterior scrotum this morning - 1/2-3/4 of inch in size he estimates. Not painful, no fever, no drainage, no other symptoms.  No trauma.     History of prostate cancer and overdue for labs    Low back pains bilateral nonradiating pains off and on for years and worse the last 3 months.     Reviewed and updated as needed this visit by provider:  Tobacco  Allergies  Meds  Problems  Med Hx  Surg Hx  Fam Hx         Review of Systems   Constitutional, HEENT, cardiovascular, pulmonary, GI, , musculoskeletal, neuro, skin, endocrine and psych systems are negative, except as otherwise noted.        Objective   /60   Pulse 55   Temp 95.8  F (35.4  C) (Tympanic)   Ht 1.727 m (5' 8\")   Wt 84.8 kg (187 lb)   SpO2 98%   BMI 28.43 kg/m   Body mass index is 28.43 kg/m .  Physical Exam   GENERAL: healthy, alert, well nourished, well hydrated, no distress  NECK: no tenderness, no adenopathy, no asymmetry, no masses, no stiffness; thyroid- normal to palpation  RESP: lungs clear to auscultation - no rales, no rhonchi, no wheezes  CV: regular rates and rhythm, normal S1 S2, no S3 or S4 and no murmur, no click or rub -  ABDOMEN: soft, no tenderness, no  hepatosplenomegaly, no masses, normal bowel sounds  MS: extremities- no gross deformities noted, no edema  SKIN: no suspicious lesions, no rashes  BACK: no CVA tenderness, bilateral  paralumbar tenderness, negative straight leg raising   - male: testicles- normal, no atrophy, no masses;  no inguinal hernias - the area of concern feels like fat , no cysts like or nodules noted.  No signs of lymphadenopathy .  RECTAL- male: no masses, no hemorhoids, Prostate- symmetric, no  nodularity, no masses, no hypertrophy  PSYCH: Alert and oriented times 3; speech- " coherent , normal rate and volume; able to articulate logical thoughts, able to abstract reason, no tangential thoughts, no hallucinations or delusions, affect- normal    LYmph - no adenopathy    Diagnostic Test Results  Results for orders placed or performed in visit on 05/14/20 (from the past 24 hour(s))   PSA tumor marker   Result Value Ref Range    PSA <0.01 0 - 4 ug/L         Assessment & Plan   Juwan was seen today for mass.    Diagnoses and all orders for this visit:    Perineum fullness, male -  Feels like fat and minimally more full then the left side.  Nontender - observe for changes in symptoms.   Will consider an ultrasound or CT exam if needed.  No lymph nodes noted in this area.     Other urinary incontinence  -     PSA tumor marker    Major depressive disorder, recurrent episode, mild (H) - improved, no medications   Prostate cancer (H) - h/o - nondetected PSA    Bilateral low back pain without sciatica, unspecified chronicity - heat, stretching - core strengthening             See Patient Instructions    Return in about 3 months (around 8/14/2020), or if symptoms worsen or fail to improve, for recheck.          Zay Sandhu MD      [address] [address]  [email or web address]   Office: [phone]  Pager: [phone]

## 2020-05-15 ENCOUNTER — TELEPHONE (OUTPATIENT)
Dept: FAMILY MEDICINE | Facility: CLINIC | Age: 70
End: 2020-05-15

## 2020-05-15 LAB — PSA SERPL-MCNC: <0.01 UG/L (ref 0–4)

## 2020-05-15 NOTE — TELEPHONE ENCOUNTER
Called patient was able to find out the information himself.  He found the Nephrologist and has made an appointment.     Romana Lewis MA

## 2020-05-15 NOTE — TELEPHONE ENCOUNTER
Reason for Call:  Other     Detailed comments: Juwan is calling regarding his diagnoses on his AVS from visit on 5/14. One being Major depressive disorder, recurrent episode, mild (H)    Phone Number Patient can be reached at: Home number on file [phone] (home)    Best Time: anytime     Can we leave a detailed message on this number? YES    Call taken on 5/15/2020 at 11:53 AM by Mary Klein

## 2020-05-15 NOTE — RESULT ENCOUNTER NOTE
Dear Juwan,    Here is a summary of your recent test results:  -PSA (prostate specific antigen) test is not detected.  This indicates a low likelihood of prostate cancer recurrence.  ADVISE: rechecking this in 1 year.    For additional lab test information, [email or web address] is an excellent reference.           Thank you very much for trusting me and [facility].     Healthy regards,  Zay Sandhu MD

## 2020-05-18 NOTE — TELEPHONE ENCOUNTER
Please see message from patient below. He is wondering why this diagnosis appears as part of his visit, when this was not discussed. Reports that he was only seen for scrotal and urinary issues. Please advise. Thank you.  AUDRA Walton, RN  [facility]

## 2020-05-20 NOTE — TELEPHONE ENCOUNTER
Called # [phone]     Advanced directives, counseling/discussion  Prostate cancer (H)  Major depressive disorder, recurrent episode, mild (H)  Malignant neoplasm of upper lobe of right lung (H)  Perineum pain, male - Primary  Other urinary incontinence  Bilateral low back pain without sciatica, unspecified chronicity    Pt called and advised of the note below. Pt then had a billing question. Writer attempted to answer but was not successful, writer advised to call central billing. Number was provided.      Jaquan Lauren RN   [facility]

## 2020-05-20 NOTE — TELEPHONE ENCOUNTER
It was a previous diagnosis and it was documented to address that he was doing fine and off medications

## 2020-05-26 ENCOUNTER — TELEPHONE (OUTPATIENT)
Dept: FAMILY MEDICINE | Facility: CLINIC | Age: 70
End: 2020-05-26

## 2020-05-26 NOTE — TELEPHONE ENCOUNTER
Appointment scheduled - per last note recheck in clinic if not getting better.  Nanda BROWNRN BSN  [facility]  [phone]

## 2020-05-26 NOTE — TELEPHONE ENCOUNTER
Reason for Call:  Juwan is calling to let Dr Mccormick know that the vaseline and the oral medication isn't helping his dry scalp.    Please call to advise.    Best phone number to reach pt at is: [phone]  Ok to leave a message with medical info? yes    Pharmacy preferred (if calling for a refill): [facility]  Patient Representative

## 2020-05-27 ENCOUNTER — MEDICAL CORRESPONDENCE (OUTPATIENT)
Dept: HEALTH INFORMATION MANAGEMENT | Facility: CLINIC | Age: 70
End: 2020-05-27

## 2020-06-25 ENCOUNTER — TELEPHONE (OUTPATIENT)
Dept: FAMILY MEDICINE | Facility: CLINIC | Age: 70
End: 2020-06-25

## 2020-06-25 NOTE — TELEPHONE ENCOUNTER
"Reason for Call:  Other     Detailed comments: Juwan is calling saying he wants his primary doctor (Dr. White) to put in lab orders for him to check his \"fatty liver.\" I told the patient there are lab orders in but he says no, he just wants to  a kit to do that? He would like to speak to a nurse.    Phone Number Patient can be reached at: Cell number on file:    Telephone Information:   Mobile [phone]     Best Time: Anytime    Can we leave a detailed message on this number? YES    Call taken on 6/25/2020 at 9:48 AM by Francheska Chen      "

## 2020-06-25 NOTE — TELEPHONE ENCOUNTER
See message below. Can we call patient and clarify what he is looking for? Patient completed liver function labs and follow up visit with [facility] in May. If he questions regarding that visit or his labs he will need to contact [facility]. Is he referring to a FIT test for colon cancer screening? If that is what he is requesting we can forward to  DO for review. Thank you.  TEDDY WaltonN, RN  [facility]

## 2020-06-25 NOTE — TELEPHONE ENCOUNTER
Called pt back.  He clarified he wants something to check his blood sugar because he has a fatty liver.  I asked him if he has a dx of diabetes.  He states no, but he has a fatty liver and has been doing research online and it says he needs to check his blood sugar.  I told him he really needs a visit with his PCP to discuss this as insurance may not pay for this if he does not have a dx of diabetes.  Scheduled video visit tomorrow with DO MANDA.  Jayna Rider

## 2020-06-26 ENCOUNTER — VIRTUAL VISIT (OUTPATIENT)
Dept: FAMILY MEDICINE | Facility: CLINIC | Age: 70
End: 2020-06-26
Payer: COMMERCIAL

## 2020-06-26 DIAGNOSIS — R53.83 FATIGUE, UNSPECIFIED TYPE: Primary | ICD-10-CM

## 2020-06-26 DIAGNOSIS — R79.9 ABNORMAL FINDING OF BLOOD CHEMISTRY, UNSPECIFIED: ICD-10-CM

## 2020-06-26 PROCEDURE — 99213 OFFICE O/P EST LOW 20 MIN: CPT | Mod: 95 | Performed by: FAMILY MEDICINE

## 2020-06-26 NOTE — PROGRESS NOTES
Juwan Latham is a 69 year old male who is being evaluated via a billable video visit.        Will anyone else be joining your telephone visit? No     Subjective     Juwan Latham is a 69 year old male who presents today via telephone visit for the following health issues:    HPI     Fatigue: diagnosed with fatty liver. Has had borderline elevated blood sugars in the past and wondering if he needs glucose meter to be checking sugars at home. Fatigue has been long-standing issue for him. Has quit drinking, working on diet and has been losing weight.      PAULINO - stopped using CPAP in February. Sleeps better than he ever has. Feels like he is sleeping well but still waking up foggy but then feels okay. Naps 4x per day when he is feeling more fatigued.           Patient Active Problem List   Diagnosis     Allergic rhinitis due to other allergen     CARDIOVASCULAR SCREENING; LDL GOAL LESS THAN 160     Advance Care Planning     History of colonic polyps     Anxiety     PAULINO (obstructive sleep apnea)     GERD (gastroesophageal reflux disease)     Hypertension goal BP (blood pressure) < 140/90     Seasonal allergies     Benign neoplasm of descending colon     s/p Malignant neoplasm of upper lobe of right lung (H)     Overweight (BMI 25.0-29.9)     Cavernous hemangioma of brain (H)     Major depressive disorder, recurrent episode, mild (H)     h/o Prostate cancer (H) - s/p prostatectomy 2017     Somatic dysfunction of pelvis region     Mixed incontinence urge and stress (male)(female)     Retinal detachment of left eye with single break     Past Surgical History:   Procedure Laterality Date     BRONCHOSCOPY FLEXIBLE N/A 3/3/2017    Procedure: BRONCHOSCOPY FLEXIBLE;  Surgeon: Maria A Desai MD;  [facility]: [facility]     COLONOSCOPY N/A 2/11/2015    Procedure: COLONOSCOPY;  Surgeon: Murphy Jesus MD;  Location:  GI     COLONOSCOPY N/A 12/11/2019    Procedure: COLONOSCOPY;  Surgeon: Murphy Jesus MD;  Location:  GI      COMBINED CYSTOSCOPY, RETROGRADES, URETEROSCOPY, LASER HOLMIUM LITHOTRIPSY URETER(S), INSERT STENT N/A 3/25/2018    Procedure: COMBINED CYSTOSCOPY, RETROGRADES, URETEROSCOPY, LASER HOLMIUM LITHOTRIPSY URETER(S), INSERT STENT;  Cystoscopy, Catheter Exchange under Anesthesia;  Surgeon: Zaria Cain MD;  Location: [facility] OR     DAVINCI PROSTATECTOMY N/A 2017    Procedure: DAVINCI PROSTATECTOMY;  Robotic Assisted Radical Prostatectomy and Pelvic Lymph Node Dissection ;  Surgeon: Paulo Agarwal MD;  Location: [facility] OR     ESOPHAGOSCOPY, GASTROSCOPY, DUODENOSCOPY (EGD), COMBINED N/A 2016    Procedure: COMBINED ESOPHAGOSCOPY, GASTROSCOPY, DUODENOSCOPY (EGD), BIOPSY SINGLE OR MULTIPLE;  Surgeon: Murphy Jesus MD;  Location: [facility]     LAPAROSCOPIC WEDGE RESECTION LUNG Right 3/3/2017    Procedure: LAPAROSCOPIC WEDGE RESECTION LUNG;  Surgeon: Maria A Desai MD;  [facility]     LASER HOLMIUM LITHOTRIPSY URETER(S), INSERT STENT, COMBINED  2012    Procedure: COMBINED CYSTOSCOPY, URETEROSCOPY, LASER HOLMIUM LITHOTRIPSY URETER(S), INSERT STENT;  Cystoscopy, Right Ureteroscopy, Stone Extraction, Holmium Laser, Double J Stent Placement;  Surgeon: Nicolás Blackwell MD;  Location:  OR       Social History     Tobacco Use     Smoking status: Former Smoker     Packs/day: 2.00     Years: 20.00     Pack years: 40.00     Types: Cigarettes     Last attempt to quit: 1985     Years since quittin.5     Smokeless tobacco: Never Used   Substance Use Topics     Alcohol use: Not Currently     Comment: couple  times  per  week  (beers)     Family History   Problem Relation Age of Onset     Heart Disease Mother      Diabetes Brother 65        Type 2     Diabetes Maternal Grandmother      Cancer - colorectal Brother 70     Hypertension No family hx of      Lipids No family hx of            Reviewed and updated as needed this visit by Provider         Review of Systems   Constitutional, HEENT,  "cardiovascular, pulmonary, gi and gu systems are negative, except as otherwise noted.      Objective             Physical Exam     GENERAL: Healthy, alert and no distress  PSYCH: Mentation appears normal, affect normal/bright, judgement and insight intact, normal speech and appearance well-groomed.      Diagnostic Test Results:  Labs reviewed in Epic        Assessment & Plan     1. Fatigue, unspecified type: recheck labs. Encourage follow up with sleep medicine as untreated sleep apnea could be contributing to his fatigue.  - **Comprehensive metabolic panel FUTURE anytime; Future  - **A1C FUTURE anytime; Future  - Albumin Random Urine Quantitative with Creat Ratio; Future  - SLEEP EVALUATION & MANAGEMENT REFERRAL - [facility] [place]  [phone] (Age 18 and up); Future    2. Abnormal finding of blood chemistry, unspecified : recheck blood sugars.  - **A1C FUTURE anytime; Future       No follow-ups on file.    Asad White DO  [facility]      Telephone Visit    Duration: 13 minutes    No follow-ups on file.       Asad White DO    The patient has been notified of following:     \"This telephone visit will be conducted via a call between you and your physician/provider. We have found that certain health care needs can be provided without the need for an in-person physical exam.  This service lets us provide the care you need with a telephone conversation.  If a prescription is necessary we can send it directly to your pharmacy.  If lab work is needed we can place an order for that and you can then stop by our lab to have the test done at a later time.    Telephone visits are billed at different rates depending on your insurance coverage.  Please reach out to your insurance provider with any questions.    If during the course of the call the physician/provider feels a telephone visit is not appropriate, you will not be charged for this service.\"    Patient has given " verbal consent for Video visit? Yes

## 2020-07-06 ENCOUNTER — VIRTUAL VISIT (OUTPATIENT)
Dept: SLEEP MEDICINE | Facility: CLINIC | Age: 70
End: 2020-07-06
Payer: COMMERCIAL

## 2020-07-06 VITALS — WEIGHT: 180 LBS | BODY MASS INDEX: 26.66 KG/M2 | HEIGHT: 69 IN

## 2020-07-06 DIAGNOSIS — R53.83 FATIGUE, UNSPECIFIED TYPE: ICD-10-CM

## 2020-07-06 DIAGNOSIS — G47.33 OSA ON CPAP: Primary | ICD-10-CM

## 2020-07-06 PROCEDURE — 99214 OFFICE O/P EST MOD 30 MIN: CPT | Mod: 95 | Performed by: INTERNAL MEDICINE

## 2020-07-06 ASSESSMENT — MIFFLIN-ST. JEOR: SCORE: 1576.85

## 2020-07-06 NOTE — PATIENT INSTRUCTIONS
Your BMI is Body mass index is 26.58 kg/m .  Weight management is a personal decision.  If you are interested in exploring weight loss strategies, the following discussion covers the approaches that may be successful. Body mass index (BMI) is one way to tell whether you are at a healthy weight, overweight, or obese. It measures your weight in relation to your height.  A BMI of 18.5 to 24.9 is in the healthy range. A person with a BMI of 25 to 29.9 is considered overweight, and someone with a BMI of 30 or greater is considered obese. More than two-thirds of American adults are considered overweight or obese.  Being overweight or obese increases the risk for further weight gain. Excess weight may lead to heart disease and diabetes.  Creating and following plans for healthy eating and physical activity may help you improve your health.  Weight control is part of healthy lifestyle and includes exercise, emotional health, and healthy eating habits. Careful eating habits lifelong are the mainstay of weight control. Though there are significant health benefits from weight loss, long-term weight loss with diet alone may be very difficult to achieve- studies show long-term success with dietary management in less than 10% of people. Attaining a healthy weight may be especially difficult to achieve in those with severe obesity. In some cases, medications, devices and surgical management might be considered.  What can you do?  If you are overweight or obese and are interested in methods for weight loss, you should discuss this with your provider.     Consider reducing daily calorie intake by 500 calories.     Keep a food journal.     Avoiding skipping meals, consider cutting portions instead.    Diet combined with exercise helps maintain muscle while optimizing fat loss. Strength training is particularly important for building and maintaining muscle mass. Exercise helps reduce stress, increase energy, and improves fitness.  Increasing exercise without diet control, however, may not burn enough calories to loose weight.       Start walking three days a week 10-20 minutes at a time    Work towards walking thirty minutes five days a week     Eventually, increase the speed of your walking for 1-2 minutes at time    In addition, we recommend that you review healthy lifestyles and methods for weight loss available through the National Institutes of Health patient information sites:  http://win.niddk.nih.gov/publications/index.htm    And look into health and wellness programs that may be available through your health insurance provider, employer, local community center, or [employer]dina club.    Weight management plan: Patient was referred to their PCP to discuss a diet and exercise plan.

## 2020-07-06 NOTE — PROGRESS NOTES
"Juwan Latham is a 69 year old male who is being evaluated via a billable video visit.      The patient has been notified of following:     \"This video visit will be conducted via a call between you and your physician/provider. We have found that certain health care needs can be provided without the need for an in-person physical exam.  This service lets us provide the care you need with a video conversation.  If a prescription is necessary we can send it directly to your pharmacy.  If lab work is needed we can place an order for that and you can then stop by our lab to have the test done at a later time.    Video visits are billed at different rates depending on your insurance coverage.  Please reach out to your insurance provider with any questions.    If during the course of the call the physician/provider feels a video visit is not appropriate, you will not be charged for this service.\"    Patient has given verbal consent for Video visit? No  How would you like to obtain your AVS? Harinihart  Patient would like the video invitation sent by: Send to e-mail at: [email or web address]  Will anyone else be joining your video visit? No      Cheryl Causey MA    Video-Visit Details    Type of service:  Video Visit  Total time:23 minutes  Video Start Time: 12:40PM  Video End Time: 1:03 PM    Originating Location (pt. Location): Home    Distant Location (provider location[place]  [facility] [place]     Platform used for Video Visit: MD FERNANDA [facility]  [address] [facility] [id number] [phone]  Dept: [phone]            [facility] [place]  7/6/2020      Name: Juwan Latham MRN# [id number]   Age: 69 year old YOB: 1950   Date of visit: 7/6/2020  Primary care provider: Julio Guidry Jr.            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  Last sleep clinic visit was on 2018    Patient was diagnosed with sleep apnea on 13 and had repeat sleep study on 17 and was prescribed auto-CPAP pressure of 8-16 cmH2O, and was setup at PAP therapy on 17.     He has not been using his PAP therapy since 2020.   Sleep quality is not different with/without CPAP. Feels like he is sleeping well but reports non-restorative sleep.  He reports he was given a wrong type of mask last year, it was a FFM and he did not like it. He also wants new hose, he does not know how to clean it.  He is afraid of getting infection if he uses the hose in its current state.  He reports  fatigue.  He naps frequently.  He denies drowsiness while driving.    Sleep Scales:  ESS: 8  STEPHANIE: 2     PREVIOUS SLEEP STUDIES:  PS17  Sleep latency 72.3 minutes without sleep aid.    REM achieved.   REM latency 112 minutes.    Sleep efficiency 65%. Total sleep time 331 minutes.  Sleep architecture:  Stage 1, 13.4% (5%), stage 2, 67.2% (45-55%), stage 3, N/A% (15-20%), stage REM, 19.3% (20-25%).    AHI was 18.5/hour.   RDI 18.9/hour.    REM AHI 30.9/hour.    Supine AHI N/A.  Lowest O2 saturation:84.6%  S/He spent 1.1 minutes below 89% SpO2.   Periodic Limb Movement Index 48.4/hour.        Date: 2013  AHI: 14.6/hr  Intervention: oral device  Lowest O 2 sat: 82%     CPAP Compliance:  ResMed  Auto-PAP 8.0 - 16.0 cmH2O 30 day usage data:    0% of days with > 4 hours of use. 28/30 days with no use.   Average use 8 minutes per day.   95%ile Leak 2.4 L/min.   CPAP 95% pressure 8 cm.   AHI 0 events per hour.     Current meds:  Current Outpatient Medications   Medication Sig Dispense Refill     multivitamin w/minerals (MULTI-VITAMIN)  tablet Take 1 tablet by mouth daily       vitamin B1 (THIAMINE) 250 MG tablet Take 250 mg by mouth daily       Vitamin D, Cholecalciferol, 1000 units TABS        doxycycline monohydrate (MONODOX) 100 MG capsule 1 tab po bid (Patient not taking: Reported on 6/26/2020) 28 capsule 0     losartan (COZAAR) 25 MG tablet Take 1 tablet (25 mg) by mouth daily (Patient not taking: Reported on 6/26/2020) 90 tablet 1     Problem list:  Patient Active Problem List   Diagnosis     Allergic rhinitis due to other allergen     CARDIOVASCULAR SCREENING; LDL GOAL LESS THAN 160     Advance Care Planning     History of colonic polyps     Anxiety     PAULINO (obstructive sleep apnea)     GERD (gastroesophageal reflux disease)     Hypertension goal BP (blood pressure) < 140/90     Seasonal allergies     Benign neoplasm of descending colon     s/p Malignant neoplasm of upper lobe of right lung (H)     Overweight (BMI 25.0-29.9)     Cavernous hemangioma of brain (H)     Major depressive disorder, recurrent episode, mild (H)     h/o Prostate cancer (H) - s/p prostatectomy 2017     Somatic dysfunction of pelvis region     Mixed incontinence urge and stress (male)(female)     Retinal detachment of left eye with single break       Past medical history:  Past Medical History:   Diagnosis Date     Anxiety      Arthritis     fingers, hips     Calculus of kidney 2005, 2012     Colon polyps      Congenital single kidney      Gastroesophageal reflux disease      Hx of cancer of lung 03/2017    wedge resection     Hypertension      Prostate cancer (H) 08/2017     Seasonal allergies     spring and fall     Sleep apnea     cpap     Past surgical history:  Past Surgical History:   Procedure Laterality Date     BRONCHOSCOPY FLEXIBLE N/A 3/3/2017    Procedure: BRONCHOSCOPY FLEXIBLE;  Surgeon: Maria A Desai MD;  [facility]     COLONOSCOPY N/A 2/11/2015    Procedure: COLONOSCOPY;  Surgeon: Murphy Jesus MD;  Location:  GI     COLONOSCOPY N/A  2019    Procedure: COLONOSCOPY;  Surgeon: Murphy Jesus MD;  [facility]: [facility]     COMBINED CYSTOSCOPY, RETROGRADES, URETEROSCOPY, LASER HOLMIUM LITHOTRIPSY URETER(S), INSERT STENT N/A 3/25/2018    Procedure: COMBINED CYSTOSCOPY, RETROGRADES, URETEROSCOPY, LASER HOLMIUM LITHOTRIPSY URETER(S), INSERT STENT;  Cystoscopy, Catheter Exchange under Anesthesia;  Surgeon: Zaria Cain MD;  Location: [facility] OR     DAVINCI PROSTATECTOMY N/A 2017    Procedure: DAVINCI PROSTATECTOMY;  Robotic Assisted Radical Prostatectomy and Pelvic Lymph Node Dissection ;  Surgeon: Paulo Agarwal MD;  [facility]     ESOPHAGOSCOPY, GASTROSCOPY, DUODENOSCOPY (EGD), COMBINED N/A 2016    Procedure: COMBINED ESOPHAGOSCOPY, GASTROSCOPY, DUODENOSCOPY (EGD), BIOPSY SINGLE OR MULTIPLE;  Surgeon: Murphy Jesus MD;  Location:  GI     LAPAROSCOPIC WEDGE RESECTION LUNG Right 3/3/2017    Procedure: LAPAROSCOPIC WEDGE RESECTION LUNG;  Surgeon: Maria A Desai MD;  [facility]     LASER HOLMIUM LITHOTRIPSY URETER(S), INSERT STENT, COMBINED  2012    Procedure: COMBINED CYSTOSCOPY, URETEROSCOPY, LASER HOLMIUM LITHOTRIPSY URETER(S), INSERT STENT;  Cystoscopy, Right Ureteroscopy, Stone Extraction, Holmium Laser, Double J Stent Placement;  Surgeon: Nicolás Blackwell MD;  Location:  OR      Allergies:No Known Allergies      Social history:  Social History     Tobacco Use     Smoking status: Former Smoker     Packs/day: 2.00     Years: 20.00     Pack years: 40.00     Types: Cigarettes     Last attempt to quit: 1985     Years since quittin.5     Smokeless tobacco: Never Used   Substance Use Topics     Alcohol use: Not Currently     Comment: couple  times  per  week  (beers)     Drug use: No     Family history:  Family History   Problem Relation Age of Onset     Heart Disease Mother      Diabetes Brother 65        Type 2     Diabetes Maternal Grandmother      Cancer - colorectal Brother 70      "Hypertension No family hx of      Lipids No family hx of      Exam:  Ht 1.753 m (5' 9\")   Wt 81.6 kg (180 lb)   BMI 26.58 kg/m    GENERAL APPEARANCE: in no distress   PSYCH: Alert and oriented times 3; coherent speech, normal   rate and volume, able to articulate logical thoughts, able   to abstract reason, no tangential thoughts, no hallucinations   or delusions  His affect is normal  RESP: No cough, no audible wheezing, able to talk in full sentences         Assessment and Plan:      1. Obstructive Sleep Apnea:  - Non compliance of PAP use. (The CPAP compliance was poor even during his previous sleep clinic visit 2 years ago).  I have discussed with the patient if he would be interested in non-CPAP options like dental device. His plan for now: he wants to  try CPAP for some time and in future  may think of  to alternate treatment options for sleep apnea.  - Mouth breather, he uses full face. Does not like mask he previously obtained. Needs mask fit optimization and new hose.  - Prescription was provided for renewal of all the CPAP supplies and requested the [facility] DME to help with mask fit optimization and also to provide him with instructions how to clean the equipment.  I did not encourage him about the devices such as so clean because of the questionable safety with ozone.  -I discussed with the patient about our sleep therapy management program  to offer him coaching with the CPAP compliance and he was agreeable with that. I have discussed with our virtual care coordinator Ms. Tavares to follow him up through the sleep therapy management program.  - Recommend using CPAP every night for at least 7-8 hours each night  - Daytime naps are not advised, but use CPAP if taking naps.  - Follow up in sleep clinic via virtual visit in 5 weeks when compliance measure will be reviewed.     Encouraged healthy diet and regular exercise.    Patient was strongly advised to avoid driving, operating any heavy machinery or " other hazardous situations while drowsy or sleepy.  Patient was counseled on the importance of driving while alert, to pull over if drowsy, or nap before getting into the vehicle if sleepy.     The above note was dictated using voice recognition software. Although reviewed after completion, some word and grammatical error may remain . Please contact the author for any clarifications.        Eva Vicente MD  [address] [id number] [phone]  Dept: [phone]

## 2020-07-06 NOTE — Clinical Note
Vahid Porter,    Patient  has not been compliant with CPAP but is willing to retry.  Kindly follow him up through the sleep therapy management program.  I plan on following up via virtual visit in 5-6 weeks.    Thank you,  Carmelita

## 2020-07-07 ENCOUNTER — TELEPHONE (OUTPATIENT)
Dept: FAMILY MEDICINE | Facility: CLINIC | Age: 70
End: 2020-07-07

## 2020-07-07 NOTE — TELEPHONE ENCOUNTER
Called Juwan and left detailed message asking him to schedule a video or phone visit.  Can be with any primary in [facility] clinic.  [facility] has spots for virtual care tomorrow which will be the soonest with anyone.  If he feels he can wait, can do this next week with Dr. Guidry or Dr. White.  Jayna Rider

## 2020-07-07 NOTE — TELEPHONE ENCOUNTER
Reason for Call:  Other prescription    Detailed comments: Pt is wondering if he can get a few doses of a medication to help him calm down.    States this has not been discussed with PCP but he has felt this way in the past. Pt would prefer to not come into the clinic for an appt.    Phone Number Patient can be reached at: Cell number on file:    Telephone Information:   Mobile [phone]       Best Time: any    Can we leave a detailed message on this number? YES    Call taken on 7/7/2020 at 12:49 PM by Bhavya Abbasi

## 2020-07-07 NOTE — TELEPHONE ENCOUNTER
See below. Please offer patient virtual visit (telephone or video) or evisit through [employer] to discuss further. Thank you.  AUDRA Walton, RN  [facility]

## 2020-07-09 DIAGNOSIS — R79.9 ABNORMAL FINDING OF BLOOD CHEMISTRY, UNSPECIFIED: ICD-10-CM

## 2020-07-09 DIAGNOSIS — R53.83 FATIGUE, UNSPECIFIED TYPE: ICD-10-CM

## 2020-07-09 LAB — HBA1C MFR BLD: 5.3 % (ref 0–5.6)

## 2020-07-09 PROCEDURE — 83036 HEMOGLOBIN GLYCOSYLATED A1C: CPT | Performed by: FAMILY MEDICINE

## 2020-07-09 PROCEDURE — 36415 COLL VENOUS BLD VENIPUNCTURE: CPT | Performed by: FAMILY MEDICINE

## 2020-07-09 PROCEDURE — 82043 UR ALBUMIN QUANTITATIVE: CPT | Performed by: FAMILY MEDICINE

## 2020-07-09 PROCEDURE — 80053 COMPREHEN METABOLIC PANEL: CPT | Performed by: FAMILY MEDICINE

## 2020-07-10 LAB
ALBUMIN SERPL-MCNC: 4.1 G/DL (ref 3.4–5)
ALP SERPL-CCNC: 62 U/L (ref 40–150)
ALT SERPL W P-5'-P-CCNC: 31 U/L (ref 0–70)
ANION GAP SERPL CALCULATED.3IONS-SCNC: 7 MMOL/L (ref 3–14)
AST SERPL W P-5'-P-CCNC: 27 U/L (ref 0–45)
BILIRUB SERPL-MCNC: 0.5 MG/DL (ref 0.2–1.3)
BUN SERPL-MCNC: 14 MG/DL (ref 7–30)
CALCIUM SERPL-MCNC: 8.9 MG/DL (ref 8.5–10.1)
CHLORIDE SERPL-SCNC: 106 MMOL/L (ref 94–109)
CO2 SERPL-SCNC: 25 MMOL/L (ref 20–32)
CREAT SERPL-MCNC: 0.89 MG/DL (ref 0.66–1.25)
GFR SERPL CREATININE-BSD FRML MDRD: 87 ML/MIN/{1.73_M2}
GLUCOSE SERPL-MCNC: 91 MG/DL (ref 70–99)
POTASSIUM SERPL-SCNC: 4.4 MMOL/L (ref 3.4–5.3)
PROT SERPL-MCNC: 7.1 G/DL (ref 6.8–8.8)
SODIUM SERPL-SCNC: 138 MMOL/L (ref 133–144)

## 2020-07-11 LAB
CREAT UR-MCNC: 130 MG/DL
MICROALBUMIN UR-MCNC: 7 MG/L
MICROALBUMIN/CREAT UR: 5.57 MG/G CR (ref 0–17)

## 2020-07-17 ENCOUNTER — DOCUMENTATION ONLY (OUTPATIENT)
Dept: SLEEP MEDICINE | Facility: CLINIC | Age: 70
End: 2020-07-17

## 2020-07-17 NOTE — PROGRESS NOTES
Patient was called and he requested a new humidifier chamber, tubing and filters. They are being shipped via [facility].

## 2020-07-31 ENCOUNTER — DOCUMENTATION ONLY (OUTPATIENT)
Dept: SLEEP MEDICINE | Facility: CLINIC | Age: 70
End: 2020-07-31

## 2020-07-31 NOTE — PROGRESS NOTES
STM recheck     Subjective measures:   Patient feels that the pressure is too low when he is first starting.  He has not been able to use much due to this.      Assessment: Pt not meeting objective benchmarks for compliance  Patient failing following subjective benchmarks: pressure issues    Action plan: STM recheck  8/6/20 turned off ramp and machine will now start at  7 cm H20            PAP settings: CPAP min 8.0 cm  H20       CPAP max 16.0 cm  H20            95th% pressure 12.6 cm  H20     Mask type:  Nasal Mask    Objective measures: 14 day rolling measures      Compliance  0 %      Leak  30.8 lpm  last  upload      AHI 3.77   last  upload      Average number of minutes 21      Objective measure goal  Compliance   Goal >70%  Leak   Goal < 24 lpm  AHI  Goal < 5  Usage  Goal >240        Total time spent on accessing and interpreting remote patient PAP therapy data  12 minutes    Total time spent counseling, coaching  and reviewing PAP therapy data with patient  5 minutes

## 2020-08-06 ENCOUNTER — DOCUMENTATION ONLY (OUTPATIENT)
Dept: SLEEP MEDICINE | Facility: CLINIC | Age: 70
End: 2020-08-06

## 2020-08-06 NOTE — PROGRESS NOTES
STM recheck          Subjective measures:   Pt feels that thing are starting to get a little bit better.  He sometimes feels that the pressure is not enough, however this too seems like its getting better.     Assessment: Pt not meeting objective benchmarks for compliance  Patient failing following subjective benchmarks: pressure issues    Action plan: follow up per provider request           PAP settings: CPAP min 8.0 cm  H20       CPAP max 16.0 cm  H20        95th% pressure 12.3 cm  H20     Mask type:  Nasal Mask    Objective measures: 14 day rolling measures      Compliance  0 %      Leak  40.63 lpm  last  upload      AHI 2.54   last  upload      Average number of minutes 67      Objective measure goal  Compliance   Goal >70%  Leak   Goal < 24 lpm  AHI  Goal < 5  Usage  Goal >240        Total time spent on accessing and interpreting remote patient PAP therapy data  10 minutes    Total time spent counseling, coaching  and reviewing PAP therapy data with patient  0 minutes

## 2020-08-10 ENCOUNTER — DOCUMENTATION ONLY (OUTPATIENT)
Dept: SLEEP MEDICINE | Facility: CLINIC | Age: 70
End: 2020-08-10

## 2020-08-10 NOTE — PROGRESS NOTES
[facility] recheck     Subjective measures:   Patient reports continuing issues with mask fit and pressures.  He feels that the pressures might be too low due to mask leak.        Assessment: Pt not meeting objective benchmarks for leak and compliance  Patient failing following subjective benchmarks: pressure issues and leak issues     Action plan: pt will go back to full face mask and see if pressures feel better and he can get a better seal.  If this works he will reach out to [facility] about a mask exchange.        PAP settings: CPAP min 8.0 cm  H20       CPAP max 16.0 cm  H20            95th% pressure 12.7 cm  H20     Mask type:  Nasal Mask    Objective measures: 14 day rolling measures      Compliance  7 %      Leak  42.53 lpm  last  upload      AHI 3.6   last  upload      Average number of minutes 98      Objective measure goal  Compliance   Goal >70%  Leak   Goal < 24 lpm  AHI  Goal < 5  Usage  Goal >240        Total time spent on accessing and interpreting remote patient PAP therapy data  12 minutes    Total time spent counseling, coaching  and reviewing PAP therapy data with patient  5 minutes

## 2020-08-18 ENCOUNTER — VIRTUAL VISIT (OUTPATIENT)
Dept: SLEEP MEDICINE | Facility: CLINIC | Age: 70
End: 2020-08-18
Payer: COMMERCIAL

## 2020-08-18 VITALS — HEIGHT: 68 IN | BODY MASS INDEX: 26.98 KG/M2 | WEIGHT: 178 LBS

## 2020-08-18 DIAGNOSIS — G47.33 OSA ON CPAP: Primary | ICD-10-CM

## 2020-08-18 PROCEDURE — 99213 OFFICE O/P EST LOW 20 MIN: CPT | Mod: 95 | Performed by: INTERNAL MEDICINE

## 2020-08-18 ASSESSMENT — MIFFLIN-ST. JEOR: SCORE: 1551.9

## 2020-08-18 NOTE — PROGRESS NOTES
"Juwan Latham is a 69 year old male who is being evaluated via a billable telephone visit.      The patient has been notified of following:     \"This telephone visit will be conducted via a call between you and your physician/provider. We have found that certain health care needs can be provided without the need for a physical exam.  This service lets us provide the care you need with a short phone conversation.  If a prescription is necessary we can send it directly to your pharmacy.  If lab work is needed we can place an order for that and you can then stop by our lab to have the test done at a later time.    Telephone visits are billed at different rates depending on your insurance coverage. During this emergency period, for some insurers they may be billed the same as an in-person visit.  Please reach out to your insurance provider with any questions.    If during the course of the call the physician/provider feels a telephone visit is not appropriate, you will not be charged for this service.\"    Patient has given verbal consent for Telephone visit?  Yes    What phone number would you like to be contacted at? [phone]    How would you like to obtain your AVS? MyChart    Phone call duration: 13minutes    Eva Vicente MD  [facility]   Floor [address], [address]   [address] [place] [address]   Appointments: [phone]         Outpatient Sleep Medicine Follow-up  8/18/2020      Name: Juwan Latham MRN# [id number]   Age: 69 year old YOB: 1950   Date of visit: 8/18/2020  Primary care provider: Julio Guidry Jr.             Chief Complaint:    Follow up of PAULINO             History of Present Illness:      Juwan Latham is a 69 year old male with history of mild PAULINO, bruxism, recent right lung NSCC adenocarcinoma s/p wedge resection of tumor who presents for virtual visit today for follow up of " "PAULINO.     Patient was diagnosed with sleep apnea on 13 and had repeat sleep study on 17 and was prescribed auto-CPAP pressure of 8-16 cmH2O, and was setup at PAP therapy on 17.      He has not been  using the CPAP regularly.  He reports that he sleeping pretty good.  He has been using nasal mask.   He reports mouth opening but does not have a chinstrap. He reports the pressure setting settings feel comfortable.  He sleeps alone but has a roommate next to his room and according to the roommate there has not been any snoring with his CPAP use.  His energy levels are fairly good until afternoon.  He naps occasionally.  He denies drowsiness while driving.     Sleep Scales:  ESS: 8  STEPHANIE: 0     PREVIOUS SLEEP STUDIES:  PS17  Sleep latency 72.3 minutes without sleep aid.    REM achieved.   REM latency 112 minutes.    Sleep efficiency 65%. Total sleep time 331 minutes.  Sleep architecture:  Stage 1, 13.4% (5%), stage 2, 67.2% (45-55%), stage 3, N/A% (15-20%), stage REM, 19.3% (20-25%).    AHI was 18.5/hour.   RDI 18.9/hour.    REM AHI 30.9/hour.    Supine AHI N/A.  Lowest O2 saturation:84.6%   He spent 1.1 minutes below 89% SpO2.   Periodic Limb Movement Index 48.4/hour.        Date: 2013  AHI: 14.6/hr  Intervention: oral device  Lowest O 2 sat: 82%      CPAP Compliance:ResMed          Auto-PAP 8.0 - 16.0 cmH2O 30 day usage data:    3% of days with > 4 hours of use. 20/30 days with no use.   Average use 56 minutes per day.   95%ile Leak 44.88 L/min.   CPAP 95% pressure 12.4 cm.   AHI 3.73 events per hour.       Current meds, Past medical history, Past surgical history, Allergies, Social history, Family history: reviewed, per EMR    Exam:  Ht 1.727 m (5' 8\")   Wt 80.7 kg (178 lb)   BMI 27.06 kg/m    GENERAL APPEARANCE: no distress  PSYCH: Alert and oriented times 3; coherent speech, normal   rate and volume, able to articulate logical thoughts, able   to abstract reason, no tangential thoughts, no " "hallucinations   or delusions  His affect is normal  RESP: No cough, no audible wheezing, able to talk in full sentences  Remainder of exam unable to be completed due to telephone visits    Assessment/plan:  Obstructive sleep apnea: Patient reports poor CPAP compliance.  We discussed the consequences of untreated sleep apnea and encouraged him to use the device regularly during sleep to derive maximum benefit.  He will be followed through the sleep therapy management program.  Chin strap in addition to nasal mask or FFM  would be helpful.  If his PAP compliance does not improve will consider non CPAP options such as oral appliance    Patient was strongly advised to avoid driving, operating any heavy machinery or other hazardous situations while drowsy or sleepy.  Patient was counseled on the importance of driving while alert, to pull over if drowsy, or nap before getting into the vehicle if sleepy.     F/u in 2 months.     The above note was dictated using voice recognition software. Although reviewed after completion, some word and grammatical error may remain . Please contact the author for any clarifications.    \"I spent a total of 13 minutes with Juwan Latham during today's telephone visit,  all of which was spent  counseling, consulting, coordinating plan of care regarding PAULINO, CPAP compliance,and going over PAP download/sleep study and chart review.\"     Eva Vicente MD  [facility]   Floor [address], [address]   [address] [place] [address]   Appointments: [phone]      "

## 2020-08-18 NOTE — PATIENT INSTRUCTIONS
Your BMI is Body mass index is 27.06 kg/m .  Weight management is a personal decision.  If you are interested in exploring weight loss strategies, the following discussion covers the approaches that may be successful. Body mass index (BMI) is one way to tell whether you are at a healthy weight, overweight, or obese. It measures your weight in relation to your height.  A BMI of 18.5 to 24.9 is in the healthy range. A person with a BMI of 25 to 29.9 is considered overweight, and someone with a BMI of 30 or greater is considered obese. More than two-thirds of American adults are considered overweight or obese.  Being overweight or obese increases the risk for further weight gain. Excess weight may lead to heart disease and diabetes.  Creating and following plans for healthy eating and physical activity may help you improve your health.  Weight control is part of healthy lifestyle and includes exercise, emotional health, and healthy eating habits. Careful eating habits lifelong are the mainstay of weight control. Though there are significant health benefits from weight loss, long-term weight loss with diet alone may be very difficult to achieve- studies show long-term success with dietary management in less than 10% of people. Attaining a healthy weight may be especially difficult to achieve in those with severe obesity. In some cases, medications, devices and surgical management might be considered.  What can you do?  If you are overweight or obese and are interested in methods for weight loss, you should discuss this with your provider.     Consider reducing daily calorie intake by 500 calories.     Keep a food journal.     Avoiding skipping meals, consider cutting portions instead.    Diet combined with exercise helps maintain muscle while optimizing fat loss. Strength training is particularly important for building and maintaining muscle mass. Exercise helps reduce stress, increase energy, and improves fitness.  Increasing exercise without diet control, however, may not burn enough calories to loose weight.       Start walking three days a week 10-20 minutes at a time    Work towards walking thirty minutes five days a week     Eventually, increase the speed of your walking for 1-2 minutes at time    In addition, we recommend that you review healthy lifestyles and methods for weight loss available through the National Institutes of Health patient information sites:  http://win.niddk.nih.gov/publications/index.htm    And look into health and wellness programs that may be available through your health insurance provider, employer, local community center, or ondina club.

## 2020-09-02 ENCOUNTER — OFFICE VISIT (OUTPATIENT)
Dept: FAMILY MEDICINE | Facility: CLINIC | Age: 70
End: 2020-09-02
Payer: COMMERCIAL

## 2020-09-02 ENCOUNTER — TELEPHONE (OUTPATIENT)
Dept: FAMILY MEDICINE | Facility: CLINIC | Age: 70
End: 2020-09-02

## 2020-09-02 VITALS — SYSTOLIC BLOOD PRESSURE: 114 MMHG | DIASTOLIC BLOOD PRESSURE: 60 MMHG

## 2020-09-02 DIAGNOSIS — L73.9 FOLLICULITIS: Primary | ICD-10-CM

## 2020-09-02 DIAGNOSIS — L73.9 FOLLICULITIS: ICD-10-CM

## 2020-09-02 PROCEDURE — 99213 OFFICE O/P EST LOW 20 MIN: CPT | Performed by: FAMILY MEDICINE

## 2020-09-02 RX ORDER — KETOCONAZOLE 20 MG/ML
SHAMPOO TOPICAL
Qty: 120 ML | Refills: 3 | Status: SHIPPED | OUTPATIENT
Start: 2020-09-02 | End: 2020-09-03

## 2020-09-02 NOTE — PATIENT INSTRUCTIONS
ketonazole shampoo 2% apply to scalp when in shower, leave in contact for few minutes then a good rinse    Recheck in 4 weeks, if not improved then consider tissue biopsy

## 2020-09-02 NOTE — LETTER
9/2/2020         RE: Juwan Latham  [address]  [place] [address]        Dear Colleague,    Thank you for [place] your [place]atham[place] [facility] [place] Please see a copy of my visit note below.      [facility] - PRIMARY CARE SKIN    CC: scalp folliculitis   SUBJECTIVE:   Juwan Latham is a(n) 69 year old male who presents to the clinic for follow up of scalp folliculitis , recurrent episodes over the past months.   Current treatment : doxycycline 100 mg po bid - finished but made no difference  Past treatments :  Triamcinolone 0.1% cream - not effective        Personal Medical History  Skin Cancer: NO  Eczema Psoriasis Autoimmune   NO NO NO     Family Medical History  Skin Cancer: NO  Eczema Psoriasis Autoimmune   NO NO NO     Occupation: construction (both indoor & outdoor).    Refer to electronic medical record (EMR) for past medical history and medications.    ROS: 14 point review of systems was negative except the symptoms listed above in the HPI.      OBJECTIVE:   GENERAL: healthy, alert and no distress.  SKIN: Azevedo Skin Type - II.  Scalp examined. The dermatoscope was used to help evaluate pigmented lesions.  Skin Pertinent Findings:       Scalp - multiple erythematous pustules and excoriated papules , slight extension on the upper forehead    ASSESSMENT:     Encounter Diagnosis   Name Primary?     Folliculitis Yes     MDM : the exact etiology is still not clear ? Atypical rosacea . May need to consider tissue biopsy    PLAN:   Patient Instructions   ketonazole shampoo 2% apply to scalp when in shower, leave in contact for few minutes then a good rinse    Recheck in 4 weeks, if not improved then consider tissue biopsy    TT: 20 minutes.        Again, thank you for allowing me to participate in the care of your patient.        Sincerely,        Maria M Mccormick MD

## 2020-09-02 NOTE — PROGRESS NOTES
[facility] - PRIMARY CARE SKIN    CC: scalp folliculitis   SUBJECTIVE:   Juwan Latham is a(n) 69 year old male who presents to the clinic for follow up of scalp folliculitis , recurrent episodes over the past months.   Current treatment : doxycycline 100 mg po bid - finished but made no difference  Past treatments :  Triamcinolone 0.1% cream - not effective        Personal Medical History  Skin Cancer: NO  Eczema Psoriasis Autoimmune   NO NO NO     Family Medical History  Skin Cancer: NO  Eczema Psoriasis Autoimmune   NO NO NO     Occupation: construction (both indoor & outdoor).    Refer to electronic medical record (EMR) for past medical history and medications.    ROS: 14 point review of systems was negative except the symptoms listed above in the HPI.      OBJECTIVE:   GENERAL: healthy, alert and no distress.  SKIN: Azevedo Skin Type - II.  Scalp examined. The dermatoscope was used to help evaluate pigmented lesions.  Skin Pertinent Findings:       Scalp - multiple erythematous pustules and excoriated papules , slight extension on the upper forehead    ASSESSMENT:     Encounter Diagnosis   Name Primary?     Folliculitis Yes     MDM : the exact etiology is still not clear ? Atypical rosacea . May need to consider tissue biopsy    PLAN:   Patient Instructions   ketonazole shampoo 2% apply to scalp when in shower, leave in contact for few minutes then a good rinse    Recheck in 4 weeks, if not improved then consider tissue biopsy    TT: 20 minutes.

## 2020-09-02 NOTE — TELEPHONE ENCOUNTER
Patient insurance requires specific directions on the prescription:    Frequency of how patient will be using the medication and day supply.

## 2020-09-03 RX ORDER — KETOCONAZOLE 20 MG/ML
SHAMPOO TOPICAL
Qty: 120 ML | Refills: 3 | Status: SHIPPED | OUTPATIENT
Start: 2020-09-03 | End: 2021-02-15

## 2020-09-03 NOTE — TELEPHONE ENCOUNTER
General Call:     Who is calling:  Pt    Reason for Call:  Pt is wondering about his medication.  Pharmacy has not heard     What are your questions or concerns:  na    Date of last appointment with provider: na    Okay to leave a detailed message:Yes at Home number on file [phone] (home)

## 2020-09-16 ENCOUNTER — TELEPHONE (OUTPATIENT)
Dept: FAMILY MEDICINE | Facility: CLINIC | Age: 70
End: 2020-09-16

## 2020-09-16 NOTE — TELEPHONE ENCOUNTER
Scalp is worsening- itchy and has more sores- they are like white pimples  Used vinegar last night and it calmed the itching  Using ketoconazole shampoo    States he has to wear a hat as this is becoming embarassing    Nanda BROWN RN BSN  [facility]  [phone]

## 2020-09-16 NOTE — TELEPHONE ENCOUNTER
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    What are your questions or concerns:  n/a    Date of last appointment with provider: earlier this month    Okay to leave a detailed message:Yes at Cell number on file:    Telephone Information:   Mobile [phone]

## 2020-09-16 NOTE — TELEPHONE ENCOUNTER
I would like to see him to do a skin biopsy, I mentioned this to him at the last visit.     Thank you,   Maria M Mccormick M.D.

## 2020-09-17 ENCOUNTER — OFFICE VISIT (OUTPATIENT)
Dept: FAMILY MEDICINE | Facility: CLINIC | Age: 70
End: 2020-09-17
Payer: COMMERCIAL

## 2020-09-17 VITALS — DIASTOLIC BLOOD PRESSURE: 62 MMHG | SYSTOLIC BLOOD PRESSURE: 122 MMHG

## 2020-09-17 DIAGNOSIS — D48.5 NEOPLASM OF UNCERTAIN BEHAVIOR OF SKIN: ICD-10-CM

## 2020-09-17 DIAGNOSIS — L30.9 DERMATITIS: Primary | ICD-10-CM

## 2020-09-17 PROCEDURE — 88305 TISSUE EXAM BY PATHOLOGIST: CPT | Mod: TC | Performed by: FAMILY MEDICINE

## 2020-09-17 PROCEDURE — 11104 PUNCH BX SKIN SINGLE LESION: CPT | Performed by: FAMILY MEDICINE

## 2020-09-17 PROCEDURE — 88312 SPECIAL STAINS GROUP 1: CPT | Mod: TC | Performed by: FAMILY MEDICINE

## 2020-09-17 PROCEDURE — 99213 OFFICE O/P EST LOW 20 MIN: CPT | Mod: 25 | Performed by: FAMILY MEDICINE

## 2020-09-17 NOTE — PATIENT INSTRUCTIONS
"FUTURE APPOINTMENTS    Follow up in 7-10 days for suture removal and office visit for tissue pathology review.    WOUND CARE INSTRUCTIONS  1. Wash hands before every dressing change.  2. Change the dressing after 24 hours and once daily, or earlier if it becomes saturated.  3. Wash the wound area with a mild soap, then rinse.  4. Gently pat dry with a sterile gauze or Q-tip.  5. Using a Q-tip, apply Vaseline or Aquaphor only over entire wound. DO NOT use Neosporin - as many people react to neomycin.  6. Finally, cover with a bandage or sterile non-stick gauze with micropore paper tape.  7. Repeat once daily until wound has healed.      Soap, water and shampoo will not hurt this area.    Do not go swimming or take baths, but showering is encouraged.    Limit use of the area where the procedure was done for a few days to allow for optimal healing.    Signs of Infection:  Infection can occur in any area where skin has been disrupted. If you notice persistent redness, swelling, colored drainage, increasing pain, fever or other signs of infection, please call us at: [phone] and ask to have me or my colleague paged. We will call you back to discuss.    If you experience bleeding:  Wash hands and hold firm pressure on the area for 10 minutes without checking to see if the bleeding has stopped. \"Checking\" pulls off the protective wound clot and restarts the bleeding all over again. Re-apply pressure for 10 minutes if necessary to stop bleeding.  Use additional sterile gauze and tape to maintain pressure once bleeding has stopped.  If bleeding continues, then call back to clinic at [phone].    PATIENT INFORMATION : WOUNDS  During the healing process you will notice a number of changes.     All wounds normally drain.  The larger the wound the more drainage there will be.  After 7-10 days, you will notice the wound beginning to shrink and new skin will begin to grow.  The wound is healed when you can see that " skin has formed over the entire area.  A healed wound has a healthy, shiny look to the surface and is red to dark pink in color to normalize.  Wounds may take approximately 4-6 weeks to heal.  Larger wounds may take 6-8 weeks. After the wound is healed you may discontinue dressing changes.    All wounds develop a small halo of redness surrounding the wound which means that healing is occurring. Severe itching with extensive redness usually indicates sensitivity to the ointment or bandage tape used to dress the wound.  You should call our office if severe itching with extensive redness develops.    Swelling  and/or discoloration around your surgical site is common, particularly when performed around the eye.  You may experience a sensation of tightness as your wound heals. This is normal and will gradually subside.  Your healed wound may be sensitive to temperature changes. This sensitivity improves with time, but if you re having a lot of discomfort, try to avoid temperature extremes.  Patients frequently experience itching after their wound appears to have healed because of the continue healing under the skin.  Plain Vaseline will help relieve the itching.

## 2020-09-17 NOTE — LETTER
9/17/2020         RE: Juwan Latham  [address]  [place] [address]        Dear Colleague,    Thank you for referring your patient, Juwan Latham, to the [facility]. Please see a copy of my visit note below.      [facility] - PRIMARY CARE SKIN    CC: scalp folliculitis   SUBJECTIVE:   Juwan Ltaham is a(n) 69 year old male who presents to the clinic for follow up of scalp folliculitis , recurrent episodes over the past months. He develops scattered small pustules on the scalp , in the past treatment with doxycycline 100 mg bid was effective. Over the past 4 months the symptoms have not resolved.    Past treatments : doxycycline 100 mg bid , Triamcinolone 0.1% cream - not effective        Personal Medical History  Skin Cancer: NO  Eczema Psoriasis Autoimmune   NO NO NO     Family Medical History  Skin Cancer: NO  Eczema Psoriasis Autoimmune   NO NO NO     Occupation: construction (both indoor & outdoor).    Refer to electronic medical record (EMR) for past medical history and medications.    ROS: 14 point review of systems was negative except the symptoms listed above in the HPI.      OBJECTIVE:   GENERAL: healthy, alert and no distress.  SKIN: Azevedo Skin Type - II.  Scalp examined. The dermatoscope was used to help evaluate pigmented lesions.  Skin Pertinent Findings:       Scalp - multiple erythematous pustules and excoriated papules , slight extension on the upper forehead ? Actinic keratosis ? Seborrheic dermatitis ? Rosacea ? Folliculitis? Other                ASSESSMENT:     Encounter Diagnoses   Name Primary?     Dermatitis Yes     Neoplasm of uncertain behavior of skin      MDM : the currently clinical picture is most consistent with multiple actinic keratosis but intially there were just cluster of pustules on the top of the scalp and few scattered on the side of the scalp.    PLAN:   Patient Instructions   FUTURE APPOINTMENTS    Follow up in 7-10 days for  "suture removal and office visit for tissue pathology review.    WOUND CARE INSTRUCTIONS  1. Wash hands before every dressing change.  2. Change the dressing after 24 hours and once daily, or earlier if it becomes saturated.  3. Wash the wound area with a mild soap, then rinse.  4. Gently pat dry with a sterile gauze or Q-tip.  5. Using a Q-tip, apply Vaseline or Aquaphor only over entire wound. DO NOT use Neosporin - as many people react to neomycin.  6. Finally, cover with a bandage or sterile non-stick gauze with micropore paper tape.  7. Repeat once daily until wound has healed.      Soap, water and shampoo will not hurt this area.    Do not go swimming or take baths, but showering is encouraged.    Limit use of the area where the procedure was done for a few days to allow for optimal healing.    Signs of Infection:  Infection can occur in any area where skin has been disrupted. If you notice persistent redness, swelling, colored drainage, increasing pain, fever or other signs of infection, please call us at: [phone] and ask to have me or my colleague paged. We will call you back to discuss.    If you experience bleeding:  Wash hands and hold firm pressure on the area for 10 minutes without checking to see if the bleeding has stopped. \"Checking\" pulls off the protective wound clot and restarts the bleeding all over again. Re-apply pressure for 10 minutes if necessary to stop bleeding.  Use additional sterile gauze and tape to maintain pressure once bleeding has stopped.  If bleeding continues, then call back to clinic at [phone].    PATIENT INFORMATION : WOUNDS  During the healing process you will notice a number of changes.     All wounds normally drain.  The larger the wound the more drainage there will be.  After 7-10 days, you will notice the wound beginning to shrink and new skin will begin to grow.  The wound is healed when you can see that skin has formed over the entire area.  A healed wound " has a healthy, shiny look to the surface and is red to dark pink in color to normalize.  Wounds may take approximately 4-6 weeks to heal.  Larger wounds may take 6-8 weeks. After the wound is healed you may discontinue dressing changes.    All wounds develop a small halo of redness surrounding the wound which means that healing is occurring. Severe itching with extensive redness usually indicates sensitivity to the ointment or bandage tape used to dress the wound.  You should call our office if severe itching with extensive redness develops.    Swelling  and/or discoloration around your surgical site is common, particularly when performed around the eye.  You may experience a sensation of tightness as your wound heals. This is normal and will gradually subside.  Your healed wound may be sensitive to temperature changes. This sensitivity improves with time, but if you re having a lot of discomfort, try to avoid temperature extremes.  Patients frequently experience itching after their wound appears to have healed because of the continue healing under the skin.  Plain Vaseline will help relieve the itching.        TT: 20 minutes.        Again, thank you for allowing me to participate in the care of your patient.        Sincerely,        Maria M Mccormick MD

## 2020-09-17 NOTE — PROCEDURES
ORDER : Biopsy Skin (one)  Name : Punch Biopsy  Indication : Biopsy for pathology to evaluate tissue.  Location(s) :  Scalp - multiple erythematous pustules and excoriated papules , slight extension on the upper forehead ? Actinic keratosis ? Seborrheic dermatitis ? Rosacea ? Folliculitis? other    Completed by : Migdalia Mccormick MD  Photo Taken : YES.  Anesthesia : Patient was anesthetized by infiltrating the area surrounding the lesion with 1% lidocaine.   epinephrine [id number] : Yes.  Note : Discussed the risk of pain, infection, scarring, hypo- or hyperpigmentation and recurrence or need for re-treatment. The benefits of treatment and alternative treatments were also discussed.    During this procedure, the universal protocol was utilized. The patient's identity was confirmed by no less than two patient identifiers, correct procedure was verified, correct site was verified and marked as applicable and a final pause was completed.    Sterile technique was used throughout the procedure. The skin was cleaned and prepped with surgical cleanser. Once adequate anesthesia was obtained, the lesion was biopsied using a 4 mm punch and appropriate skin traction. The specimen was sent to pathology.    Direct pressure was applied for hemostasis. The skin was closed with simple interrupted sutures of 4-0 Prolene. No bleeding was present upon the completion of the procedure. The wound was coated with antibacterial ointment. A dry sterile dressing was applied.

## 2020-09-17 NOTE — PROGRESS NOTES
[facility] - PRIMARY CARE SKIN    CC: scalp folliculitis   SUBJECTIVE:   Juwan Latham is a(n) 69 year old male who presents to the clinic for follow up of scalp folliculitis , recurrent episodes over the past months. He develops scattered small pustules on the scalp , in the past treatment with doxycycline 100 mg bid was effective. Over the past 4 months the symptoms have not resolved.    Past treatments : doxycycline 100 mg bid , Triamcinolone 0.1% cream - not effective        Personal Medical History  Skin Cancer: NO  Eczema Psoriasis Autoimmune   NO NO NO     Family Medical History  Skin Cancer: NO  Eczema Psoriasis Autoimmune   NO NO NO     Occupation: construction (both indoor & outdoor).    Refer to electronic medical record (EMR) for past medical history and medications.    ROS: 14 point review of systems was negative except the symptoms listed above in the HPI.      OBJECTIVE:   GENERAL: healthy, alert and no distress.  SKIN: Azevedo Skin Type - II.  Scalp examined. The dermatoscope was used to help evaluate pigmented lesions.  Skin Pertinent Findings:       Scalp - multiple erythematous pustules and excoriated papules , slight extension on the upper forehead ? Actinic keratosis ? Seborrheic dermatitis ? Rosacea ? Folliculitis? Other                ASSESSMENT:     Encounter Diagnoses   Name Primary?     Dermatitis Yes     Neoplasm of uncertain behavior of skin      MDM : the currently clinical picture is most consistent with multiple actinic keratosis but intially there were just cluster of pustules on the top of the scalp and few scattered on the side of the scalp.    PLAN:   Patient Instructions   FUTURE APPOINTMENTS    Follow up in 7-10 days for suture removal and office visit for tissue pathology review.    WOUND CARE INSTRUCTIONS  1. Wash hands before every dressing change.  2. Change the dressing after 24 hours and once daily, or earlier if it becomes saturated.  3. Wash the wound area  "with a mild soap, then rinse.  4. Gently pat dry with a sterile gauze or Q-tip.  5. Using a Q-tip, apply Vaseline or Aquaphor only over entire wound. DO NOT use Neosporin - as many people react to neomycin.  6. Finally, cover with a bandage or sterile non-stick gauze with micropore paper tape.  7. Repeat once daily until wound has healed.      Soap, water and shampoo will not hurt this area.    Do not go swimming or take baths, but showering is encouraged.    Limit use of the area where the procedure was done for a few days to allow for optimal healing.    Signs of Infection:  Infection can occur in any area where skin has been disrupted. If you notice persistent redness, swelling, colored drainage, increasing pain, fever or other signs of infection, please call us at: [phone] and ask to have me or my colleague paged. We will call you back to discuss.    If you experience bleeding:  Wash hands and hold firm pressure on the area for 10 minutes without checking to see if the bleeding has stopped. \"Checking\" pulls off the protective wound clot and restarts the bleeding all over again. Re-apply pressure for 10 minutes if necessary to stop bleeding.  Use additional sterile gauze and tape to maintain pressure once bleeding has stopped.  If bleeding continues, then call back to clinic at [phone].    PATIENT INFORMATION : WOUNDS  During the healing process you will notice a number of changes.     All wounds normally drain.  The larger the wound the more drainage there will be.  After 7-10 days, you will notice the wound beginning to shrink and new skin will begin to grow.  The wound is healed when you can see that skin has formed over the entire area.  A healed wound has a healthy, shiny look to the surface and is red to dark pink in color to normalize.  Wounds may take approximately 4-6 weeks to heal.  Larger wounds may take 6-8 weeks. After the wound is healed you may discontinue dressing changes.    All " wounds develop a small halo of redness surrounding the wound which means that healing is occurring. Severe itching with extensive redness usually indicates sensitivity to the ointment or bandage tape used to dress the wound.  You should call our office if severe itching with extensive redness develops.    Swelling  and/or discoloration around your surgical site is common, particularly when performed around the eye.  You may experience a sensation of tightness as your wound heals. This is normal and will gradually subside.  Your healed wound may be sensitive to temperature changes. This sensitivity improves with time, but if you re having a lot of discomfort, try to avoid temperature extremes.  Patients frequently experience itching after their wound appears to have healed because of the continue healing under the skin.  Plain Vaseline will help relieve the itching.        TT: 20 minutes.

## 2020-09-28 LAB — COPATH REPORT: NORMAL

## 2020-09-29 NOTE — PROGRESS NOTES
[facility]] - PRIMARY CARE SKIN    CC: scalp folliculitis   SUBJECTIVE:   Juwan Latham is a(n) 69 year old male who presents to the clinic for follow up of scalp folliculitis , recurrent episodes over the past months. He develops scattered small pustules on the scalp , in the past treatment with doxycycline 100 mg bid was effective. Over the past 4 months the symptoms have not resolved. Tissue biopsy was competed 2 weeks ago most consistent with rosacea /seborrheic dermatitis over lap.    Past treatments : doxycycline 100 mg bid , Triamcinolone 0.1% cream - not effective    Tissue biopsy :         SPECIMEN(S):   Skin, scalp, punch     FINAL DIAGNOSIS:   Skin, scalp, punch:   - Mild spongiotic dermatitis and perifolliculitis - (see comment and   description)     COMMENT:   Together with the clinical information, the features seen in this specimen    are favored to represent a   rosacea/seborrheic dermatitis overlap. The possibility of Demodex   folliculitis is also considered. Features of   a bacterial folliculitis are not identified. Clinical correlation is   recommended.       Personal Medical History  Skin Cancer: NO  Eczema Psoriasis Autoimmune   NO NO NO     Family Medical History  Skin Cancer: NO  Eczema Psoriasis Autoimmune   NO NO NO     Occupation: construction (both indoor & outdoor).    Refer to electronic medical record (EMR) for past medical history and medications.    ROS: 14 point review of systems was negative except the symptoms listed above in the HPI.      OBJECTIVE:   GENERAL: healthy, alert and no distress.  SKIN: Azevedo Skin Type - II.  Scalp examined. The dermatoscope was used to help evaluate pigmented lesions.  Skin Pertinent Findings:       Scalp - minimal erythema                    Suture removed        ASSESSMENT:     Encounter Diagnoses   Name Primary?     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    PLAN:   Patient Instructions   Ivermectin 12 mg orally times one then repeat in one week  Recheck if needed    TT: 20 minutes.

## 2020-09-30 ENCOUNTER — OFFICE VISIT (OUTPATIENT)
Dept: FAMILY MEDICINE | Facility: CLINIC | Age: 70
End: 2020-09-30
Payer: COMMERCIAL

## 2020-09-30 VITALS — DIASTOLIC BLOOD PRESSURE: 64 MMHG | SYSTOLIC BLOOD PRESSURE: 122 MMHG

## 2020-09-30 DIAGNOSIS — L73.9 FOLLICULITIS: Primary | ICD-10-CM

## 2020-09-30 DIAGNOSIS — B88.0 INFESTATION BY DEMODEX FOLLICULORUM: ICD-10-CM

## 2020-09-30 PROCEDURE — 99213 OFFICE O/P EST LOW 20 MIN: CPT | Performed by: FAMILY MEDICINE

## 2020-09-30 RX ORDER — IVERMECTIN 3 MG/1
TABLET ORAL
Qty: 8 TABLET | Refills: 0 | Status: SHIPPED | OUTPATIENT
Start: 2020-09-30 | End: 2021-02-15

## 2020-09-30 NOTE — LETTER
9/30/2020         RE: Juwan Latham  [address]  [place] [address]        Dear Colleague,    Thank you for referring your patient, [place]wan Latham, [place] [facility] Please see a copy of my visit note below.      [facility] - PRIMARY CARE SKIN    CC: scalp folliculitis   SUBJECTIVE:   Juwan Latham is a(n) 69 year old male who presents to the clinic for follow up of scalp folliculitis , recurrent episodes over the past months. He develops scattered small pustules on the scalp , in the past treatment with doxycycline 100 mg bid was effective. Over the past 4 months the symptoms have not resolved. Tissue biopsy was competed 2 weeks ago most consistent with rosacea /seborrheic dermatitis over lap.    Past treatments : doxycycline 100 mg bid , Triamcinolone 0.1% cream - not effective    Tissue biopsy :         SPECIMEN(S):   Skin, scalp, punch     FINAL DIAGNOSIS:   Skin, scalp, punch:   - Mild spongiotic dermatitis and perifolliculitis - (see comment and   description)     COMMENT:   Together with the clinical information, the features seen in this specimen    are favored to represent a   rosacea/seborrheic dermatitis overlap. The possibility of Demodex   folliculitis is also considered. Features of   a bacterial folliculitis are not identified. Clinical correlation is   recommended.       Personal Medical History  Skin Cancer: NO  Eczema Psoriasis Autoimmune   NO NO NO     Family Medical History  Skin Cancer: NO  Eczema Psoriasis Autoimmune   NO NO NO     Occupation: construction (both indoor & outdoor).    Refer to electronic medical record (EMR) for past medical history and medications.    ROS: 14 point review of systems was negative except the symptoms listed above in the HPI.      OBJECTIVE:   GENERAL: healthy, alert and no distress.  SKIN: Azevedo Skin Type - II.  Scalp examined. The dermatoscope was used to help evaluate pigmented lesions.  Skin Pertinent Findings:        Scalp - minimal erythema                    Suture removed        ASSESSMENT:     Encounter Diagnoses   Name Primary?     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    PLAN:   Patient Instructions   Ivermectin 12 mg orally times one then repeat in one week  Recheck if needed    TT: 20 minutes.        Again, thank you for allowing me to participate in the care of your patient.        Sincerely,        Maria M Mccormick MD

## 2020-10-08 ENCOUNTER — HOSPITAL ENCOUNTER (OUTPATIENT)
Dept: ULTRASOUND IMAGING | Facility: CLINIC | Age: 70
Discharge: HOME | End: 2020-10-08
Attending: INTERNAL MEDICINE | Admitting: INTERNAL MEDICINE
Payer: COMMERCIAL

## 2020-10-08 DIAGNOSIS — K76.9 DISEASE OF LIVER: ICD-10-CM

## 2020-10-08 PROCEDURE — 76705 ECHO EXAM OF ABDOMEN: CPT

## 2020-10-13 ENCOUNTER — TRANSFERRED RECORDS (OUTPATIENT)
Dept: HEALTH INFORMATION MANAGEMENT | Facility: CLINIC | Age: 70
End: 2020-10-13

## 2020-10-14 DIAGNOSIS — J91.8 PLEURAL EFFUSION ASSOCIATED WITH HEPATIC DISORDER: Primary | ICD-10-CM

## 2020-10-14 DIAGNOSIS — K76.9 PLEURAL EFFUSION ASSOCIATED WITH HEPATIC DISORDER: Primary | ICD-10-CM

## 2020-10-20 DIAGNOSIS — K76.9 PLEURAL EFFUSION ASSOCIATED WITH HEPATIC DISORDER: ICD-10-CM

## 2020-10-20 DIAGNOSIS — J91.8 PLEURAL EFFUSION ASSOCIATED WITH HEPATIC DISORDER: ICD-10-CM

## 2020-10-20 LAB
AFP SERPL-MCNC: <1.5 UG/L (ref 0–8)
ERYTHROCYTE [DISTWIDTH] IN BLOOD BY AUTOMATED COUNT: 13.8 % (ref 10–15)
HCT VFR BLD AUTO: 42.7 % (ref 40–53)
HGB BLD-MCNC: 13.9 G/DL (ref 13.3–17.7)
INR PPP: 0.97 (ref 0.86–1.14)
MCH RBC QN AUTO: 30.1 PG (ref 26.5–33)
MCHC RBC AUTO-ENTMCNC: 32.6 G/DL (ref 31.5–36.5)
MCV RBC AUTO: 92 FL (ref 78–100)
PLATELET # BLD AUTO: 150 10E9/L (ref 150–450)
RBC # BLD AUTO: 4.62 10E12/L (ref 4.4–5.9)
WBC # BLD AUTO: 3.8 10E9/L (ref 4–11)

## 2020-10-20 PROCEDURE — 82105 ALPHA-FETOPROTEIN SERUM: CPT | Performed by: INTERNAL MEDICINE

## 2020-10-20 PROCEDURE — 85027 COMPLETE CBC AUTOMATED: CPT | Performed by: INTERNAL MEDICINE

## 2020-10-20 PROCEDURE — 80076 HEPATIC FUNCTION PANEL: CPT | Performed by: INTERNAL MEDICINE

## 2020-10-20 PROCEDURE — 85610 PROTHROMBIN TIME: CPT | Performed by: INTERNAL MEDICINE

## 2020-10-20 PROCEDURE — 80048 BASIC METABOLIC PNL TOTAL CA: CPT | Performed by: INTERNAL MEDICINE

## 2020-10-20 PROCEDURE — 36415 COLL VENOUS BLD VENIPUNCTURE: CPT | Performed by: INTERNAL MEDICINE

## 2020-10-21 LAB
ALBUMIN SERPL-MCNC: 3.9 G/DL (ref 3.4–5)
ALP SERPL-CCNC: 68 U/L (ref 40–150)
ALT SERPL W P-5'-P-CCNC: 27 U/L (ref 0–70)
ANION GAP SERPL CALCULATED.3IONS-SCNC: 5 MMOL/L (ref 3–14)
AST SERPL W P-5'-P-CCNC: 23 U/L (ref 0–45)
BILIRUB DIRECT SERPL-MCNC: 0.1 MG/DL (ref 0–0.2)
BILIRUB SERPL-MCNC: 0.6 MG/DL (ref 0.2–1.3)
BUN SERPL-MCNC: 19 MG/DL (ref 7–30)
CALCIUM SERPL-MCNC: 9.4 MG/DL (ref 8.5–10.1)
CHLORIDE SERPL-SCNC: 105 MMOL/L (ref 94–109)
CO2 SERPL-SCNC: 28 MMOL/L (ref 20–32)
CREAT SERPL-MCNC: 0.8 MG/DL (ref 0.66–1.25)
GFR SERPL CREATININE-BSD FRML MDRD: >90 ML/MIN/{1.73_M2}
GLUCOSE SERPL-MCNC: 90 MG/DL (ref 70–99)
POTASSIUM SERPL-SCNC: 4.4 MMOL/L (ref 3.4–5.3)
PROT SERPL-MCNC: 6.7 G/DL (ref 6.8–8.8)
SODIUM SERPL-SCNC: 138 MMOL/L (ref 133–144)

## 2020-11-05 ENCOUNTER — TELEPHONE (OUTPATIENT)
Dept: FAMILY MEDICINE | Facility: CLINIC | Age: 70
End: 2020-11-05

## 2020-11-05 DIAGNOSIS — B88.0 INFESTATION BY DEMODEX FOLLICULORUM: Primary | ICD-10-CM

## 2020-11-05 RX ORDER — IVERMECTIN 10 MG/G
CREAM TOPICAL
Qty: 30 G | Refills: 1 | Status: SHIPPED | OUTPATIENT
Start: 2020-11-05 | End: 2021-02-15

## 2020-11-05 NOTE — TELEPHONE ENCOUNTER
I will write for the same ivermectin but in a cream. Hopefully his insurance will cover this because it if expensive.     Ivermectin cream- apply to affected area on scalp once per day   Reassess in 4-6 weeks.     Thank you,   Maria M Mccormick M.D.

## 2020-11-05 NOTE — TELEPHONE ENCOUNTER
Symptoms    Describe your symptoms: Juwan is calling to let Dr. Mccormick know he's still having problems with his scalp. He saw her on 09/30/2020. He would like to speak to a nurse regarding this.    Okay to leave a detailed message? Yes at Cell number on file:    Telephone Information:   Mobile [phone]

## 2020-11-05 NOTE — TELEPHONE ENCOUNTER
Called patient- states that the scalp was good in between the two doses of ivermectin-its been at least 1.5 weeks since he finished the medication.  States the symptoms started coming back one week ago- blistering, peeling, itching, white filled sores.    [facility] in [place]

## 2020-11-05 NOTE — TELEPHONE ENCOUNTER
Called patient and gave below information he will call pharmacy and ask them to run this script- if insurance does not cover he will call us back.    Nanda BROWNRN BSN  [facility]  [phone]

## 2020-11-06 ENCOUNTER — TELEPHONE (OUTPATIENT)
Dept: FAMILY MEDICINE | Facility: CLINIC | Age: 70
End: 2020-11-06

## 2020-11-06 NOTE — TELEPHONE ENCOUNTER
Patient Returning Call    Reason for call:  Generally not feeling good, he is ready to start taking the depression drugs that were discussed.    Information relayed to patient:  Triage call please back to patient    Patient has additional questions:  Yes Generally not feeling good, he is ready to start taking the depression drugs that were discussed.    What are your questions/concerns:  Previous discussed depression drugs would like to start them now.    Okay to leave a detailed message?: No at Cell number on file:    Telephone Information:   Mobile [phone]

## 2020-11-06 NOTE — TELEPHONE ENCOUNTER
Dr. White, please see message from Juwan, looks like he is most likely also due for OV. Please advise.     Justa Naqvi R.N.

## 2020-11-09 ENCOUNTER — TELEPHONE (OUTPATIENT)
Dept: FAMILY MEDICINE | Facility: CLINIC | Age: 70
End: 2020-11-09

## 2020-11-09 DIAGNOSIS — L71.9 ROSACEA: Primary | ICD-10-CM

## 2020-11-09 RX ORDER — METRONIDAZOLE 7.5 MG/G
GEL TOPICAL
Qty: 45 G | Refills: 0 | Status: SHIPPED | OUTPATIENT
Start: 2020-11-09 | End: 2020-12-30

## 2020-11-09 NOTE — TELEPHONE ENCOUNTER
Patient notified of providers message, patient has no further questions. He will call if worsening    Nanda BROWNRN BSN  [facility]  [phone]

## 2020-11-09 NOTE — TELEPHONE ENCOUNTER
Please call and let him know that I faxed over metronidazole gel0.75% -apply to affected area on scalp two times per day. Need to use this for 6 weeks to .       Thank you,   Maria M Mccormick M.D.

## 2020-11-10 ENCOUNTER — VIRTUAL VISIT (OUTPATIENT)
Dept: FAMILY MEDICINE | Facility: CLINIC | Age: 70
End: 2020-11-10
Payer: COMMERCIAL

## 2020-11-10 DIAGNOSIS — F33.0 MAJOR DEPRESSIVE DISORDER, RECURRENT EPISODE, MILD (H): Primary | ICD-10-CM

## 2020-11-10 PROCEDURE — 99214 OFFICE O/P EST MOD 30 MIN: CPT | Mod: 95 | Performed by: FAMILY MEDICINE

## 2020-11-10 RX ORDER — CITALOPRAM HYDROBROMIDE 10 MG/1
TABLET ORAL
Qty: 42 TABLET | Refills: 0 | Status: SHIPPED | OUTPATIENT
Start: 2020-11-10 | End: 2020-12-17

## 2020-11-10 ASSESSMENT — PATIENT HEALTH QUESTIONNAIRE - PHQ9
SUM OF ALL RESPONSES TO PHQ QUESTIONS 1-9: 7
5. POOR APPETITE OR OVEREATING: NOT AT ALL

## 2020-11-10 ASSESSMENT — ANXIETY QUESTIONNAIRES
2. NOT BEING ABLE TO STOP OR CONTROL WORRYING: SEVERAL DAYS
IF YOU CHECKED OFF ANY PROBLEMS ON THIS QUESTIONNAIRE, HOW DIFFICULT HAVE THESE PROBLEMS MADE IT FOR YOU TO DO YOUR WORK, TAKE CARE OF THINGS AT HOME, OR GET ALONG WITH OTHER PEOPLE: SOMEWHAT DIFFICULT
6. BECOMING EASILY ANNOYED OR IRRITABLE: NEARLY EVERY DAY
1. FEELING NERVOUS, ANXIOUS, OR ON EDGE: SEVERAL DAYS
3. WORRYING TOO MUCH ABOUT DIFFERENT THINGS: SEVERAL DAYS
7. FEELING AFRAID AS IF SOMETHING AWFUL MIGHT HAPPEN: NOT AT ALL
5. BEING SO RESTLESS THAT IT IS HARD TO SIT STILL: NEARLY EVERY DAY
GAD7 TOTAL SCORE: 9

## 2020-11-10 NOTE — TELEPHONE ENCOUNTER
Please schedule appointment (could be virtual) to discuss mood symptoms and medication/treatment options.    Asad White DO  11/10/2020 8:18 AM

## 2020-11-10 NOTE — PROGRESS NOTES
"Juwan Latham is a 69 year old male who is being evaluated via a billable telephone visit.      The patient has been notified of following:     \"This telephone visit will be conducted via a call between you and your physician/provider. We have found that certain health care needs can be provided without the need for a physical exam.  This service lets us provide the care you need with a short phone conversation.  If a prescription is necessary we can send it directly to your pharmacy.  If lab work is needed we can place an order for that and you can then stop by our lab to have the test done at a later time.    Telephone visits are billed at different rates depending on your insurance coverage. During this emergency period, for some insurers they may be billed the same as an in-person visit.  Please reach out to your insurance provider with any questions.    If during the course of the call the physician/provider feels a telephone visit is not appropriate, you will not be charged for this service.\"    Patient has given verbal consent for Telephone visit?  Yes    What phone number would you like to be contacted at? [phone]    How would you like to obtain your AVS? Harinihart    Subjective     Juwan Latham is a 69 year old male who presents via phone visit today for the following health issues:    HPI     Abnormal Mood Symptoms  Onset/Duration: Has felt crappy for a while- depressed because of Biden winning election.  Description: a while   Depression (if yes, do PHQ-9): YES  Anxiety (if yes, do BENSON-7): YES  Accompanying Signs & Symptoms:  Still participating in activities that you used to enjoy: YES  Fatigue: YES  Irritability: YES- emoitional  Difficulty concentrating: YES  Changes in appetite: no  Problems with sleep: no  Heart racing/beating fast: YES  Abnormally elevated, expansive, or irritable mood: YES  Persistently increased activity or energy: no  Thoughts of hurting yourself or others: " no  History:  Recent stress or major life event: YES  Prior depression or anxiety: yes  Family history of depression or anxiety: no  Alcohol/drug use: no  Difficulty sleeping: no  Precipitating or alleviating factors: None  Therapies tried and outcome: none    Sertraline - nausea  Fluoxetine - tried a long time ago - felt like he was in a tunnel      PHQ 5/7/2019 12/5/2019 11/10/2020   PHQ-9 Total Score 6 1 7   Q9: Thoughts of better off dead/self-harm past 2 weeks Not at all Not at all Not at all     BENSON-7 SCORE 5/7/2019 12/5/2019 11/10/2020   Total Score - - -   Total Score 4 5 9       Review of Systems   Constitutional, HEENT, cardiovascular, pulmonary, gi and gu systems are negative, except as otherwise noted.       Objective          Vitals:  No vitals were obtained today due to virtual visit.    healthy, alert and no distress  PSYCH: Alert and oriented times 3; coherent speech, normal   rate and volume, able to articulate logical thoughts, able   to abstract reason, no tangential thoughts, no hallucinations   or delusions  His affect is normal  RESP: No cough, no audible wheezing, able to talk in full sentences  Remainder of exam unable to be completed due to telephone visits          Assessment/Plan:    Assessment & Plan     Major depressive disorder, recurrent episode, mild (H): will start citalopram. Follow up in 4 weeks.  - citalopram (CELEXA) 10 MG tablet; Take 1 tablet (10 mg) by mouth daily for 14 days, THEN 2 tablets (20 mg) daily for 14 days.            No follow-ups on file.    Asad White [facility] [place]    Phone call duration:  6 minutes

## 2020-11-11 ASSESSMENT — ANXIETY QUESTIONNAIRES: GAD7 TOTAL SCORE: 9

## 2020-11-21 ENCOUNTER — ANCILLARY PROCEDURE (OUTPATIENT)
Dept: CT IMAGING | Facility: CLINIC | Age: 70
End: 2020-11-21
Attending: CLINICAL NURSE SPECIALIST
Payer: COMMERCIAL

## 2020-11-21 DIAGNOSIS — C34.11 MALIGNANT NEOPLASM OF UPPER LOBE OF RIGHT LUNG (H): ICD-10-CM

## 2020-11-21 PROCEDURE — 71250 CT THORAX DX C-: CPT | Performed by: RADIOLOGY

## 2020-11-24 ENCOUNTER — TELEPHONE (OUTPATIENT)
Dept: FAMILY MEDICINE | Facility: CLINIC | Age: 70
End: 2020-11-24

## 2020-11-24 DIAGNOSIS — R11.0 NAUSEA: Primary | ICD-10-CM

## 2020-11-24 NOTE — TELEPHONE ENCOUNTER
Returned call to patient.  Patient states he thought it was his lung and had a CT scan and it was clear, but continues have nausea.  Reviewed non-pharmaceutical methods to help with nausea.    Patient denies any other symptoms.    Routing to provider for review and advise as appropriate. Pharmacy is pended.    TEDDY VincentN, RN  [employer] Triage

## 2020-11-25 RX ORDER — ONDANSETRON 4 MG/1
4 TABLET, FILM COATED ORAL EVERY 6 HOURS PRN
Qty: 30 TABLET | Refills: 0 | Status: SHIPPED | OUTPATIENT
Start: 2020-11-25 | End: 2021-02-15

## 2020-11-25 NOTE — TELEPHONE ENCOUNTER
Agree with OTC measures to manage nausea.   Will add in A prescription for Zofran to see if this helps.  Chart reviewed.  Rx sent to pt's preferred pharmacy.       [id number] - [place], [place] - [address] AT [facility] OF [facility] [address]  [facility]  [facility] [place] - [place] - [address]    Julio Guidry MD

## 2020-11-28 ENCOUNTER — TRANSFERRED RECORDS (OUTPATIENT)
Dept: HEALTH INFORMATION MANAGEMENT | Facility: CLINIC | Age: 70
End: 2020-11-28

## 2020-12-14 DIAGNOSIS — F33.0 MAJOR DEPRESSIVE DISORDER, RECURRENT EPISODE, MILD (H): ICD-10-CM

## 2020-12-16 ENCOUNTER — MYC MEDICAL ADVICE (OUTPATIENT)
Dept: FAMILY MEDICINE | Facility: CLINIC | Age: 70
End: 2020-12-16

## 2020-12-16 DIAGNOSIS — F33.0 MAJOR DEPRESSIVE DISORDER, RECURRENT EPISODE, MILD (H): ICD-10-CM

## 2020-12-16 RX ORDER — CITALOPRAM HYDROBROMIDE 10 MG/1
TABLET ORAL
Qty: 42 TABLET | Refills: 0 | Status: CANCELLED | OUTPATIENT
Start: 2020-12-16 | End: 2021-01-13

## 2020-12-16 NOTE — TELEPHONE ENCOUNTER
Per LOV:Major depressive disorder, recurrent episode, mild (H): will start citalopram. Follow up in 4 weeks.  - citalopram (CELEXA) 10 MG tablet; Take 1 tablet (10 mg) by mouth daily for 14 days, THEN 2 tablets (20 mg) daily for 14 days.    Pt needs follow up visit.    Attempt #1   [employer] message sent.    Jaquan BEARDEN RN   [facility]

## 2020-12-17 ENCOUNTER — MYC MEDICAL ADVICE (OUTPATIENT)
Dept: FAMILY MEDICINE | Facility: CLINIC | Age: 70
End: 2020-12-17

## 2020-12-17 RX ORDER — CITALOPRAM HYDROBROMIDE 20 MG/1
20 TABLET ORAL DAILY
Qty: 90 TABLET | Refills: 1 | Status: SHIPPED | OUTPATIENT
Start: 2020-12-17 | End: 2021-02-15

## 2020-12-17 ASSESSMENT — ANXIETY QUESTIONNAIRES
GAD7 TOTAL SCORE: 0
7. FEELING AFRAID AS IF SOMETHING AWFUL MIGHT HAPPEN: NOT AT ALL
GAD7 TOTAL SCORE: 0
1. FEELING NERVOUS, ANXIOUS, OR ON EDGE: NOT AT ALL
GAD7 TOTAL SCORE: 0
3. WORRYING TOO MUCH ABOUT DIFFERENT THINGS: NOT AT ALL
7. FEELING AFRAID AS IF SOMETHING AWFUL MIGHT HAPPEN: NOT AT ALL
4. TROUBLE RELAXING: NOT AT ALL
2. NOT BEING ABLE TO STOP OR CONTROL WORRYING: NOT AT ALL
5. BEING SO RESTLESS THAT IT IS HARD TO SIT STILL: NOT AT ALL
6. BECOMING EASILY ANNOYED OR IRRITABLE: NOT AT ALL

## 2020-12-17 ASSESSMENT — PATIENT HEALTH QUESTIONNAIRE - PHQ9
SUM OF ALL RESPONSES TO PHQ QUESTIONS 1-9: 2
SUM OF ALL RESPONSES TO PHQ QUESTIONS 1-9: 2
10. IF YOU CHECKED OFF ANY PROBLEMS, HOW DIFFICULT HAVE THESE PROBLEMS MADE IT FOR YOU TO DO YOUR WORK, TAKE CARE OF THINGS AT HOME, OR GET ALONG WITH OTHER PEOPLE: NOT DIFFICULT AT ALL

## 2020-12-17 NOTE — TELEPHONE ENCOUNTER
Routing to PCP to review and advise of mychart messages.    Jaquan BEARDEN RN   [facility]

## 2020-12-17 NOTE — TELEPHONE ENCOUNTER
[employer] message sent    Jaquan BEARDEN RN   [facility] Triage

## 2020-12-18 ASSESSMENT — ANXIETY QUESTIONNAIRES: GAD7 TOTAL SCORE: 0

## 2020-12-18 ASSESSMENT — PATIENT HEALTH QUESTIONNAIRE - PHQ9: SUM OF ALL RESPONSES TO PHQ QUESTIONS 1-9: 2

## 2020-12-18 NOTE — TELEPHONE ENCOUNTER
PHQ 12/5/2019 11/10/2020 12/17/2020   PHQ-9 Total Score 1 7 2   Q9: Thoughts of better off dead/self-harm past 2 weeks Not at all Not at all Not at all     BENSON-7 SCORE 12/5/2019 11/10/2020 12/17/2020   Total Score - - -   Total Score - - 0 (minimal anxiety)   Total Score 5 9 0       See refill encounter      Liat Parry RN  [facility]

## 2021-01-15 ENCOUNTER — HEALTH MAINTENANCE LETTER (OUTPATIENT)
Age: 71
End: 2021-01-15

## 2021-01-15 ENCOUNTER — TELEPHONE (OUTPATIENT)
Dept: FAMILY MEDICINE | Facility: CLINIC | Age: 71
End: 2021-01-15

## 2021-01-15 NOTE — TELEPHONE ENCOUNTER
Called # [phone]     Pt called and advised of note below. Patient stated an understanding and agreed with plan.    Telephone visit set 2/12/21.      Jaquan Lauren RN   [facility]

## 2021-01-15 NOTE — TELEPHONE ENCOUNTER
We can increase citalopram from 20 mg (1 tablet) to 40 mg (2 tablets) daily. He should follow up with me in a month to see how that is going. Virtual visit would be fine.    Asad White,   1/15/2021 2:31 PM

## 2021-01-15 NOTE — TELEPHONE ENCOUNTER
Patient is calling to ask if he can double his citalopram because he feels like the one pill a day is not helping him any more. Please advise patient if he needs an appointment for this.

## 2021-02-15 ENCOUNTER — VIRTUAL VISIT (OUTPATIENT)
Dept: FAMILY MEDICINE | Facility: CLINIC | Age: 71
End: 2021-02-15
Payer: COMMERCIAL

## 2021-02-15 DIAGNOSIS — F33.0 MAJOR DEPRESSIVE DISORDER, RECURRENT EPISODE, MILD (H): Primary | ICD-10-CM

## 2021-02-15 PROCEDURE — 99442 PR PHYSICIAN TELEPHONE EVALUATION 11-20 MIN: CPT | Mod: 95 | Performed by: FAMILY MEDICINE

## 2021-02-15 RX ORDER — PYRIDOXINE HCL (VITAMIN B6) 100 MG
TABLET ORAL
COMMUNITY
End: 2022-10-17

## 2021-02-15 RX ORDER — ZINC GLUCONATE 50 MG
50 TABLET ORAL DAILY
COMMUNITY
End: 2023-01-18

## 2021-02-15 RX ORDER — CITALOPRAM HYDROBROMIDE 20 MG/1
20 TABLET ORAL DAILY
Qty: 90 TABLET | Refills: 1 | Status: SHIPPED | OUTPATIENT
Start: 2021-02-15 | End: 2021-09-10

## 2021-02-15 RX ORDER — BUSPIRONE HYDROCHLORIDE 10 MG/1
10 TABLET ORAL 2 TIMES DAILY
Qty: 60 TABLET | Refills: 0 | Status: SHIPPED | OUTPATIENT
Start: 2021-02-15 | End: 2021-04-08

## 2021-02-15 ASSESSMENT — ANXIETY QUESTIONNAIRES
6. BECOMING EASILY ANNOYED OR IRRITABLE: NOT AT ALL
3. WORRYING TOO MUCH ABOUT DIFFERENT THINGS: NOT AT ALL
1. FEELING NERVOUS, ANXIOUS, OR ON EDGE: NOT AT ALL
GAD7 TOTAL SCORE: 0
IF YOU CHECKED OFF ANY PROBLEMS ON THIS QUESTIONNAIRE, HOW DIFFICULT HAVE THESE PROBLEMS MADE IT FOR YOU TO DO YOUR WORK, TAKE CARE OF THINGS AT HOME, OR GET ALONG WITH OTHER PEOPLE: NOT DIFFICULT AT ALL
2. NOT BEING ABLE TO STOP OR CONTROL WORRYING: NOT AT ALL
5. BEING SO RESTLESS THAT IT IS HARD TO SIT STILL: NOT AT ALL
7. FEELING AFRAID AS IF SOMETHING AWFUL MIGHT HAPPEN: NOT AT ALL

## 2021-02-15 ASSESSMENT — PATIENT HEALTH QUESTIONNAIRE - PHQ9
5. POOR APPETITE OR OVEREATING: NOT AT ALL
SUM OF ALL RESPONSES TO PHQ QUESTIONS 1-9: 12

## 2021-02-15 NOTE — PROGRESS NOTES
"Juwan is a 70 year old who is being evaluated via a billable telephone visit.      What phone number would you like to be contacted at? [phone]  How would you like to obtain your AVS? Ori    Assessment & Plan     Major depressive disorder, recurrent episode, mild (H): will try augmenting Celexa with Buspar. Follow up in 1 month to see how this is going. Consider psychiatry consultation in the future.  - busPIRone (BUSPAR) 10 MG tablet; Take 1 tablet (10 mg) by mouth 2 times daily  - citalopram (CELEXA) 20 MG tablet; Take 1 tablet (20 mg) by mouth daily       BMI:   Estimated body mass index is 27.06 kg/m  as calculated from the following:    Height as of 8/18/20: 1.727 m (5' 8\").    Weight as of 8/18/20: 80.7 kg (178 lb).       No follow-ups on file.    Asad White DO  [facility]    Subjective   Juwan is a 70 year old who presents for the following health issues     HPI       Hypertension Follow-up      Do you check your blood pressure regularly outside of the clinic? yes - 124/70.     Are you following a low salt diet? No    Are your blood pressures ever more than 140 on the top number (systolic) OR more   than 90 on the bottom number (diastolic), for example 140/90? No    Depression and Anxiety Follow-Up    How are you doing with your depression since your last visit? No change    How are you doing with your anxiety since your last visit?  No change    Are you having other symptoms that might be associated with depression or anxiety? No    Have you had a significant life event? No     Do you have any concerns with your use of alcohol or other drugs? No     Has been taking 40 mg daily for the past month. He doesn't feel like he is as on edge as he was but the fatigue is back. For the most part, sleeps well at night. Sometimes wakes up to urinate.     He has tried other medication with lack of benefit or side effects -- Sertraline, Prozac, Effexor, and Wellbutrin.      Social " History     Tobacco Use     Smoking status: Former Smoker     Packs/day: 2.00     Years: 20.00     Pack years: 40.00     Types: Cigarettes     Quit date: 1985     Years since quittin.1     Smokeless tobacco: Never Used   Substance Use Topics     Alcohol use: Not Currently     Comment: couple  times  per  week  (beers)     Drug use: No     PHQ 11/10/2020 2020 2/15/2021   PHQ-9 Total Score 7 2 12   Q9: Thoughts of better off dead/self-harm past 2 weeks Not at all Not at all Not at all     BENSON-7 SCORE 11/10/2020 2020 2/15/2021   Total Score - - -   Total Score - 0 (minimal anxiety) -   Total Score 9 0 0       Suicide Assessment Five-step Evaluation and Treatment (SAFE-T)      How many servings of fruits and vegetables do you eat daily?  0-1    On average, how many sweetened beverages do you drink each day (Examples: soda, juice, sweet tea, etc.  Do NOT count diet or artificially sweetened beverages)?   0    How many days per week do you exercise enough to make your heart beat faster? 3 or less    How many minutes a day do you exercise enough to make your heart beat faster? 9 or less    How many days per week do you miss taking your medication? 0      Review of Systems   Constitutional, HEENT, cardiovascular, pulmonary, gi and gu systems are negative, except as otherwise noted.      Objective           Vitals:  No vitals were obtained today due to virtual visit.    Physical Exam   healthy, alert and no distress  PSYCH: Alert and oriented times 3; coherent speech, normal   rate and volume, able to articulate logical thoughts, able   to abstract reason, no tangential thoughts, no hallucinations   or delusions  His affect is normal  RESP: No cough, no audible wheezing, able to talk in full sentences  Remainder of exam unable to be completed due to telephone visits            Phone call duration: 15 minutes

## 2021-02-16 ASSESSMENT — ANXIETY QUESTIONNAIRES: GAD7 TOTAL SCORE: 0

## 2021-03-17 ENCOUNTER — DOCUMENTATION ONLY (OUTPATIENT)
Dept: OTHER | Facility: CLINIC | Age: 71
End: 2021-03-17

## 2021-03-21 ENCOUNTER — HEALTH MAINTENANCE LETTER (OUTPATIENT)
Age: 71
End: 2021-03-21

## 2021-04-06 DIAGNOSIS — F33.0 MAJOR DEPRESSIVE DISORDER, RECURRENT EPISODE, MILD (H): ICD-10-CM

## 2021-04-07 ENCOUNTER — TRANSFERRED RECORDS (OUTPATIENT)
Dept: HEALTH INFORMATION MANAGEMENT | Facility: CLINIC | Age: 71
End: 2021-04-07

## 2021-04-07 LAB
ALT SERPL-CCNC: 32 U/L (ref 0–78)
AST SERPL-CCNC: 21 U/L (ref 0–37)
CREAT SERPL-MCNC: 0.78 MG/DL (ref 0.7–1.3)

## 2021-04-07 NOTE — TELEPHONE ENCOUNTER
LOV: 2/15/2021  Patient due for med check regarding this med (VV OK)  No future appt scheduled    Routing to [facility] to assist in scheduling      Liat Parry RN  [facility]

## 2021-04-08 RX ORDER — BUSPIRONE HYDROCHLORIDE 10 MG/1
10 TABLET ORAL 2 TIMES DAILY
Qty: 60 TABLET | Refills: 0 | Status: SHIPPED | OUTPATIENT
Start: 2021-04-08 | End: 2021-04-13

## 2021-04-08 NOTE — TELEPHONE ENCOUNTER
Prescription approved per [facility] Refill Protocol.      Jaquan Lauren RN   [facility]

## 2021-04-09 ENCOUNTER — HOSPITAL ENCOUNTER (OUTPATIENT)
Dept: ULTRASOUND IMAGING | Facility: CLINIC | Age: 71
Discharge: HOME | End: 2021-04-09
Attending: INTERNAL MEDICINE | Admitting: INTERNAL MEDICINE
Payer: COMMERCIAL

## 2021-04-09 DIAGNOSIS — K76.9 CHRONIC LIVER DISEASE: ICD-10-CM

## 2021-04-09 PROCEDURE — 76705 ECHO EXAM OF ABDOMEN: CPT

## 2021-04-13 ENCOUNTER — VIRTUAL VISIT (OUTPATIENT)
Dept: FAMILY MEDICINE | Facility: CLINIC | Age: 71
End: 2021-04-13
Payer: COMMERCIAL

## 2021-04-13 DIAGNOSIS — F33.0 MAJOR DEPRESSIVE DISORDER, RECURRENT EPISODE, MILD (H): ICD-10-CM

## 2021-04-13 PROCEDURE — 99441 PR PHYSICIAN TELEPHONE EVALUATION 5-10 MIN: CPT | Mod: 95 | Performed by: FAMILY MEDICINE

## 2021-04-13 RX ORDER — BUSPIRONE HYDROCHLORIDE 10 MG/1
10 TABLET ORAL 2 TIMES DAILY
Qty: 180 TABLET | Refills: 1 | Status: SHIPPED | OUTPATIENT
Start: 2021-04-13 | End: 2021-09-10

## 2021-04-13 ASSESSMENT — ANXIETY QUESTIONNAIRES
1. FEELING NERVOUS, ANXIOUS, OR ON EDGE: NOT AT ALL
2. NOT BEING ABLE TO STOP OR CONTROL WORRYING: NOT AT ALL
6. BECOMING EASILY ANNOYED OR IRRITABLE: NOT AT ALL
7. FEELING AFRAID AS IF SOMETHING AWFUL MIGHT HAPPEN: NOT AT ALL
3. WORRYING TOO MUCH ABOUT DIFFERENT THINGS: NOT AT ALL
GAD7 TOTAL SCORE: 0
5. BEING SO RESTLESS THAT IT IS HARD TO SIT STILL: NOT AT ALL

## 2021-04-13 ASSESSMENT — PATIENT HEALTH QUESTIONNAIRE - PHQ9
SUM OF ALL RESPONSES TO PHQ QUESTIONS 1-9: 3
5. POOR APPETITE OR OVEREATING: NOT AT ALL

## 2021-04-13 NOTE — PROGRESS NOTES
"Juwan is a 70 year old who is being evaluated via a billable telephone visit.      What phone number would you like to be contacted at? [phone]  How would you like to obtain your AVS? MyChart    Assessment & Plan     Major depressive disorder, recurrent episode, mild (H): mood improved since starting Buspar. Will continue along with citalopram. Follow up in 6 months. For intermittent fatigue, advised scheduling follow up with sleep medicine as untreated PAULINO may be a large contributing factor.  - busPIRone (BUSPAR) 10 MG tablet; Take 1 tablet (10 mg) by mouth 2 times daily     BMI:   Estimated body mass index is 27.06 kg/m  as calculated from the following:    Height as of 20: 1.727 m (5' 8\").    Weight as of 20: 80.7 kg (178 lb).     No follow-ups on file.    Asad White DO  [facility]   Juwan is a 70 year old who presents for the following health issues     HPI     Depression and Anxiety Follow-Up    How are you doing with your depression since your last visit? Improved     How are you doing with your anxiety since your last visit?  Improved     Are you having other symptoms that might be associated with depression or anxiety? No    Have you had a significant life event? No     Do you have any concerns with your use of alcohol or other drugs? No    Social History     Tobacco Use     Smoking status: Former Smoker     Packs/day: 2.00     Years: 20.00     Pack years: 40.00     Types: Cigarettes     Quit date: 1985     Years since quittin.3     Smokeless tobacco: Never Used   Substance Use Topics     Alcohol use: Not Currently     Comment: couple  times  per  week  (beers)     Drug use: No     PHQ 2020 2/15/2021 2021   PHQ-9 Total Score 2 12 3   Q9: Thoughts of better off dead/self-harm past 2 weeks Not at all Not at all Not at all     BENSON-7 SCORE 2020 2/15/2021 2021   Total Score - - -   Total Score 0 (minimal anxiety) - -   Total " Score 0 0 0     Suicide Assessment Five-step Evaluation and Treatment (SAFE-T)        Review of Systems   Constitutional, HEENT, cardiovascular, pulmonary, gi and gu systems are negative, except as otherwise noted.      Objective           Vitals:  No vitals were obtained today due to virtual visit.    Physical Exam   healthy, alert and no distress  PSYCH: Alert and oriented times 3; coherent speech, normal   rate and volume, able to articulate logical thoughts, able   to abstract reason, no tangential thoughts, no hallucinations   or delusions  His affect is normal  RESP: No cough, no audible wheezing, able to talk in full sentences  Remainder of exam unable to be completed due to telephone visits            Phone call duration: 10 minutes

## 2021-04-14 ASSESSMENT — ANXIETY QUESTIONNAIRES: GAD7 TOTAL SCORE: 0

## 2021-04-19 ENCOUNTER — MYC MEDICAL ADVICE (OUTPATIENT)
Dept: FAMILY MEDICINE | Facility: CLINIC | Age: 71
End: 2021-04-19

## 2021-04-21 NOTE — TELEPHONE ENCOUNTER
My chart message sent     Suzy Schuster RN, BSN  [facility]

## 2021-04-22 ENCOUNTER — MEDICAL CORRESPONDENCE (OUTPATIENT)
Dept: HEALTH INFORMATION MANAGEMENT | Facility: CLINIC | Age: 71
End: 2021-04-22
Payer: COMMERCIAL

## 2021-04-22 ENCOUNTER — TRANSFERRED RECORDS (OUTPATIENT)
Dept: HEALTH INFORMATION MANAGEMENT | Facility: CLINIC | Age: 71
End: 2021-04-22

## 2021-08-09 NOTE — PROGRESS NOTES
"    Assessment & Plan   Right-sided low back pain without sciatica, unspecified chronicity  Symptoms related to tender muscles with probable irritation of nerves to the area.  No significant radicular symptoms.  Recommended heat or ice, massage.  Stretching and exercise information sheet given as well.  Formal physical therapy consideration if needed.    s/p Malignant neoplasm of upper lobe of right lung (H)  Stable       BMI:   Estimated body mass index is 27.1 kg/m  as calculated from the following:    Height as of this encounter: 1.753 m (5' 9\").    Weight as of this encounter: 83.2 kg (183 lb 8 oz).         Return in about 1 month (around 9/10/2021) for wellness exam with fasting labs with Zay Sandhu MD.      Zay Sandhu MD      [address] [address]  [email or web address]   Office: [phone]       Izabella Echeverria is a 70 year old who presents for the following health issues     HPI     Musculoskeletal problem/pain  Onset/Duration: ongoing for about a year intermittently and worse the last 2-3 weeks.   Description  Location:right low back radiating into the buttock  Joint Swelling: no  Redness: no  Pain: YES  Warmth: no  Intensity:  moderate  Progression of Symptoms:  intermittent  Accompanying signs and symptoms:   Fevers: no  Numbness/tingling/weakness: no  History  Trauma to the area: no  Recent illness:  no  Previous similar problem: no  Previous evaluation:  no  Precipitating or alleviating factors:  Aggravating factors include: none  Therapies tried and outcome: Ibuprofen and chiropractor (helped somewhat)      Review of Systems   Constitutional, HEENT, cardiovascular, pulmonary, gi and gu systems are negative, except as otherwise noted.      Objective    /64   Pulse 61   Temp 98.5  F (36.9  C)   Resp 20   Ht 1.753 m (5' 9\")   Wt 83.2 kg (183 lb 8 oz)   SpO2 98%   BMI 27.10 kg/m    Body mass index is 27.1 kg/m .  Physical Exam   GENERAL: " healthy, alert and no distress  NECK: no adenopathy, no asymmetry, masses, or scars and thyroid normal to palpation  RESP: lungs clear to auscultation - no rales, rhonchi or wheezes  CV: regular rate and rhythm, normal S1 S2, no S3 or S4, no murmur, click or rub, no peripheral edema and peripheral pulses strong  ABDOMEN: soft, nontender, no hepatosplenomegaly, no masses and bowel sounds normal  MS: good range of motion of the hips otherwise no gross musculoskeletal defects noted, no edema  SKIN: no suspicious lesions or rashes  NEURO: Normal strength and tone, mentation intact and speech normal  BACK: no CVA tenderness, right paralumbar tenderness, negative straight leg raising

## 2021-08-10 ENCOUNTER — OFFICE VISIT (OUTPATIENT)
Dept: FAMILY MEDICINE | Facility: CLINIC | Age: 71
End: 2021-08-10
Payer: COMMERCIAL

## 2021-08-10 VITALS
HEIGHT: 69 IN | HEART RATE: 61 BPM | OXYGEN SATURATION: 98 % | TEMPERATURE: 98.5 F | DIASTOLIC BLOOD PRESSURE: 64 MMHG | WEIGHT: 183.5 LBS | RESPIRATION RATE: 20 BRPM | SYSTOLIC BLOOD PRESSURE: 120 MMHG | BODY MASS INDEX: 27.18 KG/M2

## 2021-08-10 DIAGNOSIS — C34.11 MALIGNANT NEOPLASM OF UPPER LOBE OF RIGHT LUNG (H): ICD-10-CM

## 2021-08-10 DIAGNOSIS — M54.50 RIGHT-SIDED LOW BACK PAIN WITHOUT SCIATICA, UNSPECIFIED CHRONICITY: Primary | ICD-10-CM

## 2021-08-10 PROCEDURE — 99213 OFFICE O/P EST LOW 20 MIN: CPT | Performed by: FAMILY MEDICINE

## 2021-08-10 RX ORDER — VITAMIN B COMPLEX
1 TABLET, EXTENDED RELEASE ORAL
COMMUNITY
End: 2021-12-17

## 2021-08-10 ASSESSMENT — ANXIETY QUESTIONNAIRES
5. BEING SO RESTLESS THAT IT IS HARD TO SIT STILL: NOT AT ALL
GAD7 TOTAL SCORE: 0
7. FEELING AFRAID AS IF SOMETHING AWFUL MIGHT HAPPEN: NOT AT ALL
GAD7 TOTAL SCORE: 0
1. FEELING NERVOUS, ANXIOUS, OR ON EDGE: NOT AT ALL
7. FEELING AFRAID AS IF SOMETHING AWFUL MIGHT HAPPEN: NOT AT ALL
4. TROUBLE RELAXING: NOT AT ALL
6. BECOMING EASILY ANNOYED OR IRRITABLE: NOT AT ALL
2. NOT BEING ABLE TO STOP OR CONTROL WORRYING: NOT AT ALL
3. WORRYING TOO MUCH ABOUT DIFFERENT THINGS: NOT AT ALL
GAD7 TOTAL SCORE: 0
8. IF YOU CHECKED OFF ANY PROBLEMS, HOW DIFFICULT HAVE THESE MADE IT FOR YOU TO DO YOUR WORK, TAKE CARE OF THINGS AT HOME, OR GET ALONG WITH OTHER PEOPLE?: NOT DIFFICULT AT ALL

## 2021-08-10 ASSESSMENT — PATIENT HEALTH QUESTIONNAIRE - PHQ9
SUM OF ALL RESPONSES TO PHQ QUESTIONS 1-9: 5
SUM OF ALL RESPONSES TO PHQ QUESTIONS 1-9: 5
10. IF YOU CHECKED OFF ANY PROBLEMS, HOW DIFFICULT HAVE THESE PROBLEMS MADE IT FOR YOU TO DO YOUR WORK, TAKE CARE OF THINGS AT HOME, OR GET ALONG WITH OTHER PEOPLE: NOT DIFFICULT AT ALL

## 2021-08-10 ASSESSMENT — MIFFLIN-ST. JEOR: SCORE: 1587.73

## 2021-08-11 ASSESSMENT — ANXIETY QUESTIONNAIRES: GAD7 TOTAL SCORE: 0

## 2021-08-11 ASSESSMENT — PATIENT HEALTH QUESTIONNAIRE - PHQ9: SUM OF ALL RESPONSES TO PHQ QUESTIONS 1-9: 5

## 2021-09-04 ENCOUNTER — HEALTH MAINTENANCE LETTER (OUTPATIENT)
Age: 71
End: 2021-09-04

## 2021-09-10 ENCOUNTER — OFFICE VISIT (OUTPATIENT)
Dept: FAMILY MEDICINE | Facility: CLINIC | Age: 71
End: 2021-09-10
Payer: COMMERCIAL

## 2021-09-10 VITALS
SYSTOLIC BLOOD PRESSURE: 126 MMHG | BODY MASS INDEX: 27.11 KG/M2 | TEMPERATURE: 96.9 F | OXYGEN SATURATION: 97 % | HEART RATE: 71 BPM | DIASTOLIC BLOOD PRESSURE: 78 MMHG | WEIGHT: 183 LBS | RESPIRATION RATE: 16 BRPM | HEIGHT: 69 IN

## 2021-09-10 DIAGNOSIS — C61 PROSTATE CANCER (H): ICD-10-CM

## 2021-09-10 DIAGNOSIS — K76.0 HEPATIC STEATOSIS: ICD-10-CM

## 2021-09-10 DIAGNOSIS — Z13.6 CARDIOVASCULAR SCREENING; LDL GOAL LESS THAN 160: ICD-10-CM

## 2021-09-10 DIAGNOSIS — Z00.00 ENCOUNTER FOR MEDICARE ANNUAL WELLNESS EXAM: Primary | ICD-10-CM

## 2021-09-10 DIAGNOSIS — R79.9 ABNORMAL FINDING OF BLOOD CHEMISTRY, UNSPECIFIED: ICD-10-CM

## 2021-09-10 DIAGNOSIS — Z13.1 SCREENING FOR DIABETES MELLITUS: ICD-10-CM

## 2021-09-10 DIAGNOSIS — I10 HYPERTENSION GOAL BP (BLOOD PRESSURE) < 140/90: ICD-10-CM

## 2021-09-10 DIAGNOSIS — G47.33 OSA (OBSTRUCTIVE SLEEP APNEA): ICD-10-CM

## 2021-09-10 DIAGNOSIS — F33.0 MAJOR DEPRESSIVE DISORDER, RECURRENT EPISODE, MILD (H): ICD-10-CM

## 2021-09-10 PROCEDURE — 99397 PER PM REEVAL EST PAT 65+ YR: CPT | Performed by: FAMILY MEDICINE

## 2021-09-10 RX ORDER — BUSPIRONE HYDROCHLORIDE 10 MG/1
10 TABLET ORAL 2 TIMES DAILY
Qty: 180 TABLET | Refills: 1 | Status: SHIPPED | OUTPATIENT
Start: 2021-09-10 | End: 2022-10-14

## 2021-09-10 RX ORDER — CITALOPRAM HYDROBROMIDE 20 MG/1
20 TABLET ORAL DAILY
Qty: 90 TABLET | Refills: 1 | Status: SHIPPED | OUTPATIENT
Start: 2021-09-10 | End: 2022-10-14

## 2021-09-10 ASSESSMENT — ACTIVITIES OF DAILY LIVING (ADL): CURRENT_FUNCTION: NO ASSISTANCE NEEDED

## 2021-09-10 ASSESSMENT — ENCOUNTER SYMPTOMS
CONSTIPATION: 0
COUGH: 0
PARESTHESIAS: 0
SORE THROAT: 0
JOINT SWELLING: 0
ARTHRALGIAS: 0
HEMATOCHEZIA: 0
HEADACHES: 0
CHILLS: 0
DIARRHEA: 0
DIZZINESS: 0
HEARTBURN: 1
EYE PAIN: 0
FEVER: 0
PALPITATIONS: 0
HEMATURIA: 0
NERVOUS/ANXIOUS: 0
MYALGIAS: 0
NAUSEA: 0
WEAKNESS: 0
ABDOMINAL PAIN: 0
DYSURIA: 0
SHORTNESS OF BREATH: 0
FREQUENCY: 0

## 2021-09-10 ASSESSMENT — MIFFLIN-ST. JEOR: SCORE: 1585.46

## 2021-09-10 NOTE — PATIENT INSTRUCTIONS
Patient Education   Personalized Prevention Plan  You are due for the preventive services outlined below.  Your care team is available to assist you in scheduling these services.  If you have already completed any of these items, please share that information with your care team to update in your medical record.  Health Maintenance Due   Topic Date Due     Hepatitis B Vaccine (1 of 3 - Risk 3-dose series) Never done     Zoster (Shingles) Vaccine (1 of 2) Never done     Annual Wellness Visit  01/14/2021     FALL RISK ASSESSMENT  01/14/2021     Prostate Test  05/14/2021     Kidney Microalbumin Urine Test  07/09/2021     Flu Vaccine (1) 09/01/2021

## 2021-09-10 NOTE — PROGRESS NOTES
"SUBJECTIVE:   Juwan Latham is a 70 year old male who presents for Preventive Visit.      Patient has been advised of split billing requirements and indicates understanding: Yes   Are you in the first 12 months of your Medicare coverage?  No    Healthy Habits:     In general, how would you rate your overall health?  Fair    Frequency of exercise:  2-3 days/week    Duration of exercise:  30-45 minutes    Do you usually eat at least 4 servings of fruit and vegetables a day, include whole grains    & fiber and avoid regularly eating high fat or \"junk\" foods?  Yes    Taking medications regularly:  Yes    Medication side effects:  None    Ability to successfully perform activities of daily living:  No assistance needed    Home Safety:  No safety concerns identified    Hearing Impairment:  No hearing concerns    In the past 6 months, have you been bothered by leaking of urine? Yes    In general, how would you rate your overall mental or emotional health?  Good      PHQ-2 Total Score: 0    Additional concerns today:  No    Do you feel safe in your environment? Yes    Have you ever done Advance Care Planning? (For example, a Health Directive, POLST, or a discussion with a medical provider or your loved ones about your wishes): Yes, advance care planning is on file.     Fall risk  Fallen 2 or more times in the past year?: No  Any fall with injury in the past year?: No    Cognitive Screening   1) Repeat 3 items (Leader, Season, Table)    2) Clock draw: NORMAL  3) 3 item recall: Recalls 2 objects   Results: NORMAL clock, 1-2 items recalled: COGNITIVE IMPAIRMENT LESS LIKELY    Mini-CogTM Copyright NETT[employer] Carlos. Licensed by the author for use in [facility]; reprinted with permission (alex@.[facility]). All rights reserved.      Do you have sleep apnea, excessive snoring or daytime drowsiness?: no    Reviewed and updated as needed this visit by clinical staff  Tobacco  Allergies  Meds   Med Hx  Surg Hx  Fam Hx  Soc " Hx        Reviewed and updated as needed this visit by Provider  Tobacco               Social History     Tobacco Use     Smoking status: Former Smoker     Packs/day: 2.00     Years: 20.00     Pack years: 40.00     Types: Cigarettes     Quit date: 1985     Years since quittin.7     Smokeless tobacco: Never Used   Substance Use Topics     Alcohol use: Not Currently     Comment: sparingly     If you drink alcohol do you typically have >3 drinks per day or >7 drinks per week? No    Alcohol Use 9/10/2021   Prescreen: >3 drinks/day or >7 drinks/week? No   Prescreen: >3 drinks/day or >7 drinks/week? -           Depression Followup    How are you doing with your depression since your last visit? No change    Are you having other symptoms that might be associated with depression? No    Have you had a significant life event?  No     Are you feeling anxious or having panic attacks?   No    Do you have any concerns with your use of alcohol or other drugs? No    Social History     Tobacco Use     Smoking status: Former Smoker     Packs/day: 2.00     Years: 20.00     Pack years: 40.00     Types: Cigarettes     Quit date: 1985     Years since quittin.7     Smokeless tobacco: Never Used   Substance Use Topics     Alcohol use: Not Currently     Comment: sparingly     Drug use: No     PHQ 2/15/2021 2021 8/10/2021   PHQ-9 Total Score 12 3 5   Q9: Thoughts of better off dead/self-harm past 2 weeks Not at all Not at all Not at all     BENSON-7 SCORE 2/15/2021 2021 8/10/2021   Total Score - - -   Total Score - - 0 (minimal anxiety)   Total Score 0 0 0       Suicide Assessment Five-step Evaluation and Treatment (SAFE-T)      Current providers sharing in care for this patient include:   Patient Care Team:  Julio Guidry Jr., MD as PCP - General (Family Practice)  Paulo Agarwal MD as MD (Urology)  Love Mcdonald, RN as Registered Nurse (Oncology)  Maria A Desai MD as MD (Cardiovascular &  "Thoracic Surgery)  Eva Vicente MD as Assigned Sleep Provider  Asad White DO as Assigned PCP    The following health maintenance items are reviewed in Epic and correct as of today:  Health Maintenance Due   Topic Date Due     HEPATITIS B IMMUNIZATION (1 of 3 - Risk 3-dose series) Never done     ZOSTER IMMUNIZATION (1 of 2) Never done     FALL RISK ASSESSMENT  01/14/2021     PSA  05/14/2021     MICROALBUMIN  07/09/2021     INFLUENZA VACCINE (1) 09/01/2021     Labs reviewed in EPIC        Review of Systems  Constitutional, HEENT, cardiovascular, pulmonary, gi and gu systems are negative, except as otherwise noted.    OBJECTIVE:   /78   Pulse 71   Temp 96.9  F (36.1  C) (Tympanic)   Resp 16   Ht 1.753 m (5' 9\")   Wt 83 kg (183 lb)   SpO2 97%   BMI 27.02 kg/m   Estimated body mass index is 27.02 kg/m  as calculated from the following:    Height as of this encounter: 1.753 m (5' 9\").    Weight as of this encounter: 83 kg (183 lb).  Physical Exam  GENERAL: healthy, alert and no distress  EYES: Eyes grossly normal to inspection, PERRL and conjunctivae and sclerae normal  HENT: ear canals and TM's normal, nose and mouth without ulcers or lesions  NECK: no adenopathy and no asymmetry, masses, or scars  RESP: lungs clear to auscultation - no rales, rhonchi or wheezes  CV: regular rate and rhythm, normal S1 S2, no S3 or S4, no murmur, click or rub, no peripheral edema and peripheral pulses strong  MS: no gross musculoskeletal defects noted, no edema  SKIN: no suspicious lesions or rashes and several seborrheic keratosis on back  NEURO: Normal strength and tone, mentation intact and speech normal  PSYCH: mentation appears normal, affect normal/bright    Diagnostic Test Results:  Labs reviewed in Epic    ASSESSMENT / PLAN:   1. Encounter for Medicare annual wellness exam: health maintenance     2. Prostate cancer (H): stable. S/p prostatectomy 2017.  - PSA, tumor marker; " "Future    3. Hepatic steatosis: very limited alcohol and has kept weight off. Eating a healthy diet. Following up with GI.    4. Hypertension goal BP (blood pressure) < 140/90: at goal without medication. Continue monitoring.   - Albumin Random Urine Quantitative with Creat Ratio; Future  - Comprehensive metabolic panel (BMP + Alb, Alk Phos, ALT, AST, Total. Bili, TP); Future    5. PAULINO (obstructive sleep apnea)    6. Screening for diabetes mellitus    7. Abnormal finding of blood chemistry, unspecified: check fasting glucose and A1c  - Comprehensive metabolic panel (BMP + Alb, Alk Phos, ALT, AST, Total. Bili, TP); Future  - Hemoglobin A1c; Future    8. CARDIOVASCULAR SCREENING; LDL GOAL LESS THAN 160  - Lipid panel reflex to direct LDL Fasting; Future    9. Major depressive disorder, recurrent episode, mild (H): stable with current regimen.  - busPIRone (BUSPAR) 10 MG tablet; Take 1 tablet (10 mg) by mouth 2 times daily  Dispense: 180 tablet; Refill: 1  - citalopram (CELEXA) 20 MG tablet; Take 1 tablet (20 mg) by mouth daily  Dispense: 90 tablet; Refill: 1      Patient has been advised of split billing requirements and indicates understanding: Yes  COUNSELING:  Reviewed preventive health counseling, as reflected in patient instructions       Regular exercise       Healthy diet/nutrition    Estimated body mass index is 27.02 kg/m  as calculated from the following:    Height as of this encounter: 1.753 m (5' 9\").    Weight as of this encounter: 83 kg (183 lb).        He reports that he quit smoking about 36 years ago. His smoking use included cigarettes. He has a 40.00 pack-year smoking history. He has never used smokeless tobacco.      Appropriate preventive services were discussed with this patient, including applicable screening as appropriate for cardiovascular disease, diabetes, osteopenia/osteoporosis, and glaucoma.  As appropriate for age/gender, discussed screening for colorectal cancer, prostate cancer, " breast cancer, and cervical cancer. Checklist reviewing preventive services available has been given to the patient.    Reviewed patients plan of care and provided an AVS. The Basic Care Plan (routine screening as documented in Health Maintenance) for Juwan meets the Care Plan requirement. This Care Plan has been established and reviewed with the Patient.    Counseling Resources:  ATP IV Guidelines  Pooled Cohorts Equation Calculator  Breast Cancer Risk Calculator  Breast Cancer: Medication to Reduce Risk  FRAX Risk Assessment  ICSI Preventive Guidelines  Dietary Guidelines for Americans, 2010  USDA's MyPlate  ASA Prophylaxis  Lung CA Screening    Asad White DO  [facility]    Identified Health Risks:

## 2021-09-23 ENCOUNTER — LAB (OUTPATIENT)
Dept: LAB | Facility: CLINIC | Age: 71
End: 2021-09-23

## 2021-09-23 ENCOUNTER — ALLIED HEALTH/NURSE VISIT (OUTPATIENT)
Dept: FAMILY MEDICINE | Facility: CLINIC | Age: 71
End: 2021-09-23
Payer: COMMERCIAL

## 2021-09-23 DIAGNOSIS — C61 PROSTATE CANCER (H): ICD-10-CM

## 2021-09-23 DIAGNOSIS — Z23 NEED FOR PROPHYLACTIC VACCINATION AND INOCULATION AGAINST INFLUENZA: Primary | ICD-10-CM

## 2021-09-23 DIAGNOSIS — Z13.6 CARDIOVASCULAR SCREENING; LDL GOAL LESS THAN 160: ICD-10-CM

## 2021-09-23 DIAGNOSIS — I10 HYPERTENSION GOAL BP (BLOOD PRESSURE) < 140/90: ICD-10-CM

## 2021-09-23 DIAGNOSIS — R79.9 ABNORMAL FINDING OF BLOOD CHEMISTRY, UNSPECIFIED: ICD-10-CM

## 2021-09-23 LAB
ALBUMIN SERPL-MCNC: 4 G/DL (ref 3.4–5)
ALP SERPL-CCNC: 53 U/L (ref 40–150)
ALT SERPL W P-5'-P-CCNC: 26 U/L (ref 0–70)
ANION GAP SERPL CALCULATED.3IONS-SCNC: 3 MMOL/L (ref 3–14)
AST SERPL W P-5'-P-CCNC: 22 U/L (ref 0–45)
BILIRUB SERPL-MCNC: 0.7 MG/DL (ref 0.2–1.3)
BUN SERPL-MCNC: 19 MG/DL (ref 7–30)
CALCIUM SERPL-MCNC: 8.5 MG/DL (ref 8.5–10.1)
CHLORIDE BLD-SCNC: 108 MMOL/L (ref 94–109)
CHOLEST SERPL-MCNC: 201 MG/DL
CO2 SERPL-SCNC: 28 MMOL/L (ref 20–32)
CREAT SERPL-MCNC: 0.83 MG/DL (ref 0.66–1.25)
CREAT UR-MCNC: 97 MG/DL
FASTING STATUS PATIENT QL REPORTED: YES
GFR SERPL CREATININE-BSD FRML MDRD: 90 ML/MIN/1.73M2
GLUCOSE BLD-MCNC: 95 MG/DL (ref 70–99)
HBA1C MFR BLD: 5.3 % (ref 0–5.6)
HDLC SERPL-MCNC: 48 MG/DL
LDLC SERPL CALC-MCNC: 136 MG/DL
MICROALBUMIN UR-MCNC: <5 MG/L
MICROALBUMIN/CREAT UR: NORMAL MG/G{CREAT}
NONHDLC SERPL-MCNC: 153 MG/DL
POTASSIUM BLD-SCNC: 4.6 MMOL/L (ref 3.4–5.3)
PROT SERPL-MCNC: 7.2 G/DL (ref 6.8–8.8)
PSA SERPL-MCNC: <0.01 UG/L (ref 0–4)
SODIUM SERPL-SCNC: 139 MMOL/L (ref 133–144)
TRIGL SERPL-MCNC: 86 MG/DL

## 2021-09-23 PROCEDURE — 90662 IIV NO PRSV INCREASED AG IM: CPT

## 2021-09-23 PROCEDURE — 82043 UR ALBUMIN QUANTITATIVE: CPT

## 2021-09-23 PROCEDURE — 80053 COMPREHEN METABOLIC PANEL: CPT

## 2021-09-23 PROCEDURE — 84153 ASSAY OF PSA TOTAL: CPT

## 2021-09-23 PROCEDURE — G0008 ADMIN INFLUENZA VIRUS VAC: HCPCS

## 2021-09-23 PROCEDURE — 83036 HEMOGLOBIN GLYCOSYLATED A1C: CPT

## 2021-09-23 PROCEDURE — 80061 LIPID PANEL: CPT

## 2021-09-23 PROCEDURE — 36415 COLL VENOUS BLD VENIPUNCTURE: CPT

## 2021-10-13 ENCOUNTER — TRANSFERRED RECORDS (OUTPATIENT)
Dept: HEALTH INFORMATION MANAGEMENT | Facility: CLINIC | Age: 71
End: 2021-10-13

## 2021-10-18 NOTE — TELEPHONE ENCOUNTER
Reason for Call:  Need Advice     Detailed comments: pt called and stated that while he is doing his every 6 month Lung xray there is some cyst  findubg on his left side Kidney.    Pt would like to let Dr. White know and need advice what could be the next plan      Phone Number Patient can be reached at: Cell number on file:    Telephone Information:   Mobile [phone]       Best Time: anytime     Can we leave a detailed message on this number? YES    Call taken on 3/25/2020 at 11:36 AM by JUSTO TELLO       No

## 2021-10-20 DIAGNOSIS — C34.11 MALIGNANT NEOPLASM OF UPPER LOBE OF RIGHT LUNG (H): Primary | ICD-10-CM

## 2021-10-21 ENCOUNTER — TELEPHONE (OUTPATIENT)
Dept: FAMILY MEDICINE | Facility: CLINIC | Age: 71
End: 2021-10-21

## 2021-10-21 NOTE — TELEPHONE ENCOUNTER
Patient calling, results of ct showed o to little scarring so he is wondering if there is anything else you need him to do right now or if everything is fine for right now       Nanda Taylor

## 2021-10-22 NOTE — TELEPHONE ENCOUNTER
Called # [phone]     Pt called, noted this was actually the US done on 10/13/21 with [facility].      Jaquan Lauren RN   [facility]

## 2021-10-22 NOTE — TELEPHONE ENCOUNTER
Which CT is he referring to? The one from 11/21/2020? Or did he get a new one done recently?    Asad White,   10/22/2021 12:30 AM

## 2021-10-22 NOTE — TELEPHONE ENCOUNTER
Okay, we do have the letter from [facility] indicating those results. No additional evaluation is needed.    Asad White DO  10/22/2021 9:05 AM

## 2021-10-24 ENCOUNTER — OFFICE VISIT (OUTPATIENT)
Dept: URGENT CARE | Facility: URGENT CARE | Age: 71
End: 2021-10-24
Payer: COMMERCIAL

## 2021-10-24 ENCOUNTER — NURSE TRIAGE (OUTPATIENT)
Dept: NURSING | Facility: CLINIC | Age: 71
End: 2021-10-24

## 2021-10-24 VITALS — HEART RATE: 60 BPM | SYSTOLIC BLOOD PRESSURE: 129 MMHG | DIASTOLIC BLOOD PRESSURE: 72 MMHG | TEMPERATURE: 96.6 F

## 2021-10-24 DIAGNOSIS — R31.9 HEMATURIA, UNSPECIFIED TYPE: Primary | ICD-10-CM

## 2021-10-24 LAB
ALBUMIN SERPL-MCNC: 4 G/DL (ref 3.4–5)
ALBUMIN UR-MCNC: NEGATIVE MG/DL
ALP SERPL-CCNC: 59 U/L (ref 40–150)
ALT SERPL W P-5'-P-CCNC: 20 U/L
ANION GAP SERPL CALCULATED.3IONS-SCNC: 5 MMOL/L (ref 3–14)
APPEARANCE UR: CLEAR
AST SERPL W P-5'-P-CCNC: 27 U/L (ref 0–45)
BASOPHILS # BLD AUTO: 0 10E3/UL (ref 0–0.2)
BASOPHILS NFR BLD AUTO: 0 %
BILIRUB SERPL-MCNC: 0.7 MG/DL (ref 0.2–1.3)
BILIRUB UR QL STRIP: NEGATIVE
BUN SERPL-MCNC: 17 MG/DL (ref 7–30)
CALCIUM SERPL-MCNC: 9.7 MG/DL (ref 8.5–10.1)
CHLORIDE BLD-SCNC: 109 MMOL/L (ref 94–109)
CO2 SERPL-SCNC: 30 MMOL/L (ref 20–32)
COLOR UR AUTO: YELLOW
CREAT SERPL-MCNC: 0.7 MG/DL (ref 0.66–1.25)
EOSINOPHIL # BLD AUTO: 0.1 10E3/UL (ref 0–0.7)
EOSINOPHIL NFR BLD AUTO: 3 %
ERYTHROCYTE [DISTWIDTH] IN BLOOD BY AUTOMATED COUNT: 13.7 % (ref 10–15)
GFR SERPL CREATININE-BSD FRML MDRD: >90 ML/MIN/1.73M2
GLUCOSE BLD-MCNC: 109 MG/DL (ref 70–99)
GLUCOSE UR STRIP-MCNC: NEGATIVE MG/DL
HCT VFR BLD AUTO: 44.8 % (ref 40–53)
HGB BLD-MCNC: 14.5 G/DL (ref 13.3–17.7)
HGB UR QL STRIP: NEGATIVE
KETONES UR STRIP-MCNC: NEGATIVE MG/DL
LEUKOCYTE ESTERASE UR QL STRIP: NEGATIVE
LYMPHOCYTES # BLD AUTO: 0.7 10E3/UL (ref 0.8–5.3)
LYMPHOCYTES NFR BLD AUTO: 19 %
MCH RBC QN AUTO: 30.3 PG (ref 26.5–33)
MCHC RBC AUTO-ENTMCNC: 32.4 G/DL (ref 31.5–36.5)
MCV RBC AUTO: 94 FL (ref 78–100)
MONOCYTES # BLD AUTO: 0.5 10E3/UL (ref 0–1.3)
MONOCYTES NFR BLD AUTO: 12 %
NEUTROPHILS # BLD AUTO: 2.5 10E3/UL (ref 1.6–8.3)
NEUTROPHILS NFR BLD AUTO: 66 %
NITRATE UR QL: NEGATIVE
PH UR STRIP: 6.5 [PH] (ref 5–7)
PLATELET # BLD AUTO: 152 10E3/UL (ref 150–450)
POTASSIUM BLD-SCNC: 4.7 MMOL/L (ref 3.4–5.3)
PROT SERPL-MCNC: 7.2 G/DL (ref 6.8–8.8)
PSA SERPL-MCNC: <0.01 UG/L (ref 0–4)
RBC # BLD AUTO: 4.79 10E6/UL (ref 4.4–5.9)
SODIUM SERPL-SCNC: 144 MMOL/L (ref 133–144)
SP GR UR STRIP: 1.02 (ref 1–1.03)
UROBILINOGEN UR STRIP-ACNC: 0.2 E.U./DL
WBC # BLD AUTO: 3.8 10E3/UL (ref 4–11)

## 2021-10-24 PROCEDURE — 81003 URINALYSIS AUTO W/O SCOPE: CPT | Performed by: FAMILY MEDICINE

## 2021-10-24 PROCEDURE — 36415 COLL VENOUS BLD VENIPUNCTURE: CPT | Performed by: FAMILY MEDICINE

## 2021-10-24 PROCEDURE — 85025 COMPLETE CBC W/AUTO DIFF WBC: CPT | Performed by: FAMILY MEDICINE

## 2021-10-24 PROCEDURE — 80053 COMPREHEN METABOLIC PANEL: CPT | Performed by: FAMILY MEDICINE

## 2021-10-24 PROCEDURE — 84153 ASSAY OF PSA TOTAL: CPT | Performed by: FAMILY MEDICINE

## 2021-10-24 PROCEDURE — 99214 OFFICE O/P EST MOD 30 MIN: CPT | Performed by: FAMILY MEDICINE

## 2021-10-24 NOTE — TELEPHONE ENCOUNTER
"Thick blood in urine this morning. Now clear yellow urine.  No pain with urination.   No fever, temp 96.7 now, orally.   No chills.   No back or flank pain.   No injury.  Not on blood thinner.  No nausea, vomiting, or diarrhea.   Pt reports urinating less than usual in past month, \"not getting up as often at night\".     Abdomen \"sore\" on occasional with twinge at belt line across entire abdomen, below belly button. Pt reports he hasn't been monitoring that symptom for frequency or intensity, but states it's not sore at time of call.    Kidney stones in past, but denies chronic kidney issues.  Pt has had prostate cancer in past, approx 4 years ago he states.  Pt also reports hip pain x3wks, not worsening though.     Per RN Triage protocol, pt advised to be seen within 24 hours. Pt requested appt tomorrow at clinic, but will go to Urgent Care today if no appts in clinic available, pt transferred to scheduling. Caller given care advice per RN triage protocol. Caller verbalizes understanding and agreement of plan. Caller instructed to call back with questions, worsening or new symptoms of concern.    Kiya Selby RN   10/24/21 9:54 AM  [facility] Nurse Advisor    Reason for Disposition    Blood in urine  (Exception: could be normal menstrual bleeding)    Additional Information    Negative: Shock suspected (e.g., cold/pale/clammy skin, too weak to stand, low BP, rapid pulse)    Negative: Sounds like a life-threatening emergency to the triager    Negative: Urinary catheter, questions about    Negative: Recent back or abdominal injury    Negative: Recent genital injury    Negative: [1] Unable to urinate (or only a few drops) > 4 hours AND [2] bladder feels very full (e.g., palpable bladder or strong urge to urinate)    Negative: Passing pure blood or large blood clots (i.e., size > a dime) (Exception: john or small strands)    Negative: Fever > 100.4 F (38.0 C)    Negative: Patient sounds very sick or weak to " the triager    Negative: Known sickle cell disease    Negative: Taking Coumadin (warfarin) or other strong blood thinner, or known bleeding disorder (e.g., thrombocytopenia)    Negative: Side (flank) or back pain present    Negative: Pain or burning with passing urine    Negative: [1] Pink or red-colored urine and likely from food (beets, rhubarb, red food dye) AND [2] lasts > 24 hours after stopping food    Protocols used: URINE - BLOOD IN-A-AH    COVID 19 Nurse Triage Plan/Patient Instructions    Please be aware that novel coronavirus (COVID-19) may be circulating in the community. If you develop symptoms such as fever, cough, or SOB or if you have concerns about the presence of another infection including coronavirus (COVID-19), please contact your health care provider or visit [email or web address].     Disposition/Instructions    In-Person Visit with provider recommended. Reference Visit Selection Guide.    Thank you for taking steps to prevent the spread of this virus.  o Limit your contact with others.  o Wear a simple mask to cover your cough.  o Wash your hands well and often.    Resources    [facility]: About COVID-19: [email or web address]    CDC: What to Do If You're Sick: www.cdc.gov/coronavirus/2019-ncov/about/steps-when-sick.html    CDC: Ending Home Isolation: www.cdc.gov/coronavirus/2019-ncov/hcp/disposition-in-home-patients.html     CDC: Caring for Someone: www.cdc.gov/coronavirus/2019-ncov/if-you-are-sick/care-for-someone.html     [facility]: Interim Guidance for Hospital Discharge to Home: www.health.[facility].[place]/diseases/coronavirus/hcp/hospdischarge.pdf    [facility] clinical trials (COVID-19 research studies): [email or web address]-trials     Below are the COVID-19 hotlines at the [place] Department of Health ([facility]). Interpreters are available.   o For health questions: Call 235-696-1908 or 1-702.292.2528 (7 a.m. to 7 p.m.)  o For questions about  schools and childcare: Call 158-042-7607 or 1-763.898.6627 (7 a.m. to 7 p.m.)

## 2021-10-24 NOTE — PATIENT INSTRUCTIONS
Patient Education     Blood in the Urine    Blood in the urine (hematuria) has many possible causes. If it occurs after an injury (such as a car accident or fall), it is most often a sign of bruising to the kidney or bladder. Common causes of blood in the urine include urinary tract infections, kidney stones, inflammation, tumors, or certain other diseases of the kidney or bladder. Menstruation can cause blood to appear in the urine sample, but it is not coming from the urinary tract.   If only a tiny (trace) amount of blood is present, it will show up on the urine test, even though the urine may be yellow and not pink or red. This may occur with any of the above conditions, as well as heavy exercise or high fever. In this case, your healthcare provider may want to repeat the urine test on another day. This will show if there is still blood in the urine. If there is, then other tests can be done to find out the cause.   Home care  Follow these home care guidelines:    If your urine does not look bloody (pink, brown, or red) then you don't need to restrict your activity in any way.    If you can see blood in your urine, rest and don't do any strenuous activity until your next exam. Don't use aspirin, blood thinners, or anti-platelet or anti-inflammatory medicines. These include ibuprofen and naproxen. These thin the blood and may increase bleeding. Call your healthcare provider to talk about using these medicines.  Follow-up care  Follow up with your healthcare provider, or as advised. If you were injured and had blood in your urine, you should have a repeat urine test in 1 to 2 days. Contact your provider for this test.   A radiologist will review any X-rays that were taken. You will be told of any new findings that may affect your care.   When to seek medical advice  Call your healthcare provider right away if any of these occur:    Bright red blood or blood clots in the urine (if you did not have this  before)    Weakness, dizziness or fainting    New groin, belly, or back pain    Fever of 100.4 F (38 C) or higher, or as directed by your provider    Repeated vomiting    Bleeding from the nose or gums or easy bruising  Rosa last reviewed this educational content on 9/1/2019 2000-2021 [employer]. All rights reserved. This information is not intended as a substitute for professional medical care. Always follow your healthcare professional's instructions.           Patient Education     Hematuria: Possible Causes     Many things can lead to blood in the urine (hematuria). The blood may be seen with the eye (macroscopic or gross hematuria). Or it may only be seen when the urine is looked at under a microscope (microscopic hematuria). Often no cause for the blood can be found. This is called idiopathic hematuria. Here are some of the most common causes of blood in the urine:     Kidney or bladder stones. These are collections of crystals. They form in the urine. Stones may be found anywhere in the urinary tract. But they form most often in the kidneys or bladder. In addition to blood in the urine, they can cause severe pain.    BPH (benign prostatic hyperplasia). This is enlargement of the prostate gland. It happens as men age. BPH often causes problems with urination. It sometimes causes blood in the urine.    Urinary tract infection (UTI). This is due to bacteria growing in the urinary tract. It can cause blood in the urine. Other symptoms include burning or pain with urination. You may need to urinate often or urgently. You may also have a fever.    Damage to the urinary tract may cause blood in the urine. This damage may be due to a blow or accident. It may also result from the use of a urinary catheter. Very hard exercise may sometimes irritate the urinary tract and cause bleeding.    Cancer may occur anywhere in the urinary tract. A tumor may sometimes cause no symptoms other than  bleeding.  Other possible causes of bleeding include:     Prostate gland infection (prostatitis)    Taking anticoagulants    Blockage in the urinary tract    Kidney disease or inflammation    Cystic diseases of the kidneys    Sickle cell anemia    Vigorous exercise    Endometriosis  Rosa last reviewed this educational content on 7/1/2019 2000-2021 [employer]. All rights reserved. This information is not intended as a substitute for professional medical care. Always follow your healthcare professional's instructions.

## 2021-10-25 ENCOUNTER — TELEPHONE (OUTPATIENT)
Dept: UROLOGY | Facility: CLINIC | Age: 71
End: 2021-10-25

## 2021-10-25 NOTE — TELEPHONE ENCOUNTER
Please schedule patient for franky jaime on Friday Nov 5th at 3pm for video new for hematuria   please take hold off when scheduled Jacquelyn Nogueira LPN Staff Nurse

## 2021-10-25 NOTE — TELEPHONE ENCOUNTER
Called #   Telephone Information:   Mobile [phone]     Advised pt on the information below     Patient stated an understanding and agreed with plan.      Suzy Schuster RN, BSN  [facility]

## 2021-10-25 NOTE — TELEPHONE ENCOUNTER
[facility] Call [facility]    Phone Message    May a detailed message be left on voicemail: yes     Reason for Call: Appointment Intake    Referring Provider Name: Brad Fajardo MD in  URGENT CARE    Diagnosis and/or Symptoms: hematuria, unspecified    Action Taken: Message routed to:  [facility] uro    Travel Screening: Not Applicable

## 2021-10-26 NOTE — PROGRESS NOTES
SUBJECTIVE:  Juwan Latham, a 70 year old male scheduled an appointment to discuss the following issues:     Blood in urine  Hematuria, unspecified type    Medical, social, surgical, and family histories reviewed.     Urgent Care (blood in urine since 3am this morning. )    Communicated with pt mostly by writing because pt did not bring his hearing aide.  Around 3am today, he developed painless hematuria. That has since resolved.  He did not strain or injure himself doing any heavy work.  No urine urgency or frequency.  No abdominal or back pain.  Has hx of kidney stones and hx of prostate cancer---TURP surgery 3 years ago when he had urinary catheter X 3 months.      ROS:  See HPI.  No nausea/vomiting.  No fever/chills.  No chest pain/SOB.  No BM/urine problems.  No dizziness or syncope.  The weight loss or decreased appetite.      OBJECTIVE:  /72   Pulse 60   Temp (!) 96.6  F (35.9  C) (Tympanic)   EXAM:  GENERAL APPEARANCE:  alert and no distress, afebrile, looks well, not septic  EYES: Eyes grossly normal to inspection, PERRL and conjunctivae and sclerae normal  HENT: ear canals and TM's normal and nose and mouth without ulcers or lesions  NECK: no adenopathy, no asymmetry, masses, or scars and thyroid normal to palpation  RESP: lungs clear to auscultation - no rales, rhonchi or wheezes  CV: regular rates and rhythm, normal S1 S2, no S3 or S4 and no murmur, click or rub  LYMPHATICS: no cervical adenopathy  ABDOMEN: soft, nontender, without hepatosplenomegaly or masses and bowel sounds normal; no hernia; no CVA tenderness  MS: extremities normal- no gross deformities noted  SKIN: no suspicious lesions or rashes  NEURO: Normal strength and tone, mentation intact and speech normal      ASSESSMENT/PLAN:  (R31.9) Blood in urine  (primary encounter diagnosis)  Comment: urinalysis normal  Plan: UA reflex to Microscopic and Culture      (R31.9) Hematuria, unspecified type  Comment: transient; normal PSA,  essentially normal CBC+diff and CMP (with slightly low WBC 3.8 and Glucose 109)  Plan: PSA, tumor marker, CBC with platelets and         differential, Comprehensive metabolic panel         (BMP + Alb, Alk Phos, ALT, AST, Total. Bili,         TP), Adult Urology Referral    Pt to f/up PCP within 1 week if no improvement or worsening.  Warning signs and symptoms explained.

## 2021-10-27 ENCOUNTER — PRE VISIT (OUTPATIENT)
Dept: UROLOGY | Facility: CLINIC | Age: 71
End: 2021-10-27

## 2021-10-27 NOTE — TELEPHONE ENCOUNTER
Reason for visit: consult for hematuria      Dx/Hx/Sx: hematuria     Records/imaging/labs/orders: in epic     At Rooming: video visit

## 2021-11-05 ENCOUNTER — VIRTUAL VISIT (OUTPATIENT)
Dept: UROLOGY | Facility: CLINIC | Age: 71
End: 2021-11-05
Payer: COMMERCIAL

## 2021-11-05 DIAGNOSIS — Z85.46 PERSONAL HISTORY OF MALIGNANT NEOPLASM OF PROSTATE: ICD-10-CM

## 2021-11-05 DIAGNOSIS — R31.0 GROSS HEMATURIA: Primary | ICD-10-CM

## 2021-11-05 PROCEDURE — 99214 OFFICE O/P EST MOD 30 MIN: CPT | Mod: 95 | Performed by: NURSE PRACTITIONER

## 2021-11-05 NOTE — PATIENT INSTRUCTIONS
UROLOGY CLINIC VISIT PATIENT INSTRUCTIONS    Will complete the following workup components to help understand where the blood in your urine is coming from:  -Labs: Will obtain a urinalysis, culture, and urine cytology  -Imaging: Will obtain a CT scan to evaluate your kidneys, ureters, and bladder. We should hopefully be able to add this on to be done at the same time as your upcoming chest CT.   -Lastly, will schedule a cystoscopy with the next available urologist to evaluate the interior of your bladder in the OR. Message sent to Dr. Souza.     If you have any issues, questions or concerns in the meantime, do not hesitate to contact us at [phone] or via [employer].     It was a pleasure meeting with you today.  Thank you for allowing me and my team the privilege of caring for you today.  YOU are the reason we are here, and I truly hope we provided you with the excellent service you deserve.  Please let us know if there is anything else we can do for you so that we can be sure you are leaving completely satisfied with your care experience.    Ana Maria Pacheco, CNP

## 2021-11-05 NOTE — PROGRESS NOTES
Juwan is a 70 year old who is being evaluated via a billable video visit.      How would you like to obtain your AVS? MyChart  If the video visit is dropped, the invitation should be resent by: Text to cell phone: [phone]  Will anyone else be joining your video visit? No    Video Start Time: 3:05 PM  Video-Visit Details    Type of service:  Video Visit    Video End Time: 3:27 PM    Originating Location (pt. Location): Home    Distant Location (provider location):  [facility] [place]     Platform used for Video Visit: Minh Latham complains of   Chief Complaint   Patient presents with     Consult For     hematuria       Assessment/Plan:  70 year old male with a history of prostate cancer s/p robotic radical prostatectomy in 2017 and undetectable PSAs since, who has had two instances of painless gross hematuria / clot passage in the past few weeks. The differential diagnosis at this point includes stone disease, infection, urothelial malignancy, renal disorder, radiation cystitis versus another yet unknown diagnosis.    At this time, recommend proceeding with comprehensive hematuria evaluation to include:  - Urinalysis and urine culture to rule out an acute urinary tract infection.   - Urine cytology to look for cells concerning for malignancy.  - CT urogram for upper tract imaging.  - Cystoscopy with the first available urologist to evaluate the interior of the bladder. The patient would like this to be done in the OR as he does not feel that he would be able to tolerate it during an office visit. Start with the above testing and coordinate an OR date to complete this.   -Follow up for hematuria as recommended by urologist performing cystoscopic evaluation.      Ana Maria Pacheco, CNP  Department of Urology      Subjective:   70 year old male with a history of prostate cancer s/p robotic radical prostatectomy on 12/1/2017. Has previously followed with Dr. Agarwal, last  seen in 03/2019, at which time his PSA remained undetectable. Chronic issue of urinary incontinence that became worse after surgery and radiation. Plan at his last visit was to involve PT. His PSAs have continued to be undetectable, last on 10/24/21.     He presents today, however, for hematuria evaluation. He was seen in urgent care a few weeks ago for painless gross hematuria and clot passage. UA obtained and was negative. CBC and CMP largely normal. He had another episode of this last Thursday. He notes that both episodes happened after he was straining to have a bowel movement.       Objective:     Exam:   Patient is a 70 year old male   General Appearance: Well groomed, hygenic  Respiratory: No cough, no respiratory distress or labored breathing  Musculoskeletal: Grossly normal with no gross deficits  Skin: No discoloration or apparent rashes  Neurologic: No tremors  Psychiatric: Alert and oriented  Further examination is deferred due to the nature of our visit.

## 2021-11-05 NOTE — LETTER
11/5/2021       RE: Juwan Latham  [address]  [place] [address]     Dear Colleague,    Thank you for referring your patient, Juwan Latham, to the [facility] [place] at [facility]. Please see a copy of my visit note below.    Juwan is a 70 year old who is being evaluated via a billable video visit.      How would you like to obtain your AVS? MyChart  If the video visit is dropped, the invitation should be resent by: Text to cell phone: [phone]  Will anyone else be joining your video visit? No    Video Start Time: 3:05 PM  Video-Visit Details    Type of service:  Video Visit    Video End Time: 3:27 PM    Originating Location (pt. Location): Home    Distant Location (provider location):  [facility]     Platform used for Video Visit: [employer]       Juwan Latham complains of   Chief Complaint   Patient presents with     Consult For     hematuria       Assessment/Plan:  70 year old male with a history of prostate cancer s/p robotic radical prostatectomy in 2017 and undetectable PSAs since, who has had two instances of painless gross hematuria / clot passage in the past few weeks. The differential diagnosis at this point includes stone disease, infection, urothelial malignancy, renal disorder, radiation cystitis versus another yet unknown diagnosis.    At this time, recommend proceeding with comprehensive hematuria evaluation to include:  - Urinalysis and urine culture to rule out an acute urinary tract infection.   - Urine cytology to look for cells concerning for malignancy.  - CT urogram for upper tract imaging.  - Cystoscopy with the first available urologist to evaluate the interior of the bladder. The patient would like this to be done in the OR as he does not feel that he would be able to tolerate it during an office visit. Start with the above testing and coordinate an OR date to complete  this.   -Follow up for hematuria as recommended by urologist performing cystoscopic evaluation.      Ana Maria Pacheco, CNP  Department of Urology      Subjective:   70 year old male with a history of prostate cancer s/p robotic radical prostatectomy on 12/1/2017. Has previously followed with Dr. Agarwal, last seen in 03/2019, at which time his PSA remained undetectable. Chronic issue of urinary incontinence that became worse after surgery and radiation. Plan at his last visit was to involve PT. His PSAs have continued to be undetectable, last on 10/24/21.     He presents today, however, for hematuria evaluation. He was seen in urgent care a few weeks ago for painless gross hematuria and clot passage. UA obtained and was negative. CBC and CMP largely normal. He had another episode of this last Thursday. He notes that both episodes happened after he was straining to have a bowel movement.       Objective:     Exam:   Patient is a 70 year old male   General Appearance: Well groomed, hygenic  Respiratory: No cough, no respiratory distress or labored breathing  Musculoskeletal: Grossly normal with no gross deficits  Skin: No discoloration or apparent rashes  Neurologic: No tremors  Psychiatric: Alert and oriented  Further examination is deferred due to the nature of our visit.

## 2021-11-09 DIAGNOSIS — F41.9 ANXIETY DUE TO INVASIVE PROCEDURE: Primary | ICD-10-CM

## 2021-11-09 RX ORDER — DIAZEPAM 5 MG
5 TABLET ORAL ONCE
Qty: 1 TABLET | Refills: 0 | Status: SHIPPED | OUTPATIENT
Start: 2021-11-09 | End: 2021-11-09

## 2021-11-11 ENCOUNTER — PRE VISIT (OUTPATIENT)
Dept: UROLOGY | Facility: CLINIC | Age: 71
End: 2021-11-11
Payer: COMMERCIAL

## 2021-11-11 NOTE — TELEPHONE ENCOUNTER
Reason for visit: Cystoscopy     Relevant information: gross hematuria; hx of prostate cancer    Records/imaging/labs/orders: in EPIC    Pt called: no    At Rooming: Urine sample

## 2021-11-16 ENCOUNTER — LAB (OUTPATIENT)
Dept: LAB | Facility: CLINIC | Age: 71
End: 2021-11-16
Payer: COMMERCIAL

## 2021-11-16 DIAGNOSIS — R31.0 GROSS HEMATURIA: ICD-10-CM

## 2021-11-16 LAB
ALBUMIN UR-MCNC: NEGATIVE MG/DL
AMORPH CRY #/AREA URNS HPF: ABNORMAL /HPF
APPEARANCE UR: CLEAR
BACTERIA #/AREA URNS HPF: ABNORMAL /HPF
BILIRUB UR QL STRIP: NEGATIVE
COLOR UR AUTO: YELLOW
GLUCOSE UR STRIP-MCNC: NEGATIVE MG/DL
HGB UR QL STRIP: ABNORMAL
KETONES UR STRIP-MCNC: NEGATIVE MG/DL
LEUKOCYTE ESTERASE UR QL STRIP: NEGATIVE
MUCOUS THREADS #/AREA URNS LPF: PRESENT /LPF
NITRATE UR QL: NEGATIVE
PH UR STRIP: 7.5 [PH] (ref 5–7)
RBC #/AREA URNS AUTO: ABNORMAL /HPF
SP GR UR STRIP: 1.02 (ref 1–1.03)
UROBILINOGEN UR STRIP-ACNC: 0.2 E.U./DL
WBC #/AREA URNS AUTO: ABNORMAL /HPF

## 2021-11-16 PROCEDURE — 81001 URINALYSIS AUTO W/SCOPE: CPT

## 2021-11-16 PROCEDURE — 87086 URINE CULTURE/COLONY COUNT: CPT

## 2021-11-16 PROCEDURE — 88112 CYTOPATH CELL ENHANCE TECH: CPT | Performed by: PATHOLOGY

## 2021-11-17 LAB — BACTERIA UR CULT: NO GROWTH

## 2021-11-18 LAB
PATH REPORT.COMMENTS IMP SPEC: NORMAL
PATH REPORT.FINAL DX SPEC: NORMAL
PATH REPORT.GROSS SPEC: NORMAL
PATH REPORT.RELEVANT HX SPEC: NORMAL

## 2021-11-23 ENCOUNTER — HOSPITAL ENCOUNTER (OUTPATIENT)
Dept: CT IMAGING | Facility: CLINIC | Age: 71
Discharge: HOME | End: 2021-11-23
Attending: CLINICAL NURSE SPECIALIST | Admitting: CLINICAL NURSE SPECIALIST
Payer: COMMERCIAL

## 2021-11-23 DIAGNOSIS — R31.0 GROSS HEMATURIA: ICD-10-CM

## 2021-11-23 DIAGNOSIS — C34.11 MALIGNANT NEOPLASM OF UPPER LOBE OF RIGHT LUNG (H): ICD-10-CM

## 2021-11-23 PROCEDURE — 71260 CT THORAX DX C+: CPT

## 2021-11-23 PROCEDURE — 250N000009 HC RX 250: Performed by: CLINICAL NURSE SPECIALIST

## 2021-11-23 PROCEDURE — 250N000011 HC RX IP 250 OP 636: Performed by: CLINICAL NURSE SPECIALIST

## 2021-11-23 RX ORDER — IOPAMIDOL 755 MG/ML
500 INJECTION, SOLUTION INTRAVASCULAR ONCE
Status: COMPLETED | OUTPATIENT
Start: 2021-11-23 | End: 2021-11-23

## 2021-11-23 RX ADMIN — SODIUM CHLORIDE 95 ML: 9 INJECTION, SOLUTION INTRAVENOUS at 08:22

## 2021-11-23 RX ADMIN — IOPAMIDOL 92 ML: 755 INJECTION, SOLUTION INTRAVENOUS at 08:22

## 2021-11-24 ENCOUNTER — VIRTUAL VISIT (OUTPATIENT)
Dept: SURGERY | Facility: CLINIC | Age: 71
End: 2021-11-24
Attending: CLINICAL NURSE SPECIALIST
Payer: COMMERCIAL

## 2021-11-24 DIAGNOSIS — C34.11 MALIGNANT NEOPLASM OF UPPER LOBE OF RIGHT LUNG (H): Primary | ICD-10-CM

## 2021-11-24 PROCEDURE — 999N001193 HC VIDEO/TELEPHONE VISIT; NO CHARGE

## 2021-11-24 PROCEDURE — 99213 OFFICE O/P EST LOW 20 MIN: CPT | Mod: 95 | Performed by: CLINICAL NURSE SPECIALIST

## 2021-11-24 NOTE — LETTER
11/24/2021         RE: Juwan Latham  [address]  [place] [address]        Dear Colleague,    Thank you for referring your patient, Juwan Latham, to the [facility]. Please see a copy of my visit note below.    THORACIC SURGERY FOLLOW UP VISIT    I spoke by telephone to Mr. Latham in follow-up today. He is here for an annual lung cancer surveillance, review of chest CT scan.     PREOP DIAGNOSIS   Enlarging ground glass opacity in the right upper lobe.     PROCEDURE   1. Flexible bronchoscopy  2. Laparoscopic right upper lobe wedge resection  3. Laparoscopic-assisted thoracoscopic mediastinal lymph node dissection.      DATE OF PROCEDURE  03/03/2017     HISTOPATHOLOGY   Minimally invasive well differentiated adenocarcinoma.  Surgical Margins negative. Lymph Nodes negative  Tumor Size 1.5 cm.  [id number]     COMPLICATIONS  None        INTERVAL STUDIES  CT Chest, abdomen, pelvis  11/23/21  (abdomen/pelvis portion done on request of Urologist, patient is scheduled for a cystoscopy next week)  IMPRESSION:  1.  Suspicious urinary bladder wall thickening superiorly. Recommend  correlation with cystoscopy.  2.  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  3.  Stable appearance of right lung wedge resection. No evidence of  metastatic disease.     ETOH: 3 beers nightly  TOB: Former Smoker (quit 1985)  BMI: Body mass index is 29.37 kg/(m^2).     SUBJECTIVE       Juwan reports being in good spirits.  He has not had any respiratory concerns or questions.   He has had some recent hematuria and is being worked up by a Urology team, is scheduled soon for a cystoscopy.     We reviewed the chest CT portion of his scan which shows no evidence of disease recurrence.    He plans to call the urologist to discuss abd/pelvis portion.           We will arrange for another chest CT scan in one year, to be done at [facility], with a follow-up a few days later.    All  questions were answered.  PLAN  I spent 20 min on the date of the encounter in chart review, patient visit, review of tests, documentation and/or discussion with other providers about the issues documented above. I reviewed the plan as follows:  Repeat chest CT with follow-up in 1 year  1. Necessary Tests & Appointments: Chest CT 1 year  2. Pain Control Plan: n/a  3. Anticoagulation Plan: n/a  4. Smoking Cessation: n/a    All questions were answered and the patient and present family were in agreement with the plan.    GLORIA Gustafson, CNS

## 2021-11-24 NOTE — PROGRESS NOTES
Juwna is a 70 year old who is being evaluated via a billable video visit.      How would you like to obtain your AVS? Quarterlyhart  If the video visit is dropped, the invitation should be resent by: Text to cell phone: [phone]  Will anyone else be joining your video visit? No      Video visit failed, reverted to a telephone consultation.    THORACIC SURGERY FOLLOW UP VISIT    I spoke by telephone to Mr. Latham in follow-up today. He is here for an annual lung cancer surveillance, review of chest CT scan.     PREOP DIAGNOSIS   Enlarging ground glass opacity in the right upper lobe.     PROCEDURE   1. Flexible bronchoscopy  2. Laparoscopic right upper lobe wedge resection  3. Laparoscopic-assisted thoracoscopic mediastinal lymph node dissection.      DATE OF PROCEDURE  03/03/2017     HISTOPATHOLOGY   Minimally invasive well differentiated adenocarcinoma.  Surgical Margins negative. Lymph Nodes negative  Tumor Size 1.5 cm.  [id number]     COMPLICATIONS  None        INTERVAL STUDIES  CT Chest, abdomen, pelvis  11/23/21  (abdomen/pelvis portion done on request of Urologist, patient is scheduled for a cystoscopy next week)  IMPRESSION:  1.  Suspicious urinary bladder wall thickening superiorly. Recommend  correlation with cystoscopy.  2.  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  3.  Stable appearance of right lung wedge resection. No evidence of  metastatic disease.     ETOH: 3 beers nightly  TOB: Former Smoker (quit 1985)  BMI: Body mass index is 29.37 kg/(m^2).     SUBJECTIVE       Juwan reports being in good spirits.  He has not had any respiratory concerns or questions.   He has had some recent hematuria and is being worked up by a Urology team, is scheduled soon for a cystoscopy.     We reviewed the chest CT portion of his scan which shows no evidence of disease recurrence.    He plans to call the urologist to discuss abd/pelvis portion.           We will arrange for another chest CT scan  in one year, to be done at [facility], with a follow-up a few days later.    All questions were answered.  PLAN  I spent 20 min on the date of the encounter in chart review, patient visit, review of tests, documentation and/or discussion with other providers about the issues documented above. I reviewed the plan as follows:  Repeat chest CT with follow-up in 1 year  1. Necessary Tests & Appointments: Chest CT 1 year  2. Pain Control Plan: n/a  3. Anticoagulation Plan: n/a  4. Smoking Cessation: n/a    All questions were answered and the patient and present family were in agreement with the plan.  I appreciate the opportunity to participate in the care of your patient and will keep you updated.  Sincerely,  GLORIA Gustafson, CNS

## 2021-12-03 ENCOUNTER — OFFICE VISIT (OUTPATIENT)
Dept: UROLOGY | Facility: CLINIC | Age: 71
End: 2021-12-03
Payer: COMMERCIAL

## 2021-12-03 ENCOUNTER — TELEPHONE (OUTPATIENT)
Dept: UROLOGY | Facility: CLINIC | Age: 71
End: 2021-12-03

## 2021-12-03 VITALS — DIASTOLIC BLOOD PRESSURE: 79 MMHG | SYSTOLIC BLOOD PRESSURE: 145 MMHG | HEART RATE: 66 BPM

## 2021-12-03 DIAGNOSIS — N99.114 POSTPROCEDURAL MALE URETHRAL STRICTURE: Primary | ICD-10-CM

## 2021-12-03 DIAGNOSIS — R31.0 GROSS HEMATURIA: ICD-10-CM

## 2021-12-03 PROCEDURE — 52000 CYSTOURETHROSCOPY: CPT | Performed by: UROLOGY

## 2021-12-03 PROCEDURE — 99207 PR NO CHARGE NURSE ONLY: CPT

## 2021-12-03 RX ORDER — CEFAZOLIN SODIUM 2 G/50ML
2 SOLUTION INTRAVENOUS SEE ADMIN INSTRUCTIONS
Status: CANCELLED | OUTPATIENT
Start: 2021-12-03

## 2021-12-03 RX ORDER — CEFAZOLIN SODIUM 2 G/50ML
2 SOLUTION INTRAVENOUS
Status: CANCELLED | OUTPATIENT
Start: 2021-12-03

## 2021-12-03 ASSESSMENT — PAIN SCALES - GENERAL: PAINLEVEL: NO PAIN (0)

## 2021-12-03 NOTE — PATIENT INSTRUCTIONS
"Please proceed with surgery as desired.     It was a pleasure meeting with you today.  Thank you for allowing me and my team the privilege of caring for you today.  YOU are the reason we are here, and I truly hope we provided you with the excellent service you deserve.  Please let us know if there is anything else we can do for you so that we can be sure you are leaving completely satisfied with your care experience.          AFTER YOUR CYSTOSCOPY        You have just completed a cystoscopy, or \"cysto\", which allowed your physician to learn more about your bladder (or to remove a stent placed after surgery). We suggest that you continue to avoid caffeine, fruit juice, and alcohol for the next 24 hours, however, you are encouraged to return to your normal activities.         A few things that are considered normal after your cystoscopy:     * Small amount of bleeding (or spotting) that clears within the next 24 hours     * Slight burning sensation with urination     * Sensation to of needing to avoid more frequently     * The feeling of \"air\" in your urine     * Mild discomfort that is relieved with Tylenol        Please contact our office promptly if you:     * Develop a fever above 101 degrees     * Are unable to urinate     * Develop bright red blood that does not stop     * Severe pain or swelling         Please contact our office with any concerns or questions @[email or web address].  "

## 2021-12-03 NOTE — PROGRESS NOTES
Cystoscopy Note    PRE-PROCEDURE DIAGNOSIS:  Gross Hematuria  POST-PROCEDURE DIAGNOSIS:  Gross hematuria.  Urethral stricture    PROCEDURE:   Cystoscopy    HISTORY: Juwan Latham is a 70 year old male with microscopic hematuria.  He has hx of RALP and XRT  He had some retention and difficult to remove catheter previous (see op notes from Phil Cain)  Now he has gross hematuria.    DESCRIPTION OF PROCEDURE:  After informed consent was obtained, the patient was brought to the procedure room where they were placed in the modified lithotomy position with all pressure points well padded.  The patient was prepped and draped in a sterile fashion. A flexible cystoscope was introduced through a well-lubricated urethra. Urethral stricture noted in membranous urethra. Sphincter does coapt.  I tried to pass scope and it wouldn't pass.    ASSESSMENT AND PLAN:  Urethral stricture  Hx prostate cancer    I recommend cysto with urethral dilation to assess exact location (bladder neck vs. Membranous) - in addition to seeing if it can be repaired and completion of hematuria workup.    Mike Chan MD  Reconstructive Urology

## 2021-12-03 NOTE — LETTER
12/3/2021       RE: Juwan Latham  [id number] [place] [address]     Dear Colleague,    Thank you for referring your patient, Juwan Latham, to the [facility] [place] [facility]. Please see a copy of my visit note below.    Cystoscopy Note    PRE-PROCEDURE DIAGNOSIS:  Gross Hematuria  POST-PROCEDURE DIAGNOSIS:  Gross hematuria.  Urethral stricture    PROCEDURE:   Cystoscopy    HISTORY: Juwan Latham is a 70 year old male with microscopic hematuria.  He has hx of RALP and XRT  He had some retention and difficult to remove catheter previous (see op notes from Phil Cain)  Now he has gross hematuria.    DESCRIPTION OF PROCEDURE:  After informed consent was obtained, the patient was brought to the procedure room where they were placed in the modified lithotomy position with all pressure points well padded.  The patient was prepped and draped in a sterile fashion. A flexible cystoscope was introduced through a well-lubricated urethra. Urethral stricture noted in membranous urethra. Sphincter does coapt.  I tried to pass scope and it wouldn't pass.    ASSESSMENT AND PLAN:  Urethral stricture  Hx prostate cancer    I recommend cysto with urethral dilation to assess exact location (bladder neck vs. Membranous) - in addition to seeing if it can be repaired and completion of hematuria workup.    Mike Chan MD  Reconstructive Urology

## 2021-12-03 NOTE — CONFIDENTIAL NOTE
Patient is scheduled for surgery with Dr. Chan    Spoke with: Mayte HART spoke to patient in clinic face to face 12/03/21    Date of Surgery: Monday 01/03/22    [facility]     Informed patient they will need an adult  Yes    Pre op with Provider roger    H&P: Scheduled with Patient will schedule with his PCP    [facility]     Additional imaging/appointments: roger    Surgery packet: Mayte HART gave to patient 12/03/21     Additional comments: na

## 2021-12-09 DIAGNOSIS — Z11.59 ENCOUNTER FOR SCREENING FOR OTHER VIRAL DISEASES: ICD-10-CM

## 2021-12-17 ENCOUNTER — OFFICE VISIT (OUTPATIENT)
Dept: FAMILY MEDICINE | Facility: CLINIC | Age: 71
End: 2021-12-17
Payer: COMMERCIAL

## 2021-12-17 VITALS
OXYGEN SATURATION: 98 % | TEMPERATURE: 97.8 F | SYSTOLIC BLOOD PRESSURE: 128 MMHG | WEIGHT: 184.8 LBS | BODY MASS INDEX: 27.37 KG/M2 | DIASTOLIC BLOOD PRESSURE: 74 MMHG | RESPIRATION RATE: 16 BRPM | HEIGHT: 69 IN | HEART RATE: 74 BPM

## 2021-12-17 DIAGNOSIS — Z23 HIGH PRIORITY FOR 2019-NCOV VACCINE: ICD-10-CM

## 2021-12-17 DIAGNOSIS — R31.0 GROSS HEMATURIA: ICD-10-CM

## 2021-12-17 DIAGNOSIS — Z01.818 PREOP EXAMINATION: Primary | ICD-10-CM

## 2021-12-17 DIAGNOSIS — N99.114 POSTPROCEDURAL MALE URETHRAL STRICTURE: ICD-10-CM

## 2021-12-17 LAB
ALBUMIN UR-MCNC: NEGATIVE MG/DL
APPEARANCE UR: CLEAR
BACTERIA #/AREA URNS HPF: ABNORMAL /HPF
BILIRUB UR QL STRIP: NEGATIVE
COLOR UR AUTO: YELLOW
GLUCOSE UR STRIP-MCNC: NEGATIVE MG/DL
HGB UR QL STRIP: ABNORMAL
KETONES UR STRIP-MCNC: ABNORMAL MG/DL
LEUKOCYTE ESTERASE UR QL STRIP: NEGATIVE
NITRATE UR QL: NEGATIVE
PH UR STRIP: 6.5 [PH] (ref 5–7)
RBC #/AREA URNS AUTO: ABNORMAL /HPF
SP GR UR STRIP: 1.02 (ref 1–1.03)
UROBILINOGEN UR STRIP-ACNC: 0.2 E.U./DL
WBC #/AREA URNS AUTO: ABNORMAL /HPF

## 2021-12-17 PROCEDURE — 99214 OFFICE O/P EST MOD 30 MIN: CPT | Performed by: NURSE PRACTITIONER

## 2021-12-17 PROCEDURE — 87086 URINE CULTURE/COLONY COUNT: CPT | Performed by: NURSE PRACTITIONER

## 2021-12-17 PROCEDURE — 81001 URINALYSIS AUTO W/SCOPE: CPT | Performed by: NURSE PRACTITIONER

## 2021-12-17 PROCEDURE — 91306 COVID-19,PF,MODERNA (18+ YRS BOOSTER .25ML): CPT | Performed by: NURSE PRACTITIONER

## 2021-12-17 PROCEDURE — 0064A COVID-19,PF,MODERNA (18+ YRS BOOSTER .25ML): CPT | Performed by: NURSE PRACTITIONER

## 2021-12-17 RX ORDER — DIAZEPAM 5 MG
TABLET ORAL
COMMUNITY
Start: 2021-12-01 | End: 2021-12-17

## 2021-12-17 ASSESSMENT — MIFFLIN-ST. JEOR: SCORE: 1588.63

## 2021-12-17 NOTE — PROGRESS NOTES
[address] [address]  Phone: [phone]  Primary Provider: Julio Guidry Jr.  Pre-op Performing Provider: DEQAUN KNOTT      PREOPERATIVE EVALUATION:  Today's date: 12/17/2021    Juwan Latham is a 71 year old male who presents for a preoperative evaluation.    Surgical Information:  Surgery/Procedure: Urethro dilation   Surgery Location: [facility] surgery center   Surgeon: Dr. Chan   Surgery Date: 01/03/2022  Time of Surgery: TBD  Where patient plans to recover: At home with family  Fax number for surgical facility: [phone]    Type of Anesthesia Anticipated: Local with MAC    Assessment & Plan     The proposed surgical procedure is considered INTERMEDIATE risk.    Preop examination  - UA with Microscopic - lab collect  - Urine Culture Aerobic Bacterial - lab collect  - UA with Microscopic - lab collect  - Urine Culture Aerobic Bacterial - lab collect  - Urine Microscopic Exam    Postprocedural male urethral stricture  - UA with Microscopic - lab collect  - Urine Culture Aerobic Bacterial - lab collect  - UA with Microscopic - lab collect  - Urine Culture Aerobic Bacterial - lab collect  - Urine Microscopic Exam    Gross hematuria   - Urine Culture Aerobic Bacterial - lab collect  - Urine Culture Aerobic Bacterial - lab collect    High priority for 2019-nCoV vaccine  - COVID-19,PF,MODERNA (18+ Yrs BOOSTER .25mL)      RECOMMENDATION:  APPROVAL GIVEN to proceed with proposed procedure pending review of diagnostic evaluation.      No LOS data to display   Time spent doing chart review, history and exam, documentation and further activities per the note      Subjective     HPI related to upcoming procedure: stricture, unable to pass in clinic with cystoscopy. Doing under anesthesia.    Sleep Apnea: diagnosed-doesn't use CPAP with weight loss. HTN-hasn't needed medications with diet and weight change.    Preop Questions 12/17/2021    1. Have you ever had a heart attack or stroke? No   2. Have you ever had surgery on your heart or blood vessels, such as a stent placement, a coronary artery bypass, or surgery on an artery in your head, neck, heart, or legs? No   3. Do you have chest pain with activity? No   4. Do you have a history of  heart failure? No   5. Do you currently have a cold, bronchitis or symptoms of other infection? No   6. Do you have a cough, shortness of breath, or wheezing? No   7. Do you or anyone in your family have previous history of blood clots? No   8. Do you or does anyone in your family have a serious bleeding problem such as prolonged bleeding following surgeries or cuts? No   9. Have you ever had problems with anemia or been told to take iron pills? No   10. Have you had any abnormal blood loss such as black, tarry or bloody stools? No   11. Have you ever had a blood transfusion? No   12. Are you willing to have a blood transfusion if it is medically needed before, during, or after your surgery? Yes   13. Have you or any of your relatives ever had problems with anesthesia? No   14. Do you have sleep apnea, excessive snoring or daytime drowsiness? Diagnosed-no CPAP use.   15. Do you have any artifical heart valves or other implanted medical devices like a pacemaker, defibrillator, or continuous glucose monitor? No   16. Do you have artificial joints? No   17. Are you allergic to latex? No       Health Care Directive:  Patient has a Health Care Directive on file      Preoperative Review of :   reviewed - no record of controlled substances prescribed.      Review of Systems  CONSTITUTIONAL: NEGATIVE for fever, chills, change in weight  INTEGUMENTARY/SKIN: NEGATIVE for worrisome rashes, moles or lesions  EYES: NEGATIVE for vision changes or irritation  ENT/MOUTH: NEGATIVE for ear, mouth and throat problems  RESP: NEGATIVE for significant cough or SOB  CV: NEGATIVE for chest pain, palpitations or peripheral  edema  GI: NEGATIVE for nausea, abdominal pain, heartburn, or change in bowel habits  : NEGATIVE for frequency, dysuria, or hematuria  MUSCULOSKELETAL: NEGATIVE for significant arthralgias or myalgia  NEURO: NEGATIVE for weakness, dizziness or paresthesias  ENDOCRINE: NEGATIVE for temperature intolerance, skin/hair changes  HEME: NEGATIVE for bleeding problems  PSYCHIATRIC: NEGATIVE for changes in mood or affect    Patient Active Problem List    Diagnosis Date Noted     Postprocedural male urethral stricture 12/03/2021     Priority: Medium     Added automatically from request for surgery [id number]       Gross hematuria 12/03/2021     Priority: Medium     Added automatically from request for surgery [id number]       Retinal detachment of left eye with single break 05/21/2018     Priority: Medium     Planned surgical repair       Somatic dysfunction of pelvis region 12/30/2017     Priority: Medium     Mixed incontinence urge and stress (male)(female) 12/30/2017     Priority: Medium     h/o Prostate cancer (H) - s/p prostatectomy 2017 12/01/2017     Priority: Medium     [id number] Grade Group 5 prostate cancer s/p robotic radical prostatectomy on 12/1/2017 by Dr. Bates Weight at [facility]  Substantially higher risk than previously believed given positive lymph node and grade group 5     Given high risk for prostate cancer would refer to radiation oncology for adjuvant radiation and possible hormone treatment.  Possibly start in Feb/March 2018. Re-check PSA prior to starting radiation therapy.  Completed radiation therapy in April 2018.            Major depressive disorder, recurrent episode, mild (H) 05/09/2017     Priority: Medium     Cavernous hemangioma of brain (H) 04/07/2017     Priority: Medium     Found incidentally on MRI of brain which was ordered to look for mets from lung tumor  Neurosurgery consult with Dr. Mcelroy in March 2017 - recommend repeat MRI in September 2017 to assess for any changes       Overweight  (BMI 25.0-29.9) 03/24/2017     Priority: Medium     s/p Malignant neoplasm of upper lobe of right lung (H) 03/13/2017     Priority: Medium     Adenocarcinoma       Benign neoplasm of descending colon 01/16/2017     Priority: Medium     Seen on colonoscopy 1/4/2017       Seasonal allergies      Priority: Medium     spring and fall       Hypertension goal BP (blood pressure) < 140/90 04/28/2015     Priority: Medium     GERD (gastroesophageal reflux disease) 01/27/2015     Priority: Medium     PAULINO (obstructive sleep apnea) 05/24/2013     Priority: Medium     Anxiety 12/12/2011     Priority: Medium     History of colonic polyps 06/22/2011     Priority: Medium     Problem list name updated by automated process. Provider to review       Allergic rhinitis due to other allergen 05/28/2004     Priority: Medium     Tu score is 9 on 6-3-11        Past Medical History:   Diagnosis Date     Anxiety      Arthritis     fingers, hips     Calculus of kidney 2005, 2012     Colon polyps      Congenital single kidney      Gastroesophageal reflux disease      Hx of cancer of lung 03/2017    wedge resection     Hypertension      Prostate cancer (H) 08/2017     Seasonal allergies     spring and fall     Sleep apnea     cpap     Past Surgical History:   Procedure Laterality Date     BRONCHOSCOPY FLEXIBLE N/A 3/3/2017    Procedure: BRONCHOSCOPY FLEXIBLE;  Surgeon: Maria A Desai MD;  Location: [facility] OR     COLONOSCOPY N/A 2/11/2015    Procedure: COLONOSCOPY;  Surgeon: Murphy Jesus MD;  Location:  GI     COLONOSCOPY N/A 12/11/2019    Procedure: COLONOSCOPY;  Surgeon: Murphy Jesus MD;  Location:  GI     COMBINED CYSTOSCOPY, RETROGRADES, URETEROSCOPY, LASER HOLMIUM LITHOTRIPSY URETER(S), INSERT STENT N/A 3/25/2018    Procedure: COMBINED CYSTOSCOPY, RETROGRADES, URETEROSCOPY, LASER HOLMIUM LITHOTRIPSY URETER(S), INSERT STENT;  Cystoscopy, Catheter Exchange under Anesthesia;  Surgeon: Zaria Cain MD;  Location:  OR      DAVINCI PROSTATECTOMY N/A 2017    Procedure: DAVINCI PROSTATECTOMY;  Robotic Assisted Radical Prostatectomy and Pelvic Lymph Node Dissection ;  Surgeon: Paulo Agarwal MD;  Location: [facility] OR     ESOPHAGOSCOPY, GASTROSCOPY, DUODENOSCOPY (EGD), COMBINED N/A 2016    Procedure: COMBINED ESOPHAGOSCOPY, GASTROSCOPY, DUODENOSCOPY (EGD), BIOPSY SINGLE OR MULTIPLE;  Surgeon: Murphy Jesus MD;  Location: [facility] GI     LAPAROSCOPIC WEDGE RESECTION LUNG Right 3/3/2017    Procedure: LAPAROSCOPIC WEDGE RESECTION LUNG;  Surgeon: Maria A Desai MD;  Location: [facility] OR     LASER HOLMIUM LITHOTRIPSY URETER(S), INSERT STENT, COMBINED  2012    Procedure: COMBINED CYSTOSCOPY, URETEROSCOPY, LASER HOLMIUM LITHOTRIPSY URETER(S), INSERT STENT;  Cystoscopy, Right Ureteroscopy, Stone Extraction, Holmium Laser, Double J Stent Placement;  Surgeon: Nicolás Blackwell MD;  Location:  OR     Current Outpatient Medications   Medication Sig Dispense Refill     busPIRone (BUSPAR) 10 MG tablet Take 1 tablet (10 mg) by mouth 2 times daily 180 tablet 1     citalopram (CELEXA) 20 MG tablet Take 1 tablet (20 mg) by mouth daily 90 tablet 1     Garlic 1000 MG CAPS        Milk Thistle 200 MG CAPS        multivitamin w/minerals (MULTI-VITAMIN) tablet Take 1 tablet by mouth daily       PHOSPHATIDYL CHOLINE PO  (Patient not taking: Reported on 10/24/2021)       vitamin B1 (THIAMINE) 250 MG tablet Take 250 mg by mouth daily       Vitamin D, Cholecalciferol, 1000 units TABS        Vitamins-Lipotropics (COMPLEX B-100-INOSITOL) TBCR Take 1 tablet by mouth       zinc gluconate 50 MG tablet Take 50 mg by mouth daily         No Known Allergies     Social History     Tobacco Use     Smoking status: Former Smoker     Packs/day: 2.00     Years: 20.00     Pack years: 40.00     Types: Cigarettes     Quit date: 1985     Years since quittin.9     Smokeless tobacco: Never Used   Substance Use Topics     Alcohol use: Not Currently      Comment: sparingly     Family History   Problem Relation Age of Onset     Heart Disease Mother      Diabetes Brother 65        Type 2     Diabetes Maternal Grandmother      Cancer - colorectal Brother 70     Hypertension No family hx of      Lipids No family hx of      History   Drug Use No         Objective     There were no vitals taken for this visit.    Physical Exam    GENERAL APPEARANCE: healthy, alert and no distress     EYES: EOMI,  PERRL     HENT: ear canals and TM's normal and nose and mouth without ulcers or lesions     NECK: no adenopathy, no asymmetry, masses, or scars and thyroid normal to palpation     RESP: lungs clear to auscultation - no rales, rhonchi or wheezes     CV: regular rates and rhythm, normal S1 S2, no S3 or S4 and no murmur, click or rub     ABDOMEN:  soft, nontender, no HSM or masses and bowel sounds normal     MS: extremities normal- no gross deformities noted, no evidence of inflammation in joints, FROM in all extremities.     SKIN: no suspicious lesions or rashes     NEURO: Normal strength and tone, sensory exam grossly normal, mentation intact and speech normal     PSYCH: mentation appears normal. and affect normal/bright     LYMPHATICS: No cervical adenopathy    Recent Labs   Lab Test 10/24/21  1141 09/23/21  0818 04/07/21  0000 10/20/20  1055 07/09/20  0958   HGB 14.5  --   --  13.9  --      --   --  150  --    INR  --   --   --  0.97  --     139  --  138 138   POTASSIUM 4.7 4.6  --  4.4 4.4   CR 0.70 0.83   < > 0.80 0.89   A1C  --  5.3  --   --  5.3    < > = values in this interval not displayed.        Diagnostics:  Labs pending at this time.  Results will be reviewed when available.   No EKG required, no history of coronary heart disease, significant arrhythmia, peripheral arterial disease or other structural heart disease.    Revised Cardiac Risk Index (RCRI):  The patient has the following serious cardiovascular risks for perioperative complications:   - No  serious cardiac risks = 0 points     RCRI Interpretation: 0 points: Class I (very low risk - 0.4% complication rate)           Signed Electronically by: Cristiana Mccoy CNP  Copy of this evaluation report is provided to requesting physician.

## 2021-12-17 NOTE — H&P (VIEW-ONLY)
[address] [address]  Phone: [phone]  Primary Provider: Julio Guidry Jr.  Pre-op Performing Provider: DQEUAN KNOTT      PREOPERATIVE EVALUATION:  Today's date: 12/17/2021    Juwan Latham is a 71 year old male who presents for a preoperative evaluation.    Surgical Information:  Surgery/Procedure: Urethro dilation   Surgery Location: [facility]   Surgeon: Dr. Chan   Surgery Date: 01/03/2022  Time of Surgery: TBD  Where patient plans to recover: At home with family  Fax number for surgical facility: [phone]    Type of Anesthesia Anticipated: Local with MAC    Assessment & Plan     The proposed surgical procedure is considered INTERMEDIATE risk.    Preop examination  - UA with Microscopic - lab collect  - Urine Culture Aerobic Bacterial - lab collect  - UA with Microscopic - lab collect  - Urine Culture Aerobic Bacterial - lab collect  - Urine Microscopic Exam    Postprocedural male urethral stricture  - UA with Microscopic - lab collect  - Urine Culture Aerobic Bacterial - lab collect  - UA with Microscopic - lab collect  - Urine Culture Aerobic Bacterial - lab collect  - Urine Microscopic Exam    Gross hematuria   - Urine Culture Aerobic Bacterial - lab collect  - Urine Culture Aerobic Bacterial - lab collect    High priority for 2019-nCoV vaccine  - COVID-19,PF,MODERNA (18+ Yrs BOOSTER .25mL)      RECOMMENDATION:  APPROVAL GIVEN to proceed with proposed procedure pending review of diagnostic evaluation.      No LOS data to display   Time spent doing chart review, history and exam, documentation and further activities per the note      Subjective     HPI related to upcoming procedure: stricture, unable to pass in clinic with cystoscopy. Doing under anesthesia.    Sleep Apnea: diagnosed-doesn't use CPAP with weight loss. HTN-hasn't needed medications with diet and weight change.    Preop Questions 12/17/2021    1. Have you ever had a heart attack or stroke? No   2. Have you ever had surgery on your heart or blood vessels, such as a stent placement, a coronary artery bypass, or surgery on an artery in your head, neck, heart, or legs? No   3. Do you have chest pain with activity? No   4. Do you have a history of  heart failure? No   5. Do you currently have a cold, bronchitis or symptoms of other infection? No   6. Do you have a cough, shortness of breath, or wheezing? No   7. Do you or anyone in your family have previous history of blood clots? No   8. Do you or does anyone in your family have a serious bleeding problem such as prolonged bleeding following surgeries or cuts? No   9. Have you ever had problems with anemia or been told to take iron pills? No   10. Have you had any abnormal blood loss such as black, tarry or bloody stools? No   11. Have you ever had a blood transfusion? No   12. Are you willing to have a blood transfusion if it is medically needed before, during, or after your surgery? Yes   13. Have you or any of your relatives ever had problems with anesthesia? No   14. Do you have sleep apnea, excessive snoring or daytime drowsiness? Diagnosed-no CPAP use.   15. Do you have any artifical heart valves or other implanted medical devices like a pacemaker, defibrillator, or continuous glucose monitor? No   16. Do you have artificial joints? No   17. Are you allergic to latex? No       Health Care Directive:  Patient has a Health Care Directive on file      Preoperative Review of :   reviewed - no record of controlled substances prescribed.      Review of Systems  CONSTITUTIONAL: NEGATIVE for fever, chills, change in weight  INTEGUMENTARY/SKIN: NEGATIVE for worrisome rashes, moles or lesions  EYES: NEGATIVE for vision changes or irritation  ENT/MOUTH: NEGATIVE for ear, mouth and throat problems  RESP: NEGATIVE for significant cough or SOB  CV: NEGATIVE for chest pain, palpitations or peripheral  edema  GI: NEGATIVE for nausea, abdominal pain, heartburn, or change in bowel habits  : NEGATIVE for frequency, dysuria, or hematuria  MUSCULOSKELETAL: NEGATIVE for significant arthralgias or myalgia  NEURO: NEGATIVE for weakness, dizziness or paresthesias  ENDOCRINE: NEGATIVE for temperature intolerance, skin/hair changes  HEME: NEGATIVE for bleeding problems  PSYCHIATRIC: NEGATIVE for changes in mood or affect    Patient Active Problem List    Diagnosis Date Noted     Postprocedural male urethral stricture 12/03/2021     Priority: Medium     Added automatically from request for surgery [id number]       Gross hematuria 12/03/2021     Priority: Medium     Added automatically from request for surgery [id number]       Retinal detachment of left eye with single break 05/21/2018     Priority: Medium     Planned surgical repair       Somatic dysfunction of pelvis region 12/30/2017     Priority: Medium     Mixed incontinence urge and stress (male)(female) 12/30/2017     Priority: Medium     h/o Prostate cancer (H) - s/p prostatectomy 2017 12/01/2017     Priority: Medium     [id number] Grade Group 5 prostate cancer s/p robotic radical prostatectomy on 12/1/2017 by Dr. Bates Weight at [facility]  Substantially higher risk than previously believed given positive lymph node and grade group 5     Given high risk for prostate cancer would refer to radiation oncology for adjuvant radiation and possible hormone treatment.  Possibly start in Feb/March 2018. Re-check PSA prior to starting radiation therapy.  Completed radiation therapy in April 2018.            Major depressive disorder, recurrent episode, mild (H) 05/09/2017     Priority: Medium     Cavernous hemangioma of brain (H) 04/07/2017     Priority: Medium     Found incidentally on MRI of brain which was ordered to look for mets from lung tumor  Neurosurgery consult with Dr. Mcelroy in March 2017 - recommend repeat MRI in September 2017 to assess for any changes       Overweight  (BMI 25.0-29.9) 03/24/2017     Priority: Medium     s/p Malignant neoplasm of upper lobe of right lung (H) 03/13/2017     Priority: Medium     Adenocarcinoma       Benign neoplasm of descending colon 01/16/2017     Priority: Medium     Seen on colonoscopy 1/4/2017       Seasonal allergies      Priority: Medium     spring and fall       Hypertension goal BP (blood pressure) < 140/90 04/28/2015     Priority: Medium     GERD (gastroesophageal reflux disease) 01/27/2015     Priority: Medium     PAULINO (obstructive sleep apnea) 05/24/2013     Priority: Medium     Anxiety 12/12/2011     Priority: Medium     History of colonic polyps 06/22/2011     Priority: Medium     Problem list name updated by automated process. Provider to review       Allergic rhinitis due to other allergen 05/28/2004     Priority: Medium     Tu score is 9 on 6-3-11        Past Medical History:   Diagnosis Date     Anxiety      Arthritis     fingers, hips     Calculus of kidney 2005, 2012     Colon polyps      Congenital single kidney      Gastroesophageal reflux disease      Hx of cancer of lung 03/2017    wedge resection     Hypertension      Prostate cancer (H) 08/2017     Seasonal allergies     spring and fall     Sleep apnea     cpap     Past Surgical History:   Procedure Laterality Date     BRONCHOSCOPY FLEXIBLE N/A 3/3/2017    Procedure: BRONCHOSCOPY FLEXIBLE;  Surgeon: Maria A Desai MD;  Location: [facility] OR     COLONOSCOPY N/A 2/11/2015    Procedure: COLONOSCOPY;  Surgeon: Murphy Jesus MD;  Location:  GI     COLONOSCOPY N/A 12/11/2019    Procedure: COLONOSCOPY;  Surgeon: Murphy Jesus MD;  Location:  GI     COMBINED CYSTOSCOPY, RETROGRADES, URETEROSCOPY, LASER HOLMIUM LITHOTRIPSY URETER(S), INSERT STENT N/A 3/25/2018    Procedure: COMBINED CYSTOSCOPY, RETROGRADES, URETEROSCOPY, LASER HOLMIUM LITHOTRIPSY URETER(S), INSERT STENT;  Cystoscopy, Catheter Exchange under Anesthesia;  Surgeon: Zaria Cain MD;  Location:  OR      DAVINCI PROSTATECTOMY N/A 2017    Procedure: DAVINCI PROSTATECTOMY;  Robotic Assisted Radical Prostatectomy and Pelvic Lymph Node Dissection ;  Surgeon: Paulo Agarwal MD;  Location: [facility] OR     ESOPHAGOSCOPY, GASTROSCOPY, DUODENOSCOPY (EGD), COMBINED N/A 2016    Procedure: COMBINED ESOPHAGOSCOPY, GASTROSCOPY, DUODENOSCOPY (EGD), BIOPSY SINGLE OR MULTIPLE;  Surgeon: Murphy Jesus MD;  Location: [facility] GI     LAPAROSCOPIC WEDGE RESECTION LUNG Right 3/3/2017    Procedure: LAPAROSCOPIC WEDGE RESECTION LUNG;  Surgeon: Maria A Desai MD;  Location: [facility] OR     LASER HOLMIUM LITHOTRIPSY URETER(S), INSERT STENT, COMBINED  2012    Procedure: COMBINED CYSTOSCOPY, URETEROSCOPY, LASER HOLMIUM LITHOTRIPSY URETER(S), INSERT STENT;  Cystoscopy, Right Ureteroscopy, Stone Extraction, Holmium Laser, Double J Stent Placement;  Surgeon: Nicolás Blackwell MD;  Location:  OR     Current Outpatient Medications   Medication Sig Dispense Refill     busPIRone (BUSPAR) 10 MG tablet Take 1 tablet (10 mg) by mouth 2 times daily 180 tablet 1     citalopram (CELEXA) 20 MG tablet Take 1 tablet (20 mg) by mouth daily 90 tablet 1     Garlic 1000 MG CAPS        Milk Thistle 200 MG CAPS        multivitamin w/minerals (MULTI-VITAMIN) tablet Take 1 tablet by mouth daily       PHOSPHATIDYL CHOLINE PO  (Patient not taking: Reported on 10/24/2021)       vitamin B1 (THIAMINE) 250 MG tablet Take 250 mg by mouth daily       Vitamin D, Cholecalciferol, 1000 units TABS        Vitamins-Lipotropics (COMPLEX B-100-INOSITOL) TBCR Take 1 tablet by mouth       zinc gluconate 50 MG tablet Take 50 mg by mouth daily         No Known Allergies     Social History     Tobacco Use     Smoking status: Former Smoker     Packs/day: 2.00     Years: 20.00     Pack years: 40.00     Types: Cigarettes     Quit date: 1985     Years since quittin.9     Smokeless tobacco: Never Used   Substance Use Topics     Alcohol use: Not Currently      Comment: sparingly     Family History   Problem Relation Age of Onset     Heart Disease Mother      Diabetes Brother 65        Type 2     Diabetes Maternal Grandmother      Cancer - colorectal Brother 70     Hypertension No family hx of      Lipids No family hx of      History   Drug Use No         Objective     There were no vitals taken for this visit.    Physical Exam    GENERAL APPEARANCE: healthy, alert and no distress     EYES: EOMI,  PERRL     HENT: ear canals and TM's normal and nose and mouth without ulcers or lesions     NECK: no adenopathy, no asymmetry, masses, or scars and thyroid normal to palpation     RESP: lungs clear to auscultation - no rales, rhonchi or wheezes     CV: regular rates and rhythm, normal S1 S2, no S3 or S4 and no murmur, click or rub     ABDOMEN:  soft, nontender, no HSM or masses and bowel sounds normal     MS: extremities normal- no gross deformities noted, no evidence of inflammation in joints, FROM in all extremities.     SKIN: no suspicious lesions or rashes     NEURO: Normal strength and tone, sensory exam grossly normal, mentation intact and speech normal     PSYCH: mentation appears normal. and affect normal/bright     LYMPHATICS: No cervical adenopathy    Recent Labs   Lab Test 10/24/21  1141 09/23/21  0818 04/07/21  0000 10/20/20  1055 07/09/20  0958   HGB 14.5  --   --  13.9  --      --   --  150  --    INR  --   --   --  0.97  --     139  --  138 138   POTASSIUM 4.7 4.6  --  4.4 4.4   CR 0.70 0.83   < > 0.80 0.89   A1C  --  5.3  --   --  5.3    < > = values in this interval not displayed.        Diagnostics:  Labs pending at this time.  Results will be reviewed when available.   No EKG required, no history of coronary heart disease, significant arrhythmia, peripheral arterial disease or other structural heart disease.    Revised Cardiac Risk Index (RCRI):  The patient has the following serious cardiovascular risks for perioperative complications:   - No  serious cardiac risks = 0 points     RCRI Interpretation: 0 points: Class I (very low risk - 0.4% complication rate)           Signed Electronically by: Cristiana Mccoy CNP  Copy of this evaluation report is provided to requesting physician.

## 2021-12-18 LAB — BACTERIA UR CULT: NO GROWTH

## 2021-12-28 DIAGNOSIS — Z11.59 ENCOUNTER FOR SCREENING FOR OTHER VIRAL DISEASES: ICD-10-CM

## 2021-12-29 ENCOUNTER — PATIENT OUTREACH (OUTPATIENT)
Dept: UROLOGY | Facility: CLINIC | Age: 71
End: 2021-12-29
Payer: COMMERCIAL

## 2021-12-29 DIAGNOSIS — R31.0 GROSS HEMATURIA: ICD-10-CM

## 2021-12-29 DIAGNOSIS — N99.114 POSTPROCEDURAL MALE URETHRAL STRICTURE: Primary | ICD-10-CM

## 2021-12-29 NOTE — PROGRESS NOTES
Pre Op Teaching Flowsheet                                        Pre and Post op Patient Education  Relevant Diagnosis: urethral stricture  Teaching Topic:  Pre and post op teaching for urethral dilation  Person Involved in teaching: patient and wife      Motivation Level: High  Asks Questions: Yes  Eager to Learn:  Yes  Cooperative: Yes  Receptive (willing/able to accept information):  Yes  Patient demonstrates understanding of the following:  Date and time of surgery:  1/03/21  Location of surgery: [facility]  History and Physical and any other testing necessary prior to surgery Pre Op Physical with: PCP on 12/17/21  Required time line for completion of History and Physical and any pre-op testing: No more than 30 days prior to surgery date    NPO Guidelines: NPO per Anesthesia Guidelines : Reviewed (surgery packet for further information).    Patient demonstrates understanding of the following:  Patient understands the need for a responsible adult to drive them home and someone to stay with them for the first 24 hours post-operatively: wife  Pre-op bowel prep: N/A  Pre-op showering/scrub information with Hibiclens Soap: Yes  Medications to take the day of surgery: Pre op Physical for instruction.   Blood thinner medications discussed and when to stop (if applicable):  Yes  Diabetes medication management (if applicable):  Patient to discuss with Primary Care Provider  Discussed pain control after surgery: pain scale, pain medications and pain management techniques  Infection Prevention: Patient demonstrates understanding of the following:  Patient instructed on hand hygiene:  Yes  Surgical procedure site care taught: Yes  Signs and symptoms of infection taught:  Yes  Wound care will be taught at the time of discharge.    Urine anylisis and Urine Culture ordered on:12/17/21 no growth  Pre op Covid testing on: 12/30/21  Post-op follow-up:as needed  Discussed how to contact the hospital, nurse, and clinic scheduling staff  if necessary.     Surgical instructions given to patient in clinic: yes.   Instructional materials used/given/mailed: Before your surgery packet , Medications to avoid before surgery , Showering or Bathing instructions before surgery  and What to expect after surgery    Total time with patient: 10 minutes   Mayte Black RN

## 2021-12-30 ENCOUNTER — LAB (OUTPATIENT)
Dept: LAB | Facility: CLINIC | Age: 71
End: 2021-12-30
Payer: COMMERCIAL

## 2021-12-30 DIAGNOSIS — Z11.59 ENCOUNTER FOR SCREENING FOR OTHER VIRAL DISEASES: ICD-10-CM

## 2021-12-30 DIAGNOSIS — R31.0 GROSS HEMATURIA: ICD-10-CM

## 2021-12-30 DIAGNOSIS — N99.114 POSTPROCEDURAL MALE URETHRAL STRICTURE: ICD-10-CM

## 2021-12-30 LAB
ALBUMIN UR-MCNC: NEGATIVE MG/DL
APPEARANCE UR: CLEAR
BACTERIA #/AREA URNS HPF: ABNORMAL /HPF
BILIRUB UR QL STRIP: NEGATIVE
COLOR UR AUTO: YELLOW
GLUCOSE UR STRIP-MCNC: NEGATIVE MG/DL
HGB UR QL STRIP: ABNORMAL
KETONES UR STRIP-MCNC: NEGATIVE MG/DL
LEUKOCYTE ESTERASE UR QL STRIP: NEGATIVE
NITRATE UR QL: NEGATIVE
PH UR STRIP: 6 [PH] (ref 5–7)
RBC #/AREA URNS AUTO: ABNORMAL /HPF
SP GR UR STRIP: >=1.03 (ref 1–1.03)
SQUAMOUS #/AREA URNS AUTO: ABNORMAL /LPF
UROBILINOGEN UR STRIP-ACNC: 0.2 E.U./DL
WBC #/AREA URNS AUTO: ABNORMAL /HPF

## 2021-12-30 PROCEDURE — U0005 INFEC AGEN DETEC AMPLI PROBE: HCPCS

## 2021-12-30 PROCEDURE — 87086 URINE CULTURE/COLONY COUNT: CPT

## 2021-12-30 PROCEDURE — U0003 INFECTIOUS AGENT DETECTION BY NUCLEIC ACID (DNA OR RNA); SEVERE ACUTE RESPIRATORY SYNDROME CORONAVIRUS 2 (SARS-COV-2) (CORONAVIRUS DISEASE [COVID-19]), AMPLIFIED PROBE TECHNIQUE, MAKING USE OF HIGH THROUGHPUT TECHNOLOGIES AS DESCRIBED BY CMS-2020-01-R: HCPCS

## 2021-12-30 PROCEDURE — 81001 URINALYSIS AUTO W/SCOPE: CPT

## 2021-12-31 LAB
BACTERIA UR CULT: NO GROWTH
SARS-COV-2 RNA RESP QL NAA+PROBE: NEGATIVE

## 2022-01-02 ENCOUNTER — ANESTHESIA EVENT (OUTPATIENT)
Dept: SURGERY | Facility: AMBULATORY SURGERY CENTER | Age: 72
End: 2022-01-02
Payer: COMMERCIAL

## 2022-01-03 ENCOUNTER — HOSPITAL ENCOUNTER (OUTPATIENT)
Facility: AMBULATORY SURGERY CENTER | Age: 72
End: 2022-01-03
Attending: UROLOGY
Payer: COMMERCIAL

## 2022-01-03 ENCOUNTER — ANESTHESIA (OUTPATIENT)
Dept: SURGERY | Facility: AMBULATORY SURGERY CENTER | Age: 72
End: 2022-01-03
Payer: COMMERCIAL

## 2022-01-03 VITALS
BODY MASS INDEX: 27.11 KG/M2 | WEIGHT: 183 LBS | HEIGHT: 69 IN | OXYGEN SATURATION: 97 % | SYSTOLIC BLOOD PRESSURE: 118 MMHG | RESPIRATION RATE: 18 BRPM | HEART RATE: 63 BPM | DIASTOLIC BLOOD PRESSURE: 68 MMHG | TEMPERATURE: 97.5 F

## 2022-01-03 DIAGNOSIS — N99.114 POSTPROCEDURAL MALE URETHRAL STRICTURE: ICD-10-CM

## 2022-01-03 DIAGNOSIS — R31.0 GROSS HEMATURIA: ICD-10-CM

## 2022-01-03 PROCEDURE — 52204 CYSTOSCOPY W/BIOPSY(S): CPT

## 2022-01-03 PROCEDURE — 52224 CYSTOSCOPY AND TREATMENT: CPT | Mod: GC | Performed by: UROLOGY

## 2022-01-03 PROCEDURE — 52224 CYSTOSCOPY AND TREATMENT: CPT

## 2022-01-03 RX ORDER — DEXAMETHASONE SODIUM PHOSPHATE 4 MG/ML
INJECTION, SOLUTION INTRA-ARTICULAR; INTRALESIONAL; INTRAMUSCULAR; INTRAVENOUS; SOFT TISSUE PRN
Status: DISCONTINUED | OUTPATIENT
Start: 2022-01-03 | End: 2022-01-03

## 2022-01-03 RX ORDER — MEPERIDINE HYDROCHLORIDE 25 MG/ML
12.5 INJECTION INTRAMUSCULAR; INTRAVENOUS; SUBCUTANEOUS
Status: DISCONTINUED | OUTPATIENT
Start: 2022-01-03 | End: 2022-01-05 | Stop reason: HOSPADM

## 2022-01-03 RX ORDER — GABAPENTIN 100 MG/1
100 CAPSULE ORAL
Status: COMPLETED | OUTPATIENT
Start: 2022-01-03 | End: 2022-01-03

## 2022-01-03 RX ORDER — OXYCODONE HYDROCHLORIDE 5 MG/1
5 TABLET ORAL EVERY 6 HOURS PRN
Qty: 5 TABLET | Refills: 0 | Status: SHIPPED | OUTPATIENT
Start: 2022-01-03 | End: 2022-01-06

## 2022-01-03 RX ORDER — ACETAMINOPHEN 325 MG/1
975 TABLET ORAL ONCE
Status: COMPLETED | OUTPATIENT
Start: 2022-01-03 | End: 2022-01-03

## 2022-01-03 RX ORDER — CEFAZOLIN SODIUM 2 G/50ML
2 SOLUTION INTRAVENOUS
Status: COMPLETED | OUTPATIENT
Start: 2022-01-03 | End: 2022-01-03

## 2022-01-03 RX ORDER — HYDROMORPHONE HYDROCHLORIDE 1 MG/ML
0.4 INJECTION, SOLUTION INTRAMUSCULAR; INTRAVENOUS; SUBCUTANEOUS EVERY 10 MIN PRN
Status: DISCONTINUED | OUTPATIENT
Start: 2022-01-03 | End: 2022-01-03 | Stop reason: HOSPADM

## 2022-01-03 RX ORDER — LIDOCAINE HYDROCHLORIDE 20 MG/ML
INJECTION, SOLUTION INFILTRATION; PERINEURAL PRN
Status: DISCONTINUED | OUTPATIENT
Start: 2022-01-03 | End: 2022-01-03

## 2022-01-03 RX ORDER — PROPOFOL 10 MG/ML
INJECTION, EMULSION INTRAVENOUS CONTINUOUS PRN
Status: DISCONTINUED | OUTPATIENT
Start: 2022-01-03 | End: 2022-01-03

## 2022-01-03 RX ORDER — PROPOFOL 10 MG/ML
INJECTION, EMULSION INTRAVENOUS PRN
Status: DISCONTINUED | OUTPATIENT
Start: 2022-01-03 | End: 2022-01-03

## 2022-01-03 RX ORDER — ONDANSETRON 2 MG/ML
INJECTION INTRAMUSCULAR; INTRAVENOUS PRN
Status: DISCONTINUED | OUTPATIENT
Start: 2022-01-03 | End: 2022-01-03

## 2022-01-03 RX ORDER — ONDANSETRON 4 MG/1
4 TABLET, ORALLY DISINTEGRATING ORAL EVERY 30 MIN PRN
Status: DISCONTINUED | OUTPATIENT
Start: 2022-01-03 | End: 2022-01-05 | Stop reason: HOSPADM

## 2022-01-03 RX ORDER — SENNA AND DOCUSATE SODIUM 50; 8.6 MG/1; MG/1
1 TABLET, FILM COATED ORAL AT BEDTIME
Qty: 30 TABLET | Refills: 0 | Status: SHIPPED | OUTPATIENT
Start: 2022-01-03 | End: 2022-01-21

## 2022-01-03 RX ORDER — OXYCODONE HYDROCHLORIDE 5 MG/1
5 TABLET ORAL EVERY 4 HOURS PRN
Status: DISCONTINUED | OUTPATIENT
Start: 2022-01-03 | End: 2022-01-05 | Stop reason: HOSPADM

## 2022-01-03 RX ORDER — HALOPERIDOL 5 MG/ML
1 INJECTION INTRAMUSCULAR
Status: DISCONTINUED | OUTPATIENT
Start: 2022-01-03 | End: 2022-01-05 | Stop reason: HOSPADM

## 2022-01-03 RX ORDER — LIDOCAINE 40 MG/G
CREAM TOPICAL
Status: DISCONTINUED | OUTPATIENT
Start: 2022-01-03 | End: 2022-01-03 | Stop reason: HOSPADM

## 2022-01-03 RX ORDER — ALBUTEROL SULFATE 0.83 MG/ML
2.5 SOLUTION RESPIRATORY (INHALATION) EVERY 4 HOURS PRN
Status: DISCONTINUED | OUTPATIENT
Start: 2022-01-03 | End: 2022-01-03 | Stop reason: HOSPADM

## 2022-01-03 RX ORDER — CEFAZOLIN SODIUM 2 G/50ML
2 SOLUTION INTRAVENOUS SEE ADMIN INSTRUCTIONS
Status: DISCONTINUED | OUTPATIENT
Start: 2022-01-03 | End: 2022-01-03 | Stop reason: HOSPADM

## 2022-01-03 RX ORDER — SODIUM CHLORIDE, SODIUM LACTATE, POTASSIUM CHLORIDE, CALCIUM CHLORIDE 600; 310; 30; 20 MG/100ML; MG/100ML; MG/100ML; MG/100ML
INJECTION, SOLUTION INTRAVENOUS CONTINUOUS
Status: DISCONTINUED | OUTPATIENT
Start: 2022-01-03 | End: 2022-01-03 | Stop reason: HOSPADM

## 2022-01-03 RX ORDER — ONDANSETRON 2 MG/ML
4 INJECTION INTRAMUSCULAR; INTRAVENOUS EVERY 30 MIN PRN
Status: DISCONTINUED | OUTPATIENT
Start: 2022-01-03 | End: 2022-01-05 | Stop reason: HOSPADM

## 2022-01-03 RX ORDER — FENTANYL CITRATE 50 UG/ML
INJECTION, SOLUTION INTRAMUSCULAR; INTRAVENOUS PRN
Status: DISCONTINUED | OUTPATIENT
Start: 2022-01-03 | End: 2022-01-03

## 2022-01-03 RX ORDER — FENTANYL CITRATE 50 UG/ML
50 INJECTION, SOLUTION INTRAMUSCULAR; INTRAVENOUS EVERY 5 MIN PRN
Status: DISCONTINUED | OUTPATIENT
Start: 2022-01-03 | End: 2022-01-03 | Stop reason: HOSPADM

## 2022-01-03 RX ADMIN — CEFAZOLIN SODIUM 2 G: 2 SOLUTION INTRAVENOUS at 09:51

## 2022-01-03 RX ADMIN — ACETAMINOPHEN 975 MG: 325 TABLET ORAL at 09:03

## 2022-01-03 RX ADMIN — DEXAMETHASONE SODIUM PHOSPHATE 4 MG: 4 INJECTION, SOLUTION INTRA-ARTICULAR; INTRALESIONAL; INTRAMUSCULAR; INTRAVENOUS; SOFT TISSUE at 09:50

## 2022-01-03 RX ADMIN — FENTANYL CITRATE 50 MCG: 50 INJECTION, SOLUTION INTRAMUSCULAR; INTRAVENOUS at 09:46

## 2022-01-03 RX ADMIN — PROPOFOL 60 MG: 10 INJECTION, EMULSION INTRAVENOUS at 09:50

## 2022-01-03 RX ADMIN — PROPOFOL 150 MCG/KG/MIN: 10 INJECTION, EMULSION INTRAVENOUS at 09:50

## 2022-01-03 RX ADMIN — GABAPENTIN 100 MG: 100 CAPSULE ORAL at 09:03

## 2022-01-03 RX ADMIN — LIDOCAINE HYDROCHLORIDE 80 MG: 20 INJECTION, SOLUTION INFILTRATION; PERINEURAL at 09:50

## 2022-01-03 RX ADMIN — SODIUM CHLORIDE, SODIUM LACTATE, POTASSIUM CHLORIDE, CALCIUM CHLORIDE: 600; 310; 30; 20 INJECTION, SOLUTION INTRAVENOUS at 09:05

## 2022-01-03 RX ADMIN — ONDANSETRON 4 MG: 2 INJECTION INTRAMUSCULAR; INTRAVENOUS at 09:58

## 2022-01-03 ASSESSMENT — MIFFLIN-ST. JEOR: SCORE: 1580.46

## 2022-01-03 NOTE — DISCHARGE INSTRUCTIONS
[facility]  Home Care Following Anesthesia  For 24 hours after surgery:  1. Get plenty of rest.  A responsible adult must stay with you for at least 24 hours after you leave the surgery center.  2. Do not drive or use heavy equipment.  If you have weakness or tingling, don't drive or use heavy equipment until this feeling goes away.   3. Do not drink alcohol.   4. Avoid strenuous or risky activities.  Ask for help when climbing stairs.  5. You may feel lightheaded.  IF so, sit for a few minutes before standing.  Have someone help you get up.   6. If you have nausea (feel sick to your stomach): Drink only clear liquids such as apple juice, ginger ale, broth or 7-Up.  Rest may also help.  Be sure to drink enough fluids.  Move to a regular diet as you feel able.   7. You may have a slight fever.  Call the doctor if your fever is over 100 F (37.7 C) (taken under the tongue) or lasts longer than 24 hours.  8. You may have a dry mouth, a sore throat, muscle aches or trouble sleeping. These should go away after 24 hours.  9. Do not make important or legal decisions.   10. It is recommended to avoid smoking.               Tips for taking pain medications  To get the best pain relief possible, remember these points:    Take pain medications as directed, before pain becomes severe.    Pain medication can upset your stomach: taking it with food may help.    Constipation is a common side effect of pain medication. Drink plenty of  fluids.    Eat foods high in fiber. Take a stool softener if recommended by your doctor or pharmacist.    Do not drink alcohol, drive or operate machinery while taking pain medications.    Ask about other ways to control pain, such as with heat, ice or relaxation.    Tylenol/Acetaminophen Consumption  To help encourage the safe use of acetaminophen, the makers of TYLENOL  have lowered the maximum daily dose for single-ingredient Extra Strength TYLENOL   (acetaminophen) products sold in the [place] from 8 pills per day (4,000 mg) to 6 pills per day (3,000 mg). The dosing interval has also changed from 2 pills every 4-6 hours to 2 pills every 6 hours.    If you feel your pain relief is insufficient, you may take Tylenol/Acetaminophen in addition to your narcotic pain medication.     Be careful not to exceed 3,000 mg of Tylenol/Acetaminophen in a 24 hour period from all sources.    If you are taking extra strength Tylenol/acetaminophen (500 mg), the maximum dose is 6 tablets in 24 hours.    If you are taking regular strength acetaminophen (325 mg), the maximum dose is 9 tablets in 24 hours.    Call a doctor for any of the followin. Signs of infection (fever, growing tenderness at the surgery site, a large amount of drainage or bleeding, severe pain, foul-smelling drainage, redness, swelling).  2. It has been over 8 to 10 hours since surgery and you are still not able to urinate (pass water).  3. Headache for over 24 hours.  4. Numbness, tingling or weakness the day after surgery (if you had spinal anesthesia).  5. Signs of Covid-19 infection (temperature over 100 degrees, shortness of breath, cough, loss of taste/smell, generalized body aches, persistent headache, chills, sore throat, nausea/vomiting/diarrhea)  Your doctor is:       Dr. Mike Chan, Prostate and Urology: [phone]               Or dial [phone] and ask for the resident on call for:  Prostate Urology  For emergency care, call the:  [facility]:  [phone] (TTY for hearing impaired: [phone])  Emptying and Cleaning Your Urinary Catheter Bag  You have an indwelling urinary catheter. This drains urine from your bladder into a bag. The bag can be one that is used at your bedside. Or it can be a smaller bag that is strapped to your leg. Follow the steps below to empty and clean a urinary bag.       Drain Clean tube Clean catheter   Step 1. Drain the bag    Wash your  hands well with soap and water to prevent infecting the urinary catheter and bag.    If the short drainage tube is inserted into a pocket on the bag, take the drainage tube out of the pocket.    Hold the drainage tube over a toilet or measuring container. Open the valve.    Don t touch the tip of the valve or let it touch the toilet or container.    Wash your hands again.  Step 2. Clean the drainage tube    When the bag is empty, clean the tip of the drainage valve with an alcohol wipe.    Close the valve.    Reinsert the drainage tube into the pocket, if there is one.  Step 3. Clean your skin    Wash your hands well before and after cleaning your skin.    If you have a catheter (such as a Breen) that enters through the urethra, clean the urethral area with soap and water 1 time(s) daily as you were taught by your healthcare provider. You should also clean after every bowel movement to prevent infection.  ? Don't pull on the tubing when cleaning so you don t injure the urethra.  ? Don t apply powder to the genital area or to the tubing.    If you have a suprapubic catheter, your healthcare provider will tell you how to clean your skin around the catheter. This is a catheter that was surgically placed into the bladder through the lower abdomen.  Step 4. Check and clean the catheter tubing    Check the tubing. If there are kinks, cracks, clogs, or you can t see into the tubing, you ll need to change to new tubing as you were shown by your healthcare provider.    If the current tubing can still be used, wash it with soap and water. Always wash the tubing in the direction away from your body. Don't pull on the tubing.    Dry the tubing with a clean washcloth or paper towel.  Step 5. Keep the drainage bag clean.    If  you have a catheter in place long term, talk to you provider about if and how often you should clean your drainage bag.  ? If need, clean your drainage bag by following these steps:    Wash your hands well  with soap and water.    Disconnect the bag from the catheter tubing. Connect the tubing to the backup bag or drainage device.    Drain any remaining urine from the bag you just disconnected. Close the drainage valve.    Pour some warm (not hot) soapy water into the bag. Swish the soap around, being sure to get the corners of the bag.    Open the drainage valve to drain the soap. Close the valve.  ? Ask your healthcare provider how often you should clean your bag and what solution you should use to reduce odor and keep your bag free of germs.  ? Shake the solution a bit and allow it to remain in the bag for 30 minutes.  ? Drain the solution and rinse the bag with cold tap water.  ? Hang the bag to drain and air-dry.  When to call your healthcare provider  Call your healthcare provider right away if you have any of the following:    Little or no urine flowing into the bag    Urine leaking where the catheter enters the body    Pain, burning, or redness of the area where the catheter enters the body    Bloody urine (a trace of blood is normal)    Cloudy or foul-smelling urine, or sand-like grains in your urine    Pain in your lower back or lower abdomen    Your catheter falls out    Fever of 100.4 F (38 C) or higher, or as directed by your healthcare provider    Shaking chills      Discharge Instructions: Caring for Your Leg Bag  You are going home with a urinary catheter and collection device (drainage bag) in place. One type of collection device is called a leg bag. This is a smaller drainage bag that you can wear on your leg to collect urine during the day. The bag can fit under your clothing.   Home care    Wash your hands thoroughly before and after you care for your catheter or collection device.    Gather your supplies:  ? Alcohol wipes  ? Soap and water  ? Towel and washcloth  ? Leg strap and leg bag    Use soap and water to wash the area where your catheter enters your body. Rinse well.    Secure the bag soriano  to your leg:  ? Put the leg band high on your thigh with the product label pointing away from your leg.  ? Stretch the leg band in place and fasten.  ? Place the catheter tubing over the bag and secure it. You may secure it with a Velcro tab or other method, depending on the product you use. Be sure to leave enough loop in the catheter above the leg band so you won't pull on the tube.  ? Every 4 to 6 hours, reposition the band. This will prevent pressure from the elastic on your leg. You can do this by changing the bag to the other leg or by raising or lowering the leg band.  ? Wash the band as often as needed. You can hand wash and dry the leg band.    Place the bag in the bag soriano.    Clean the urine bag end of the catheter and your catheter port with an alcohol wipe.    Place a towel under the bag and port to keep urine from dripping onto your leg.    Before connecting the outlet valve at the bottom of the bag to the catheter, make sure that it is firmly closed. Flip the valve upward toward the bag. It needs to snap firmly in place. Be sure not to tug on the tubing. Be gentle.    Attach the urine bag to the end of the catheter. Insert the connector snugly into the catheter port. You can prevent dribbling urine by bending the catheter tubing just below the tip and holding it while you disconnect it from the catheter. Be careful to keep the tip clean while connecting the leg bag tubing to the catheter--this keeps germs from getting into the system.    Drain the bag when it's full. To drain the bag, flip the clamp downward. Direct the flexible outlet tube to control the flow of urine. You don t have to disconnect the leg bag from the catheter to empty it. Raise your leg up to the edge of the toilet to reach the leg bag. Then you can empty the bag directly into the toilet. This way, you won t need to bend over, which may be uncomfortable.    Keep the leg bag clean.    Ask your healthcare provider how often you  should clean your bag and what solution you should use ?to reduce odor and keep the bag free of germs.    Shake the solution a bit and allow it to remain in the bag for 30 minutes.      Drain the solution and rinse the bag with cold tap water.    Hang the bag to drain and air dry.    Remember to keep the drainage bag below the level of your bladder for proper drainage.  Follow-up  Make a follow-up appointment as directed by your healthcare provider.  When to call your healthcare provider  Call your healthcare provider right away if you have any of the following:    Redness, swelling, or warmth around the catheter entry site    Pus draining from your catheter entry site or into the catheter tubing and bag    Blood, clots, or floating debris in the urine    Nausea and vomiting    Shaking chills    Fever above 100.4 F (38 C), or as directed by your healthcare provider    Pain that is not relieved by medicine     Catheter that falls out or is dislodged       Breen Catheter Removal  Your healthcare provider has instructed you to remove your Breen catheter. This is a thin, flexible tube that allows urine to drain out of your bladder and into a bag. It s important to correctly remove your catheter to help prevent infection and other complications. If you have any questions about removing the Breen catheter, ask your healthcare provider before trying to remove it. Otherwise, follow the instructions on this sheet.   Breen catheter  The Breen catheter is held in place by a small balloon that s filled with water. To remove the catheter, you must first drain the water from the balloon. This is done using a syringe and the balloon port. This is the opening in the catheter that isn t attached to the bag. It allows you to get to the balloon.      The syringe is put into the balloon port to allow the balloon to empty. Once the balloon is empty, the catheter can be removed.   Instructions for removing the catheter   Follow the  directions closely. If the catheter doesn t come out with gentle pulling, stop and call your healthcare provider right away.     Empty the bag of urine if needed.    Wash your hands with soap and warm water. Dry them well.    Gather your supplies. This includes a wastebasket, a towel, and a syringe that was given to you by your healthcare provider.    Put the syringe into the balloon port on the catheter. The syringe fits tightly into the port with a firm push and twist motion.    Wait as the water from the balloon empties into the syringe. Depending on how large the balloon is, you may need to repeat this process several times until all of the water is out of the balloon.    Once the balloon is emptied, gently pull out the catheter.    Put the used catheter in the wastebasket. Also throw away the syringe.    Use the towel to wipe up any spilled water or urine if needed.    Wash your hands again.  When to call your healthcare provider  Call the healthcare provider right away if:     You have a fever of  100.4  F ( 38 C ) or higher, or as directed by your provider    You have questions about removing the catheter    The catheter doesn t come out with gentle pulling    You can t urinate within  8 hours after removing the catheter    Your belly (abdomen) is painful or bloated    You have burning pain with urination that lasts for  24 hours    You see a lot of blood in the urine. Light bleeding for  24 hours is normal.    It feels like the bladder is not emptying  [employer] last reviewed this educational content on 12/1/2019 2000-2021 [employer]. All rights reserved. This information is not intended as a substitute for professional medical care. Always follow your healthcare professional's instructions.

## 2022-01-03 NOTE — OP NOTE
OPERATIVE REPORT    PREOPERATIVE DIAGNOSIS:    1. Gross hematuria  2. membranous urethral stricture    POSTOPERATIVE DIAGNOSIS:   1. Gross hematuria  2. membranous urethral stricture  3. Bladder lesion    PROCEDURES PERFORMED:   1. Cystourethroscopy with Urethral dilation of membranous urethral stricture  2. Cystoscopy with Fulguration of bladder    STAFF SURGEON: Dr. Mike Chan MD  Fellow: Dr. Justyna Deshpande MD    RESIDENT(S): Al Tiwari MD    ANESTHESIA: Monitor Anesthesia Care    ESTIMATED BLOOD LOSS: < 10 mL.     DRAINS: 18 Fr Zuni tip donovan catheter, latex    SIGNIFICANT FINDINGS:  1) Short 1cm membranous urethral stricture about 6 Fr  2) Intact vesicourethral anastomosis w/o evidence of contracture  3) Scattered hypervascular lesions in the bladder consistent with radiation cystitis     OPERATIVE INDICATIONS:   Juwan Latham is a(n) 71 year old male with a history of RALP, XRT, and gross hematuria. Cystoscopy was attempted in clinic, but a membranous urethral stricture was identified and a flexible cystoscope unable to be passed into the bladder. As such, the patient elected to proceed with cystoscopy, urethral dilation after discussion of the risks and benefits of the procedure.     DESCRIPTION OF PROCEDURE:   After verification of informed consent was obtained, the patient was brought to the operating room, and moved to the operating table. After adequate anesthesia was induced, the patient was repositioned in dorsal lithotomy position and prepped and draped in the usual sterile fashion. A formal timeout was performed to confirm the correct patient, procedure and operative site.     A 22-Faroese rigid cystoscope was inserted into a well lubricated urethra. We began by performing a white light cystourethroscopy. A 6 Fr urethral stricture was identified at the membranous urethra. At this time a sensor wire was placed into the bladder. Doan sounds were used to dilate his stricture to 18Fr. We  then reinserted a 18-Fr rigid cystoscope into the bladder. The stricture did not involve the vesicourethral anastomosis which was intact without contracture. Both ureteral orifices were orthotopic, the bladder was w/o tumors, diverticula, or stones. There were scattered areas of hypervascularity consistent with radiation cystitis. One in particular was suspicious for source of prior bleeding. Three areas along the LLW, RAW, and RPW were fulgurated with a Bugbee to reduce the risk of future bleeding. The bladder was hemostatic at the end of the case.      At this point the cystoscope was withdrawn and an 18fr donovan catheter placed into the bladder w/ return of clear urine. 10cc of sterile water was placed in the balloon.     The procedure was then concluded. The patient was transferred to the postanesthesia care unit in stable condition and tolerated the procedure well.    POSTOP PLAN:  1. F/u in 3m w/ cystoscopy in clinic  2. Donovan catheter removal in 2 days at home     As attending surgeon, I, Mike Chan MD, was present for the entire procedure.

## 2022-01-03 NOTE — ANESTHESIA POSTPROCEDURE EVALUATION
Patient: Juwan Latham    Procedure: Procedure(s):  CYSTOSCOPY, WITH URETHRAL DILATION WITH FULGERATION OF BLADDER WALL       Diagnosis:Postprocedural male urethral stricture [N99.114]  Gross hematuria [R31.0]  Diagnosis Additional Information: No value filed.    Anesthesia Type:  MAC    Note:  Disposition: Outpatient   Postop Pain Control: Uneventful            Sign Out: Well controlled pain   PONV: No   Neuro/Psych: Uneventful            Sign Out: Acceptable/Baseline neuro status   Airway/Respiratory: Uneventful            Sign Out: Acceptable/Baseline resp. status   CV/Hemodynamics: Uneventful            Sign Out: Acceptable CV status   Other NRE: NONE   DID A NON-ROUTINE EVENT OCCUR? No           Last vitals:  Vitals Value Taken Time   /68 01/03/22 1053   Temp 36.4  C (97.5  F) 01/03/22 1053   Pulse     Resp 18 01/03/22 1053   SpO2 97 % 01/03/22 1053       Electronically Signed By: Teja Marin DO  January 3, 2022  1:18 PM

## 2022-01-03 NOTE — ANESTHESIA CARE TRANSFER NOTE
Patient: Juwan Latham    Procedure: Procedure(s):  CYSTOSCOPY, WITH URETHRAL DILATION WITH FULGERATION OF BLADDER WALL       Diagnosis: Postprocedural male urethral stricture [N99.114]  Gross hematuria [R31.0]  Diagnosis Additional Information: No value filed.    Anesthesia Type:   MAC     Note:    Oropharynx: oropharynx clear of all foreign objects  Level of Consciousness: awake  Oxygen Supplementation: room air    Independent Airway: airway patency satisfactory and stable  Dentition: dentition unchanged  Vital Signs Stable: post-procedure vital signs reviewed and stable  Report to RN Given: handoff report given  Patient transferred to: Phase II  Comments: VSS and WNL, comfortable, no PONV, report to Manju RN  Handoff Report: Identifed the Patient, Identified the Reponsible Provider, Reviewed the pertinent medical history, Discussed the surgical course, Reviewed Intra-OP anesthesia mangement and issues during anesthesia, Set expectations for post-procedure period and Allowed opportunity for questions and acknowledgement of understanding      Vitals:  Vitals Value Taken Time   BP     Temp     Pulse     Resp     SpO2         Electronically Signed By: GLORIA Espino CRNA  January 3, 2022  10:25 AM

## 2022-01-03 NOTE — ANESTHESIA PREPROCEDURE EVALUATION
Anesthesia Pre-Procedure Evaluation    Patient: Juwan Latham   MRN: [id number] : 1950        Preoperative Diagnosis: Postprocedural male urethral stricture [N99.114]  Gross hematuria [R31.0]    Procedure : Procedure(s):  CYSTOSCOPY, WITH URETHRAL DILATION          Past Medical History:   Diagnosis Date     Anxiety      Arthritis     fingers, hips     Calculus of kidney ,      Colon polyps      Congenital single kidney      Gastroesophageal reflux disease      Hx of cancer of lung 2017    wedge resection     Hypertension      Prostate cancer (H) 2017     Seasonal allergies     spring and fall     Sleep apnea     cpap      Past Surgical History:   Procedure Laterality Date     BRONCHOSCOPY FLEXIBLE N/A 3/3/2017    Procedure: BRONCHOSCOPY FLEXIBLE;  Surgeon: Maria A Desai MD;  Location: [facility] OR     COLONOSCOPY N/A 2015    Procedure: COLONOSCOPY;  Surgeon: Murphy Jesus MD;  Location:  GI     COLONOSCOPY N/A 2019    Procedure: COLONOSCOPY;  Surgeon: Murphy Jesus MD;  Location:  GI     COMBINED CYSTOSCOPY, RETROGRADES, URETEROSCOPY, LASER HOLMIUM LITHOTRIPSY URETER(S), INSERT STENT N/A 3/25/2018    Procedure: COMBINED CYSTOSCOPY, RETROGRADES, URETEROSCOPY, LASER HOLMIUM LITHOTRIPSY URETER(S), INSERT STENT;  Cystoscopy, Catheter Exchange under Anesthesia;  Surgeon: Zaria Cain MD;  Location:  OR     DAVINCI PROSTATECTOMY N/A 2017    Procedure: DAVINCI PROSTATECTOMY;  Robotic Assisted Radical Prostatectomy and Pelvic Lymph Node Dissection ;  Surgeon: Paulo Agarwal MD;  Location:  OR     ESOPHAGOSCOPY, GASTROSCOPY, DUODENOSCOPY (EGD), COMBINED N/A 2016    Procedure: COMBINED ESOPHAGOSCOPY, GASTROSCOPY, DUODENOSCOPY (EGD), BIOPSY SINGLE OR MULTIPLE;  Surgeon: Murphy Jesus MD;  Location:  GI     LAPAROSCOPIC WEDGE RESECTION LUNG Right 3/3/2017    Procedure: LAPAROSCOPIC WEDGE RESECTION LUNG;  Surgeon: Maria A Desai MD;  Location:  [facility] OR     LASER HOLMIUM LITHOTRIPSY URETER(S), INSERT STENT, COMBINED  2012    Procedure: COMBINED CYSTOSCOPY, URETEROSCOPY, LASER HOLMIUM LITHOTRIPSY URETER(S), INSERT STENT;  Cystoscopy, Right Ureteroscopy, Stone Extraction, Holmium Laser, Double J Stent Placement;  Surgeon: Nicolás Blackwell MD;  Location:  OR      No Known Allergies   Social History     Tobacco Use     Smoking status: Former Smoker     Packs/day: 2.00     Years: 20.00     Pack years: 40.00     Types: Cigarettes     Quit date: 1985     Years since quittin.0     Smokeless tobacco: Never Used   Substance Use Topics     Alcohol use: Not Currently     Comment: sparingly      Wt Readings from Last 1 Encounters:   22 83 kg (183 lb)        Anesthesia Evaluation   Pt has had prior anesthetic.     No history of anesthetic complications       ROS/MED HX  ENT/Pulmonary:     (+) sleep apnea,     Neurologic:  - neg neurologic ROS     Cardiovascular:     (+) hypertension-----    METS/Exercise Tolerance: 4 - Raking leaves, gardening    Hematologic:  - neg hematologic  ROS     Musculoskeletal:   (+) arthritis,     GI/Hepatic:     (+) GERD,     Renal/Genitourinary:     (+) renal disease,     Endo:  - neg endo ROS     Psychiatric/Substance Use:  - neg psychiatric ROS     Infectious Disease:  - neg infectious disease ROS     Malignancy:   (+) Malignancy, History of Lung and Prostate.    Other:  - neg other ROS          Physical Exam    Airway  airway exam normal      Mallampati: I   TM distance: > 3 FB   Neck ROM: full   Mouth opening: > 3 cm    Respiratory Devices and Support         Dental  no notable dental history         Cardiovascular   cardiovascular exam normal       Rhythm and rate: regular and normal     Pulmonary   pulmonary exam normal        breath sounds clear to auscultation           OUTSIDE LABS:  CBC:   Lab Results   Component Value Date    WBC 3.8 (L) 10/24/2021    WBC 3.8 (L) 10/20/2020    HGB 14.5 10/24/2021    HGB 13.9  10/20/2020    HCT 44.8 10/24/2021    HCT 42.7 10/20/2020     10/24/2021     10/20/2020     BMP:   Lab Results   Component Value Date     10/24/2021     09/23/2021    POTASSIUM 4.7 10/24/2021    POTASSIUM 4.6 09/23/2021    CHLORIDE 109 10/24/2021    CHLORIDE 108 09/23/2021    CO2 30 10/24/2021    CO2 28 09/23/2021    BUN 17 10/24/2021    BUN 19 09/23/2021    CR 0.70 10/24/2021    CR 0.83 09/23/2021     (H) 10/24/2021    GLC 95 09/23/2021     COAGS:   Lab Results   Component Value Date    PTT 28 10/28/2005    INR 0.97 10/20/2020     POC:   Lab Results   Component Value Date    BGM 94 12/03/2017     HEPATIC:   Lab Results   Component Value Date    ALBUMIN 4.0 10/24/2021    PROTTOTAL 7.2 10/24/2021    ALT 20 10/24/2021    AST 27 10/24/2021    ALKPHOS 59 10/24/2021    BILITOTAL 0.7 10/24/2021     OTHER:   Lab Results   Component Value Date    A1C 5.3 09/23/2021    JOANNE 9.7 10/24/2021    PHOS 3.2 03/14/2017    MAG 2.2 03/05/2017    LIPASE 199 08/12/2019    TSH 2.72 05/07/2019    CRP 3.8 01/14/2020    SED 6 01/14/2020       Anesthesia Plan    ASA Status:  3   NPO Status:  NPO Appropriate    Anesthesia Type: MAC.     - Reason for MAC: Deep or markedly invasive procedure (G8)   Induction: Propofol.   Maintenance: N/A.        Consents    Anesthesia Plan(s) and associated risks, benefits, and realistic alternatives discussed. Questions answered and patient/representative(s) expressed understanding.    - Discussed:     - Discussed with:  Patient    Use of blood products discussed: No .     Postoperative Care    Pain management: Multi-modal analgesia, Oral pain medications.   PONV prophylaxis: Ondansetron (or other 5HT-3), Dexamethasone or Solumedrol     Comments:                Teja Marin DO

## 2022-01-20 ENCOUNTER — OFFICE VISIT (OUTPATIENT)
Dept: FAMILY MEDICINE | Facility: CLINIC | Age: 72
End: 2022-01-20
Payer: COMMERCIAL

## 2022-01-20 VITALS
BODY MASS INDEX: 28.1 KG/M2 | OXYGEN SATURATION: 98 % | HEIGHT: 68 IN | WEIGHT: 185.4 LBS | TEMPERATURE: 98 F | HEART RATE: 61 BPM | SYSTOLIC BLOOD PRESSURE: 130 MMHG | DIASTOLIC BLOOD PRESSURE: 62 MMHG | RESPIRATION RATE: 16 BRPM

## 2022-01-20 DIAGNOSIS — Z86.0100 HISTORY OF COLONIC POLYPS: ICD-10-CM

## 2022-01-20 DIAGNOSIS — Z11.59 ENCOUNTER FOR SCREENING FOR OTHER VIRAL DISEASES: ICD-10-CM

## 2022-01-20 DIAGNOSIS — Z01.818 PREOP GENERAL PHYSICAL EXAM: Primary | ICD-10-CM

## 2022-01-20 DIAGNOSIS — F33.0 MAJOR DEPRESSIVE DISORDER, RECURRENT EPISODE, MILD (H): ICD-10-CM

## 2022-01-20 PROCEDURE — U0003 INFECTIOUS AGENT DETECTION BY NUCLEIC ACID (DNA OR RNA); SEVERE ACUTE RESPIRATORY SYNDROME CORONAVIRUS 2 (SARS-COV-2) (CORONAVIRUS DISEASE [COVID-19]), AMPLIFIED PROBE TECHNIQUE, MAKING USE OF HIGH THROUGHPUT TECHNOLOGIES AS DESCRIBED BY CMS-2020-01-R: HCPCS | Performed by: NURSE PRACTITIONER

## 2022-01-20 PROCEDURE — U0005 INFEC AGEN DETEC AMPLI PROBE: HCPCS | Performed by: NURSE PRACTITIONER

## 2022-01-20 PROCEDURE — 99214 OFFICE O/P EST MOD 30 MIN: CPT | Performed by: NURSE PRACTITIONER

## 2022-01-20 ASSESSMENT — ANXIETY QUESTIONNAIRES
7. FEELING AFRAID AS IF SOMETHING AWFUL MIGHT HAPPEN: NOT AT ALL
5. BEING SO RESTLESS THAT IT IS HARD TO SIT STILL: NOT AT ALL
2. NOT BEING ABLE TO STOP OR CONTROL WORRYING: NOT AT ALL
3. WORRYING TOO MUCH ABOUT DIFFERENT THINGS: NOT AT ALL
1. FEELING NERVOUS, ANXIOUS, OR ON EDGE: NOT AT ALL
GAD7 TOTAL SCORE: 0
6. BECOMING EASILY ANNOYED OR IRRITABLE: NOT AT ALL

## 2022-01-20 ASSESSMENT — PATIENT HEALTH QUESTIONNAIRE - PHQ9
5. POOR APPETITE OR OVEREATING: NOT AT ALL
SUM OF ALL RESPONSES TO PHQ QUESTIONS 1-9: 0

## 2022-01-20 ASSESSMENT — MIFFLIN-ST. JEOR: SCORE: 1575.47

## 2022-01-20 NOTE — PATIENT INSTRUCTIONS
Preparing for Your Surgery  Getting started  A nurse will call you to review your health history and instructions. They will give you an arrival time based on your scheduled surgery time. Please be ready to share:    Your doctor's clinic name and phone number    Your medical, surgical and anesthesia history    A list of allergies and sensitivities    A list of medicines, including herbal treatments and over-the-counter drugs    Whether the patient has a legal guardian (ask how to send us the papers in advance)  Please tell us if you're pregnant--or if there's any chance you might be pregnant. Some surgeries may injure a fetus (unborn baby), so they require a pregnancy test. Surgeries that are safe for a fetus don't always need a test, and you can choose whether to have one.   If you have a child who's having surgery, please ask for a copy of Preparing for Your Child's Surgery.    Preparing for surgery    Within 30 days of surgery: Have a pre-op exam (sometimes called an H&P, or History and Physical). This can be done at a clinic or pre-operative center.  ? If you're having a , you may not need this exam. Talk to your care team.    At your pre-op exam, talk to your care team about all medicines you take. If you need to stop any medicines before surgery, ask when to start taking them again.  ? We do this for your safety. Many medicines can make you bleed too much during surgery. Some change how well surgery (anesthesia) drugs work.    Call your insurance company to let them know you're having surgery. (If you don't have insurance, call [phone].)    Call your clinic if there's any change in your health. This includes signs of a cold or flu (sore throat, runny nose, cough, rash, fever). It also includes a scrape or scratch near the surgery site.    If you have questions on the day of surgery, call your hospital or surgery center.  COVID testing  You may need to be tested for COVID-19 before having  surgery. If so, your surgical team will give you instructions for scheduling this test, separate from your preoperative history and physical.  Eating and drinking guidelines  For your safety: Unless your surgeon tells you otherwise, follow the guidelines below.    Eat and drink as usual until 8 hours before surgery. After that, no food or milk.    Drink clear liquids until 2 hours before surgery. These are liquids you can see through, like water, Gatorade and Propel Water. You may also have black coffee and tea (no cream or milk).    Nothing by mouth within 2 hours of surgery. This includes gum, candy and breath mints.    If you drink alcohol: Stop drinking it the night before surgery.    If your care team tells you to take medicine on the morning of surgery, it's okay to take it with a sip of water.  Preventing infection    Shower or bathe the night before and morning of your surgery. Follow the instructions your clinic gave you. (If no instructions, use regular soap.)    Don't shave or clip hair near your surgery site. We'll remove the hair if needed.    Don't smoke or vape the morning of surgery. You may chew nicotine gum up to 2 hours before surgery. A nicotine patch is okay.  ? Note: Some surgeries require you to completely quit smoking and nicotine. Check with your surgeon.    Your care team will make every effort to keep you safe from infection. We will:  ? Clean our hands often with soap and water (or an alcohol-based hand rub).  ? Clean the skin at your surgery site with a special soap that kills germs.  ? Give you a special gown to keep you warm. (Cold raises the risk of infection.)  ? Wear special hair covers, masks, gowns and gloves during surgery.  ? Give antibiotic medicine, if prescribed. Not all surgeries need antibiotics.  What to bring on the day of surgery    Photo ID and insurance card    Copy of your health care directive, if you have one    Glasses and hearing aides (bring cases)  ? You can't  wear contacts during surgery    Inhaler and eye drops, if you use them (tell us about these when you arrive)    CPAP machine or breathing device, if you use them    A few personal items, if spending the night    If you have . . .  ? A pacemaker, ICD (cardiac defibrillator) or other implant: Bring the ID card.  ? An implanted stimulator: Bring the remote control.  ? A legal guardian: Bring a copy of the certified (court-stamped) guardianship papers.  Please remove any jewelry, including body piercings. Leave jewelry and other valuables at home.  If you're going home the day of surgery    You must have a responsible adult drive you home. They should stay with you overnight as well.    If you don't have someone to stay with you, and you aren't safe to go home alone, we may keep you overnight. Insurance often won't pay for this.  After surgery  If it's hard to control your pain or you need more pain medicine, please call your surgeon's office.  Questions?   If you have any questions for your care team, list them here: _________________________________________________________________________________________________________________________________________________________________________ ____________________________________ ____________________________________ ____________________________________  For informational purposes only. Not to replace the advice of your health care provider. Copyright   2003, 2019 [facility]. All rights reserved. Clinically reviewed by Eveline Cervantes MD. SNTMNT [id number] - REV 07/21.

## 2022-01-20 NOTE — PROGRESS NOTES
[facility]  [address] [address]  Phone: [phone]  Primary Provider: Julio Guidry Jr.  Pre-op Performing Provider: LANCE DE LEON      PREOPERATIVE EVALUATION:  Today's date: 1/20/2022    Juwan Latham is a 71 year old male who presents for a preoperative evaluation.    Surgical Information:  Surgery/Procedure: Colonoscopy  Surgery Location: [facility]   Surgeon: Neha Rosen   Surgery Date: 1/25/2022  Time of Surgery: 7:35 am    Where patient plans to recover: At home with family  Fax number for surgical facility: Note does not need to be faxed, will be available electronically in Epic.    Type of Anesthesia Anticipated: conscious sedation     Assessment & Plan     The proposed surgical procedure is considered LOW risk.    Preop general physical exam    Encounter for screening for other viral diseases  - Asymptomatic COVID-19 Virus (Coronavirus) by PCR Nose    History of colonic polyps    Major depressive disorder, recurrent episode, mild (H)  Stable, continue current medications and follow-up with PCP for refills.            Risks and Recommendations:  The patient has the following additional risks and recommendations for perioperative complications:   - No identified additional risk factors other than previously addressed    Medication Instructions:  Patient is to take all scheduled medications on the day of surgery   Will hold vitamins    RECOMMENDATION:  APPROVAL GIVEN to proceed with proposed procedure, without further diagnostic evaluation.          Subjective     HPI related to upcoming procedure: hx of polyps, has had intermittent blood in the stools.      Preop Questions 1/20/2022   1. Have you ever had a heart attack or stroke? No   2. Have you ever had surgery on your heart or blood vessels, such as a stent placement, a coronary artery bypass, or surgery on an artery in your head, neck, heart, or legs? No   3. Do  you have chest pain with activity? No   4. Do you have a history of  heart failure? No   5. Do you currently have a cold, bronchitis or symptoms of other infection? No   6. Do you have a cough, shortness of breath, or wheezing? No   7. Do you or anyone in your family have previous history of blood clots? No   8. Do you or does anyone in your family have a serious bleeding problem such as prolonged bleeding following surgeries or cuts? No   9. Have you ever had problems with anemia or been told to take iron pills? No   10. Have you had any abnormal blood loss such as black, tarry or bloody stools? No   11. Have you ever had a blood transfusion? No   12. Are you willing to have a blood transfusion if it is medically needed before, during, or after your surgery? Yes   13. Have you or any of your relatives ever had problems with anesthesia? No   14. Do you have sleep apnea, excessive snoring or daytime drowsiness? No   15. Do you have any artifical heart valves or other implanted medical devices like a pacemaker, defibrillator, or continuous glucose monitor? No   16. Do you have artificial joints? No   17. Are you allergic to latex? No       Health Care Directive:  Patient has a Health Care Directive on file      Preoperative Review of :   reviewed - old prescriptions, not active      Status of Chronic Conditions:  See problem list for active medical problems.  Problems all longstanding and stable, except as noted/documented.  See ROS for pertinent symptoms related to these conditions.    DEPRESSION - Patient has a long history of Depression of moderate severity requiring medication for control with recent symptoms being stable..Current symptoms of depression include none.     PHQ = 0     Review of Systems  CONSTITUTIONAL: NEGATIVE for fever, chills, change in weight  ENT/MOUTH: NEGATIVE for ear, mouth and throat problems  RESP: NEGATIVE for significant cough or SOB  CV: NEGATIVE for chest pain, palpitations or  peripheral edema    Patient Active Problem List    Diagnosis Date Noted     Malignant neoplasm (H) 12/17/2021     Priority: Medium     Numbness 12/17/2021     Priority: Medium     Postprocedural male urethral stricture 12/03/2021     Priority: Medium     Added automatically from request for surgery [id number]       Gross hematuria 12/03/2021     Priority: Medium     Added automatically from request for surgery [id number]       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 Bates Weight at [facility]  Substantially higher risk than previously believed given positive lymph node and grade group 5     Given high risk for prostate cancer would refer to radiation oncology for adjuvant radiation and possible hormone treatment.  Possibly start in Feb/March 2018. Re-check PSA prior to starting radiation therapy.  Completed radiation therapy in April 2018.            Major depressive disorder, recurrent episode, mild (H) 05/09/2017     Priority: Medium     Cavernous hemangioma of brain (H) 04/07/2017     Priority: Medium     Found incidentally on MRI of brain which was ordered to look for mets from lung tumor  Neurosurgery consult with Dr. Mcelroy in March 2017 - recommend repeat MRI in September 2017 to assess for any changes       Overweight (BMI 25.0-29.9) 03/24/2017     Priority: Medium     s/p Malignant neoplasm of upper lobe of right lung (H) 03/13/2017     Priority: Medium     Adenocarcinoma       Benign neoplasm of descending colon 01/16/2017     Priority: Medium     Seen on colonoscopy 1/4/2017       Seasonal allergies       Priority: Medium     spring and fall       Hypertension goal BP (blood pressure) < 140/90 04/28/2015     Priority: Medium     GERD (gastroesophageal reflux disease) 01/27/2015     Priority: Medium     PAULINO (obstructive sleep apnea) 05/24/2013     Priority: Medium     Anxiety 12/12/2011     Priority: Medium     History of colonic polyps 06/22/2011     Priority: Medium     Problem list name updated by automated process. Provider to review       Allergic rhinitis due to other allergen 05/28/2004     Priority: Medium     Tu score is 9 on 6-3-11        Past Medical History:   Diagnosis Date     Anxiety      Arthritis     fingers, hips     Calculus of kidney 2005, 2012     Colon polyps      Congenital single kidney      Gastroesophageal reflux disease      Hx of cancer of lung 03/2017    wedge resection     Hypertension      Prostate cancer (H) 08/2017     Seasonal allergies     spring and fall     Sleep apnea     cpap     Past Surgical History:   Procedure Laterality Date     BRONCHOSCOPY FLEXIBLE N/A 3/3/2017    Procedure: BRONCHOSCOPY FLEXIBLE;  Surgeon: Maria A Desai MD;  Location: [facility] OR     COLONOSCOPY N/A 2/11/2015    Procedure: COLONOSCOPY;  Surgeon: Murphy Jesus MD;  Location:  GI     COLONOSCOPY N/A 12/11/2019    Procedure: COLONOSCOPY;  Surgeon: Murphy Jesus MD;  Location:  GI     COMBINED CYSTOSCOPY, RETROGRADES, URETEROSCOPY, LASER HOLMIUM LITHOTRIPSY URETER(S), INSERT STENT N/A 3/25/2018    Procedure: COMBINED CYSTOSCOPY, RETROGRADES, URETEROSCOPY, LASER HOLMIUM LITHOTRIPSY URETER(S), INSERT STENT;  Cystoscopy, Catheter Exchange under Anesthesia;  Surgeon: Zaria Cain MD;  Location:  OR     CYSTOSCOPY, DILATE URETHRA, COMBINED N/A 1/3/2022    Procedure: CYSTOSCOPY, WITH URETHRAL DILATION WITH FULGERATION OF BLADDER WALL;  Surgeon: Mike Chan MD;  Location: [facility] OR     DAVINCI PROSTATECTOMY N/A 12/1/2017    Procedure: DAVINCI PROSTATECTOMY;  Robotic Assisted Radical  Prostatectomy and Pelvic Lymph Node Dissection ;  Surgeon: Paulo Agarwal MD;  Location: [facility] OR     ESOPHAGOSCOPY, GASTROSCOPY, DUODENOSCOPY (EGD), COMBINED N/A 2016    Procedure: COMBINED ESOPHAGOSCOPY, GASTROSCOPY, DUODENOSCOPY (EGD), BIOPSY SINGLE OR MULTIPLE;  Surgeon: Murphy Jesus MD;  Location: [facility] GI     LAPAROSCOPIC WEDGE RESECTION LUNG Right 3/3/2017    Procedure: LAPAROSCOPIC WEDGE RESECTION LUNG;  Surgeon: Maria A Desai MD;  Location: [facility] OR     LASER HOLMIUM LITHOTRIPSY URETER(S), INSERT STENT, COMBINED  2012    Procedure: COMBINED CYSTOSCOPY, URETEROSCOPY, LASER HOLMIUM LITHOTRIPSY URETER(S), INSERT STENT;  Cystoscopy, Right Ureteroscopy, Stone Extraction, Holmium Laser, Double J Stent Placement;  Surgeon: Nicolás Blackwell MD;  Location:  OR     Current Outpatient Medications   Medication Sig Dispense Refill     busPIRone (BUSPAR) 10 MG tablet Take 1 tablet (10 mg) by mouth 2 times daily 180 tablet 1     citalopram (CELEXA) 20 MG tablet Take 1 tablet (20 mg) by mouth daily 90 tablet 1     Garlic 1000 MG CAPS        Milk Thistle 200 MG CAPS        multivitamin w/minerals (MULTI-VITAMIN) tablet Take 1 tablet by mouth daily       PHOSPHATIDYL CHOLINE PO        SENNA-docusate sodium (SENNA S) 8.6-50 MG tablet Take 1 tablet by mouth At Bedtime 30 tablet 0     vitamin B1 (THIAMINE) 250 MG tablet Take 250 mg by mouth daily       Vitamin D, Cholecalciferol, 1000 units TABS        zinc gluconate 50 MG tablet Take 50 mg by mouth daily         No Known Allergies     Social History     Tobacco Use     Smoking status: Former Smoker     Packs/day: 2.00     Years: 20.00     Pack years: 40.00     Types: Cigarettes     Quit date: 1985     Years since quittin.0     Smokeless tobacco: Never Used   Substance Use Topics     Alcohol use: Not Currently     Comment: sparingly       History   Drug Use No         Objective     /62   Pulse 61   Temp 98  F (36.7  C) (Oral)   Resp 16    "Ht 1.727 m (5' 8\")   Wt 84.1 kg (185 lb 6.4 oz)   SpO2 98%   BMI 28.19 kg/m      Physical Exam  GENERAL APPEARANCE: healthy, alert and no distress  HENT: ear canals and TM's normal and nose and mouth without ulcers or lesions  RESP: lungs clear to auscultation - no rales, rhonchi or wheezes  CV: regular rate and rhythm, normal S1 S2, no S3 or S4 and no murmur, click or rub   ABDOMEN: soft, nontender, no HSM or masses and bowel sounds normal  NEURO: Normal strength and tone, sensory exam grossly normal, mentation intact and speech normal    Recent Labs   Lab Test 10/24/21  1141 09/23/21  0818 04/07/21  0000 10/20/20  1055 07/09/20  0958   HGB 14.5  --   --  13.9  --      --   --  150  --    INR  --   --   --  0.97  --     139  --  138 138   POTASSIUM 4.7 4.6  --  4.4 4.4   CR 0.70 0.83   < > 0.80 0.89   A1C  --  5.3  --   --  5.3    < > = values in this interval not displayed.        Diagnostics:  No labs were ordered during this visit.   No EKG required for low risk surgery (cataract, skin procedure, breast biopsy, etc).    Revised Cardiac Risk Index (RCRI):  The patient has the following serious cardiovascular risks for perioperative complications:   - No serious cardiac risks = 0 points     RCRI Interpretation: 0 points: Class I (very low risk - 0.4% complication rate)           Signed Electronically by: GLORIA Fernandes CNP  Copy of this evaluation report is provided to requesting physician.      "

## 2022-01-21 LAB — SARS-COV-2 RNA RESP QL NAA+PROBE: NEGATIVE

## 2022-01-21 RX ORDER — VIT C/B6/B5/MAGNESIUM/HERB 173 50-5-6-5MG
1500 CAPSULE ORAL DAILY
COMMUNITY
End: 2024-03-07

## 2022-01-21 RX ORDER — UREA 10 %
2500 LOTION (ML) TOPICAL DAILY
COMMUNITY
End: 2024-03-07

## 2022-01-21 ASSESSMENT — ANXIETY QUESTIONNAIRES: GAD7 TOTAL SCORE: 0

## 2022-01-25 ENCOUNTER — HOSPITAL ENCOUNTER (OUTPATIENT)
Facility: CLINIC | Age: 72
Discharge: HOME | End: 2022-01-25
Attending: COLON & RECTAL SURGERY | Admitting: COLON & RECTAL SURGERY
Payer: COMMERCIAL

## 2022-01-25 VITALS
HEART RATE: 58 BPM | OXYGEN SATURATION: 94 % | TEMPERATURE: 97.9 F | SYSTOLIC BLOOD PRESSURE: 115 MMHG | RESPIRATION RATE: 18 BRPM | DIASTOLIC BLOOD PRESSURE: 66 MMHG

## 2022-01-25 LAB — COLONOSCOPY: NORMAL

## 2022-01-25 PROCEDURE — G0500 MOD SEDAT ENDO SERVICE >5YRS: HCPCS | Performed by: COLON & RECTAL SURGERY

## 2022-01-25 PROCEDURE — 45385 COLONOSCOPY W/LESION REMOVAL: CPT | Performed by: COLON & RECTAL SURGERY

## 2022-01-25 PROCEDURE — 250N000013 HC RX MED GY IP 250 OP 250 PS 637: Performed by: COLON & RECTAL SURGERY

## 2022-01-25 PROCEDURE — 250N000011 HC RX IP 250 OP 636: Performed by: COLON & RECTAL SURGERY

## 2022-01-25 PROCEDURE — 88305 TISSUE EXAM BY PATHOLOGIST: CPT | Mod: TC | Performed by: COLON & RECTAL SURGERY

## 2022-01-25 RX ORDER — NALOXONE HYDROCHLORIDE 0.4 MG/ML
0.4 INJECTION, SOLUTION INTRAMUSCULAR; INTRAVENOUS; SUBCUTANEOUS
Status: DISCONTINUED | OUTPATIENT
Start: 2022-01-25 | End: 2022-01-25 | Stop reason: HOSPADM

## 2022-01-25 RX ORDER — EPINEPHRINE 1 MG/ML
0.1 INJECTION, SOLUTION INTRAMUSCULAR; SUBCUTANEOUS
Status: DISCONTINUED | OUTPATIENT
Start: 2022-01-25 | End: 2022-01-25 | Stop reason: HOSPADM

## 2022-01-25 RX ORDER — ONDANSETRON 2 MG/ML
4 INJECTION INTRAMUSCULAR; INTRAVENOUS
Status: DISCONTINUED | OUTPATIENT
Start: 2022-01-25 | End: 2022-01-25 | Stop reason: HOSPADM

## 2022-01-25 RX ORDER — FENTANYL CITRATE 0.05 MG/ML
25 INJECTION, SOLUTION INTRAMUSCULAR; INTRAVENOUS
Status: DISCONTINUED | OUTPATIENT
Start: 2022-01-25 | End: 2022-01-25 | Stop reason: HOSPADM

## 2022-01-25 RX ORDER — FENTANYL CITRATE 0.05 MG/ML
25-50 INJECTION, SOLUTION INTRAMUSCULAR; INTRAVENOUS
Status: COMPLETED | OUTPATIENT
Start: 2022-01-25 | End: 2022-01-25

## 2022-01-25 RX ORDER — ONDANSETRON 4 MG/1
4 TABLET, ORALLY DISINTEGRATING ORAL EVERY 6 HOURS PRN
Status: DISCONTINUED | OUTPATIENT
Start: 2022-01-25 | End: 2022-01-25 | Stop reason: HOSPADM

## 2022-01-25 RX ORDER — NALOXONE HYDROCHLORIDE 0.4 MG/ML
0.2 INJECTION, SOLUTION INTRAMUSCULAR; INTRAVENOUS; SUBCUTANEOUS
Status: DISCONTINUED | OUTPATIENT
Start: 2022-01-25 | End: 2022-01-25 | Stop reason: HOSPADM

## 2022-01-25 RX ORDER — FLUMAZENIL 0.1 MG/ML
0.2 INJECTION, SOLUTION INTRAVENOUS
Status: DISCONTINUED | OUTPATIENT
Start: 2022-01-25 | End: 2022-01-25 | Stop reason: HOSPADM

## 2022-01-25 RX ORDER — SIMETHICONE 40MG/0.6ML
133 SUSPENSION, DROPS(FINAL DOSAGE FORM)(ML) ORAL
Status: COMPLETED | OUTPATIENT
Start: 2022-01-25 | End: 2022-01-25

## 2022-01-25 RX ORDER — PROCHLORPERAZINE MALEATE 5 MG
5 TABLET ORAL EVERY 6 HOURS PRN
Status: DISCONTINUED | OUTPATIENT
Start: 2022-01-25 | End: 2022-01-25 | Stop reason: HOSPADM

## 2022-01-25 RX ORDER — LIDOCAINE 40 MG/G
CREAM TOPICAL
Status: DISCONTINUED | OUTPATIENT
Start: 2022-01-25 | End: 2022-01-25 | Stop reason: HOSPADM

## 2022-01-25 RX ORDER — ATROPINE SULFATE 0.4 MG/ML
0.4 AMPUL (ML) INJECTION
Status: DISCONTINUED | OUTPATIENT
Start: 2022-01-25 | End: 2022-01-25 | Stop reason: HOSPADM

## 2022-01-25 RX ORDER — ONDANSETRON 2 MG/ML
4 INJECTION INTRAMUSCULAR; INTRAVENOUS EVERY 6 HOURS PRN
Status: DISCONTINUED | OUTPATIENT
Start: 2022-01-25 | End: 2022-01-25 | Stop reason: HOSPADM

## 2022-01-25 RX ADMIN — SIMETHICONE 133 MG: 20 SUSPENSION/ DROPS ORAL at 07:58

## 2022-01-25 RX ADMIN — FENTANYL CITRATE 100 MCG: 0.05 INJECTION, SOLUTION INTRAMUSCULAR; INTRAVENOUS at 07:54

## 2022-01-25 RX ADMIN — MIDAZOLAM 2 MG: 1 INJECTION INTRAMUSCULAR; INTRAVENOUS at 07:55

## 2022-01-25 NOTE — DISCHARGE INSTRUCTIONS
The patient has received a copy of the Provation  report the doctor has written and discharge instructions have been discussed with the patient and responsible adult.  All questions were addressed and answered prior to patient discharge.      Understanding Colon and Rectal Polyps     The colon has a smooth lining composed of millions of cells.     The colon (also called the large intestine) is a muscular tube that forms the last part of the digestive tract. It absorbs water and stores food waste. The colon is about 4 to 6 feet long. The rectum is the last 6 inches of the colon. The colon and rectum have a smooth lining composed of millions of cells. Changes in these cells can lead to growths in the colon that can become cancerous and should be removed.     When the Colon Lining Changes  Changes that occur in the cells that line the colon or rectum can lead to growths called polyps. Over a period of years, polyps can turn cancerous. Removing polyps early may prevent cancer from ever forming.      Polyps  Polyps are fleshy clumps of tissue that form on the lining of the colon or rectum. Small polyps are usually benign (not cancerous). However, over time, cells in a polyp can change and become cancerous. The larger a polyp grows, the more likely this is to happen. Also, certain types of polyps known as adenomatous polyps are considered premalignant. This means that they will almost always become cancerous if they re not removed.          Cancer  Almost all colorectal cancers start when polyp cells begin growing abnormally. As a cancerous tumor grows, it may involve more and more of the colon or rectum. In time, cancer can also grow beyond the colon or rectum and spread to nearby organs or to glands called lymph nodes. The cells can also travel to other parts of the body. This is known as metastasis. The earlier a cancerous tumor is removed, the better the chance of preventing its spread.        [id number] Karen  [employer], [address] [place] [address]. All rights reserved. This information is not intended as a substitute for professional medical care. Always follow your healthcare professional's instructions.      Understanding Diverticulosis and Diverticulitis     Pouches or diverticula usually occur in the lower part of the colon called the sigmoid.      Diverticulitis occurs when the pouches become inflamed.     The colon (large intestine) is the last part of the digestive tract. It absorbs water from stool and changes it from a liquid to a solid. In certain cases, small pouches called diverticula can form in the colon wall. This condition is called diverticulosis. The pouches can become infected. If this happens, it becomes a more serious problem called diverticulitis. These problems can be painful. But they can be managed.   Managing Your Condition  Diet changes or taking medications are often tried first. These may be enough to bring relief. If the case is bad, surgery may be done. You and your doctor can discuss the plan that is best for you.  If You Have Diverticulosis  Diet changes are often enough to control symptoms. The main changes are adding fiber (roughage) and drinking more water. Fiber absorbs water as it travels through your colon. This helps your stool stay soft and move smoothly. Water helps this process. If needed, you may be told to take over-the-counter stool softeners. To help relieve pain, antispasmodic medications may be prescribed.  If You Have Diverticulitis  Treatment depends on how bad your symptoms are.  For mild symptoms: You may be put on a liquid diet for a short time. You may also be prescribed antibiotics. If these two steps relieve your symptoms, you may then be prescribed a high-fiber diet. If you still have symptoms, your doctor will discuss further treatment options with you.  For severe symptoms: You may need to be admitted to the hospital. There, you can be given IV  antibiotics and fluids. Once symptoms are under control, the above treatments may be tried. If these don t control your condition, your doctor may discuss the option of having surgery with you.  [facility] Colon [facility]  Help keep your colon healthy with a diet that includes plenty of high-fiber fruits, vegetables, and whole grains. Drink plenty of liquids like water and juice. Your doctor may also recommend avoiding seeds and nuts.          [id number] Karen [facility], [address] [place] [address], [place] [address]. All rights reserved. This information is not intended as a substitute for professional medical care. Always follow your healthcare professional's instructions.      HIGH FIBER DIET  Fiber is present in all fruits, vegetables, cereals and grains. Fiber passes through the body undigested. A high fiber diet helps food move through the intestinal tract. The added bulk is helpful in preventing constipation. In people with diverticulosis it serves to clean out the pouches along the colon wall while preventing new ones from forming. A high fiber diet also reduces the risk of colon cancer, decreases blood cholesterol and prevents high blood sugar in people with diabetes.    The foods listed below are high in fiber and should be included in your diet. If you are not used to high fiber foods, start with 1 or 2 foods from this list. Every 3-4 days add a new one to your diet until you are eating 4 high fiber foods per day. This should give you 20-35 Gm of fiber/day. It is also important to drink a lot of water when you are on this diet (6-8 glasses a day). Water causes the fiber to swell and increases the benefit.    FOODS HIGH IN DIETARY FIBER:  BREADS: Made with 100% whole wheat flour; regis, wheat or rye crackers; tortillas, bran muffins  CEREALS: Whole grain cereal with bran (Chex, Raisin Bran, Corn Bran), oatmeal, rolled oats, granola, wheat flakes, brown rice  NUTS: Any nuts  FRUITS: All fresh fruits along with edible  skins, (bananas, citrus fruit, mangoes, pears, prunes, raisins, apples, pineapple, apricot, melon, jams and marmalades), fruit juices (especially prune juice)  VEGETABLES: All types, preferably raw or lightly cooked: especially, celery, eggplant, potatoes, spinach, broccoli, brussel sprouts, winter squash, carrots, cauliflower, soybeans, lentils, fresh and dried beans of all kinds  OTHER: Popcorn, any spices      [id number] [facility], [address] [place] [address]. All rights reserved. This information is not intended as a substitute for professional medical care. Always follow your healthcare professional's instructions.      Eating a High-Fiber Diet  Fiber is what gives strength and structure to plants. Most grains, beans, vegetables, and fruits contain fiber. Foods rich in fiber are often low in calories and fat, and they fill you up more. They may also reduce your risks for certain health problems. To find out the amount of fiber in canned, packaged, or frozen foods, read the  Nutrition Facts  label. It tells you how much fiber is in a serving.      Types of Fiber and Their Benefits  There are two types of fiber: insoluble and soluble. They both aid digestion and help you maintain a healthy weight.  Insoluble fiber: This is found in whole grains, cereals, certain fruits and vegetables (such as apple skin, corn, and carrots). Insoluble fiber may prevent constipation and reduce the risk of certain types of cancer.   Soluble fiber: This type of fiber is in oats, beans, and certain fruits and vegetables (such as strawberries and peas). Soluble fiber can reduce cholesterol (which may help lower the risk of heart disease), and helps control blood sugar levels.  Look for High-Fiber Foods  Whole-grain breads and cereals: Try to eat 6-8 ounces a day. Include wheat and oat bran cereals, whole-wheat muffins or toast, and corn tortillas in your meals.  Fruits: Try to eat 2 cups a day. Apples, oranges,  strawberries, pears, and bananas are good sources. (Note: Fruit juice is low in fiber.)  Vegetables: Try to eat 3 cups a day. Add asparagus, carrots, broccoli, peas, and corn to your meals.  Legumes (beans): One cup of cooked lentils gives you over 15 grams of fiber. Try navy beans, lentils, and chickpeas.  Seeds:  A small handful of seeds gives you about 3 grams of fiber. Try sunflower seeds.    Keep Track of Your Fiber  A healthy diet includes 31 grams of fiber a day if you have a 2,000-calorie diet. Keep track of how much fiber you eat. Start by reading food labels. Then eat a variety of foods high in fiber. Ask your doctor about supplemental fiber products.            [id number] Karen [employer], [address] [place] [address], [place] [address]. All rights reserved. This information is not intended as a substitute for professional medical care. Always follow your healthcare professional's instructions.

## 2022-01-25 NOTE — H&P
Pre-Endoscopy History and Physical     Juwan Latham MRN# [id number]   YOB: 1950 Age: 71 year old     Date of Procedure: 1/25/2022  Primary care provider: Julio Guidry Jr.  Type of Endoscopy: colonoscopy  Reason for Procedure: screening  Type of Anesthesia Anticipated: Moderate Sedation    HPI:    Juwan is a 71 year old male who will be undergoing the above procedure.      A history and physical has been performed. The patient's medications and allergies have been reviewed. The risks and benefits of the procedure and the sedation options and risks were discussed with the patient.  All questions were answered and informed consent was obtained.      He denies a personal or family history of anesthesia complications or bleeding disorders.     No Known Allergies     Prior to Admission Medications   Prescriptions Last Dose Informant Patient Reported? Taking?   Garlic 1000 MG CAPS   Yes Yes   Milk Thistle 200 MG CAPS   Yes Yes   PHOSPHATIDYL CHOLINE PO   Yes Yes   Turmeric 500 MG CAPS   Yes Yes   Sig: Take 1,500 capsules by mouth daily   Vitamin D, Cholecalciferol, 1000 units TABS   Yes Yes   busPIRone (BUSPAR) 10 MG tablet   No Yes   Sig: Take 1 tablet (10 mg) by mouth 2 times daily   citalopram (CELEXA) 20 MG tablet   No Yes   Sig: Take 1 tablet (20 mg) by mouth daily   cyanocobalamin (VITAMIN B-12) 500 MCG tablet   Yes Yes   Sig: Take 2,500 mcg by mouth daily   multivitamin w/minerals (MULTI-VITAMIN) tablet   Yes Yes   Sig: Take 1 tablet by mouth daily   zinc gluconate 50 MG tablet   Yes Yes   Sig: Take 50 mg by mouth daily      Facility-Administered Medications: None       Patient Active Problem List   Diagnosis     Allergic rhinitis due to other allergen     History of colonic polyps     Anxiety     PAULINO (obstructive sleep apnea)     GERD (gastroesophageal reflux disease)     Hypertension goal BP (blood pressure) < 140/90     Seasonal allergies     Benign neoplasm of descending colon     s/p  Malignant neoplasm of upper lobe of right lung (H)     Overweight (BMI 25.0-29.9)     Cavernous hemangioma of brain (H)     Major depressive disorder, recurrent episode, mild (H)     h/o Prostate cancer (H) - s/p prostatectomy 2017     Somatic dysfunction of pelvis region     Mixed incontinence urge and stress (male)(female)     Retinal detachment of left eye with single break     Postprocedural male urethral stricture     Gross hematuria     Malignant neoplasm (H)     Numbness        Past Medical History:   Diagnosis Date     Anxiety      Arthritis     fingers, hips     Calculus of kidney 2005, 2012     Colon polyps      Congenital single kidney      Gastroesophageal reflux disease      Hx of cancer of lung 03/2017    wedge resection     Hypertension      Prostate cancer (H) 08/2017     Seasonal allergies     spring and fall     Sleep apnea     cpap        Past Surgical History:   Procedure Laterality Date     BRONCHOSCOPY FLEXIBLE N/A 3/3/2017    Procedure: BRONCHOSCOPY FLEXIBLE;  Surgeon: Maria A Desai MD;  Location: [facility] OR     COLONOSCOPY N/A 2/11/2015    Procedure: COLONOSCOPY;  Surgeon: Murphy Jesus MD;  Location:  GI     COLONOSCOPY N/A 12/11/2019    Procedure: COLONOSCOPY;  Surgeon: Murphy Jesus MD;  Location:  GI     COMBINED CYSTOSCOPY, RETROGRADES, URETEROSCOPY, LASER HOLMIUM LITHOTRIPSY URETER(S), INSERT STENT N/A 3/25/2018    Procedure: COMBINED CYSTOSCOPY, RETROGRADES, URETEROSCOPY, LASER HOLMIUM LITHOTRIPSY URETER(S), INSERT STENT;  Cystoscopy, Catheter Exchange under Anesthesia;  Surgeon: Zaria Cain MD;  Location:  OR     CYSTOSCOPY, DILATE URETHRA, COMBINED N/A 1/3/2022    Procedure: CYSTOSCOPY, WITH URETHRAL DILATION WITH FULGERATION OF BLADDER WALL;  Surgeon: Mike Chan MD;  [place] [facility] [place]     DAVINCI PROSTATECTOMY N/A 12/1/2017    Procedure: DAVINCI PROSTATECTOMY;  Robotic Assisted Radical Prostatectomy and Pelvic Lymph Node Dissection ;  Surgeon: Weight,  "Paulo Wiley MD;  Location: [facility] OR     ESOPHAGOSCOPY, GASTROSCOPY, DUODENOSCOPY (EGD), COMBINED N/A 2016    Procedure: COMBINED ESOPHAGOSCOPY, GASTROSCOPY, DUODENOSCOPY (EGD), BIOPSY SINGLE OR MULTIPLE;  Surgeon: Murphy Jesus MD;  Location: [facility] GI     LAPAROSCOPIC WEDGE RESECTION LUNG Right 3/3/2017    Procedure: LAPAROSCOPIC WEDGE RESECTION LUNG;  Surgeon: Maria A Desai MD;  Location: [facility] OR     LASER HOLMIUM LITHOTRIPSY URETER(S), INSERT STENT, COMBINED  2012    Procedure: COMBINED CYSTOSCOPY, URETEROSCOPY, LASER HOLMIUM LITHOTRIPSY URETER(S), INSERT STENT;  Cystoscopy, Right Ureteroscopy, Stone Extraction, Holmium Laser, Double J Stent Placement;  Surgeon: Nicolás Blackwell MD;  Location:  OR       Social History     Tobacco Use     Smoking status: Former Smoker     Packs/day: 2.00     Years: 20.00     Pack years: 40.00     Types: Cigarettes     Quit date: 1985     Years since quittin.0     Smokeless tobacco: Never Used   Substance Use Topics     Alcohol use: Not Currently     Comment: sparingly       Family History   Problem Relation Age of Onset     Heart Disease Mother      Diabetes Brother 65        Type 2     Diabetes Maternal Grandmother      Cancer - colorectal Brother 70     Hypertension No family hx of      Lipids No family hx of        REVIEW OF SYSTEMS:     5 point ROS negative except as noted above in HPI, including Gen., Resp., CV, GI &  system review.      PHYSICAL EXAM:   Temp 97.9  F (36.6  C) (Temporal)  Estimated body mass index is 28.19 kg/m  as calculated from the following:    Height as of 22: 1.727 m (5' 8\").    Weight as of 22: 84.1 kg (185 lb 6.4 oz).   GENERAL APPEARANCE: healthy and alert  MENTAL STATUS: alert  AIRWAY EXAM: Mallampatti Class I (visualization of the soft palate, fauces, uvula, anterior and posterior pillars)  RESP: lungs clear to auscultation - no rales, rhonchi or wheezes  CV: regular rates and rhythm      DIAGNOSTICS:    Not " indicated      IMPRESSION   ASA Class 2 - Mild systemic disease        PLAN:       Plan for colonoscopy. We discussed the risks, benefits and alternatives and the patient wished to proceed.    The above has been forwarded to the consulting provider.      Signed Electronically by: Neha Rosen MD, MD  January 25, 2022

## 2022-01-26 LAB
PATH REPORT.COMMENTS IMP SPEC: NORMAL
PATH REPORT.COMMENTS IMP SPEC: NORMAL
PATH REPORT.FINAL DX SPEC: NORMAL
PATH REPORT.GROSS SPEC: NORMAL
PATH REPORT.MICROSCOPIC SPEC OTHER STN: NORMAL
PATH REPORT.RELEVANT HX SPEC: NORMAL
PHOTO IMAGE: NORMAL

## 2022-01-26 PROCEDURE — 88305 TISSUE EXAM BY PATHOLOGIST: CPT | Mod: 26

## 2022-03-14 ENCOUNTER — PRE VISIT (OUTPATIENT)
Dept: UROLOGY | Facility: CLINIC | Age: 72
End: 2022-03-14
Payer: COMMERCIAL

## 2022-03-14 NOTE — TELEPHONE ENCOUNTER
Reason for visit: Cystoscopy     Relevant information: s/p urethral dilation with fulgeration    Records/imaging/labs/orders: in EPIC    Pt called: no    At Rooming: normal

## 2022-03-25 ENCOUNTER — TELEPHONE (OUTPATIENT)
Dept: UROLOGY | Facility: CLINIC | Age: 72
End: 2022-03-25
Payer: COMMERCIAL

## 2022-03-25 NOTE — TELEPHONE ENCOUNTER
Spoke with patient reviewed last procedure and current problem of patients preception of gradually slowing stream.  Patient has 3 month follow up with Dr Chan scheduled for April 6 2022, with a cystoscopy in clinic explained procedure to patient.  Patient denies further questions. Mayte Black RN

## 2022-03-25 NOTE — TELEPHONE ENCOUNTER
[facility] Call Center    Phone Message    May a detailed message be left on voicemail: yes     Reason for Call: Other: Juwan is calling stating the urethra expansion surgery he did with Dr. Chan he feels like is going backwards and that he is feeling more restricted and more difficult to empty bladder. Juwan would like a call back to discuss, thank you!     Action Taken: Message routed to:  [facility] [place]    [place] Screening: Not Applicable

## 2022-04-06 ENCOUNTER — OFFICE VISIT (OUTPATIENT)
Dept: UROLOGY | Facility: CLINIC | Age: 72
End: 2022-04-06
Payer: COMMERCIAL

## 2022-04-06 ENCOUNTER — TRANSFERRED RECORDS (OUTPATIENT)
Dept: HEALTH INFORMATION MANAGEMENT | Facility: CLINIC | Age: 72
End: 2022-04-06

## 2022-04-06 ENCOUNTER — MEDICAL CORRESPONDENCE (OUTPATIENT)
Dept: HEALTH INFORMATION MANAGEMENT | Facility: CLINIC | Age: 72
End: 2022-04-06

## 2022-04-06 VITALS
HEART RATE: 63 BPM | BODY MASS INDEX: 27.58 KG/M2 | HEIGHT: 68 IN | DIASTOLIC BLOOD PRESSURE: 78 MMHG | SYSTOLIC BLOOD PRESSURE: 126 MMHG | WEIGHT: 182 LBS

## 2022-04-06 DIAGNOSIS — N35.819 OTHER STRICTURE OF URETHRA IN MALE: Primary | ICD-10-CM

## 2022-04-06 LAB
ALT SERPL-CCNC: 20 IU/L (ref 0–44)
AST SERPL-CCNC: 20 IU/L (ref 0–40)

## 2022-04-06 PROCEDURE — 52000 CYSTOURETHROSCOPY: CPT | Performed by: UROLOGY

## 2022-04-06 ASSESSMENT — PAIN SCALES - GENERAL: PAINLEVEL: NO PAIN (0)

## 2022-04-06 NOTE — NURSING NOTE
"Chief Complaint   Patient presents with     Cystoscopy     history of urethral dilation with fulguration, history of urethral stricture       Blood pressure 126/78, pulse 63, height 1.727 m (5' 8\"), weight 82.6 kg (182 lb). Body mass index is 27.67 kg/m .    Patient Active Problem List   Diagnosis     Allergic rhinitis due to other allergen     History of colonic polyps     Anxiety     PAULINO (obstructive sleep apnea)     GERD (gastroesophageal reflux disease)     Hypertension goal BP (blood pressure) < 140/90     Seasonal allergies     Benign neoplasm of descending colon     s/p Malignant neoplasm of upper lobe of right lung (H)     Overweight (BMI 25.0-29.9)     Cavernous hemangioma of brain (H)     Major depressive disorder, recurrent episode, mild (H)     h/o Prostate cancer (H) - s/p prostatectomy 2017     Somatic dysfunction of pelvis region     Mixed incontinence urge and stress (male)(female)     Retinal detachment of left eye with single break     Postprocedural male urethral stricture     Gross hematuria     Malignant neoplasm (H)     Numbness       No Known Allergies    Current Outpatient Medications   Medication Sig Dispense Refill     cyanocobalamin (VITAMIN B-12) 500 MCG tablet Take 2,500 mcg by mouth daily       Garlic 1000 MG CAPS        Milk Thistle 200 MG CAPS        multivitamin w/minerals (MULTI-VITAMIN) tablet Take 1 tablet by mouth daily       PHOSPHATIDYL CHOLINE PO        Turmeric 500 MG CAPS Take 1,500 capsules by mouth daily       Vitamin D, Cholecalciferol, 1000 units TABS        zinc gluconate 50 MG tablet Take 50 mg by mouth daily       busPIRone (BUSPAR) 10 MG tablet Take 1 tablet (10 mg) by mouth 2 times daily (Patient not taking: Reported on 4/6/2022) 180 tablet 1     citalopram (CELEXA) 20 MG tablet Take 1 tablet (20 mg) by mouth daily (Patient not taking: Reported on 4/6/2022) 90 tablet 1       Social History     Tobacco Use     Smoking status: Former Smoker     Packs/day: 2.00     " Years: 20.00     Pack years: 40.00     Types: Cigarettes     Quit date: 1985     Years since quittin.2     Smokeless tobacco: Never Used   Substance Use Topics     Alcohol use: Not Currently     Comment: sparingly     Drug use: No       Parris Cabrales CMA  2022  9:37 AM     Invasive Procedure Safety Checklist:    Procedure: Cystoscopy     Action: Complete sections and checkboxes as appropriate.    Pre-procedure:  1. Patient ID Verified with 2 identifiers (Ines and  or MRN) : YES    2. Procedure and site verified with patient/designee (when able) : YES    3. Accurate consent documentation in medical record : YES    4. H&P (or appropriate assessment) documented in medical record : YES  H&P must be up to 30 days prior to procedure an updated within 24 hours of                 Procedure as applicable.     5. Relevant diagnostic and radiology test results appropriately labeled and displayed as applicable : YES    6. Blood products, implants, devices, and/or special equipment available for the procedure as applicable : YES    7. Procedure site(s) marked with provider initials [Exclusions: None] : NO    8. Marking not required. Reason : Yes  Procedure does not require site marking    Time Out:     Time-Out performed immediately prior to starting procedure, including verbal and active participation of all team members addressing: YES    1. Correct patient identity.  2. Confirmed that the correct side and site are marked.  3. An accurate procedure to be done.  4. Agreement on the procedure to be done.  5. Correct patient position.  6. Relevant images and results are properly labeled and appropriately displayed.  7. The need to administer antibiotics or fluids for irrigation purposes during the procedure as applicable.  8. Safety precautions based on patient history or medication use.    During Procedure: Verification of correct person, site, and procedure occurs any time the responsibility for care of the  patient is transferred to another member of the care team.    No medications administered during this procedure.    Parris Cabrales CMA  April 6, 2022

## 2022-04-06 NOTE — PROGRESS NOTES
Cystoscopy Note    PRE-PROCEDURE DIAGNOSIS:  Gross Hematuria  POST-PROCEDURE DIAGNOSIS:  Gross hematuria.  Urethral stricture    PROCEDURE:   Cystoscopy    HISTORY: Juwan Latham is a 71 year old male with microscopic hematuria.  He has hx of RALP and XRT  He had dilation of stricture on 1/3/2022  As well as fulguration of bleeding  Here for surveillance cystoscopy    DESCRIPTION OF PROCEDURE:  After informed consent was obtained, the patient was brought to the procedure room where they were placed in the modified lithotomy position with all pressure points well padded.  The patient was prepped and draped in a sterile fashion. A flexible cystoscope was introduced through a well-lubricated urethra. Urethral stricture noted in membranous urethra. Sphincter does coapt.  I tried to pass scope and it wouldn't pass.    ASSESSMENT AND PLAN:  Urethral stricture  Hx prostate cancer    Membranous urethral stricture recurrence with hx of RALP and XRT.  He's minimally symptomatic and we decided on monitoring for now    We also discussed incontinence but mild so monitoring    Mike Chan MD  Reconstructive Urology

## 2022-04-06 NOTE — PATIENT INSTRUCTIONS
"Schedule a cystoscopy in 9 months with Dr. Chan.    AFTER YOUR CYSTOSCOPY        You have just completed a cystoscopy, or \"cysto\", which allowed your physician to learn more about your bladder (or to remove a stent placed after surgery). We suggest that you continue to avoid caffeine, fruit juice, and alcohol for the next 24 hours, however, you are encouraged to return to your normal activities.         A few things that are considered normal after your cystoscopy:     * Small amount of bleeding (or spotting) that clears within the next 24 hours     * Slight burning sensation with urination     * Sensation to of needing to avoid more frequently     * The feeling of \"air\" in your urine     * Mild discomfort that is relieved with Tylenol        Please contact our office promptly if you:     * Develop a fever above 101 degrees     * Are unable to urinate     * Develop bright red blood that does not stop     * Severe pain or swelling         Please contact our office with any concerns or questions @ [phone]    "

## 2022-04-06 NOTE — LETTER
4/6/2022       RE: Juwan Latham  [id number] [facility] [address]     Dear Colleague,    Thank you for referring your patient, Juwan Latham, to the [facility] [place] [facility]. Please see a copy of my visit note below.    Cystoscopy Note    PRE-PROCEDURE DIAGNOSIS:  Gross Hematuria  POST-PROCEDURE DIAGNOSIS:  Gross hematuria.  Urethral stricture    PROCEDURE:   Cystoscopy    HISTORY: Juwan Latham is a 71 year old male with microscopic hematuria.  He has hx of RALP and XRT  He had some retention and difficult to remove catheter previous (see op notes from Phil Cain)  He underwenty    DESCRIPTION OF PROCEDURE:  After informed consent was obtained, the patient was brought to the procedure room where they were placed in the modified lithotomy position with all pressure points well padded.  The patient was prepped and draped in a sterile fashion. A flexible cystoscope was introduced through a well-lubricated urethra. Urethral stricture noted in membranous urethra. Sphincter does coapt.  I tried to pass scope and it wouldn't pass.    ASSESSMENT AND PLAN:  Urethral stricture  Hx prostate cancer    Membranous urethral stricture recurrence with hx of RALP and XRT.  He's minimally symptomatic and we decided on monitoring for now    We also discussed incontinence but mild so monitoring    Mike Chan MD  Reconstructive Urology

## 2022-04-08 ENCOUNTER — TRANSFERRED RECORDS (OUTPATIENT)
Dept: HEALTH INFORMATION MANAGEMENT | Facility: CLINIC | Age: 72
End: 2022-04-08

## 2022-04-28 ENCOUNTER — OFFICE VISIT (OUTPATIENT)
Dept: FAMILY MEDICINE | Facility: CLINIC | Age: 72
End: 2022-04-28
Payer: COMMERCIAL

## 2022-04-28 VITALS — SYSTOLIC BLOOD PRESSURE: 118 MMHG | DIASTOLIC BLOOD PRESSURE: 62 MMHG

## 2022-04-28 DIAGNOSIS — L30.9 HAND DERMATITIS: ICD-10-CM

## 2022-04-28 DIAGNOSIS — L21.9 SEBORRHEIC DERMATITIS: Primary | ICD-10-CM

## 2022-04-28 PROCEDURE — 99204 OFFICE O/P NEW MOD 45 MIN: CPT | Performed by: NURSE PRACTITIONER

## 2022-04-28 RX ORDER — HYDROCORTISONE 25 MG/G
OINTMENT TOPICAL 2 TIMES DAILY
Qty: 30 G | Refills: 1 | Status: SHIPPED | OUTPATIENT
Start: 2022-04-28 | End: 2022-11-10

## 2022-04-28 RX ORDER — KETOCONAZOLE 20 MG/G
CREAM TOPICAL
Qty: 60 G | Refills: 11 | Status: SHIPPED | OUTPATIENT
Start: 2022-04-28 | End: 2023-09-14

## 2022-04-28 NOTE — PATIENT INSTRUCTIONS
Ketoconazole, anti yeast. Use this once daily to the face/scalp/ears to treat and prevent flares of rash.     Hydrocortisone is the steroid. This is used to treat inflammation/redness and itching. Use this only where you are itchy and have active rash. When the rash is gone stop using this one. You can try this to the rash on the hands and if it does not work you can message me on mychart for a stronger steroid cream.

## 2022-04-28 NOTE — LETTER
4/28/2022         RE: Juwan Latham  [address]  [place] [address]        Dear Colleague,    Thank you for referring your patient, Juwan Latham, to the [facility]. Please see a copy of my visit note below.    [facility] Dermatology Note  Encounter Date: Apr 28, 2022  Office Visit     Reviewed patients past medical history and pertinent chart review prior to patients visit today.     Dermatology Problem List:  1. seborrheic dermatitis, scalp and face  2. Eczematous dermatitis, dorsum hands  ____________________________________________    Assessment & Plan:     # Seborrheic dermatitis, face, ears and scalp. Discussed that this is a recurring rash for most people and will need some maintenance even after rash has resolved. Given prescription for ketoconazole 2% cream to use daily while rash is present, and ongoing 1-3 times weekly when rash is well controlled. Also advised okay to use hydrocortisone 2.5% ointment 1-2 times daily when rash is itchy. Stop use when rash is not symptomatic and use for flares only. Advised to avoid face, groin and skin folds. Councled about use and side effects of thinning of the skin, striae, erythema, and worsening of rash.        #Eczematous dermatitis    -Start hydrocortisone 2.5% ointment twice daily until rash resolves, repeat cycle for flares. If not fully resolved in 1 month, patient advised to return to clinic for reevaluation. Discussed risk of skin atrophy with long term chronic use. Not for face, armpits or groin.  If patient would like a stronger steroid he will send a [employer] message for this.  I am happy to give him a stronger steroid for the dorsum hands but he was already getting hydrocortisone 2.5% ointment for the face and wanted to try this first.  - Encouraged regular use of an emollient such as Vanicream, Cera Ve Cream or Cetaphil Cream to the hands  -Avoid harsh soaps and , wear gloves if  cleaning or washing dishes.      Return to clinic in 1 month if not fully resolved, sooner PRN for new or worsening conditions.     Kayla Hicks, APRN CNP on 4/28/2022 at 10:12 AM   _______________________________________    CC: Derm Problem (Follow up scalp folliculitis)    HPI:  Mr. Juwan Latham is a(n) 71 year old male who presents today as a new patient for scalp rash. He has had this for several years and seen Dr. Canchola for this. He had a biopsy suggestive of rosacea/seborrheic dermatitis overlap.  He has tried doxycycline 100 mg twice daily and triamcinolone 0.1% cream and metronidazole 0.75% gel and says that these were not effective.  He also has a rash on the tops of his hands over his last knuckle on the dorsal hands.  This comes and goes.  He says that Vaseline helps the rash not be so flared up      Patient is otherwise feeling well, without additional skin concerns.      Physical Exam:  Vitals: /62   SKIN: Focused examination of scalp, face and hands was performed.  - bilateral dorsal hands have erythema and scaley patches  -scalp, glabella, medial cheeks, dorsum nose, and outer ear canal have erythema and mild scale    - No other lesions of concern on areas examined.     Medications:  Current Outpatient Medications   Medication     busPIRone (BUSPAR) 10 MG tablet     citalopram (CELEXA) 20 MG tablet     cyanocobalamin (VITAMIN B-12) 500 MCG tablet     Garlic 1000 MG CAPS     Milk Thistle 200 MG CAPS     multivitamin w/minerals (THERA-VIT-M) tablet     PHOSPHATIDYL CHOLINE PO     Turmeric 500 MG CAPS     Vitamin D, Cholecalciferol, 1000 units TABS     zinc gluconate 50 MG tablet     No current facility-administered medications for this visit.     Facility-Administered Medications Ordered in Other Visits   Medication     sodium chloride (PF) 0.9% PF flush 10 mL      Past Medical History:   Patient Active Problem List   Diagnosis     Allergic rhinitis due to other allergen     History of  colonic polyps     Anxiety     PAULINO (obstructive sleep apnea)     GERD (gastroesophageal reflux disease)     Hypertension goal BP (blood pressure) < 140/90     Seasonal allergies     Benign neoplasm of descending colon     s/p Malignant neoplasm of upper lobe of right lung (H)     Overweight (BMI 25.0-29.9)     Cavernous hemangioma of brain (H)     Major depressive disorder, recurrent episode, mild (H)     h/o Prostate cancer (H) - s/p prostatectomy 2017     Somatic dysfunction of pelvis region     Mixed incontinence urge and stress (male)(female)     Retinal detachment of left eye with single break     Postprocedural male urethral stricture     Gross hematuria     Malignant neoplasm (H)     Numbness     Past Medical History:   Diagnosis Date     Anxiety      Arthritis     fingers, hips     Calculus of kidney 2005, 2012     Colon polyps      Congenital single kidney      Gastroesophageal reflux disease      Hx of cancer of lung 03/2017    wedge resection     Hypertension      Prostate cancer (H) 08/2017     Seasonal allergies     spring and fall     Sleep apnea     cpap       CC No referring provider defined for this encounter. on close of this encounter.      Again, thank you for allowing me to participate in the care of your patient.        Sincerely,        GLORIA Barros CNP

## 2022-04-28 NOTE — PROGRESS NOTES
[facility] Dermatology Note  Encounter Date: Apr 28, 2022  Office Visit     Reviewed patients past medical history and pertinent chart review prior to patients visit today.     Dermatology Problem List:  1. seborrheic dermatitis, scalp and face  2. Eczematous dermatitis, dorsum hands  ____________________________________________    Assessment & Plan:     # Seborrheic dermatitis, face, ears and scalp. Discussed that this is a recurring rash for most people and will need some maintenance even after rash has resolved. Given prescription for ketoconazole 2% cream to use daily while rash is present, and ongoing 1-3 times weekly when rash is well controlled. Also advised okay to use hydrocortisone 2.5% ointment 1-2 times daily when rash is itchy. Stop use when rash is not symptomatic and use for flares only. Advised to avoid face, groin and skin folds. Councled about use and side effects of thinning of the skin, striae, erythema, and worsening of rash.        #Eczematous dermatitis    -Start hydrocortisone 2.5% ointment twice daily until rash resolves, repeat cycle for flares. If not fully resolved in 1 month, patient advised to return to clinic for reevaluation. Discussed risk of skin atrophy with long term chronic use. Not for face, armpits or groin.  If patient would like a stronger steroid he will send a [employer] message for this.  I am happy to give him a stronger steroid for the dorsum hands but he was already getting hydrocortisone 2.5% ointment for the face and wanted to try this first.  - Encouraged regular use of an emollient such as Vanicream, Cera Ve Cream or Cetaphil Cream to the hands  -Avoid harsh soaps and , wear gloves if cleaning or washing dishes.      Return to clinic in 1 month if not fully resolved, sooner PRN for new or worsening conditions.     Kayla Hicks, APRN CNP on 4/28/2022 at 10:12 AM   _______________________________________    CC: Derm Problem (Follow up  scalp folliculitis)    HPI:  Mr. Juwan Latham is a(n) 71 year old male who presents today as a new patient for scalp rash. He has had this for several years and seen Dr. Canchola for this. He had a biopsy suggestive of rosacea/seborrheic dermatitis overlap.  He has tried doxycycline 100 mg twice daily and triamcinolone 0.1% cream and metronidazole 0.75% gel and says that these were not effective.  He also has a rash on the tops of his hands over his last knuckle on the dorsal hands.  This comes and goes.  He says that Vaseline helps the rash not be so flared up      Patient is otherwise feeling well, without additional skin concerns.      Physical Exam:  Vitals: /62   SKIN: Focused examination of scalp, face and hands was performed.  - bilateral dorsal hands have erythema and scaley patches  -scalp, glabella, medial cheeks, dorsum nose, and outer ear canal have erythema and mild scale    - No other lesions of concern on areas examined.     Medications:  Current Outpatient Medications   Medication     busPIRone (BUSPAR) 10 MG tablet     citalopram (CELEXA) 20 MG tablet     cyanocobalamin (VITAMIN B-12) 500 MCG tablet     Garlic 1000 MG CAPS     Milk Thistle 200 MG CAPS     multivitamin w/minerals (THERA-VIT-M) tablet     PHOSPHATIDYL CHOLINE PO     Turmeric 500 MG CAPS     Vitamin D, Cholecalciferol, 1000 units TABS     zinc gluconate 50 MG tablet     No current facility-administered medications for this visit.     Facility-Administered Medications Ordered in Other Visits   Medication     sodium chloride (PF) 0.9% PF flush 10 mL      Past Medical History:   Patient Active Problem List   Diagnosis     Allergic rhinitis due to other allergen     History of colonic polyps     Anxiety     PAULINO (obstructive sleep apnea)     GERD (gastroesophageal reflux disease)     Hypertension goal BP (blood pressure) < 140/90     Seasonal allergies     Benign neoplasm of descending colon     s/p Malignant neoplasm of upper lobe  of right lung (H)     Overweight (BMI 25.0-29.9)     Cavernous hemangioma of brain (H)     Major depressive disorder, recurrent episode, mild (H)     h/o Prostate cancer (H) - s/p prostatectomy 2017     Somatic dysfunction of pelvis region     Mixed incontinence urge and stress (male)(female)     Retinal detachment of left eye with single break     Postprocedural male urethral stricture     Gross hematuria     Malignant neoplasm (H)     Numbness     Past Medical History:   Diagnosis Date     Anxiety      Arthritis     fingers, hips     Calculus of kidney 2005, 2012     Colon polyps      Congenital single kidney      Gastroesophageal reflux disease      Hx of cancer of lung 03/2017    wedge resection     Hypertension      Prostate cancer (H) 08/2017     Seasonal allergies     spring and fall     Sleep apnea     cpap       CC No referring provider defined for this encounter. on close of this encounter.

## 2022-05-18 ENCOUNTER — OFFICE VISIT (OUTPATIENT)
Dept: FAMILY MEDICINE | Facility: CLINIC | Age: 72
End: 2022-05-18
Payer: COMMERCIAL

## 2022-05-18 ENCOUNTER — ANCILLARY PROCEDURE (OUTPATIENT)
Dept: GENERAL RADIOLOGY | Facility: CLINIC | Age: 72
End: 2022-05-18
Attending: NURSE PRACTITIONER
Payer: COMMERCIAL

## 2022-05-18 ENCOUNTER — TELEPHONE (OUTPATIENT)
Dept: UROLOGY | Facility: CLINIC | Age: 72
End: 2022-05-18

## 2022-05-18 VITALS
HEIGHT: 68 IN | OXYGEN SATURATION: 98 % | BODY MASS INDEX: 27.74 KG/M2 | SYSTOLIC BLOOD PRESSURE: 108 MMHG | TEMPERATURE: 97.8 F | RESPIRATION RATE: 18 BRPM | DIASTOLIC BLOOD PRESSURE: 66 MMHG | HEART RATE: 66 BPM | WEIGHT: 183 LBS

## 2022-05-18 DIAGNOSIS — M54.50 CHRONIC MIDLINE LOW BACK PAIN WITHOUT SCIATICA: Primary | ICD-10-CM

## 2022-05-18 DIAGNOSIS — M54.50 CHRONIC MIDLINE LOW BACK PAIN WITHOUT SCIATICA: ICD-10-CM

## 2022-05-18 DIAGNOSIS — G89.29 CHRONIC MIDLINE LOW BACK PAIN WITHOUT SCIATICA: ICD-10-CM

## 2022-05-18 DIAGNOSIS — G89.29 CHRONIC MIDLINE LOW BACK PAIN WITHOUT SCIATICA: Primary | ICD-10-CM

## 2022-05-18 PROCEDURE — 99214 OFFICE O/P EST MOD 30 MIN: CPT | Performed by: NURSE PRACTITIONER

## 2022-05-18 PROCEDURE — 72100 X-RAY EXAM L-S SPINE 2/3 VWS: CPT | Mod: TC | Performed by: RADIOLOGY

## 2022-05-18 NOTE — TELEPHONE ENCOUNTER
[facility]    Phone Message    May a detailed message be left on voicemail: yes     Reason for Call: Requesting Results   Name/type of test: Cystoscopy  Date of test: 4/6/22  Was test done at a location other than [facility] (Please fill in the location if not [facility])?: No    The patient called stating he has not received results of the cystoscopy done on 4/6/22. He says a [employer] message would be fine. Please advise. Thank you.     Action Taken: Message routed to:  [facility] Urology    Travel Screening: Not Applicable

## 2022-05-18 NOTE — PATIENT INSTRUCTIONS
UROLOGY    Department Address: [address] [facility] [address]   Department Phone: [phone]

## 2022-05-18 NOTE — PROGRESS NOTES
"   Assessment & Plan     Juwan was seen today for back pain.    Diagnoses and all orders for this visit:    Chronic midline low back pain without sciatica  Noted degenerative changes on imaging.    -     XR Lumbar Spine 2/3 Views; Future                                                                   IMPRESSION: Five lumbar type vertebrae. Normal alignment. Vertebral  body heights normal. No fractures. Loss of disc space height and  degenerative endplate spurring at L1-L2. Facet arthropathy at L4-L5  and L5-S1.    -     Physical Therapy Referral; Future    Recommended initiation of physical therapy and continuing with as needed Acetaminophen.      30 minutes spent on the date of the encounter doing chart review, history and exam, documentation and further activities per the note       BMI:   Estimated body mass index is 27.83 kg/m  as calculated from the following:    Height as of this encounter: 1.727 m (5' 8\").    Weight as of this encounter: 83 kg (183 lb).     Return in about 6 weeks (around 6/29/2022) for No improvement or sooner with worsening symptoms.      Melita Shah, GLORIA [facility]        Juwan is a 71 year old who presents for the following health issues:         History of Present Illness     Worsening of back pain after chiropractor visit last Friday.    X-rays were done at chiropractor visit.    Back pain is present in midline lower back.  No radiation of pain.  Chronic numbness in right foot.   No bowel or bladder incontinence.    Aggravated with standing.    No known alleviating factors.   Has been taking Tylenol.      Back Pain:  He presents for follow up of back pain. Patient's back pain is a new problem.    Original cause of back pain: not sure  First noticed back pain: 1-4 weeks ago  Patient feels back pain: constantly  Location of back pain:  Left lower back, right shoulder, left shoulder, right hip and left hip  Description of back pain: " Detail Level: Detailed Trial of lexapro for depression and anxiety  Melatonin as needed for insomnia  Refer for therapy; appt scheduled   "gnawing  Back pain spreads: nowhere    Since patient first noticed back pain, pain is: gradually worsening  Does back pain interfere with his job:  No  On a scale of 1-10 (10 being the worst), patient describes pain as:  3  What makes back pain worse: bending and standing  Acupuncture: not tried  Acetaminophen: not tried  Activity or exercise: not tried  Chiropractor:  Not helpful  Cold: not tried  Heat: not tried  Massage: not helpful  Muscle relaxants: not tried  NSAIDS: helpful  Opioids: not tried  Physical Therapy: not tried  Rest: helpful  Steroid Injection: not tried  Stretching: not helpful  Surgery: not tried  TENS unit: not tried  Topical pain relievers: not tried  Other healthcare providers patient is seeing for back pain: Chiropractor    He eats 2-3 servings of fruits and vegetables daily.He consumes 0 sweetened beverage(s) daily.He exercises with enough effort to increase his heart rate 9 or less minutes per day.  He exercises with enough effort to increase his heart rate 4 days per week.   He is taking medications regularly.      Review of Systems     Constitutional, HEENT, cardiovascular, pulmonary, gi and gu systems are negative, except as otherwise noted.      Objective    /66   Pulse 66   Temp 97.8  F (36.6  C)   Resp 18   Ht 1.727 m (5' 8\")   Wt 83 kg (183 lb)   SpO2 98%   BMI 27.83 kg/m    Body mass index is 27.83 kg/m .     Physical Exam     GENERAL: healthy, alert and no distress  MS: spine with limited ROM due to pain, diffuse tenderness to palpation along lumbar paraspinal muscles  NEURO: Normal strength and tone in bilateral LE's, +5/5, mentation intact and speech normal  PSYCH: mentation appears normal, affect normal/bright            " Wound Evaluated By: RAFY COOPER Add 55653 Cpt? (Important Note: In 2017 The Use Of 95762 Is Being Tracked By Cms To Determine Future Global Period Reimbursement For Global Periods): no Body Location Override (Optional - Billing Will Still Be Based On Selected Body Map Location If Applicable): left suprabrow

## 2022-05-18 NOTE — RESULT ENCOUNTER NOTE
Dear Juwan,     The radiologist read your x-ray and noted degenerative changes (arthritis).   Continue with current plan of physical therapy and if there is no improvement, we can have you see a spine specialist.      See below for reading:      IMPRESSION: Five lumbar type vertebrae. Normal alignment. Vertebral  body heights normal. No fractures. Loss of disc space height and  degenerative endplate spurring at L1-L2. Facet arthropathy at L4-L5  and L5-S1.      Please send a [employer] message or call [phone]  if you have any questions.      Melita Shah, APRN, CNP  [facility]

## 2022-05-19 ENCOUNTER — THERAPY VISIT (OUTPATIENT)
Dept: PHYSICAL THERAPY | Facility: CLINIC | Age: 72
End: 2022-05-19
Attending: NURSE PRACTITIONER
Payer: COMMERCIAL

## 2022-05-19 DIAGNOSIS — G89.29 CHRONIC MIDLINE LOW BACK PAIN WITHOUT SCIATICA: ICD-10-CM

## 2022-05-19 DIAGNOSIS — M54.50 RIGHT-SIDED LOW BACK PAIN WITHOUT SCIATICA: ICD-10-CM

## 2022-05-19 DIAGNOSIS — M54.50 CHRONIC MIDLINE LOW BACK PAIN WITHOUT SCIATICA: ICD-10-CM

## 2022-05-19 PROCEDURE — 97161 PT EVAL LOW COMPLEX 20 MIN: CPT | Mod: GP | Performed by: PHYSICAL THERAPIST

## 2022-05-19 PROCEDURE — 97110 THERAPEUTIC EXERCISES: CPT | Mod: GP | Performed by: PHYSICAL THERAPIST

## 2022-05-19 NOTE — PROGRESS NOTES
Physical Therapy Initial Evaluation  Subjective:  The history is provided by the patient.   Patient Health History  Juwan Latham being seen for Hip and back pain .     Problem began: 5/18/2022 (MD visit).   Problem occurred: insidious onset   Pain is reported as 3/10 on pain scale.  General health as reported by patient is fair.  Pertinent medical history includes: cancer. Other medical history details: fatty liver.         Other surgery history details: lung, prostate.        Current occupation is Retired.   Primary job tasks include:  Pushing/pulling.                  Therapist Generated HPI Evaluation  Problem details: Pt reports R sided LBP, went to a chiropractor and felt like it made it worse. Now having problems with walking and standing. Pt hoping to get back to standing and playing music. Some mild R sided LE weakness, pain localized around L5 and R SIJ..         Type of problem:  Lumbar.    This is a recurrent condition.      Patient reports pain:  Lower lumbar spine and lumbar spine right.  Pain is described as aching (throbbing) and is intermittent.  Pain radiates to:  Gluteals right and gluteals left.   Since onset symptoms are gradually worsening.  Associated symptoms:  Numbness (on top of R foot ). Symptoms are exacerbated by walking and standing  and relieved by NSAID's.  Special tests included:  X-ray.  Previous treatment includes chiropractic. There was none improvement following previous treatment.                          Objective:  System         Lumbar/SI Evaluation  ROM:  AROM Lumbar: normal      Lumbar Myotomes:    T12-L3 (Hip Flex):  Left: 4+    Right: 4  L2-4 (Quads):  Left:  4+    Right:  4                Lumbar Palpation:      Tenderness present at Right: Erector Spinae and PSIS                                          Hip Evaluation    Hip Strength:        Abduction:  Right: 4+/5    Pain:                                 General     ROS    Assessment/Plan:    Patient is a 71 year old  male with lumbar complaints.    Patient has the following significant findings with corresponding treatment plan.                Diagnosis 1:  R sided LBP  Pain -  self management, education and home program  Decreased ROM/flexibility - manual therapy, therapeutic exercise and home program  Decreased strength - therapeutic exercise, therapeutic activities and home program  Impaired muscle performance - neuro re-education and home program  Decreased function - therapeutic activities and home program  Impaired posture - neuro re-education and home program      Cumulative Therapy Evaluation is: Low complexity.    Previous and current functional limitations:  (See Goal Flow Sheet for this information)    Short term and Long term goals: (See Goal Flow Sheet for this information)     Communication ability:  Patient appears to be able to clearly communicate and understand verbal and written communication and follow directions correctly.  Treatment Explanation - The following has been discussed with the patient:   RX ordered/plan of care  Anticipated outcomes  Possible risks and side effects  This patient would benefit from PT intervention to resume normal activities.   Rehab potential is good.    Frequency:  1 X week, once daily  Duration:  for 8 weeks  Discharge Plan:  Achieve all LTG.  Independent in home treatment program.  Reach maximal therapeutic benefit.    Please refer to the daily flowsheet for treatment today, total treatment time and time spent performing 1:1 timed codes.

## 2022-05-19 NOTE — PROGRESS NOTES
[facility]    OUTPATIENT Physical Therapy ORTHOPEDIC EVALUATION  PLAN OF TREATMENT FOR OUTPATIENT REHABILITATION  (COMPLETE FOR INITIAL CLAIMS ONLY)  Patient's Last Name, First Name, M.I.  YOB: 1950  Juwan Latham    Provider s Name:  [facility]   Medical Record No.  [id number]   Start of Care Date:  05/19/22   Onset Date:   05/18/22 (MD visit)   Type:     _X__PT   ___OT Medical Diagnosis:    Encounter Diagnoses   Name Primary?     Chronic midline low back pain without sciatica      Right-sided low back pain without sciatica         Treatment Diagnosis:  R sided LBP        Goals:     05/19/22 0500   Body Part   Goals listed below are for LBP   Goal #1   Goal #1 standing   Previous Functional Level No restrictions   Current Functional Level Minutes patient can stand   Performance level 10 minutes with increased pain   STG Target Performance Minutes patient will be able to stand   Performance level 20 minutes without pain   Rationale for meal preparation;for safe community ambulation   Due date 06/16/22   LTG Target Performance Minutes patient will be able to stand   Performance Level 40 without increased pain   Rationale for safe community ambulation;for meal preparation   Due date 07/14/22       Therapy Frequency:  once per week  Predicted Duration of Therapy Intervention:       Candie Ovalle, PT                 I CERTIFY THE NEED FOR THESE SERVICES FURNISHED UNDER        THIS PLAN OF TREATMENT AND WHILE UNDER MY CARE     (Physician attestation of this document indicates review and certification of the therapy plan).                     Certification Date From:  05/19/22   Certification Date To:  08/16/22    Referring Provider:  Melita Shah    Initial Assessment        See Epic Evaluation SOC Date: 05/19/22

## 2022-05-20 NOTE — TELEPHONE ENCOUNTER
Ray, Mayte, RN  You 36 minutes ago (12:21 PM)     JR Lugo he can have his notes, it was discussed with patient.     Mayte       Writer messaging pt their appt notes in [employer].

## 2022-05-31 ENCOUNTER — PRE VISIT (OUTPATIENT)
Dept: UROLOGY | Facility: CLINIC | Age: 72
End: 2022-05-31
Payer: COMMERCIAL

## 2022-05-31 NOTE — TELEPHONE ENCOUNTER
Reason for visit: Follow up    Relevant information: urethral stricture    Records/imaging/labs/orders: in EPIC    Pt called: no    At Rooming: normal

## 2022-06-01 ENCOUNTER — VIRTUAL VISIT (OUTPATIENT)
Dept: UROLOGY | Facility: CLINIC | Age: 72
End: 2022-06-01
Payer: COMMERCIAL

## 2022-06-01 VITALS — WEIGHT: 180 LBS | HEIGHT: 68 IN | BODY MASS INDEX: 27.28 KG/M2

## 2022-06-01 DIAGNOSIS — N99.114 POSTPROCEDURAL MALE URETHRAL STRICTURE: Primary | ICD-10-CM

## 2022-06-01 PROCEDURE — 99213 OFFICE O/P EST LOW 20 MIN: CPT | Mod: 95 | Performed by: UROLOGY

## 2022-06-01 ASSESSMENT — PAIN SCALES - GENERAL: PAINLEVEL: NO PAIN (0)

## 2022-06-01 NOTE — PROGRESS NOTES
Juwan is a 71 year old who is being evaluated via a billable video visit.      How would you like to obtain your AVS? MyChart  If the video visit is dropped, the invitation should be resent by: Text to cell phone: [phone]  Will anyone else be joining your video visit? No      Video Start Time: 245p  Video-Visit Details    Type of service:  Video Visit    Video End Time:3:00p    Originating Location (pt. Location): Home    Distant Location (provider location):  [facility] [place]     Platform used for Video Visit: [employer]       71 year M with RALP + XRT  Used to have gross hematuria.  We took him to OR 1/3/2022 -   Fulguration and urethral dilation    Also did surveillance cystoscopy April 2022.  Exam:           Constitutional - No apparent distress. Patient of stated age.               Eyes - no redness or discharge.              Respiratory - no cough. no labored breathing              Musculoskeletal - full range of motion in all extremities              Skin - no visible skin discoloration or lesions              Neurological - no tremors              Psychiatric - no anxiety, alert & oriented                 The rest of a comprehensive physical examination is deferred due to PHE (public health emergency) video visit restrictions.    A/P: urethral stricture. Gross hematuria  Hematuria is better now.  He also has some incontinence.    He wants to just monitor symptoms now.  If wants something done, would probably opt for [employer].

## 2022-06-01 NOTE — LETTER
6/1/2022       RE: Juwan Latham  [address]  [place] [address]     Dear Colleague,    Thank you for referring your patient, Juwan Latham, to the [facility] [place] at [facility]. Please see a copy of my visit note below.    Juwan is a 71 year old who is being evaluated via a billable video visit.      How would you like to obtain your AVS? MyChart  If the video visit is dropped, the invitation should be resent by: Text to cell phone: [phone]  Will anyone else be joining your video visit? No      Video Start Time: 245p  Video-Visit Details    Type of service:  Video Visit    Video End Time:3:00p    Originating Location (pt. Location): Home    Distant Location (provider location):  [facility] [place]     Platform used for Video Visit: AmWell       71 year M with RALP + XRT  Used to have gross hematuria.  We took him to OR 1/3/2022 -   Fulguration and urethral dilation    Also did surveillance cystoscopy April 2022.  Exam:           Constitutional - No apparent distress. Patient of stated age.               Eyes - no redness or discharge.              Respiratory - no cough. no labored breathing              Musculoskeletal - full range of motion in all extremities              Skin - no visible skin discoloration or lesions              Neurological - no tremors              Psychiatric - no anxiety, alert & oriented                 The rest of a comprehensive physical examination is deferred due to PHE (public health emergency) video visit restrictions.    A/P: urethral stricture. Gross hematuria  Hematuria is better now.  He also has some incontinence.    He wants to just monitor symptoms now.  If wants something done, would probably opt for [employer].    Sincerely,    Mike Chan MD

## 2022-06-20 ENCOUNTER — NURSE TRIAGE (OUTPATIENT)
Dept: FAMILY MEDICINE | Facility: CLINIC | Age: 72
End: 2022-06-20
Payer: COMMERCIAL

## 2022-06-20 ENCOUNTER — LAB (OUTPATIENT)
Dept: LAB | Facility: CLINIC | Age: 72
End: 2022-06-20
Payer: COMMERCIAL

## 2022-06-20 DIAGNOSIS — R31.0 GROSS HEMATURIA: ICD-10-CM

## 2022-06-20 LAB
ALBUMIN UR-MCNC: NEGATIVE MG/DL
APPEARANCE UR: CLEAR
BILIRUB UR QL STRIP: NEGATIVE
COLOR UR AUTO: YELLOW
GLUCOSE UR STRIP-MCNC: NEGATIVE MG/DL
HGB UR QL STRIP: ABNORMAL
KETONES UR STRIP-MCNC: NEGATIVE MG/DL
LEUKOCYTE ESTERASE UR QL STRIP: NEGATIVE
NITRATE UR QL: NEGATIVE
PH UR STRIP: 5.5 [PH] (ref 5–7)
RBC #/AREA URNS AUTO: ABNORMAL /HPF
SP GR UR STRIP: 1.01 (ref 1–1.03)
UROBILINOGEN UR STRIP-ACNC: 0.2 E.U./DL
WBC #/AREA URNS AUTO: ABNORMAL /HPF

## 2022-06-20 PROCEDURE — 81001 URINALYSIS AUTO W/SCOPE: CPT

## 2022-06-20 PROCEDURE — 87086 URINE CULTURE/COLONY COUNT: CPT

## 2022-06-20 NOTE — TELEPHONE ENCOUNTER
"Patient calls to report blood in his urine. Urine is clear, just saw some clots this morning. Every couple weeks, seems to happen. No fever or chills. Doesn't feel like he is emptying bladder fully. Stream is \"thin\". Lab orders are in under Dr. Chan. Patient was messaging urology team.     Hx of urethral dilation in Jan 2022.     Scheduled lab appointment today at [facility] lab.     Gladys Lees RN  [facility]    "

## 2022-06-22 LAB — BACTERIA UR CULT: NO GROWTH

## 2022-06-28 PROBLEM — M54.50 RIGHT-SIDED LOW BACK PAIN WITHOUT SCIATICA: Status: RESOLVED | Noted: 2022-05-19 | Resolved: 2022-06-28

## 2022-07-18 ENCOUNTER — TELEPHONE (OUTPATIENT)
Dept: UROLOGY | Facility: CLINIC | Age: 72
End: 2022-07-18

## 2022-07-18 NOTE — TELEPHONE ENCOUNTER
Spoke to pt. Pt had urethral dilation done with Dr. Chan on 1/3/22. He reports that his ability to urinate has been progressively taking longer. Intermittently it will be not so bad like over the past weekend no issues. Otherwise, he is needing to sit down to urinate, because it takes so long and it dribbles out. Pt confirmed that he does feel like he is completely emptying his bladder every time. Urine is yellow and clear. No pain, no fever. Incontinence seems consistent since last meeting. Pt requests in person visit, because virtual was expensive for him. Assisted pt to schedule follow up appointment with provider. Reviewed urgent symptoms and he verbalized understanding. Will call clinic as needed.     Chhaya Park, TANNER MSN

## 2022-07-20 ENCOUNTER — PATIENT OUTREACH (OUTPATIENT)
Dept: UROLOGY | Facility: CLINIC | Age: 72
End: 2022-07-20

## 2022-07-20 NOTE — TELEPHONE ENCOUNTER
Reached out to pt to reschedule pt for post op appointment and possible worsening stricture. Pt agreeable to August 3 at 9:00. Will continue to follow as needed.

## 2022-07-25 ENCOUNTER — PRE VISIT (OUTPATIENT)
Dept: UROLOGY | Facility: CLINIC | Age: 72
End: 2022-07-25

## 2022-07-25 NOTE — TELEPHONE ENCOUNTER
Reason for visit: Follow up     Relevant information: s/p urethral dilation; worsening urine stream    Records/imaging/labs/orders: in EPIC    Pt called: no    At Rooming: normal

## 2022-08-15 DIAGNOSIS — K75.81 NONALCOHOLIC STEATOHEPATITIS: ICD-10-CM

## 2022-08-15 DIAGNOSIS — K76.9 CHRONIC LIVER DISEASE: Primary | ICD-10-CM

## 2022-08-15 DIAGNOSIS — K70.9 ALCOHOLIC LIVER DISEASE (H): ICD-10-CM

## 2022-08-18 ENCOUNTER — NURSE TRIAGE (OUTPATIENT)
Dept: UROLOGY | Facility: CLINIC | Age: 72
End: 2022-08-18

## 2022-08-18 DIAGNOSIS — N39.41 URGE INCONTINENCE: Primary | ICD-10-CM

## 2022-08-18 NOTE — TELEPHONE ENCOUNTER
Call center called with pt on the line. Pt returning a call about symptoms. Last we spoke to the pt was 1 month ago. Their urinary symptoms have returned. Writer will have triage RN call back pt to discuss.

## 2022-08-19 NOTE — TELEPHONE ENCOUNTER
Nurse Triage SBAR    Is this a 2nd Level Triage? NO    Situation: Spoke to pt. Pt reports intermittent dribbling of urine.     Background: Pt has urethral stricture.     Assessment: Pt reports concern has persisted since June. He states it is intermittent, but has lasted for a week until today. Today he is able to urinate just fine. Pt reports that he will get urge to urinate, but only able to dribble out urine. Urine is yellow and clear. No pain. No hematuria. No fever. Denies abdominal discomfort or flank pain. He reports that hearing water run will aggravate his urge feeling. He also experiences some urinary incontinence throughout the day and wears a pad. He changes the pad once daily. Chart and problems list reviewed.    Protocol Recommended Disposition:   See in Office Today - pt opted to monitor symptoms and will schedule appointment if needed.     Reviewed urgent symptoms and advised to call clinic if unable to urinate and bladder feels full. Pt verbalized understanding.      Recommendation: UA/UC to rule out infection.   Continue to drink 6-8 glasses of water daily.   Initiate urination hourly to help decrease incontinence and urgency.      .    Does the patient meet one of the following criteria for ADS visit consideration? No     Chhaya Park RN MSN    Orders Placed This Encounter   Procedures     Routine UA with microscopic - No culture     Standing Status:   Future     Standing Expiration Date:   8/19/2023     Urine Culture Aerobic Bacterial     Standing Status:   Future     Standing Expiration Date:   8/19/2023         Reason for Disposition    Can't control passage of urine (i.e., urinary incontinence) and new-onset (< 2 weeks) or worsening    Protocols used: URINARY SYMPTOMS-A-OH

## 2022-08-25 ENCOUNTER — LAB (OUTPATIENT)
Dept: LAB | Facility: CLINIC | Age: 72
End: 2022-08-25
Attending: UROLOGY
Payer: COMMERCIAL

## 2022-08-25 DIAGNOSIS — N39.41 URGE INCONTINENCE: ICD-10-CM

## 2022-08-25 LAB
ALBUMIN UR-MCNC: NEGATIVE MG/DL
APPEARANCE UR: CLEAR
BILIRUB UR QL STRIP: NEGATIVE
COLOR UR AUTO: YELLOW
GLUCOSE UR STRIP-MCNC: NEGATIVE MG/DL
HGB UR QL STRIP: NEGATIVE
KETONES UR STRIP-MCNC: NEGATIVE MG/DL
LEUKOCYTE ESTERASE UR QL STRIP: NEGATIVE
NITRATE UR QL: NEGATIVE
PH UR STRIP: 7.5 [PH] (ref 5–7)
RBC #/AREA URNS AUTO: NORMAL /HPF
SP GR UR STRIP: 1.02 (ref 1–1.03)
UROBILINOGEN UR STRIP-ACNC: 0.2 E.U./DL
WBC #/AREA URNS AUTO: NORMAL /HPF

## 2022-08-25 PROCEDURE — 81001 URINALYSIS AUTO W/SCOPE: CPT

## 2022-08-25 PROCEDURE — 87086 URINE CULTURE/COLONY COUNT: CPT

## 2022-08-25 NOTE — TELEPHONE ENCOUNTER
General Call:     Who is calling:  Pt    Reason for Call:  Pt states had ct  And was good. Pt states has nausea, have had for 3 weeks. Wondering what to do     What are your questions or concerns:  na    Date of last appointment with provider: na    Okay to leave a detailed message:Yes at Home number on file [phone] (home)                  4 = No assist / stand by assistance

## 2022-08-27 LAB — BACTERIA UR CULT: NORMAL

## 2022-08-29 ENCOUNTER — TELEPHONE (OUTPATIENT)
Dept: UROLOGY | Facility: CLINIC | Age: 72
End: 2022-08-29

## 2022-08-29 DIAGNOSIS — R35.0 URINARY FREQUENCY: Primary | ICD-10-CM

## 2022-08-29 NOTE — TELEPHONE ENCOUNTER
[facility] Call Center    Phone Message    May a detailed message be left on voicemail: yes     Reason for Call: the patient called asking if there is a medication he can take so he doesn't wake up so often in the middle of the night to urinate? If so, he would like that to go to  [id number] - [place], [place] - [address] AT [address]  Please advise. Thank you.    Action Taken: Message routed to:  [facility]rology    Travel Screening: Not Applicable

## 2022-08-30 RX ORDER — MIRABEGRON 25 MG/1
25 TABLET, FILM COATED, EXTENDED RELEASE ORAL DAILY
Qty: 30 TABLET | Refills: 1 | Status: SHIPPED | OUTPATIENT
Start: 2022-08-30 | End: 2022-10-14

## 2022-08-30 NOTE — TELEPHONE ENCOUNTER
Per Dr. Chan: Myrbetriq 25 mg is good. He has a stricture so I worry about trospium or other anticholinergic causing retention.     Spoke to pt. Informed pt of new order. Pt verbalized understanding.  Order sent to preferred pharmacy.     Chhaya Gordon, RN MSN    Signed Prescriptions:                        Disp   Refills    mirabegron (MYRBETRIQ) 25 MG 24 hr tablet  30 tab*1        Sig: Take 1 tablet (25 mg) by mouth daily  Authorizing Provider: JOHANNA CHAN  Ordering User: CHHAYA GORDON

## 2022-08-30 NOTE — TELEPHONE ENCOUNTER
Spoke to pt. Pt reports having to go to the bathroom frequently at night time that started a couple weeks ago. No pain with urination. Pt is going about 5-6 times a night. Not bad during the day. Pt stops fluid intake at 7 pm. He reports that overall his previous symptoms have improved, but not completely resolved. Pt would like to have a medication ordered to help with his nocturia versus having to complete a visit. Pt requests for a message to be sent to provider.     Chhaya Park RN MSN

## 2022-10-12 ENCOUNTER — HOSPITAL ENCOUNTER (OUTPATIENT)
Dept: CT IMAGING | Facility: CLINIC | Age: 72
Discharge: HOME | End: 2022-10-12
Attending: CLINICAL NURSE SPECIALIST
Payer: COMMERCIAL

## 2022-10-12 ENCOUNTER — HOSPITAL ENCOUNTER (OUTPATIENT)
Dept: ULTRASOUND IMAGING | Facility: CLINIC | Age: 72
Discharge: HOME | End: 2022-10-12
Attending: INTERNAL MEDICINE
Payer: COMMERCIAL

## 2022-10-12 DIAGNOSIS — K76.9 CHRONIC LIVER DISEASE: ICD-10-CM

## 2022-10-12 DIAGNOSIS — K75.81 NONALCOHOLIC STEATOHEPATITIS: ICD-10-CM

## 2022-10-12 DIAGNOSIS — C34.11 MALIGNANT NEOPLASM OF UPPER LOBE OF RIGHT LUNG (H): ICD-10-CM

## 2022-10-12 DIAGNOSIS — K70.9 ALCOHOLIC LIVER DISEASE (H): ICD-10-CM

## 2022-10-12 PROCEDURE — 71250 CT THORAX DX C-: CPT

## 2022-10-12 PROCEDURE — 76705 ECHO EXAM OF ABDOMEN: CPT

## 2022-10-13 ENCOUNTER — TRANSFERRED RECORDS (OUTPATIENT)
Dept: HEALTH INFORMATION MANAGEMENT | Facility: CLINIC | Age: 72
End: 2022-10-13

## 2022-10-14 ENCOUNTER — VIRTUAL VISIT (OUTPATIENT)
Dept: SURGERY | Facility: CLINIC | Age: 72
End: 2022-10-14
Attending: CLINICAL NURSE SPECIALIST
Payer: COMMERCIAL

## 2022-10-14 DIAGNOSIS — C34.11 MALIGNANT NEOPLASM OF UPPER LOBE OF RIGHT LUNG (H): Primary | ICD-10-CM

## 2022-10-14 PROCEDURE — 99213 OFFICE O/P EST LOW 20 MIN: CPT | Mod: 95 | Performed by: CLINICAL NURSE SPECIALIST

## 2022-10-14 NOTE — PROGRESS NOTES
Juwan is a 71 year old who is being evaluated via a billable telephone visit.      What phone number would you like to be contacted at? [phone]  How would you like to obtain your AVS? Ori Guzman     THORACIC SURGERY FOLLOW UP VISIT      I had a telephone visit with Mr. Latham in follow-up today. The clinical summary follows:     PREOP DIAGNOSIS   Enlarging right upper lobe lung nodule  PROCEDURE   Laparoscopic right upper lobe wedge resection    DATE OF PROCEDURE  03/05/2017    HISTOPATHOLOGY   Minimally invasive well differentiated adenocarcinoma, pT1aN0    COMPLICATIONS  None    INTERVAL STUDIES  CT chest: stable post operative changes of a right upper lobe wedge resection, stable 3 mm nodule in the left major fissure (stable compared to 2016).    ETOH sparingly  TOB former smoker, 40 pack years, quit 001/01/1985    SUBJECTIVE   Juwan says he feels crappy a lot of the time. He has no energy and feels like he sleeps a lot more. He denies any shortness of breath or cough-he just feels run down. He has not seen his PCP in awhile.    IMPRESSION  71 year-old male with right upper lobe lung cancer status post right upper lobe wedge resection.     We will get another lung cancer surveillance CT in 1 year. I advised him to see his PCP to discuss his lack of energy.    PLAN  I spent 25 min on the date of the encounter in chart review, patient visit, review of tests, documentation and/or discussion with other providers about the issues documented above. I reviewed the plan as follows:  Chest CT in 1 year  Follow up with PCP  1. Necessary Tests & Appointments: chest CT  All questions were answered and the patient and present family were in agreement with the plan.  I appreciate the opportunity to participate in the care of your patient and will keep you updated.  Sincerely,        Phone call duration: 17 minutes

## 2022-10-14 NOTE — LETTER
10/14/2022         RE: Juwan Latham  [address]  [place] [address]        Dear Colleague,    Thank you for referring your patient, Juwan Latham, to the [facility]. Please see a copy of my visit note below.      THORACIC SURGERY FOLLOW UP VISIT      I had a telephone visit with Mr. Latham in follow-up today. The clinical summary follows:     PREOP DIAGNOSIS   Enlarging right upper lobe lung nodule  PROCEDURE   Laparoscopic right upper lobe wedge resection    DATE OF PROCEDURE  03/05/2017    HISTOPATHOLOGY   Minimally invasive well differentiated adenocarcinoma, pT1aN0    COMPLICATIONS  None    INTERVAL STUDIES  CT chest: stable post operative changes of a right upper lobe wedge resection, stable 3 mm nodule in the left major fissure (stable compared to 2016).    ETOH sparingly  TOB former smoker, 40 pack years, quit 001/01/1985    SUBJECTIVE   Juwan says he feels crappy a lot of the time. He has no energy and feels like he sleeps a lot more. He denies any shortness of breath or cough-he just feels run down. He has not seen his PCP in awhile.    IMPRESSION  71 year-old male with right upper lobe lung cancer status post right upper lobe wedge resection.     We will get another lung cancer surveillance CT in 1 year. I advised him to see his PCP to discuss his lack of energy.    PLAN  I spent 25 min on the date of the encounter in chart review, patient visit, review of tests, documentation and/or discussion with other providers about the issues documented above. I reviewed the plan as follows:  Chest CT in 1 year  Follow up with PCP  1. Necessary Tests & Appointments: chest CT  All questions were answered and the patient and present family were in agreement with the plan.  I appreciate the opportunity to participate in the care of your patient and will keep you updated.  Sincerely,    Carla Faulkner, GLORIA CNS

## 2022-10-17 ENCOUNTER — OFFICE VISIT (OUTPATIENT)
Dept: FAMILY MEDICINE | Facility: CLINIC | Age: 72
End: 2022-10-17
Payer: COMMERCIAL

## 2022-10-17 ENCOUNTER — NURSE TRIAGE (OUTPATIENT)
Dept: UROLOGY | Facility: CLINIC | Age: 72
End: 2022-10-17

## 2022-10-17 VITALS
DIASTOLIC BLOOD PRESSURE: 60 MMHG | OXYGEN SATURATION: 97 % | WEIGHT: 186 LBS | RESPIRATION RATE: 18 BRPM | SYSTOLIC BLOOD PRESSURE: 110 MMHG | BODY MASS INDEX: 28.28 KG/M2 | HEART RATE: 68 BPM | TEMPERATURE: 98.6 F

## 2022-10-17 DIAGNOSIS — N99.114 POSTPROCEDURAL MALE URETHRAL STRICTURE: ICD-10-CM

## 2022-10-17 DIAGNOSIS — C61 PROSTATE CANCER (H): ICD-10-CM

## 2022-10-17 DIAGNOSIS — C80.1 MALIGNANT NEOPLASM (H): ICD-10-CM

## 2022-10-17 DIAGNOSIS — R35.0 URINARY FREQUENCY: Primary | ICD-10-CM

## 2022-10-17 DIAGNOSIS — G47.33 OSA (OBSTRUCTIVE SLEEP APNEA): ICD-10-CM

## 2022-10-17 DIAGNOSIS — I10 HYPERTENSION GOAL BP (BLOOD PRESSURE) < 140/90: ICD-10-CM

## 2022-10-17 DIAGNOSIS — K21.9 GASTROESOPHAGEAL REFLUX DISEASE, UNSPECIFIED WHETHER ESOPHAGITIS PRESENT: ICD-10-CM

## 2022-10-17 DIAGNOSIS — K92.1 BLACK TARRY STOOLS: Primary | ICD-10-CM

## 2022-10-17 DIAGNOSIS — R20.0 NUMBNESS: ICD-10-CM

## 2022-10-17 LAB
ALBUMIN UR-MCNC: NEGATIVE MG/DL
APPEARANCE UR: CLEAR
BASOPHILS # BLD AUTO: 0 10E3/UL (ref 0–0.2)
BASOPHILS NFR BLD AUTO: 0 %
BILIRUB UR QL STRIP: NEGATIVE
COLOR UR AUTO: YELLOW
EOSINOPHIL # BLD AUTO: 0.1 10E3/UL (ref 0–0.7)
EOSINOPHIL NFR BLD AUTO: 1 %
ERYTHROCYTE [DISTWIDTH] IN BLOOD BY AUTOMATED COUNT: 13.4 % (ref 10–15)
GLUCOSE UR STRIP-MCNC: NEGATIVE MG/DL
HCT VFR BLD AUTO: 42.6 % (ref 40–53)
HGB BLD-MCNC: 14 G/DL (ref 13.3–17.7)
HGB UR QL STRIP: NEGATIVE
KETONES UR STRIP-MCNC: NEGATIVE MG/DL
LEUKOCYTE ESTERASE UR QL STRIP: NEGATIVE
LYMPHOCYTES # BLD AUTO: 0.9 10E3/UL (ref 0.8–5.3)
LYMPHOCYTES NFR BLD AUTO: 17 %
MCH RBC QN AUTO: 29.9 PG (ref 26.5–33)
MCHC RBC AUTO-ENTMCNC: 32.9 G/DL (ref 31.5–36.5)
MCV RBC AUTO: 91 FL (ref 78–100)
MONOCYTES # BLD AUTO: 0.5 10E3/UL (ref 0–1.3)
MONOCYTES NFR BLD AUTO: 10 %
NEUTROPHILS # BLD AUTO: 3.7 10E3/UL (ref 1.6–8.3)
NEUTROPHILS NFR BLD AUTO: 71 %
NITRATE UR QL: NEGATIVE
PH UR STRIP: 7 [PH] (ref 5–7)
PLATELET # BLD AUTO: 173 10E3/UL (ref 150–450)
RBC # BLD AUTO: 4.69 10E6/UL (ref 4.4–5.9)
SP GR UR STRIP: 1.02 (ref 1–1.03)
UROBILINOGEN UR STRIP-ACNC: 0.2 E.U./DL
WBC # BLD AUTO: 5.1 10E3/UL (ref 4–11)

## 2022-10-17 PROCEDURE — 99214 OFFICE O/P EST MOD 30 MIN: CPT | Performed by: NURSE PRACTITIONER

## 2022-10-17 PROCEDURE — 80053 COMPREHEN METABOLIC PANEL: CPT | Performed by: NURSE PRACTITIONER

## 2022-10-17 PROCEDURE — 36415 COLL VENOUS BLD VENIPUNCTURE: CPT | Performed by: NURSE PRACTITIONER

## 2022-10-17 PROCEDURE — 85025 COMPLETE CBC W/AUTO DIFF WBC: CPT | Performed by: NURSE PRACTITIONER

## 2022-10-17 PROCEDURE — G0103 PSA SCREENING: HCPCS | Performed by: NURSE PRACTITIONER

## 2022-10-17 PROCEDURE — 81003 URINALYSIS AUTO W/O SCOPE: CPT | Performed by: NURSE PRACTITIONER

## 2022-10-17 PROCEDURE — 82043 UR ALBUMIN QUANTITATIVE: CPT | Performed by: NURSE PRACTITIONER

## 2022-10-17 ASSESSMENT — ENCOUNTER SYMPTOMS
RESPIRATORY NEGATIVE: 1
UNEXPECTED WEIGHT CHANGE: 0
FATIGUE: 1
COUGH: 0
LIGHT-HEADEDNESS: 1
ACTIVITY CHANGE: 0
CARDIOVASCULAR NEGATIVE: 1
ABDOMINAL DISTENTION: 0
FEVER: 0
NAUSEA: 0
APPETITE CHANGE: 0
PSYCHIATRIC NEGATIVE: 1
CHILLS: 0
DIFFICULTY URINATING: 1
FLANK PAIN: 0
HEARTBURN: 0
JOINT SWELLING: 0
MYALGIAS: 0
HEMATURIA: 0
CONSTIPATION: 0
COLOR CHANGE: 0
SHORTNESS OF BREATH: 0
VOMITING: 0
RECTAL PAIN: 0
DIARRHEA: 0

## 2022-10-17 ASSESSMENT — PAIN SCALES - GENERAL: PAINLEVEL: NO PAIN (0)

## 2022-10-17 ASSESSMENT — PATIENT HEALTH QUESTIONNAIRE - PHQ9
SUM OF ALL RESPONSES TO PHQ QUESTIONS 1-9: 4
10. IF YOU CHECKED OFF ANY PROBLEMS, HOW DIFFICULT HAVE THESE PROBLEMS MADE IT FOR YOU TO DO YOUR WORK, TAKE CARE OF THINGS AT HOME, OR GET ALONG WITH OTHER PEOPLE: NOT DIFFICULT AT ALL
SUM OF ALL RESPONSES TO PHQ QUESTIONS 1-9: 4

## 2022-10-17 NOTE — PROGRESS NOTES
Assessment & Plan     ICD-10-CM    1. Screening for hyperlipidemia  Z13.220       2. Hypertension goal BP (blood pressure) < 140/90  I10 COMPREHENSIVE METABOLIC PANEL     COMPREHENSIVE METABOLIC PANEL      3. Screening for prostate cancer  Z12.5 PROSTATE SPEC ANTIGEN SCREEN     PROSTATE SPEC ANTIGEN SCREEN      4. Prostate cancer (H)  C61 PROSTATE SPEC ANTIGEN SCREEN     PROSTATE SPEC ANTIGEN SCREEN      5. PAULINO (obstructive sleep apnea)  G47.33       6. Gastroesophageal reflux disease, unspecified whether esophagitis present  K21.9       7. Numbness  R20.0       8. Black tarry stools  K92.1 CBC with platelets and differential     CBC with platelets and differential     Fecal colorectal cancer screen (FIT)     Fecal colorectal cancer screen (FIT)      9. Postprocedural male urethral stricture  N99.114 Albumin Random Urine Quantitative with Creat Ratio     UA Macro with Reflex to Micro and Culture - lab collect     Albumin Random Urine Quantitative with Creat Ratio     UA Macro with Reflex to Micro and Culture - lab collect      10. Malignant neoplasm (H)  C80.1         No current change in blood count.  Check FIT test and follow up as indicated. UA is normal.  Follow up with urology as planned-- appt needed for further evaluation.  Labs to screen for liver function changes.        Patient Instructions   Get the stool test done and bring it back to the [place] lab.    Please make a follow up appointment with your urologist for a check up as well.    Return in about 4 weeks (around 11/14/2022) for with your Primary Care Doctor in [place].    GLORIA [facility] LYDIA Echeverria is a 71 year old, presenting for the following health issues:  Rectal Problem      History of Present Illness       Reason for visit:  Black stool    He eats 0-1 servings of fruits and vegetables daily.He consumes 1 sweetened beverage(s) daily.He exercises with enough effort to increase his  heart rate 10 to 19 minutes per day.  He exercises with enough effort to increase his heart rate 3 or less days per week.   He is taking medications regularly.    Today's PHQ-9         PHQ-9 Total Score: 4    PHQ-9 Q9 Thoughts of better off dead/self-harm past 2 weeks :   Not at all    How difficult have these problems made it for you to do your work, take care of things at home, or get along with other people: Not difficult at all       Black tarry stools  Onset/Duration: x 7 days  Description:  Frequency of bowel movements: on a daily basis--2 formed stools in the morning  Consistency of stool: soft  Progression of Symptoms: same  Accompanying signs and symptoms:    Abdominal pain: No, patient states that he is having kidney issues   Rectal pain: No   Blood in stool: black tarry    Nausea/Vomiting: No   Weight loss or gain: Gained 6 pounds since last visit, but no edema  History:   Similar problems in past: No  History of abdominal surgery: No  Chronic laxative use: No  New medications: No  Precipitating or alleviating factors: none    Therapies tried and outcome: patient has been watch his diet.    PAULINO (obstructive sleep apnea)  Resolved with diet changes.    GERD (gastroesophageal reflux disease)  Resolved with reducing Alcohol use and some weight loss        Review of Systems   Constitutional: Positive for fatigue. Negative for activity change, appetite change, chills, fever and unexpected weight change.   HENT: Negative.    Respiratory: Negative.  Negative for cough and shortness of breath.    Cardiovascular: Negative.  Negative for chest pain and peripheral edema.   Gastrointestinal: Negative for abdominal distention, constipation, diarrhea, heartburn, nausea, rectal pain and vomiting.        Small amount of lower abdominal tenderness--does not affect activities of daily living    Black tarry stools   Genitourinary: Positive for difficulty urinating. Negative for decreased urine volume, flank pain, hematuria,  penile swelling, scrotal swelling and testicular pain.        Feels some sense of restriction at the end of his penis, no stream decrease.  Has to sit however in order to urinate.  This is new since his last scope with urology   Musculoskeletal: Negative for joint swelling and myalgias.   Skin: Negative for color change.   Neurological: Positive for light-headedness.   Psychiatric/Behavioral: Negative.             Objective    /60 (BP Location: Right arm, Patient Position: Sitting, Cuff Size: Adult Regular)   Pulse 68   Temp 98.6  F (37  C) (Oral)   Resp 18   Wt 84.4 kg (186 lb)   SpO2 97%   BMI 28.28 kg/m    Body mass index is 28.28 kg/m .  Physical Exam  Constitutional:       Appearance: Normal appearance.   HENT:      Nose: Nose normal.      Mouth/Throat:      Mouth: Mucous membranes are moist.   Eyes:      Conjunctiva/sclera: Conjunctivae normal.   Cardiovascular:      Rate and Rhythm: Normal rate and regular rhythm.      Pulses: Normal pulses.      Heart sounds: Normal heart sounds.   Pulmonary:      Effort: Pulmonary effort is normal.      Breath sounds: Normal breath sounds.   Abdominal:      General: Abdomen is flat. There is no distension.      Palpations: Abdomen is soft.      Tenderness: There is no guarding or rebound.   Skin:     General: Skin is warm and dry.      Coloration: Skin is not pale.   Neurological:      General: No focal deficit present.      Mental Status: He is alert.   Psychiatric:         Mood and Affect: Mood normal.         Behavior: Behavior normal.

## 2022-10-17 NOTE — TELEPHONE ENCOUNTER
[facility] Call Center    Phone Message    May a detailed message be left on voicemail: yes     Reason for Call: Symptoms or Concerns     If patient has red-flag symptoms, warm transfer to triage line    Current symptom or concern: black stools    Symptoms have been present for:  6 day(s)    Pt called stating that he has noticed his stools have been black for the past 6 days and he is concerned what may be causing this or what is may mean. Please reach out to pt asap. Thanks      Action Taken: Message routed to:  [facility] Uro    Travel Screening: Not Applicable

## 2022-10-17 NOTE — PATIENT INSTRUCTIONS
Get the stool test done and bring it back to the [place] lab.    Please make a follow up appointment with your urologist for a check up as well.

## 2022-10-17 NOTE — TELEPHONE ENCOUNTER
Nurse Triage SBAR    Is this a 2nd Level Triage? NO    Situation: Spoke to pt. Pt reports black stools for last 6 days.     Background: Pt has noted urethral stricture and history of hematuria.     Assessment: Denies dizziness or light headedness. No nausea or vomiting. Stools are formed. Not on blood thinners. Pt reports having low back pain that started this morning. Pain is tolerable. Feels it mostly when sitting. Pt reports hesitant urinary stream. He is able to empty bladder completely every time. Increase nocturia for about 3 nights now. No hematuria. Pt just started taking iron pills 2 days ago. Chart and problems list reviewed.    Protocol Recommended Disposition:   See in Office Today    Recommendation: UA/UC.   Discuss black stools with primary provider.      .    Does the patient meet one of the following criteria for ADS visit consideration? No     Chhaya Park RN MSN    Orders Placed This Encounter   Procedures     Routine UA with microscopic - No culture     Standing Status:   Future     Standing Expiration Date:   10/17/2023     Urine Culture Aerobic Bacterial     Standing Status:   Future     Standing Expiration Date:   10/17/2023         Reason for Disposition    Patient wants to be seen    Protocols used: RECTAL BLEEDING-A-OH

## 2022-10-18 LAB
ALBUMIN SERPL-MCNC: 4.2 G/DL (ref 3.4–5)
ALP SERPL-CCNC: 51 U/L (ref 40–150)
ALT SERPL W P-5'-P-CCNC: 32 U/L (ref 0–70)
ANION GAP SERPL CALCULATED.3IONS-SCNC: 7 MMOL/L (ref 3–14)
AST SERPL W P-5'-P-CCNC: 22 U/L (ref 0–45)
BILIRUB SERPL-MCNC: 0.5 MG/DL (ref 0.2–1.3)
BUN SERPL-MCNC: 13 MG/DL (ref 7–30)
CALCIUM SERPL-MCNC: 9.2 MG/DL (ref 8.5–10.1)
CHLORIDE BLD-SCNC: 108 MMOL/L (ref 94–109)
CO2 SERPL-SCNC: 25 MMOL/L (ref 20–32)
CREAT SERPL-MCNC: 0.75 MG/DL (ref 0.66–1.25)
CREAT UR-MCNC: 54 MG/DL
GFR SERPL CREATININE-BSD FRML MDRD: >90 ML/MIN/1.73M2
GLUCOSE BLD-MCNC: 93 MG/DL (ref 70–99)
MICROALBUMIN UR-MCNC: 6 MG/L
MICROALBUMIN/CREAT UR: 11.11 MG/G CR (ref 0–17)
POTASSIUM BLD-SCNC: 4.2 MMOL/L (ref 3.4–5.3)
PROT SERPL-MCNC: 7.3 G/DL (ref 6.8–8.8)
PSA SERPL-MCNC: <0.01 UG/L (ref 0–4)
SODIUM SERPL-SCNC: 140 MMOL/L (ref 133–144)

## 2022-10-18 PROCEDURE — 82274 ASSAY TEST FOR BLOOD FECAL: CPT | Performed by: NURSE PRACTITIONER

## 2022-10-19 ENCOUNTER — TELEPHONE (OUTPATIENT)
Dept: UROLOGY | Facility: CLINIC | Age: 72
End: 2022-10-19

## 2022-10-19 NOTE — TELEPHONE ENCOUNTER
Spoke to pt. Pt reports that he continues to have black. He denies any acute urinary concerns. Advised pt that UA/UC is not needed. He confirmed that he has followed up with his primary about the black stools. Reviewed urgent symptoms. Pt verbalized understanding.  No other questions or concerns. Will call clinic back as needed.     Chhaya Park RN MSN

## 2022-10-19 NOTE — TELEPHONE ENCOUNTER
[facility] Call Center    Phone Message    May a detailed message be left on voicemail: yes     Reason for Call: Other: Pt called and stated he talked to Chhaya a couple days ago regarding urination issues - can not remember what they specifically spoke about nor what he was supposed to do for a f/u. Pt seened a little confused as he asked how to sign up for [facility] then told me he was already in MyChart - please call pt back to further discuss, thanks!     Action Taken: Message routed to:  [facility] Uro    Travel Screening: Not Applicable

## 2022-10-21 LAB — HEMOCCULT STL QL IA: NEGATIVE

## 2022-10-22 ENCOUNTER — HEALTH MAINTENANCE LETTER (OUTPATIENT)
Age: 72
End: 2022-10-22

## 2022-11-09 DIAGNOSIS — L30.9 HAND DERMATITIS: ICD-10-CM

## 2022-11-09 DIAGNOSIS — L21.9 SEBORRHEIC DERMATITIS: ICD-10-CM

## 2022-11-09 NOTE — TELEPHONE ENCOUNTER
Routing refill request to provider for review/approval because:  Drug not on the [facility] refill protocol     Marylin LLAMAS RN  [facility] [place]  [phone]

## 2022-11-10 RX ORDER — HYDROCORTISONE 25 MG/G
OINTMENT TOPICAL 2 TIMES DAILY
Qty: 30 G | Refills: 1 | Status: SHIPPED | OUTPATIENT
Start: 2022-11-10 | End: 2023-09-14

## 2022-11-10 NOTE — TELEPHONE ENCOUNTER
Pt called and informed of HC 2.5% cream refill and providers note including rx instruction. Patient reported little resolution of rash/itching on scalp since last visit with KAIT Hicks CNP on 4/15/22. Stated that rash does improve for a few days at a time but application of HC cream has been fairly consistent since his  last evaluation. Message forwarded to provider for review.    Garrett MATIAS

## 2022-11-10 NOTE — TELEPHONE ENCOUNTER
Provider KAIT Hicks CNP requested  patient be called to clarify if he is using Ketoconazole 2% cream to scalp with HC 2.5% ointement for flares.   Patient called and reported that he was using the HC ointment primarily to scalp and had not been using the Ketoconazole cream. Reviewed providers instructions as written in visit notes from 4/28/22 , patient verbalized understanding and to try this regimen and contact provider if no improvement.     Garrett MATIAS

## 2022-11-10 NOTE — TELEPHONE ENCOUNTER
Please call patient and let him know I sent refills of the hydrocortisone 2.5% and make sure he is not using them when he does not have a rash.  He should only be using these for flares of rash on his face, scalp, or hands and then stopping.

## 2022-12-12 PROBLEM — K76.0 FATTY LIVER: Status: ACTIVE | Noted: 2022-12-12

## 2023-01-06 ENCOUNTER — TELEPHONE (OUTPATIENT)
Dept: UROLOGY | Facility: CLINIC | Age: 73
End: 2023-01-06

## 2023-01-06 ENCOUNTER — OFFICE VISIT (OUTPATIENT)
Dept: UROLOGY | Facility: CLINIC | Age: 73
End: 2023-01-06
Payer: COMMERCIAL

## 2023-01-06 VITALS — SYSTOLIC BLOOD PRESSURE: 155 MMHG | DIASTOLIC BLOOD PRESSURE: 80 MMHG | HEART RATE: 79 BPM

## 2023-01-06 DIAGNOSIS — N99.114 POSTPROCEDURAL MALE URETHRAL STRICTURE: Primary | ICD-10-CM

## 2023-01-06 PROCEDURE — 99213 OFFICE O/P EST LOW 20 MIN: CPT | Performed by: UROLOGY

## 2023-01-06 NOTE — PATIENT INSTRUCTIONS
Please follow up in six months virtually.     It was a pleasure meeting with you today.  Thank you for allowing me and my team the privilege of caring for you today.  YOU are the reason we are here, and I truly hope we provided you with the excellent service you deserve.  Please let us know if there is anything else we can do for you so that we can be sure you are leaving completely satisfied with your care experience.

## 2023-01-06 NOTE — LETTER
1/6/2023       RE: Juwan Latham  [address]  [place] [address]     Dear Colleague,    Thank you for referring your patient, Juwan Latham, to the [facility]OLOGY [facility] [place] at [facility]. Please see a copy of my visit note below.    HPI:  Juwan Latham is a 72 year old male with history of RALP and XRT.  He has a stricture near membranous urethra which was dilated Jan 2022.  He voided well afterwards.  He also had gross hematuria and we did some fulguration of erythematous lesions.  He has some mild recurrent hematuria.    Had ct of c/a/p in Nov 2021 which showed horseshoe kidney with no hydro. There are some benign cysts. There are some small stones nonobstructive.    He notes improved urination for some time after dilation but now only somewhat improved from previous.  We had considered cystoscopy today but he doesn't tolerate it well and he's not interested in intervention at this time.    Exam:  BP (!) 155/80   Pulse 79   NAD  Abdomen soft  Ambulating easily    Assessment & Plan     Membranous urethral stricture  Horseshoe kidney  Gross hematuria    Given prior negative hematuria workup and difficulties with cystoscopy, we discussed holding off repeat hematuria workup    Also discussed his stenosis seems at least mildly improved.    For now, wants to hold off.  Will have him followup in 6 months virtually.    Mike Chan MD  Reconstructive Urology  [facility]

## 2023-01-06 NOTE — TELEPHONE ENCOUNTER
[facility] Call Center    Phone Message    May a detailed message be left on voicemail: yes     Reason for Call: Other: .  Pt calling regarding cysto appt today with Dustin. Pt states he did come in clinic however he did not do the cysto. Pt is wanting to speak with care team with questions about cysto that he didn't get to ask today before scheduling again. Please call pt      Action Taken: Message routed to:  Other: Uro    Travel Screening: Not Applicable

## 2023-01-06 NOTE — NURSING NOTE
Chief Complaint   Patient presents with     Cystoscopy       Blood pressure (!) 155/80, pulse 79. There is no height or weight on file to calculate BMI.    Patient Active Problem List   Diagnosis     Anxiety     PAULINO (obstructive sleep apnea)     GERD (gastroesophageal reflux disease)     Benign neoplasm of descending colon     s/p Malignant neoplasm of upper lobe of right lung (H)     Overweight (BMI 25.0-29.9)     Cavernous hemangioma of brain (H)     Major depressive disorder, recurrent episode, mild (H)     h/o Prostate cancer (H) - s/p prostatectomy 2017     Somatic dysfunction of pelvis region     Mixed incontinence urge and stress (male)(female)     Retinal detachment of left eye with single break     Postprocedural male urethral stricture     Gross hematuria     Malignant neoplasm (H)     Numbness     Fatty liver       No Known Allergies    Current Outpatient Medications   Medication Sig Dispense Refill     cyanocobalamin (VITAMIN B-12) 500 MCG tablet Take 2,500 mcg by mouth daily (Patient not taking: Reported on 1/6/2023)       Garlic 1000 MG CAPS  (Patient not taking: Reported on 1/6/2023)       hydrocortisone 2.5 % ointment Apply topically 2 times daily To all areas of itch/rash. Stop when rash/itch resolves. Hand/face/scalp (Patient not taking: Reported on 1/6/2023) 30 g 1     ketoconazole (NIZORAL) 2 % external cream Apply to rash daily until rash resolves. Then use twice weekly to prevent flares. Face/scalp (Patient not taking: Reported on 1/6/2023) 60 g 11     multivitamin w/minerals (THERA-VIT-M) tablet Take 1 tablet by mouth daily (Patient not taking: Reported on 1/6/2023)       PHOSPHATIDYL CHOLINE PO  (Patient not taking: Reported on 1/6/2023)       Turmeric 500 MG CAPS Take 1,500 capsules by mouth daily (Patient not taking: Reported on 1/6/2023)       Vitamin D, Cholecalciferol, 1000 units TABS  (Patient not taking: Reported on 1/6/2023)       zinc gluconate 50 MG tablet Take 50 mg by mouth daily  (Patient not taking: Reported on 2023)         Social History     Tobacco Use     Smoking status: Former     Packs/day: 2.00     Years: 20.00     Pack years: 40.00     Types: Cigarettes     Quit date: 1985     Years since quittin.0     Smokeless tobacco: Never   Vaping Use     Vaping Use: Never used   Substance Use Topics     Alcohol use: Not Currently     Comment: sparingly     Drug use: No       Ramón Kate  2023  10:26 AM

## 2023-01-09 DIAGNOSIS — K75.81 NONALCOHOLIC STEATOHEPATITIS: Primary | ICD-10-CM

## 2023-01-13 NOTE — TELEPHONE ENCOUNTER
Contacted patient to discuss questions. Cystoscopy was not performed. Patient to follow up in 6 months. Patient stated that virtual visits are not covered by his insurance. Assisted patient to schedule an in person visit with Dr. Chan in July 2023. All questions answered to patient's satisfaction. Patient stated understanding and agreement with plan.  Leona Wall LPN  Urology Clinic Service   [facility]

## 2023-01-13 NOTE — PROGRESS NOTES
HPI:  Juwan Latham is a 72 year old male with history of RALP and XRT.  He has a stricture near membranous urethra which was dilated Jan 2022.  He voided well afterwards.  He also had gross hematuria and we did some fulguration of erythematous lesions.  He has some mild recurrent hematuria.    Had ct of c/a/p in Nov 2021 which showed horseshoe kidney with no hydro. There are some benign cysts. There are some small stones nonobstructive.    He notes improved urination for some time after dilation but now only somewhat improved from previous.  We had considered cystoscopy today but he doesn't tolerate it well and he's not interested in intervention at this time.    Exam:  BP (!) 155/80   Pulse 79   NAD  Abdomen soft  Ambulating easily    Assessment & Plan     Membranous urethral stricture  Horseshoe kidney  Gross hematuria    Given prior negative hematuria workup and difficulties with cystoscopy, we discussed holding off repeat hematuria workup    Also discussed his stenosis seems at least mildly improved.    For now, wants to hold off.  Will have him followup in 6 months virtually.    Mike Chan MD  Reconstructive Urology  [facility]

## 2023-01-16 ENCOUNTER — HOSPITAL ENCOUNTER (OUTPATIENT)
Dept: ULTRASOUND IMAGING | Facility: CLINIC | Age: 73
Discharge: HOME | End: 2023-01-16
Attending: INTERNAL MEDICINE
Payer: COMMERCIAL

## 2023-01-16 ENCOUNTER — LAB (OUTPATIENT)
Dept: LAB | Facility: CLINIC | Age: 73
End: 2023-01-16
Attending: INTERNAL MEDICINE
Payer: COMMERCIAL

## 2023-01-16 DIAGNOSIS — K75.81 NONALCOHOLIC STEATOHEPATITIS: ICD-10-CM

## 2023-01-16 DIAGNOSIS — K76.9 CHRONIC LIVER DISEASE: ICD-10-CM

## 2023-01-16 DIAGNOSIS — K70.9 ALCOHOLIC LIVER DISEASE (H): ICD-10-CM

## 2023-01-16 LAB
AFP SERPL-MCNC: 2 NG/ML
ALBUMIN SERPL BCG-MCNC: 4.9 G/DL (ref 3.5–5.2)
ALP SERPL-CCNC: 51 U/L (ref 40–129)
ALT SERPL W P-5'-P-CCNC: 21 U/L (ref 10–50)
AST SERPL W P-5'-P-CCNC: 23 U/L (ref 10–50)
BILIRUB DIRECT SERPL-MCNC: <0.2 MG/DL (ref 0–0.3)
BILIRUB SERPL-MCNC: 0.6 MG/DL
ERYTHROCYTE [DISTWIDTH] IN BLOOD BY AUTOMATED COUNT: 13.3 % (ref 10–15)
HCT VFR BLD AUTO: 45.1 % (ref 40–53)
HGB BLD-MCNC: 14.8 G/DL (ref 13.3–17.7)
MCH RBC QN AUTO: 30.5 PG (ref 26.5–33)
MCHC RBC AUTO-ENTMCNC: 32.8 G/DL (ref 31.5–36.5)
MCV RBC AUTO: 93 FL (ref 78–100)
PLATELET # BLD AUTO: 185 10E3/UL (ref 150–450)
PROT SERPL-MCNC: 7.2 G/DL (ref 6.4–8.3)
RBC # BLD AUTO: 4.86 10E6/UL (ref 4.4–5.9)
WBC # BLD AUTO: 4.9 10E3/UL (ref 4–11)

## 2023-01-16 PROCEDURE — 85027 COMPLETE CBC AUTOMATED: CPT

## 2023-01-16 PROCEDURE — 36415 COLL VENOUS BLD VENIPUNCTURE: CPT

## 2023-01-16 PROCEDURE — 80076 HEPATIC FUNCTION PANEL: CPT

## 2023-01-16 PROCEDURE — 82105 ALPHA-FETOPROTEIN SERUM: CPT

## 2023-01-16 PROCEDURE — 76705 ECHO EXAM OF ABDOMEN: CPT

## 2023-01-17 ENCOUNTER — NURSE TRIAGE (OUTPATIENT)
Dept: FAMILY MEDICINE | Facility: CLINIC | Age: 73
End: 2023-01-17
Payer: COMMERCIAL

## 2023-01-17 NOTE — TELEPHONE ENCOUNTER
"S-(situation): Patient calling to ask about lab results and with c/o feeling \"awful\".    B-(background): Worsening back pain x 3 months.     A-(assessment): Pain in small of back all the way across above the hips. States pain is mild but  \"nauseating\". It does not interfere with activities. Has taken tylenol for it once and that helped but worries about taking pain meds due to liver function. No urinary symptoms, no fever, no radiation of pain. Member is worried it may be cancer (hx of lung CA).     R-(recommendations): Appointment made for tomorrow AM. Advised any fever or urinary symptoms to call back and report. Any severe pain back pain, chest pain go to ER or activate EMS.       Reason for Disposition    Age > 50 and no history of prior similar back pain    Additional Information    Negative: Passed out (i.e., fainted, collapsed and was not responding)    Negative: Shock suspected (e.g., cold/pale/clammy skin, too weak to stand, low BP, rapid pulse)    Negative: Sounds like a life-threatening emergency to the triager    Negative: Major injury to the back (e.g., MVA, fall > 10 feet or 3 meters, penetrating injury, etc.)    Negative: Pain in the upper back over the ribs (rib cage) that radiates (travels) into the chest    Negative: Pain in the upper back over the ribs (rib cage) and worsened by coughing (or clearly increases with breathing)    Negative: Back pain during pregnancy    Negative: SEVERE back pain of sudden onset and age > 60 years    Negative: SEVERE abdominal pain (e.g., excruciating)    Negative: Abdominal pain and age > 60 years    Negative: Unable to urinate (or only a few drops) and bladder feels very full    Negative: Loss of bladder or bowel control (urine or bowel incontinence; wetting self, leaking stool) of new-onset    Negative: Numbness (loss of sensation) in groin or rectal area    Negative: Pain radiates into groin, scrotum    Negative: Blood in urine (red, pink, or tea-colored)    " "Negative: Vomiting and pain over lower ribs of back (i.e., flank - kidney area)    Negative: Weakness of a leg or foot (e.g., unable to bear weight, dragging foot)    Negative: Patient sounds very sick or weak to the triager    Negative: Fever > 100.4 F (38.0 C) and flank pain    Negative: Pain or burning with passing urine (urination)    Negative: SEVERE back pain (e.g., excruciating, unable to do any normal activities) and not improved after pain medicine and CARE ADVICE    Negative: Numbness in an arm or hand (i.e., loss of sensation) and upper back pain    Negative: Numbness in a leg or foot (i.e., loss of sensation)    Negative: High-risk adult (e.g., history of cancer, history of HIV, or history of IV Drug Use)    Negative: Soft tissue infection (e.g., abscess, cellulitis) or other serious infection (e.g., bacteremia) in last 2 weeks    Negative: Painful rash with multiple small blisters grouped together (i.e., dermatomal distribution or 'band' or 'stripe')    Negative: Pain radiates into the thigh or further down the leg, and in both legs    Answer Assessment - Initial Assessment Questions  1. ONSET: \"When did the pain begin?\"       3 month   2. LOCATION: \"Where does it hurt?\" (upper, mid or lower back)      Small of back, both sides above the hips  3. SEVERITY: \"How bad is the pain?\"  (e.g., Scale 1-10; mild, moderate, or severe)    - MILD (1-3): doesn't interfere with normal activities     - MODERATE (4-7): interferes with normal activities or awakens from sleep     - SEVERE (8-10): excruciating pain, unable to do any normal activities       Mild  4. PATTERN: \"Is the pain constant?\" (e.g., yes, no; constant, intermittent)       Movement makes it better, better in the morning  5. RADIATION: \"Does the pain shoot into your legs or elsewhere?\"     No  6. CAUSE:  \"What do you think is causing the back pain?\"       No   7. BACK OVERUSE:  \"Any recent lifting of heavy objects, strenuous work or exercise?\"      No, " "patient had some blood in urine a month ago  8. MEDICATIONS: \"What have you taken so far for the pain?\" (e.g., nothing, acetaminophen, NSAIDS)      Tylenol   9. NEUROLOGIC SYMPTOMS: \"Do you have any weakness, numbness, or problems with bowel/bladder control?\"      No  10. OTHER SYMPTOMS: \"Do you have any other symptoms?\" (e.g., fever, abdominal pain, burning with urination, blood in urine)        Nausea  11. PREGNANCY: \"Is there any chance you are pregnant?\" (e.g., yes, no; LMP)        No    Protocols used: BACK PAIN-A-OH      "

## 2023-01-18 ENCOUNTER — TRANSFERRED RECORDS (OUTPATIENT)
Dept: HEALTH INFORMATION MANAGEMENT | Facility: CLINIC | Age: 73
End: 2023-01-18

## 2023-01-18 ENCOUNTER — OFFICE VISIT (OUTPATIENT)
Dept: FAMILY MEDICINE | Facility: CLINIC | Age: 73
End: 2023-01-18
Payer: COMMERCIAL

## 2023-01-18 ENCOUNTER — ANCILLARY PROCEDURE (OUTPATIENT)
Dept: GENERAL RADIOLOGY | Facility: CLINIC | Age: 73
End: 2023-01-18
Attending: FAMILY MEDICINE
Payer: COMMERCIAL

## 2023-01-18 VITALS
WEIGHT: 182.4 LBS | DIASTOLIC BLOOD PRESSURE: 72 MMHG | RESPIRATION RATE: 16 BRPM | BODY MASS INDEX: 27.65 KG/M2 | HEIGHT: 68 IN | SYSTOLIC BLOOD PRESSURE: 128 MMHG | OXYGEN SATURATION: 99 % | TEMPERATURE: 97.8 F | HEART RATE: 71 BPM

## 2023-01-18 DIAGNOSIS — Z51.81 MEDICATION MONITORING ENCOUNTER: ICD-10-CM

## 2023-01-18 DIAGNOSIS — N45.1 EPIDIDYMITIS: ICD-10-CM

## 2023-01-18 DIAGNOSIS — G89.29 CHRONIC BILATERAL LOW BACK PAIN WITHOUT SCIATICA: ICD-10-CM

## 2023-01-18 DIAGNOSIS — G89.29 CHRONIC BILATERAL LOW BACK PAIN WITHOUT SCIATICA: Primary | ICD-10-CM

## 2023-01-18 DIAGNOSIS — M54.50 CHRONIC BILATERAL LOW BACK PAIN WITHOUT SCIATICA: ICD-10-CM

## 2023-01-18 DIAGNOSIS — K76.0 FATTY LIVER: ICD-10-CM

## 2023-01-18 DIAGNOSIS — M54.50 CHRONIC BILATERAL LOW BACK PAIN WITHOUT SCIATICA: Primary | ICD-10-CM

## 2023-01-18 DIAGNOSIS — K21.9 GASTROESOPHAGEAL REFLUX DISEASE, UNSPECIFIED WHETHER ESOPHAGITIS PRESENT: ICD-10-CM

## 2023-01-18 DIAGNOSIS — F41.9 ANXIETY: ICD-10-CM

## 2023-01-18 DIAGNOSIS — C34.11 MALIGNANT NEOPLASM OF UPPER LOBE OF RIGHT LUNG (H): ICD-10-CM

## 2023-01-18 DIAGNOSIS — C61 PROSTATE CANCER (H): ICD-10-CM

## 2023-01-18 DIAGNOSIS — F33.0 MAJOR DEPRESSIVE DISORDER, RECURRENT EPISODE, MILD (H): ICD-10-CM

## 2023-01-18 LAB
ALBUMIN UR-MCNC: NEGATIVE MG/DL
APPEARANCE UR: CLEAR
BACTERIA #/AREA URNS HPF: ABNORMAL /HPF
BILIRUB UR QL STRIP: NEGATIVE
CAOX CRY #/AREA URNS HPF: ABNORMAL /HPF
COLOR UR AUTO: YELLOW
GLUCOSE UR STRIP-MCNC: NEGATIVE MG/DL
HGB UR QL STRIP: ABNORMAL
KETONES UR STRIP-MCNC: NEGATIVE MG/DL
LEUKOCYTE ESTERASE UR QL STRIP: NEGATIVE
MUCOUS THREADS #/AREA URNS LPF: PRESENT /LPF
NITRATE UR QL: NEGATIVE
PH UR STRIP: 6 [PH] (ref 5–7)
RBC #/AREA URNS AUTO: ABNORMAL /HPF
SP GR UR STRIP: 1.02 (ref 1–1.03)
SQUAMOUS #/AREA URNS AUTO: ABNORMAL /LPF
UROBILINOGEN UR STRIP-ACNC: 0.2 E.U./DL
WBC #/AREA URNS AUTO: ABNORMAL /HPF

## 2023-01-18 PROCEDURE — 72100 X-RAY EXAM L-S SPINE 2/3 VWS: CPT | Mod: TC | Performed by: RADIOLOGY

## 2023-01-18 PROCEDURE — 87086 URINE CULTURE/COLONY COUNT: CPT | Performed by: FAMILY MEDICINE

## 2023-01-18 PROCEDURE — 81001 URINALYSIS AUTO W/SCOPE: CPT | Performed by: FAMILY MEDICINE

## 2023-01-18 PROCEDURE — 99214 OFFICE O/P EST MOD 30 MIN: CPT | Performed by: FAMILY MEDICINE

## 2023-01-18 RX ORDER — LEVOFLOXACIN 500 MG/1
500 TABLET, FILM COATED ORAL DAILY
Qty: 10 TABLET | Refills: 0 | Status: SHIPPED | OUTPATIENT
Start: 2023-01-18 | End: 2023-01-28

## 2023-01-18 ASSESSMENT — ANXIETY QUESTIONNAIRES
GAD7 TOTAL SCORE: 2
3. WORRYING TOO MUCH ABOUT DIFFERENT THINGS: NOT AT ALL
7. FEELING AFRAID AS IF SOMETHING AWFUL MIGHT HAPPEN: NOT AT ALL
GAD7 TOTAL SCORE: 1
2. NOT BEING ABLE TO STOP OR CONTROL WORRYING: NOT AT ALL
1. FEELING NERVOUS, ANXIOUS, OR ON EDGE: NOT AT ALL
6. BECOMING EASILY ANNOYED OR IRRITABLE: NOT AT ALL
6. BECOMING EASILY ANNOYED OR IRRITABLE: SEVERAL DAYS
IF YOU CHECKED OFF ANY PROBLEMS ON THIS QUESTIONNAIRE, HOW DIFFICULT HAVE THESE PROBLEMS MADE IT FOR YOU TO DO YOUR WORK, TAKE CARE OF THINGS AT HOME, OR GET ALONG WITH OTHER PEOPLE: NOT DIFFICULT AT ALL
1. FEELING NERVOUS, ANXIOUS, OR ON EDGE: SEVERAL DAYS
5. BEING SO RESTLESS THAT IT IS HARD TO SIT STILL: NOT AT ALL
3. WORRYING TOO MUCH ABOUT DIFFERENT THINGS: SEVERAL DAYS
5. BEING SO RESTLESS THAT IT IS HARD TO SIT STILL: NOT AT ALL
IF YOU CHECKED OFF ANY PROBLEMS ON THIS QUESTIONNAIRE, HOW DIFFICULT HAVE THESE PROBLEMS MADE IT FOR YOU TO DO YOUR WORK, TAKE CARE OF THINGS AT HOME, OR GET ALONG WITH OTHER PEOPLE: NOT DIFFICULT AT ALL
7. FEELING AFRAID AS IF SOMETHING AWFUL MIGHT HAPPEN: NOT AT ALL
2. NOT BEING ABLE TO STOP OR CONTROL WORRYING: NOT AT ALL
GAD7 TOTAL SCORE: 2

## 2023-01-18 ASSESSMENT — PATIENT HEALTH QUESTIONNAIRE - PHQ9
5. POOR APPETITE OR OVEREATING: NOT AT ALL
SUM OF ALL RESPONSES TO PHQ QUESTIONS 1-9: 5
5. POOR APPETITE OR OVEREATING: NOT AT ALL

## 2023-01-18 NOTE — LETTER
[facility]  [address] [address]  [phone]                    January 23, 2023    Juwan Latham  [address]  [place] [address]      Dear Juwan,    Here is a summary of your recent test results:    Urine culture is negative   Xrays of the lower back note mild to moderate wear and tear     We advise:     Continue cares as discussed, follow up if any issues     For additional lab test information, [email or web address] is an excellent reference.     Your test results are enclosed.      Please contact me if you have any questions.    In addition, here is a list of due or overdue Health Maintenance reminders.    Health Maintenance Due   Topic Date Due     Zoster (Shingles) Vaccine (1 of 2) Never done     Hepatitis B Vaccine (1 of 3 - Risk 3-dose series) Never done     COVID-19 Vaccine (4 - Booster for Moderna series) 02/11/2022     Flu Vaccine (1) 09/01/2022     Annual Wellness Visit  09/10/2022     Cholesterol Lab  09/23/2022       Please call us at [phone] (or use [employer]) to address the above recommendations.            Thank you very much for trusting [facility].     Healthy regards,          Ryan Kamara M.D.        Results for orders placed or performed in visit on 01/18/23   XR Lumbar Spine 2/3 Views     Status: None    Narrative    LUMBAR SPINE TWO TO THREE VIEWS  1/18/2023 9:28 AM     HISTORY: Chronic bilateral low back pain without sciatica.    COMPARISON: Lumbar spine radiographs dated 5/18/2022.      Impression    IMPRESSION: Nomenclature is based on five lumbar vertebral bodies. No  obvious new gross vertebral body height loss identified. There appears  to be multilevel degenerative endplate remodeling, as before, most  pronounced at the L1-L2 level. Vertebral body heights otherwise appear  maintained. Sagittal alignment is normal. Minimal dextroconvex  curvature. Moderate disc space narrowing at L1-L2 with minimal  disc  space narrowing elsewhere. Mild/moderate lower lumbar degenerative  facet disease. Mild atherosclerotic calcifications of the aorta.    MARSHALL ERWIN MD         SYSTEM ID:  [id number]   Results for orders placed or performed in visit on 01/18/23   UA Macro with Reflex to Micro and Culture - lab collect     Status: Abnormal    Specimen: Urine, Midstream   Result Value Ref Range    Color Urine Yellow Colorless, Straw, Light Yellow, Yellow    Appearance Urine Clear Clear    Glucose Urine Negative Negative mg/dL    Bilirubin Urine Negative Negative    Ketones Urine Negative Negative mg/dL    Specific Gravity Urine 1.025 1.003 - 1.035    Blood Urine Trace (A) Negative    pH Urine 6.0 5.0 - 7.0    Protein Albumin Urine Negative Negative mg/dL    Urobilinogen Urine 0.2 0.2, 1.0 E.U./dL    Nitrite Urine Negative Negative    Leukocyte Esterase Urine Negative Negative   Urine Microscopic     Status: Abnormal   Result Value Ref Range    Bacteria Urine Moderate (A) None Seen /HPF    RBC Urine 0-2 0-2 /HPF /HPF    WBC Urine 0-5 0-5 /HPF /HPF    Squamous Epithelials Urine Few (A) None Seen /LPF    Mucus Urine Present (A) None Seen /LPF    Calcium Oxalate Crystals Urine Moderate (A) None Seen /HPF    Narrative    Urine Culture not indicated   Urine Culture Aerobic Bacterial - lab collect     Status: None    Specimen: Urine, Midstream   Result Value Ref Range    Culture No Growth

## 2023-01-20 LAB — BACTERIA UR CULT: NO GROWTH

## 2023-01-31 ENCOUNTER — OFFICE VISIT (OUTPATIENT)
Dept: FAMILY MEDICINE | Facility: CLINIC | Age: 73
End: 2023-01-31
Payer: COMMERCIAL

## 2023-01-31 ENCOUNTER — ANCILLARY PROCEDURE (OUTPATIENT)
Dept: GENERAL RADIOLOGY | Facility: CLINIC | Age: 73
End: 2023-01-31
Attending: FAMILY MEDICINE
Payer: COMMERCIAL

## 2023-01-31 ENCOUNTER — ALLIED HEALTH/NURSE VISIT (OUTPATIENT)
Dept: NURSING | Facility: CLINIC | Age: 73
End: 2023-01-31
Payer: COMMERCIAL

## 2023-01-31 VITALS
WEIGHT: 183 LBS | DIASTOLIC BLOOD PRESSURE: 76 MMHG | SYSTOLIC BLOOD PRESSURE: 134 MMHG | RESPIRATION RATE: 15 BRPM | HEART RATE: 70 BPM | OXYGEN SATURATION: 100 % | BODY MASS INDEX: 27.83 KG/M2 | TEMPERATURE: 97.3 F

## 2023-01-31 DIAGNOSIS — Z51.81 MEDICATION MONITORING ENCOUNTER: ICD-10-CM

## 2023-01-31 DIAGNOSIS — C34.11 MALIGNANT NEOPLASM OF UPPER LOBE OF RIGHT LUNG (H): ICD-10-CM

## 2023-01-31 DIAGNOSIS — M54.50 CHRONIC BILATERAL LOW BACK PAIN WITHOUT SCIATICA: ICD-10-CM

## 2023-01-31 DIAGNOSIS — I44.7 LBBB (LEFT BUNDLE BRANCH BLOCK): ICD-10-CM

## 2023-01-31 DIAGNOSIS — R07.89 XYPHOIDALGIA: ICD-10-CM

## 2023-01-31 DIAGNOSIS — Z01.30 BLOOD PRESSURE CHECK: Primary | ICD-10-CM

## 2023-01-31 DIAGNOSIS — F33.0 MAJOR DEPRESSIVE DISORDER, RECURRENT EPISODE, MILD (H): ICD-10-CM

## 2023-01-31 DIAGNOSIS — M54.50 CHRONIC BILATERAL LOW BACK PAIN WITHOUT SCIATICA: Primary | ICD-10-CM

## 2023-01-31 DIAGNOSIS — C61 PROSTATE CANCER (H): ICD-10-CM

## 2023-01-31 DIAGNOSIS — F41.9 ANXIETY: ICD-10-CM

## 2023-01-31 DIAGNOSIS — G89.29 CHRONIC BILATERAL LOW BACK PAIN WITHOUT SCIATICA: ICD-10-CM

## 2023-01-31 DIAGNOSIS — K76.0 FATTY LIVER: ICD-10-CM

## 2023-01-31 DIAGNOSIS — K21.9 GASTROESOPHAGEAL REFLUX DISEASE, UNSPECIFIED WHETHER ESOPHAGITIS PRESENT: ICD-10-CM

## 2023-01-31 DIAGNOSIS — G89.29 CHRONIC BILATERAL LOW BACK PAIN WITHOUT SCIATICA: Primary | ICD-10-CM

## 2023-01-31 DIAGNOSIS — H61.23 BILATERAL IMPACTED CERUMEN: ICD-10-CM

## 2023-01-31 PROCEDURE — 99214 OFFICE O/P EST MOD 30 MIN: CPT | Mod: 25 | Performed by: FAMILY MEDICINE

## 2023-01-31 PROCEDURE — 69209 REMOVE IMPACTED EAR WAX UNI: CPT | Performed by: FAMILY MEDICINE

## 2023-01-31 PROCEDURE — 71046 X-RAY EXAM CHEST 2 VIEWS: CPT | Mod: TC | Performed by: RADIOLOGY

## 2023-01-31 PROCEDURE — 96127 BRIEF EMOTIONAL/BEHAV ASSMT: CPT | Performed by: FAMILY MEDICINE

## 2023-01-31 PROCEDURE — 93000 ELECTROCARDIOGRAM COMPLETE: CPT | Mod: 59 | Performed by: FAMILY MEDICINE

## 2023-01-31 PROCEDURE — 99207 PR NO CHARGE NURSE ONLY: CPT

## 2023-01-31 ASSESSMENT — ANXIETY QUESTIONNAIRES
1. FEELING NERVOUS, ANXIOUS, OR ON EDGE: SEVERAL DAYS
IF YOU CHECKED OFF ANY PROBLEMS ON THIS QUESTIONNAIRE, HOW DIFFICULT HAVE THESE PROBLEMS MADE IT FOR YOU TO DO YOUR WORK, TAKE CARE OF THINGS AT HOME, OR GET ALONG WITH OTHER PEOPLE: NOT DIFFICULT AT ALL
7. FEELING AFRAID AS IF SOMETHING AWFUL MIGHT HAPPEN: NOT AT ALL
2. NOT BEING ABLE TO STOP OR CONTROL WORRYING: NOT AT ALL
GAD7 TOTAL SCORE: 2
GAD7 TOTAL SCORE: 2
6. BECOMING EASILY ANNOYED OR IRRITABLE: NOT AT ALL
5. BEING SO RESTLESS THAT IT IS HARD TO SIT STILL: NOT AT ALL
3. WORRYING TOO MUCH ABOUT DIFFERENT THINGS: SEVERAL DAYS

## 2023-01-31 ASSESSMENT — PATIENT HEALTH QUESTIONNAIRE - PHQ9
5. POOR APPETITE OR OVEREATING: NOT AT ALL
SUM OF ALL RESPONSES TO PHQ QUESTIONS 1-9: 0

## 2023-01-31 NOTE — PROGRESS NOTES
Patient is here for  Blood pressure check and is not sure why.  Per the last visit Follow up around  2/1 with provider     Did have a follow up on 2/8 but rescheduled to today provider has opening  Today.   Huddled with ma's  BP Readings from Last 3 Encounters:   01/18/23 128/72   01/06/23 (!) 155/80   10/17/22 110/60     Manuela HOUSE RN   [facility] Triage

## 2023-01-31 NOTE — PROGRESS NOTES
Assessment & Plan       ICD-10-CM    1. Chronic bilateral low back pain without sciatica  M54.50     G89.29       2. Xyphoidalgia  R07.89 EKG 12-lead complete w/read - Clinics     XR Chest 2 Views      3. LBBB (left bundle branch block)  I44.7 Adult Cardiology Eval [facility] Referral      4. Prostate cancer (H)  C61       5. Malignant neoplasm of upper lobe of right lung (H)  C34.11       6. Fatty liver  K76.0       7. Gastroesophageal reflux disease, unspecified whether esophagitis present  K21.9       8. Major depressive disorder, recurrent episode, mild (H)  F33.0       9. Anxiety  F41.9       10. Bilateral impacted cerumen  H61.23       11. Medication monitoring encounter  Z51.81           Discussed treatment/modality options, including risk and benefits, he desires:    1) Cardiology consult - new LBBB - currently no cardiac symptoms    2) xyphoid pain - heat/ice/voltaren    3) cerumen irrigation    All diagnosis above reviewed and noted above, otherwise stable.      See [employer] orders for further details.      Return in about 3 months (around 4/30/2023) for Complete Physical, Medication Recheck Visit, Follow Up Chronic.    No LOS data to display    Doing chart review, history and exam, documentation and further activities as noted.           Ryan Kamara MD, FAAFP     [facility]  [address] [place] [id number]  [email or web address]   Office: [phone]  Fax: [phone]  Pager: [phone]       Subjective      Juwan is a 72 year old, presenting for the following health issues:    Recheck Medication    History of Present Illness       Back Pain:  He presents for follow up of back pain. Patient's back pain is a new problem.    Original cause of back pain: not sure  First noticed back pain: more than 1 month ago  Patient feels back pain: constantlyLocation of back pain:  Right lower back and left lower  back  Description of back pain: dull ache  Back pain spreads: nowhere    Since patient first noticed back pain, pain is: unchanged  Does back pain interfere with his job:  Not applicable  On a scale of 1-10 (10 being the worst), patient describes pain as:  4  What makes back pain worse: bending  Acupuncture: not tried  Acetaminophen: not tried  Activity or exercise: helpful  Chiropractor:  Not helpful  Cold: not tried  Heat: not tried  Massage: not tried  Muscle relaxants: not tried  NSAIDS: helpful  Opioids: not tried  Physical Therapy: not tried  Rest: not helpful  Steroid Injection: not tried  Stretching: helpful  Surgery: not tried  TENS unit: not tried  Topical pain relievers: helpful  Other healthcare providers patient is seeing for back pain: None    He eats 0-1 servings of fruits and vegetables daily.He consumes 1 sweetened beverage(s) daily.He exercises with enough effort to increase his heart rate 20 to 29 minutes per day.  He exercises with enough effort to increase his heart rate 5 days per week.   He is taking medications regularly.     Chronic low back pain - doing well - stretches have helped    Left epididymitis - completed levaquin - much improved    Lower sternum - mid chest - tender to touch - non exertional - no nausea - no sweats - no radiation - no GERD - normal bowels/bladder - no f/c/s - no URI sx - cough when laying down (like clearing) - walks 2 miles with out issues - tender over xyphoid only - occas feels like has to catch breath/gas bubble    Overall feeling quite good    Prostate Cancer / Lung cancer - follows with oncology (DIANA Felder/ROCKY Faulkner), recent recent negative, urology Dr Chan    PSA   Date Value Ref Range Status   05/14/2020 <0.01 0 - 4 ug/L Final     Comment:     Assay Method:  Chemiluminescence using Siemens Vista analyzer     Prostate Specific Antigen Screen   Date Value Ref Range Status   10/17/2022 <0.01 0.00 - 4.00 ug/L Final     CPX scheduled    Next 5  appointments (look out 90 days)    2023  8:00 AM  (Arrive by 7:40 AM)  Adult Preventative Visit with Ryan Kamara MD  [facility] ([facility] ) [address] [place] [id number] [phone]        Depression and Anxiety Follow-Up - PHQ = 0 and BENSON = 2      How are you doing with your depression since your last visit? No change    How are you doing with your anxiety since your last visit?  No change    Are you having other symptoms that might be associated with depression or anxiety? No    Have you had a significant life event? No     Do you have any concerns with your use of alcohol or other drugs? No    Social History     Tobacco Use     Smoking status: Former     Packs/day: 2.00     Years: 20.00     Pack years: 40.00     Types: Cigarettes     Quit date: 1985     Years since quittin.1     Smokeless tobacco: Never   Vaping Use     Vaping Use: Never used   Substance Use Topics     Alcohol use: Not Currently     Comment: sparingly     Drug use: No     PHQ 2023   PHQ-9 Total Score 5 5 0   Q9: Thoughts of better off dead/self-harm past 2 weeks Not at all Not at all Not at all     BENSON-7 SCORE 2023   Total Score - - -   Total Score - - -   Total Score 1 2 2     Last PHQ-9 2023   1.  Little interest or pleasure in doing things 0   2.  Feeling down, depressed, or hopeless 0   3.  Trouble falling or staying asleep, or sleeping too much 0   4.  Feeling tired or having little energy 0   5.  Poor appetite or overeating 0   6.  Feeling bad about yourself 0   7.  Trouble concentrating 0   8.  Moving slowly or restless 0   Q9: Thoughts of better off dead/self-harm past 2 weeks 0   PHQ-9 Total Score 0   Difficulty at work, home, or with people Not difficult at all     BENSON-7  2023   1. Feeling nervous, anxious, or on edge 1   2. Not being able to stop or control worrying 0   3.  Worrying too much about different things 1   4. Trouble relaxing 0   5. Being so restless that it is hard to sit still 0   6. Becoming easily annoyed or irritable 0   7. Feeling afraid, as if something awful might happen 0   BENSON-7 Total Score 2   If you checked any problems, how difficult have they made it for you to do your work, take care of things at home, or get along with other people? Not difficult at all     Suicide Assessment Five-step Evaluation and Treatment (SAFE-T)      Review of Systems   CONSTITUTIONAL: NEGATIVE for fever, chills, change in weight  INTEGUMENTARY/SKIN: NEGATIVE for worrisome rashes, moles or lesions  EYES: NEGATIVE for vision changes or irritation  ENT/MOUTH: NEGATIVE for ear, mouth and throat problems  RESP: NEGATIVE for significant cough or SOB  CV: NEGATIVE for chest pain, palpitations or peripheral edema  GI: NEGATIVE for nausea, abdominal pain, heartburn, or change in bowel habits  : NEGATIVE for frequency, dysuria, or hematuria  MUSCULOSKELETAL: NEGATIVE for significant arthralgias or myalgia  NEURO: NEGATIVE for weakness, dizziness or paresthesias  ENDOCRINE: NEGATIVE for temperature intolerance, skin/hair changes  HEME: NEGATIVE for bleeding problems  PSYCHIATRIC: NEGATIVE for changes in mood or affect      Objective    /76 (BP Location: Left arm, Patient Position: Sitting, Cuff Size: Adult Regular)   Pulse 70   Temp 97.3  F (36.3  C) (Tympanic)   Resp 15   Wt 83 kg (183 lb)   SpO2 100%   BMI 27.83 kg/m    Body mass index is 27.83 kg/m .     Physical Exam   GENERAL: healthy, alert and no distress  EYES: Eyes grossly normal to inspection, PERRL and conjunctivae and sclerae normal  HENT: ear canals (after ear irrigation) and TM's normal, nose and mouth without ulcers or lesions  NECK: no adenopathy, no asymmetry, masses, or scars and thyroid normal to palpation  RESP: lungs clear to auscultation - no rales, rhonchi or wheezes  CV: regular rate and rhythm, normal S1  S2, no S3 or S4, no murmur, click or rub, no peripheral edema and peripheral pulses strong - chest wall tenderness to the xyphoid only  ABDOMEN: soft, nontender, no hepatosplenomegaly, no masses and bowel sounds normal  MS: no gross musculoskeletal defects noted, no edema  SKIN: no suspicious lesions or rashes  NEURO: Normal strength and tone, mentation intact and speech normal  PSYCH: mentation appears normal, affect normal/bright    CXR neg  ECG - new LBBB

## 2023-02-02 NOTE — PROGRESS NOTES
CARDIOLOGY CONSULT    REASON FOR CONSULT: [facility]    PRIMARY CARE PHYSICIAN:  Julio Guidry Jr    HISTORY OF PRESENT ILLNESS:  72-year-old male seen for left bundle branch block.  He has a history of prostate cancer, history lung cancer, sleep apnea, fatty liver.    Stress echo 2019 was normal.    Chest CT October 2022 showed minimal coronary calcification.    In general he has been feeling quite well.  He will go to the gym and walk 1 to 2 miles on the treadmill at 3.3 mph with usually a flat incline.  He thinks blood pressure runs okay, it improved after he lost 30 pounds.  For the past few weeks he has had an intermittent random, resting chest pain in the midsternal area.  This goes away when he is doing something, he does not have it with exertion.  Otherwise he denies palpitations, lightheadedness, or dizziness.    PAST MEDICAL HISTORY:  Past Medical History:   Diagnosis Date     Anxiety      Arthritis     fingers, hips     Calculus of kidney 2005, 2012     Colon polyps      Congenital single kidney      Gastroesophageal reflux disease      Hx of cancer of lung 03/2017    wedge resection     Hypertension      Prostate cancer (H) 08/2017     Seasonal allergies     spring and fall     Sleep apnea     cpap       MEDICATIONS:  Current Outpatient Medications   Medication     cyanocobalamin (VITAMIN B-12) 500 MCG tablet     Garlic 1000 MG CAPS     hydrocortisone 2.5 % ointment     ketoconazole (NIZORAL) 2 % external cream     multivitamin w/minerals (THERA-VIT-M) tablet     Turmeric 500 MG CAPS     Vitamin D, Cholecalciferol, 1000 units TABS     No current facility-administered medications for this visit.       ALLERGIES:  No Known Allergies    SOCIAL HISTORY:  I have reviewed this patient's social history and updated it with pertinent information if needed. Juwan Latham  reports that he quit smoking about 38 years ago. His smoking use included cigarettes. He has a 40.00 pack-year smoking history. He has never  "used smokeless tobacco. He reports that he does not currently use alcohol. He reports that he does not use drugs.    FAMILY HISTORY:  I have reviewed this patient's family history and updated it with pertinent information if needed.   Family History   Problem Relation Age of Onset     Heart Disease Mother      Diabetes Brother 65        Type 2     Diabetes Maternal Grandmother      Cancer - colorectal Brother 70     Hypertension No family hx of      Lipids No family hx of        REVIEW OF SYSTEMS:  Constitutional:  No weight loss, fever, chills  HEENT:  Eyes:  No visual loss, blurred vision, double vision or yellow sclerae. No hearing loss, sneezing, congestion, runny nose or sore throat.  Skin:  No rash or itching.  Cardiovascular: per HPI  Respiratory: per HPI  GI:  No anorexia, nausea, vomiting or diarrhea. No abdominal pain or blood.  :  No dysurea, hematuria  Neurologic:  No headache, paralysis, ataxia, numbness or tingling in the extremities. No change in bowel or bladder control.  Musculoskeletal:  No muscle pain  Hematologic:  No bleeding or bruising.  Lymphatics:  No enlarged nodes. No history of splenectomy.  Endocrine:  No reports of sweating, cold or heat intolerance. No polyuria or polydipsia.  Allergies:  No history of asthma, hives, eczema or rhinitis.    PHYSICAL EXAM:  /88 (BP Location: Right arm, Patient Position: Sitting, Cuff Size: Adult Large)   Pulse 65   Ht 1.727 m (5' 8\")   Wt 81.6 kg (180 lb)   SpO2 98%   BMI 27.37 kg/m      Constitutional: awake, alert, no distress  Eyes: PERRL, sclera nonicteric  ENT: trachea midline  Respiratory: Lungs clear  Cardiovascular: Regular rate and rhythm, no murmurs  GI: nondistended, nontender, bowel sounds present  Lymph/Hematologic: no lymphadenopathy  Skin: dry, no rash  Musculoskeletal: good muscle tone, strength 5/5 in upper and lower extremities  Neurologic: no focal deficits  Neuropsychiatric: appropriate affact    DATA:  Labs:   Recent " Labs   Lab Test 09/23/21  0818 01/14/20  0905   CHOL 201* 189   HDL 48 48   * 120*   TRIG 86 106   October 2022: Potassium 4.2, creatinine 0.7    EKG, January 31, 2023: Sinus rhythm, rate 60, left bundle branch block    ASSESSMENT:  72-year-old male seen for left bundle branch block.  Last EKG was 2019, QRS was narrow at that time.  He has some atypical resting random chest pain, but nothing exertional that sounds like angina.  He has no evidence of heart failure.  Resting echo will be done.  If normal, no further testing required.  If stress testing is needed in the future, he will need a Lexiscan nuclear stress due to the left bundle branch block.    RECOMMENDATIONS:  1.  Left bundle branch block  -Echocardiogram  -If any abnormalities on echo, then Lexiscan nuclear stress    2.  Atypical chest pain  -Check sed rate and CRP    Follow-up as needed with cardiology.    Bladimir Moseley MD  Cardiology - [facility]  Pager:  [phone]  Text Page  February 6, 2023

## 2023-02-06 ENCOUNTER — OFFICE VISIT (OUTPATIENT)
Dept: CARDIOLOGY | Facility: CLINIC | Age: 73
End: 2023-02-06
Attending: FAMILY MEDICINE
Payer: COMMERCIAL

## 2023-02-06 ENCOUNTER — LAB (OUTPATIENT)
Dept: LAB | Facility: CLINIC | Age: 73
End: 2023-02-06
Payer: COMMERCIAL

## 2023-02-06 VITALS
BODY MASS INDEX: 27.28 KG/M2 | DIASTOLIC BLOOD PRESSURE: 88 MMHG | HEIGHT: 68 IN | WEIGHT: 180 LBS | OXYGEN SATURATION: 98 % | HEART RATE: 65 BPM | SYSTOLIC BLOOD PRESSURE: 124 MMHG

## 2023-02-06 DIAGNOSIS — R07.89 ATYPICAL CHEST PAIN: ICD-10-CM

## 2023-02-06 DIAGNOSIS — I44.7 LBBB (LEFT BUNDLE BRANCH BLOCK): ICD-10-CM

## 2023-02-06 DIAGNOSIS — R07.89 ATYPICAL CHEST PAIN: Primary | ICD-10-CM

## 2023-02-06 LAB
CRP SERPL-MCNC: <3 MG/L
ERYTHROCYTE [SEDIMENTATION RATE] IN BLOOD BY WESTERGREN METHOD: 11 MM/HR (ref 0–20)

## 2023-02-06 PROCEDURE — 36415 COLL VENOUS BLD VENIPUNCTURE: CPT | Performed by: INTERNAL MEDICINE

## 2023-02-06 PROCEDURE — 86140 C-REACTIVE PROTEIN: CPT | Performed by: INTERNAL MEDICINE

## 2023-02-06 PROCEDURE — 99204 OFFICE O/P NEW MOD 45 MIN: CPT | Performed by: INTERNAL MEDICINE

## 2023-02-06 PROCEDURE — 85652 RBC SED RATE AUTOMATED: CPT | Performed by: INTERNAL MEDICINE

## 2023-02-06 NOTE — PATIENT INSTRUCTIONS
Your recent EKG showed you have a left bundle branch block.  This is a pattern on EKG that shows the electrical signal going to the lower part of your heart is slower than normal.  This often is an incidental finding.  However it is important to rule out any change in your heart condition that could have resulted in this EKG pattern.    Echocardiogram  Will schedule an echocardiogram, or ultrasound of the heart.  This looks at the size and function of the heart and valves.  It generally takes about 20-30 minutes.  This will tell if there is any reduced heart function or problem with any of the valves.

## 2023-02-06 NOTE — LETTER
2/6/2023    Julio Guidry Jr, MD  [address] [address]    RE: Juwan MICHAEL Yeison       Dear Colleague,     I had the pleasure of seeing Juwan FERNANDA Latham in the [facility].  CARDIOLOGY CONSULT    REASON FOR CONSULT: [facility]    PRIMARY CARE PHYSICIAN:  Julio Guidry Jr    HISTORY OF PRESENT ILLNESS:  72-year-old male seen for left bundle branch block.  He has a history of prostate cancer, history lung cancer, sleep apnea, fatty liver.    Stress echo 2019 was normal.    Chest CT October 2022 showed minimal coronary calcification.    In general he has been feeling quite well.  He will go to the gym and walk 1 to 2 miles on the treadmill at 3.3 mph with usually a flat incline.  He thinks blood pressure runs okay, it improved after he lost 30 pounds.  For the past few weeks he has had an intermittent random, resting chest pain in the midsternal area.  This goes away when he is doing something, he does not have it with exertion.  Otherwise he denies palpitations, lightheadedness, or dizziness.    PAST MEDICAL HISTORY:  Past Medical History:   Diagnosis Date     Anxiety      Arthritis     fingers, hips     Calculus of kidney 2005, 2012     Colon polyps      Congenital single kidney      Gastroesophageal reflux disease      Hx of cancer of lung 03/2017    wedge resection     Hypertension      Prostate cancer (H) 08/2017     Seasonal allergies     spring and fall     Sleep apnea     cpap       MEDICATIONS:  Current Outpatient Medications   Medication     cyanocobalamin (VITAMIN B-12) 500 MCG tablet     Garlic 1000 MG CAPS     hydrocortisone 2.5 % ointment     ketoconazole (NIZORAL) 2 % external cream     multivitamin w/minerals (THERA-VIT-M) tablet     Turmeric 500 MG CAPS     Vitamin D, Cholecalciferol, 1000 units TABS     No current facility-administered medications for this visit.       ALLERGIES:  No Known Allergies    SOCIAL HISTORY:  I have reviewed this patient's social history  "and updated it with pertinent information if needed. Juwan Latham  reports that he quit smoking about 38 years ago. His smoking use included cigarettes. He has a 40.00 pack-year smoking history. He has never used smokeless tobacco. He reports that he does not currently use alcohol. He reports that he does not use drugs.    FAMILY HISTORY:  I have reviewed this patient's family history and updated it with pertinent information if needed.   Family History   Problem Relation Age of Onset     Heart Disease Mother      Diabetes Brother 65        Type 2     Diabetes Maternal Grandmother      Cancer - colorectal Brother 70     Hypertension No family hx of      Lipids No family hx of        REVIEW OF SYSTEMS:  Constitutional:  No weight loss, fever, chills  HEENT:  Eyes:  No visual loss, blurred vision, double vision or yellow sclerae. No hearing loss, sneezing, congestion, runny nose or sore throat.  Skin:  No rash or itching.  Cardiovascular: per HPI  Respiratory: per HPI  GI:  No anorexia, nausea, vomiting or diarrhea. No abdominal pain or blood.  :  No dysurea, hematuria  Neurologic:  No headache, paralysis, ataxia, numbness or tingling in the extremities. No change in bowel or bladder control.  Musculoskeletal:  No muscle pain  Hematologic:  No bleeding or bruising.  Lymphatics:  No enlarged nodes. No history of splenectomy.  Endocrine:  No reports of sweating, cold or heat intolerance. No polyuria or polydipsia.  Allergies:  No history of asthma, hives, eczema or rhinitis.    PHYSICAL EXAM:  /88 (BP Location: Right arm, Patient Position: Sitting, Cuff Size: Adult Large)   Pulse 65   Ht 1.727 m (5' 8\")   Wt 81.6 kg (180 lb)   SpO2 98%   BMI 27.37 kg/m      Constitutional: awake, alert, no distress  Eyes: PERRL, sclera nonicteric  ENT: trachea midline  Respiratory: Lungs clear  Cardiovascular: Regular rate and rhythm, no murmurs  GI: nondistended, nontender, bowel sounds present  Lymph/Hematologic: no " lymphadenopathy  Skin: dry, no rash  Musculoskeletal: good muscle tone, strength 5/5 in upper and lower extremities  Neurologic: no focal deficits  Neuropsychiatric: appropriate affact    DATA:  Labs:   Recent Labs   Lab Test 09/23/21  0818 01/14/20  0905   CHOL 201* 189   HDL 48 48   * 120*   TRIG 86 106   October 2022: Potassium 4.2, creatinine 0.7    EKG, January 31, 2023: Sinus rhythm, rate 60, left bundle branch block    ASSESSMENT:  72-year-old male seen for left bundle branch block.  Last EKG was 2019, QRS was narrow at that time.  He has some atypical resting random chest pain, but nothing exertional that sounds like angina.  He has no evidence of heart failure.  Resting echo will be done.  If normal, no further testing required.  If stress testing is needed in the future, he will need a Lexiscan nuclear stress due to the left bundle branch block.    RECOMMENDATIONS:  1.  Left bundle branch block  -Echocardiogram  -If any abnormalities on echo, then Lexiscan nuclear stress    2.  Atypical chest pain  -Check sed rate and CRP    Follow-up as needed with cardiology.    Bladimir Moseley MD  Cardiology - [facility]  Pager:  [phone]  Text Page  February 6, 2023      Thank you for allowing me to participate in the care of your patient.      Sincerely,     Bladimir Moseley MD     [facility]  cc:   Ryan Kamara MD  [address] [place] [address]

## 2023-02-17 ENCOUNTER — HOSPITAL ENCOUNTER (OUTPATIENT)
Dept: CT IMAGING | Facility: CLINIC | Age: 73
Discharge: HOME | End: 2023-02-17
Attending: CLINICAL NURSE SPECIALIST | Admitting: CLINICAL NURSE SPECIALIST
Payer: COMMERCIAL

## 2023-02-17 DIAGNOSIS — C34.11 MALIGNANT NEOPLASM OF UPPER LOBE OF RIGHT LUNG (H): ICD-10-CM

## 2023-02-17 PROCEDURE — 71250 CT THORAX DX C-: CPT

## 2023-02-27 ENCOUNTER — NURSE TRIAGE (OUTPATIENT)
Dept: FAMILY MEDICINE | Facility: CLINIC | Age: 73
End: 2023-02-27
Payer: COMMERCIAL

## 2023-02-27 DIAGNOSIS — U07.1 INFECTION DUE TO 2019 NOVEL CORONAVIRUS: Primary | ICD-10-CM

## 2023-02-27 NOTE — TELEPHONE ENCOUNTER
Pt tested on 2/21/2023 when symptoms started and it was negative, tested on 2/23/2022 it was positive.       Additional Information    Negative: SEVERE difficulty breathing (e.g., struggling for each breath, speaks in single words)    Negative: Difficult to awaken or acting confused (e.g., disoriented, slurred speech)    Negative: Bluish (or gray) lips or face now    Negative: Shock suspected (e.g., cold/pale/clammy skin, too weak to stand, low BP, rapid pulse)    Negative: Sounds like a life-threatening emergency to the triager    Negative: [1] Diagnosed or suspected COVID-19 AND [2] symptoms lasting 3 or more weeks    Negative: [1] COVID-19 exposure AND [2] no symptoms    Negative: COVID-19 vaccine reaction suspected (e.g., fever, headache, muscle aches) occurring 1 to 3 days after getting vaccine    Negative: COVID-19 vaccine, questions about    Negative: [1] Lives with someone known to have influenza (flu test positive) AND [2] flu-like symptoms (e.g., cough, runny nose, sore throat, SOB; with or without fever)    Negative: [1] Adult with possible COVID-19 symptoms AND [2] triager concerned about severity of symptoms or other causes    Negative: COVID-19 and breastfeeding, questions about    Negative: SEVERE or constant chest pain or pressure  (Exception: Mild central chest pain, present only when coughing.)    Negative: MODERATE difficulty breathing (e.g., speaks in phrases, SOB even at rest, pulse 100-120)    Negative: Headache and stiff neck (can't touch chin to chest)    Negative: Oxygen level (e.g., pulse oximetry) 90 percent or lower    Negative: Chest pain or pressure  (Exception: MILD central chest pain, present only when coughing)    Negative: Patient sounds very sick or weak to the triager    Protocols used: CORONAVIRUS (COVID-19) DIAGNOSED OR [id number]    RN COVID TREATMENT VISIT  02/27/23      The patient has been triaged and does not require a higher level of care.    Juwan Latham  72 year  old  Current weight?   Wt Readings from Last 2 Encounters:   02/06/23 81.6 kg (180 lb)   01/31/23 83 kg (183 lb)         Has the patient been seen by a primary care provider at an [facility] or [facility] within the past two years? Yes.   Have you been in close proximity to/do you have a known exposure to a person with a confirmed case of influenza? No.     General treatment eligibility:  Date of positive COVID test (PCR or at home)?  2/23/2023    Are you or have you been hospitalized for this COVID-19 infection? No.   Have you received monoclonal antibodies or antiviral treatment for COVID-19 since this positive test? No.   Do you have any of the following conditions that place you at risk of being very sick from COVID-19?   - Age 50 years or older  - Overweight or Obesity (BMI >85th percentile or BMI 25 or higher)  Yes, patient has at least one high risk condition as noted above.     Current COVID symptoms:   - congestion or runny nose  Yes. Patient has at least one symptom as selected.     How many days since symptoms started? 5 days or less. Established patient, 12 years or older weighing at least 88.2 lbs, who has symptoms that started in the past 5 days, has not been hospitalized nor received treatment already, and is at risk for being very sick from COVID-19.     Treatment eligibility by RN:    Are you currently pregnant or nursing? No    Do you have a clinically significant hypersensitivity to nirmatrelvir or ritonavir, or toxic epidermal necrolysis (TEN) or Nicole-Jesús Syndrome? No    Do you have a history of hepatitis, any hepatic impairment on the Problem List (such as Child-Wilson Class C, cirrhosis, fatty liver disease, alcoholic liver disease), or was the last liver lab (hepatic panel, ALT, AST, ALK Phos, bilirubin) elevated in the past 6 months? No    Do you have any history of severe renal impairment (eGFR < 30mL/min)? No    Is patient eligible to continue?   Yes,  patient meets all eligibility requirements for the RN COVID treatment (as denoted by all no responses above).     Current Outpatient Medications   Medication Sig Dispense Refill     APPLE CIDER VINEGAR PO Gummies       cyanocobalamin (VITAMIN B-12) 500 MCG tablet Take 2,500 mcg by mouth daily       hydrocortisone 2.5 % ointment Apply topically 2 times daily To all areas of itch/rash. Stop when rash/itch resolves. Hand/face/scalp 30 g 1     ketoconazole (NIZORAL) 2 % external cream Apply to rash daily until rash resolves. Then use twice weekly to prevent flares. Face/scalp 60 g 11     multivitamin w/minerals (THERA-VIT-M) tablet Take 1 tablet by mouth daily       Turmeric 500 MG CAPS Take 1,500 capsules by mouth daily       Vitamin D, Cholecalciferol, 1000 units TABS          Medications from List 1 of the standing order (on medications that exclude the use of Paxlovid) that patient is taking: ergotamine (Ergomar) and NONE. Is patient taking [place]'s Wort? No  Is patient taking Janelle's Wort or any meds from List 1? No.   Medications from List 2 of the standing order (on meds that provider needs to adjust) that patient is taking: NONE. Is patient on any of the meds from List 2? No.   Medications from List 3 of standing order (on meds that a RN needs to adjust) that patient is taking: NONE. Is patient on any meds from List 3? No.     Paxlovid has an approximate 90% reduction in hospitalization. Paxlovid can possibly cause altered sense of taste, diarrhea (loose, watery stools), high blood pressure, muscle aches.     Would patient like a Paxlovid prescription?   Yes.   Lab Results   Component Value Date    GFRESTIMATED >90 10/17/2022       Was last eGFR reduced? No, eGFR 60 or greater/ No Result on record. Patient can receive the normal renal function dose. Paxlovid Rx sent to [facility]   [facility] [address]     Temporary change to home medications: None    All medication adjustments (holds, etc) were  discussed with the patient and patient was asked to repeat back (teachback) their med adjustment.  Did patient understand med adjustment? Yes, patient repeated back and understood correctly.        Reviewed the following instructions with the patient:    Paxlovid (nimatrelvir and ritonavir)    How it works  Two medicines (nirmatrelvir and ritonavir) are taken together. They stop the virus from growing. Less amount of virus is easier for your body to fight.    How to take    Medicine comes in a daily container with both medicine tablets. Take by mouth twice daily (once in the morning, once at night) for 5 days.    The number of tablets to take varies by patient.    Don't chew or break capsules. Swallow whole.    When to take  Take as soon as possible after positive COVID-19 test result, and within 5 days of your first symptoms.    Possible side effects  Can cause altered sense of taste, diarrhea (loose, watery stools), high blood pressure, muscle aches.    Suzy Schuster RN

## 2023-02-28 NOTE — TELEPHONE ENCOUNTER
Patient calling to ask if he should start the paxlovid and is it safe for him having a fatty liver the on the 1/16/23 abd US.   He read the pamphlet yesterday and it states should not be taken with liver issues so he wanted to check.      He had not started the Paxlovid.      Lab Results   Component Value Date    ALT 21 01/16/2023    ALT 32 04/07/2021     AST value was 23 on 1/16/23  Bili was < 0.20  symptoms started 2/21/23 + 2/23/23- today day 7    Patient wants pcp input before starting. declined virtual covid treatment appointment.   Routed as a high priority to pcp    Manuela HOUSE RN   [facility] Triage

## 2023-02-28 NOTE — TELEPHONE ENCOUNTER
Despite fatty liver, had normal liver function tests last month.  He's on no medications that would appear to interact with the Paxlovid.  Please call patient - he's OK to begin the medication.    Julio Guidry MD     Home

## 2023-02-28 NOTE — TELEPHONE ENCOUNTER
Attempt #1  Called Phone # [phone]      Left a voicemail ok per Dr. Guidyr ok to proceed in taking the medication, I will also send you a mychart.  please call back and ask for any available triage nurse @ [phone] if you have further questions. .       Manuela HOUSE RN   [facility] Triage

## 2023-03-02 DIAGNOSIS — C34.11 MALIGNANT NEOPLASM OF UPPER LOBE OF RIGHT LUNG (H): Primary | ICD-10-CM

## 2023-03-16 ENCOUNTER — HOSPITAL ENCOUNTER (OUTPATIENT)
Dept: CARDIOLOGY | Facility: CLINIC | Age: 73
Discharge: HOME | End: 2023-03-16
Attending: INTERNAL MEDICINE | Admitting: INTERNAL MEDICINE
Payer: COMMERCIAL

## 2023-03-16 DIAGNOSIS — R07.89 ATYPICAL CHEST PAIN: ICD-10-CM

## 2023-03-16 DIAGNOSIS — I44.7 LBBB (LEFT BUNDLE BRANCH BLOCK): ICD-10-CM

## 2023-03-16 LAB — LVEF ECHO: NORMAL

## 2023-03-16 PROCEDURE — 93306 TTE W/DOPPLER COMPLETE: CPT | Mod: 26 | Performed by: INTERNAL MEDICINE

## 2023-03-16 PROCEDURE — 93306 TTE W/DOPPLER COMPLETE: CPT

## 2023-04-17 ENCOUNTER — TRANSFERRED RECORDS (OUTPATIENT)
Dept: HEALTH INFORMATION MANAGEMENT | Facility: CLINIC | Age: 73
End: 2023-04-17
Payer: COMMERCIAL

## 2023-04-17 PROCEDURE — 82274 ASSAY TEST FOR BLOOD FECAL: CPT | Performed by: FAMILY MEDICINE

## 2023-04-18 ENCOUNTER — OFFICE VISIT (OUTPATIENT)
Dept: FAMILY MEDICINE | Facility: CLINIC | Age: 73
End: 2023-04-18
Payer: COMMERCIAL

## 2023-04-18 VITALS
HEART RATE: 61 BPM | DIASTOLIC BLOOD PRESSURE: 68 MMHG | HEIGHT: 68 IN | TEMPERATURE: 97.1 F | OXYGEN SATURATION: 98 % | BODY MASS INDEX: 27.77 KG/M2 | SYSTOLIC BLOOD PRESSURE: 120 MMHG | WEIGHT: 183.2 LBS | RESPIRATION RATE: 16 BRPM

## 2023-04-18 DIAGNOSIS — Z12.11 SCREEN FOR COLON CANCER: ICD-10-CM

## 2023-04-18 DIAGNOSIS — K21.9 GASTROESOPHAGEAL REFLUX DISEASE, UNSPECIFIED WHETHER ESOPHAGITIS PRESENT: ICD-10-CM

## 2023-04-18 DIAGNOSIS — D18.02 CAVERNOUS HEMANGIOMA OF BRAIN (H): ICD-10-CM

## 2023-04-18 DIAGNOSIS — Z00.00 ROUTINE GENERAL MEDICAL EXAMINATION AT A HEALTH CARE FACILITY: Primary | ICD-10-CM

## 2023-04-18 DIAGNOSIS — Z12.5 SCREENING FOR PROSTATE CANCER: ICD-10-CM

## 2023-04-18 DIAGNOSIS — N39.46 MIXED INCONTINENCE URGE AND STRESS (MALE)(FEMALE): ICD-10-CM

## 2023-04-18 DIAGNOSIS — F41.9 ANXIETY: ICD-10-CM

## 2023-04-18 DIAGNOSIS — F33.0 MAJOR DEPRESSIVE DISORDER, RECURRENT EPISODE, MILD (H): ICD-10-CM

## 2023-04-18 DIAGNOSIS — K76.0 FATTY LIVER: ICD-10-CM

## 2023-04-18 DIAGNOSIS — H33.012 RETINAL DETACHMENT OF LEFT EYE WITH SINGLE BREAK: ICD-10-CM

## 2023-04-18 DIAGNOSIS — Z13.6 CARDIOVASCULAR SCREENING; LDL GOAL LESS THAN 130: ICD-10-CM

## 2023-04-18 DIAGNOSIS — Z85.118 HX OF CANCER OF LUNG: ICD-10-CM

## 2023-04-18 DIAGNOSIS — C34.11 MALIGNANT NEOPLASM OF UPPER LOBE OF RIGHT LUNG (H): ICD-10-CM

## 2023-04-18 DIAGNOSIS — Z51.81 MEDICATION MONITORING ENCOUNTER: ICD-10-CM

## 2023-04-18 DIAGNOSIS — G47.33 OSA (OBSTRUCTIVE SLEEP APNEA): ICD-10-CM

## 2023-04-18 DIAGNOSIS — I10 HYPERTENSION GOAL BP (BLOOD PRESSURE) < 140/90: ICD-10-CM

## 2023-04-18 DIAGNOSIS — C61 PROSTATE CANCER (H): ICD-10-CM

## 2023-04-18 DIAGNOSIS — D12.4 BENIGN NEOPLASM OF DESCENDING COLON: ICD-10-CM

## 2023-04-18 DIAGNOSIS — Z00.00 MEDICARE ANNUAL WELLNESS VISIT, SUBSEQUENT: ICD-10-CM

## 2023-04-18 LAB
ALBUMIN SERPL BCG-MCNC: 4.7 G/DL (ref 3.5–5.2)
ALBUMIN UR-MCNC: NEGATIVE MG/DL
ALP SERPL-CCNC: 52 U/L (ref 40–129)
ALT SERPL W P-5'-P-CCNC: 23 U/L (ref 10–50)
ANION GAP SERPL CALCULATED.3IONS-SCNC: 12 MMOL/L (ref 7–15)
APPEARANCE UR: CLEAR
AST SERPL W P-5'-P-CCNC: 22 U/L (ref 10–50)
BILIRUB SERPL-MCNC: 0.4 MG/DL
BILIRUB UR QL STRIP: NEGATIVE
BUN SERPL-MCNC: 18.6 MG/DL (ref 8–23)
CALCIUM SERPL-MCNC: 9.8 MG/DL (ref 8.8–10.2)
CHLORIDE SERPL-SCNC: 105 MMOL/L (ref 98–107)
CHOLEST SERPL-MCNC: 184 MG/DL
CK SERPL-CCNC: 110 U/L (ref 39–308)
COLOR UR AUTO: YELLOW
CREAT SERPL-MCNC: 0.82 MG/DL (ref 0.67–1.17)
CREAT UR-MCNC: 86.6 MG/DL
DEPRECATED HCO3 PLAS-SCNC: 24 MMOL/L (ref 22–29)
ERYTHROCYTE [DISTWIDTH] IN BLOOD BY AUTOMATED COUNT: 13.4 % (ref 10–15)
GFR SERPL CREATININE-BSD FRML MDRD: >90 ML/MIN/1.73M2
GLUCOSE SERPL-MCNC: 94 MG/DL (ref 70–99)
GLUCOSE UR STRIP-MCNC: NEGATIVE MG/DL
HCT VFR BLD AUTO: 42.9 % (ref 40–53)
HDLC SERPL-MCNC: 45 MG/DL
HGB BLD-MCNC: 14.2 G/DL (ref 13.3–17.7)
HGB UR QL STRIP: ABNORMAL
KETONES UR STRIP-MCNC: NEGATIVE MG/DL
LDLC SERPL CALC-MCNC: 124 MG/DL
LEUKOCYTE ESTERASE UR QL STRIP: NEGATIVE
MCH RBC QN AUTO: 30.4 PG (ref 26.5–33)
MCHC RBC AUTO-ENTMCNC: 33.1 G/DL (ref 31.5–36.5)
MCV RBC AUTO: 92 FL (ref 78–100)
MICROALBUMIN UR-MCNC: <12 MG/L
MICROALBUMIN/CREAT UR: NORMAL MG/G{CREAT}
NITRATE UR QL: NEGATIVE
NONHDLC SERPL-MCNC: 139 MG/DL
PH UR STRIP: 5.5 [PH] (ref 5–7)
PLATELET # BLD AUTO: 174 10E3/UL (ref 150–450)
POTASSIUM SERPL-SCNC: 4.4 MMOL/L (ref 3.4–5.3)
PROT SERPL-MCNC: 7.1 G/DL (ref 6.4–8.3)
PSA SERPL DL<=0.01 NG/ML-MCNC: <0.01 NG/ML (ref 0–6.5)
RBC # BLD AUTO: 4.67 10E6/UL (ref 4.4–5.9)
RBC #/AREA URNS AUTO: NORMAL /HPF
SODIUM SERPL-SCNC: 141 MMOL/L (ref 136–145)
SP GR UR STRIP: 1.02 (ref 1–1.03)
TRIGL SERPL-MCNC: 75 MG/DL
TSH SERPL DL<=0.005 MIU/L-ACNC: 2.41 UIU/ML (ref 0.3–4.2)
UROBILINOGEN UR STRIP-ACNC: 0.2 E.U./DL
WBC # BLD AUTO: 4.4 10E3/UL (ref 4–11)
WBC #/AREA URNS AUTO: NORMAL /HPF

## 2023-04-18 PROCEDURE — 82043 UR ALBUMIN QUANTITATIVE: CPT | Performed by: FAMILY MEDICINE

## 2023-04-18 PROCEDURE — 84443 ASSAY THYROID STIM HORMONE: CPT | Performed by: FAMILY MEDICINE

## 2023-04-18 PROCEDURE — G0438 PPPS, INITIAL VISIT: HCPCS | Performed by: FAMILY MEDICINE

## 2023-04-18 PROCEDURE — 99214 OFFICE O/P EST MOD 30 MIN: CPT | Mod: 25 | Performed by: FAMILY MEDICINE

## 2023-04-18 PROCEDURE — 84153 ASSAY OF PSA TOTAL: CPT | Performed by: FAMILY MEDICINE

## 2023-04-18 PROCEDURE — 81001 URINALYSIS AUTO W/SCOPE: CPT | Performed by: FAMILY MEDICINE

## 2023-04-18 PROCEDURE — 80061 LIPID PANEL: CPT | Performed by: FAMILY MEDICINE

## 2023-04-18 PROCEDURE — 85027 COMPLETE CBC AUTOMATED: CPT | Performed by: FAMILY MEDICINE

## 2023-04-18 PROCEDURE — 80053 COMPREHEN METABOLIC PANEL: CPT | Performed by: FAMILY MEDICINE

## 2023-04-18 PROCEDURE — 36415 COLL VENOUS BLD VENIPUNCTURE: CPT | Performed by: FAMILY MEDICINE

## 2023-04-18 PROCEDURE — 82550 ASSAY OF CK (CPK): CPT | Performed by: FAMILY MEDICINE

## 2023-04-18 PROCEDURE — 82570 ASSAY OF URINE CREATININE: CPT | Performed by: FAMILY MEDICINE

## 2023-04-18 ASSESSMENT — ENCOUNTER SYMPTOMS
NAUSEA: 0
WEAKNESS: 0
HEARTBURN: 0
HEADACHES: 0
PARESTHESIAS: 0
DIZZINESS: 0
NERVOUS/ANXIOUS: 0
ARTHRALGIAS: 0
JOINT SWELLING: 0
DIARRHEA: 0
FREQUENCY: 0
CONSTIPATION: 0
DYSURIA: 0
ABDOMINAL PAIN: 0
PALPITATIONS: 0
MYALGIAS: 0
EYE PAIN: 0
HEMATOCHEZIA: 0
HEMATURIA: 0
SORE THROAT: 0
SHORTNESS OF BREATH: 0
CHILLS: 0
COUGH: 0
FEVER: 0

## 2023-04-18 ASSESSMENT — ACTIVITIES OF DAILY LIVING (ADL): CURRENT_FUNCTION: NO ASSISTANCE NEEDED

## 2023-04-18 NOTE — LETTER
April 24, 2023      Juwan FERNANDA Yeison  [address] [facility] [address]        Dear ,    We are writing to inform you of your test results.    Labs are overall quite good     We advise:     Continue current cares.   Balanced low cholesterol diet.   Regular exercise.     Resulted Orders   Comprehensive metabolic panel   Result Value Ref Range    Sodium 141 136 - 145 mmol/L    Potassium 4.4 3.4 - 5.3 mmol/L    Chloride 105 98 - 107 mmol/L    Carbon Dioxide (CO2) 24 22 - 29 mmol/L    Anion Gap 12 7 - 15 mmol/L    Urea Nitrogen 18.6 8.0 - 23.0 mg/dL    Creatinine 0.82 0.67 - 1.17 mg/dL    Calcium 9.8 8.8 - 10.2 mg/dL    Glucose 94 70 - 99 mg/dL    Alkaline Phosphatase 52 40 - 129 U/L    AST 22 10 - 50 U/L    ALT 23 10 - 50 U/L    Protein Total 7.1 6.4 - 8.3 g/dL    Albumin 4.7 3.5 - 5.2 g/dL    Bilirubin Total 0.4 <=1.2 mg/dL    GFR Estimate >90 >60 mL/min/1.73m2      Comment:      eGFR calculated using 2021 CKD-EPI equation.   Lipid panel reflex to direct LDL Fasting   Result Value Ref Range    Cholesterol 184 <200 mg/dL    Triglycerides 75 <150 mg/dL    Direct Measure HDL 45 >=40 mg/dL    LDL Cholesterol Calculated 124 (H) <=100 mg/dL    Non HDL Cholesterol 139 (H) <130 mg/dL    Narrative    Cholesterol  Desirable:  <200 mg/dL    Triglycerides  Normal:  Less than 150 mg/dL  Borderline High:  150-199 mg/dL  High:  200-499 mg/dL  Very High:  Greater than or equal to 500 mg/dL    Direct Measure HDL  Female:  Greater than or equal to 50 mg/dL   Male:  Greater than or equal to 40 mg/dL    LDL Cholesterol  Desirable:  <100mg/dL  Above Desirable:  100-129 mg/dL   Borderline High:  130-159 mg/dL   High:  160-189 mg/dL   Very High:  >= 190 mg/dL    Non HDL Cholesterol  Desirable:  130 mg/dL  Above Desirable:  130-159 mg/dL  Borderline High:  160-189 mg/dL  High:  190-219 mg/dL  Very High:  Greater than or equal to 220 mg/dL   CBC with platelets   Result Value Ref Range    WBC Count 4.4 4.0 - 11.0 10e3/uL    RBC  Count 4.67 4.40 - 5.90 10e6/uL    Hemoglobin 14.2 13.3 - 17.7 g/dL    Hematocrit 42.9 40.0 - 53.0 %    MCV 92 78 - 100 fL    MCH 30.4 26.5 - 33.0 pg    MCHC 33.1 31.5 - 36.5 g/dL    RDW 13.4 10.0 - 15.0 %    Platelet Count 174 150 - 450 10e3/uL   CK total   Result Value Ref Range     39 - 308 U/L   UA Macroscopic with reflex to Microscopic and Culture   Result Value Ref Range    Color Urine Yellow Colorless, Straw, Light Yellow, Yellow    Appearance Urine Clear Clear    Glucose Urine Negative Negative mg/dL    Bilirubin Urine Negative Negative    Ketones Urine Negative Negative mg/dL    Specific Gravity Urine 1.025 1.003 - 1.035    Blood Urine Trace (A) Negative    pH Urine 5.5 5.0 - 7.0    Protein Albumin Urine Negative Negative mg/dL    Urobilinogen Urine 0.2 0.2, 1.0 E.U./dL    Nitrite Urine Negative Negative    Leukocyte Esterase Urine Negative Negative   Albumin Random Urine Quantitative with Creat Ratio   Result Value Ref Range    Creatinine Urine mg/dL 86.6 mg/dL      Comment:      The reference ranges have not been established in urine creatinine. The results should be integrated into the clinical context for interpretation.    Albumin Urine mg/L <12.0 mg/L      Comment:      The reference ranges have not been established in urine albumin. The results should be integrated into the clinical context for interpretation.    Albumin Urine mg/g Cr        Comment:      Unable to calculate, urine albumin and/or urine creatinine is outside detectable limits.  Microalbuminuria is defined as an albumin:creatinine ratio of 17 to 299 for males and 25 to 299 for females. A ratio of albumin:creatinine of 300 or higher is indicative of overt proteinuria.  Due to biologic variability, positive results should be confirmed by a second, first-morning random or 24-hour timed urine specimen. If there is discrepancy, a third specimen is recommended. When 2 out of 3 results are in the microalbuminuria range, this is evidence  for incipient nephropathy and warrants increased efforts at glucose control, blood pressure control, and institution of therapy with an angiotensin-converting-enzyme (ACE) inhibitor (if the patient can tolerate it).     TSH with free T4 reflex   Result Value Ref Range    TSH 2.41 0.30 - 4.20 uIU/mL   Fecal colorectal cancer screen FIT   Result Value Ref Range    Occult Blood Screen FIT Negative Negative   PSA, tumor marker   Result Value Ref Range    PSA Tumor Marker <0.01 0.00 - 6.50 ng/mL    Narrative    This result is obtained using the Roche Elecsys total PSA method on the tom e801 immunoassay analyzer. Results obtained with different assay methods or kits cannot be used interchangeably.   UA Microscopic with Reflex to Culture   Result Value Ref Range    RBC Urine 0-2 0-2 /HPF /HPF    WBC Urine 0-5 0-5 /HPF /HPF    Narrative    Urine Culture not indicated       If you have any questions or concerns, please call the clinic at the number listed above.       Sincerely,      Ryan Kamara MD

## 2023-04-18 NOTE — LETTER
April 24, 2023      Juwan FERNANDA Yeison  [address] [facility] [address]        Dear ,    We are writing to inform you of your test results.    Labs are overall quite good     We advise:     Continue current cares.   Balanced low cholesterol diet.   Regular exercise.     Resulted Orders   Comprehensive metabolic panel   Result Value Ref Range    Sodium 141 136 - 145 mmol/L    Potassium 4.4 3.4 - 5.3 mmol/L    Chloride 105 98 - 107 mmol/L    Carbon Dioxide (CO2) 24 22 - 29 mmol/L    Anion Gap 12 7 - 15 mmol/L    Urea Nitrogen 18.6 8.0 - 23.0 mg/dL    Creatinine 0.82 0.67 - 1.17 mg/dL    Calcium 9.8 8.8 - 10.2 mg/dL    Glucose 94 70 - 99 mg/dL    Alkaline Phosphatase 52 40 - 129 U/L    AST 22 10 - 50 U/L    ALT 23 10 - 50 U/L    Protein Total 7.1 6.4 - 8.3 g/dL    Albumin 4.7 3.5 - 5.2 g/dL    Bilirubin Total 0.4 <=1.2 mg/dL    GFR Estimate >90 >60 mL/min/1.73m2      Comment:      eGFR calculated using 2021 CKD-EPI equation.   Lipid panel reflex to direct LDL Fasting   Result Value Ref Range    Cholesterol 184 <200 mg/dL    Triglycerides 75 <150 mg/dL    Direct Measure HDL 45 >=40 mg/dL    LDL Cholesterol Calculated 124 (H) <=100 mg/dL    Non HDL Cholesterol 139 (H) <130 mg/dL    Narrative    Cholesterol  Desirable:  <200 mg/dL    Triglycerides  Normal:  Less than 150 mg/dL  Borderline High:  150-199 mg/dL  High:  200-499 mg/dL  Very High:  Greater than or equal to 500 mg/dL    Direct Measure HDL  Female:  Greater than or equal to 50 mg/dL   Male:  Greater than or equal to 40 mg/dL    LDL Cholesterol  Desirable:  <100mg/dL  Above Desirable:  100-129 mg/dL   Borderline High:  130-159 mg/dL   High:  160-189 mg/dL   Very High:  >= 190 mg/dL    Non HDL Cholesterol  Desirable:  130 mg/dL  Above Desirable:  130-159 mg/dL  Borderline High:  160-189 mg/dL  High:  190-219 mg/dL  Very High:  Greater than or equal to 220 mg/dL   CBC with platelets   Result Value Ref Range    WBC Count 4.4 4.0 - 11.0 10e3/uL    RBC  Count 4.67 4.40 - 5.90 10e6/uL    Hemoglobin 14.2 13.3 - 17.7 g/dL    Hematocrit 42.9 40.0 - 53.0 %    MCV 92 78 - 100 fL    MCH 30.4 26.5 - 33.0 pg    MCHC 33.1 31.5 - 36.5 g/dL    RDW 13.4 10.0 - 15.0 %    Platelet Count 174 150 - 450 10e3/uL   CK total   Result Value Ref Range     39 - 308 U/L   UA Macroscopic with reflex to Microscopic and Culture   Result Value Ref Range    Color Urine Yellow Colorless, Straw, Light Yellow, Yellow    Appearance Urine Clear Clear    Glucose Urine Negative Negative mg/dL    Bilirubin Urine Negative Negative    Ketones Urine Negative Negative mg/dL    Specific Gravity Urine 1.025 1.003 - 1.035    Blood Urine Trace (A) Negative    pH Urine 5.5 5.0 - 7.0    Protein Albumin Urine Negative Negative mg/dL    Urobilinogen Urine 0.2 0.2, 1.0 E.U./dL    Nitrite Urine Negative Negative    Leukocyte Esterase Urine Negative Negative   Albumin Random Urine Quantitative with Creat Ratio   Result Value Ref Range    Creatinine Urine mg/dL 86.6 mg/dL      Comment:      The reference ranges have not been established in urine creatinine. The results should be integrated into the clinical context for interpretation.    Albumin Urine mg/L <12.0 mg/L      Comment:      The reference ranges have not been established in urine albumin. The results should be integrated into the clinical context for interpretation.    Albumin Urine mg/g Cr        Comment:      Unable to calculate, urine albumin and/or urine creatinine is outside detectable limits.  Microalbuminuria is defined as an albumin:creatinine ratio of 17 to 299 for males and 25 to 299 for females. A ratio of albumin:creatinine of 300 or higher is indicative of overt proteinuria.  Due to biologic variability, positive results should be confirmed by a second, first-morning random or 24-hour timed urine specimen. If there is discrepancy, a third specimen is recommended. When 2 out of 3 results are in the microalbuminuria range, this is evidence  for incipient nephropathy and warrants increased efforts at glucose control, blood pressure control, and institution of therapy with an angiotensin-converting-enzyme (ACE) inhibitor (if the patient can tolerate it).     TSH with free T4 reflex   Result Value Ref Range    TSH 2.41 0.30 - 4.20 uIU/mL   Fecal colorectal cancer screen FIT   Result Value Ref Range    Occult Blood Screen FIT Negative Negative   PSA, tumor marker   Result Value Ref Range    PSA Tumor Marker <0.01 0.00 - 6.50 ng/mL    Narrative    This result is obtained using the Roche Elecsys total PSA method on the tom e801 immunoassay analyzer. Results obtained with different assay methods or kits cannot be used interchangeably.   UA Microscopic with Reflex to Culture   Result Value Ref Range    RBC Urine 0-2 0-2 /HPF /HPF    WBC Urine 0-5 0-5 /HPF /HPF    Narrative    Urine Culture not indicated       If you have any questions or concerns, please call the clinic at the number listed above.       Sincerely,      Ryan Kamara MD

## 2023-04-18 NOTE — PROGRESS NOTES
"[facility] Echeverria is a 72 year old who presents for Preventive Visit.        4/18/2023     7:55 AM   Additional Questions   Roomed by Madelin Dove CMA   Accompanied by Self     Patient has been advised of split billing requirements and indicates understanding: Yes     Are you in the first 12 months of your Medicare coverage?  No    Healthy Habits:     In general, how would you rate your overall health?  Fair    Frequency of exercise:  4-5 days/week    Duration of exercise:  15-30 minutes    Do you usually eat at least 4 servings of fruit and vegetables a day, include whole grains    & fiber and avoid regularly eating high fat or \"junk\" foods?  Yes    Taking medications regularly:  Yes    Medication side effects:  Not applicable    Ability to successfully perform activities of daily living:  No assistance needed    Home Safety:  No safety concerns identified    Hearing Impairment:  No hearing concerns    In the past 6 months, have you been bothered by leaking of urine? Yes    In general, how would you rate your overall mental or emotional health?  Good      PHQ-2 Total Score: 0    Additional concerns today:  No    Have you ever done Advance Care Planning? (For example, a Health Directive, POLST, or a discussion with a medical provider or your loved ones about your wishes): No, advance care planning information given to patient to review.  Patient declined advance care planning discussion at this time.    Fall risk    Fallen 2 or more times in the past year?: No  Any fall with injury in the past year?: No    Cognitive Screening   1) Repeat 3 items (Leader, Season, Table)    2) Clock draw: NORMAL  3) 3 item recall: Recalls 2 objects   Results: NORMAL clock, 1-2 items recalled: COGNITIVE IMPAIRMENT LESS LIKELY    Mini-CogTM Copyright NETTIE Carlos. Licensed by the author for use in [facility]; reprinted with permission (alex@.[facility]). All rights reserved.      Do you have " sleep apnea, excessive snoring or daytime drowsiness?: yes    Reviewed and updated as needed this visit by clinical staff   Tobacco  Allergies  Meds              Reviewed and updated as needed this visit by Provider                 Social History     Tobacco Use     Smoking status: Former     Packs/day: 2.00     Years: 20.00     Pack years: 40.00     Types: Cigarettes     Quit date: 1985     Years since quittin.3     Smokeless tobacco: Never   Vaping Use     Vaping status: Never Used   Substance Use Topics     Alcohol use: Not Currently     Alcohol/week: 0.0 - 2.0 standard drinks of alcohol             2023     7:48 AM   Alcohol Use   Prescreen: >3 drinks/day or >7 drinks/week? No          View : No data to display.              Do you have a current opioid prescription? No  Do you use any other controlled substances or medications that are not prescribed by a provider? None      Prostate Cancer - hx of lung cancer (CT negative 2023) - wears pad daily - minimal incontinence    PSA   Date Value Ref Range Status   2020 <0.01 0 - 4 ug/L Final     Comment:     Assay Method:  Chemiluminescence using Siemens Vista analyzer     Prostate Specific Antigen Screen   Date Value Ref Range Status   10/17/2022 <0.01 0.00 - 4.00 ug/L Final     PAULINO - no CPAP - not snoring - lost 30 lbs - offered sleep testing, declines for now    Fatty liver - no issues -  [facility] seen yesterday    Nephrolithiasis - push fluids - no current issues    Hypertension Follow-up      Do you check your blood pressure regularly outside of the clinic? Yes     Are you following a low salt diet? Yes    Are your blood pressures ever more than 140 on the top number (systolic) OR more   than 90 on the bottom number (diastolic), for example 140/90? Yes     BP Readings from Last 3 Encounters:   23 120/68   23 124/88   23 134/76     Creatinine   Date Value Ref Range Status   10/17/2022 0.75 0.66 - 1.25 mg/dL Final    04/07/2021 0.78 0.70 - 1.30 mg/dl Final     GFR Estimate   Date Value Ref Range Status   10/17/2022 >90 >60 mL/min/1.73m2 Final     Comment:     Effective December 21, 2021 eGFRcr in adults is calculated using the 2021 CKD-EPI creatinine equation which includes age and gender (Irene et al., NE, DOI: 10.1056/CIHQzj6991028)   10/20/2020 >90 >60 mL/min/[1.73_m2] Final     Comment:     Non  GFR Calc  Starting 12/18/2018, serum creatinine based estimated GFR (eGFR) will be   calculated using the Chronic Kidney Disease Epidemiology Collaboration   (CKD-EPI) equation.       Lipids    Recent Labs   Lab Test 09/23/21  0818 01/14/20  0905   CHOL 201* 189   HDL 48 48   * 120*   TRIG 86 106     GERD - no issues    Retinal detachment - no issues    Depression and Anxiety Follow-Up    How are you doing with your depression since your last visit? Improved     How are you doing with your anxiety since your last visit?  Improved     Are you having other symptoms that might be associated with depression or anxiety? No    Have you had a significant life event? No     Do you have any concerns with your use of alcohol or other drugs? No        1/18/2023     8:34 AM 1/18/2023    10:34 AM 1/31/2023    11:02 AM   PHQ   PHQ-9 Total Score 5 5 0   Q9: Thoughts of better off dead/self-harm past 2 weeks Not at all Not at all Not at all         1/18/2023     8:34 AM 1/18/2023    10:34 AM 1/31/2023    11:02 AM   BENSON-7 SCORE   Total Score 1 2 2         1/31/2023    11:02 AM   Last PHQ-9   1.  Little interest or pleasure in doing things 0   2.  Feeling down, depressed, or hopeless 0   3.  Trouble falling or staying asleep, or sleeping too much 0   4.  Feeling tired or having little energy 0   5.  Poor appetite or overeating 0   6.  Feeling bad about yourself 0   7.  Trouble concentrating 0   8.  Moving slowly or restless 0   Q9: Thoughts of better off dead/self-harm past 2 weeks 0   PHQ-9 Total Score 0   Difficulty at  work, home, or with people Not difficult at all         1/31/2023    11:02 AM   BENSON-7    1. Feeling nervous, anxious, or on edge 1   2. Not being able to stop or control worrying 0   3. Worrying too much about different things 1   4. Trouble relaxing 0   5. Being so restless that it is hard to sit still 0   6. Becoming easily annoyed or irritable 0   7. Feeling afraid, as if something awful might happen 0   BENSON-7 Total Score 2   If you checked any problems, how difficult have they made it for you to do your work, take care of things at home, or get along with other people? Not difficult at all       Suicide Assessment Five-step Evaluation and Treatment (SAFE-T)      Current providers sharing in care for this patient include:   Patient Care Team:  Julio Guidry Jr., MD as PCP - General (Family Practice)  Paulo Agarwal MD as MD (Urology)  Love Mcdonald, RN as Registered Nurse (Oncology)  Maria A Desai MD as MD (Cardiovascular & Thoracic Surgery)  Mike Chan MD as Assigned Surgical Provider  Bladimir Moseley MD as MD (Cardiovascular Disease)  Bladimir Moseley MD as Assigned Heart and Vascular Provider  Ryan Kamara MD as Assigned PCP    The following health maintenance items are reviewed in Epic and correct as of today:  Health Maintenance   Topic Date Due     ZOSTER IMMUNIZATION (1 of 2) Never done     HEPATITIS B IMMUNIZATION (1 of 3 - Risk 3-dose series) Never done     COVID-19 Vaccine (4 - Booster for Moderna series) 02/11/2022     INFLUENZA VACCINE (1) 09/01/2022     PHQ-9  07/31/2023     BMP  10/17/2023     CMP  10/17/2023     MICROALBUMIN  10/17/2023     PSA  10/17/2023     ANNUAL REVIEW OF HM ORDERS  10/17/2023     CBC  01/16/2024     MEDICARE ANNUAL WELLNESS VISIT  04/18/2024     FALL RISK ASSESSMENT  04/18/2024     DTAP/TDAP/TD IMMUNIZATION (2 - Td or Tdap) 02/28/2026     LIPID  09/23/2026     COLORECTAL CANCER SCREENING  01/25/2027     ADVANCE CARE PLANNING   04/18/2028     HEPATITIS C SCREENING  Completed     DEPRESSION ACTION PLAN  Completed     Pneumococcal Vaccine: 65+ Years  Completed     AORTIC ANEURYSM SCREENING (SYSTEM ASSIGNED)  Completed     IPV IMMUNIZATION  Aged Out     MENINGITIS IMMUNIZATION  Aged Out     Review of Systems   Constitutional: Negative for chills and fever.   HENT: Negative for congestion, ear pain, hearing loss and sore throat.    Eyes: Negative for pain and visual disturbance.   Respiratory: Negative for cough and shortness of breath.    Cardiovascular: Negative for chest pain, palpitations and peripheral edema.   Gastrointestinal: Negative for abdominal pain, constipation, diarrhea, heartburn, hematochezia and nausea.   Genitourinary: Negative for dysuria, frequency, genital sores, hematuria, impotence, penile discharge and urgency.   Musculoskeletal: Negative for arthralgias, joint swelling and myalgias.   Skin: Negative for rash.   Neurological: Negative for dizziness, weakness, headaches and paresthesias.   Psychiatric/Behavioral: Negative for mood changes. The patient is not nervous/anxious.      Health Maintenance     Colonoscopy:  Due 1/ 2027   FIT:  given              PSA:  pending   DEXA:  NA    Health Maintenance Due   Topic Date Due     ZOSTER IMMUNIZATION (1 of 2) Never done     HEPATITIS B IMMUNIZATION (1 of 3 - Risk 3-dose series) Never done     COVID-19 Vaccine (4 - Booster for Moderna series) 02/11/2022     INFLUENZA VACCINE (1) 09/01/2022       Current Problem List    Patient Active Problem List   Diagnosis     Anxiety     PAULINO (obstructive sleep apnea)     GERD (gastroesophageal reflux disease)     Hypertension goal BP (blood pressure) < 140/90     Benign neoplasm of descending colon     s/p Malignant neoplasm of upper lobe of right lung (H)     Overweight (BMI 25.0-29.9)     Cavernous hemangioma of brain (H)     Major depressive disorder, recurrent episode, mild (H)     h/o Prostate cancer (H) - s/p prostatectomy 2017      Somatic dysfunction of pelvis region     Mixed incontinence urge and stress (male)(female)     Retinal detachment of left eye with single break     Postprocedural male urethral stricture     Gross hematuria     Malignant neoplasm (H)     Numbness     Fatty liver     CARDIOVASCULAR SCREENING; LDL GOAL LESS THAN 130     Hx of cancer of lung       Past Medical History    Past Medical History:   Diagnosis Date     Anxiety      Arthritis     fingers, hips     Calculus of kidney 2005, 2012     Colon polyps      Congenital single kidney      Gastroesophageal reflux disease      Hx of cancer of lung 03/2017    wedge resection     Hypertension      Prostate cancer (H) 08/2017    prostatectomy/Rad Tx     Seasonal allergies     spring and fall     Sleep apnea     cpap       Past Surgical History    Past Surgical History:   Procedure Laterality Date     BRONCHOSCOPY FLEXIBLE N/A 03/03/2017    Procedure: BRONCHOSCOPY FLEXIBLE;  Surgeon: Maria A Desai MD;  Location: [facility] OR     COLONOSCOPY N/A 02/11/2015    Procedure: COLONOSCOPY;  Surgeon: Murphy Jesus MD;  Location:  GI     COLONOSCOPY N/A 12/11/2019    Procedure: COLONOSCOPY;  Surgeon: Murphy Jesus MD;  Location:  GI     COLONOSCOPY N/A 01/25/2022    due 5 yrs - COLONOSCOPY, FLEXIBLE, WITH POLYPECTOMY  USING COLD SNARE;  Surgeon: Neha Rosen MD;  Location:  GI     COMBINED CYSTOSCOPY, RETROGRADES, URETEROSCOPY, LASER HOLMIUM LITHOTRIPSY URETER(S), INSERT STENT N/A 03/25/2018    Procedure: COMBINED CYSTOSCOPY, RETROGRADES, URETEROSCOPY, LASER HOLMIUM LITHOTRIPSY URETER(S), INSERT STENT;  Cystoscopy, Catheter Exchange under Anesthesia;  Surgeon: Zaria Cain MD;  Location:  OR     CYSTOSCOPY, DILATE URETHRA, COMBINED N/A 01/03/2022    Procedure: CYSTOSCOPY, WITH URETHRAL DILATION WITH FULGERATION OF BLADDER WALL;  Surgeon: Mike Chan MD;  [place] [facility] [place]     DAVINCI PROSTATECTOMY N/A 12/01/2017    Procedure: DAVINCI PROSTATECTOMY;   Robotic Assisted Radical Prostatectomy and Pelvic Lymph Node Dissection ;  Surgeon: Paulo Agarwal MD;  Location: [facility] OR     ESOPHAGOSCOPY, GASTROSCOPY, DUODENOSCOPY (EGD), COMBINED N/A 05/20/2016    Procedure: COMBINED ESOPHAGOSCOPY, GASTROSCOPY, DUODENOSCOPY (EGD), BIOPSY SINGLE OR MULTIPLE;  Surgeon: Murphy Jesus MD;  Location: [facility] GI     LAPAROSCOPIC WEDGE RESECTION LUNG Right 03/03/2017    Procedure: LAPAROSCOPIC WEDGE RESECTION LUNG;  Surgeon: Maria A Desai MD;  Location: [facility] OR     LASER HOLMIUM LITHOTRIPSY URETER(S), INSERT STENT, COMBINED  09/11/2012    Procedure: COMBINED CYSTOSCOPY, URETEROSCOPY, LASER HOLMIUM LITHOTRIPSY URETER(S), INSERT STENT;  Cystoscopy, Right Ureteroscopy, Stone Extraction, Holmium Laser, Double J Stent Placement;  Surgeon: Nicolás Blackwell MD;  Location:  OR       Current Medications    Current Outpatient Medications   Medication Sig Dispense Refill     APPLE CIDER VINEGAR PO Gummies       cyanocobalamin (VITAMIN B-12) 500 MCG tablet Take 2,500 mcg by mouth daily       hydrocortisone 2.5 % ointment Apply topically 2 times daily To all areas of itch/rash. Stop when rash/itch resolves. Hand/face/scalp 30 g 1     ketoconazole (NIZORAL) 2 % external cream Apply to rash daily until rash resolves. Then use twice weekly to prevent flares. Face/scalp 60 g 11     multivitamin w/minerals (THERA-VIT-M) tablet Take 1 tablet by mouth daily       Turmeric 500 MG CAPS Take 1,500 capsules by mouth daily       Vitamin D, Cholecalciferol, 1000 units TABS          Allergies    No Known Allergies    Immunizations    Immunization History   Administered Date(s) Administered     COVID-19 Vaccine 18+ (Moderna) 02/06/2021, 03/06/2021     COVID-19,PF,Moderna Booster 12/17/2021     Influenza (High Dose) 3 valent vaccine 12/05/2019     Influenza Vaccine 65+ (Fluzone HD) 09/23/2021     Pneumo Conj 13-V (2010&after) 03/14/2017     Pneumococcal 23 valent 12/04/2018     TD,PF 7+ (Tenivac)  10/18/2004     TDAP Vaccine (Boostrix) 2016       Family History    Family History   Problem Relation Age of Onset     Heart Disease Mother      Other - See Comments Sister         during surgery for hip fracture     Cancer Sister         skin     Cancer Sister         skin     Diabetes Brother 65        Type 2     Diabetes Maternal Grandmother      Substance Abuse Son      Hypertension No family hx of      Lipids No family hx of        Social History    Social History     Socioeconomic History     Marital status:      Spouse name: Not on file     Number of children: 1     Years of education: Not on file     Highest education level: Not on file   Occupational History     Occupation: [email or web address]     Employer: SELF     Employer: OTHER   Tobacco Use     Smoking status: Former     Packs/day: 2.00     Years: 20.00     Pack years: 40.00     Types: Cigarettes     Quit date: 1985     Years since quittin.3     Smokeless tobacco: Never   Vaping Use     Vaping status: Never Used   Substance and Sexual Activity     Alcohol use: Not Currently     Alcohol/week: 0.0 - 2.0 standard drinks of alcohol     Drug use: No     Sexual activity: Yes     Partners: Female   Other Topics Concern      Service Not Asked     Blood Transfusions Not Asked     Caffeine Concern No     Occupational Exposure Not Asked     Hobby Hazards Not Asked     Sleep Concern Yes     Comment: middle/late insomnia     Stress Concern Not Asked     Weight Concern Not Asked     Special Diet Not Asked     Back Care Not Asked     Exercise Yes     Bike Helmet Not Asked     Seat Belt Yes     Self-Exams Not Asked     Parent/sibling w/ CABG, MI or angioplasty before 65F 55M? No   Social History Narrative    Dad  at age 86 from complications after hip surgery.    Mom  at age 68 from heart condition    Siblings:  Three sisters in good health.  Brother with diabetes and overweight.        One son    One granddaughter    Occupation:   "remodeling     Social Determinants of Health     Financial Resource Strain: Not on file   Food Insecurity: Not on file   Transportation Needs: Not on file   Physical Activity: Not on file   Stress: Not on file   Social Connections: Not on file   Intimate Partner Violence: Not on file   Housing Stability: Not on file       ROS    CONSTITUTIONAL: NEGATIVE for fever, chills, change in weight  INTEGUMENTARY/SKIN: NEGATIVE for worrisome rashes, moles or lesions  EYES: NEGATIVE for vision changes or irritation  ENT/MOUTH: NEGATIVE for ear, mouth and throat problems  RESP: NEGATIVE for significant cough or SOB  BREAST: NEGATIVE for masses, tenderness or discharge  CV: NEGATIVE for chest pain, palpitations or peripheral edema  GI: NEGATIVE for nausea, abdominal pain, heartburn, or change in bowel habits  : NEGATIVE for frequency, dysuria, or hematuria  MUSCULOSKELETAL: NEGATIVE for significant arthralgias or myalgia  NEURO: NEGATIVE for weakness, dizziness or paresthesias  ENDOCRINE: NEGATIVE for temperature intolerance, skin/hair changes  HEME: NEGATIVE for bleeding problems  PSYCHIATRIC: NEGATIVE for changes in mood or affect    OBJECTIVE    /68   Pulse 61   Temp 97.1  F (36.2  C) (Tympanic)   Resp 16   Ht 1.727 m (5' 8\")   Wt 83.1 kg (183 lb 3.2 oz)   SpO2 98%   BMI 27.86 kg/m      EXAM:    GENERAL: healthy, alert and no distress  EYES: Eyes grossly normal to inspection, PERRL and conjunctivae and sclerae normal  HENT: ear canals and TM's normal, nose and mouth without ulcers or lesions  NECK: no adenopathy, no asymmetry, masses, or scars and thyroid normal to palpation  RESP: lungs clear to auscultation - no rales, rhonchi or wheezes  CV: regular rate and rhythm, normal S1 S2, no S3 or S4, no murmur, click or rub, no peripheral edema and peripheral pulses strong  ABDOMEN: soft, nontender, no hepatosplenomegaly, no masses and bowel sounds normal   (male): pt declines  RECTAL: pt declines  MS: no gross " musculoskeletal defects noted, no edema  SKIN: no suspicious lesions or rashes  NEURO: Normal strength and tone, mentation intact and speech normal  PSYCH: mentation appears normal, affect normal/bright  LYMPH: no cervical, supraclavicular, axillary, or inguinal adenopathy    DIAGNOSTICS/PROCEDURES    Pending    ASSESSMENT      ICD-10-CM    1. Routine general medical examination at a health care facility  Z00.00 Comprehensive metabolic panel     Lipid panel reflex to direct LDL Fasting     CBC with platelets     CK total     UA Macroscopic with reflex to Microscopic and Culture     Albumin Random Urine Quantitative with Creat Ratio     TSH with free T4 reflex     Fecal colorectal cancer screen FIT     PSA, tumor marker     Comprehensive metabolic panel     Lipid panel reflex to direct LDL Fasting     CBC with platelets     CK total     UA Macroscopic with reflex to Microscopic and Culture     Albumin Random Urine Quantitative with Creat Ratio     TSH with free T4 reflex     Fecal colorectal cancer screen FIT     PSA, tumor marker     UA Microscopic with Reflex to Culture     PRIMARY CARE FOLLOW-UP SCHEDULING      2. Medicare annual wellness visit, subsequent  Z00.00 Comprehensive metabolic panel     Lipid panel reflex to direct LDL Fasting     CBC with platelets     CK total     UA Macroscopic with reflex to Microscopic and Culture     Albumin Random Urine Quantitative with Creat Ratio     TSH with free T4 reflex     Fecal colorectal cancer screen FIT     PSA, tumor marker     Comprehensive metabolic panel     Lipid panel reflex to direct LDL Fasting     CBC with platelets     CK total     UA Macroscopic with reflex to Microscopic and Culture     Albumin Random Urine Quantitative with Creat Ratio     TSH with free T4 reflex     Fecal colorectal cancer screen FIT     PSA, tumor marker     UA Microscopic with Reflex to Culture     PRIMARY CARE FOLLOW-UP SCHEDULING      3. Malignant neoplasm of upper lobe of right lung  (H)  C34.11 Comprehensive metabolic panel     CBC with platelets     UA Macroscopic with reflex to Microscopic and Culture     Albumin Random Urine Quantitative with Creat Ratio     Comprehensive metabolic panel     CBC with platelets     UA Macroscopic with reflex to Microscopic and Culture     Albumin Random Urine Quantitative with Creat Ratio     UA Microscopic with Reflex to Culture     PRIMARY CARE FOLLOW-UP SCHEDULING     OFFICE/OUTPT VISIT,EST,LEVL IV      4. Prostate cancer (H)  C61 UA Macroscopic with reflex to Microscopic and Culture     Albumin Random Urine Quantitative with Creat Ratio     PSA, tumor marker     UA Macroscopic with reflex to Microscopic and Culture     Albumin Random Urine Quantitative with Creat Ratio     PSA, tumor marker     UA Microscopic with Reflex to Culture     PRIMARY CARE FOLLOW-UP SCHEDULING     OFFICE/OUTPT VISIT,EST,LEVL IV      5. Hx of cancer of lung  Z85.118 PRIMARY CARE FOLLOW-UP SCHEDULING     OFFICE/OUTPT VISIT,EST,LEVL IV      6. Major depressive disorder, recurrent episode, mild (H)  F33.0 TSH with free T4 reflex     TSH with free T4 reflex     PRIMARY CARE FOLLOW-UP SCHEDULING     OFFICE/OUTPT VISIT,EST,LEVL IV      7. Anxiety  F41.9 TSH with free T4 reflex     TSH with free T4 reflex     PRIMARY CARE FOLLOW-UP SCHEDULING     OFFICE/OUTPT VISIT,EST,LEVL IV      8. PAULINO (obstructive sleep apnea)  G47.33 TSH with free T4 reflex     TSH with free T4 reflex     PRIMARY CARE FOLLOW-UP SCHEDULING     OFFICE/OUTPT VISIT,EST,LEVL IV      9. Fatty liver  K76.0 Comprehensive metabolic panel     CBC with platelets     Comprehensive metabolic panel     CBC with platelets     PRIMARY CARE FOLLOW-UP SCHEDULING     OFFICE/OUTPT VISIT,EST,LEVL IV      10. Hypertension goal BP (blood pressure) < 140/90  I10 Comprehensive metabolic panel     UA Macroscopic with reflex to Microscopic and Culture     Albumin Random Urine Quantitative with Creat Ratio     Comprehensive metabolic panel      UA Macroscopic with reflex to Microscopic and Culture     Albumin Random Urine Quantitative with Creat Ratio     UA Microscopic with Reflex to Culture     PRIMARY CARE FOLLOW-UP SCHEDULING     OFFICE/OUTPT VISIT,EST,LEVL IV      11. CARDIOVASCULAR SCREENING; LDL GOAL LESS THAN 130  Z13.6 Comprehensive metabolic panel     Lipid panel reflex to direct LDL Fasting     CK total     Comprehensive metabolic panel     Lipid panel reflex to direct LDL Fasting     CK total     PRIMARY CARE FOLLOW-UP SCHEDULING     OFFICE/OUTPT VISIT,EST,LEVL IV      12. Gastroesophageal reflux disease, unspecified whether esophagitis present  K21.9 CBC with platelets     CBC with platelets     PRIMARY CARE FOLLOW-UP SCHEDULING     OFFICE/OUTPT VISIT,EST,LEVL IV      13. Retinal detachment of left eye with single break  H33.012 PRIMARY CARE FOLLOW-UP SCHEDULING     OFFICE/OUTPT VISIT,EST,LEVL IV      14. Mixed incontinence urge and stress (male)(female)  N39.46 UA Macroscopic with reflex to Microscopic and Culture     Albumin Random Urine Quantitative with Creat Ratio     UA Macroscopic with reflex to Microscopic and Culture     Albumin Random Urine Quantitative with Creat Ratio     UA Microscopic with Reflex to Culture     PRIMARY CARE FOLLOW-UP SCHEDULING     OFFICE/OUTPT VISIT,EST,LEVL IV      15. Cavernous hemangioma of brain (H)  D18.02 PRIMARY CARE FOLLOW-UP SCHEDULING     OFFICE/OUTPT VISIT,EST,LEVL IV      16. Benign neoplasm of descending colon  D12.4 Fecal colorectal cancer screen FIT     Fecal colorectal cancer screen FIT     PRIMARY CARE FOLLOW-UP SCHEDULING     OFFICE/OUTPT VISIT,EST,LEVL IV      17. Screen for colon cancer  Z12.11 Fecal colorectal cancer screen FIT     Fecal colorectal cancer screen FIT     PRIMARY CARE FOLLOW-UP SCHEDULING     OFFICE/OUTPT VISIT,EST,LEVL IV      18. Screening for prostate cancer  Z12.5 PRIMARY CARE FOLLOW-UP SCHEDULING     OFFICE/OUTPT VISIT,EST,LEVL IV      19. Medication monitoring encounter   Z51.81 Comprehensive metabolic panel     Lipid panel reflex to direct LDL Fasting     CBC with platelets     CK total     UA Macroscopic with reflex to Microscopic and Culture     Albumin Random Urine Quantitative with Creat Ratio     TSH with free T4 reflex     PSA, tumor marker     Comprehensive metabolic panel     Lipid panel reflex to direct LDL Fasting     CBC with platelets     CK total     UA Macroscopic with reflex to Microscopic and Culture     Albumin Random Urine Quantitative with Creat Ratio     TSH with free T4 reflex     PSA, tumor marker     UA Microscopic with Reflex to Culture     PRIMARY CARE FOLLOW-UP SCHEDULING     OFFICE/OUTPT VISIT,EST,LEVL IV          PLAN    Discussed treatment/modality options, including risk and benefits, he desires:    advised alcohol consumption 1oz per day or less, advised 1 multivitamin per day, advised calcium 8135-7916 mg/d and Vitamin D 800-1200 IU/d, advised dentist every 6 months, advised diet and exercise, advised opthalmologist every 1 years, advised self testicular exam q month, further health care maintenance, further lab(s), immunization(s) and observation    Discussed controversies surrounding PSA. Specifically reviewed 2017 USPSTF findings recommending discussion of PSA testing for men ages 55-69.  Reviewed findings of the  Randomized Study of Screening for Prostate Cancer which showed a 30% reduction in advanced stage prostate cancer and a 20% reduction in death rate from prostate cancer in this age group. PSA-based screening may prevent up to 2 deaths and up to 3 cases of metastatic disease per 1,000 men screened over 13 years.    We've elected to do PSA this year after discussing these controversies.    All diagnosis above reviewed and noted above, otherwise stable.      [facility] for [facility] details.      1) meds reviewed    2) labs pending    3) immunizations - reviewed - consider COVID bivalent, shingrix    4) Ophthalmology    5)  "Oncology    6) [facility]    Return in about 1 year (around 4/18/2024) for Complete Physical, Medication Recheck Visit, Follow Up Chronic.    Health Maintenance Due   Topic Date Due     ZOSTER IMMUNIZATION (1 of 2) Never done     HEPATITIS B IMMUNIZATION (1 of 3 - Risk 3-dose series) Never done     COVID-19 Vaccine (4 - Booster for Moderna series) 02/11/2022     INFLUENZA VACCINE (1) 09/01/2022       COUNSELING    Reviewed preventive health counseling, as reflected in patient instructions    BP Readings from Last 1 Encounters:   04/18/23 120/68     Estimated body mass index is 27.86 kg/m  as calculated from the following:    Height as of this encounter: 1.727 m (5' 8\").    Weight as of this encounter: 83.1 kg (183 lb 3.2 oz).           reports that he quit smoking about 38 years ago. His smoking use included cigarettes. He has a 40.00 pack-year smoking history. He has never used smokeless tobacco.      Counseling Resources:    ATP IV Guidelines  Pooled Cohorts Equation Calculator  FRAX Risk Assessment  ICSI Preventive Guidelines  Dietary Guidelines for Americans, 2010  USDA's MyPlate  ASA Prophylaxis  Lung CA Screening           Ryan Kamara MD, FAAFP     [facility]  [address] [place] [id number]  [email or web address]  [email or web address]   Office: [phone]  Fax: [phone]  Pager: [phone]     Identified Health Risks:    I have reviewed Opioid Use Disorder and Substance Use Disorder risk factors and made any needed referrals.     "

## 2023-04-20 LAB — HEMOCCULT STL QL IA: NEGATIVE

## 2023-04-22 PROBLEM — C80.1 MALIGNANT NEOPLASM (H): Status: ACTIVE | Noted: 2021-12-17

## 2023-04-27 ENCOUNTER — TELEPHONE (OUTPATIENT)
Dept: UROLOGY | Facility: CLINIC | Age: 73
End: 2023-04-27
Payer: COMMERCIAL

## 2023-04-27 NOTE — TELEPHONE ENCOUNTER
Attempted to call pt. Left detailed message to call clinic back at [phone].   To discuss symptoms.     Chhaya Park, MSN RN

## 2023-04-27 NOTE — TELEPHONE ENCOUNTER
[facility] Call Center    Phone Message    May a detailed message be left on voicemail: yes     Reason for Call: Symptoms or Concerns     If patient has red-flag symptoms, warm transfer to triage line    Current symptom or concern: Patient is calling with some concerns about a UTI. Having some tingling and restriction with urination. Please reach out to patient to discuss. Thank you    Symptoms have been present for:  3 day(s)    Has patient previously been seen for this? No          Action Taken: Message routed to:  [facility] URO    Travel Screening: Not Applicable

## 2023-04-28 NOTE — TELEPHONE ENCOUNTER
Spoke to pt. Pt reports having tingling and restriction intermittently for the last 8 days. He states that it is present a little bit, but the other day it was more concerning. No fever. Noted increased urgency and frequency. He reports he is able to sleep well. No hematuria. Drinking canned lemon water and drinks a couple bottles of water a day. He is experiencing some mild right hip pain that comes and goes. Pt had UA done recently that was negative. Assisted to schedule follow up with Dr. Chan. Advised pt to increase water intake and use Tylenol, Ibuprofen, and heating pad for relief. Reviewed urgent symptoms to monitor for. Pt verbalized understanding.  He will call back as needed.     Chhaya Park, MSN RN

## 2023-05-01 ENCOUNTER — PRE VISIT (OUTPATIENT)
Dept: UROLOGY | Facility: CLINIC | Age: 73
End: 2023-05-01
Payer: COMMERCIAL

## 2023-05-01 NOTE — TELEPHONE ENCOUNTER
Reason for visit: follow up      Dx/Hx/Sx: stricture     Records/imaging/labs/orders: in epic     At Rooming: standard

## 2023-05-03 ENCOUNTER — OFFICE VISIT (OUTPATIENT)
Dept: UROLOGY | Facility: CLINIC | Age: 73
End: 2023-05-03
Payer: COMMERCIAL

## 2023-05-03 VITALS
HEIGHT: 68 IN | WEIGHT: 180 LBS | BODY MASS INDEX: 27.28 KG/M2 | HEART RATE: 63 BPM | DIASTOLIC BLOOD PRESSURE: 76 MMHG | SYSTOLIC BLOOD PRESSURE: 130 MMHG

## 2023-05-03 DIAGNOSIS — N99.114 POSTPROCEDURAL MALE URETHRAL STRICTURE: Primary | ICD-10-CM

## 2023-05-03 PROCEDURE — 99214 OFFICE O/P EST MOD 30 MIN: CPT | Mod: GC | Performed by: UROLOGY

## 2023-05-03 ASSESSMENT — PAIN SCALES - GENERAL: PAINLEVEL: NO PAIN (0)

## 2023-05-03 NOTE — LETTER
"5/3/2023       RE: Juwan Latham  [address]  [facility] [address]     Dear Colleague,    Thank you for referring your patient, Juwan Latham, to the [facility]OLOGY [facility] [place] at [facility]. Please see a copy of my visit note below.    HPI:  Juwan Latham is a 72 year old male with history of RALP and XRT.  He has a stricture near membranous urethra which was dilated Jan 2022.  He voided well afterwards.  He also had gross hematuria and we did some fulguration of erythematous lesions.  He doesnot have significant recurrence hematuria.    Had ct of c/a/p in Nov 2021 which showed horseshoe kidney with no hydro. There are some benign cysts. There are some small stones nonobstructive.    He notes improved urination for some time after dilation but has some stopping and starting of stream, new from last visit.     Exam:  /76   Pulse 63   Ht 1.727 m (5' 8\")   Wt 81.6 kg (180 lb)   BMI 27.37 kg/m    NAD  Abdomen soft  Ambulating easily    Assessment & Plan     Membranous urethral stricture - recurrent  Horseshoe kidney  Gross hematuria    Given prior negative hematuria workup and difficulties with cystoscopy, we discussed holding off repeat hematuria workup    Discussed optilume as a next step for urethral dilation, also discussed urethroplasty, patient would like to hold off and revisit these topics after some thought.     He is leaning towards optilume, but wants to consider to think about it. We printed out information related to the procedure.    Maco Hill MD  PGY1 Surgery     Physician Attestation   I, Mike Chan MD, saw this patient and agree with the findings and plan of care as documented in the note.      Mike Chan MD  Reconstructive Urology    "

## 2023-05-03 NOTE — PROGRESS NOTES
"HPI:  Juwan Latham is a 72 year old male with history of RALP and XRT.  He has a stricture near membranous urethra which was dilated Jan 2022.  He voided well afterwards.  He also had gross hematuria and we did some fulguration of erythematous lesions.  He doesnot have significant recurrence hematuria.    Had ct of c/a/p in Nov 2021 which showed horseshoe kidney with no hydro. There are some benign cysts. There are some small stones nonobstructive.    He notes improved urination for some time after dilation but has some stopping and starting of stream, new from last visit.     Exam:  /76   Pulse 63   Ht 1.727 m (5' 8\")   Wt 81.6 kg (180 lb)   BMI 27.37 kg/m    NAD  Abdomen soft  Ambulating easily    Assessment & Plan     Membranous urethral stricture - recurrent  Horseshoe kidney  Gross hematuria    Given prior negative hematuria workup and difficulties with cystoscopy, we discussed holding off repeat hematuria workup    Discussed optilume as a next step for urethral dilation, also discussed urethroplasty, patient would like to hold off and revisit these topics after some thought.     He is leaning towards optilume, but wants to consider to think about it. We printed out information related to the procedure.    Maco Hill MD  PGY1 Surgery     Physician Attestation   I, Mike Chan MD, saw this patient and agree with the findings and plan of care as documented in the note.      Mike Chan MD  Reconstructive Urology    "

## 2023-05-03 NOTE — NURSING NOTE
"Chief Complaint   Patient presents with     Follow Up     Pt stated \"Talk to the doctor to find out a good plan\"       Blood pressure 130/76, pulse 63, height 1.727 m (5' 8\"), weight 81.6 kg (180 lb). Body mass index is 27.37 kg/m .    Patient Active Problem List   Diagnosis     Anxiety     PAULINO (obstructive sleep apnea)     GERD (gastroesophageal reflux disease)     Hypertension goal BP (blood pressure) < 140/90     Benign neoplasm of descending colon     s/p Malignant neoplasm of upper lobe of right lung (H)     Overweight (BMI 25.0-29.9)     Cavernous hemangioma of brain (H)     Major depressive disorder, recurrent episode, mild (H)     h/o Prostate cancer (H) - s/p prostatectomy 2017     Somatic dysfunction of pelvis region     Mixed incontinence urge and stress (male)(female)     Retinal detachment of left eye with single break     Postprocedural male urethral stricture     Gross hematuria     Malignant neoplasm (H)     Numbness     Fatty liver     CARDIOVASCULAR SCREENING; LDL GOAL LESS THAN 130     Hx of cancer of lung       No Known Allergies    Current Outpatient Medications   Medication Sig Dispense Refill     APPLE CIDER VINEGAR PO Gummies       cyanocobalamin (VITAMIN B-12) 500 MCG tablet Take 2,500 mcg by mouth daily       hydrocortisone 2.5 % ointment Apply topically 2 times daily To all areas of itch/rash. Stop when rash/itch resolves. Hand/face/scalp 30 g 1     ketoconazole (NIZORAL) 2 % external cream Apply to rash daily until rash resolves. Then use twice weekly to prevent flares. Face/scalp 60 g 11     multivitamin w/minerals (THERA-VIT-M) tablet Take 1 tablet by mouth daily       Turmeric 500 MG CAPS Take 1,500 capsules by mouth daily       Vitamin D, Cholecalciferol, 1000 units TABS          Social History     Tobacco Use     Smoking status: Former     Packs/day: 2.00     Years: 20.00     Pack years: 40.00     Types: Cigarettes     Quit date: 1985     Years since quittin.3     Smokeless " tobacco: Never   Vaping Use     Vaping status: Never Used   Substance Use Topics     Alcohol use: Not Currently     Alcohol/week: 0.0 - 2.0 standard drinks of alcohol     Drug use: No       Brittney Patricio  5/3/2023  11:07 AM

## 2023-05-04 PROBLEM — N99.114 POSTPROCEDURAL MALE URETHRAL STRICTURE: Status: ACTIVE | Noted: 2021-12-03

## 2023-05-08 NOTE — TELEPHONE ENCOUNTER
Called patient and notified of appointment needs.  Appointment scheduled for tomorrow    Nanda BROWNRN BSN  [facility]  [phone]     Home

## 2023-06-19 ENCOUNTER — OFFICE VISIT (OUTPATIENT)
Dept: FAMILY MEDICINE | Facility: CLINIC | Age: 73
End: 2023-06-19
Payer: COMMERCIAL

## 2023-06-19 VITALS
HEART RATE: 55 BPM | DIASTOLIC BLOOD PRESSURE: 62 MMHG | WEIGHT: 180 LBS | TEMPERATURE: 98.1 F | OXYGEN SATURATION: 99 % | HEIGHT: 68 IN | RESPIRATION RATE: 12 BRPM | SYSTOLIC BLOOD PRESSURE: 104 MMHG | BODY MASS INDEX: 27.28 KG/M2

## 2023-06-19 DIAGNOSIS — Z23 NEED FOR SHINGLES VACCINE: ICD-10-CM

## 2023-06-19 DIAGNOSIS — M54.50 LUMBAR BACK PAIN: Primary | ICD-10-CM

## 2023-06-19 DIAGNOSIS — R11.0 NAUSEA: ICD-10-CM

## 2023-06-19 PROCEDURE — 99214 OFFICE O/P EST MOD 30 MIN: CPT | Performed by: NURSE PRACTITIONER

## 2023-06-19 RX ORDER — PREDNISONE 20 MG/1
20 TABLET ORAL DAILY
Qty: 7 TABLET | Refills: 0 | Status: SHIPPED | OUTPATIENT
Start: 2023-06-19 | End: 2023-08-30

## 2023-06-19 RX ORDER — ONDANSETRON 4 MG/1
4 TABLET, ORALLY DISINTEGRATING ORAL EVERY 8 HOURS PRN
Qty: 20 TABLET | Refills: 0 | Status: SHIPPED | OUTPATIENT
Start: 2023-06-19 | End: 2023-08-30

## 2023-06-19 RX ORDER — CYCLOBENZAPRINE HCL 5 MG
5-10 TABLET ORAL 3 TIMES DAILY PRN
Qty: 20 TABLET | Refills: 0 | Status: SHIPPED | OUTPATIENT
Start: 2023-06-19 | End: 2023-08-30

## 2023-06-19 RX ORDER — DIAZEPAM 10 MG
TABLET ORAL
Qty: 1 TABLET | Refills: 0 | Status: SHIPPED | OUTPATIENT
Start: 2023-06-19 | End: 2023-08-30

## 2023-06-19 ASSESSMENT — ENCOUNTER SYMPTOMS: BACK PAIN: 1

## 2023-06-19 NOTE — PROGRESS NOTES
Assessment & Plan     Need for shingles vaccine  - will get done at other facility due to cost    Lumbar back pain  - will get MRI and then treat as indicated with conservative therapy, possible referral to spine as needed  - MR Lumbar Spine w/o Contrast  - predniSONE (DELTASONE) 20 MG tablet  Dispense: 7 tablet; Refill: 0  - cyclobenzaprine (FLEXERIL) 5 MG tablet  Dispense: 20 tablet; Refill: 0  - diazepam (VALIUM) 10 MG tablet  Dispense: 1 tablet; Refill: 0    Nausea  - will use as needed  - ondansetron (ZOFRAN ODT) 4 MG ODT tab  Dispense: 20 tablet; Refill: 0    Marian Cao NP  [facility] 72 year [facility], presenting for the following health issues:  Back Pain and Nausea        6/19/2023     7:54 AM   Additional Questions   Roomed by irwin KYLE     Back Pain     History of Present Illness       Back Pain:  He presents for follow up of back pain. Patient's back pain is a new problem.    Original cause of back pain: not sure  First noticed back pain: 1-4 weeks ago  Patient feels back pain: dailyLocation of back pain:  Right lower back and left lower back  Description of back pain: dull ache  Back pain spreads: nowhere    Since patient first noticed back pain, pain is: gradually worsening  Does back pain interfere with his job:  Not applicable  On a scale of 1-10 (10 being the worst), patient describes pain as:  5  What makes back pain worse: standing  Acupuncture: not tried  Acetaminophen: not tried  Activity or exercise: helpful  Chiropractor:  Not helpful  Cold: not tried  Heat: not tried  Massage: not tried  Muscle relaxants: not tried  NSAIDS: not tried  Opioids: not tried  Physical Therapy: helpful  Rest: not helpful  Steroid Injection: not tried  Stretching: helpful  Surgery: not tried  TENS unit: not tried  Topical pain relievers: not tried  Other healthcare providers patient is seeing for back pain: None    Reason for visit:  Back and nuaseia  Symptom  "onset:  1-2 weeks ago    He eats 0-1 servings of fruits and vegetables daily.He consumes 1 sweetened beverage(s) daily.He exercises with enough effort to increase his heart rate 20 to 29 minutes per day.  He exercises with enough effort to increase his heart rate 3 or less days per week.   He is taking medications regularly.     He is here for back pain that has been going on for about 2 weeks. He did have an xray in the past of is lumbar spine. He does work out a couple times a week. He states the pain gets worse when he stands up, denies radiculopathy. Denies loss of bladder and or bowel. The pain will intensify with flexion and extension. He has tried to stay away from OTC pain medication due to his liver problem, he did try on OTC and that didn't help. Denies N/T in lower extremities. Denies blood in urine. Denies urinary concerns, hx of prostate CA with Radical prostate removal.     Review of Systems   Musculoskeletal: Positive for back pain.      {    Objective    /62 (Cuff Size: Adult Regular)   Pulse 55   Temp 98.1  F (36.7  C)   Resp 12   Ht 1.727 m (5' 8\")   Wt 81.6 kg (180 lb)   SpO2 99%   BMI 27.37 kg/m    Body mass index is 27.37 kg/m .  Physical Exam   GENERAL: healthy, alert and no distress  EYES: Eyes grossly normal to inspection, PERRL and conjunctivae and sclerae normal  HENT: ear canals and TM's normal, nose and mouth without ulcers or lesions  NECK: no adenopathy, no asymmetry, masses, or scars and thyroid normal to palpation  RESP: lungs clear to auscultation - no rales, rhonchi or wheezes  CV: regular rate and rhythm, normal S1 S2, no S3 or S4, no murmur, click or rub, no peripheral edema and peripheral pulses strong  ABDOMEN: soft, nontender, no hepatosplenomegaly, no masses and bowel sounds normal  MS: no gross musculoskeletal defects noted, no edema  SKIN: no suspicious lesions or rashes  NEURO: Normal strength and tone, mentation intact and speech normal  PSYCH: mentation " appears normal, affect normal/bright

## 2023-06-20 ENCOUNTER — HOSPITAL ENCOUNTER (OUTPATIENT)
Dept: MRI IMAGING | Facility: CLINIC | Age: 73
Discharge: HOME | End: 2023-06-20
Attending: NURSE PRACTITIONER | Admitting: NURSE PRACTITIONER
Payer: COMMERCIAL

## 2023-06-20 DIAGNOSIS — M54.50 LUMBAR BACK PAIN: ICD-10-CM

## 2023-06-20 PROCEDURE — 72148 MRI LUMBAR SPINE W/O DYE: CPT

## 2023-07-14 ENCOUNTER — PRE VISIT (OUTPATIENT)
Dept: UROLOGY | Facility: CLINIC | Age: 73
End: 2023-07-14
Payer: COMMERCIAL

## 2023-07-18 ENCOUNTER — TELEPHONE (OUTPATIENT)
Dept: FAMILY MEDICINE | Facility: CLINIC | Age: 73
End: 2023-07-18
Payer: COMMERCIAL

## 2023-07-18 DIAGNOSIS — M54.50 LUMBAR BACK PAIN: Primary | ICD-10-CM

## 2023-07-18 NOTE — TELEPHONE ENCOUNTER
"Received incoming call from patient who is requesting \"back therapy\" for his ongoing back pain. Patient states he has been taking cyclobenzaprine, but this isn't helping his pain. He is also taking ibuprofen with no relief. Patient states he has also tried walking, but he has had no relief. Patient states his pain is currently at 7/10 in his lower back.    Patient was seen for back pain on 06/19/2023 and had MRI of his spine on 06/20/2023.    Patient requesting referral for physical therapy.    Thank you,  Manny Gil, Triage RN [place]  3:01 PM 7/18/2023     "

## 2023-07-19 ENCOUNTER — OFFICE VISIT (OUTPATIENT)
Dept: UROLOGY | Facility: CLINIC | Age: 73
End: 2023-07-19
Payer: COMMERCIAL

## 2023-07-19 VITALS
HEART RATE: 70 BPM | HEIGHT: 68 IN | SYSTOLIC BLOOD PRESSURE: 134 MMHG | DIASTOLIC BLOOD PRESSURE: 77 MMHG | BODY MASS INDEX: 27.28 KG/M2 | WEIGHT: 180 LBS

## 2023-07-19 DIAGNOSIS — N99.114 POSTPROCEDURAL MALE URETHRAL STRICTURE: Primary | ICD-10-CM

## 2023-07-19 PROCEDURE — 99213 OFFICE O/P EST LOW 20 MIN: CPT | Performed by: UROLOGY

## 2023-07-19 ASSESSMENT — PAIN SCALES - GENERAL: PAINLEVEL: NO PAIN (0)

## 2023-07-19 NOTE — LETTER
"7/19/2023       RE: Juwan Latham  [address]  [facility] [address]     Dear Colleague,    Thank you for referring your patient, Juwan Latham, to the [facility] UROLOGY CLIN[facility] [place] at [facility]. Please see a copy of my visit note below.    Juwan Latham is a 72 year old male with history of RALP and XRT.  He has a stricture near membranous urethra which was dilated Jan 2022.  He voided well afterwards.  He also had gross hematuria and we did some fulguration of erythematous lesions.  He doesnot have significant recurrence hematuria.     Had ct of c/a/p in Nov 2021 which showed horseshoe kidney with no hydro. There are some benign cysts. There are some small stones nonobstructive.     He has a reasonable stream. He does have some double voiding, intermittent voiding, occasional incontinence.    However, he is mostly dry.  He is reasonably happy with current stream.  He was also having intermittent gross hematuria, which has resolved.    He's not very excited about more surgery since he's comfortable with current voiding pattern.    /77   Pulse 70   Ht 1.727 m (5' 8\")   Wt 81.6 kg (180 lb)   BMI 27.37 kg/m    NAD  Abdomen soft  No distress.    I reviewed PSA from 4/2023 - <0.01    A/P:  Urethral stricture in membranous urethra with prior RALP + XRT    He is happy with current voiding pattern, lack of hematuria, minimal incontinence and his PSA <0.01    He wants to followup PRN at this point.    Can get PSA checks with PCP.  Followup PRN with me.    Mike Chan MD    "

## 2023-07-19 NOTE — PROGRESS NOTES
"Juwan Latham is a 72 year old male with history of RALP and XRT.  He has a stricture near membranous urethra which was dilated Jan 2022.  He voided well afterwards.  He also had gross hematuria and we did some fulguration of erythematous lesions.  He doesnot have significant recurrence hematuria.     Had ct of c/a/p in Nov 2021 which showed horseshoe kidney with no hydro. There are some benign cysts. There are some small stones nonobstructive.     He has a reasonable stream. He does have some double voiding, intermittent voiding, occasional incontinence.    However, he is mostly dry.  He is reasonably happy with current stream.  He was also having intermittent gross hematuria, which has resolved.    He's not very excited about more surgery since he's comfortable with current voiding pattern.    /77   Pulse 70   Ht 1.727 m (5' 8\")   Wt 81.6 kg (180 lb)   BMI 27.37 kg/m    NAD  Abdomen soft  No distress.    I reviewed PSA from 4/2023 - <0.01    A/P:  Urethral stricture in membranous urethra with prior RALP + XRT    He is happy with current voiding pattern, lack of hematuria, minimal incontinence and his PSA <0.01    He wants to followup PRN at this point.    Can get PSA checks with PCP.  Followup PRN with me.    Mike Chan MD  "

## 2023-07-19 NOTE — NURSING NOTE
"Chief Complaint   Patient presents with     Follow Up       Blood pressure 134/77, pulse 70, height 1.727 m (5' 8\"), weight 81.6 kg (180 lb). Body mass index is 27.37 kg/m .    Patient Active Problem List   Diagnosis     Anxiety     PAULINO (obstructive sleep apnea)     GERD (gastroesophageal reflux disease)     Hypertension goal BP (blood pressure) < 140/90     Benign neoplasm of descending colon     s/p Malignant neoplasm of upper lobe of right lung (H)     Overweight (BMI 25.0-29.9)     Cavernous hemangioma of brain (H)     Major depressive disorder, recurrent episode, mild (H)     h/o Prostate cancer (H) - s/p prostatectomy 2017     Somatic dysfunction of pelvis region     Mixed incontinence urge and stress (male)(female)     Retinal detachment of left eye with single break     Postprocedural male urethral stricture     Gross hematuria     Malignant neoplasm (H)     Numbness     Fatty liver     CARDIOVASCULAR SCREENING; LDL GOAL LESS THAN 130     Hx of cancer of lung       No Known Allergies    Current Outpatient Medications   Medication Sig Dispense Refill     APPLE CIDER VINEGAR PO Gummies       cyanocobalamin (VITAMIN B-12) 500 MCG tablet Take 2,500 mcg by mouth daily       cyclobenzaprine (FLEXERIL) 5 MG tablet Take 1-2 tablets (5-10 mg) by mouth 3 times daily as needed for muscle spasms 20 tablet 0     diazepam (VALIUM) 10 MG tablet TAKE ONE PILL 30 MINUTES PRIOR TO MRI APPOINTMENT 1 tablet 0     hydrocortisone 2.5 % ointment Apply topically 2 times daily To all areas of itch/rash. Stop when rash/itch resolves. Hand/face/scalp 30 g 1     ketoconazole (NIZORAL) 2 % external cream Apply to rash daily until rash resolves. Then use twice weekly to prevent flares. Face/scalp 60 g 11     multivitamin w/minerals (THERA-VIT-M) tablet Take 1 tablet by mouth daily       ondansetron (ZOFRAN ODT) 4 MG ODT tab Take 1 tablet (4 mg) by mouth every 8 hours as needed for nausea 20 tablet 0     predniSONE (DELTASONE) 20 MG " Pre-charting complete. Care gaps identified will be addressed at the time of visit.   tablet Take 1 tablet (20 mg) by mouth daily TAKE WITH FOOD 7 tablet 0     Turmeric 500 MG CAPS Take 1,500 capsules by mouth daily       Vitamin D, Cholecalciferol, 1000 units TABS          Social History     Tobacco Use     Smoking status: Former     Packs/day: 2.00     Years: 20.00     Pack years: 40.00     Types: Cigarettes     Quit date: 1985     Years since quittin.5     Smokeless tobacco: Never   Vaping Use     Vaping Use: Never used   Substance Use Topics     Alcohol use: Not Currently     Alcohol/week: 0.0 - 2.0 standard drinks of alcohol     Drug use: Jen Elias  2023  9:36 AM

## 2023-07-20 ENCOUNTER — THERAPY VISIT (OUTPATIENT)
Dept: PHYSICAL THERAPY | Facility: CLINIC | Age: 73
End: 2023-07-20
Attending: FAMILY MEDICINE
Payer: COMMERCIAL

## 2023-07-20 DIAGNOSIS — G89.29 CHRONIC BILATERAL LOW BACK PAIN WITHOUT SCIATICA: ICD-10-CM

## 2023-07-20 DIAGNOSIS — M54.50 CHRONIC BILATERAL LOW BACK PAIN WITHOUT SCIATICA: ICD-10-CM

## 2023-07-20 PROCEDURE — 97110 THERAPEUTIC EXERCISES: CPT | Mod: GP | Performed by: SPECIALIST/TECHNOLOGIST

## 2023-07-20 PROCEDURE — 97161 PT EVAL LOW COMPLEX 20 MIN: CPT | Mod: GP | Performed by: SPECIALIST/TECHNOLOGIST

## 2023-07-20 NOTE — PROGRESS NOTES
PHYSICAL THERAPY EVALUATION  Type of Visit: Evaluation    See electronic medical record for Abuse and Falls Screening details.    Subjective       Presenting condition or subjective complaint: Low back pain  Date of onset: 06/19/23    Relevant medical history: Cancer   Dates & types of surgery: lung, prostate    Prior diagnostic imaging/testing results: MRI     Prior therapy history for the same diagnosis, illness or injury: Yes      Prior Level of Function  Transfers: Independent  Ambulation: Independent  ADL: Independent      Living Environment  Social support: Alone   Type of home: 1 level   Stairs to enter the home: No       Ramp: No   Stairs inside the home: No       Help at home: Self Cares (home health aide/personal care attendant, family, etc); Home and Yard maintenance tasks  Equipment owned:       Employment: No    Hobbies/Interests: Music    Patient goals for therapy: walk without pain    Pain assessment: Pain present     Objective   LUMBAR SPINE EVALUATION  PAIN: Pain is Exacerbated By: Flexion and extension, standing up  POSTURE: Standing Posture: Rounded shoulders, Forward head, Lordosis decreased, Thoracic kyphosis increased  Sitting Posture: Rounded shoulders, Forward head, Lordosis decreased, Thoracic kyphosis increased  GAIT:   Weightbearing Status: WBAT  Assistive Device(s): None  Gait Deviations: WNL  BALANCE/PROPRIOCEPTION: WNL  ROM: AROM WNL  MYOTOMES: WNL  DTR S: WNL  CORD SIGNS: WNL  DERMATOMES: WNL  NEURAL TENSION: Lumbar WNL  FLEXIBILITY: Hamstring tightness bilaterally  LUMBAR/HIP Special Tests: WNL   PELVIS/SI SPECIAL TESTS: WNL  PALPATION: Tender and able to reproduce pain with palpation to R QL and R paraspinals.      Assessment & Plan   CLINICAL IMPRESSIONS  Medical Diagnosis: Chronic Bilateral low back pain without sciatica    Treatment Diagnosis: LBP   Impression/Assessment: Patient is a 72 year old male with low back pain complaints.  The following significant findings have been  identified: Pain, Impaired muscle performance and Decreased activity tolerance. These impairments interfere with their ability to perform self care tasks, recreational activities, household chores, driving , household mobility and community mobility as compared to previous level of function.     Plans to work with a  at lifetime to work on more whole body strengthening exercises.     Clinical Decision Making (Complexity):  Clinical Presentation: Stable/Uncomplicated  Clinical Presentation Rationale: based on medical and personal factors listed in PT evaluation  Clinical Decision Making (Complexity): Low complexity    PLAN OF CARE  Treatment Interventions:  Interventions: Manual Therapy, Neuromuscular Re-education, Therapeutic Activity, Therapeutic Exercise, Self-Care/Home Management    Long Term Goals     PT Goal 1  Goal Description: Perform all ADL's and household chores without pain  Rationale: to maximize safety and independence with performance of ADLs and functional tasks;to maximize safety and independence within the home;to maximize safety and independence within the community;to maximize safety and independence with transportation;to maximize safety and independence with self cares  Target Date: 08/17/23      Frequency of Treatment: 1x/wk  Duration of Treatment: 4 weeks    Recommended Referrals to Other Professionals:   Education Assessment:   Learner/Method: Patient    Risks and benefits of evaluation/treatment have been explained.   Patient/Family/caregiver agrees with Plan of Care.     Evaluation Time:     PT Eval, Low Complexity Minutes (78347): 15       Signing Clinician: SOHA Parekh [facility]                                                                                   OUTPATIENT PHYSICAL THERAPY      PLAN OF TREATMENT FOR OUTPATIENT REHABILITATION   Patient's Last Name, First Name, Juwan Hicks YOB: 1950    Provider's Name   [facility]   Medical Record No.  [id number]     Onset Date: 06/19/23  Start of Care Date: 07/20/23     Medical Diagnosis:  Chronic Bilateral low back pain without sciatica      PT Treatment Diagnosis:  LBP Plan of Treatment  Frequency/Duration: 1x/wk/ 4 weeks    Certification date from 07/20/23 to 08/17/23         See note for plan of treatment details and functional goals     Joel Caruso, PT                         I CERTIFY THE NEED FOR THESE SERVICES FURNISHED UNDER        THIS PLAN OF TREATMENT AND WHILE UNDER MY CARE     (Physician attestation of this document indicates review and certification of the therapy plan).                  Referring Provider:  Ryan Kamara      Initial Assessment  See Epic Evaluation- Start of Care Date: 07/20/23

## 2023-07-21 ENCOUNTER — PRE VISIT (OUTPATIENT)
Dept: NEUROSURGERY | Facility: CLINIC | Age: 73
End: 2023-07-21
Payer: COMMERCIAL

## 2023-07-21 NOTE — TELEPHONE ENCOUNTER
NEUROSURGERY- NEW PREVISIT PLANNING       Record Status/Location     Referring Provider Ryan Kamara MD    Diagnosis Back pain    MRI (HEAD, NECK, SPINE) 6/20/23 Yes (if yes, when/where)   CT none No   X-ray 1/18/23 Yes (if yes, when/where)   INJECTION none No   PHYSICAL THERAPY Currently in PT Yes (if yes, when/where)   SURGERY none No

## 2023-08-18 ENCOUNTER — NURSE TRIAGE (OUTPATIENT)
Dept: FAMILY MEDICINE | Facility: CLINIC | Age: 73
End: 2023-08-18
Payer: COMMERCIAL

## 2023-08-18 NOTE — TELEPHONE ENCOUNTER
Patient calls asking what he should do about back pain     Assessment  Center of torso about 6 inches above his waist line for about a week. He feels like the pain goes in about 4 inches.      No UTI symptoms     No headaches denied be BEFAST except.   No vision changes besides  - One side of the right eye has one side that is darker than the other.  at first sounded new but then he explained it has been this way for the past couple months - states he has cataract and has a call into the eye doctor.   No dizziness  No chest pain or SOB  Pain does not hurt worse when he take a deep breath  No leg swelling or pain in calves.   No cough    Nausea comes and goes for the past couple months.     Recommendation    Advise UC today -declined states he will go tomorrow or sooner if symptoms gets worse.     Reason for Disposition   Age > 50 and no history of prior similar back pain    Additional Information   Negative: Passed out (i.e., fainted, collapsed and was not responding)   Negative: Shock suspected (e.g., cold/pale/clammy skin, too weak to stand, low BP, rapid pulse)   Negative: Sounds like a life-threatening emergency to the triager   Negative: Major injury to the back (e.g., MVA, fall > 10 feet or 3 meters, penetrating injury, etc.)   Negative: Pain in the upper back over the ribs (rib cage) that radiates (travels) into the chest   Negative: Pain in the upper back over the ribs (rib cage) and worsened by coughing (or clearly increases with breathing)   Negative: Back pain during pregnancy   Negative: SEVERE back pain of sudden onset and age > 60 years   Negative: SEVERE abdominal pain (e.g., excruciating)   Negative: Abdominal pain and age > 60 years   Negative: Unable to urinate (or only a few drops) and bladder feels very full   Negative: Loss of bladder or bowel control (urine or bowel incontinence; wetting self, leaking stool) of new-onset   Negative: Numbness (loss of sensation) in groin or rectal area    Negative: Pain radiates into groin, scrotum   Negative: Blood in urine (red, pink, or tea-colored)   Negative: Vomiting and pain over lower ribs of back (i.e., flank - kidney area)   Negative: Weakness of a leg or foot (e.g., unable to bear weight, dragging foot)   Negative: Patient sounds very sick or weak to the triager   Negative: Fever > 100.4 F (38.0 C) and flank pain   Negative: Pain or burning with passing urine (urination)   Negative: SEVERE back pain (e.g., excruciating, unable to do any normal activities) and not improved after pain medicine and CARE ADVICE   Negative: Numbness in an arm or hand (i.e., loss of sensation) and upper back pain   Negative: Numbness in a leg or foot (i.e., loss of sensation)   Negative: High-risk adult (e.g., history of cancer, history of HIV, or history of IV Drug Use)   Negative: Soft tissue infection (e.g., abscess, cellulitis) or other serious infection (e.g., bacteremia) in last 2 weeks   Negative: Painful rash with multiple small blisters grouped together (i.e., dermatomal distribution or 'band' or 'stripe')   Negative: Pain radiates into the thigh or further down the leg, and in both legs   Negative: Weakness of the face, arm or leg on one side of the body   Negative: Followed getting substance in the eye   Negative: Foreign body or object is or was lodged in the eye   Negative: Followed an eye injury   Negative: Followed sun lamp or sun exposure (UV keratitis)   Negative: Yellow or green discharge (pus) in the eye   Negative: Pregnant   Negative: Complete loss of vision in 1 or both eyes   Negative: SEVERE eye pain   Negative: Severe headache   Negative: Double vision   Negative: Blurred vision or visual changes and present now and sudden onset or new (e.g., minutes, hours, days)  (Exception: Seeing floaters / black specks OR previously diagnosed migraine headaches with same symptoms.)   Negative: Patient sounds very sick or weak to the triager   Negative: Flashes of  light  (Exception: brief from pressing on the eyeball)   Negative: Eye pain and brief (now gone) blurred vision or visual changes   Negative: Taking digoxin (e.g., Lanoxin, Digitek, Cardoxin, Lanoxicaps; Toloxin in Echo) and blurred vision, yellow vision, or yellow-green halos   Negative: Many floaters in the eye   Negative: Jaw pain while eating and age > 50 years   Negative: Headache and age > 50 years   Negative: Patient wants to be seen   Negative: Single floater (i.e., small speck seems to float across the eye)  (Exception: Floater(s) are a chronic symptom and this is unchanged from patient's baseline pattern.)   Negative: Brief (now gone) blurred vision and unexplained   Negative: Laser light exposure and blurred vision present > 15 minutes   Negative: Blurred vision or visual changes and gradual onset (e.g., weeks, months)   Negative: Floaters are a chronic symptom (recurrent or ongoing AND present > 4 weeks)   Negative: Holds book very close to face or sits very close to TV   Negative: Closes or covers 1 eye to read   Negative: Diabetes mellitus and no eye exam in > 12 months   Negative: Age > 65 years and no eye exam in > 12 months   Negative: Brief blurred vision (now better), caused by prolonged close-work   Negative: Small flashes of light (brief, now gone), caused by pushing (pressure) on eyeball   Negative: Laser light exposure and now no symptoms (or blurred vision lasts < 15 minutes)   Negative: Pain radiates into the thigh or further down the leg   Negative: MODERATE back pain (e.g., interferes with normal activities) and present > 3 days    Protocols used: Back Pain-A-OH, Vision Loss or Change-A-OH

## 2023-08-23 ENCOUNTER — ANCILLARY PROCEDURE (OUTPATIENT)
Dept: GENERAL RADIOLOGY | Facility: CLINIC | Age: 73
End: 2023-08-23
Attending: PHYSICIAN ASSISTANT
Payer: COMMERCIAL

## 2023-08-23 ENCOUNTER — OFFICE VISIT (OUTPATIENT)
Dept: URGENT CARE | Facility: URGENT CARE | Age: 73
End: 2023-08-23
Payer: COMMERCIAL

## 2023-08-23 VITALS
RESPIRATION RATE: 14 BRPM | TEMPERATURE: 97.9 F | SYSTOLIC BLOOD PRESSURE: 128 MMHG | DIASTOLIC BLOOD PRESSURE: 84 MMHG | HEART RATE: 66 BPM | WEIGHT: 180 LBS | OXYGEN SATURATION: 98 % | BODY MASS INDEX: 27.37 KG/M2

## 2023-08-23 DIAGNOSIS — M54.6 BILATERAL THORACIC BACK PAIN, UNSPECIFIED CHRONICITY: ICD-10-CM

## 2023-08-23 DIAGNOSIS — M54.6 BILATERAL THORACIC BACK PAIN, UNSPECIFIED CHRONICITY: Primary | ICD-10-CM

## 2023-08-23 DIAGNOSIS — R11.0 NAUSEA: ICD-10-CM

## 2023-08-23 LAB
ALBUMIN SERPL BCG-MCNC: 4.7 G/DL (ref 3.5–5.2)
ALBUMIN UR-MCNC: NEGATIVE MG/DL
ALP SERPL-CCNC: 45 U/L (ref 40–129)
ALT SERPL W P-5'-P-CCNC: 21 U/L (ref 0–70)
ANION GAP SERPL CALCULATED.3IONS-SCNC: 8 MMOL/L (ref 7–15)
APPEARANCE UR: CLEAR
AST SERPL W P-5'-P-CCNC: 22 U/L (ref 0–45)
BACTERIA #/AREA URNS HPF: ABNORMAL /HPF
BILIRUB SERPL-MCNC: 0.7 MG/DL
BILIRUB UR QL STRIP: NEGATIVE
BUN SERPL-MCNC: 12 MG/DL (ref 8–23)
CALCIUM SERPL-MCNC: 9.7 MG/DL (ref 8.8–10.2)
CHLORIDE SERPL-SCNC: 107 MMOL/L (ref 98–107)
COLOR UR AUTO: YELLOW
CREAT SERPL-MCNC: 0.93 MG/DL (ref 0.67–1.17)
DEPRECATED HCO3 PLAS-SCNC: 28 MMOL/L (ref 22–29)
ERYTHROCYTE [DISTWIDTH] IN BLOOD BY AUTOMATED COUNT: 13.3 % (ref 10–15)
GFR SERPL CREATININE-BSD FRML MDRD: 88 ML/MIN/1.73M2
GLUCOSE SERPL-MCNC: 98 MG/DL (ref 70–99)
GLUCOSE UR STRIP-MCNC: NEGATIVE MG/DL
HCT VFR BLD AUTO: 45.1 % (ref 40–53)
HGB BLD-MCNC: 14.4 G/DL (ref 13.3–17.7)
HGB UR QL STRIP: ABNORMAL
KETONES UR STRIP-MCNC: NEGATIVE MG/DL
LEUKOCYTE ESTERASE UR QL STRIP: NEGATIVE
MCH RBC QN AUTO: 30.3 PG (ref 26.5–33)
MCHC RBC AUTO-ENTMCNC: 31.9 G/DL (ref 31.5–36.5)
MCV RBC AUTO: 95 FL (ref 78–100)
NITRATE UR QL: NEGATIVE
PH UR STRIP: 6 [PH] (ref 5–7)
PLATELET # BLD AUTO: 180 10E3/UL (ref 150–450)
POTASSIUM SERPL-SCNC: 4.6 MMOL/L (ref 3.4–5.3)
PROT SERPL-MCNC: 6.8 G/DL (ref 6.4–8.3)
RBC # BLD AUTO: 4.76 10E6/UL (ref 4.4–5.9)
RBC #/AREA URNS AUTO: ABNORMAL /HPF
SODIUM SERPL-SCNC: 143 MMOL/L (ref 136–145)
SP GR UR STRIP: 1.02 (ref 1–1.03)
SQUAMOUS #/AREA URNS AUTO: ABNORMAL /LPF
UROBILINOGEN UR STRIP-ACNC: 0.2 E.U./DL
WBC # BLD AUTO: 4.7 10E3/UL (ref 4–11)
WBC #/AREA URNS AUTO: ABNORMAL /HPF

## 2023-08-23 PROCEDURE — 80053 COMPREHEN METABOLIC PANEL: CPT | Performed by: PHYSICIAN ASSISTANT

## 2023-08-23 PROCEDURE — 99215 OFFICE O/P EST HI 40 MIN: CPT | Performed by: PHYSICIAN ASSISTANT

## 2023-08-23 PROCEDURE — 81001 URINALYSIS AUTO W/SCOPE: CPT | Performed by: PHYSICIAN ASSISTANT

## 2023-08-23 PROCEDURE — 36415 COLL VENOUS BLD VENIPUNCTURE: CPT | Performed by: PHYSICIAN ASSISTANT

## 2023-08-23 PROCEDURE — 85027 COMPLETE CBC AUTOMATED: CPT | Performed by: PHYSICIAN ASSISTANT

## 2023-08-23 PROCEDURE — 72070 X-RAY EXAM THORAC SPINE 2VWS: CPT | Mod: TC | Performed by: STUDENT IN AN ORGANIZED HEALTH CARE EDUCATION/TRAINING PROGRAM

## 2023-08-23 RX ORDER — PROCHLORPERAZINE MALEATE 10 MG
5 TABLET ORAL EVERY 6 HOURS PRN
Qty: 20 TABLET | Refills: 0 | Status: SHIPPED | OUTPATIENT
Start: 2023-08-23 | End: 2024-03-07

## 2023-08-23 NOTE — PROGRESS NOTES
Assessment & Plan     Bilateral thoracic back pain, unspecified chronicity  Examination and history today more suggestive of a musculoskeletal source of discomfort than other more serious etiology such as cardiovascular, intra-abdominal, or other potential more serious etiologies.  Given higher location and nature we will plan to obtain a radiograph of the thoracic spine.  This was unremarkable today on my review and confirmed by radiology.  As such advised that patient have conservative management of symptoms, given the lack of abnormal findings on vitals today and concerning symptoms, advised acetaminophen, consideration for other modalities such as massage, chiropractic, acupuncture, and avoidance of any activities that might exacerbate symptoms.  Advise should he have any significant worsening of symptoms to seek more urgent reevaluation.  - XR Thoracic Spine 2 Views    Nausea  Unclear etiology for nausea.  Laboratory work-up today obtained to identify potential electrolyte, hepatic, or infectious etiologies.  Lab work-up is unremarkable.  Only abnormal finding is a stable microscopic blood in the urine from previous test.  Advised some dietary recommendations the patient that may help alleviate symptoms, but advise should he have persistent symptoms without improvement over the course of the next 1 to 2 weeks to have follow-up with primary care for reevaluation.  Will provide patient a small amount of antiemetics to see if this aids symptoms.  He is in agreement with the plan.  - CBC with platelets  - Comprehensive metabolic panel (BMP + Alb, Alk Phos, ALT, AST, Total. Bili, TP)  - UA Macroscopic with reflex to Microscopic and Culture - Lab Collect  - prochlorperazine (COMPAZINE) 10 MG tablet  Dispense: 20 tablet; Refill: 0     I spent a total of 43 minutes on the day of the visit.   Time spent by me doing chart review, history and exam, documentation and further activities per the note    Return in about 2  weeks (around 9/6/2023) for reevaluation with PCP if symptoms not improving, return earlier if symptoms are worsening.    Teja Johnson PA-C  [facility] SAMMIE Echeverria is a 72 year old male who presents to clinic today for the following health issues:  Chief Complaint   Patient presents with    Back Pain     Low  mid back pain and nausea x on and off 3 weeks -- not sure how it happened -( he talked to a triage nurse over the weekend and they told him to come in) --HAS heart issue in the family     HPI  Patient is a 72-year-old male presenting to urgent care for evaluation of back pain and nausea.  Patient reports that approximately 3 weeks ago he had onset of dull/achy mid bilateral back pain.  He is unclear as to what precipitated this, as he does not recall any activities or inciting injuries.  He denies any known triggers or relievers, notes that he has active every day with walking or exercising, and notes that neither of these seem to exacerbate the back pain or the nausea, and in some instances actually seem to help it improve.  Patient notes that the symptoms when present are typically starting mid to late morning and lasting into the early evening.  Notes that some days he has not had symptoms.  States that he contacted his clinic last week and was advised to be seen in urgent care for evaluation.  Decided to hold off on coming until today.  Patient does have a prior history of low back pain, but states that that pain was much lower towards the waist.  In work-up of that he has had multiple radiographs and more recently an MRI of the lumbar spine.  Patient had completed a course of prednisone as well from that incident back in June of this year.  Patient is not completely clear as to whether or not those symptoms completely resolved, but states that his pain today is higher in location and nature.  He denies any fever/chills, chest pain, palpitations, dyspnea, flank  pain, abdominal pain, urinary symptoms, GI symptoms, skin changes or rash, paresthesias, saddle anesthesia, bowel/bladder incontinence, lower extremity weakness, gait abnormalities, or other complaints.    Review of Systems  Focused ROS obtained, pertinent positives/negatives reviewed in the HPI.       Objective    /84 (BP Location: Right arm, Patient Position: Chair, Cuff Size: Adult Regular)   Pulse 66   Temp 97.9  F (36.6  C) (Oral)   Resp 14   Wt 81.6 kg (180 lb)   SpO2 98%   BMI 27.37 kg/m    Physical Exam   GENERAL: healthy, alert and no distress  RESP: lungs clear to auscultation - no rales, rhonchi or wheezes  CV: regular rates and rhythm  SKIN: no suspicious lesions or rashes  Comprehensive back pain exam:  No tenderness, Range of motion not limited by pain, Lower extremity strength functional and equal on both sides, Lower extremity reflexes within normal limits bilaterally, Lower extremity sensation normal and equal on both sides, and Straight leg raise negative bilaterally    Xray of Thoracic Spine (2-view) - Reviewed and interpreted by me.  No acute osseous abnormalities appreciated    Results for orders placed or performed in visit on 08/23/23 (from the past 24 hour(s))   CBC with platelets   Result Value Ref Range    WBC Count 4.7 4.0 - 11.0 10e3/uL    RBC Count 4.76 4.40 - 5.90 10e6/uL    Hemoglobin 14.4 13.3 - 17.7 g/dL    Hematocrit 45.1 40.0 - 53.0 %    MCV 95 78 - 100 fL    MCH 30.3 26.5 - 33.0 pg    MCHC 31.9 31.5 - 36.5 g/dL    RDW 13.3 10.0 - 15.0 %    Platelet Count 180 150 - 450 10e3/uL   Comprehensive metabolic panel (BMP + Alb, Alk Phos, ALT, AST, Total. Bili, TP)   Result Value Ref Range    Sodium 143 136 - 145 mmol/L    Potassium 4.6 3.4 - 5.3 mmol/L    Chloride 107 98 - 107 mmol/L    Carbon Dioxide (CO2) 28 22 - 29 mmol/L    Anion Gap 8 7 - 15 mmol/L    Urea Nitrogen 12.0 8.0 - 23.0 mg/dL    Creatinine 0.93 0.67 - 1.17 mg/dL    Calcium 9.7 8.8 - 10.2 mg/dL    Glucose 98 70 -  99 mg/dL    Alkaline Phosphatase 45 40 - 129 U/L    AST 22 0 - 45 U/L    ALT 21 0 - 70 U/L    Protein Total 6.8 6.4 - 8.3 g/dL    Albumin 4.7 3.5 - 5.2 g/dL    Bilirubin Total 0.7 <=1.2 mg/dL    GFR Estimate 88 >60 mL/min/1.73m2   UA Macroscopic with reflex to Microscopic and Culture - Lab Collect    Specimen: Urine, NOS   Result Value Ref Range    Color Urine Yellow Colorless, Straw, Light Yellow, Yellow    Appearance Urine Clear Clear    Glucose Urine Negative Negative mg/dL    Bilirubin Urine Negative Negative    Ketones Urine Negative Negative mg/dL    Specific Gravity Urine 1.025 1.003 - 1.035    Blood Urine Trace (A) Negative    pH Urine 6.0 5.0 - 7.0    Protein Albumin Urine Negative Negative mg/dL    Urobilinogen Urine 0.2 0.2, 1.0 E.U./dL    Nitrite Urine Negative Negative    Leukocyte Esterase Urine Negative Negative   UA Microscopic with Reflex to Culture   Result Value Ref Range    Bacteria Urine Moderate (A) None Seen /HPF    RBC Urine 0-2 0-2 /HPF /HPF    WBC Urine 0-5 0-5 /HPF /HPF    Squamous Epithelials Urine Few (A) None Seen /LPF    Narrative    Urine Culture not indicated       XR Thoracic Spine 2 Views  Result Date: 8/23/2023  COMPARISON: Chest CT: 2/17/2023.     IMPRESSION: 1.  No acute fracture. 2.  Slight levocurvature with the apex centered at T10, possibly positional as this was less pronounced on prior chest CT. No substantial listhesis involving the lumbar spine. 3.  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 4.  Post surgical changes of the right lung upper lobe.

## 2023-08-23 NOTE — PATIENT INSTRUCTIONS
Use medication for nausea as directed.  If no further improvement in symptoms over the next week or 2 would advise follow-up with primary care for reevaluation.  Likewise, consider conservative management options for your back pain and see how it responds.  If no further improvement with treatment would advise follow-up with primary care.  At any time should you have any significant worsening of symptoms please follow-up more urgently for reevaluation either with primary care, urgent care, or if significant concerns directly to emergency department.

## 2023-08-29 ENCOUNTER — NURSE TRIAGE (OUTPATIENT)
Dept: FAMILY MEDICINE | Facility: CLINIC | Age: 73
End: 2023-08-29
Payer: COMMERCIAL

## 2023-08-29 NOTE — TELEPHONE ENCOUNTER
"Reason for Disposition   Requesting to talk with a counselor (mental health worker, psychiatrist, etc.)    Additional Information   Negative: Patient attempted suicide   Negative: Patient is threatening suicide now   Negative: Violent behavior, or threatening to physically hurt or kill someone   Negative: [1] Patient is very confused (disoriented, slurred speech) AND [2] no other adult (e.g., friend or family member) available   Negative: [1] Difficult to awaken or acting very confused (disoriented, slurred speech) AND [2] new-onset   Negative: Sounds like a life-threatening emergency to the triager   Negative: Suicide thoughts, threats, attempts, or questions   Negative: Questions or concerns about alcohol use, unhealthy alcohol use, binge drinking, intoxication, or withdrawal   Negative: Questions or concerns about substance use (drug use), unhealthy drug use, intoxication, or withdrawal   Negative: Questions or concerns about bipolar disorder (manic depression)   Negative: Questions or concerns about depression during the postpartum period (< 1 year since delivery)   Negative: [1] Depression AND [2] unable to do any of normal activities (e.g., self care, school, work; in comparison to baseline).   Negative: Very strange or confused behavior   Negative: Patient sounds very sick or weak to the triager   Negative: [1] Depression AND [2] worsening (e.g., sleeping poorly, less able to do activities of daily living)   Negative: Symptoms interfere with work or school   Negative: Sometimes has thoughts of suicide   Negative: Fever > 101 F (38.3 C)   Negative: Substance use (drug use) or misuse, known or suspected   Negative: Unhealthy alcohol use, known or suspected    Answer Assessment - Initial Assessment Questions  1. CONCERN: \"What happened that made you call today?\"      Family concerns  2. DEPRESSION SYMPTOM SCREENING: \"How are you feeling overall?\" (e.g., decreased energy, increased sleeping or difficulty " "sleeping, difficulty concentrating, feelings of sadness, guilt, hopelessness, or worthlessness)      Decreased energy, depressed,   3. RISK OF HARM - SUICIDAL IDEATION:  \"Do you ever have thoughts of hurting or killing yourself?\"  (e.g., yes, no, no but preoccupation with thoughts about death)    - INTENT:  \"Do you have thoughts of hurting or killing yourself right NOW?\" (e.g., yes, no, N/A)    - PLAN: \"Do you have a specific plan for how you would do this?\" (e.g., gun, knife, overdose, no plan, N/A)      No  4. RISK OF HARM - HOMICIDAL IDEATION:  \"Do you ever have thoughts of hurting or killing someone else?\"  (e.g., yes, no, no but preoccupation with thoughts about death)    - INTENT:  \"Do you have thoughts of hurting or killing someone right NOW?\" (e.g., yes, no, N/A)    - PLAN: \"Do you have a specific plan for how you would do this?\" (e.g., gun, knife, no plan, N/A)       No  5. FUNCTIONAL IMPAIRMENT: \"How have things been going for you overall? Have you had more difficulty than usual doing your normal daily activities?\"  (e.g., better, same, worse; self-care, school, work, interactions)      Interaction, isolated  6. SUPPORT: \"Who is with you now?\" \"Who do you live with?\" \"Do you have family or friends who you can talk to?\"       Some  7. THERAPIST: \"Do you have a counselor or therapist? Name?\"      No  8. STRESSORS: \"Has there been any new stress or recent changes in your life?\"      Granddaughters mental health concerns  9. ALCOHOL USE OR SUBSTANCE USE (DRUG USE): \"Do you drink alcohol or use any illegal drugs?\"      No  10. OTHER: \"Do you have any other physical symptoms right now?\" (e.g., fever)        No  11. PREGNANCY: \"Is there any chance you are pregnant?\" \"When was your last menstrual period?\"        N/A    Protocols used: Depression-A-AH    "

## 2023-08-30 ENCOUNTER — VIRTUAL VISIT (OUTPATIENT)
Dept: FAMILY MEDICINE | Facility: CLINIC | Age: 73
End: 2023-08-30
Payer: COMMERCIAL

## 2023-08-30 DIAGNOSIS — C34.11 MALIGNANT NEOPLASM OF UPPER LOBE OF RIGHT LUNG (H): ICD-10-CM

## 2023-08-30 DIAGNOSIS — I10 HYPERTENSION GOAL BP (BLOOD PRESSURE) < 140/90: Chronic | ICD-10-CM

## 2023-08-30 DIAGNOSIS — C61 PROSTATE CANCER (H): ICD-10-CM

## 2023-08-30 DIAGNOSIS — R11.0 NAUSEA: ICD-10-CM

## 2023-08-30 DIAGNOSIS — Z63.9 DIFFICULTY WITH FAMILY: ICD-10-CM

## 2023-08-30 DIAGNOSIS — Z85.118 HX OF CANCER OF LUNG: ICD-10-CM

## 2023-08-30 DIAGNOSIS — Z51.81 MEDICATION MONITORING ENCOUNTER: ICD-10-CM

## 2023-08-30 DIAGNOSIS — G47.33 OSA (OBSTRUCTIVE SLEEP APNEA): ICD-10-CM

## 2023-08-30 DIAGNOSIS — K21.9 GASTROESOPHAGEAL REFLUX DISEASE, UNSPECIFIED WHETHER ESOPHAGITIS PRESENT: ICD-10-CM

## 2023-08-30 DIAGNOSIS — F41.9 ANXIETY: ICD-10-CM

## 2023-08-30 DIAGNOSIS — F43.9 STRESS: Primary | ICD-10-CM

## 2023-08-30 PROCEDURE — 99214 OFFICE O/P EST MOD 30 MIN: CPT | Mod: VID | Performed by: FAMILY MEDICINE

## 2023-08-30 RX ORDER — ONDANSETRON 4 MG/1
4-8 TABLET, ORALLY DISINTEGRATING ORAL EVERY 8 HOURS PRN
Qty: 20 TABLET | Refills: 0 | Status: SHIPPED
Start: 2023-08-30 | End: 2024-03-07

## 2023-08-30 SDOH — SOCIAL STABILITY - SOCIAL INSECURITY: PROBLEM RELATED TO PRIMARY SUPPORT GROUP, UNSPECIFIED: Z63.9

## 2023-08-30 ASSESSMENT — ANXIETY QUESTIONNAIRES
6. BECOMING EASILY ANNOYED OR IRRITABLE: NOT AT ALL
1. FEELING NERVOUS, ANXIOUS, OR ON EDGE: NEARLY EVERY DAY
7. FEELING AFRAID AS IF SOMETHING AWFUL MIGHT HAPPEN: NOT AT ALL
3. WORRYING TOO MUCH ABOUT DIFFERENT THINGS: SEVERAL DAYS
IF YOU CHECKED OFF ANY PROBLEMS ON THIS QUESTIONNAIRE, HOW DIFFICULT HAVE THESE PROBLEMS MADE IT FOR YOU TO DO YOUR WORK, TAKE CARE OF THINGS AT HOME, OR GET ALONG WITH OTHER PEOPLE: SOMEWHAT DIFFICULT
5. BEING SO RESTLESS THAT IT IS HARD TO SIT STILL: NOT AT ALL
2. NOT BEING ABLE TO STOP OR CONTROL WORRYING: SEVERAL DAYS
GAD7 TOTAL SCORE: 5
GAD7 TOTAL SCORE: 5

## 2023-08-30 ASSESSMENT — PATIENT HEALTH QUESTIONNAIRE - PHQ9
SUM OF ALL RESPONSES TO PHQ QUESTIONS 1-9: 6
5. POOR APPETITE OR OVEREATING: NOT AT ALL

## 2023-08-30 NOTE — PROGRESS NOTES
Juwan is a 72 year old who is being evaluated via a billable video visit.      How would you like to obtain your AVS? MyChart  If the video visit is dropped, the invitation should be resent by: Text to cell phone: [phone]  Will anyone else be joining your video visit? No        Assessment & Plan       ICD-10-CM    1. Stress  F43.9 Adult Mental Health  Referral      2. Difficulty with family  Z63.9 Adult Mental Health  Referral      3. Anxiety  F41.9 Adult Mental Health  Referral      4. Nausea  R11.0 ondansetron (ZOFRAN ODT) 4 MG ODT tab      5. Gastroesophageal reflux disease, unspecified whether esophagitis present  K21.9       6. h/o Prostate cancer (H) - s/p prostatectomy 2017  C61       7. Hx of cancer of lung  Z85.118       8. s/p Malignant neoplasm of upper lobe of right lung (H)  C34.11       9. PAULINO (obstructive sleep apnea)  G47.33       10. Hypertension goal BP (blood pressure) < 140/90  I10       11. Medication monitoring encounter  Z51.81           Discussed treatment/modality options, including risk and benefits, he desires:    1) increase zofran from 4-8 mg every 8 hrs    2) consider GI    3) psychology    4) consider SSRI    5) close follow up    6) [facility] follow up     All diagnosis above reviewed and noted above, otherwise stable.      See [facility] orders for further details.      No follow-ups on file.    No LOS data to display    Doing chart review, history and exam, documentation and further activities as noted.           Ryan Kamara MD, FAAFP     [facility]  [address] [place] [id number]  [email or web address]   Office: [phone]  Fax: [phone]  Pager: [phone]       Subjective   Juwan is a 72 year old, presenting for the following health issues:        8/30/2023     2:05 PM   Additional Questions   Roomed by Sabrina AIKEN     Abnormal Mood  Symptoms    Onset/Duration: having bad dreams, some nausea, grand daughter has been mean, age 22, working, he's been trying to be connected with her, she notes whatever he says bothers her, feels like heart was skipping earlier, no vomiting, no f/c/s, no unusual foods, diarrhea a few days ago, notes bad dreams last couple weeks, has been watching videos on narcissim    See recent notes     Description: having bad dreams, feeling depressed x 1  week  Depression (if yes, do PHQ-9): YES  Anxiety (if yes, do BENSON-7): YES  Accompanying Signs & Symptoms:  Still participating in activities that you used to enjoy: No  Fatigue: No  Irritability: YES  Difficulty concentrating: No  Changes in appetite: No  Problems with sleep: YES  Heart racing/beating fast: No  Abnormally elevated, expansive, or irritable mood: No  Persistently increased activity or energy: No  Thoughts of hurting yourself or others: No  History:  Recent stress or major life event: YES- family   Prior depression or anxiety: yes   Family history of depression or anxiety: YES  Alcohol/drug use: No  Difficulty sleeping: No  Precipitating or alleviating factors: None  Therapies tried and outcome: none      1/18/2023    10:34 AM 1/31/2023    11:02 AM 8/30/2023     2:07 PM   PHQ   PHQ-9 Total Score 5 0 6   Q9: Thoughts of better off dead/self-harm past 2 weeks Not at all Not at all Not at all         1/18/2023    10:34 AM 1/31/2023    11:02 AM 8/30/2023     2:07 PM   BENSON-7 SCORE   Total Score 2 2 5     Review of Systems   CONSTITUTIONAL: NEGATIVE for fever, chills, change in weight  INTEGUMENTARY/SKIN: NEGATIVE for worrisome rashes, moles or lesions  EYES: NEGATIVE for vision changes or irritation  ENT/MOUTH: NEGATIVE for ear, mouth and throat problems  RESP: NEGATIVE for significant cough or SOB  CV: NEGATIVE for chest pain, palpitations or peripheral edema  GI: NEGATIVE for nausea, abdominal pain, heartburn, or change in bowel habits  : NEGATIVE for frequency,  dysuria, or hematuria  MUSCULOSKELETAL: NEGATIVE for significant arthralgias or myalgia  NEURO: NEGATIVE for weakness, dizziness or paresthesias  ENDOCRINE: NEGATIVE for temperature intolerance, skin/hair changes  HEME: NEGATIVE for bleeding problems  PSYCHIATRIC: NEGATIVE for changes in mood or affect      Objective       Vitals:  No vitals were obtained today due to virtual visit.    Physical Exam   GENERAL: Healthy, alert and no distress  EYES: Eyes grossly normal to inspection.  No discharge or erythema, or obvious scleral/conjunctival abnormalities.  RESP: No audible wheeze, cough, or visible cyanosis.  No visible retractions or increased work of breathing.    SKIN: Visible skin clear. No significant rash, abnormal pigmentation or lesions.  NEURO: Cranial nerves grossly intact.  Mentation and speech appropriate for age.  PSYCH: Mentation appears normal, affect normal/bright, judgement and insight intact, normal speech and appearance well-groomed.      Video-Visit Details    Type of service:  Video Visit   Video Start Time: 2:38 PM  Video End Time:3:10 PM    Originating Location (pt. Location): Home        Distant Location (provider location):  On-site  Platform used for Video Visit: Minh

## 2023-08-31 DIAGNOSIS — L30.9 HAND DERMATITIS: ICD-10-CM

## 2023-08-31 DIAGNOSIS — L21.9 SEBORRHEIC DERMATITIS: ICD-10-CM

## 2023-08-31 RX ORDER — HYDROCORTISONE 25 MG/G
OINTMENT TOPICAL 2 TIMES DAILY
Qty: 30 G | Refills: 1 | OUTPATIENT
Start: 2023-08-31

## 2023-08-31 NOTE — TELEPHONE ENCOUNTER
Routing refill request to provider for review/approval because:  Drug not on the [facility] refill protocol   Last seen 11/2022    Marylin LLAMAS RN  [facility] [place]  [phone]

## 2023-09-14 ENCOUNTER — OFFICE VISIT (OUTPATIENT)
Dept: FAMILY MEDICINE | Facility: CLINIC | Age: 73
End: 2023-09-14
Payer: COMMERCIAL

## 2023-09-14 DIAGNOSIS — L30.9 DERMATITIS: ICD-10-CM

## 2023-09-14 DIAGNOSIS — L21.9 SEBORRHEIC DERMATITIS: Primary | ICD-10-CM

## 2023-09-14 PROCEDURE — 99213 OFFICE O/P EST LOW 20 MIN: CPT | Performed by: PHYSICIAN ASSISTANT

## 2023-09-14 RX ORDER — TACROLIMUS 1 MG/G
OINTMENT TOPICAL
Qty: 60 G | Refills: 3 | Status: SHIPPED | OUTPATIENT
Start: 2023-09-14 | End: 2024-06-13

## 2023-09-14 NOTE — PROGRESS NOTES
[facility] Dermatology Note  Encounter Date: Sep 14, 2023  Office Visit      Dermatology Problem List:  1. Seb derm- tacrolimus 0.1% ointment  - previous: ketoconazole 2% cream, 2.5% HTC oint  2. Dermatitis - tacrolimus 0.1% oint, vaseline  - previous: hydrocortisone 2.5%  oint  ____________________________________________    Assessment & Plan:  1. Seb derm, mild - scalp and face, mild pruritus  - we discussed that if he is not having sx he does not need to treat.  - start tacrolimus 0.1% ointment BID for pruritus - if unable to get tacrolimus due to cost, I gave pharmacy alternatives such as elidel and 2.5% hydrocortisone cream   - stop ketoconazole 2% cream (did not find it helpful)  and 2.5% HTC oint to replace with tacrolimus    2. Dermatitis - Dorsal hands  - start tacrolimus 0.1% oint BID for flares - if unable to get tacrolimus due to cost, I gave pharmacy alternatives such as elidel and 2.5% hydrocortisone cream   - continue vaseline prn, advised to put over the tacrolimus    Discussed need for FBSE skin cancer screening, patient to schedule for sometime within the next year.     Procedures Performed:   none    Follow-up: 1 year(s) in-person, or earlier for new or changing lesions    Staff:     All risks, benefits and alternatives were discussed with patient.  Patient is in agreement and understands the assessment and plan.  All questions were answered.    Shy Arana PA-C, MPAS  [facility]: Phone: [phone], Fax: [phone]  [facility]: Phone: [phone],  Fax: [phone]  [facility] [place]: Phone: [phone], Fax: [phone]  ____________________________________________    CC: Derm Problem (Yearly med ck, head/neck check)      HPI:  Mr. Juwan Latham is a 72 year old male who presents today as a return patient for head check. Last seen 4/28/22 by Charlotte Hicks and was  started on keto 2% cream and hydrocortisone 2.5% ointment for dermatitis. There was some confusion about which creams to use where and also he notes that his skin looks the same now as it did when he saw her last. The dermatitis on the hands switched from the R hand and is now on the left. He has been using Vaseline which has helped a lot on the hands. Notes scalp it itchy at times.     Patient is otherwise feeling well, without additional concerns.    Labs:  none    Physical Exam:  Vitals: There were no vitals taken for this visit.  SKIN: Focused examination of face and scalp was performed.   - very mild erythema and scale on scalp and face, mild  - patch of lichenification on the L dorsal hand, PIH on the R dorsal hand  - No other lesions of concern on areas examined.     Medications:  Current Outpatient Medications   Medication    cyanocobalamin (VITAMIN B-12) 500 MCG tablet    hydrocortisone 2.5 % ointment    ketoconazole (NIZORAL) 2 % external cream    multivitamin w/minerals (THERA-VIT-M) tablet    ondansetron (ZOFRAN ODT) 4 MG ODT tab    prochlorperazine (COMPAZINE) 10 MG tablet    Turmeric 500 MG CAPS    Vitamin D, Cholecalciferol, 1000 units TABS     No current facility-administered medications for this visit.      Past Medical/Surgical History:   Patient Active Problem List   Diagnosis    Anxiety    PAULINO (obstructive sleep apnea)    GERD (gastroesophageal reflux disease)    Hypertension goal BP (blood pressure) < 140/90    Benign neoplasm of descending colon    s/p Malignant neoplasm of upper lobe of right lung (H)    Overweight (BMI 25.0-29.9)    Cavernous hemangioma of brain (H)    Major depressive disorder, recurrent episode, mild (H)    h/o Prostate cancer (H) - s/p prostatectomy 2017    Somatic dysfunction of pelvis region    Mixed incontinence urge and stress (male)(female)    Retinal detachment of left eye with single break    Postprocedural male urethral stricture    Gross hematuria    Malignant  neoplasm (H)    Numbness    Fatty liver    CARDIOVASCULAR SCREENING; LDL GOAL LESS THAN 130    Hx of cancer of lung    Chronic bilateral low back pain without sciatica     Past Medical History:   Diagnosis Date    Anxiety     Arthritis     fingers, hips    Calculus of kidney 2005, 2012    CARDIOVASCULAR SCREENING; LDL GOAL LESS THAN 130 4/18/2023    Colon polyps     Congenital single kidney     Gastroesophageal reflux disease     Hx of cancer of lung 03/2017    wedge resection    Hypertension     Prostate cancer (H) 08/2017    prostatectomy/Rad Tx    Seasonal allergies     spring and fall    Sleep apnea     cpap

## 2023-09-14 NOTE — LETTER
9/14/2023         RE: Juwan Latham  [address]  [place] [address]        Dear Colleague,    Thank you for referring your patient, Juwan Latham, to the [facility] [place]. Please see a copy of my visit note below.    [facility] Dermatology Note  Encounter Date: Sep 14, 2023  Office Visit      Dermatology Problem List:  1. Seb derm- tacrolimus 0.1% ointment  - previous: ketoconazole 2% cream, 2.5% HTC oint  2. Dermatitis - tacrolimus 0.1% oint, vaseline  - previous: hydrocortisone 2.5%  oint  ____________________________________________    Assessment & Plan:  1. Seb derm, mild - scalp and face, mild pruritus  - we discussed that if he is not having sx he does not need to treat.  - start tacrolimus 0.1% ointment BID for pruritus - if unable to get tacrolimus due to cost, I gave pharmacy alternatives such as elidel and 2.5% hydrocortisone cream   - stop ketoconazole 2% cream (did not find it helpful)  and 2.5% HTC oint to replace with tacrolimus    2. Dermatitis - Dorsal hands  - start tacrolimus 0.1% oint BID for flares - if unable to get tacrolimus due to cost, I gave pharmacy alternatives such as elidel and 2.5% hydrocortisone cream   - continue vaseline prn, advised to put over the tacrolimus    Discussed need for FBSE skin cancer screening, patient to schedule for sometime within the next year.     Procedures Performed:   none    Follow-up: 1 year(s) in-person, or earlier for new or changing lesions    Staff:     All risks, benefits and alternatives were discussed with patient.  Patient is in agreement and understands the assessment and plan.  All questions were answered.    Shy Arana PA-C, MPAS  [facility] [place]: Phone: [phone], Fax: [phone]  [facility]: Phone: [phone],  Fax: [phone]  [facility]: Phone: [phone], Fax:  [phone]  ____________________________________________    CC: Derm Problem (Yearly med ck, head/neck check)      HPI:  Mr. Juwan Latham is a 72 year old male who presents today as a return patient for head check. Last seen 4/28/22 by Charlotte Hicks and was started on keto 2% cream and hydrocortisone 2.5% ointment for dermatitis. There was some confusion about which creams to use where and also he notes that his skin looks the same now as it did when he saw her last. The dermatitis on the hands switched from the R hand and is now on the left. He has been using Vaseline which has helped a lot on the hands. Notes scalp it itchy at times.     Patient is otherwise feeling well, without additional concerns.    Labs:  none    Physical Exam:  Vitals: There were no vitals taken for this visit.  SKIN: Focused examination of face and scalp was performed.   - very mild erythema and scale on scalp and face, mild  - patch of lichenification on the L dorsal hand, PIH on the R dorsal hand  - No other lesions of concern on areas examined.     Medications:  Current Outpatient Medications   Medication     cyanocobalamin (VITAMIN B-12) 500 MCG tablet     hydrocortisone 2.5 % ointment     ketoconazole (NIZORAL) 2 % external cream     multivitamin w/minerals (THERA-VIT-M) tablet     ondansetron (ZOFRAN ODT) 4 MG ODT tab     prochlorperazine (COMPAZINE) 10 MG tablet     Turmeric 500 MG CAPS     Vitamin D, Cholecalciferol, 1000 units TABS     No current facility-administered medications for this visit.      Past Medical/Surgical History:   Patient Active Problem List   Diagnosis     Anxiety     PAULINO (obstructive sleep apnea)     GERD (gastroesophageal reflux disease)     Hypertension goal BP (blood pressure) < 140/90     Benign neoplasm of descending colon     s/p Malignant neoplasm of upper lobe of right lung (H)     Overweight (BMI 25.0-29.9)     Cavernous hemangioma of brain (H)     Major depressive disorder, recurrent episode, mild  (H)     h/o Prostate cancer (H) - s/p prostatectomy 2017     Somatic dysfunction of pelvis region     Mixed incontinence urge and stress (male)(female)     Retinal detachment of left eye with single break     Postprocedural male urethral stricture     Gross hematuria     Malignant neoplasm (H)     Numbness     Fatty liver     CARDIOVASCULAR SCREENING; LDL GOAL LESS THAN 130     Hx of cancer of lung     Chronic bilateral low back pain without sciatica     Past Medical History:   Diagnosis Date     Anxiety      Arthritis     fingers, hips     Calculus of kidney 2005, 2012     CARDIOVASCULAR SCREENING; LDL GOAL LESS THAN 130 4/18/2023     Colon polyps      Congenital single kidney      Gastroesophageal reflux disease      Hx of cancer of lung 03/2017    wedge resection     Hypertension      Prostate cancer (H) 08/2017    prostatectomy/Rad Tx     Seasonal allergies     spring and fall     Sleep apnea     cpap                        Again, thank you for allowing me to participate in the care of your patient.        Sincerely,        Shy Arana PA-C

## 2023-09-15 ENCOUNTER — TELEPHONE (OUTPATIENT)
Dept: FAMILY MEDICINE | Facility: CLINIC | Age: 73
End: 2023-09-15
Payer: COMMERCIAL

## 2023-09-15 DIAGNOSIS — L30.9 DERMATITIS: ICD-10-CM

## 2023-09-15 DIAGNOSIS — L21.9 SEBORRHEIC DERMATITIS: Primary | ICD-10-CM

## 2023-09-15 RX ORDER — HYDROCORTISONE 25 MG/G
OINTMENT TOPICAL 2 TIMES DAILY
Qty: 60 G | Refills: 3 | Status: SHIPPED | OUTPATIENT
Start: 2023-09-15

## 2023-09-15 NOTE — TELEPHONE ENCOUNTER
[facility] Call Center    Phone Message    May a detailed message be left on voicemail: yes     Reason for Call: Medication Question or concern regarding medication   Prescription Clarification  Name of Medication:   tacrolimus (PROTOPIC) 0.1 % external ointment   Prescribing Provider: Shy Arana   Pharmacy: [id number] - [place], [place] - [address] AT [address] [facility] [address]    What on the order needs clarification? Pt called and said that this medication is too expensive and was wondering if someone can send the alternative to his pharm above. Shy gave the pt two alternatives if this one was too expensive. Pt doesn't want to wait until Monday and was wondering if one of the colleagues can help. Please call him back. Thanks       Action Taken: Message routed to:  Other: EC SKIN    Travel Screening: Not Applicable

## 2023-09-15 NOTE — TELEPHONE ENCOUNTER
Sent in hydrocortisone 2.5% ointment to the [facility] pharmacy in [place] per Shy's message in the tacrolimus ointment rx.  Advised pt of the above and stated this was one of the alternatives that Shy recommended.  If the HC 2.5 % ointment is still too expensive to call the clinic back on Monday.  Pt states understanding.    Marylin LLAMAS RN  [facility] [place]  [phone]

## 2023-09-26 ENCOUNTER — VIRTUAL VISIT (OUTPATIENT)
Dept: PSYCHOLOGY | Facility: CLINIC | Age: 73
End: 2023-09-26
Payer: COMMERCIAL

## 2023-09-26 DIAGNOSIS — F43.21 ADJUSTMENT DISORDER WITH DEPRESSED MOOD: Primary | ICD-10-CM

## 2023-09-26 PROCEDURE — 90785 PSYTX COMPLEX INTERACTIVE: CPT | Mod: 95 | Performed by: SOCIAL WORKER

## 2023-09-26 PROCEDURE — 90832 PSYTX W PT 30 MINUTES: CPT | Mod: 95 | Performed by: SOCIAL WORKER

## 2023-09-26 ASSESSMENT — COLUMBIA-SUICIDE SEVERITY RATING SCALE - C-SSRS
TOTAL  NUMBER OF INTERRUPTED ATTEMPTS LIFETIME: NO
ATTEMPT LIFETIME: NO
6. HAVE YOU EVER DONE ANYTHING, STARTED TO DO ANYTHING, OR PREPARED TO DO ANYTHING TO END YOUR LIFE?: NO
2. HAVE YOU ACTUALLY HAD ANY THOUGHTS OF KILLING YOURSELF?: NO
TOTAL  NUMBER OF ABORTED OR SELF INTERRUPTED ATTEMPTS LIFETIME: NO
1. HAVE YOU WISHED YOU WERE DEAD OR WISHED YOU COULD GO TO SLEEP AND NOT WAKE UP?: NO

## 2023-09-26 NOTE — PROGRESS NOTES
[facility]   Mental Health & Addiction Services     Progress Note - Initial Visit    Patient  Name:  Juwan Latham Date: 2023         Service Type: Individual     Visit Start Time: 10:35 am  Visit End Time: 11:05 am  (The client arrived late and then there were audio issues)    Visit #: 1    Attendees: Client attended alone    Service Modality:  Video Visit:      Provider verified identity through the following two step process.  Patient provided:  Patient photo, Patient  and Patient address    Telemedicine Visit: The patient's condition can be safely assessed and treated via synchronous audio and visual telemedicine encounter.      Reason for Telemedicine Visit: Patient has requested telehealth visit and Patient convenience (e.g. access to timely appointments / distance to available provider)    Originating Site (Patient Location): Patient's home    Distant Site (Provider Location): Provider Remote Setting- Home Office    Consent:  The patient/guardian has verbally consented to: the potential risks and benefits of telemedicine (video visit) versus in person care; bill my insurance or make self-payment for services provided; and responsibility for payment of non-covered services.     Patient would like the video invitation sent by:  My Chart    Mode of Communication:  Video Conference via [facility]    Distant Location (Provider):  Off-site    As the provider I attest to compliance with applicable laws and regulations related to telemedicine.       DATA:   Interactive Complexity: Yes, visit entailed Interactive Complexity evidenced by:  -The need to manage maladaptive communication (related to, e.g., high anxiety, high reactivity, repeated questions, or disagreement) among participants that complicates delivery of care   -The client did not answer questions regarding his mental health. He expressed that he was having some problems and did not feel very well, so he apologized for his  "responses to questions not relating to what was answered or that he was not giving details surrounding what is/was going on for him.    Crisis: No     Presenting Concerns/  Current Stressors:   The client reported health problems and problems with his granddaughter. The client reported depression related to  physical health problems of having cancer. He reported that he upsets his granddaughter frequently and she says he  says things that she does not like when he sees her.       ASSESSMENT:  Mental Status Assessment:  Appearance:   Appropriate   Eye Contact:   Fair   Psychomotor Behavior: Restless -Client frequently moving around and moving the camera.   Attitude:   Indifferent  Orientation:   All  Speech   Rate / Production: Impoverished    Volume:  Normal   Mood:    Depressed , \"Emotional\"  Affect:    Flat   Thought Content:  Clear   Thought Form:  Coherent   Insight:    Fair       Safety Issues and Plan for Safety and Risk Management:     [place] Suicide Severity Rating Scale (Lifetime/Recent)      9/26/2023    10:57 AM   [place] Suicide Severity Rating (Lifetime/Recent)   Q1 Wish to be Dead (Lifetime) N   Q2 Non-Specific Active Suicidal Thoughts (Lifetime) N   Actual Attempt (Lifetime) N   Has subject engaged in non-suicidal self-injurious behavior? (Lifetime) N   Interrupted Attempts (Lifetime) N   Aborted or Self-Interrupted Attempt (Lifetime) N   Preparatory Acts or Behavior (Lifetime) N   Calculated C-SSRS Risk Score (Lifetime/Recent) No Risk Indicated     Patient denies current fears or concerns for personal safety.  Patient denies current or recent suicidal ideation or behaviors.  Patient denies current or recent homicidal ideation or behaviors.  Patient denies current or recent self injurious behavior or ideation.  Patient denies other safety concerns.  Recommended that patient call 911 or go to the local ED should there be a change in any of these risk factors.  Patient reports there are no firearms " in the house.     Diagnostic Criteria:  Adjustment Disorder  A. The development of emotional or behavioral symptoms in response to an identifiable stressor(s) occurring within 3 months of the onset of the stressor(s)  B. These symptoms or behaviors are clinically significant, as evidenced by one or both of the following:       - Significant impairment in social, occupational, or other important areas of functioning  C. The stress-related disturbance does not meet criteria for another disorder & is not not an exacerbation of another mental disorder  D. The symptoms do not represent normal bereavement  E. Once the stressor or its consequences have terminated, the symptoms do not persist for more than an additional 6 months       * Adjustment Disorder with Depressed Mood: The predominant manifestations are symptoms such as low mood, tearfulness, or feelings of hopelessness      DSM5 Diagnoses: (Sustained by DSM5 Criteria Listed Above)  Diagnoses: Adjustment Disorders  309.0 (F43.21) With depressed mood  Psychosocial & Contextual Factors: The client reported that when he speaks to his granddaughter about how he is doing she responds with he is just like grandma with saying what is not going well. He reported a history of depression with taking medications in the past. He reported taking medications in the past, but feeling like he was in a tunnel and not like taking medications. He reported that he first had depression years ago. The client reported he believes the depression he is experiencing is more related to his physical health. He reported bad dreams at times, stomach pain, bladder or prostate concerns and having an xray for his lungs.   WHODAS 2.0 (12 item):        No data to display              Intervention:   Psychodynamic- Patient processed internal experiences   Collateral Reports Completed:  Not Applicable      PLAN: (Homework, other):  1. Provider will continue Diagnostic Assessment.  Patient was given the  following to do until next session:  Consider what he would like to do in terms of therapy. The client was quite contemplative surrounding attending therapy. He repeated several times that maybe the problem is with him not his granddaughter and he has more medical problems that he is trying to get figured out that are impacting him mentally. He expressed that he would like to attend the next session and go from there in terms of what he would prefer regarding psychotherapy services. He requested to end this session early due to not feeling well and reporting difficulty expressing himself.     2. Provider recommended the following referrals: Individual Therapy.     3.  Suicide Risk and Safety Concerns were assessed for Juwan Latham.    Patient meets the following risk assessment and triage: Patient denied any current/recent/lifetime history of suicidal ideation and/or behaviors.  No safety plan indicated at this time.       CAROL ANN De Jesus, [facility]  September 26, 2023

## 2023-10-09 ENCOUNTER — ONCOLOGY VISIT (OUTPATIENT)
Dept: SURGERY | Facility: CLINIC | Age: 73
End: 2023-10-09
Attending: CLINICAL NURSE SPECIALIST
Payer: COMMERCIAL

## 2023-10-09 ENCOUNTER — ANCILLARY PROCEDURE (OUTPATIENT)
Dept: CT IMAGING | Facility: CLINIC | Age: 73
End: 2023-10-09
Attending: CLINICAL NURSE SPECIALIST
Payer: COMMERCIAL

## 2023-10-09 VITALS
HEART RATE: 65 BPM | SYSTOLIC BLOOD PRESSURE: 144 MMHG | BODY MASS INDEX: 27.35 KG/M2 | DIASTOLIC BLOOD PRESSURE: 76 MMHG | OXYGEN SATURATION: 100 % | TEMPERATURE: 97.6 F | RESPIRATION RATE: 16 BRPM | WEIGHT: 179.9 LBS

## 2023-10-09 DIAGNOSIS — C34.11 MALIGNANT NEOPLASM OF UPPER LOBE OF RIGHT LUNG (H): Primary | ICD-10-CM

## 2023-10-09 DIAGNOSIS — C34.11 MALIGNANT NEOPLASM OF UPPER LOBE OF RIGHT LUNG (H): ICD-10-CM

## 2023-10-09 LAB
CREAT BLD-MCNC: 0.7 MG/DL (ref 0.7–1.3)
EGFRCR SERPLBLD CKD-EPI 2021: >60 ML/MIN/1.73M2

## 2023-10-09 PROCEDURE — G0463 HOSPITAL OUTPT CLINIC VISIT: HCPCS | Performed by: CLINICAL NURSE SPECIALIST

## 2023-10-09 PROCEDURE — 82565 ASSAY OF CREATININE: CPT | Performed by: PATHOLOGY

## 2023-10-09 PROCEDURE — 71260 CT THORAX DX C+: CPT | Performed by: RADIOLOGY

## 2023-10-09 PROCEDURE — 99213 OFFICE O/P EST LOW 20 MIN: CPT | Performed by: CLINICAL NURSE SPECIALIST

## 2023-10-09 RX ORDER — IOPAMIDOL 755 MG/ML
90 INJECTION, SOLUTION INTRAVASCULAR ONCE
Status: COMPLETED | OUTPATIENT
Start: 2023-10-09 | End: 2023-10-09

## 2023-10-09 RX ADMIN — IOPAMIDOL 90 ML: 755 INJECTION, SOLUTION INTRAVASCULAR at 11:49

## 2023-10-09 ASSESSMENT — PAIN SCALES - GENERAL: PAINLEVEL: NO PAIN (0)

## 2023-10-09 NOTE — LETTER
10/9/2023         RE: Juwan Latham  [address] [facility]  [facility] [id number]        Dear Colleague,    Thank you for referring your patient, Juwan Latham, to the [facility]. Please see a copy of my visit note below.    THORACIC SURGERY FOLLOW UP VISIT      I saw Mr. Latham in follow-up today. The clinical summary follows:     PREOP DIAGNOSIS   Enlarging right upper lobe lung nodule  PROCEDURE   Laparoscopic assisted thoracoscopic right upper lobe nodule wedge resection and mediastinal lymph node dissection    DATE OF PROCEDURE  03/05/2017    HISTOPATHOLOGY   Minimally invasive well differentiated adenocarcinoma pT1aN0    COMPLICATIONS  None    INTERVAL STUDIES  CT chest: Unchanged prior right upper lobe wedge resection appearance and other 4 mm likely benign right lower lobe nodule.    Past Medical History:   Diagnosis Date    Anxiety     Arthritis     fingers, hips    Calculus of kidney 2005, 2012    CARDIOVASCULAR SCREENING; LDL GOAL LESS THAN 130 4/18/2023    Colon polyps     Congenital single kidney     Gastroesophageal reflux disease     Hx of cancer of lung 03/2017    wedge resection    Hypertension     Prostate cancer (H) 08/2017    prostatectomy/Rad Tx    Seasonal allergies     spring and fall    Sleep apnea     cpap      Past Surgical History:   Procedure Laterality Date    BRONCHOSCOPY FLEXIBLE N/A 03/03/2017    Procedure: BRONCHOSCOPY FLEXIBLE;  Surgeon: Maria A Desai MD;  [facility]    COLONOSCOPY N/A 02/11/2015    Procedure: COLONOSCOPY;  Surgeon: Murphy Jesus MD;  Location:  GI    COLONOSCOPY N/A 12/11/2019    Procedure: COLONOSCOPY;  Surgeon: Murphy Jesus MD;  Location:  GI    COLONOSCOPY N/A 01/25/2022    due 5 yrs - COLONOSCOPY, FLEXIBLE, WITH POLYPECTOMY  USING COLD SNARE;  Surgeon: Neha Rosen MD;  Location:  GI    COMBINED CYSTOSCOPY, RETROGRADES, URETEROSCOPY, LASER HOLMIUM LITHOTRIPSY URETER(S), INSERT STENT N/A 03/25/2018     Procedure: COMBINED CYSTOSCOPY, RETROGRADES, URETEROSCOPY, LASER HOLMIUM LITHOTRIPSY URETER(S), INSERT STENT;  Cystoscopy, Catheter Exchange under Anesthesia;  Surgeon: Zaria Cain MD;  Location: [facility]    CYSTOSCOPY, DILATE URETHRA, COMBINED N/A 2022    Procedure: CYSTOSCOPY, WITH URETHRAL DILATION WITH FULGERATION OF BLADDER WALL;  Surgeon: Mike Chan MD;  Location: [facility] OR    DAVINCI PROSTATECTOMY N/A 2017    Procedure: DAVINCI PROSTATECTOMY;  Robotic Assisted Radical Prostatectomy and Pelvic Lymph Node Dissection ;  Surgeon: Paulo Agarwal MD;  [facility]    ESOPHAGOSCOPY, GASTROSCOPY, DUODENOSCOPY (EGD), COMBINED N/A 2016    Procedure: COMBINED ESOPHAGOSCOPY, GASTROSCOPY, DUODENOSCOPY (EGD), BIOPSY SINGLE OR MULTIPLE;  Surgeon: Murphy Jesus MD;  [facility]    LAPAROSCOPIC WEDGE RESECTION LUNG Right 2017    Procedure: LAPAROSCOPIC WEDGE RESECTION LUNG;  Surgeon: Maria A Desai MD;  [facility]    LASER HOLMIUM LITHOTRIPSY URETER(S), INSERT STENT, COMBINED  2012    Procedure: COMBINED CYSTOSCOPY, URETEROSCOPY, LASER HOLMIUM LITHOTRIPSY URETER(S), INSERT STENT;  Cystoscopy, Right Ureteroscopy, Stone Extraction, Holmium Laser, Double J Stent Placement;  Surgeon: Nicolás Blackwell MD;  [facility]      Social History     Socioeconomic History    Marital status:      Spouse name: Not on file    Number of children: 1    Years of education: Not on file    Highest education level: Not on file   Occupational History    Occupation: Remodeling     Employer: SELF     Employer: OTHER   Tobacco Use    Smoking status: Former     Packs/day: 2.00     Years: 20.00     Pack years: 40.00     Types: Cigarettes     Quit date: 1985     Years since quittin.7     Passive exposure: Past    Smokeless tobacco: Never   Vaping Use    Vaping Use: Never used   Substance and Sexual Activity    Alcohol use: Not Currently     Alcohol/week: 0.0 -  2.0 standard drinks of alcohol    Drug use: No    Sexual activity: Yes     Partners: Female   Other Topics Concern     Service Not Asked    Blood Transfusions Not Asked    Caffeine Concern No    Occupational Exposure Not Asked    Hobby Hazards Not Asked    Sleep Concern Yes     Comment: middle/late insomnia    Stress Concern Not Asked    Weight Concern Not Asked    Special Diet Not Asked    Back Care Not Asked    Exercise Yes    Bike Helmet Not Asked    Seat Belt Yes    Self-Exams Not Asked    Parent/sibling w/ CABG, MI or angioplasty before 65F 55M? No   Social History Narrative    Dad  at age 86 from complications after hip surgery.    Mom  at age 68 from heart condition    Siblings:  Three sisters in good health.  Brother with diabetes and overweight.        One son    One granddaughter    Occupation:  remodeling     Social Determinants of Health     Financial Resource Strain: Not on file   Food Insecurity: Not on file   Transportation Needs: Not on file   Physical Activity: Not on file   Stress: Not on file   Social Connections: Not on file   Interpersonal Safety: Not on file   Housing Stability: Not on file       SUBJECTIVE   Juwan is doing good. He denies cough, shortness of breath or chest pain. He does have some lower back pain and thinks he may have pulled a muscle. He was nervous waiting to get the CT results and feels that is why his blood pressure was high. He checks it twice a day at home and it is always in the 120s/60s.    OBJECTIVE  BP (!) 144/76 (BP Location: Right arm, Patient Position: Sitting, Cuff Size: Adult Regular)   Pulse 65   Temp 97.6  F (36.4  C) (Oral)   Resp 16   Wt 81.6 kg (179 lb 14.4 oz)   SpO2 100%   BMI 27.35 kg/m       He was relieved to hear that his CT looks good and there are no new or concerning findings. I suggested he try a warm pad to his lower back when he is having back pain. I cautioned him not to fall asleep on the heating pad though as this  will blister his skin.    From a personal perspective, his roommate, Vik, is a die hard [employer] fan. He wore a [employer] hat today to show his support.    IMPRESSION   72 year-old male status post laparoscopic assisted thoracoscopic right upper lobe wedge resection and mediastinal lymph node dissection for a pT1aN0 (stage IA1) non small cell lung cancer. He is here for lung cancer surveillance.    PLAN  I spent 15 min on the date of the encounter in chart review, patient visit, review of tests, documentation and/or discussion with other providers about the issues documented above. I reviewed the plan as follows:  Follow up in 1 year with chest CT prior  1. Necessary Tests & Appointments: chest CT    All questions were answered and the patient and present family were in agreement with the plan.  I appreciate the opportunity to participate in the care of your patient and will keep you updated.  Sincerely,      GLORIA Arechiga CNS

## 2023-10-09 NOTE — DISCHARGE INSTRUCTIONS

## 2023-10-09 NOTE — NURSING NOTE
"Oncology Rooming Note    October 9, 2023 1:35 PM   Juwan Latham is a 72 year old male who presents for:    Chief Complaint   Patient presents with    Oncology Clinic Visit     Malignant neoplasm of upper lobe of right lung     Initial Vitals: BP (!) 144/76 (BP Location: Right arm, Patient Position: Sitting, Cuff Size: Adult Regular)   Pulse 65   Temp 97.6  F (36.4  C) (Oral)   Resp 16   Wt 81.6 kg (179 lb 14.4 oz)   SpO2 100%   BMI 27.35 kg/m   Estimated body mass index is 27.35 kg/m  as calculated from the following:    Height as of 7/19/23: 1.727 m (5' 8\").    Weight as of this encounter: 81.6 kg (179 lb 14.4 oz). Body surface area is 1.98 meters squared.  No Pain (0) Comment: Data Unavailable   No LMP for male patient.  Allergies reviewed: Yes  Medications reviewed: Yes    Medications: Medication refills not needed today.  Pharmacy name entered into [facility]: [id number] - [place], [place] - [id number]    Clinical concerns: Patient states there are no new concerns to discuss with provider.        Mandi Espinosa"

## 2023-10-09 NOTE — PROGRESS NOTES
THORACIC SURGERY FOLLOW UP VISIT      I saw Mr. Latham in follow-up today. The clinical summary follows:     PREOP DIAGNOSIS   Enlarging right upper lobe lung nodule  PROCEDURE   Laparoscopic assisted thoracoscopic right upper lobe nodule wedge resection and mediastinal lymph node dissection    DATE OF PROCEDURE  03/05/2017    HISTOPATHOLOGY   Minimally invasive well differentiated adenocarcinoma pT1aN0    COMPLICATIONS  None    INTERVAL STUDIES  CT chest: Unchanged prior right upper lobe wedge resection appearance and other 4 mm likely benign right lower lobe nodule.    Past Medical History:   Diagnosis Date    Anxiety     Arthritis     fingers, hips    Calculus of kidney 2005, 2012    CARDIOVASCULAR SCREENING; LDL GOAL LESS THAN 130 4/18/2023    Colon polyps     Congenital single kidney     Gastroesophageal reflux disease     Hx of cancer of lung 03/2017    wedge resection    Hypertension     Prostate cancer (H) 08/2017    prostatectomy/Rad Tx    Seasonal allergies     spring and fall    Sleep apnea     cpap      Past Surgical History:   Procedure Laterality Date    BRONCHOSCOPY FLEXIBLE N/A 03/03/2017    Procedure: BRONCHOSCOPY FLEXIBLE;  Surgeon: Maria A Desai MD;  [facility]    COLONOSCOPY N/A 02/11/2015    Procedure: COLONOSCOPY;  Surgeon: Murphy Jesus MD;  Location:  GI    COLONOSCOPY N/A 12/11/2019    Procedure: COLONOSCOPY;  Surgeon: Murphy Jesus MD;  Location:  GI    COLONOSCOPY N/A 01/25/2022    due 5 yrs - COLONOSCOPY, FLEXIBLE, WITH POLYPECTOMY  USING COLD SNARE;  Surgeon: Neha Rosen MD;  Location:  GI    COMBINED CYSTOSCOPY, RETROGRADES, URETEROSCOPY, LASER HOLMIUM LITHOTRIPSY URETER(S), INSERT STENT N/A 03/25/2018    Procedure: COMBINED CYSTOSCOPY, RETROGRADES, URETEROSCOPY, LASER HOLMIUM LITHOTRIPSY URETER(S), INSERT STENT;  Cystoscopy, Catheter Exchange under Anesthesia;  Surgeon: Zaria Cain MD;  Location:  OR    CYSTOSCOPY, DILATE URETHRA, COMBINED  N/A 2022    Procedure: CYSTOSCOPY, WITH URETHRAL DILATION WITH FULGERATION OF BLADDER WALL;  Surgeon: Mike Chan MD;  Location: [facility] OR    DAVINCI PROSTATECTOMY N/A 2017    Procedure: DAVINCI PROSTATECTOMY;  Robotic Assisted Radical Prostatectomy and Pelvic Lymph Node Dissection ;  Surgeon: Paulo Agarwal MD;  [facility]    ESOPHAGOSCOPY, GASTROSCOPY, DUODENOSCOPY (EGD), COMBINED N/A 2016    Procedure: COMBINED ESOPHAGOSCOPY, GASTROSCOPY, DUODENOSCOPY (EGD), BIOPSY SINGLE OR MULTIPLE;  Surgeon: Murphy Jesus MD;  [facility]    LAPAROSCOPIC WEDGE RESECTION LUNG Right 2017    Procedure: LAPAROSCOPIC WEDGE RESECTION LUNG;  Surgeon: Maria A Desai MD;  [facility]    LASER HOLMIUM LITHOTRIPSY URETER(S), INSERT STENT, COMBINED  2012    Procedure: COMBINED CYSTOSCOPY, URETEROSCOPY, LASER HOLMIUM LITHOTRIPSY URETER(S), INSERT STENT;  Cystoscopy, Right Ureteroscopy, Stone Extraction, Holmium Laser, Double J Stent Placement;  Surgeon: Nicolás Blackwell MD;  [facility]      Social History     Socioeconomic History    Marital status:      Spouse name: Not on file    Number of children: 1    Years of education: Not on file    Highest education level: Not on file   Occupational History    Occupation: [employer]     Employer: SELF     Employer: OTHER   Tobacco Use    Smoking status: Former     Packs/day: 2.00     Years: 20.00     Pack years: 40.00     Types: Cigarettes     Quit date: 1985     Years since quittin.7     Passive exposure: Past    Smokeless tobacco: Never   Vaping Use    Vaping Use: Never used   Substance and Sexual Activity    Alcohol use: Not Currently     Alcohol/week: 0.0 - 2.0 standard drinks of alcohol    Drug use: No    Sexual activity: Yes     Partners: Female   Other Topics Concern     Service Not Asked    Blood Transfusions Not Asked    Caffeine Concern No    Occupational Exposure Not Asked    Hobby Hazards Not  Asked    Sleep Concern Yes     Comment: middle/late insomnia    Stress Concern Not Asked    Weight Concern Not Asked    Special Diet Not Asked    Back Care Not Asked    Exercise Yes    Bike Helmet Not Asked    Seat Belt Yes    Self-Exams Not Asked    Parent/sibling w/ CABG, MI or angioplasty before 65F 55M? No   Social History Narrative    Dad  at age 86 from complications after hip surgery.    Mom  at age 68 from heart condition    Siblings:  Three sisters in good health.  Brother with diabetes and overweight.        One son    One granddaughter    Occupation:  remodeling     Social Determinants of Health     Financial Resource Strain: Not on file   Food Insecurity: Not on file   Transportation Needs: Not on file   Physical Activity: Not on file   Stress: Not on file   Social Connections: Not on file   Interpersonal Safety: Not on file   Housing Stability: Not on file       SUBJECTIVE   Juwan is doing good. He denies cough, shortness of breath or chest pain. He does have some lower back pain and thinks he may have pulled a muscle. He was nervous waiting to get the CT results and feels that is why his blood pressure was high. He checks it twice a day at home and it is always in the 120s/60s.    OBJECTIVE  BP (!) 144/76 (BP Location: Right arm, Patient Position: Sitting, Cuff Size: Adult Regular)   Pulse 65   Temp 97.6  F (36.4  C) (Oral)   Resp 16   Wt 81.6 kg (179 lb 14.4 oz)   SpO2 100%   BMI 27.35 kg/m       He was relieved to hear that his CT looks good and there are no new or concerning findings. I suggested he try a warm pad to his lower back when he is having back pain. I cautioned him not to fall asleep on the heating pad though as this will blister his skin.    From a personal perspective, his roommate, Vik, is a die hard [employer] fan. He wore a [employer] hat today to show his support.    IMPRESSION   72 year-old male status post laparoscopic assisted thoracoscopic right upper lobe wedge  resection and mediastinal lymph node dissection for a pT1aN0 (stage IA1) non small cell lung cancer. He is here for lung cancer surveillance.    PLAN  I spent 15 min on the date of the encounter in chart review, patient visit, review of tests, documentation and/or discussion with other providers about the issues documented above. I reviewed the plan as follows:  Follow up in 1 year with chest CT prior  1. Necessary Tests & Appointments: chest CT    All questions were answered and the patient and present family were in agreement with the plan.  I appreciate the opportunity to participate in the care of your patient and will keep you updated.  Sincerely,

## 2023-10-13 ENCOUNTER — TELEPHONE (OUTPATIENT)
Dept: FAMILY MEDICINE | Facility: CLINIC | Age: 73
End: 2023-10-13
Payer: COMMERCIAL

## 2023-10-13 NOTE — TELEPHONE ENCOUNTER
Patient calls, is asking what kind of help can be done for his room mate who drinks a lot of ETOH. Writer gave options for help lines for [facility] and [facility]. Patient declined these options. Wants his room mate to be seen by a provider. Patient has no PCP, informed him that he can bring the patient to a  to be seen vs a Emergency Room. Informed him, the patient would need to reach out himself to make a PCP appointment. Patient thanked writer.

## 2023-11-09 ENCOUNTER — PATIENT OUTREACH (OUTPATIENT)
Dept: GASTROENTEROLOGY | Facility: CLINIC | Age: 73
End: 2023-11-09
Payer: COMMERCIAL

## 2023-12-14 ENCOUNTER — HOSPITAL ENCOUNTER (OUTPATIENT)
Dept: GENERAL RADIOLOGY | Facility: CLINIC | Age: 73
Discharge: HOME | End: 2023-12-14
Attending: CLINICAL NURSE SPECIALIST | Admitting: CLINICAL NURSE SPECIALIST
Payer: COMMERCIAL

## 2023-12-14 ENCOUNTER — VIRTUAL VISIT (OUTPATIENT)
Dept: PULMONOLOGY | Facility: CLINIC | Age: 73
End: 2023-12-14
Attending: CLINICAL NURSE SPECIALIST
Payer: COMMERCIAL

## 2023-12-14 DIAGNOSIS — R07.1 PAINFUL BREATHING: ICD-10-CM

## 2023-12-14 DIAGNOSIS — R07.1 PAINFUL BREATHING: Primary | ICD-10-CM

## 2023-12-14 PROCEDURE — 71046 X-RAY EXAM CHEST 2 VIEWS: CPT

## 2023-12-14 PROCEDURE — 99442 PR PHYSICIAN TELEPHONE EVALUATION 11-20 MIN: CPT | Mod: 95 | Performed by: CLINICAL NURSE SPECIALIST

## 2023-12-14 NOTE — PROGRESS NOTES
Virtual Visit Details    Type of service:  Telephone Visit   Phone call duration: 14 minutes     THORACIC SURGERY FOLLOW UP VISIT      I saw Mr. Latham in follow-up today. The clinical summary follows:     PREOP DIAGNOSIS   Enlarging right upper lobe lung nodule  PROCEDURE   Laparoscopic assisted thoracoscopic right upper lobe nodule wedge resection and mediastinal lymph node dissection    DATE OF PROCEDURE  03/05/2017    HISTOPATHOLOGY   Minimally invasive well differentiated adenocarcinoma pT1aN0    COMPLICATIONS  None    INTERVAL STUDIES  None    Past Medical History:   Diagnosis Date    Anxiety     Arthritis     fingers, hips    Calculus of kidney 2005, 2012    CARDIOVASCULAR SCREENING; LDL GOAL LESS THAN 130 4/18/2023    Colon polyps     Congenital single kidney     Gastroesophageal reflux disease     Hx of cancer of lung 03/2017    wedge resection    Hypertension     Prostate cancer (H) 08/2017    prostatectomy/Rad Tx    Seasonal allergies     spring and fall    Sleep apnea     cpap      Past Surgical History:   Procedure Laterality Date    BRONCHOSCOPY FLEXIBLE N/A 03/03/2017    Procedure: BRONCHOSCOPY FLEXIBLE;  Surgeon: Maria A Desai MD;  [facility]    COLONOSCOPY N/A 02/11/2015    Procedure: COLONOSCOPY;  Surgeon: Murphy Jesus MD;  Location:  GI    COLONOSCOPY N/A 12/11/2019    Procedure: COLONOSCOPY;  Surgeon: Murphy Jesus MD;  Location:  GI    COLONOSCOPY N/A 01/25/2022    due 5 yrs - COLONOSCOPY, FLEXIBLE, WITH POLYPECTOMY  USING COLD SNARE;  Surgeon: Neha Rosen MD;  Location:  GI    COMBINED CYSTOSCOPY, RETROGRADES, URETEROSCOPY, LASER HOLMIUM LITHOTRIPSY URETER(S), INSERT STENT N/A 03/25/2018    Procedure: COMBINED CYSTOSCOPY, RETROGRADES, URETEROSCOPY, LASER HOLMIUM LITHOTRIPSY URETER(S), INSERT STENT;  Cystoscopy, Catheter Exchange under Anesthesia;  Surgeon: Zaria Cain MD;  Location:  OR    CYSTOSCOPY, DILATE URETHRA, COMBINED N/A 01/03/2022     Procedure: CYSTOSCOPY, WITH URETHRAL DILATION WITH FULGERATION OF BLADDER WALL;  Surgeon: Mike Chan MD;  Location: [facility]    DAVINCI PROSTATECTOMY N/A 2017    Procedure: DAVINCI PROSTATECTOMY;  Robotic Assisted Radical Prostatectomy and Pelvic Lymph Node Dissection ;  Surgeon: Paulo Agarwal MD;  [facility]    ESOPHAGOSCOPY, GASTROSCOPY, DUODENOSCOPY (EGD), COMBINED N/A 2016    Procedure: COMBINED ESOPHAGOSCOPY, GASTROSCOPY, DUODENOSCOPY (EGD), BIOPSY SINGLE OR MULTIPLE;  Surgeon: Murphy Jesus MD;  [facility]    LAPAROSCOPIC WEDGE RESECTION LUNG Right 2017    Procedure: LAPAROSCOPIC WEDGE RESECTION LUNG;  Surgeon: Maria A Desai MD;  [facility]    LASER HOLMIUM LITHOTRIPSY URETER(S), INSERT STENT, COMBINED  2012    Procedure: COMBINED CYSTOSCOPY, URETEROSCOPY, LASER HOLMIUM LITHOTRIPSY URETER(S), INSERT STENT;  Cystoscopy, Right Ureteroscopy, Stone Extraction, Holmium Laser, Double J Stent Placement;  Surgeon: Nicolás Blackwell MD;  [facility]: [facility]      Social History     Socioeconomic History    Marital status:      Spouse name: Not on file    Number of children: 1    Years of education: Not on file    Highest education level: Not on file   Occupational History    Occupation: Remodeling     Employer: SELF     Employer: OTHER   Tobacco Use    Smoking status: Former     Packs/day: 2.00     Years: 20.00     Additional pack years: 0.00     Total pack years: 40.00     Types: Cigarettes     Quit date: 1985     Years since quittin.9     Passive exposure: Past    Smokeless tobacco: Never   Vaping Use    Vaping Use: Never used   Substance and Sexual Activity    Alcohol use: Not Currently     Alcohol/week: 0.0 - 2.0 standard drinks of alcohol    Drug use: No    Sexual activity: Yes     Partners: Female   Other Topics Concern     Service Not Asked    Blood Transfusions Not Asked    Caffeine Concern No    Occupational Exposure Not Asked     Hobby Hazards Not Asked    Sleep Concern Yes     Comment: middle/late insomnia    Stress Concern Not Asked    Weight Concern Not Asked    Special Diet Not Asked    Back Care Not Asked    Exercise Yes    Bike Helmet Not Asked    Seat Belt Yes    Self-Exams Not Asked    Parent/sibling w/ CABG, MI or angioplasty before 65F 55M? No   Social History Narrative    Dad  at age 86 from complications after hip surgery.    Mom  at age 68 from heart condition    Siblings:  Three sisters in good health.  Brother with diabetes and overweight.        One son    One granddaughter    Occupation:  [employer]     Social Determinants of Health     Financial Resource Strain: Not on file   Food Insecurity: Not on file   Transportation Needs: Not on file   Physical Activity: Not on file   Stress: Not on file   Social Connections: Not on file   Interpersonal Safety: Not on file   Housing Stability: Not on file      SUBJECTIVE   Juwan wanted a phone visit with me today because he has been having an aching sensation when he takes a deep breath in. It started a couple of weeks ago after he took a lung tonic. The lung tonic is called [employer] Lung Tonic and he used 30 drops of the tonic in water three times a day as recommended. Shortly after he started taking this he had the aching sensation when he takes a deep breath in. He does not have a cough, shortness of breath, fevers, night sweats or chills. He did not contact his primary care doctor about this.     IMPRESSION   73 year-old male status post laparoscopic assisted thoracoscopic right upper lobe wedge resection and mediastinal lymph node dissection for a pT1aN0 (stage IA1) non small cell lung cancer in . His most erecnet lung cancer surveillance chest CT in 2023 did not show any new nodules or lung opacities.    According to the [employer] website, the ingredients in their Lung Tonic Classic Liquid Extract include: Organic fresh Mullein leaf, Organic [place]ound  herb, Organic Elecampane root, fresh Grindelia flower, Organic Echinacea angustifolia root, Pleurisy Root root, fresh Passionflower herb, Osha root, fresh Lobelia herb (in bladder seed stage), [place]ba Irma leaf, [place] Pharmacopoeial alcohol (48-58% by volume), purified water, vegetable glycerine, and Organic apple cider vinegar (acetic acid <0.01%).    I did an UpToDate search on each individual herb but was not able to find any information on any of them except the Mullein leaf (which is described as gingko biloba), echinacea, and passion flower.    I think he had an allergic reaction to one or more of the tonic ingredients and I recommended he stop using the lung tonic immediatly and we will get a CXR to make sure he does not have a large inflammatory process going on from this tonic.    PLAN  I spent 25 min on the date of the encounter in chart review, patient visit, review of tests, documentation and/or discussion with other providers about the issues documented above. I reviewed the plan as follows:  Stop use of [employer] Lung tonic. Get a CXR.  1. Necessary Tests & Appointments: CXR  All questions were answered and the patient and present family were in agreement with the plan.  I appreciate the opportunity to participate in the care of your patient and will keep you updated.  Sincerely,

## 2023-12-14 NOTE — NURSING NOTE
Is the patient currently in the state of [place]? YES    Visit mode:TELEPHONE    If the visit is dropped, the patient can be reconnected by: TELEPHONE VISIT: Phone number:   Telephone Information:   Mobile [phone]       Will anyone else be joining the visit? NO  (If patient encounters technical issues they should call [phone]    How would you like to obtain your AVS? MyChart    Are changes needed to the allergy or medication list? Yes patient is also taking Loin's saba    Reason for visit: No chief complaint on file.    Megan ANTOINEF

## 2023-12-21 ENCOUNTER — HOSPITAL ENCOUNTER (EMERGENCY)
Facility: CLINIC | Age: 73
Discharge: HOME | End: 2023-12-21
Attending: EMERGENCY MEDICINE | Admitting: EMERGENCY MEDICINE
Payer: COMMERCIAL

## 2023-12-21 ENCOUNTER — NURSE TRIAGE (OUTPATIENT)
Dept: SURGERY | Facility: CLINIC | Age: 73
End: 2023-12-21
Payer: COMMERCIAL

## 2023-12-21 VITALS
OXYGEN SATURATION: 97 % | DIASTOLIC BLOOD PRESSURE: 88 MMHG | SYSTOLIC BLOOD PRESSURE: 151 MMHG | HEART RATE: 69 BPM | TEMPERATURE: 99.9 F | RESPIRATION RATE: 20 BRPM

## 2023-12-21 LAB
ANION GAP SERPL CALCULATED.3IONS-SCNC: 14 MMOL/L (ref 7–15)
BASOPHILS # BLD AUTO: 0 10E3/UL (ref 0–0.2)
BASOPHILS NFR BLD AUTO: 0 %
BUN SERPL-MCNC: 13.7 MG/DL (ref 8–23)
CALCIUM SERPL-MCNC: 9.6 MG/DL (ref 8.8–10.2)
CHLORIDE SERPL-SCNC: 104 MMOL/L (ref 98–107)
CREAT SERPL-MCNC: 0.77 MG/DL (ref 0.67–1.17)
DEPRECATED HCO3 PLAS-SCNC: 24 MMOL/L (ref 22–29)
EGFRCR SERPLBLD CKD-EPI 2021: >90 ML/MIN/1.73M2
EOSINOPHIL # BLD AUTO: 0 10E3/UL (ref 0–0.7)
EOSINOPHIL NFR BLD AUTO: 0 %
ERYTHROCYTE [DISTWIDTH] IN BLOOD BY AUTOMATED COUNT: 13.1 % (ref 10–15)
GLUCOSE SERPL-MCNC: 104 MG/DL (ref 70–99)
HCT VFR BLD AUTO: 43.9 % (ref 40–53)
HGB BLD-MCNC: 14.6 G/DL (ref 13.3–17.7)
HOLD SPECIMEN: NORMAL
IMM GRANULOCYTES # BLD: 0 10E3/UL
IMM GRANULOCYTES NFR BLD: 1 %
LYMPHOCYTES # BLD AUTO: 0.7 10E3/UL (ref 0.8–5.3)
LYMPHOCYTES NFR BLD AUTO: 11 %
MCH RBC QN AUTO: 30.7 PG (ref 26.5–33)
MCHC RBC AUTO-ENTMCNC: 33.3 G/DL (ref 31.5–36.5)
MCV RBC AUTO: 92 FL (ref 78–100)
MONOCYTES # BLD AUTO: 0.5 10E3/UL (ref 0–1.3)
MONOCYTES NFR BLD AUTO: 8 %
NEUTROPHILS # BLD AUTO: 4.9 10E3/UL (ref 1.6–8.3)
NEUTROPHILS NFR BLD AUTO: 80 %
NRBC # BLD AUTO: 0 10E3/UL
NRBC BLD AUTO-RTO: 0 /100
PLATELET # BLD AUTO: 181 10E3/UL (ref 150–450)
POTASSIUM SERPL-SCNC: 4.2 MMOL/L (ref 3.4–5.3)
RBC # BLD AUTO: 4.76 10E6/UL (ref 4.4–5.9)
SODIUM SERPL-SCNC: 142 MMOL/L (ref 135–145)
TROPONIN T SERPL HS-MCNC: 9 NG/L
WBC # BLD AUTO: 6.2 10E3/UL (ref 4–11)

## 2023-12-21 PROCEDURE — 84484 ASSAY OF TROPONIN QUANT: CPT | Performed by: EMERGENCY MEDICINE

## 2023-12-21 PROCEDURE — 93005 ELECTROCARDIOGRAM TRACING: CPT

## 2023-12-21 PROCEDURE — 82310 ASSAY OF CALCIUM: CPT | Performed by: EMERGENCY MEDICINE

## 2023-12-21 PROCEDURE — 85014 HEMATOCRIT: CPT | Performed by: EMERGENCY MEDICINE

## 2023-12-21 PROCEDURE — 80048 BASIC METABOLIC PNL TOTAL CA: CPT | Performed by: EMERGENCY MEDICINE

## 2023-12-21 PROCEDURE — 85025 COMPLETE CBC W/AUTO DIFF WBC: CPT | Performed by: EMERGENCY MEDICINE

## 2023-12-21 PROCEDURE — 36415 COLL VENOUS BLD VENIPUNCTURE: CPT | Performed by: EMERGENCY MEDICINE

## 2023-12-21 PROCEDURE — 99281 EMR DPT VST MAYX REQ PHY/QHP: CPT

## 2023-12-21 NOTE — ED TRIAGE NOTES
Pt presents to the ED with vague symptoms- feeling in the right side of chest that's been there since Dec 1st and lightheadedness. Pt states he can't describe the feeling in his chest and states he hasn't been sick recently but states he did have a chest x ray that showed nothing. Pt states he had chills last night. Pt states he did workout this morning.      Triage Assessment (Adult)       Row Name 12/21/23 1214          Triage Assessment    Airway WDL WDL        Respiratory WDL    Respiratory WDL WDL        Skin Circulation/Temperature WDL    Skin Circulation/Temperature WDL WDL        Cardiac WDL    Cardiac WDL WDL        Peripheral/Neurovascular WDL    Peripheral Neurovascular WDL WDL        Cognitive/Neuro/Behavioral WDL    Cognitive/Neuro/Behavioral WDL WDL

## 2023-12-21 NOTE — TELEPHONE ENCOUNTER
Call transferred from [facility]    Pt is reporting chest aching similar to what he reported last Thursday to Carla Faulkner.  Had gone away, now feeling it again over past 3 days and is worsening.  Per Carla's advice on 12/14, pt stopped [employer] Lung Tonic and has not resumed taking it.  Feels it in chest--same as when he spoke with Carla on 12/14, he feels it is worse than when talked to Carla last week. Achy sensation w/deep breath in. No SOB, cough, no fever, sweating; felt cold last night, had shaking chills. Hot bath took care of it. Now starting to feel it again. Feeling weak.  No dizziness or lightheadedness.   No pain in arms or legs  Temp today is: T: 96.2    Pt wants to know if CXR showed anything.    CXR result per pt's chart:  Impression:     IMPRESSION: No acute cardiopulmonary disease.     Per Carla's last note on 12/14: I think he had an allergic reaction to one or more of the tonic ingredients and I recommended he stop using the lung tonic immediatly and we will get a CXR to make sure he does not have a large inflammatory process going on from this tonic.     1130: Message sent to Thoracic Care Team. Carla is not in the office today.  1135: Per Elsie, pt should go to UC/ED for evaluation today.    1137: Called Juwan and advised UC or ED. Pt voiced understanding and is going to the ED for evaluation.

## 2023-12-22 ENCOUNTER — HOSPITAL ENCOUNTER (EMERGENCY)
Facility: CLINIC | Age: 73
Discharge: HOME | End: 2023-12-22
Attending: STUDENT IN AN ORGANIZED HEALTH CARE EDUCATION/TRAINING PROGRAM | Admitting: STUDENT IN AN ORGANIZED HEALTH CARE EDUCATION/TRAINING PROGRAM
Payer: COMMERCIAL

## 2023-12-22 ENCOUNTER — NURSE TRIAGE (OUTPATIENT)
Dept: FAMILY MEDICINE | Facility: CLINIC | Age: 73
End: 2023-12-22
Payer: COMMERCIAL

## 2023-12-22 ENCOUNTER — APPOINTMENT (OUTPATIENT)
Dept: GENERAL RADIOLOGY | Facility: CLINIC | Age: 73
End: 2023-12-22
Attending: EMERGENCY MEDICINE
Payer: COMMERCIAL

## 2023-12-22 VITALS
OXYGEN SATURATION: 99 % | RESPIRATION RATE: 16 BRPM | TEMPERATURE: 97.5 F | DIASTOLIC BLOOD PRESSURE: 73 MMHG | SYSTOLIC BLOOD PRESSURE: 129 MMHG | HEART RATE: 64 BPM

## 2023-12-22 DIAGNOSIS — R07.89 ATYPICAL CHEST PAIN: ICD-10-CM

## 2023-12-22 LAB
ALBUMIN SERPL BCG-MCNC: 4.9 G/DL (ref 3.5–5.2)
ALP SERPL-CCNC: 48 U/L (ref 40–150)
ALT SERPL W P-5'-P-CCNC: 23 U/L (ref 0–70)
ANION GAP SERPL CALCULATED.3IONS-SCNC: 12 MMOL/L (ref 7–15)
AST SERPL W P-5'-P-CCNC: 23 U/L (ref 0–45)
ATRIAL RATE - MUSE: 69 BPM
BASOPHILS # BLD AUTO: 0 10E3/UL (ref 0–0.2)
BASOPHILS NFR BLD AUTO: 0 %
BILIRUB SERPL-MCNC: 0.4 MG/DL
BUN SERPL-MCNC: 16.9 MG/DL (ref 8–23)
CALCIUM SERPL-MCNC: 9.1 MG/DL (ref 8.8–10.2)
CHLORIDE SERPL-SCNC: 104 MMOL/L (ref 98–107)
CREAT SERPL-MCNC: 0.83 MG/DL (ref 0.67–1.17)
DEPRECATED HCO3 PLAS-SCNC: 24 MMOL/L (ref 22–29)
DIASTOLIC BLOOD PRESSURE - MUSE: NORMAL MMHG
EGFRCR SERPLBLD CKD-EPI 2021: >90 ML/MIN/1.73M2
EOSINOPHIL # BLD AUTO: 0 10E3/UL (ref 0–0.7)
EOSINOPHIL NFR BLD AUTO: 1 %
ERYTHROCYTE [DISTWIDTH] IN BLOOD BY AUTOMATED COUNT: 13.1 % (ref 10–15)
FLUAV RNA SPEC QL NAA+PROBE: NEGATIVE
FLUBV RNA RESP QL NAA+PROBE: NEGATIVE
GLUCOSE SERPL-MCNC: 97 MG/DL (ref 70–99)
HCT VFR BLD AUTO: 43.8 % (ref 40–53)
HGB BLD-MCNC: 14.6 G/DL (ref 13.3–17.7)
IMM GRANULOCYTES # BLD: 0 10E3/UL
IMM GRANULOCYTES NFR BLD: 1 %
INTERPRETATION ECG - MUSE: NORMAL
LIPASE SERPL-CCNC: 48 U/L (ref 13–60)
LYMPHOCYTES # BLD AUTO: 1.1 10E3/UL (ref 0.8–5.3)
LYMPHOCYTES NFR BLD AUTO: 17 %
MAGNESIUM SERPL-MCNC: 1.8 MG/DL (ref 1.7–2.3)
MCH RBC QN AUTO: 30.9 PG (ref 26.5–33)
MCHC RBC AUTO-ENTMCNC: 33.3 G/DL (ref 31.5–36.5)
MCV RBC AUTO: 93 FL (ref 78–100)
MONOCYTES # BLD AUTO: 0.6 10E3/UL (ref 0–1.3)
MONOCYTES NFR BLD AUTO: 9 %
NEUTROPHILS # BLD AUTO: 4.7 10E3/UL (ref 1.6–8.3)
NEUTROPHILS NFR BLD AUTO: 72 %
NRBC # BLD AUTO: 0 10E3/UL
NRBC BLD AUTO-RTO: 0 /100
NT-PROBNP SERPL-MCNC: 51 PG/ML (ref 0–900)
P AXIS - MUSE: -4 DEGREES
PLATELET # BLD AUTO: 183 10E3/UL (ref 150–450)
POTASSIUM SERPL-SCNC: 4 MMOL/L (ref 3.4–5.3)
PR INTERVAL - MUSE: 142 MS
PROT SERPL-MCNC: 7.1 G/DL (ref 6.4–8.3)
QRS DURATION - MUSE: 90 MS
QT - MUSE: 376 MS
QTC - MUSE: 402 MS
R AXIS - MUSE: -9 DEGREES
RBC # BLD AUTO: 4.73 10E6/UL (ref 4.4–5.9)
RSV RNA SPEC NAA+PROBE: NEGATIVE
SARS-COV-2 RNA RESP QL NAA+PROBE: NEGATIVE
SODIUM SERPL-SCNC: 140 MMOL/L (ref 135–145)
SYSTOLIC BLOOD PRESSURE - MUSE: NORMAL MMHG
T AXIS - MUSE: 87 DEGREES
TROPONIN T SERPL HS-MCNC: 9 NG/L
VENTRICULAR RATE- MUSE: 69 BPM
WBC # BLD AUTO: 6.5 10E3/UL (ref 4–11)

## 2023-12-22 PROCEDURE — 83690 ASSAY OF LIPASE: CPT | Performed by: STUDENT IN AN ORGANIZED HEALTH CARE EDUCATION/TRAINING PROGRAM

## 2023-12-22 PROCEDURE — 80053 COMPREHEN METABOLIC PANEL: CPT | Performed by: EMERGENCY MEDICINE

## 2023-12-22 PROCEDURE — 99285 EMERGENCY DEPT VISIT HI MDM: CPT | Mod: 25

## 2023-12-22 PROCEDURE — 83880 ASSAY OF NATRIURETIC PEPTIDE: CPT | Performed by: EMERGENCY MEDICINE

## 2023-12-22 PROCEDURE — 83735 ASSAY OF MAGNESIUM: CPT | Performed by: EMERGENCY MEDICINE

## 2023-12-22 PROCEDURE — 84484 ASSAY OF TROPONIN QUANT: CPT | Performed by: STUDENT IN AN ORGANIZED HEALTH CARE EDUCATION/TRAINING PROGRAM

## 2023-12-22 PROCEDURE — 85025 COMPLETE CBC W/AUTO DIFF WBC: CPT | Performed by: STUDENT IN AN ORGANIZED HEALTH CARE EDUCATION/TRAINING PROGRAM

## 2023-12-22 PROCEDURE — 87637 SARSCOV2&INF A&B&RSV AMP PRB: CPT | Performed by: EMERGENCY MEDICINE

## 2023-12-22 PROCEDURE — 84484 ASSAY OF TROPONIN QUANT: CPT | Performed by: EMERGENCY MEDICINE

## 2023-12-22 PROCEDURE — 36415 COLL VENOUS BLD VENIPUNCTURE: CPT | Performed by: EMERGENCY MEDICINE

## 2023-12-22 PROCEDURE — 93005 ELECTROCARDIOGRAM TRACING: CPT

## 2023-12-22 PROCEDURE — 71046 X-RAY EXAM CHEST 2 VIEWS: CPT

## 2023-12-22 PROCEDURE — 80053 COMPREHEN METABOLIC PANEL: CPT | Performed by: STUDENT IN AN ORGANIZED HEALTH CARE EDUCATION/TRAINING PROGRAM

## 2023-12-22 PROCEDURE — 85025 COMPLETE CBC W/AUTO DIFF WBC: CPT | Performed by: EMERGENCY MEDICINE

## 2023-12-22 ASSESSMENT — ACTIVITIES OF DAILY LIVING (ADL)
ADLS_ACUITY_SCORE: 33
ADLS_ACUITY_SCORE: 35
ADLS_ACUITY_SCORE: 33

## 2023-12-22 NOTE — TELEPHONE ENCOUNTER
"Nurse Triage SBAR    Is this a 2nd Level Triage? NO    Situation: Patient states he was seen in the ER yesterday for a \"feeling in his chest\". Per EPIC patient left without being seen. Patient states he \"was there 4.5 hours and had to go home because he was too tired. Blood pressure has also been running higher at 150s/80s.     Background: Patient has history of lung cancer and family history of heart disease. Patient called yesterday with same concerns and was advised ER. Labs and EKG were done and per patient was normal.     Assessment: States he feels a \"light fluttering feeling\" once in a while. Reports a dull ache in the right top side of his chest right above his breast. This ache improves with exercise. It is more noticeable with a deep breath. Patient has been feeling this for about 2 weeks. Patient had a virtual visit with pulmonary on 12/14/23. Patient was advised to stop some supplements he was taking. States symptoms have been worse for the past 4-5 days. Reports breathing is OK. Occasionally feels a little bit of lightheadedness when he stands up. Also reports increased fatigue and feeling \"foggy\". Patient reports chills the night before last before going to ER and a little bit last night. Patient has been drinking about a cup of coffee per day and doesn't usually drink coffee. States he has been doing this for the past 6 months.     Protocol Recommended Disposition:   See in Office Today, Go To ED/UCC Now (Or To Office With PCP Approval), Call  Now    Recommendation: Please advise given patient was advised to go to ER yesterday and left after initial triage assessment without being seen by a provider.    Discussed patient may want to hold off on caffeine and see if this improves symptoms.    Routed to provider    Does the patient meet one of the following criteria for ADS visit consideration? 16+ years old, with an [facility] PCP     TIP  Providers, please consider if this condition is appropriate " for management at one of our Acute and Diagnostic Services sites.     If patient is a good candidate, please use dotphrase <dot>triageresponse and select Refer to ADS to document.    Reason for Disposition   Chest pain lasting longer than 5 minutes and ANY of the following:         Pain is crushing, pressure-like, or heavy         Over 44 years old          Over 30 years old and one cardiac risk factor (e.g diabetes, high blood pressure, high cholesterol, smoker, or family history of heart disease)         History of heart disease (e.g. angina, heart attack, heart failure, bypass surgery, takes nitroglycerin)   Chest pain lasting longer than 5 minutes and occurred in last 3 days (72 hours) (Exception: Feels exactly the same as previously diagnosed heartburn and has accompanying sour taste in mouth.)   Dizziness or lightheadedness   All other patients with chest pain (Exception: Fleeting chest pain lasting a few seconds.)    Additional Information   Negative: SEVERE difficulty breathing (e.g., struggling for each breath, speaks in single words)   Negative: Difficult to awaken or acting confused (e.g., disoriented, slurred speech)   Negative: Shock suspected (e.g., cold/pale/clammy skin, too weak to stand, low BP, rapid pulse)   Negative: Passed out (i.e., lost consciousness, collapsed and was not responding)   Negative: Heart beating < 50 beats per minute OR > 140 beats per minute   Negative: Visible sweat on face or sweat dripping down face   Negative: Sounds like a life-threatening emergency to the triager   Negative: Followed an injury to chest   Negative: SEVERE chest pain   Negative: Pain also in shoulder(s) or arm(s) or jaw   Negative: Difficulty breathing   Negative: Cocaine use within last 3 days   Negative: Major surgery in the past month   Negative: Hip or leg fracture (broken bone) in past month (or had cast on leg or ankle in past month)   Negative: Illness requiring prolonged bedrest in past month (e.g.,  immobilization, long hospital stay)   Negative: Long-distance travel in past month (e.g., car, bus, train, plane; with trip lasting 6 or more hours)   Negative: History of prior 'blood clot' in leg or lungs (i.e., deep vein thrombosis, pulmonary embolism)   Negative: History of inherited increased risk of blood clots (e.g., Factor 5 Leiden, Anti-thrombin 3, Protein C or Protein S deficiency, Prothrombin mutation)   Negative: Cancer treatment in the past two months (or has cancer now)   Negative: Heart beating irregularly or very rapidly   Negative: Chest pain or 'angina' comes and goes and is happening more often (increasing in frequency) or getting worse (increasing in severity) (Exception: Chest pains that last only a few seconds.)   Negative: Coughing up blood   Negative: Patient sounds very sick or weak to the triager   Negative: Patient says chest pain feels exactly the same as previously diagnosed 'heartburn' and describes burning in chest and accompanying sour taste in mouth   Negative: Fever > 100.4 F (38.0 C)   Negative: Chest pain(s) lasting a few seconds persists > 3 days   Negative: Rash in same area as pain (may be described as 'small blisters')    Protocols used: Chest Pain-A-[place]

## 2023-12-22 NOTE — ED TRIAGE NOTES
Arrives from home. States was seen yesterday for the same thing. States today now the pain is more central.   States this evening was cold while sleeping. States when he was checking his blood pressure his blood pressure machine detected a irregular heart beat.

## 2023-12-22 NOTE — TELEPHONE ENCOUNTER
Routing message    Karuna Ho, Sharon Thrasher, RN; Julio Guidry Jr., MD; Carla Faulkner APRN CNS; Rv Triage; Thoracic Care Coordination-Ump1 hour ago (12:31 PM)     ZITA Herrera,  He needs to go back to the ED and have a full evaluation done. We can't evaluate over the phone.  The fluttering in the chest could be cardiac related and warrants a full work-up.    Karuna Ho RN, BSN  Thoracic Surgery RN Care Coordinator     Called patient back and advised er per protocol and pcp  - advised of rationale.     Patient is agreeable to go back to er, declined 911

## 2023-12-23 NOTE — ED PROVIDER NOTES
History     Chief Complaint:  Chest Pain       HPI   Juwan Latham is a 73 year old male with a past medical history of prostate cancer, lung cancer, HTN, and fatty liver who presents with chest pain. The patient states that he has been having central back pain that radiates into hs upper right back. Patient states that he first noticed this when he was sleeping about two weeks ago. Patient states that he has been exercising still and does not feel at much when he walks but more at rest. He states that the pain is like a tightness in his chest. The pain is not worse when he lays down to rest. Patient denies any pain with deep breaths. Patient denies any fever, sore throat, cough, runny nose, nausea, vomiting, abdominal pain, or shortness of breath. Patient denies any weakness or numbness in hs extremities. He denies trying any medications to help the pain. Patient states that hs moms side of the family has had problems with heart disease. He denies seeing a cardiologist or having any history of heart disease. Patient states that he had a wedge resection of his right lung ten years ago but never had radiation or chemo treatment for his lung cancer.      Independent Historian:   None - Patient Only    Review of External Notes:   None      Medications:    ondansetron   prochlorperazine     Past Medical History:    Anxiety  Arthritis  Kidney calculus  Colon polyps  Single kidney  GERD  Lung cancer  HTN  Prostate cancer  Sleep apnea  Hemangioma of brain  Fatty liver    Past Surgical History:    Lithotripsy  Prostatectomy  EGD  Wedge resection  Ureter stent     Physical Exam   Patient Vitals for the past 24 hrs:   BP Temp Temp src Pulse Resp SpO2   12/22/23 2103 129/73 -- -- 64 16 99 %   12/22/23 1528 (!) 152/85 97.5  F (36.4  C) Temporal 72 18 97 %        Physical Exam  General: Awake, alert, in no acute distress   HEENT: Atraumatic   EOM normal   External ears normal   Trachea midline  Neck: Supple, normal ROM  CV:  Regular rate, regular rhythm   No murmur   No lower extremity edema  2+ radial and DP pulses  PULM: Breath sounds normal bilaterally  No wheezes or rales  ABD: Soft, non-tender, non-distended  Normal bowel sounds   No rebound or guarding   MSK: No gross deformities. No chest wall tenderness  NEURO: Alert, no focal deficits  Skin: Warm, dry and intact      Emergency Department Course   ECG  ECG results from 12/22/23   EKG 12 lead     Value    Systolic Blood Pressure     Diastolic Blood Pressure     Ventricular Rate 67    Atrial Rate 67    SC Interval 160    QRS Duration 98        QTc 393    P Axis 14    R AXIS 0    T Axis 85    Interpretation ECG      Sinus rhythm  Nonspecific T wave abnormality  Abnormal ECG  When compared with ECG of 21-DEC-2023 13:01,  No significant change was found         Imaging:  Chest XR,  PA & LAT   Final Result   IMPRESSION: Negative chest.           Laboratory:  Labs Ordered and Resulted from Time of ED Arrival to Time of ED Departure   TROPONIN T, HIGH SENSITIVITY - Normal       Result Value    Troponin T, High Sensitivity 9     COMPREHENSIVE METABOLIC PANEL - Normal    Sodium 140      Potassium 4.0      Carbon Dioxide (CO2) 24      Anion Gap 12      Urea Nitrogen 16.9      Creatinine 0.83      GFR Estimate >90      Calcium 9.1      Chloride 104      Glucose 97      Alkaline Phosphatase 48      AST 23      ALT 23      Protein Total 7.1      Albumin 4.9      Bilirubin Total 0.4     NT PROBNP INPATIENT - Normal    N terminal Pro BNP Inpatient 51     MAGNESIUM - Normal    Magnesium 1.8     INFLUENZA A/B, RSV, & SARS-COV2 PCR - Normal    Influenza A PCR Negative      Influenza B PCR Negative      RSV PCR Negative      SARS CoV2 PCR Negative     LIPASE - Normal    Lipase 48     CBC WITH PLATELETS AND DIFFERENTIAL    WBC Count 6.5      RBC Count 4.73      Hemoglobin 14.6      Hematocrit 43.8      MCV 93      MCH 30.9      MCHC 33.3      RDW 13.1      Platelet Count 183      % Neutrophils  72      % Lymphocytes 17      % Monocytes 9      % Eosinophils 1      % Basophils 0      % Immature Granulocytes 1      NRBCs per 100 WBC 0      Absolute Neutrophils 4.7      Absolute Lymphocytes 1.1      Absolute Monocytes 0.6      Absolute Eosinophils 0.0      Absolute Basophils 0.0      Absolute Immature Granulocytes 0.0      Absolute NRBCs 0.0        Emergency Department Course & Assessments:    Assessments:  1910 Obtained the patients history and performed initial exam  2055 Rechecked the patient and updated him on findings    ED Course as of 12/23/23 0145   Fri Dec 22, 2023   1917 Heart score 3       Independent Interpretation (X-rays, CTs, rhythm strip):  Chest x-ray: No focal infiltrates or pneumothorax      Social Determinants of Health affecting care:   None    Disposition:  The patient was discharged to home.     Impression & Plan      Medical Decision Making:    HEART Score  Criteria   0-2 points for each of 5 items (maximum of 10 points):  Score 0- History slightly suspicious for coronary syndrome  Score 0- EKG Normal  Score 2- Age 65 years or older  Score 1- One to 2 risk factors for atherosclerotic disease  Score 0- Within normal limits for troponin levels  Interpretation  Risk of adverse outcome  Heart Score: 3  Total Score 0-3- Adverse Outcome Risk 2.5% - Supports early discharge with appropriate follow-up    Juwan Latham is a 73 year old male presented with chest pain. Initial laboratory and imaging tests have come back normal.  His story is inconsistent with cardiac related chest pain-it actually gets better with exertion.  Wells low, doubt PE.  The patients symptoms have improved with interventions in the Emergency Department. There is no clinical, laboratory, or radiographic evidence of pulmonary embolism, aortic dissection or cardiac ischemia.  Given the low HEART score, I feel the patient is appropriate for discharge with outpatient stress testing.  he has agreed to follow-up with the  stress test and PCP in the next 1-2 days and return to the Emergency Department if symptoms worsen or change.          Diagnosis:    ICD-10-CM    1. Atypical chest pain  R07.89            Scribe Disclosure:  I, Morris Lynne, am serving as a scribe at 7:14 PM on 12/22/2023 to document services personally performed by Callie Oliva DO based on my observations and the provider's statements to me.     12/22/2023   Callie Oliva DO Pappas Richter, Ellen, DO  12/23/23 0145

## 2023-12-24 LAB
ATRIAL RATE - MUSE: 67 BPM
DIASTOLIC BLOOD PRESSURE - MUSE: NORMAL MMHG
INTERPRETATION ECG - MUSE: NORMAL
P AXIS - MUSE: 14 DEGREES
PR INTERVAL - MUSE: 160 MS
QRS DURATION - MUSE: 98 MS
QT - MUSE: 372 MS
QTC - MUSE: 393 MS
R AXIS - MUSE: 0 DEGREES
SYSTOLIC BLOOD PRESSURE - MUSE: NORMAL MMHG
T AXIS - MUSE: 85 DEGREES
VENTRICULAR RATE- MUSE: 67 BPM

## 2023-12-27 NOTE — PLAN OF CARE
Problem: Patient Care Overview  Goal: Plan of Care/Patient Progress Review  Outcome: No Change  Pt alert and oriented. SBA/Ass of 1. Walks well. Donovan to stay in until Dec.13th. Pt has been instructed on donovan use and cares. Pt was all set to DC today, but had an increase in nausea. Pts abdomen rounded and tender. Area above umbilical incision red and warm. Plan to monitor.   Pt had increase in pain, given Tylenol, pt worried about taking narcotics and constipation. Pain 7/10. Given one Norco with little decrease in pain. Then given IV Dilaudid, and IV Zofran.  Pts pain decreased. Nausea decreased.   Regular diet. Pt has no appetite.      Pt has two pill prescriptions for d/c, and one ointment. He has signed for these.    independent

## 2024-01-23 ENCOUNTER — IMMUNIZATION (OUTPATIENT)
Dept: FAMILY MEDICINE | Facility: CLINIC | Age: 74
End: 2024-01-23
Payer: COMMERCIAL

## 2024-01-23 DIAGNOSIS — Z23 NEED FOR PROPHYLACTIC VACCINATION AGAINST HEPATITIS A AND HEPATITIS B IN ADULT: ICD-10-CM

## 2024-01-23 DIAGNOSIS — Z23 ENCOUNTER FOR IMMUNIZATION: Primary | ICD-10-CM

## 2024-01-23 DIAGNOSIS — Z29.11 NEED FOR VACCINATION AGAINST RESPIRATORY SYNCYTIAL VIRUS: ICD-10-CM

## 2024-01-23 DIAGNOSIS — Z23 NEED FOR SHINGLES VACCINE: ICD-10-CM

## 2024-01-23 PROCEDURE — 90662 IIV NO PRSV INCREASED AG IM: CPT

## 2024-01-23 PROCEDURE — G0008 ADMIN INFLUENZA VIRUS VAC: HCPCS

## 2024-01-23 NOTE — PROGRESS NOTES
Prior to immunization administration, verified patients identity using patient s name and date of birth. Please see Immunization Activity for additional information.     Screening Questionnaire for Adult Immunization    Are you sick today?   No   Do you have allergies to medications, food, a vaccine component or latex?   No   Have you ever had a serious reaction after receiving a vaccination?   No   Do you have a long-term health problem with heart, lung, kidney, or metabolic disease (e.g., diabetes), asthma, a blood disorder, no spleen, complement component deficiency, a cochlear implant, or a spinal fluid leak?  Are you on long-term aspirin therapy?   No   Do you have cancer, leukemia, HIV/AIDS, or any other immune system problem?   No   Do you have a parent, brother, or sister with an immune system problem?   No   In the past 3 months, have you taken medications that affect  your immune system, such as prednisone, other steroids, or anticancer drugs; drugs for the treatment of rheumatoid arthritis, Crohn s disease, or psoriasis; or have you had radiation treatments?   No   Have you had a seizure, or a brain or other nervous system problem?   No   During the past year, have you received a transfusion of blood or blood    products, or been given immune (gamma) globulin or antiviral drug?   No   For women: Are you pregnant or is there a chance you could become       pregnant during the next month?   No   Have you received any vaccinations in the past 4 weeks?   No     Immunization questionnaire answers were all negative.    I have reviewed the following standing orders:   This patient is due and qualifies for the Influenza vaccine.    Click here for Influenza Vaccine Standing Order    I have reviewed the vaccines inclusion and exclusion criteria; No concerns regarding eligibility.     Patient instructed to remain in clinic for 15 minutes afterwards, and to report any adverse reactions.     Screening performed by Pearl  TEETEE Aguilar on 1/23/2024 at 10:29 AM.

## 2024-02-10 ENCOUNTER — TELEPHONE (OUTPATIENT)
Dept: SURGERY | Facility: CLINIC | Age: 74
End: 2024-02-10
Payer: COMMERCIAL

## 2024-02-10 ENCOUNTER — HOSPITAL ENCOUNTER (EMERGENCY)
Facility: CLINIC | Age: 74
Discharge: HOME | End: 2024-02-10
Attending: STUDENT IN AN ORGANIZED HEALTH CARE EDUCATION/TRAINING PROGRAM | Admitting: STUDENT IN AN ORGANIZED HEALTH CARE EDUCATION/TRAINING PROGRAM
Payer: COMMERCIAL

## 2024-02-10 VITALS
OXYGEN SATURATION: 98 % | HEART RATE: 72 BPM | TEMPERATURE: 98.3 F | RESPIRATION RATE: 18 BRPM | DIASTOLIC BLOOD PRESSURE: 85 MMHG | SYSTOLIC BLOOD PRESSURE: 146 MMHG

## 2024-02-10 DIAGNOSIS — R31.0 GROSS HEMATURIA: ICD-10-CM

## 2024-02-10 LAB
ALBUMIN UR-MCNC: 30 MG/DL
ANION GAP SERPL CALCULATED.3IONS-SCNC: 13 MMOL/L (ref 7–15)
APPEARANCE UR: ABNORMAL
BASOPHILS # BLD AUTO: 0 10E3/UL (ref 0–0.2)
BASOPHILS NFR BLD AUTO: 0 %
BILIRUB UR QL STRIP: NEGATIVE
BUN SERPL-MCNC: 18.8 MG/DL (ref 8–23)
CALCIUM SERPL-MCNC: 9.5 MG/DL (ref 8.8–10.2)
CHLORIDE SERPL-SCNC: 102 MMOL/L (ref 98–107)
COLOR UR AUTO: YELLOW
CREAT SERPL-MCNC: 0.85 MG/DL (ref 0.67–1.17)
DEPRECATED HCO3 PLAS-SCNC: 22 MMOL/L (ref 22–29)
EGFRCR SERPLBLD CKD-EPI 2021: >90 ML/MIN/1.73M2
EOSINOPHIL # BLD AUTO: 0.1 10E3/UL (ref 0–0.7)
EOSINOPHIL NFR BLD AUTO: 1 %
ERYTHROCYTE [DISTWIDTH] IN BLOOD BY AUTOMATED COUNT: 13.5 % (ref 10–15)
GLUCOSE SERPL-MCNC: 102 MG/DL (ref 70–99)
GLUCOSE UR STRIP-MCNC: NEGATIVE MG/DL
HCT VFR BLD AUTO: 44.6 % (ref 40–53)
HGB BLD-MCNC: 15 G/DL (ref 13.3–17.7)
HGB UR QL STRIP: ABNORMAL
HOLD SPECIMEN: NORMAL
HOLD SPECIMEN: NORMAL
IMM GRANULOCYTES # BLD: 0.1 10E3/UL
IMM GRANULOCYTES NFR BLD: 1 %
KETONES UR STRIP-MCNC: ABNORMAL MG/DL
LEUKOCYTE ESTERASE UR QL STRIP: NEGATIVE
LYMPHOCYTES # BLD AUTO: 1.1 10E3/UL (ref 0.8–5.3)
LYMPHOCYTES NFR BLD AUTO: 13 %
MCH RBC QN AUTO: 30.8 PG (ref 26.5–33)
MCHC RBC AUTO-ENTMCNC: 33.6 G/DL (ref 31.5–36.5)
MCV RBC AUTO: 92 FL (ref 78–100)
MONOCYTES # BLD AUTO: 0.7 10E3/UL (ref 0–1.3)
MONOCYTES NFR BLD AUTO: 9 %
MUCOUS THREADS #/AREA URNS LPF: PRESENT /LPF
NEUTROPHILS # BLD AUTO: 6 10E3/UL (ref 1.6–8.3)
NEUTROPHILS NFR BLD AUTO: 76 %
NITRATE UR QL: NEGATIVE
NRBC # BLD AUTO: 0 10E3/UL
NRBC BLD AUTO-RTO: 0 /100
PH UR STRIP: 5.5 [PH] (ref 5–7)
PLATELET # BLD AUTO: 207 10E3/UL (ref 150–450)
POTASSIUM SERPL-SCNC: 4.2 MMOL/L (ref 3.4–5.3)
RBC # BLD AUTO: 4.87 10E6/UL (ref 4.4–5.9)
RBC URINE: >182 /HPF
SODIUM SERPL-SCNC: 137 MMOL/L (ref 135–145)
SP GR UR STRIP: 1.02 (ref 1–1.03)
UROBILINOGEN UR STRIP-MCNC: NORMAL MG/DL
WBC # BLD AUTO: 7.9 10E3/UL (ref 4–11)
WBC URINE: 7 /HPF

## 2024-02-10 PROCEDURE — 81001 URINALYSIS AUTO W/SCOPE: CPT | Performed by: STUDENT IN AN ORGANIZED HEALTH CARE EDUCATION/TRAINING PROGRAM

## 2024-02-10 PROCEDURE — 80048 BASIC METABOLIC PNL TOTAL CA: CPT | Performed by: STUDENT IN AN ORGANIZED HEALTH CARE EDUCATION/TRAINING PROGRAM

## 2024-02-10 PROCEDURE — 36415 COLL VENOUS BLD VENIPUNCTURE: CPT | Performed by: STUDENT IN AN ORGANIZED HEALTH CARE EDUCATION/TRAINING PROGRAM

## 2024-02-10 PROCEDURE — 96374 THER/PROPH/DIAG INJ IV PUSH: CPT

## 2024-02-10 PROCEDURE — 250N000011 HC RX IP 250 OP 636: Performed by: STUDENT IN AN ORGANIZED HEALTH CARE EDUCATION/TRAINING PROGRAM

## 2024-02-10 PROCEDURE — 96375 TX/PRO/DX INJ NEW DRUG ADDON: CPT

## 2024-02-10 PROCEDURE — 99284 EMERGENCY DEPT VISIT MOD MDM: CPT | Mod: 25

## 2024-02-10 PROCEDURE — 96361 HYDRATE IV INFUSION ADD-ON: CPT

## 2024-02-10 PROCEDURE — 250N000009 HC RX 250: Performed by: STUDENT IN AN ORGANIZED HEALTH CARE EDUCATION/TRAINING PROGRAM

## 2024-02-10 PROCEDURE — 258N000003 HC RX IP 258 OP 636: Performed by: STUDENT IN AN ORGANIZED HEALTH CARE EDUCATION/TRAINING PROGRAM

## 2024-02-10 PROCEDURE — 85025 COMPLETE CBC W/AUTO DIFF WBC: CPT | Performed by: STUDENT IN AN ORGANIZED HEALTH CARE EDUCATION/TRAINING PROGRAM

## 2024-02-10 PROCEDURE — 51798 US URINE CAPACITY MEASURE: CPT

## 2024-02-10 RX ORDER — LIDOCAINE HYDROCHLORIDE 20 MG/ML
6 JELLY TOPICAL ONCE
Status: COMPLETED | OUTPATIENT
Start: 2024-02-10 | End: 2024-02-10

## 2024-02-10 RX ORDER — ONDANSETRON 2 MG/ML
4 INJECTION INTRAMUSCULAR; INTRAVENOUS ONCE
Status: DISCONTINUED | OUTPATIENT
Start: 2024-02-10 | End: 2024-02-10 | Stop reason: HOSPADM

## 2024-02-10 RX ORDER — PHENAZOPYRIDINE HYDROCHLORIDE 95 MG/1
95 TABLET ORAL EVERY 8 HOURS PRN
Qty: 9 TABLET | Refills: 0 | Status: SHIPPED | OUTPATIENT
Start: 2024-02-10 | End: 2024-03-07

## 2024-02-10 RX ORDER — FENTANYL CITRATE 50 UG/ML
50 INJECTION, SOLUTION INTRAMUSCULAR; INTRAVENOUS ONCE
Status: COMPLETED | OUTPATIENT
Start: 2024-02-10 | End: 2024-02-10

## 2024-02-10 RX ORDER — MORPHINE SULFATE 4 MG/ML
4 INJECTION, SOLUTION INTRAMUSCULAR; INTRAVENOUS ONCE
Status: COMPLETED | OUTPATIENT
Start: 2024-02-10 | End: 2024-02-10

## 2024-02-10 RX ADMIN — MORPHINE SULFATE 4 MG: 4 INJECTION, SOLUTION INTRAMUSCULAR; INTRAVENOUS at 16:02

## 2024-02-10 RX ADMIN — FENTANYL CITRATE 50 MCG: 50 INJECTION, SOLUTION INTRAMUSCULAR; INTRAVENOUS at 15:10

## 2024-02-10 RX ADMIN — SODIUM CHLORIDE 1000 ML: 9 INJECTION, SOLUTION INTRAVENOUS at 15:11

## 2024-02-10 RX ADMIN — LIDOCAINE HYDROCHLORIDE 6 ML: 20 JELLY TOPICAL at 15:11

## 2024-02-10 ASSESSMENT — ACTIVITIES OF DAILY LIVING (ADL)
ADLS_ACUITY_SCORE: 35
ADLS_ACUITY_SCORE: 33
ADLS_ACUITY_SCORE: 35

## 2024-02-10 NOTE — ED PROVIDER NOTES
History     Chief Complaint:  Hematuria       The history is provided by the patient.      Juwan Latham is a 73 year old male with a history of lung cancer, prostate cancer, and hypertension who presents with hematuria. Patient reports he has had some difficulty emptying his bladder today, and did have hematuria this morning. He states he passed a clot, but this was not painful, and the urine following was clear. Endorses urinary retention, but denies back pain. He has a history of kidney stones, but states this feels different.     Independent Historian:   None - Patient Only    Review of External Notes:   None     Medications:    Prochlorperazine   Losartan     Past Medical History:    Anxiety  Arthritis  Calculus of kidney   Colon polyps   Congenital single kidney  GERD  Lung cancer   Hypertension  Prostate cancer   Sleep apnea   Depression   Lymphedema   Fatty liver  Benign neoplasm of descending colon  Cavernous hemangioma of brain  Retinal detachment   Chronic bilateral low back pain     Past Surgical History:    Flexible bronchoscopy   Colonoscopy x 2  Colonoscopy with polypectomy   Combined cystoscopy, retrogrades, ureteroscopy, laser holmium lithotripsy ureters insert stent x 2  Cystoscopy with urethral dilation with fulguration of bladder wall   DaVinci prostatectomy   EGD combined   Laparoscopic wedge resection lung  Retinal detachment repair     Physical Exam   Patient Vitals for the past 24 hrs:   BP Temp Temp src Pulse Resp SpO2   02/10/24 1544 (!) 146/85 -- -- 72 -- 98 %   02/10/24 1510 -- -- -- -- -- 96 %   02/10/24 1509 -- -- -- -- -- 94 %   02/10/24 1508 -- -- -- -- -- 98 %   02/10/24 1507 -- -- -- -- -- 96 %   02/10/24 1506 -- -- -- -- -- 97 %   02/10/24 1505 -- -- -- -- -- 97 %   02/10/24 1504 -- -- -- -- -- 96 %   02/10/24 1349 (!) 173/90 98.3  F (36.8  C) Temporal 109 18 100 %      Physical Exam  General:  Alert, interactive  Cardiovascular:  Normal rate  Lungs:  No respiratory distress,  no accessory muscle use  Abdominal: Mild suprapubic tenderness, no CVA tenderness   Neuro:  Moving all 4 extremities  MSK: No gross deformities  Skin:  Warm, dry            Emergency Department Course   Laboratory:  Labs Ordered and Resulted from Time of ED Arrival to Time of ED Departure   ROUTINE UA WITH MICROSCOPIC REFLEX TO CULTURE - Abnormal       Result Value    Color Urine Yellow      Appearance Urine Slightly Cloudy (*)     Glucose Urine Negative      Bilirubin Urine Negative      Ketones Urine Trace (*)     Specific Gravity Urine 1.018      Blood Urine Large (*)     pH Urine 5.5      Protein Albumin Urine 30 (*)     Urobilinogen Urine Normal      Nitrite Urine Negative      Leukocyte Esterase Urine Negative      Mucus Urine Present (*)     RBC Urine >182 (*)     WBC Urine 7 (*)    BASIC METABOLIC PANEL - Abnormal    Sodium 137      Potassium 4.2      Chloride 102      Carbon Dioxide (CO2) 22      Anion Gap 13      Urea Nitrogen 18.8      Creatinine 0.85      GFR Estimate >90      Calcium 9.5      Glucose 102 (*)    CBC WITH PLATELETS AND DIFFERENTIAL    WBC Count 7.9      RBC Count 4.87      Hemoglobin 15.0      Hematocrit 44.6      MCV 92      MCH 30.8      MCHC 33.6      RDW 13.5      Platelet Count 207      % Neutrophils 76      % Lymphocytes 13      % Monocytes 9      % Eosinophils 1      % Basophils 0      % Immature Granulocytes 1      NRBCs per 100 WBC 0      Absolute Neutrophils 6.0      Absolute Lymphocytes 1.1      Absolute Monocytes 0.7      Absolute Eosinophils 0.1      Absolute Basophils 0.0      Absolute Immature Granulocytes 0.1      Absolute NRBCs 0.0        Emergency Department Course & Assessments:  Interventions:  Medications   lidocaine (XYLOCAINE) 2 % external gel 6 mL (6 mLs Urethral $Given 2/10/24 1511)   fentaNYL (PF) (SUBLIMAZE) injection 50 mcg (50 mcg Intravenous $Given 2/10/24 1510)   sodium chloride 0.9% BOLUS 1,000 mL (0 mLs Intravenous Stopped 2/10/24 7872)   morphine (PF)  injection 4 mg (4 mg Intravenous $Given 2/10/24 1602)     Assessments/Consultations/Discussion of Management or Tests:   ED Course as of 02/10/24 2222   Sat Feb 10, 2024   1426 I evaluated the patient and obtained history as noted above.   1455 299 on bladder scan   1616 Consult with Dr. Carey, Urology. Patient was supposed to have a procedure due to severe urethral stricture, but did not follow through with the procedure.      Social Determinants of Health affecting care:   None    Disposition:  The patient was discharged.     Impression & Plan    Medical Decision Making:  Vitals hypertensive and tachycardic on arrival otherwise WNL.  This patient presents with sensation of being unable to pee although minimal blood in urinary bladder on bladder scan.  Nursing attempted to place Breen catheter x 2 without success.  In discussion with urology plan was for them to come to bedside to help with difficult Breen placement although after fluids and pain control patient ultimately voided spontaneously.  Urology had not yet arrived so called back to inform of patient's voiding and holding off on inpatient consultation.  After just a few voids, his urine cleared pictured as above.  There is no signs of infection.  His kidney function is WNL and has no flank pain to suggest nephrolithiasis or pyelonephritis.  Will treat with spasmodic agent, close follow-up with urology.  He has history of radiation cystitis as well as what sounds like quite narrow urethral stricture.  Unclear the cause of his gross hematuria today though given he is cleared spontaneously and is now urinating without difficulty, plan for outpatient follow-up.  Return if retention or recurrence of gross hematuria.  Patient voices understanding and agreement with this plan.  All questions answered    Diagnosis:    ICD-10-CM    1. Gross hematuria  R31.0          Discharge Medications:  Discharge Medication List as of 2/10/2024  6:54 PM        START taking these  medications    Details   phenazopyridine (AZO URINARY PAIN RELIEF) 95 MG tablet Take 1 tablet (95 mg) by mouth every 8 hours as needed (painful urination), Disp-9 tablet, R-0, E-Prescribe            Scribe Disclosure:  I, Rosario Kirby, am serving as a scribe at 2:02 PM on 2/10/2024 to document services personally performed by Callie Oliva DO based on my observations and the provider's statements to me.    2/10/2024   Callie Oliva DO Pappas Richter, Ellen, DO  02/10/24 4698

## 2024-02-10 NOTE — ED TRIAGE NOTES
"Pt reports hematuria and difficulty emptying bladder today. Hx \"raw bladder\". Not on blood thinners. ABC intact.         "

## 2024-02-10 NOTE — TELEPHONE ENCOUNTER
Pt called to report he passed a blood clot. H/o urethral stricture and XRT for prostate cancer. Denies infectious symptoms and no clot obstruction currently.    Discussed reasons to present to ER including clot retention, instructed to increase hydration and call the clinic on Monday to set up appt w/ Dr. Chan to consider cysto and hyperbarics if radiation cystitis concerns.    Vitor Espinosa MD  Urology PGY-4

## 2024-02-10 NOTE — ED NOTES
Genitourinary WDL:  (pt comes in with inability to void, pt reports that he passed a blood clot this morning.)

## 2024-02-11 ENCOUNTER — TELEPHONE (OUTPATIENT)
Dept: NURSING | Facility: CLINIC | Age: 74
End: 2024-02-11
Payer: COMMERCIAL

## 2024-02-11 NOTE — TELEPHONE ENCOUNTER
Telephone call  Patient calling  he was in the ED and he could not find the prescription that the Doctor told him about.  After looking in the chart they sent it to the pharmacy.  Patient will pick it up when the pharmacy is open.    Ludy Roy RN   [facility] Nurse Advisor  1:17 PM 2/11/2024

## 2024-02-12 ENCOUNTER — NURSE TRIAGE (OUTPATIENT)
Dept: NURSING | Facility: CLINIC | Age: 74
End: 2024-02-12

## 2024-02-12 NOTE — TELEPHONE ENCOUNTER
"Reason for Disposition   Patient wants to be seen    Additional Information   Negative: Shock suspected (e.g., cold/pale/clammy skin, too weak to stand, low BP, rapid pulse)   Negative: Sounds like a life-threatening emergency to the triager   Negative: Followed a female genital area injury (e.g., labia, vagina, vulva)   Negative: Followed a male genital area injury (penis, scrotum)   Negative: Vaginal discharge   Negative: Pus (white, yellow) or bloody discharge from end of penis   Negative: Pain or burning with passing urine (urination) and pregnant   Negative: Pain or burning with passing urine (urination) and female   Negative: Pain or burning with passing urine (urination) and male   Negative: Pain or itching in the vulvar area   Negative: Pain in scrotum is main symptom   Negative: Blood in the urine is main symptom   Negative: Symptoms arising from use of a urinary catheter (e.g., Coude, Breen)   Negative: Unable to urinate (or only a few drops) > 4 hours and bladder feels very full (e.g., palpable bladder or strong urge to urinate)   Negative: Decreased urination and drinking very little and dehydration suspected (e.g., dark urine, no urine > 12 hours, very dry mouth, very lightheaded)   Negative: Patient sounds very sick or weak to the triager   Negative: Fever > 100.4 F  (38.0 C)   Negative: Side (flank) or lower back pain present   Negative: Bad or foul-smelling urine   Negative: Urinating more frequently than usual (i.e., frequency)   Negative: Can't control passage of urine (i.e., urinary incontinence) and new-onset (< 2 weeks) or worsening    Answer Assessment - Initial Assessment Questions  1. SYMPTOM: \"What's the main symptom you're concerned about?\" (e.g., frequency, incontinence)      Blood in urine with clots  2. ONSET: \"When did the  bleeding  start?\"      yesterday  3. PAIN: \"Is there any pain?\" If Yes, ask: \"How bad is it?\" (Scale: 1-10; mild, moderate, severe)      Yesterday had pain  better " "today  taking AZO  4. CAUSE: \"What do you think is causing the symptoms?\"      Not sure  5. OTHER SYMPTOMS: \"Do you have any other symptoms?\" (e.g., blood in urine, fever, flank pain, pain with urination)      Today blood in urine still with small clots   having pain in lower mid back  6. PREGNANCY: \"Is there any chance you are pregnant?\" \"When was your last menstrual period?\"      no    Protocols used: Urinary Symptoms-A-OH    "

## 2024-02-12 NOTE — TELEPHONE ENCOUNTER
73 year old history of RALP and XRT calls with blood in his urine   He was seen in the ER yesterday -he was having pain with hematuria and blood clots   They were unable to pass a catheter   After fluids and pain control he was able to void spontaneously and the urine was clear. He went home   Today he is experiencing more blood in his urine with small clots  He took AZO and is not having pain with urination.  He does have pain mid lower back  Denies fever ,urgency or retention   Forwarding to urology team

## 2024-02-15 ENCOUNTER — OFFICE VISIT (OUTPATIENT)
Dept: UROLOGY | Facility: CLINIC | Age: 74
End: 2024-02-15
Payer: COMMERCIAL

## 2024-02-15 VITALS
HEART RATE: 56 BPM | WEIGHT: 180 LBS | DIASTOLIC BLOOD PRESSURE: 79 MMHG | HEIGHT: 69 IN | SYSTOLIC BLOOD PRESSURE: 142 MMHG | BODY MASS INDEX: 26.66 KG/M2

## 2024-02-15 DIAGNOSIS — R68.83 CHILLS: ICD-10-CM

## 2024-02-15 DIAGNOSIS — R31.0 GROSS HEMATURIA: Primary | ICD-10-CM

## 2024-02-15 DIAGNOSIS — N39.0 COMPLICATED UTI (URINARY TRACT INFECTION): ICD-10-CM

## 2024-02-15 LAB
ALBUMIN UR-MCNC: 10 MG/DL
AMORPH CRY #/AREA URNS HPF: ABNORMAL /HPF
APPEARANCE UR: ABNORMAL
BILIRUB UR QL STRIP: NEGATIVE
COLOR UR AUTO: YELLOW
GLUCOSE UR STRIP-MCNC: NEGATIVE MG/DL
HGB UR QL STRIP: NEGATIVE
KETONES UR STRIP-MCNC: NEGATIVE MG/DL
LEUKOCYTE ESTERASE UR QL STRIP: NEGATIVE
MUCOUS THREADS #/AREA URNS LPF: PRESENT /LPF
NITRATE UR QL: NEGATIVE
PH UR STRIP: 7 [PH] (ref 5–7)
RBC URINE: 5 /HPF
SP GR UR STRIP: 1.02 (ref 1–1.03)
UROBILINOGEN UR STRIP-MCNC: NORMAL MG/DL
WBC URINE: 20 /HPF

## 2024-02-15 PROCEDURE — 99214 OFFICE O/P EST MOD 30 MIN: CPT | Performed by: PHYSICIAN ASSISTANT

## 2024-02-15 PROCEDURE — 81001 URINALYSIS AUTO W/SCOPE: CPT | Performed by: PATHOLOGY

## 2024-02-15 PROCEDURE — 88112 CYTOPATH CELL ENHANCE TECH: CPT | Mod: TC | Performed by: PHYSICIAN ASSISTANT

## 2024-02-15 PROCEDURE — 88112 CYTOPATH CELL ENHANCE TECH: CPT | Mod: 26 | Performed by: PATHOLOGY

## 2024-02-15 PROCEDURE — 99000 SPECIMEN HANDLING OFFICE-LAB: CPT | Performed by: PATHOLOGY

## 2024-02-15 PROCEDURE — 87086 URINE CULTURE/COLONY COUNT: CPT | Performed by: PHYSICIAN ASSISTANT

## 2024-02-15 RX ORDER — SULFAMETHOXAZOLE/TRIMETHOPRIM 800-160 MG
1 TABLET ORAL 2 TIMES DAILY
Qty: 14 TABLET | Refills: 0 | Status: SHIPPED | OUTPATIENT
Start: 2024-02-15 | End: 2024-03-07

## 2024-02-15 ASSESSMENT — PAIN SCALES - GENERAL: PAINLEVEL: NO PAIN (0)

## 2024-02-15 NOTE — PATIENT INSTRUCTIONS
PLAN:   - Urinalysis today - reflex to culture.  If there is evidence of UTI will start antibiotics  - Urine cytology today  - Bladderscan postvoid residual today    - Schedule Lab appointment:  BMP and CBC at the lab today  - Schedule stat CT urogram.  THis was ordered STAT so that we can get the CT within 7 days.  The patient is having ongoing intermittent gross hematuria, now with right flank RLQ abd pain (concerning for stone, but a previous CT in 2021 showed only left sided stones rather than right sided stones).  He is having chills and nausea, which is concerning for infection vs hydronephrosis.  I considered ordering a CT AP without contrast, but given age and Hx adjuvant pelvic radiation, and given new hematuria, I felt the urogram component would be important.     - Schedule cystoscopy with either Harjinder Martin, Lilo Souza given new hematuria, Hx prostate cancer/radiation and Hx urethral stricture.    - Schedule appointment in 3 months with SWEETIE Silverio.  ALternatively could schedule with one of the urology providers in [place].     - Seek urgent medical care for worsening pain, inability to urinate, fevers, chills, worsening nausea, flu-like symptoms, etc.     [facility]

## 2024-02-15 NOTE — PROGRESS NOTES
HPI: Mr. Juwan Latham is a 73 year old year old male presenting today for evaluation of chief complaint(s): Consult For (Gross hematuria)    Today, he is unaccompanied    He has PMH significant for HTN, lung cancer, prostate cancer ([place] 4+5, grade group 5, [id number]) s/p RALP in 2017 then adjuvant EBRT, complicated by development of a membranous urethral stricture requiring dilation in 1/3/2022.  Per a note from Dr. Chan on 7/19/23 he also had gross hematuria and some erythematous bladder lesions were fulgurated (1/3/22) with resolution of the hematuria.  Last CT AP in 11/2021 showed a horseshoe kidney with no hydro and some small nonobstructive stones on the left side.  At the last visit the patient was voiding normally.   - At his last visit with Dr. Chan on 7/19/23 his PSA was undetectable (4/2023)     Since his last visit he was seen in the ED on 2/10/24 for gross hematuria difficulty emptying his bladder and the nurses in the ED were unable to catheterize him, suspected a stricture.  Ultimately the patient voided spontaneously and urine had changed to clear yellow.  No PVR was recorded in ED notes.  UA showed RBC >182, WBC 7, neg nitrites. Labs were normal (SCr 0.85mg/dL). There was no new imaging.    4/18/23 - last PSA undetectable    Sts the last 2 days passed a few clots  Otherwise urine has been yellow  No dysuria.    Does have some right flank pain and RLQ.    Yesterday was shaking on and off. Between shaking symptoms subsided.   No fevers.    Feeling nauseated.  No emesis.    Pain is 2/10 but colicky.  Difficult to get comfortable.      Current Outpatient Medications   Medication Sig Dispense Refill     cyanocobalamin (VITAMIN B-12) 500 MCG tablet Take 2,500 mcg by mouth daily (Patient not taking: Reported on 12/14/2023)       hydrocortisone 2.5 % ointment Apply topically 2 times daily 60 g 3     multivitamin w/minerals (THERA-VIT-M) tablet Take 1 tablet by mouth daily       ondansetron  "(ZOFRAN ODT) 4 MG ODT tab Take 1-2 tablets (4-8 mg) by mouth every 8 hours as needed for nausea (Patient not taking: Reported on 10/9/2023) 20 tablet 0     phenazopyridine (AZO URINARY PAIN RELIEF) 95 MG tablet Take 1 tablet (95 mg) by mouth every 8 hours as needed (painful urination) 9 tablet 0     prochlorperazine (COMPAZINE) 10 MG tablet Take 0.5 tablets (5 mg) by mouth every 6 hours as needed for nausea (Patient not taking: Reported on 10/9/2023) 20 tablet 0     tacrolimus (PROTOPIC) 0.1 % external ointment Apply topically to the face, scalp and hands once or twice daily. May cover with vaseline. 60 g 3     Turmeric 500 MG CAPS Take 1,500 capsules by mouth daily       Vitamin D, Cholecalciferol, 1000 units TABS          ALLERGIES: Patient has no known allergies.      REVIEW OF SYSTEMS:  As above in HPI     GENERAL PHYSICAL EXAM:   Vitals: BP (!) 142/79   Pulse 56   Ht 1.753 m (5' 9\")   Wt 81.6 kg (180 lb)   BMI 26.58 kg/m    Body mass index is 26.58 kg/m .    GENERAL: Well groomed, well developed, well nourished male in NAD.  Patient pointed to RLQ abdomen and right flank as the site of pain  (\"along the side and in my low back and just under by rib cage in the back\")  NEURO: Alert and oriented x 3.  PSYCH: Normal mood and affect, pleasant and agreeable during interview and exam.    PVR: Residual urine by ultrasound was 7 ml.      RADIOLOGY: The following tests were reviewed:   CT CHEST WITH CONTRAST, CT ABDOMEN/PELVIS WITH AND WITHOUT CONTRAST  November 23, 2021 8:46 AM     CLINICAL HISTORY: Malignant neoplasm of upper lobe of right lung (H);  Gross hematuria     TECHNIQUE: Noncontrast images of the abdomen were followed by  postcontrast images of the chest, abdomen, and pelvis. Urographic  phase images were then obtained of the abdomen and pelvis. Multiplanar  reformats were obtained. Dose reduction techniques were used.   CONTRAST: 92mL Isovue-370.     COMPARISON: Chest CT 3/19/2020.     FINDINGS:   LUNGS " AND PLEURA: Postsurgical changes from wedge resection in the  right upper lobe. Associated scarring in the medial right upper lobe  unchanged. There is a noncalcified nodule along the anterior margin of  the left major fissure that measures 3 mm (series 18, image 66) that  is unchanged. No new pulmonary nodule or mass.     MEDIASTINUM/AXILLAE: No mediastinal, hilar, or axillary adenopathy.  Unremarkable thyroid and esophagus. Normal caliber thoracic aorta.     CORONARY ARTERY CALCIFICATION: None.     HEPATOBILIARY: Subcentimeter hypodensity in the inferomedial aspect of  segment 3 of the liver (series 8, image 58) is too small to  characterize but statistically likely to be benign. Calcific density  on the surface of the right lobe (series 8, image 47) is unchanged. No  other liver lesions. Gallbladder unremarkable.     PANCREAS: Normal.  SPLEEN: Normal.  ADRENAL GLANDS: Normal.     KIDNEYS/BLADDER: Horseshoe kidney. There are four nonobstructing  calcifications in the left moiety, the largest of which measures 6 mm.  No hydronephrosis or hydroureter. There is a 3.0 cm cyst in the left  moiety that does not require specific follow-up. A 1.8 cm cyst in the  right moiety also does not require specific follow-up. No evidence of  solid renal or ureteral mass. The urinary bladder wall is thickened,  particularly along the superior margin, best demonstrated on coronal  imaging (series 29, image 46). Wall thickness of this region measures  up to 1.2 cm.     BOWEL: No bowel obstruction or inflammatory change. Normal appendix.  PELVIC ORGANS: Prostate gland is absent.  ADDITIONAL FINDINGS: No lymphadenopathy or ascites.  MUSCULOSKELETAL: No worrisome bone lesions.                                                                      IMPRESSION:  1.  Suspicious urinary bladder wall thickening superiorly. Recommend  correlation with cystoscopy.  2.  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  3.   Stable appearance of right lung wedge resection. No evidence of  metastatic disease.    LABS: The last test results for Mr. Juwan Latham were reviewed:  PSA -   Lab Results   Component Value Date    PSA <0.01 04/18/2023    PSA <0.01 10/17/2022    PSA <0.01 10/24/2021    PSA <0.01 09/23/2021    PSA <0.01 05/14/2020    PSA <0.01 03/04/2019    PSA <0.01 06/18/2018    PSA 0.03 01/11/2018    PSA 3.90 11/03/2004     BMP -   Recent Labs   Lab Test 02/10/24  1457 12/22/23  1533 12/21/23  1242 07/13/17  1025 03/14/17  1414 03/05/17  0905    140 142   < >  --  138   POTASSIUM 4.2 4.0 4.2   < >  --  3.8   CHLORIDE 102 104 104   < >  --  106   CO2 22 24 24   < >  --  28   BUN 18.8 16.9 13.7   < >  --  14   CR 0.85 0.83 0.77   < >  --  0.74   * 97 104*   < >  --  114*   JOANNE 9.5 9.1 9.6   < >  --  8.8   MAG  --  1.8  --   --   --  2.2   PHOS  --   --   --   --  3.2 1.1*    < > = values in this interval not displayed.       CBC -   Recent Labs   Lab Test 02/10/24  1457 12/22/23  1533 12/21/23  1242   WBC 7.9 6.5 6.2   HGB 15.0 14.6 14.6    183 181       ASSESSMENT:   1) Gross hematuria   2) Hx RALP with adjuvant EBRT  3) Hx urethral stricture s/p dilation  4) Hx stones seen on CT - left horseshoe kidney    PLAN:   - Urinalysis today - reflex to culture.  If there is evidence of UTI will start antibiotics  - Urine cytology today  - Bladderscan postvoid residual today    - Schedule Lab appointment:  BMP and CBC at the lab today  - Schedule stat CT urogram.  THis was ordered STAT so that we can get the CT within 7 days.  The patient is having ongoing intermittent gross hematuria, now with right flank RLQ abd pain (concerning for stone, but a previous CT in 2021 showed only left sided stones rather than right sided stones).  He is having chills and nausea, which is concerning for infection vs hydronephrosis.  I considered ordering a CT AP without contrast, but given age and Hx adjuvant pelvic radiation, and  given new hematuria, I felt the urogram component would be important.     - Schedule cystoscopy with either Harjinder Martin, Lilo Souza given new hematuria, Hx prostate cancer/radiation and Hx urethral stricture.    - Schedule appointment in 3 months with SWEETIE Silverio.  ALternatively could schedule with one of the urology providers in [place].     - Seek urgent medical care for worsening pain, inability to urinate, fevers, chills, worsening nausea, flu-like symptoms, etc.     VISIT DURATION: 31 minutes (9:31 - 10:12 on DOS)    Corrie Jackson PA-C  Department of Urologic Surgery

## 2024-02-15 NOTE — NURSING NOTE
"Chief Complaint   Patient presents with    Consult For     Gross hematuria       Blood pressure (!) 142/79, pulse 56, height 1.753 m (5' 9\"), weight 81.6 kg (180 lb). Body mass index is 26.58 kg/m .    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 May cover with vaseline. 60 g 3    Turmeric 500 MG CAPS Take 1,500 capsules by mouth daily      Vitamin D, Cholecalciferol, 1000 units TABS       cyanocobalamin (VITAMIN B-12) 500 MCG tablet Take 2,500 mcg by mouth daily (Patient not taking: Reported on 12/14/2023)      ondansetron (ZOFRAN ODT) 4 MG ODT tab Take 1-2 tablets (4-8 mg) by mouth every 8 hours as needed for nausea (Patient not taking: Reported on 10/9/2023) 20 tablet 0    phenazopyridine (AZO URINARY PAIN RELIEF) 95 MG tablet Take 1 tablet (95 mg) by mouth every 8 hours as needed " (painful urination) (Patient not taking: Reported on 2/15/2024) 9 tablet 0    prochlorperazine (COMPAZINE) 10 MG tablet Take 0.5 tablets (5 mg) by mouth every 6 hours as needed for nausea (Patient not taking: Reported on 10/9/2023) 20 tablet 0       Social History     Tobacco Use    Smoking status: Former     Packs/day: 2.00     Years: 20.00     Additional pack years: 0.00     Total pack years: 40.00     Types: Cigarettes     Quit date: 1985     Years since quittin.1     Passive exposure: Past    Smokeless tobacco: Never   Vaping Use    Vaping Use: Never used   Substance Use Topics    Alcohol use: Not Currently     Alcohol/week: 0.0 - 2.0 standard drinks of alcohol    Drug use: No       Hernan Gee MA  2/15/2024  9:24 AM

## 2024-02-15 NOTE — LETTER
2/15/2024       RE: Juwan Latham  [id number] [facility] [address]     Dear Colleague,    Thank you for referring your patient, Juwan Latham, to the [facility] [place] at [facility]. Please see a copy of my visit note below.    HPI: Mr. Juwan Latham is a 73 year old year old male presenting today for evaluation of chief complaint(s): Consult For (Gross hematuria)    Today, he is unaccompanied    He has PMH significant for HTN, lung cancer, prostate cancer ([place] 4+5, grade group 5, [id number]) s/p RALP in 2017 then adjuvant EBRT, complicated by development of a membranous urethral stricture requiring dilation in 1/3/2022.  Per a note from Dr. Chan on 7/19/23 he also had gross hematuria and some erythematous bladder lesions were fulgurated (1/3/22) with resolution of the hematuria.  Last CT AP in 11/2021 showed a horseshoe kidney with no hydro and some small nonobstructive stones on the left side.  At the last visit the patient was voiding normally.   - At his last visit with Dr. Chan on 7/19/23 his PSA was undetectable (4/2023)     Since his last visit he was seen in the ED on 2/10/24 for gross hematuria difficulty emptying his bladder and the nurses in the ED were unable to catheterize him, suspected a stricture.  Ultimately the patient voided spontaneously and urine had changed to clear yellow.  No PVR was recorded in ED notes.  UA showed RBC >182, WBC 7, neg nitrites. Labs were normal (SCr 0.85mg/dL). There was no new imaging.    4/18/23 - last PSA undetectable    Sts the last 2 days passed a few clots  Otherwise urine has been yellow  No dysuria.    Does have some right flank pain and RLQ.    Yesterday was shaking on and off. Between shaking symptoms subsided.   No fevers.    Feeling nauseated.  No emesis.    Pain is 2/10 but colicky.  Difficult to get comfortable.      Current Outpatient Medications   Medication Sig  "Dispense Refill    cyanocobalamin (VITAMIN B-12) 500 MCG tablet Take 2,500 mcg by mouth daily (Patient not taking: Reported on 12/14/2023)      hydrocortisone 2.5 % ointment Apply topically 2 times daily 60 g 3    multivitamin w/minerals (THERA-VIT-M) tablet Take 1 tablet by mouth daily      ondansetron (ZOFRAN ODT) 4 MG ODT tab Take 1-2 tablets (4-8 mg) by mouth every 8 hours as needed for nausea (Patient not taking: Reported on 10/9/2023) 20 tablet 0    phenazopyridine (AZO URINARY PAIN RELIEF) 95 MG tablet Take 1 tablet (95 mg) by mouth every 8 hours as needed (painful urination) 9 tablet 0    prochlorperazine (COMPAZINE) 10 MG tablet Take 0.5 tablets (5 mg) by mouth every 6 hours as needed for nausea (Patient not taking: Reported on 10/9/2023) 20 tablet 0    tacrolimus (PROTOPIC) 0.1 % external ointment Apply topically to the face, scalp and hands once or twice daily. May cover with vaseline. 60 g 3    Turmeric 500 MG CAPS Take 1,500 capsules by mouth daily      Vitamin D, Cholecalciferol, 1000 units TABS          ALLERGIES: Patient has no known allergies.      REVIEW OF SYSTEMS:  As above in HPI     GENERAL PHYSICAL EXAM:   Vitals: BP (!) 142/79   Pulse 56   Ht 1.753 m (5' 9\")   Wt 81.6 kg (180 lb)   BMI 26.58 kg/m    Body mass index is 26.58 kg/m .    GENERAL: Well groomed, well developed, well nourished male in NAD.  Patient pointed to RLQ abdomen and right flank as the site of pain  (\"along the side and in my low back and just under by rib cage in the back\")  NEURO: Alert and oriented x 3.  PSYCH: Normal mood and affect, pleasant and agreeable during interview and exam.    PVR: Residual urine by ultrasound was 7 ml.      RADIOLOGY: The following tests were reviewed:   CT CHEST WITH CONTRAST, CT ABDOMEN/PELVIS WITH AND WITHOUT CONTRAST  November 23, 2021 8:46 AM     CLINICAL HISTORY: Malignant neoplasm of upper lobe of right lung (H);  Gross hematuria     TECHNIQUE: Noncontrast images of the abdomen were " followed by  postcontrast images of the chest, abdomen, and pelvis. Urographic  phase images were then obtained of the abdomen and pelvis. Multiplanar  reformats were obtained. Dose reduction techniques were used.   CONTRAST: 92mL Isovue-370.     COMPARISON: Chest CT 3/19/2020.     FINDINGS:   LUNGS AND PLEURA: Postsurgical changes from wedge resection in the  right upper lobe. Associated scarring in the medial right upper lobe  unchanged. There is a noncalcified nodule along the anterior margin of  the left major fissure that measures 3 mm (series 18, image 66) that  is unchanged. No new pulmonary nodule or mass.     MEDIASTINUM/AXILLAE: No mediastinal, hilar, or axillary adenopathy.  Unremarkable thyroid and esophagus. Normal caliber thoracic aorta.     CORONARY ARTERY CALCIFICATION: None.     HEPATOBILIARY: Subcentimeter hypodensity in the inferomedial aspect of  segment 3 of the liver (series 8, image 58) is too small to  characterize but statistically likely to be benign. Calcific density  on the surface of the right lobe (series 8, image 47) is unchanged. No  other liver lesions. Gallbladder unremarkable.     PANCREAS: Normal.  SPLEEN: Normal.  ADRENAL GLANDS: Normal.     KIDNEYS/BLADDER: Horseshoe kidney. There are four nonobstructing  calcifications in the left moiety, the largest of which measures 6 mm.  No hydronephrosis or hydroureter. There is a 3.0 cm cyst in the left  moiety that does not require specific follow-up. A 1.8 cm cyst in the  right moiety also does not require specific follow-up. No evidence of  solid renal or ureteral mass. The urinary bladder wall is thickened,  particularly along the superior margin, best demonstrated on coronal  imaging (series 29, image 46). Wall thickness of this region measures  up to 1.2 cm.     BOWEL: No bowel obstruction or inflammatory change. Normal appendix.  PELVIC ORGANS: Prostate gland is absent.  ADDITIONAL FINDINGS: No lymphadenopathy or  ascites.  MUSCULOSKELETAL: No worrisome bone lesions.                                                                      IMPRESSION:  1.  Suspicious urinary bladder wall thickening superiorly. Recommend  correlation with cystoscopy.  2.  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  3.  Stable appearance of right lung wedge resection. No evidence of  metastatic disease.    LABS: The last test results for Mr. Juwan Latham were reviewed:  PSA -   Lab Results   Component Value Date    PSA <0.01 04/18/2023    PSA <0.01 10/17/2022    PSA <0.01 10/24/2021    PSA <0.01 09/23/2021    PSA <0.01 05/14/2020    PSA <0.01 03/04/2019    PSA <0.01 06/18/2018    PSA 0.03 01/11/2018    PSA 3.90 11/03/2004     BMP -   Recent Labs   Lab Test 02/10/24  1457 12/22/23  1533 12/21/23  1242 07/13/17  1025 03/14/17  1414 03/05/17  0905    140 142   < >  --  138   POTASSIUM 4.2 4.0 4.2   < >  --  3.8   CHLORIDE 102 104 104   < >  --  106   CO2 22 24 24   < >  --  28   BUN 18.8 16.9 13.7   < >  --  14   CR 0.85 0.83 0.77   < >  --  0.74   * 97 104*   < >  --  114*   JOANNE 9.5 9.1 9.6   < >  --  8.8   MAG  --  1.8  --   --   --  2.2   PHOS  --   --   --   --  3.2 1.1*    < > = values in this interval not displayed.       CBC -   Recent Labs   Lab Test 02/10/24  1457 12/22/23  1533 12/21/23  1242   WBC 7.9 6.5 6.2   HGB 15.0 14.6 14.6    183 181       ASSESSMENT:   1) Gross hematuria   2) Hx RALP with adjuvant EBRT  3) Hx urethral stricture s/p dilation  4) Hx stones seen on CT - left horseshoe kidney    PLAN:   - Urinalysis today - reflex to culture.  If there is evidence of UTI will start antibiotics  - Urine cytology today  - Bladderscan postvoid residual today    - Schedule Lab appointment:  BMP and CBC at the lab today  - Schedule stat CT urogram.  THis was ordered STAT so that we can get the CT within 7 days.  The patient is having ongoing intermittent gross hematuria, now with right  flank RLQ abd pain (concerning for stone, but a previous CT in 2021 showed only left sided stones rather than right sided stones).  He is having chills and nausea, which is concerning for infection vs hydronephrosis.  I considered ordering a CT AP without contrast, but given age and Hx adjuvant pelvic radiation, and given new hematuria, I felt the urogram component would be important.     - Schedule cystoscopy with either Harjinder Martin, Lilo Souza given new hematuria, Hx prostate cancer/radiation and Hx urethral stricture.    - Schedule appointment in 3 months with SWEETIE Silverio.  ALternatively could schedule with one of the urology providers in [place].     - Seek urgent medical care for worsening pain, inability to urinate, fevers, chills, worsening nausea, flu-like symptoms, etc.     VISIT DURATION: 31 minutes (9:31 - 10:12 on DOS)    Corrie Jackson PA-C  Department of Urologic Surgery

## 2024-02-16 ENCOUNTER — ANCILLARY PROCEDURE (OUTPATIENT)
Dept: CT IMAGING | Facility: CLINIC | Age: 74
End: 2024-02-16
Attending: PHYSICIAN ASSISTANT
Payer: COMMERCIAL

## 2024-02-16 ENCOUNTER — CARE COORDINATION (OUTPATIENT)
Dept: UROLOGY | Facility: CLINIC | Age: 74
End: 2024-02-16

## 2024-02-16 ENCOUNTER — HOSPITAL ENCOUNTER (EMERGENCY)
Facility: CLINIC | Age: 74
Discharge: HOME | End: 2024-02-16
Attending: EMERGENCY MEDICINE | Admitting: EMERGENCY MEDICINE
Payer: COMMERCIAL

## 2024-02-16 ENCOUNTER — LAB (OUTPATIENT)
Dept: LAB | Facility: CLINIC | Age: 74
End: 2024-02-16
Payer: COMMERCIAL

## 2024-02-16 VITALS
RESPIRATION RATE: 22 BRPM | DIASTOLIC BLOOD PRESSURE: 77 MMHG | SYSTOLIC BLOOD PRESSURE: 155 MMHG | HEART RATE: 105 BPM | OXYGEN SATURATION: 97 % | TEMPERATURE: 97.9 F

## 2024-02-16 DIAGNOSIS — R33.9 URINARY RETENTION: ICD-10-CM

## 2024-02-16 DIAGNOSIS — R31.0 GROSS HEMATURIA: ICD-10-CM

## 2024-02-16 DIAGNOSIS — R68.83 CHILLS: ICD-10-CM

## 2024-02-16 LAB
ANION GAP SERPL CALCULATED.3IONS-SCNC: 10 MMOL/L (ref 7–15)
BACTERIA UR CULT: NORMAL
BUN SERPL-MCNC: 17.1 MG/DL (ref 8–23)
CALCIUM SERPL-MCNC: 9.2 MG/DL (ref 8.8–10.2)
CHLORIDE SERPL-SCNC: 104 MMOL/L (ref 98–107)
CREAT SERPL-MCNC: 0.95 MG/DL (ref 0.67–1.17)
DEPRECATED HCO3 PLAS-SCNC: 24 MMOL/L (ref 22–29)
EGFRCR SERPLBLD CKD-EPI 2021: 85 ML/MIN/1.73M2
ERYTHROCYTE [DISTWIDTH] IN BLOOD BY AUTOMATED COUNT: 13.3 % (ref 10–15)
GLUCOSE SERPL-MCNC: 86 MG/DL (ref 70–99)
HCT VFR BLD AUTO: 40.9 % (ref 40–53)
HGB BLD-MCNC: 13.6 G/DL (ref 13.3–17.7)
MCH RBC QN AUTO: 30.5 PG (ref 26.5–33)
MCHC RBC AUTO-ENTMCNC: 33.3 G/DL (ref 31.5–36.5)
MCV RBC AUTO: 92 FL (ref 78–100)
PATH REPORT.COMMENTS IMP SPEC: NORMAL
PATH REPORT.FINAL DX SPEC: NORMAL
PATH REPORT.GROSS SPEC: NORMAL
PATH REPORT.MICROSCOPIC SPEC OTHER STN: NORMAL
PATH REPORT.RELEVANT HX SPEC: NORMAL
PLATELET # BLD AUTO: 168 10E3/UL (ref 150–450)
POTASSIUM SERPL-SCNC: 4.1 MMOL/L (ref 3.4–5.3)
RBC # BLD AUTO: 4.46 10E6/UL (ref 4.4–5.9)
SODIUM SERPL-SCNC: 138 MMOL/L (ref 135–145)
WBC # BLD AUTO: 5.8 10E3/UL (ref 4–11)

## 2024-02-16 PROCEDURE — 36415 COLL VENOUS BLD VENIPUNCTURE: CPT | Performed by: PATHOLOGY

## 2024-02-16 PROCEDURE — 74178 CT ABD&PLV WO CNTR FLWD CNTR: CPT | Mod: GC | Performed by: RADIOLOGY

## 2024-02-16 PROCEDURE — 99284 EMERGENCY DEPT VISIT MOD MDM: CPT | Performed by: EMERGENCY MEDICINE

## 2024-02-16 PROCEDURE — 80048 BASIC METABOLIC PNL TOTAL CA: CPT | Performed by: PATHOLOGY

## 2024-02-16 PROCEDURE — 85027 COMPLETE CBC AUTOMATED: CPT | Performed by: PATHOLOGY

## 2024-02-16 RX ORDER — MORPHINE SULFATE 4 MG/ML
4 INJECTION, SOLUTION INTRAMUSCULAR; INTRAVENOUS
Status: DISCONTINUED | OUTPATIENT
Start: 2024-02-16 | End: 2024-02-16 | Stop reason: HOSPADM

## 2024-02-16 RX ORDER — TAMSULOSIN HYDROCHLORIDE 0.4 MG/1
0.4 CAPSULE ORAL DAILY
Qty: 10 CAPSULE | Refills: 0 | Status: SHIPPED | OUTPATIENT
Start: 2024-02-16 | End: 2024-02-26

## 2024-02-16 RX ORDER — HYDROCODONE BITARTRATE AND ACETAMINOPHEN 5; 325 MG/1; MG/1
1 TABLET ORAL EVERY 6 HOURS PRN
Qty: 10 TABLET | Refills: 0 | Status: SHIPPED | OUTPATIENT
Start: 2024-02-16 | End: 2024-02-19

## 2024-02-16 RX ORDER — IOPAMIDOL 755 MG/ML
90 INJECTION, SOLUTION INTRAVASCULAR ONCE
Status: COMPLETED | OUTPATIENT
Start: 2024-02-16 | End: 2024-02-16

## 2024-02-16 RX ADMIN — IOPAMIDOL 90 ML: 755 INJECTION, SOLUTION INTRAVASCULAR at 14:56

## 2024-02-16 NOTE — DISCHARGE INSTRUCTIONS

## 2024-02-16 NOTE — ED PROVIDER NOTES
[place] EMERGENCY DEPARTMENT ([facility])    2/16/24       ED PROVIDER NOTE    History   No chief complaint on file.    The history is provided by the patient and medical records.     Juwan Latham is a 73 year old male with PMH notable for HTN, lung cancer, prostate cancer s/p prostatectomy (2017) and adjuvant EBRT s/b urethral stricture requiring dilation (01/2022) who presents to the Emergency Department today due to hematuria and urinary retention. Patient reports difficulty passing his urine. He states that last time of urination was 1330 today. Now he reports severe bladder pain due to being unable to void. No other symptoms noted.     Per chart review, patient presented to [facility] ED on 02/10/24 with similar presentations. He was found to have minimal blood in urinary baldder on bladder scan. Nursing attempted to place Breen catheter x2 without success. He was given fluid and pain control with Fentanyl and voided spontaneously. His kidney function is WNL. No suggestion of nephrolithiasis or pyelonephritis. He was treated with spasmodic agent and close follow up with urology.     Past Medical History  Past Medical History:   Diagnosis Date    Anxiety     Arthritis     fingers, hips    Calculus of kidney 2005, 2012    CARDIOVASCULAR SCREENING; LDL GOAL LESS THAN 130 4/18/2023    Colon polyps     Congenital single kidney     Gastroesophageal reflux disease     Hx of cancer of lung 03/2017    wedge resection    Hypertension     Prostate cancer (H) 08/2017    prostatectomy/Rad Tx    Seasonal allergies     spring and fall    Sleep apnea     cpap     Past Surgical History:   Procedure Laterality Date    BRONCHOSCOPY FLEXIBLE N/A 03/03/2017    Procedure: BRONCHOSCOPY FLEXIBLE;  Surgeon: Maria A Desai MD;  Location: [facility] OR    COLONOSCOPY N/A 02/11/2015    Procedure: COLONOSCOPY;  Surgeon: Murphy Jesus MD;  Location:  GI    COLONOSCOPY N/A 12/11/2019    Procedure: COLONOSCOPY;  Surgeon:  Murphy Jesus MD;  Location:  GI    COLONOSCOPY N/A 01/25/2022    due 5 yrs - COLONOSCOPY, FLEXIBLE, WITH POLYPECTOMY  USING COLD SNARE;  Surgeon: Neha Rosen MD;  Location:  GI    COMBINED CYSTOSCOPY, RETROGRADES, URETEROSCOPY, LASER HOLMIUM LITHOTRIPSY URETER(S), INSERT STENT N/A 03/25/2018    Procedure: COMBINED CYSTOSCOPY, RETROGRADES, URETEROSCOPY, LASER HOLMIUM LITHOTRIPSY URETER(S), INSERT STENT;  Cystoscopy, Catheter Exchange under Anesthesia;  Surgeon: Zaria Cain MD;  Location: [facility] OR    CYSTOSCOPY, DILATE URETHRA, COMBINED N/A 01/03/2022    Procedure: CYSTOSCOPY, WITH URETHRAL DILATION WITH FULGERATION OF BLADDER WALL;  Surgeon: Mike Chan MD;  Location[place] [facility] [place]    DAVINCI PROSTATECTOMY N/A 12/01/2017    Procedure: DAVINCI PROSTATECTOMY;  Robotic Assisted Radical Prostatectomy and Pelvic Lymph Node Dissection ;  Surgeon: Paulo Agarwal MD;  Location:  OR    ESOPHAGOSCOPY, GASTROSCOPY, DUODENOSCOPY (EGD), COMBINED N/A 05/20/2016    Procedure: COMBINED ESOPHAGOSCOPY, GASTROSCOPY, DUODENOSCOPY (EGD), BIOPSY SINGLE OR MULTIPLE;  Surgeon: Murphy Jesus MD;  Location:  GI    LAPAROSCOPIC WEDGE RESECTION LUNG Right 03/03/2017    Procedure: LAPAROSCOPIC WEDGE RESECTION LUNG;  Surgeon: Maria A Desai MD;  Location: [facility] OR    LASER HOLMIUM LITHOTRIPSY URETER(S), INSERT STENT, COMBINED  09/11/2012    Procedure: COMBINED CYSTOSCOPY, URETEROSCOPY, LASER HOLMIUM LITHOTRIPSY URETER(S), INSERT STENT;  Cystoscopy, Right Ureteroscopy, Stone Extraction, Holmium Laser, Double J Stent Placement;  Surgeon: Nicolás Blackwell MD;  Location:  OR     cyanocobalamin (VITAMIN B-12) 500 MCG tablet  hydrocortisone 2.5 % ointment  multivitamin w/minerals (THERA-VIT-M) tablet  ondansetron (ZOFRAN ODT) 4 MG ODT tab  phenazopyridine (AZO URINARY PAIN RELIEF) 95 MG tablet  prochlorperazine (COMPAZINE) 10 MG tablet  sulfamethoxazole-trimethoprim (BACTRIM DS) 800-160 MG  tablet  tacrolimus (PROTOPIC) 0.1 % external ointment  Turmeric 500 MG CAPS  Vitamin D, Cholecalciferol, 1000 units TABS      No Known Allergies  Family History  Family History   Problem Relation Age of Onset    Heart Disease Mother     Other - See Comments Sister         during surgery for hip fracture    Cancer Sister         skin    Cancer Sister         skin    Diabetes Brother 65        Type 2    Diabetes Maternal Grandmother     Substance Abuse Son     Hypertension No family hx of     Lipids No family hx of      Social History   Social History     Tobacco Use    Smoking status: Former     Packs/day: 2.00     Years: 20.00     Additional pack years: 0.00     Total pack years: 40.00     Types: Cigarettes     Quit date: 1985     Years since quittin.1     Passive exposure: Past    Smokeless tobacco: Never   Vaping Use    Vaping Use: Never used   Substance Use Topics    Alcohol use: Not Currently     Alcohol/week: 0.0 - 2.0 standard drinks of alcohol    Drug use: No         A medically appropriate review of systems was performed with pertinent positives and negatives noted in the HPI, and all other systems negative.    Physical Exam      Physical Exam  Vitals and nursing note reviewed.   Constitutional:       General: He is in acute distress.      Appearance: Normal appearance. He is not toxic-appearing.   HENT:      Head: Atraumatic.   Eyes:      General: No scleral icterus.     Conjunctiva/sclera: Conjunctivae normal.   Cardiovascular:      Rate and Rhythm: Normal rate.      Heart sounds: Normal heart sounds.   Pulmonary:      Effort: Pulmonary effort is normal. No respiratory distress.      Breath sounds: Normal breath sounds.   Abdominal:      Palpations: Abdomen is soft.      Tenderness: There is abdominal tenderness in the suprapubic area.   Musculoskeletal:         General: No deformity.      Cervical back: Neck supple.   Skin:     General: Skin is warm.   Neurological:      Mental Status: He is  alert.           ED Course, Procedures, & Data      Procedures                      Results for orders placed or performed in visit on 02/16/24   CT Urogram wo & w Contrast     Status: None (Preliminary result)    Impression    RESIDENT PRELIMINARY INTERPRETATION  IMPRESSION:     1. Irregular posteroinferior urinary bladder wall filling defect  measuring up to 1.7 cm with minimal inflammatory changes surrounding  the partially distended urinary bladder. Findings may represent  irregular focal bladder wall thickening sequelae of acute versus  chronic cystitis. Less likely neoplastic etiology given provided  clinical history.    2. Scattered prominent mesenteric lymph nodes, favoring reactive.    3. Stable postsurgical changes of radical prostatectomy without  evidence of focal disease recurrence or metastasis.         Results for orders placed or performed in visit on 02/16/24   CBC with platelets     Status: Normal   Result Value Ref Range    WBC Count 5.8 4.0 - 11.0 10e3/uL    RBC Count 4.46 4.40 - 5.90 10e6/uL    Hemoglobin 13.6 13.3 - 17.7 g/dL    Hematocrit 40.9 40.0 - 53.0 %    MCV 92 78 - 100 fL    MCH 30.5 26.5 - 33.0 pg    MCHC 33.3 31.5 - 36.5 g/dL    RDW 13.3 10.0 - 15.0 %    Platelet Count 168 150 - 450 10e3/uL   Basic metabolic panel     Status: Normal   Result Value Ref Range    Sodium 138 135 - 145 mmol/L    Potassium 4.1 3.4 - 5.3 mmol/L    Chloride 104 98 - 107 mmol/L    Carbon Dioxide (CO2) 24 22 - 29 mmol/L    Anion Gap 10 7 - 15 mmol/L    Urea Nitrogen 17.1 8.0 - 23.0 mg/dL    Creatinine 0.95 0.67 - 1.17 mg/dL    GFR Estimate 85 >60 mL/min/1.73m2    Calcium 9.2 8.8 - 10.2 mg/dL    Glucose 86 70 - 99 mg/dL     Medications - No data to display  Labs Ordered and Resulted from Time of ED Arrival to Time of ED Departure - No data to display  No orders to display              Assessment & Plan    This is a 73-year-old male who presents with urinary retention.  Patient has a history of similar  occurrences in the past.  He also notes some hematuria with this.  Last time he was able to pass urine was at 1330 today.  On exam patient appears uncomfortable.  He has considerable suprapubic tenderness.  Patient did have relief of symptoms with pain medication previously.  Lab work and morphine was ordered.  Prior to this patient was able to void a large amount.  His symptoms completely resolved and he is requesting discharge.  As dose of pain medication has helped him when he has had similar occurrences in the past we will write for hydrocodone acetaminophen and that he can take a dose of the he has a similar episode.  Discussed that if this does not work he should emergency department.  Patient agrees with this plan.  Patient does follow with Urology.  We will also start patient on tamsulosin as he has not taken this in the past.  We will discharge with return precautions.    I have reviewed the nursing notes. I have reviewed the findings, diagnosis, plan and need for follow up with the patient.    New Prescriptions    No medications on file       Final diagnoses:   None   I, Jamil Lane, am serving as a trained medical scribe to document services personally performed by Jason Suero DO  based on the provider's statements to me on February 16, 2024.  This document has been checked and approved by the attending provider.    I, Jason Suero DO, was physically present and have reviewed and verified the accuracy of this note documented by Jamil Lane medical scribe.      Jason Suero DO  [facility] EMERGENCY DEPARTMENT  2/16/2024        Jason Suero DO  02/16/24 1983

## 2024-02-16 NOTE — PROGRESS NOTES
PT stopped by the urology clinic after imaging appointment to speak with RN.  He stated that he has been having blood clots, but today he has not had any.  He is concerned that he will have to go the ER over the weekend.  This writer went over labs that looked normal so far, imaging has not been resulted but let patient know that someone would reach out to him if results are abnormal.  This writer gave patient my card with direct number if he has concerns on Monday.      Teresa Chaney RN   4:14 PM

## 2024-02-17 ENCOUNTER — TELEPHONE (OUTPATIENT)
Dept: EMERGENCY MEDICINE | Facility: CLINIC | Age: 74
End: 2024-02-17
Payer: COMMERCIAL

## 2024-02-17 RX ORDER — HYDROCODONE BITARTRATE AND ACETAMINOPHEN 5; 325 MG/1; MG/1
1 TABLET ORAL EVERY 6 HOURS PRN
Qty: 10 TABLET | Refills: 0 | Status: SHIPPED | OUTPATIENT
Start: 2024-02-17 | End: 2024-02-20

## 2024-02-17 RX ORDER — TAMSULOSIN HYDROCHLORIDE 0.4 MG/1
0.4 CAPSULE ORAL DAILY
Qty: 10 CAPSULE | Refills: 0 | Status: SHIPPED | OUTPATIENT
Start: 2024-02-17 | End: 2024-03-01

## 2024-02-17 NOTE — DISCHARGE INSTRUCTIONS
Continue to follow-up with your Urologist.  Return to the emergency department if symptoms recur, there are any new symptoms or any cause for concern.

## 2024-02-17 NOTE — ED NOTES
Pt called ER today to say his prescriptions were sent to [facility] where there was no pharmacist to fill. Requests that they be sent elsewhere. Will give to MD or STEVE to resolve.

## 2024-02-17 NOTE — ED NOTES
1210: Called patient to discuss medication issues with picking up his prescriptions. Confirmed date of birth prior to discussion of medical information. He would like his prescription sent to another [facility] in [place], [place] to  today. Norco and tamsulosin was sent to said pharmacy. Patient voiced understanding and all questions were answered prior to end of call.      Neha Lerma PA-C  02/17/24 1213

## 2024-02-25 ENCOUNTER — HOSPITAL ENCOUNTER (EMERGENCY)
Facility: CLINIC | Age: 74
Discharge: HOME | End: 2024-02-26
Attending: EMERGENCY MEDICINE | Admitting: EMERGENCY MEDICINE
Payer: COMMERCIAL

## 2024-02-25 DIAGNOSIS — R33.9 URINARY RETENTION: ICD-10-CM

## 2024-02-25 DIAGNOSIS — R31.9 HEMATURIA, UNSPECIFIED TYPE: ICD-10-CM

## 2024-02-25 LAB
ALBUMIN UR-MCNC: 100 MG/DL
APPEARANCE UR: ABNORMAL
BILIRUB UR QL STRIP: NEGATIVE
COLOR UR AUTO: ABNORMAL
GLUCOSE UR STRIP-MCNC: NEGATIVE MG/DL
HGB UR QL STRIP: ABNORMAL
HOLD SPECIMEN: NORMAL
KETONES UR STRIP-MCNC: ABNORMAL MG/DL
LEUKOCYTE ESTERASE UR QL STRIP: ABNORMAL
NITRATE UR QL: NEGATIVE
PH UR STRIP: 6 [PH] (ref 5–7)
RBC URINE: >182 /HPF
SP GR UR STRIP: 1.02 (ref 1–1.03)
UROBILINOGEN UR STRIP-MCNC: NORMAL MG/DL
WBC URINE: 24 /HPF

## 2024-02-25 PROCEDURE — 250N000011 HC RX IP 250 OP 636

## 2024-02-25 PROCEDURE — 81003 URINALYSIS AUTO W/O SCOPE: CPT | Performed by: EMERGENCY MEDICINE

## 2024-02-25 PROCEDURE — 96374 THER/PROPH/DIAG INJ IV PUSH: CPT

## 2024-02-25 PROCEDURE — 87086 URINE CULTURE/COLONY COUNT: CPT | Performed by: EMERGENCY MEDICINE

## 2024-02-25 PROCEDURE — 99284 EMERGENCY DEPT VISIT MOD MDM: CPT | Mod: 25

## 2024-02-25 RX ORDER — MORPHINE SULFATE 2 MG/ML
INJECTION, SOLUTION INTRAMUSCULAR; INTRAVENOUS
Status: COMPLETED
Start: 2024-02-25 | End: 2024-02-25

## 2024-02-25 RX ORDER — LIDOCAINE HYDROCHLORIDE 20 MG/ML
6 JELLY TOPICAL ONCE
Status: COMPLETED | OUTPATIENT
Start: 2024-02-25 | End: 2024-02-26

## 2024-02-25 RX ORDER — MORPHINE SULFATE 2 MG/ML
2 INJECTION, SOLUTION INTRAMUSCULAR; INTRAVENOUS ONCE
Status: COMPLETED | OUTPATIENT
Start: 2024-02-25 | End: 2024-02-26

## 2024-02-25 RX ORDER — MORPHINE SULFATE 2 MG/ML
2 INJECTION, SOLUTION INTRAMUSCULAR; INTRAVENOUS ONCE
Status: COMPLETED | OUTPATIENT
Start: 2024-02-25 | End: 2024-02-25

## 2024-02-25 RX ADMIN — MORPHINE SULFATE 2 MG: 2 INJECTION, SOLUTION INTRAMUSCULAR; INTRAVENOUS at 22:09

## 2024-02-25 ASSESSMENT — ACTIVITIES OF DAILY LIVING (ADL)
ADLS_ACUITY_SCORE: 35
ADLS_ACUITY_SCORE: 33
ADLS_ACUITY_SCORE: 33

## 2024-02-26 ENCOUNTER — HOSPITAL ENCOUNTER (EMERGENCY)
Facility: CLINIC | Age: 74
Discharge: HOME | End: 2024-02-26
Attending: STUDENT IN AN ORGANIZED HEALTH CARE EDUCATION/TRAINING PROGRAM | Admitting: STUDENT IN AN ORGANIZED HEALTH CARE EDUCATION/TRAINING PROGRAM
Payer: COMMERCIAL

## 2024-02-26 VITALS
TEMPERATURE: 97.2 F | HEART RATE: 87 BPM | OXYGEN SATURATION: 98 % | SYSTOLIC BLOOD PRESSURE: 142 MMHG | RESPIRATION RATE: 18 BRPM | DIASTOLIC BLOOD PRESSURE: 72 MMHG

## 2024-02-26 VITALS
HEART RATE: 90 BPM | SYSTOLIC BLOOD PRESSURE: 112 MMHG | TEMPERATURE: 97.8 F | OXYGEN SATURATION: 96 % | DIASTOLIC BLOOD PRESSURE: 69 MMHG | WEIGHT: 180.34 LBS | BODY MASS INDEX: 26.63 KG/M2 | RESPIRATION RATE: 20 BRPM

## 2024-02-26 DIAGNOSIS — R33.9 URINARY RETENTION: Primary | ICD-10-CM

## 2024-02-26 DIAGNOSIS — C61 PROSTATE CANCER (H): ICD-10-CM

## 2024-02-26 DIAGNOSIS — R31.0 GROSS HEMATURIA: ICD-10-CM

## 2024-02-26 DIAGNOSIS — N99.114 POSTPROCEDURAL MALE URETHRAL STRICTURE: ICD-10-CM

## 2024-02-26 PROBLEM — R79.89 ABNORMAL LIVER FUNCTION TESTS: Status: ACTIVE | Noted: 2024-02-26

## 2024-02-26 PROBLEM — K70.9 ALCOHOLIC LIVER DISEASE (H): Status: ACTIVE | Noted: 2024-02-26

## 2024-02-26 LAB — HOLD SPECIMEN: NORMAL

## 2024-02-26 PROCEDURE — 96374 THER/PROPH/DIAG INJ IV PUSH: CPT

## 2024-02-26 PROCEDURE — 250N000009 HC RX 250: Performed by: EMERGENCY MEDICINE

## 2024-02-26 PROCEDURE — 99282 EMERGENCY DEPT VISIT SF MDM: CPT

## 2024-02-26 PROCEDURE — 250N000011 HC RX IP 250 OP 636: Performed by: EMERGENCY MEDICINE

## 2024-02-26 RX ORDER — HYDROMORPHONE HYDROCHLORIDE 1 MG/ML
0.5 INJECTION, SOLUTION INTRAMUSCULAR; INTRAVENOUS; SUBCUTANEOUS ONCE
Status: DISCONTINUED | OUTPATIENT
Start: 2024-02-26 | End: 2024-02-26

## 2024-02-26 RX ORDER — TAMSULOSIN HYDROCHLORIDE 0.4 MG/1
0.4 CAPSULE ORAL DAILY
Qty: 3 CAPSULE | Refills: 0 | Status: SHIPPED | OUTPATIENT
Start: 2024-02-26 | End: 2024-03-01

## 2024-02-26 RX ORDER — ONDANSETRON 2 MG/ML
4 INJECTION INTRAMUSCULAR; INTRAVENOUS ONCE
Status: DISCONTINUED | OUTPATIENT
Start: 2024-02-26 | End: 2024-02-26

## 2024-02-26 RX ADMIN — MORPHINE SULFATE 2 MG: 2 INJECTION, SOLUTION INTRAMUSCULAR; INTRAVENOUS at 01:00

## 2024-02-26 RX ADMIN — LIDOCAINE HYDROCHLORIDE 6 ML: 20 JELLY TOPICAL at 02:45

## 2024-02-26 ASSESSMENT — ACTIVITIES OF DAILY LIVING (ADL)
ADLS_ACUITY_SCORE: 33
ADLS_ACUITY_SCORE: 35

## 2024-02-26 ASSESSMENT — COLUMBIA-SUICIDE SEVERITY RATING SCALE - C-SSRS
2. HAVE YOU ACTUALLY HAD ANY THOUGHTS OF KILLING YOURSELF IN THE PAST MONTH?: NO
1. IN THE PAST MONTH, HAVE YOU WISHED YOU WERE DEAD OR WISHED YOU COULD GO TO SLEEP AND NOT WAKE UP?: NO
6. HAVE YOU EVER DONE ANYTHING, STARTED TO DO ANYTHING, OR PREPARED TO DO ANYTHING TO END YOUR LIFE?: NO

## 2024-02-26 NOTE — ED NOTES
Attempted donovan catheter per order with 20 Fr donovan, unable to advance. Pt was able have some urine out from the catheter, approx 120ml collected and sent to lab. Pt is able void by himself after catheter removed. Approx 150ml urine in the urinal. Pt still reported urge to urinate. Dr. Sidhu is aware

## 2024-02-26 NOTE — ED NOTES
"Pt reported unable to urinating again when discharging pt. Stated he feels \"everything is clot up again\", MD is aware, discharge deferred. Will attempt donovan catheter again   "

## 2024-02-26 NOTE — ED TRIAGE NOTES
"Pt was just seen in ER. Checking back in d/t urgency to urinate and 10/10 pain. Pt states he is unable to urinate and that \"clots\" just come out. VSS, ABCs intact, A&Ox4.        "

## 2024-02-26 NOTE — ED NOTES
Another attempt to insert a 16 Fr Coude catheter by this RN, unable to pass the donovan. Pt is able to start having slow stream of urination again, but small amount. Dr. Sidhu attempted, failed as well. Pt is not in acute distress

## 2024-02-26 NOTE — ED PROVIDER NOTES
History     Chief Complaint:  Abdominal Pain and Urinary Retention     HPI   Juwan Latham is a 73 year old male with history of HTN who presents to the ED with abdominal pain and urinary retention. Patient states that this has been an ongoing problem for him, as he has been seen for the same symptoms twice this month. He notes that he was doing well until he suddenly developed urinary retention around 1300 today. He says it feels like he has a full bladder but is hardly able to urinate anything. Also endorses associated abdominal pain, mild back pain, and nausea. He notes that this pain feels similar to the pain he had with past kidney stones.      Independent Historian:   None - Patient Only    Review of External Notes:   I reviewed the [facility] ED note from 2/16/24 where he was seen for urinary retention.  I learned he had a past prostatectomy due to prostate cancer. He also received a CT urogram at this time (see results below).    CT Urogram WO & W Contrast Results (2/16/24):   1. Irregular posteroinferior urinary bladder filling defect measuring  up to 1.7 cm with minimal inflammatory changes surrounding the  partially distended urinary bladder. Findings may represent irregular  acute versus chronic radiation cystitis. Less likely neoplastic  etiology given provided clinical history with some intravesicular  blood products.     2. Scattered prominent mesenteric lymph nodes, favoring reactive.     3. Stable postsurgical changes of radical prostatectomy without  evidence of focal disease recurrence or metastasis.    I also reviewed the patient's ED visit from 2/10/24. He was not seen by urology because he was able to void spontaneously after receiving morphine.     Medications:     Bactrim   Flomax     Past Medical History:    Anxiety   Arthritis   Calculus of kidney   Congenital single kidney   GERD  Lung cancer  HTN  Prostate cancer   Seasonal allergies   PAULINO   Cavernous hemangioma of brain   MDD  Somatic  dysfunction of pelvis region  Mixed incontinence urge and stress   Retinal detachment of left eye with single break   Postprocedural male urethral stricture   Fatty liver   Chronic bilateral low back pain   Alcoholic liver damage   Lymphedema     Past Surgical History:    Flexible bronchoscopy   Colonoscopy x3  Combined cystoscopy, retrogrades, ureteroscopy, laser holmium lithotripsy, insert stent  Cytoscopy, dilate urethra, combined   DaVinci prostatectomy   EGD   Laparoscopic wedge resection lung   Laser holmium lithotripsy ureter, insert stent, combined    Retinal detachment repair/Vitrectomy/Scleral buckle/Laser/Gas    Physical Exam   Patient Vitals for the past 24 hrs:   BP Temp Temp src Pulse Resp SpO2 Weight   02/26/24 0100 102/61 -- -- -- -- -- --   02/25/24 2050 (!) 189/136 97.8  F (36.6  C) Oral 120 20 97 % 81.8 kg (180 lb 5.4 oz)        Physical Exam  General:  No respiratory distress. Uncomfortable.     Cardiovascular: Good cap refill.    Respiratory: Breathing non labored.     GI: Discomfort to abdominal palpation.     Musculoskeletal: No bony deformity. He has some discomfort with palpation of the back.     Skin: No rashes or petechiae     Neurologic: non focal      Psychiatric: Appropriate     Emergency Department Course     Laboratory:  Labs Ordered and Resulted from Time of ED Arrival to Time of ED Departure   ROUTINE UA WITH MICROSCOPIC REFLEX TO CULTURE - Abnormal       Result Value    Color Urine Dark Brown (*)     Appearance Urine Cloudy (*)     Glucose Urine Negative      Bilirubin Urine Negative      Ketones Urine Trace (*)     Specific Gravity Urine 1.019      Blood Urine Large (*)     pH Urine 6.0      Protein Albumin Urine 100 (*)     Urobilinogen Urine Normal      Nitrite Urine Negative      Leukocyte Esterase Urine Trace (*)     RBC Urine >182 (*)     WBC Urine 24 (*)    URINE CULTURE          Emergency Department Course & Assessments:    Interventions:  Medications   morphine (PF)  injection 2 mg (has no administration in time range)   lidocaine (XYLOCAINE) 2 % external gel 6 mL (has no administration in time range)   morphine (PF) injection 2 mg (2 mg Intravenous $Given 2/25/24 2209)        Assessments:  2230 I obtained history and examined the patient as noted above.  0025 I rechecked the patient and explained findings. Patient is now urinating on his own and is comfortable with discharge/plan for outpatient follow up with urology.   0215 The patient started to have urgency. I called the urologist who thinks this is bladder irritation. The patient is able to urinate and will be discharged.    Social Determinants of Health affecting care:   Healthcare Access/Compliance    Disposition:  The patient was discharged.     Impression & Plan    CMS Diagnoses: None    Medical Decision Making:  In review of the patient's records he has had a 80-year-old urethral stricture but has not followed up to had this taken care of.  There have been several recent visits due to difficulty with urinating and urinary retention however each time he had spontaneously been able to void.  The patient reported that when he given morphine this had helped.  We did attempt to pass the catheter however he urinated spontaneously and was starting to urinate on his own.  The plan had been to discharge him however he said he started to feel things back up again.  Therefore we attempted a Donovan catheter again. I attempted to place a donovan catheter but was unable to. Urology was paced.      Diagnosis:    ICD-10-CM    1. Urinary retention  R33.9       2. Hematuria, unspecified type  R31.9            Scribe Disclosure:  I, Maggie Mckeonalado, am serving as a scribe at 10:58 PM on 2/25/2024 to document services personally performed by Paulo Sidhu MD based on my observations and the provider's statements to me.   2/25/2024   Paulo Sidhu MD Farnan, Christopher M, MD  02/26/24 0210       Paulo Sidhu  MD FERNANDA  02/26/24 0219

## 2024-02-26 NOTE — ED NOTES
Pt is able to have small stream of urination, dark brown without foul smelling. Pt does not appear in distress, still have urge to urinate, provided a urinal for pt to go home. VSS at discharge

## 2024-02-26 NOTE — ED PROVIDER NOTES
History   Chief Complaint:  Urinary Retention and Urinary Frequency       HPI:  Juwan Latham is a very pleasant 73 year old male with history of prostate cancer status post radical prostatectomy, urethral stricture with presenting with urinary retention. Patient has had numerous recent emergency department visits for hematuria and urinary retention.  He was seen in this emergency department within the last 12 hours for the same symptoms.  There was an attempt made to place a Breen catheter but this was unsuccessful.  The patient ultimately voided spontaneously.  Urinalysis was performed, showing hematuria with some pyuria.  Urine culture was sent.    He returned because he is having continued urinary retention and pain.  When I evaluated the patient, shortly after arrival, the patient had been able to void approximately 200 mL of urine and had relief of symptoms.  Grossly, the specimen appeared pink.    Patient has been following with urology and last had a clinic appointment on 2/15/2024.  He has a follow-up appointment on 3/1/2024 with urology.  He did have a CT performed on 2/16/2024 which suggested radiation cystitis.  He has cytology performed on urinalysis specimen on 2/15/2024 which was reassuring against high-grade urothelial carcinoma.    Independent Historian:  None. Only the patient provided history.    Review of External Notes:  I personally reviewed notes from the patient's clinic visit dated  2/15/2024 . This provided me with information regarding  recent assessment by urology .     I personally reviewed the patient's chart, including available medication list and available past medical history, past surgical history, family history, and social history.    Physical Exam   Patient Vitals for the past 24 hrs:   BP Temp Temp src Pulse Resp SpO2   02/26/24 0347 (!) 142/72 97.2  F (36.2  C) Temporal 87 18 98 %      Physical Exam  Vitals and nursing note reviewed.   Constitutional:       General: He is  not in acute distress.     Appearance: Normal appearance. He is not ill-appearing.   Cardiovascular:      Rate and Rhythm: Normal rate.   Pulmonary:      Effort: Pulmonary effort is normal.   Abdominal:      Tenderness: There is no abdominal tenderness.   Skin:     General: Skin is warm and dry.   Neurological:      Mental Status: He is alert and oriented to person, place, and time.         Emergency Department Course     ECG:   No ECG performed.     Imaging: Laboratory:   No orders to display          Labs Ordered and Resulted from Time of ED Arrival to Time of ED Departure - No data to display        Procedures  None performed    Interventions & Assessments:           Interventions:  Medications - No data to display     Assessments  Independent Interpretations  Consultations/Discussion of Management or Tests       Social Determinants of Health affecting care:   None.      Disposition:  The patient was discharged to home.     Impression & Plan        Medical Decision Making:  Patient presenting with urinary retention and pain.  Show after arrival, the patient was able to void, providing relief of symptoms.  On reevaluation, the patient had voided again.  He continued to feel comfortable.  We discussed attempting again to place a Breen but the patient would like to defer this for the moment.  I think this is reasonable.  Did not obtain a urine specimen since this was done within the last 12 hours.  Urine culture is pending.  Encouraged the patient to call his urologist office this morning to discuss his recurrent urinary retention and to seek reassessment.      Diagnosis:    ICD-10-CM    1. Urinary retention  R33.9       2. Gross hematuria  R31.0       3. 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 h/o Prostate cancer (H) - s/p prostatectomy 2017  C61            Discharge Medications:  New Prescriptions    No medications on file        Damon Tobar MD  02/26/24 [id number]

## 2024-02-28 LAB — BACTERIA UR CULT: NO GROWTH

## 2024-03-01 ENCOUNTER — VIRTUAL VISIT (OUTPATIENT)
Dept: UROLOGY | Facility: CLINIC | Age: 74
End: 2024-03-01
Payer: COMMERCIAL

## 2024-03-01 DIAGNOSIS — R31.0 GROSS HEMATURIA: Primary | ICD-10-CM

## 2024-03-01 DIAGNOSIS — N99.114 POSTPROCEDURAL MALE URETHRAL STRICTURE: ICD-10-CM

## 2024-03-01 PROCEDURE — 99214 OFFICE O/P EST MOD 30 MIN: CPT | Mod: 95 | Performed by: UROLOGY

## 2024-03-01 RX ORDER — CEFAZOLIN SODIUM 2 G/50ML
2 SOLUTION INTRAVENOUS SEE ADMIN INSTRUCTIONS
Status: CANCELLED | OUTPATIENT
Start: 2024-03-01

## 2024-03-01 RX ORDER — CEFAZOLIN SODIUM 2 G/50ML
2 SOLUTION INTRAVENOUS
Status: CANCELLED | OUTPATIENT
Start: 2024-03-01

## 2024-03-01 NOTE — PROGRESS NOTES
Virtual Visit Details    Type of service:  Video Visit   Video Start Time: 245p  Video End Time:300p    Originating Location (pt. Location): Home    Distant Location (provider location):  On-site  Platform used for Video Visit: [facility]     73 year old male with history of RALP, XRT, gross hematuria.  We did urethral dilation on 1/2022.  We noted short membranous stricture and trabeculations/bleeding areas in bladder.  He notes some recurrent stricture symptoms.  More notably, he notes gross hematuria which led to clot retention, er visit  They tried to place a donovan but couldn't due to stricture.  His stream is not the main issue but when clots, it's a big issue.    CT urogram demonstrated   1. Irregular posteroinferior urinary bladder filling defect measuring  up to 1.7 cm with minimal inflammatory changes surrounding the  partially distended urinary bladder. Findings may represent irregular  acute versus chronic radiation cystitis. Less likely neoplastic  etiology given provided clinical history with some intravesicular  blood products.     2. Scattered prominent mesenteric lymph nodes, favoring reactive.     3. Stable postsurgical changes of radical prostatectomy without  evidence of focal disease recurrence or metastasis.    Exam:           Constitutional - No apparent distress. Patient of stated age.               Eyes - no redness or discharge.              Respiratory - no cough. no labored breathing              Musculoskeletal - full range of motion in all extremities              Skin - no visible skin discoloration or lesions              Neurological - no tremors              Psychiatric - no anxiety, alert & oriented                A/P:  Urethral stricture  Hx prostate cancer  Gross hematuria    I recommend urethral dilation, clot evac, bladder washout, possible biopsy  I recommend optilume given recurrent stricture.    Risks, benefits alternatives discussed.    OR for optilume. Plan for catheter 1-2  days postop and he'll remove at home  Cysto 3 months postop in clinic

## 2024-03-01 NOTE — LETTER
3/1/2024       RE: Juwan Latham  [address]  [place] [address]     Dear Colleague,    Thank you for referring your patient, Juwan Latham, to the [facility] [place] [facility]. Please see a copy of my visit note below.    Virtual Visit Details    Type of service:  Video Visit   Video Start Time: 245p  Video End Time:300p    Originating Location (pt. Location): Home    Distant Location (provider location):  On-site  Platform used for Video Visit: [employer]     73 year old male with history of RALP, XRT, gross hematuria.  We did urethral dilation on 1/2022.  We noted short membranous stricture and trabeculations/bleeding areas in bladder.  He notes some recurrent stricture symptoms.  More notably, he notes gross hematuria which led to clot retention, er visit  They tried to place a donovan but couldn't due to stricture.  His stream is not the main issue but when clots, it's a big issue.    CT urogram demonstrated   1. Irregular posteroinferior urinary bladder filling defect measuring  up to 1.7 cm with minimal inflammatory changes surrounding the  partially distended urinary bladder. Findings may represent irregular  acute versus chronic radiation cystitis. Less likely neoplastic  etiology given provided clinical history with some intravesicular  blood products.     2. Scattered prominent mesenteric lymph nodes, favoring reactive.     3. Stable postsurgical changes of radical prostatectomy without  evidence of focal disease recurrence or metastasis.    Exam:           Constitutional - No apparent distress. Patient of stated age.               Eyes - no redness or discharge.              Respiratory - no cough. no labored breathing              Musculoskeletal - full range of motion in all extremities              Skin - no visible skin discoloration or lesions              Neurological - no tremors              Psychiatric - no  anxiety, alert & oriented                A/P:  Urethral stricture  Hx prostate cancer  Gross hematuria    I recommend urethral dilation, clot evac, bladder washout, possible biopsy  I recommend optilume given recurrent stricture.    Risks, benefits alternatives discussed.    OR for optilume. Plan for catheter 1-2 days postop and he'll remove at home  Cysto 3 months postop in clinic      Mike Chan MD

## 2024-03-01 NOTE — NURSING NOTE
Is the patient currently in the state of [place]? YES    Visit mode:VIDEO    If the visit is dropped, the patient can be reconnected by: VIDEO VISIT: Send to e-mail at: [email or web address]    Will anyone else be joining the visit? NO  (If patient encounters technical issues they should call [phone]    How would you like to obtain your AVS? MyChart    Are changes needed to the allergy or medication list? No    Reason for visit: Follow Up    Viky MORRIS

## 2024-03-04 ENCOUNTER — TELEPHONE (OUTPATIENT)
Dept: UROLOGY | Facility: CLINIC | Age: 74
End: 2024-03-04
Payer: COMMERCIAL

## 2024-03-04 NOTE — TELEPHONE ENCOUNTER
Patient is schedule for surgery with: Dr. Chan    Surgery Date: 3/18     Location: [facility]    H&P: to be completed by Primary Care team - patient instructed to schedule per patient, this will be scheduled with [facility]    Post-op: 6/19  3 months postop for cystoscopy     Patient will receive a phone call from pre-admission nurses 1-2 days prior to surgery with arrival time and NPO instructions.    Patient aware times are subject to change up until day before surgery.     Patient questions/concerns: N/A     Surgery packet was sent via US mail on 3/5      Rochelle Jean on 3/4/2024 at 4:19 PM

## 2024-03-07 ENCOUNTER — TELEPHONE (OUTPATIENT)
Dept: FAMILY MEDICINE | Facility: CLINIC | Age: 74
End: 2024-03-07

## 2024-03-07 ENCOUNTER — OFFICE VISIT (OUTPATIENT)
Dept: INTERNAL MEDICINE | Facility: CLINIC | Age: 74
End: 2024-03-07
Payer: COMMERCIAL

## 2024-03-07 VITALS
OXYGEN SATURATION: 98 % | DIASTOLIC BLOOD PRESSURE: 64 MMHG | SYSTOLIC BLOOD PRESSURE: 120 MMHG | HEART RATE: 55 BPM | RESPIRATION RATE: 16 BRPM | BODY MASS INDEX: 25.99 KG/M2 | TEMPERATURE: 97.4 F | WEIGHT: 176 LBS

## 2024-03-07 DIAGNOSIS — G47.33 OSA (OBSTRUCTIVE SLEEP APNEA): ICD-10-CM

## 2024-03-07 DIAGNOSIS — Z01.818 PREOP GENERAL PHYSICAL EXAM: Primary | ICD-10-CM

## 2024-03-07 DIAGNOSIS — F33.0 MAJOR DEPRESSIVE DISORDER, RECURRENT EPISODE, MILD (H): ICD-10-CM

## 2024-03-07 DIAGNOSIS — R11.0 NAUSEA: ICD-10-CM

## 2024-03-07 DIAGNOSIS — C61 PROSTATE CANCER (H): ICD-10-CM

## 2024-03-07 DIAGNOSIS — C34.11 MALIGNANT NEOPLASM OF UPPER LOBE OF RIGHT LUNG (H): ICD-10-CM

## 2024-03-07 PROBLEM — K70.9 ALCOHOLIC LIVER DISEASE (H): Status: RESOLVED | Noted: 2024-02-26 | Resolved: 2024-03-07

## 2024-03-07 PROCEDURE — 99214 OFFICE O/P EST MOD 30 MIN: CPT | Performed by: INTERNAL MEDICINE

## 2024-03-07 RX ORDER — HYDROCORTISONE 25 MG/G
OINTMENT TOPICAL 2 TIMES DAILY
Qty: 60 G | Refills: 3 | Status: CANCELLED | OUTPATIENT
Start: 2024-03-07

## 2024-03-07 RX ORDER — ONDANSETRON 4 MG/1
4-8 TABLET, ORALLY DISINTEGRATING ORAL EVERY 8 HOURS PRN
Qty: 20 TABLET | Refills: 0 | Status: SHIPPED | OUTPATIENT
Start: 2024-03-07 | End: 2024-05-28

## 2024-03-07 RX ORDER — TACROLIMUS 1 MG/G
OINTMENT TOPICAL
Qty: 60 G | Refills: 3 | Status: CANCELLED | OUTPATIENT
Start: 2024-03-07

## 2024-03-07 ASSESSMENT — PATIENT HEALTH QUESTIONNAIRE - PHQ9
SUM OF ALL RESPONSES TO PHQ QUESTIONS 1-9: 3
SUM OF ALL RESPONSES TO PHQ QUESTIONS 1-9: 3
10. IF YOU CHECKED OFF ANY PROBLEMS, HOW DIFFICULT HAVE THESE PROBLEMS MADE IT FOR YOU TO DO YOUR WORK, TAKE CARE OF THINGS AT HOME, OR GET ALONG WITH OTHER PEOPLE: NOT DIFFICULT AT ALL

## 2024-03-07 NOTE — PROGRESS NOTES
Preoperative Evaluation  [address] [address]  Phone: [phone]  Primary Provider: Julio Guidry Jr.  Pre-op Performing Provider: TAJ SCHMITZ  Mar 7, 2024       Juwan is a 73 year old, presenting for the following:  Pre-Op Exam      Surgical Information  Surgery/Procedure: CYSTOSCOPY, WITH OPTILUME URETHRAL DILATION   Surgery Location: [facility]   Surgeon: Dr. Chan   Surgery Date: 3/18/24  Time of Surgery: 2:25 pm   Where patient plans to recover: At home with family  Fax number for surgical facility: Note does not need to be faxed, will be available electronically in Epic.    Assessment & Plan     The proposed surgical procedure is considered LOW risk.    Preop general physical exam  Low risk procedure.  Surgery to proceed as scheduled.    Please note I reviewed with the patient the diagnosis listed within the chart of alcohol liver disease.  Patient adamantly denies that this has been an issue.  Upon review he apparently has been seen by [facility] with comments made of such.  We discussed alcohol use on a intermittent basis if he does not have a history of such but I have advised him to discuss with his primary physician this diagnosis if it does not reflect accurate information.    h/o Prostate cancer (H) - s/p prostatectomy 2017  Routine postoperative course per urology as directed.    Nausea  Refilled per patient request  - ondansetron (ZOFRAN ODT) 4 MG ODT tab; Take 1-2 tablets (4-8 mg) by mouth every 8 hours as needed for nausea    PAULINO (obstructive sleep apnea)  Of note since weight loss patient informs me he is not using CPAP.    s/p Malignant neoplasm of upper lobe of right lung (H)  Prior history and followed as noted with subspecialist    Major depressive disorder, recurrent episode, mild (H24)  Per patient.       - No identified additional risk factors other than previously  addressed    Antiplatelet or Anticoagulation Medication Instructions  As ordered    Additional Medication Instructions  Patient is to take all scheduled medications on the day of surgery    Recommendation  APPROVAL GIVEN to proceed with proposed procedure, without further diagnostic evaluation.      Subjective       HPI related to upcoming procedure: CYSTOSCOPY, WITH OPTILUME URETHRAL DILATION           3/7/2024     9:49 AM   Preop Questions   1. Have you ever had a heart attack or stroke? No   2. Have you ever had surgery on your heart or blood vessels, such as a stent placement, a coronary artery bypass, or surgery on an artery in your head, neck, heart, or legs? No   3. Do you have chest pain with activity? No   4. Do you have a history of  heart failure? No   5. Do you currently have a cold, bronchitis or symptoms of other infection? No   6. Do you have a cough, shortness of breath, or wheezing? No   7. Do you or anyone in your family have previous history of blood clots? No   8. Do you or does anyone in your family have a serious bleeding problem such as prolonged bleeding following surgeries or cuts? No   9. Have you ever had problems with anemia or been told to take iron pills? No   10. Have you had any abnormal blood loss such as black, tarry or bloody stools? No   11. Have you ever had a blood transfusion? No   12. Are you willing to have a blood transfusion if it is medically needed before, during, or after your surgery? Yes   13. Have you or any of your relatives ever had problems with anesthesia? No   14. Do you have sleep apnea, excessive snoring or daytime drowsiness? YES -    14a. Do you have a CPAP machine? Yes   15. Do you have any artifical heart valves or other implanted medical devices like a pacemaker, defibrillator, or continuous glucose monitor? No   16. Do you have artificial joints? No   17. Are you allergic to latex? No     Health Care Directive  Patient has a Health Care Directive on  file      Status of Chronic Conditions:  See problem list for active medical problems.  Problems all longstanding and stable, except as noted/documented.  See ROS for pertinent symptoms related to these conditions.    Patient Active Problem List    Diagnosis Date Noted    Abnormal liver function tests 02/26/2024     Priority: Medium    Alcoholic liver disease (H24) 02/26/2024     Priority: Medium    Chronic bilateral low back pain without sciatica 07/20/2023     Priority: Medium    CARDIOVASCULAR SCREENING; LDL GOAL LESS THAN 130 04/18/2023     Priority: Medium    Fatty liver 12/12/2022     Priority: Medium    Malignant neoplasm (H) 12/17/2021     Priority: Medium    Numbness 12/17/2021     Priority: Medium     Dorsum right foot      Postprocedural male urethral stricture 12/03/2021     Priority: Medium     Membranous.  Recurrent.      Gross hematuria 12/03/2021     Priority: Medium     Added automatically from request for surgery [id number]      Retinal detachment of left eye with single break 05/21/2018     Priority: Medium     Planned surgical repair      Somatic dysfunction of pelvis region 12/30/2017     Priority: Medium    Mixed incontinence urge and stress (male)(female) 12/30/2017     Priority: Medium    h/o Prostate cancer (H) - s/p prostatectomy 2017 12/01/2017     Priority: Medium     [id number] Grade Group 5 prostate cancer s/p robotic radical prostatectomy on 12/1/2017 by Dr. Paulo Agarwal at [facility]  Substantially higher risk than previously believed given positive lymph node and grade group 5     Given high risk for prostate cancer would refer to radiation oncology for adjuvant radiation and possible hormone treatment.  Possibly start in Feb/March 2018. Re-check PSA prior to starting radiation therapy.  Completed radiation therapy in April 2018.           Major depressive disorder, recurrent episode, mild (H24) 05/09/2017     Priority: Medium    Cavernous hemangioma of brain (H) 04/07/2017      Priority: Medium     Found incidentally on MRI of brain which was ordered to look for mets from lung tumor  Neurosurgery consult with Dr. Mcelroy in March 2017 - recommend repeat MRI in September 2017 to assess for any changes      Overweight (BMI 25.0-29.9) 03/24/2017     Priority: Medium    s/p Malignant neoplasm of upper lobe of right lung (H) 03/13/2017     Priority: Medium     Adenocarcinoma      Hx of cancer of lung 03/2017     Priority: Medium     wedge resection      Benign neoplasm of descending colon 01/16/2017     Priority: Medium     Seen on colonoscopy 1/4/2017      Hypertension goal BP (blood pressure) < 140/90 04/28/2015     Priority: Medium    GERD (gastroesophageal reflux disease) 01/27/2015     Priority: Medium    PAULINO (obstructive sleep apnea) 05/24/2013     Priority: Medium    Anxiety 12/12/2011     Priority: Medium      Past Medical History:   Diagnosis Date    Anxiety     Arthritis     fingers, hips    Calculus of kidney 2005, 2012    CARDIOVASCULAR SCREENING; LDL GOAL LESS THAN 130 4/18/2023    Colon polyps     Congenital single kidney     Gastroesophageal reflux disease     Hx of cancer of lung 03/2017    wedge resection    Hypertension     Prostate cancer (H) 08/2017    prostatectomy/Rad Tx    Seasonal allergies     spring and fall    Sleep apnea     cpap     Past Surgical History:   Procedure Laterality Date    BRONCHOSCOPY FLEXIBLE N/A 03/03/2017    Procedure: BRONCHOSCOPY FLEXIBLE;  Surgeon: Maria A Desai MD;  Location: [facility] OR    COLONOSCOPY N/A 02/11/2015    Procedure: COLONOSCOPY;  Surgeon: Murphy Jesus MD;  Location:  GI    COLONOSCOPY N/A 12/11/2019    Procedure: COLONOSCOPY;  Surgeon: Murphy Jesus MD;  Location:  GI    COLONOSCOPY N/A 01/25/2022    due 5 yrs - COLONOSCOPY, FLEXIBLE, WITH POLYPECTOMY  USING COLD SNARE;  Surgeon: Neha Rosen MD;  Location:  GI    COMBINED CYSTOSCOPY, RETROGRADES, URETEROSCOPY, LASER HOLMIUM LITHOTRIPSY URETER(S), INSERT STENT  N/A 03/25/2018    Procedure: COMBINED CYSTOSCOPY, RETROGRADES, URETEROSCOPY, LASER HOLMIUM LITHOTRIPSY URETER(S), INSERT STENT;  Cystoscopy, Catheter Exchange under Anesthesia;  Surgeon: Zaria Cain MD;  Location: [facility] OR    CYSTOSCOPY, DILATE URETHRA, COMBINED N/A 01/03/2022    Procedure: CYSTOSCOPY, WITH URETHRAL DILATION WITH FULGERATION OF BLADDER WALL;  Surgeon: Mike Chan MD;  Location: [facility] OR    DAVINCI PROSTATECTOMY N/A 12/01/2017    Procedure: DAVINCI PROSTATECTOMY;  Robotic Assisted Radical Prostatectomy and Pelvic Lymph Node Dissection ;  Surgeon: Paulo Agarwal MD;  Location:  OR    ESOPHAGOSCOPY, GASTROSCOPY, DUODENOSCOPY (EGD), COMBINED N/A 05/20/2016    Procedure: COMBINED ESOPHAGOSCOPY, GASTROSCOPY, DUODENOSCOPY (EGD), BIOPSY SINGLE OR MULTIPLE;  Surgeon: Murphy Jesus MD;  Location:  GI    LAPAROSCOPIC WEDGE RESECTION LUNG Right 03/03/2017    Procedure: LAPAROSCOPIC WEDGE RESECTION LUNG;  Surgeon: Maria A Desai MD;  Location: [facility] OR    LASER HOLMIUM LITHOTRIPSY URETER(S), INSERT STENT, COMBINED  09/11/2012    Procedure: COMBINED CYSTOSCOPY, URETEROSCOPY, LASER HOLMIUM LITHOTRIPSY URETER(S), INSERT STENT;  Cystoscopy, Right Ureteroscopy, Stone Extraction, Holmium Laser, Double J Stent Placement;  Surgeon: Nicolás Blackwell MD;  Location:  OR     Current Outpatient Medications   Medication Sig Dispense Refill    hydrocortisone 2.5 % ointment Apply topically 2 times daily 60 g 3    multivitamin w/minerals (THERA-VIT-M) tablet Take 1 tablet by mouth daily      ondansetron (ZOFRAN ODT) 4 MG ODT tab Take 1-2 tablets (4-8 mg) by mouth every 8 hours as needed for nausea 20 tablet 0    tacrolimus (PROTOPIC) 0.1 % external ointment Apply topically to the face, scalp and hands once or twice daily. May cover with vaseline. 60 g 3    Vitamin D, Cholecalciferol, 1000 units TABS          No Known Allergies     Social History     Tobacco Use    Smoking status: Former      Packs/day: 2.00     Years: 20.00     Additional pack years: 0.00     Total pack years: 40.00     Types: Cigarettes     Quit date: 1985     Years since quittin.2     Passive exposure: Past    Smokeless tobacco: Never   Substance Use Topics    Alcohol use: Not Currently     Alcohol/week: 0.0 - 2.0 standard drinks of alcohol       History   Drug Use No         Review of Systems    Review of Systems  CONSTITUTIONAL: NEGATIVE for fever, chills, change in weight  EYES: NEGATIVE for vision changes or irritation  ENT/MOUTH: NEGATIVE for ear, mouth and throat problems  RESP: NEGATIVE for significant cough or SOB  CV: NEGATIVE for chest pain, palpitations or peripheral edema  GI: NEGATIVE for nausea, abdominal pain, heartburn, or change in bowel habits  MUSCULOSKELETAL: NEGATIVE for significant arthralgias or myalgia  NEURO: NEGATIVE for weakness, dizziness or paresthesias  HEME: NEGATIVE for bleeding problems  PSYCHIATRIC: NEGATIVE for changes in mood or affect        Lab Results   Component Value Date    ALT 23 2023    ALT 32 2021     AST   Date Value Ref Range Status   2023 23 0 - 45 U/L Final     Comment:     Reference intervals for this test were updated on 2023 to more accurately reflect our healthy population. There may be differences in the flagging of prior results with similar values performed with this method. Interpretation of those prior results can be made in the context of the updated reference intervals.   2021 21 0 - 37 U/L Final      Lab Results   Component Value Date    BILITOTAL 0.4 2023    BILITOTAL 0.7 2023    BILITOTAL 0.4 2023    BILITOTAL 0.6 2023    BILITOTAL 0.5 10/17/2022    BILITOTAL 0.6 10/20/2020    BILITOTAL 0.5 2020    BILITOTAL 1.1 2020    BILITOTAL 0.5 2019    BILITOTAL 0.6 2019        Objective    /64   Pulse 55   Temp 97.4  F (36.3  C) (Temporal)   Resp 16   Wt 79.8 kg (176 lb)   SpO2 98%    "BMI 25.99 kg/m     Estimated body mass index is 25.99 kg/m  as calculated from the following:    Height as of 2/15/24: 1.753 m (5' 9\").    Weight as of this encounter: 79.8 kg (176 lb).  Physical Exam  GENERAL: alert and no distress  EYES: Eyes grossly normal to inspection, PERRL and conjunctivae and sclerae normal  HENT: ear canals and TM's normal, nose and mouth without ulcers or lesions  RESP: lungs clear to auscultation - no rales, rhonchi or wheezes  CV: regular rate and rhythm, normal S1 S2, no S3 or S4, no murmur, click or rub, no peripheral edema  MS: no gross musculoskeletal defects noted, no edema  NEURO: No focal changes  PSYCH: mentation appears normal, affect normal/bright    Recent Labs   Lab Test 02/16/24  1537 02/10/24  1457   HGB 13.6 15.0    207    137   POTASSIUM 4.1 4.2   CR 0.95 0.85        Diagnostics  Labs pending at this time.  Results will be reviewed when available.   No EKG required, no history of coronary heart disease, significant arrhythmia, peripheral arterial disease or other structural heart disease.    Revised Cardiac Risk Index (RCRI)  The patient has the following serious cardiovascular risks for perioperative complications:   - No serious cardiac risks = 0 points     RCRI Interpretation: 1 point: Class II (low risk - 0.9% complication rate)       Signed Electronically by: Bigg Cunningham MD  Copy of this evaluation report is provided to requesting physician, .  Dr. Chan        Answers submitted by the patient for this visit:  Patient Health Questionnaire (Submitted on 3/7/2024)  If you checked off any problems, how difficult have these problems made it for you to do your work, take care of things at home, or get along with other people?: Not difficult at all  PHQ9 TOTAL SCORE: 3    "

## 2024-03-07 NOTE — TELEPHONE ENCOUNTER
FYI - Status Update    Who is Calling: patient    Update: Please update medical notes. Patient has always had a bad liver and its not related to alcohol.    Does caller want a call/response back: Yes     Could we send this information to you in [facility]IF[facility] or would you prefer to receive a phone call?:   Patient would prefer a phone call   Okay to leave a detailed message?: Yes at Cell number on file:    Telephone Information:   Mobile [phone]

## 2024-03-14 RX ORDER — IBUPROFEN 200 MG
200 TABLET ORAL EVERY 4 HOURS PRN
COMMUNITY
End: 2024-06-24

## 2024-03-14 NOTE — TELEPHONE ENCOUNTER
Rec'd a call from [facility]eri (Or ) asking that per PAN writer should call pt as they are confused about DOS date and time. Write called pt and he stated that he saw a 3/18 date and a 3/20 date. Writer let pt know that  the 3/20 date was a follow up clinic visit and that DOS is 3/18. Writer went back over PAN instructions with patient and let pt know to call back if he has any other questions.   Rochelle Jean on 3/14/2024 at 11:10 AM

## 2024-03-15 ENCOUNTER — ANESTHESIA EVENT (OUTPATIENT)
Dept: SURGERY | Facility: CLINIC | Age: 74
End: 2024-03-15
Payer: COMMERCIAL

## 2024-03-15 ENCOUNTER — PRE VISIT (OUTPATIENT)
Dept: UROLOGY | Facility: CLINIC | Age: 74
End: 2024-03-15
Payer: COMMERCIAL

## 2024-03-15 NOTE — TELEPHONE ENCOUNTER
Messaged schedulers to cancel cystoscopy scheduled for 3/20/24 & to reschedule pt for a cystoscopy for 3 months post op per OP note. (Sometime in June).     -DOS 3/18/24    -Brittney DE GUZMAN

## 2024-03-17 NOTE — ANESTHESIA PREPROCEDURE EVALUATION
Anesthesia Pre-Procedure Evaluation    Patient: Juwan Latham   MRN: [id number] : 1950        Procedure : Procedure(s):  CYSTOSCOPY, WITH OPTILUME URETHRAL DILATION          Past Medical History:   Diagnosis Date    Anxiety     Arthritis     fingers, hips    Calculus of kidney ,     CARDIOVASCULAR SCREENING; LDL GOAL LESS THAN 130 2023    Colon polyps     Congenital single kidney     Gastroesophageal reflux disease     Hx of cancer of lung 2017    wedge resection    Hypertension     Prostate cancer (H) 2017    prostatectomy/Rad Tx    Seasonal allergies     spring and fall    Sleep apnea     cpap      Past Surgical History:   Procedure Laterality Date    BRONCHOSCOPY FLEXIBLE N/A 2017    Procedure: BRONCHOSCOPY FLEXIBLE;  Surgeon: Maria A Desai MD;  Location: [facility] OR    COLONOSCOPY N/A 2015    Procedure: COLONOSCOPY;  Surgeon: Murphy Jesus MD;  Location:  GI    COLONOSCOPY N/A 2019    Procedure: COLONOSCOPY;  Surgeon: Murphy Jesus MD;  Location:  GI    COLONOSCOPY N/A 2022    due 5 yrs - COLONOSCOPY, FLEXIBLE, WITH POLYPECTOMY  USING COLD SNARE;  Surgeon: Neha Rosen MD;  Location:  GI    COMBINED CYSTOSCOPY, RETROGRADES, URETEROSCOPY, LASER HOLMIUM LITHOTRIPSY URETER(S), INSERT STENT N/A 2018    Procedure: COMBINED CYSTOSCOPY, RETROGRADES, URETEROSCOPY, LASER HOLMIUM LITHOTRIPSY URETER(S), INSERT STENT;  Cystoscopy, Catheter Exchange under Anesthesia;  Surgeon: Zaria Cain MD;  Location:  OR    CYSTOSCOPY, DILATE URETHRA, COMBINED N/A 2022    Procedure: CYSTOSCOPY, WITH URETHRAL DILATION WITH FULGERATION OF BLADDER WALL;  Surgeon: Mike Chan MD;  Location[place] [facility]    DAVINCI PROSTATECTOMY N/A 2017    Procedure: DAVINCI PROSTATECTOMY;  Robotic Assisted Radical Prostatectomy and Pelvic Lymph Node Dissection ;  Surgeon: Paulo Agarwal MD;  Location:  OR    ESOPHAGOSCOPY, GASTROSCOPY,  DUODENOSCOPY (EGD), COMBINED N/A 2016    Procedure: COMBINED ESOPHAGOSCOPY, GASTROSCOPY, DUODENOSCOPY (EGD), BIOPSY SINGLE OR MULTIPLE;  Surgeon: Murphy Jesus MD;  Location: [facility] GI    LAPAROSCOPIC WEDGE RESECTION LUNG Right 2017    Procedure: LAPAROSCOPIC WEDGE RESECTION LUNG;  Surgeon: Maria A Desai MD;  Location: [facility] OR    LASER HOLMIUM LITHOTRIPSY URETER(S), INSERT STENT, COMBINED  2012    Procedure: COMBINED CYSTOSCOPY, URETEROSCOPY, LASER HOLMIUM LITHOTRIPSY URETER(S), INSERT STENT;  Cystoscopy, Right Ureteroscopy, Stone Extraction, Holmium Laser, Double J Stent Placement;  Surgeon: Nicolás Blackwell MD;  Location: [facility] OR      No Known Allergies   Social History     Tobacco Use    Smoking status: Former     Packs/day: 2.00     Years: 20.00     Additional pack years: 0.00     Total pack years: 40.00     Types: Cigarettes     Quit date: 1985     Years since quittin.2     Passive exposure: Past    Smokeless tobacco: Never   Substance Use Topics    Alcohol use: Yes     Alcohol/week: 0.0 - 2.0 standard drinks of alcohol     Comment: had 2 beers 2 weeks ago      Wt Readings from Last 1 Encounters:   24 79.8 kg (176 lb)        Anesthesia Evaluation   Pt has had prior anesthetic. Type: MAC and General.        ROS/MED HX  ENT/Pulmonary: Comment: Hx of retinal detachment in L eye    (+) sleep apnea,                                       Neurologic: Comment: Cavernous hemangioma      Cardiovascular:     (+)  hypertension- -   -  - -                                 Previous cardiac testing   Echo: Date: 3/16/23 Results:  1. The left ventricle is normal in size. There is normal left ventricular wall  thickness. Left ventricular systolic function is low normal. The visual  ejection fraction is 50-55%. Diastolic Doppler findings (E/E' ratio and/or  other parameters) suggest left ventricular filling pressures are normal.  Septal motion is consistent with conduction abnormality.  2. The  right ventricle is normal size. The right ventricular systolic function  is normal.  3. Trace to mild mitral and tricuspid regurgitation.  4. No pericardial effusion.  Stress Test:  Date: 8/16/19 Results:  This was a normal stress echocardiogram with no evidence of stress-induced  ischemia.  The patient exhibited chest pain during exercise.  ECG Reviewed:  Date: 12/22/23 Results:  Sinus rhythm   Nonspecific T wave abnormality   Abnormal ECG   When compared with ECG of 21-DEC-2023 13:01,   No significant change was found   Cath:  Date: Results:      METS/Exercise Tolerance:     Hematologic:  - neg hematologic  ROS     Musculoskeletal:       GI/Hepatic:     (+) GERD, Asymptomatic on medication,           liver disease (Alcoholic liver disease, fatty liver),       Renal/Genitourinary: Comment: Post procedural urethral stricture  Hematuria  Mixed incontinence, urge and stress.      Endo:  - neg endo ROS     Psychiatric/Substance Use:     (+) psychiatric history anxiety and depression       Infectious Disease:  - neg infectious disease ROS     Malignancy: Comment: Hx of prostate cancer, s/p prostatectomy in 2017  Hx of lung neoplasm, s/p RUL right wedge resection in 2017      Other:      (+)  , H/O Chronic Pain (bilateral low back),            OUTSIDE LABS:  CBC:   Lab Results   Component Value Date    WBC 5.8 02/16/2024    WBC 7.9 02/10/2024    HGB 13.6 02/16/2024    HGB 15.0 02/10/2024    HCT 40.9 02/16/2024    HCT 44.6 02/10/2024     02/16/2024     02/10/2024     BMP:   Lab Results   Component Value Date     02/16/2024     02/10/2024    POTASSIUM 4.1 02/16/2024    POTASSIUM 4.2 02/10/2024    CHLORIDE 104 02/16/2024    CHLORIDE 102 02/10/2024    CO2 24 02/16/2024    CO2 22 02/10/2024    BUN 17.1 02/16/2024    BUN 18.8 02/10/2024    CR 0.95 02/16/2024    CR 0.85 02/10/2024    GLC 86 02/16/2024     (H) 02/10/2024     COAGS:   Lab Results   Component Value Date    PTT 28 10/28/2005    INR  0.97 10/20/2020     POC:   Lab Results   Component Value Date    BGM 94 12/03/2017     HEPATIC:   Lab Results   Component Value Date    ALBUMIN 4.9 12/22/2023    PROTTOTAL 7.1 12/22/2023    ALT 23 12/22/2023    AST 23 12/22/2023    ALKPHOS 48 12/22/2023    BILITOTAL 0.4 12/22/2023     OTHER:   Lab Results   Component Value Date    A1C 5.3 09/23/2021    JOANNE 9.2 02/16/2024    PHOS 3.2 03/14/2017    MAG 1.8 12/22/2023    LIPASE 48 12/22/2023    TSH 2.41 04/18/2023    CRP 3.8 01/14/2020    SED 11 02/06/2023       Anesthesia Plan    ASA Status:  3    NPO Status:  NPO Appropriate    Anesthesia Type: MAC.     - Reason for MAC: straight local not clinically adequate   Induction: Intravenous.   Maintenance: TIVA.   Techniques and Equipment:     - Lines/Monitors: BIS     Consents           - Patient is DNR/DNI Status: No          Postoperative Care    Pain management: IV analgesics, Oral pain medications, Multi-modal analgesia.   PONV prophylaxis: Ondansetron (or other 5HT-3), Dexamethasone or Solumedrol     Comments:               Miguelangel Meyers MD    I have reviewed the pertinent notes and labs in the chart from the past 30 days and (re)examined the patient.  Any updates or changes from those notes are reflected in this note.

## 2024-03-18 ENCOUNTER — ANESTHESIA (OUTPATIENT)
Dept: SURGERY | Facility: CLINIC | Age: 74
End: 2024-03-18
Payer: COMMERCIAL

## 2024-03-18 ENCOUNTER — HOSPITAL ENCOUNTER (OUTPATIENT)
Facility: CLINIC | Age: 74
Discharge: HOME | End: 2024-03-18
Attending: UROLOGY | Admitting: UROLOGY
Payer: COMMERCIAL

## 2024-03-18 ENCOUNTER — APPOINTMENT (OUTPATIENT)
Dept: GENERAL RADIOLOGY | Facility: CLINIC | Age: 74
End: 2024-03-18
Attending: UROLOGY
Payer: COMMERCIAL

## 2024-03-18 VITALS
DIASTOLIC BLOOD PRESSURE: 83 MMHG | RESPIRATION RATE: 18 BRPM | HEIGHT: 68 IN | SYSTOLIC BLOOD PRESSURE: 130 MMHG | OXYGEN SATURATION: 97 % | WEIGHT: 179.68 LBS | HEART RATE: 61 BPM | TEMPERATURE: 97.5 F | BODY MASS INDEX: 27.23 KG/M2

## 2024-03-18 PROCEDURE — 999N000179 XR SURGERY CARM FLUORO LESS THAN 5 MIN W STILLS: Mod: TC

## 2024-03-18 PROCEDURE — 258N000003 HC RX IP 258 OP 636

## 2024-03-18 PROCEDURE — 360N000082 HC SURGERY LEVEL 2 W/ FLUORO, PER MIN: Performed by: UROLOGY

## 2024-03-18 PROCEDURE — 52284 CYSTO RX BALO CATH URTL STRX: CPT | Mod: GC | Performed by: UROLOGY

## 2024-03-18 PROCEDURE — 999N000141 HC STATISTIC PRE-PROCEDURE NURSING ASSESSMENT: Performed by: UROLOGY

## 2024-03-18 PROCEDURE — 250N000011 HC RX IP 250 OP 636: Performed by: UROLOGY

## 2024-03-18 PROCEDURE — 52284 CYSTO RX BALO CATH URTL STRX: CPT | Performed by: STUDENT IN AN ORGANIZED HEALTH CARE EDUCATION/TRAINING PROGRAM

## 2024-03-18 PROCEDURE — 250N000011 HC RX IP 250 OP 636

## 2024-03-18 PROCEDURE — C1726 CATH, BAL DIL, NON-VASCULAR: HCPCS | Performed by: UROLOGY

## 2024-03-18 PROCEDURE — 272N000001 HC OR GENERAL SUPPLY STERILE: Performed by: UROLOGY

## 2024-03-18 PROCEDURE — 255N000002 HC RX 255 OP 636: Performed by: UROLOGY

## 2024-03-18 PROCEDURE — 370N000017 HC ANESTHESIA TECHNICAL FEE, PER MIN: Performed by: UROLOGY

## 2024-03-18 PROCEDURE — 74450 X-RAY URETHRA/BLADDER: CPT | Mod: 26 | Performed by: UROLOGY

## 2024-03-18 PROCEDURE — 250N000025 HC SEVOFLURANE, PER MIN: Performed by: UROLOGY

## 2024-03-18 PROCEDURE — 99100 ANES PT EXTEME AGE<1 YR&>70: CPT | Performed by: STUDENT IN AN ORGANIZED HEALTH CARE EDUCATION/TRAINING PROGRAM

## 2024-03-18 PROCEDURE — 710N000012 HC RECOVERY PHASE 2, PER MINUTE: Performed by: UROLOGY

## 2024-03-18 PROCEDURE — 250N000013 HC RX MED GY IP 250 OP 250 PS 637

## 2024-03-18 PROCEDURE — C1758 CATHETER, URETERAL: HCPCS | Performed by: UROLOGY

## 2024-03-18 PROCEDURE — 258N000003 HC RX IP 258 OP 636: Performed by: UROLOGY

## 2024-03-18 PROCEDURE — C1769 GUIDE WIRE: HCPCS | Performed by: UROLOGY

## 2024-03-18 PROCEDURE — 710N000010 HC RECOVERY PHASE 1, LEVEL 2, PER MIN: Performed by: UROLOGY

## 2024-03-18 PROCEDURE — 250N000011 HC RX IP 250 OP 636: Performed by: STUDENT IN AN ORGANIZED HEALTH CARE EDUCATION/TRAINING PROGRAM

## 2024-03-18 RX ORDER — SODIUM CHLORIDE, SODIUM LACTATE, POTASSIUM CHLORIDE, CALCIUM CHLORIDE 600; 310; 30; 20 MG/100ML; MG/100ML; MG/100ML; MG/100ML
INJECTION, SOLUTION INTRAVENOUS CONTINUOUS
Status: DISCONTINUED | OUTPATIENT
Start: 2024-03-18 | End: 2024-03-18 | Stop reason: HOSPADM

## 2024-03-18 RX ORDER — ONDANSETRON 2 MG/ML
4 INJECTION INTRAMUSCULAR; INTRAVENOUS EVERY 30 MIN PRN
Status: DISCONTINUED | OUTPATIENT
Start: 2024-03-18 | End: 2024-03-18 | Stop reason: HOSPADM

## 2024-03-18 RX ORDER — DEXAMETHASONE SODIUM PHOSPHATE 4 MG/ML
INJECTION, SOLUTION INTRA-ARTICULAR; INTRALESIONAL; INTRAMUSCULAR; INTRAVENOUS; SOFT TISSUE PRN
Status: DISCONTINUED | OUTPATIENT
Start: 2024-03-18 | End: 2024-03-18

## 2024-03-18 RX ORDER — PROPOFOL 10 MG/ML
INJECTION, EMULSION INTRAVENOUS PRN
Status: DISCONTINUED | OUTPATIENT
Start: 2024-03-18 | End: 2024-03-18

## 2024-03-18 RX ORDER — CEFAZOLIN SODIUM/WATER 2 G/20 ML
2 SYRINGE (ML) INTRAVENOUS
Status: COMPLETED | OUTPATIENT
Start: 2024-03-18 | End: 2024-03-18

## 2024-03-18 RX ORDER — ONDANSETRON 4 MG/1
4 TABLET, ORALLY DISINTEGRATING ORAL EVERY 30 MIN PRN
Status: DISCONTINUED | OUTPATIENT
Start: 2024-03-18 | End: 2024-03-18 | Stop reason: HOSPADM

## 2024-03-18 RX ORDER — KETOROLAC TROMETHAMINE 30 MG/ML
15 INJECTION, SOLUTION INTRAMUSCULAR; INTRAVENOUS ONCE
Status: COMPLETED | OUTPATIENT
Start: 2024-03-18 | End: 2024-03-18

## 2024-03-18 RX ORDER — HYDROMORPHONE HCL IN WATER/PF 6 MG/30 ML
0.2 PATIENT CONTROLLED ANALGESIA SYRINGE INTRAVENOUS EVERY 5 MIN PRN
Status: DISCONTINUED | OUTPATIENT
Start: 2024-03-18 | End: 2024-03-18 | Stop reason: HOSPADM

## 2024-03-18 RX ORDER — NALOXONE HYDROCHLORIDE 0.4 MG/ML
0.1 INJECTION, SOLUTION INTRAMUSCULAR; INTRAVENOUS; SUBCUTANEOUS
Status: DISCONTINUED | OUTPATIENT
Start: 2024-03-18 | End: 2024-03-18 | Stop reason: HOSPADM

## 2024-03-18 RX ORDER — FENTANYL CITRATE 50 UG/ML
25 INJECTION, SOLUTION INTRAMUSCULAR; INTRAVENOUS EVERY 5 MIN PRN
Status: DISCONTINUED | OUTPATIENT
Start: 2024-03-18 | End: 2024-03-18 | Stop reason: HOSPADM

## 2024-03-18 RX ORDER — ONDANSETRON 2 MG/ML
INJECTION INTRAMUSCULAR; INTRAVENOUS PRN
Status: DISCONTINUED | OUTPATIENT
Start: 2024-03-18 | End: 2024-03-18

## 2024-03-18 RX ORDER — CEFAZOLIN SODIUM/WATER 2 G/20 ML
2 SYRINGE (ML) INTRAVENOUS SEE ADMIN INSTRUCTIONS
Status: DISCONTINUED | OUTPATIENT
Start: 2024-03-18 | End: 2024-03-18 | Stop reason: HOSPADM

## 2024-03-18 RX ORDER — ACETAMINOPHEN 325 MG/1
975 TABLET ORAL ONCE
Status: COMPLETED | OUTPATIENT
Start: 2024-03-18 | End: 2024-03-18

## 2024-03-18 RX ORDER — OXYCODONE HYDROCHLORIDE 10 MG/1
10 TABLET ORAL
Status: DISCONTINUED | OUTPATIENT
Start: 2024-03-18 | End: 2024-03-18 | Stop reason: HOSPADM

## 2024-03-18 RX ORDER — PROPOFOL 10 MG/ML
INJECTION, EMULSION INTRAVENOUS CONTINUOUS PRN
Status: DISCONTINUED | OUTPATIENT
Start: 2024-03-18 | End: 2024-03-18

## 2024-03-18 RX ORDER — OXYCODONE HYDROCHLORIDE 5 MG/1
5 TABLET ORAL
Status: DISCONTINUED | OUTPATIENT
Start: 2024-03-18 | End: 2024-03-18 | Stop reason: HOSPADM

## 2024-03-18 RX ORDER — HYDROMORPHONE HCL IN WATER/PF 6 MG/30 ML
0.4 PATIENT CONTROLLED ANALGESIA SYRINGE INTRAVENOUS EVERY 5 MIN PRN
Status: DISCONTINUED | OUTPATIENT
Start: 2024-03-18 | End: 2024-03-18 | Stop reason: HOSPADM

## 2024-03-18 RX ORDER — FENTANYL CITRATE 50 UG/ML
INJECTION, SOLUTION INTRAMUSCULAR; INTRAVENOUS PRN
Status: DISCONTINUED | OUTPATIENT
Start: 2024-03-18 | End: 2024-03-18

## 2024-03-18 RX ORDER — FENTANYL CITRATE 50 UG/ML
50 INJECTION, SOLUTION INTRAMUSCULAR; INTRAVENOUS EVERY 5 MIN PRN
Status: DISCONTINUED | OUTPATIENT
Start: 2024-03-18 | End: 2024-03-18 | Stop reason: HOSPADM

## 2024-03-18 RX ORDER — LIDOCAINE 40 MG/G
CREAM TOPICAL
Status: DISCONTINUED | OUTPATIENT
Start: 2024-03-18 | End: 2024-03-18 | Stop reason: HOSPADM

## 2024-03-18 RX ORDER — IOPAMIDOL 510 MG/ML
INJECTION, SOLUTION INTRAVASCULAR PRN
Status: DISCONTINUED | OUTPATIENT
Start: 2024-03-18 | End: 2024-03-18 | Stop reason: HOSPADM

## 2024-03-18 RX ADMIN — FENTANYL CITRATE 25 MCG: 50 INJECTION INTRAMUSCULAR; INTRAVENOUS at 14:31

## 2024-03-18 RX ADMIN — DEXAMETHASONE SODIUM PHOSPHATE 4 MG: 4 INJECTION, SOLUTION INTRA-ARTICULAR; INTRALESIONAL; INTRAMUSCULAR; INTRAVENOUS; SOFT TISSUE at 14:17

## 2024-03-18 RX ADMIN — PROPOFOL 10 MG: 10 INJECTION, EMULSION INTRAVENOUS at 13:59

## 2024-03-18 RX ADMIN — SODIUM CHLORIDE, POTASSIUM CHLORIDE, SODIUM LACTATE AND CALCIUM CHLORIDE: 600; 310; 30; 20 INJECTION, SOLUTION INTRAVENOUS at 13:51

## 2024-03-18 RX ADMIN — MIDAZOLAM 1 MG: 1 INJECTION INTRAMUSCULAR; INTRAVENOUS at 13:49

## 2024-03-18 RX ADMIN — PHENYLEPHRINE HYDROCHLORIDE 50 MCG: 10 INJECTION INTRAVENOUS at 14:23

## 2024-03-18 RX ADMIN — PROPOFOL 150 MCG/KG/MIN: 10 INJECTION, EMULSION INTRAVENOUS at 13:56

## 2024-03-18 RX ADMIN — ACETAMINOPHEN 975 MG: 325 TABLET, FILM COATED ORAL at 13:02

## 2024-03-18 RX ADMIN — KETOROLAC TROMETHAMINE 15 MG: 30 INJECTION, SOLUTION INTRAMUSCULAR at 16:26

## 2024-03-18 RX ADMIN — ONDANSETRON 4 MG: 2 INJECTION INTRAMUSCULAR; INTRAVENOUS at 14:34

## 2024-03-18 RX ADMIN — Medication 2 G: at 14:06

## 2024-03-18 RX ADMIN — PROPOFOL 50 MG: 10 INJECTION, EMULSION INTRAVENOUS at 14:13

## 2024-03-18 RX ADMIN — FENTANYL CITRATE 50 MCG: 50 INJECTION INTRAMUSCULAR; INTRAVENOUS at 14:18

## 2024-03-18 RX ADMIN — GENTAMICIN SULFATE 240 MG: 40 INJECTION, SOLUTION INTRAMUSCULAR; INTRAVENOUS at 14:06

## 2024-03-18 ASSESSMENT — ACTIVITIES OF DAILY LIVING (ADL)
ADLS_ACUITY_SCORE: 33

## 2024-03-18 NOTE — ANESTHESIA POSTPROCEDURE EVALUATION
Patient: Juwan Latham    Procedure: Procedure(s):  CYSTOSCOPY, WITH OPTILUME URETHRAL DILATION       Anesthesia Type:  MAC    Note:  Disposition: Outpatient   Postop Pain Control: Uneventful            Sign Out: Well controlled pain   PONV: No   Neuro/Psych: Uneventful            Sign Out: Acceptable/Baseline neuro status   Airway/Respiratory: Uneventful            Sign Out: Acceptable/Baseline resp. status   CV/Hemodynamics: Uneventful            Sign Out: Acceptable CV status; No obvious hypovolemia; No obvious fluid overload   Other NRE: NONE   DID A NON-ROUTINE EVENT OCCUR? No           Last vitals:  Vitals Value Taken Time   /68 03/18/24 1545   Temp 36.5  C (97.7  F) 03/18/24 1500   Pulse 58 03/18/24 1559   Resp 19 03/18/24 1559   SpO2 98 % 03/18/24 1559   Vitals shown include unfiled device data.    Electronically Signed By: Loyda Wall MD  March 18, 2024  4:00 PM

## 2024-03-18 NOTE — DISCHARGE INSTRUCTIONS
Activity  - No strenuous exercise for 1 week.  - No lifting, pushing, pulling more than 10 pounds for 1 week  - Do not strain with bowel movements.  - Do not drive until you can press the brake pedal quickly and fully without pain.     Diet  You may resume your regular post-procedure diet  Take it slow, eating small meals frequently    Catheter Care:  You will be discharged home with a urinary catheter in place. Your nurse will instruct you on how to care for this at home.  Empty the bag into the toilet several times daily   Keep the catheter secured to your thigh using the securement device. Ensure the tubing doesn t become snagged or tugged.  You may shower normally and allow soapy water to run over the catheter  Sometimes people notice build-up at the insertion site of the catheter to the urethra. You may gently wipe this away with a towelette or washcloth.     Common catheter issues:  Urine leaking around the catheter. This is a common finding in patients with a catheter and is usually no cause for alarm. Typically, it is due to bladder spasms. Bladder spasms occur because your bladder is not used to a forgein object and tries to  squeeze  this out, releasing urine around the catheter. Sometimes patients can experience abdominal cramping. Typically, bladder spasms resolve after a few days.   Occasionally, urine leaking from around that catheter can be due to a blockage of the catheter- especially if your urine appears bloody. If the urine does not appear to be draining through the tube and your bladder feels full, please contact our department.  The feeling of needing to urinate. Some patients feel the persistent need to urinate with the catheter in place. As long as the catheter is still draining, this is likely due to a  forgein object sensation  in your bladder. Even though your bladder is empty of urine, the catheter balloon gives a false sensation that your bladder is full. This typically subsides after  "24-48 hours but there are medications that may be able to help with this if the feeling is too bothersome.   Catheter sliding in and out. There is a balloon filled with water inside your bladder to prevent the catheter from slipping out. The tubing is quite long and may slide back and forth freely. If the catheter does slide completely out, save the catheter and call our department immediately.   You may use Vaseline at the tip of the penis to prevent chafing   If you need extra catheter supplies (leg bags, securement devices), call the clinic during business hours to arrange for this.     Remove your catheter at home on Wednesday. Use a 10cc syringe to deflate the balloon and then remove the catheter.       Follow-Up:  - Follow up with Urology on 06/19  - Call or return sooner than your regularly scheduled visit if you develop any of the following: fever (greater than 101.5), uncontrolled pain, uncontrolled nausea or vomiting, or inability to urinate.    Phone numbers:   - Monday through Friday 8am to 4:30pm: Call [phone] with questions, requests for medication refills, or to schedule or confirm an appointment.  - Nights or weekends: call the after hours emergency pager - [phone] and tell the  \"I would like to page the Urology Resident on call.\" Please note, due to prescribing laws, resident physicians are unable to prescribe narcotics after-hours. If you feel as though you will need a refill of a narcotic pain medication, you will need to call the clinic during business hours OR seek emergency care.  - For emergencies, call [phone]   "

## 2024-03-18 NOTE — OR NURSING
Dr. KAIT Wall at the bedside in the PACU to check on Juwan's pain in the back of his right calf. She thinks he may have strained it when he had to change from a MAC to general anesthesia intra-op. He was given Toradol, Dr. Chan was OK with this and he is aware of the discomfort he is having.

## 2024-03-18 NOTE — ANESTHESIA PROCEDURE NOTES
Airway    Staff -        Resident/Fellow: Miguelangel Meyers MD       Performed By: residentIndications and Patient Condition       Indications for airway management: justo-procedural and airway protection       Induction type:intravenous       Mask difficulty assessment: 0 - not attempted    Final Airway Details       Final airway type: supraglottic airway    Supraglottic Airway Details        Type: LMA    Post intubation assessment        Placement verified by: capnometry, equal breath sounds and chest rise        Number of attempts at approach: 1       Number of other approaches attempted: 0       Secured with: tape       Ease of procedure: easy       Dentition: Intact and Unchanged

## 2024-03-18 NOTE — OP NOTE
OPERATIVE REPORT  March 18, 2024      PRE-OPERATIVE DIAGNOSIS:   1. urethral stricture      POST-OPERATIVE DIAGNOSIS:   1.  urethral stricture     PROCEDURES PERFORMED:   1. Retrograde urethrogram  2.  CYSTOSCOPY, WITH URETHRAL DILATION WITH OPTILUME DRUG-COATED BALLOON     STAFF SURGEON: Mike Chan MD     RESIDENT SURGEON: Vitor Espinosa MD     ANESTHESIA: General  ESTIMATED BLOOD LOSS: <5 mL.   SPECIMEN: none    DRAINS/TUBES: 16 Fr  catheter     SIGNIFICANT FINDINGS:  1. RUG demonstrated Proximal 2cm stricture  2. Cystoscopic findings demontrated proximal stricture near sphincter to bladder neck  3. Uromax balloon dilation in the bulbar urethra followed by 30fr 3cm Optilume balloon dilation for 5 minutes    OPERATIVE INDICATIONS:   Juwan Latham is a(n) 73 year old year old male with a history of a proximal urethral stricture with prior failed dilation. Has Hx of RALP + XRT for prostate cancer. Stricture is in membranous urethra. The patient was counseled on the alternatives, risks, and benefits and elected to proceed with the above stated procedure.    DESCRIPTION OF PROCEDURE:   After verification of informed consent was obtained, the patient was brought to the operating room, and moved to the operating table. After adequate anesthesia was induced, the patient was repositioned in the supine position and prepped and draped in the usual sterile fashion. A formal timeout was performed to confirm the correct patient, procedure and operative site.      A retrograde urethrogram was performed, which demonstrated stricture in the membranous  urethra. A flexible scope was introduced just distal to the stricture and through this a sensor wire was passed into the bladder. The scope was removed and replaced alongside the wire. A Uromax balloon dilation of the above sizing was inserted into the the urethra over the wire and visualized into the correct location over the stricture. This was dilated to 20 nick for about 1  minute for a pre-dilation for the drug delivery. We then performed cystoscopy of the bladder which was unremarkable without tumors or abnormality. The drug coated balloon (optilume 30F 3cm) was advanced over the wire under fluoroscopy and direct vision until the balloon lined up with the stricture. The balloon was inflated to 12 nick and position was confirmed on fluoro. This was allowed to sit for 5 minutes. The balloon was then deflated and the balloon removed. From the distal end of the treated area we could see the entire stricture had been dilated. The scope was removed and a 16F catheter advanced over the wire into the bladder. The balloon was filled with 10mls of sterile water and put to gravity drainage.      The procedure was then concluded. The patient was transferred to the postanesthesia care unit in stable condition and tolerated the procedure well.     POST-OP PLAN:  Patient to remove his own catheter in two days  Follow up for office cystoscopy in three months      Vitor Espinosa MD  Urology PGY-4    As attending surgeon, I, Mike Chan MD, was scrubbed and present for the entire procedure.

## 2024-03-18 NOTE — ANESTHESIA CARE TRANSFER NOTE
Patient: Juwan Latham    Procedure: Procedure(s):  CYSTOSCOPY, WITH OPTILUME URETHRAL DILATION       Diagnosis: Gross hematuria [R31.0]  Postprocedural male urethral stricture [N99.114]  Diagnosis Additional Information: No value filed.    Anesthesia Type:   MAC     Note:    Oropharynx: oropharynx clear of all foreign objects and spontaneously breathing  Level of Consciousness: awake and drowsy  Oxygen Supplementation: face mask  Level of Supplemental Oxygen (L/min / FiO2): 5  Independent Airway: airway patency satisfactory and stable  Dentition: dentition unchanged  Vital Signs Stable: post-procedure vital signs reviewed and stable  Report to RN Given: handoff report given  Patient transferred to: PACU  Comments: Started as MAC case. Got too apneic and started coughing. Difficult to bag mask. Converted to general with LMA without issues. LMA removed at end of case, patient breathing spontaneously and oxygenating and ventilating appropriately.  Handoff Report: Identifed the Patient, Identified the Reponsible Provider, Reviewed the pertinent medical history, Discussed the surgical course, Reviewed Intra-OP anesthesia mangement and issues during anesthesia, Set expectations for post-procedure period and Allowed opportunity for questions and acknowledgement of understanding      Vitals:  Vitals Value Taken Time   /74 03/18/24 1450   Temp     Pulse 65 03/18/24 1455   Resp 13 03/18/24 1455   SpO2 98 % 03/18/24 1455   Vitals shown include unfiled device data.    Electronically Signed By: Miguelangel Meyers MD  March 18, 2024  2:55 PM

## 2024-03-21 ENCOUNTER — TELEPHONE (OUTPATIENT)
Dept: UROLOGY | Facility: CLINIC | Age: 74
End: 2024-03-21
Payer: COMMERCIAL

## 2024-03-21 NOTE — TELEPHONE ENCOUNTER
[facility] Call Center    Phone Message    May a detailed message be left on voicemail: yes     Reason for Call: Other: Paitent calls stating that he has questions and would like to speak with a nurse. Patient mentioned some bleeding but is not sure how serious to take it.      Action Taken: Message routed to:  [facility] Urology    Travel Screening: Not Applicable

## 2024-03-21 NOTE — TELEPHONE ENCOUNTER
Pt reports he removed the catheter yesterday 3/20/24. He reports he did deflate the balloon and the catheter came out without difficulty or pain.    Pt states the blood he sees is red in color and is only visible at the start of urination.    Pt denies pain, fever, chills.    Pt states he is not constipated, and reports he has been more active since taking the catheter out.    Pt notified to increase fluids, to contact the resident on call if there are any changes or he has not heard from our team.    Message routed to NEGAR Cabrales CMA  03/21/24  3:36 PM

## 2024-03-25 NOTE — TELEPHONE ENCOUNTER
Follow up call made. Pt reports he has not seen blood in his urine since we spoke.    Parris Cabrales CMA  03/25/24  10:30 AM

## 2024-03-28 NOTE — NURSING NOTE
"Oncology Rooming Note    March 19, 2019 3:23 PM   Juwan Latham is a 68 year old male who presents for:    Chief Complaint   Patient presents with     Oncology Clinic Visit     Return Lung Ca     Initial Vitals: /83   Pulse 65   Temp 98.2  F (36.8  C) (Oral)   Resp 16   Ht 1.702 m (5' 7\")   Wt 92.5 kg (204 lb)   SpO2 98%   BMI 31.95 kg/m   Estimated body mass index is 31.95 kg/m  as calculated from the following:    Height as of this encounter: 1.702 m (5' 7\").    Weight as of this encounter: 92.5 kg (204 lb). Body surface area is 2.09 meters squared.  No Pain (0) Comment: Data Unavailable   No LMP for male patient.  Allergies reviewed: Yes  Medications reviewed: Yes    Medications: Medication refills not needed today.  Pharmacy name entered into [employer]:    [id number] [place], [place] - [address] [facility] [address] [facility]    Clinical concerns: CT scan results      Eloina Barajas CMA              "
Initial (On Arrival)

## 2024-04-11 ENCOUNTER — TRANSFERRED RECORDS (OUTPATIENT)
Dept: HEALTH INFORMATION MANAGEMENT | Facility: CLINIC | Age: 74
End: 2024-04-11

## 2024-04-11 ENCOUNTER — PATIENT OUTREACH (OUTPATIENT)
Dept: UROLOGY | Facility: CLINIC | Age: 74
End: 2024-04-11
Payer: COMMERCIAL

## 2024-04-11 NOTE — PROGRESS NOTES
"Call placed to patient to assess his symptoms. He reports an occasional \"cold flush feeling in my entire body\" for the last few days and fatigue for 2 weeks. He denies: fever, confusion, abdominal/back/flank pain, hematuria, urgency. He has incontinence and wears pads and does PFPT, but he says \"it doesn't seem like it's helping.\"  He also reports frequency, but says, \"that's not new\". I instructed him to monitor for any of the above symptoms and be seen in Urgent Care or ED if develops any of them.  He has an appointment with Diego Latham PA-C on 5/15/24.    Thank you,  Theodora Maldonado RN, BSN Urology Triage    "

## 2024-04-17 ENCOUNTER — HOSPITAL ENCOUNTER (OUTPATIENT)
Dept: ULTRASOUND IMAGING | Facility: CLINIC | Age: 74
Discharge: HOME | End: 2024-04-17
Attending: INTERNAL MEDICINE | Admitting: INTERNAL MEDICINE
Payer: COMMERCIAL

## 2024-04-17 DIAGNOSIS — K76.9 CHRONIC LIVER DISEASE: ICD-10-CM

## 2024-04-17 PROCEDURE — 76705 ECHO EXAM OF ABDOMEN: CPT

## 2024-04-19 ENCOUNTER — TRANSFERRED RECORDS (OUTPATIENT)
Dept: HEALTH INFORMATION MANAGEMENT | Facility: CLINIC | Age: 74
End: 2024-04-19
Payer: COMMERCIAL

## 2024-04-22 ENCOUNTER — TRANSFERRED RECORDS (OUTPATIENT)
Dept: HEALTH INFORMATION MANAGEMENT | Facility: CLINIC | Age: 74
End: 2024-04-22
Payer: COMMERCIAL

## 2024-04-22 LAB
ALT SERPL-CCNC: 20 IU/L (ref 0–44)
AST SERPL-CCNC: 22 IU/L (ref 0–40)
CREATININE (EXTERNAL): 0.71 MG/DL (ref 0.76–1.27)
GFR ESTIMATED (EXTERNAL): 97 ML/MIN/1.73M2
GLUCOSE (EXTERNAL): 102 MG/DL (ref 70–99)
POTASSIUM (EXTERNAL): 4.7 MMOL/L (ref 3.5–5.2)
TSH SERPL-ACNC: 1.84 UIU/ML (ref 0.45–4.5)

## 2024-04-24 ENCOUNTER — OFFICE VISIT (OUTPATIENT)
Dept: FAMILY MEDICINE | Facility: CLINIC | Age: 74
End: 2024-04-24
Attending: FAMILY MEDICINE
Payer: COMMERCIAL

## 2024-04-24 VITALS
SYSTOLIC BLOOD PRESSURE: 130 MMHG | HEIGHT: 68 IN | OXYGEN SATURATION: 97 % | WEIGHT: 176 LBS | DIASTOLIC BLOOD PRESSURE: 60 MMHG | TEMPERATURE: 98 F | BODY MASS INDEX: 26.67 KG/M2 | RESPIRATION RATE: 16 BRPM | HEART RATE: 63 BPM

## 2024-04-24 DIAGNOSIS — C61 PROSTATE CANCER (H): ICD-10-CM

## 2024-04-24 DIAGNOSIS — F33.0 MAJOR DEPRESSIVE DISORDER, RECURRENT EPISODE, MILD (H): ICD-10-CM

## 2024-04-24 DIAGNOSIS — C34.11 MALIGNANT NEOPLASM OF UPPER LOBE OF RIGHT LUNG (H): ICD-10-CM

## 2024-04-24 DIAGNOSIS — Z00.00 ENCOUNTER FOR MEDICARE ANNUAL WELLNESS EXAM: Primary | ICD-10-CM

## 2024-04-24 DIAGNOSIS — R31.0 GROSS HEMATURIA: ICD-10-CM

## 2024-04-24 DIAGNOSIS — F41.9 ANXIETY: ICD-10-CM

## 2024-04-24 DIAGNOSIS — D18.02 CAVERNOUS HEMANGIOMA OF BRAIN (H): ICD-10-CM

## 2024-04-24 PROBLEM — C80.1 MALIGNANT NEOPLASM (H): Status: RESOLVED | Noted: 2021-12-17 | Resolved: 2024-04-24

## 2024-04-24 PROBLEM — Z85.118 HX OF CANCER OF LUNG: Status: RESOLVED | Noted: 2017-03-01 | Resolved: 2024-04-24

## 2024-04-24 PROCEDURE — 82043 UR ALBUMIN QUANTITATIVE: CPT | Performed by: FAMILY MEDICINE

## 2024-04-24 PROCEDURE — 81001 URINALYSIS AUTO W/SCOPE: CPT | Performed by: FAMILY MEDICINE

## 2024-04-24 PROCEDURE — 36415 COLL VENOUS BLD VENIPUNCTURE: CPT | Performed by: FAMILY MEDICINE

## 2024-04-24 PROCEDURE — 82570 ASSAY OF URINE CREATININE: CPT | Performed by: FAMILY MEDICINE

## 2024-04-24 PROCEDURE — 99214 OFFICE O/P EST MOD 30 MIN: CPT | Mod: 25 | Performed by: FAMILY MEDICINE

## 2024-04-24 PROCEDURE — G0439 PPPS, SUBSEQ VISIT: HCPCS | Performed by: FAMILY MEDICINE

## 2024-04-24 PROCEDURE — 84153 ASSAY OF PSA TOTAL: CPT | Performed by: FAMILY MEDICINE

## 2024-04-24 SDOH — HEALTH STABILITY: PHYSICAL HEALTH: ON AVERAGE, HOW MANY DAYS PER WEEK DO YOU ENGAGE IN MODERATE TO STRENUOUS EXERCISE (LIKE A BRISK WALK)?: 4 DAYS

## 2024-04-24 SDOH — HEALTH STABILITY: PHYSICAL HEALTH: ON AVERAGE, HOW MANY MINUTES DO YOU ENGAGE IN EXERCISE AT THIS LEVEL?: 20 MIN

## 2024-04-24 ASSESSMENT — PATIENT HEALTH QUESTIONNAIRE - PHQ9
10. IF YOU CHECKED OFF ANY PROBLEMS, HOW DIFFICULT HAVE THESE PROBLEMS MADE IT FOR YOU TO DO YOUR WORK, TAKE CARE OF THINGS AT HOME, OR GET ALONG WITH OTHER PEOPLE: SOMEWHAT DIFFICULT
SUM OF ALL RESPONSES TO PHQ QUESTIONS 1-9: 5
SUM OF ALL RESPONSES TO PHQ QUESTIONS 1-9: 5

## 2024-04-24 ASSESSMENT — SOCIAL DETERMINANTS OF HEALTH (SDOH): HOW OFTEN DO YOU GET TOGETHER WITH FRIENDS OR RELATIVES?: NEVER

## 2024-04-24 NOTE — COMMUNITY RESOURCES LIST (ENGLISH)
April 24, 2024           YOUR PERSONALIZED LIST OF SERVICES & PROGRAMS               Bill Payment Assistance      [facility] [place] [facility] [facility]  [address] [place] [address] (Distance: 3.6 miles)  Phone: [phone]  Language: English  Fee: Free  Accessibility: Ada accessible      [employer] [facility] - Energy Assistance Program (EAP)  [address] [place] [address] (Distance: 8.9 miles)  Phone: [phone]) [phone]  Language: English, Romanian  Fee: Free      - Dislocated Worker/Adult [employer] Employment Program  Phone: (248) 416-5908  Email: raegan@[employer]  Website: https://[employer]/services/employment-services/dislocated-worker-program/  Language: English, Burkinan  Hours: Mon 8:00 AM - 4:30 PM Tue 8:00 AM - 4:30 PM Wed 8:00 AM - 4:30 PM Thu 8:00 AM - 4:30 PM Fri 8:00 AM - 4:30 PM  Fee: Free  Accessibility: Ada accessible               IMPORTANT NUMBERS & WEBSITES        Emergency Services  911  .   United Way  211 http://211unitedway.org  .   Poison Control  (442) 604-4512 http://[place]poison.org http://wis[place]poison.org  .     Suicide and Crisis Lifeline  988 http://988lifeline.org  .   Childhelp National Child Abuse Hotline  661.832.2423 http://Childhelphotline.org   .   National Sexual Assault Hotline  (475) 919-1279 (HOPE) http://Rainn.org   .     National Runaway Safeline  (242) 478-3626 (RUNAWAY) http://1800runaway.org  .   Pregnancy & Postpartum Support  Call/text 207-370-5884  [place]: http://ppsupport[place].org  [place]: http://IDMission.com/wi  .   Substance Abuse National Helpline ([facility])  840-036-HELP (9998) http://Findtreatment.gov   .                DISCLAIMER: These resources have been generated via the [employer]. [employer] does not endorse any service providers mentioned in this resource list. [employer] does not guarantee that the services mentioned in this resource list will be available to you or  will improve your health or wellness.    [facility]

## 2024-04-24 NOTE — PATIENT INSTRUCTIONS
Preventive Care Advice   This is general advice given by our system to help you stay healthy. However, your care team may have specific advice just for you. Please talk to your care team about your preventive care needs.  Nutrition  Eat 5 or more servings of fruits and vegetables each day.  Try wheat bread, brown rice and whole grain pasta (instead of white bread, rice, and pasta).  Get enough calcium and vitamin D. Check the label on foods and aim for 100% of the RDA (recommended daily allowance).  Lifestyle  Exercise at least 150 minutes each week   (30 minutes a day, 5 days a week).  Do muscle strengthening activities 2 days a week. These help control your weight and prevent disease.  No smoking.  Wear sunscreen to prevent skin cancer.  Have a dental exam and cleaning every 6 months.  Yearly exams  See your health care team every year to talk about:  Any changes in your health.  Any medicines your care team has prescribed.  Preventive care, family planning, and ways to prevent chronic diseases.  Shots (vaccines)   HPV shots (up to age 26), if you've never had them before.  Hepatitis B shots (up to age 59), if you've never had them before.  COVID-19 shot: Get this shot when it's due.  Flu shot: Get a flu shot every year.  Tetanus shot: Get a tetanus shot every 10 years.  Pneumococcal, hepatitis A, and RSV shots: Ask your care team if you need these based on your risk.  Shingles shot (for age 50 and up).  General health tests  Diabetes screening:  Starting at age 35, Get screened for diabetes at least every 3 years.  If you are younger than age 35, ask your care team if you should be screened for diabetes.  Cholesterol test: At age 39, start having a cholesterol test every 5 years, or more often if advised.  Bone density scan (DEXA): At age 50, ask your care team if you should have this scan for osteoporosis (brittle bones).  Hepatitis C: Get tested at least once in your life.  STIs (sexually transmitted  infections)  Before age 24: Ask your care team if you should be screened for STIs.  After age 24: Get screened for STIs if you're at risk. You are at risk for STIs (including HIV) if:  You are sexually active with more than one person.  You don't use condoms every time.  You or a partner was diagnosed with a sexually transmitted infection.  If you are at risk for HIV, ask about PrEP medicine to prevent HIV.  Get tested for HIV at least once in your life, whether you are at risk for HIV or not.  Cancer screening tests  Cervical cancer screening: If you have a cervix, begin getting regular cervical cancer screening tests at age 21. Most people who have regular screenings with normal results can stop after age 65. Talk about this with your provider.  Breast cancer scan (mammogram): If you've ever had breasts, begin having regular mammograms starting at age 40. This is a scan to check for breast cancer.  Colon cancer screening: It is important to start screening for colon cancer at age 45.  Have a colonoscopy test every 10 years (or more often if you're at risk) Or, ask your provider about stool tests like a FIT test every year or Cologuard test every 3 years.  To learn more about your testing options, visit: https://[email or web address]/[email or web address].  For help making a decision, visit: https://bit.ly/bn19226.  Prostate cancer screening test: If you have a prostate and are age 55 to 69, ask your provider if you would benefit from a yearly prostate cancer screening test.  Lung cancer screening: If you are a current or former smoker age 50 to 80, ask your care team if ongoing lung cancer screenings are right for you.  For informational purposes only. Not to replace the advice of your health care provider. Copyright   2023 [employer]. All rights reserved. Clinically reviewed by the [facility] Transitions Program. [employer] 111478 - REV 01/24.    Relationships for Good Health  Relationships are important for  our health and happiness. Social isolation, loneliness and lack of support are bad for your health. Studies show that loneliness can harm health and limit your life span as much as high blood pressure and smoking.   Take some time to reflect on your relationships. Then answer these questions:  Are there people in your life that cause you stress or drain your energy? What can you do to set limits?  ________________________________________________________________________________________________________________________________________________________________________________________________________________________________________________________________________________________________________________________________________________  Who do you enjoy spending time with? Who can you go to for support?  ________________________________________________________________________________________________________________________________________________________________________________________________________________________________________________________________________________________________________________________________________________  What can you do to improve your relationships with others?  __________________________________________________________________________________________________________________________________________________________________________________________________________________  ______________________________________________________________________________________________________________________________  What do you like most about your relationships with others?  ________________________________________________________________________________________________________________________________________________________________________________________________________________________________________________________________________________________________________________________________________________  My  goal: ______________________________________________________________________  I will ______________________________________________________________________________________________________________________________________________________________________________________________    For informational purposes only. Not to replace the advice of your health care provider. Copyright   2018 [facility]. All rights reserved. Clinically reviewed by [facility]ric Health  Team. [employer] [id number] - Rev 04/21.    Learning About Stress  What is stress?     Stress is your body's response to a hard situation. Your body can have a physical, emotional, or mental response. Stress is a fact of life for most people, and it affects everyone differently. What causes stress for you may not be stressful for someone else.  A lot of things can cause stress. You may feel stress when you go on a job interview, take a test, or run a race. This kind of short-term stress is normal and even useful. It can help you if you need to work hard or react quickly. For example, stress can help you finish an important job on time.  Long-term stress is caused by ongoing stressful situations or events. Examples of long-term stress include long-term health problems, ongoing problems at work, or conflicts in your family. Long-term stress can harm your health.  How does stress affect your health?  When you are stressed, your body responds as though you are in danger. It makes hormones that speed up your heart, make you breathe faster, and give you a burst of energy. This is called the fight-or-flight stress response. If the stress is over quickly, your body goes back to normal and no harm is done.  But if stress happens too often or lasts too long, it can have bad effects. Long-term stress can make you more likely to get sick, and it can make symptoms of some diseases worse. If you tense up when you are stressed, you may develop neck, shoulder, or low  back pain. Stress is linked to high blood pressure and heart disease.  Stress also harms your emotional health. It can make you fernandez, tense, or depressed. Your relationships may suffer, and you may not do well at work or school.  What can you do to manage stress?  You can try these things to help manage stress:   Do something active. Exercise or activity can help reduce stress. Walking is a great way to get started. Even everyday activities such as housecleaning or yard work can help.  Try yoga or joey chi. These techniques combine exercise and meditation. You may need some training at first to learn them.  Do something you enjoy. For example, listen to music or go to a movie. Practice your hobby or do volunteer work.  Meditate. This can help you relax, because you are not worrying about what happened before or what may happen in the future.  Do guided imagery. Imagine yourself in any setting that helps you feel calm. You can use online videos, books, or a teacher to guide you.  Do breathing exercises. For example:  From a standing position, bend forward from the waist with your knees slightly bent. Let your arms dangle close to the floor.  Breathe in slowly and deeply as you return to a standing position. Roll up slowly and lift your head last.  Hold your breath for just a few seconds in the standing position.  Breathe out slowly and bend forward from the waist.  Let your feelings out. Talk, laugh, cry, and express anger when you need to. Talking with supportive friends or family, a counselor, or a tresa leader about your feelings is a healthy way to relieve stress. Avoid discussing your feelings with people who make you feel worse.  Write. It may help to write about things that are bothering you. This helps you find out how much stress you feel and what is causing it. When you know this, you can find better ways to cope.  What can you do to prevent stress?  You might try some of these things to help prevent  "stress:  Manage your time. This helps you find time to do the things you want and need to do.  Get enough sleep. Your body recovers from the stresses of the day while you are sleeping.  Get support. Your family, friends, and community can make a difference in how you experience stress.  Limit your news feed. Avoid or limit time on social media or news that may make you feel stressed.  Do something active. Exercise or activity can help reduce stress. Walking is a great way to get started.  Where can you learn more?  Go to https://www.zeenworld.net/patiented  Enter N032 in the search box to learn more about \"Learning About Stress.\"  Current as of: October 24, 2023               Content Version: 14.0    3554-0858 [employer].   Care instructions adapted under license by your healthcare professional. If you have questions about a medical condition or this instruction, always ask your healthcare professional. [employer] disclaims any warranty or liability for your use of this information.      Learning About Sleeping Well  What does sleeping well mean?     Sleeping well means getting enough sleep to feel good and stay healthy. How much sleep is enough varies among people.  The number of hours you sleep and how you feel when you wake up are both important. If you do not feel refreshed, you probably need more sleep. Another sign of not getting enough sleep is feeling tired during the day.  Experts recommend that adults get at least 7 or more hours of sleep per day. Children and older adults need more sleep.  Why is getting enough sleep important?  Getting enough quality sleep is a basic part of good health. When your sleep suffers, your physical health, mood, and your thoughts can suffer too. You may find yourself feeling more grumpy or stressed. Not getting enough sleep also can lead to serious problems, including injury, accidents, anxiety, and depression.  What might cause poor " "sleeping?  Many things can cause sleep problems, including:  Changes to your sleep schedule.  Stress. Stress can be caused by fear about a single event, such as giving a speech. Or you may have ongoing stress, such as worry about work or school.  Depression, anxiety, and other mental or emotional conditions.  Changes in your sleep habits or surroundings. This includes changes that happen where you sleep, such as noise, light, or sleeping in a different bed. It also includes changes in your sleep pattern, such as having jet lag or working a late shift.  Health problems, such as pain, breathing problems, and restless legs syndrome.  Lack of regular exercise.  Using alcohol, nicotine, or caffeine before bed.  How can you help yourself?  Here are some tips that may help you sleep more soundly and wake up feeling more refreshed.  Your sleeping area   Use your bedroom only for sleeping and sex. A bit of light reading may help you fall asleep. But if it doesn't, do your reading elsewhere in the house. Try not to use your TV, computer, smartphone, or tablet while you are in bed.  Be sure your bed is big enough to stretch out comfortably, especially if you have a sleep partner.  Keep your bedroom quiet, dark, and cool. Use curtains, blinds, or a sleep mask to block out light. To block out noise, use earplugs, soothing music, or a \"white noise\" machine.  Your evening and bedtime routine   Create a relaxing bedtime routine. You might want to take a warm shower or bath, or listen to soothing music.  Go to bed at the same time every night. And get up at the same time every morning, even if you feel tired.  What to avoid   Limit caffeine (coffee, tea, caffeinated sodas) during the day, and don't have any for at least 6 hours before bedtime.  Avoid drinking alcohol before bedtime. Alcohol can cause you to wake up more often during the night.  Try not to smoke or use tobacco, especially in the evening. Nicotine can keep you " "awake.  Limit naps during the day, especially close to bedtime.  Avoid lying in bed awake for too long. If you can't fall asleep or if you wake up in the middle of the night and can't get back to sleep within about 20 minutes, get out of bed and go to another room until you feel sleepy.  Avoid taking medicine right before bed that may keep you awake or make you feel hyper or energized. Your doctor can tell you if your medicine may do this and if you can take it earlier in the day.  If you can't sleep   Imagine yourself in a peaceful, pleasant scene. Focus on the details and feelings of being in a place that is relaxing.  Get up and do a quiet or boring activity until you feel sleepy.  Avoid drinking any liquids before going to bed to help prevent waking up often to use the bathroom.  Where can you learn more?  Go to https://www.[employer].Planspot/patiented  Enter J942 in the search box to learn more about \"Learning About Sleeping Well.\"  Current as of: July 10, 2023               Content Version: 14.0    2433-3577 [employer].   Care instructions adapted under license by your healthcare professional. If you have questions about a medical condition or this instruction, always ask your healthcare professional. [employer] disclaims any warranty or liability for your use of this information.      Bladder Training: Care Instructions  Your Care Instructions     Bladder training is used to treat urge incontinence and stress incontinence. Urge incontinence means that the need to urinate comes on so fast that you can't get to a toilet in time. Stress incontinence means that you leak urine because of pressure on your bladder. For example, it may happen when you laugh, cough, or lift something heavy.  Bladder training can increase how long you can wait before you have to urinate. It can also help your bladder hold more urine. And it can give you better control over the urge to urinate.  It is important " to remember that bladder training takes a few weeks to a few months to make a difference. You may not see results right away, but don't give up.  Follow-up care is a key part of your treatment and safety. Be sure to make and go to all appointments, and call your doctor if you are having problems. It's also a good idea to know your test results and keep a list of the medicines you take.  How can you care for yourself at home?  Work with your doctor to come up with a bladder training program that is right for you. You may use one or more of the following methods.  Delayed urination  In the beginning, try to keep from urinating for 5 minutes after you first feel the need to go.  While you wait, take deep, slow breaths to relax. Kegel exercises can also help you delay the need to go to the bathroom.  After some practice, when you can easily wait 5 minutes to urinate, try to wait 10 minutes before you urinate.  Slowly increase the waiting period until you are able to control when you have to urinate.  Scheduled urination  Empty your bladder when you first wake up in the morning.  Schedule times throughout the day when you will urinate.  Start by going to the bathroom every hour, even if you don't need to go.  Slowly increase the time between trips to the bathroom.  When you have found a schedule that works well for you, keep doing it.  If you wake up during the night and have to urinate, do it. Apply your schedule to waking hours only.  Kegel exercises  These tighten and strengthen pelvic muscles, which can help you control the flow of urine. (If doing these exercises causes pain, stop doing them and talk with your doctor.) To do Kegel exercises:  Squeeze your muscles as if you were trying not to pass gas. Or squeeze your muscles as if you were stopping the flow of urine. Your belly, legs, and buttocks shouldn't move.  Hold the squeeze for 3 seconds, then relax for 5 to 10 seconds.  Start with 3 seconds, then add 1  "second each week until you are able to squeeze for 10 seconds.  Repeat the exercise 10 times a session. Do 3 to 8 sessions a day.  When should you call for help?  Watch closely for changes in your health, and be sure to contact your doctor if:    Your incontinence is getting worse.     You do not get better as expected.   Where can you learn more?  Go to https://www.[employer].net/patiented  Enter V684 in the search box to learn more about \"Bladder Training: Care Instructions.\"  Current as of: November 15, 2023               Content Version: 14.0    6872-2546 [employer].   Care instructions adapted under license by your healthcare professional. If you have questions about a medical condition or this instruction, always ask your healthcare professional. [employer] disclaims any warranty or liability for your use of this information.      Learning About Depression Screening  What is depression screening?  Depression screening is a way to see if you have depression symptoms. It may be done by a doctor or counselor. It's often part of a routine checkup. That's because your mental health is just as important as your physical health.  Depression is a mental health condition that affects how you feel, think, and act. You may:  Have less energy.  Lose interest in your daily activities.  Feel sad and grouchy for a long time.  Depression is very common. It affects people of all ages.  Many things can lead to depression. Some people become depressed after they have a stroke or find out they have a major illness like cancer or heart disease. The death of a loved one or a breakup may lead to depression. It can run in families. Most experts believe that a combination of inherited genes and stressful life events can cause it.  What happens during screening?  You may be asked to fill out a form about your depression symptoms. You and the doctor will discuss your answers. The doctor may ask you more " "questions to learn more about how you think, act, and feel.  What happens after screening?  If you have symptoms of depression, your doctor will talk to you about your options.  Doctors usually treat depression with medicines or counseling. Often, combining the two works best. Many people don't get help because they think that they'll get over the depression on their own. But people with depression may not get better unless they get treatment.  The cause of depression is not well understood. There may be many factors involved. But if you have depression, it's not your fault.  A serious symptom of depression is thinking about death or suicide. If you or someone you care about talks about this or about feeling hopeless, get help right away.  It's important to know that depression can be treated. Medicine, counseling, and self-care may help.  Where can you learn more?  Go to https://www.[employer].net/patiented  Enter T185 in the search box to learn more about \"Learning About Depression Screening.\"  Current as of: June 24, 2023               Content Version: 14.0    5154-2699 [employer].   Care instructions adapted under license by your healthcare professional. If you have questions about a medical condition or this instruction, always ask your healthcare professional. [employer] disclaims any warranty or liability for your use of this information.      "

## 2024-04-24 NOTE — RESULT ENCOUNTER NOTE
Yeison,    -Urine is normal.    If you have further questions about the interpretation of your labs, [email or web address] is a good website to check out for further information.    Please contact the clinic if you have additional questions.  Thank you.    Sincerely,    Julio Guidry MD

## 2024-04-24 NOTE — PROGRESS NOTES
Preventive Care Visit  [facility] [place]  Julio Guidry Jr, MD, Family Medicine  Apr 24, 2024      Assessment & Plan     Encounter for Medicare annual wellness exam  For the most part is up to date with preventative care measures     Malignant neoplasm of upper lobe of right lung (H)  S/p wedge resection.  Following with oncology.    Prostate cancer (H)  S/p prostatectomy.  Following with urology.  - PRIMARY CARE FOLLOW-UP SCHEDULING  - PSA, tumor marker; Future  - PSA, tumor marker    Major depressive disorder, recurrent episode, mild (H24)  As with anxiety below.  - PRIMARY CARE FOLLOW-UP SCHEDULING  - sertraline (ZOLOFT) 50 MG tablet; Take 0.5 tablets (25 mg) by mouth daily for 10 days, THEN 1 tablet (50 mg) daily for 30 days.    Anxiety  I think this may be contributing to the coldness and nausea he complains of. Had routine labs checked with GI two days ago - all of which were normal (see below). Juwan has a long history of anxiety and is not on anything for this despite having been on medication in the past.  He is surprised to learn that there are medications to treat anxiety and is open to starting one - odd because he's been on selective serotonin reuptake inhibitor's, buspirone, benzodiazepines in the past.  Will start medication below.  Recheck in 4-6 weeks.  - sertraline (ZOLOFT) 50 MG tablet; Take 0.5 tablets (25 mg) by mouth daily for 10 days, THEN 1 tablet (50 mg) daily for 30 days.      Cavernous hemangioma of brain (H)  Saw Dr. Mcelroy back in 2017 with no neurosurgical follow up since this time as he's asymptomatic.  Was recommended to have follow up MRI in roughly October 2017 and I don't see that this has been completed.  Will proceed with ordering.  - PRIMARY CARE FOLLOW-UP SCHEDULING      Gross hematuria  Has been following with urology.  Will recheck UA to ensure resolution of hematuria.  - Albumin Random Urine Quantitative with Creat Ratio; Future  - UA Macroscopic with  "reflex to Microscopic and Culture - Lab Collect; Future  - Albumin Random Urine Quantitative with Creat Ratio  - UA Macroscopic with reflex to Microscopic and Culture - Lab Collect  - UA Microscopic with Reflex to Culture              BMI  Estimated body mass index is 26.76 kg/m  as calculated from the following:    Height as of this encounter: 1.727 m (5' 8\").    Weight as of this encounter: 79.8 kg (176 lb).       Counseling  Appropriate preventive services were discussed with this patient, including applicable screening as appropriate for fall prevention, nutrition, physical activity, Tobacco-use cessation, weight loss and cognition.  Checklist reviewing preventive services available has been given to the patient.  Reviewed patient's diet, addressing concerns and/or questions.   Patient is at risk for social isolation and has been provided with information about the benefit of social connection.   The patient was instructed to see the dentist every 6 months.   Discussed possible causes of fatigue. Information on urinary incontinence and treatment options given to patient.   The patient's PHQ-9 score is consistent with mild depression. He was provided with information regarding depression.           Subjective   Juwan is a 73 year old, presenting for the following:  Physical        4/24/2024    10:41 AM   Additional Questions   Roomed by shauna multani         Notes nausea and fatigue  Health Care Directive  Patient has a Health Care Directive on file  Advance care planning document is on file and is current.    HPI  Reports feeling cold and flushed in trunk and proximal arms for the past two months.  Began with urinary retention and hematuria which cystoscopy revealed was due to a urethral stricture.  This was treated with balloon dilatation.  Also had negative CT urogram.  Saw Gastroenterology late last week and had normal CBC, TSH, alpha fetoprotein, CMP.  Also notes some nausea.  GI prescribed Zofran for this. "         Discuss lab work from [facility] on 4/22 4/24/2024   General Health   How would you rate your overall physical health? (!) POOR   Feel stress (tense, anxious, or unable to sleep) To some extent   (!) STRESS CONCERN      4/24/2024   Nutrition   Diet: Carbohydrate counting         4/24/2024   Exercise   Days per week of moderate/strenous exercise 4 days   Average minutes spent exercising at this level 20 min         4/24/2024   Social Factors   Frequency of gathering with friends or relatives Never   Worry food won't last until get money to buy more No   Food not last or not have enough money for food? No   Do you have housing?  Yes   Are you worried about losing your housing? No   Lack of transportation? No   Unable to get utilities (heat,electricity)? Yes   Want help with housing or utility concern? No   (!) FINANCIAL RESOURCE STRAIN CONCERN(!) SOCIAL CONNECTIONS CONCERN      4/24/2024   Fall Risk   Fallen 2 or more times in the past year? No   Trouble with walking or balance? No          4/24/2024   Activities of Daily Living- Home Safety   Needs help with the following daily activites None of the above   Safety concerns in the home None of the above         4/24/2024   Dental   Dentist two times every year? (!) NO         4/24/2024   Hearing Screening   Hearing concerns? None of the above         4/24/2024   Driving Risk Screening   Patient/family members have concerns about driving No         4/24/2024   General Alertness/Fatigue Screening   Have you been more tired than usual lately? (!) YES         4/24/2024   Urinary Incontinence Screening   Bothered by leaking urine in past 6 months Yes         4/24/2024   TB Screening   Were you born outside of the [place]? Yes       Today's PHQ-9 Score:       4/24/2024    10:37 AM   PHQ-9 SCORE   PHQ-9 Total Score MyChart 5 (Mild depression)   PHQ-9 Total Score 5         4/24/2024   Substance Use   Alcohol more than 3/day or more than 7/wk No   Do you have a current  opioid prescription? No   How severe/bad is pain from 1 to 10? 0/10 (No Pain)   Do you use any other substances recreationally? No     Social History     Tobacco Use    Smoking status: Former     Current packs/day: 0.00     Average packs/day: 2.0 packs/day for 20.0 years (40.0 ttl pk-yrs)     Types: Cigarettes     Start date: 1965     Quit date: 1985     Years since quittin.3     Passive exposure: Past    Smokeless tobacco: Never   Vaping Use    Vaping status: Never Used   Substance Use Topics    Alcohol use: Yes     Alcohol/week: 0.0 - 2.0 standard drinks of alcohol     Comment: had 2 beers 2 weeks ago    Drug use: Yes     Types: Marijuana     Comment: gummies twice a week       ASCVD Risk   The 10-year ASCVD risk score (Yvette ORONA, et al., 2019) is: 21.2%    Values used to calculate the score:      Age: 73 years      Sex: Male      Is Non- : No      Diabetic: No      Tobacco smoker: No      Systolic Blood Pressure: 130 mmHg      Is BP treated: No      HDL Cholesterol: 45 mg/dL      Total Cholesterol: 184 mg/dL            Reviewed and updated as needed this visit by Provider                      Current providers sharing in care for this patient include:  Patient Care Team:  Julio Guidry Jr., MD as PCP - General (Family Practice)  Paulo Agarwal MD as MD (Urology)  Maria A Desai MD as MD (Cardiovascular & Thoracic Surgery)  Mike Chan MD as Assigned Surgical Provider  Bladimir Moseley MD as MD (Cardiovascular Disease)  Bladimir Moseley MD as Assigned Heart and Vascular Provider  Ryan Kamara MD as Assigned PCP  Shy Arana PA-C as Physician Assistant (Dermatology)  Mike Chan MD as MD (Urology)    The following health maintenance items are reviewed in Epic and correct as of today:  Health Maintenance   Topic Date Due    HEPATITIS A IMMUNIZATION (1 of 2 - Risk 2-dose series) Never done    ZOSTER IMMUNIZATION (1  "of 2) Never done    HEPATITIS B IMMUNIZATION (1 of 3 - Risk 3-dose series) Never done    RSV VACCINE (Pregnancy & 60+) (1 - 1-dose 60+ series) Never done    COVID-19 Vaccine (4 - 2023-24 season) 09/01/2023    ANNUAL REVIEW OF HM ORDERS  10/17/2023    MICROALBUMIN  04/18/2024    PSA  04/18/2024    MEDICARE ANNUAL WELLNESS VISIT  04/18/2024    PHQ-9  10/24/2024    CMP  12/22/2024    BMP  02/16/2025    CBC  02/16/2025    FALL RISK ASSESSMENT  04/24/2025    DTAP/TDAP/TD IMMUNIZATION (2 - Td or Tdap) 02/28/2026    COLORECTAL CANCER SCREENING  01/25/2027    GLUCOSE  02/16/2027    LIPID  04/18/2028    ADVANCE CARE PLANNING  04/18/2028    HEPATITIS C SCREENING  Completed    DEPRESSION ACTION PLAN  Completed    INFLUENZA VACCINE  Completed    Pneumococcal Vaccine: 65+ Years  Completed    AORTIC ANEURYSM SCREENING (SYSTEM ASSIGNED)  Completed    IPV IMMUNIZATION  Aged Out    HPV IMMUNIZATION  Aged Out    MENINGITIS IMMUNIZATION  Aged Out    RSV MONOCLONAL ANTIBODY  Aged Out            Objective    Exam  /60   Pulse 63   Temp 98  F (36.7  C)   Resp 16   Ht 1.727 m (5' 8\")   Wt 79.8 kg (176 lb)   SpO2 97%   BMI 26.76 kg/m     Estimated body mass index is 26.76 kg/m  as calculated from the following:    Height as of this encounter: 1.727 m (5' 8\").    Weight as of this encounter: 79.8 kg (176 lb).    Physical Exam  GENERAL: alert and no distress  EYES: Eyes grossly normal to inspection, PERRL and conjunctivae and sclerae normal  HENT: ear canals and TM's normal, nose and mouth without ulcers or lesions  NECK: no adenopathy, no asymmetry, masses, or scars  RESP: lungs clear to auscultation - no rales, rhonchi or wheezes  CV: regular rate and rhythm, normal S1 S2, no S3 or S4, no murmur, click or rub, no peripheral edema  ABDOMEN: soft, nontender, no hepatosplenomegaly, no masses and bowel sounds normal  MS: no gross musculoskeletal defects noted, no edema  SKIN: no suspicious lesions or rashes  NEURO: Normal strength " and tone, mentation intact and speech normal  PSYCH: mentation appears normal, affect normal/bright        4/24/2024   Mini Cog   Clock Draw Score 2 Normal   3 Item Recall 2 objects recalled   Mini Cog Total Score 4              Signed Electronically by: Julio Guidry Jr, MD    Answers submitted by the patient for this visit:  Patient Health Questionnaire (Submitted on 4/24/2024)  If you checked off any problems, how difficult have these problems made it for you to do your work, take care of things at home, or get along with other people?: Somewhat difficult  PHQ9 TOTAL SCORE: 5

## 2024-04-25 LAB
CREAT UR-MCNC: 119 MG/DL
MICROALBUMIN UR-MCNC: 13.4 MG/L
MICROALBUMIN/CREAT UR: 11.26 MG/G CR (ref 0–17)
PSA SERPL DL<=0.01 NG/ML-MCNC: <0.01 NG/ML (ref 0–6.5)

## 2024-04-26 ENCOUNTER — TELEPHONE (OUTPATIENT)
Dept: FAMILY MEDICINE | Facility: CLINIC | Age: 74
End: 2024-04-26
Payer: COMMERCIAL

## 2024-04-26 NOTE — TELEPHONE ENCOUNTER
Routed to covering providers to review.      S-(situation): chills, fatigue, nausea    B-(background): office visit for these issues on 4/24/24-symptoms not improving.  Office visit notes not yet completed, so unable to review.     A-(assessment): Has been quite fatigued lately and is so chilled right now - having trouble warming up(prompting his call to clinic).  Feels like eating triggers his symptoms.  Has been taking zofran and had to take 8 mg today.  Has been diaphoretic during the night intermittently and has woken up drenched in sweat a couple of nights-last night and a couple days ago. Feels radiating mild discomfort across the middle part of his back.  Denied fever, chest pain, difficulty breathing, cough.      R-(recommendations): Patient wondering what the next steps are.  Please advise, thanks.      Patient would prefer a phone call- ok to chandler

## 2024-04-26 NOTE — RESULT ENCOUNTER NOTE
Mr. Latham,    -PSA (prostate specific antigen) test is normal.  This indicates a low likelihood of recurrence of prostate cancer.  ADVISE: rechecking this in 1 year.  -Microalbumin (urine protein) test is normal.  ADVISE: rechecking this annually.    If you have further questions about the interpretation of your labs, labtestsonline.org is a good website to check out for further information.    Please contact the clinic if you have additional questions.  Thank you.    Sincerely,    Julio Guidry MD

## 2024-04-30 ENCOUNTER — TELEPHONE (OUTPATIENT)
Dept: FAMILY MEDICINE | Facility: CLINIC | Age: 74
End: 2024-04-30
Payer: COMMERCIAL

## 2024-05-01 ENCOUNTER — HOSPITAL ENCOUNTER (EMERGENCY)
Facility: CLINIC | Age: 74
Discharge: HOME | End: 2024-05-01
Attending: EMERGENCY MEDICINE | Admitting: EMERGENCY MEDICINE
Payer: COMMERCIAL

## 2024-05-01 ENCOUNTER — APPOINTMENT (OUTPATIENT)
Dept: GENERAL RADIOLOGY | Facility: CLINIC | Age: 74
End: 2024-05-01
Attending: EMERGENCY MEDICINE
Payer: COMMERCIAL

## 2024-05-01 VITALS
SYSTOLIC BLOOD PRESSURE: 135 MMHG | BODY MASS INDEX: 26.67 KG/M2 | HEIGHT: 68 IN | WEIGHT: 176 LBS | HEART RATE: 60 BPM | TEMPERATURE: 97.3 F | OXYGEN SATURATION: 98 % | DIASTOLIC BLOOD PRESSURE: 69 MMHG | RESPIRATION RATE: 18 BRPM

## 2024-05-01 DIAGNOSIS — R53.83 FATIGUE, UNSPECIFIED TYPE: ICD-10-CM

## 2024-05-01 LAB
ALBUMIN SERPL BCG-MCNC: 4.4 G/DL (ref 3.5–5.2)
ALBUMIN UR-MCNC: NEGATIVE MG/DL
ALP SERPL-CCNC: 57 U/L (ref 40–150)
ALT SERPL W P-5'-P-CCNC: 20 U/L (ref 0–70)
ANION GAP SERPL CALCULATED.3IONS-SCNC: 8 MMOL/L (ref 7–15)
APPEARANCE UR: CLEAR
AST SERPL W P-5'-P-CCNC: 19 U/L (ref 0–45)
BASOPHILS # BLD AUTO: 0 10E3/UL (ref 0–0.2)
BASOPHILS NFR BLD AUTO: 0 %
BILIRUB SERPL-MCNC: 0.4 MG/DL
BILIRUB UR QL STRIP: NEGATIVE
BUN SERPL-MCNC: 14 MG/DL (ref 8–23)
CALCIUM SERPL-MCNC: 9.4 MG/DL (ref 8.8–10.2)
CHLORIDE SERPL-SCNC: 102 MMOL/L (ref 98–107)
COLOR UR AUTO: ABNORMAL
CREAT SERPL-MCNC: 0.92 MG/DL (ref 0.67–1.17)
DEPRECATED HCO3 PLAS-SCNC: 27 MMOL/L (ref 22–29)
EGFRCR SERPLBLD CKD-EPI 2021: 88 ML/MIN/1.73M2
EOSINOPHIL # BLD AUTO: 0.1 10E3/UL (ref 0–0.7)
EOSINOPHIL NFR BLD AUTO: 1 %
ERYTHROCYTE [DISTWIDTH] IN BLOOD BY AUTOMATED COUNT: 13.2 % (ref 10–15)
GLUCOSE SERPL-MCNC: 126 MG/DL (ref 70–99)
GLUCOSE UR STRIP-MCNC: NEGATIVE MG/DL
HCT VFR BLD AUTO: 42.3 % (ref 40–53)
HGB BLD-MCNC: 14 G/DL (ref 13.3–17.7)
HGB UR QL STRIP: NEGATIVE
HOLD SPECIMEN: NORMAL
HOLD SPECIMEN: NORMAL
IMM GRANULOCYTES # BLD: 0 10E3/UL
IMM GRANULOCYTES NFR BLD: 1 %
KETONES UR STRIP-MCNC: ABNORMAL MG/DL
LACTATE SERPL-SCNC: 0.8 MMOL/L (ref 0.7–2)
LEUKOCYTE ESTERASE UR QL STRIP: NEGATIVE
LYMPHOCYTES # BLD AUTO: 0.8 10E3/UL (ref 0.8–5.3)
LYMPHOCYTES NFR BLD AUTO: 14 %
MCH RBC QN AUTO: 30.4 PG (ref 26.5–33)
MCHC RBC AUTO-ENTMCNC: 33.1 G/DL (ref 31.5–36.5)
MCV RBC AUTO: 92 FL (ref 78–100)
MONOCYTES # BLD AUTO: 0.5 10E3/UL (ref 0–1.3)
MONOCYTES NFR BLD AUTO: 8 %
MUCOUS THREADS #/AREA URNS LPF: PRESENT /LPF
NEUTROPHILS # BLD AUTO: 4.7 10E3/UL (ref 1.6–8.3)
NEUTROPHILS NFR BLD AUTO: 76 %
NITRATE UR QL: NEGATIVE
NRBC # BLD AUTO: 0 10E3/UL
NRBC BLD AUTO-RTO: 0 /100
PH UR STRIP: 5.5 [PH] (ref 5–7)
PLATELET # BLD AUTO: 180 10E3/UL (ref 150–450)
POTASSIUM SERPL-SCNC: 3.6 MMOL/L (ref 3.4–5.3)
PROT SERPL-MCNC: 6.6 G/DL (ref 6.4–8.3)
RBC # BLD AUTO: 4.6 10E6/UL (ref 4.4–5.9)
RBC URINE: 3 /HPF
SODIUM SERPL-SCNC: 137 MMOL/L (ref 135–145)
SP GR UR STRIP: 1.02 (ref 1–1.03)
TROPONIN T SERPL HS-MCNC: 8 NG/L
UROBILINOGEN UR STRIP-MCNC: NORMAL MG/DL
WBC # BLD AUTO: 6 10E3/UL (ref 4–11)
WBC URINE: 1 /HPF

## 2024-05-01 PROCEDURE — 71046 X-RAY EXAM CHEST 2 VIEWS: CPT

## 2024-05-01 PROCEDURE — 81001 URINALYSIS AUTO W/SCOPE: CPT | Performed by: EMERGENCY MEDICINE

## 2024-05-01 PROCEDURE — 87040 BLOOD CULTURE FOR BACTERIA: CPT | Performed by: EMERGENCY MEDICINE

## 2024-05-01 PROCEDURE — 99284 EMERGENCY DEPT VISIT MOD MDM: CPT | Mod: 25

## 2024-05-01 PROCEDURE — 36415 COLL VENOUS BLD VENIPUNCTURE: CPT | Performed by: EMERGENCY MEDICINE

## 2024-05-01 PROCEDURE — 83605 ASSAY OF LACTIC ACID: CPT | Performed by: EMERGENCY MEDICINE

## 2024-05-01 PROCEDURE — 85025 COMPLETE CBC W/AUTO DIFF WBC: CPT | Performed by: EMERGENCY MEDICINE

## 2024-05-01 PROCEDURE — 82040 ASSAY OF SERUM ALBUMIN: CPT | Performed by: EMERGENCY MEDICINE

## 2024-05-01 PROCEDURE — 84484 ASSAY OF TROPONIN QUANT: CPT | Performed by: EMERGENCY MEDICINE

## 2024-05-01 ASSESSMENT — COLUMBIA-SUICIDE SEVERITY RATING SCALE - C-SSRS
1. IN THE PAST MONTH, HAVE YOU WISHED YOU WERE DEAD OR WISHED YOU COULD GO TO SLEEP AND NOT WAKE UP?: NO
6. HAVE YOU EVER DONE ANYTHING, STARTED TO DO ANYTHING, OR PREPARED TO DO ANYTHING TO END YOUR LIFE?: NO
2. HAVE YOU ACTUALLY HAD ANY THOUGHTS OF KILLING YOURSELF IN THE PAST MONTH?: NO

## 2024-05-01 ASSESSMENT — ACTIVITIES OF DAILY LIVING (ADL)
ADLS_ACUITY_SCORE: 33
ADLS_ACUITY_SCORE: 35

## 2024-05-01 NOTE — ED TRIAGE NOTES
"Arrives from home.  was seen at the MD on Friday and did blood work at that time for fatigue.        was suppose to present to the ED, but was never told this.     States he feels \"Like crap\" reports feeling SOBE and fatigued.     Speaking in full sentences without issues. No respiratory distress noted.   "

## 2024-05-01 NOTE — TELEPHONE ENCOUNTER
Pt was transferred to writer. He is unsure what the MRI is for and thinks he may have gotten a denial from insurance but states the denial doesn't state what it is for.     Advised Pt of diagnosis for MRI and Pt asks why MRI was ordered now as this was an old issue and he has new concerns. Advised writer would look into it more and let him know what it is for and why Dr. Guidry ordered it.     Writer discussed with provider and reason is in progress note from 4/24/24 note:    Cavernous hemangioma of brain (H)  Saw Dr. Mcelroy back in 2017 with no neurosurgical follow up since this time as he's asymptomatic.  Was recommended to have follow up MRI in roughly October 2017 and I don't see that this has been completed.  Will proceed with ordering.  - PRIMARY CARE FOLLOW-UP SCHEDULING     Please call Pt back to advise about MRI, scheduled for 5/1824 and referral states it is authorized.

## 2024-05-01 NOTE — TELEPHONE ENCOUNTER
Pt called with a separate issue. While reviewing  noted this encounter and asked if Pt had gone to the ED. Pt states he did not. Writer asks if Pt is feeling better and Pt reports he is not better and in fact is worse and now SOB. Writer advised Pt go to [facility] ED for evaluation. Pt agrees.       Bernardo Espinosa RN [facility]

## 2024-05-01 NOTE — TELEPHONE ENCOUNTER
Pt calling stating that he is at the ER and they dont know why he is there     RN reviewed notes about they he is sent there and that he noted he was worse and has SOB now   Pt stated that is true - RN advised pt that he needs to tell the ER team his symptoms.     Patient stated an understanding and agreed with plan.    Suzy Schuster RN, BSN  [facility]

## 2024-05-01 NOTE — ED PROVIDER NOTES
"  History     Chief Complaint:  Abnormal Labs       HPI   Juwan Latham is a 73 year old male with history of lung cancer s/p resection and prostate cancer s/p prostatectomy who presents to the ED for evaluation of abnormal labs. He reports seeing his doctor five days ago and states labs were checked and he was suppose to get a call to go to the ED but was never called. He mentions a possibility of an infection according to a nurse. He states symptoms of fatigue, nausea and shortness of breath for the past couple of weeks. He has been having fever, feeling \"flushed\" and sweats for the past week. Denies cough, dysuria, or sore throat.    Independent Historian:   None - Patient Only    Review of External Notes:   Reviewed office visit from 4/24/24. Reviewed telephone notes from last couple days before arrival.     Medications:    Zofran  Zoloft  Vitamin D  Losartan    Past Medical History:    Anxiety  Arthritis  Calculus of kidney  Colon polyps  GERD  Lung cancer  Hypertension  Prostate cancer   Seasonal allergies  PAULINO   Major depressive disorder     Past Surgical History:    Laser holmium lithotripsy ureter(s), insert stent, combined  Colonoscopy x3  EGD  Laparoscopic wedge resection lung - right  Bronchoscopy flexible  DaVinci prostatectomy  Combined cystoscopy, retrogrades, ureteroscopy, laser holmium lithotripsy ureter (s), insert stent  Cystoscopy dilate urethral, combined   Cystoscopy with urethral chemotherapy stent insertion      Physical Exam   Patient Vitals for the past 24 hrs:   BP Temp Temp src Pulse Resp SpO2 Height Weight   05/01/24 1601 (!) 143/83 97.3  F (36.3  C) Temporal 76 18 98 % 1.727 m (5' 8\") 79.8 kg (176 lb)      Physical Exam  Nursing note and vitals reviewed.  HENT:   Mouth/Throat: Moist mucous membranes.   Eyes: EOMI, nonicteric sclera  Cardiovascular: Normal rate, regular rhythm, no murmurs, rubs, or gallops  Pulmonary/Chest: Effort normal and breath sounds normal. No respiratory " distress. No wheezes. No rales.   Abdominal: Soft. Nontender, nondistended, no guarding or rigidity.   Musculoskeletal: Normal range of motion.   Neurological: Alert. Moves all extremities spontaneously.   Skin: Skin is warm and dry. No rash noted.         Emergency Department Course   Imaging:  Chest XR,  PA & LAT   Final Result   IMPRESSION: Stable chest with no acute cardiopulmonary abnormalities. Mild to moderate degenerative changes most marked in the spine and AC joints.         Report per radiology    Laboratory:  Labs Ordered and Resulted from Time of ED Arrival to Time of ED Departure   ROUTINE UA WITH MICROSCOPIC REFLEX TO CULTURE - Abnormal       Result Value    Color Urine Light Yellow      Appearance Urine Clear      Glucose Urine Negative      Bilirubin Urine Negative      Ketones Urine Trace (*)     Specific Gravity Urine 1.019      Blood Urine Negative      pH Urine 5.5      Protein Albumin Urine Negative      Urobilinogen Urine Normal      Nitrite Urine Negative      Leukocyte Esterase Urine Negative      Mucus Urine Present (*)     RBC Urine 3 (*)     WBC Urine 1     COMPREHENSIVE METABOLIC PANEL - Abnormal    Sodium 137      Potassium 3.6      Carbon Dioxide (CO2) 27      Anion Gap 8      Urea Nitrogen 14.0      Creatinine 0.92      GFR Estimate 88      Calcium 9.4      Chloride 102      Glucose 126 (*)     Alkaline Phosphatase 57      AST 19      ALT 20      Protein Total 6.6      Albumin 4.4      Bilirubin Total 0.4     LACTIC ACID WHOLE BLOOD - Normal    Lactic Acid 0.8     TROPONIN T, HIGH SENSITIVITY - Normal    Troponin T, High Sensitivity 8     CBC WITH PLATELETS AND DIFFERENTIAL    WBC Count 6.0      RBC Count 4.60      Hemoglobin 14.0      Hematocrit 42.3      MCV 92      MCH 30.4      MCHC 33.1      RDW 13.2      Platelet Count 180      % Neutrophils 76      % Lymphocytes 14      % Monocytes 8      % Eosinophils 1      % Basophils 0      % Immature Granulocytes 1      NRBCs per 100 WBC 0  "     Absolute Neutrophils 4.7      Absolute Lymphocytes 0.8      Absolute Monocytes 0.5      Absolute Eosinophils 0.1      Absolute Basophils 0.0      Absolute Immature Granulocytes 0.0      Absolute NRBCs 0.0     BLOOD CULTURE   BLOOD CULTURE      Procedures   None    Emergency Department Course & Assessments:    Interventions:  Medications - No data to display     Independent Interpretation (X-rays, CTs, rhythm strip):  None    Assessments/Consultations/Discussion of Management or Tests:  ED Course as of 05/01/24 1931   Wed May 01, 2024   1817 I obtained history and examined the patient as noted above.    1930 I rechecked the patient and explained findings. I prepared the patient to be discharged home.     Social Determinants of Health affecting care:   None    Disposition:  The patient was discharged.     Impression & Plan        Medical Decision Making:  Pt presents with CC fatigue and \"abnormal labs.\" He states he was seen by his doctor about a week ago, had labs drawn, and he states that he was called and told to go to the emergency department. I reviewed labs from 4/24 and results are unremarkable. Note from that visit reviewed and ED evaluation not recommended. Ultimately cast broad net and workup negative for infection with negative UA, no leukocytosis, normal lactic acid level. Screening troponin checked as well and negative without evidence of ACS or myocarditis. Electrolytes unremarkable. CXR negative for pneumonia. No emergent pathology evident. Discussed results with pt at bedside. Encouraged pcp follow-up for recheck of symptoms. He is in stable condition at the time of discharge, red flags that should merit ED return were discussed as well as recommended follow-up instructions. All questions were answered and he is in agreement with the plan.      Diagnosis:    ICD-10-CM    1. Fatigue, unspecified type  R53.83              Scribe Disclosure:  IRossy, am serving as a scribe at 6:20 PM on " 5/1/2024 to document services personally performed by Saqib Harris MD based on my observations and the provider's statements to me.          Saqib Harris MD  05/04/24 [id number]

## 2024-05-02 ENCOUNTER — MYC MEDICAL ADVICE (OUTPATIENT)
Dept: FAMILY MEDICINE | Facility: CLINIC | Age: 74
End: 2024-05-02
Payer: COMMERCIAL

## 2024-05-02 NOTE — ED NOTES
PIV R AC removed    Cyclosporine Counseling:  I discussed with the patient the risks of cyclosporine including but not limited to hypertension, gingival hyperplasia,myelosuppression, immunosuppression, liver damage, kidney damage, neurotoxicity, lymphoma, and serious infections. The patient understands that monitoring is required including baseline blood pressure, CBC, CMP, lipid panel and uric acid, and then 1-2 times monthly CMP and blood pressure.

## 2024-05-02 NOTE — DISCHARGE INSTRUCTIONS
No signs of an emergency today. Your testing all appears normal without signs of anemia or infection.     Follow-up with your doctor in 1-2 weeks for recheck and ongoing evaluation of your symptoms.     Return to emergency department for fever > 100.4, worsening symptoms, loss of consciousness, chest pain, shortness or breath, or for any other concerns.

## 2024-05-03 ENCOUNTER — MYC MEDICAL ADVICE (OUTPATIENT)
Dept: FAMILY MEDICINE | Facility: CLINIC | Age: 74
End: 2024-05-03
Payer: COMMERCIAL

## 2024-05-03 NOTE — TELEPHONE ENCOUNTER
Called and spoke with patient.      Advised MRI should be covered. [facility] would call him if its not. Patient stated an understanding and agreed with plan.       LUIS ENRIQUEZ RN on 5/3/2024 at 10:25 AM   [facility]

## 2024-05-03 NOTE — TELEPHONE ENCOUNTER
Please see Pt MyChart. Pt seen in ED 5/1/24 without any findings. Can I schedule ED follow up in a virtual spot for 5/8?    Bernardo Espinosa RN [facility]

## 2024-05-06 LAB
BACTERIA BLD CULT: NO GROWTH
BACTERIA BLD CULT: NO GROWTH

## 2024-05-06 NOTE — TELEPHONE ENCOUNTER
Called and spoke with patient.      Appt scheduled.    Future Appointments   Date Time Provider Department Center   5/8/2024 10:30 AM Julio Guidry Jr., MD [facility]    5/15/2024 11:30 AM Jaquan Latham PA-C [facility]   5/18/2024 10:30 AM [id number] [facility]   6/19/2024  9:30 AM Mike Chan MD [facility]   10/2/2024  9:40 [id number]   10/2/2024 10:30 AM Carla Faulkner APRN CNS [facility]        LUIS ENRIQUEZ RN on 5/6/2024 at 1:08 PM   [facility]

## 2024-05-06 NOTE — TELEPHONE ENCOUNTER
Patient will discuss at 5/8 appt    LUIS ENRIQUEZ RN on 5/6/2024 at 1:08 PM   [facility]

## 2024-05-08 ENCOUNTER — OFFICE VISIT (OUTPATIENT)
Dept: FAMILY MEDICINE | Facility: CLINIC | Age: 74
End: 2024-05-08
Payer: COMMERCIAL

## 2024-05-08 VITALS
BODY MASS INDEX: 25.76 KG/M2 | SYSTOLIC BLOOD PRESSURE: 122 MMHG | OXYGEN SATURATION: 97 % | HEART RATE: 61 BPM | DIASTOLIC BLOOD PRESSURE: 62 MMHG | WEIGHT: 170 LBS | TEMPERATURE: 99 F | HEIGHT: 68 IN | RESPIRATION RATE: 12 BRPM

## 2024-05-08 DIAGNOSIS — R11.0 NAUSEA: Primary | ICD-10-CM

## 2024-05-08 DIAGNOSIS — C61 PROSTATE CANCER (H): ICD-10-CM

## 2024-05-08 PROCEDURE — G2211 COMPLEX E/M VISIT ADD ON: HCPCS | Performed by: FAMILY MEDICINE

## 2024-05-08 PROCEDURE — 99215 OFFICE O/P EST HI 40 MIN: CPT | Performed by: FAMILY MEDICINE

## 2024-05-08 ASSESSMENT — ANXIETY QUESTIONNAIRES
6. BECOMING EASILY ANNOYED OR IRRITABLE: NOT AT ALL
GAD7 TOTAL SCORE: 1
GAD7 TOTAL SCORE: 1
5. BEING SO RESTLESS THAT IT IS HARD TO SIT STILL: NOT AT ALL
8. IF YOU CHECKED OFF ANY PROBLEMS, HOW DIFFICULT HAVE THESE MADE IT FOR YOU TO DO YOUR WORK, TAKE CARE OF THINGS AT HOME, OR GET ALONG WITH OTHER PEOPLE?: NOT DIFFICULT AT ALL
IF YOU CHECKED OFF ANY PROBLEMS ON THIS QUESTIONNAIRE, HOW DIFFICULT HAVE THESE PROBLEMS MADE IT FOR YOU TO DO YOUR WORK, TAKE CARE OF THINGS AT HOME, OR GET ALONG WITH OTHER PEOPLE: NOT DIFFICULT AT ALL
GAD7 TOTAL SCORE: 1
2. NOT BEING ABLE TO STOP OR CONTROL WORRYING: NOT AT ALL
3. WORRYING TOO MUCH ABOUT DIFFERENT THINGS: NOT AT ALL
4. TROUBLE RELAXING: NOT AT ALL
7. FEELING AFRAID AS IF SOMETHING AWFUL MIGHT HAPPEN: NOT AT ALL
1. FEELING NERVOUS, ANXIOUS, OR ON EDGE: SEVERAL DAYS
7. FEELING AFRAID AS IF SOMETHING AWFUL MIGHT HAPPEN: NOT AT ALL

## 2024-05-08 ASSESSMENT — PATIENT HEALTH QUESTIONNAIRE - PHQ9
SUM OF ALL RESPONSES TO PHQ QUESTIONS 1-9: 7
SUM OF ALL RESPONSES TO PHQ QUESTIONS 1-9: 7
10. IF YOU CHECKED OFF ANY PROBLEMS, HOW DIFFICULT HAVE THESE PROBLEMS MADE IT FOR YOU TO DO YOUR WORK, TAKE CARE OF THINGS AT HOME, OR GET ALONG WITH OTHER PEOPLE: NOT DIFFICULT AT ALL

## 2024-05-08 ASSESSMENT — PAIN SCALES - GENERAL: PAINLEVEL: NO PAIN (0)

## 2024-05-08 NOTE — PROGRESS NOTES
"  Assessment & Plan     Nausea  Will refer him back to his gastroenterologist at [facility] (Dr. Kayla Clayton) to complete upper GI endoscopy.  I have advised Juwan that if this returns normal that he has had a very thorough evaluation of his abdominal symptoms including CT scan of the abdomen pelvis, right upper quadrant ultrasound, extensive laboratory testing, gastroenterology consultation.  He had a normal colonoscopy done about a year ago and really has had no change in his stool symptoms to suggest inflammatory bowel disease or other colonic source of his discomfort.  If his EGD is normal I advised him we should focus our treatment on his anxiety as a contributor to his abdominal pain.  I will have him follow-up with me in about a month to see how he is doing on the sertraline.  If he is mildly improved, could consider increasing his dose.  Could consider addition of BuSpar.  I reminded him that he can take up to 8 mg of Zofran at a time for nausea as he has been finding the 4 mg gives him a little bit of relief.  - Adult GI  Referral - Procedure Only; Future    h/o Prostate cancer (H) - s/p prostatectomy 2017  Continue following with urology as he has been doing.      43 minutes spent by me on the date of the encounter doing chart review, history and exam, documentation and further activities per the note    The longitudinal plan of care for the diagnosis(es)/condition(s) as documented were addressed during this visit. Due to the added complexity in care, I will continue to support Juwan in the subsequent management and with ongoing continuity of care.      MED REC REQUIRED  Post Medication Reconciliation Status: discharge medications reconciled, continue medications without change  BMI  Estimated body mass index is 25.85 kg/m  as calculated from the following:    Height as of this encounter: 1.727 m (5' 8\").    Weight as of this encounter: 77.1 kg (170 lb).             Subjective "   Juwan is a 73 year old, presenting for the following health issues:  ER F/U     sensations.    5/8/2024    10:21 AM   Additional Questions   Roomed by DAVID VASQUEZ   Accompanied by SELF     HPI       ED/UC Followup:    Facility:  [facility]  Date of visit: 5/1/24  Reason for visit: fatigue and nausea  Current Status: about the same, maybe worse    Not trying to lose weight - hasn't been eating breakfast   Wt Readings from Last 4 Encounters:   05/08/24 77.1 kg (170 lb)   05/01/24 79.8 kg (176 lb)   04/24/24 79.8 kg (176 lb)   03/18/24 81.5 kg (179 lb 10.8 oz)   Juwan follows up from his ER visit last week today.  He was seen there for nausea and chills.  He had a normal evaluation there and reader is referred her to the ER note for further details of that visit.  He continues to note some discomfort in his abdominal area that he describes as nausea at times.  He met with his gastroenterologist for his nonalcoholic fatty liver disease about a month ago and notes that all the lab testing there was normal as well as an ultrasound of his liver.  Furthermore, he had a CT urogram to evaluate and monitor his prostate cancer that was diagnosed in 2017 and this returned largely unremarkable just 3 months ago.  None of the testing that I did at our last visit together revealed anything and I am concerned that this may either be a gastritis or GERD issue or anxiety.  His gastroenterologist has recommended an upper GI endoscopy and about 2 weeks ago I started him on sertraline for anxiety.  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  He was supposed to have an MRI completed per neurosurgery about 6 months after the initial finding, however he has never completed this.  He is quick to point out that he is not having any headaches, visual changes, presyncopal episodes.            3/7/2024     9:46 AM 4/24/2024    10:37 AM  "5/8/2024    10:14 AM   PHQ   PHQ-9 Total Score 3 5 7   Q9: Thoughts of better off dead/self-harm past 2 weeks Not at all Not at all Not at all           1/31/2023    11:02 AM 8/30/2023     2:07 PM 5/8/2024    10:15 AM   BENSON-7 SCORE   Total Score   1 (minimal anxiety)   Total Score 2 5 1                   Objective    /62   Pulse 61   Temp 99  F (37.2  C) (Tympanic)   Resp 12   Ht 1.727 m (5' 8\")   Wt 77.1 kg (170 lb)   SpO2 97%   BMI 25.85 kg/m    Body mass index is 25.85 kg/m .  Physical Exam   GENERAL: alert and no distress            Signed Electronically by: Julio Guidry Jr, MD    "

## 2024-05-13 NOTE — PROGRESS NOTES
Visit conducted via real-time audio/video technology by Jaquan Latham PA-C to the patient in their home. Patient consented to this billed visit that was started at 11:28 AM and completed at 11:46 AM.    Subjective      REASON FOR VISIT  Gross hematuria follow-up    HISTORY OF PRESENT ILLNESS  Mr. Latham is a 73 year old male who I am speaking with today in follow-up for his history of gross hematuria and urethral stricture.  He was seen for this by Corrie Jackson on 2/15/2024, and recently underwent cystoscopy with urethral dilation with Optilume in the operating room with Dr. Chan on 3/18/2024, at which time it was also noted that there were no concerning findings in his bladder.  The plan after that procedure was to return to the clinic for cystoscopy 3 months later with Dr. Chan for reevaluation of the stricture that was dilated.    Today:  Since dilation on 3/19/24, has been peeing well but having some incontinence, unsure if stress or urge  Also some persistent nausea since the procedure    Objective      PHYSICAL EXAMINATION  Deferred given virtual visit.    LABS   Latest Reference Range & Units 04/24/24 11:55 05/01/24 17:45   Color Urine Colorless, Straw, Light Yellow, Yellow  Yellow Light Yellow   Appearance Urine Clear  Clear Clear   Glucose Urine Negative mg/dL Negative Negative   Bilirubin Urine Negative  Negative Negative   Ketones Urine Negative mg/dL Negative Trace !   Specific Gravity Urine 1.003 - 1.035  >=1.030 1.019   pH Urine 5.0 - 7.0  6.0 5.5   Protein Albumin Urine Negative mg/dL Negative Negative   Urobilinogen mg/dL Normal, 2.0 mg/dL  Normal   Urobilinogen Urine 0.2, 1.0 E.U./dL 0.2    Nitrite Urine Negative  Negative Negative   Blood Urine Negative  Trace ! Negative   Leukocyte Esterase Urine Negative  Negative Negative   WBC Urine <=5 /HPF 0-5 1   RBC Urine <=2 /HPF 0-2 3 (H)   Bacteria Urine None Seen /HPF Few !    Squamous Epithelial /LPF Urine None Seen /LPF Few !    Mucus  Urine None Seen /LPF  Present !   !: Data is abnormal  (H): Data is abnormally high    IMAGING  CT urogram results from 2/16/24    Narrative & Impression   EXAMINATION: CT UROGRAM WO & W CONTRAST, 2/16/2024 3:10 PM     TECHNIQUE:  Helical CT images from the lung bases through the  symphysis pubis were obtained  without and with contrast using CT  urogram protocol. Contrast dose: Isovue 370 90cc     COMPARISON: CT chest 10/9/2023, CT; CT CHEST W CONT ABDOMEN PELVIS W/O  & W CONT 11/23/2021     HISTORY: Gross hematuria, right flank pain, chills, Hx radiation, hx  stones; Gross hematuria; Chills     FINDINGS:     Urinary tract:   No renal masses, obstructing renal calculi, or hydronephrosis.  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 Simple renal cysts and  additional cortical/subcortical, too small to characterize  hypodensities, statistically simple cysts. Posterior inferior urinary  bladder filling defect seen on delayed 81, measuring up to 1.7 cm in  transverse dimension (series 10, image 81). No significant urinary  bladder wall thickening, however there are mild fat  stranding/inflammatory changes about the bladder. Postsurgical changes  of radical prostatectomy.      Remainder of the abdomen and pelvis:   Hepatobiliary, spleen, pancreas, adrenals: Calcific density of the  right hepatic lobe surface, unchanged. Ill-defined left hepatic lobe  hypodensity, too small to characterize, without significant change  compared to most recent prior comparison exam. No focal hepatic  masses. No intrahepatic biliary dilatation.  Gallbladder and common  bile duct within normal limits. Spleen within normal limits.  Pancreatic parenchymal fatty atrophy without mass or main pancreatic  ductal dilatation. Adrenal glands within normal limits.      Peritoneum, bowel, stomach, lymph nodes: No pneumoperitoneum or  ascites. Nondistended small bowel is within normal  limits. Colonic  diverticulosis. Appendix is unremarkable. Stomach within normal  limits. Scattered prominent mesenteric lymph nodes, for example left  mesenteric lymph node measuring 9 mm short axis (series 6, image 175)  Patent major abdominal vasculature and branching vessels with  scattered atherosclerotic calcifications.      Lower chest:    Mediastinum: Normal heart size. No pericardial fluid. No thoracic  lymphadenopathy. Esophagus is within normal limits.     Lungs: Pleural spaces are clear. No acute focal parenchymal  consolidations of the partially visualized lungs.     Bones and soft tissues:   No acute or aggressive appearing osseous abnormality. Chronic  degenerative changes of hips and spine. Soft tissues are within normal  limits.                                                                      IMPRESSION:      1. Irregular posteroinferior urinary bladder filling defect measuring  up to 1.7 cm with minimal inflammatory changes surrounding the  partially distended urinary bladder. Findings may represent irregular  acute versus chronic radiation cystitis. Less likely neoplastic  etiology given provided clinical history with some intravesicular  blood products.     2. Scattered prominent mesenteric lymph nodes, favoring reactive.     3. Stable postsurgical changes of radical prostatectomy without  evidence of focal disease recurrence or metastasis.     Assessment & Plan    Gross hematuria  Urethral stricture, proximal, s/p Optilume dilation on 3/18/24  Persistent nausea following the urethral dilation    It was my pleasure to speak with Mr. Latham in follow-up for his history of gross hematuria as well as his urethral stricture status post dilation with Optilume on 3/18/2024.  Overall, I am glad to hear that he is doing okay since his procedure, though I do not know necessarily what to make of the worsened incontinence after his dilation.  I will follow-up with Dr. Chan today in the clinic, but  this is something that he can ask about during his cystoscopy procedure as well.    We did discuss trialing a medication to potentially help with his incontinence, specifically oxybutynin 10 mg daily as it sounds like this could be urge incontinence, though it may have some stress component as well.  We discussed the main side effects of this medication include dry mouth, dry eyes, and constipation.  He has struggled with constipation more recently, and I recommended he rely on things like MiraLAX if he starts to get more constipated on the medication.    In regards to his persistent nausea, I do not have any good explanation for this as there is nothing specific about the procedure we did in the operating room that should lead to nausea.  This is something I believe he should speak with his primary care about as there are further tests that could be done to determine different causes of nausea.    We will determine longer-term follow-up after his repeat cystoscopy with Dr. Chan. Mr. Latham expressed understanding and agreement to the above discussion and plan and all of his questions were answered to his satisfaction.     PLAN  Trial of oxybutynin 10 mg daily for incontinence  Proceed with cystoscopy already scheduled in June with Dr. Chan  Discuss continued nausea with primary care    SIGNED    Jaquan Latham PA-C      I spent a total of 28 minutes spent on the date of the encounter doing chart review, history and exam, documentation, and further activities as noted above.

## 2024-05-15 ENCOUNTER — VIRTUAL VISIT (OUTPATIENT)
Dept: UROLOGY | Facility: CLINIC | Age: 74
End: 2024-05-15
Payer: COMMERCIAL

## 2024-05-15 DIAGNOSIS — N39.41 URGE INCONTINENCE: ICD-10-CM

## 2024-05-15 DIAGNOSIS — R11.0 NAUSEA: ICD-10-CM

## 2024-05-15 DIAGNOSIS — N99.114 POSTPROCEDURAL MALE URETHRAL STRICTURE: ICD-10-CM

## 2024-05-15 DIAGNOSIS — R31.0 GROSS HEMATURIA: Primary | ICD-10-CM

## 2024-05-15 PROCEDURE — 99214 OFFICE O/P EST MOD 30 MIN: CPT | Mod: 95 | Performed by: STUDENT IN AN ORGANIZED HEALTH CARE EDUCATION/TRAINING PROGRAM

## 2024-05-15 RX ORDER — OXYBUTYNIN CHLORIDE 10 MG/1
10 TABLET, EXTENDED RELEASE ORAL DAILY
Qty: 30 TABLET | Refills: 11 | Status: SHIPPED | OUTPATIENT
Start: 2024-05-15 | End: 2024-05-15

## 2024-05-15 RX ORDER — OXYBUTYNIN CHLORIDE 10 MG/1
10 TABLET, EXTENDED RELEASE ORAL DAILY
Qty: 90 TABLET | Refills: 3 | Status: SHIPPED | OUTPATIENT
Start: 2024-05-15 | End: 2024-06-24

## 2024-05-15 NOTE — LETTER
5/15/2024       RE: Juwan Latham  [address]  [place] [address]     Dear Colleague,    Thank you for referring your patient, Juwan Latham, to the [facility] [place] [facility]. Please see a copy of my visit note below.    Visit conducted via real-time audio/video technology by Jaquan Latham PA-C to the patient in their home. Patient consented to this billed visit that was started at 11:28 AM and completed at 11:46 AM.    Subjective     REASON FOR VISIT  Gross hematuria follow-up    HISTORY OF PRESENT ILLNESS  Mr. Latham is a 73 year old male who I am speaking with today in follow-up for his history of gross hematuria and urethral stricture.  He was seen for this by Corrie Jackson on 2/15/2024, and recently underwent cystoscopy with urethral dilation with Optilume in the operating room with Dr. Chan on 3/18/2024, at which time it was also noted that there were no concerning findings in his bladder.  The plan after that procedure was to return to the clinic for cystoscopy 3 months later with Dr. Chan for reevaluation of the stricture that was dilated.    Today:  Since dilation on 3/19/24, has been peeing well but having some incontinence, unsure if stress or urge  Also some persistent nausea since the procedure    Objective     PHYSICAL EXAMINATION  Deferred given virtual visit.    LABS   Latest Reference Range & Units 04/24/24 11:55 05/01/24 17:45   Color Urine Colorless, Straw, Light Yellow, Yellow  Yellow Light Yellow   Appearance Urine Clear  Clear Clear   Glucose Urine Negative mg/dL Negative Negative   Bilirubin Urine Negative  Negative Negative   Ketones Urine Negative mg/dL Negative Trace !   Specific Gravity Urine 1.003 - 1.035  >=1.030 1.019   pH Urine 5.0 - 7.0  6.0 5.5   Protein Albumin Urine Negative mg/dL Negative Negative   Urobilinogen mg/dL Normal, 2.0 mg/dL  Normal   Urobilinogen Urine 0.2, 1.0 E.U./dL  0.2    Nitrite Urine Negative  Negative Negative   Blood Urine Negative  Trace ! Negative   Leukocyte Esterase Urine Negative  Negative Negative   WBC Urine <=5 /HPF 0-5 1   RBC Urine <=2 /HPF 0-2 3 (H)   Bacteria Urine None Seen /HPF Few !    Squamous Epithelial /LPF Urine None Seen /LPF Few !    Mucus Urine None Seen /LPF  Present !   !: Data is abnormal  (H): Data is abnormally high    IMAGING  CT urogram results from 2/16/24    Narrative & Impression   EXAMINATION: CT UROGRAM WO & W CONTRAST, 2/16/2024 3:10 PM     TECHNIQUE:  Helical CT images from the lung bases through the  symphysis pubis were obtained  without and with contrast using CT  urogram protocol. Contrast dose: Isovue 370 90cc     COMPARISON: CT chest 10/9/2023, CT; CT CHEST W CONT ABDOMEN PELVIS W/O  & W CONT 11/23/2021     HISTORY: Gross hematuria, right flank pain, chills, Hx radiation, hx  stones; Gross hematuria; Chills     FINDINGS:     Urinary tract:   No renal masses, obstructing renal calculi, or hydronephrosis.  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 Simple renal cysts and  additional cortical/subcortical, too small to characterize  hypodensities, statistically simple cysts. Posterior inferior urinary  bladder filling defect seen on delayed 81, measuring up to 1.7 cm in  transverse dimension (series 10, image 81). No significant urinary  bladder wall thickening, however there are mild fat  stranding/inflammatory changes about the bladder. Postsurgical changes  of radical prostatectomy.      Remainder of the abdomen and pelvis:   Hepatobiliary, spleen, pancreas, adrenals: Calcific density of the  right hepatic lobe surface, unchanged. Ill-defined left hepatic lobe  hypodensity, too small to characterize, without significant change  compared to most recent prior comparison exam. No focal hepatic  masses. No intrahepatic biliary dilatation.  Gallbladder and common  bile duct  within normal limits. Spleen within normal limits.  Pancreatic parenchymal fatty atrophy without mass or main pancreatic  ductal dilatation. Adrenal glands within normal limits.      Peritoneum, bowel, stomach, lymph nodes: No pneumoperitoneum or  ascites. Nondistended small bowel is within normal limits. Colonic  diverticulosis. Appendix is unremarkable. Stomach within normal  limits. Scattered prominent mesenteric lymph nodes, for example left  mesenteric lymph node measuring 9 mm short axis (series 6, image 175)  Patent major abdominal vasculature and branching vessels with  scattered atherosclerotic calcifications.      Lower chest:    Mediastinum: Normal heart size. No pericardial fluid. No thoracic  lymphadenopathy. Esophagus is within normal limits.     Lungs: Pleural spaces are clear. No acute focal parenchymal  consolidations of the partially visualized lungs.     Bones and soft tissues:   No acute or aggressive appearing osseous abnormality. Chronic  degenerative changes of hips and spine. Soft tissues are within normal  limits.                                                                      IMPRESSION:      1. Irregular posteroinferior urinary bladder filling defect measuring  up to 1.7 cm with minimal inflammatory changes surrounding the  partially distended urinary bladder. Findings may represent irregular  acute versus chronic radiation cystitis. Less likely neoplastic  etiology given provided clinical history with some intravesicular  blood products.     2. Scattered prominent mesenteric lymph nodes, favoring reactive.     3. Stable postsurgical changes of radical prostatectomy without  evidence of focal disease recurrence or metastasis.     Assessment & Plan   Gross hematuria  Urethral stricture, proximal, s/p Optilume dilation on 3/18/24  Persistent nausea following the urethral dilation    It was my pleasure to speak with Mr. Latham in follow-up for his history of gross hematuria as well as  his urethral stricture status post dilation with Optilume on 3/18/2024.  Overall, I am glad to hear that he is doing okay since his procedure, though I do not know necessarily what to make of the worsened incontinence after his dilation.  I will follow-up with Dr. Chan today in the clinic, but this is something that he can ask about during his cystoscopy procedure as well.    We did discuss trialing a medication to potentially help with his incontinence, specifically oxybutynin 10 mg daily as it sounds like this could be urge incontinence, though it may have some stress component as well.  We discussed the main side effects of this medication include dry mouth, dry eyes, and constipation.  He has struggled with constipation more recently, and I recommended he rely on things like MiraLAX if he starts to get more constipated on the medication.    In regards to his persistent nausea, I do not have any good explanation for this as there is nothing specific about the procedure we did in the operating room that should lead to nausea.  This is something I believe he should speak with his primary care about as there are further tests that could be done to determine different causes of nausea.    We will determine longer-term follow-up after his repeat cystoscopy with Dr. Chan. Mr. Latham expressed understanding and agreement to the above discussion and plan and all of his questions were answered to his satisfaction.     PLAN  Trial of oxybutynin 10 mg daily for incontinence  Proceed with cystoscopy already scheduled in June with Dr. Chan  Discuss continued nausea with primary care    SIGNED    Jaquan Latham PA-C      I spent a total of 28 minutes spent on the date of the encounter doing chart review, history and exam, documentation, and further activities as noted above.

## 2024-05-15 NOTE — NURSING NOTE
Is the patient currently in the state of [place]? YES    Visit mode:VIDEO    If the visit is dropped, the patient can be reconnected by: VIDEO VISIT: Text to cell phone:   Telephone Information:   Mobile [phone]       Will anyone else be joining the visit? NO  (If patient encounters technical issues they should call [phone]    How would you like to obtain your AVS? MyChart    Are changes needed to the allergy or medication list? No, Pt stated no changes to allergies, and Pt stated no med changes    Are refills needed on medications prescribed by this physician? NO    Reason for visit: VALDEZ MORRIS

## 2024-05-16 ENCOUNTER — TELEPHONE (OUTPATIENT)
Dept: FAMILY MEDICINE | Facility: CLINIC | Age: 74
End: 2024-05-16
Payer: COMMERCIAL

## 2024-05-16 NOTE — TELEPHONE ENCOUNTER
Patient Returning Call    Reason for call:  PATIENT REQUESTING A CALL BACK REGARDING NAUSEA.    Information relayed to patient:  N/A    Patient has additional questions:  Yes    What are your questions/concerns:  YES    Could we send this information to you in [place] [place] or would you prefer to receive a phone call?:   Patient would prefer a phone call   Okay to leave a detailed message?: Yes at Cell number on file:    Telephone Information:   Mobile [phone]

## 2024-05-16 NOTE — TELEPHONE ENCOUNTER
"Chart reviewed.     Yesterday, urology told him to follow up with PCP regarding nausea.     Patient had office visit on 5/8 and nausea discussed  \"Nausea  Will refer him back to his gastroenterologist at [facility] (Dr. Kayla Clayton) to complete upper GI endoscopy. ...\"        Called and spoke with patient.      [facility] called to set up upper GI. Will be calling patient to schedule. Waiting on order, patient states has been done already.     .LUIS ENRIQUEZ RN on 5/16/2024 at 4:55 PM   [facility]          "

## 2024-05-20 ENCOUNTER — TRANSFERRED RECORDS (OUTPATIENT)
Dept: HEALTH INFORMATION MANAGEMENT | Facility: CLINIC | Age: 74
End: 2024-05-20

## 2024-05-21 ENCOUNTER — TRANSFERRED RECORDS (OUTPATIENT)
Dept: HEALTH INFORMATION MANAGEMENT | Facility: CLINIC | Age: 74
End: 2024-05-21
Payer: COMMERCIAL

## 2024-05-24 ENCOUNTER — TRANSFERRED RECORDS (OUTPATIENT)
Dept: HEALTH INFORMATION MANAGEMENT | Facility: CLINIC | Age: 74
End: 2024-05-24
Payer: COMMERCIAL

## 2024-05-28 ENCOUNTER — OFFICE VISIT (OUTPATIENT)
Dept: FAMILY MEDICINE | Facility: CLINIC | Age: 74
End: 2024-05-28
Attending: FAMILY MEDICINE
Payer: COMMERCIAL

## 2024-05-28 VITALS
TEMPERATURE: 97.6 F | HEIGHT: 68 IN | SYSTOLIC BLOOD PRESSURE: 118 MMHG | OXYGEN SATURATION: 99 % | HEART RATE: 69 BPM | WEIGHT: 175 LBS | DIASTOLIC BLOOD PRESSURE: 60 MMHG | RESPIRATION RATE: 16 BRPM | BODY MASS INDEX: 26.52 KG/M2

## 2024-05-28 DIAGNOSIS — C61 PROSTATE CANCER (H): ICD-10-CM

## 2024-05-28 DIAGNOSIS — R11.0 NAUSEA: Primary | ICD-10-CM

## 2024-05-28 DIAGNOSIS — F41.9 ANXIETY: ICD-10-CM

## 2024-05-28 DIAGNOSIS — I10 HYPERTENSION GOAL BP (BLOOD PRESSURE) < 140/90: Chronic | ICD-10-CM

## 2024-05-28 DIAGNOSIS — D18.02 CAVERNOUS HEMANGIOMA OF BRAIN (H): ICD-10-CM

## 2024-05-28 DIAGNOSIS — C34.11 MALIGNANT NEOPLASM OF UPPER LOBE OF RIGHT LUNG (H): ICD-10-CM

## 2024-05-28 DIAGNOSIS — R25.1 TREMOR OF BOTH OUTSTRETCHED HANDS: ICD-10-CM

## 2024-05-28 DIAGNOSIS — F33.0 MAJOR DEPRESSIVE DISORDER, RECURRENT EPISODE, MILD (H): ICD-10-CM

## 2024-05-28 DIAGNOSIS — R53.82 CHRONIC FATIGUE: ICD-10-CM

## 2024-05-28 DIAGNOSIS — K76.0 FATTY LIVER: ICD-10-CM

## 2024-05-28 PROBLEM — I50.9 CHF (CONGESTIVE HEART FAILURE) (H): Status: ACTIVE | Noted: 2024-05-28

## 2024-05-28 PROBLEM — I50.9 CHF (CONGESTIVE HEART FAILURE) (H): Status: RESOLVED | Noted: 2024-05-28 | Resolved: 2024-05-28

## 2024-05-28 LAB
ALBUMIN SERPL BCG-MCNC: 4.5 G/DL (ref 3.5–5.2)
ALP SERPL-CCNC: 48 U/L (ref 40–150)
ALT SERPL W P-5'-P-CCNC: 19 U/L (ref 0–70)
ANION GAP SERPL CALCULATED.3IONS-SCNC: 8 MMOL/L (ref 7–15)
AST SERPL W P-5'-P-CCNC: 21 U/L (ref 0–45)
BASOPHILS # BLD AUTO: 0 10E3/UL (ref 0–0.2)
BASOPHILS NFR BLD AUTO: 0 %
BILIRUB SERPL-MCNC: 0.3 MG/DL
BUN SERPL-MCNC: 18.7 MG/DL (ref 8–23)
CALCIUM SERPL-MCNC: 9.9 MG/DL (ref 8.8–10.2)
CHLORIDE SERPL-SCNC: 107 MMOL/L (ref 98–107)
CREAT SERPL-MCNC: 0.84 MG/DL (ref 0.67–1.17)
DEPRECATED HCO3 PLAS-SCNC: 29 MMOL/L (ref 22–29)
EGFRCR SERPLBLD CKD-EPI 2021: >90 ML/MIN/1.73M2
EOSINOPHIL # BLD AUTO: 0.1 10E3/UL (ref 0–0.7)
EOSINOPHIL NFR BLD AUTO: 1 %
ERYTHROCYTE [DISTWIDTH] IN BLOOD BY AUTOMATED COUNT: 13.8 % (ref 10–15)
GLUCOSE SERPL-MCNC: 79 MG/DL (ref 70–99)
HCT VFR BLD AUTO: 41.4 % (ref 40–53)
HGB BLD-MCNC: 13.6 G/DL (ref 13.3–17.7)
IMM GRANULOCYTES # BLD: 0 10E3/UL
IMM GRANULOCYTES NFR BLD: 1 %
LYMPHOCYTES # BLD AUTO: 0.8 10E3/UL (ref 0.8–5.3)
LYMPHOCYTES NFR BLD AUTO: 15 %
MCH RBC QN AUTO: 30.8 PG (ref 26.5–33)
MCHC RBC AUTO-ENTMCNC: 32.9 G/DL (ref 31.5–36.5)
MCV RBC AUTO: 94 FL (ref 78–100)
MONOCYTES # BLD AUTO: 0.6 10E3/UL (ref 0–1.3)
MONOCYTES NFR BLD AUTO: 11 %
MONOCYTES NFR BLD AUTO: NEGATIVE %
NEUTROPHILS # BLD AUTO: 3.8 10E3/UL (ref 1.6–8.3)
NEUTROPHILS NFR BLD AUTO: 72 %
PLATELET # BLD AUTO: 173 10E3/UL (ref 150–450)
POTASSIUM SERPL-SCNC: 4.7 MMOL/L (ref 3.4–5.3)
PROT SERPL-MCNC: 6.5 G/DL (ref 6.4–8.3)
RBC # BLD AUTO: 4.41 10E6/UL (ref 4.4–5.9)
SODIUM SERPL-SCNC: 144 MMOL/L (ref 135–145)
WBC # BLD AUTO: 5.3 10E3/UL (ref 4–11)

## 2024-05-28 PROCEDURE — 86308 HETEROPHILE ANTIBODY SCREEN: CPT | Performed by: FAMILY MEDICINE

## 2024-05-28 PROCEDURE — 36415 COLL VENOUS BLD VENIPUNCTURE: CPT | Performed by: FAMILY MEDICINE

## 2024-05-28 PROCEDURE — 85025 COMPLETE CBC W/AUTO DIFF WBC: CPT | Performed by: FAMILY MEDICINE

## 2024-05-28 PROCEDURE — 80053 COMPREHEN METABOLIC PANEL: CPT | Performed by: FAMILY MEDICINE

## 2024-05-28 PROCEDURE — 99215 OFFICE O/P EST HI 40 MIN: CPT | Performed by: FAMILY MEDICINE

## 2024-05-28 RX ORDER — ONDANSETRON 4 MG/1
4-8 TABLET, ORALLY DISINTEGRATING ORAL EVERY 8 HOURS PRN
Qty: 20 TABLET | Refills: 0 | Status: SHIPPED | OUTPATIENT
Start: 2024-05-28 | End: 2024-06-24

## 2024-05-28 RX ORDER — LORAZEPAM 0.5 MG/1
.5-1 TABLET ORAL EVERY 6 HOURS PRN
Qty: 4 TABLET | Refills: 0 | Status: SHIPPED | OUTPATIENT
Start: 2024-05-28 | End: 2024-06-24

## 2024-05-28 NOTE — PROGRESS NOTES
"[facility]  [address] [place] [address]  Office: [phone]   Fax:    [phone]      Assessment & Plan :       ICD-10-CM    1. Nausea - without vomiting  R11.0 NM Hepatobiliary Scan with GB EF and/or Pharm     Comprehensive metabolic panel     CBC with Platelets & Differential     Mononucleosis screen     LORazepam (ATIVAN) 0.5 MG tablet     MR Brain w/o & w Contrast     ondansetron (ZOFRAN ODT) 4 MG ODT tab     Comprehensive metabolic panel     CBC with Platelets & Differential     Mononucleosis screen      2. Chronic fatigue  R53.82 Mononucleosis screen     MR Brain w/o & w Contrast     Mononucleosis screen      3. s/p Malignant neoplasm of upper lobe of right lung (H)  C34.11 MR Brain w/o & w Contrast      4. h/o Prostate cancer (H) - s/p prostatectomy 2017  C61 MR Brain w/o & w Contrast      5. Cavernous hemangioma of brain (H)  D18.02 MR Brain w/o & w Contrast      6. Hypertension goal BP (blood pressure) < 140/90  I10       7. Fatty liver- combination of SINA/COE per Dr. Kayla Clayton- [facility]  K76.0       8. Major depressive disorder, recurrent episode, mild (H24)  F33.0 sertraline (ZOLOFT) 50 MG tablet      9. Anxiety  F41.9 sertraline (ZOLOFT) 50 MG tablet      10. Tremor of both outstretched hands- pt thinks this is new - noted on exam 5/28/2024  R25.1          Please, call our clinic or go to the ER immediately if signs or symptoms worsen or fail to improve as anticipated.     Review of prior external note(s) from - Outside records from [facility].   Review of external notes as documented elsewhere in note  Ordering of each unique test  Prescription drug management  50 minutes spent by me on the date of the encounter doing chart review, history and exam, documentation and further activities per the note       BMI:   Estimated body mass index is 26.61 kg/m  as calculated from the following:    Height as of this encounter: 1.727 m (5' 8\").    Weight as of " this encounter: 79.4 kg (175 lb).       MEDICATIONS:  Continue current medications without change  Regular exercise  See Patient Instructions    Return in about 2 weeks (around 6/11/2024) for with Dr. Guidry for follow-up on nausea and fatigue .    Future Appointments 5/28/2024 - 11/24/2024        Date Visit Type Length Department Provider     6/13/2024  2:20 PM OFFICE VISIT 20 min  FAMILY PRACTICE Romana Hampton PA-C    Location Instructions:     [facility] is located at [address], [address] Free parking is available; access the lot by turning north from [address] onto [address], then west onto [address].              6/19/2024  9:30 AM CYSTOSCOPY 15 min [facility] Mike Chan MD    Location Instructions:     The [facility] [place] ([facility]) is in a dense urban area with multiple transportation and parking options. You may wish to review options for  service and self-parking in more detail on the [facility] s website at [email or web address].&nbsp;                10/2/2024  9:40 AM CT CHEST W 20 min [facility] [place] [id number]    Location Instructions:     The [facility] [place] ([facility]) is in a dense urban area with multiple transportation and parking options. You may wish to review options for  service and self-parking in more detail on the [facility] website at [email or web address].               10/2/2024 10:30 AM RETURN CCSL 30 min  ONC SURGERY Carla Faulkner APRN CNS    Location Instructions:     Located in the [facility] [place] at [address] [address]. For parking options, enter the [facility] /arrival plaza from [facility] and attendants can assist you based on your needs.  parking is available for those with limited mobility M-F from 7 a.m. to 5 p.m. Due to short staffing, we are unable to offer  to all patients/visitors. Visit [email or web address] for more details.   Self-parking: [facility]: Located across from the main entrance, this is a convenient option for patients. Enter on [address]. Parking attendants available most hours to assist.  [facility] Ramp: Enter at the [address] [facility] SE entrance (one block north of the [facility] [place] main entrance). Do not enter the ramp from [facility] - this entrance is not staffed and is further from the [facility] [place] main entrance.  This appointment is in a hospital-based location.&nbsp; Before your visit, you may want to check with your insurance company for coverage and referral options, including cost differences between services provided in different clinic settings.&nbsp; For more information visit this link on the [employer] [place] Website:&nbsp; [email or web address]                            Love Kat MD  [facility] 73 year old male with hx of SINA/COE with diffuse fatty infiltration of the liver , presenting for the following health issues:  Recheck Medication    1. Nausea - without vomiting    2. Chronic fatigue    3. s/p Malignant neoplasm of upper lobe of right lung (H)    4. h/o Prostate cancer (H) - s/p prostatectomy 2017    5. Cavernous hemangioma of brain (H)    6. Hypertension goal BP (blood pressure) < 140/90    7. Fatty liver- combination of SINA/COE per Dr. Kayla Clayton- [facility]    8. Major depressive disorder, recurrent episode, mild (H24)    9. Anxiety    10. Tremor of both outstretched hands- pt thinks this is new - noted on exam 5/28/2024       Reviewed ED notes and Dr. Guidry's notes from earlier this month in detail.     History of Present Illness       Reason for visit:  Check    He eats 0-1 servings of fruits and vegetables daily.He consumes 0 sweetened beverage(s) daily.He exercises with enough effort to increase his heart rate 9 or less minutes per day.  He exercises with enough effort to increase his heart rate 3 or less days per week. He is  missing 1 dose(s) of medications per week.    Doesn't have his hearing aids with him today, so we spoke more loudly and I had him repeat back to me questions and answers to ensure understanding.     Continuing Nausea x 2 months  : on and off , but present almost every day - worse about 1 hour after eating.   With fatigue that comes and goes as well - sometimes related to another , sometimes not. No vomiting. No spitting up of blood at all.   Has seen [facility]- had negative abdominal CT and negative abdominal US and negative upper GI endoscopy per patient.  Hemp gummies - with CBD = helped his nausea, but are expensive.  Has had some darker stools within  the last month - on and off and did have last one yesterday .  No cara blood per rectum.      Pt had an appointment for an MRI of brain with and without contrast that Dr. Yoav Guidry, his primary MD had ordered on 4/26/2024, but pt didn't go as he didn't have $250 at the time and thought he had to pay that portion prior to that exam.  He's willing to get that done now.     US abd from 4/17/2024:   COMPARISON: CT 2/16/2024.     FINDINGS:  GALLBLADDER: The gallbladder is normal. No gallstones, wall  thickening, or pericholecystic fluid. Negative sonographic Cedeno's  sign.     BILE DUCTS: There is no biliary dilatation. The common duct measures 6  mm.     LIVER: Fatty liver. Mildly coarse echotexture of the liver. No focal  observation. Liver is 14.3 cm.     RIGHT KIDNEY: No hydronephrosis. Cyst at the lower right kidney  measuring 1.6 cm without specific imaging follow-up recommended.  Horseshoe kidney.     PANCREAS: The visualized portions of the pancreas are normal.     No ascites.                                                                      IMPRESSION:  Hepatic steatosis. Hepatomegaly. No focal hepatic lesion  identified.     ESTEFANY ISAAC MD     Rarely drinking alcohol now - [employer] - 1 beer a week now.  Had one last night. Strongly encouraged him  to have NO alcohol at all secondary to his fatty liver.     Hx of lung cancer s/p resection and hx of prostate cancer s/p prostatectomy.      The last 4 days has had really tight muscles on the right side of his neck. Slept funny and has been lying down and watching TV on his side as well.   No chest pain or discomfort. No shortness of breath with exertion or at rest.     Has had blood in his urine and had a urethral dilation procedure on 3/18/2024.   and is seeing Urology for that. Reviewed Dr. Latham's note from last visit on 5/15/2024. Following up with Urology again in 3 months.     Pt denies any cleaning chemical or yard chemical exposure except for bleach that he cleans with occasionally.      CBC RESULTS:   Recent Labs   Lab Test 05/01/24  1838   WBC 6.0   RBC 4.60   HGB 14.0   HCT 42.3   MCV 92   MCH 30.4   MCHC 33.1   RDW 13.2          Last Comprehensive Metabolic Panel:  Sodium   Date Value Ref Range Status   05/01/2024 137 135 - 145 mmol/L Final     Comment:     Reference intervals for this test were updated on 09/26/2023 to more accurately reflect our healthy population. There may be differences in the flagging of prior results with similar values performed with this method. Interpretation of those prior results can be made in the context of the updated reference intervals.    10/20/2020 138 133 - 144 mmol/L Final     Potassium   Date Value Ref Range Status   05/01/2024 3.6 3.4 - 5.3 mmol/L Final   10/17/2022 4.2 3.4 - 5.3 mmol/L Final   10/20/2020 4.4 3.4 - 5.3 mmol/L Final     Chloride   Date Value Ref Range Status   05/01/2024 102 98 - 107 mmol/L Final   10/17/2022 108 94 - 109 mmol/L Final   10/20/2020 105 94 - 109 mmol/L Final     Carbon Dioxide   Date Value Ref Range Status   10/20/2020 28 20 - 32 mmol/L Final     Carbon Dioxide (CO2)   Date Value Ref Range Status   05/01/2024 27 22 - 29 mmol/L Final   10/17/2022 25 20 - 32 mmol/L Final     Anion Gap   Date Value Ref Range Status    05/01/2024 8 7 - 15 mmol/L Final   10/17/2022 7 3 - 14 mmol/L Final   10/20/2020 5 3 - 14 mmol/L Final     Glucose   Date Value Ref Range Status   05/01/2024 126 (H) 70 - 99 mg/dL Final   10/17/2022 93 70 - 99 mg/dL Final   10/20/2020 90 70 - 99 mg/dL Final     Urea Nitrogen   Date Value Ref Range Status   05/01/2024 14.0 8.0 - 23.0 mg/dL Final   10/17/2022 13 7 - 30 mg/dL Final   10/20/2020 19 7 - 30 mg/dL Final     Creatinine   Date Value Ref Range Status   05/01/2024 0.92 0.67 - 1.17 mg/dL Final   04/07/2021 0.78 0.70 - 1.30 mg/dl Final     GFR Estimate   Date Value Ref Range Status   05/01/2024 88 >60 mL/min/1.73m2 Final   10/20/2020 >90 >60 mL/min/[1.73_m2] Final     Comment:     Non  GFR Calc  Starting 12/18/2018, serum creatinine based estimated GFR (eGFR) will be   calculated using the Chronic Kidney Disease Epidemiology Collaboration   (CKD-EPI) equation.       GFR, ESTIMATED POCT   Date Value Ref Range Status   10/09/2023 >60 >60 mL/min/1.73m2 Final     Calcium   Date Value Ref Range Status   05/01/2024 9.4 8.8 - 10.2 mg/dL Final   10/20/2020 9.4 8.5 - 10.1 mg/dL Final     Bilirubin Total   Date Value Ref Range Status   05/01/2024 0.4 <=1.2 mg/dL Final   10/20/2020 0.6 0.2 - 1.3 mg/dL Final     Alkaline Phosphatase   Date Value Ref Range Status   05/01/2024 57 40 - 150 U/L Final     Comment:     Reference intervals for this test were updated on 11/14/2023 to more accurately reflect our healthy population. There may be differences in the flagging of prior results with similar values performed with this method. Interpretation of those prior results can be made in the context of the updated reference intervals.   10/20/2020 68 40 - 150 U/L Final     ALT   Date Value Ref Range Status   05/01/2024 20 0 - 70 U/L Final     Comment:     Reference intervals for this test were updated on 6/12/2023 to more accurately reflect our healthy population. There may be differences in the flagging of prior  results with similar values performed with this method. Interpretation of those prior results can be made in the context of the updated reference intervals.     2021 32 0 - 78 U/L Final     AST   Date Value Ref Range Status   2024 19 0 - 45 U/L Final     Comment:     Reference intervals for this test were updated on 2023 to more accurately reflect our healthy population. There may be differences in the flagging of prior results with similar values performed with this method. Interpretation of those prior results can be made in the context of the updated reference intervals.   2021 21 0 - 37 U/L Final         Depression : anxiety is gone.  Taking sertaline 50mg daily and feels like he is where he wants to be.   How are you doing with your depression since your last visit? No change  Are you having other symptoms that might be associated with depression? No  Have you had a significant life event?  No   Are you feeling anxious or having panic attacks?   No  Do you have any concerns with your use of alcohol or other drugs? No      Is not smoking at all since  .  His roommate smokes , but not inside the home.     Social History     Tobacco Use    Smoking status: Former     Current packs/day: 0.00     Average packs/day: 2.0 packs/day for 20.0 years (40.0 ttl pk-yrs)     Types: Cigarettes     Start date: 1965     Quit date: 1985     Years since quittin.4     Passive exposure: Past    Smokeless tobacco: Never   Vaping Use    Vaping status: Never Used   Substance Use Topics    Alcohol use: Yes     Alcohol/week: 0.0 - 2.0 standard drinks of alcohol     Comment: had 2 beers 2 weeks ago    Drug use: Yes     Types: Marijuana     Comment: gummies twice a week         3/7/2024     9:46 AM 2024    10:37 AM 2024    10:14 AM   PHQ   PHQ-9 Total Score 3 5 7   Q9: Thoughts of better off dead/self-harm past 2 weeks Not at all Not at all Not at all         2023    11:02 AM 2023      2:07 PM 5/8/2024    10:15 AM   BENSON-7 SCORE   Total Score   1 (minimal anxiety)   Total Score 2 5 1         5/8/2024    10:14 AM   Last PHQ-9   1.  Little interest or pleasure in doing things 1   2.  Feeling down, depressed, or hopeless 1   3.  Trouble falling or staying asleep, or sleeping too much 1   4.  Feeling tired or having little energy 3   5.  Poor appetite or overeating 0   6.  Feeling bad about yourself 0   7.  Trouble concentrating 1   8.  Moving slowly or restless 0   Q9: Thoughts of better off dead/self-harm past 2 weeks 0   PHQ-9 Total Score 7         5/8/2024    10:15 AM   BENSON-7    1. Feeling nervous, anxious, or on edge 1   2. Not being able to stop or control worrying 0   3. Worrying too much about different things 0   4. Trouble relaxing 0   5. Being so restless that it is hard to sit still 0   6. Becoming easily annoyed or irritable 0   7. Feeling afraid, as if something awful might happen 0   BENSON-7 Total Score 1   If you checked any problems, how difficult have they made it for you to do your work, take care of things at home, or get along with other people? Not difficult at all       Suicide Assessment Five-step Evaluation and Treatment (SAFE-T)          Patient Active Problem List   Diagnosis    Anxiety    PAULINO (obstructive sleep apnea)    GERD (gastroesophageal reflux disease)    Hypertension goal BP (blood pressure) < 140/90    Benign neoplasm of descending colon    s/p Malignant neoplasm of upper lobe of right lung (H)    Overweight (BMI 25.0-29.9)    Cavernous hemangioma of brain (H)    Major depressive disorder, recurrent episode, mild (H24)    h/o Prostate cancer (H) - s/p prostatectomy 2017    Somatic dysfunction of pelvis region    Mixed incontinence urge and stress (male)(female)    Retinal detachment of left eye with single break    Postprocedural male urethral stricture    Gross hematuria    Numbness    Fatty liver    CARDIOVASCULAR SCREENING; LDL GOAL LESS THAN 130    Chronic  "bilateral low back pain without sciatica    Abnormal liver function tests    Tremor of both outstretched hands- pt thinks this is new - noted on exam 5/28/2024    Nausea - without vomiting    Chronic fatigue       Current Outpatient Medications   Medication Sig Dispense Refill    hydrocortisone 2.5 % ointment Apply topically 2 times daily 60 g 3    ibuprofen (ADVIL/MOTRIN) 200 MG tablet Take 200 mg by mouth every 4 hours as needed for pain      LORazepam (ATIVAN) 0.5 MG tablet Take 1-2 tablets (0.5-1 mg) by mouth every 6 hours as needed for anxiety (1 hour prior to MRI procedure) 4 tablet 0    multivitamin w/minerals (THERA-VIT-M) tablet Take 1 tablet by mouth daily      ondansetron (ZOFRAN ODT) 4 MG ODT tab Take 1-2 tablets (4-8 mg) by mouth every 8 hours as needed for nausea 20 tablet 0    oxyBUTYnin ER (DITROPAN XL) 10 MG 24 hr tablet Take 1 tablet (10 mg) by mouth daily 90 tablet 3    sertraline (ZOLOFT) 50 MG tablet Take 1 tablet (50 mg) by mouth daily 90 tablet 1    tacrolimus (PROTOPIC) 0.1 % external ointment Apply topically to the face, scalp and hands once or twice daily. May cover with vaseline. (Patient not taking: Reported on 5/28/2024) 60 g 3    Vitamin D, Cholecalciferol, 1000 units TABS Take 1 tablet by mouth daily (Patient not taking: Reported on 5/28/2024)          No Known Allergies       Review of Systems :   Constitutional, HEENT, cardiovascular, pulmonary, GI, , musculoskeletal, neuro, skin, endocrine and psych systems are negative, except as otherwise noted.      Objective    /60   Pulse 69   Temp 97.6  F (36.4  C)   Resp 16   Ht 1.727 m (5' 8\")   Wt 79.4 kg (175 lb)   SpO2 99%   BMI 26.61 kg/m    Body mass index is 26.61 kg/m .  Physical Exam   GENERAL: alert and no distress  EYES: Eyes grossly normal to inspection, PERRL and conjunctivae and sclerae normal  HENT: ear canals and TM's normal, nose and mouth without ulcers or lesions  NECK: no adenopathy, no asymmetry, masses, or " scars  RESP: lungs clear to auscultation - no rales, rhonchi or wheezes  CV: regular rate and rhythm, normal S1 S2, no S3 or S4, no murmur, click or rub, no peripheral edema  ABDOMEN: soft, nontender, no hepatosplenomegaly, no masses and bowel sounds normal  MS: no gross musculoskeletal defects noted, no edema  SKIN: no suspicious lesions or rashes  NEURO: Normal strength and tone, mentation intact and speech normal - PERRL, EOMI. Fundoscopic exam reveals sharp discs bilaterally, no evidence of papilledema. Visual fields are fully intact by confrontation.   Cranial nerves 2-12 are fully intact.     Muscle strength is 5/5 at the biceps, triceps, brachioradialis,  strength and finger spread as well as hip flexors and extendors, quadriceps, hamstrings, foot dorsi- and plantar flexion as well as extensor hallucis longus bilaterally.   Reflexes are brisk and symmetric at the biceps, triceps, brachioradialis, patellar, and Achilles tendons bilaterally.  Toes are down going bilaterally.   Gait is normal. Rhomberg is negative. No pronator drift.  Fine finger movements and finger-nose-finger fully intact. Visual fields fully intact by confrontation.    There is a fine tremor noted in the hands when arms are fully extended.  ? Early asterixis vs. Other. No true hand flap noted.   PSYCH: mentation appears normal, affect normal/bright    Admission on 05/01/2024, Discharged on 05/01/2024   Component Date Value Ref Range Status    Color Urine 05/01/2024 Light Yellow  Colorless, Straw, Light Yellow, Yellow Final    Appearance Urine 05/01/2024 Clear  Clear Final    Glucose Urine 05/01/2024 Negative  Negative mg/dL Final    Bilirubin Urine 05/01/2024 Negative  Negative Final    Ketones Urine 05/01/2024 Trace (A)  Negative mg/dL Final    Specific Gravity Urine 05/01/2024 1.019  1.003 - 1.035 Final    Blood Urine 05/01/2024 Negative  Negative Final    pH Urine 05/01/2024 5.5  5.0 - 7.0 Final    Protein Albumin Urine 05/01/2024  Negative  Negative mg/dL Final    Urobilinogen Urine 05/01/2024 Normal  Normal, 2.0 mg/dL Final    Nitrite Urine 05/01/2024 Negative  Negative Final    Leukocyte Esterase Urine 05/01/2024 Negative  Negative Final    Mucus Urine 05/01/2024 Present (A)  None Seen /LPF Final    RBC Urine 05/01/2024 3 (H)  <=2 /HPF Final    WBC Urine 05/01/2024 1  <=5 /HPF Final    Sodium 05/01/2024 137  135 - 145 mmol/L Final    Reference intervals for this test were updated on 09/26/2023 to more accurately reflect our healthy population. There may be differences in the flagging of prior results with similar values performed with this method. Interpretation of those prior results can be made in the context of the updated reference intervals.     Potassium 05/01/2024 3.6  3.4 - 5.3 mmol/L Final    Carbon Dioxide (CO2) 05/01/2024 27  22 - 29 mmol/L Final    Anion Gap 05/01/2024 8  7 - 15 mmol/L Final    Urea Nitrogen 05/01/2024 14.0  8.0 - 23.0 mg/dL Final    Creatinine 05/01/2024 0.92  0.67 - 1.17 mg/dL Final    GFR Estimate 05/01/2024 88  >60 mL/min/1.73m2 Final    Calcium 05/01/2024 9.4  8.8 - 10.2 mg/dL Final    Chloride 05/01/2024 102  98 - 107 mmol/L Final    Glucose 05/01/2024 126 (H)  70 - 99 mg/dL Final    Alkaline Phosphatase 05/01/2024 57  40 - 150 U/L Final    Reference intervals for this test were updated on 11/14/2023 to more accurately reflect our healthy population. There may be differences in the flagging of prior results with similar values performed with this method. Interpretation of those prior results can be made in the context of the updated reference intervals.    AST 05/01/2024 19  0 - 45 U/L Final    Reference intervals for this test were updated on 6/12/2023 to more accurately reflect our healthy population. There may be differences in the flagging of prior results with similar values performed with this method. Interpretation of those prior results can be made in the context of the updated reference intervals.     ALT 05/01/2024 20  0 - 70 U/L Final    Reference intervals for this test were updated on 6/12/2023 to more accurately reflect our healthy population. There may be differences in the flagging of prior results with similar values performed with this method. Interpretation of those prior results can be made in the context of the updated reference intervals.      Protein Total 05/01/2024 6.6  6.4 - 8.3 g/dL Final    Albumin 05/01/2024 4.4  3.5 - 5.2 g/dL Final    Bilirubin Total 05/01/2024 0.4  <=1.2 mg/dL Final    Lactic Acid 05/01/2024 0.8  0.7 - 2.0 mmol/L Final    Culture 05/01/2024 No Growth   Final    Culture 05/01/2024 No Growth   Final    Troponin T, High Sensitivity 05/01/2024 8  <=22 ng/L Final    Either a High Sensitivity Troponin T baseline (0 hours) value = 100 ng/L, or an increase in High Sensitivity Troponin T = 7 ng/L at 2 hours compared to 0 hours (2-0 hours), suggests myocardial injury, and urgent clinical attention is required.    If the 2-0 hours increase is <7 ng/L, a High Sensitivity Troponin T result above gender-specific reference ranges warrants further evaluation.   Recommendations for further evaluation include correlation with clinical decision-making tool (e.g., HEART), a 3rd High Sensitivity Troponin T test 2 hours after the 2nd (a 20% change from baseline would represent concern), admission for observation, close PCC/cardiology follow-up, or urgent outpatient provocative testing.    WBC Count 05/01/2024 6.0  4.0 - 11.0 10e3/uL Final    RBC Count 05/01/2024 4.60  4.40 - 5.90 10e6/uL Final    Hemoglobin 05/01/2024 14.0  13.3 - 17.7 g/dL Final    Hematocrit 05/01/2024 42.3  40.0 - 53.0 % Final    MCV 05/01/2024 92  78 - 100 fL Final    MCH 05/01/2024 30.4  26.5 - 33.0 pg Final    MCHC 05/01/2024 33.1  31.5 - 36.5 g/dL Final    RDW 05/01/2024 13.2  10.0 - 15.0 % Final    Platelet Count 05/01/2024 180  150 - 450 10e3/uL Final    % Neutrophils 05/01/2024 76  % Final    % Lymphocytes  05/01/2024 14  % Final    % Monocytes 05/01/2024 8  % Final    % Eosinophils 05/01/2024 1  % Final    % Basophils 05/01/2024 0  % Final    % Immature Granulocytes 05/01/2024 1  % Final    NRBCs per 100 WBC 05/01/2024 0  <1 /100 Final    Absolute Neutrophils 05/01/2024 4.7  1.6 - 8.3 10e3/uL Final    Absolute Lymphocytes 05/01/2024 0.8  0.8 - 5.3 10e3/uL Final    Absolute Monocytes 05/01/2024 0.5  0.0 - 1.3 10e3/uL Final    Absolute Eosinophils 05/01/2024 0.1  0.0 - 0.7 10e3/uL Final    Absolute Basophils 05/01/2024 0.0  0.0 - 0.2 10e3/uL Final    Absolute Immature Granulocytes 05/01/2024 0.0  <=0.4 10e3/uL Final    Absolute NRBCs 05/01/2024 0.0  10e3/uL Final    Hold Specimen 05/01/2024 [facility]   Final    Hold Specimen 05/01/2024 [facility]   Final

## 2024-05-28 NOTE — PATIENT INSTRUCTIONS
" [facility]  [address] [place] [address]  Office: [phone]   Fax:    [phone]     Return in about 2 weeks (around 6/11/2024) for with Dr. Guidry for follow-up on nausea and fatigue .     Radiology will call you to schedule the MRI brain with  and without contrast. I've reordered this in Dr Guidry's name.  I've also ordered a HIDA scan of your gallbladder to ensure that is not causing your nausea .     Please, call our clinic or go to the ER immediately if signs or symptoms worsen or fail to improve as anticipated.     Thank you so much or choosing [facility]  for your Health Care. It was a pleasure seeing you at your visit today! Please contact us with any questions or concerns you may have.                   Love Kat MD                              To reach your [facility] care team after hours call:   [phone] press #2 \"to speak with your care team\".  This will get you to our clinic instead of routing to central [facility]  scheduling.     PLEASE NOTE OUR HOURS HAVE CHANGED secondary to COVID-19 coronavirus pandemic, as we are trying to minimize patient exposure to the virus,  which is now widespread in the [facility].  These hours may change with very little notice.  We apologize for any inconvenience.       Our current clinic hours are:          Monday- Thursday   7:00am - 6:00pm  in person.      Friday  7:00am- 5:00pm                       Saturday and Sunday : Closed to in person and virtual visits        We have telephone and virtual visit times available between    7:00am - 6pm on Monday-Friday as well.                                                Phone:  [phone]      Our pharmacy hours: Monday through Friday 8:00am to 5:00pm                        Saturday - 9:00 am to 12 noon       Sunday : Closed.              Phone:  [phone]              ###  Please note: at this time " we are not accepting any walk-in visits. ###      There is also information available at our web site:  [email or web address]    If your provider ordered any lab tests and you do not receive the results within 10 business days, please call the clinic.    If you need a medication refill please contact your pharmacy.  Please allow 3 business days for your refill to be completed.    Our clinic offers telephone visits and e visits.  Please ask one of your team members to explain more.      Use [employer] (secure email communication and access to your chart) to send your primary care provider a message or make an appointment. Ask someone on your Team how to sign up for [employer].

## 2024-05-30 ENCOUNTER — PRE VISIT (OUTPATIENT)
Dept: UROLOGY | Facility: CLINIC | Age: 74
End: 2024-05-30
Payer: COMMERCIAL

## 2024-05-30 NOTE — TELEPHONE ENCOUNTER
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

## 2024-06-04 ENCOUNTER — HOSPITAL ENCOUNTER (OUTPATIENT)
Dept: NUCLEAR MEDICINE | Facility: CLINIC | Age: 74
Setting detail: NUCLEAR MEDICINE
Discharge: HOME | End: 2024-06-04
Attending: FAMILY MEDICINE | Admitting: FAMILY MEDICINE
Payer: COMMERCIAL

## 2024-06-04 DIAGNOSIS — R11.0 NAUSEA: ICD-10-CM

## 2024-06-04 PROCEDURE — A9537 TC99M MEBROFENIN: HCPCS | Performed by: FAMILY MEDICINE

## 2024-06-04 PROCEDURE — 343N000001 HC RX 343: Performed by: FAMILY MEDICINE

## 2024-06-04 PROCEDURE — 78227 HEPATOBIL SYST IMAGE W/DRUG: CPT

## 2024-06-04 RX ORDER — KIT FOR THE PREPARATION OF TECHNETIUM TC 99M MEBROFENIN 45 MG/10ML
6 INJECTION, POWDER, LYOPHILIZED, FOR SOLUTION INTRAVENOUS ONCE
Status: COMPLETED | OUTPATIENT
Start: 2024-06-04 | End: 2024-06-04

## 2024-06-04 RX ADMIN — MEBROFENIN 6 MILLICURIE: 45 INJECTION, POWDER, LYOPHILIZED, FOR SOLUTION INTRAVENOUS at 08:14

## 2024-06-10 PROCEDURE — 82274 ASSAY TEST FOR BLOOD FECAL: CPT | Performed by: PHYSICIAN ASSISTANT

## 2024-06-13 ENCOUNTER — OFFICE VISIT (OUTPATIENT)
Dept: FAMILY MEDICINE | Facility: CLINIC | Age: 74
End: 2024-06-13
Payer: COMMERCIAL

## 2024-06-13 VITALS
TEMPERATURE: 96.7 F | WEIGHT: 172.9 LBS | OXYGEN SATURATION: 98 % | SYSTOLIC BLOOD PRESSURE: 130 MMHG | HEART RATE: 70 BPM | HEIGHT: 68 IN | DIASTOLIC BLOOD PRESSURE: 60 MMHG | BODY MASS INDEX: 26.21 KG/M2 | RESPIRATION RATE: 14 BRPM

## 2024-06-13 DIAGNOSIS — K76.0 FATTY LIVER: ICD-10-CM

## 2024-06-13 DIAGNOSIS — K82.8 GALLBLADDER DILATATION: Primary | ICD-10-CM

## 2024-06-13 DIAGNOSIS — R10.11 RIGHT UPPER QUADRANT PAIN: ICD-10-CM

## 2024-06-13 DIAGNOSIS — K83.8 BILIARY TRACT DISTENTION: ICD-10-CM

## 2024-06-13 DIAGNOSIS — R53.83 OTHER FATIGUE: ICD-10-CM

## 2024-06-13 DIAGNOSIS — K29.00 ACUTE GASTRITIS WITHOUT HEMORRHAGE, UNSPECIFIED GASTRITIS TYPE: ICD-10-CM

## 2024-06-13 DIAGNOSIS — R11.0 NAUSEA: ICD-10-CM

## 2024-06-13 DIAGNOSIS — R61 NIGHT SWEATS: ICD-10-CM

## 2024-06-13 DIAGNOSIS — M25.511 ACUTE PAIN OF RIGHT SHOULDER: ICD-10-CM

## 2024-06-13 DIAGNOSIS — R19.5 DARK STOOLS: ICD-10-CM

## 2024-06-13 LAB
ALBUMIN UR-MCNC: NEGATIVE MG/DL
APPEARANCE UR: CLEAR
BASOPHILS # BLD AUTO: 0 10E3/UL (ref 0–0.2)
BASOPHILS NFR BLD AUTO: 0 %
BILIRUB UR QL STRIP: NEGATIVE
COLOR UR AUTO: YELLOW
EOSINOPHIL # BLD AUTO: 0.1 10E3/UL (ref 0–0.7)
EOSINOPHIL NFR BLD AUTO: 1 %
ERYTHROCYTE [DISTWIDTH] IN BLOOD BY AUTOMATED COUNT: 13.8 % (ref 10–15)
ERYTHROCYTE [SEDIMENTATION RATE] IN BLOOD BY WESTERGREN METHOD: 8 MM/HR (ref 0–20)
GLUCOSE UR STRIP-MCNC: NEGATIVE MG/DL
HCT VFR BLD AUTO: 42.1 % (ref 40–53)
HGB BLD-MCNC: 14.1 G/DL (ref 13.3–17.7)
HGB UR QL STRIP: NEGATIVE
IMM GRANULOCYTES # BLD: 0 10E3/UL
IMM GRANULOCYTES NFR BLD: 0 %
KETONES UR STRIP-MCNC: NEGATIVE MG/DL
LEUKOCYTE ESTERASE UR QL STRIP: NEGATIVE
LYMPHOCYTES # BLD AUTO: 0.8 10E3/UL (ref 0.8–5.3)
LYMPHOCYTES NFR BLD AUTO: 9 %
MCH RBC QN AUTO: 30.7 PG (ref 26.5–33)
MCHC RBC AUTO-ENTMCNC: 33.5 G/DL (ref 31.5–36.5)
MCV RBC AUTO: 92 FL (ref 78–100)
MONOCYTES # BLD AUTO: 0.8 10E3/UL (ref 0–1.3)
MONOCYTES NFR BLD AUTO: 9 %
NEUTROPHILS # BLD AUTO: 7.2 10E3/UL (ref 1.6–8.3)
NEUTROPHILS NFR BLD AUTO: 81 %
NITRATE UR QL: NEGATIVE
PH UR STRIP: 7.5 [PH] (ref 5–7)
PLATELET # BLD AUTO: 172 10E3/UL (ref 150–450)
RBC # BLD AUTO: 4.6 10E6/UL (ref 4.4–5.9)
SP GR UR STRIP: 1.02 (ref 1–1.03)
UROBILINOGEN UR STRIP-ACNC: 0.2 E.U./DL
WBC # BLD AUTO: 8.9 10E3/UL (ref 4–11)

## 2024-06-13 PROCEDURE — 85025 COMPLETE CBC W/AUTO DIFF WBC: CPT | Performed by: PHYSICIAN ASSISTANT

## 2024-06-13 PROCEDURE — 80076 HEPATIC FUNCTION PANEL: CPT | Performed by: PHYSICIAN ASSISTANT

## 2024-06-13 PROCEDURE — 85652 RBC SED RATE AUTOMATED: CPT | Performed by: PHYSICIAN ASSISTANT

## 2024-06-13 PROCEDURE — 82977 ASSAY OF GGT: CPT | Performed by: PHYSICIAN ASSISTANT

## 2024-06-13 PROCEDURE — 36415 COLL VENOUS BLD VENIPUNCTURE: CPT | Performed by: PHYSICIAN ASSISTANT

## 2024-06-13 PROCEDURE — 86140 C-REACTIVE PROTEIN: CPT | Performed by: PHYSICIAN ASSISTANT

## 2024-06-13 PROCEDURE — 99215 OFFICE O/P EST HI 40 MIN: CPT | Performed by: PHYSICIAN ASSISTANT

## 2024-06-13 PROCEDURE — 81003 URINALYSIS AUTO W/O SCOPE: CPT | Performed by: PHYSICIAN ASSISTANT

## 2024-06-13 RX ORDER — TIZANIDINE 2 MG/1
2-4 TABLET ORAL 3 TIMES DAILY PRN
Qty: 40 TABLET | Refills: 0 | Status: SHIPPED | OUTPATIENT
Start: 2024-06-13 | End: 2024-06-24

## 2024-06-13 RX ORDER — ONDANSETRON 4 MG/1
4 TABLET, FILM COATED ORAL EVERY 8 HOURS PRN
COMMUNITY
Start: 2024-05-13 | End: 2024-06-24

## 2024-06-13 RX ORDER — OMEPRAZOLE 40 MG/1
40 CAPSULE, DELAYED RELEASE ORAL DAILY
COMMUNITY
Start: 2024-05-24 | End: 2024-06-24

## 2024-06-13 RX ORDER — PANTOPRAZOLE SODIUM 20 MG/1
40 TABLET, DELAYED RELEASE ORAL
Qty: 60 TABLET | Refills: 1 | Status: SHIPPED | OUTPATIENT
Start: 2024-06-13 | End: 2024-08-21

## 2024-06-13 RX ORDER — SUCRALFATE 1 G/1
1 TABLET ORAL 4 TIMES DAILY PRN
Qty: 120 TABLET | Refills: 1 | Status: SHIPPED | OUTPATIENT
Start: 2024-06-13 | End: 2024-08-21

## 2024-06-13 ASSESSMENT — ANXIETY QUESTIONNAIRES
6. BECOMING EASILY ANNOYED OR IRRITABLE: NOT AT ALL
2. NOT BEING ABLE TO STOP OR CONTROL WORRYING: NOT AT ALL
GAD7 TOTAL SCORE: 3
IF YOU CHECKED OFF ANY PROBLEMS ON THIS QUESTIONNAIRE, HOW DIFFICULT HAVE THESE PROBLEMS MADE IT FOR YOU TO DO YOUR WORK, TAKE CARE OF THINGS AT HOME, OR GET ALONG WITH OTHER PEOPLE: NOT DIFFICULT AT ALL
7. FEELING AFRAID AS IF SOMETHING AWFUL MIGHT HAPPEN: SEVERAL DAYS
3. WORRYING TOO MUCH ABOUT DIFFERENT THINGS: SEVERAL DAYS
5. BEING SO RESTLESS THAT IT IS HARD TO SIT STILL: NOT AT ALL
8. IF YOU CHECKED OFF ANY PROBLEMS, HOW DIFFICULT HAVE THESE MADE IT FOR YOU TO DO YOUR WORK, TAKE CARE OF THINGS AT HOME, OR GET ALONG WITH OTHER PEOPLE?: NOT DIFFICULT AT ALL
4. TROUBLE RELAXING: NOT AT ALL
GAD7 TOTAL SCORE: 3
7. FEELING AFRAID AS IF SOMETHING AWFUL MIGHT HAPPEN: SEVERAL DAYS
1. FEELING NERVOUS, ANXIOUS, OR ON EDGE: SEVERAL DAYS
GAD7 TOTAL SCORE: 3

## 2024-06-13 ASSESSMENT — PATIENT HEALTH QUESTIONNAIRE - PHQ9
SUM OF ALL RESPONSES TO PHQ QUESTIONS 1-9: 7
10. IF YOU CHECKED OFF ANY PROBLEMS, HOW DIFFICULT HAVE THESE PROBLEMS MADE IT FOR YOU TO DO YOUR WORK, TAKE CARE OF THINGS AT HOME, OR GET ALONG WITH OTHER PEOPLE: SOMEWHAT DIFFICULT
SUM OF ALL RESPONSES TO PHQ QUESTIONS 1-9: 7

## 2024-06-13 NOTE — PATIENT INSTRUCTIONS
Stop omeprazole and replace with pantoprazole twice daily before meals.    Use carafate 4x/daily as needed for nausea    Complete FIT test (stool card asap)    Schedule CT scan    If all normal plan to follow up with general surgery to discuss next steps - OK to schedule now for next week

## 2024-06-13 NOTE — PROGRESS NOTES
Assessment & Plan     Gallbladder dilatation  Biliary tract distention  Right upper quadrant pain  Acute pain of right shoulder  Other fatigue  Night sweats  Fatty liver  Stat CT chest abdomen pelvis ordered due to vagueness of symptoms.  Thankfully, CT quite reassuring with the exception of biliary dilation.  Referred to general surgery due to gallbladder dilation, biliary tract distention and right upper quadrant pain that all are consistent with gallbladder pathology.  - Adult General Surg Referral  - UA Macroscopic with reflex to Microscopic and Culture - Lab Collect  - CT Chest (PE) Abdomen Pelvis w Contrast  - tiZANidine (ZANAFLEX) 2 MG tablet  Dispense: 40 tablet; Refill: 0  - ESR: Erythrocyte sedimentation rate  - CRP, inflammation      Nausea  Acute gastritis without hemorrhage, unspecified gastritis type  Dark stools  Persistent and  significant postprandial nausea with concerns for persistent gastritis despite omeprazole.  Will change to pantoprazole and encourage patient to use Carafate 4 times daily scheduled for the next few days and then taper to as needed use.  Fit test to rule out quiescent bleed.  - pantoprazole (PROTONIX) 20 MG EC tablet  Dispense: 60 tablet; Refill: 1  - sucralfate (CARAFATE) 1 GM tablet  Dispense: 120 tablet; Refill: 1  - Adult General Surg Referral  - UA Macroscopic with reflex to Microscopic and Culture - Lab Collect  - Fecal colorectal cancer screen (FIT)      Review of prior external note(s) from - Outside records from [facility]  43 minutes spent by me on the date of the encounter doing chart review, history and exam, documentation and further activities per the note  Return in about 1 day (around 6/14/2024) for CT.      Romana Hampton MBA, MS, PA-C  [facility] [place]      Izabella Echeverria is a 73 year old, presenting for the following health issues:  RECHECK        6/13/2024     1:42 PM   Additional Questions   Roomed by Barb LAST   Accompanied by Self      History of Present Illness       Reason for visit:  Checking in    He eats 0-1 servings of fruits and vegetables daily.He consumes 1 sweetened beverage(s) daily.He exercises with enough effort to increase his heart rate 10 to 19 minutes per day.  He exercises with enough effort to increase his heart rate 3 or less days per week.   He is taking medications regularly.     **Patient Summary:**    **Chief Complaint:**  - Persistent nausea    **Current Symptoms:**  - Nausea present almost daily for 2 months, worse about 1 hour after eating.  - Intermittent fatigue, sometimes associated with nausea.  - Darker stools occasionally, but no cara blood per rectum.  - No vomiting or hematemesis.    **Recent Medical History:**  - Seen by Dr. Kat on 5/28/2024 for ongoing nausea.  - Negative abdominal CT, abdominal ultrasound, and upper GI endoscopy as per the patient.  - Previous ER visit on 5/1/2024 for fatigue, nausea, and shortness of breath; workup negative for infection and emergent pathology.  - Last seen by Dr. Guidry on 5/8/2024; advised possible gastritis, GERD, or anxiety as causes of symptoms.  - Endoscopy on 5/24/2024 showed non-erosive reactive gastropathy; started on omeprazole 40 mg daily with no improvement in nausea after 3 days.    **Imaging and Tests:**  - Abdominal ultrasound (4/17/2024): Showed hepatic steatosis and hepatomegaly.  - Hepatobiliary scan (6/4/2024): Negative for cholecystitis and biliary dyskinesia.  - CT abdomen/pelvis with contrast (5/20/2024): Showed horseshoe kidney with non-obstructive renal calculus and mild hepatic steatosis.  - Liver ultrasound (4/8/2022): No focal hepatic mass.  - MRI of the brain with and without contrast ordered by Dr. Guidry on 4/26/2024, but not completed due to cost; patient now willing to undergo MRI.    **Social History:**  - Rare alcohol consumption (1 beer per week).  - Previously used hemp gummies with CBD for nausea relief but found them too  "expensive.    **Past Medical History:**  - Lung cancer, status post resection.  - Prostate cancer, status post prostatectomy (2017).  - History of non-alcoholic fatty liver disease.  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    **Medications:**  - Omeprazole 40 mg daily (recently started - x 2 days - no improvement in nausea)  - Sertraline started for anxiety.      Review of Systems  Constitutional, HEENT, cardiovascular, pulmonary, GI, , musculoskeletal, neuro, skin, endocrine and psych systems are negative, except as otherwise noted.      Objective    /60   Pulse 70   Temp (!) 96.7  F (35.9  C) (Tympanic)   Resp 14   Ht 1.727 m (5' 8\")   Wt 78.4 kg (172 lb 14.4 oz)   SpO2 98%   BMI 26.29 kg/m    Body mass index is 26.29 kg/m .  Physical Exam   GENERAL: alert and no distress  EYES: Eyes grossly normal to inspection, PERRL and conjunctivae and sclerae normal  RESP: lungs clear to auscultation - no rales, rhonchi or wheezes  CV: regular rate and rhythm, normal S1 S2, no S3 or S4, no murmur, click or rub, no peripheral edema  ABDOMEN: soft, epigastric and right upper quadrant tenderness with guarding no hepatosplenomegaly, no masses and bowel sounds normal  MS: no gross musculoskeletal defects noted, no edema  SKIN: no suspicious lesions or rashes  NEURO: Normal strength and tone, mentation intact and speech normal  PSYCH: mentation appears normal, affect normal/bright    Results for orders placed or performed in visit on 06/13/24   Fecal colorectal cancer screen (FIT)     Status: Abnormal   Result Value Ref Range    Occult Blood Screen FIT Positive (A) Negative   GGT     Status: Normal   Result Value Ref Range    GGT 20 8 - 61 U/L   Hepatic panel (Albumin, ALT, AST, Bili, Alk Phos, TP)     Status: Normal   Result Value Ref Range    Protein Total 7.1 6.4 - 8.3 g/dL    Albumin 4.7 3.5 - 5.2 g/dL    Bilirubin Total 0.4 <=1.2 mg/dL    Alkaline " Phosphatase 51 40 - 150 U/L    AST 24 0 - 45 U/L    ALT 23 0 - 70 U/L    Bilirubin Direct <0.20 0.00 - 0.30 mg/dL   ESR: Erythrocyte sedimentation rate     Status: Normal   Result Value Ref Range    Erythrocyte Sedimentation Rate 8 0 - 20 mm/hr   CRP, inflammation     Status: Abnormal   Result Value Ref Range    CRP Inflammation 6.64 (H) <5.00 mg/L   UA Macroscopic with reflex to Microscopic and Culture - Lab Collect     Status: Abnormal    Specimen: Urine, Midstream   Result Value Ref Range    Color Urine Yellow Colorless, Straw, Light Yellow, Yellow    Appearance Urine Clear Clear    Glucose Urine Negative Negative mg/dL    Bilirubin Urine Negative Negative    Ketones Urine Negative Negative mg/dL    Specific Gravity Urine 1.020 1.003 - 1.035    Blood Urine Negative Negative    pH Urine 7.5 (H) 5.0 - 7.0    Protein Albumin Urine Negative Negative mg/dL    Urobilinogen Urine 0.2 0.2, 1.0 E.U./dL    Nitrite Urine Negative Negative    Leukocyte Esterase Urine Negative Negative    Narrative    Microscopic not indicated   CBC with platelets and differential     Status: None   Result Value Ref Range    WBC Count 8.9 4.0 - 11.0 10e3/uL    RBC Count 4.60 4.40 - 5.90 10e6/uL    Hemoglobin 14.1 13.3 - 17.7 g/dL    Hematocrit 42.1 40.0 - 53.0 %    MCV 92 78 - 100 fL    MCH 30.7 26.5 - 33.0 pg    MCHC 33.5 31.5 - 36.5 g/dL    RDW 13.8 10.0 - 15.0 %    Platelet Count 172 150 - 450 10e3/uL    % Neutrophils 81 %    % Lymphocytes 9 %    % Monocytes 9 %    % Eosinophils 1 %    % Basophils 0 %    % Immature Granulocytes 0 %    Absolute Neutrophils 7.2 1.6 - 8.3 10e3/uL    Absolute Lymphocytes 0.8 0.8 - 5.3 10e3/uL    Absolute Monocytes 0.8 0.0 - 1.3 10e3/uL    Absolute Eosinophils 0.1 0.0 - 0.7 10e3/uL    Absolute Basophils 0.0 0.0 - 0.2 10e3/uL    Absolute Immature Granulocytes 0.0 <=0.4 10e3/uL   CBC with platelets and differential     Status: None    Narrative    The following orders were created for panel order CBC with  platelets and differential.  Procedure                               Abnormality         Status                     ---------                               -----------         ------                     CBC with platelets and d...[[id number]]                      Final result                 Please view results for these tests on the individual orders.         Recent Results (from the past 744 hour(s))   NM Hepatobiliary Scan with GB EF and/or Pharm    Narrative    EXAM: NM HEPATOBILIARY SCAN WITH GB EF AND/OR PHARM  LOCATION: [facility]  DATE: 6/4/2024    INDICATION: nausea   fairly constant , but worse 1 hour after eating  COMPARISON: None.  TECHNIQUE: 6.0 mCi of technetium-99m mebrofenin, IV. Anterior planar imaging of the abdomen. 8 oz Nepro PO. Gallbladder imaging for 30-60 minutes.    FINDINGS: Normal radionuclide activity in liver, gallbladder, bile ducts, and small bowel. No evidence of cystic or common duct obstruction or intrinsic liver disease. Gallbladder ejection fraction measures 81% (Normal range = 35% or greater).      Impression    IMPRESSION:     Negative for cholecystitis and biliary dyskinesia.    CT Chest (PE) Abdomen Pelvis w Contrast    Narrative    CT CHEST PE ABDOMEN PELVIS W CONTRAST 6/14/2024 9:02 AM    CLINICAL HISTORY: hx of lung cancer s/p right lobectomy, prostate  cancer, unrelenting nausea, right shoulder pain, night sweats, severe  fatigue; Acute pain of right shoulder  TECHNIQUE: CT angiogram chest and routine CT abdomen pelvis with IV  contrast. Arterial phase through the chest and venous phase through  the abdomen and pelvis. 2D and 3D MIP reconstructions were preformed  by the CT technologist. Dose reduction techniques were used.     CONTRAST: 84mL [id number]-[id number]    COMPARISON: Chest CT on 2/17/2023, CT urogram on 2/16/2024 and HIDA  scan on 6/4/2024    FINDINGS:  ANGIOGRAM CHEST: Pulmonary arteries are normal caliber and negative  for pulmonary emboli.  Thoracic aorta is negative for dissection. No CT  evidence of right heart strain.     LUNGS AND PLEURA: Right upper lobe wedge resection, stable in  appearance since 2/17/2023. Stable left upper lobe 4 mm nodule  (15/68). No infiltrate or pleural effusion. No new or enlarging  nodules.    MEDIASTINUM/AXILLAE: No lymphadenopathy. No thoracic aortic aneurysm.  Mild coronary artery calcifications. No pericardial effusion.    HEPATOBILIARY: No focal lesions in the liver. Small gallstone seen on  the prior CT is less apparent today. Mildly distended gallbladder.  Mild prominence of the central intrahepatic bile ducts. Upper normal  size of the common hepatic duct measuring 7 mm. The common bile duct  measures 7 mm with smooth tapering towards the ampulla..    PANCREAS: Normal.    SPLEEN: Stable 1.2 cm hemangioma in the posterior mid spleen.    ADRENAL GLANDS: Normal.    KIDNEYS/BLADDER: Horseshoe kidney. A 3 mm nonobstructing calculus in  the left renal moiety. A few simple cysts requiring no specific  follow-up. Partially decompressed urinary bladder with circumferential  urinary bladder wall thickening.    BOWEL: Duodenal ampullary diverticulum. No small bowel or colonic  obstruction or inflammatory changes. Normal appendix. Colonic  diverticulosis.    LYMPH NODES: Stable 1.6 cm portacaval lymph node, nonspecific and  likely reactive (series 4, image 53). No new or enlarging lymph nodes  in the abdomen and pelvis.    PELVIC ORGANS: Prostatectomy. No pelvic masses.    OTHER: Trace pelvic free fluid. No fluid collections or free air.    MUSCULOSKELETAL: No suspicious lesions in the bones.      Impression    IMPRESSION:  1.  No pulmonary emboli. No acute findings in the chest, abdomen and  pelvis.  2.  Mildly distended gallbladder. Previously seen small gallstone is  not visible on today's CT. Mild central intrahepatic and extrahepatic  biliary ductal dilatation. Please correlate with liver function tests  and consider  follow-up MRCP.  3.  Horseshoe kidney.    KAMILAH HENRIQUEZ MD         SYSTEM ID:  [id number]   MR Brain w/o & w Contrast    Narrative    MRI BRAIN WITHOUT AND WITH CONTRAST  6/21/2024 9:08 AM     HISTORY:  Fairly constant nausea and fatigue the last 2+ months - with  history of lung cancer and prostate cancer. Prostate cancer (H).  Malignant neoplasm of upper lobe of right lung (H). Nausea. Chronic  fatigue. Cavernous hemangioma of brain (H).     TECHNIQUE:  Multiplanar, multisequence MRI of the brain without and  with 8 mL Gadavist.     COMPARISON: MRI of the brain dated 3/27/2017.     FINDINGS: No abnormal intracranial restricted diffusion is identified  to suggest recent infarct. The ventricles appear normal in size and  configuration. There has been no significant interval change in the  previously demonstrated lobulated T2 hyperintense lesion in the  inferior right basal ganglia region measuring approximately 20 mm AP  by 21 mm transverse by 19 mm craniocaudal compared to prior MRI of  3/27/2017. This lesion again shows associated blooming susceptibility  signal loss on the T2 star-weighted sequence, a thin rim of T2  hypointense signal, and mildly heterogeneous postcontrast enhancement.  This is again most in keeping with a cavernous malformation. There is  no evidence for surrounding vasogenic edema.    Mild generalized brain parenchymal volume loss. Mild scattered patchy  foci of nonspecific T2 FLAIR hyperintense signal in the cerebral white  matter, likely due to chronic small vessel ischemic disease. No new  intracranial mass or abnormal enhancement is evident. No acute  intracranial hemorrhage, extra axial fluid collection, or mass effect  is identified. The major arterial flow voids of the skull base are  grossly maintained.    Mild scattered paranasal sinus mucosal thickening. Left lens implant.  The calvarium, skull base, and midface otherwise appear unremarkable.      Impression    IMPRESSION:  1. No new  intracranial mass, abnormal enhancement, or definite  findings to suggest intracranial metastatic disease.  2. Unchanged lobulated T2 hyperintense lesion with associated  susceptibility artifact and heterogeneous postcontrast enhancement in  the inferior right basal ganglia region, likely representing a  cavernous malformation.  3. Mild presumed age-related changes of the brain, as described.  4. No acute intracranial process.     MARSHALL ERWIN MD         SYSTEM ID:  [id number]       Signed Electronically by: Romana Hampton PA-C

## 2024-06-14 ENCOUNTER — HOSPITAL ENCOUNTER (OUTPATIENT)
Dept: CT IMAGING | Facility: CLINIC | Age: 74
Discharge: HOME | End: 2024-06-14
Attending: PHYSICIAN ASSISTANT | Admitting: PHYSICIAN ASSISTANT
Payer: COMMERCIAL

## 2024-06-14 DIAGNOSIS — M25.511 ACUTE PAIN OF RIGHT SHOULDER: ICD-10-CM

## 2024-06-14 LAB
ALBUMIN SERPL BCG-MCNC: 4.7 G/DL (ref 3.5–5.2)
ALP SERPL-CCNC: 51 U/L (ref 40–150)
ALT SERPL W P-5'-P-CCNC: 23 U/L (ref 0–70)
AST SERPL W P-5'-P-CCNC: 24 U/L (ref 0–45)
BILIRUB DIRECT SERPL-MCNC: <0.2 MG/DL (ref 0–0.3)
BILIRUB SERPL-MCNC: 0.4 MG/DL
CRP SERPL-MCNC: 6.64 MG/L
GGT SERPL-CCNC: 20 U/L (ref 8–61)
PROT SERPL-MCNC: 7.1 G/DL (ref 6.4–8.3)

## 2024-06-14 PROCEDURE — 250N000011 HC RX IP 250 OP 636: Performed by: PHYSICIAN ASSISTANT

## 2024-06-14 PROCEDURE — 74177 CT ABD & PELVIS W/CONTRAST: CPT

## 2024-06-14 PROCEDURE — 250N000009 HC RX 250: Performed by: PHYSICIAN ASSISTANT

## 2024-06-14 RX ORDER — IOPAMIDOL 755 MG/ML
500 INJECTION, SOLUTION INTRAVASCULAR ONCE
Status: COMPLETED | OUTPATIENT
Start: 2024-06-14 | End: 2024-06-14

## 2024-06-14 RX ADMIN — IOPAMIDOL 84 ML: 755 INJECTION, SOLUTION INTRAVENOUS at 08:48

## 2024-06-14 RX ADMIN — SODIUM CHLORIDE 80 ML: 9 INJECTION, SOLUTION INTRAVENOUS at 08:48

## 2024-06-14 NOTE — RESULT ENCOUNTER NOTE
Triage: please advise of results/recommendations:     Juwan  I have reviewed your recent test results:    Your CT scan shows some distension of your gallbladder and some dilation of the bile ducts in and around the liver.  Due to this, and your current symptoms, I am going to would like you to see the general surgeon as soon as possilble to see if your gallbladder is the likely culprit and if removal or any other intervention is recommended.  I  placed this referral at your visit yesterday and you will be called to schedule (phone number is listed below if you'd like to call)    [facility]  Phone: [phone]     If you have any questions please do not hesitate to contact our office via phone ([phone]) or [employer].    Healthy regards,     Romana Hampton MBA, MS, PA-C  [facility] [place]

## 2024-06-18 ENCOUNTER — OFFICE VISIT (OUTPATIENT)
Dept: SURGERY | Facility: CLINIC | Age: 74
End: 2024-06-18
Payer: COMMERCIAL

## 2024-06-18 ENCOUNTER — TELEPHONE (OUTPATIENT)
Dept: SURGERY | Facility: CLINIC | Age: 74
End: 2024-06-18

## 2024-06-18 VITALS
WEIGHT: 172 LBS | BODY MASS INDEX: 26.07 KG/M2 | HEART RATE: 60 BPM | DIASTOLIC BLOOD PRESSURE: 54 MMHG | SYSTOLIC BLOOD PRESSURE: 128 MMHG | HEIGHT: 68 IN | OXYGEN SATURATION: 98 %

## 2024-06-18 DIAGNOSIS — K80.20 GALLSTONES: Primary | ICD-10-CM

## 2024-06-18 LAB — HEMOCCULT STL QL IA: POSITIVE

## 2024-06-18 PROCEDURE — 99204 OFFICE O/P NEW MOD 45 MIN: CPT | Performed by: SURGERY

## 2024-06-18 RX ORDER — INDOCYANINE GREEN AND WATER 25 MG
2.5 KIT INJECTION ONCE
Status: CANCELLED | OUTPATIENT
Start: 2024-06-18 | End: 2024-06-18

## 2024-06-18 NOTE — LETTER
June 18, 2024        RE:   Juwan Latham 1950      Dear Colleague,    Thank you for referring your patient, Juwan Latham, to [facility] at [place] location. Please see a copy of my visit note below.    Chief complaint:  Nausea    HPI:  This patient is a 73 year old male who presents with frequent nausea over the last 2 months.  He has also noticed some intermittent right shoulder pain.  He has had multiple imaging studies including CT scans, ultrasounds, and HIDA scan.    Past Medical History:  Has a past medical history of Anxiety, Arthritis, Calculus of kidney (2005, 2012), CARDIOVASCULAR SCREENING; LDL GOAL LESS THAN 130 (4/18/2023), Colon polyps, Congenital single kidney, Gastroesophageal reflux disease, cancer of lung (03/2017), Hypertension, Prostate cancer (H) (08/2017), Seasonal allergies, and Sleep apnea.    Review of Systems:  The 10 point Review of Systems is negative other than noted in the HPI and above.    Physical Exam:  General - This is a well developed, well nourished male in no apparent distress.  HEENT - Normocephalic, atraumatic.  No scleral icterus.  Neck - supple without masses  Lungs - clear to ascultation.    Heart - Regular rate & rhythm without murmur  Abdomen:   soft, non-distended, nontender  Extremities - warm without edema  Neurologic - nonfocal    Relevant labs:  Normal liver function tests.        Imaging:  CT scan 2 years ago revealed an apparent small gallstone.  This was not seen on the patient's recent CT scan.  Recent CT scan did show a dilated gallbladder, however.  In addition, it appears to have an unusual fold at its neck.  HIDA scan revealed an ejection fraction of 81%, which was interpreted as normal.  Multiple ultrasounds have showed no evidence of gallstones.    Assessment and Plan:  This is a patient with ongoing nausea and a stone seen on earlier CT scan.  His HIDA scan showed no obstruction of the cystic duct, but he does have a fairly high  ejection fraction, which could conceivably be pain a role in his nausea.  His gallbladder does now appear somewhat dilated and I think there is a reasonable chance that his gallbladder is playing a role in his symptoms.  We discussed the option of laparoscopic cholecystectomy.  We discussed the procedure, along with its risks and complications, in detail.  The patient understands that surgery might not resolve his nausea, but there are some findings and historical points which certainly bring that up as a significant possibility for cause of his nausea.  The patient would like to proceed with laparoscopic cholecystectomy.  We will work on getting that scheduled for him in the near future.      Again, thank you for allowing me to participate in the care of your patient.      Sincerely,      Arnaud Garza MD

## 2024-06-18 NOTE — TELEPHONE ENCOUNTER
Type of surgery: LAPAROSCOPIC CHOLECYSTECTOMY    Location of surgery: [facility]  Date and time of surgery: 7-2-24, 8:00 AM  Surgeon: DR MAHAN  Pre-Op Appt Date: PATIENT TO SCHEDULE   Post-Op Appt Date: NA   Packet sent out: Yes  Pre-cert/Authorization completed:  Not Applicable  Date: 6-18-24    LAPAROSCOPIC CHOLECYSTECTOMY GENERAL PT INST TO HAVE H&P 75 MINS REQ PA ASSIST [facility]

## 2024-06-18 NOTE — PROGRESS NOTES
Chief complaint:  Nausea    HPI:  This patient is a 73 year old male who presents with frequent nausea over the last 2 months.  He has also noticed some intermittent right shoulder pain.  He has had multiple imaging studies including CT scans, ultrasounds, and HIDA scan.    Past Medical History:   has a past medical history of Anxiety, Arthritis, Calculus of kidney (2005, 2012), CARDIOVASCULAR SCREENING; LDL GOAL LESS THAN 130 (4/18/2023), Colon polyps, Congenital single kidney, Gastroesophageal reflux disease, cancer of lung (03/2017), Hypertension, Prostate cancer (H) (08/2017), Seasonal allergies, and Sleep apnea.    Past Surgical History:  Past Surgical History:   Procedure Laterality Date    BRONCHOSCOPY FLEXIBLE N/A 03/03/2017    Procedure: BRONCHOSCOPY FLEXIBLE;  Surgeon: Maria A Desai MD;  Location: [facility]    COLONOSCOPY N/A 02/11/2015    Procedure: COLONOSCOPY;  Surgeon: Murphy Jesus MD;  Location: [facility]    COLONOSCOPY N/A 12/11/2019    Procedure: COLONOSCOPY;  Surgeon: Murphy Jesus MD;  Location: [facility]    COLONOSCOPY N/A 01/25/2022    due 5 yrs - COLONOSCOPY, FLEXIBLE, WITH POLYPECTOMY  USING COLD SNARE;  Surgeon: Neha Rosen MD;  Location:  GI    COMBINED CYSTOSCOPY, RETROGRADES, URETEROSCOPY, LASER HOLMIUM LITHOTRIPSY URETER(S), INSERT STENT N/A 03/25/2018    Procedure: COMBINED CYSTOSCOPY, RETROGRADES, URETEROSCOPY, LASER HOLMIUM LITHOTRIPSY URETER(S), INSERT STENT;  Cystoscopy, Catheter Exchange under Anesthesia;  Surgeon: Zaria Cain MD;  Location: [facility] OR    CYSTOSCOPY, DILATE URETHRA, COMBINED N/A 01/03/2022    Procedure: CYSTOSCOPY, WITH URETHRAL DILATION WITH FULGERATION OF BLADDER WALL;  Surgeon: Mike Chan MD;  Location: [facility] OR    CYSTOSCOPY, WITH URETHRAL CHEMOTHERAPY STENT INSERTION N/A 3/18/2024    Procedure: CYSTOSCOPY, RETROGRADE WITH OPTILUME URETHRAL DILATION BALLOON;  Surgeon: Mike Chan MD;  Location: [facility]    DAVINCI PROSTATECTOMY N/A  2017    Procedure: DAVINCI PROSTATECTOMY;  Robotic Assisted Radical Prostatectomy and Pelvic Lymph Node Dissection ;  Surgeon: Paulo Agarwal MD;  Location: [facility]    ESOPHAGOSCOPY, GASTROSCOPY, DUODENOSCOPY (EGD), COMBINED N/A 2016    Procedure: COMBINED ESOPHAGOSCOPY, GASTROSCOPY, DUODENOSCOPY (EGD), BIOPSY SINGLE OR MULTIPLE;  Surgeon: Murphy Jesus MD;  Location: [facility]    LAPAROSCOPIC WEDGE RESECTION LUNG Right 2017    Procedure: LAPAROSCOPIC WEDGE RESECTION LUNG;  Surgeon: Maria A Desai MD;  Location: [facility]    LASER HOLMIUM LITHOTRIPSY URETER(S), INSERT STENT, COMBINED  2012    Procedure: COMBINED CYSTOSCOPY, URETEROSCOPY, LASER HOLMIUM LITHOTRIPSY URETER(S), INSERT STENT;  Cystoscopy, Right Ureteroscopy, Stone Extraction, Holmium Laser, Double J Stent Placement;  Surgeon: Nicolás Blackwell MD;  Location: [facility]        Social History:  Social History     Socioeconomic History    Marital status:      Spouse name: Not on file    Number of children: 1    Years of education: Not on file    Highest education level: Not on file   Occupational History    Occupation: [employer]     Employer: SELF     Employer: OTHER   Tobacco Use    Smoking status: Former     Current packs/day: 0.00     Average packs/day: 2.0 packs/day for 20.0 years (40.0 ttl pk-yrs)     Types: Cigarettes     Start date: 1965     Quit date: 1985     Years since quittin.4     Passive exposure: Past    Smokeless tobacco: Never   Vaping Use    Vaping status: Never Used   Substance and Sexual Activity    Alcohol use: Yes     Alcohol/week: 0.0 - 2.0 standard drinks of alcohol     Comment: had 2 beers 2 weeks ago    Drug use: Yes     Types: Marijuana     Comment: gummies twice a week    Sexual activity: Yes     Partners: Female   Other Topics Concern     Service Not Asked    Blood Transfusions Not Asked    Caffeine Concern No    Occupational Exposure Not Asked    Hobby Hazards Not Asked     Sleep Concern Yes     Comment: middle/late insomnia    Stress Concern Not Asked    Weight Concern Not Asked    Special Diet Not Asked    Back Care Not Asked    Exercise Yes    Bike Helmet Not Asked    Seat Belt Yes    Self-Exams Not Asked    Parent/sibling w/ CABG, MI or angioplasty before 65F 55M? No   Social History Narrative    Dad  at age 86 from complications after hip surgery.    Mom  at age 68 from heart condition    Siblings:  Three sisters in good health.  Brother with diabetes and overweight.        One son    One granddaughter    Occupation:  remodeling     Social Determinants of Health     Financial Resource Strain: High Risk (2024)    Financial Resource Strain     Within the past 12 months, have you or your family members you live with been unable to get utilities (heat, electricity) when it was really needed?: Yes   Food Insecurity: Low Risk  (2024)    Food Insecurity     Within the past 12 months, did you worry that your food would run out before you got money to buy more?: No     Within the past 12 months, did the food you bought just not last and you didn t have money to get more?: No   Transportation Needs: Low Risk  (2024)    Transportation Needs     Within the past 12 months, has lack of transportation kept you from medical appointments, getting your medicines, non-medical meetings or appointments, work, or from getting things that you need?: No   Physical Activity: Insufficiently Active (2024)    Exercise Vital Sign     Days of Exercise per Week: 4 days     Minutes of Exercise per Session: 20 min   Stress: Stress Concern Present (2024)    [employer] - Occupational Stress Questionnaire     Feeling of Stress : To some extent   Social Connections: Unknown (2024)    Social Connection and Isolation Panel [NHANES]     Frequency of Communication with Friends and Family: Not on file     Frequency of Social Gatherings with  Friends and Family: Never     Attends Restorationism Services: Not on file     Active Member of Clubs or Organizations: Not on file     Attends Club or Organization Meetings: Not on file     Marital Status: Not on file   Interpersonal Safety: Low Risk  (4/24/2024)    Interpersonal Safety     Do you feel physically and emotionally safe where you currently live?: Yes     Within the past 12 months, have you been hit, slapped, kicked or otherwise physically hurt by someone?: No     Within the past 12 months, have you been humiliated or emotionally abused in other ways by your partner or ex-partner?: No   Housing Stability: Low Risk  (4/24/2024)    Housing Stability     Do you have housing? : Yes     Are you worried about losing your housing?: No        Family History:  Family History   Problem Relation Age of Onset    Heart Disease Mother     Other - See Comments Sister         during surgery for hip fracture    Cancer Sister         skin    Cancer Sister         skin    Diabetes Brother 65        Type 2    Diabetes Maternal Grandmother     Substance Abuse Son     Hypertension No family hx of     Lipids No family hx of        Review of Systems:  The 10 point Review of Systems is negative other than noted in the HPI and above.    Physical Exam:  General - This is a well developed, well nourished male in no apparent distress.  HEENT - Normocephalic, atraumatic.  No scleral icterus.  Neck - supple without masses  Lungs - clear to ascultation.    Heart - Regular rate & rhythm without murmur  Abdomen:   soft, non-distended, nontender  Extremities - warm without edema  Neurologic - nonfocal    Relevant labs:  Normal liver function tests.    Imaging:  CT scan 2 years ago revealed an apparent small gallstone.  This was not seen on the patient's recent CT scan.  Recent CT scan did show a dilated gallbladder, however.  In addition, it appears to have an unusual fold at its neck.  HIDA scan revealed an ejection fraction of 81%, which  was interpreted as normal.  Multiple ultrasounds have showed no evidence of gallstones.    Assessment and Plan:  This is a patient with ongoing nausea and a stone seen on earlier CT scan.  His HIDA scan showed no obstruction of the cystic duct, but he does have a fairly high ejection fraction, which could conceivably be pain a role in his nausea.  His gallbladder does now appear somewhat dilated and I think there is a reasonable chance that his gallbladder is playing a role in his symptoms.  We discussed the option of laparoscopic cholecystectomy.  We discussed the procedure, along with its risks and complications, in detail.  The patient understands that surgery might not resolve his nausea, but there are some findings and historical points which certainly bring that up as a significant possibility for cause of his nausea.  The patient would like to proceed with laparoscopic cholecystectomy.  We will work on getting that scheduled for him in the near future.    Arnaud Garza MD  [facility]    Please route or send letter to:  Primary Care Provider (PCP) and Referring Provider

## 2024-06-19 ENCOUNTER — OFFICE VISIT (OUTPATIENT)
Dept: UROLOGY | Facility: CLINIC | Age: 74
End: 2024-06-19
Payer: COMMERCIAL

## 2024-06-19 VITALS
HEART RATE: 58 BPM | BODY MASS INDEX: 26.22 KG/M2 | WEIGHT: 173 LBS | SYSTOLIC BLOOD PRESSURE: 158 MMHG | HEIGHT: 68 IN | DIASTOLIC BLOOD PRESSURE: 77 MMHG

## 2024-06-19 DIAGNOSIS — N35.014 POST-TRAUMATIC MALE URETHRAL STRICTURE: Primary | ICD-10-CM

## 2024-06-19 PROCEDURE — 52000 CYSTOURETHROSCOPY: CPT | Performed by: UROLOGY

## 2024-06-19 ASSESSMENT — PAIN SCALES - GENERAL: PAINLEVEL: NO PAIN (0)

## 2024-06-19 NOTE — LETTER
"6/19/2024       RE: Juwan Latham  [id number]  [facility] [place] [id number]     Dear Colleague,    Thank you for referring your patient, Juwan Latham, to the [facility] [place] [facility]. Please see a copy of my visit note below.    Urethroplasty Follow-up Visit with Surveillance Urethroscopy    PRE-PROCEDURE DIAGNOSIS: History of urethral stricture  POST-PROCEDURE DIAGNOSIS: No evidence of urethral stricture   PROCEDURE: Urethroscopy    HISTORY: Juwan Latham is a 73 year old man with XRT. RALP  In Jan 2022, we did cystoscopy/fulguration for hemorrhagic cystitis. We also did dilation of membranous stricture.  He had recurrence and thus we did optilume on 3/18/2024.  He notes he did well. He voids much improved now.    DESCRIPTION OF PROCEDURE:  After informed consent was obtained, the patient was brought to the procedure room where he was placed in the supine position with all pressure points well padded.  He was prepped and draped in a sterile fashion. A flexible cystoscope was introduced through a well-lubricated urethra.  The anterior urethra up to the point of reconstruction was  normal    At the sphincter to bladder neck there is slight tightness. The lumen is 15 Fr or so. The scope appeared like it would go through but he did not tolerate it very well so I stopped. I also did not want to traumatize the prior dilated area.      ASSESSMENT AND PLAN:  Excellent outcome after optilume dilation.  Followup in 4 months with symptom check.  I am hopeful that bleeding has stopped for long period of time now and that \"3 month post optilume cystoscopy\" today showed an essentially open urethra - which correlates with symptoms.    Mike Chan MD    "

## 2024-06-19 NOTE — PATIENT INSTRUCTIONS
"Please follow up with Diego Latham in 4 months. Virtual visit.     It was a pleasure meeting with you today.  Thank you for allowing me and my team the privilege of caring for you today.  YOU are the reason we are here, and I truly hope we provided you with the excellent service you deserve.  Please let us know if there is anything else we can do for you so that we can be sure you are leaving completely satisfied with your care experience.        AFTER YOUR CYSTOSCOPY        You have just completed a cystoscopy, or \"cysto\", which allowed your physician to learn more about your bladder (or to remove a stent placed after surgery). We suggest that you continue to avoid caffeine, fruit juice, and alcohol for the next 24 hours, however, you are encouraged to return to your normal activities.         A few things that are considered normal after your cystoscopy:     * Small amount of bleeding (or spotting) that clears within the next 24 hours     * Slight burning sensation with urination     * Sensation to of needing to avoid more frequently     * The feeling of \"air\" in your urine     * Mild discomfort that is relieved with Tylenol        Please contact our office promptly if you:     * Develop a fever above 101 degrees     * Are unable to urinate     * Develop bright red blood that does not stop     * Severe pain or swelling         Please contact our office with any concerns or questions @[email or web address].  "

## 2024-06-19 NOTE — NURSING NOTE
"Chief Complaint   Patient presents with    Cystoscopy     Post op   \"I can't shut it off sometimes\"       Blood pressure (!) 158/77, pulse 58, height 1.727 m (5' 8\"), weight 78.5 kg (173 lb). Body mass index is 26.3 kg/m .    Patient Active Problem List   Diagnosis    Anxiety    PAULINO (obstructive sleep apnea)    GERD (gastroesophageal reflux disease)    Hypertension goal BP (blood pressure) < 140/90    Benign neoplasm of descending colon    s/p Malignant neoplasm of upper lobe of right lung (H)    Overweight (BMI 25.0-29.9)    Cavernous hemangioma of brain (H)    Major depressive disorder, recurrent episode, mild (H24)    h/o Prostate cancer (H) - s/p prostatectomy 2017    Somatic dysfunction of pelvis region    Mixed incontinence urge and stress (male)(female)    Retinal detachment of left eye with single break    Postprocedural male urethral stricture    Gross hematuria    Numbness    Fatty liver    CARDIOVASCULAR SCREENING; LDL GOAL LESS THAN 130    Chronic bilateral low back pain without sciatica    Abnormal liver function tests    Tremor of both outstretched hands- pt thinks this is new - noted on exam 5/28/2024    Nausea - without vomiting    Chronic fatigue       No Known Allergies    Current Outpatient Medications   Medication Sig Dispense Refill    hydrocortisone 2.5 % ointment Apply topically 2 times daily 60 g 3    ibuprofen (ADVIL/MOTRIN) 200 MG tablet Take 200 mg by mouth every 4 hours as needed for pain      LORazepam (ATIVAN) 0.5 MG tablet Take 1-2 tablets (0.5-1 mg) by mouth every 6 hours as needed for anxiety (1 hour prior to MRI procedure) 4 tablet 0    multivitamin w/minerals (THERA-VIT-M) tablet Take 1 tablet by mouth daily      omeprazole (PRILOSEC) 40 MG DR capsule Take 40 mg by mouth daily      ondansetron (ZOFRAN ODT) 4 MG ODT tab Take 1-2 tablets (4-8 mg) by mouth every 8 hours as needed for nausea 20 tablet 0    ondansetron (ZOFRAN) 4 MG tablet Take 4 mg by mouth every 8 hours as needed for " nausea      oxyBUTYnin ER (DITROPAN XL) 10 MG 24 hr tablet Take 1 tablet (10 mg) by mouth daily 90 tablet 3    pantoprazole (PROTONIX) 20 MG EC tablet Take 2 tablets (40 mg) by mouth 2 times daily (before meals) 60 tablet 1    sertraline (ZOLOFT) 50 MG tablet Take 1 tablet (50 mg) by mouth daily 90 tablet 1    sucralfate (CARAFATE) 1 GM tablet Take 1 tablet (1 g) by mouth 4 times daily as needed for nausea 120 tablet 1    tiZANidine (ZANAFLEX) 2 MG tablet Take 1-2 tablets (2-4 mg) by mouth 3 times daily as needed for muscle spasms 40 tablet 0    Vitamin D, Cholecalciferol, 1000 units TABS Take 1 tablet by mouth daily         Social History     Tobacco Use    Smoking status: Former     Current packs/day: 0.00     Average packs/day: 2.0 packs/day for 20.0 years (40.0 ttl pk-yrs)     Types: Cigarettes     Start date: 1965     Quit date: 1985     Years since quittin.4     Passive exposure: Past    Smokeless tobacco: Never   Vaping Use    Vaping status: Never Used   Substance Use Topics    Alcohol use: Yes     Alcohol/week: 0.0 - 2.0 standard drinks of alcohol     Comment: had 2 beers 2 weeks ago    Drug use: Yes     Types: Marijuana     Comment: gummies twice a week       Invasive Procedure Safety Checklist:    Procedure: Cystoscopy    Action: Complete sections and checkboxes as appropriate.    Pre-procedure:  1. Patient ID Verified with 2 identifiers (Ines and  or MRN) : YES    2. Procedure and site verified with patient/designee (when able) : YES    3. Accurate consent documentation in medical record : YES    4. H&P (or appropriate assessment) documented in medical record : N/A  H&P must be up to 30 days prior to procedure an updated within 24 hours of                 Procedure as applicable.     5. Relevant diagnostic and radiology test results appropriately labeled and displayed as applicable : YES    6. Blood products, implants, devices, and/or special equipment available for the procedure as  applicable : YES    7. Procedure site(s) marked with provider initials [Exclusions: none] : NO    8. Marking not required. Reason : Yes  Procedure does not require site marking    Time Out:     Time-Out performed immediately prior to starting procedure, including verbal and active participation of all team members addressing: YES    1. Correct patient identity.  2. Confirmed that the correct side and site are marked.  3. An accurate procedure to be done.  4. Agreement on the procedure to be done.  5. Correct patient position.  6. Relevant images and results are properly labeled and appropriately displayed.  7. The need to administer antibiotics or fluids for irrigation purposes during the procedure as applicable.  8. Safety precautions based on patient history or medication use.    During Procedure: Verification of correct person, site, and procedure occurs any time the responsibility for care of the patient is transferred to another member of the care team.          Brittney Patricio  6/19/2024  10:11 AM

## 2024-06-19 NOTE — PROGRESS NOTES
"Urethroplasty Follow-up Visit with Surveillance Urethroscopy    PRE-PROCEDURE DIAGNOSIS: History of urethral stricture  POST-PROCEDURE DIAGNOSIS: No evidence of urethral stricture   PROCEDURE: Urethroscopy    HISTORY: Juwan Latham is a 73 year old man with XRT. RALP  In Jan 2022, we did cystoscopy/fulguration for hemorrhagic cystitis. We also did dilation of membranous stricture.  He had recurrence and thus we did optilume on 3/18/2024.  He notes he did well. He voids much improved now.    DESCRIPTION OF PROCEDURE:  After informed consent was obtained, the patient was brought to the procedure room where he was placed in the supine position with all pressure points well padded.  He was prepped and draped in a sterile fashion. A flexible cystoscope was introduced through a well-lubricated urethra.  The anterior urethra up to the point of reconstruction was  normal    At the sphincter to bladder neck there is slight tightness. The lumen is 15 Fr or so. The scope appeared like it would go through but he did not tolerate it very well so I stopped. I also did not want to traumatize the prior dilated area.      ASSESSMENT AND PLAN:  Excellent outcome after optilume dilation.  Followup in 4 months with symptom check.  I am hopeful that bleeding has stopped for long period of time now and that \"3 month post optilume cystoscopy\" today showed an essentially open urethra - which correlates with symptoms.    Mike Chan MD  "

## 2024-06-21 ENCOUNTER — HOSPITAL ENCOUNTER (OUTPATIENT)
Dept: MRI IMAGING | Facility: CLINIC | Age: 74
Discharge: HOME | End: 2024-06-21
Attending: FAMILY MEDICINE | Admitting: FAMILY MEDICINE
Payer: COMMERCIAL

## 2024-06-21 DIAGNOSIS — D18.02 CAVERNOUS HEMANGIOMA OF BRAIN (H): ICD-10-CM

## 2024-06-21 DIAGNOSIS — C34.11 MALIGNANT NEOPLASM OF UPPER LOBE OF RIGHT LUNG (H): ICD-10-CM

## 2024-06-21 DIAGNOSIS — R11.0 NAUSEA: ICD-10-CM

## 2024-06-21 DIAGNOSIS — R53.82 CHRONIC FATIGUE: ICD-10-CM

## 2024-06-21 DIAGNOSIS — C61 PROSTATE CANCER (H): ICD-10-CM

## 2024-06-21 PROCEDURE — A9585 GADOBUTROL INJECTION: HCPCS | Mod: JZ | Performed by: FAMILY MEDICINE

## 2024-06-21 PROCEDURE — 255N000002 HC RX 255 OP 636: Mod: JZ | Performed by: FAMILY MEDICINE

## 2024-06-21 PROCEDURE — 70553 MRI BRAIN STEM W/O & W/DYE: CPT

## 2024-06-21 RX ORDER — GADOBUTROL 604.72 MG/ML
8 INJECTION INTRAVENOUS ONCE
Status: COMPLETED | OUTPATIENT
Start: 2024-06-21 | End: 2024-06-21

## 2024-06-21 RX ADMIN — GADOBUTROL 8 ML: 604.72 INJECTION INTRAVENOUS at 09:08

## 2024-06-21 NOTE — RESULT ENCOUNTER NOTE
Juwan  I have reviewed your recent test results:    Your labs show a level of inflammation but otherwise were reassuring.    Your stool test did show possible blood in your stool - if your pain does not dramatically improve with your gallbladder removal then we should pursue a colonoscopy.  If your pain improves with the gallbladder, then repeat a FIT test at your annual visit to see if it is negative at that time (as it could have been positive due to the inflammation on your endoscopy - that should improve with the pantoprazole)    For additional lab test information, [email or web address] is an excellent reference.     If you have any questions please do not hesitate to contact our office via phone ([phone]) or [email or web address].    Healthy regards,     Romana Hampton MBA, MS, PA-C  [facility] [place]

## 2024-06-24 ENCOUNTER — TELEPHONE (OUTPATIENT)
Dept: FAMILY MEDICINE | Facility: CLINIC | Age: 74
End: 2024-06-24
Payer: COMMERCIAL

## 2024-06-24 NOTE — TELEPHONE ENCOUNTER
Called patient left message that there was one and only one appointment for his pre op.    LM scheduled for 6/26 at 1:40 check in.

## 2024-06-24 NOTE — TELEPHONE ENCOUNTER
Reason for Call:  Appointment Request    Patient requesting this type of appt: Pre-op    Requested provider: Julio Guidry Jr.    Reason patient unable to be scheduled: Not within requested timeframe    When does patient want to be seen/preferred time: 3-7 days    Comments: patient is having surgery on 7/2 and needs  a pre op he wants to see his primary provider     Could we send this information to you in [facility] or would you prefer to receive a phone call?:   Patient would prefer a phone call   Okay to leave a detailed message?: Yes at Home number on file [phone] (home)    Call taken on 6/24/2024 at 1:28 PM by Jayna Delaney

## 2024-06-26 ENCOUNTER — OFFICE VISIT (OUTPATIENT)
Dept: FAMILY MEDICINE | Facility: CLINIC | Age: 74
End: 2024-06-26
Payer: COMMERCIAL

## 2024-06-26 VITALS
HEART RATE: 56 BPM | HEIGHT: 68 IN | TEMPERATURE: 97.7 F | RESPIRATION RATE: 12 BRPM | BODY MASS INDEX: 25.91 KG/M2 | OXYGEN SATURATION: 98 % | SYSTOLIC BLOOD PRESSURE: 130 MMHG | DIASTOLIC BLOOD PRESSURE: 72 MMHG | WEIGHT: 171 LBS

## 2024-06-26 DIAGNOSIS — Z01.818 PREOP GENERAL PHYSICAL EXAM: Primary | ICD-10-CM

## 2024-06-26 DIAGNOSIS — R10.11 RUQ ABDOMINAL PAIN: ICD-10-CM

## 2024-06-26 DIAGNOSIS — I10 HYPERTENSION GOAL BP (BLOOD PRESSURE) < 140/90: Chronic | ICD-10-CM

## 2024-06-26 DIAGNOSIS — R11.0 NAUSEA: ICD-10-CM

## 2024-06-26 PROBLEM — R19.5 DARK STOOLS: Status: ACTIVE | Noted: 2024-06-26

## 2024-06-26 PROCEDURE — 99214 OFFICE O/P EST MOD 30 MIN: CPT | Performed by: FAMILY MEDICINE

## 2024-06-26 PROCEDURE — 80048 BASIC METABOLIC PNL TOTAL CA: CPT | Performed by: FAMILY MEDICINE

## 2024-06-26 PROCEDURE — 36415 COLL VENOUS BLD VENIPUNCTURE: CPT | Performed by: FAMILY MEDICINE

## 2024-06-26 ASSESSMENT — PAIN SCALES - GENERAL: PAINLEVEL: NO PAIN (0)

## 2024-06-26 NOTE — PROGRESS NOTES
Preoperative Evaluation  [address] [address]  Phone: [phone]  Primary Provider: Julio Guidry Jr, MD  Pre-op Performing Provider: Julio Guidry Jr, MD  Jun 26, 2024 6/26/2024   Surgical Information   What procedure is being done? CHOLECYSTECTOMY, LAPAROSCOPIC    Facility or Hospital where procedure/surgery will be performed: [facility]   Who is doing the procedure / surgery? Arnaud Garza MD    Date of surgery / procedure: July 2nd 2024   Time of surgery / procedure: 8:00 am    Where do you plan to recover after surgery? at home with family        Fax number for surgical facility: Note does not need to be faxed, will be available electronically in Epic.    Assessment & Plan     The proposed surgical procedure is considered INTERMEDIATE risk.    Preop general physical exam  RUQ abdominal pain  Nausea  Hypertension goal BP (blood pressure) < 140/90              - No identified additional risk factors other than previously addressed    Preoperative Medication Instructions  Antiplatelet or Anticoagulation Medication Instructions   - Patient is on no antiplatelet or anticoagulation medications.    Additional Medication Instructions   - sucralfate: DO NOT TAKE day of surgery.   - SSRIs, SNRIs, TCAs, Antipsychotics: Continue without modification.     Recommendation  Approval given to proceed with proposed procedure, without further diagnostic evaluation.    Subjective   Juwan is a 73 year old, presenting for the following:  Pre-Op Exam          6/26/2024     1:43 PM   Additional Questions   Roomed by DAVID Paige   Accompanied by SELF     HPI related to upcoming procedure: chronic nausea and abdominal pain with relatively unremarkable RUQ ultrasound, but HIDA scan demonstrating high ejection fraction and recent CT showing dilated gallbladder.  Now scheduled for surgery after failing conservative therapy with proton  pump inhibitor, lower fat diet.          6/26/2024   Pre-Op Questionnaire   Have you ever had a heart attack or stroke? No   Have you ever had surgery on your heart or blood vessels, such as a stent placement, a coronary artery bypass, or surgery on an artery in your head, neck, heart, or legs? No   Do you have chest pain with activity? No   Do you have a history of heart failure? No   Do you currently have a cold, bronchitis or symptoms of other infection? No   Do you have a cough, shortness of breath, or wheezing? No   Do you or anyone in your family have previous history of blood clots? No   Do you or does anyone in your family have a serious bleeding problem such as prolonged bleeding following surgeries or cuts? No   Have you ever had problems with anemia or been told to take iron pills? No   Have you had any abnormal blood loss such as black, tarry or bloody stools? (!) UNKNOWN positive FIT test in June 2024.   Have you ever had a blood transfusion? No   Are you willing to have a blood transfusion if it is medically needed before, during, or after your surgery? Yes   Have you or any of your relatives ever had problems with anesthesia? No   Do you have sleep apnea, excessive snoring or daytime drowsiness? (!) UNKNOWN   Do you have any artifical heart valves or other implanted medical devices like a pacemaker, defibrillator, or continuous glucose monitor? No   Do you have artificial joints? No   Are you allergic to latex? No        Health Care Directive  Patient has a Health Care Directive on file      Preoperative Review of    reviewed - no record of controlled substances prescribed.          Patient Active Problem List    Diagnosis Date Noted    Tremor of both outstretched hands- pt thinks this is new - noted on exam 5/28/2024 05/28/2024     Priority: Medium    Nausea - without vomiting 05/28/2024     Priority: Medium    Chronic fatigue 05/28/2024     Priority: Medium    Abnormal liver function tests  02/26/2024     Priority: Medium    Chronic bilateral low back pain without sciatica 07/20/2023     Priority: Medium    CARDIOVASCULAR SCREENING; LDL GOAL LESS THAN 130 04/18/2023     Priority: Medium    Fatty liver 12/12/2022     Priority: Medium    Numbness 12/17/2021     Priority: Medium     Dorsum right foot      Postprocedural male urethral stricture 12/03/2021     Priority: Medium     Membranous.  Recurrent.      Gross hematuria 12/03/2021     Priority: Medium     Added automatically from request for surgery [id number]      Retinal detachment of left eye with single break 05/21/2018     Priority: Medium     Planned surgical repair      Somatic dysfunction of pelvis region 12/30/2017     Priority: Medium    Mixed incontinence urge and stress (male)(female) 12/30/2017     Priority: Medium    h/o Prostate cancer (H) - s/p prostatectomy 2017 12/01/2017     Priority: Medium     [id number] Grade Group 5 prostate cancer s/p robotic radical prostatectomy on 12/1/2017 by Dr. Bates Weight at [facility]  Substantially higher risk than previously believed given positive lymph node and grade group 5     Given high risk for prostate cancer would refer to radiation oncology for adjuvant radiation and possible hormone treatment.  Possibly start in Feb/March 2018. Re-check PSA prior to starting radiation therapy.  Completed radiation therapy in April 2018.           Major depressive disorder, recurrent episode, mild (H24) 05/09/2017     Priority: Medium    Cavernous hemangioma of brain (H) 04/07/2017     Priority: Medium     Found incidentally on MRI of brain which was ordered to look for mets from lung tumor  Neurosurgery consult with Dr. Mcelroy in March 2017 - recommend repeat MRI in September 2017 to assess for any changes      Overweight (BMI 25.0-29.9) 03/24/2017     Priority: Medium    s/p Malignant neoplasm of upper lobe of right lung (H) 03/13/2017     Priority: Medium     Adenocarcinoma  S/p right upper lobe wedge resection  and laparoscopic assisted mediastinal lymph node dissection.       Benign neoplasm of descending colon 01/16/2017     Priority: Medium     Seen on colonoscopy 1/4/2017      Hypertension goal BP (blood pressure) < 140/90 04/28/2015     Priority: Medium    GERD (gastroesophageal reflux disease) 01/27/2015     Priority: Medium    PAULINO (obstructive sleep apnea) 05/24/2013     Priority: Medium    Anxiety 12/12/2011     Priority: Medium      Past Medical History:   Diagnosis Date    Anxiety     Arthritis     fingers, hips    Calculus of kidney 2005, 2012    CARDIOVASCULAR SCREENING; LDL GOAL LESS THAN 130 04/18/2023    Colon polyps     Congenital single kidney     Gastroesophageal reflux disease     Hx of cancer of lung 03/2017    wedge resection    Hypertension     Prostate cancer (H) 08/2017    prostatectomy/Rad Tx    Seasonal allergies     spring and fall    Sleep apnea     cpap     Past Surgical History:   Procedure Laterality Date    BRONCHOSCOPY FLEXIBLE N/A 03/03/2017    Procedure: BRONCHOSCOPY FLEXIBLE;  Surgeon: Maria A Desai MD;  Location: [facility] OR    COLONOSCOPY N/A 02/11/2015    Procedure: COLONOSCOPY;  Surgeon: Murphy Jesus MD;  Location:  GI    COLONOSCOPY N/A 12/11/2019    Procedure: COLONOSCOPY;  Surgeon: Murphy Jesus MD;  Location:  GI    COLONOSCOPY N/A 01/25/2022    due 5 yrs - COLONOSCOPY, FLEXIBLE, WITH POLYPECTOMY  USING COLD SNARE;  Surgeon: Neha Rosen MD;  Location:  GI    COMBINED CYSTOSCOPY, RETROGRADES, URETEROSCOPY, LASER HOLMIUM LITHOTRIPSY URETER(S), INSERT STENT N/A 03/25/2018    Procedure: COMBINED CYSTOSCOPY, RETROGRADES, URETEROSCOPY, LASER HOLMIUM LITHOTRIPSY URETER(S), INSERT STENT;  Cystoscopy, Catheter Exchange under Anesthesia;  Surgeon: Zaria Cain MD;  Location:  OR    CYSTOSCOPY, DILATE URETHRA, COMBINED N/A 01/03/2022    Procedure: CYSTOSCOPY, WITH URETHRAL DILATION WITH FULGERATION OF BLADDER WALL;  Surgeon: Mike Chan MD;  Location:  [facility] OR    CYSTOSCOPY, WITH URETHRAL CHEMOTHERAPY STENT INSERTION N/A 3/18/2024    Procedure: CYSTOSCOPY, RETROGRADE WITH OPTILUME URETHRAL DILATION BALLOON;  Surgeon: Mike Chan MD;  Location: [facility] OR    DAVINCI PROSTATECTOMY N/A 12/01/2017    Procedure: DAVINCI PROSTATECTOMY;  Robotic Assisted Radical Prostatectomy and Pelvic Lymph Node Dissection ;  Surgeon: Paulo Agarwal MD;  Location: [facility] OR    ESOPHAGOSCOPY, GASTROSCOPY, DUODENOSCOPY (EGD), COMBINED N/A 05/20/2016    Procedure: COMBINED ESOPHAGOSCOPY, GASTROSCOPY, DUODENOSCOPY (EGD), BIOPSY SINGLE OR MULTIPLE;  Surgeon: Murphy Jesus MD;  Location: [facility] GI    LAPAROSCOPIC WEDGE RESECTION LUNG Right 03/03/2017    Procedure: LAPAROSCOPIC WEDGE RESECTION LUNG;  Surgeon: Maria A Desai MD;  Location: [facility] OR    LASER HOLMIUM LITHOTRIPSY URETER(S), INSERT STENT, COMBINED  09/11/2012    Procedure: COMBINED CYSTOSCOPY, URETEROSCOPY, LASER HOLMIUM LITHOTRIPSY URETER(S), INSERT STENT;  Cystoscopy, Right Ureteroscopy, Stone Extraction, Holmium Laser, Double J Stent Placement;  Surgeon: Nicolás Blackwell MD;  Location:  OR     Current Outpatient Medications   Medication Sig Dispense Refill    hydrocortisone 2.5 % ointment Apply topically 2 times daily 60 g 3    multivitamin w/minerals (THERA-VIT-M) tablet Take 1 tablet by mouth daily      pantoprazole (PROTONIX) 20 MG EC tablet Take 2 tablets (40 mg) by mouth 2 times daily (before meals) 60 tablet 1    sertraline (ZOLOFT) 50 MG tablet Take 1 tablet (50 mg) by mouth daily 90 tablet 1    sucralfate (CARAFATE) 1 GM tablet Take 1 tablet (1 g) by mouth 4 times daily as needed for nausea 120 tablet 1    Vitamin D, Cholecalciferol, 1000 units TABS Take 1 tablet by mouth daily         No Known Allergies     Social History     Tobacco Use    Smoking status: Former     Current packs/day: 0.00     Average packs/day: 2.0 packs/day for 20.0 years (40.0 ttl pk-yrs)     Types: Cigarettes     Start date: 1/1/1965      "Quit date: 1985     Years since quittin.5     Passive exposure: Past    Smokeless tobacco: Never   Substance Use Topics    Alcohol use: Yes     Alcohol/week: 0.0 - 2.0 standard drinks of alcohol     Comment: had 2 beers 2 weeks ago       History   Drug Use    Types: Marijuana     Comment: gummies twice a week             Review of Systems  Constitutional, HEENT, cardiovascular, pulmonary, gi and gu systems are negative, except as otherwise noted.  Does note some right sided neck pain for the past 1-2 months.      Objective    /72   Pulse 56   Temp 97.7  F (36.5  C) (Tympanic)   Resp 12   Ht 1.727 m (5' 8\")   Wt 77.6 kg (171 lb)   SpO2 98%   BMI 26.00 kg/m     Estimated body mass index is 26 kg/m  as calculated from the following:    Height as of this encounter: 1.727 m (5' 8\").    Weight as of this encounter: 77.6 kg (171 lb).  Physical Exam  GENERAL: alert and no distress  EYES: Eyes grossly normal to inspection, PERRL and conjunctivae and sclerae normal  HENT: ear canals and TM's normal, nose and mouth without ulcers or lesions  NECK: no adenopathy, no asymmetry, masses, or scars  RESP: lungs clear to auscultation - no rales, rhonchi or wheezes  CV: regular rate and rhythm, normal S1 S2, no S3 or S4, no murmur, click or rub, no peripheral edema  ABDOMEN: soft, nontender, no hepatosplenomegaly, no masses and bowel sounds normal  MS: no gross musculoskeletal defects noted, no edema  SKIN: no suspicious lesions or rashes  NEURO: Normal strength and tone, mentation intact and speech normal  PSYCH: mentation appears normal, affect normal/bright    Recent Labs   Lab Test 24  1533 24  1215 24  1838   HGB 14.1 13.6 14.0    173 180   NA  --  144 137   POTASSIUM  --  4.7 3.6   CR  --  0.84 0.92        Diagnostics  Labs pending at this time.  Results will be reviewed when available.   No EKG required, no history of coronary heart disease, significant arrhythmia, peripheral " arterial disease or other structural heart disease.    Revised Cardiac Risk Index (RCRI)  The patient has the following serious cardiovascular risks for perioperative complications:   - No serious cardiac risks = 0 points     RCRI Interpretation: 0 points: Class I (very low risk - 0.4% complication rate)         Signed Electronically by: Julio Guidry Jr, MD  Copy of this evaluation report is provided to requesting physician.

## 2024-06-26 NOTE — PATIENT INSTRUCTIONS

## 2024-06-27 LAB
ANION GAP SERPL CALCULATED.3IONS-SCNC: 10 MMOL/L (ref 7–15)
BUN SERPL-MCNC: 11.9 MG/DL (ref 8–23)
CALCIUM SERPL-MCNC: 9.6 MG/DL (ref 8.8–10.2)
CHLORIDE SERPL-SCNC: 104 MMOL/L (ref 98–107)
CREAT SERPL-MCNC: 0.83 MG/DL (ref 0.67–1.17)
DEPRECATED HCO3 PLAS-SCNC: 27 MMOL/L (ref 22–29)
EGFRCR SERPLBLD CKD-EPI 2021: >90 ML/MIN/1.73M2
GLUCOSE SERPL-MCNC: 97 MG/DL (ref 70–99)
POTASSIUM SERPL-SCNC: 4.5 MMOL/L (ref 3.4–5.3)
SODIUM SERPL-SCNC: 141 MMOL/L (ref 135–145)

## 2024-06-28 NOTE — RESULT ENCOUNTER NOTE
Mr. Latham,    -Kidney function is normal (Cr, GFR), Sodium is normal, Potassium is normal, Calcium is normal, Glucose is normal.     If you have further questions about the interpretation of your labs, [email or web address] is a good website to check out for further information.    Please contact the clinic if you have additional questions.  Thank you.    Sincerely,    Julio Guidry MD

## 2024-07-02 ENCOUNTER — APPOINTMENT (OUTPATIENT)
Dept: SURGERY | Facility: PHYSICIAN GROUP | Age: 74
End: 2024-07-02
Payer: COMMERCIAL

## 2024-07-02 ENCOUNTER — ANESTHESIA (OUTPATIENT)
Dept: SURGERY | Facility: CLINIC | Age: 74
End: 2024-07-02
Payer: COMMERCIAL

## 2024-07-02 ENCOUNTER — HOSPITAL ENCOUNTER (OUTPATIENT)
Facility: CLINIC | Age: 74
Discharge: HOME | End: 2024-07-02
Attending: SURGERY | Admitting: SURGERY
Payer: COMMERCIAL

## 2024-07-02 ENCOUNTER — ANESTHESIA EVENT (OUTPATIENT)
Dept: SURGERY | Facility: CLINIC | Age: 74
End: 2024-07-02
Payer: COMMERCIAL

## 2024-07-02 VITALS
OXYGEN SATURATION: 96 % | DIASTOLIC BLOOD PRESSURE: 79 MMHG | BODY MASS INDEX: 25.99 KG/M2 | HEART RATE: 57 BPM | WEIGHT: 170.9 LBS | RESPIRATION RATE: 15 BRPM | TEMPERATURE: 98.2 F | SYSTOLIC BLOOD PRESSURE: 143 MMHG

## 2024-07-02 DIAGNOSIS — K80.20 GALLSTONES: ICD-10-CM

## 2024-07-02 PROCEDURE — 250N000025 HC SEVOFLURANE, PER MIN: Performed by: SURGERY

## 2024-07-02 PROCEDURE — 250N000011 HC RX IP 250 OP 636: Performed by: SURGERY

## 2024-07-02 PROCEDURE — 999N000141 HC STATISTIC PRE-PROCEDURE NURSING ASSESSMENT: Performed by: SURGERY

## 2024-07-02 PROCEDURE — 250N000011 HC RX IP 250 OP 636: Performed by: NURSE ANESTHETIST, CERTIFIED REGISTERED

## 2024-07-02 PROCEDURE — 250N000013 HC RX MED GY IP 250 OP 250 PS 637: Performed by: SURGERY

## 2024-07-02 PROCEDURE — 250N000009 HC RX 250: Performed by: SURGERY

## 2024-07-02 PROCEDURE — 250N000011 HC RX IP 250 OP 636: Performed by: ANESTHESIOLOGY

## 2024-07-02 PROCEDURE — 710N000012 HC RECOVERY PHASE 2, PER MINUTE: Performed by: SURGERY

## 2024-07-02 PROCEDURE — 258N000003 HC RX IP 258 OP 636: Performed by: ANESTHESIOLOGY

## 2024-07-02 PROCEDURE — 370N000017 HC ANESTHESIA TECHNICAL FEE, PER MIN: Performed by: SURGERY

## 2024-07-02 PROCEDURE — 47562 LAPAROSCOPIC CHOLECYSTECTOMY: CPT | Mod: AS | Performed by: PHYSICIAN ASSISTANT

## 2024-07-02 PROCEDURE — 88304 TISSUE EXAM BY PATHOLOGIST: CPT | Mod: 26 | Performed by: PATHOLOGY

## 2024-07-02 PROCEDURE — 250N000009 HC RX 250: Performed by: NURSE ANESTHETIST, CERTIFIED REGISTERED

## 2024-07-02 PROCEDURE — 272N000001 HC OR GENERAL SUPPLY STERILE: Performed by: SURGERY

## 2024-07-02 PROCEDURE — 360N000083 HC SURGERY LEVEL 3 W/ FLUORO, PER MIN: Performed by: SURGERY

## 2024-07-02 PROCEDURE — 710N000009 HC RECOVERY PHASE 1, LEVEL 1, PER MIN: Performed by: SURGERY

## 2024-07-02 PROCEDURE — 88304 TISSUE EXAM BY PATHOLOGIST: CPT | Mod: TC | Performed by: SURGERY

## 2024-07-02 PROCEDURE — 47562 LAPAROSCOPIC CHOLECYSTECTOMY: CPT | Performed by: SURGERY

## 2024-07-02 PROCEDURE — 250N000013 HC RX MED GY IP 250 OP 250 PS 637: Performed by: ANESTHESIOLOGY

## 2024-07-02 RX ORDER — NALOXONE HYDROCHLORIDE 0.4 MG/ML
0.1 INJECTION, SOLUTION INTRAMUSCULAR; INTRAVENOUS; SUBCUTANEOUS
Status: DISCONTINUED | OUTPATIENT
Start: 2024-07-02 | End: 2024-07-02 | Stop reason: HOSPADM

## 2024-07-02 RX ORDER — HYDROMORPHONE HCL IN WATER/PF 6 MG/30 ML
0.2 PATIENT CONTROLLED ANALGESIA SYRINGE INTRAVENOUS EVERY 5 MIN PRN
Status: DISCONTINUED | OUTPATIENT
Start: 2024-07-02 | End: 2024-07-02 | Stop reason: HOSPADM

## 2024-07-02 RX ORDER — ONDANSETRON 4 MG/1
4 TABLET, ORALLY DISINTEGRATING ORAL EVERY 30 MIN PRN
Status: DISCONTINUED | OUTPATIENT
Start: 2024-07-02 | End: 2024-07-02 | Stop reason: HOSPADM

## 2024-07-02 RX ORDER — LIDOCAINE HYDROCHLORIDE 20 MG/ML
INJECTION, SOLUTION INFILTRATION; PERINEURAL PRN
Status: DISCONTINUED | OUTPATIENT
Start: 2024-07-02 | End: 2024-07-02

## 2024-07-02 RX ORDER — ACETAMINOPHEN 325 MG/1
975 TABLET ORAL
Status: COMPLETED | OUTPATIENT
Start: 2024-07-02 | End: 2024-07-02

## 2024-07-02 RX ORDER — INDOCYANINE GREEN AND WATER 25 MG
2.5 KIT INJECTION ONCE
Status: COMPLETED | OUTPATIENT
Start: 2024-07-02 | End: 2024-07-02

## 2024-07-02 RX ORDER — ONDANSETRON 2 MG/ML
4 INJECTION INTRAMUSCULAR; INTRAVENOUS EVERY 30 MIN PRN
Status: DISCONTINUED | OUTPATIENT
Start: 2024-07-02 | End: 2024-07-02 | Stop reason: HOSPADM

## 2024-07-02 RX ORDER — LIDOCAINE 40 MG/G
CREAM TOPICAL
Status: DISCONTINUED | OUTPATIENT
Start: 2024-07-02 | End: 2024-07-02 | Stop reason: HOSPADM

## 2024-07-02 RX ORDER — CEFAZOLIN SODIUM/WATER 2 G/20 ML
2 SYRINGE (ML) INTRAVENOUS SEE ADMIN INSTRUCTIONS
Status: DISCONTINUED | OUTPATIENT
Start: 2024-07-02 | End: 2024-07-02 | Stop reason: HOSPADM

## 2024-07-02 RX ORDER — PROPOFOL 10 MG/ML
INJECTION, EMULSION INTRAVENOUS PRN
Status: DISCONTINUED | OUTPATIENT
Start: 2024-07-02 | End: 2024-07-02

## 2024-07-02 RX ORDER — OXYCODONE HYDROCHLORIDE 5 MG/1
5 TABLET ORAL
Status: DISCONTINUED | OUTPATIENT
Start: 2024-07-02 | End: 2024-07-02 | Stop reason: HOSPADM

## 2024-07-02 RX ORDER — GLYCOPYRROLATE 0.2 MG/ML
INJECTION, SOLUTION INTRAMUSCULAR; INTRAVENOUS PRN
Status: DISCONTINUED | OUTPATIENT
Start: 2024-07-02 | End: 2024-07-02

## 2024-07-02 RX ORDER — BUPIVACAINE HYDROCHLORIDE 5 MG/ML
INJECTION, SOLUTION PERINEURAL PRN
Status: DISCONTINUED | OUTPATIENT
Start: 2024-07-02 | End: 2024-07-02 | Stop reason: HOSPADM

## 2024-07-02 RX ORDER — OXYCODONE HYDROCHLORIDE 5 MG/1
5-10 TABLET ORAL EVERY 4 HOURS PRN
Qty: 20 TABLET | Refills: 0 | Status: SHIPPED | OUTPATIENT
Start: 2024-07-02 | End: 2024-08-21

## 2024-07-02 RX ORDER — SODIUM CHLORIDE, SODIUM LACTATE, POTASSIUM CHLORIDE, CALCIUM CHLORIDE 600; 310; 30; 20 MG/100ML; MG/100ML; MG/100ML; MG/100ML
INJECTION, SOLUTION INTRAVENOUS CONTINUOUS
Status: DISCONTINUED | OUTPATIENT
Start: 2024-07-02 | End: 2024-07-02 | Stop reason: HOSPADM

## 2024-07-02 RX ORDER — FENTANYL CITRATE 50 UG/ML
25 INJECTION, SOLUTION INTRAMUSCULAR; INTRAVENOUS EVERY 5 MIN PRN
Status: DISCONTINUED | OUTPATIENT
Start: 2024-07-02 | End: 2024-07-02 | Stop reason: HOSPADM

## 2024-07-02 RX ORDER — FENTANYL CITRATE 50 UG/ML
50 INJECTION, SOLUTION INTRAMUSCULAR; INTRAVENOUS EVERY 5 MIN PRN
Status: DISCONTINUED | OUTPATIENT
Start: 2024-07-02 | End: 2024-07-02 | Stop reason: HOSPADM

## 2024-07-02 RX ORDER — FENTANYL CITRATE 50 UG/ML
INJECTION, SOLUTION INTRAMUSCULAR; INTRAVENOUS PRN
Status: DISCONTINUED | OUTPATIENT
Start: 2024-07-02 | End: 2024-07-02

## 2024-07-02 RX ORDER — LABETALOL HYDROCHLORIDE 5 MG/ML
10 INJECTION, SOLUTION INTRAVENOUS
Status: DISCONTINUED | OUTPATIENT
Start: 2024-07-02 | End: 2024-07-02 | Stop reason: HOSPADM

## 2024-07-02 RX ORDER — HYDROMORPHONE HCL IN WATER/PF 6 MG/30 ML
0.4 PATIENT CONTROLLED ANALGESIA SYRINGE INTRAVENOUS EVERY 5 MIN PRN
Status: DISCONTINUED | OUTPATIENT
Start: 2024-07-02 | End: 2024-07-02 | Stop reason: HOSPADM

## 2024-07-02 RX ORDER — ONDANSETRON 4 MG/1
4 TABLET, ORALLY DISINTEGRATING ORAL EVERY 8 HOURS PRN
Qty: 10 TABLET | Refills: 0 | Status: SHIPPED | OUTPATIENT
Start: 2024-07-02 | End: 2024-07-16

## 2024-07-02 RX ORDER — CEFAZOLIN SODIUM/WATER 2 G/20 ML
2 SYRINGE (ML) INTRAVENOUS
Status: COMPLETED | OUTPATIENT
Start: 2024-07-02 | End: 2024-07-02

## 2024-07-02 RX ORDER — ONDANSETRON 2 MG/ML
INJECTION INTRAMUSCULAR; INTRAVENOUS PRN
Status: DISCONTINUED | OUTPATIENT
Start: 2024-07-02 | End: 2024-07-02

## 2024-07-02 RX ORDER — NEOSTIGMINE METHYLSULFATE 1 MG/ML
VIAL (ML) INJECTION PRN
Status: DISCONTINUED | OUTPATIENT
Start: 2024-07-02 | End: 2024-07-02

## 2024-07-02 RX ORDER — DEXAMETHASONE SODIUM PHOSPHATE 4 MG/ML
INJECTION, SOLUTION INTRA-ARTICULAR; INTRALESIONAL; INTRAMUSCULAR; INTRAVENOUS; SOFT TISSUE PRN
Status: DISCONTINUED | OUTPATIENT
Start: 2024-07-02 | End: 2024-07-02

## 2024-07-02 RX ORDER — OXYCODONE HYDROCHLORIDE 5 MG/1
5 TABLET ORAL
Status: COMPLETED | OUTPATIENT
Start: 2024-07-02 | End: 2024-07-02

## 2024-07-02 RX ORDER — DEXAMETHASONE SODIUM PHOSPHATE 4 MG/ML
4 INJECTION, SOLUTION INTRA-ARTICULAR; INTRALESIONAL; INTRAMUSCULAR; INTRAVENOUS; SOFT TISSUE
Status: DISCONTINUED | OUTPATIENT
Start: 2024-07-02 | End: 2024-07-02 | Stop reason: HOSPADM

## 2024-07-02 RX ADMIN — FENTANYL CITRATE 25 MCG: 50 INJECTION, SOLUTION INTRAMUSCULAR; INTRAVENOUS at 10:47

## 2024-07-02 RX ADMIN — OXYCODONE HYDROCHLORIDE 5 MG: 5 TABLET ORAL at 10:35

## 2024-07-02 RX ADMIN — FENTANYL CITRATE 50 MCG: 50 INJECTION INTRAMUSCULAR; INTRAVENOUS at 08:08

## 2024-07-02 RX ADMIN — PROPOFOL 150 MG: 10 INJECTION, EMULSION INTRAVENOUS at 08:08

## 2024-07-02 RX ADMIN — INDOCYANINE GREEN AND WATER 2.5 MG: KIT at 07:19

## 2024-07-02 RX ADMIN — ACETAMINOPHEN 975 MG: 325 TABLET, FILM COATED ORAL at 12:26

## 2024-07-02 RX ADMIN — FENTANYL CITRATE 25 MCG: 50 INJECTION, SOLUTION INTRAMUSCULAR; INTRAVENOUS at 10:14

## 2024-07-02 RX ADMIN — NEOSTIGMINE METHYLSULFATE 4 MG: 1 INJECTION, SOLUTION INTRAVENOUS at 09:04

## 2024-07-02 RX ADMIN — SODIUM CHLORIDE, POTASSIUM CHLORIDE, SODIUM LACTATE AND CALCIUM CHLORIDE: 600; 310; 30; 20 INJECTION, SOLUTION INTRAVENOUS at 07:06

## 2024-07-02 RX ADMIN — Medication 2 G: at 08:20

## 2024-07-02 RX ADMIN — GLYCOPYRROLATE 0.6 MG: 0.2 INJECTION, SOLUTION INTRAMUSCULAR; INTRAVENOUS at 09:04

## 2024-07-02 RX ADMIN — ROCURONIUM BROMIDE 50 MG: 50 INJECTION, SOLUTION INTRAVENOUS at 08:12

## 2024-07-02 RX ADMIN — SODIUM CHLORIDE, POTASSIUM CHLORIDE, SODIUM LACTATE AND CALCIUM CHLORIDE: 600; 310; 30; 20 INJECTION, SOLUTION INTRAVENOUS at 08:00

## 2024-07-02 RX ADMIN — FENTANYL CITRATE 25 MCG: 50 INJECTION, SOLUTION INTRAMUSCULAR; INTRAVENOUS at 09:58

## 2024-07-02 RX ADMIN — FENTANYL CITRATE 50 MCG: 50 INJECTION INTRAMUSCULAR; INTRAVENOUS at 08:28

## 2024-07-02 RX ADMIN — ONDANSETRON 4 MG: 2 INJECTION INTRAMUSCULAR; INTRAVENOUS at 08:38

## 2024-07-02 RX ADMIN — PROPOFOL 50 MG: 10 INJECTION, EMULSION INTRAVENOUS at 08:12

## 2024-07-02 RX ADMIN — SODIUM CHLORIDE, POTASSIUM CHLORIDE, SODIUM LACTATE AND CALCIUM CHLORIDE: 600; 310; 30; 20 INJECTION, SOLUTION INTRAVENOUS at 08:45

## 2024-07-02 RX ADMIN — DEXAMETHASONE SODIUM PHOSPHATE 4 MG: 4 INJECTION, SOLUTION INTRA-ARTICULAR; INTRALESIONAL; INTRAMUSCULAR; INTRAVENOUS; SOFT TISSUE at 08:12

## 2024-07-02 RX ADMIN — ONDANSETRON 4 MG: 4 TABLET, ORALLY DISINTEGRATING ORAL at 12:23

## 2024-07-02 RX ADMIN — HYDROMORPHONE HYDROCHLORIDE 0.5 MG: 1 INJECTION, SOLUTION INTRAMUSCULAR; INTRAVENOUS; SUBCUTANEOUS at 08:32

## 2024-07-02 RX ADMIN — LIDOCAINE HYDROCHLORIDE 50 MG: 20 INJECTION, SOLUTION INFILTRATION; PERINEURAL at 08:08

## 2024-07-02 ASSESSMENT — ACTIVITIES OF DAILY LIVING (ADL)
ADLS_ACUITY_SCORE: 22
ADLS_ACUITY_SCORE: 20
ADLS_ACUITY_SCORE: 22
ADLS_ACUITY_SCORE: 22
ADLS_ACUITY_SCORE: 26
ADLS_ACUITY_SCORE: 22
ADLS_ACUITY_SCORE: 26
ADLS_ACUITY_SCORE: 22

## 2024-07-02 ASSESSMENT — LIFESTYLE VARIABLES: TOBACCO_USE: 1

## 2024-07-02 ASSESSMENT — COPD QUESTIONNAIRES: COPD: 0

## 2024-07-02 NOTE — OP NOTE
General Surgery Operative Note    Pre-operative diagnosis:  Gallstones [K80.20]   Post-operative diagnosis: same   Procedure: Laparoscopic Cholecystectomy   Surgeon: Arnaud Garza MD   Assistant(s): Mayte Craft PA-C - the physician assistant was medically necessary to assist in prepping, positioning, camera operation, retraction/exposure and closure of the port site.    Anesthesia: General    Estimated blood loss: 5 cc's   Drains placed: None   Complications:  None   Findings:  Gallbladder without obvious inflammatory change.  It did appear distended.  There were extensive adhesions up over the dome of the liver.  These were taken down using electrocautery.     INDICATIONS FOR OPERATION: This is a patient with ongoing nausea, distended gallbladder, and gallstones seen on a previous ultrasound.  Laparoscopic cholecystectomy was recommended.  The procedure along with its risks and complications was discussed in detail and the patient agreed to proceed.  We specifically discussed the possibility that cholecystectomy might not resolve all of the patient's symptoms.    DETAILS OF THE OPERATION: After informed consent the patient was taken to the operating room where he underwent satisfactory induction of general anesthesia.  The patient was then sterilely prepped and draped.  A supraumbilical skin incision was made using a skin knife.  The dissection was carried bluntly down to the fascia.  The fascia was opened using electrocautery and the Galeano trocar was then inserted.  Pneumoperitoneum was achieved using CO2 insufflation, and under direct visualization  three  5 mm upper abdominal ports were placed.  The gallbladder was visualized and was grasped. It was pulled up over the liver and the cystic duct was exposed.  The cystic duct was skeletonized, triple clipped and divided.  The cystic artery was likewise triple clipped and divided.  The gallbladder was then dissected away from the liver using electrocautery.   The gallbladder was then placed in an Endo Catch bag and set aside.  Adhesions to the dome of the liver restricted the motion of the liver.  I therefore took these adhesions down using electrocautery.  The gallbladder fossa was irrigated out.  There was excellent hemostasis and the clips were in good position.  The trocar sites were now infiltrated with half percent Marcaine with epinephrine.  The trochars were removed under direct visualization.  The gallbladder was removed in the Endo Catch bag through the supraumbilical incision.  The supraumbilical fascia was then closed using 0 Vicryl suture.  Skin incisions were closed using 4-0 subcuticular Vicryl followed by Steri-Strips.    The patient was transferred to the recovery room in satisfactory condition.  Sponge and needle counts were correct at the close of the case.      Specimens:   ID Type Source Tests Collected by Time Destination   1 : gallbladder and contents Tissue Gallbladder SURGICAL PATHOLOGY EXAM Arnaud Graza MD 7/2/2024  8:44 AM            Arnaud Garza MD

## 2024-07-02 NOTE — ANESTHESIA PROCEDURE NOTES
Airway         Procedure Start/Stop Times: 7/2/2024 8:15 AM  Staff -        CRNA: Steffanie Castillo APRN CRNA       Performed By: CRNA  Consent for Airway        Urgency: elective  Indications and Patient Condition       Indications for airway management: justo-procedural       Induction type:intravenous       Mask difficulty assessment: 1 - vent by mask    Final Airway Details       Final airway type: endotracheal airway       Successful airway: ETT - single  Endotracheal Airway Details        ETT size (mm): 8.0       Cuffed: yes       Successful intubation technique: direct laryngoscopy       DL Blade Type: Rubio 2       Grade View of Cords: 1       Position: Right       Measured from: gums/teeth       Secured at (cm): 24       Bite block used: None    Post intubation assessment        Placement verified by: capnometry, equal breath sounds and chest rise        Number of attempts at approach: 1       Number of other approaches attempted: 0       Secured with: tape       Ease of procedure: easy       Dentition: Intact and Unchanged    Medication(s) Administered   Medication Administration Time: 7/2/2024 8:15 AM

## 2024-07-02 NOTE — ANESTHESIA POSTPROCEDURE EVALUATION
Patient: Juwan Latham    Procedure: Procedure(s):  CHOLECYSTECTOMY, LAPAROSCOPIC       Anesthesia Type:  General    Note:  Disposition: Outpatient   Postop Pain Control:    PONV: No   Neuro/Psych: Uneventful            Sign Out: Acceptable/Baseline neuro status   Airway/Respiratory: Uneventful            Sign Out: Acceptable/Baseline resp. status   CV/Hemodynamics: Uneventful            Sign Out: Acceptable CV status; No obvious hypovolemia; No obvious fluid overload   Other NRE:    DID A NON-ROUTINE EVENT OCCUR? No           Last vitals:  Vitals Value Taken Time   /71 07/02/24 1000   Temp 97.2  F (36.2  C) 07/02/24 0912   Pulse 55 07/02/24 1011   Resp 13 07/02/24 1011   SpO2 91 % 07/02/24 1011   Vitals shown include unfiled device data.    Electronically Signed By: Shannan Araiza MD  July 2, 2024  10:12 AM

## 2024-07-02 NOTE — ANESTHESIA CARE TRANSFER NOTE
Patient: Juwan Latham    Procedure: Procedure(s):  CHOLECYSTECTOMY, LAPAROSCOPIC       Diagnosis: Gallstones [K80.20]  Diagnosis Additional Information: No value filed.    Anesthesia Type:   General     Note:    Oropharynx: oropharynx clear of all foreign objects and spontaneously breathing  Level of Consciousness: awake and drowsy  Oxygen Supplementation: face mask  Level of Supplemental Oxygen (L/min / FiO2): 7  Independent Airway: airway patency satisfactory and stable  Dentition: dentition unchanged  Vital Signs Stable: post-procedure vital signs reviewed and stable  Report to RN Given: handoff report given  Patient transferred to: PACU    Handoff Report: Identifed the Patient, Identified the Reponsible Provider, Reviewed the pertinent medical history, Discussed the surgical course, Reviewed Intra-OP anesthesia mangement and issues during anesthesia, Set expectations for post-procedure period and Allowed opportunity for questions and acknowledgement of understanding      Vitals:  Vitals Value Taken Time   /70 07/02/24    0915   Temp     Pulse 65 07/02/24 0916   Resp 17 07/02/24 0916   SpO2 100 % 07/02/24 0916   Vitals shown include unfiled device data.    Electronically Signed By: GLORIA BURROUGHS CRNA  July 2, 2024  9:17 AM

## 2024-07-02 NOTE — DISCHARGE INSTRUCTIONS
You were given Oxycodone 5mg at 1030 am today    Maximum acetaminophen (Tylenol) dose from all sources should not exceed 4 grams (4000 mg) per day.          HOME CARE FOLLOWING LAPAROSCOPIC CHOLECYSTECTOMY  NORMAN Lizama, LACY Wells, LACY Irving, MURIEL Garcia    INCISIONAL CARE:  Replace the bandage over your incisions DAILY until all drainage stops, or if more comfortable to have in place.  If present, leave the steri-strips (white paper tapes) in place for 14 days after surgery.  If Dermabond (a type of skin glue) is present, leave in place until it wears/flakes off (2-3 weeks).     BATHING:  OK to shower 48 hours after surgery.  Avoid baths for 1 week after surgery.  You may wash your hair at any time.  Gently pat your incision dry after bathing.  Do not apply lotions, creams, or ointments to incisions.    ACTIVITY:  Light Activity -- you may immediately be up and about as tolerated.  Walking is encouraged, increase as tolerated.  Driving/Light Work-- when comfortable and off narcotic pain medications.  Strenuous Work/Activity -- limit lifting to 20 pounds for 2 weeks.  Progressively increase with time.  Active Sports (running, biking, etc.) -- cautiously resume after 2 weeks.    DISCOMFORT:  Local anesthetic placed at surgery should provide relief for 4-8 hours.  Begin taking pain pills before discomfort is severe.  Take the pain medication with some food, when possible, to minimize side effects.  Intermittent use of ice packs may help during the first 1-3 weeks after surgery.  Expect gradual improvement.    Over-the-counter anti-inflammatory medications (i.e. Ibuprofen/Advil/Motrin or Naprosyn/Aleve) may be used per package instructions in addition to or while tapering off the narcotic pain medications to decrease swelling and sensitivity.  DO NOT TAKE these Anti-inflammatory medications if your primary physician has advised against doing so, or if you have acid reflux, ulcer, or bleeding  disorder, or take blood-thinner medications.  Call your primary physician or the surgery office if you have medication questions.    After laparoscopic cholecystectomy, you may have shoulder or upper back discomfort due to the gas used during surgery.  This is temporary and should resolve within 2-3 days.  Frequent short walks may help with this.  You may have decreased energy level for 1-2 weeks after surgery related to your recovery.    DIET:  Start with liquids and gradually increase diet as tolerated.  Drink plenty of fluids.  While taking pain medications, consider use of a stool softener, increase your fiber in your diet, or add a fiber supplement (like Metamucil, Citrucel) to help prevent constipation - a possible side effect of pain medications.  It is not uncommon to experience some bowel changes (loose stools or constipation) after surgery.  Your body has to adapt to you no longer having a gall bladder.  To help minimize this side effect, avoid fatty foods for 1-2 weeks after surgery.  You may then slowly increase the amount of fatty foods in your diet.      NAUSEA:  If nauseated from the anesthetic/pain meds; rest in bed, get up cautiously with assistance, and drink clear liquids (juice, tea, broth).    FOLLOW-UP AFTER SURGERY:  -Our office will contact you approximately 2-3 weeks after surgery to check on your progress and answer any questions you may have.  If you are doing well, you will not need to return for an office appointment.  If any concerns are identified over the phone, we will help you make an appointment to see a provider.    -If you have not received a phone call, have any questions or concerns, or would like to be seen, please call us at [phone].  We are located at: [address] [facility], [address]; [place] [address]    -CONTACT US IF THE FOLLOWING DEVELOPS:   1. A fever that is above 101     2. Increased redness, warmth, drainage, bleeding, or swelling.   3. Pain that is not  relieved by rest/ice and your prescription.   4.  Increasing pain after 48 hours.   5. Drainage that is thick, cloudy, yellow, green or white.   6. Any other questions or concerns.      FREQUENTLY ASKED QUESTIONS:    Q:  How should my incision look?    A:  Normally your incision will appear slightly swollen with light redness directly along the incision itself as it heals.  It may feel like a bump or ridge as the healing/scarring happens, and over time (3-4 months) this bump or ridge feeling should slowly go away.  In general, clear or pink watery drainage can be normal at first as your incision heals, but should decrease over time.    Q:  How do I know if my incision is infected?  A:  Look at your incision for signs of infection, like redness around the incision spreading to surrounding skin, or drainage of cloudy or foul-smelling drainage.  If you feel warm, check your temperature to see if you are running a fever.    **If any of these things occur, please notify the nurse at our office.  We may need you to come into the office for an incision check.      Q:  How do I take care of my incision?  A:  If you have a dressing in place - Starting the day after surgery, replace the dressing 1-2 times a day until there is no further drainage from the incision.  At that time, a dressing is no longer needed.  Try to minimize tape on the skin if irritation is occurring at the tape sites.  If you have significant irritation from tape on the skin, please call the office to discuss other method of dressing your incision.    Small pieces of tape called  steri-strips  may be present directly overlying your incision; these may be removed 10 days after surgery unless otherwise specified by your surgeon.  If these tapes start to loosen at the ends, you may trim them back until they fall off or are removed.    A:  If you had  Dermabond  tissue glue used as a dressing (this causes your incision to look shiny with a clear covering over  it) - This type of dressing wears off with time and does not require more dressings over the top unless it is draining around the glue as it wears off.  Do not apply ointments or lotions over the incisions until the glue has completely worn off.    Q:  There is a piece of tape or a sticky  lead  still on my skin.  Can I remove this?  A:  Sometimes the sticky  leads  used for monitoring during surgery or for evaluation in the emergency department are not all removed while you are in the hospital.  These sometimes have a tab or metal dot on them.  You can easily remove these on your own, like taking off a band-aid.  If there is a gel substance under the  lead , simply wipe/clean it off with a washcloth or paper towel.      Q:  What can I do to minimize constipation (very hard stools, or lack of stools)?  A:  Stay well hydrated.  Increase your dietary fiber intake or take a fiber supplement -with plenty of water.  Walk around frequently.  You may consider an over-the-counter stool-softener.  Your Pharmacist can assist you with choosing one that is stocked at your pharmacy.  Constipation is also one of the most common side effects of pain medication.  If you are using pain medication, be pro-active and try to PREVENT problems with constipation by taking the steps above BEFORE constipation becomes a problem.    Q:  What do I do if I need more pain medications?  A:  Call the office to receive refills.  Be aware that certain pain meds cannot be called into a pharmacy and actually require a paper prescription.  A change may be made in your pain med as you progress thru your recovery period or if you have side effects to certain meds.    --Pain meds are NOT refilled after 5pm on weekdays, and NOT AT ALL on the weekends, so please look ahead to prevent problems.      Q:  Why am I having a hard time sleeping now that I am at home?  A:  Many medications you receive while you are in the hospital can impact your sleep for a  number of days after your surgery/hospitalization.  Decreased level of activity and naps during the day may also make sleeping at night difficult.  Try to minimize day-time naps, and get up frequently during the day to walk around your home during your recovery time.  Sleep aides may be of some help, but are not recommended for long-term use.      Q:  I am having some back discomfort.  What should I do?  A:  This may be related to certain positioning that was required for your surgery, extended periods of time in bed, or other changes in your overall activity level.  You may try ice, heat, acetaminophen, or ibuprofen to treat this temporarily.  Note that many pain medications have acetaminophen in them and would state this on the prescription bottle.  Be sure not to exceed the maximum of 4000mg per day of acetaminophen.     **If the pain you are having does not resolve, is severe, or is a flare of back pain you have had on other occasions prior to surgery, please contact your primary physician for further recommendations or for an appointment to be examined at their office.    Q:  Why am I having headaches?  A:  Headaches can be caused by many things:  caffeine withdrawal, use of pain meds, dehydration, high blood pressure, lack of sleep, over-activity/exhaustion, flare-up of usual migraine headaches.  If you feel this is related to muscle tension (a band-like feeling around the head, or a pressure at the low-back of the head) you may try ice or heat to this area.  You may need to drink more fluids (try electrolyte drink like Gatorade), rest, or take your usual migraine medications.   **If your headaches do not resolve, worsen, are accompanied by other symptoms, or if your blood pressure is high, please call your primary physician for recommendation and/or examination.    Q:  I am unable to urinate.  What do I do?  A:  A small percentage of people can have difficulty urinating initially after surgery.  This includes  being able to urinate only a very small amount at a time and feeling discomfort or pressure in the very low abdomen.  This is called  urinary retention , and is actually an urgent situation.  Proceed to your nearest Emergency department for evaluation (not an Urgent Care Center).  Sometimes the bladder does not work correctly after certain medications you receive during surgery, or related to certain procedures.  You may need to have a catheter placed until your bladder recovers.  When planning to go to an Emergency department, it may help to call the ER to let them know you are coming in for this problem after a surgery.  This may help you get in quicker to be evaluated.  **If you have symptoms of a urinary tract infection, please contact your primary physician for the proper evaluation and treatment.          If you have other questions, please call the office Monday thru Friday between 8am and 4:30pm to discuss with the nurse or physician assistant.  #[phone]    There is a surgeon ON CALL on weekday evenings and over the weekend in case of urgent need only, and may be contacted at the same number.    If you are having an emergency, call 911 or proceed to your nearest emergency department.

## 2024-07-02 NOTE — ANESTHESIA PREPROCEDURE EVALUATION
Anesthesia Pre-Procedure Evaluation    Patient: Juwan Latham   MRN: [id number] : 1950        Procedure : Procedure(s):  CHOLECYSTECTOMY, LAPAROSCOPIC          Past Medical History:   Diagnosis Date    Anxiety     Arthritis     fingers, hips    Calculus of kidney ,     CARDIOVASCULAR SCREENING; LDL GOAL LESS THAN 130 2023    Colon polyps     Congenital single kidney     Gastroesophageal reflux disease     Hx of cancer of lung 2017    wedge resection    Hypertension     Prostate cancer (H) 2017    prostatectomy/Rad Tx    Seasonal allergies     spring and     Sleep apnea     cpap      Past Surgical History:   Procedure Laterality Date    BRONCHOSCOPY FLEXIBLE N/A 2017    Procedure: BRONCHOSCOPY FLEXIBLE;  Surgeon: Maria A Desai MD;  Location: [facility]    COLONOSCOPY N/A 2015    Procedure: COLONOSCOPY;  Surgeon: Murphy Jesus MD;  Location: [facility]    COLONOSCOPY N/A 2019    Procedure: COLONOSCOPY;  Surgeon: Murphy Jesus MD;  Location: [facility]    COLONOSCOPY N/A 2022    due 5 yrs - COLONOSCOPY, FLEXIBLE, WITH POLYPECTOMY  USING COLD SNARE;  Surgeon: Neha Rosen MD;  Location: [facility]    COMBINED CYSTOSCOPY, RETROGRADES, URETEROSCOPY, LASER HOLMIUM LITHOTRIPSY URETER(S), INSERT STENT N/A 2018    Procedure: COMBINED CYSTOSCOPY, RETROGRADES, URETEROSCOPY, LASER HOLMIUM LITHOTRIPSY URETER(S), INSERT STENT;  Cystoscopy, Catheter Exchange under Anesthesia;  Surgeon: Zaria Cain MD;  Location: [facility]    CYSTOSCOPY, DILATE URETHRA, COMBINED N/A 2022    Procedure: CYSTOSCOPY, WITH URETHRAL DILATION WITH FULGERATION OF BLADDER WALL;  Surgeon: Mike Chan MD;  Location: [facility]    CYSTOSCOPY, WITH URETHRAL CHEMOTHERAPY STENT INSERTION N/A 3/18/2024    Procedure: CYSTOSCOPY, RETROGRADE WITH OPTILUME URETHRAL DILATION BALLOON;  Surgeon: Mike Chan MD;  Location: [facility]    DAVINCI PROSTATECTOMY N/A 2017    Procedure: DAVINCI  PROSTATECTOMY;  Robotic Assisted Radical Prostatectomy and Pelvic Lymph Node Dissection ;  Surgeon: Paulo Agarwal MD;  Location: [facility]    ESOPHAGOSCOPY, GASTROSCOPY, DUODENOSCOPY (EGD), COMBINED N/A 2016    Procedure: COMBINED ESOPHAGOSCOPY, GASTROSCOPY, DUODENOSCOPY (EGD), BIOPSY SINGLE OR MULTIPLE;  Surgeon: Murphy Jesus MD;  Location: [facility]    LAPAROSCOPIC WEDGE RESECTION LUNG Right 2017    Procedure: LAPAROSCOPIC WEDGE RESECTION LUNG;  Surgeon: Maria A Desai MD;  Location: [facility]    LASER HOLMIUM LITHOTRIPSY URETER(S), INSERT STENT, COMBINED  2012    Procedure: COMBINED CYSTOSCOPY, URETEROSCOPY, LASER HOLMIUM LITHOTRIPSY URETER(S), INSERT STENT;  Cystoscopy, Right Ureteroscopy, Stone Extraction, Holmium Laser, Double J Stent Placement;  Surgeon: Nicolás Blackwell MD;  Location: [facility]      No Known Allergies   Social History     Tobacco Use    Smoking status: Former     Current packs/day: 0.00     Average packs/day: 2.0 packs/day for 20.0 years (40.0 ttl pk-yrs)     Types: Cigarettes     Start date: 1965     Quit date: 1985     Years since quittin.5     Passive exposure: Past    Smokeless tobacco: Never   Substance Use Topics    Alcohol use: Yes     Alcohol/week: 0.0 - 2.0 standard drinks of alcohol     Comment: had 2 beers 2 weeks ago      Wt Readings from Last 1 Encounters:   24 77.5 kg (170 lb 14.4 oz)        Anesthesia Evaluation   Pt has had prior anesthetic. Type: General.    No history of anesthetic complications       ROS/MED HX  ENT/Pulmonary:     (+) sleep apnea, uses CPAP,              tobacco use, Past use,                    (-) asthma, COPD and recent URI   Neurologic:  - neg neurologic ROS     Cardiovascular:     (+)  hypertension- -   -  - -                                 Previous cardiac testing   Echo: Date: 3/2023 Results:  Interpretation Summary     1. The left ventricle is normal in size. There is normal left ventricular wall  thickness.  Left ventricular systolic function is low normal. The visual  ejection fraction is 50-55%. Diastolic Doppler findings (E/E' ratio and/or  other parameters) suggest left ventricular filling pressures are normal.  Septal motion is consistent with conduction abnormality.  2. The right ventricle is normal size. The right ventricular systolic function  is normal.  3. Trace to mild mitral and tricuspid regurgitation.  4. No pericardial effusion.    Stress Test:  Date: Results:    ECG Reviewed:  Date: Results:    Cath:  Date: Results:   (-) irregular heartbeat/palpitations and valvular problems/murmurs   METS/Exercise Tolerance:     Hematologic:       Musculoskeletal:       GI/Hepatic:     (+) GERD, Asymptomatic on medication,           liver disease (fatty liver),       Renal/Genitourinary:     (+) renal disease (solitary kidney),             Endo:  - neg endo ROS     Psychiatric/Substance Use:     (+) psychiatric history anxiety   Recreational drug usage: Cannabis.    Infectious Disease:       Malignancy:   (+) Malignancy, History of Lung and Prostate.  Lung CA Remission status post Surgery and Radiation.      Other:            Physical Exam    Airway        Mallampati: III   TM distance: > 3 FB   Neck ROM: full   Mouth opening: > 3 cm    Respiratory Devices and Support         Dental       (+) Minor Abnormalities - some fillings, tiny chips      Cardiovascular   cardiovascular exam normal       Rhythm and rate: regular and normal     Pulmonary   pulmonary exam normal        breath sounds clear to auscultation           OUTSIDE LABS:  CBC:   Lab Results   Component Value Date    WBC 8.9 06/13/2024    WBC 5.3 05/28/2024    HGB 14.1 06/13/2024    HGB 13.6 05/28/2024    HCT 42.1 06/13/2024    HCT 41.4 05/28/2024     06/13/2024     05/28/2024     BMP:   Lab Results   Component Value Date     06/26/2024     05/28/2024    POTASSIUM 4.5 06/26/2024    POTASSIUM 4.7 05/28/2024    CHLORIDE 104 06/26/2024  "   CHLORIDE 107 05/28/2024    CO2 27 06/26/2024    CO2 29 05/28/2024    BUN 11.9 06/26/2024    BUN 18.7 05/28/2024    CR 0.83 06/26/2024    CR 0.84 05/28/2024    GLC 97 06/26/2024    GLC 79 05/28/2024     COAGS:   Lab Results   Component Value Date    PTT 28 10/28/2005    INR 0.97 10/20/2020     POC:   Lab Results   Component Value Date    BGM 94 12/03/2017     HEPATIC:   Lab Results   Component Value Date    ALBUMIN 4.7 06/13/2024    PROTTOTAL 7.1 06/13/2024    ALT 23 06/13/2024    AST 24 06/13/2024    GGT 20 06/13/2024    ALKPHOS 51 06/13/2024    BILITOTAL 0.4 06/13/2024     OTHER:   Lab Results   Component Value Date    LACT 0.8 05/01/2024    A1C 5.3 09/23/2021    JOANNE 9.6 06/26/2024    PHOS 3.2 03/14/2017    MAG 1.8 12/22/2023    LIPASE 48 12/22/2023    TSH 2.41 04/18/2023    CRP 3.8 01/14/2020    SED 8 06/13/2024       Anesthesia Plan    ASA Status:  2    NPO Status:  NPO Appropriate    Anesthesia Type: General.     - Airway: ETT   Induction: Intravenous.   Maintenance: Balanced.        Consents    Anesthesia Plan(s) and associated risks, benefits, and realistic alternatives discussed. Questions answered and patient/representative(s) expressed understanding.     - Discussed:     - Discussed with:  Patient      - Extended Intubation/Ventilatory Support Discussed: No.      - Patient is DNR/DNI Status: No     Use of blood products discussed: No .     Postoperative Care    Pain management: IV analgesics, Oral pain medications, Multi-modal analgesia.   PONV prophylaxis: Ondansetron (or other 5HT-3), Dexamethasone or Solumedrol     Comments:               Shannan Araiza MD    I have reviewed the pertinent notes and labs in the chart from the past 30 days and (re)examined the patient.  Any updates or changes from those notes are reflected in this note.              # Overweight: Estimated body mass index is 25.99 kg/m  as calculated from the following:    Height as of 6/26/24: 1.727 m (5' 8\").    Weight as of " this encounter: 77.5 kg (170 lb 14.4 oz).

## 2024-07-08 ENCOUNTER — MYC MEDICAL ADVICE (OUTPATIENT)
Dept: FAMILY MEDICINE | Facility: CLINIC | Age: 74
End: 2024-07-08
Payer: COMMERCIAL

## 2024-07-16 ENCOUNTER — NURSE TRIAGE (OUTPATIENT)
Dept: FAMILY MEDICINE | Facility: CLINIC | Age: 74
End: 2024-07-16
Payer: COMMERCIAL

## 2024-07-16 DIAGNOSIS — K80.20 GALLSTONES: ICD-10-CM

## 2024-07-16 RX ORDER — ONDANSETRON 4 MG/1
4 TABLET, ORALLY DISINTEGRATING ORAL EVERY 8 HOURS PRN
Qty: 10 TABLET | Refills: 0 | Status: SHIPPED | OUTPATIENT
Start: 2024-07-16 | End: 2024-08-21

## 2024-07-16 NOTE — TELEPHONE ENCOUNTER
Impression: Age-related nuclear cataract, left eye Plan: pt wishes to hold off on surgery Patient said he recently had surgery was prescribed Zofran for nausea. Triaged, see nurse triage call for details. Justa Naqvi R.N.

## 2024-07-16 NOTE — TELEPHONE ENCOUNTER
"S-(situation): Chronic Nausea    B-(background): medication prescribed but patient did not try or start 7/2//24 cholecystectomy. Please also see highlighted triage initial assessment questions for further details.       A-(assessment): Patient rates abdominal pain as chronic and mild and has difficult time describing how it feels but is likely a pressure per our conversation.     R-(recommendations): Start prescription medication for issue, have provider review for any next steps or appointment.       Patient sent MyChart request for Zofran which was ordered by surgeon, had gallbladder surgery on 7/4/24. He \"thinks\" zofran works but isn't sure, triaged for nausea.   He says nausea is a chronic problem for him and I noticed pantoprazole and sucralfate  where prescribed which he did not start taking (was ordered in June.) I reviewed these medications with him extensively as well as when and how to take. Asked him to start these medications and call us back in one week to let us know if adding these medications helps, and call us right away if symptoms become worse at any time. Juwan in agreement to this plan.     Will also route to Dr. Guidry to inform if any further recommendations. Did not make appointment at this time per protocol recommendations as patient was not taking medications as directed and will have Dr. Guidry review for any input and if scheduling appointment would be indicated.     Justa Naqvi R.N.          Reason for Disposition   Nausea is a chronic symptom (recurrent or ongoing AND present > 4 weeks)    Additional Information   Negative: Shock suspected (e.g., cold/pale/clammy skin, too weak to stand, low BP, rapid pulse)   Negative: Sounds like a life-threatening emergency to the triager   Negative: Nausea or vomiting and pregnancy < 20 weeks   Negative: Menstrual Period - Missed or Late (i.e., pregnancy suspected)   Negative: Heat exhaustion suspected (i.e., dehydration from heat exposure)   " "Negative: Anxiety or stress suspected (i.e., nausea with anxiety attacks or stressful situations)   Negative: Traumatic Brain Injury (TBI) suspected   Negative: Vomiting occurs   Negative: Other symptom is present, see that guideline.  (e.g., chest pain, headache, dizziness, abdominal pain, colds, sore throat, etc.).   Negative: Unable to walk, or can only walk with assistance (e.g., requires support)   Negative: Difficulty breathing   Negative: Insulin-dependent diabetes (Type I) and glucose > 400 mg/dL (22 mmol/L)   Negative: Drinking very little and dehydration suspected (e.g., no urine > 12 hours, very dry mouth, very lightheaded)   Negative: Patient sounds very sick or weak to the triager   Negative: Fever > 104 F  (40 C)   Negative: Fever > 101 F  (38.3 C) and over 60 years of age   Negative: Fever > 100.0 F  (37.8 C) and bedridden (e.g., CVA, chronic illness, recovering from surgery)   Negative: Fever > 100.0 F  (37.8 C) and diabetes mellitus or weak immune system (e.g., HIV positive, cancer chemo, splenectomy, chronic steroids)   Negative: Taking any of the following medications: digoxin (Lanoxin), lithium, theophylline, phenytoin (Dilantin)   Negative: Yellowish color of the skin or white of the eye (i.e., jaundice)   Negative: Fever present > 3 days (72 hours)   Negative: Receiving cancer chemotherapy medication   Negative: Taking prescription medication that could cause nausea (e.g., narcotics/opiates, antibiotics, OCPs, many others)   Negative: Nausea lasts > 1 week   Negative: Substance use (drug use) or unhealthy alcohol use, known or suspected    Answer Assessment - Initial Assessment Questions  1. NAUSEA SEVERITY: \"How bad is the nausea?\" (e.g., mild, moderate, severe; dehydration, weight loss)    - MILD: loss of appetite without change in eating habits    - MODERATE: decreased oral intake without significant weight loss, dehydration, or malnutrition    - SEVERE: inadequate caloric or fluid intake, " "significant weight loss, symptoms of dehydration      Feels a pressure that is mild.   2. ONSET: \"When did the nausea begin?\"      Last week  3. VOMITING: \"Any vomiting?\" If Yes, ask: \"How many times today?\"      No  4. RECURRENT SYMPTOM: \"Have you had nausea before?\" If Yes, ask: \"When was the last time?\" \"What happened that time?\"      On and off, No  5. CAUSE: \"What do you think is causing the nausea?\"      Unsure   6. PREGNANCY: \"Is there any chance you are pregnant?\" (e.g., unprotected intercourse, missed birth control pill, broken condom)      No    Protocols used: Nausea-A-OH    "

## 2024-07-16 NOTE — TELEPHONE ENCOUNTER
Provider Response to 2nd Level Triage Request    I have reviewed the RN documentation. My recommendation is:  Follow Justa's recommendation of starting proton pump inhibitor and Carafate as our next steps.  Should things not improve in two weeks, consider follow up with me.  Of note, he has recently had an unremarkable EGD for chronic nausea.   I sent in refill for Zofran as well.    Julio Guidry MD

## 2024-07-16 NOTE — TELEPHONE ENCOUNTER
Called and reviewed below with Pt, he reports understanding and agree with plan.       Bernardo Espinosa RN [facility]

## 2024-07-18 ENCOUNTER — TELEPHONE (OUTPATIENT)
Dept: SURGERY | Facility: CLINIC | Age: 74
End: 2024-07-18
Payer: COMMERCIAL

## 2024-07-18 NOTE — TELEPHONE ENCOUNTER
[facility] Postoperative Call Note:     Juwan Latham was called for an update regarding his recovery. He underwent a laparoscopic cholecystectomy by Dr. Garza on 7/02/24. Today he tells me he is doing ok but has had some upset stomach and nausea. He told me he is eating ice cream and greasy foods, not sticking to a low-fat diet. He will try a low-fat diet and see how he feels. His bowels are regular. He states his wounds are healing well.    The pathology revealed acute cholecystitis. This was discussed with the patient.     Patient was instructed to slowly and gradually resume all normal activities. Discussed a low-fat diet. The patient states all of his questions were answered. He understands our discussion. He agrees to follow up as needed or to call our office with any concerns.    Emigdio Babb PA-C      Please route or send letter to:  Primary Care Provider (PCP)

## 2024-08-20 ENCOUNTER — NURSE TRIAGE (OUTPATIENT)
Dept: FAMILY MEDICINE | Facility: CLINIC | Age: 74
End: 2024-08-20
Payer: COMMERCIAL

## 2024-08-20 ENCOUNTER — TELEPHONE (OUTPATIENT)
Dept: SURGERY | Facility: CLINIC | Age: 74
End: 2024-08-20
Payer: COMMERCIAL

## 2024-08-20 NOTE — TELEPHONE ENCOUNTER
Called patient back with PA input:    Please advise him that the complete recovery period after lap melvin is considered to be 6 weeks. Since he is over 6 weeks postop, his symptoms likely have a different cause so he should seek evaluation from his PCP.     Patient verbalized understanding and agreed    He will call PRSULEIMAN Villa RN-BSN

## 2024-08-20 NOTE — TELEPHONE ENCOUNTER
"Procedure: Laparoscopic cholecystectomy    Date: 7/2/24    Surgeon: Kayla    Patient calling to discuss ongoing fatigue and nausea.  Has been going on since surgery, but it's been getting worse over the past couple of weeks.    He wakes up feeling fatigued and nauseated.  Starts to feel better as the morning goes on. But, around 11 am, he starts feeling very low energy, increasing heartburn and nausea.  Continues off and on throughout the day. Feels like \"all energy sucked out.\"    Feels worse after eating.    Staying hydrated with water.    Having bowel movements. Reports that they start real dark in color and then get lighter.     He reports he has dealt with nausea in the past, but heartburn is new for him and nausea has not persisted in this way before. Does use zofran for nausea    Prior to surgery, he was also prescribed sucralfate and pantoprazole. He is out of pantoprazole. He does not feel like either of this is helping his symptoms much. Although he does report that his symptoms have been getting worse since he has stopped taking the pantoprazole.    There was one refill on each (prescribed by Romana Hampton).      Encouraged him to fill the medications and restart to see if they provide symptoms improvement. He agreed.    Will also discuss with provider team to determine if patient's symptoms could be associated with surgery. Will call him back with additional input/feedback.  He is in agreement with this plan.    Shivani Villa RN-BSN    "

## 2024-08-20 NOTE — TELEPHONE ENCOUNTER
Name of caller: Patient    Reason for Call:  Heartburn and fatigue    Surgeon:  Dr. Garza    Recent Surgery:yes    If yes, when & what type:    7/2, Laparoscopic Cholecystectomy       Best phone number to reach pt at is: [phone]  Ok to leave a message with medical info? Yes.    Pharmacy preferred (if calling for a refill): [facility] in [place] on [address]

## 2024-08-20 NOTE — TELEPHONE ENCOUNTER
Pt calling stating that he is having heart burn it started a few weeks ago - it is at the bottom of the rib cage in the center its more of an upset stomach than a burning sensation.    Has nausea this is always after eating he tries to stay up for at least 30 mins after eating  Had his gallbladder taken out on 7/2/2024     Has been trying to eat a low fat diet  He has been taking Carafate and Protonix but not as directed and so he is not having relief with them     When he does take them it does not seem to help with his symptoms     Denies: CP, SOB, vomiting,    RN advised pt he be seen     Made an OV for tomorrow and Advise pt to try to follow the directions on his Protonix and Carafate today to see if that helps and if things worsen to give clinic a call back     Made an OV for tomorrow     Patient stated an understanding and agreed with plan.    Suzy Schuster RN, BSN  [facility]      Reason for Disposition   Nausea lasts > 1 week    Additional Information   Negative: Shock suspected (e.g., cold/pale/clammy skin, too weak to stand, low BP, rapid pulse)   Negative: Sounds like a life-threatening emergency to the triager   Negative: Nausea or vomiting and pregnancy < 20 weeks   Negative: Menstrual Period - Missed or Late (i.e., pregnancy suspected)   Negative: Heat exhaustion suspected (i.e., dehydration from heat exposure)   Negative: Anxiety or stress suspected (i.e., nausea with anxiety attacks or stressful situations)   Negative: Traumatic Brain Injury (TBI) suspected   Negative: Vomiting occurs   Negative: Other symptom is present, see that guideline.  (e.g., chest pain, headache, dizziness, abdominal pain, colds, sore throat, etc.).   Negative: Unable to walk, or can only walk with assistance (e.g., requires support)   Negative: Difficulty breathing   Negative: Insulin-dependent diabetes (Type I) and glucose > 400 mg/dL (22 mmol/L)   Negative: Drinking very little and  "dehydration suspected (e.g., no urine > 12 hours, very dry mouth, very lightheaded)   Negative: Patient sounds very sick or weak to the triager   Negative: Fever > 104 F  (40 C)   Negative: Fever > 101 F  (38.3 C) and over 60 years of age   Negative: Fever > 100.0 F  (37.8 C) and bedridden (e.g., CVA, chronic illness, recovering from surgery)   Negative: Fever > 100.0 F  (37.8 C) and diabetes mellitus or weak immune system (e.g., HIV positive, cancer chemo, splenectomy, chronic steroids)   Negative: Taking any of the following medications: digoxin (Lanoxin), lithium, theophylline, phenytoin (Dilantin)   Negative: Yellowish color of the skin or white of the eye (i.e., jaundice)   Negative: Fever present > 3 days (72 hours)   Negative: Receiving cancer chemotherapy medication   Negative: Taking prescription medication that could cause nausea (e.g., narcotics/opiates, antibiotics, OCPs, many others)   Negative: Patient wants to be seen    Answer Assessment - Initial Assessment Questions  1. NAUSEA SEVERITY: \"How bad is the nausea?\" (e.g., mild, moderate, severe; dehydration, weight loss)    - MILD: loss of appetite without change in eating habits    - MODERATE: decreased oral intake without significant weight loss, dehydration, or malnutrition    - SEVERE: inadequate caloric or fluid intake, significant weight loss, symptoms of dehydration      Mild     2. ONSET: \"When did the nausea begin?\"      When he eats food     3. VOMITING: \"Any vomiting?\" If Yes, ask: \"How many times today?\"      None     4. RECURRENT SYMPTOM: \"Have you had nausea before?\" If Yes, ask: \"When was the last time?\" \"What happened that time?\"      None     5. CAUSE: \"What do you think is causing the nausea?\"      Has gallbladder removed 2 months ago     6. PREGNANCY: \"Is there any chance you are pregnant?\" (e.g., unprotected intercourse, missed birth control pill, broken condom)      NA    Protocols used: Nausea-A-OH    "

## 2024-08-21 ENCOUNTER — OFFICE VISIT (OUTPATIENT)
Dept: FAMILY MEDICINE | Facility: CLINIC | Age: 74
End: 2024-08-21
Payer: COMMERCIAL

## 2024-08-21 VITALS
HEIGHT: 68 IN | OXYGEN SATURATION: 97 % | TEMPERATURE: 98 F | SYSTOLIC BLOOD PRESSURE: 116 MMHG | WEIGHT: 165.6 LBS | HEART RATE: 63 BPM | RESPIRATION RATE: 16 BRPM | DIASTOLIC BLOOD PRESSURE: 71 MMHG | BODY MASS INDEX: 25.1 KG/M2

## 2024-08-21 DIAGNOSIS — R55 PRE-SYNCOPE: ICD-10-CM

## 2024-08-21 DIAGNOSIS — R53.83 FATIGUE, UNSPECIFIED TYPE: ICD-10-CM

## 2024-08-21 DIAGNOSIS — R11.0 NAUSEA: Primary | ICD-10-CM

## 2024-08-21 DIAGNOSIS — R19.5 POSITIVE FIT (FECAL IMMUNOCHEMICAL TEST): ICD-10-CM

## 2024-08-21 DIAGNOSIS — Z12.11 SCREEN FOR COLON CANCER: ICD-10-CM

## 2024-08-21 DIAGNOSIS — I49.1 PREMATURE ATRIAL COMPLEX: ICD-10-CM

## 2024-08-21 LAB
ALBUMIN SERPL BCG-MCNC: 4.8 G/DL (ref 3.5–5.2)
ALP SERPL-CCNC: 52 U/L (ref 40–150)
ALT SERPL W P-5'-P-CCNC: 22 U/L (ref 0–70)
ANION GAP SERPL CALCULATED.3IONS-SCNC: 11 MMOL/L (ref 7–15)
AST SERPL W P-5'-P-CCNC: 27 U/L (ref 0–45)
BILIRUB SERPL-MCNC: 0.5 MG/DL
BUN SERPL-MCNC: 12.9 MG/DL (ref 8–23)
CALCIUM SERPL-MCNC: 9.8 MG/DL (ref 8.8–10.4)
CHLORIDE SERPL-SCNC: 103 MMOL/L (ref 98–107)
CREAT SERPL-MCNC: 0.85 MG/DL (ref 0.67–1.17)
EGFRCR SERPLBLD CKD-EPI 2021: >90 ML/MIN/1.73M2
ERYTHROCYTE [DISTWIDTH] IN BLOOD BY AUTOMATED COUNT: 13.2 % (ref 10–15)
GLUCOSE SERPL-MCNC: 102 MG/DL (ref 70–99)
HCO3 SERPL-SCNC: 26 MMOL/L (ref 22–29)
HCT VFR BLD AUTO: 45 % (ref 40–53)
HGB BLD-MCNC: 14.8 G/DL (ref 13.3–17.7)
MCH RBC QN AUTO: 30.8 PG (ref 26.5–33)
MCHC RBC AUTO-ENTMCNC: 32.9 G/DL (ref 31.5–36.5)
MCV RBC AUTO: 94 FL (ref 78–100)
PLATELET # BLD AUTO: 179 10E3/UL (ref 150–450)
POTASSIUM SERPL-SCNC: 4.9 MMOL/L (ref 3.4–5.3)
PROT SERPL-MCNC: 7.3 G/DL (ref 6.4–8.3)
RBC # BLD AUTO: 4.81 10E6/UL (ref 4.4–5.9)
SODIUM SERPL-SCNC: 140 MMOL/L (ref 135–145)
TSH SERPL DL<=0.005 MIU/L-ACNC: 1.83 UIU/ML (ref 0.3–4.2)
WBC # BLD AUTO: 6 10E3/UL (ref 4–11)

## 2024-08-21 PROCEDURE — 80053 COMPREHEN METABOLIC PANEL: CPT | Performed by: PHYSICIAN ASSISTANT

## 2024-08-21 PROCEDURE — 99215 OFFICE O/P EST HI 40 MIN: CPT | Mod: 24 | Performed by: PHYSICIAN ASSISTANT

## 2024-08-21 PROCEDURE — 85027 COMPLETE CBC AUTOMATED: CPT | Performed by: PHYSICIAN ASSISTANT

## 2024-08-21 PROCEDURE — 36415 COLL VENOUS BLD VENIPUNCTURE: CPT | Performed by: PHYSICIAN ASSISTANT

## 2024-08-21 PROCEDURE — 84443 ASSAY THYROID STIM HORMONE: CPT | Performed by: PHYSICIAN ASSISTANT

## 2024-08-21 PROCEDURE — 93000 ELECTROCARDIOGRAM COMPLETE: CPT | Mod: 76 | Performed by: PHYSICIAN ASSISTANT

## 2024-08-21 RX ORDER — PANTOPRAZOLE SODIUM 20 MG/1
40 TABLET, DELAYED RELEASE ORAL
Qty: 60 TABLET | Refills: 1 | Status: SHIPPED | OUTPATIENT
Start: 2024-08-21

## 2024-08-21 RX ORDER — SUCRALFATE 1 G/1
1 TABLET ORAL 4 TIMES DAILY PRN
Qty: 120 TABLET | Refills: 1 | Status: SHIPPED | OUTPATIENT
Start: 2024-08-21

## 2024-08-21 ASSESSMENT — PATIENT HEALTH QUESTIONNAIRE - PHQ9
10. IF YOU CHECKED OFF ANY PROBLEMS, HOW DIFFICULT HAVE THESE PROBLEMS MADE IT FOR YOU TO DO YOUR WORK, TAKE CARE OF THINGS AT HOME, OR GET ALONG WITH OTHER PEOPLE: NOT DIFFICULT AT ALL
SUM OF ALL RESPONSES TO PHQ QUESTIONS 1-9: 5
SUM OF ALL RESPONSES TO PHQ QUESTIONS 1-9: 5

## 2024-08-21 ASSESSMENT — PAIN SCALES - GENERAL: PAINLEVEL: NO PAIN (0)

## 2024-08-21 NOTE — Clinical Note
Vahid Cason, saw your wanda patient today! Just wanted to forward for your review to keep you in the loop. Call me with questions if needed!  Loyda Badillo PA-C

## 2024-08-21 NOTE — PROGRESS NOTES
"72 yo male with hx lung CA s/p resection, prostate cancer s/p prostatectomy who called RN triage yesterday reporting heartburn that became a few weeks ago. Describes more as an upset stomach than burning sensation. Worse after eating and tries to stay up for at least 30 min. Of note, he's been suffering with chronic nausea and RUQ dating back ~ 5/2024 (3 months). He's had an extensive work-up to date which was reviewed and copied below as follows from 6/13 visit with JIM Hampton:    \"- Seen by Dr. Kat on 5/28/2024 for ongoing nausea.  - Negative abdominal CT, abdominal ultrasound, and upper GI endoscopy as per the patient.  - Previous ER visit on 5/1/2024 for fatigue, nausea, and shortness of breath; workup negative for infection and emergent pathology.  - Last seen by Dr. Guidry on 5/8/2024; advised possible gastritis, GERD, or anxiety as causes of symptoms.  - Endoscopy on 5/24/2024 showed non-erosive reactive gastropathy; started on omeprazole 40 mg daily with no improvement in nausea after 3 days.\"    Interval history update:  Underwent stat CT C/A/P due to ongoing nausea/fatigue/night sweats/fatty liver - fortunately this was reassuring excluding biliary dilation was noted. It was felt that sx could be consistent with gallbladder pathology so patient was referred to Gen Surg and underwent cholecystectomy 7/2. Reports he does not feel this helped the nausea at all and in fact maybe feels like it's worse. Continues to have nausea daily - feels has it constantly all day, but it's worse as the day goes on. Continues to be plagued by feeling fatigued and notes that his stool continues to look \"dark\", but then turns a regular color. Today denies any melena, hematochezia, or dark stool with BM today. No hematemesis or hematuria.   Had Positive FIT test - 6/10 - was advised to have repeat colonoscopy if no improvement with nausea after cholecystectomy.  Denies any NSAID use.  Felt faint upon being in exam " room today - visibly pre-syncopal. Pt reports he has not eaten anything yet today.    Stopped taking sertralin 50mg 1 month ago- not sure why.

## 2024-08-21 NOTE — RESULT ENCOUNTER NOTE
Result(s) was/were reviewed in the clinic with patient at time of appointment.  Electronically Signed By: Loyda Badillo PA-C

## 2024-08-21 NOTE — PROGRESS NOTES
Assessment & Plan     Juwan was seen today for Nausea    Diagnoses and all orders for this visit:    Nausea  Fatigue, unspecified type  Pre-syncope  Premature atrial complex  -     72 yo male presents with ongoing nausea. Has had extensive work-up to date as cataloged below in HPI - see for details. Had new sensation of pre-syncope while in office today so cardiac source also considered as possible etiology for his nausea. Consulted with on-call cardiologist, Dr. Vasquez. EKG shows PACs without any acute ischemic findings, echo 3/2023 also reassuring/normal so symptoms not felt to be c/w cardiac origin and No further cardiac work-up recommended at this time. Given he notes ongoing associated fatigue and occasional dark stool (without cara hematochezia, hematemesis, hematuria) also repeated CBC which was WNL. Will await CMP, TSH and pt made aware that I am a float provider so will need to follow-up with PCP for final review of results. Given prior endoscopy showed non-erosive reactive gastropathy and pt reports sub-optimal use of medications will extend pantoprazole and carafate. Advised circling back to GI for additional consult if no improvement - see additional notes below.   - EKG 12-lead complete w/read - Clinics  -     CBC with platelets; Future  -     TSH with free T4 reflex; Future  -     Comprehensive metabolic panel (BMP + Alb, Alk Phos, ALT, AST, Total. Bili, TP); Future  -     CBC with platelets  -     TSH with free T4 reflex  -     Comprehensive metabolic panel (BMP + Alb, Alk Phos, ALT, AST, Total. Bili, TP)  -     EKG 12-lead complete w/read - Clinics  -     Adult GI  Referral - Procedure Only; Future  -     pantoprazole (PROTONIX) 20 MG EC tablet; Take 2 tablets (40 mg) by mouth 2 times daily (before meals).  -     sucralfate (CARAFATE) 1 GM tablet; Take 1 tablet (1 g) by mouth 4 times daily as needed for nausea.      Positive FIT (fecal immunochemical test)  Screen for colon cancer  -     Pt  "reports no improvement in nausea following cholecystectomy with + FIT in June. Recommend colonoscopy for further evaluation to ensure no changes from 1/25/2022 (1 tubular adenoma per pathology, but \"partly denuded mucosal surface so precludes accurate assessment\").   If colonoscopy normal also encouraged return to PCP as previously prescribed sertraline 50mg as felt component of sx may be contributed to by anxiety. Pt reports he stopped taking this 1 month ago and is not sure why. Encouraged revisiting this and to bring all meds to review re-initiation vs trial of low-dose amitriptyline.   -     CBC with platelets; Future  -     TSH with free T4 reflex; Future  -     Comprehensive metabolic panel (BMP + Alb, Alk Phos, ALT, AST, Total. Bili, TP); Future  -     CBC with platelets  -     TSH with free T4 reflex  -     Comprehensive metabolic panel (BMP + Alb, Alk Phos, ALT, AST, Total. Bili, TP)  -     Adult GI  Referral - Procedure Only; Future    >60 minutes was spent with the patient today regarding PMHX review, current symptoms, evaluation and charting.      Izabella Echeverria is a 73 year old, presenting for the following health issues:  Gastrophageal Reflux        8/21/2024    10:38 AM   Additional Questions   Roomed by Renata   Accompanied by self     HPI   72 yo male with hx lung CA s/p resection 2017 ago per, prostate cancer s/p prostatectomy 2017 who called RN triage yesterday reporting heartburn for the last few weeks. Describes more as nausea than burning sensation that sometimes radiates into his R upper chest. Worse after eating and tries to stay up for at least 30 min. Of note, he's been suffering with these same symptoms dating back ~ 5/2024 (3 months). He's had an extensive work-up to date which was reviewed and copied below from 6/13 visit with JIM Hampton:    \"- Seen by Dr. Kat on 5/28/2024 for ongoing nausea.  - Negative abdominal CT, abdominal ultrasound, and upper GI endoscopy as " "per the patient.  - Previous ER visit on 5/1/2024 for fatigue, nausea, and shortness of breath; workup negative for infection and emergent pathology.  - Last seen by Dr. Guidry on 5/8/2024; advised possible gastritis, GERD, or anxiety as causes of symptoms.  - Endoscopy on 5/24/2024 showed non-erosive reactive gastropathy; started on omeprazole 40 mg daily with no improvement in nausea after 3 days.\"    Interval history update:  Underwent stat CT C/A/P 6/14 due to ongoing nausea/fatigue/night sweats/fatty liver - fortunately this was reassuring excluding biliary dilation and mildly distended gallbladder was noted. It was felt that sx could be consistent with gallbladder pathology so patient was referred to Gen Surg and switched to protonix and carafate. Pt reports he hasn't really used the carafate due to size of the pill. Underwent cholecystectomy 7/2. Unfortunately, he reports he does not feel this helped the nausea at all and in fact maybe feels like it's worse. Continues to have nausea daily - constant low-grade, but can be worse in the morning after eating such that he needs to go lie down because he feels fatigued, chilled/sweats. It can persist throughout the day, but around 5pm seems to improve some. Denies worsening nausea with exertion or associated SOB, palpitations.   Continues to report that initial portion of stool looks \"dark\", but then turns a regular color. Had some bouts of constipation prior to cholecystectomy, now feels he full evacuates bowel. Otherwise denies cara melena, hematochezia, hematemesis or hematuria.   Of note, had Positive FIT test - 6/10 - was advised to have repeat colonoscopy if no improvement with nausea after cholecystectomy.  Denies any NSAID use, but has been drinking iced coffees recently, likes chocolate.    Felt faint upon being in exam room today - visibly pre-syncopal. Pt reports he has not eaten anything yet today.    Pt had been prescribed sertraline 50mg daily as " "felt element of symptoms may be contributed to by anxiety, but he discontinued 1 month ago. He is not sure why he stopped it.    Current Outpatient Medications   Medication Sig Dispense Refill    Cholecalciferol (VITAMIN D-3 PO) Take by mouth.      hydrocortisone 2.5 % ointment Apply topically 2 times daily 60 g 3    multivitamin w/minerals (THERA-VIT-M) tablet Take 1 tablet by mouth daily      pantoprazole (PROTONIX) 20 MG EC tablet Take 2 tablets (40 mg) by mouth 2 times daily (before meals). 60 tablet 1    sertraline (ZOLOFT) 50 MG tablet Take 1 tablet (50 mg) by mouth daily 90 tablet 1    sucralfate (CARAFATE) 1 GM tablet Take 1 tablet (1 g) by mouth 4 times daily as needed for nausea. 120 tablet 1     No current facility-administered medications for this visit.      No Known Allergies      Review of Systems  Constitutional, HEENT, cardiovascular, pulmonary, GI, , musculoskeletal, neuro, skin, endocrine and psych systems are negative, except as otherwise noted.      Objective    /71 (BP Location: Right arm, Patient Position: Sitting, Cuff Size: Adult Large)   Pulse 63   Temp 98  F (36.7  C) (Oral)   Resp 16   Ht 1.715 m (5' 7.5\")   Wt 75.1 kg (165 lb 9.6 oz)   SpO2 97%   BMI 25.55 kg/m    Body mass index is 25.55 kg/m .  Physical Exam   GENERAL: alert. Pt initially appears visibly pre-syncopal and reports feeling faint. Placed in trendelenburg, given regis crackers/juice and improved upon recheck.   EYES: Eyes grossly normal to inspection, PERRL and conjunctivae and sclerae normal  HENT: hearing aids in place, nose and mouth without ulcers or lesions  NECK: no adenopathy, no asymmetry, masses, or scars  RESP: lungs clear to auscultation - no rales, rhonchi or wheezes  CV: regular rate and rhythm, no murmur, click or rub, no peripheral edema  ABDOMEN: soft, mild epigastric tenderness without guarding or rebound, no hepatosplenomegaly, no masses and bowel sounds normal. Recent lap cholecystectomy " incisions are noted and appear to be healing well without secondary cellulitis/complication.  MS: no gross musculoskeletal defects noted, no edema  SKIN: no suspicious lesions or rashes  NEURO: Normal strength and tone, mentation intact and speech normal  PSYCH: mentation appears normal, affect normal/bright    EKG - consulted with on-call cardiologist, Dr. Vasquez given electronic reading of EKG denoting a-fib. Reports EKG not c/w a-fib, but does have PACs without any acute ischemic findings.   Reviewed echo 3/2023 which is reassuring/normal so symptoms do not sound cardiac in origin. No further cardiac work-up recommended.  Results for orders placed or performed in visit on 08/21/24   CBC with platelets     Status: Normal   Result Value Ref Range    WBC Count 6.0 4.0 - 11.0 10e3/uL    RBC Count 4.81 4.40 - 5.90 10e6/uL    Hemoglobin 14.8 13.3 - 17.7 g/dL    Hematocrit 45.0 40.0 - 53.0 %    MCV 94 78 - 100 fL    MCH 30.8 26.5 - 33.0 pg    MCHC 32.9 31.5 - 36.5 g/dL    RDW 13.2 10.0 - 15.0 %    Platelet Count 179 150 - 450 10e3/uL         Signed Electronically by: Loyda Badillo PA-C    Answers submitted by the patient for this visit:  Patient Health Questionnaire (Submitted on 8/21/2024)  If you checked off any problems, how difficult have these problems made it for you to do your work, take care of things at home, or get along with other people?: Not difficult at all  PHQ9 TOTAL SCORE: 5

## 2024-08-22 ENCOUNTER — TELEPHONE (OUTPATIENT)
Dept: FAMILY MEDICINE | Facility: CLINIC | Age: 74
End: 2024-08-22
Payer: COMMERCIAL

## 2024-08-22 DIAGNOSIS — F41.9 ANXIETY: Primary | ICD-10-CM

## 2024-08-22 DIAGNOSIS — F41.9 ANXIETY: ICD-10-CM

## 2024-08-22 DIAGNOSIS — F33.0 MAJOR DEPRESSIVE DISORDER, RECURRENT EPISODE, MILD (H): ICD-10-CM

## 2024-08-22 RX ORDER — SERTRALINE HYDROCHLORIDE 100 MG/1
100 TABLET, FILM COATED ORAL DAILY
Qty: 90 TABLET | Refills: 1 | Status: SHIPPED | OUTPATIENT
Start: 2024-08-22

## 2024-08-22 RX ORDER — ALPRAZOLAM 0.25 MG
0.25 TABLET ORAL
Qty: 10 TABLET | Refills: 0 | Status: SHIPPED | OUTPATIENT
Start: 2024-08-22

## 2024-08-22 ASSESSMENT — ANXIETY QUESTIONNAIRES
GAD7 TOTAL SCORE: 3
7. FEELING AFRAID AS IF SOMETHING AWFUL MIGHT HAPPEN: NOT AT ALL
2. NOT BEING ABLE TO STOP OR CONTROL WORRYING: SEVERAL DAYS
5. BEING SO RESTLESS THAT IT IS HARD TO SIT STILL: NOT AT ALL
GAD7 TOTAL SCORE: 3
3. WORRYING TOO MUCH ABOUT DIFFERENT THINGS: SEVERAL DAYS
6. BECOMING EASILY ANNOYED OR IRRITABLE: NOT AT ALL
IF YOU CHECKED OFF ANY PROBLEMS ON THIS QUESTIONNAIRE, HOW DIFFICULT HAVE THESE PROBLEMS MADE IT FOR YOU TO DO YOUR WORK, TAKE CARE OF THINGS AT HOME, OR GET ALONG WITH OTHER PEOPLE: NOT DIFFICULT AT ALL
1. FEELING NERVOUS, ANXIOUS, OR ON EDGE: SEVERAL DAYS

## 2024-08-22 ASSESSMENT — PATIENT HEALTH QUESTIONNAIRE - PHQ9: 5. POOR APPETITE OR OVEREATING: NOT AT ALL

## 2024-08-22 NOTE — TELEPHONE ENCOUNTER
Provider Response to 2nd Level Triage Request    I have reviewed the RN documentation. My recommendation is:  Given that sleep right now is the main issue, I'm going to send in ten Xanax 0.25 mg tablets.  He can use this at bedtime if needed as the sertraline will not help him sleep right away.  The Xanax will do this.  Please call Juwan.  Chart reviewed.  Rx sent to pt's preferred pharmacy.       [id number] - [place], [place] - [address]      Julio Guidry MD

## 2024-08-22 NOTE — PROGRESS NOTES
Noted continued anxiety despite 50 mg sertraline.  Will increase to 100 mg. Chart reviewed.  Rx sent to pt's preferred pharmacy.    [id number] - [place], [place] - [address] AT [facility] [address]    Julio Guidry MD

## 2024-08-22 NOTE — TELEPHONE ENCOUNTER
Nurse Triage SBAR    Is this a 2nd Level Triage? YES, LICENSED PRACTITIONER REVIEW IS REQUIRED    Situation: anxiety    Background: seen yesterday for nausea. Everything was good in regards to his heart.     Assessment: Thinks he's having anxiety. Wants one pill to help him sleep. Denies trouble sleeping but when he lays down he is restless.   Still nauseated it, feels weak.   Denies chest pain, palpitations.     PHQ-9 score:        8/21/2024    10:21 AM   PHQ   PHQ-9 Total Score 5   Q9: Thoughts of better off dead/self-harm past 2 weeks Not at all         5/8/2024    10:15 AM 6/13/2024     1:40 PM 8/22/2024     1:21 PM   BENSON-7 SCORE   Total Score 1 (minimal anxiety) 3 (minimal anxiety)    Total Score 1 3 3            Protocol Recommended Disposition:   No disposition on file.    Recommendation:  does patient need another visit for anxiety? E-visit or virtual? Or can you send something in?      Patient stated an understanding and agreed with plan.     LUIS ENRIQUEZ RN on 8/22/2024 at 1:18 PM   [facility]

## 2024-08-22 NOTE — TELEPHONE ENCOUNTER
Message handled by Nurse Triage with [facility] - provider name: Dr. Guidry - ok to increase sertraline to 100 mg daily.     Called and spoke with patient.     Advised of providers recommendation. Patient stated an understanding and agreed with plan.     LUIS ENRIQUEZ RN on 8/22/2024 at 5:51 PM   [facility]

## 2024-08-22 NOTE — TELEPHONE ENCOUNTER
Called and spoke with patient.     Advised of providers recommendation.  States he is taking sertraline 50 mg daily. Never stopped taking it. Not sure what medication provider was referring to yesterday that he stopped taking a month ago. Message sent to Dr. Guidry.     LUIS ENRIQUEZ RN on 8/22/2024 at 2:19 PM   [facility]

## 2024-08-30 ENCOUNTER — TELEPHONE (OUTPATIENT)
Dept: FAMILY MEDICINE | Facility: CLINIC | Age: 74
End: 2024-08-30
Payer: COMMERCIAL

## 2024-08-30 NOTE — TELEPHONE ENCOUNTER
"S-(situation): Patient wanted to update PCP about how he is feeling after starting on Alprazolam.    B-(background): Anxiety overall continues, he still feels \"keyed up a lot.\" He is taking the medication at night, and it is helping him sleep. He sleeps about 5 hours, gets up to urinate, but then has trouble sleeping again as his mind races and he overall just feels restless.    A-(assessment): Continued anxiety, sleep has improved, but is still not optimum.     R-(recommendations): Will forward to PCP for review.    "

## 2024-09-03 NOTE — TELEPHONE ENCOUNTER
Let's give the sertraline two more weeks to work.  If he's still having anxiety at that point, we can consider increasing the dosage or starting another anti-anxiety medication to help called Buspar/buspirone.  This is not addictive and commonly used in conjunction with sertraline if the sertraline alone isn't enough to control symptoms.  Please call Juwan to advise him of this.    Julio Guidry MD

## 2024-09-04 NOTE — TELEPHONE ENCOUNTER
Called patient and advised of providers note.     Patient states understanding and has been taking the sertraline daily as directed.     Reminded patient of upcoming  appointment location, date and     Advised if anxiety/nausea is not controlled before his 9/18 appointment, call and talk with a triage nurse.

## 2024-09-05 ENCOUNTER — TELEPHONE (OUTPATIENT)
Dept: GASTROENTEROLOGY | Facility: CLINIC | Age: 74
End: 2024-09-05
Payer: COMMERCIAL

## 2024-09-05 NOTE — TELEPHONE ENCOUNTER
"Endoscopy Scheduling Screen    Have you had a positive Covid test in the last 14 days?  Yes (Schedule at least 14 days from symptom onset)    What is your communication preference for Instructions and/or Bowel Prep?   Mail/USPS    What insurance is in the chart?  Other:  [facility]    Ordering/Referring Provider: PIPER CHILDRESS    (If ordering provider performs procedure, schedule with ordering provider unless otherwise instructed. )    BMI: Estimated body mass index is 25.55 kg/m  as calculated from the following:    Height as of 8/21/24: 1.715 m (5' 7.5\").    Weight as of 8/21/24: 75.1 kg (165 lb 9.6 oz).     Sedation Ordered  moderate sedation.   If patient BMI > 50 do not schedule in [facility].    If patient BMI > 45 do not schedule at [facility].    Are you taking methadone or Suboxone?  No    Have you had difficulties, pain, or discomfort during past endoscopy procedures?  No    Are you taking any prescription medications for pain 3 or more times per week?   NO, No RN review required.    Do you have a history of malignant hyperthermia?  No    (Females) Are you currently pregnant?   No     Have you been diagnosed or told you have pulmonary hypertension?   No    Do you have an LVAD?  No    Have you been told you have moderate to severe sleep apnea?  Yes (RN Review required for scheduling unless scheduling in Hospital.)    Have you been told you have COPD, asthma, or any other lung disease?  No    Do you have any heart conditions?  No     Have you ever had or are you waiting for an organ transplant?  No. Continue scheduling, no site restrictions.    Have you had a stroke or transient ischemic attack (TIA aka \"mini stroke\" in the last 6 months?   No    Have you been diagnosed with or been told you have cirrhosis of the liver?   No    Are you currently on dialysis?   No    Do you need assistance transferring?   No    BMI: Estimated body mass index is 25.55 kg/m  as calculated from the following:    Height as of 8/21/24: " "1.715 m (5' 7.5\").    Weight as of 8/21/24: 75.1 kg (165 lb 9.6 oz).     Is patients BMI > 40 and scheduling location [facility]?  No    Do you take an injectable medication for weight loss or diabetes (excluding insulin)?  No    Do you take the medication Naltrexone?  No    Do you take blood thinners?  No       Prep   Are you currently on dialysis or do you have chronic kidney disease?  No    Do you have a diagnosis of diabetes?  No    Do you have a diagnosis of cystic fibrosis (CF)?  No    On a regular basis do you go 3 -5 days between bowel movements?  No    BMI > 40?  No    Preferred Pharmacy:    [id number] - [place], [place] - [address] AT [facility] [address]  [address] [place] [address]  Phone: [phone] Fax: [phone]      Final Scheduling Details     Procedure scheduled  Colonoscopy    Surgeon:  PERLA     Date of procedure:  9/23/24     Pre-OP / PAC:   No - Not required for this site.    Location  [facility] - Patient preference.    Sedation   Moderate Sedation - Per order.      Patient Reminders:   You will receive a call from a Nurse to review instructions and health history.  This assessment must be completed prior to your procedure.  Failure to complete the Nurse assessment may result in the procedure being cancelled.      On the day of your procedure, please designate an adult(s) who can drive you home stay with you for the next 24 hours. The medicines used in the exam will make you sleepy. You will not be able to drive.      You cannot take public transportation, ride share services, or non-medical taxi service without a responsible caregiver.  Medical transport services are allowed with the requirement that a responsible caregiver will receive you at your destination.  We require that drivers and caregivers are confirmed prior to your procedure.  "

## 2024-09-13 ENCOUNTER — TELEPHONE (OUTPATIENT)
Dept: GASTROENTEROLOGY | Facility: CLINIC | Age: 74
End: 2024-09-13
Payer: COMMERCIAL

## 2024-09-13 DIAGNOSIS — Z12.11 SPECIAL SCREENING FOR MALIGNANT NEOPLASMS, COLON: Primary | ICD-10-CM

## 2024-09-13 NOTE — TELEPHONE ENCOUNTER
Pre visit planning completed.      Procedure details:    Patient scheduled for Colonoscopy on 9/23/24.     Arrival time: 0900. Procedure time 0945    Facility location: [facility]; [address] [place] [address]. Check in location: Main entrance, door #1 on the North side of the building under roundabout awning. DO NOT GO TO SURGERY/ED ENTRANCE.     Sedation type: Conscious sedation     Pre op exam needed? No.    Indication for procedure: nausea, + FIT test, screening      Chart review:     Electronic implanted devices? No    Recent diagnosis of diverticulitis within the last 6 weeks? No      Medication review:    Diabetic? No    Anticoagulants? No    Weight loss medication/injectable? No.    Other medication HOLDING recommendations:  N/A      Prep for procedure:     Bowel prep recommendation: Standard Miralax  Due to: standard bowel prep.    Prep instructions sent via [employer] - patient did request via mail during scheduling call however due to the proximity of the procedure, the prep instructions will not make it to the patient in time.         Sugey Bethea RN  Endoscopy Procedure Pre Assessment RN  [phone] option 2

## 2024-09-16 NOTE — TELEPHONE ENCOUNTER
Pre assessment completed for upcoming procedure.   (Please see previous telephone encounter notes for complete details)        Procedure details:    Arrival time and facility location reviewed.    Pre op exam needed? No.    Designated  policy reviewed. Instructed to have someone stay 6  hours post procedure.       Medication review:    Medications reviewed. Please see supporting documentation below. Holding recommendations discussed (if applicable).   N/A      Prep for procedure:     Procedure prep instructions reviewed.        Any additional information needed:  N/A      Patient  verbalized understanding and had no questions or concerns at this time.      Liat Linder RN  Endoscopy Procedure Pre Assessment   [phone] option 2

## 2024-09-19 ENCOUNTER — TELEPHONE (OUTPATIENT)
Dept: FAMILY MEDICINE | Facility: CLINIC | Age: 74
End: 2024-09-19
Payer: COMMERCIAL

## 2024-09-19 NOTE — ED TRIAGE NOTES
Mohs Case Number:  Presents with hematuria and severe bladder pain due to not being able to void.     Pt states last time this happened they gave him morphine so he could relax his bladder.   Pt states a catheter will not work due to stricture.      Triage Assessment (Adult)       Row Name 02/16/24 1733          Triage Assessment    Airway WDL WDL        Respiratory WDL    Respiratory WDL WDL        Skin Circulation/Temperature WDL    Skin Circulation/Temperature WDL WDL        Cardiac WDL    Cardiac WDL WDL        Peripheral/Neurovascular WDL    Peripheral Neurovascular WDL WDL        Cognitive/Neuro/Behavioral WDL    Cognitive/Neuro/Behavioral WDL WDL                      Date Of Previous Biopsy (Optional): 08/13/24 Previous Accession (Optional): [id number] Biopsy Photograph Reviewed: Yes Referring Physician (Optional): Fanta Bull MD Consent Type: Consent 1 (Standard) Eye Shield Used: No Initial Size Of Lesion: 0.8 X Size Of Lesion In Cm (Optional): 0.5 Number Of Stages: 1 Primary Defect Length In Cm (Final Defect Size - Required For Flaps/Grafts): 1.5 Primary Defect Depth In Cm (Optional But Required For Some Insurers): 0 Repair Type: Intermediate Layered Repair Which Instrument Did You Use For Dermabrasion?: Wire Brush Which Eyelid Repair Cpt Are You Using?: 26577 Oculoplastic Surgeon Procedure Text (A): After obtaining clear surgical margins the patient was sent to oculoplastics for surgical repair.  The patient understands they will receive post-surgical care and follow-up from the referring physician's office. Otolaryngologist Procedure Text (A): After obtaining clear surgical margins the patient was sent to otolaryngology for surgical repair.  The patient understands they will receive post-surgical care and follow-up from the referring physician's office. Plastic Surgeon Procedure Text (A): After obtaining clear surgical margins the patient was sent to plastics for surgical repair.  The patient understands they will receive post-surgical care and follow-up from the referring physician's office. Mid-Level Procedure Text (A): After obtaining clear surgical margins the patient was sent to a mid-level provider for surgical repair.  The patient understands they will receive post-surgical care and follow-up from the mid-level provider. Provider Procedure Text (A): After obtaining clear surgical margins the defect was repaired by another provider. Asc Procedure Text (A): After obtaining clear surgical margins the patient was sent to an ASC for surgical repair.  The patient understands they will receive post-surgical care and follow-up from the ASC physician. Simple / Intermediate / Complex Repair - Final Wound Length In Cm: 4.4 Suturegard Retention Suture: 2-0 Nylon Retention Suture Bite Size: 3 mm Length To Time In Minutes Device Was In Place: 10 Undermining Type: Entire Wound Debridement Text: The wound edges were debrided prior to proceeding with the closure to facilitate wound healing. Helical Rim Text: The closure involved the helical rim. Vermilion Border Text: The closure involved the vermilion border. Nostril Rim Text: The closure involved the nostril rim. Retention Suture Text: Retention sutures were placed to support the closure and prevent dehiscence. Location Indication Override (Is Already Calculated Based On Selected Body Location): Area M Area H Indication Text: Tumors in this location are included in Area H (eyelids, eyebrows, central face, nose, lips, chin, ear, pre-auricular, post-auricular, temple, genitalia, hands, feet, ankles and areola).  Tissue conservation is critical in these anatomic locations. Area M Indication Text: Tumors in this location are included in Area M (cheek, forehead, scalp, neck, jawline and pretibial skin).  Mohs surgery is indicated for tumors in these anatomic locations. Area L Indication Text: Tumors in this location are included in Area L (trunk and extremities).  Mohs surgery is indicated for larger tumors, or tumors with aggressive histologic features, in these anatomic locations. Tumor Debulked?: scalpel Depth Of Tumor Invasion (For Histology): tumor not visualized (deep and peripheral margins are clear of tumor) Perineural Invasion (For Histology - Be Specific If Possible): absent Special Stains Stage 1 - Results: Base On Clearance Noted Above Stage 2: Additional Anesthesia Type: 1% lidocaine with 1:100,000 epinephrine Staging Info: By selecting yes to the question above you will include information on AJCC 8 tumor staging in your Mohs note. Information on tumor staging will be automatically added for SCCs on the head and neck. AJCC 8 includes tumor size, tumor depth, perineural involvement and bone invasion. Tumor Depth: Less than 6mm from granular layer and no invasion beyond the subcutaneous fat Why Was The Change Made?: Please Select the Appropriate Response Medical Necessity Statement: Based on my medical judgement, Mohs surgery is the most appropriate treatment for this cancer compared to other treatments. Alternatives Discussed Intro (Do Not Add Period): I discussed alternative treatments to Mohs surgery and specifically discussed the risks and benefits of Consent 1/Introductory Paragraph: The rationale for Mohs was explained to the patient and consent was obtained. The risks, benefits and alternatives to therapy were discussed in detail. Specifically, the risks of infection, scarring, bleeding, prolonged wound healing, incomplete removal, allergy to anesthesia, nerve injury and recurrence were addressed. Prior to the procedure, the treatment site was clearly identified and confirmed by the patient using a hand mirror. All components of Universal Protocol/PAUSE Rule completed. Consent 2/Introductory Paragraph: I explained to the patient that excisional surgery with margin control is the standard treatment for melanoma.  Mohs Surgery, which is a form of excisional surgery with margin control, over the past few decades has become a treatment within that standard of care for melanoma.  Many now consider it the standard of care for melanoma, especially melanoma in situ, on the head and neck given its more complete margin examination, and evidence supporting higher cure rate compared with conventional excision.  In this case, initial margins of 0.6 cm, in excess of the standard margins of 0.5 cm were employed for the first stage of Mohs Surgery.  \\n\\nMohs surgery was explained to the patient and consent was obtained. The risks, benefits and alternatives to therapy were discussed in detail. Specifically, the risks of infection, scarring, bleeding, prolonged wound healing, incomplete removal, allergy to anesthesia, nerve injury and recurrence were addressed. Prior to the procedure, the treatment site was clearly identified and confirmed by the patient  using a hand mirror. All components of Universal Protocol/PAUSE Rule completed.  \\n\\nMART-1 (Melanoma Antigen Recognized by T-cells) antibody immunostaining was used during Mohs surgery as per standard protocol, in addition to routine processing of all specimens with hematoxylin and eosin. One or more of the reagents used in immunohistochemical testing in this case may not have been cleared or approved by the [place]. Food and Drug Administration (FDA). The FDA has determined that such clearance or approval is not necessary. These tests are used for clinical purposes. They should not be regarded as investigational or for research. Consent 3/Introductory Paragraph: I gave the patient a chance to ask questions they had about the procedure.  Following this I explained the Mohs procedure and consent was obtained. The risks, benefits and alternatives to therapy were discussed in detail. Specifically, the risks of infection, scarring, bleeding, prolonged wound healing, incomplete removal, allergy to anesthesia, nerve injury and recurrence were addressed. Prior to the procedure, the treatment site was clearly identified and confirmed by the patient using a hand mirror. All components of Universal Protocol/PAUSE Rule completed. Consent (Temporal Branch)/Introductory Paragraph: The rationale for Mohs was explained to the patient and consent was obtained. The risks, benefits and alternatives to therapy were discussed in detail. Specifically, the risks of damage to the temporal branch of the facial nerve, infection, scarring, bleeding, prolonged wound healing, incomplete removal, allergy to anesthesia, and recurrence were addressed. Prior to the procedure, the treatment site was clearly identified and confirmed by the patient using a hand mirror. All components of Universal Protocol/PAUSE Rule completed. Consent (Marginal Mandibular)/Introductory Paragraph: The rationale for Mohs was explained to the patient and consent was obtained. The risks, benefits and alternatives to therapy were discussed in detail. Specifically, the risks of damage to the marginal mandibular branch of the facial nerve, infection, scarring, bleeding, prolonged wound healing, incomplete removal, allergy to anesthesia, and recurrence were addressed. Prior to the procedure, the treatment site was clearly identified and confirmed by the patient using a hand mirror. All components of Universal Protocol/PAUSE Rule completed. Consent (Spinal Accessory)/Introductory Paragraph: The rationale for Mohs was explained to the patient and consent was obtained. The risks, benefits and alternatives to therapy were discussed in detail. Specifically, the risks of damage to the spinal accessory nerve, infection, scarring, bleeding, prolonged wound healing, incomplete removal, allergy to anesthesia, and recurrence were addressed. Prior to the procedure, the treatment site was clearly identified and confirmed by the patient using a hand mirror. All components of Universal Protocol/PAUSE Rule completed. Consent (Near Eyelid Margin)/Introductory Paragraph: The rationale for Mohs was explained to the patient and consent was obtained. The risks, benefits and alternatives to therapy were discussed in detail. Specifically, the risks of ectropion or eyelid deformity, infection, scarring, bleeding, prolonged wound healing, incomplete removal, allergy to anesthesia, nerve injury and recurrence were addressed. Prior to the procedure, the treatment site was clearly identified and confirmed by the patient using a hand mirror. All components of Universal Protocol/PAUSE Rule completed. Consent (Ear)/Introductory Paragraph: The rationale for Mohs was explained to the patient and consent was obtained. The risks, benefits and alternatives to therapy were discussed in detail. Specifically, the risks of ear deformity, infection, scarring, bleeding, prolonged wound healing, incomplete removal, allergy to anesthesia, nerve injury and recurrence were addressed. Prior to the procedure, the treatment site was clearly identified and confirmed by the patient using a hand mirror. All components of Universal Protocol/PAUSE Rule completed. Consent (Nose)/Introductory Paragraph: The rationale for Mohs was explained to the patient and consent was obtained. The risks, benefits and alternatives to therapy were discussed in detail. Specifically, the risks of nasal deformity, changes in the flow of air through the nose, infection, scarring, bleeding, prolonged wound healing, incomplete removal, allergy to anesthesia, nerve injury and recurrence were addressed. Prior to the procedure, the treatment site was clearly identified and confirmed by the patient using a hand mirror. All components of Universal Protocol/PAUSE Rule completed. Consent (Lip)/Introductory Paragraph: The rationale for Mohs was explained to the patient and consent was obtained. The risks, benefits and alternatives to therapy were discussed in detail. Specifically, the risks of lip deformity, changes in the oral aperture, infection, scarring, bleeding, prolonged wound healing, incomplete removal, allergy to anesthesia, nerve injury and recurrence were addressed. Prior to the procedure, the treatment site was clearly identified and confirmed by the patient using a hand mirror. All components of Universal Protocol/PAUSE Rule completed. Consent (Scalp)/Introductory Paragraph: The rationale for Mohs was explained to the patient and consent was obtained. The risks, benefits and alternatives to therapy were discussed in detail. Specifically, the risks of alopecia in or around the treatment site, changes in hair growth pattern secondary to repair, infection, scarring, bleeding, prolonged wound healing, incomplete removal, allergy to anesthesia, nerve injury and recurrence were addressed. Prior to the procedure, the treatment site was clearly identified and confirmed by the patient using a hand mirror. All components of Universal Protocol/PAUSE Rule completed. Detail Level: Detailed Postop Diagnosis: same Surgeon: Hemant Khalil M.D. Anesthesia Type: 1% lidocaine with epinephrine Anesthesia Volume In Cc: 2 Hemostasis: Electrodesiccation Estimated Blood Loss (Cc): minimal Repair Anesthesia Method: local infiltration Anesthesia Volume In Cc: 6 Brow Lift Text: A midfrontal incision was made medially to the defect to allow access to the tissues just superior to the left eyebrow. Following careful dissection inferiorly in a supraperiosteal plane to the level of the left eyebrow, several 3-0 monocryl sutures were used to resuspend the eyebrow orbicularis oculi muscular unit to the superior frontal bone periosteum. This resulted in an appropriate reapproximation of static eyebrow symmetry and correction of the left brow ptosis. Deep Sutures: 5-0 Vicryl Epidermal Sutures: GluSeal Suturegard Intro: Intraoperative tissue expansion was performed, utilizing the SUTUREGARD device, in order to reduce wound tension. Suturegard Body: The suture ends were repeatedly re-tightened and re-clamped to achieve the desired tissue expansion. Hemigard Intro: Due to skin fragility and wound tension, it was decided to use HEMIGARD adhesive retention suture devices to permit a linear closure. The skin was cleaned and dried for a 6cm distance away from the wound. Excessive hair, if present, was removed to allow for adhesion. Hemigard Postcare Instructions: The HEMIGARD strips are to remain completely dry for at least 5-7 days. Donor Site Anesthesia Type: same as repair anesthesia Graft Basting Suture (Optional): 6-0 Prolene Epidermal Closure Graft Donor Site (Optional): running Graft Donor Site Bandage (Optional-Leave Blank If You Don't Want In Note): A pressure bandage with telfa was applied to the donor site. Closure 2 Information: This tab is for additional flaps and grafts, including complex repair and grafts and complex repair and flaps. You can also specify a different location for the additional defect, if the location is the same you do not need to select a new one. We will insert the automated text for the repair you select below just as we do for solitary flaps and grafts. Please note that at this time if you select a location with a different insurance zone you will need to override the ICD10 and CPT if appropriate. Closure 3 Information: This tab is for additional flaps and grafts above and beyond our usual structured repairs.  Please note if you enter information here it will not currently bill and you will need to add the billing information manually. Wound Care: Mastisol Dressing: steri-strips and pressure dressing Unna Boot Text: An Unna boot was placed to help immobilize the limb and facilitate more rapid healing. Home Suture Removal Text: Patient was provided instructions on removing sutures and will remove their sutures at home.  If they have any questions or difficulties they will call the office. Post-Care Instructions: I reviewed with the patient in detail post-care instructions. Patient is not to engage in any strenuous activity for at least 48 hours, and is to avoid heavy lifting, strenuous exercise, or swimming for the next 14 days. Should the patient develop any fevers, chills, bleeding, or severe pain, the patient will contact the office immediately. Pain Refusal Text: I offered to prescribe pain medication but the patient refused to take this medication. Mauc Instructions: By selecting yes to the question below the MAUC number will be added into the note.  This will be calculated automatically based on the diagnosis chosen, the size entered, the body zone selected (H,M,L) and the specific indications you chose. You will also have the option to override the Mohs AUC if you disagree with the automatically calculated number and this option is found in the Case Summary tab. Where Do You Want The Question To Include Opioid Counseling Located?: Case Summary Tab Eye Protection Verbiage: Before proceeding with the stage, a plastic scleral shield was inserted. The globe was anesthetized with a few drops of 1% lidocaine with 1:100,000 epinephrine. Then, an appropriate sized scleral shield was chosen and coated with lacrilube ointment. The shield was gently inserted and left in place for the duration of each stage. After the stage was completed, the shield was gently removed. Mohs Method Verbiage: After tumor debulking, and with each stage, an incision was made into a layer of healthy appearing skin around the clinically apparent tissue following the standard Mohs approach.  The specimen was harvested as a microscopically controlled layer.  A map was prepared to correspond to the area of skin from which it was excised.  The tissue was inked, divided as necessary, and prepared for frozen section processing. Surgeon/Pathologist Verbiage (Will Incorporate Name Of Surgeon From Intro If Not Blank): operated in two distinct and integrated capacities as the surgeon and pathologist. Mohs Histo Method Verbiage: Each section was then chromacoded and processed in the Mohs laboratory using the Mohs protocol and submitted for tissue processing with frozen sections prepared in the Mohs laboratory. The entire base and margins of the excised tissue were examined by the surgeon. Subsequent Stages Histo Method Verbiage: Using a similar technique to that described above, a thin layer of tissue was removed from all areas where tumor was visible on the previous stage.  The tissue was again oriented, mapped, dyed, and processed as above. Mohs Rapid Report Verbiage: The area of clinically evident tumor was marked with skin marking ink and appropriately hatched.  The initial incision was made following the Mohs approach through the skin.  The specimen was taken to the lab, divided into the necessary number of pieces, chromacoded and processed according to the Mohs protocol.  This was repeated in successive stages until a tumor free defect was achieved. Complex Repair Preamble Text (Leave Blank If You Do Not Want): The wound edges and deep margins of the Mohs defect were debrided of excess dermis and adipose tissue.  Retention sutures encompassing deep dermal, adipose, and fascial layers were placed to alleviate the tension on the primary suture line and to decrease the potential for wound dehiscence. Crescentic Complex Repair Preamble Text (Leave Blank If You Do Not Want): Three layered closure, including plication of the fascia underlying the defect, as well as extensive wide undermining were performed. M-Plasty Complex Repair Preamble Text (Leave Blank If You Do Not Want): Extensive wide undermining was performed. Crescentic Intermediate Repair Preamble Text (Leave Blank If You Do Not Want): Undermining was performed with blunt dissection. Graft Cartilage Fenestration Text: The cartilage was fenestrated with a 2mm punch biopsy to help facilitate graft survival and healing. Non-Graft Cartilage Fenestration Text: The cartilage was fenestrated with a 2mm punch biopsy to help facilitate healing. Secondary Intention Text (Leave Blank If You Do Not Want): After discussion and consideration of repair and wound management options, the patient opted to allow the defect to heal by second intent. No Repair - Repaired With Adjacent Surgical Defect Text (Leave Blank If You Do Not Want): After obtaining clear surgical margins the defect was repaired concurrently with another surgical defect which was in close approximation. Adjacent Tissue Transfer Text: The defect edges were debeveled with a #15 scalpel blade.  Given the location of the defect and the proximity to free margins an adjacent tissue transfer was deemed most appropriate.  Using a sterile surgical marker, an appropriate flap was drawn incorporating the defect and placing the expected incisions within the relaxed skin tension lines where possible.    The area thus outlined was incised deep to adipose tissue with a #15 scalpel blade.  The skin margins were undermined to an appropriate distance in all directions utilizing iris scissors. Advancement Flap (Single) Text: The defect edges were debeveled with a #15 scalpel blade.  Given the location of the defect and the proximity to free margins a single advancement flap was deemed most appropriate.  Using a sterile surgical marker, an appropriate advancement flap was drawn incorporating the defect and placing the expected incisions within the relaxed skin tension lines where possible.    The area thus outlined was incised deep to adipose tissue with a #15 scalpel blade and carried over to the primary defect.  The skin margins were undermined to an appropriate distance in all directions utilizing iris scissors. Advancement Flap (Double) Text: The defect edges were debeveled with a #15 scalpel blade.  Given the location of the defect and the proximity to free margins a double advancement flap was deemed most appropriate.  Using a sterile surgical marker, the appropriate advancement flaps were drawn incorporating the defect and placing the expected incisions within the relaxed skin tension lines where possible.    The area thus outlined was incised deep to adipose tissue with a #15 scalpel blade and carried over to the primary defect.  The skin margins were undermined to an appropriate distance in all directions utilizing iris scissors. Advancement-Rotation Flap Text: The defect edges were debeveled with a #15 scalpel blade.  Given the location of the defect, shape of the defect and the proximity to free margins an advancement-rotation flap was deemed most appropriate.  Using a sterile surgical marker, an appropriate flap was drawn incorporating the defect and placing the expected incisions within the relaxed skin tension lines where possible. The area thus outlined was incised deep to adipose tissue with a #15 scalpel blade.  The skin margins were undermined to an appropriate distance in all directions utilizing iris scissors. Alar Island Pedicle Flap Text: The defect edges were debeveled with a #15 scalpel blade.  Given the location of the defect, shape of the defect and the proximity to the alar rim an island pedicle advancement flap was deemed most appropriate.  Using a sterile surgical marker, an appropriate advancement flap was drawn incorporating the defect, outlining the appropriate donor tissue and placing the expected incisions within the nasal ala running parallel to the alar rim. The area thus outlined was incised with a #15 scalpel blade and carried over to the primary defect.  The skin margins were undermined minimally to an appropriate distance in all directions around the primary defect and laterally outward around the island pedicle utilizing iris scissors.  There was minimal undermining beneath the pedicle flap. A-T Advancement Flap Text: The defect edges were debeveled with a #15 scalpel blade.  Given the location of the defect, shape of the defect and the proximity to free margins an A-T advancement flap was deemed most appropriate.  Using a sterile surgical marker, an appropriate advancement flap was drawn incorporating the defect and placing the expected incisions within the relaxed skin tension lines where possible.    The area thus outlined was incised deep to adipose tissue with a #15 scalpel blade and carried over to the primary defect.  The skin margins were undermined to an appropriate distance in all directions utilizing iris scissors. [facility] Transposition Flap Text: The defect edges were debeveled with a #15 scalpel blade.  Given the location of the defect and the proximity to free margins a Banner transposition flap was deemed most appropriate.  Using a sterile surgical marker, an appropriate flap drawn around the defect. The area thus outlined was incised deep to adipose tissue with a #15 scalpel blade.  The skin margins were undermined to an appropriate distance in all directions utilizing iris scissors. Bilateral Helical Rim Advancement Flap Text: The defect edges were debeveled with a #15 blade scalpel.  Given the location of the defect and the proximity to free margins (helical rim) a bilateral helical rim advancement flap was deemed most appropriate.  Using a sterile surgical marker, the appropriate advancement flaps were drawn incorporating the defect and placing the expected incisions between the helical rim and antihelix where possible.  The area thus outlined was incised through and through with a #15 scalpel blade.  With a skin hook and iris scissors, the flaps were gently and sharply undermined and freed up. Bilateral Rotation Flap Text: The defect edges were debeveled with a #15 scalpel blade. Given the location of the defect, shape of the defect and the proximity to free margins a bilateral rotation flap was deemed most appropriate. Using a sterile surgical marker, an appropriate rotation flap was drawn incorporating the defect and placing the expected incisions within the relaxed skin tension lines where possible. The area thus outlined was incised deep to adipose tissue with a #15 scalpel blade. The skin margins were undermined to an appropriate distance in all directions utilizing iris scissors. Following this, the designed flap was carried over into the primary defect and sutured into place. Bilobed Flap Text: The defect edges were debeveled with a #15 scalpel blade.  Given the location of the defect and the proximity to free margins a bilobe flap was deemed most appropriate.  Using a sterile surgical marker, an appropriate bilobed flap drawn around the defect.    The area thus outlined was incised deep to adipose tissue with a #15 scalpel blade and carried over to the primary defect.  The skin margins were undermined to an appropriate distance in all directions utilizing iris scissors. Bilobed Transposition Flap Text: The defect edges were debeveled with a #15 scalpel blade.  Given the location of the defect and the proximity to free margins a bilobed transposition flap was deemed most appropriate.  Using a sterile surgical marker, an appropriate bilobe flap drawn around the defect.    The area thus outlined was incised deep to adipose tissue with a #15 scalpel blade and carried over to the primary defect.  The skin margins were undermined to an appropriate distance in all directions utilizing iris scissors. Bi-Rhombic Flap Text: The defect edges were debeveled with a #15 scalpel blade.  Given the location of the defect and the proximity to free margins a bi-rhombic flap was deemed most appropriate.  Using a sterile surgical marker, an appropriate rhombic flap was drawn incorporating the defect. The area thus outlined was incised deep to adipose tissue with a #15 scalpel blade.  The skin margins were undermined to an appropriate distance in all directions utilizing iris scissors. Burow's Advancement Flap Text: The defect edges were debeveled with a #15 scalpel blade.  Given the location of the defect and the proximity to free margins a Burow's advancement flap was deemed most appropriate.  Using a sterile surgical marker, the appropriate advancement flap was drawn incorporating the defect and placing the expected incisions within the relaxed skin tension lines where possible.    The area thus outlined was incised deep to adipose tissue with a #15 scalpel blade.  The skin margins were undermined to an appropriate distance in all directions utilizing iris scissors. Chonodrocutaneous Helical Advancement Flap Text: The defect edges were debeveled with a #15 scalpel blade.  Given the location of the defect and the proximity to free margins a chondrocutaneous helical advancement flap was deemed most appropriate.  Using a sterile surgical marker, the appropriate advancement flap was drawn incorporating the defect and placing the expected incisions within the relaxed skin tension lines where possible.    The area thus outlined was incised deep to adipose tissue with a #15 scalpel blade.  The skin margins were undermined to an appropriate distance in all directions utilizing iris scissors. Crescentic Advancement Flap Text: The defect edges were debeveled with a #15 scalpel blade.  Given the location of the defect and the proximity to free margins a crescentic advancement flap was deemed most appropriate.  Using a sterile surgical marker, the appropriate advancement flap was drawn incorporating the defect and placing the expected incisions within the relaxed skin tension lines where possible.    The area thus outlined was incised deep to adipose tissue with a #15 scalpel blade.  The skin margins were undermined to an appropriate distance in all directions utilizing iris scissors. Dorsal Nasal Flap Text: The defect edges were debeveled with a #15 scalpel blade.  Given the location of the defect and the proximity to free margins a dorsal nasal flap was deemed most appropriate.  Using a sterile surgical marker, an appropriate dorsal nasal flap was drawn around the defect.    The area thus outlined was incised deep to adipose tissue with a #15 scalpel blade and carried over to the primary defect.  The skin margins were undermined to an appropriate distance in all directions utilizing iris scissors. Double Island Pedicle Flap Text: The defect edges were debeveled with a #15 scalpel blade.  Given the location of the defect, shape of the defect and the proximity to free margins a double island pedicle advancement flap was deemed most appropriate.  Using a sterile surgical marker, an appropriate advancement flap was drawn incorporating the defect, outlining the appropriate donor tissue and placing the expected incisions within the relaxed skin tension lines where possible.    The area thus outlined was incised deep to adipose tissue with a #15 scalpel blade and carried over to the primary defect.  The skin margins were undermined to an appropriate distance in all directions around the primary defect and laterally outward around the island pedicle utilizing iris scissors.  There was minimal undermining beneath the pedicle flap. Double O-Z Flap Text: The defect edges were debeveled with a #15 scalpel blade.  Given the location of the defect, shape of the defect and the proximity to free margins a Double O-Z flap was deemed most appropriate.  Using a sterile surgical marker, an appropriate transposition flap was drawn incorporating the defect and placing the expected incisions within the relaxed skin tension lines where possible. The area thus outlined was incised deep to adipose tissue with a #15 scalpel blade.  The skin margins were undermined to an appropriate distance in all directions utilizing iris scissors. Double O-Z Plasty Text: The defect edges were debeveled with a #15 scalpel blade.  Given the location of the defect, shape of the defect and the proximity to free margins a Double O-Z plasty (double transposition flap) was deemed most appropriate.  Using a sterile surgical marker, the appropriate transposition flaps were drawn incorporating the defect and placing the expected incisions within the relaxed skin tension lines where possible. The area thus outlined was incised deep to adipose tissue with a #15 scalpel blade.  The skin margins were undermined to an appropriate distance in all directions utilizing iris scissors.  Hemostasis was achieved with electrocautery.  The flaps were then transposed into place, one clockwise and the other counterclockwise, and anchored with interrupted buried subcutaneous sutures. Double Z Plasty Text: The lesion was extirpated to the level of the fat with a #15 scalpel blade. Given the location of the defect, shape of the defect and the proximity to free margins a double Z-plasty was deemed most appropriate for repair. Using a sterile surgical marker, the appropriate transposition arms of the double Z-plasty were drawn incorporating the defect and placing the expected incisions within the relaxed skin tension lines where possible. The area thus outlined was incised deep to adipose tissue with a #15 scalpel blade. The skin margins were undermined to an appropriate distance in all directions utilizing iris scissors. The opposing transposition arms were then transposed and carried over into place in opposite direction and anchored with interrupted buried subcutaneous sutures. Ear Star Wedge Flap Text: The defect edges were debeveled with a #15 blade scalpel.  Given the location of the defect and the proximity to free margins (helical rim) an ear star wedge flap was deemed most appropriate.  Using a sterile surgical marker, the appropriate flap was drawn incorporating the defect and placing the expected incisions between the helical rim and antihelix where possible.  The area thus outlined was incised through and through with a #15 scalpel blade. Flip-Flop Flap Text: The defect edges were debeveled with a #15 blade scalpel.  Given the location of the defect and the proximity to free margins a flip-flop flap was deemed most appropriate. Using a sterile surgical marker, the appropriate flap was drawn incorporating the defect and placing the expected incisions between the helical rim and antihelix where possible.  The area thus outlined was incised through and through with a #15 scalpel blade. Following this, the designed flap was carried over into the primary defect and sutured into place. Hatchet Flap Text: The defect edges were debeveled with a #15 scalpel blade.  Given the location of the defect, shape of the defect and the proximity to free margins a hatchet flap was deemed most appropriate.  Using a sterile surgical marker, an appropriate hatchet flap was drawn incorporating the defect and placing the expected incisions within the relaxed skin tension lines where possible.    The area thus outlined was incised deep to adipose tissue with a #15 scalpel blade and carried over to the primary defect.  The skin margins were undermined to an appropriate distance in all directions utilizing iris scissors. Helical Rim Advancement Flap Text: The defect edges were debeveled with a #15 blade scalpel.  Given the location of the defect and the proximity to free margins (helical rim) a double helical rim advancement flap was deemed most appropriate.  Using a sterile surgical marker, the appropriate advancement flaps were drawn incorporating the defect and placing the expected incisions between the helical rim and antihelix where possible.  The area thus outlined was incised through and through with a #15 scalpel blade and carried over to the primary defect.  With a skin hook and iris scissors, the flaps were gently and sharply undermined and freed up. H Plasty Text: Given the location of the defect, shape of the defect and the proximity to free margins a H-plasty was deemed most appropriate for repair.  Using a sterile surgical marker, the appropriate advancement arms of the H-plasty were drawn incorporating the defect and placing the expected incisions within the relaxed skin tension lines where possible. The area thus outlined was incised deep to adipose tissue with a #15 scalpel blade. The skin margins were undermined to an appropriate distance in all directions utilizing iris scissors.  The opposing advancement arms were then advanced into place in opposite direction and anchored with interrupted buried subcutaneous sutures. Island Pedicle Flap Text: The defect edges were debeveled with a #15 scalpel blade.  Given the location of the defect, shape of the defect and the proximity to free margins an island pedicle advancement flap was deemed most appropriate.  Using a sterile surgical marker, an appropriate advancement flap was drawn incorporating the defect, outlining the appropriate donor tissue and placing the expected incisions within the relaxed skin tension lines where possible.    The area thus outlined was incised deep to adipose tissue with a #15 scalpel blade and carried over to the primary defect.  The skin margins were undermined to an appropriate distance in all directions around the primary defect and laterally outward around the island pedicle utilizing iris scissors.  There was minimal undermining beneath the pedicle flap. Island Pedicle Flap With Canthal Suspension Text: The defect edges were debeveled with a #15 scalpel blade.  Given the location of the defect, shape of the defect and the proximity to free margins an island pedicle advancement flap was deemed most appropriate.  Using a sterile surgical marker, an appropriate advancement flap was drawn incorporating the defect, outlining the appropriate donor tissue and placing the expected incisions within the relaxed skin tension lines where possible. The area thus outlined was incised deep to adipose tissue with a #15 scalpel blade.  The skin margins were undermined to an appropriate distance in all directions around the primary defect and laterally outward around the island pedicle utilizing iris scissors.  There was minimal undermining beneath the pedicle flap. A suspension suture was placed in the canthal tendon to prevent tension and prevent ectropion. Island Pedicle Flap-Requiring Vessel Identification Text: The defect edges were debeveled with a #15 scalpel blade.  Given the location of the defect, shape of the defect and the proximity to free margins an island pedicle advancement flap was deemed most appropriate.  Using a sterile surgical marker, an appropriate advancement flap was drawn, based on the axial vessel mentioned above, incorporating the defect, outlining the appropriate donor tissue and placing the expected incisions within the relaxed skin tension lines where possible.    The area thus outlined was incised deep to adipose tissue with a #15 scalpel blade.  The skin margins were undermined to an appropriate distance in all directions around the primary defect and laterally outward around the island pedicle utilizing iris scissors.  There was minimal undermining beneath the pedicle flap. Keystone Flap Text: The defect edges were debeveled with a #15 scalpel blade.  Given the location of the defect, shape of the defect a keystone flap was deemed most appropriate.  Using a sterile surgical marker, an appropriate keystone flap was drawn incorporating the defect, outlining the appropriate donor tissue and placing the expected incisions within the relaxed skin tension lines where possible. The area thus outlined was incised deep to adipose tissue with a #15 scalpel blade and carried over to the primary defect.  The skin margins were undermined to an appropriate distance in all directions around the primary defect and laterally outward around the flap utilizing iris scissors. Melolabial Transposition Flap Text: The defect edges were debeveled with a #15 scalpel blade.  Given the location of the defect and the proximity to free margins a melolabial flap was deemed most appropriate.  Using a sterile surgical marker, an appropriate melolabial transposition flap was drawn incorporating the defect.    The area thus outlined was incised deep to adipose tissue with a #15 scalpel blade and carried over to the primary defect.  The skin margins were undermined to an appropriate distance in all directions utilizing iris scissors. Mercedes Flap Text: The defect edges were debeveled with a #15 scalpel blade.  Given the location of the defect, shape of the defect and the proximity to free margins a Mercedes flap was deemed most appropriate.  Using a sterile surgical marker, an appropriate advancement flap was drawn incorporating the defect and placing the expected incisions within the relaxed skin tension lines where possible. The area thus outlined was incised deep to adipose tissue with a #15 scalpel blade and carried over to the primary defect.  The skin margins were undermined to an appropriate distance in all directions utilizing iris scissors. Modified Advancement Flap Text: The defect edges were debeveled with a #15 scalpel blade.  Given the location of the defect, shape of the defect and the proximity to free margins a three point advancement flap was deemed most appropriate.  Using a sterile surgical marker, an appropriate advancement flap was drawn incorporating the defect and placing the expected incisions within the relaxed skin tension lines where possible.   The area thus outlined was incised deep to adipose tissue with a #15 scalpel blade.  Three standing tissue cones were excised.  The skin margins were undermined to an appropriate distance in all directions utilizing iris scissors. Mucosal Advancement Flap Text: Given the location of the defect, shape of the defect and the proximity to free margins a mucosal advancement flap was deemed most appropriate. Incisions were made with a 15 blade scalpel in the appropriate fashion along the cutaneous vermilion border and the mucosal lip. The remaining actinically damaged mucosal tissue was excised.  The mucosal advancement flap was then elevated to the gingival sulcus with care taken to preserve the neurovascular structures and advanced into the primary defect. Care was taken to ensure that precise realignment of the vermilion border was achieved. Muscle Hinge Flap Text: The defect edges were debeveled with a #15 scalpel blade.  Given the size, depth and location of the defect and the proximity to free margins a muscle hinge flap was deemed most appropriate.  Using a sterile surgical marker, an appropriate hinge flap was drawn incorporating the defect. The area thus outlined was incised with a #15 scalpel blade.  The skin margins were undermined to an appropriate distance in all directions utilizing iris scissors. Mustarde Flap Text: The defect edges were debeveled with a #15 scalpel blade.  Given the size, depth and location of the defect and the proximity to free margins a Mustarde flap was deemed most appropriate.  Using a sterile surgical marker, an appropriate flap was drawn incorporating the defect. The area thus outlined was incised with a #15 scalpel blade and carried over to the primary defect.  The skin margins were undermined to an appropriate distance in all directions utilizing iris scissors. Nasal Turnover Hinge Flap Text: The defect edges were debeveled with a #15 scalpel blade.  Given the size, depth, location of the defect and the defect being full thickness a nasal turnover hinge flap was deemed most appropriate.  Using a sterile surgical marker, an appropriate hinge flap was drawn incorporating the defect. The area thus outlined was incised with a #15 scalpel blade and carried over to the primary defect. The flap was designed to recreate the nasal mucosal lining and the alar rim. The skin margins were undermined to an appropriate distance in all directions utilizing iris scissors. Nasalis-Muscle-Based Myocutaneous Island Pedicle Flap Text: Using a #15 blade, an incision was made around the donor flap to the level of the nasalis muscle. Wide lateral undermining was then performed in both the subcutaneous plane above the nasalis muscle, and in a submuscular plane just above periosteum. This allowed the formation of a free nasalis muscle axial pedicle (based on the angular artery) which was still attached to the actual cutaneous flap, increasing its mobility and vascular viability. Hemostasis was obtained with pinpoint electrocoagulation. The flap was mobilized into position and the pivotal anchor points positioned and stabilized with buried interrupted sutures. Subcutaneous and dermal tissues were closed in a multilayered fashion with sutures. Tissue redundancies were excised, and the epidermal edges were apposed without significant tension and sutured with sutures. Nasalis Myocutaneous Flap Text: Using a #15 blade, an incision was made around the donor flap to the level of the nasalis muscle. Wide lateral undermining was then performed in both the subcutaneous plane above the nasalis muscle, and in a submuscular plane just above periosteum. This allowed the formation of a free nasalis muscle axial pedicle which was still attached to the actual cutaneous flap, increasing its mobility and vascular viability. Hemostasis was obtained with pinpoint electrocoagulation. The flap was mobilized into position and the pivotal anchor points positioned and stabilized with buried interrupted sutures. Subcutaneous and dermal tissues were closed in a multilayered fashion with sutures. Tissue redundancies were excised, and the epidermal edges were apposed without significant tension and sutured with sutures. Nasolabial Transposition Flap Text: The defect edges were debeveled with a #15 scalpel blade.  Given the size, depth and location of the defect and the proximity to free margins a nasolabial transposition flap was deemed most appropriate. Using a sterile surgical marker, an appropriate flap was drawn incorporating the defect. The area thus outlined was incised with a #15 scalpel blade. The skin margins were undermined to an appropriate distance in all directions utilizing iris scissors. Following this, the designed flap was carried into the primary defect and sutured into place. Orbicularis Oris Muscle Flap Text: The defect edges were debeveled with a #15 scalpel blade.  Given that the defect affected the competency of the oral sphincter an orbicularis oris muscle flap was deemed most appropriate to restore this competency and normal muscle function.  Using a sterile surgical marker, an appropriate flap was drawn incorporating the defect. The area thus outlined was incised with a #15 scalpel blade. O-T Advancement Flap Text: The defect edges were debeveled with a #15 scalpel blade.  Given the location of the defect, shape of the defect and the proximity to free margins an O-T advancement flap was deemed most appropriate.  Using a sterile surgical marker, an appropriate advancement flap was drawn incorporating the defect and placing the expected incisions within the relaxed skin tension lines where possible.    The area thus outlined was incised deep to adipose tissue with a #15 scalpel blade and carried over to the primary defect.  The skin margins were undermined to an appropriate distance in all directions utilizing iris scissors. O-T Plasty Text: The defect edges were debeveled with a #15 scalpel blade.  Given the location of the defect, shape of the defect and the proximity to free margins an O-T plasty was deemed most appropriate.  Using a sterile surgical marker, an appropriate O-T plasty was drawn incorporating the defect and placing the expected incisions within the relaxed skin tension lines where possible.    The area thus outlined was incised deep to adipose tissue with a #15 scalpel blade.  The skin margins were undermined to an appropriate distance in all directions utilizing iris scissors. O-L Flap Text: The defect edges were debeveled with a #15 scalpel blade.  Given the location of the defect, shape of the defect and the proximity to free margins an O-L flap was deemed most appropriate.  Using a sterile surgical marker, an appropriate advancement flap was drawn incorporating the defect and placing the expected incisions within the relaxed skin tension lines where possible.    The area thus outlined was incised deep to adipose tissue with a #15 scalpel blade.  The skin margins were undermined to an appropriate distance in all directions utilizing iris scissors. O-Z Flap Text: The defect edges were debeveled with a #15 scalpel blade.  Given the location of the defect, shape of the defect and the proximity to free margins an O-Z flap was deemed most appropriate.  Using a sterile surgical marker, an appropriate transposition flap was drawn incorporating the defect and placing the expected incisions within the relaxed skin tension lines where possible. The area thus outlined was incised deep to adipose tissue with a #15 scalpel blade and carried over to the primary defect.  The skin margins were undermined to an appropriate distance in all directions utilizing iris scissors. O-Z Plasty Text: The defect edges were debeveled with a #15 scalpel blade.  Given the location of the defect, shape of the defect and the proximity to free margins an O-Z plasty (double transposition flap) was deemed most appropriate.  Using a sterile surgical marker, the appropriate transposition flaps were drawn incorporating the defect and placing the expected incisions within the relaxed skin tension lines where possible.    The area thus outlined was incised deep to adipose tissue with a #15 scalpel blade.  The skin margins were undermined to an appropriate distance in all directions utilizing iris scissors.  Hemostasis was achieved with electrocautery.  The flaps were then transposed into place, one clockwise and the other counterclockwise, and anchored with interrupted buried subcutaneous sutures. Peng Advancement Flap Text: The defect edges were debeveled with a #15 scalpel blade.  Given the location of the defect, shape of the defect and the proximity to free margins a Peng advancement flap was deemed most appropriate.  Using a sterile surgical marker, an appropriate advancement flap was drawn incorporating the defect and placing the expected incisions within the relaxed skin tension lines where possible. The area thus outlined was incised deep to adipose tissue with a #15 scalpel blade and carried over to the primary defect.  The skin margins were undermined to an appropriate distance in all directions utilizing iris scissors. Rectangular Flap Text: The defect edges were debeveled with a #15 scalpel blade. Given the location of the defect and the proximity to free margins a rectangular flap was deemed most appropriate. Using a sterile surgical marker, an appropriate rectangular flap was drawn incorporating the defect. The area thus outlined was incised deep to adipose tissue with a #15 scalpel blade. The skin margins were undermined to an appropriate distance in all directions utilizing iris scissors. Following this, the designed flap was carried over into the primary defect and sutured into place. Rhombic Flap Text: The defect edges were debeveled with a #15 scalpel blade.  Given the location of the defect and the proximity to free margins a rhombic flap was deemed most appropriate.  Using a sterile surgical marker, an appropriate rhombic flap was drawn incorporating the defect.    The area thus outlined was incised deep to adipose tissue with a #15 scalpel blade and carried over to the primary defect.  The skin margins were undermined to an appropriate distance in all directions utilizing iris scissors. Rhomboid Transposition Flap Text: The defect edges were debeveled with a #15 scalpel blade.  Given the location of the defect and the proximity to free margins a rhomboid transposition flap was deemed most appropriate.  Using a sterile surgical marker, an appropriate rhomboid flap was drawn incorporating the defect.    The area thus outlined was incised deep to adipose tissue with a #15 scalpel blade.  The skin margins were undermined to an appropriate distance in all directions utilizing iris scissors. Rotation Flap Text: The defect edges were debeveled with a #15 scalpel blade.  Given the location of the defect, shape of the defect and the proximity to free margins a rotation flap was deemed most appropriate.  Using a sterile surgical marker, an appropriate rotation flap was drawn incorporating the defect and placing the expected incisions within the relaxed skin tension lines where possible.    The area thus outlined was incised deep to adipose tissue with a #15 scalpel blade and carried over to the primary defect.  The skin margins were undermined to an appropriate distance in all directions utilizing iris scissors. Spiral Flap Text: The defect edges were debeveled with a #15 scalpel blade.  Given the location of the defect, shape of the defect and the proximity to free margins a spiral flap was deemed most appropriate.  Using a sterile surgical marker, an appropriate rotation flap was drawn incorporating the defect and placing the expected incisions within the relaxed skin tension lines where possible. The area thus outlined was incised deep to adipose tissue with a #15 scalpel blade and carried over to the primary defect.  The skin margins were undermined to an appropriate distance in all directions utilizing iris scissors. Staged Advancement Flap Text: The defect edges were debeveled with a #15 scalpel blade.  Given the location of the defect, shape of the defect and the proximity to free margins a staged advancement flap was deemed most appropriate.  Using a sterile surgical marker, an appropriate advancement flap was drawn incorporating the defect and placing the expected incisions within the relaxed skin tension lines where possible. The area thus outlined was incised deep to adipose tissue with a #15 scalpel blade.  The skin margins were undermined to an appropriate distance in all directions utilizing iris scissors. Star Wedge Flap Text: The defect edges were debeveled with a #15 scalpel blade.  Given the location of the defect, shape of the defect and the proximity to free margins a star wedge flap was deemed most appropriate.  Using a sterile surgical marker, an appropriate rotation flap was drawn incorporating the defect and placing the expected incisions within the relaxed skin tension lines where possible. The area thus outlined was incised deep to adipose tissue with a #15 scalpel blade.  The skin margins were undermined to an appropriate distance in all directions utilizing iris scissors. Transposition Flap Text: The defect edges were debeveled with a #15 scalpel blade.  Given the location of the defect and the proximity to free margins a transposition flap was deemed most appropriate.  Using a sterile surgical marker, an appropriate transposition flap was drawn incorporating the defect.    The area thus outlined was incised deep to adipose tissue with a #15 scalpel blade and carried over to the primary defect.  The skin margins were undermined to an appropriate distance in all directions utilizing iris scissors. Trilobed Flap Text: The defect edges were debeveled with a #15 scalpel blade.  Given the location of the defect and the proximity to free margins a trilobed flap was deemed most appropriate.  Using a sterile surgical marker, an appropriate trilobed flap drawn around the defect.    The area thus outlined was incised deep to adipose tissue with a #15 scalpel blade and carried over to the primary defect.  The skin margins were undermined to an appropriate distance in all directions utilizing iris scissors. V-Y Flap Text: The defect edges were debeveled with a #15 scalpel blade.  Given the location of the defect, shape of the defect and the proximity to free margins a V-Y flap was deemed most appropriate.  Using a sterile surgical marker, an appropriate advancement flap was drawn incorporating the defect and placing the expected incisions within the relaxed skin tension lines where possible.    The area thus outlined was incised deep to adipose tissue with a #15 scalpel blade.  The skin margins were undermined to an appropriate distance in all directions utilizing iris scissors. V-Y Plasty Text: The defect edges were debeveled with a #15 scalpel blade.  Given the location of the defect, shape of the defect and the proximity to free margins an V-Y advancement flap was deemed most appropriate.  Using a sterile surgical marker, an appropriate advancement flap was drawn incorporating the defect and placing the expected incisions within the relaxed skin tension lines where possible.    The area thus outlined was incised deep to adipose tissue with a #15 scalpel blade.  The skin margins were undermined to an appropriate distance in all directions utilizing iris scissors. W Plasty Text: The lesion was extirpated to the level of the fat with a #15 scalpel blade.  Given the location of the defect, shape of the defect and the proximity to free margins a W-plasty was deemed most appropriate for repair.  Using a sterile surgical marker, the appropriate transposition arms of the W-plasty were drawn incorporating the defect and placing the expected incisions within the relaxed skin tension lines where possible.    The area thus outlined was incised deep to adipose tissue with a #15 scalpel blade.  The skin margins were undermined to an appropriate distance in all directions utilizing iris scissors.  The opposing transposition arms were then transposed into place in opposite direction and anchored with interrupted buried subcutaneous sutures. Z Plasty Text: The lesion was extirpated to the level of the fat with a #15 scalpel blade.  Given the location of the defect, shape of the defect and the proximity to free margins a Z-plasty was deemed most appropriate for repair.  Using a sterile surgical marker, the appropriate transposition arms of the Z-plasty were drawn incorporating the defect and placing the expected incisions within the relaxed skin tension lines where possible.    The area thus outlined was incised deep to adipose tissue with a #15 scalpel blade.  The skin margins were undermined to an appropriate distance in all directions utilizing iris scissors.  The opposing transposition arms were then transposed into place in opposite direction and anchored with interrupted buried subcutaneous sutures. Zygomaticofacial Flap Text: Given the location of the defect, shape of the defect and the proximity to free margins a zygomaticofacial flap was deemed most appropriate for repair.  Using a sterile surgical marker, the appropriate flap was drawn incorporating the defect and placing the expected incisions within the relaxed skin tension lines where possible. The area thus outlined was incised deep to adipose tissue with a #15 scalpel blade with preservation of a vascular pedicle.  The skin margins were undermined to an appropriate distance in all directions utilizing iris scissors.  The flap was then placed into the defect and anchored with interrupted buried subcutaneous sutures. Abbe Flap (Lower To Upper Lip) Text: The defect of the upper lip was assessed and measured.  Given the location and size of the defect, an Abbe flap was deemed most appropriate.  Using a sterile surgical marker, an appropriate Abbe flap was measured and drawn on the lower lip. Local anesthesia was then infiltrated. A scalpel was then used to incise the upper lip through and through the skin, vermilion, muscle and mucosa, leaving the flap pedicled on the opposite side.  The flap was then rotated and transferred to the lower lip defect.  The flap was then sutured into place with a three layer technique, closing the orbicularis oris muscle layer with subcutaneous buried sutures, followed by a mucosal layer and an epidermal layer. Abbe Flap (Upper To Lower Lip) Text: The defect of the lower lip was assessed and measured.  Given the location and size of the defect, an Abbe flap was deemed most appropriate.  Using a sterile surgical marker, an appropriate Abbe flap was measured and drawn on the upper lip. Local anesthesia was then infiltrated.  A scalpel was then used to incise the upper lip through and through the skin, vermilion, muscle and mucosa, leaving the flap pedicled on the opposite side.  The flap was then rotated and transferred to the lower lip defect.  The flap was then sutured into place with a three layer technique, closing the orbicularis oris muscle layer with subcutaneous buried sutures, followed by a mucosal layer and an epidermal layer. Cheek Interpolation Flap Text: A decision was made to reconstruct the defect utilizing an interpolation axial flap and a staged reconstruction.  A telfa template was made of the defect.  This telfa template was then used to outline the Cheek Interpolation flap.  The donor area for the pedicle flap was then injected with anesthesia.  The flap was excised through the skin and subcutaneous tissue down to the layer of the underlying musculature.  The interpolation flap was carefully excised within this deep plane to maintain its blood supply.  The edges of the donor site were undermined.   The donor site was closed in a primary fashion.  The pedicle was then carried over to the primary defect, rotated into position and sutured.  Once the tube was sutured into place, adequate blood supply was confirmed with blanching and refill.  The pedicle was then wrapped with xeroform gauze and dressed appropriately with a telfa and gauze bandage to ensure continued blood supply and protect the attached pedicle. Cheek-To-Nose Interpolation Flap Text: A decision was made to reconstruct the defect utilizing an interpolation axial flap and a staged reconstruction.  A telfa template was made of the defect.  This telfa template was then used to outline the Cheek-To-Nose Interpolation flap.  The donor area for the pedicle flap was then injected with anesthesia.  The flap was excised through the skin and subcutaneous tissue down to the layer of the underlying musculature.  The interpolation flap was carefully excised within this deep plane to maintain its blood supply.  The edges of the donor site were undermined.   The donor site was closed in a primary fashion.  The pedicle was then and carried over to the primary defect, rotated into position and sutured.  Once the tube was sutured into place, adequate blood supply was confirmed with blanching and refill.  The pedicle was then wrapped with xeroform gauze and dressed appropriately with a telfa and gauze bandage to ensure continued blood supply and protect the attached pedicle. Estlander Flap (Lower To Upper Lip) Text: The defect of the lower lip was assessed and measured.  Given the location and size of the defect, an Estlander flap was deemed most appropriate.  Using a sterile surgical marker, an appropriate Estlander flap was measured and drawn on the upper lip. Local anesthesia was then infiltrated. A scalpel was then used to incise the lateral aspect of the flap, through skin, muscle and mucosa, leaving the flap pedicled medially.  The flap was then rotated and positioned to fill the lower lip defect.  The flap was then sutured into place with a three layer technique, closing the orbicularis oris muscle layer with subcutaneous buried sutures, followed by a mucosal layer and an epidermal layer. Interpolation Flap Text: A decision was made to reconstruct the defect utilizing an interpolation axial flap and a staged reconstruction.  A telfa template was made of the defect.  This telfa template was then used to outline the interpolation flap.  The donor area for the pedicle flap was then injected with anesthesia.  The flap was excised through the skin and subcutaneous tissue down to the layer of the underlying musculature.  The interpolation flap was carefully excised within this deep plane to maintain its blood supply.  The edges of the donor site were undermined.   The donor site was closed in a primary fashion.  The pedicle was then and carried over to the primary defect, rotated into position and sutured.  Once the tube was sutured into place, adequate blood supply was confirmed with blanching and refill.  The pedicle was then wrapped with xeroform gauze and dressed appropriately with a telfa and gauze bandage to ensure continued blood supply and protect the attached pedicle. Melolabial Interpolation Flap Text: A decision was made to reconstruct the defect utilizing an interpolation axial flap and a staged reconstruction.  A telfa template was made of the defect.  This telfa template was then used to outline the melolabial interpolation flap.  The donor area for the pedicle flap was then injected with anesthesia.  The flap was excised through the skin and subcutaneous tissue down to the layer of the underlying musculature.  The pedicle flap was carefully excised within this deep plane to maintain its blood supply.  The edges of the donor site were undermined.   The donor site was closed in a primary fashion.  The pedicle was then carried over to the primary defect, rotated into position and sutured.  Once the tube was sutured into place, adequate blood supply was confirmed with blanching and refill.  The pedicle was then wrapped with xeroform gauze and dressed appropriately with a telfa and gauze bandage to ensure continued blood supply and protect the attached pedicle. Mastoid Interpolation Flap Text: A decision was made to reconstruct the defect utilizing an interpolation axial flap and a staged reconstruction.  A telfa template was made of the defect.  This telfa template was then used to outline the mastoid interpolation flap.  The donor area for the pedicle flap was then injected with anesthesia.  The flap was excised through the skin and subcutaneous tissue down to the layer of the underlying musculature.  The pedicle flap was carefully excised within this deep plane to maintain its blood supply.  The edges of the donor site were undermined.   The donor site was closed in a primary fashion.  The pedicle was then and carried over to the primary defect, rotated into position and sutured.  Once the tube was sutured into place, adequate blood supply was confirmed with blanching and refill.  The pedicle was then wrapped with xeroform gauze and dressed appropriately with a telfa and gauze bandage to ensure continued blood supply and protect the attached pedicle. Paramedian Forehead Flap Text: A decision was made to reconstruct the defect utilizing an interpolation axial flap and a staged reconstruction.  A telfa template was made of the defect.  This telfa template was then used to outline the paramedian forehead pedicle flap.  The donor area for the pedicle flap was then injected with anesthesia.  The flap was excised through the skin and subcutaneous tissue down to the layer of the underlying musculature.  The pedicle flap was carefully excised within this deep plane to maintain its blood supply.  The edges of the donor site were undermined.   The donor site was closed in a primary fashion.  The pedicle was then rotated into position and sutured.  Once the tube was sutured into place, adequate blood supply was confirmed with blanching and refill.  The pedicle was then wrapped with xeroform gauze and dressed appropriately with a telfa and gauze bandage to ensure continued blood supply and protect the attached pedicle. Posterior Auricular Interpolation Flap Text: A decision was made to reconstruct the defect utilizing an interpolation axial flap and a staged reconstruction.  A telfa template was made of the defect.  This telfa template was then used to outline the posterior auricular interpolation flap.  The donor area for the pedicle flap was then injected with anesthesia.  The flap was excised through the skin and subcutaneous tissue down to the layer of the underlying musculature.  The pedicle flap was carefully excised within this deep plane to maintain its blood supply.  The edges of the donor site were undermined.   The donor site was closed in a primary fashion.  The pedicle was then rotated into position and sutured.  Once the tube was sutured into place, adequate blood supply was confirmed with blanching and refill.  The pedicle was then wrapped with xeroform gauze and dressed appropriately with a telfa and gauze bandage to ensure continued blood supply and protect the attached pedicle. Cheiloplasty (Complex) Text: A decision was made to reconstruct the defect with a  cheiloplasty.  The defect was undermined extensively.  Additional obicularis oris muscle was excised with a 15 blade scalpel.  The defect was converted into a full thickness wedge to facilite a better cosmetic result.  Small vessels were then tied off with 5-0 monocyrl. The obicularis oris, superficial fascia, adipose and dermis were then reapproximated.  After the deeper layers were approximated the epidermis was reapproximated with particular care given to realign the vermilion border. Cheiloplasty (Less Than 50%) Text: A decision was made to reconstruct the defect with a  cheiloplasty.  The defect was undermined extensively.  Additional obicularis oris muscle was excised with a 15 blade scalpel.  The defect was converted into a full thickness wedge, of less than 50% of the vertical height of the lip, to facilite a better cosmetic result.  Small vessels were then tied off with 5-0 monocyrl. The obicularis oris, superficial fascia, adipose and dermis were then reapproximated.  After the deeper layers were approximated the epidermis was reapproximated with particular care given to realign the vermilion border. Ear Wedge Repair Text: A wedge excision was completed by carrying down an excision through the full thickness of the ear and cartilage with an inward facing Burow's triangle. The wound was then closed in a layered fashion. Full Thickness Lip Wedge Repair (Flap) Text: Given the location of the defect and the proximity to free margins a full thickness wedge repair was deemed most appropriate.  Using a sterile surgical marker, the appropriate repair was drawn incorporating the defect and placing the expected incisions perpendicular to the vermilion border.  The vermilion border was also meticulously outlined to ensure appropriate reapproximation during the repair.  The area thus outlined was incised through and through with a #15 scalpel blade.  The muscularis and dermis were reaproximated with deep sutures following hemostasis. Care was taken to realign the vermilion border before proceeding with the superficial closure.  Once the vermilion was realigned the superfical and mucosal closure was finished. Burow's Graft Text: The defect edges were debeveled with a #15 scalpel blade.  Given the location of the defect, shape of the defect, the proximity to free margins and the presence of a standing cone deformity a Burow's skin graft was deemed most appropriate. The standing cone was removed and this tissue was then trimmed to the shape of the primary defect. The adipose tissue was also removed until only dermis and epidermis were left.  The skin margins of the secondary defect were undermined to an appropriate distance in all directions utilizing iris scissors.  The secondary defect was closed with interrupted buried subcutaneous sutures.  The skin edges were then re-apposed with running  sutures.  The skin graft was then placed in the primary defect and oriented appropriately. Cartilage Graft Text: The defect edges were debeveled with a #15 scalpel blade.  Given the location of the defect, shape of the defect, the fact the defect involved a full thickness cartilage defect a cartilage graft was deemed most appropriate.  An appropriate donor site was identified, cleansed, and anesthetized. The cartilage graft was then harvested and transferred to the recipient site, oriented appropriately and then sutured into place.  The secondary defect was then repaired using a primary closure. Composite Graft Text: The defect edges were debeveled with a #15 scalpel blade.  Given the location of the defect, shape of the defect, the proximity to free margins and the fact the defect was full thickness a composite graft was deemed most appropriate.  The defect was outline and then transferred to the donor site.  A full thickness graft was then excised from the donor site. The graft was then placed in the primary defect, oriented appropriately and then sutured into place.  The secondary defect was then repaired using a primary closure. Epidermal Autograft Text: The defect edges were debeveled with a #15 scalpel blade.  Given the location of the defect, shape of the defect and the proximity to free margins an epidermal autograft was deemed most appropriate.  Using a sterile surgical marker, the primary defect shape was transferred to the donor site. The epidermal graft was then harvested.  The skin graft was then placed in the primary defect and oriented appropriately. Dermal Autograft Text: The defect edges were debeveled with a #15 scalpel blade.  Given the location of the defect, shape of the defect and the proximity to free margins a dermal autograft was deemed most appropriate.  Using a sterile surgical marker, the primary defect shape was transferred to the donor site. The area thus outlined was incised deep to adipose tissue with a #15 scalpel blade.  The harvested graft was then trimmed of adipose and epidermal tissue until only dermis was left.  The skin graft was then placed in the primary defect and oriented appropriately. Ftsg Text: The defect edges were debeveled with a #15 scalpel blade.  Given the location of the defect, shape of the defect and the proximity to free margins a full thickness skin graft was deemed most appropriate.  Using a sterile surgical marker, the primary defect shape was transferred to the donor site. The area thus outlined was incised deep to adipose tissue with a #15 scalpel blade.  The harvested graft was then trimmed of adipose tissue until only dermis and epidermis was left.  The skin margins of the secondary defect were undermined to an appropriate distance in all directions utilizing iris scissors.  The secondary defect was closed with interrupted buried subcutaneous sutures.  The skin edges were then re-apposed with running  sutures.  The skin graft was then placed in the primary defect and oriented appropriately. Pinch Graft Text: The defect edges were debeveled with a #15 scalpel blade. Given the location of the defect, shape of the defect and the proximity to free margins a pinch graft was deemed most appropriate. Using a sterile surgical marker, the primary defect shape was transferred to the donor site. The area thus outlined was incised deep to adipose tissue with a #15 scalpel blade.  The harvested graft was then trimmed of adipose tissue until only dermis and epidermis was left. The skin margins of the secondary defect were undermined to an appropriate distance in all directions utilizing iris scissors.  The secondary defect was closed with interrupted buried subcutaneous sutures.  The skin edges were then re-apposed with running  sutures.  The skin graft was then placed in the primary defect and oriented appropriately. Skin Substitute Text: The defect edges were debeveled with a #15 scalpel blade.  Given the location of the defect, shape of the defect and the proximity to free margins a skin substitute graft was deemed most appropriate.  The graft material was trimmed to fit the size of the defect. The graft was then placed in the primary defect and oriented appropriately. Split-Thickness Skin Graft Text: The defect edges were debeveled with a #15 scalpel blade.  Given the location of the defect, shape of the defect and the proximity to free margins a split thickness skin graft was deemed most appropriate.  Using a sterile surgical marker, the primary defect shape was transferred to the donor site. The split thickness graft was then harvested.  The skin graft was then placed in the primary defect and oriented appropriately. Tissue Cultured Epidermal Autograft Text: The defect edges were debeveled with a #15 scalpel blade.  Given the location of the defect, shape of the defect and the proximity to free margins a tissue cultured epidermal autograft was deemed most appropriate.  The graft was then trimmed to fit the size of the defect.  The graft was then placed in the primary defect and oriented appropriately. Xenograft Text: The defect edges were debeveled with a #15 scalpel blade.  Given the location of the defect, shape of the defect and the proximity to free margins a xenograft was deemed most appropriate.  The graft was then trimmed to fit the size of the defect.  The graft was then placed in the primary defect and oriented appropriately. Complex Repair And Flap Additional Text (Will Appearing After The Standard Complex Repair Text): The complex repair was not sufficient to completely close the primary defect. The remaining additional defect was repaired with the flap mentioned below. Complex Repair And Graft Additional Text (Will Appearing After The Standard Complex Repair Text): The complex repair was not sufficient to completely close the primary defect. The remaining additional defect was repaired with the graft mentioned below. Eyelid Full Thickness Repair - 56689: The eyelid defect was full thickness which required a wedge repair of the eyelid. Special care was taken to ensure that the eyelid margin was realligned when placing sutures. Eyelid Partial Thickness Repair - [id number]: The eyelid defect was partial thickness which required a wedge repair of the eyelid. Special care was taken to ensure that the eyelid margin was realligned when placing sutures. Intermediate Repair And Flap Additional Text (Will Appearing After The Standard Complex Repair Text): The intermediate repair was not sufficient to completely close the primary defect. The remaining additional defect was repaired with the flap mentioned below. Intermediate Repair And Graft Additional Text (Will Appearing After The Standard Complex Repair Text): The intermediate repair was not sufficient to completely close the primary defect. The remaining additional defect was repaired with the graft mentioned below. Localized Dermabrasion With 15 Blade Text: The patient was draped in routine manner.  Localized dermabrasion using a 15 blade was performed in routine manner to papillary dermis. This spot dermabrasion is being performed to complete skin cancer reconstruction. It also will eliminate the other sun damaged precancerous cells that are known to be part of the regional effect of a lifetime's worth of sun exposure. This localized dermabrasion is therapeutic and should not be considered cosmetic in any regard. Localized Dermabrasion With Sand Papertext: The patient was draped in routine manner.  Localized dermabrasion using sterile sand paper was performed in routine manner to papillary dermis. This spot dermabrasion is being performed to complete skin cancer reconstruction. It also will eliminate the other sun damaged precancerous cells that are known to be part of the regional effect of a lifetime's worth of sun exposure. This localized dermabrasion is therapeutic and should not be considered cosmetic in any regard. Localized Dermabrasion With Wire Brush Text: The patient was draped in routine manner.  Localized dermabrasion using 3 x 17 mm wire brush was performed in routine manner to papillary dermis. This spot dermabrasion is being performed to complete skin cancer reconstruction. It also will eliminate the other sun damaged precancerous cells that are known to be part of the regional effect of a lifetime's worth of sun exposure. This localized dermabrasion is therapeutic and should not be considered cosmetic in any regard. Purse String (Simple) Text: Given the location of the defect and the characteristics of the surrounding skin a purse string closure was deemed most appropriate.  Undermining was performed circumfirentially around the surgical defect.  A purse string suture was then placed and tightened. Purse String (Intermediate) Text: Given the location of the defect and the characteristics of the surrounding skin a purse string intermediate closure was deemed most appropriate.  Undermining was performed circumfirentially around the surgical defect.  A purse string suture was then placed and tightened. Partial Purse String (Simple) Text: Given the location of the defect and the characteristics of the surrounding skin a simple purse string closure was deemed most appropriate.  Undermining was performed circumfirentially around the surgical defect.  A purse string suture was then placed and tightened. Wound tension only allowed a partial closure of the circular defect. Partial Purse String (Intermediate) Text: Given the location of the defect and the characteristics of the surrounding skin an intermediate purse string closure was deemed most appropriate.  Undermining was performed circumfirentially around the surgical defect.  A purse string suture was then placed and tightened. Wound tension only allowed a partial closure of the circular defect. Tarsorrhaphy Text: A tarsorrhaphy was performed using Frost sutures. Manual Repair Warning Statement: We plan on removing the manually selected variable below in favor of our much easier automatic structured text blocks found in the previous tab. We decided to do this to help make the flow better and give you the full power of structured data. Manual selection is never going to be ideal in our platform and I would encourage you to avoid using manual selection from this point on, especially since I will be sunsetting this feature. It is important that you do one of two things with the customized text below. First, you can save all of the text in a word file so you can have it for future reference. Second, transfer the text to the appropriate area in the Library tab. Lastly, if there is a flap or graft type which we do not have you need to let us know right away so I can add it in before the variable is hidden. No need to panic, we plan to give you roughly 6 months to make the change. Same Histology In Subsequent Stages Text: The pattern and morphology of the tumor is as described in the first stage. No Residual Tumor Seen Histology Text: Normal epidermis, dermis, and subcutaneous and/or deeper tissue (where applicable) were noted on frozen sections during Mohs Surgery. There were no malignant cells seen in the sections examined. Inflammation Suggestive Of Cancer Camouflage Histology Text: There was a dense lymphocytic infiltrate which prevented adequate histologic evaluation of adjacent structures. Incidental Squamous Cell Carcinoma In Situ Histology Text: Incidental squamous cell carcinoma in situ was noted.  The epidermis shows acanthosis with elongation and thickening of the rete ridges.  There are varying degrees of atypia seen within the epidermal cells.  The anaplastic cells are enlarged with hyperchromatic nuclei.  Multiple nucleated epidermal cells are present and parakeratotic nuclei.  Multiple nucleated epidermal cells are present and parakeratotic cells are seen in the stratum corneum.  The anaplastic cells appear to be confined to the epidermis without penetration of the dermal-epidermal junction. Incidental Actinic Keratosis Histology Text: Actinic keratosis was noted on frozen sections.  The specimen contains foci of disordered epidermal cells confined to the epidermis.  The cells have anaplastic nuclei with parakeratosis and a thickened granular cell layer.  There is papillomatosis and some hyperkeratosis. Bcc Histology Text: The dermis contains distinctive tumor cell masses of various shapes and sizes composed of cells with large oval or elongated nuclei with relatively little cytoplasm.  The nuclei are homogenous.  There is no pronounced variation in size or intensity of staining and no significant anaplastic appearance.  The cells at the outer aspect of the tumor masses show palisading of nuclei.  Tumor masses are surrounded by a connective tissue stroma and in some areas there is retraction artifact of the tumor nodule away from the surrounding stroma. Bcc Infiltrative Histology Text: The dermis contains distinctive tumor cell masses of various shapes and sizes composed of cells with large oval or elongated nuclei with relatively little cytoplasm.  The nuclei are homogenous.  There is no pronounced variation in size or intensity of staining and no significant anaplastic appearance.  The cells at the outer aspect of the tumor masses show palisading of nuclei.  Tumor masses are surrounded by a connective tissue stroma and in some areas there is retraction artifact of the tumor nodule away from the surrounding stroma.  The tumor nests are sharply angulated and demonstrating an infiltrating pattern extending deeply with surrounding fibrosis. Bcc Infundibulocystic Histology Text: The dermis contains distinctive tumor cell masses of various shapes and sizes composed of cells with large oval or elongated nuclei with relatively little cytoplasm.  The nuclei are homogenous.  There is no pronounced variation in size or intensity of staining and no significant anaplastic appearance.  The cells at the outer aspect of the tumor masses show palisading of nuclei.  Tumor masses are surrounded by a connective tissue stroma and in some areas there is retraction artifact of the tumor nodule away from the surrounding stroma.  An infundibulocystic histology is noted. Bcc Macronodular Histology Text: The dermis contains distinctive tumor cell masses of various shapes and sizes composed of cells with large oval or elongated nuclei with relatively little cytoplasm.  The nuclei are homogenous.  There is no pronounced variation in size or intensity of staining and no significant anaplastic appearance.  The cells at the outer aspect of the tumor masses show palisading of nuclei.  Tumor masses are surrounded by a connective tissue stroma and in some areas there is retraction artifact of the tumor nodule away from the surrounding stroma.  A macronodular histology is noted. Bcc Micronodular Histology Text: The dermis contains distinctive tumor cell masses of various shapes and sizes composed of cells with large oval or elongated nuclei with relatively little cytoplasm.  The nuclei are homogenous.  There is no pronounced variation in size or intensity of staining and no significant anaplastic appearance.  The cells at the outer aspect of the tumor masses show palisading of nuclei.  Tumor masses are surrounded by a connective tissue stroma and in some areas there is retraction artifact of the tumor nodule away from the surrounding stroma.  A micronodular histology is noted. Bcc  Morpheaform/Sclerosing Histology Text: The dermis contains distinctive tumor cell masses of various shapes and sizes composed of cells with large oval or elongated nuclei with relatively little cytoplasm.  The nuclei are homogenous.  There is no pronounced variation in size or intensity of staining and no significant anaplastic appearance.  The cells at the outer aspect of the tumor masses show palisading of nuclei.  Tumor masses are surrounded by a connective tissue stroma and in some areas there is retraction artifact of the tumor nodule away from the surrounding stroma.  The tumor nests are sharply angulated and demonstrating an infiltrating pattern extending deeply with surrounding fibrosis, consistent with morpheaform/sclerosing histology. Bcc  Nodular Histology Text: The dermis contains distinctive tumor cell masses of various shapes and sizes composed of cells with large oval or elongated nuclei with relatively little cytoplasm.  The nuclei are homogenous.  There is no pronounced variation in size or intensity of staining and no significant anaplastic appearance.  The cells at the outer aspect of the tumor masses show palisading of nuclei.  Tumor masses are surrounded by a connective tissue stroma and in some areas there is retraction artifact of the tumor nodule away from the surrounding stroma.  A nodular histology is noted. Bcc  Nodulocystic Histology Text: The dermis contains distinctive tumor cell masses of various shapes and sizes composed of cells with large oval or elongated nuclei with relatively little cytoplasm.  The nuclei are homogenous.  There is no pronounced variation in size or intensity of staining and no significant anaplastic appearance.  The cells at the outer aspect of the tumor masses show palisading of nuclei.  Tumor masses are surrounded by a connective tissue stroma and in some areas there is retraction artifact of the tumor nodule away from the surrounding stroma.  A nodulocystic histology is noted. Bcc Pigmented Histology Text: The dermis contains distinctive tumor cell masses of various shapes and sizes composed of cells with large oval or elongated nuclei with relatively little cytoplasm.  The nuclei are homogenous.  There is no pronounced variation in size or intensity of staining and no significant anaplastic appearance.  The cells at the outer aspect of the tumor masses show palisading of nuclei.  Tumor masses are surrounded by a connective tissue stroma and in some areas there is retraction artifact of the tumor nodule away from the surrounding stroma.  Pigmented tumor cells are noted. Bcc Superficial Histology Text: The dermis contains distinctive tumor cell masses of various shapes and sizes composed of cells with large oval or elongated nuclei with relatively little cytoplasm.  The nuclei are homogenous.  There is no pronounced variation in size or intensity of staining and no significant anaplastic appearance.  The cells at the outer aspect of the tumor masses show palisading of nuclei.  Tumor masses are surrounded by a connective tissue stroma and in some areas there is retraction artifact of the tumor nodule away from the surrounding stroma.  A superficial pattern is noted. Bcc Superficial Pigmented Histology Text: The dermis contains distinctive tumor cell masses of various shapes and sizes composed of cells with large oval or elongated nuclei with relatively little cytoplasm.  The nuclei are homogenous.  There is no pronounced variation in size or intensity of staining and no significant anaplastic appearance.  The cells at the outer aspect of the tumor masses show palisading of nuclei.  Tumor masses are surrounded by a connective tissue stroma and in some areas there is retraction artifact of the tumor nodule away from the surrounding stroma.  A superficial pigmented pattern is noted. Mixed Superficial And Nodular Bcc Histology Text: The dermis contains distinctive tumor cell masses of various shapes and sizes composed of cells with large oval or elongated nuclei with relatively little cytoplasm.  The nuclei are homogenous.  There is no pronounced variation in size or intensity of staining and no significant anaplastic appearance.  The cells at the outer aspect of the tumor masses show palisading of nuclei.  Tumor masses are surrounded by a connective tissue stroma and in some areas there is retraction artifact of the tumor nodule away from the surrounding stroma.  A mixed superficial and nodular pattern is noted. Mixed Nodular And Infiltrative Bcc Histology Text: The dermis contains distinctive tumor cell masses of various shapes and sizes composed of cells with large oval or elongated nuclei with relatively little cytoplasm.  The nuclei are homogenous.  There is no pronounced variation in size or intensity of staining and no significant anaplastic appearance.  The cells at the outer aspect of the tumor masses show palisading of nuclei.  Tumor masses are surrounded by a connective tissue stroma and in some areas there is retraction artifact of the tumor nodule away from the surrounding stroma.  There is an admixture of nodular pattern BCC, as well as tumor nests that are sharply angulated and demonstrating an infiltrating pattern extending deeply with surrounding fibrosis. Mixed Nodular And Micronodular Bcc Histology Text: The dermis contains distinctive tumor cell masses of various shapes and sizes composed of cells with large oval or elongated nuclei with relatively little cytoplasm.  The nuclei are homogenous.  There is no pronounced variation in size or intensity of staining and no significant anaplastic appearance.  The cells at the outer aspect of the tumor masses show palisading of nuclei.  Tumor masses are surrounded by a connective tissue stroma and in some areas there is retraction artifact of the tumor nodule away from the surrounding stroma.  A mixed nodular and micronodular pattern is noted. Metatypical Bcc Histology Text: The dermis contains distinctive tumor cell masses of various shapes and sizes composed of cells with large oval or elongated nuclei with relatively little cytoplasm.  The nuclei are homogenous.  There is no pronounced variation in size or intensity of staining and no significant anaplastic appearance.  The cells at the outer aspect of the tumor masses show palisading of nuclei.  Tumor masses are surrounded by a connective tissue stroma and in some areas there is retraction artifact of the tumor nodule away from the surrounding stroma.  Squamous differentiation consistent with metatypical BCC is noted. Scc Histology Text: There are irregular masses of epidermal cells in the upper dermis.  Within the tumor masses, there are varying proportions of normal squamous cells and anaplastic squamous cells.  The anaplastic cells have variation in size and shape with hyperplasia and hyperchromasia of the nuclei, absence of intracellular bridges and varying degrees of keratinization. Scc Well Differentiated Histology Text: There are irregular masses of epidermal cells in the upper dermis.  Within the tumor masses, there are varying proportions of normal squamous cells and anaplastic squamous cells.  The anaplastic cells have variation in size and shape with hyperplasia and hyperchromasia of the nuclei, absence of intracellular bridges and varying degrees of keratinization.  A well differentiated pattern is noted. Scc Moderately Differentiated Histology Text: There are irregular masses of epidermal cells in the upper dermis.  Within the tumor masses, there are varying proportions of normal squamous cells and anaplastic squamous cells.  The anaplastic cells have variation in size and shape with hyperplasia and hyperchromasia of the nuclei, absence of intracellular bridges and varying degrees of keratinization.  A moderately differentiated pattern is noted. Scc Poorly Differentiated Histology Text: There are irregular masses of epidermal cells in the upper dermis.  Within the tumor masses, there are varying proportions of normal squamous cells and anaplastic squamous cells.  The anaplastic cells have variation in size and shape with hyperplasia and hyperchromasia of the nuclei, absence of intracellular bridges and varying degrees of keratinization.  A poorly differentiated pattern is noted. Scc Acantholytic Histology Text: There are irregular masses of epidermal cells in the upper dermis.  Within the tumor masses, there are varying proportions of normal squamous cells and anaplastic squamous cells.  The anaplastic cells have variation in size and shape with hyperplasia and hyperchromasia of the nuclei, absence of intracellular bridges and varying degrees of keratinization.  An acantholytic pattern is noted. Scc Ka Subtype Histology Text: On low power, a keratin filled invagination of the epidermis is noted to penetrate into the dermis.  There appear to be collections of epidermal cells with nuclear atypia in and around the base of the invagination.  These dyskeratotic cells show individual cells keratinization and appear eosinophilic.  A pronounced inflammatory cell infiltrate is noted in the surrounding dermis.  The surrounding epidermis extends as a buttress over the crater formed by the invagination.  At high magnification, the epidermal proliferations at the base of the crater show enlarged epidermal cells with hyperchromatic nuclei, consistent with SCC keratoacanthoma subtype. Scc Spindle Histology Text: There are irregular masses of epidermal cells in the upper dermis.  Within the tumor masses, there are varying proportions of normal squamous cells and anaplastic squamous cells.  The anaplastic cells have variation in size and shape with hyperplasia and hyperchromasia of the nuclei, absence of intracellular bridges and varying degrees of keratinization.  A spindle cell pattern is noted. Scc In Situ Histology Text: The epidermis shows acanthosis with elongation and thickening of the rete ridges.  There are varying degrees of atypia seen within the epidermal cells.  The anaplastic cells are enlarged with hyperchromatic nuclei.  Multiple nucleated epidermal cells are present and parakeratotic nuclei.  Multiple nucleated epidermal cells are present and parakeratotic cells are seen in the stratum corneum.  The anaplastic cells appear to be confined to the epidermis without penetration of the dermal- epidermal junction. Lentigo Maligna Histology Text: Small clusters of uniformly round large melanocytes with atypical hyperchromatic nuclei and abundant cytoplasm are seen scattered throughout the lower levels of the epidermis.  Similar cells are seen singly in the upper layers of the epidermis.  There appears to be no penetration of these atypical melanocytes through the dermal epidermal junction and all cells appear to be confined to the epidermis. Merkel Cell Carcinoma Histology Text: In the dermis and extending into the subcutaneous tissue, there are multiple anastomosing cords, clusters and sheets of basophilic cells.  These collections of cells do not appear to be connected to the epidermis.  Individual tumor cells have a very homogenous appearance.  They are closely spaced round ells that stain intensely blue with hematoxylin eosin.  These cells have vesiculare nuclei and scantly ill -defined cytoplasm.  Mitotic figures are seen. Afx Histology Text: Mild to severe solar elastosis is noted.  Aggregates of dermal tumor cells  with moderate to severe pleomorphism with spindle, epithelioid, or multinucleated forms and atypical mitotic figures are seen. Basosquamous Cell Carcinoma Histology Text: There are multiple islands of malignant cells throughout the dermis, exhibiting both basaloid and squamous differentiation. Some areas show BCC, with large and rounded basaloid cells, while others show SCC, with polygonal squamoid cells with abundant eosinophilic cytoplasm, large nuclei, and prominent nucleoli. The SCC component exhibited frequent atypical mitosis, cell and nuclear pleomorphism and hyperchromatism of the nuclei. Dfsp Histology Text: In the dermis and subcutaneous fat there is a tumor mass consisting of cells with large spindle shaped nuclei embedded in varying amounts of collagen.  In some areas these cells are arranged in irregular bands while in others the intertwining bands result in a matte -like or a whorl -like fashion.  In some areas, the cells have enlarged nuclei with a moderate degree of atypia, and mitotic figures are seen. Microcystic Adnexal Carcinoma Histology Text: Atypical epithelial cells are seen in the dermis arranged singly or in groups or organized as a cell-lined cystic cavity.  These cells are small in size with anaplastic appearing small nuclei.  When seen in the cystic configuration they resemble sweat ducts or glands.  In some areas the cells assume a spindle shape.  A slight stromal reaction is seen around the cells. Sebaceous Carcinoma Histology Text: In the dermis, there are irregular lobules of varying size composed of undifferentiated sebaceous cells with foamy cytoplasm.  These cells, as well as their nuclei are of various sizes and shape. Mart-1 - Positive Histology Text: MART-1 staining demonstrates areas of higher density and clustering of melanocytes with Pagetoid spread upwards within the epidermis. The surgical margins are positive for tumor cells. Mart-1 - Negative Histology Text: MART-1 staining demonstrates a normal density and pattern of melanocytes along the dermal-epidermal junction. The surgical margins are negative for tumor cells. Clia Id #: [id number] Information: Selecting Yes will display possible errors in your note based on the variables you have selected. This validation is only offered as a suggestion for you. PLEASE NOTE THAT THE VALIDATION TEXT WILL BE REMOVED WHEN YOU FINALIZE YOUR NOTE. IF YOU WANT TO FAX A PRELIMINARY NOTE YOU WILL NEED TO TOGGLE THIS TO 'NO' IF YOU DO NOT WANT IT IN YOUR FAXED NOTE. Bill 59 Modifier?: No - Continue to Bill 79 Modifier

## 2024-09-19 NOTE — TELEPHONE ENCOUNTER
"S-(situation): patient calls.     B-(background): has colonoscopy on Tuesday, 9/23.   Missed appt yesterday  LOV: 8/21/2024  \"Positive FIT (fecal immunochemical test)  Screen for colon cancer  -     Pt reports no improvement in nausea following cholecystectomy with + FIT in June. Recommend colonoscopy for further evaluation to ensure no changes from 1/25/2022 (1 tubular adenoma per pathology, but \"partly denuded mucosal surface so precludes accurate assessment\").   If colonoscopy normal also encouraged return to PCP as previously prescribed sertraline 50mg as felt component of sx may be contributed to by anxiety. Pt reports he stopped taking this 1 month ago and is not sure why. Encouraged revisiting this and to bring all meds to review re-initiation vs trial of low-dose amitriptyline.\"  7/2/24 - gallbladder removed.     A-(assessment): patient asking if its necessary. Or if he can repeat a FIT test.   Patient states removing gallbladder didn't help his symptoms a lot.     R-(recommendations): Routing to provider to review and advise.     Gladys Lees RN  [facility]     "

## 2024-09-20 RX ORDER — MAGNESIUM CARB/ALUMINUM HYDROX 105-160MG
296 TABLET,CHEWABLE ORAL ONCE
Qty: 296 ML | Refills: 0 | Status: SHIPPED | OUTPATIENT
Start: 2024-09-20 | End: 2024-09-20

## 2024-09-20 RX ORDER — POLYETHYLENE GLYCOL 3350 17 G/17G
POWDER, FOR SOLUTION ORAL
Qty: 238 G | Refills: 0 | Status: ON HOLD | OUTPATIENT
Start: 2024-09-20 | End: 2024-09-23

## 2024-09-20 RX ORDER — BISACODYL 5 MG/1
TABLET, DELAYED RELEASE ORAL
Qty: 4 TABLET | Refills: 0 | Status: SHIPPED | OUTPATIENT
Start: 2024-09-20

## 2024-09-20 NOTE — TELEPHONE ENCOUNTER
Repeating a FIT test to validate results is not the established workflow.  Yes, Juwan needs another colonoscopy to follow up his positive FIT.  He should complete this as scheduled on 9/23/24.  He can then come see me in the office to further discuss his nausea.  Please call patient.     Julio Guidry MD

## 2024-09-20 NOTE — TELEPHONE ENCOUNTER
Called and spoke with patient.     Advised of providers recommendation.  Patient stated an understanding and agreed with plan.     LUIS ENRIQUEZ RN on 9/20/2024 at 1:58 PM   [facility]

## 2024-09-20 NOTE — TELEPHONE ENCOUNTER
"Received incoming call from patient.    Patient stated that the pharmacy did not have prep for them and that we needed to send in a \"package.\" Writer stated that they could send a prescription in, but the patient could buy it off the shelf. The patient was very confused by this so writer sent in script for standard Miralax.     Writer then realized that patient did not have ANY of the prep instructions written down. Patient was in the car and did not write any notes down. Patient did not know what the low fiber diet was, that they had to follow a CLD or how to take prep. Writer went through all of these with patient. Writer noted after call was finished that patient also has MyChart and has read the prep instructions. Writer sent an additional message to patient to read through the instructions.     Patient verbalized understanding and had no further questions.    Sugey Bethea RN  Endoscopy Procedure Pre Assessment RN  [phone] option 4  "

## 2024-09-23 ENCOUNTER — HOSPITAL ENCOUNTER (OUTPATIENT)
Facility: CLINIC | Age: 74
Discharge: HOME | End: 2024-09-23
Attending: COLON & RECTAL SURGERY | Admitting: COLON & RECTAL SURGERY
Payer: COMMERCIAL

## 2024-09-23 VITALS
BODY MASS INDEX: 25.46 KG/M2 | SYSTOLIC BLOOD PRESSURE: 104 MMHG | WEIGHT: 168 LBS | DIASTOLIC BLOOD PRESSURE: 75 MMHG | HEIGHT: 68 IN | HEART RATE: 61 BPM | RESPIRATION RATE: 14 BRPM | OXYGEN SATURATION: 96 %

## 2024-09-23 LAB — COLONOSCOPY: NORMAL

## 2024-09-23 PROCEDURE — G0500 MOD SEDAT ENDO SERVICE >5YRS: HCPCS | Performed by: COLON & RECTAL SURGERY

## 2024-09-23 PROCEDURE — G0105 COLORECTAL SCRN; HI RISK IND: HCPCS | Mod: KX | Performed by: COLON & RECTAL SURGERY

## 2024-09-23 PROCEDURE — 45378 DIAGNOSTIC COLONOSCOPY: CPT | Performed by: COLON & RECTAL SURGERY

## 2024-09-23 PROCEDURE — 250N000011 HC RX IP 250 OP 636: Performed by: COLON & RECTAL SURGERY

## 2024-09-23 RX ORDER — DIPHENHYDRAMINE HYDROCHLORIDE 50 MG/ML
25-50 INJECTION INTRAMUSCULAR; INTRAVENOUS
Status: DISCONTINUED | OUTPATIENT
Start: 2024-09-23 | End: 2024-09-23 | Stop reason: HOSPADM

## 2024-09-23 RX ORDER — NALOXONE HYDROCHLORIDE 0.4 MG/ML
0.2 INJECTION, SOLUTION INTRAMUSCULAR; INTRAVENOUS; SUBCUTANEOUS
Status: DISCONTINUED | OUTPATIENT
Start: 2024-09-23 | End: 2024-09-23 | Stop reason: HOSPADM

## 2024-09-23 RX ORDER — ATROPINE SULFATE 0.1 MG/ML
1 INJECTION INTRAVENOUS
Status: DISCONTINUED | OUTPATIENT
Start: 2024-09-23 | End: 2024-09-23 | Stop reason: HOSPADM

## 2024-09-23 RX ORDER — NALOXONE HYDROCHLORIDE 0.4 MG/ML
0.4 INJECTION, SOLUTION INTRAMUSCULAR; INTRAVENOUS; SUBCUTANEOUS
Status: DISCONTINUED | OUTPATIENT
Start: 2024-09-23 | End: 2024-09-23 | Stop reason: HOSPADM

## 2024-09-23 RX ORDER — ONDANSETRON 2 MG/ML
4 INJECTION INTRAMUSCULAR; INTRAVENOUS EVERY 6 HOURS PRN
Status: DISCONTINUED | OUTPATIENT
Start: 2024-09-23 | End: 2024-09-23 | Stop reason: HOSPADM

## 2024-09-23 RX ORDER — FLUMAZENIL 0.1 MG/ML
0.2 INJECTION, SOLUTION INTRAVENOUS
Status: DISCONTINUED | OUTPATIENT
Start: 2024-09-23 | End: 2024-09-23 | Stop reason: HOSPADM

## 2024-09-23 RX ORDER — ONDANSETRON 2 MG/ML
4 INJECTION INTRAMUSCULAR; INTRAVENOUS
Status: DISCONTINUED | OUTPATIENT
Start: 2024-09-23 | End: 2024-09-23 | Stop reason: HOSPADM

## 2024-09-23 RX ORDER — FENTANYL CITRATE 50 UG/ML
50-100 INJECTION, SOLUTION INTRAMUSCULAR; INTRAVENOUS EVERY 5 MIN PRN
Status: DISCONTINUED | OUTPATIENT
Start: 2024-09-23 | End: 2024-09-23 | Stop reason: HOSPADM

## 2024-09-23 RX ORDER — ONDANSETRON 4 MG/1
4 TABLET, ORALLY DISINTEGRATING ORAL EVERY 6 HOURS PRN
Status: DISCONTINUED | OUTPATIENT
Start: 2024-09-23 | End: 2024-09-23 | Stop reason: HOSPADM

## 2024-09-23 RX ORDER — SIMETHICONE 40MG/0.6ML
133 SUSPENSION, DROPS(FINAL DOSAGE FORM)(ML) ORAL
Status: DISCONTINUED | OUTPATIENT
Start: 2024-09-23 | End: 2024-09-23 | Stop reason: HOSPADM

## 2024-09-23 RX ORDER — LIDOCAINE 40 MG/G
CREAM TOPICAL
Status: DISCONTINUED | OUTPATIENT
Start: 2024-09-23 | End: 2024-09-23 | Stop reason: HOSPADM

## 2024-09-23 RX ORDER — PROCHLORPERAZINE MALEATE 5 MG
5 TABLET ORAL EVERY 6 HOURS PRN
Status: DISCONTINUED | OUTPATIENT
Start: 2024-09-23 | End: 2024-09-23 | Stop reason: HOSPADM

## 2024-09-23 RX ORDER — EPINEPHRINE 1 MG/ML
0.1 INJECTION, SOLUTION INTRAMUSCULAR; SUBCUTANEOUS
Status: DISCONTINUED | OUTPATIENT
Start: 2024-09-23 | End: 2024-09-23 | Stop reason: HOSPADM

## 2024-09-23 RX ADMIN — MIDAZOLAM 2 MG: 1 INJECTION INTRAMUSCULAR; INTRAVENOUS at 09:36

## 2024-09-23 RX ADMIN — FENTANYL CITRATE 100 MCG: 50 INJECTION, SOLUTION INTRAMUSCULAR; INTRAVENOUS at 09:36

## 2024-09-23 ASSESSMENT — ACTIVITIES OF DAILY LIVING (ADL): ADLS_ACUITY_SCORE: 35

## 2024-09-23 NOTE — H&P
Pre-Endoscopy History and Physical     Juwan Latham MRN# [id number]   YOB: 1950 Age: 73 year old     Date of Procedure: 9/23/2024  Primary care provider: Julio Guidry Jr.  Type of Endoscopy: colonoscopy  Reason for Procedure: surveillance  Type of Anesthesia Anticipated: Moderate Sedation    HPI:    Juwan is a 73 year old male who will be undergoing the above procedure.      A history and physical has been performed. The patient's medications and allergies have been reviewed. The risks and benefits of the procedure and the sedation options and risks were discussed with the patient.  All questions were answered and informed consent was obtained.      He denies a personal or family history of anesthesia complications or bleeding disorders.     No Known Allergies     Prior to Admission Medications   Prescriptions Last Dose Informant Patient Reported? Taking?   ALPRAZolam (XANAX) 0.25 MG tablet   No No   Sig: Take 1 tablet (0.25 mg) by mouth nightly as needed for anxiety.   Cholecalciferol (VITAMIN D-3 PO)   Yes No   Sig: Take by mouth.   MAGNESIUM GLYCINATE PO   Yes No   Sig: Take 480 mg by mouth daily.   bisacodyl (DULCOLAX) 5 MG EC tablet   No No   Sig: Take as directed. One day before the exam at 4 PM, take 2 Dulcolax (Bisacodyl) tablets.  At 10 PM take 2 Dulcolax (Bisacodyl) tablets.   hydrocortisone 2.5 % ointment   No No   Sig: Apply topically 2 times daily   multivitamin w/minerals (THERA-VIT-M) tablet   Yes No   Sig: Take 1 tablet by mouth daily   pantoprazole (PROTONIX) 20 MG EC tablet   No No   Sig: Take 2 tablets (40 mg) by mouth 2 times daily (before meals).   polyethylene glycol (MIRALAX) 17 GM/Dose powder   No No   Sig: Day before exam at 5 PM start drinking an 8-ounce glass of the Miralax and Gatorade mixture every 15 minutes until the pitcher is HALF empty.  Store the rest in the refrigerator. At 10 PM start drinking an 8-ounce glass of Miralax and Gatorade mixture every 15  minutes until the pitcher is empty (about 4 glasses).   sertraline (ZOLOFT) 100 MG tablet   No No   Sig: Take 1 tablet (100 mg) by mouth daily.   sucralfate (CARAFATE) 1 GM tablet   No No   Sig: Take 1 tablet (1 g) by mouth 4 times daily as needed for nausea.      Facility-Administered Medications: None       Patient Active Problem List   Diagnosis    Anxiety    PAULINO (obstructive sleep apnea)    GERD (gastroesophageal reflux disease)    Hypertension goal BP (blood pressure) < 140/90    Benign neoplasm of descending colon    s/p Malignant neoplasm of upper lobe of right lung (H)    Overweight (BMI 25.0-29.9)    Cavernous hemangioma of brain (H)    Major depressive disorder, recurrent episode, mild (H24)    h/o Prostate cancer (H) - s/p prostatectomy 2017    Somatic dysfunction of pelvis region    Mixed incontinence urge and stress (male)(female)    Retinal detachment of left eye with single break    Postprocedural male urethral stricture    Gross hematuria    Numbness    Fatty liver    CARDIOVASCULAR SCREENING; LDL GOAL LESS THAN 130    Chronic bilateral low back pain without sciatica    Abnormal liver function tests    Tremor of both outstretched hands- pt thinks this is new - noted on exam 5/28/2024    Nausea - without vomiting    Chronic fatigue    Dark stools        Past Medical History:   Diagnosis Date    Anxiety     Arthritis     fingers, hips    Calculus of kidney 2005, 2012    CARDIOVASCULAR SCREENING; LDL GOAL LESS THAN 130 04/18/2023    Colon polyps     Congenital single kidney     Gastroesophageal reflux disease     Hx of cancer of lung 03/2017    wedge resection    Hypertension     Prostate cancer (H) 08/2017    prostatectomy/Rad Tx    Seasonal allergies     spring and fall    Sleep apnea     cpap        Past Surgical History:   Procedure Laterality Date    BRONCHOSCOPY FLEXIBLE N/A 03/03/2017    Procedure: BRONCHOSCOPY FLEXIBLE;  Surgeon: Maria A Desai MD;  Location: [facility] OR    COLONOSCOPY N/A 02/11/2015     Procedure: COLONOSCOPY;  Surgeon: Murphy Jesus MD;  Location:  GI    COLONOSCOPY N/A 12/11/2019    Procedure: COLONOSCOPY;  Surgeon: Murphy Jesus MD;  Location:  GI    COLONOSCOPY N/A 01/25/2022    due 5 yrs - COLONOSCOPY, FLEXIBLE, WITH POLYPECTOMY  USING COLD SNARE;  Surgeon: Neha Rosen MD;  Location: [facility]    COMBINED CYSTOSCOPY, RETROGRADES, URETEROSCOPY, LASER HOLMIUM LITHOTRIPSY URETER(S), INSERT STENT N/A 03/25/2018    Procedure: COMBINED CYSTOSCOPY, RETROGRADES, URETEROSCOPY, LASER HOLMIUM LITHOTRIPSY URETER(S), INSERT STENT;  Cystoscopy, Catheter Exchange under Anesthesia;  Surgeon: Zaria Cain MD;  Location: [facility] OR    CYSTOSCOPY, DILATE URETHRA, COMBINED N/A 01/03/2022    Procedure: CYSTOSCOPY, WITH URETHRAL DILATION WITH FULGERATION OF BLADDER WALL;  Surgeon: Mike Chan MD;  Location: [facility] OR    CYSTOSCOPY, WITH URETHRAL CHEMOTHERAPY STENT INSERTION N/A 3/18/2024    Procedure: CYSTOSCOPY, RETROGRADE WITH OPTILUME URETHRAL DILATION BALLOON;  Surgeon: Mike Chan MD;  Location: [facility] OR    DAVINCI PROSTATECTOMY N/A 12/01/2017    Procedure: DAVINCI PROSTATECTOMY;  Robotic Assisted Radical Prostatectomy and Pelvic Lymph Node Dissection ;  Surgeon: Paulo Agarwal MD;  Location: [facility]    ESOPHAGOSCOPY, GASTROSCOPY, DUODENOSCOPY (EGD), COMBINED N/A 05/20/2016    Procedure: COMBINED ESOPHAGOSCOPY, GASTROSCOPY, DUODENOSCOPY (EGD), BIOPSY SINGLE OR MULTIPLE;  Surgeon: Murphy Jesus MD;  Location:  GI    LAPAROSCOPIC CHOLECYSTECTOMY N/A 7/2/2024    Procedure: CHOLECYSTECTOMY, LAPAROSCOPIC;  Surgeon: Arnaud Garza MD;  Location: [facility] OR    LAPAROSCOPIC WEDGE RESECTION LUNG Right 03/03/2017    Procedure: LAPAROSCOPIC WEDGE RESECTION LUNG;  Surgeon: Maria A Desai MD;  Location: [facility] OR    LASER HOLMIUM LITHOTRIPSY URETER(S), INSERT STENT, COMBINED  09/11/2012    Procedure: COMBINED CYSTOSCOPY, URETEROSCOPY, LASER HOLMIUM LITHOTRIPSY URETER(S), INSERT  "STENT;  Cystoscopy, Right Ureteroscopy, Stone Extraction, Holmium Laser, Double J Stent Placement;  Surgeon: Nicolás Blackwell MD;  Location:  OR       Social History     Tobacco Use    Smoking status: Former     Current packs/day: 0.00     Average packs/day: 2.0 packs/day for 20.0 years (40.0 ttl pk-yrs)     Types: Cigarettes     Start date: 1965     Quit date: 1985     Years since quittin.7     Passive exposure: Past    Smokeless tobacco: Never   Substance Use Topics    Alcohol use: Yes     Alcohol/week: 0.0 - 2.0 standard drinks of alcohol     Comment: had 2 beers 2 weeks ago       Family History   Problem Relation Age of Onset    Heart Disease Mother     Other - See Comments Sister         during surgery for hip fracture    Cancer Sister         skin    Cancer Sister         skin    Diabetes Brother 65        Type 2    Diabetes Maternal Grandmother     Substance Abuse Son     Hypertension No family hx of     Lipids No family hx of        REVIEW OF SYSTEMS:     5 point ROS negative except as noted above in HPI, including Gen., Resp., CV, GI &  system review.      PHYSICAL EXAM:   There were no vitals taken for this visit. Estimated body mass index is 25.55 kg/m  as calculated from the following:    Height as of 24: 1.715 m (5' 7.5\").    Weight as of 24: 75.1 kg (165 lb 9.6 oz).   GENERAL APPEARANCE: healthy and alert  MENTAL STATUS: alert  AIRWAY EXAM: Mallampatti Class II (visualization of the soft palate, fauces, and uvula)  RESP: lungs clear to auscultation - no rales, rhonchi or wheezes  CV: regular rates and rhythm      DIAGNOSTICS:    Not indicated      IMPRESSION   ASA Class 2 - Mild systemic disease        PLAN:       Plan for colonoscopy. We discussed the risks, benefits and alternatives and the patient wished to proceed.    The above has been forwarded to the consulting provider.      Signed Electronically by: Neha Rosen MD  2024    "

## 2024-10-02 ENCOUNTER — ONCOLOGY VISIT (OUTPATIENT)
Dept: SURGERY | Facility: CLINIC | Age: 74
End: 2024-10-02
Attending: CLINICAL NURSE SPECIALIST
Payer: COMMERCIAL

## 2024-10-02 ENCOUNTER — ANCILLARY PROCEDURE (OUTPATIENT)
Dept: CT IMAGING | Facility: CLINIC | Age: 74
End: 2024-10-02
Attending: CLINICAL NURSE SPECIALIST
Payer: COMMERCIAL

## 2024-10-02 VITALS
SYSTOLIC BLOOD PRESSURE: 129 MMHG | RESPIRATION RATE: 16 BRPM | TEMPERATURE: 98.2 F | HEART RATE: 64 BPM | OXYGEN SATURATION: 99 % | WEIGHT: 160 LBS | BODY MASS INDEX: 24.33 KG/M2 | DIASTOLIC BLOOD PRESSURE: 62 MMHG

## 2024-10-02 DIAGNOSIS — C34.11 MALIGNANT NEOPLASM OF UPPER LOBE OF RIGHT LUNG (H): Primary | ICD-10-CM

## 2024-10-02 DIAGNOSIS — C34.11 MALIGNANT NEOPLASM OF UPPER LOBE OF RIGHT LUNG (H): ICD-10-CM

## 2024-10-02 LAB
CREAT BLD-MCNC: 0.9 MG/DL (ref 0.7–1.3)
EGFRCR SERPLBLD CKD-EPI 2021: >60 ML/MIN/1.73M2

## 2024-10-02 PROCEDURE — G0463 HOSPITAL OUTPT CLINIC VISIT: HCPCS | Performed by: CLINICAL NURSE SPECIALIST

## 2024-10-02 PROCEDURE — 71260 CT THORAX DX C+: CPT | Performed by: RADIOLOGY

## 2024-10-02 PROCEDURE — 82565 ASSAY OF CREATININE: CPT | Performed by: PATHOLOGY

## 2024-10-02 PROCEDURE — 99213 OFFICE O/P EST LOW 20 MIN: CPT | Performed by: CLINICAL NURSE SPECIALIST

## 2024-10-02 RX ORDER — IOPAMIDOL 755 MG/ML
82 INJECTION, SOLUTION INTRAVASCULAR ONCE
Status: COMPLETED | OUTPATIENT
Start: 2024-10-02 | End: 2024-10-02

## 2024-10-02 RX ADMIN — IOPAMIDOL 82 ML: 755 INJECTION, SOLUTION INTRAVASCULAR at 09:41

## 2024-10-02 ASSESSMENT — PAIN SCALES - GENERAL: PAINLEVEL: NO PAIN (0)

## 2024-10-02 NOTE — DISCHARGE INSTRUCTIONS

## 2024-10-02 NOTE — NURSING NOTE
"Oncology Rooming Note    October 2, 2024 10:24 AM   Juwan Latham is a 73 year old male who presents for:    Chief Complaint   Patient presents with    Oncology Clinic Visit     Malignant neoplasm of upper lobe of right lung      Initial Vitals: /62   Pulse 64   Temp 98.2  F (36.8  C) (Oral)   Resp 16   Wt 72.6 kg (160 lb)   SpO2 99%   BMI 24.33 kg/m   Estimated body mass index is 24.33 kg/m  as calculated from the following:    Height as of 9/23/24: 1.727 m (5' 8\").    Weight as of this encounter: 72.6 kg (160 lb). Body surface area is 1.87 meters squared.  No Pain (0) Comment: Data Unavailable   No LMP for male patient.  Allergies reviewed: Yes  Medications reviewed: Yes    Medications: Medication refills not needed today.  Pharmacy name entered into [employer]: "[id number] - [place], [place] - [address]    Frailty Screening:   Is the patient here for a new oncology consult visit in cancer care? 2. No      Clinical concerns:        Suzy Thayer              "

## 2024-10-02 NOTE — PROGRESS NOTES
THORACIC SURGERY FOLLOW UP VISIT      I saw Mr. Latham in follow-up today. The clinical summary follows:     PREOP DIAGNOSIS   Enlarging right upper lobe lung nodule  PROCEDURE   Laparoscopic assisted thoracoscopic right upper lobe nodule wedge resection and mediastinal lymph node dissection     DATE OF PROCEDURE  03/05/2017    HISTOPATHOLOGY   Minimally invasive well differentiated adenocarcinoma pT1aN0     COMPLICATIONS  None    INTERVAL STUDIES  CT chest 10/02/2024:   1. No change in scattered tiny nodules. Continued follow-up suggested.  2. No definite evidence of tumor recurrence.    Past Medical History:   Diagnosis Date    Anxiety     Arthritis     fingers, hips    Calculus of kidney 2005, 2012    CARDIOVASCULAR SCREENING; LDL GOAL LESS THAN 130 04/18/2023    Colon polyps     Congenital single kidney     Gastroesophageal reflux disease     Hx of cancer of lung 03/2017    wedge resection    Hypertension     Prostate cancer (H) 08/2017    prostatectomy/Rad Tx    Seasonal allergies     spring and fall    Sleep apnea     cpap      Past Surgical History:   Procedure Laterality Date    BRONCHOSCOPY FLEXIBLE N/A 03/03/2017    Procedure: BRONCHOSCOPY FLEXIBLE;  Surgeon: Maria A Desai MD;  Location: [facility]    COLONOSCOPY N/A 02/11/2015    Procedure: COLONOSCOPY;  Surgeon: Murphy Jesus MD;  Location:  GI    COLONOSCOPY N/A 12/11/2019    Procedure: COLONOSCOPY;  Surgeon: Murphy Jesus MD;  Location:  GI    COLONOSCOPY N/A 01/25/2022    due 5 yrs - COLONOSCOPY, FLEXIBLE, WITH POLYPECTOMY  USING COLD SNARE;  Surgeon: Neha Rosen MD;  Location:  GI    COLONOSCOPY N/A 9/23/2024    Procedure: Colonoscopy;  Surgeon: Neha Rosen MD;  Location:  GI    COMBINED CYSTOSCOPY, RETROGRADES, URETEROSCOPY, LASER HOLMIUM LITHOTRIPSY URETER(S), INSERT STENT N/A 03/25/2018    Procedure: COMBINED CYSTOSCOPY, RETROGRADES, URETEROSCOPY, LASER HOLMIUM LITHOTRIPSY URETER(S), INSERT STENT;  Cystoscopy,  Catheter Exchange under Anesthesia;  Surgeon: Zaria Cain MD;  Location: [facility]    CYSTOSCOPY, DILATE URETHRA, COMBINED N/A 01/03/2022    Procedure: CYSTOSCOPY, WITH URETHRAL DILATION WITH FULGERATION OF BLADDER WALL;  Surgeon: Mike Chan MD;  Location: [facility] OR    CYSTOSCOPY, WITH URETHRAL CHEMOTHERAPY STENT INSERTION N/A 3/18/2024    Procedure: CYSTOSCOPY, RETROGRADE WITH OPTILUME URETHRAL DILATION BALLOON;  Surgeon: Mike Chan MD;  Location: [facility]    DAVINCI PROSTATECTOMY N/A 12/01/2017    Procedure: DAVINCI PROSTATECTOMY;  Robotic Assisted Radical Prostatectomy and Pelvic Lymph Node Dissection ;  Surgeon: Paulo Agarwal MD;  Location: [facility]    ESOPHAGOSCOPY, GASTROSCOPY, DUODENOSCOPY (EGD), COMBINED N/A 05/20/2016    Procedure: COMBINED ESOPHAGOSCOPY, GASTROSCOPY, DUODENOSCOPY (EGD), BIOPSY SINGLE OR MULTIPLE;  Surgeon: Murphy Jesus MD;  [facility]    LAPAROSCOPIC CHOLECYSTECTOMY N/A 7/2/2024    Procedure: CHOLECYSTECTOMY, LAPAROSCOPIC;  Surgeon: Arnaud Garza MD;  [facility]    LAPAROSCOPIC WEDGE RESECTION LUNG Right 03/03/2017    Procedure: LAPAROSCOPIC WEDGE RESECTION LUNG;  Surgeon: Maria A Desai MD;  [facility]: [facility]    LASER HOLMIUM LITHOTRIPSY URETER(S), INSERT STENT, COMBINED  09/11/2012    Procedure: COMBINED CYSTOSCOPY, URETEROSCOPY, LASER HOLMIUM LITHOTRIPSY URETER(S), INSERT STENT;  Cystoscopy, Right Ureteroscopy, Stone Extraction, Holmium Laser, Double J Stent Placement;  Surgeon: Nicolás Blackwell MD;  Location: [facility]      Social History     Socioeconomic History    Marital status:      Spouse name: Not on file    Number of children: 1    Years of education: Not on file    Highest education level: Not on file   Occupational History    Occupation: Remodeling     Employer: SELF     Employer: OTHER   Tobacco Use    Smoking status: Former     Current packs/day: 0.00     Average packs/day: 2.0 packs/day for 20.0 years (40.0 ttl pk-yrs)      Types: Cigarettes     Start date: 1965     Quit date: 1985     Years since quittin.7     Passive exposure: Past    Smokeless tobacco: Never   Vaping Use    Vaping status: Never Used   Substance and Sexual Activity    Alcohol use: Yes     Alcohol/week: 0.0 - 2.0 standard drinks of alcohol     Comment: had 2 beers 2 weeks ago    Drug use: Yes     Types: Marijuana     Comment: gummies twice a week    Sexual activity: Yes     Partners: Female   Other Topics Concern     Service Not Asked    Blood Transfusions Not Asked    Caffeine Concern No    Occupational Exposure Not Asked    Hobby Hazards Not Asked    Sleep Concern Yes     Comment: middle/late insomnia    Stress Concern Not Asked    Weight Concern Not Asked    Special Diet Not Asked    Back Care Not Asked    Exercise Yes    Bike Helmet Not Asked    Seat Belt Yes    Self-Exams Not Asked    Parent/sibling w/ CABG, MI or angioplasty before 65F 55M? No   Social History Narrative    Dad  at age 86 from complications after hip surgery.    Mom  at age 68 from heart condition    Siblings:  Three sisters in good health.  Brother with diabetes and overweight.        One son    One granddaughter    Occupation:  remodeling     Social Determinants of Health     Financial Resource Strain: High Risk (2024)    Financial Resource Strain     Within the past 12 months, have you or your family members you live with been unable to get utilities (heat, electricity) when it was really needed?: Yes   Food Insecurity: Low Risk  (2024)    Food Insecurity     Within the past 12 months, did you worry that your food would run out before you got money to buy more?: No     Within the past 12 months, did the food you bought just not last and you didn t have money to get more?: No   Transportation Needs: Low Risk  (2024)    Transportation Needs     Within the past 12 months, has lack of transportation kept you from medical appointments, getting  your medicines, non-medical meetings or appointments, work, or from getting things that you need?: No   Physical Activity: Insufficiently Active (4/24/2024)    Exercise Vital Sign     Days of Exercise per Week: 4 days     Minutes of Exercise per Session: 20 min   Stress: Stress Concern Present (4/24/2024)    [employer] - Occupational Stress Questionnaire     Feeling of Stress : To some extent   Social Connections: Unknown (4/24/2024)    Social Connection and Isolation Panel [NHANES]     Frequency of Communication with Friends and Family: Not on file     Frequency of Social Gatherings with Friends and Family: Never     Attends Tenriism Services: Not on file     Active Member of Clubs or Organizations: Not on file     Attends Club or Organization Meetings: Not on file     Marital Status: Not on file   Interpersonal Safety: Low Risk  (4/24/2024)    Interpersonal Safety     Do you feel physically and emotionally safe where you currently live?: Yes     Within the past 12 months, have you been hit, slapped, kicked or otherwise physically hurt by someone?: No     Within the past 12 months, have you been humiliated or emotionally abused in other ways by your partner or ex-partner?: No   Housing Stability: Low Risk  (4/24/2024)    Housing Stability     Do you have housing? : Yes     Are you worried about losing your housing?: No      SUBJECTIVE   Juwan is doing ok. He denies cough or shortness of breath. He denies fevers, nigh sweats or chills. His main concern lately is feeling foggy every day. Between the hours of 11 Am and 3 PM he does not feel right. He feels like he is in a fog. He has also noticed that sometimes after bending over or sitting up from a lying position he feels dizzy and unsteady. When he is upright he is not dizzy and denies any unstable walking or falls.    OBJECTIVE  /62   Pulse 64   Temp 98.2  F (36.8  C) (Oral)   Resp 16   Wt 72.6 kg (160 lb)   SpO2 99%   BMI  "24.33 kg/m       From a personal perspective, he lives with a room mate.He is here alone today.    RAJENDRA Echeverria is a 73 year-old male status post laparoscopic assisted thoracoscopic right upper lobe wedge resection and mediastinal lymph node dissection for a pT1aN0 (stage IA1) non small cell lung cancer in 2017.     I reviewed his CT results with him and he is happy to hear that his lungs look good. I asked if he has seen a Neurologist or ENT for evaluation of his daily \"fog\"  episodes and dizzy spells. He is seeing his primary care provider next week for follow up on these issues and will talk to him about this in more detail.    PLAN  I spent 25 min on the date of the encounter in chart review, patient visit, review of tests, documentation and/or discussion with other providers about the issues documented above. I reviewed the plan as follows:  Follow up with me in 1 year with a chest CT prior. Consider evaluation with Neurology or ENT-Mike will discuss this with his PCP.  1. Necessary Tests & Appointments: chest CT  All questions were answered and the patient and present family were in agreement with the plan.  I appreciate the opportunity to participate in the care of your patient and will keep you updated.  Sincerely,    Carla Faulkner, GLORIA CNS     "

## 2024-10-02 NOTE — LETTER
10/2/2024      Juwan Latham  [address]  [place] [address]      Dear Colleague,    Thank you for referring your patient, Juwan Latham, to the [facility]. Please see a copy of my visit note below.    THORACIC SURGERY FOLLOW UP VISIT      I saw Mr. Latham in follow-up today. The clinical summary follows:     PREOP DIAGNOSIS   Enlarging right upper lobe lung nodule  PROCEDURE   Laparoscopic assisted thoracoscopic right upper lobe nodule wedge resection and mediastinal lymph node dissection     DATE OF PROCEDURE  03/05/2017    HISTOPATHOLOGY   Minimally invasive well differentiated adenocarcinoma pT1aN0     COMPLICATIONS  None    INTERVAL STUDIES  CT chest 10/02/2024:   1. No change in scattered tiny nodules. Continued follow-up suggested.  2. No definite evidence of tumor recurrence.    Past Medical History:   Diagnosis Date     Anxiety      Arthritis     fingers, hips     Calculus of kidney 2005, 2012     CARDIOVASCULAR SCREENING; LDL GOAL LESS THAN 130 04/18/2023     Colon polyps      Congenital single kidney      Gastroesophageal reflux disease      Hx of cancer of lung 03/2017    wedge resection     Hypertension      Prostate cancer (H) 08/2017    prostatectomy/Rad Tx     Seasonal allergies     spring and fall     Sleep apnea     cpap      Past Surgical History:   Procedure Laterality Date     BRONCHOSCOPY FLEXIBLE N/A 03/03/2017    Procedure: BRONCHOSCOPY FLEXIBLE;  Surgeon: Maria A Desai MD;  [facility]     COLONOSCOPY N/A 02/11/2015    Procedure: COLONOSCOPY;  Surgeon: Murphy Jesus MD;  Location:  GI     COLONOSCOPY N/A 12/11/2019    Procedure: COLONOSCOPY;  Surgeon: Murphy Jesus MD;  Location:  GI     COLONOSCOPY N/A 01/25/2022    due 5 yrs - COLONOSCOPY, FLEXIBLE, WITH POLYPECTOMY  USING COLD SNARE;  Surgeon: Neha Rosen MD;  Location:  GI     COLONOSCOPY N/A 9/23/2024    Procedure: Colonoscopy;  Surgeon: Neha Rosen MD;   [facility]     COMBINED CYSTOSCOPY, RETROGRADES, URETEROSCOPY, LASER HOLMIUM LITHOTRIPSY URETER(S), INSERT STENT N/A 03/25/2018    Procedure: COMBINED CYSTOSCOPY, RETROGRADES, URETEROSCOPY, LASER HOLMIUM LITHOTRIPSY URETER(S), INSERT STENT;  Cystoscopy, Catheter Exchange under Anesthesia;  Surgeon: Zaria Cain MD;  [facility]: [facility]     CYSTOSCOPY, DILATE URETHRA, COMBINED N/A 01/03/2022    Procedure: CYSTOSCOPY, WITH URETHRAL DILATION WITH FULGERATION OF BLADDER WALL;  Surgeon: Mike Chan MD;  Location: [facility] OR     CYSTOSCOPY, WITH URETHRAL CHEMOTHERAPY STENT INSERTION N/A 3/18/2024    Procedure: CYSTOSCOPY, RETROGRADE WITH OPTILUME URETHRAL DILATION BALLOON;  Surgeon: Mike hCan MD;  [facility]     DAVINCI PROSTATECTOMY N/A 12/01/2017    Procedure: DAVINCI PROSTATECTOMY;  Robotic Assisted Radical Prostatectomy and Pelvic Lymph Node Dissection ;  Surgeon: Paulo Agarwal MD;  [facility]     ESOPHAGOSCOPY, GASTROSCOPY, DUODENOSCOPY (EGD), COMBINED N/A 05/20/2016    Procedure: COMBINED ESOPHAGOSCOPY, GASTROSCOPY, DUODENOSCOPY (EGD), BIOPSY SINGLE OR MULTIPLE;  Surgeon: Murphy Jesus MD;  [facility]     LAPAROSCOPIC CHOLECYSTECTOMY N/A 7/2/2024    Procedure: CHOLECYSTECTOMY, LAPAROSCOPIC;  Surgeon: Arnaud Garza MD;  [facility]     LAPAROSCOPIC WEDGE RESECTION LUNG Right 03/03/2017    Procedure: LAPAROSCOPIC WEDGE RESECTION LUNG;  Surgeon: Maria A Desai MD;  [facility]     LASER HOLMIUM LITHOTRIPSY URETER(S), INSERT STENT, COMBINED  09/11/2012    Procedure: COMBINED CYSTOSCOPY, URETEROSCOPY, LASER HOLMIUM LITHOTRIPSY URETER(S), INSERT STENT;  Cystoscopy, Right Ureteroscopy, Stone Extraction, Holmium Laser, Double J Stent Placement;  Surgeon: Nicolás Blackwell MD;  [facility]      Social History     Socioeconomic History     Marital status:      Spouse name: Not on file     Number of children: 1     Years of education: Not on file      Highest education level: Not on file   Occupational History     Occupation: Remodeling     Employer: SELF     Employer: OTHER   Tobacco Use     Smoking status: Former     Current packs/day: 0.00     Average packs/day: 2.0 packs/day for 20.0 years (40.0 ttl pk-yrs)     Types: Cigarettes     Start date: 1965     Quit date: 1985     Years since quittin.7     Passive exposure: Past     Smokeless tobacco: Never   Vaping Use     Vaping status: Never Used   Substance and Sexual Activity     Alcohol use: Yes     Alcohol/week: 0.0 - 2.0 standard drinks of alcohol     Comment: had 2 beers 2 weeks ago     Drug use: Yes     Types: Marijuana     Comment: gummies twice a week     Sexual activity: Yes     Partners: Female   Other Topics Concern      Service Not Asked     Blood Transfusions Not Asked     Caffeine Concern No     Occupational Exposure Not Asked     Hobby Hazards Not Asked     Sleep Concern Yes     Comment: middle/late insomnia     Stress Concern Not Asked     Weight Concern Not Asked     Special Diet Not Asked     Back Care Not Asked     Exercise Yes     Bike Helmet Not Asked     Seat Belt Yes     Self-Exams Not Asked     Parent/sibling w/ CABG, MI or angioplasty before 65F 55M? No   Social History Narrative    Dad  at age 86 from complications after hip surgery.    Mom  at age 68 from heart condition    Siblings:  Three sisters in good health.  Brother with diabetes and overweight.        One son    One granddaughter    Occupation:  remodeling     Social Determinants of Health     Financial Resource Strain: High Risk (2024)    Financial Resource Strain      Within the past 12 months, have you or your family members you live with been unable to get utilities (heat, electricity) when it was really needed?: Yes   Food Insecurity: Low Risk  (2024)    Food Insecurity      Within the past 12 months, did you worry that your food would run out before you got money to buy more?:  No      Within the past 12 months, did the food you bought just not last and you didn t have money to get more?: No   Transportation Needs: Low Risk  (4/24/2024)    Transportation Needs      Within the past 12 months, has lack of transportation kept you from medical appointments, getting your medicines, non-medical meetings or appointments, work, or from getting things that you need?: No   Physical Activity: Insufficiently Active (4/24/2024)    Exercise Vital Sign      Days of Exercise per Week: 4 days      Minutes of Exercise per Session: 20 min   Stress: Stress Concern Present (4/24/2024)    [employer] - Occupational Stress Questionnaire      Feeling of Stress : To some extent   Social Connections: Unknown (4/24/2024)    Social Connection and Isolation Panel [NHANES]      Frequency of Communication with Friends and Family: Not on file      Frequency of Social Gatherings with Friends and Family: Never      Attends Protestant Services: Not on file      Active Member of Clubs or Organizations: Not on file      Attends Club or Organization Meetings: Not on file      Marital Status: Not on file   Interpersonal Safety: Low Risk  (4/24/2024)    Interpersonal Safety      Do you feel physically and emotionally safe where you currently live?: Yes      Within the past 12 months, have you been hit, slapped, kicked or otherwise physically hurt by someone?: No      Within the past 12 months, have you been humiliated or emotionally abused in other ways by your partner or ex-partner?: No   Housing Stability: Low Risk  (4/24/2024)    Housing Stability      Do you have housing? : Yes      Are you worried about losing your housing?: No      SUBJECTIVE   Juwan is doing ok. He denies cough or shortness of breath. He denies fevers, nigh sweats or chills. His main concern lately is feeling foggy every day. Between the hours of 11 Am and 3 PM he does not feel right. He feels like he is in a fog. He has also  "noticed that sometimes after bending over or sitting up from a lying position he feels dizzy and unsteady. When he is upright he is not dizzy and denies any unstable walking or falls.    OBJECTIVE  /62   Pulse 64   Temp 98.2  F (36.8  C) (Oral)   Resp 16   Wt 72.6 kg (160 lb)   SpO2 99%   BMI 24.33 kg/m       From a personal perspective, he lives with a room mate.He is here alone today.    IMPRESSION   Juwan is a 73 year-old male status post laparoscopic assisted thoracoscopic right upper lobe wedge resection and mediastinal lymph node dissection for a pT1aN0 (stage IA1) non small cell lung cancer in 2017.     I reviewed his CT results with him and he is happy to hear that his lungs look good. I asked if he has seen a Neurologist or ENT for evaluation of his daily \"fog\"  episodes and dizzy spells. He is seeing his primary care provider next week for follow up on these issues and will talk to him about this in more detail.    PLAN  I spent 25 min on the date of the encounter in chart review, patient visit, review of tests, documentation and/or discussion with other providers about the issues documented above. I reviewed the plan as follows:  Follow up with me in 1 year with a chest CT prior. Consider evaluation with Neurology or ENT-Mike will discuss this with his PCP.  1. Necessary Tests & Appointments: chest CT  All questions were answered and the patient and present family were in agreement with the plan.  I appreciate the opportunity to participate in the care of your patient and will keep you updated.  Sincerely,    GLORIA Arechiga       Again, thank you for allowing me to participate in the care of your patient.        Sincerely,        GLORIA Arechiga  "

## 2024-10-09 ENCOUNTER — OFFICE VISIT (OUTPATIENT)
Dept: FAMILY MEDICINE | Facility: CLINIC | Age: 74
End: 2024-10-09
Payer: COMMERCIAL

## 2024-10-09 VITALS
BODY MASS INDEX: 25.31 KG/M2 | HEART RATE: 59 BPM | SYSTOLIC BLOOD PRESSURE: 102 MMHG | OXYGEN SATURATION: 98 % | WEIGHT: 167 LBS | HEIGHT: 68 IN | TEMPERATURE: 97.6 F | RESPIRATION RATE: 12 BRPM | DIASTOLIC BLOOD PRESSURE: 64 MMHG

## 2024-10-09 DIAGNOSIS — Z23 NEEDS FLU SHOT: ICD-10-CM

## 2024-10-09 DIAGNOSIS — K76.0 FATTY LIVER: ICD-10-CM

## 2024-10-09 DIAGNOSIS — Z29.11 NEED FOR VACCINATION AGAINST RESPIRATORY SYNCYTIAL VIRUS: ICD-10-CM

## 2024-10-09 DIAGNOSIS — R11.0 NAUSEA: Primary | ICD-10-CM

## 2024-10-09 PROBLEM — R19.5 DARK STOOLS: Status: RESOLVED | Noted: 2024-06-26 | Resolved: 2024-10-09

## 2024-10-09 PROCEDURE — 90662 IIV NO PRSV INCREASED AG IM: CPT | Performed by: FAMILY MEDICINE

## 2024-10-09 PROCEDURE — G0008 ADMIN INFLUENZA VIRUS VAC: HCPCS | Performed by: FAMILY MEDICINE

## 2024-10-09 PROCEDURE — 99214 OFFICE O/P EST MOD 30 MIN: CPT | Mod: 25 | Performed by: FAMILY MEDICINE

## 2024-10-09 RX ORDER — PANTOPRAZOLE SODIUM 20 MG/1
20 TABLET, DELAYED RELEASE ORAL DAILY
Qty: 90 TABLET | Refills: 1 | Status: SHIPPED | OUTPATIENT
Start: 2024-10-09

## 2024-10-09 ASSESSMENT — ANXIETY QUESTIONNAIRES
8. IF YOU CHECKED OFF ANY PROBLEMS, HOW DIFFICULT HAVE THESE MADE IT FOR YOU TO DO YOUR WORK, TAKE CARE OF THINGS AT HOME, OR GET ALONG WITH OTHER PEOPLE?: NOT DIFFICULT AT ALL
2. NOT BEING ABLE TO STOP OR CONTROL WORRYING: NOT AT ALL
7. FEELING AFRAID AS IF SOMETHING AWFUL MIGHT HAPPEN: NOT AT ALL
4. TROUBLE RELAXING: NOT AT ALL
3. WORRYING TOO MUCH ABOUT DIFFERENT THINGS: SEVERAL DAYS
1. FEELING NERVOUS, ANXIOUS, OR ON EDGE: SEVERAL DAYS
GAD7 TOTAL SCORE: 2
7. FEELING AFRAID AS IF SOMETHING AWFUL MIGHT HAPPEN: NOT AT ALL
6. BECOMING EASILY ANNOYED OR IRRITABLE: NOT AT ALL
GAD7 TOTAL SCORE: 2
GAD7 TOTAL SCORE: 2
IF YOU CHECKED OFF ANY PROBLEMS ON THIS QUESTIONNAIRE, HOW DIFFICULT HAVE THESE PROBLEMS MADE IT FOR YOU TO DO YOUR WORK, TAKE CARE OF THINGS AT HOME, OR GET ALONG WITH OTHER PEOPLE: NOT DIFFICULT AT ALL
5. BEING SO RESTLESS THAT IT IS HARD TO SIT STILL: NOT AT ALL

## 2024-10-09 ASSESSMENT — PAIN SCALES - GENERAL: PAINLEVEL: NO PAIN (0)

## 2024-10-09 ASSESSMENT — ENCOUNTER SYMPTOMS: NAUSEA: 1

## 2024-10-09 NOTE — PROGRESS NOTES
Assessment & Plan     Nausea  We will simplify his pantoprazole regimen by changing his dose to just 20 mg daily.  He will continue this with sucralfate 1 g up to 4 times daily as needed nausea.  He was given instructions on a low FODMAP diet.  Continue low alcohol consumption weekly as he has been doing.  Continue sertraline as he has been doing as I think there may be a component of anxiety that is contributing to his nausea.  - pantoprazole (PROTONIX) 20 MG EC tablet; Take 1 tablet (20 mg) by mouth daily.    Fatty liver  He continues to follow with gastroenterology.  Because of his fatty liver it is recommended that he be vaccinated against hepatitis A and hepatitis B.  It does not appear that Medicare covers these in the office.  I recommend that he follow-up with his pharmacist to determine eligibility there.  - hepatitis A-hepatitis B (TWINRIX) 720-20 ELU-MCG/ML injection; Inject 1 mL into the muscle once for 1 dose.    Need for vaccination against respiratory syncytial virus  Recommended one-time RSV vaccination at his pharmacy.    Needs flu shot  This was updated today.  - INFLUENZA HIGH DOSE, TRIVALENT, PF (FLUZONE)      35 minutes spent by me on the date of the encounter doing chart review, history and exam, documentation and further activities per the note            Subjective   Juwan is a 73 year old, presenting for the following health issues:  Nausea        10/9/2024    10:16 AM   Additional Questions   Roomed by DAVID VASQUEZ CMA   Accompanied by SELF     History of Present Illness       Reason for visit:  Follow up    He eats 0-1 servings of fruits and vegetables daily.He consumes 0 sweetened beverage(s) daily.He exercises with enough effort to increase his heart rate 20 to 29 minutes per day.  He exercises with enough effort to increase his heart rate 4 days per week. He is missing 1 dose(s) of medications per week.  He is not taking prescribed medications regularly due to other.       Discussion/Summary   As I explained to you needs to eat fatb free foods and follow up with me    Medicine for cholesterol only in your Pharmacy.        Verified Results  Fecal Occult Blood, Tube Test 17Oct2018 11:58AM CESIA MICHELLE     Test Name Result Flag Reference   OCCULT BLOOD, TUBE TEST NEGATIVE  NEGATIVE     CBC WITH AUTOMATED DIFFERENTIAL 17Oct2018 10:34AM CESIA MICHELLE     Test Name Result Flag Reference   WHITE BLOOD COUNT 5.0 K/mcL  4.2-11.0   RED CELL COUNT 4.43 mil/mcL  4.00-5.20   HEMOGLOBIN 13.6 g/dl  12.0-15.5   HEMATOCRIT 42.8 %  36.0-46.5   MEAN CORPUSCULAR VOLUME 96.6 fL  78.0-100.0   MEAN CORPUSCULAR HEMOGLOBIN 30.7 pg  26.0-34.0   MEAN CORPUSCULAR HGB CONC 31.8 g/dl L 32.0-36.5   RDW-CV 13.2 %  11.0-15.0   PLATELET COUNT 227 K/mcL  140-450   DEACON% 52 %     LYM% 37 %     MON% 8 %     EOS% 2 %     BASO% 1 %     DEACON ABS 2.6 K/mcL  1.8-7.7   LYM ABS 1.8 K/mcL  1.0-4.0   MON ABS 0.4 K/mcL  0.3-0.9   EOS ABS 0.1 K/mcL  0.1-0.5   BASO ABS 0.1 K/mcL  0.0-0.3   DIFF TYPE      AUTOMATED DIFFERENTIAL   NRBC 0 /100 WBC  0   PERCENT IMMATURE GRANULOCYTES 0 %     ABSOLUTE IMMATURE GRANULOCYTES 0.0 K/mcL  0-0.2     COMP METABOLIC PNL 17Oct2018 10:34AM CESIA MICHELLE     Test Name Result Flag Reference   SODIUM 141 mmol/L  135-145   POTASSIUM 4.0 mmol/L  3.4-5.1   CHLORIDE 106 mmol/L     CARBON DIOXIDE 25 mmol/L  21-32   ANION GAP 14 mmol/L  10-20   GLUCOSE 87 mg/dl  65-99   BUN 10 mg/dl  6-20   CREATININE 0.91 mg/dl  0.51-0.95   GFR EST.AFRICAN AMER 72     eGFR 60 - 89 mL/min/1.73m2 = Mild decrease in kidney function.   GFR EST.NONAFRI AMER 62     eGFR 60 - 89 mL/min/1.73m2 = Mild decrease in kidney function.   BUN/CREATININE RATIO 11  7-25   BILIRUBIN TOTAL 0.7 mg/dl  0.2-1.0   GOT/AST 20 Units/L  <38   ALKALINE PHOSPHATASE 76 Units/L     ALBUMIN 3.8 g/dl  3.6-5.1   TOTAL PROTEIN 7.2 g/dl  6.4-8.2   GLOBULIN (CALCULATED) 3.4 g/dl  2.0-4.0   A/G RATIO 1.1  1.0-2.4   CALCIUM 8.7 mg/dl  8.4-10.2    GPT/ALT 23 Units/L  <79   FASTING STATUS 15.5 hrs       LIPID PNL W/ RFX 17Oct2018 10:34AM CESIA MICHELLE   [Oct 16, 2018 12:25PM Gladys Galeanoie]  non-fasting will do another day     Test Name Result Flag Reference   FASTING STATUS 15.5 hrs     CHOLESTEROL 227 mg/dl H <200   Desirable            <200  Borderline High      200 to 239  High                 >=240   HDL CHOLESTEROL 44 mg/dl L >49   Low            <40  Borderline Low 40 to 49  Near Optimal   50 to 59  Optimal        >=60   TRIGLYCERIDES 186 mg/dl H <150   Normal                   <150  Borderline High          150 to 199  High                     200 to 499  Very High                >=500   LDL CHOLESTEROL (CALCULATED) 146 mg/dl H <130   OPTIMAL               <100  NEAR OPTIMAL          100-129  BORDERLINE HIGH       130-159  HIGH                  160-189  VERY HIGH             >=190   NON-HDL CHOLESTEROL 183 mg/dl     Therapeutic Target:  CHD and risk equivalents <130  Multiple risk factors    <160  0 to 1 risk factors      <190   CHOLESTEROL/HDL RATIO 5.2 H <4.5     TSH WITH REFLEX 17Oct2018 10:34AM MIGUEL ANGEL CESIA     Test Name Result Flag Reference   TSH 1.073 mcUnits/mL  0.350-5.000   Findings most consistent with euthyroid state, no additional testing suggested. TSH may be normal in patients with thyroid dysfunction and pituitary disease. Clinical correlation recommended.  (Reflex TSH algorithm is not recommended in hospitalized patients. A variety of drugs, as well as serious acute and chronic illnesses may alter thyroid function tests. Commonly implicated drugs include glucocorticoids, dopamine, carbamazepine, iodine, amiodarone, lithium and heparin.)     CPK - CREATINE KINASE 17Oct2018 10:34AM CESIA MICHELLE     Test Name Result Flag Reference   Creatine Kinase 260 Units/L H         "Followup:    Facility:  [facility]  Date of visit: 8/21/24  Reason for visit: Nausea   Current Status: tired and nausea  Nausea worse after eating   Pt denies vomiting, constipation and diarrhea  Wakes during night multiple times to urinate    Overall, Juwan reports he is feeling better than he did a few weeks ago.  He reports that he really has noticed no difference in his nausea or other GI issues since undergoing a cholecystectomy, however.  He admits that he has a little trouble remembering to take his pantoprazole twice daily before meals.  Alcohol intake is about 1-2 cocktails per week.  He completed a colonoscopy a few weeks ago that was unremarkable.  Continues to see gastroenterology for his fatty liver.            Objective    /64   Pulse 59   Temp 97.6  F (36.4  C) (Tympanic)   Resp 12   Ht 1.727 m (5' 8\")   Wt 75.8 kg (167 lb)   SpO2 98%   BMI 25.39 kg/m    Body mass index is 25.39 kg/m .  Physical Exam   GENERAL: alert and no distress  HENT: ear canals and TM's normal, nose and mouth without ulcers or lesions  RESP: lungs clear to auscultation - no rales, rhonchi or wheezes  CV: regular rate and rhythm, normal S1 S2, no S3 or S4, no murmur, click or rub, no peripheral edema             Signed Electronically by: Julio Guidry Jr, MD    "

## 2024-10-11 ENCOUNTER — PRE VISIT (OUTPATIENT)
Dept: UROLOGY | Facility: CLINIC | Age: 74
End: 2024-10-11
Payer: COMMERCIAL

## 2024-10-11 ENCOUNTER — TELEPHONE (OUTPATIENT)
Dept: FAMILY MEDICINE | Facility: CLINIC | Age: 74
End: 2024-10-11
Payer: COMMERCIAL

## 2024-10-11 DIAGNOSIS — F41.9 ANXIETY: ICD-10-CM

## 2024-10-11 NOTE — TELEPHONE ENCOUNTER
Reason for visit: 4 month follow up     Relevant information: symptom check    Records/imaging/labs/orders: all records available    At Rooming: Virtual visit      Ayan Lopez  10/11/2024  9:56 AM

## 2024-10-11 NOTE — TELEPHONE ENCOUNTER
"Patient states he is shaking, he states he is \"flush and cold\" and he believes it is anxiety and is wondering if Dr Guidry needs to give him something to calm down. Patient states he had just talked with a nurse about 20 or so minutes ago. Writer will call triage nurse as patient's PCP is out of office today.   "

## 2024-10-11 NOTE — TELEPHONE ENCOUNTER
"Called and spoke with patient.     Getting outside right now. Shaking started 3 days ago, worsening. Was prescribed alprazolam on 8/22, he doesn't have any left.     Denies chest pain, sob, numbness/tingling. Has not checked his temp and advised that he does when he gets back to his house.     Was just in to see him on 10/9, patient didn't bring up any shaking \"didn't think about it.\"       Routing to provider to review and advise.     LUIS ENRIQUEZ RN on 10/11/2024 at 3:27 PM   [facility]     "

## 2024-10-11 NOTE — TELEPHONE ENCOUNTER
General Call    Contacts       Contact Date/Time Type Contact Phone/Fax    10/11/2024 02:13 PM CDT Phone (Incoming) Juwan Latham (Self) [phone] (M)          Reason for Call:  Pt wanted to call and let primary know that he has high anxiety and feel like shaking inside.  Wanted to let Dr Guidry know and would like to know what Chao recommends.     Please call or send message message on my chart           Could we send this information to you in [facility] or would you prefer to receive a phone call?:   Patient would prefer a phone call   Okay to leave a detailed message?: Yes at Cell number on file:    Telephone Information:   Mobile [phone]

## 2024-10-14 RX ORDER — ALPRAZOLAM 0.25 MG/1
0.25 TABLET ORAL
Qty: 10 TABLET | Refills: 0 | Status: SHIPPED | OUTPATIENT
Start: 2024-10-14

## 2024-10-14 NOTE — TELEPHONE ENCOUNTER
Pt called wondering about the refill states when an episode comes on its really strong, but is doing okay until the next time it happens.

## 2024-10-14 NOTE — TELEPHONE ENCOUNTER
Refilled alprazolam.  Will need to consider other measures if anxiety persists - increasing dose of selective serotonin reuptake inhibitor vs adding buspirone.    Chart reviewed.  Rx sent to pt's preferred pharmacy.    [id number] [place] [place] [facility] [address] AT [facility] [address]    Julio Guidry MD

## 2024-10-18 ENCOUNTER — PRE VISIT (OUTPATIENT)
Dept: UROLOGY | Facility: CLINIC | Age: 74
End: 2024-10-18
Payer: COMMERCIAL

## 2024-10-18 NOTE — TELEPHONE ENCOUNTER
Reason for visit: 4 month follow up     Relevant information: was dilated, symptom check    Records/imaging/labs/orders: Inquire about open urethra post 3month optilume cystoscopy    At Rooming: Virtual visit      Ayan Lopez  10/18/2024  2:47 PM

## 2024-10-24 NOTE — PROGRESS NOTES
Virtual Visit Details     Type of service:  Video Visit   Video Start Time: 2:14 PM  Video End Time:2:24 PM    Originating Location (pt. Location): Home    Distant Location (provider location):  Off-site  Platform used for Video Visit: [facility]     Visit conducted via real-time audio/video technology by Jaquan Latham PA-C to the patient in their home.        Subjective    REASON FOR VISIT  Gross hematuria and urethral stricture follow-up      HISTORY OF PRESENT ILLNESS  Mr. Latham is a 73 year old male when speaking with today in follow-up for his recently completed gross hematuria workup in the setting of a urethral stricture and hemorrhagic cystitis in 2022.  He specifically completed his cystoscopy with Dr. Chan on 6/19/2024 who felt the stricture was healing well after Optilume and recommended 4-month follow-up for symptom check and check on hematuria.    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     Today:  Continues to endorse problematic prostatitis dress incontinence  Also admits that he continues to forget to take his oxybutynin so is unsure if it is working very well for him     Objective    PHYSICAL EXAMINATION  Deferred given virtual visit.    TESTING  Cystoscopy completed by Dr. Chan from 6/19/2024:    Urethroplasty Follow-up Visit with Surveillance Urethroscopy     PRE-PROCEDURE DIAGNOSIS: History of urethral stricture  POST-PROCEDURE DIAGNOSIS: No evidence of urethral stricture   PROCEDURE: Urethroscopy     HISTORY: Juwan Latham is a 73 year old man with XRT. RALP  In Jan 2022, we did cystoscopy/fulguration for hemorrhagic cystitis. We also did dilation of membranous stricture.  He had recurrence and thus we did optilume on 3/18/2024.  He notes he did well. He voids much improved now.     DESCRIPTION OF  "PROCEDURE:  After informed consent was obtained, the patient was brought to the procedure room where he was placed in the supine position with all pressure points well padded.  He was prepped and draped in a sterile fashion. A flexible cystoscope was introduced through a well-lubricated urethra.  The anterior urethra up to the point of reconstruction was  normal     At the sphincter to bladder neck there is slight tightness. The lumen is 15 Fr or so. The scope appeared like it would go through but he did not tolerate it very well so I stopped. I also did not want to traumatize the prior dilated area.        ASSESSMENT AND PLAN:  Excellent outcome after optilume dilation.  Followup in 4 months with symptom check.  I am hopeful that bleeding has stopped for long period of time now and that \"3 month post optilume cystoscopy\" today showed an essentially open urethra - which correlates with symptoms.     Mike Chan MD     Assessment & Plan    Gross hematuria  Urethral stricture status post Optilum in March 2024  Post-prostatectomy stress incontinence      It was my pleasure to speak with Juwan in follow-up for his gross hematuria and post-prostatectomy stress incontinence.  After reviewing his clinical history, I am sorry to hear that the stress incontinence continues to be so bothersome to him.  With this in mind we reviewed different interventions possible including a repeat referral to the physical therapy department as well as surgical interventions including urethral bulking agents, a male urethral sling, or an artificial urinary sphincter.    After reviewing the above options, Juwan would prefer to try to stay as conservative as possible and just see the physical therapy team again.  I will plan to touch base with him in 6 months at which time we can discuss potentially renewing his medication and discussed the efficacy of the physical therapy. Mr. Latham expressed understanding and agreement to the above " discussion and plan and all of his questions were answered to his satisfaction.      PLAN  Referral back to pelvic floor physical therapy for post-prostatectomy stress incontinence   Follow-up visit with Jaquan Latham PA-C in 6 months      SIGNED     Jaquan Latham PA-C        I spent a total of 20 minutes spent on the date of the encounter doing chart review, history and exam, documentation, and further activities as noted above.

## 2024-10-25 ENCOUNTER — VIRTUAL VISIT (OUTPATIENT)
Dept: UROLOGY | Facility: CLINIC | Age: 74
End: 2024-10-25
Payer: COMMERCIAL

## 2024-10-25 DIAGNOSIS — N99.114 POSTPROCEDURAL MALE URETHRAL STRICTURE: ICD-10-CM

## 2024-10-25 DIAGNOSIS — N39.3 STRESS INCONTINENCE AFTER SURGICAL PROCEDURE: Primary | ICD-10-CM

## 2024-10-25 DIAGNOSIS — N99.89 STRESS INCONTINENCE AFTER SURGICAL PROCEDURE: Primary | ICD-10-CM

## 2024-10-25 DIAGNOSIS — R31.0 GROSS HEMATURIA: ICD-10-CM

## 2024-10-25 PROCEDURE — 99213 OFFICE O/P EST LOW 20 MIN: CPT | Mod: 95 | Performed by: STUDENT IN AN ORGANIZED HEALTH CARE EDUCATION/TRAINING PROGRAM

## 2024-10-25 ASSESSMENT — PAIN SCALES - GENERAL: PAINLEVEL_OUTOF10: NO PAIN (0)

## 2024-10-25 NOTE — NURSING NOTE
Current patient location: Patient declined to provide     Is the patient currently in the state of [place]? YES    Visit mode:VIDEO    If the visit is dropped, the patient can be reconnected by: VIDEO VISIT: Text to cell phone:   Telephone Information:   Mobile [phone]       Will anyone else be joining the visit? NO  (If patient encounters technical issues they should call [phone])    Are changes needed to the allergy or medication list? No    Are refills needed on medications prescribed by this physician? NO    Rooming Documentation:  Questionnaire(s) not done per department protocol    Reason for visit: RECHECK Shelby Kocher VVF

## 2024-10-25 NOTE — LETTER
10/25/2024       RE: Juwan Latham  [address]  [facility] [address]     Dear Colleague,    Thank you for referring your patient, Juwan Latham, to the [facility] [place] [facility]. Please see a copy of my visit note below.    Virtual Visit Details     Type of service:  Video Visit   Video Start Time: 2:14 PM  Video End Time:2:24 PM    Originating Location (pt. Location): Home    Distant Location (provider location):  Off-site  Platform used for Video Visit: Well     Visit conducted via real-time audio/video technology by Jaquan Latham PA-C to the patient in their home.        Subjective   REASON FOR VISIT  Gross hematuria and urethral stricture follow-up      HISTORY OF PRESENT ILLNESS  Mr. Latham is a 73 year old male when speaking with today in follow-up for his recently completed gross hematuria workup in the setting of a urethral stricture and hemorrhagic cystitis in 2022.  He specifically completed his cystoscopy with Dr. Chan on 6/19/2024 who felt the stricture was healing well after Optilume and recommended 4-month follow-up for symptom check and check on hematuria.    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     Today:  Continues to endorse problematic prostatitis dress incontinence  Also admits that he continues to forget to take his oxybutynin so is unsure if it is working very well for him     Objective   PHYSICAL EXAMINATION  Deferred given virtual visit.    TESTING  Cystoscopy completed by Dr. Chan from 6/19/2024:    Urethroplasty Follow-up Visit with Surveillance Urethroscopy     PRE-PROCEDURE DIAGNOSIS: History of urethral stricture  POST-PROCEDURE DIAGNOSIS: No evidence of urethral stricture   PROCEDURE: Urethroscopy    "  HISTORY: Juwan Latham is a 73 year old man with XRT. RALP  In Jan 2022, we did cystoscopy/fulguration for hemorrhagic cystitis. We also did dilation of membranous stricture.  He had recurrence and thus we did optilume on 3/18/2024.  He notes he did well. He voids much improved now.     DESCRIPTION OF PROCEDURE:  After informed consent was obtained, the patient was brought to the procedure room where he was placed in the supine position with all pressure points well padded.  He was prepped and draped in a sterile fashion. A flexible cystoscope was introduced through a well-lubricated urethra.  The anterior urethra up to the point of reconstruction was  normal     At the sphincter to bladder neck there is slight tightness. The lumen is 15 Fr or so. The scope appeared like it would go through but he did not tolerate it very well so I stopped. I also did not want to traumatize the prior dilated area.        ASSESSMENT AND PLAN:  Excellent outcome after optilume dilation.  Followup in 4 months with symptom check.  I am hopeful that bleeding has stopped for long period of time now and that \"3 month post optilume cystoscopy\" today showed an essentially open urethra - which correlates with symptoms.     Mike Chan MD     Assessment & Plan   Gross hematuria  Urethral stricture status post Optilum in March 2024  Post-prostatectomy stress incontinence      It was my pleasure to speak with Juwan in follow-up for his gross hematuria and post-prostatectomy stress incontinence.  After reviewing his clinical history, I am sorry to hear that the stress incontinence continues to be so bothersome to him.  With this in mind we reviewed different interventions possible including a repeat referral to the physical therapy department as well as surgical interventions including urethral bulking agents, a male urethral sling, or an artificial urinary sphincter.    After reviewing the above options, Juwan would prefer to try to stay " as conservative as possible and just see the physical therapy team again.  I will plan to touch base with him in 6 months at which time we can discuss potentially renewing his medication and discussed the efficacy of the physical therapy. Mr. Latham expressed understanding and agreement to the above discussion and plan and all of his questions were answered to his satisfaction.      PLAN  Referral back to pelvic floor physical therapy for post-prostatectomy stress incontinence   Follow-up visit with Jaquan Latham PA-C in 6 months      SIGNED     Jaquan Latham PA-C        I spent a total of 20 minutes spent on the date of the encounter doing chart review, history and exam, documentation, and further activities as noted above.      Again, thank you for allowing me to participate in the care of your patient.      Sincerely,    Jaquan Latham PA-C

## 2024-11-08 ENCOUNTER — TELEPHONE (OUTPATIENT)
Dept: UROLOGY | Facility: CLINIC | Age: 74
End: 2024-11-08
Payer: COMMERCIAL

## 2024-11-08 NOTE — TELEPHONE ENCOUNTER
Patient confirmed scheduled appointment:  Date: April 24th   Time: 10:30am  Visit type: Return   Provider: Diego Latham   Location: [facility]  Additional notes: per check out -  Follow-up visit with Jaquan Latham PA-C in 6 months.

## 2024-11-11 ENCOUNTER — TELEPHONE (OUTPATIENT)
Dept: FAMILY MEDICINE | Facility: CLINIC | Age: 74
End: 2024-11-11
Payer: COMMERCIAL

## 2024-11-11 DIAGNOSIS — F41.9 ANXIETY: ICD-10-CM

## 2024-11-11 DIAGNOSIS — F33.0 MAJOR DEPRESSIVE DISORDER, RECURRENT EPISODE, MILD (H): ICD-10-CM

## 2024-11-11 RX ORDER — SERTRALINE HYDROCHLORIDE 100 MG/1
200 TABLET, FILM COATED ORAL DAILY
Qty: 180 TABLET | Refills: 1 | Status: SHIPPED | OUTPATIENT
Start: 2024-11-11

## 2024-11-11 NOTE — TELEPHONE ENCOUNTER
"Patient calls, he is continuing to have anxiety problems. He states that he just took a 1/2 tab of 0.25 mg of Xanax, but is still having the same issues as before, this cold shaky feeling. Will forward to PCP as previous encounters note this and patient may benefit  from \"increasing dose of selective serotonin reuptake inhibitor vs adding buspirone\" from Dr. Guidry's last note.  Will forward to pcp and team.  "

## 2024-11-12 NOTE — TELEPHONE ENCOUNTER
Let's increase Juwan's sertraline dose from 100 mg to 200 mg per day.  He can simply take two of the 100 mg tablets he has at home and then  his new Rx at [facility] when he runs out.  Please call patient.     Chart reviewed.  Rx sent to pt's preferred pharmacy.    [id number] - [place], [place] - [address]    Julio Guidry MD

## 2024-11-12 NOTE — TELEPHONE ENCOUNTER
Attempt # 1    Called # [phone]     Left a non detailed VM to call back at [phone] and ask for any available Triage Nurse.    LUIS ENRIQUEZ RN on 11/12/2024 at 2:03 PM   [facility]

## 2024-11-15 NOTE — TELEPHONE ENCOUNTER
Called [phone]    Informed patient of results and plan. Patient verbalized understanding.     LUIS ENRIQUEZ RN on 11/15/2024 at 12:28 PM   [facility]

## 2024-11-22 PROBLEM — N99.89 STRESS INCONTINENCE AFTER SURGICAL PROCEDURE: Status: ACTIVE | Noted: 2024-11-22

## 2024-11-22 PROBLEM — N39.3 STRESS INCONTINENCE AFTER SURGICAL PROCEDURE: Status: ACTIVE | Noted: 2024-11-22

## 2024-12-16 DIAGNOSIS — M25.511 ACUTE PAIN OF RIGHT SHOULDER: ICD-10-CM

## 2024-12-16 RX ORDER — TIZANIDINE 2 MG/1
TABLET ORAL
Qty: 40 TABLET | Refills: 0 | OUTPATIENT
Start: 2024-12-16

## 2024-12-16 NOTE — TELEPHONE ENCOUNTER
Called #   Telephone Information:   Mobile [phone]     Advised pt on the refill below   Pt is not taking it - will remove refill request     Suzy Schuster RN, BSN  [facility]

## 2024-12-16 NOTE — TELEPHONE ENCOUNTER
Clinic RN: Please investigate patient's chart or contact patient if the information cannot be found because the medication is listed as historical or discontinued. Confirm patient is taking this medication. Document findings and route refill encounter to provider for approval or denial.    Kaylee ZHU, RN, PHN

## 2024-12-23 ENCOUNTER — HOSPITAL ENCOUNTER (OUTPATIENT)
Dept: CT IMAGING | Facility: CLINIC | Age: 74
Discharge: HOME | End: 2024-12-23
Attending: PHYSICIAN ASSISTANT | Admitting: PHYSICIAN ASSISTANT
Payer: COMMERCIAL

## 2024-12-23 ENCOUNTER — NURSE TRIAGE (OUTPATIENT)
Dept: FAMILY MEDICINE | Facility: CLINIC | Age: 74
End: 2024-12-23

## 2024-12-23 ENCOUNTER — OFFICE VISIT (OUTPATIENT)
Dept: PEDIATRICS | Facility: CLINIC | Age: 74
End: 2024-12-23
Payer: COMMERCIAL

## 2024-12-23 VITALS
TEMPERATURE: 98.3 F | BODY MASS INDEX: 25.39 KG/M2 | HEART RATE: 66 BPM | OXYGEN SATURATION: 99 % | WEIGHT: 167 LBS | SYSTOLIC BLOOD PRESSURE: 138 MMHG | DIASTOLIC BLOOD PRESSURE: 70 MMHG

## 2024-12-23 DIAGNOSIS — R07.9 RIGHT-SIDED CHEST PAIN: ICD-10-CM

## 2024-12-23 DIAGNOSIS — R74.8 ELEVATED LIPASE: ICD-10-CM

## 2024-12-23 DIAGNOSIS — K29.00 ACUTE GASTRITIS, PRESENCE OF BLEEDING UNSPECIFIED, UNSPECIFIED GASTRITIS TYPE: Primary | ICD-10-CM

## 2024-12-23 LAB
ALBUMIN SERPL BCG-MCNC: 4.5 G/DL (ref 3.5–5.2)
ALBUMIN UR-MCNC: NEGATIVE MG/DL
ALP SERPL-CCNC: 50 U/L (ref 40–150)
ALT SERPL W P-5'-P-CCNC: 17 U/L (ref 0–70)
AMORPH CRY #/AREA URNS HPF: ABNORMAL /HPF
AMYLASE SERPL-CCNC: 81 U/L (ref 28–100)
ANION GAP SERPL CALCULATED.3IONS-SCNC: 10 MMOL/L (ref 7–15)
APPEARANCE UR: ABNORMAL
AST SERPL W P-5'-P-CCNC: 22 U/L (ref 0–45)
BASOPHILS # BLD AUTO: 0 10E3/UL (ref 0–0.2)
BASOPHILS NFR BLD AUTO: 0 %
BILIRUB SERPL-MCNC: 0.3 MG/DL
BILIRUB UR QL STRIP: NEGATIVE
BUN SERPL-MCNC: 12.8 MG/DL (ref 8–23)
CALCIUM SERPL-MCNC: 9.4 MG/DL (ref 8.8–10.4)
CHLORIDE SERPL-SCNC: 104 MMOL/L (ref 98–107)
COLOR UR AUTO: ABNORMAL
CREAT SERPL-MCNC: 0.91 MG/DL (ref 0.67–1.17)
D DIMER PPP FEU-MCNC: <0.27 UG/ML FEU (ref 0–0.5)
EGFRCR SERPLBLD CKD-EPI 2021: 89 ML/MIN/1.73M2
EOSINOPHIL # BLD AUTO: 0.1 10E3/UL (ref 0–0.7)
EOSINOPHIL NFR BLD AUTO: 1 %
ERYTHROCYTE [DISTWIDTH] IN BLOOD BY AUTOMATED COUNT: 13.3 % (ref 10–15)
GGT SERPL-CCNC: 16 U/L (ref 8–61)
GLUCOSE SERPL-MCNC: 90 MG/DL (ref 70–99)
GLUCOSE UR STRIP-MCNC: NEGATIVE MG/DL
HCO3 SERPL-SCNC: 25 MMOL/L (ref 22–29)
HCT VFR BLD AUTO: 41.2 % (ref 40–53)
HGB BLD-MCNC: 13.7 G/DL (ref 13.3–17.7)
HGB UR QL STRIP: NEGATIVE
IMM GRANULOCYTES # BLD: 0 10E3/UL
IMM GRANULOCYTES NFR BLD: 0 %
KETONES UR STRIP-MCNC: NEGATIVE MG/DL
LEUKOCYTE ESTERASE UR QL STRIP: NEGATIVE
LIPASE SERPL-CCNC: 125 U/L (ref 13–60)
LYMPHOCYTES # BLD AUTO: 1 10E3/UL (ref 0.8–5.3)
LYMPHOCYTES NFR BLD AUTO: 17 %
MCH RBC QN AUTO: 30.1 PG (ref 26.5–33)
MCHC RBC AUTO-ENTMCNC: 33.3 G/DL (ref 31.5–36.5)
MCV RBC AUTO: 91 FL (ref 78–100)
MONOCYTES # BLD AUTO: 0.6 10E3/UL (ref 0–1.3)
MONOCYTES NFR BLD AUTO: 11 %
MUCOUS THREADS #/AREA URNS LPF: PRESENT /LPF
NEUTROPHILS # BLD AUTO: 4.2 10E3/UL (ref 1.6–8.3)
NEUTROPHILS NFR BLD AUTO: 71 %
NITRATE UR QL: NEGATIVE
NRBC # BLD AUTO: 0 10E3/UL
NRBC BLD AUTO-RTO: 0 /100
PH UR STRIP: 7.5 [PH] (ref 5–7)
PLATELET # BLD AUTO: 188 10E3/UL (ref 150–450)
POTASSIUM SERPL-SCNC: 4.6 MMOL/L (ref 3.4–5.3)
PROT SERPL-MCNC: 6.6 G/DL (ref 6.4–8.3)
RBC # BLD AUTO: 4.55 10E6/UL (ref 4.4–5.9)
RBC URINE: 1 /HPF
SODIUM SERPL-SCNC: 139 MMOL/L (ref 135–145)
SP GR UR STRIP: 1.01 (ref 1–1.03)
TROPONIN T SERPL HS-MCNC: 9 NG/L
UROBILINOGEN UR STRIP-MCNC: NORMAL MG/DL
WBC # BLD AUTO: 6 10E3/UL (ref 4–11)
WBC URINE: 3 /HPF

## 2024-12-23 PROCEDURE — 85025 COMPLETE CBC W/AUTO DIFF WBC: CPT | Performed by: PHYSICIAN ASSISTANT

## 2024-12-23 PROCEDURE — 80053 COMPREHEN METABOLIC PANEL: CPT | Performed by: PHYSICIAN ASSISTANT

## 2024-12-23 PROCEDURE — 85379 FIBRIN DEGRADATION QUANT: CPT | Performed by: PHYSICIAN ASSISTANT

## 2024-12-23 PROCEDURE — 250N000009 HC RX 250: Performed by: PHYSICIAN ASSISTANT

## 2024-12-23 PROCEDURE — 36415 COLL VENOUS BLD VENIPUNCTURE: CPT | Performed by: PHYSICIAN ASSISTANT

## 2024-12-23 PROCEDURE — 82150 ASSAY OF AMYLASE: CPT | Performed by: PHYSICIAN ASSISTANT

## 2024-12-23 PROCEDURE — 84484 ASSAY OF TROPONIN QUANT: CPT | Performed by: PHYSICIAN ASSISTANT

## 2024-12-23 PROCEDURE — 81001 URINALYSIS AUTO W/SCOPE: CPT | Performed by: PHYSICIAN ASSISTANT

## 2024-12-23 PROCEDURE — 93000 ELECTROCARDIOGRAM COMPLETE: CPT | Performed by: PHYSICIAN ASSISTANT

## 2024-12-23 PROCEDURE — 83690 ASSAY OF LIPASE: CPT | Performed by: PHYSICIAN ASSISTANT

## 2024-12-23 PROCEDURE — 74177 CT ABD & PELVIS W/CONTRAST: CPT

## 2024-12-23 PROCEDURE — 99215 OFFICE O/P EST HI 40 MIN: CPT | Performed by: PHYSICIAN ASSISTANT

## 2024-12-23 PROCEDURE — 82977 ASSAY OF GGT: CPT | Performed by: PHYSICIAN ASSISTANT

## 2024-12-23 PROCEDURE — 250N000011 HC RX IP 250 OP 636: Performed by: PHYSICIAN ASSISTANT

## 2024-12-23 RX ORDER — IOPAMIDOL 755 MG/ML
500 INJECTION, SOLUTION INTRAVASCULAR ONCE
Status: COMPLETED | OUTPATIENT
Start: 2024-12-23 | End: 2024-12-23

## 2024-12-23 RX ORDER — PANTOPRAZOLE SODIUM 20 MG/1
20 TABLET, DELAYED RELEASE ORAL
Qty: 28 TABLET | Refills: 0 | Status: SHIPPED | OUTPATIENT
Start: 2024-12-23 | End: 2025-01-06

## 2024-12-23 RX ADMIN — IOPAMIDOL 84 ML: 755 INJECTION, SOLUTION INTRAVENOUS at 17:06

## 2024-12-23 RX ADMIN — SODIUM CHLORIDE 61 ML: 9 INJECTION, SOLUTION INTRAVENOUS at 17:06

## 2024-12-23 NOTE — RESULT ENCOUNTER NOTE
Results discussed directly with patient while patient was present. Any further details documented in the note.   Romana Hampton PA-C

## 2024-12-23 NOTE — PROGRESS NOTES
"Acute and Diagnostic Services Clinic Visit    Assessment & Plan     Acute gastritis, presence of bleeding unspecified, unspecified gastritis type  Right-sided chest pain  Elevated lipase  Stat EKG showed normal sinus rhythm without concerning features.  Stat labs revealed elevated lipase for which CT scan was completed to rule out pancreatitis.  Thankfully, no evidence of pancreatitis.  Significant symptom improvement with GI cocktail.  Patient reports compliance with pantoprazole once daily.  Will increase to pantoprazole 20 mg twice daily before meals x 14 days & augmenting with Carafate 1 g 4 times daily as needed for symptomatic relief.  Hydration efforts encouraged.  Advised of warning signs and when to seek urgent care.  All questions answered to the patient satisfaction.  Close follow-up in 1 to 2 weeks with PCP/partner to ensure normalization of lipase elevation and symptom resolution.  - EKG 12-lead, tracing only  - Lipase  - Troponin T, High Sensitivity  - CBC with platelets differential  - Comprehensive metabolic panel  - D dimer quantitative  - lidocaine (viscous) (XYLOCAINE) 2 % 15 mL, alum & mag hydroxide-simethicone (MAALOX) 15 mL GI Cocktail  - CT Abdomen Pelvis w Contrast  - Amylase  - UA with Microscopic reflex to Culture  - pantoprazole (PROTONIX) 20 MG EC tablet  Dispense: 28 tablet; Refill: 0      43 minutes were spent doing chart review, history and exam, documentation and further activities per the note.       BMI  Estimated body mass index is 25.39 kg/m  as calculated from the following:    Height as of 10/9/24: 1.727 m (5' 8\").    Weight as of this encounter: 75.8 kg (167 lb).   Weight management plan: Patient was referred to their PCP to discuss a diet and exercise plan.      Return in about 8 days (around 12/31/2024) for follow up and recheck labs.    Patient Instructions   Increase pantoprazole to twice daily before meals x 14 days.  Also OK to use carafate up to 4x/daily as needed for " discomfort        Romana Hampton MBA, MS, PA-C  [facility]      Subjective   Juwan is a 74 year old, presenting for the following health issues:  Chest Pain        10/9/2024    10:16 AM   Additional Questions   Roomed by DAVID VASQUEZ CMA   Accompanied by SELF     HPI     Chest Pain  Onset/Duration:  started 4 days, feels like heart burn, and achey. He feel some discomfort and sharp pain on his left side and upper back, fatigued   Description:   Location: right side  Character: achey and burning  Radiation: on left side, upper back  Duration: constant   Intensity: 7-8/10  Progression of Symptoms: same  Accompanying Signs & Symptoms:  Shortness of breath: No  Sweating: No  Nausea/vomiting: No  Lightheadedness: No  Palpitations: No  Fever/Chills: Yes - no measurable fever but + chills  Cough: No           Heartburn: YES  History:   Family history of heart disease: YES- mom and aunt  Tobacco use: No  Previous similar symptoms: no   Precipitating factors:   Worse with exertion: No  Worse with deep breaths: No           Related to eating: yes, some discomfort left chest area           Better with burping: No  Alleviating factors: no  Therapies tried and outcome: none    Took pantoprazole and Carafate this morning with minimal improvement.  Is having symptoms currently.    Review of Systems  Constitutional, HEENT, cardiovascular, pulmonary, GI, , musculoskeletal, neuro, skin, endocrine and psych systems are negative, except as otherwise noted.      Objective    /70 (BP Location: Right arm, Patient Position: Sitting)   Pulse 66   Temp 98.3  F (36.8  C) (Oral)   Wt 75.8 kg (167 lb)   SpO2 99%   BMI 25.39 kg/m    Body mass index is 25.39 kg/m .  Physical Exam   GENERAL: alert and no distress  EYES: Eyes grossly normal to inspection, PERRL and conjunctivae and sclerae normal  RESP: lungs clear to auscultation - no rales, rhonchi or wheezes  CV: regular rate and rhythm,  normal S1 S2, no S3 or S4, no murmur, click or rub, no peripheral edema  ABDOMEN: no CVA tenderness bilaterally, very mild right upper quadrant/epigastric pain,  no hepatosplenomegaly, no masses and bowel sounds normal  MS: no gross musculoskeletal defects noted, no edema  SKIN: no suspicious lesions or rashes  NEURO: Normal strength and tone, mentation intact and speech normal  PSYCH: mentation appears normal, affect normal/bright    Results for orders placed or performed during the hospital encounter of 12/23/24   CT Abdomen Pelvis w Contrast     Status: None    Narrative    EXAM: CT ABDOMEN PELVIS W CONTRAST  LOCATION: [facility]  DATE: 12/23/2024    INDICATION: right sided abdominal pain, elevated lipase, evaluate pancreas  COMPARISON: 6/14/2024  TECHNIQUE: CT scan of the abdomen and pelvis was performed following injection of IV contrast. Multiplanar reformats were obtained. Dose reduction techniques were used.  CONTRAST: 84mL Isovue 370    FINDINGS:   LOWER CHEST: Plaque-like calcifications associated with right hemidiaphragm unchanged.    HEPATOBILIARY: Interval cholecystectomy.    PANCREAS: Normal. No inflammatory changes.    SPLEEN: Unchanged with small presumed hyperdense hemangioma.    ADRENAL GLANDS: Normal.    KIDNEYS/BLADDER: Stable appearance of horseshoe kidney, no hydronephrosis. 4 mm stone within left side moiety. Renal cysts need no dedicated follow-up.    BOWEL: No obstruction or inflammatory change. Appendix normal.    LYMPH NODES: No lymphadenopathy.    VASCULATURE: Normal.    PELVIC ORGANS: Prostatectomy. There is a wisp of free fluid in the pelvis similar to previous exam though etiology is not apparent.    MUSCULOSKELETAL: A few tiny nonspecific sclerotic foci present within the pelvis and unchanged.      Impression    IMPRESSION:   1.  No etiology for symptoms evident. Pancreas appears normal.  2.  Interval cholecystectomy, no complication suggested.  3.  Stable  appearance of horseshoe kidney, no hydronephrosis. Small left-sided kidney stone unchanged.  4.  Bowel and appendix unremarkable.  5.  Wisp of ascites in the pelvis unchanged.   Results for orders placed or performed in visit on 12/23/24   Lipase     Status: Abnormal   Result Value Ref Range    Lipase 125 (H) 13 - 60 U/L   Troponin T, High Sensitivity     Status: Normal   Result Value Ref Range    Troponin T, High Sensitivity 9 <=22 ng/L   Comprehensive metabolic panel     Status: Normal   Result Value Ref Range    Sodium 139 135 - 145 mmol/L    Potassium 4.6 3.4 - 5.3 mmol/L    Carbon Dioxide (CO2) 25 22 - 29 mmol/L    Anion Gap 10 7 - 15 mmol/L    Urea Nitrogen 12.8 8.0 - 23.0 mg/dL    Creatinine 0.91 0.67 - 1.17 mg/dL    GFR Estimate 89 >60 mL/min/1.73m2    Calcium 9.4 8.8 - 10.4 mg/dL    Chloride 104 98 - 107 mmol/L    Glucose 90 70 - 99 mg/dL    Alkaline Phosphatase 50 40 - 150 U/L    AST 22 0 - 45 U/L    ALT 17 0 - 70 U/L    Protein Total 6.6 6.4 - 8.3 g/dL    Albumin 4.5 3.5 - 5.2 g/dL    Bilirubin Total 0.3 <=1.2 mg/dL   D dimer quantitative     Status: Normal   Result Value Ref Range    D-Dimer Quantitative <0.27 0.00 - 0.50 ug/mL FEU    Narrative    This D-dimer assay is intended for use in conjunction with a clinical pretest probability assessment model to exclude pulmonary embolism (PE) and deep venous thrombosis (DVT) in outpatients suspected of PE or DVT. The cut-off value is 0.50 ug/mL FEU.    For patients 50 years of age or older, the application of age-adjusted cut-off values for D-Dimer may increase the specificity without significant effect on sensitivity. The literature suggested calculation age adjusted cut-off in ug/L = age in years x 10 ug/L. The results in this laboratory are reported as ug/mL rather than ug/L. The calculation for age adjusted cut off in ug/mL= age in years x 0.01 ug/mL. For example, the cut off for a 76 year old male is 76 x 0.01 ug/mL = 0.76 ug/mL (760 ug/L).    FERNANDA Carrasquillo  et al. Age adjusted D-dimer cut-off levels to rule out pulmonary embolism: The ADJUST-PE Study. EVANS 2014;311:6673-8709.; HJ Kady et al. Diagnostic accuracy of conventional or age adjusted D-dimer cutoff values in older patients with suspected venous thromboembolism. Systemic review and meta-analysis. BMJ 2013:346:f2492.   CBC with platelets and differential     Status: None   Result Value Ref Range    WBC Count 6.0 4.0 - 11.0 10e3/uL    RBC Count 4.55 4.40 - 5.90 10e6/uL    Hemoglobin 13.7 13.3 - 17.7 g/dL    Hematocrit 41.2 40.0 - 53.0 %    MCV 91 78 - 100 fL    MCH 30.1 26.5 - 33.0 pg    MCHC 33.3 31.5 - 36.5 g/dL    RDW 13.3 10.0 - 15.0 %    Platelet Count 188 150 - 450 10e3/uL    % Neutrophils 71 %    % Lymphocytes 17 %    % Monocytes 11 %    % Eosinophils 1 %    % Basophils 0 %    % Immature Granulocytes 0 %    NRBCs per 100 WBC 0 <1 /100    Absolute Neutrophils 4.2 1.6 - 8.3 10e3/uL    Absolute Lymphocytes 1.0 0.8 - 5.3 10e3/uL    Absolute Monocytes 0.6 0.0 - 1.3 10e3/uL    Absolute Eosinophils 0.1 0.0 - 0.7 10e3/uL    Absolute Basophils 0.0 0.0 - 0.2 10e3/uL    Absolute Immature Granulocytes 0.0 <=0.4 10e3/uL    Absolute NRBCs 0.0 10e3/uL   Amylase     Status: Normal   Result Value Ref Range    Amylase 81 28 - 100 U/L   UA with Microscopic reflex to Culture     Status: Abnormal    Specimen: Urine, NOS   Result Value Ref Range    Color Urine Light Yellow Colorless, Straw, Light Yellow, Yellow    Appearance Urine Slightly Cloudy (A) Clear    Glucose Urine Negative Negative mg/dL    Bilirubin Urine Negative Negative    Ketones Urine Negative Negative mg/dL    Specific Gravity Urine 1.013 1.003 - 1.035    Blood Urine Negative Negative    pH Urine 7.5 (H) 5.0 - 7.0    Protein Albumin Urine Negative Negative mg/dL    Urobilinogen Urine Normal Normal, 2.0 mg/dL    Nitrite Urine Negative Negative    Leukocyte Esterase Urine Negative Negative    Mucus Urine Present (A) None Seen /LPF    Amorphous Crystals Urine  Few (A) None Seen /HPF    RBC Urine 1 <=2 /HPF    WBC Urine 3 <=5 /HPF    Narrative    Urine Culture not indicated   CBC with platelets differential     Status: None    Narrative    The following orders were created for panel order CBC with platelets differential.  Procedure                               Abnormality         Status                     ---------                               -----------         ------                     CBC with platelets and d...[[id number]]                      Final result                 Please view results for these tests on the individual orders.              EKG Interpretation:      Interpreted by Romana Hampton PA-C  Symptoms at time of EKG: Right sided chest discomfort   Rhythm: Normal sinus   Rate: Normal  Axis: Normal  Ectopy: None  Conduction: Normal  ST Segments/ T Waves: No ST-T wave changes and No acute ischemic changes  Q Waves: None  Comparison to prior: Unchanged from previous tracings    Clinical Impression: normal EKG   Signed Electronically by: Romana Hampton PA-C

## 2024-12-23 NOTE — PATIENT INSTRUCTIONS
Increase pantoprazole to twice daily before meals x 14 days.  Also OK to use carafate up to 4x/daily as needed for discomfort

## 2024-12-23 NOTE — TELEPHONE ENCOUNTER
Called and spoke with patient.     Advised of providers recommendation.  He will be at [facility] at 3    LUIS ENRIQUEZ RN on 12/23/2024 at 2:19 PM   [facility]

## 2024-12-23 NOTE — TELEPHONE ENCOUNTER
"Nurse Triage SBAR    Is this a 2nd Level Triage? YES, LICENSED PRACTITIONER REVIEW IS REQUIRED    Situation: chest pain    Background: dx with GERD, on pantoprazole, carafate    Assessment: right upper breast area. Lasts all day. Achy. Moderate-severe pain. Good in the mroning. Until I eat. Then it hangs with me all day. 5pm starts to go away. No pain after supper. No pain when laying down. Laying down helps.     Fatigue, go back to sleep, can't sleep.   Sob, cough - denies.   lightheaded - maybe a little lightheaded upon standing.     Heartburn in past but \"not like this\"     Takes pantoprazole every morning and tries to take the carafate at least once a day. Hard to remember to take 4x/day.     Protocol Recommended Disposition:   Call [facility]/Go to ED/UCC Now (Or To Office with PCP Approval)    Recommendation: Called [facility]. Awaiting recommendation.       Does the patient meet one of the following criteria for [facility] visit consideration? 16+ years old, with an [facility] PCP     TIP  Providers, please consider if this condition is appropriate for management at one of our Acute and Diagnostic Services sites.     If patient is a good candidate, please use dotphrase <dot>triageresponse and select Refer to [facility] to document.     Patient stated an understanding and agreed with plan.     LUIS ENRIQUEZ RN on 12/23/2024 at 1:10 PM   [facility]     Reason for Disposition   Chest pain lasting longer than 5 minutes and occurred in last 3 days (72 hours) (Exception: Feels exactly the same as previously diagnosed heartburn and has accompanying sour taste in mouth.)    Additional Information   Negative: SEVERE chest pain   Negative: Pain also in shoulder(s) or arm(s) or jaw   Negative: Difficulty breathing   Negative: Cocaine use within last 3 days   Negative: Major surgery in the past month   Negative: Hip or leg fracture (broken bone) in past month (or had cast on leg or ankle in past month)   Negative: Illness requiring " prolonged bedrest in past month (e.g., immobilization, long hospital stay)   Negative: Long-distance travel in past month (e.g., car, bus, train, plane; with trip lasting 6 or more hours)   Negative: History of prior 'blood clot' in leg or lungs (i.e., deep vein thrombosis, pulmonary embolism)   Negative: History of inherited increased risk of blood clots (e.g., Factor 5 Leiden, Anti-thrombin 3, Protein C or Protein S deficiency, Prothrombin mutation)   Negative: Cancer treatment in the past two months (or has cancer now)   Negative: Heart beating irregularly or very rapidly   Negative: Dizziness or lightheadedness   Negative: Coughing up blood   Negative: Patient sounds very sick or weak to the triager   Negative: Chest pain or 'angina' comes and goes and is happening more often (increasing in frequency) or getting worse (increasing in severity) (Exception: Chest pains that last only a few seconds.)    Protocols used: Chest Pain-A-[place]

## 2024-12-24 DIAGNOSIS — L21.9 SEBORRHEIC DERMATITIS: ICD-10-CM

## 2024-12-24 DIAGNOSIS — L30.9 DERMATITIS: ICD-10-CM

## 2024-12-27 NOTE — TELEPHONE ENCOUNTER
Routing refill request to provider for review/approval because:  Drug not on the [facility] refill protocol     Kimberly ELMORE RN BSN  [facility]  [phone]

## 2024-12-28 RX ORDER — HYDROCORTISONE 25 MG/G
OINTMENT TOPICAL 2 TIMES DAILY
Qty: 60 G | Refills: 3 | Status: SHIPPED | OUTPATIENT
Start: 2024-12-28

## 2024-12-31 ENCOUNTER — OFFICE VISIT (OUTPATIENT)
Dept: FAMILY MEDICINE | Facility: CLINIC | Age: 74
End: 2024-12-31
Payer: COMMERCIAL

## 2024-12-31 VITALS
WEIGHT: 164.5 LBS | OXYGEN SATURATION: 95 % | RESPIRATION RATE: 18 BRPM | BODY MASS INDEX: 24.93 KG/M2 | HEIGHT: 68 IN | DIASTOLIC BLOOD PRESSURE: 68 MMHG | TEMPERATURE: 98 F | SYSTOLIC BLOOD PRESSURE: 130 MMHG | HEART RATE: 69 BPM

## 2024-12-31 DIAGNOSIS — R19.5 DARK STOOLS: ICD-10-CM

## 2024-12-31 DIAGNOSIS — R10.13 EPIGASTRIC PAIN: ICD-10-CM

## 2024-12-31 DIAGNOSIS — K29.01 ACUTE GASTRITIS WITH HEMORRHAGE, UNSPECIFIED GASTRITIS TYPE: Primary | ICD-10-CM

## 2024-12-31 DIAGNOSIS — R74.8 ELEVATED LIPASE: ICD-10-CM

## 2024-12-31 DIAGNOSIS — K29.01 ACUTE GASTRITIS WITH HEMORRHAGE, UNSPECIFIED GASTRITIS TYPE: ICD-10-CM

## 2024-12-31 LAB
ALBUMIN SERPL BCG-MCNC: 4.6 G/DL (ref 3.5–5.2)
ALP SERPL-CCNC: 54 U/L (ref 40–150)
ALT SERPL W P-5'-P-CCNC: 17 U/L (ref 0–70)
ANION GAP SERPL CALCULATED.3IONS-SCNC: 11 MMOL/L (ref 7–15)
AST SERPL W P-5'-P-CCNC: 23 U/L (ref 0–45)
BASOPHILS # BLD AUTO: 0 10E3/UL (ref 0–0.2)
BASOPHILS NFR BLD AUTO: 0 %
BILIRUB SERPL-MCNC: 0.4 MG/DL
BUN SERPL-MCNC: 15.8 MG/DL (ref 8–23)
CALCIUM SERPL-MCNC: 9.7 MG/DL (ref 8.8–10.4)
CHLORIDE SERPL-SCNC: 103 MMOL/L (ref 98–107)
CREAT SERPL-MCNC: 0.88 MG/DL (ref 0.67–1.17)
EGFRCR SERPLBLD CKD-EPI 2021: >90 ML/MIN/1.73M2
EOSINOPHIL # BLD AUTO: 0.1 10E3/UL (ref 0–0.7)
EOSINOPHIL NFR BLD AUTO: 1 %
ERYTHROCYTE [DISTWIDTH] IN BLOOD BY AUTOMATED COUNT: 13.3 % (ref 10–15)
FERRITIN SERPL-MCNC: 151 NG/ML (ref 31–409)
GLUCOSE SERPL-MCNC: 87 MG/DL (ref 70–99)
HCO3 SERPL-SCNC: 26 MMOL/L (ref 22–29)
HCT VFR BLD AUTO: 41.8 % (ref 40–53)
HGB BLD-MCNC: 14.2 G/DL (ref 13.3–17.7)
IMM GRANULOCYTES # BLD: 0 10E3/UL
IMM GRANULOCYTES NFR BLD: 0 %
LIPASE SERPL-CCNC: 63 U/L (ref 13–60)
LYMPHOCYTES # BLD AUTO: 1.1 10E3/UL (ref 0.8–5.3)
LYMPHOCYTES NFR BLD AUTO: 15 %
MCH RBC QN AUTO: 30.7 PG (ref 26.5–33)
MCHC RBC AUTO-ENTMCNC: 34 G/DL (ref 31.5–36.5)
MCV RBC AUTO: 90 FL (ref 78–100)
MONOCYTES # BLD AUTO: 0.7 10E3/UL (ref 0–1.3)
MONOCYTES NFR BLD AUTO: 10 %
NEUTROPHILS # BLD AUTO: 5.2 10E3/UL (ref 1.6–8.3)
NEUTROPHILS NFR BLD AUTO: 73 %
PLATELET # BLD AUTO: 178 10E3/UL (ref 150–450)
POTASSIUM SERPL-SCNC: 4.5 MMOL/L (ref 3.4–5.3)
PROT SERPL-MCNC: 7 G/DL (ref 6.4–8.3)
RBC # BLD AUTO: 4.63 10E6/UL (ref 4.4–5.9)
SODIUM SERPL-SCNC: 140 MMOL/L (ref 135–145)
WBC # BLD AUTO: 7.1 10E3/UL (ref 4–11)

## 2024-12-31 PROCEDURE — 80053 COMPREHEN METABOLIC PANEL: CPT | Performed by: PHYSICIAN ASSISTANT

## 2024-12-31 PROCEDURE — G2211 COMPLEX E/M VISIT ADD ON: HCPCS | Performed by: PHYSICIAN ASSISTANT

## 2024-12-31 PROCEDURE — 36415 COLL VENOUS BLD VENIPUNCTURE: CPT | Performed by: PHYSICIAN ASSISTANT

## 2024-12-31 PROCEDURE — 82728 ASSAY OF FERRITIN: CPT | Performed by: PHYSICIAN ASSISTANT

## 2024-12-31 PROCEDURE — 99215 OFFICE O/P EST HI 40 MIN: CPT | Performed by: PHYSICIAN ASSISTANT

## 2024-12-31 PROCEDURE — 85025 COMPLETE CBC W/AUTO DIFF WBC: CPT | Performed by: PHYSICIAN ASSISTANT

## 2024-12-31 PROCEDURE — 83690 ASSAY OF LIPASE: CPT | Performed by: PHYSICIAN ASSISTANT

## 2024-12-31 RX ORDER — FAMOTIDINE 20 MG/1
20 TABLET, FILM COATED ORAL 2 TIMES DAILY
Qty: 180 TABLET | OUTPATIENT
Start: 2024-12-31

## 2024-12-31 RX ORDER — FAMOTIDINE 20 MG/1
20 TABLET, FILM COATED ORAL 2 TIMES DAILY
Qty: 60 TABLET | Refills: 0 | Status: SHIPPED | OUTPATIENT
Start: 2024-12-31

## 2024-12-31 NOTE — PATIENT INSTRUCTIONS
Appointment with [facility] - Dr. Hernández  Thursday 1/2/2025  Arrival 10:40    [facility]  [facility]  [address]   [address]  [place] [address]    If any issues - call [phone]

## 2024-12-31 NOTE — PROGRESS NOTES
"  Assessment & Plan     Acute gastritis with hemorrhage, unspecified gastritis type  Epigastric pain  Elevated lipase  Dark stools  Suboptimally controlled.  Significant concern for ulcer versus H. pylori.  Likely needs repeat endoscopy.  Recommend changing pantoprazole from once daily at 40 mg to twice daily at 20 mg with Carafate scheduled 4 times daily.  Patient voiced understanding and agreement.  If incomplete resolution trial famotidine twice daily.  I was able to get Juwan scheduled with [facility] on Thursday, January 2, 2025.  Patient has appointment information and will be at that appointment.  Advised of warning signs and when to seek urgent care.  Carbon diet encouraged.  - Ferritin  - famotidine (PEPCID) 20 MG tablet  Dispense: 60 tablet; Refill: 0  - Adult GI  Referral - Consult Only  - Comprehensive metabolic panel (BMP + Alb, Alk Phos, ALT, AST, Total. Bili, TP)  - Lipase        Review of prior external note(s) from - Outside records from [facility]  45 minutes spent by me on the date of the encounter doing chart review, history and exam, documentation and further activities per the note          Return in about 2 days (around 1/2/2025) for GI appt.      Romana Hampton MBA, MS, PA-C  [facility]   Juwan is a 74 year old, presenting for the following health issues:  abdominal issue        12/31/2024     2:53 PM   Additional Questions   Roomed by ciro ashley   Accompanied by self     History of Present Illness       Reason for visit:  Follow up   He is taking medications regularly.       - **Current Visit (12/31/2024):** Follow-up for gastritis and elevated lipase.     - **Medications:** Taking pantoprazole 40 mg once daily instead of the prescribed 20 mg twice daily, and Carafate approximately twice daily.    - **Symptoms:** No significant improvement; experiences \"gurgling in the chest\" that improves with walking. Wakes up around midnight due to symptoms. " Recent black-tarry stools with one normal bowel movement in between. Persistent nausea.      - **Previous Visit (12/23/2024):**    - **Chest Pain:** Onset 4 days prior, described as heartburn and achy, with discomfort and sharp pain on the left side, upper back, and fatigue.    - **Symptoms:** Constant, intensity 7-8/10, accompanied by chills but no fever, shortness of breath, sweating, nausea, vomiting, lightheadedness, or palpitations.    - **Family History:** Heart disease in mother and aunt.    - **Lifestyle Factors:** No tobacco use, no previous similar symptoms.    - **Precipitating Factors:** Related to eating, some discomfort in the left chest area; not worsened by exertion or deep breaths.    - **Objective Findings:**    - **Vital Signs:** /70, Pulse 66, Temp 98.3 F, SpO2 99%, BMI 25.39 kg/m .    - **Physical Exam:** Alert, no distress; normal respiratory, cardiovascular, abdominal, musculoskeletal, skin, neurological, and psychological assessments.    - **Diagnostic Tests (12/23/2024):**    - **CT Abdomen/Pelvis:** Normal pancreas, stable horseshoe kidney with a small kidney stone, unchanged wisp of ascites, and no bowel obstruction.    - **Laboratory Results:**       - Elevated lipase (125 U/L), normal troponin, normal comprehensive metabolic panel, normal D-dimer.      - Urinalysis showed slightly cloudy appearance, with mucus and amorphous crystals present, but no significant abnormalities.    - **EKG:** Normal sinus rhythm, no acute ischemic changes.    - **Assessment and Plan:**    - **Acute Gastritis:** Adjust treatment to pantoprazole 20 mg twice daily and Carafate 1 g four times daily for symptomatic relief. Encourage hydration and monitor for warning signs.    - **Follow-Up:** Close follow-up in 1-2 weeks with PCP to ensure normalization of lipase levels and symptom resolution.    - **Previous Procedures:**    - **EGD (5/24/2024):** Normal esophagus, erythema in the stomach, non-erosive  reactive gastropathy, negative for H. pylori.    - **Colonoscopy (9/23/2024):** Normal findings, recommended repeat in 5 years.    GERD/Heartburn  Onset/Duration: today started up again  Description: he feels like there is a bubble in his stomach. when he walks he it feels better   Intensity: moderate  Progression of Symptoms: same  Accompanying Signs & Symptoms:  Does it feel like food gets stuck or trouble swallowing: No  Nausea: YES  Vomiting (bloody?): No  Abdominal Pain: No  Black-Tarry stools: YES this morning   Bloody stools: No  History:  Previous similar episodes: No  Previous ulcers: No  Precipitating factors:   Caffeine use: YES  about a cup coffee a day but not everyday  Alcohol use: No  NSAID/Aspirin use: No  Tobacco use: No  Worse with NA.  Alleviating factors: None  Therapies tried and outcome:             Lifestyle changes: None            Medications: none      [facility] visit 12/23/2024:     Chest Pain  Onset/Duration:  started 4 days, feels like heart burn, and achey. He feel some discomfort and sharp pain on his left side and upper back, fatigued   Description:   Location: right side  Character: achey and burning  Radiation: on left side, upper back  Duration: constant   Intensity: 7-8/10  Progression of Symptoms: same  Accompanying Signs & Symptoms:  Shortness of breath: No  Sweating: No  Nausea/vomiting: No  Lightheadedness: No  Palpitations: No  Fever/Chills: Yes - no measurable fever but + chills  Cough: No           Heartburn: YES  History:   Family history of heart disease: YES- mom and aunt  Tobacco use: No  Previous similar symptoms: no   Precipitating factors:   Worse with exertion: No  Worse with deep breaths: No           Related to eating: yes, some discomfort left chest area           Better with burping: No  Alleviating factors: no  Therapies tried and outcome: none     Took pantoprazole (wasj taking 2 tabs qam) and Carafate this morning with minimal improvement.  Is having symptoms  "currently.    Dark stools yesterday and today    Acute gastritis, presence of bleeding unspecified, unspecified gastritis type  Right-sided chest pain  Elevated lipase  Stat EKG showed normal sinus rhythm without concerning features.  Stat labs revealed elevated lipase for which CT scan was completed to rule out pancreatitis.  Thankfully, no evidence of pancreatitis.  Significant symptom improvement with GI cocktail.  Patient reports compliance with pantoprazole once daily.  Will increase to pantoprazole 20 mg twice daily before meals x 14 days & augmenting with Carafate 1 g 4 times daily as needed for symptomatic relief.  Hydration efforts encouraged.  Advised of warning signs and when to seek urgent care.  All questions answered to the patient satisfaction.  Close follow-up in 1 to 2 weeks with PCP/partner to ensure normalization of lipase elevation and symptom resolution.    Review of Systems  Constitutional, HEENT, cardiovascular, pulmonary, GI, , musculoskeletal, neuro, skin, endocrine and psych systems are negative, except as otherwise noted.      Objective    /68   Pulse 69   Temp 98  F (36.7  C) (Tympanic)   Resp 18   Ht 1.727 m (5' 7.99\")   Wt 74.6 kg (164 lb 8 oz)   SpO2 95%   BMI 25.02 kg/m    Body mass index is 25.02 kg/m .  Physical Exam   GENERAL: alert and no distress  EYES: Eyes grossly normal to inspection, PERRL and conjunctivae and sclerae normal  RESP: lungs clear to auscultation - no rales, rhonchi or wheezes  CV: regular rate and rhythm, normal S1 S2, no S3 or S4, no murmur, click or rub, no peripheral edema  ABDOMEN: soft, hyperactive bowel sounds, epigastric tenderness without guarding, no hepatosplenomegaly, no masses and bowel sounds normal  MS: no gross musculoskeletal defects noted, no edema  SKIN: no suspicious lesions or rashes  NEURO: Normal strength and tone, mentation intact and speech normal  PSYCH: mentation appears normal, affect normal/bright    Results for orders " placed or performed in visit on 12/31/24   CBC with platelets and differential     Status: None   Result Value Ref Range    WBC Count 7.1 4.0 - 11.0 10e3/uL    RBC Count 4.63 4.40 - 5.90 10e6/uL    Hemoglobin 14.2 13.3 - 17.7 g/dL    Hematocrit 41.8 40.0 - 53.0 %    MCV 90 78 - 100 fL    MCH 30.7 26.5 - 33.0 pg    MCHC 34.0 31.5 - 36.5 g/dL    RDW 13.3 10.0 - 15.0 %    Platelet Count 178 150 - 450 10e3/uL    % Neutrophils 73 %    % Lymphocytes 15 %    % Monocytes 10 %    % Eosinophils 1 %    % Basophils 0 %    % Immature Granulocytes 0 %    Absolute Neutrophils 5.2 1.6 - 8.3 10e3/uL    Absolute Lymphocytes 1.1 0.8 - 5.3 10e3/uL    Absolute Monocytes 0.7 0.0 - 1.3 10e3/uL    Absolute Eosinophils 0.1 0.0 - 0.7 10e3/uL    Absolute Basophils 0.0 0.0 - 0.2 10e3/uL    Absolute Immature Granulocytes 0.0 <=0.4 10e3/uL   CBC with platelets and differential     Status: None    Narrative    The following orders were created for panel order CBC with platelets and differential.  Procedure                               Abnormality         Status                     ---------                               -----------         ------                     CBC with platelets and d...[[id number]]                      Final result                 Please view results for these tests on the individual orders.           Signed Electronically by: Romana Hampton PA-C

## 2025-01-02 ENCOUNTER — TRANSFERRED RECORDS (OUTPATIENT)
Dept: HEALTH INFORMATION MANAGEMENT | Facility: CLINIC | Age: 75
End: 2025-01-02
Payer: COMMERCIAL

## 2025-01-06 ENCOUNTER — TELEPHONE (OUTPATIENT)
Dept: FAMILY MEDICINE | Facility: CLINIC | Age: 75
End: 2025-01-06
Payer: COMMERCIAL

## 2025-01-06 RX ORDER — PANTOPRAZOLE SODIUM 40 MG/1
40 TABLET, DELAYED RELEASE ORAL
COMMUNITY
Start: 2025-01-02

## 2025-01-06 NOTE — TELEPHONE ENCOUNTER
Called #   Telephone Information:   Mobile [phone]       Pt stated that he is on pantoprazole (PROTONIX) 40 mg BID   Pt stated that this is working very well for pts symptoms     Is this okay ? Please advise if any other suggestions     Thank you     Suzy Schuster RN, BSN  [facility]

## 2025-01-06 NOTE — TELEPHONE ENCOUNTER
Yes, this is fine.  I have updated his med list.          Romana Hampton MBA, MS, PA-C  [facility]

## 2025-01-06 NOTE — TELEPHONE ENCOUNTER
Pt calling back to speak with a nurse regarding updates - see 12/31/24 OV provider wanted triage to call get updates on symptoms.    Patient prefers a call back. Please call patient when able.

## 2025-01-07 NOTE — TELEPHONE ENCOUNTER
Attempt #1     Left voicemail for patient to call clinic back.     Summer RN 12:03 PM January 7, 2025   [facility]

## 2025-01-13 NOTE — TELEPHONE ENCOUNTER
Pt given water and crackers for PO challenge. Pt able to tolerate. Dr. Gramajo aware.    Reason for call:  Patient reporting a symptom    Symptom or request: Juwan is calling saying he has been having some skin irritation on the top of his head. He wants to talk to Dr. Mccormick or his nurse about this.    Phone Number patient can be reached at:  Cell number on file:    Telephone Information:   Mobile [phone]     Best Time:  Anytime    Can we leave a detailed message on this number:  YES    Call taken on 5/11/2020 at 9:09 AM by Francheska Chen

## 2025-01-20 DIAGNOSIS — K75.81 NONALCOHOLIC STEATOHEPATITIS: Primary | ICD-10-CM

## 2025-01-21 DIAGNOSIS — R53.83 FATIGUE: ICD-10-CM

## 2025-01-21 DIAGNOSIS — R11.0 NAUSEA: ICD-10-CM

## 2025-01-21 DIAGNOSIS — K75.81 NONALCOHOLIC STEATOHEPATITIS: Primary | ICD-10-CM

## 2025-01-22 ENCOUNTER — NURSE TRIAGE (OUTPATIENT)
Dept: FAMILY MEDICINE | Facility: CLINIC | Age: 75
End: 2025-01-22
Payer: COMMERCIAL

## 2025-01-22 NOTE — TELEPHONE ENCOUNTER
"Routing to Dr. Guidry-Scheduled for an in person apt tomorrow 1/23 with Frances Tapia.    Explained to patient will send information to pcp to review and see if Dr. Guidry has different or more recommendations     Situation   Patient called and asked to clarify if he should continue the Protonix or Pepcid.   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    Went home and was able to tolerated house work after that.     Denied any SOB at rest, walking or with stairs today/since yesterday at the gym.     Denied blood, black or tarry stools  denied chest pain, irregular or fast heart rate, pain in abdomen, coughing, fever or heart burn    Ongoing/unchanged mild nausea     The patient explains he \"feels crappy and tired\" after eating  for about a month since his gallbladder was removed.     For the last week on and off when he gets up quickly he feels a little lightheaded briefly then goes away.  He sat down when he felt lightheaded last week otherwise he states the feeling does away on it's oen after a couple seconds.   Denied feeling faint.     Had patient take his BP at home- 142/81 pulse 86    Recommendations   Explained writer feels he should be seen for his symptoms to be assessed.   Patient states he would like to be seen within one week.   Scheduled tomorrow. Explained location, arrival and " apt time   Red flag symptoms  reviewed- ed/911   Change positions slowly  Explained can take pepcid as directed until apt tomorrow and then provider can talk more about it.     Future Appointments 1/22/2025 - 7/21/2025        Date Visit Type Length Department Provider     1/23/2025 11:30 AM OFFICE VISIT 30 min  FAMILY PRACTICE  Arrive at:  FAMILY PRACTICE Frances Tapia PA-C    Location Instructions:     [facility] is located at [address] [place] [address], along [address] Free parking is available; access the lot by turning north from [address] onto [address], then west onto [facility] [address] [facility].              4/8/2025  9:20 AM US ABDOMEN LIMITED 30 min RH ULTRASOUND CC [id number]    Location Instructions:     [facility] [address] [place] [address]  Parking Imaging customers can park either in the lot to the right side of the driveway (opposite the parking ramp) as you approach the [facility]ty [facility], or in the parking ramp.  Entrance and check-in location Enter at the front entrance (pictured to the right). As you enter the lobby, the Imaging Center is on the first floor, just past the elevators. Please check-in for your appointment at the desk in the Imaging Center waiting area, [address]  This appointment is in a hospital-based location.&nbsp; Before your visit, you may want to check with your insurance company for coverage and referral options, including cost differences between services provided in different clinic settings.&nbsp; For more information visit this link on the [facility] Website:&nbsp; [email or web address]              4/24/2025 10:30 AM RETURN PATIENT 30 min [facility] Jaquan Latham PA-C    Location Instructions:     The [facility] ([facility]) is in a dense urban area with multiple transportation and parking options. You may wish to review options for  service and  "self-parking in more detail on the [facility] s website at [email or web address].&nbsp;                         Reason for Disposition   Patient wants to be seen    Additional Information   Negative: SEVERE difficulty breathing (e.g., struggling for each breath, speaks in single words, pulse > 120)   Negative: Breathing stopped and hasn't returned   Negative: Choking on something   Negative: Bluish (or gray) lips or face   Negative: Difficult to awaken or acting confused (e.g., disoriented, slurred speech)   Negative: Passed out (e.g., fainted, lost consciousness, blacked out and was not responding)   Negative: Wheezing started suddenly after medicine, an allergic food, or bee sting   Negative: Stridor (harsh sound while breathing in)   Negative: Slow, shallow and weak breathing   Negative: Sounds like a life-threatening emergency to the triager   Negative: Chest pain   Negative: Wheezing (high pitched whistling sound) and previous asthma attacks or use of asthma medicines   Negative: Breathing difficulty and within 14 days of COVID-19 EXPOSURE (close contact) with someone diagnosed with COVID-19 (e.g., COVID test positive)   Negative: Breathing difficulty and COVID-19 is widespread in the community   Negative: Breathing diffculty and only present when coughing   Negative: Breathing difficulty and only from stuffy nose   Negative: Breathing diffculty and only from stuffy nose or runny nose from common cold   Negative: MODERATE difficulty breathing (e.g., speaks in phrases, SOB even at rest, pulse 100-120) of new-onset or worse than normal   Negative: Oxygen level (e.g., pulse oximetry) 90% or lower   Negative: Wheezing can be heard across the room   Negative: Drooling or spitting out saliva (because can't swallow)   Negative: Any history of prior \"blood clot\" in leg or lungs   Negative: Illness requiring prolonged bedrest in past month (e.g., immobilization, long hospital stay)   Negative: Hip or leg fracture (broken " bone) in past month (or had cast on leg or ankle in past month)   Negative: Major surgery in the past month   Negative: Long-distance travel in past month (e.g., car, bus, train, plane; with trip lasting 6 or more hours)   Negative: Cancer treatment in past six months (or has cancer now)   Negative: Extra heartbeats, irregular heart beating, or heart is beating very fast (i.e., 'palpitations')   Negative: Fever > 103 F (39.4 C)   Negative: Fever > 101 F (38.3 C) and over 60 years of age   Negative: Fever > 100 F (37.8 C) and bedridden (e.g., nursing home patient, stroke, chronic illness, recovering from surgery)   Negative: Fever > 100 F (37.8 C) and diabetes mellitus or weak immune system (e.g., HIV positive, cancer chemo, splenectomy, organ transplant, chronic steroids)   Negative: Periods where breathing stops and then resumes normally and bedridden (e.g., nursing home patient, CVA)   Negative: Pregnant or postpartum (from 0 to 6 weeks after delivery)   Negative: Patient sounds very sick or weak to the triager   Negative: Longstanding difficulty breathing (e.g., CHF, COPD, emphysema) and worse than normal   Negative: Longstanding difficulty breathing and not responding to usual therapy   Negative: Continuous (nonstop) coughing   Commented on: MILD difficulty breathing (e.g., minimal/no SOB at rest, SOB with walking, pulse < 100) of new-onset or worse than normal     Yesterday briefly denied since    Protocols used: Breathing Difficulty-A-OH

## 2025-01-23 ENCOUNTER — OFFICE VISIT (OUTPATIENT)
Dept: FAMILY MEDICINE | Facility: CLINIC | Age: 75
End: 2025-01-23
Payer: COMMERCIAL

## 2025-01-23 VITALS
HEIGHT: 68 IN | TEMPERATURE: 97.6 F | DIASTOLIC BLOOD PRESSURE: 60 MMHG | RESPIRATION RATE: 18 BRPM | WEIGHT: 165.7 LBS | SYSTOLIC BLOOD PRESSURE: 130 MMHG | HEART RATE: 65 BPM | BODY MASS INDEX: 25.11 KG/M2 | OXYGEN SATURATION: 99 %

## 2025-01-23 DIAGNOSIS — K29.01 ACUTE GASTRITIS WITH HEMORRHAGE, UNSPECIFIED GASTRITIS TYPE: ICD-10-CM

## 2025-01-23 DIAGNOSIS — F41.9 ANXIETY: ICD-10-CM

## 2025-01-23 DIAGNOSIS — K21.00 GASTROESOPHAGEAL REFLUX DISEASE WITH ESOPHAGITIS, UNSPECIFIED WHETHER HEMORRHAGE: ICD-10-CM

## 2025-01-23 DIAGNOSIS — Z23 NEED FOR PROPHYLACTIC VACCINATION AGAINST HEPATITIS B VIRUS: ICD-10-CM

## 2025-01-23 DIAGNOSIS — R74.8 ELEVATED LIPASE: ICD-10-CM

## 2025-01-23 DIAGNOSIS — Z13.29 SCREENING FOR THYROID DISORDER: ICD-10-CM

## 2025-01-23 DIAGNOSIS — R11.0 NAUSEA: Primary | ICD-10-CM

## 2025-01-23 DIAGNOSIS — R11.0 NAUSEA: ICD-10-CM

## 2025-01-23 DIAGNOSIS — Z29.11 NEED FOR VACCINATION AGAINST RESPIRATORY SYNCYTIAL VIRUS: ICD-10-CM

## 2025-01-23 DIAGNOSIS — Z23 NEED FOR SHINGLES VACCINE: ICD-10-CM

## 2025-01-23 DIAGNOSIS — Z23 NEED FOR PROPHYLACTIC VACCINATION AGAINST HEPATITIS A: ICD-10-CM

## 2025-01-23 DIAGNOSIS — R19.5 DARK STOOLS: ICD-10-CM

## 2025-01-23 LAB
ALBUMIN SERPL BCG-MCNC: 4.7 G/DL (ref 3.5–5.2)
ALP SERPL-CCNC: 56 U/L (ref 40–150)
ALT SERPL W P-5'-P-CCNC: 19 U/L (ref 0–70)
ANION GAP SERPL CALCULATED.3IONS-SCNC: 10 MMOL/L (ref 7–15)
AST SERPL W P-5'-P-CCNC: 21 U/L (ref 0–45)
BILIRUB SERPL-MCNC: 0.5 MG/DL
BUN SERPL-MCNC: 14.7 MG/DL (ref 8–23)
CALCIUM SERPL-MCNC: 10.2 MG/DL (ref 8.8–10.4)
CHLORIDE SERPL-SCNC: 104 MMOL/L (ref 98–107)
CREAT SERPL-MCNC: 0.93 MG/DL (ref 0.67–1.17)
EGFRCR SERPLBLD CKD-EPI 2021: 87 ML/MIN/1.73M2
ERYTHROCYTE [DISTWIDTH] IN BLOOD BY AUTOMATED COUNT: 13.7 % (ref 10–15)
GLUCOSE SERPL-MCNC: 88 MG/DL (ref 70–99)
HCO3 SERPL-SCNC: 27 MMOL/L (ref 22–29)
HCT VFR BLD AUTO: 44.3 % (ref 40–53)
HGB BLD-MCNC: 14.8 G/DL (ref 13.3–17.7)
MCH RBC QN AUTO: 30.1 PG (ref 26.5–33)
MCHC RBC AUTO-ENTMCNC: 33.4 G/DL (ref 31.5–36.5)
MCV RBC AUTO: 90 FL (ref 78–100)
PLATELET # BLD AUTO: 175 10E3/UL (ref 150–450)
POTASSIUM SERPL-SCNC: 4.8 MMOL/L (ref 3.4–5.3)
PROT SERPL-MCNC: 7.1 G/DL (ref 6.4–8.3)
RBC # BLD AUTO: 4.91 10E6/UL (ref 4.4–5.9)
SODIUM SERPL-SCNC: 141 MMOL/L (ref 135–145)
TSH SERPL DL<=0.005 MIU/L-ACNC: 1.67 UIU/ML (ref 0.3–4.2)
WBC # BLD AUTO: 5.3 10E3/UL (ref 4–11)

## 2025-01-23 PROCEDURE — 36415 COLL VENOUS BLD VENIPUNCTURE: CPT

## 2025-01-23 PROCEDURE — 85027 COMPLETE CBC AUTOMATED: CPT

## 2025-01-23 PROCEDURE — G2211 COMPLEX E/M VISIT ADD ON: HCPCS

## 2025-01-23 PROCEDURE — 80053 COMPREHEN METABOLIC PANEL: CPT

## 2025-01-23 PROCEDURE — 99214 OFFICE O/P EST MOD 30 MIN: CPT

## 2025-01-23 PROCEDURE — 84443 ASSAY THYROID STIM HORMONE: CPT | Mod: GZ

## 2025-01-23 RX ORDER — PANTOPRAZOLE SODIUM 40 MG/1
40 TABLET, DELAYED RELEASE ORAL DAILY
Qty: 90 TABLET | Refills: 0 | Status: SHIPPED | OUTPATIENT
Start: 2025-01-23 | End: 2025-04-23

## 2025-01-23 RX ORDER — ALPRAZOLAM 0.25 MG/1
0.25 TABLET ORAL
Qty: 10 TABLET | Refills: 0 | Status: SHIPPED | OUTPATIENT
Start: 2025-01-23

## 2025-01-23 RX ORDER — FAMOTIDINE 20 MG/1
20 TABLET, FILM COATED ORAL 2 TIMES DAILY
Qty: 180 TABLET | Refills: 0 | Status: SHIPPED | OUTPATIENT
Start: 2025-01-23

## 2025-01-23 RX ORDER — FAMOTIDINE 20 MG/1
20 TABLET, FILM COATED ORAL 2 TIMES DAILY
Qty: 60 TABLET | Refills: 0 | Status: SHIPPED | OUTPATIENT
Start: 2025-01-23 | End: 2025-01-23

## 2025-01-23 ASSESSMENT — ENCOUNTER SYMPTOMS: SHORTNESS OF BREATH: 1

## 2025-01-23 NOTE — PATIENT INSTRUCTIONS
- Start taking Protonix 40mg daily in the morning.  - Start taking Pepcid 20mg in the morning and 20mg in the evening before meals.

## 2025-01-23 NOTE — TELEPHONE ENCOUNTER
Pending Prescriptions:                       Disp   Refills    famotidine (PEPCID) 20 MG tablet [Pharmacy*180 ta*         Sig: TAKE 1 TABLET(20 MG) BY MOUTH TWICE DAILY    Medication passed protocol, however, refill RN could not approve because patient or pharmacy requested a 90 day supply, which is outside the RN protocol. Provider, please approve or deny the prescription.

## 2025-01-23 NOTE — TELEPHONE ENCOUNTER
Agree with scheduling with Frances Tapia on 1.23.25.  Likely needs orthostatic blood pressures, CBC, CMP, TSH and anything else Frances believes is clinically indicated.    Julio Guidry MD

## 2025-01-23 NOTE — PROGRESS NOTES
Assessment & Plan     Need for shingles vaccine  ***    Need for prophylactic vaccination against hepatitis A  ***    Need for prophylactic vaccination against hepatitis B virus  ***    Need for vaccination against respiratory syncytial virus  ***    Elevated lipase  ***    Dark stools  ***    Acute gastritis with hemorrhage, unspecified gastritis type  ***    Anxiety  ***  - Adult Mental Health  Referral  - ALPRAZolam (XANAX) 0.25 MG tablet  Dispense: 10 tablet; Refill: 0    Nausea  ***  - pantoprazole (PROTONIX) 40 MG EC tablet  Dispense: 90 tablet; Refill: 0    Gastroesophageal reflux disease with esophagitis, unspecified whether hemorrhage  ***  - Comprehensive metabolic panel (BMP + Alb, Alk Phos, ALT, AST, Total. Bili, TP)  - CBC with platelets  - pantoprazole (PROTONIX) 40 MG EC tablet  Dispense: 90 tablet; Refill: 0  - Comprehensive metabolic panel (BMP + Alb, Alk Phos, ALT, AST, Total. Bili, TP)  - CBC with platelets    Screening for thyroid disorder  ***  - TSH with free T4 reflex  - TSH with free T4 reflex          Subjective   Juwan is a 74 year old, presenting for the following health issues:  Follow Up      1/23/2025    11:22 AM   Additional Questions   Roomed by Shobha SHEFFIELD CMA     History of Present Illness       Reason for visit:  Sick  Symptom onset:  1-2 weeks ago  Symptoms include:  Fatigue nausia  Symptom intensity:  Moderate  Symptom progression:  Staying the same  Had these symptoms before:  Yes  Has tried/received treatment for these symptoms:  Yes  Previous treatment was successful:  Yes   He is taking medications regularly.     Juwan is a 74 year old male who presents today with ***.     Feels nauseated. Sick. Lots of nausea, fatigue. Went to go workout and felt like he had very low energy. Didn't    Anxiety: feels like anxiety has been a lot worse lately.     Routing to Dr. Guidry-Scheduled for an in person apt tomorrow 1/23 with Frances Tapia.     Explained to patient will send  "information to pcp to review and see if Dr. Guidry has different or more recommendations      Situation   Patient called and asked to clarify if he should continue the Protonix or Pepcid.   TC transferred him to a nurse, the patient had explained that yesterday he felt lightheaded and wea     Background   ADS 12/23/24- abd and pelvis ct completed   LOV 12/31/24- gastric hemorrhage- rx  famotidine 20mg 2 times a day   GI consult ordered 12/31- saw GI 1/2  Gi ordered Protonix and advised to take for 14 day-completed 1/17/25  Last hemoglobin was 12/31- value was 14.2  Last 10/2/24- oncology visit malignant  neoplasm - Right upper lobe  Patient stats he had his gallbladder removed about a month ago     Assessment   Has not taken any acid reducer in 3-4 days-     Felt mild SOB walking up 13 stairs at the gym yesterday  [place] weak like he couldn't do the treadmill.    Went home and was able to tolerated house work after that.      Denied any SOB at rest, walking or with stairs today/since yesterday at the gym.      Denied blood, black or tarry stools  denied chest pain, irregular or fast heart rate, pain in abdomen, coughing, fever or heart burn     Ongoing/unchanged mild nausea      The patient explains he \"feels crappy and tired\" after eating  for about a month since his gallbladder was removed.      For the last week on and off when he gets up quickly he feels a little lightheaded briefly then goes away.  He sat down when he felt lightheaded last week otherwise he states the feeling does away on it's oen after a couple seconds.   Denied feeling faint.      Had patient take his BP at home- 142/81 pulse 86     Recommendations   Explained writer feels he should be seen for his symptoms to be assessed.   Patient states he would like to be seen within one week.   Scheduled tomorrow. Explained location, arrival and apt time   Red flag symptoms  reviewed- ed/911   Change positions slowly  Explained can take pepcid as " "directed until apt tomorrow and then provider can talk more about it.      Reason for Disposition    Patient wants to be seen        {additonal problems for provider to add (Optional):725789}    {ROS Picklists (Optional):577134}      Objective    /60   Pulse 65   Temp 97.6  F (36.4  C) (Tympanic)   Resp 18   Ht 1.727 m (5' 8\")   Wt 75.2 kg (165 lb 11.2 oz)   SpO2 99%   BMI 25.19 kg/m    Body mass index is 25.19 kg/m .  Physical Exam   {Exam List (Optional):317494}    {Diagnostic Test Results (Optional):970290}        Signed Electronically by: Frances Tapia PA-C  {Email feedback regarding this note to [email or web address]   :[id number]}  " insight intact, and appearance well groomed      Signed Electronically by: Frances Tapia PA-C

## 2025-01-30 NOTE — TELEPHONE ENCOUNTER
Routing refill request to provider for review/approval because:  Medication is reported/historical    Alisha Lopez RN, BSN  [facility]           no known allergies

## 2025-02-16 ASSESSMENT — ANXIETY QUESTIONNAIRES
6. BECOMING EASILY ANNOYED OR IRRITABLE: NOT AT ALL
3. WORRYING TOO MUCH ABOUT DIFFERENT THINGS: SEVERAL DAYS
7. FEELING AFRAID AS IF SOMETHING AWFUL MIGHT HAPPEN: SEVERAL DAYS
GAD7 TOTAL SCORE: 4
4. TROUBLE RELAXING: NOT AT ALL
5. BEING SO RESTLESS THAT IT IS HARD TO SIT STILL: NOT AT ALL
GAD7 TOTAL SCORE: 4
2. NOT BEING ABLE TO STOP OR CONTROL WORRYING: SEVERAL DAYS
1. FEELING NERVOUS, ANXIOUS, OR ON EDGE: SEVERAL DAYS
7. FEELING AFRAID AS IF SOMETHING AWFUL MIGHT HAPPEN: SEVERAL DAYS
GAD7 TOTAL SCORE: 4

## 2025-02-16 ASSESSMENT — PATIENT HEALTH QUESTIONNAIRE - PHQ9
SUM OF ALL RESPONSES TO PHQ QUESTIONS 1-9: 5
10. IF YOU CHECKED OFF ANY PROBLEMS, HOW DIFFICULT HAVE THESE PROBLEMS MADE IT FOR YOU TO DO YOUR WORK, TAKE CARE OF THINGS AT HOME, OR GET ALONG WITH OTHER PEOPLE: SOMEWHAT DIFFICULT
SUM OF ALL RESPONSES TO PHQ QUESTIONS 1-9: 5

## 2025-02-17 ENCOUNTER — VIRTUAL VISIT (OUTPATIENT)
Dept: BEHAVIORAL HEALTH | Facility: CLINIC | Age: 75
End: 2025-02-17
Payer: COMMERCIAL

## 2025-02-17 DIAGNOSIS — F41.9 ANXIETY: Primary | ICD-10-CM

## 2025-02-17 DIAGNOSIS — F33.0 MAJOR DEPRESSIVE DISORDER, RECURRENT EPISODE, MILD: ICD-10-CM

## 2025-02-17 PROCEDURE — 90832 PSYTX W PT 30 MINUTES: CPT | Mod: 95 | Performed by: MARRIAGE & FAMILY THERAPIST

## 2025-02-17 ASSESSMENT — COLUMBIA-SUICIDE SEVERITY RATING SCALE - C-SSRS
1. IN THE PAST MONTH, HAVE YOU WISHED YOU WERE DEAD OR WISHED YOU COULD GO TO SLEEP AND NOT WAKE UP?: NO
2. HAVE YOU ACTUALLY HAD ANY THOUGHTS OF KILLING YOURSELF?: NO
1. HAVE YOU WISHED YOU WERE DEAD OR WISHED YOU COULD GO TO SLEEP AND NOT WAKE UP?: NO
2. HAVE YOU ACTUALLY HAD ANY THOUGHTS OF KILLING YOURSELF?: NO

## 2025-02-17 NOTE — PROGRESS NOTES
[facility] Primary Care: Integrated Behavioral Health    Integrated Behavioral Health   Mental Health & Addiction Services      Progress Note - Initial [facility] Visit     Patient Name: Juwan Latham    Date: 2025  Service Type: Individual   Visit Start Time:  1105AM  Visit End Time:   1126AM    Attendees: Client   Service Modality: Video Visit:      Provider verified identity through the following two step process.  Patient provided:  Patient  and Patient address    Telemedicine Visit: The patient's condition can be safely assessed and treated via synchronous audio and visual telemedicine encounter.      Reason for Telemedicine Visit: Patient has requested telehealth visit    Originating Site (Patient Location): Patient's home    Distant Site (Provider Location): [facility]    Consent:  The patient/guardian has verbally consented to: the potential risks and benefits of telemedicine (video visit) versus in person care; bill my insurance or make self-payment for services provided; and responsibility for payment of non-covered services.     Patient would like the video invitation sent by:  My Chart    Mode of Communication:  Video Conference via [facility]    Distant Location (Provider):  On-site    As the provider I attest to compliance with applicable laws and regulations related to telemedicine.     [facility] Visit Activities (Refresh list every visit): NEW         DATA:     Interactive Complexity: No   Crisis: No     Assessments completed prior to this visit:     The following assessments were completed by patient for this visit:  PHQ9:       3/7/2024     9:46 AM 2024    10:37 AM 2024    10:14 AM 2024     1:39 PM 2024    10:21 AM 10/9/2024    10:05 AM 2025     5:51 PM   PHQ-9 SCORE   PHQ-9 Total Score Harinihart 3 (Minimal depression) 5 (Mild depression) 7 (Mild depression) 7 (Mild depression) 5 (Mild  depression) 5 (Mild depression) 5 (Mild depression)   PHQ-9 Total Score 3 5 7 7 5 5 5        Patient-reported     GAD7:       1/31/2023    11:02 AM 8/30/2023     2:07 PM 5/8/2024    10:15 AM 6/13/2024     1:40 PM 8/22/2024     1:21 PM 10/9/2024    10:07 AM 2/16/2025     5:54 PM   BENSON-7 SCORE   Total Score   1 (minimal anxiety) 3 (minimal anxiety)  2 (minimal anxiety) 4 (minimal anxiety)   Total Score 2 5 1 3 3 2 4        Patient-reported     CAGE-AID:       2/16/2025     5:55 PM   CAGE-AID Total Score   Total Score 0    Total Score MyChart 0 (A total score of 2 or greater is considered clinically significant)       Patient-reported     PROMIS 10-Global Health (all questions and answers displayed):        No data to display              Sutton Suicide Severity Rating Scale (Lifetime/Recent)      9/26/2023    10:57 AM 2/26/2024     3:48 AM 3/18/2024    12:47 PM 5/1/2024     4:03 PM 7/2/2024     6:52 AM 9/23/2024     9:25 AM   Sutton Suicide Severity Rating (Lifetime/Recent)   Q1 Wished to be Dead (Past Month)  0-->no 0-->no 0-->no 0-->no 0-->no   Q2 Suicidal Thoughts (Past Month)  0-->no 0-->no 0-->no 0-->no 0-->no   Q6 Suicide Behavior (Lifetime)  0-->no 0-->no 0-->no 0-->no 0-->no   Level of Risk per Screen  no risks indicated no risks indicated no risks indicated no risks indicated no risks indicated   1. Wish to be Dead (Lifetime) N        2. Non-Specific Active Suicidal Thoughts (Lifetime) N        Actual Attempt (Lifetime) N        Has subject engaged in non-suicidal self-injurious behavior? (Lifetime) N        Interrupted Attempts (Lifetime) N        Aborted or Self-Interrupted Attempt (Lifetime) N        Preparatory Acts or Behavior (Lifetime) N        Calculated C-SSRS Risk Score (Lifetime/Recent) No Risk Indicated             Referral:   Patient was referred to [facility] by  Frances Willie, PA-C .    Reason for referral: clarify behavioral health diagnosis and determine behavioral health treatment options.       [facility] introduced self and role. Discussed informed consent and limits to confidentiality.     Presenting Concerns/ Current Stressors:   Pt is coming to therapy due to impact of granddaughter. Granddaughter has cut off the extended family. Pt is exploring his role and questioning his own behaviors. Pt reports feeling depressed and anxious. Granddaughter and daughter have done family therapy in the past.     Currently talking and supportive with daughter (granddaughter's mother).     Pt reports continued health concerns as well in addition to relationship stressors.     Therapeutic Interventions:  Motivational Interviewing (MI): Validated patient's thoughts, feelings and experience. Expressed respect for patient's autonomy in decision making.     Response to treatment interventions:   Patient was receptive to interventions utilized.  Patient was engaged in the therapy process.      Safety Issues and Plan for Safety and Risk Management:     Patient denies a history of suicidal ideation, suicide attempts, self-injurious behavior, homicidal ideation, homicidal behavior, and and other safety concerns   Patient denies current fears or concerns for personal safety.   Patient denies current or recent suicidal ideation or behaviors.   Patient denies current or recent homicidal ideation or behaviors.   Patient denies current or recent self injurious behavior or ideation.   Patient denies other safety concerns.   Recommended that patient call 911 or go to the local ED should there be a change in any of these risk factors   Patient reports there are no firearms in the house.       ASSESSMENT:   Mental Status:     Appearance:   Appropriate    Eye Contact:   Good    Psychomotor Behavior: Normal    Attitude:   Cooperative  Interested   Orientation:   All   Speech Rate / Production: Normal/ Responsive   Volume:   Normal    Mood:    Normal   Affect:    Appropriate    Thought Content:  Clear    Thought Form:  Coherent  Goal Directed     Insight:    Good         Diagnostic Criteria:   Generalized Anxiety Disorder  A. Excessive anxiety and worry about a number of events or activities (such as work or school performance).   B. The person finds it difficult to control the worry.  C. Select 3 or more symptoms (required for diagnosis). Only one item is required in children.   - Restlessness or feeling keyed up or on edge.    - Difficulty concentrating or mind going blank.    - Sleep disturbance (difficulty falling or staying asleep, or restless unsatisfying sleep).   D. The focus of the anxiety and worry is not confined to features of an Axis I disorder.  E. The anxiety, worry, or physical symptoms cause clinically significant distress or impairment in social, occupational, or other important areas of functioning.   F. The disturbance is not due to the direct physiological effects of a substance (e.g., a drug of abuse, a medication) or a general medical condition (e.g., hyperthyroidism) and does not occur exclusively during a Mood Disorder, a Psychotic Disorder, or a Pervasive Developmental Disorder.    - The aformentioned symptoms began 1 year(s) ago and occurs 7 days per week and is experienced as moderate.  Major Depressive Disorder  CRITERIA (A-C) REPRESENT A MAJOR DEPRESSIVE EPISODE - SELECT THESE CRITERIA  A) Recurrent episode(s) - symptoms have been present during the same 2-week period and represent a change from previous functioning 5 or more symptoms (required for diagnosis)   - Depressed mood. Note: In children and adolescents, can be irritable mood.     - Diminished interest or pleasure in all, or almost all, activities.    - Decreased sleep.    - Psychomotor activity feeling chills shaking.    - Fatigue or loss of energy.    - Diminished ability to think or concentrate, or indecisiveness.   B) The symptoms cause clinically significant distress or impairment in social, occupational, or other important areas of functioning  C) The episode  is not attributable to the physiological effects of a substance or to another medical condition  D) The occurence of major depressive episode is not better explained by other thought / psychotic disorders  E) There has never been a manic episode or hypomanic episode        DSM5 Diagnoses: (Sustained by DSM5 Criteria Listed Above)     Diagnoses: 296.31 (F33.0) Major Depressive Disorder, Recurrent Episode, Mild _  300.02 (F41.1) Generalized Anxiety Disorder     Psychosocial / Contextual Factors: Medical Complexities, Relationship Concerns, and Interpersonal Concerns       Collateral Reports Completed:   Routed note to PCP        PLAN: (Homework, other):     1. Patient was provided:  recommendation to schedule follow-up with [facility] recommendation to follow through on referrals     2. Provider recommended the following referrals: [facility] for individual therapy.        3. Suicide Risk and Safety Concerns were assessed for Juwan Latham    Safety Plan:   Patient denied any current/recent/lifetime history of suicidal ideation and/or behaviors. Recommended that patient call 911 or go to the local ED should there be a change in any of these risk factors       Armando Lea LMFT, [facility]   February 17, 2025    Answers submitted by the patient for this visit:  Patient Health Questionnaire (Submitted on 2/16/2025)  If you checked off any problems, how difficult have these problems made it for you to do your work, take care of things at home, or get along with other people?: Somewhat difficult  PHQ9 TOTAL SCORE: 5  Patient Health Questionnaire (G7) (Submitted on 2/16/2025)  BENSON 7 TOTAL SCORE: 4

## 2025-02-20 ENCOUNTER — TELEPHONE (OUTPATIENT)
Dept: FAMILY MEDICINE | Facility: CLINIC | Age: 75
End: 2025-02-20
Payer: COMMERCIAL

## 2025-02-20 DIAGNOSIS — F41.9 ANXIETY: ICD-10-CM

## 2025-02-20 RX ORDER — ALPRAZOLAM 0.25 MG
0.25 TABLET ORAL
Qty: 10 TABLET | Refills: 0 | Status: SHIPPED | OUTPATIENT
Start: 2025-02-20

## 2025-02-20 NOTE — TELEPHONE ENCOUNTER
Medication Question or Refill    ALPRAZolam (XANAX) 0.25 MG tablet     What medication are you calling about (include dose and sig)?: Pt calling to refill Xanax RX  - no refills left -     Preferred Pharmacy:     [id number] - [place], [place] - [address] [facility] [address] [facility] [address] [facility]  [address] [facility] [address]  [facility] [place] [address]  Phone: [phone] Fax: [phone]      Controlled Substance Agreement on file:   CSA -- Patient Level:    CSA: None found at the patient level.       Who prescribed the medication?: Guidry,    Do you need a refill? Yes    When did you use the medication last? na    Patient offered an appointment? No    Do you have any questions or concerns?  No      Could we send this information to you in [facility] or would you prefer to receive a phone call?:   Patient would prefer a phone call   Okay to leave a detailed message?: Yes at Cell number on file:    Telephone Information:   Mobile [phone]     Barb SAEZ

## 2025-02-20 NOTE — TELEPHONE ENCOUNTER
Chart reviewed.  Rx sent to pt's preferred pharmacy.    [id number] - [place], [place] - [address] [facility] [address] [facility]    Julio Guidry MD

## 2025-02-24 ENCOUNTER — OFFICE VISIT (OUTPATIENT)
Dept: FAMILY MEDICINE | Facility: CLINIC | Age: 75
End: 2025-02-24
Payer: COMMERCIAL

## 2025-02-24 ENCOUNTER — VIRTUAL VISIT (OUTPATIENT)
Dept: BEHAVIORAL HEALTH | Facility: CLINIC | Age: 75
End: 2025-02-24
Payer: COMMERCIAL

## 2025-02-24 VITALS
RESPIRATION RATE: 18 BRPM | HEIGHT: 69 IN | SYSTOLIC BLOOD PRESSURE: 118 MMHG | BODY MASS INDEX: 24.44 KG/M2 | WEIGHT: 165 LBS | OXYGEN SATURATION: 99 % | TEMPERATURE: 97.6 F | DIASTOLIC BLOOD PRESSURE: 66 MMHG | HEART RATE: 69 BPM

## 2025-02-24 DIAGNOSIS — F41.9 ANXIETY: Primary | ICD-10-CM

## 2025-02-24 DIAGNOSIS — R11.0 NAUSEA: ICD-10-CM

## 2025-02-24 DIAGNOSIS — K21.9 GASTROESOPHAGEAL REFLUX DISEASE, UNSPECIFIED WHETHER ESOPHAGITIS PRESENT: ICD-10-CM

## 2025-02-24 DIAGNOSIS — C34.11 MALIGNANT NEOPLASM OF UPPER LOBE OF RIGHT LUNG (H): ICD-10-CM

## 2025-02-24 DIAGNOSIS — Z79.899 ENCOUNTER FOR MEDICATION MANAGEMENT: ICD-10-CM

## 2025-02-24 PROCEDURE — 90791 PSYCH DIAGNOSTIC EVALUATION: CPT | Mod: 52 | Performed by: MARRIAGE & FAMILY THERAPIST

## 2025-02-24 PROCEDURE — 99213 OFFICE O/P EST LOW 20 MIN: CPT

## 2025-02-24 PROCEDURE — G2211 COMPLEX E/M VISIT ADD ON: HCPCS

## 2025-02-24 NOTE — PROGRESS NOTES
[facility]: Integrated Behavioral Health     Integrated Behavioral Health Services   Mental Health and Addiction Services     Brief Diagnostic Assessment        PATIENT'S NAME: Juwan Latham  MRN: [id number]     : 1950     DATE OF SERVICE: 2025  SERVICE LOCATION: [facility] / Email (patient reached)   SERVICE MODALITY: Video Visit:      Provider verified identity through the following two step process.  Patient provided:  Patient photo    Telemedicine Visit: The patient's condition can be safely assessed and treated via synchronous audio and visual telemedicine encounter.      Reason for Telemedicine Visit: Patient has requested telehealth visit    Originating Site (Patient Location): Patient's home    Distant Site (Provider Location): [facility] & [facility]    Consent:  The patient/guardian has verbally consented to: the potential risks and benefits of telemedicine (video visit) versus in person care; bill my insurance or make self-payment for services provided; and responsibility for payment of non-covered services.     Patient would like the video invitation sent by:  My Chart    Mode of Communication:  Video Conference via [facility]    Distant Location (Provider):  On-site    As the provider I attest to compliance with applicable laws and regulations related to telemedicine.  Visit Start Time: 300PM  Visit End Time:  3:30 PM   VISIT NUMBER: 2  [facility] Visit Activities: [facility] Only     Assessments completed prior to visit:     The following assessments were completed by patient for this visit:  PHQ9:       3/7/2024     9:46 AM 2024    10:37 AM 2024    10:14 AM 2024     1:39 PM 2024    10:21 AM 10/9/2024    10:05 AM 2025     5:51 PM   PHQ-9 SCORE   PHQ-9 Total Score [facility] 3 (Minimal depression) 5 (Mild depression) 7 (Mild depression) 7 (Mild depression) 5 (Mild depression) 5 (Mild depression) 5  (Mild depression)   PHQ-9 Total Score 3 5 7 7 5 5 5        Patient-reported     GAD7:       1/31/2023    11:02 AM 8/30/2023     2:07 PM 5/8/2024    10:15 AM 6/13/2024     1:40 PM 8/22/2024     1:21 PM 10/9/2024    10:07 AM 2/16/2025     5:54 PM   BENSON-7 SCORE   Total Score   1 (minimal anxiety) 3 (minimal anxiety)  2 (minimal anxiety) 4 (minimal anxiety)   Total Score 2 5 1 3 3 2 4        Patient-reported     CAGE-AID:       2/16/2025     5:55 PM   CAGE-AID Total Score   Total Score 0    Total Score MyChart 0 (A total score of 2 or greater is considered clinically significant)       Patient-reported     PROMIS 10-Global Health (all questions and answers displayed):       2/24/2025     2:49 PM   PROMIS 10   In general, would you say your health is: Fair   In general, would you say your quality of life is: Good   In general, how would you rate your physical health? Fair   In general, how would you rate your mental health, including your mood and your ability to think? Fair   In general, how would you rate your satisfaction with your social activities and relationships? Fair   In general, please rate how well you carry out your usual social activities and roles Good   To what extent are you able to carry out your everyday physical activities such as walking, climbing stairs, carrying groceries, or moving a chair? Not at all   In the past 7 days, how often have you been bothered by emotional problems such as feeling anxious, depressed, or irritable? Sometimes   In the past 7 days, how would you rate your fatigue on average? Severe   In the past 7 days, how would you rate your pain on average, where 0 means no pain, and 10 means worst imaginable pain? 0   In general, would you say your health is: 2   In general, would you say your quality of life is: 3   In general, how would you rate your physical health? 2   In general, how would you rate your mental health, including your mood and your ability to think? 2   In general,  how would you rate your satisfaction with your social activities and relationships? 2   In general, please rate how well you carry out your usual social activities and roles. (This includes activities at home, at work and in your community, and responsibilities as a parent, child, spouse, employee, friend, etc.) 3   To what extent are you able to carry out your everyday physical activities such as walking, climbing stairs, carrying groceries, or moving a chair? 1   In the past 7 days, how often have you been bothered by emotional problems such as feeling anxious, depressed, or irritable? 3   In the past 7 days, how would you rate your fatigue on average? 4   In the past 7 days, how would you rate your pain on average, where 0 means no pain, and 10 means worst imaginable pain? 0   Global Mental Health Score 10    Global Physical Health Score 10    PROMIS TOTAL - SUBSCORES 20        Patient-reported     [place] Suicide Severity Rating Scale (Lifetime/Recent)      9/26/2023    10:57 AM 2/26/2024     3:48 AM 3/18/2024    12:47 PM 5/1/2024     4:03 PM 7/2/2024     6:52 AM 9/23/2024     9:25 AM 2/17/2025    11:30 AM   [place] Suicide Severity Rating (Lifetime/Recent)   Q1 Wished to be Dead (Past Month)  0-->no 0-->no 0-->no 0-->no 0-->no    Q2 Suicidal Thoughts (Past Month)  0-->no 0-->no 0-->no 0-->no 0-->no    Q6 Suicide Behavior (Lifetime)  0-->no 0-->no 0-->no 0-->no 0-->no    Level of Risk per Screen  no risks indicated no risks indicated no risks indicated no risks indicated no risks indicated    1. Wish to be Dead (Lifetime) N      N   1. Wish to be Dead (Past 1 Month)       N   2. Non-Specific Active Suicidal Thoughts (Lifetime) N      N   2. Non-Specific Active Suicidal Thoughts (Past 1 Month)       N   Actual Attempt (Lifetime) N         Has subject engaged in non-suicidal self-injurious behavior? (Lifetime) N         Interrupted Attempts (Lifetime) N         Aborted or Self-Interrupted Attempt (Lifetime) N          Preparatory Acts or Behavior (Lifetime) N         Calculated C-SSRS Risk Score (Lifetime/Recent) No Risk Indicated      No Risk Indicated        Identifying Information:     Patient is a 74 year old male,     , single  adult.  Patient attended the session alone.         Referral:   Who referred you to care: self,  primary care provider.    Reason for referral: clarify behavioral health diagnosis and determine behavioral health treatment options.        Patient's Statement of Presenting Concern:     Patient reports the following reason(s) for seeking an assessment at this time: Yes .  Patient stated that symptoms have resulted in the following functional impairments: relationship(s)     History of Presenting Concern:     Patient reports that these problem(s) began 24  . Patient has not attempted to resolve these concerns in the past. Patient reports that other professional(s) are involved in providing support / services. PCP     Social/Family History:     The patient describes their cultural background as ,      Cultural influences and impact on patient's life structure, values, norms, and healthcare: None .      Contextual influences on patient's health include: Family Factors stress within the family system related to adult children  .      Cultural, Contextual, and socioeconomic factors do not affect the patient's access to services.  These factors will be addressed in the Preliminary Treatment plan.    Patient identified their preferred language to be English,  . Patient reported they do not  need the assistance of an  or other support involved in therapy.      Current living situation: staying in own home/apartment, , , , ,no, , , ,        Socioeconomic status and needs: does not have financial concerns.     Patient's current significant relationships include: siblings,      Patient's sexual orientation is heterosexual,     Patient reported having   1 children  in   due to oxy      Patient identified some stable and meaningful social connections.      Patient identified the following strengths or resources that will help him     Highest education level was some college,  .      Patient is currently retired .     Patient reported that they have not  been involved with the legal system. Do you have a probation office? Patient does not        Medical History:    Family History of Mental Health: Yes: complex relationship with daughter     Current Medical Health Concerns: Yes: Had prostate cancer, lung cancer and is nauseated. Also reported brain fog    Current Medication:    Patient reports current meds as:   Current Outpatient Medications   Medication Sig Dispense Refill    ALPRAZolam (XANAX) 0.25 MG tablet Take 1 tablet (0.25 mg) by mouth nightly as needed for anxiety. 10 tablet 0    Cholecalciferol (VITAMIN D-3 PO) Take by mouth.      famotidine (PEPCID) 20 MG tablet TAKE 1 TABLET(20 MG) BY MOUTH TWICE DAILY 180 tablet 0    hydrocortisone 2.5 % ointment APPLY TOPICALLY TO THE AFFECTED AREA TWICE DAILY 60 g 3    MAGNESIUM GLYCINATE PO Take 480 mg by mouth daily.      multivitamin w/minerals (THERA-VIT-M) tablet Take 1 tablet by mouth daily      pantoprazole (PROTONIX) 40 MG EC tablet Take 1 tablet (40 mg) by mouth daily. 90 tablet 0    sertraline (ZOLOFT) 100 MG tablet Take 2 tablets (200 mg) by mouth daily. 180 tablet 1    sucralfate (CARAFATE) 1 GM tablet Take 1 tablet (1 g) by mouth 4 times daily as needed for nausea. 120 tablet 1     Current Facility-Administered Medications   Medication Dose Route Frequency Provider Last Rate Last Admin    sodium chloride (PF) 0.9% PF flush 3 mL  3 mL Intravenous q1 min prn Romana Hampton PA-C   3 mL at 12/23/24 7066        Medication Adherence:     Patient reports taking/not taking prescription medications as prescribed: taking .     Substance Use:      Patient reports using alcohol 1 times per week and has 1 beers at a time. Patient  first started drinking at age NA.  Patient reported date of last use was Weekly.  Patient reports heaviest use is current use.  Patient denies any symptoms of withdrawal..  Patient has never had a period of abstinence..   Patient denies using tobacco  Patient reports using cannabis 1 times per week and smokes 1 at a time. Patient started using cannabis at age recent.  Patient reports last use was weekly.  Patient reports heaviest use is current use..  Patient denies any symptoms of withdrawal..   Patient denies using caffeine.   Patient reports using/abusing the following substance(s). Patient denies any history of substance use.      Substance Use: No symptoms     Based on the negative CAGE score and clinical interview there  are not indications of drug or alcohol abuse.        Significant Losses / Trauma / Abuse / Neglect Issues:     There are indications or report of significant loss, trauma, abuse or neglect issues related to: conflict and relationship difficulties with granddaughter  .   Issues of possible neglect are not present.           Mental Status Assessment:     Appearance:   Appropriate    Eye Contact:   Good    Psychomotor Behavior: Normal    Attitude:   Cooperative    Orientation:   All   Speech Rate / Production: Normal    Volume:   Normal    Mood:    Normal   Affect:    Appropriate    Thought Content:  Clear    Thought Form:  Coherent  Logical    Insight:    Good          Safety Assessment:     Patient denies a history of suicidal ideation, suicide attempts, self-injurious behavior, homicidal ideation, homicidal behavior, and and other safety concerns   Patient denies current or recent suicidal ideation or behaviors.   Patient denies current or recent homicidal ideation or behaviors.   Patient denies current or recent self injurious behavior or ideation.   Patient denies other safety concerns.   Patient reports there are/are not firearms in the house  are not;     Protective Factors dedication to  family or friends; safe and stable environment;       Risk Factors Lack of sleep    Plan for Safety and Risk Management:     Safety and Risk: Recommended that patient call 911 or go to the local ED should there be a change in any of these risk factors.          Report to child / adult protection services was NA.        Diagnostic Criteria:     Generalized Anxiety Disorder  A. Excessive anxiety and worry about a number of events or activities (such as work or school performance).   B. The person finds it difficult to control the worry.  C. Select 3 or more symptoms (required for diagnosis). Only one item is required in children.   - Restlessness or feeling keyed up or on edge.    - Being easily fatigued.    - Difficulty concentrating or mind going blank.    - Sleep disturbance (difficulty falling or staying asleep, or restless unsatisfying sleep).   D. The focus of the anxiety and worry is not confined to features of an Axis I disorder.  E. The anxiety, worry, or physical symptoms cause clinically significant distress or impairment in social, occupational, or other important areas of functioning.   F. The disturbance is not due to the direct physiological effects of a substance (e.g., a drug of abuse, a medication) or a general medical condition (e.g., hyperthyroidism) and does not occur exclusively during a Mood Disorder, a Psychotic Disorder, or a Pervasive Developmental Disorder.    - The aformentioned symptoms began 1 year(s) ago and occurs 7 days per week and is experienced as moderate.     DSM5 Diagnoses:      Diagnoses: 300.02 (F41.1) Generalized Anxiety Disorder   Psychosocial / Contextual Factors: Relationship Concerns, Interpersonal Concerns, and Limited Social Support       Preliminary Treatment Plan:     It is expected that patient will need less than 10 psychotherapy sessions in the next 12 months, as they have received less than 10 in the previous year.     Chemical dependency recommendations: No  indications of CD issues     As a preliminary treatment goal, patient will experience a reduction in anxiety, will develop more effective coping skills to manage anxiety symptoms, will develop healthy cognitive patterns and beliefs, and will increase ability to function adaptively.     Collaboration with other professionals is not indicated at this time.     The following referral(s) will be initiated: [facility] [place] .     A Release of Information is not needed at this time.             Armando Lea LMFT, [facility]   February 24, 2025

## 2025-02-24 NOTE — PATIENT INSTRUCTIONS
Foundational behaviors that significantly reduce anxiety are evidence-based habits and lifestyle practices that build resilience, stability, and emotional regulation. Regularly implementing these foundational behaviors can effectively manage and lower anxiety levels over time.    ?? 1. Regular Exercise and Movement  Benefit: Reduces stress hormones (cortisol), releases endorphins, and stabilizes mood.  Recommended Practices:  Moderate aerobic exercise (e.g., walking, cycling) for at least 150 minutes per week.  Mindful movement practices like yoga, stretching, or joey chi.  Short daily walks, especially outdoors in nature.    ?? 2. Good Sleep Hygiene  Benefit: Sleep deprivation significantly increases anxiety and emotional instability.  Recommended Practices:  Consistent sleep schedule (same bedtime and wake-up time daily).  Calming bedtime routine (e.g., reading, gentle stretching).  Limit screen time 1 hour before bed.  Avoid caffeine, alcohol, or heavy meals close to bedtime.    ?? 3. Balanced Nutrition and Hydration  Benefit: Stabilizes blood sugar, improves brain chemistry, reduces anxiety-related physiological symptoms.  Recommended Practices:  Balanced diet with complex carbohydrates, lean protein, healthy fats.  Regular, balanced meals to prevent blood sugar dips.  Limit caffeine and sugar intake, both linked to increased anxiety.  Stay hydrated with regular water consumption throughout the day.    ?? 4. Mindfulness and Relaxation Techniques  Benefit: Enhances present-moment awareness, reducing future-oriented worry and past-oriented rumination.  Recommended Practices:  Mindfulness meditation (10-20 minutes daily).  Deep diaphragmatic breathing exercises.  Progressive muscle relaxation (PMR).  Guided meditation apps (e.g., Headspace, Calm).        ?? 5. Structure and Routine  Benefit: Predictability reduces uncertainty, a major trigger of anxiety.  Recommended Practices:  Regular daily schedule with  consistent routines.  Structured morning and evening rituals.  Scheduled breaks for rest and relaxation.    ?? 6. Social Connection and Support  Benefit: Enhances resilience, reduces isolation, and provides emotional support.  Recommended Practices:  Regular, meaningful contact with supportive family, friends, or groups.  Participation in community activities or support groups.  Openly sharing concerns and emotional experiences with trusted individuals.    ?? 7. Limiting Substance Use  Benefit: Alcohol, nicotine, and substances can exacerbate anxiety, especially when used regularly.  Recommended Practices:  Reduce or eliminate substances that increase anxiety (e.g., caffeine, alcohol, nicotine).  Seek support if substance use has become a coping mechanism for anxiety.    ?? 8. Goal Setting and Problem Solving  Benefit: Fosters a sense of control and purpose, reducing helplessness and worry.  Recommended Practices:  Regularly set achievable short-term goals.  Practice problem-solving skills (clearly define the problem, brainstorm solutions, and implement the best strategy).    ?? 9. Education and Psychoeducation  Benefit: Understanding anxiety reduces fear and empowers self-management.  Recommended Practices:  Read evidence-based information about anxiety and coping strategies.  Attend workshops, seminars, or therapeutic groups that teach anxiety management skills.    ?? 10. Journaling and Emotional Expression  Benefit: Writing down emotions and experiences provides perspective, reduces emotional intensity, and enhances self-awareness.  Recommended Practices:  Regular journaling of anxious thoughts and feelings.  Gratitude journaling (listing positive events or experiences).        Example of a Daily Anxiety-Reducing Routine:    Morning:  Gentle yoga/stretching (10 min)  Mindfulness breathing (5 min)  Healthy breakfast (balanced protein, fiber, fats)    Afternoon:  Brief walk or physical activity (20 min)  Connection  time (short conversation or social interaction)  Evening:    Balanced dinner  Journaling or reflection (10 min)  Relaxation technique or meditation (10-15 min)  Consistent bedtime

## 2025-02-24 NOTE — PROGRESS NOTES
Assessment & Plan     Anxiety  Gastroesophageal reflux disease, unspecified whether esophagitis present  Nausea  Encounter for medication management   Discussed restarting sertraline 200mg. Spent time going through patient's medication list and discussing what each medication is for and when he should be taking them. Patient has not been taking Protonix as noted on med list and will update this in his pill box. He is seeing behavioral health and has found this helpful. Next appointment is this afternoon.     Malignant neoplasm of upper lobe of right lung (H)  S/p wedge resection. Following with oncology.     Patient should follow up as needed for new or worsening symptoms. All questions answered to patient's satisfaction.     Frances Tapia PA-C    21 minutes spent by me on the date of the encounter doing chart review, history and exam, documentation and further activities per the note      Subjective   Juwan is a 74 year old, presenting for the following health issues:  Breathing Problem        2/24/2025    10:18 AM   Additional Questions   Roomed by Romana RICHARDSON    Juwan is a 74 year old male who presents today for follow up regarding anxiety and indigestion concerns.     Is seeing behavioral health. Next appointment is later this afternoon. Anxiety gets bad in the morning and later in the afternoon. From 4pm-7pm he feels okay.  Eating causes some anxiety. Has some mild LRQ discomfort that comes and goes at random. Last bowel movement was a few hours ago and was normal. No black or bloody stools.     He reports that he has been only taking 1 pill of his sertraline because he wasn't sure if this was helping. Discussed that it can take 4-6 weeks to notice improvement and encouraged patient to resume 2 pills. He hasn't been needing his xanax very often. He did take one a few nights ago to help with sleep. He reports sleep has been okay.       Review of Systems  Constitutional, neuro, ENT, endocrine, pulmonary,  "cardiac, gastrointestinal, genitourinary, musculoskeletal, integument and psychiatric systems are negative, except as otherwise noted.      Objective    /66   Pulse 69   Temp 97.6  F (36.4  C)   Resp 18   Ht 1.753 m (5' 9\")   Wt 74.8 kg (165 lb)   SpO2 99%   BMI 24.37 kg/m    Body mass index is 24.37 kg/m .  Orthostatic Vitals from 02/22/25 1048 to 02/24/25 1048    Date and Time Orthostatic BP Orthostatic Pulse Patient Position BP   Location Cuff Size   02/24/25 1039 133/79 65 Standing -- --   02/24/25 1038 134/75 laying down 61 Sitting -- --   02/24/25 1035 133/71 58 Supine -- --      Physical Exam   GENERAL: alert and no distress  EYES: Eyes grossly normal to inspection, PERRL and conjunctivae and sclerae normal  HENT: ear canals and TM's normal, nose and mouth without ulcers or lesions  NECK: no adenopathy, no asymmetry, masses, or scars  RESP: lungs clear to auscultation - no rales, rhonchi or wheezes  CV: regular rate and rhythm, normal S1 S2, no S3 or S4, no murmur, click or rub, no peripheral edema  ABDOMEN: soft, nontender, no hepatosplenomegaly, no masses and bowel sounds normal  MS: no gross musculoskeletal defects noted, no edema  PSYCH: mentation appears normal, affect normal/bright, and anxious      Signed Electronically by: Frances Tapia PA-C    "

## 2025-03-03 ENCOUNTER — TELEPHONE (OUTPATIENT)
Dept: BEHAVIORAL HEALTH | Facility: CLINIC | Age: 75
End: 2025-03-03
Payer: COMMERCIAL

## 2025-03-03 NOTE — TELEPHONE ENCOUNTER
Phone call to patient, left voicemail to discuss scheduling error today and offering to help patient reschedule.

## 2025-03-05 ENCOUNTER — VIRTUAL VISIT (OUTPATIENT)
Dept: BEHAVIORAL HEALTH | Facility: CLINIC | Age: 75
End: 2025-03-05
Payer: COMMERCIAL

## 2025-03-05 DIAGNOSIS — F41.9 ANXIETY: Primary | ICD-10-CM

## 2025-03-05 PROCEDURE — 90832 PSYTX W PT 30 MINUTES: CPT | Mod: 95 | Performed by: MARRIAGE & FAMILY THERAPIST

## 2025-03-05 NOTE — PATIENT INSTRUCTIONS
"Putting thoughts on trial is a Cognitive-Behavioral Therapy (CBT) technique designed to challenge and reframe distorted or unhelpful thoughts by treating them as though they are \"on trial.\" This method involves examining the evidence for and against the thought to determine whether it is valid and balanced.    Here s a step-by-step guide to putting thoughts on trial:    1. Identify the Thought  Write down the specific thought you want to evaluate. Focus on one thought at a time.  Example: \"I m terrible at my job, and I m going to get fired.\"    2. Recognize the Emotion and Impact  Identify the emotion linked to the thought and how it influences your behavior.  Emotion: Anxiety, fear.  Impact: Avoiding work tasks, procrastinating, or feeling overwhelmed.    3. Examine the Evidence For the Thought  Ask yourself, \"What facts or evidence support this thought?\"  Look for objective evidence, not assumptions or feelings.  Example: \"I made a mistake on a project last week.\"    4. Examine the Evidence Against the Thought  Ask yourself, \"What evidence contradicts or challenges this thought?\"  Look for facts that show the thought may not be entirely true.  Example:  \"My boss complimented my work on another project.\"  \"I ve consistently met deadlines.\"  \"One mistake doesn t define my entire performance.\"    5. Consider Alternative Explanations  Explore other possible interpretations of the situation.  Example:  \"Everyone makes mistakes sometimes; it doesn t mean I m terrible at my job.\"  \"My mistake was due to a miscommunication, not incompetence.\"    6. Assess the Verdict  Based on the evidence, decide whether the thought is:  Completely true.  Partially true.  Not true at all.  If it s partially true, identify the aspects that need reframing.    7. Reframe the Thought  Replace the distorted thought with a balanced, realistic, and constructive one.  Example:  Old Thought: \"I m terrible at my job, and I m going to get " "fired.\"  Reframed Thought: \"I made a mistake, but I ve done well on other projects. I can learn from this and improve.\"    8. Monitor Emotional and Behavioral Changes  Reflect on how this process affects your emotions and behavior.  Before: High anxiety, avoidance of tasks.  After: Reduced anxiety, increased motivation to address the issue.          Example: Putting Thoughts on Trial  Thought: \"I m a bad friend because I forgot my friend s birthday.\"  Emotion: Guilt, sadness.  Evidence For: \"I didn t call or text them on their birthday.\"  Evidence Against:  \"I ve been supportive in other ways.\"  \"I remembered their birthday last year.\"  \"Forgetting one event doesn t mean I m a bad friend.\"  Alternative Explanation: \"I ve been overwhelmed lately, and forgetting doesn t mean I don t care.\"  Reframed Thought: \"Forgetting their birthday doesn t make me a bad friend. I ll reach out to apologize and plan something special to make it up to them.\"  Tips for Effective Thought Trials  Be objective: Stick to facts and avoid emotional reasoning.  Write it down: Documenting the process helps clarify your thinking.  Practice regularly: The more you practice, the easier it becomes to identify and reframe distorted thoughts.  By putting thoughts on trial, you challenge negative thinking patterns and develop more balanced perspectives, reducing the emotional impact of unhelpful thoughts.    "

## 2025-03-05 NOTE — PROGRESS NOTES
[facility] Primary Care: Integrated Behavioral Health    Behavioral Health Clinician Progress Note   Mental Health & Addiction Services      Wednesday March 05, 2025    Patient Name: Juwan Latham       Service Type:  Individual   Service Location:  MyChart / Email (patient reached)   Visit Start Time: 1137AM  Visit End Time:  1200PM    Session Length: 16 - 37    Attendees: Patient   Service Modality: Video Visit:      Provider verified identity through the following two step process.  Patient provided:  Patient is known previously to provider    Telemedicine Visit: The patient's condition can be safely assessed and treated via synchronous audio and visual telemedicine encounter.      Reason for Telemedicine Visit: Patient has requested telehealth visit    Originating Site (Patient Location): Patient's home    Distant Site (Provider Location): [facility] & [facility]    Consent:  The patient/guardian has verbally consented to: the potential risks and benefits of telemedicine (video visit) versus in person care; bill my insurance or make self-payment for services provided; and responsibility for payment of non-covered services.     Patient would like the video invitation sent by:  My Chart    Mode of Communication:  [facility] Conference via [facility]    Distant Location (Provider):  On-site    As the provider I attest to compliance with applicable laws and regulations related to telemedicine.   Visit number: 2    [facility] Visit Activities (Refresh list every visit): [facility] Only     Date of Brief Diagnostic Assessment : 2/24/25  Treatment Plan Review Date: 8/24/25      DATA:    Extended Session (60+ minutes): No   Interactive Complexity: No   Crisis: No   [facility] Patient: No     Assessments completed prior to visit:   The following assessments were completed by patient for this visit:  PHQ9:       3/7/2024     9:46 AM 4/24/2024    10:37 AM 5/8/2024    10:14 AM 6/13/2024      1:39 PM 8/21/2024    10:21 AM 10/9/2024    10:05 AM 2/16/2025     5:51 PM   PHQ-9 SCORE   PHQ-9 Total Score MyChart 3 (Minimal depression) 5 (Mild depression) 7 (Mild depression) 7 (Mild depression) 5 (Mild depression) 5 (Mild depression) 5 (Mild depression)   PHQ-9 Total Score 3 5 7 7 5 5 5        Patient-reported     GAD7:       1/31/2023    11:02 AM 8/30/2023     2:07 PM 5/8/2024    10:15 AM 6/13/2024     1:40 PM 8/22/2024     1:21 PM 10/9/2024    10:07 AM 2/16/2025     5:54 PM   BENSON-7 SCORE   Total Score   1 (minimal anxiety) 3 (minimal anxiety)  2 (minimal anxiety) 4 (minimal anxiety)   Total Score 2 5 1 3 3 2 4        Patient-reported     CAGE-AID:       2/16/2025     5:55 PM   CAGE-AID Total Score   Total Score 0    Total Score MyChart 0 (A total score of 2 or greater is considered clinically significant)       Patient-reported     PROMIS 10-Global Health (all questions and answers displayed):       2/24/2025     2:49 PM   PROMIS 10   In general, would you say your health is: Fair   In general, would you say your quality of life is: Good   In general, how would you rate your physical health? Fair   In general, how would you rate your mental health, including your mood and your ability to think? Fair   In general, how would you rate your satisfaction with your social activities and relationships? Fair   In general, please rate how well you carry out your usual social activities and roles Good   To what extent are you able to carry out your everyday physical activities such as walking, climbing stairs, carrying groceries, or moving a chair? Not at all   In the past 7 days, how often have you been bothered by emotional problems such as feeling anxious, depressed, or irritable? Sometimes   In the past 7 days, how would you rate your fatigue on average? Severe   In the past 7 days, how would you rate your pain on average, where 0 means no pain, and 10 means worst imaginable pain? 0   In general, would you say your  health is: 2   In general, would you say your quality of life is: 3   In general, how would you rate your physical health? 2   In general, how would you rate your mental health, including your mood and your ability to think? 2   In general, how would you rate your satisfaction with your social activities and relationships? 2   In general, please rate how well you carry out your usual social activities and roles. (This includes activities at home, at work and in your community, and responsibilities as a parent, child, spouse, employee, friend, etc.) 3   To what extent are you able to carry out your everyday physical activities such as walking, climbing stairs, carrying groceries, or moving a chair? 1   In the past 7 days, how often have you been bothered by emotional problems such as feeling anxious, depressed, or irritable? 3   In the past 7 days, how would you rate your fatigue on average? 4   In the past 7 days, how would you rate your pain on average, where 0 means no pain, and 10 means worst imaginable pain? 0   Global Mental Health Score 10    Global Physical Health Score 10    PROMIS TOTAL - SUBSCORES 20        Patient-reported     [place] Suicide Severity Rating Scale (Lifetime/Recent)      9/26/2023    10:57 AM 2/26/2024     3:48 AM 3/18/2024    12:47 PM 5/1/2024     4:03 PM 7/2/2024     6:52 AM 9/23/2024     9:25 AM 2/17/2025    11:30 AM   [place] Suicide Severity Rating (Lifetime/Recent)   Q1 Wished to be Dead (Past Month)  0-->no 0-->no 0-->no 0-->no 0-->no    Q2 Suicidal Thoughts (Past Month)  0-->no 0-->no 0-->no 0-->no 0-->no    Q6 Suicide Behavior (Lifetime)  0-->no 0-->no 0-->no 0-->no 0-->no    Level of Risk per Screen  no risks indicated no risks indicated no risks indicated no risks indicated no risks indicated    1. Wish to be Dead (Lifetime) N      N   1. Wish to be Dead (Past 1 Month)       N   2. Non-Specific Active Suicidal Thoughts (Lifetime) N      N   2. Non-Specific Active Suicidal  "Thoughts (Past 1 Month)       N   Actual Attempt (Lifetime) N         Has subject engaged in non-suicidal self-injurious behavior? (Lifetime) N         Interrupted Attempts (Lifetime) N         Aborted or Self-Interrupted Attempt (Lifetime) N         Preparatory Acts or Behavior (Lifetime) N         Calculated C-SSRS Risk Score (Lifetime/Recent) No Risk Indicated      No Risk Indicated        Reason for Visit/Presenting Concern:  Anxiety    Current Stressors / Issues:   Patient having persistent anxiety and waking up at night recalling the past and wondering \"what did I do\". Discussed Putting Thoughts on Trial and ways to use that to reduce anxiety/rumination.      Therapeutic Interventions:  Cognitive Behavioral Therapy (CBT): Provided psycho-education on cognitive distortions. and Worked with patient to recognize irrational thought processes and identify more adaptive thoughts.      Response to treatment interventions:   Patient was receptive to interventions utilized.  Patient was engaged in the therapy process.       Progress on Treatment Objective(s) / Homework:   New Objective established this session - ACTION (Actively working towards change); Intervened by reinforcing change plan / affirming steps taken     Medication Review:   No changes to current psychiatric medication(s)     Medication Compliance:   Yes     Chemical Use Review:  Substance Use: Chemical use reviewed, no active concerns identified      Tobacco Use: No current tobacco use.       Assessment: Current Emotional / Mental Status (status of significant symptoms):    Risk status (Self / Other harm or suicidal ideation)   Patient denies a history of suicidal ideation, suicide attempts, self-injurious behavior, homicidal ideation, homicidal behavior, and and other safety concerns   Patient denies current fears or concerns for personal safety.   Patient denies current or recent suicidal ideation or behaviors.   Patient denies current or recent " homicidal ideation or behaviors.   Patient denies current or recent self injurious behavior or ideation.   Patient denies other safety concerns.   Recommended that patient call 911 or go to the local ED should there be a change in any of these risk factors      ASSESSMENT:   Mental Status:     Appearance:   Appropriate    Eye Contact:   Good    Psychomotor Behavior: Normal    Attitude:   Cooperative    Orientation:   All   Speech Rate / Production: Normal    Volume:   Normal    Mood:    Normal   Affect:    Appropriate    Thought Content:  Clear    Thought Form:  Coherent  Logical    Insight:    Good          Diagnoses:   Anxiety F41.9    Collateral Reports Completed:   Routed note to PCP       Plan: (Homework, other):   Patient was provided No indications of CD issues  Patient was given information about behavioral services and encouraged to schedule a follow up appointment with the clinic [facility] in 1 week.         Armando THOMPSON [facility]     _____________________________________________________________________________________________________________________________________                                              Individual Treatment Plan    Patient's Name: Juwan Latham   YOB: 1950  Date of Creation: 3/5/25  Date Treatment Plan Last Reviewed/Revised: 3/5/25    DSM5 Diagnoses: 300.02 (F41.1) Generalized Anxiety Disorder  Psychosocial / Contextual Factors: Medical Complexities and Relationship Concerns  PROMIS (reviewed every 90 days):   The following assessments were completed by patient for this visit:  PHQ9:       3/7/2024     9:46 AM 4/24/2024    10:37 AM 5/8/2024    10:14 AM 6/13/2024     1:39 PM 8/21/2024    10:21 AM 10/9/2024    10:05 AM 2/16/2025     5:51 PM   PHQ-9 SCORE   [facility]9 [facility] (Minimal depression) 5 (Mild depression) 7 (Mild depression) 7 (Mild depression) 5 (Mild depression) 5 (Mild depression) 5 (Mild depression)   PHQ-9 Total Score 3 5 7 7 5 5 5         Patient-reported     GAD7:       1/31/2023    11:02 AM 8/30/2023     2:07 PM 5/8/2024    10:15 AM 6/13/2024     1:40 PM 8/22/2024     1:21 PM 10/9/2024    10:07 AM 2/16/2025     5:54 PM   BENSON-7 SCORE   Total Score   1 (minimal anxiety) 3 (minimal anxiety)  2 (minimal anxiety) 4 (minimal anxiety)   Total Score 2 5 1 3 3 2 4        Patient-reported     CAGE-AID:       2/16/2025     5:55 PM   CAGE-AID Total Score   Total Score 0    Total Score MyChart 0 (A total score of 2 or greater is considered clinically significant)       Patient-reported     PROMIS 10-Global Health (all questions and answers displayed):       2/24/2025     2:49 PM   PROMIS 10   In general, would you say your health is: Fair   In general, would you say your quality of life is: Good   In general, how would you rate your physical health? Fair   In general, how would you rate your mental health, including your mood and your ability to think? Fair   In general, how would you rate your satisfaction with your social activities and relationships? Fair   In general, please rate how well you carry out your usual social activities and roles Good   To what extent are you able to carry out your everyday physical activities such as walking, climbing stairs, carrying groceries, or moving a chair? Not at all   In the past 7 days, how often have you been bothered by emotional problems such as feeling anxious, depressed, or irritable? Sometimes   In the past 7 days, how would you rate your fatigue on average? Severe   In the past 7 days, how would you rate your pain on average, where 0 means no pain, and 10 means worst imaginable pain? 0   In general, would you say your health is: 2   In general, would you say your quality of life is: 3   In general, how would you rate your physical health? 2   In general, how would you rate your mental health, including your mood and your ability to think? 2   In general, how would you rate your satisfaction with your social  activities and relationships? 2   In general, please rate how well you carry out your usual social activities and roles. (This includes activities at home, at work and in your community, and responsibilities as a parent, child, spouse, employee, friend, etc.) 3   To what extent are you able to carry out your everyday physical activities such as walking, climbing stairs, carrying groceries, or moving a chair? 1   In the past 7 days, how often have you been bothered by emotional problems such as feeling anxious, depressed, or irritable? 3   In the past 7 days, how would you rate your fatigue on average? 4   In the past 7 days, how would you rate your pain on average, where 0 means no pain, and 10 means worst imaginable pain? 0   Global Mental Health Score 10    Global Physical Health Score 10    PROMIS TOTAL - SUBSCORES 20        Patient-reported     [place] Suicide Severity Rating Scale (Lifetime/Recent)      9/26/2023    10:57 AM 2/26/2024     3:48 AM 3/18/2024    12:47 PM 5/1/2024     4:03 PM 7/2/2024     6:52 AM 9/23/2024     9:25 AM 2/17/2025    11:30 AM   [place] Suicide Severity Rating (Lifetime/Recent)   Q1 Wished to be Dead (Past Month)  0-->no 0-->no 0-->no 0-->no 0-->no    Q2 Suicidal Thoughts (Past Month)  0-->no 0-->no 0-->no 0-->no 0-->no    Q6 Suicide Behavior (Lifetime)  0-->no 0-->no 0-->no 0-->no 0-->no    Level of Risk per Screen  no risks indicated no risks indicated no risks indicated no risks indicated no risks indicated    1. Wish to be Dead (Lifetime) N      N   1. Wish to be Dead (Past 1 Month)       N   2. Non-Specific Active Suicidal Thoughts (Lifetime) N      N   2. Non-Specific Active Suicidal Thoughts (Past 1 Month)       N   Actual Attempt (Lifetime) N         Has subject engaged in non-suicidal self-injurious behavior? (Lifetime) N         Interrupted Attempts (Lifetime) N         Aborted or Self-Interrupted Attempt (Lifetime) N         Preparatory Acts or Behavior (Lifetime) N          Calculated C-SSRS Risk Score (Lifetime/Recent) No Risk Indicated      No Risk Indicated        Referral / Collaboration:  Referral to another professional/service is not indicated at this time..    Anticipated number of session for this episode of care:  6-9 sessions  Anticipation frequency of session: Weekly  Anticipated Duration of each session: 16-37 minutes  Treatment plan will be reviewed in 90 days or when goals have been changed.       MeasurableTreatment Goal(s) related to diagnosis / functional impairment(s)  Goal 1: Patient will improve management of anxiety symptoms as evidenced by reduced frequency, intensity and duration of anxiety symptoms. learn and implement coping skills that result in a reduction of anxiety and nervousness. improve daily functioning. recognize contributing factors of anxiety and identify ways to intervene. enhance ability to effectively cope with life anxieties as they occur.    I will know I've met my goal when I am able to stop my racing thoughts at night.      Status: New - Date: 3/5/25          Patient has reviewed and agreed to the above plan.     Written by  Armando THOMPSON, [facility] [place]

## 2025-03-10 ENCOUNTER — TELEPHONE (OUTPATIENT)
Dept: FAMILY MEDICINE | Facility: CLINIC | Age: 75
End: 2025-03-10
Payer: COMMERCIAL

## 2025-03-10 DIAGNOSIS — F41.9 ANXIETY: ICD-10-CM

## 2025-03-10 RX ORDER — ALPRAZOLAM 0.25 MG
0.25 TABLET ORAL
Qty: 10 TABLET | Refills: 0 | Status: SHIPPED | OUTPATIENT
Start: 2025-03-10

## 2025-03-10 NOTE — TELEPHONE ENCOUNTER
Called patient and explained providers note.     Temp today was 97.5   BP today was 143/79  Yesterday home BP was 118/67     Pulse today 1010 am :65    Patient states understanding and is agreeable to plan.    Patient states understanding, denied ?'s or concerns at this time

## 2025-03-10 NOTE — TELEPHONE ENCOUNTER
Provider Response to 2nd Level Triage Request    I have reviewed the RN documentation. My recommendation is:  Take today's dose of sertraline as instructed.  I refilled Xanax.  Agree with taking temp as well as BP and heart rate if he has a cuff at home.  Follow up with us tomorrow if not better and present to urgent care later today if feeling worse.    Please call patient.     Julio Guidry MD

## 2025-03-10 NOTE — TELEPHONE ENCOUNTER
Nurse Triage SBAR    Is this a 2nd Level Triage? YES, LICENSED PRACTITIONER REVIEW IS REQUIRED    Situation: shaking    Background: seen 1/23, 2/24 for anxiety    Assessment: no xanax yet this morning. Took xanax last night for shaking and yesterday morning.  Completely out now.     Haven't taken sertraline yet this morning.     Shaking, feel cold, from chest down is shaking  Have not checked temperature, can't find thermometer  BP was 118/67 today    No dizziness/lightheadedness.   Left temple area - moderate pain - a week on and off. Take tylenol once in a while.   No vision changes - eyes are watery.   No chest pain, sob  Having nausea, took famotidine and pantoprazole  No diarrhea or abdominal pain.   Stools are kind of dark, attributes to eating blueberries - saw GI 1/2/25  No urinary sx.     Protocol Recommended Disposition:   No disposition on file.    Recommendation: advised patient to see if he can find his thermometer for a temp and take his sertraline this morning.     Routing to provider to review and advise, if needs seen in clinic.     LUIS ENRIQUEZ RN on 3/10/2025 at 9:53 AM   [facility]         Does the patient meet one of the following criteria for ADS visit consideration? 16+ years old, with an [facility] PCP     TIP  Providers, please consider if this condition is appropriate for management at one of our Acute and Diagnostic Services sites.     If patient is a good candidate, please use dotphrase <dot>triageresponse and select Refer to ADS to document.

## 2025-03-21 NOTE — NURSING NOTE
Chief Complaint   Patient presents with     Biopsy     Princess Davis MA   Continued Stay Note  SHANT Valente     Patient Name: Matthieu Hawk  MRN: [id number]  Today's Date: 3/21/2025    Admit Date: 3/17/2025    Plan: From home with wife. Pending PT/OT evals post Kyphoplasty 3/21.   Discharge Plan       Row Name 03/21/25 1531       Plan    Plan Comments DC barriers: Pain control, kyphoplasty today, IV abx, PT/OT eval pending. Neuro surgery and diabetic educator following.                     Laura Mckeon RN     Office phone: [phone]  Office fax: [phone]

## 2025-03-25 ENCOUNTER — PATIENT OUTREACH (OUTPATIENT)
Dept: CARE COORDINATION | Facility: CLINIC | Age: 75
End: 2025-03-25
Payer: COMMERCIAL

## 2025-03-27 ENCOUNTER — OFFICE VISIT (OUTPATIENT)
Dept: FAMILY MEDICINE | Facility: CLINIC | Age: 75
End: 2025-03-27
Payer: COMMERCIAL

## 2025-03-27 ENCOUNTER — TELEPHONE (OUTPATIENT)
Dept: FAMILY MEDICINE | Facility: CLINIC | Age: 75
End: 2025-03-27

## 2025-03-27 VITALS
RESPIRATION RATE: 20 BRPM | HEART RATE: 80 BPM | DIASTOLIC BLOOD PRESSURE: 60 MMHG | SYSTOLIC BLOOD PRESSURE: 118 MMHG | WEIGHT: 168.2 LBS | BODY MASS INDEX: 24.84 KG/M2 | OXYGEN SATURATION: 98 % | TEMPERATURE: 98.6 F

## 2025-03-27 DIAGNOSIS — K76.0 FATTY LIVER: ICD-10-CM

## 2025-03-27 DIAGNOSIS — Z12.5 SCREENING FOR PROSTATE CANCER: ICD-10-CM

## 2025-03-27 DIAGNOSIS — K21.9 GASTROESOPHAGEAL REFLUX DISEASE, UNSPECIFIED WHETHER ESOPHAGITIS PRESENT: ICD-10-CM

## 2025-03-27 DIAGNOSIS — R10.13 EPIGASTRIC PAIN: ICD-10-CM

## 2025-03-27 DIAGNOSIS — F41.9 ANXIETY: Primary | ICD-10-CM

## 2025-03-27 LAB
ERYTHROCYTE [DISTWIDTH] IN BLOOD BY AUTOMATED COUNT: 13.7 % (ref 10–15)
HCT VFR BLD AUTO: 42.5 % (ref 40–53)
HGB BLD-MCNC: 14.2 G/DL (ref 13.3–17.7)
MCH RBC QN AUTO: 30.3 PG (ref 26.5–33)
MCHC RBC AUTO-ENTMCNC: 33.4 G/DL (ref 31.5–36.5)
MCV RBC AUTO: 91 FL (ref 78–100)
PLATELET # BLD AUTO: 199 10E3/UL (ref 150–450)
RBC # BLD AUTO: 4.68 10E6/UL (ref 4.4–5.9)
WBC # BLD AUTO: 7.3 10E3/UL (ref 4–11)

## 2025-03-27 ASSESSMENT — ANXIETY QUESTIONNAIRES
GAD7 TOTAL SCORE: 6
IF YOU CHECKED OFF ANY PROBLEMS ON THIS QUESTIONNAIRE, HOW DIFFICULT HAVE THESE PROBLEMS MADE IT FOR YOU TO DO YOUR WORK, TAKE CARE OF THINGS AT HOME, OR GET ALONG WITH OTHER PEOPLE: NOT DIFFICULT AT ALL
7. FEELING AFRAID AS IF SOMETHING AWFUL MIGHT HAPPEN: SEVERAL DAYS
GAD7 TOTAL SCORE: 6
1. FEELING NERVOUS, ANXIOUS, OR ON EDGE: SEVERAL DAYS
6. BECOMING EASILY ANNOYED OR IRRITABLE: NOT AT ALL
7. FEELING AFRAID AS IF SOMETHING AWFUL MIGHT HAPPEN: SEVERAL DAYS
GAD7 TOTAL SCORE: 6
5. BEING SO RESTLESS THAT IT IS HARD TO SIT STILL: SEVERAL DAYS
3. WORRYING TOO MUCH ABOUT DIFFERENT THINGS: SEVERAL DAYS
4. TROUBLE RELAXING: SEVERAL DAYS
8. IF YOU CHECKED OFF ANY PROBLEMS, HOW DIFFICULT HAVE THESE MADE IT FOR YOU TO DO YOUR WORK, TAKE CARE OF THINGS AT HOME, OR GET ALONG WITH OTHER PEOPLE?: NOT DIFFICULT AT ALL
2. NOT BEING ABLE TO STOP OR CONTROL WORRYING: SEVERAL DAYS

## 2025-03-27 NOTE — TELEPHONE ENCOUNTER
Pt states he thinks he is having side effects from the Sertraline 100 mg.   It doesn't matter if he takes one or two. The symptoms are worse, today he took 2 and feeling worse.     Having nausea, feeling crappy, lightheaded, still feeling depressed. Feels like vibrating inside.     Going to go for walk see if that helps.     Took Alprazolam today has not helped. Maybe a little more mellow. It usually helps.     PT saw Frances Tapia PA-C for his Anxiety on 2/24/25, and 1/23/25.   His symptoms were discussed at Feb OV. Not improved at all.     BP is fine, 118/85.     Please advise, if should schedule follow up?

## 2025-03-27 NOTE — PROGRESS NOTES
Assessment & Plan     Anxiety  Discussed patient that he importance of taking sertraline daily and not missing any doses.  This medication can have side effects/withdrawal symptoms if not taken regularly.  Patient did recently missed 3 doses of this medication.  Patient reports that he is looking forward to the nice weather coming up and being able to walk outside.  Walking does help with his anxiety.  TSH was checked at January visit and was normal.  Plan to continue sertraline 200 mg daily and encouraged follow-up with his therapist.  Notably, his upcoming appointments with psychotherapy is with a different provider than he was previously seeing.    Gastroesophageal reflux disease, unspecified whether esophagitis present  Epigastric pain  Fatty liver  Abdominal exam normal today.  He does report GERD and epigastric discomfort, but is reassuring that he has had normal stool patterns and consistency with no black or bloody stools and no episodes of vomiting.  Patient did have acute gastric hemorrhage in December 2024.  Will obtain CBC to rule out anemia contributing to fatigue and epigastric discomfort.  Patient was previously seeing providers at [facility], but would like to switch over to [facility].  Will place referral today.  Upon chart review it does look like he has an abdominal ultrasound scheduled for 4/8.  - Adult GI  Referral - Consult Only  - CBC with platelets    Screening for prostate cancer  Due.  Labs obtained today.  - PROSTATE SPEC ANTIGEN SCREEN    Patient should follow up after seeing GI or sooner for new or worsening symptoms. All questions answered to patient's satisfaction. Warning signs of when to seek emergency care were discussed.     Frances Tapia PA-C    38 minutes spent by me on the date of the encounter doing chart review, history and exam, documentation and further activities per the note      Subjective   Juwan is a 74 year old, presenting for the following health issues:  Medication  "Problem        3/27/2025     4:31 PM   Additional Questions   Roomed by ciro ashley   Accompanied by self     History of Present Illness       Reason for visit:  Check me over He is missing 1 dose(s) of medications per week.      Juwan is a 74 year old male who presents today for follow up of his anxiety.     History of anxiety and nausea/GERD.  He sees behavioral health frequently, but is unsure if he wants to continue seeing a therapist.  He is unsure if seeing therapy has been helpful.  I see that he has follow-up appointments scheduled for 5/1 and 5/8.    He is currently prescribed sertraline 200 mg daily.  He believes he is having side effects of this medication-endorsing nausea, \"feeling crappy \", lightheadedness, and still feels depressed.  Notably though, patient has been on sertraline or other SSRI since 2011.  Sertraline has been prescribed as far back as 2016.  He does share that he has not been taking the sertraline consistently and missed about 3 days of this medication recently.  He reports not needing to take Xanax very often and shares that he has 8 of the 10 Xanax left since this was last prescribed in January.    He also notes feeling fatigued after eating.  He felt fatigued this morning and later this afternoon.  He is following a FODMAP diet and is regularly eating healthy foods such as avocado and broccoli per patient.    He does note some epigastric discomfort which settles into his gut.  Denies nausea, vomiting, diarrhea, or abnormal stools.  Denies black or bloody stools.  Reports that he is taking both famotidine and Protonix as prescribed.  Patient also has Carafate to use up to 4 times daily for nausea.  He does see [facility] but would prefer to switch over to [facility] for all aspects of his care.    At the end of the visit patient shares that he is excited about the nice weather coming up and looks forward to going for more walks outside.    Triage note 3/27/25  Pt states he thinks he is having " side effects from the Sertraline 100 mg.   It doesn't matter if he takes one or two. The symptoms are worse, today he took 2 and feeling worse.      Having nausea, feeling crappy, lightheaded, still feeling depressed. Feels like vibrating inside.      Going to go for walk see if that helps.      Took Alprazolam today has not helped. Maybe a little more mellow. It usually helps.      PT saw Frances Antoinevaibhav FERNANDEZ for his Anxiety on 2/24/25, and 1/23/25.   His symptoms were discussed at Feb OV. Not improved at all.      BP is fine, 118/85.            Review of Systems  Constitutional, neuro, ENT, endocrine, pulmonary, cardiac, gastrointestinal, genitourinary, musculoskeletal, integument and psychiatric systems are negative, except as otherwise noted.    Objective    /60   Pulse 80   Temp 98.6  F (37  C) (Tympanic)   Resp 20   Wt 76.3 kg (168 lb 3.2 oz)   SpO2 98%   BMI 24.84 kg/m    Body mass index is 24.84 kg/m .  Physical Exam   GENERAL: alert and no distress  EYES: Eyes grossly normal to inspection, PERRL and conjunctivae and sclerae normal  NECK: no adenopathy, no asymmetry, masses, or scars  RESP: lungs clear to auscultation - no rales, rhonchi or wheezes  CV: regular rate and rhythm, normal S1 S2, no S3 or S4, no murmur, click or rub, no peripheral edema  ABDOMEN: soft, nontender, no hepatosplenomegaly, no masses and bowel sounds normal  MS: no gross musculoskeletal defects noted, no edema  SKIN: no suspicious lesions or rashes  PSYCH: mentation appears normal, affect bright/anxious, and appearance well groomed.    Results for orders placed or performed in visit on 03/27/25 (from the past 24 hours)   CBC with platelets   Result Value Ref Range    WBC Count 7.3 4.0 - 11.0 10e3/uL    RBC Count 4.68 4.40 - 5.90 10e6/uL    Hemoglobin 14.2 13.3 - 17.7 g/dL    Hematocrit 42.5 40.0 - 53.0 %    MCV 91 78 - 100 fL    MCH 30.3 26.5 - 33.0 pg    MCHC 33.4 31.5 - 36.5 g/dL    RDW 13.7 10.0 - 15.0 %    Platelet Count  199 182 - 450 10e3/uL     *Note: Due to a large number of results and/or encounters for the requested time period, some results have not been displayed. A complete set of results can be found in Results Review.       Signed Electronically by: Frances aTpia PA-C

## 2025-03-27 NOTE — TELEPHONE ENCOUNTER
Yes pt should be seen for this. I have lots of openings today and tomorrow if he would like to be seen in [place].

## 2025-03-27 NOTE — TELEPHONE ENCOUNTER
Called # [phone] and spoke with patient    Advised that Frances Tapia would like to see patient    Scheduled for follow up today    Future Appointments 3/27/2025 - 9/23/2025        Date Visit Type Length Department Provider     3/27/2025  4:30 PM OFFICE VISIT 30 min  FAMILY PRACTICE  Arrive at:  FAMILY PRACTICE Frances Tapia PA-C    Location Instructions:     [facility] is located at [address] [place] [address], along [address]. Free parking is available; access the lot by turning north from [address] onto [address], then west onto [facility].              4/8/2025  9:20 AM US ABDOMEN LIMITED 30 min RH ULTRASOUND RSCC [id number]    Location Instructions:     [facility] [address] [place] [address]  Parking Imaging customers can park either in the lot to the right side of the driveway (opposite the parking ramp) as you approach the Specialty Care Center, or in the parking ramp.  Entrance and check-in location Enter at the front entrance (pictured to the right). As you enter the lobby, the Imaging Center is on the first floor, just past the elevators. Please check-in for your appointment at the desk in the Imaging Center waiting area, [address]  This appointment is in a hospital-based location.&nbsp; Before your visit, you may want to check with your insurance company for coverage and referral options, including cost differences between services provided in different clinic settings.&nbsp; For more information visit this link on the [facility] Website:&nbsp; [email or web address]              4/24/2025 10:30 AM RETURN PATIENT 30 min [facility] UROLOGY Jaquan Latham PA-C    Location Instructions:     Due to road [place] on [address], travel times to this location may be longer than usual. Please plan for extra travel time and check the [place] Department of [employer] [address] project website for delay,  closure, and detour information.  The [facility] ([facility]) is in a dense urban area with multiple transportation and parking options. You may wish to review options for  service and self-parking in more detail on the [facility] s website at [email or web address].              5/1/2025  2:00 PM ADULT PSYCHOTHERAPY NEW 60 min [facility]              5/8/2025 10:00 AM ADULT PSYCHOTHERAPY RETURN 60 min [facility]                   Patient stated he could arrive by by the arrival time of 4:10 today    Justyna Barber RN on 3/27/2025 at 3:54 PM   [facility]

## 2025-04-03 DIAGNOSIS — F41.9 ANXIETY: ICD-10-CM

## 2025-04-04 NOTE — TELEPHONE ENCOUNTER
Pt calling in regards to refill request. Please advise.    Pt still experiencing some anxiety due to recent health challenges - goes to [place]    Reports he does take his sertraline daily as prescribed, and uses Xanax every so often which helps. Please advise.

## 2025-04-07 RX ORDER — ALPRAZOLAM 0.25 MG
0.25 TABLET ORAL
Qty: 10 TABLET | Refills: 0 | Status: SHIPPED | OUTPATIENT
Start: 2025-04-07

## 2025-04-07 NOTE — TELEPHONE ENCOUNTER
PDMP reviewed.  Chart reviewed.  Rx sent to pt's preferred pharmacy.    [id number] [address] AT [facility] [address]    Julio Guidry MD

## 2025-04-08 ENCOUNTER — PATIENT OUTREACH (OUTPATIENT)
Dept: CARE COORDINATION | Facility: CLINIC | Age: 75
End: 2025-04-08
Payer: COMMERCIAL

## 2025-04-08 ENCOUNTER — HOSPITAL ENCOUNTER (OUTPATIENT)
Dept: ULTRASOUND IMAGING | Facility: CLINIC | Age: 75
Discharge: HOME | End: 2025-04-08
Attending: INTERNAL MEDICINE | Admitting: INTERNAL MEDICINE
Payer: COMMERCIAL

## 2025-04-08 DIAGNOSIS — R11.0 NAUSEA: ICD-10-CM

## 2025-04-08 DIAGNOSIS — K75.81 NASH (NONALCOHOLIC STEATOHEPATITIS): ICD-10-CM

## 2025-04-08 DIAGNOSIS — R53.83 FATIGUE, UNSPECIFIED TYPE: ICD-10-CM

## 2025-04-08 DIAGNOSIS — R03.0 ELEVATED BLOOD PRESSURE READING WITHOUT DIAGNOSIS OF HYPERTENSION: Primary | ICD-10-CM

## 2025-04-08 PROCEDURE — 76705 ECHO EXAM OF ABDOMEN: CPT

## 2025-04-09 ENCOUNTER — TRANSFERRED RECORDS (OUTPATIENT)
Dept: ADMINISTRATIVE | Facility: CLINIC | Age: 75
End: 2025-04-09
Payer: COMMERCIAL

## 2025-04-10 NOTE — PROGRESS NOTES
2025        Juwan Latham   [address] [place] [address]      Juwan Latham,  :  1950    I am writing to let you know the results of the tests that were done the other day.   Thank you for allowing [facility] the opportunity to take part in your healthcare.  At [facility] we strive to provide each patient with the finest gastroenterology care available.  We hope your experience was pleasant and informative.    The ultrasound of your liver shows diffuse fatty liver.    I hope you are feeling well.        Thank you.    Electronically signed by:  Kayla Clayton MD 2025 08:35 AM  Document generated by:  Kayla Clayton MD  2025  If your provider ordered multiple tests; the results may not become available at the same time.  If multiple test results are received within 14 days of one another, you may receive a duplicate.  cc:  Asad White DO

## 2025-04-14 ENCOUNTER — PRE VISIT (OUTPATIENT)
Dept: UROLOGY | Facility: CLINIC | Age: 75
End: 2025-04-14
Payer: COMMERCIAL

## 2025-04-14 NOTE — TELEPHONE ENCOUNTER
Reason for visit: Return     Relevant information: 6 month follow up, post prostatectomy stress incontinence    Records/imaging/labs/orders: PSA done 3/27/25, CMP done 1/23/25    At Rooming: Virtual visit      Ayan Lopez  4/14/2025  3:08 PM

## 2025-04-18 NOTE — PROGRESS NOTES
Virtual Visit Details    Type of service:  Video Visit   Video Start Time: 10:13 AM  Video End Time:10:20 AM    Originating Location (pt. Location): Home    Distant Location (provider location):  Off-site  Platform used for Video Visit: [facility]    Visit conducted via real-time audio/video technology by Jaquan Latham PA-C to the patient in their home.    Subjective      REASON FOR VISIT  Post-prostatectomy stress incontinence follow-up     HISTORY OF PRESENT ILLNESS  Mr. Latham is a 74 year old male I am speaking with today in follow-up for his bothersome post-prostatectomy stress incontinence in the setting of [place] 4+5 prostate cancer status post radical ostectomy in 2017 with adjuvant external beam radiation therapy.  This led to complications in the form of a membranous urethral stricture status post Optilume dilation with Dr. Chan on 3/18/2024 as well as gross hematuria status post workup in June 2024.    When I met with Mr. Latham on 10/25/2024, his most bothersome issue was post prostatectomy stress incontinence, so plan was to refer him to pelvic floor physical therapy per his request after reviewing different treatment and management options with a follow-up visit with me 6 months later to discuss efficacy.    Today:  Pelvic floor physical therapy helped some, but still is leaking quite a bit, specifically at night  Overall not     Objective      PHYSICAL EXAMINATION  Deferred given virtual visit.    LABS   Latest Reference Range & Units 02/25/24 22:54 04/24/24 11:55 05/01/24 17:45 06/13/24 15:33 12/23/24 16:00   Color Urine Colorless, Straw, Light Yellow, Yellow  Dark Brown ! Yellow Light Yellow Yellow Light Yellow   Appearance Urine Clear  Cloudy ! Clear Clear Clear Slightly Cloudy !   Glucose Urine Negative mg/dL Negative Negative Negative Negative Negative   Bilirubin Urine Negative  Negative Negative Negative Negative Negative   Ketones Urine Negative mg/dL Trace ! Negative Trace ! Negative  Negative   Specific Gravity Urine 1.003 - 1.035  1.019 >=1.030 1.019 1.020 1.013   pH Urine 5.0 - 7.0  6.0 6.0 5.5 7.5 (H) 7.5 (H)   Protein Albumin Urine Negative mg/dL 100 ! Negative Negative Negative Negative   Urobilinogen mg/dL Normal, 2.0 mg/dL Normal  Normal  Normal   Urobilinogen Urine 0.2, 1.0 E.U./dL  0.2  0.2    Nitrite Urine Negative  Negative Negative Negative Negative Negative   Blood Urine Negative  Large ! Trace ! Negative Negative Negative   Leukocyte Esterase Urine Negative  Trace ! Negative Negative Negative Negative   WBC Urine <=5 /HPF 24 (H) 0-5 1  3   RBC Urine <=2 /HPF >182 (H) 0-2 3 (H)  1   Bacteria Urine None Seen /HPF  Few !      Squamous Epithelial /LPF Urine None Seen /LPF  Few !      Mucus Urine None Seen /LPF   Present !  Present !   Amorphous Crystals None Seen /HPF     Few !   !: Data is abnormal  (H): Data is abnormally high    IMAGING  I personally reviewed the below CT abdomen pelvis with IV contrast and agree that there appears to be a small stone in the left moiety of Lilo horseshoe kidney    Narrative & Impression   EXAM: CT ABDOMEN PELVIS W CONTRAST  LOCATION: [facility]  DATE: 12/23/2024     INDICATION: right sided abdominal pain, elevated lipase, evaluate pancreas  COMPARISON: 6/14/2024  TECHNIQUE: CT scan of the abdomen and pelvis was performed following injection of IV contrast. Multiplanar reformats were obtained. Dose reduction techniques were used.  CONTRAST: 84mL Isovue 370     FINDINGS:   LOWER CHEST: Plaque-like calcifications associated with right hemidiaphragm unchanged.     HEPATOBILIARY: Interval cholecystectomy.     PANCREAS: Normal. No inflammatory changes.     SPLEEN: Unchanged with small presumed hyperdense hemangioma.     ADRENAL GLANDS: Normal.     KIDNEYS/BLADDER: Stable appearance of horseshoe kidney, no hydronephrosis. 4 mm stone within left side moiety. Renal cysts need no dedicated follow-up.     BOWEL: No obstruction or  inflammatory change. Appendix normal.     LYMPH NODES: No lymphadenopathy.     VASCULATURE: Normal.     PELVIC ORGANS: Prostatectomy. There is a wisp of free fluid in the pelvis similar to previous exam though etiology is not apparent.     MUSCULOSKELETAL: A few tiny nonspecific sclerotic foci present within the pelvis and unchanged.                                                                      IMPRESSION:   1.  No etiology for symptoms evident. Pancreas appears normal.  2.  Interval cholecystectomy, no complication suggested.  3.  Stable appearance of horseshoe kidney, no hydronephrosis. Small left-sided kidney stone unchanged.  4.  Bowel and appendix unremarkable.  5.  Wisp of ascites in the pelvis unchanged.     Assessment & Plan    Post-prostatectomy stress incontinence   [place] 4+5 prostate cancer status post radical prostatectomy in 2017 with adjuvant radiation therapy  Membranous urethral stricture status post Optilume dilation in March 2024  History of hematuria  Nephrolithiasis  Horseshoe kidney    It was my pleasure to speak with Mr. Latham in follow-up for his bothersome post-prostatectomy stress incontinence in the setting of his [place] 4+5 prostate cancer status post prostatectomy in 2017 with adjuvant radiation therapy.  After reviewing his clinical history, I am sorry to hear that the PT was not as successful as we were hoping for the stress incontinence, but it is a reasonable to continue to observe versus moving forward with any of the larger procedures or surgeries we have previously discussed.  For now I do not believe we need to see him back any more often than once a year to review his symptoms.    We did discuss his recent workup in December showing evidence of a left sided nonobstructing kidney stone but no evidence of microscopic hematuria.  With this in mind, we will also continue to observe for persistent microscopic hematuria with a repeat urinalysis in 1 year.    Mr. Latham  expressed understanding and agreement to the above discussion and plan and all of his questions were answered to his satisfaction.     PLAN  Repeat urinalysis in 1 year with follow-up visit with me shortly afterwards to discuss results  Observation of post prostatectomy stress incontinence and urethral stricture status post Optilume dilation in March 2024    SIGNED    Jaquan Latham PA-C      I spent a total of 12 minutes spent on the date of the encounter doing chart review, history and exam, documentation, and further activities as noted above.

## 2025-04-21 ENCOUNTER — NURSE TRIAGE (OUTPATIENT)
Dept: FAMILY MEDICINE | Facility: CLINIC | Age: 75
End: 2025-04-21

## 2025-04-21 ENCOUNTER — VIRTUAL VISIT (OUTPATIENT)
Dept: FAMILY MEDICINE | Facility: CLINIC | Age: 75
End: 2025-04-21
Payer: COMMERCIAL

## 2025-04-21 DIAGNOSIS — F41.9 ANXIETY: Primary | ICD-10-CM

## 2025-04-21 DIAGNOSIS — K21.00 GASTROESOPHAGEAL REFLUX DISEASE WITH ESOPHAGITIS, UNSPECIFIED WHETHER HEMORRHAGE: ICD-10-CM

## 2025-04-21 DIAGNOSIS — R11.0 NAUSEA: ICD-10-CM

## 2025-04-21 PROCEDURE — 98006 SYNCH AUDIO-VIDEO EST MOD 30: CPT

## 2025-04-21 RX ORDER — HYDROXYZINE HYDROCHLORIDE 25 MG/1
25 TABLET, FILM COATED ORAL 3 TIMES DAILY PRN
Qty: 60 TABLET | Refills: 0 | Status: SHIPPED | OUTPATIENT
Start: 2025-04-21

## 2025-04-21 RX ORDER — PANTOPRAZOLE SODIUM 40 MG/1
40 TABLET, DELAYED RELEASE ORAL DAILY
Qty: 180 TABLET | Refills: 0 | Status: SHIPPED | OUTPATIENT
Start: 2025-04-21 | End: 2025-10-18

## 2025-04-21 ASSESSMENT — ANXIETY QUESTIONNAIRES
8. IF YOU CHECKED OFF ANY PROBLEMS, HOW DIFFICULT HAVE THESE MADE IT FOR YOU TO DO YOUR WORK, TAKE CARE OF THINGS AT HOME, OR GET ALONG WITH OTHER PEOPLE?: NOT DIFFICULT AT ALL
6. BECOMING EASILY ANNOYED OR IRRITABLE: NOT AT ALL
6. BECOMING EASILY ANNOYED OR IRRITABLE: NOT AT ALL
2. NOT BEING ABLE TO STOP OR CONTROL WORRYING: NOT AT ALL
IF YOU CHECKED OFF ANY PROBLEMS ON THIS QUESTIONNAIRE, HOW DIFFICULT HAVE THESE PROBLEMS MADE IT FOR YOU TO DO YOUR WORK, TAKE CARE OF THINGS AT HOME, OR GET ALONG WITH OTHER PEOPLE: NOT DIFFICULT AT ALL
4. TROUBLE RELAXING: NOT AT ALL
GAD7 TOTAL SCORE: 5
1. FEELING NERVOUS, ANXIOUS, OR ON EDGE: MORE THAN HALF THE DAYS
5. BEING SO RESTLESS THAT IT IS HARD TO SIT STILL: NOT AT ALL
7. FEELING AFRAID AS IF SOMETHING AWFUL MIGHT HAPPEN: MORE THAN HALF THE DAYS
IF YOU CHECKED OFF ANY PROBLEMS ON THIS QUESTIONNAIRE, HOW DIFFICULT HAVE THESE PROBLEMS MADE IT FOR YOU TO DO YOUR WORK, TAKE CARE OF THINGS AT HOME, OR GET ALONG WITH OTHER PEOPLE: NOT DIFFICULT AT ALL
5. BEING SO RESTLESS THAT IT IS HARD TO SIT STILL: NOT AT ALL
GAD7 TOTAL SCORE: 5
3. WORRYING TOO MUCH ABOUT DIFFERENT THINGS: SEVERAL DAYS
GAD7 TOTAL SCORE: 5
GAD7 TOTAL SCORE: 5
3. WORRYING TOO MUCH ABOUT DIFFERENT THINGS: SEVERAL DAYS
4. TROUBLE RELAXING: NOT AT ALL
7. FEELING AFRAID AS IF SOMETHING AWFUL MIGHT HAPPEN: MORE THAN HALF THE DAYS
2. NOT BEING ABLE TO STOP OR CONTROL WORRYING: NOT AT ALL
1. FEELING NERVOUS, ANXIOUS, OR ON EDGE: MORE THAN HALF THE DAYS
7. FEELING AFRAID AS IF SOMETHING AWFUL MIGHT HAPPEN: MORE THAN HALF THE DAYS

## 2025-04-21 NOTE — PATIENT INSTRUCTIONS
Start taking 200mg (two tablets) of sertraline daily to help with anxiety.     Start taking hydroxyzine up to 3 times a day to help with anxiety attacks. This medicine may make you fell drowsy.     See if the counseling service near your house has openings for therapy visits.

## 2025-04-21 NOTE — PROGRESS NOTES
Juwan is a 74 year old who is being evaluated via a billable video visit.    How would you like to obtain your AVS? MyChart  If the video visit is dropped, the invitation should be resent by: Text to cell phone: [phone]  Will anyone else be joining your video visit? Yes. How would they like to receive their invitation? Text to cell phone: [phone]      Assessment & Plan     Anxiety  Discussed taking the full 200mg of sertraline daily even if he is feeling better. Will send hydroxyzine to be taken up to three times a day for anxiety. Discussed that this medication may make him feel drowsy and that he should not take it if he needs to drive or operate machinery.   - hydrOXYzine HCl (ATARAX) 25 MG tablet  Dispense: 60 tablet; Refill: 0    Nausea  Gastroesophageal reflux disease with esophagitis, unspecified whether hemorrhage  Refills requested of Protonix and these were sent today. Pt shares he was supposed to have a breath test done by [facility] and would like the orders to come through [place]. I am unable to see these notes, but given his ongoing nausea, will send orders for h.pylori stool testing.   - pantoprazole (PROTONIX) 40 MG EC tablet  Dispense: 180 tablet; Refill: 0  - Helicobacter pylori Antigen Stool    Patient should follow up with PCP on 4/30 or sooner for new or worsening symptoms. All questions answered to patient's satisfaction. Warning signs of when to seek emergency care were discussed.     31 minutes spent by me on the date of the encounter doing chart review, history and exam, documentation and further activities per the note    Frances Tapia PA-C    The longitudinal plan of care for the diagnosis(es)/condition(s) as documented were addressed during this visit. Due to the added complexity in care, I will continue to support Juwan in the subsequent management and with ongoing continuity of care.    Subjective   Juwan is a 74 year old, presenting for the following health issues:  Anxiety         "4/21/2025     3:26 PM   Additional Questions   Roomed by ciro ashley   Accompanied by self     Video Start Time:  3:51pm      History of Present Illness       Mental Health Follow-up:  Patient presents to follow-up on Depression & Anxiety.Patient's depression since last visit has been:  Bad  The patient is not having other symptoms associated with depression.  Patient's anxiety since last visit has been:  Bad  The patient is not having other symptoms associated with anxiety.  Any significant life events: relationship concerns  Patient is not feeling anxious or having panic attacks.  Patient has no concerns about alcohol or drug use. He is missing 1 dose(s) of medications per week.  He is not taking prescribed medications regularly due to other.      Juwan is a 74 year old male who presents today with anxiety.     States that his anxiety came over him again this morning and has been present all day. Unsure if anything brought on the anxiety. Feels weak. Depression seems to be worse. Feels like he is in a bad dream. He has had a few bad dreams and is wondering if this impacting his mood. Feels scared.     Has been taking 1.5 of the sertraline. Did not want to take 200mg. He cut back because he was feeling a little better.     Took a xanax this morning and one over the weekend.     Situation:   Patient calls with extreme anxiety      Patient is shaking and freezing   Symptoms started this AM   \"Feels like a bad dream\"  Patient is feeling weak      Patient's main symptom right now is anxiety. Took his xanax this AM for anxiety      Background:   LOV 3/27/25 for anxiety with Frances Tapia      Assessment:   Patient reports extreme anxiety.   Denies suicidal thoughts or actions.     Patient is tense and restless, reports feeling weak and \"scared.   Symptoms started this morning and are \"severe, worst I've had\"  Patient is doing normal activities    Nothing has helped or relaxed him, but reports taking a nap for a couple of " "hours today.      BP on phone 134/75  Reports shaking   Patient took 1.5 tablets of Zoloft this morning   Denies chest pain, palpitations or fast heart beat, difficulty breathing, diarrhea or sweating. Denies weakness and lightheadedness  Patient endorses depression and nausea      No trouble sleeping, slept good last night   Advised patient for UC, but patient doesn't feel safe enough to drive      Protocol Recommended Disposition:   Go To ED/UCC Now (Or To Office With PCP Approval)     Recommendation:  Advised ED/UC, patient did not feel safe driving. Huddled with Frances Tapia who previously saw the patient and agreed to see him  Reviewed care advice  Advised patient that he would be getting called soon for rooming.  Spoke to [facility] to room patient immediately.    Patient agreed with plan to be seen.   Advised to call 911 with any worsening symptoms or thoughts of suicide/self harm    Answer Assessment - Initial Assessment Questions  1. CONCERN: \"Did anything happen that prompted you to call today?\"       Just feeling super anxious   2. ANXIETY SYMPTOMS: \"Can you describe how you (your loved one; patient) have been feeling?\" (e.g., tense, restless, panicky, anxious, keyed up, overwhelmed, sense of impending doom).       Patient is tense and restless. He reports feeling weak and scary   3. ONSET: \"How long have you been feeling this way?\" (e.g., hours, days, weeks)      Symptoms started this morning   4. SEVERITY: \"How would you rate the level of anxiety?\" (e.g., 0 - 10; or mild, moderate, severe).      Severe, worst he's had   5. FUNCTIONAL IMPAIRMENT: \"How have these feelings affected your ability to do daily activities?\" \"Have you had more difficulty than usual doing your normal daily activities?\" (e.g., getting better, same, worse; self-care, school, work, interactions)      Doing normal activities   6. HISTORY: \"Have you felt this way before?\" \"Have you ever been diagnosed with an anxiety problem in the past?\" " "(e.g., generalized anxiety disorder, panic attacks, PTSD). If Yes, ask: \"How was this problem treated?\" (e.g., medicines, counseling, etc.)      History of anxiety, took xanax and Zoloft this AM   7. RISK OF HARM - SUICIDAL IDEATION: \"Do you ever have thoughts of hurting or killing yourself?\" If Yes, ask:  \"Do you have these feelings now?\" \"Do you have a plan on how you would do this?\"      Patient denies suicidal thoughts or hurting himself   8. TREATMENT:  \"What has been done so far to treat this anxiety?\" (e.g., medicines, relaxation strategies). \"What has helped?\"      Nothing has helped or relaxed him, but reports taking a nap for a couple of hours today.   9. THERAPIST: \"Do you have a counselor or therapist?\" If Yes, ask: \"What is their name?\"      Did but no longer does   10. POTENTIAL TRIGGERS: \"Do you drink caffeinated beverages (e.g., coffee, selma, teas), and how much daily?\" \"Do you drink alcohol or use any drugs?\" \"Have you started any new medicines recently?\"        Patient had a cup of coffee this AM but no alcohol today.   11. PATIENT SUPPORT: \"Who is with you now?\" \"Who do you live with?\" \"Do you have family or friends who you can talk to?\"         Patient is with two friends Vik and Saurabh. Can talk to them about anxiety  12. OTHER SYMPTOMS: \"Do you have any other symptoms?\" (e.g., feeling depressed, trouble concentrating, trouble sleeping, trouble breathing, palpitations or fast heartbeat, chest pain, sweating, nausea, or diarrhea)        BP on phone 134/75  Reports shaking   Patient took 1.5 tablets of Zoloft this morning   Denies chest pain, palpitations or fast heart beat, difficulty breathing, diarrhea or sweating. Denies weakness and lightheadedness  Patient endorses depression and nausea   No trouble sleeping, slept good last night   Patient doesn't feel safe enough to drive         10/9/2024    10:05 AM 2/16/2025     5:51 PM 3/27/2025     4:29 PM   PHQ   PHQ-9 Total Score 5 5  5    Q9: " Thoughts of better off dead/self-harm past 2 weeks Not at all Not at all Not at all       Patient-reported         3/27/2025     4:30 PM 4/21/2025     3:35 PM 4/21/2025     3:37 PM   BENSON-7 SCORE   Total Score 6 (mild anxiety)  5 (mild anxiety)   Total Score 6  5  5        Patient-reported       Review of Systems  Constitutional, neuro, ENT, endocrine, pulmonary, cardiac, gastrointestinal, genitourinary, musculoskeletal, integument and psychiatric systems are negative, except as otherwise noted.    Past Medical History:   Diagnosis Date    Anxiety     Arthritis     fingers, hips    Calculus of kidney 2005, 2012    CARDIOVASCULAR SCREENING; LDL GOAL LESS THAN 130 04/18/2023    Colon polyps     Congenital single kidney     Gastroesophageal reflux disease     Hx of cancer of lung 03/2017    wedge resection    Hypertension     Prostate cancer (H) 08/2017    prostatectomy/Rad Tx    Seasonal allergies     spring and fall    Sleep apnea     cpap     Past Surgical History:   Procedure Laterality Date    BRONCHOSCOPY FLEXIBLE N/A 03/03/2017    Procedure: BRONCHOSCOPY FLEXIBLE;  Surgeon: Maria A Desai MD;  Location: [facility] OR    COLONOSCOPY N/A 02/11/2015    Procedure: COLONOSCOPY;  Surgeon: Murphy Jesus MD;  Location:  GI    COLONOSCOPY N/A 12/11/2019    Procedure: COLONOSCOPY;  Surgeon: Murphy Jesus MD;  Location:  GI    COLONOSCOPY N/A 01/25/2022    due 5 yrs - COLONOSCOPY, FLEXIBLE, WITH POLYPECTOMY  USING COLD SNARE;  Surgeon: Neha Rosen MD;  Location:  GI    COLONOSCOPY N/A 9/23/2024    Procedure: Colonoscopy;  Surgeon: Neha Rosen MD;  Location: [facility]    COMBINED CYSTOSCOPY, RETROGRADES, URETEROSCOPY, LASER HOLMIUM LITHOTRIPSY URETER(S), INSERT STENT N/A 03/25/2018    Procedure: COMBINED CYSTOSCOPY, RETROGRADES, URETEROSCOPY, LASER HOLMIUM LITHOTRIPSY URETER(S), INSERT STENT;  Cystoscopy, Catheter Exchange under Anesthesia;  Surgeon: Zaria Cain MD;  Location:  OR     CYSTOSCOPY, DILATE URETHRA, COMBINED N/A 01/03/2022    Procedure: CYSTOSCOPY, WITH URETHRAL DILATION WITH FULGERATION OF BLADDER WALL;  Surgeon: Mike Chan MD;  Location: [facility] OR    CYSTOSCOPY, WITH URETHRAL CHEMOTHERAPY STENT INSERTION N/A 3/18/2024    Procedure: CYSTOSCOPY, RETROGRADE WITH OPTILUME URETHRAL DILATION BALLOON;  Surgeon: Mike Chan MD;  Location: [facility] OR    DAVINCI PROSTATECTOMY N/A 12/01/2017    Procedure: DAVINCI PROSTATECTOMY;  Robotic Assisted Radical Prostatectomy and Pelvic Lymph Node Dissection ;  Surgeon: Paulo Agarwal MD;  Location: [facility] OR    ESOPHAGOSCOPY, GASTROSCOPY, DUODENOSCOPY (EGD), COMBINED N/A 05/20/2016    Procedure: COMBINED ESOPHAGOSCOPY, GASTROSCOPY, DUODENOSCOPY (EGD), BIOPSY SINGLE OR MULTIPLE;  Surgeon: Murphy Jesus MD;  Location: [facility] GI    LAPAROSCOPIC CHOLECYSTECTOMY N/A 7/2/2024    Procedure: CHOLECYSTECTOMY, LAPAROSCOPIC;  Surgeon: Arnaud Garza MD;  Location: [facility] OR    LAPAROSCOPIC WEDGE RESECTION LUNG Right 03/03/2017    Procedure: LAPAROSCOPIC WEDGE RESECTION LUNG;  Surgeon: Maria A Desai MD;  Location: [facility] OR    LASER HOLMIUM LITHOTRIPSY URETER(S), INSERT STENT, COMBINED  09/11/2012    Procedure: COMBINED CYSTOSCOPY, URETEROSCOPY, LASER HOLMIUM LITHOTRIPSY URETER(S), INSERT STENT;  Cystoscopy, Right Ureteroscopy, Stone Extraction, Holmium Laser, Double J Stent Placement;  Surgeon: Nicolás Blackwell MD;  Location: [facility] OR     No Known Allergies      Objective    Vitals:  No vitals were obtained today due to virtual visit.  Physical Exam   GENERAL: alert and no distress  EYES: Eyes grossly normal to inspection.  No discharge or erythema, or obvious scleral/conjunctival abnormalities.  RESP: No audible wheeze, cough, or visible cyanosis.    SKIN: Visible skin clear. No significant rash, abnormal pigmentation or lesions.  NEURO: Mentation and speech appropriate for age.  PSYCH: Appropriate affect, tone, and pace of words     Video-Visit  Details  Type of service:  Video Visit   Video End Time:4:13 PM  Originating Location (pt. Location): Home    Distant Location (provider location):  On-site  Platform used for Video Visit: [employer]  Signed Electronically by: Frances Tapia PA-C

## 2025-04-21 NOTE — TELEPHONE ENCOUNTER
"  Nurse Triage SBAR    Is this a 2nd Level Triage? YES, LICENSED PRACTITIONER REVIEW IS REQUIRED    Situation:   Patient calls with extreme anxiety     Patient is shaking and freezing   Symptoms started this AM   \"Feels like a bad dream\"  Patient is feeling weak     Patient's main symptom right now is anxiety. Took his xanax this AM for anxiety     Background:   LOV 3/27/25 for anxiety with Frances Tapia     Assessment:   Patient reports extreme anxiety.   Denies suicidal thoughts or actions.    Patient is tense and restless, reports feeling weak and \"scared.     Symptoms started this morning and are \"severe, worst I've had\"    Patient is doing normal activities      Nothing has helped or relaxed him, but reports taking a nap for a couple of hours today.     BP on phone 134/75  Reports shaking   Patient took 1.5 tablets of Zoloft this morning   Denies chest pain, palpitations or fast heart beat, difficulty breathing, diarrhea or sweating. Denies weakness and lightheadedness  Patient endorses depression and nausea     No trouble sleeping, slept good last night   Advised patient for UC, but patient doesn't feel safe enough to drive     Protocol Recommended Disposition:   Go To ED/UCC Now (Or To Office With PCP Approval)    Recommendation:     Advised ED/UC, patient did not feel safe driving. Huddled with Frances Tapia who previously saw the patient and agreed to see him    Reviewed care advice    Advised patient that he would be getting called soon for rooming.    Spoke to  to room patient immediately.      Patient agreed with plan to be seen.     Advised to call 911 with any worsening symptoms or thoughts of suicide/self harm    Routed to provider    Does the patient meet one of the following criteria for ADS visit consideration? 16+ years old, with an [facility] PCP     TIP  Providers, please consider if this condition is appropriate for management at one of our Acute and Diagnostic Services sites.     If patient is a good " "candidate, please use dotphrase <dot>triageresponse and select Refer to ADS to document.    Reason for Disposition   SEVERE anxiety (e.g., extremely anxious with intense emotional symptoms such as feeling of unreality, urge to flee, unable to calm down; unable to cope or function), which is not better after 10 minutes of reassurance and Care Advice    Additional Information   Negative: SEVERE difficulty breathing (e.g., struggling for each breath, speaks in single words)   Negative: Bluish (or gray) lips or face   Negative: Difficult to awaken or acting confused (e.g., disoriented, slurred speech)   Negative: Violent behavior, or threatening to physically hurt or kill someone   Negative: Sounds like a life-threatening emergency to the triager   Negative: Chest Pain   Negative: Palpitations, skipped heartbeat, or rapid heartbeat is main symptom   Negative: Suicide thoughts, threats, attempts, or questions   Negative: Depression is main problem or symptom (e.g., feelings of sadness or hopelessness)   Negative: Difficulty breathing and persists > 10 minutes and not relieved by reassurance provided by triager   Negative: Feeling weak or lightheaded (e.g., woozy, feeling like they might faint), and lasts > 10 minutes and not relieved by reassurance provided by triager    Answer Assessment - Initial Assessment Questions  1. CONCERN: \"Did anything happen that prompted you to call today?\"       Just feeling super anxious   2. ANXIETY SYMPTOMS: \"Can you describe how you (your loved one; patient) have been feeling?\" (e.g., tense, restless, panicky, anxious, keyed up, overwhelmed, sense of impending doom).       Patient is tense and restless. He reports feeling weak and scary   3. ONSET: \"How long have you been feeling this way?\" (e.g., hours, days, weeks)      Symptoms started this morning   4. SEVERITY: \"How would you rate the level of anxiety?\" (e.g., 0 - 10; or mild, moderate, severe).      Severe, worst he's had   5. " "FUNCTIONAL IMPAIRMENT: \"How have these feelings affected your ability to do daily activities?\" \"Have you had more difficulty than usual doing your normal daily activities?\" (e.g., getting better, same, worse; self-care, school, work, interactions)      Doing normal activities   6. HISTORY: \"Have you felt this way before?\" \"Have you ever been diagnosed with an anxiety problem in the past?\" (e.g., generalized anxiety disorder, panic attacks, PTSD). If Yes, ask: \"How was this problem treated?\" (e.g., medicines, counseling, etc.)      History of anxiety, took xanax and Zoloft this AM   7. RISK OF HARM - SUICIDAL IDEATION: \"Do you ever have thoughts of hurting or killing yourself?\" If Yes, ask:  \"Do you have these feelings now?\" \"Do you have a plan on how you would do this?\"      Patient denies suicidal thoughts or hurting himself   8. TREATMENT:  \"What has been done so far to treat this anxiety?\" (e.g., medicines, relaxation strategies). \"What has helped?\"      Nothing has helped or relaxed him, but reports taking a nap for a couple of hours today.   9. THERAPIST: \"Do you have a counselor or therapist?\" If Yes, ask: \"What is their name?\"      Did but no longer does   10. POTENTIAL TRIGGERS: \"Do you drink caffeinated beverages (e.g., coffee, selma, teas), and how much daily?\" \"Do you drink alcohol or use any drugs?\" \"Have you started any new medicines recently?\"        Patient had a cup of coffee this AM but no alcohol today.   11. PATIENT SUPPORT: \"Who is with you now?\" \"Who do you live with?\" \"Do you have family or friends who you can talk to?\"         Patient is with two friends Vik and Saurabh. Can talk to them about anxiety  12. OTHER SYMPTOMS: \"Do you have any other symptoms?\" (e.g., feeling depressed, trouble concentrating, trouble sleeping, trouble breathing, palpitations or fast heartbeat, chest pain, sweating, nausea, or diarrhea)        BP on phone 134/75  Reports shaking   Patient took 1.5 tablets of Zoloft " "this morning   Denies chest pain, palpitations or fast heart beat, difficulty breathing, diarrhea or sweating. Denies weakness and lightheadedness  Patient endorses depression and nausea   No trouble sleeping, slept good last night   Patient doesn't feel safe enough to drive   13. PREGNANCY: \"Is there any chance you are pregnant?\" \"When was your last menstrual period?\"        N/a    Protocols used: Anxiety and Panic Attack-A-[place]    "

## 2025-04-24 ENCOUNTER — VIRTUAL VISIT (OUTPATIENT)
Dept: UROLOGY | Facility: CLINIC | Age: 75
End: 2025-04-24
Payer: COMMERCIAL

## 2025-04-24 DIAGNOSIS — R31.0 GROSS HEMATURIA: ICD-10-CM

## 2025-04-24 DIAGNOSIS — N39.3 STRESS INCONTINENCE AFTER SURGICAL PROCEDURE: Primary | ICD-10-CM

## 2025-04-24 DIAGNOSIS — N99.114 POSTPROCEDURAL MALE URETHRAL STRICTURE: ICD-10-CM

## 2025-04-24 DIAGNOSIS — N99.89 STRESS INCONTINENCE AFTER SURGICAL PROCEDURE: Primary | ICD-10-CM

## 2025-04-24 NOTE — NURSING NOTE
Current patient location: [id number] [place] [address] [place] [address]    Is the patient currently in the state of [place]? YES    Visit mode: VIDEO    If the visit is dropped, the patient can be reconnected by:VIDEO VISIT: Text to cell phone:   Telephone Information:   Mobile [phone]       Will anyone else be joining the visit? NO  (If patient encounters technical issues they should call [phone])    Are changes needed to the allergy or medication list? No    Are refills needed on medications prescribed by this physician? NO, Patient denies any changes since echeck-in regarding medication and allergies and states all information entered during echeck-in remains accurate.      Rooming Documentation:  Not applicable    Reason for visit: RECHECK    Merline MORRIS

## 2025-04-24 NOTE — LETTER
4/24/2025       RE: Juwan Latham  [address] [facility] [address]     Dear Colleague,    Thank you for referring your patient, Juwan Latham, to the [facility] [place] at [facility]. Please see a copy of my visit note below.    Virtual Visit Details    Type of service:  Video Visit   Video Start Time: 10:13 AM  Video End Time:10:20 AM    Originating Location (pt. Location): Home    Distant Location (provider location):  Off-site  Platform used for Video Visit: [facility]    Visit conducted via real-time audio/video technology by Jaquan Latham PA-C to the patient in their home.    Subjective     REASON FOR VISIT  Post-prostatectomy stress incontinence follow-up     HISTORY OF PRESENT ILLNESS  Mr. Latham is a 74 year old male I am speaking with today in follow-up for his bothersome post-prostatectomy stress incontinence in the setting of [place] 4+5 prostate cancer status post radical ostectomy in 2017 with adjuvant external beam radiation therapy.  This led to complications in the form of a membranous urethral stricture status post Optilume dilation with Dr. Chan on 3/18/2024 as well as gross hematuria status post workup in June 2024.    When I met with Mr. Latham on 10/25/2024, his most bothersome issue was post prostatectomy stress incontinence, so plan was to refer him to pelvic floor physical therapy per his request after reviewing different treatment and management options with a follow-up visit with me 6 months later to discuss efficacy.    Today:  Pelvic floor physical therapy helped some, but still is leaking quite a bit, specifically at night  Overall not     Objective     PHYSICAL EXAMINATION  Deferred given virtual visit.    LABS   Latest Reference Range & Units 02/25/24 22:54 04/24/24 11:55 05/01/24 17:45 06/13/24 15:33 12/23/24 16:00   Color Urine Colorless, Straw, Light Yellow, Yellow  Dark Brown ! Yellow Light  Yellow Yellow Light Yellow   Appearance Urine Clear  Cloudy ! Clear Clear Clear Slightly Cloudy !   Glucose Urine Negative mg/dL Negative Negative Negative Negative Negative   Bilirubin Urine Negative  Negative Negative Negative Negative Negative   Ketones Urine Negative mg/dL Trace ! Negative Trace ! Negative Negative   Specific Gravity Urine 1.003 - 1.035  1.019 >=1.030 1.019 1.020 1.013   pH Urine 5.0 - 7.0  6.0 6.0 5.5 7.5 (H) 7.5 (H)   Protein Albumin Urine Negative mg/dL 100 ! Negative Negative Negative Negative   Urobilinogen mg/dL Normal, 2.0 mg/dL Normal  Normal  Normal   Urobilinogen Urine 0.2, 1.0 E.U./dL  0.2  0.2    Nitrite Urine Negative  Negative Negative Negative Negative Negative   Blood Urine Negative  Large ! Trace ! Negative Negative Negative   Leukocyte Esterase Urine Negative  Trace ! Negative Negative Negative Negative   WBC Urine <=5 /HPF 24 (H) 0-5 1  3   RBC Urine <=2 /HPF >182 (H) 0-2 3 (H)  1   Bacteria Urine None Seen /HPF  Few !      Squamous Epithelial /LPF Urine None Seen /LPF  Few !      Mucus Urine None Seen /LPF   Present !  Present !   Amorphous Crystals None Seen /HPF     Few !   !: Data is abnormal  (H): Data is abnormally high    IMAGING  I personally reviewed the below CT abdomen pelvis with IV contrast and agree that there appears to be a small stone in the left moiety of [place] horseshoe kidney    Narrative & Impression   EXAM: CT ABDOMEN PELVIS W CONTRAST  LOCATION: [facility]  DATE: 12/23/2024     INDICATION: right sided abdominal pain, elevated lipase, evaluate pancreas  COMPARISON: 6/14/2024  TECHNIQUE: CT scan of the abdomen and pelvis was performed following injection of IV contrast. Multiplanar reformats were obtained. Dose reduction techniques were used.  CONTRAST: 84mL Isovue 370     FINDINGS:   LOWER CHEST: Plaque-like calcifications associated with right hemidiaphragm unchanged.     HEPATOBILIARY: Interval cholecystectomy.     PANCREAS:  Normal. No inflammatory changes.     SPLEEN: Unchanged with small presumed hyperdense hemangioma.     ADRENAL GLANDS: Normal.     KIDNEYS/BLADDER: Stable appearance of horseshoe kidney, no hydronephrosis. 4 mm stone within left side moiety. Renal cysts need no dedicated follow-up.     BOWEL: No obstruction or inflammatory change. Appendix normal.     LYMPH NODES: No lymphadenopathy.     VASCULATURE: Normal.     PELVIC ORGANS: Prostatectomy. There is a wisp of free fluid in the pelvis similar to previous exam though etiology is not apparent.     MUSCULOSKELETAL: A few tiny nonspecific sclerotic foci present within the pelvis and unchanged.                                                                      IMPRESSION:   1.  No etiology for symptoms evident. Pancreas appears normal.  2.  Interval cholecystectomy, no complication suggested.  3.  Stable appearance of horseshoe kidney, no hydronephrosis. Small left-sided kidney stone unchanged.  4.  Bowel and appendix unremarkable.  5.  Wisp of ascites in the pelvis unchanged.     Assessment & Plan   Post-prostatectomy stress incontinence   [place] 4+5 prostate cancer status post radical prostatectomy in 2017 with adjuvant radiation therapy  Membranous urethral stricture status post Optilume dilation in March 2024  History of hematuria  Nephrolithiasis  Horseshoe kidney    It was my pleasure to speak with Mr. Latham in follow-up for his bothersome post-prostatectomy stress incontinence in the setting of his [place] 4+5 prostate cancer status post prostatectomy in 2017 with adjuvant radiation therapy.  After reviewing his clinical history, I am sorry to hear that the PT was not as successful as we were hoping for the stress incontinence, but it is a reasonable to continue to observe versus moving forward with any of the larger procedures or surgeries we have previously discussed.  For now I do not believe we need to see him back any more often than once a year to  review his symptoms.    We did discuss his recent workup in December showing evidence of a left sided nonobstructing kidney stone but no evidence of microscopic hematuria.  With this in mind, we will also continue to observe for persistent microscopic hematuria with a repeat urinalysis in 1 year.    Mr. Latham expressed understanding and agreement to the above discussion and plan and all of his questions were answered to his satisfaction.     PLAN  Repeat urinalysis in 1 year with follow-up visit with me shortly afterwards to discuss results  Observation of post prostatectomy stress incontinence and urethral stricture status post Optilume dilation in March 2024    SIGNED    Jaquan Latham PA-C      I spent a total of 12 minutes spent on the date of the encounter doing chart review, history and exam, documentation, and further activities as noted above.      Again, thank you for allowing me to participate in the care of your patient.      Sincerely,    Jaquan Latham PA-C

## 2025-04-25 PROBLEM — N39.3 STRESS INCONTINENCE AFTER SURGICAL PROCEDURE: Status: ACTIVE | Noted: 2024-11-22

## 2025-04-25 PROBLEM — N99.89 STRESS INCONTINENCE AFTER SURGICAL PROCEDURE: Status: ACTIVE | Noted: 2024-11-22

## 2025-04-28 ENCOUNTER — VIRTUAL VISIT (OUTPATIENT)
Dept: FAMILY MEDICINE | Facility: CLINIC | Age: 75
End: 2025-04-28
Payer: COMMERCIAL

## 2025-04-28 ENCOUNTER — NURSE TRIAGE (OUTPATIENT)
Dept: FAMILY MEDICINE | Facility: CLINIC | Age: 75
End: 2025-04-28

## 2025-04-28 DIAGNOSIS — U07.1 INFECTION DUE TO 2019 NOVEL CORONAVIRUS: Primary | ICD-10-CM

## 2025-04-28 PROCEDURE — 98005 SYNCH AUDIO-VIDEO EST LOW 20: CPT | Performed by: NURSE PRACTITIONER

## 2025-04-28 NOTE — TELEPHONE ENCOUNTER
RN COVID TREATMENT VISIT  04/28/25      The patient has been triaged and does not require a higher level of care.    Juwan Latham  74 year old  Current weight? .  Wt Readings from Last 2 Encounters:   03/27/25 76.3 kg (168 lb 3.2 oz)   02/24/25 74.8 kg (165 lb)       Has the patient been seen by a primary care or specialty provider at a [facility] within the past three years? Yes.   Have you been in close proximity to/do you have a known exposure to a person with a confirmed case of influenza? No.     General treatment eligibility:  Date of positive COVID test (PCR or at home)?  4/27/2025    Are you or have you been hospitalized for this COVID-19 infection? No.   Have you received monoclonal antibodies or antiviral treatment for COVID-19 since this positive test? No.   Do you have any of the following conditions that place you at risk of being very sick from COVID-19?   - Age 50 or older  - Overweight and Obesity BMI >= 25  - Smoking- current or former  Yes, patient has at least one high risk condition as noted above.     Current COVID symptoms:   - cough  - fatigue  - muscle or body aches  - headache  - sore throat  - congestion or runny nose  Yes. Patient has at least one symptom as selected.     How many days since symptoms started? 5 days or less. Established patient, 12 years or older weighing at least 88.2 lbs, who has symptoms that started in the past 5 days, has not been hospitalized nor received treatment already, and is at risk for being very sick from COVID-19.     Treatment eligibility by RN:  Are you currently pregnant or nursing? No  Do you have a clinically significant hypersensitivity to nirmatrelvir or ritonavir, or toxic epidermal necrolysis (TEN) or Nicole-Jesús Syndrome? No  Do you have a history of hepatitis, any hepatic impairment on the Problem List (such as Child-Wilson Class C, cirrhosis, fatty liver disease, alcoholic liver disease), or was the last liver lab  (hepatic panel, ALT, AST, ALK Phos, bilirubin) elevated in the past 6 months? YES  Do you have any history of severe renal impairment (eGFR < 30mL/min)? No    Is patient eligible to continue? No, patient does not meet all eligibility requirements for the RN COVID treatment (as denoted by yes response(s) above). Patient informed they will need a virtual provider visit to assess treatment options.  Patient will be transferred to a  at the end of this call.   Suzy Schuster RN        Reason for Disposition   COVID-19 diagnosed by positive lab test (e.g., PCR, rapid self-test kit) and NO symptoms (e.g., cough, fever, others)    Additional Information   Negative: SEVERE difficulty breathing (e.g., struggling for each breath, speaks in single words)   Negative: Difficult to awaken or acting confused (e.g., disoriented, slurred speech)   Negative: Bluish (or gray) lips or face now   Negative: Shock suspected (e.g., cold/pale/clammy skin, too weak to stand, low BP, rapid pulse)   Negative: Sounds like a life-threatening emergency to the triager   Negative: Diagnosed or suspected COVID-19 and symptoms lasting 3 or more weeks   Negative: COVID-19 exposure and no symptoms   Negative: COVID-19 vaccine reaction suspected (e.g., fever, headache, muscle aches) occurring 1 to 3 days after getting vaccine   Negative: COVID-19 vaccine, questions about   Negative: Lives with someone known to have influenza (flu test positive) and flu-like symptoms (e.g., cough, runny nose, sore throat, SOB; with or without fever)   Negative: Possible COVID-19 symptoms and triager concerned about severity of symptoms or other causes   Negative: COVID-19 and breastfeeding, questions about   Negative: SEVERE or constant chest pain or pressure  (Exception: Mild central chest pain, present only when coughing.)   Negative: MODERATE difficulty breathing (e.g., speaks in phrases, SOB even at rest, pulse 100-120)   Negative: Headache and stiff  "neck (can't touch chin to chest)   Negative: Oxygen level (e.g., pulse oximetry) 90% or lower   Negative: Chest pain or pressure  (Exception: MILD central chest pain, present only when coughing.)   Negative: Drinking very little and dehydration suspected (e.g., no urine > 12 hours, very dry mouth, very lightheaded)   Negative: Patient sounds very sick or weak to the triager   Negative: MILD difficulty breathing (e.g., minimal/no SOB at rest, SOB with walking, pulse <100)   Negative: Fever > 103 F (39.4 C)   Negative: Fever > 101 F (38.3 C) and over 60 years of age   Negative: Fever > 100.0 F (37.8 C) and bedridden (e.g., CVA, chronic illness, recovering from surgery)   Negative: HIGH RISK patient (e.g., weak immune system, age > 64 years, obesity with BMI of 30 or higher, pregnant, chronic lung disease or other chronic medical condition) and COVID symptoms (e.g., cough, fever)  (Exceptions: Already seen by doctor or NP/PA and no new or worsening symptoms.)   Negative: HIGH RISK patient and influenza is widespread in the community and ONE OR MORE respiratory symptoms: cough, sore throat, runny or stuffy nose   Negative: HIGH RISK patient and influenza exposure within the last 7 days and ONE OR MORE respiratory symptoms: cough, sore throat, runny or stuffy nose   Negative: Oxygen level (e.g., pulse oximetry) 91 to 94%   Negative: COVID-19 infection suspected by caller or triager and mild symptoms (cough, fever, or others) and negative COVID-19 rapid test   Negative: Fever present > 3 days (72 hours)   Negative: Fever returns after gone for over 24 hours and symptoms worse or not improved   Negative: Continuous (nonstop) coughing interferes with work or school and no improvement using cough treatment per Care Advice   Negative: Cough present > 3 weeks    Answer Assessment - Initial Assessment Questions  1. COVID-19 DIAGNOSIS: \"How do you know that you have COVID?\" (e.g., positive lab test or self-test, diagnosed by " "doctor or NP/PA, symptoms after exposure).      Tested at home 4/27/2025   2. COVID-19 EXPOSURE: \"Was there any known exposure to COVID before the symptoms began?\" CDC Definition of close contact: within 6 feet (2 meters) for a total of 15 minutes or more over a 24-hour period.       Unsure     3. ONSET: \"When did the COVID-19 symptoms start?\"       4/26/2025    4. WORST SYMPTOM: \"What is your worst symptom?\" (e.g., cough, fever, shortness of breath, muscle aches)      Headache and lungs feel filling up     5. COUGH: \"Do you have a cough?\" If Yes, ask: \"How bad is the cough?\"        Sometimes     6. FEVER: \"Do you have a fever?\" If Yes, ask: \"What is your temperature, how was it measured, and when did it start?\"      None     7. RESPIRATORY STATUS: \"Describe your breathing?\" (e.g., normal; shortness of breath, wheezing, unable to speak)       Denies: SOB Wheezing     8. BETTER-SAME-WORSE: \"Are you getting better, staying the same or getting worse compared to yesterday?\"  If getting worse, ask, \"In what way?\"      A little better than yesterday     9. OTHER SYMPTOMS: \"Do you have any other symptoms?\"  (e.g., chills, fatigue, headache, loss of smell or taste, muscle pain, sore throat)      Fatigue, throat is sore, post nasal drip     Denies: loss of taste or smell, nausea, vomiting,     10. HIGH RISK DISEASE: \"Do you have any chronic medical problems?\" (e.g., asthma, heart or lung disease, weak immune system, obesity, etc.)        High BP hx, obestsity  11. VACCINE: \"Have you had the COVID-19 vaccine?\" If Yes, ask: \"Which one, how many shots, when did you get it?\"        Yes     12. PREGNANCY: \"Is there any chance you are pregnant?\" \"When was your last menstrual period?\"        NA     13. O2 SATURATION MONITOR:  \"Do you use an oxygen saturation monitor (pulse oximeter) at home?\" If Yes, ask \"What is your reading (oxygen level) today?\" \"What is your usual oxygen saturation reading?\" (e.g., 95%)        NA    Protocols " used: Coronavirus (COVID-19) Diagnosed or [id number]

## 2025-04-28 NOTE — PROGRESS NOTES
Juwan is a 74 year old who is being evaluated via a billable video visit.    How would you like to obtain your AVS? MyChart  If the video visit is dropped, the invitation should be resent by: Text to cell phone: [phone]  Will anyone else be joining your video visit? No      Assessment & Plan     Infection due to 2019 novel coronavirus  Avoid alprazolam while taking.   - nirmatrelvir and ritonavir (PAXLOVID) 300 mg/100 mg therapy pack  Dispense: 30 tablet; Refill: 0          Subjective   Juwan is a 74 year old, presenting for the following health issues:  COVID Treatment        4/28/2025     9:32 AM   Additional Questions   Roomed by Barb CMA   Accompanied by Self     Unable to connect to video.      COVID-19 Symptom Review  How many days ago did these symptoms start? 4/26/2025    Are any of the following symptoms significant for you?  New or worsening difficulty breathing? No  Worsening cough? Yes, I am coughing up mucus.  Fever or chills? Yes, I felt feverish or had chills.  Headache: YES  Sore throat: YES  Chest pain: No  Diarrhea: No  Body aches? YES    What treatments has patient tried? None   Does patient live in a nursing home, group home, or shelter? No  Does patient have a way to get food/medications during quarantined? Yes, I have a friend or family member who can help me.              Constitutional, HEENT, cardiovascular, pulmonary, GI, , musculoskeletal, neuro, skin, endocrine and psych systems are negative, except as otherwise noted.        Objective           Vitals:  No vitals were obtained today due to virtual visit.    Physical Exam   GENERAL: alert and no distress  PSYCH: Appropriate affect, tone, and pace of words          Video-Visit Details    Type of service:  Phone-unable to connect on pt end.  Video End Time:  Originating Location (pt. Location): Home    Distant Location (provider location):  On-site  Platform used for Video Visit: Unable to complete video visit  Signed Electronically by:  Cristiana Mccoy, CNP

## 2025-04-30 ENCOUNTER — TRANSFERRED RECORDS (OUTPATIENT)
Dept: ADMINISTRATIVE | Facility: CLINIC | Age: 75
End: 2025-04-30

## 2025-05-01 ENCOUNTER — TELEPHONE (OUTPATIENT)
Dept: PSYCHOLOGY | Facility: CLINIC | Age: 75
End: 2025-05-01
Payer: COMMERCIAL

## 2025-05-01 NOTE — PROGRESS NOTES
2025        Juwan Latham   [address] [place] [address]      Juwan Latham,  :  1950    I am writing to let you know the results of the tests that were done the other day.   Thank you for allowing [facility] the opportunity to take part in your healthcare.  At [facility] we strive to provide each patient with the finest gastroenterology care available.  We hope your experience was pleasant and informative.    I received a copy of your recent CBC and it was normal.    I hope you are feeling well.        Thank you.    Electronically signed by:  Kayla Clayton MD 2025 04:26 PM  Document generated by:  Kayla Clayton MD  2025  If your provider ordered multiple tests; the results may not become available at the same time.  If multiple test results are received within 14 days of one another, you may receive a duplicate.  cc:  Asad White DO

## 2025-05-06 DIAGNOSIS — F41.9 ANXIETY: ICD-10-CM

## 2025-05-06 RX ORDER — ALPRAZOLAM 0.25 MG
0.25 TABLET ORAL
Qty: 10 TABLET | Refills: 0 | Status: CANCELLED | OUTPATIENT
Start: 2025-05-06

## 2025-05-06 NOTE — TELEPHONE ENCOUNTER
"S-(situation): \"shaking, fatigue, freezing\" - woke up shaking, was out for a little bit, came back in, had to put on sweatpants    B-(background): hx of anxiety    A-(assessment): out of alprazolam, usually helps.   Asked him to take a temp - 96.8. hydroxyzine doesn't help.     R-(recommendations): Routing to provider to review and advise on refill.     LUIS ENRIQUEZ RN on 5/6/2025 at 11:20 AM   [facility]      "

## 2025-05-06 NOTE — TELEPHONE ENCOUNTER
Called and spoke with patient.     Advised of providers recommendation.    Had covid last week.   Didn't have any chills with covid.   Week before that had some chills.   Paxlovid - finished that on saturday  Tested negative on Friday.   No shaking and chills right now. It comes and goes. Try to do more walking. Really fatigued.     Advised to monitor symptoms, to call back next week if no improvement. Patient stated an understanding and agreed with plan.     LUIS ENRIQUEZ RN on 5/6/2025 at 3:19 PM   [facility]

## 2025-05-06 NOTE — TELEPHONE ENCOUNTER
PDMP reviewed.  Seven lorazepam Rx's in the past year.  I think the chills may be more likely due to his recent COVID diagnosis - if those worsen he should be seen.  He should not be taking lorazepam while on Paxlovid as these medications interact.  Please call patient.     Julio Guidry MD

## 2025-05-15 PROBLEM — N39.3 STRESS INCONTINENCE AFTER SURGICAL PROCEDURE: Status: RESOLVED | Noted: 2024-11-22 | Resolved: 2025-05-15

## 2025-05-15 PROBLEM — N99.89 STRESS INCONTINENCE AFTER SURGICAL PROCEDURE: Status: RESOLVED | Noted: 2024-11-22 | Resolved: 2025-05-15

## 2025-05-20 PROBLEM — D12.6 COLON ADENOMA: Status: ACTIVE | Noted: 2025-05-20

## 2025-05-25 ENCOUNTER — HEALTH MAINTENANCE LETTER (OUTPATIENT)
Age: 75
End: 2025-05-25

## 2025-06-03 ENCOUNTER — OFFICE VISIT (OUTPATIENT)
Dept: FAMILY MEDICINE | Facility: CLINIC | Age: 75
End: 2025-06-03
Payer: COMMERCIAL

## 2025-06-03 VITALS
RESPIRATION RATE: 16 BRPM | BODY MASS INDEX: 25.02 KG/M2 | HEIGHT: 69 IN | HEART RATE: 75 BPM | DIASTOLIC BLOOD PRESSURE: 80 MMHG | TEMPERATURE: 97.6 F | SYSTOLIC BLOOD PRESSURE: 120 MMHG | WEIGHT: 168.9 LBS | OXYGEN SATURATION: 99 %

## 2025-06-03 DIAGNOSIS — K76.0 NONALCOHOLIC FATTY LIVER DISEASE: Primary | ICD-10-CM

## 2025-06-03 DIAGNOSIS — F41.9 ANXIETY: ICD-10-CM

## 2025-06-03 DIAGNOSIS — R10.84 GENERALIZED ABDOMINAL DISCOMFORT: ICD-10-CM

## 2025-06-03 DIAGNOSIS — K21.9 GASTROESOPHAGEAL REFLUX DISEASE, UNSPECIFIED WHETHER ESOPHAGITIS PRESENT: ICD-10-CM

## 2025-06-03 LAB
ALBUMIN SERPL BCG-MCNC: 4.6 G/DL (ref 3.5–5.2)
ALP SERPL-CCNC: 54 U/L (ref 40–150)
ALT SERPL W P-5'-P-CCNC: 22 U/L (ref 0–70)
ANION GAP SERPL CALCULATED.3IONS-SCNC: 9 MMOL/L (ref 7–15)
AST SERPL W P-5'-P-CCNC: 29 U/L (ref 0–45)
BILIRUB SERPL-MCNC: 0.4 MG/DL
BUN SERPL-MCNC: 15.2 MG/DL (ref 8–23)
CALCIUM SERPL-MCNC: 9.9 MG/DL (ref 8.8–10.4)
CHLORIDE SERPL-SCNC: 103 MMOL/L (ref 98–107)
CREAT SERPL-MCNC: 1.02 MG/DL (ref 0.67–1.17)
CRP SERPL-MCNC: <3 MG/L
EGFRCR SERPLBLD CKD-EPI 2021: 78 ML/MIN/1.73M2
ERYTHROCYTE [DISTWIDTH] IN BLOOD BY AUTOMATED COUNT: 13.6 % (ref 10–15)
ERYTHROCYTE [SEDIMENTATION RATE] IN BLOOD BY WESTERGREN METHOD: 8 MM/HR (ref 0–20)
GLUCOSE SERPL-MCNC: 102 MG/DL (ref 70–99)
HCO3 SERPL-SCNC: 29 MMOL/L (ref 22–29)
HCT VFR BLD AUTO: 43 % (ref 40–53)
HGB BLD-MCNC: 14.4 G/DL (ref 13.3–17.7)
LIPASE SERPL-CCNC: 58 U/L (ref 13–60)
MCH RBC QN AUTO: 29.9 PG (ref 26.5–33)
MCHC RBC AUTO-ENTMCNC: 33.5 G/DL (ref 31.5–36.5)
MCV RBC AUTO: 89 FL (ref 78–100)
PLATELET # BLD AUTO: 193 10E3/UL (ref 150–450)
POTASSIUM SERPL-SCNC: 4.5 MMOL/L (ref 3.4–5.3)
PROT SERPL-MCNC: 6.9 G/DL (ref 6.4–8.3)
RBC # BLD AUTO: 4.82 10E6/UL (ref 4.4–5.9)
SODIUM SERPL-SCNC: 141 MMOL/L (ref 135–145)
WBC # BLD AUTO: 6.3 10E3/UL (ref 4–11)

## 2025-06-03 PROCEDURE — 85027 COMPLETE CBC AUTOMATED: CPT

## 2025-06-03 PROCEDURE — 80053 COMPREHEN METABOLIC PANEL: CPT

## 2025-06-03 PROCEDURE — 3074F SYST BP LT 130 MM HG: CPT

## 2025-06-03 PROCEDURE — 3079F DIAST BP 80-89 MM HG: CPT

## 2025-06-03 PROCEDURE — 83690 ASSAY OF LIPASE: CPT

## 2025-06-03 PROCEDURE — G2211 COMPLEX E/M VISIT ADD ON: HCPCS

## 2025-06-03 PROCEDURE — 36415 COLL VENOUS BLD VENIPUNCTURE: CPT

## 2025-06-03 PROCEDURE — 86140 C-REACTIVE PROTEIN: CPT

## 2025-06-03 PROCEDURE — 85652 RBC SED RATE AUTOMATED: CPT

## 2025-06-03 PROCEDURE — 99214 OFFICE O/P EST MOD 30 MIN: CPT

## 2025-06-03 RX ORDER — BUSPIRONE HYDROCHLORIDE 5 MG/1
5 TABLET ORAL 3 TIMES DAILY
Qty: 270 TABLET | Refills: 0 | Status: SHIPPED | OUTPATIENT
Start: 2025-06-03 | End: 2025-09-01

## 2025-06-03 RX ORDER — HYDROXYZINE HYDROCHLORIDE 50 MG/1
50 TABLET, FILM COATED ORAL 3 TIMES DAILY PRN
Qty: 30 TABLET | Refills: 0 | Status: SHIPPED | OUTPATIENT
Start: 2025-06-03 | End: 2025-07-03

## 2025-06-03 ASSESSMENT — PATIENT HEALTH QUESTIONNAIRE - PHQ9
10. IF YOU CHECKED OFF ANY PROBLEMS, HOW DIFFICULT HAVE THESE PROBLEMS MADE IT FOR YOU TO DO YOUR WORK, TAKE CARE OF THINGS AT HOME, OR GET ALONG WITH OTHER PEOPLE: NOT DIFFICULT AT ALL
SUM OF ALL RESPONSES TO PHQ QUESTIONS 1-9: 15
SUM OF ALL RESPONSES TO PHQ QUESTIONS 1-9: 15

## 2025-06-03 ASSESSMENT — ANXIETY QUESTIONNAIRES
IF YOU CHECKED OFF ANY PROBLEMS ON THIS QUESTIONNAIRE, HOW DIFFICULT HAVE THESE PROBLEMS MADE IT FOR YOU TO DO YOUR WORK, TAKE CARE OF THINGS AT HOME, OR GET ALONG WITH OTHER PEOPLE: SOMEWHAT DIFFICULT
8. IF YOU CHECKED OFF ANY PROBLEMS, HOW DIFFICULT HAVE THESE MADE IT FOR YOU TO DO YOUR WORK, TAKE CARE OF THINGS AT HOME, OR GET ALONG WITH OTHER PEOPLE?: SOMEWHAT DIFFICULT
4. TROUBLE RELAXING: NOT AT ALL
7. FEELING AFRAID AS IF SOMETHING AWFUL MIGHT HAPPEN: MORE THAN HALF THE DAYS
GAD7 TOTAL SCORE: 8
2. NOT BEING ABLE TO STOP OR CONTROL WORRYING: SEVERAL DAYS
5. BEING SO RESTLESS THAT IT IS HARD TO SIT STILL: NOT AT ALL
3. WORRYING TOO MUCH ABOUT DIFFERENT THINGS: MORE THAN HALF THE DAYS
7. FEELING AFRAID AS IF SOMETHING AWFUL MIGHT HAPPEN: MORE THAN HALF THE DAYS
GAD7 TOTAL SCORE: 8
6. BECOMING EASILY ANNOYED OR IRRITABLE: NOT AT ALL
1. FEELING NERVOUS, ANXIOUS, OR ON EDGE: NEARLY EVERY DAY
GAD7 TOTAL SCORE: 8

## 2025-06-03 ASSESSMENT — ENCOUNTER SYMPTOMS: NERVOUS/ANXIOUS: 1

## 2025-06-03 NOTE — PROGRESS NOTES
Assessment & Plan     Anxiety  Anxiety has not improved. Patient has tried hydroxyzine and this does not seem to be helpful. Will increase to a higher dose to hopefully decrease the need for lorazepam. Will also trial starting buspar. Patient was previously working with a counselor virtually, but would like to start seeing someone in person. [facility] [place]   - hydrOXYzine HCl (ATARAX) 50 MG tablet  Dispense: 30 tablet; Refill: 0  - busPIRone (BUSPAR) 5 MG tablet  Dispense: 270 tablet; Refill: 0    Generalized abdominal discomfort   Nonalcoholic fatty liver disease  Gastroesophageal reflux disease, unspecified whether esophagitis present  Continued abdominal discomfort. Patient had an abdominal ultrasound completed in April which revealed liver steatosis, and no other concerning findings. Patient is following with [facility], but he is unsure when his next follow up appointment will be.  Plan to recheck labs today as below.   - CRP, inflammation  - ESR: Erythrocyte sedimentation rate  - CBC with platelets  - Comprehensive metabolic panel (BMP + Alb, Alk Phos, ALT, AST, Total. Bili, TP)  - Lipase    Patient should follow up in 1 month if symptoms do not improve or sooner for new or worsening symptoms. All questions answered to patient's satisfaction.     Frances Tapia PA-C      Subjective   Juwan is a 74 year old, presenting for the following health issues:  Anxiety        6/3/2025     3:04 PM   Additional Questions   Roomed by Srinath Evans    History of Present Illness       Reason for visit:  Check up   He is taking medications regularly.      Juwan is a 74 year old male who presents today with continued anxiety.     Anxiety/depression: Last viist with therapist over the phone was one month ago. He would like to switch to in person therapy and has noticed a spot down the street from him he would like to try. Woke up in  a night sweats the other night. Feels like there is shaking happening inside him- unclear if this is related to panic. He has been taking 1.5 tablets (150mg) of sertraline daily. He tried the hydroxyzine and found it somewhat helpful, but not as therapeutic as lorazepam.      Abdominal discomfort: Feels generalized abdominal discomfort when he is laying down flat. Discomfort comes on mostly when laying down and with sleep. Has been cutting out all sugar and unhealthy foods. No n/v/d. He has been taking Protonix 40mg and famotidine 20mg dialy and Carafate as needed for nausea.     Anxiety   How are you doing with your anxiety since your last visit? No change  Are you having other symptoms that might be associated with anxiety? Yes:  generalized abdominal discomfort  Have you had a significant life event? No   Are you feeling depressed? No  Do you have any concerns with your use of alcohol or other drugs? No    Social History     Tobacco Use    Smoking status: Former     Current packs/day: 0.00     Average packs/day: 2.0 packs/day for 20.0 years (40.0 ttl pk-yrs)     Types: Cigarettes     Start date: 1965     Quit date: 1985     Years since quittin.4     Passive exposure: Past    Smokeless tobacco: Never   Vaping Use    Vaping status: Never Used   Substance Use Topics    Alcohol use: Yes     Alcohol/week: 0.0 - 2.0 standard drinks of alcohol     Comment: had 2 beers 2 weeks ago    Drug use: Yes     Types: Marijuana     Comment: gummies twice a week         2025     3:35 PM 2025     3:37 PM 6/3/2025     2:29 PM   BENSON-7 SCORE   Total Score  5 (mild anxiety) 8 (mild anxiety)   Total Score 5  5  8        Patient-reported         2025     5:51 PM 3/27/2025     4:29 PM 6/3/2025     2:28 PM   PHQ   PHQ-9 Total Score 5  5  15    Q9: Thoughts of better off dead/self-harm past 2 weeks Not at all Not at all Not at all       Patient-reported         6/3/2025     2:28 PM   Last PHQ-9   1.  Little  "interest or pleasure in doing things 3   2.  Feeling down, depressed, or hopeless 3   3.  Trouble falling or staying asleep, or sleeping too much 2   4.  Feeling tired or having little energy 3   5.  Poor appetite or overeating 0   6.  Feeling bad about yourself 2   7.  Trouble concentrating 2   8.  Moving slowly or restless 0   Q9: Thoughts of better off dead/self-harm past 2 weeks 0   PHQ-9 Total Score 15        Patient-reported         6/3/2025     2:29 PM   BENSON-7    1. Feeling nervous, anxious, or on edge 3   2. Not being able to stop or control worrying 1   3. Worrying too much about different things 2   4. Trouble relaxing 0   5. Being so restless that it is hard to sit still 0   6. Becoming easily annoyed or irritable 0   7. Feeling afraid, as if something awful might happen 2   BENSON-7 Total Score 8    If you checked any problems, how difficult have they made it for you to do your work, take care of things at home, or get along with other people? Somewhat difficult       Patient-reported       Review of Systems  Constitutional, neuro, ENT, endocrine, pulmonary, cardiac, gastrointestinal, genitourinary, musculoskeletal, integument and psychiatric systems are negative, except as otherwise noted.      Objective    /80   Pulse 75   Temp 97.6  F (36.4  C) (Tympanic)   Resp 16   Ht 1.753 m (5' 9\")   Wt 76.6 kg (168 lb 14.4 oz)   SpO2 99%   BMI 24.94 kg/m    Body mass index is 24.94 kg/m .  Physical Exam   GENERAL: alert and no distress  EYES: Eyes grossly normal to inspection, PERRL and conjunctivae and sclerae normal  NECK: no adenopathy, no asymmetry, masses, or scars  RESP: lungs clear to auscultation - no rales, rhonchi or wheezes  CV: regular rate and rhythm, normal S1 S2, no S3 or S4, no murmur, click or rub, no peripheral edema   ABDOMEN: tenderness RUQ with no rebound or guarding, no organomegaly or masses, bowel sounds normal, and no palpable or pulsatile masses  MS: no gross musculoskeletal " defects noted, no edema  SKIN: no suspicious lesions or rashes  NEURO: Normal strength and tone, mentation intact and speech normal  PSYCH: mentation appears normal, affect normal/bright, and anxious    Results for orders placed or performed in visit on 06/03/25 (from the past 24 hours)   ESR: Erythrocyte sedimentation rate   Result Value Ref Range    Erythrocyte Sedimentation Rate 8 0 - 20 mm/hr   CBC with platelets   Result Value Ref Range    WBC Count 6.3 4.0 - 11.0 10e3/uL    RBC Count 4.82 4.40 - 5.90 10e6/uL    Hemoglobin 14.4 13.3 - 17.7 g/dL    Hematocrit 43.0 40.0 - 53.0 %    MCV 89 78 - 100 fL    MCH 29.9 26.5 - 33.0 pg    MCHC 33.5 31.5 - 36.5 g/dL    RDW 13.6 10.0 - 15.0 %    Platelet Count 193 150 - 450 10e3/uL     *Note: Due to a large number of results and/or encounters for the requested time period, some results have not been displayed. A complete set of results can be found in Results Review.         Signed Electronically by: Frances Tapia PA-C

## 2025-06-04 PROCEDURE — 87338 HPYLORI STOOL AG IA: CPT

## 2025-06-05 ENCOUNTER — RESULTS FOLLOW-UP (OUTPATIENT)
Dept: FAMILY MEDICINE | Facility: CLINIC | Age: 75
End: 2025-06-05

## 2025-06-05 LAB — H PYLORI AG STL QL IA: NEGATIVE

## 2025-06-30 DIAGNOSIS — R14.0 POSTPRANDIAL ABDOMINAL BLOATING: ICD-10-CM

## 2025-06-30 DIAGNOSIS — R53.83 FATIGUE: ICD-10-CM

## 2025-06-30 DIAGNOSIS — K75.81 NONALCOHOLIC STEATOHEPATITIS: Primary | ICD-10-CM

## 2025-08-04 DIAGNOSIS — R11.0 NAUSEA: ICD-10-CM

## 2025-08-05 ENCOUNTER — OFFICE VISIT (OUTPATIENT)
Dept: FAMILY MEDICINE | Facility: CLINIC | Age: 75
End: 2025-08-05
Payer: COMMERCIAL

## 2025-08-05 ENCOUNTER — NURSE TRIAGE (OUTPATIENT)
Dept: FAMILY MEDICINE | Facility: CLINIC | Age: 75
End: 2025-08-05

## 2025-08-05 VITALS
OXYGEN SATURATION: 97 % | RESPIRATION RATE: 18 BRPM | BODY MASS INDEX: 24.73 KG/M2 | HEART RATE: 74 BPM | TEMPERATURE: 98.9 F | DIASTOLIC BLOOD PRESSURE: 78 MMHG | WEIGHT: 167 LBS | HEIGHT: 69 IN | SYSTOLIC BLOOD PRESSURE: 126 MMHG

## 2025-08-05 DIAGNOSIS — K76.0 NONALCOHOLIC FATTY LIVER DISEASE: ICD-10-CM

## 2025-08-05 DIAGNOSIS — K21.00 GASTROESOPHAGEAL REFLUX DISEASE WITH ESOPHAGITIS, UNSPECIFIED WHETHER HEMORRHAGE: ICD-10-CM

## 2025-08-05 DIAGNOSIS — R11.0 NAUSEA: Primary | ICD-10-CM

## 2025-08-05 DIAGNOSIS — F41.9 ANXIETY: ICD-10-CM

## 2025-08-05 DIAGNOSIS — F33.0 MAJOR DEPRESSIVE DISORDER, RECURRENT EPISODE, MILD: ICD-10-CM

## 2025-08-05 PROCEDURE — 3078F DIAST BP <80 MM HG: CPT

## 2025-08-05 PROCEDURE — 3074F SYST BP LT 130 MM HG: CPT

## 2025-08-05 PROCEDURE — G2211 COMPLEX E/M VISIT ADD ON: HCPCS

## 2025-08-05 PROCEDURE — 99214 OFFICE O/P EST MOD 30 MIN: CPT

## 2025-08-05 RX ORDER — SUCRALFATE 1 G/1
1 TABLET ORAL 4 TIMES DAILY PRN
Qty: 120 TABLET | Refills: 1 | Status: SHIPPED | OUTPATIENT
Start: 2025-08-05 | End: 2025-08-07

## 2025-08-05 RX ORDER — OXYBUTYNIN CHLORIDE 10 MG/1
TABLET, EXTENDED RELEASE ORAL
COMMUNITY
Start: 2024-12-16

## 2025-08-05 RX ORDER — SERTRALINE HYDROCHLORIDE 100 MG/1
150 TABLET, FILM COATED ORAL DAILY
COMMUNITY
Start: 2025-08-05

## 2025-08-05 ASSESSMENT — ANXIETY QUESTIONNAIRES
7. FEELING AFRAID AS IF SOMETHING AWFUL MIGHT HAPPEN: SEVERAL DAYS
6. BECOMING EASILY ANNOYED OR IRRITABLE: NOT AT ALL
5. BEING SO RESTLESS THAT IT IS HARD TO SIT STILL: SEVERAL DAYS
1. FEELING NERVOUS, ANXIOUS, OR ON EDGE: SEVERAL DAYS
7. FEELING AFRAID AS IF SOMETHING AWFUL MIGHT HAPPEN: SEVERAL DAYS
GAD7 TOTAL SCORE: 3
2. NOT BEING ABLE TO STOP OR CONTROL WORRYING: NOT AT ALL
8. IF YOU CHECKED OFF ANY PROBLEMS, HOW DIFFICULT HAVE THESE MADE IT FOR YOU TO DO YOUR WORK, TAKE CARE OF THINGS AT HOME, OR GET ALONG WITH OTHER PEOPLE?: NOT DIFFICULT AT ALL
3. WORRYING TOO MUCH ABOUT DIFFERENT THINGS: NOT AT ALL
IF YOU CHECKED OFF ANY PROBLEMS ON THIS QUESTIONNAIRE, HOW DIFFICULT HAVE THESE PROBLEMS MADE IT FOR YOU TO DO YOUR WORK, TAKE CARE OF THINGS AT HOME, OR GET ALONG WITH OTHER PEOPLE: NOT DIFFICULT AT ALL
GAD7 TOTAL SCORE: 3
4. TROUBLE RELAXING: NOT AT ALL
GAD7 TOTAL SCORE: 3

## 2025-08-05 ASSESSMENT — PATIENT HEALTH QUESTIONNAIRE - PHQ9
10. IF YOU CHECKED OFF ANY PROBLEMS, HOW DIFFICULT HAVE THESE PROBLEMS MADE IT FOR YOU TO DO YOUR WORK, TAKE CARE OF THINGS AT HOME, OR GET ALONG WITH OTHER PEOPLE: NOT DIFFICULT AT ALL
SUM OF ALL RESPONSES TO PHQ QUESTIONS 1-9: 6
SUM OF ALL RESPONSES TO PHQ QUESTIONS 1-9: 6

## 2025-08-06 ENCOUNTER — MYC MEDICAL ADVICE (OUTPATIENT)
Dept: FAMILY MEDICINE | Facility: CLINIC | Age: 75
End: 2025-08-06
Payer: COMMERCIAL

## 2025-08-07 ENCOUNTER — APPOINTMENT (OUTPATIENT)
Dept: CT IMAGING | Facility: CLINIC | Age: 75
End: 2025-08-07
Attending: EMERGENCY MEDICINE
Payer: COMMERCIAL

## 2025-08-07 ENCOUNTER — HOSPITAL ENCOUNTER (EMERGENCY)
Facility: CLINIC | Age: 75
Discharge: HOME | End: 2025-08-07
Attending: EMERGENCY MEDICINE
Payer: COMMERCIAL

## 2025-08-07 ENCOUNTER — PATIENT OUTREACH (OUTPATIENT)
Dept: CARE COORDINATION | Facility: CLINIC | Age: 75
End: 2025-08-07

## 2025-08-07 VITALS
SYSTOLIC BLOOD PRESSURE: 137 MMHG | TEMPERATURE: 99.1 F | BODY MASS INDEX: 24.96 KG/M2 | OXYGEN SATURATION: 97 % | HEART RATE: 76 BPM | WEIGHT: 164.68 LBS | DIASTOLIC BLOOD PRESSURE: 91 MMHG | RESPIRATION RATE: 19 BRPM | HEIGHT: 68 IN

## 2025-08-07 DIAGNOSIS — G89.29 CHRONIC ABDOMINAL PAIN: Primary | ICD-10-CM

## 2025-08-07 DIAGNOSIS — R68.83 CHILLS: ICD-10-CM

## 2025-08-07 DIAGNOSIS — R11.0 NAUSEA: ICD-10-CM

## 2025-08-07 DIAGNOSIS — R10.9 CHRONIC ABDOMINAL PAIN: Primary | ICD-10-CM

## 2025-08-07 LAB
ALBUMIN SERPL BCG-MCNC: 4.5 G/DL (ref 3.5–5.2)
ALP SERPL-CCNC: 52 U/L (ref 40–150)
ALT SERPL W P-5'-P-CCNC: 18 U/L (ref 0–70)
ANION GAP SERPL CALCULATED.3IONS-SCNC: 14 MMOL/L (ref 7–15)
AST SERPL W P-5'-P-CCNC: 24 U/L (ref 0–45)
ATRIAL RATE - MUSE: 66 BPM
BASOPHILS # BLD AUTO: 0 10E3/UL (ref 0–0.2)
BASOPHILS NFR BLD AUTO: 0 %
BILIRUB SERPL-MCNC: 0.8 MG/DL
BUN SERPL-MCNC: 15.1 MG/DL (ref 8–23)
CALCIUM SERPL-MCNC: 9.7 MG/DL (ref 8.8–10.4)
CHLORIDE SERPL-SCNC: 105 MMOL/L (ref 98–107)
CREAT BLD-MCNC: 1 MG/DL (ref 0.7–1.2)
CREAT SERPL-MCNC: 0.93 MG/DL (ref 0.67–1.17)
DIASTOLIC BLOOD PRESSURE - MUSE: NORMAL MMHG
EGFRCR SERPLBLD CKD-EPI 2021: 87 ML/MIN/1.73M2
EGFRCR SERPLBLD CKD-EPI 2021: >60 ML/MIN/1.73M2
EOSINOPHIL # BLD AUTO: 0.1 10E3/UL (ref 0–0.7)
EOSINOPHIL NFR BLD AUTO: 1 %
ERYTHROCYTE [DISTWIDTH] IN BLOOD BY AUTOMATED COUNT: 13.7 % (ref 10–15)
GLUCOSE SERPL-MCNC: 108 MG/DL (ref 70–99)
HCO3 SERPL-SCNC: 24 MMOL/L (ref 22–29)
HCT VFR BLD AUTO: 44 % (ref 40–53)
HGB BLD-MCNC: 14.6 G/DL (ref 13.3–17.7)
IMM GRANULOCYTES # BLD: 0 10E3/UL
IMM GRANULOCYTES NFR BLD: 1 %
INTERPRETATION ECG - MUSE: NORMAL
LIPASE SERPL-CCNC: 39 U/L (ref 13–60)
LYMPHOCYTES # BLD AUTO: 0.7 10E3/UL (ref 0.8–5.3)
LYMPHOCYTES NFR BLD AUTO: 11 %
MCH RBC QN AUTO: 29.6 PG (ref 26.5–33)
MCHC RBC AUTO-ENTMCNC: 33.2 G/DL (ref 31.5–36.5)
MCV RBC AUTO: 89 FL (ref 78–100)
MONOCYTES # BLD AUTO: 0.6 10E3/UL (ref 0–1.3)
MONOCYTES NFR BLD AUTO: 9 %
NEUTROPHILS # BLD AUTO: 5.1 10E3/UL (ref 1.6–8.3)
NEUTROPHILS NFR BLD AUTO: 78 %
NRBC # BLD AUTO: 0 10E3/UL
NRBC BLD AUTO-RTO: 0 /100
P AXIS - MUSE: 21 DEGREES
PLATELET # BLD AUTO: 212 10E3/UL (ref 150–450)
POTASSIUM SERPL-SCNC: 4.4 MMOL/L (ref 3.4–5.3)
PR INTERVAL - MUSE: 148 MS
PROT SERPL-MCNC: 7.2 G/DL (ref 6.4–8.3)
QRS DURATION - MUSE: 138 MS
QT - MUSE: 426 MS
QTC - MUSE: 446 MS
R AXIS - MUSE: -28 DEGREES
RBC # BLD AUTO: 4.93 10E6/UL (ref 4.4–5.9)
SODIUM SERPL-SCNC: 143 MMOL/L (ref 135–145)
SYSTOLIC BLOOD PRESSURE - MUSE: NORMAL MMHG
T AXIS - MUSE: 94 DEGREES
TROPONIN T SERPL HS-MCNC: 10 NG/L
TROPONIN T SERPL HS-MCNC: 11 NG/L
VENTRICULAR RATE- MUSE: 66 BPM
WBC # BLD AUTO: 6.6 10E3/UL (ref 4–11)

## 2025-08-07 PROCEDURE — 74177 CT ABD & PELVIS W/CONTRAST: CPT

## 2025-08-07 PROCEDURE — 250N000009 HC RX 250: Performed by: EMERGENCY MEDICINE

## 2025-08-07 PROCEDURE — 250N000011 HC RX IP 250 OP 636: Performed by: EMERGENCY MEDICINE

## 2025-08-07 PROCEDURE — 84484 ASSAY OF TROPONIN QUANT: CPT | Performed by: EMERGENCY MEDICINE

## 2025-08-07 PROCEDURE — 36415 COLL VENOUS BLD VENIPUNCTURE: CPT | Performed by: EMERGENCY MEDICINE

## 2025-08-07 PROCEDURE — 99285 EMERGENCY DEPT VISIT HI MDM: CPT | Mod: 25 | Performed by: EMERGENCY MEDICINE

## 2025-08-07 PROCEDURE — 84132 ASSAY OF SERUM POTASSIUM: CPT | Performed by: EMERGENCY MEDICINE

## 2025-08-07 PROCEDURE — 82565 ASSAY OF CREATININE: CPT

## 2025-08-07 PROCEDURE — 83690 ASSAY OF LIPASE: CPT | Performed by: EMERGENCY MEDICINE

## 2025-08-07 PROCEDURE — 85014 HEMATOCRIT: CPT | Performed by: EMERGENCY MEDICINE

## 2025-08-07 PROCEDURE — 93005 ELECTROCARDIOGRAM TRACING: CPT

## 2025-08-07 RX ORDER — ONDANSETRON 4 MG/1
4 TABLET, ORALLY DISINTEGRATING ORAL EVERY 8 HOURS PRN
Qty: 10 TABLET | Refills: 0 | Status: SHIPPED | OUTPATIENT
Start: 2025-08-07 | End: 2025-08-10

## 2025-08-07 RX ORDER — IOPAMIDOL 755 MG/ML
500 INJECTION, SOLUTION INTRAVASCULAR ONCE
Status: COMPLETED | OUTPATIENT
Start: 2025-08-07 | End: 2025-08-07

## 2025-08-07 RX ORDER — ONDANSETRON 4 MG/1
4 TABLET, ORALLY DISINTEGRATING ORAL ONCE
Status: COMPLETED | OUTPATIENT
Start: 2025-08-07 | End: 2025-08-07

## 2025-08-07 RX ORDER — SUCRALFATE 1 G/1
1 TABLET ORAL 4 TIMES DAILY
Qty: 28 TABLET | Refills: 0 | Status: SHIPPED | OUTPATIENT
Start: 2025-08-07

## 2025-08-07 RX ADMIN — ONDANSETRON 4 MG: 4 TABLET, ORALLY DISINTEGRATING ORAL at 10:16

## 2025-08-07 RX ADMIN — SODIUM CHLORIDE 61 ML: 9 INJECTION, SOLUTION INTRAVENOUS at 10:44

## 2025-08-07 RX ADMIN — IOPAMIDOL 82 ML: 755 INJECTION, SOLUTION INTRAVENOUS at 10:44

## 2025-08-07 ASSESSMENT — ACTIVITIES OF DAILY LIVING (ADL)
ADLS_ACUITY_SCORE: 41

## 2025-08-11 ENCOUNTER — PATIENT OUTREACH (OUTPATIENT)
Dept: CARE COORDINATION | Facility: CLINIC | Age: 75
End: 2025-08-11
Payer: COMMERCIAL

## 2025-08-13 ENCOUNTER — OFFICE VISIT (OUTPATIENT)
Dept: FAMILY MEDICINE | Facility: CLINIC | Age: 75
End: 2025-08-13
Payer: COMMERCIAL

## 2025-08-13 VITALS
HEART RATE: 64 BPM | BODY MASS INDEX: 24.71 KG/M2 | HEIGHT: 68 IN | WEIGHT: 163 LBS | DIASTOLIC BLOOD PRESSURE: 68 MMHG | TEMPERATURE: 98.6 F | SYSTOLIC BLOOD PRESSURE: 130 MMHG | OXYGEN SATURATION: 98 % | RESPIRATION RATE: 14 BRPM

## 2025-08-13 DIAGNOSIS — I10 HYPERTENSION GOAL BP (BLOOD PRESSURE) < 140/90: Chronic | ICD-10-CM

## 2025-08-13 DIAGNOSIS — Z00.00 ENCOUNTER FOR MEDICARE ANNUAL WELLNESS EXAM: Primary | ICD-10-CM

## 2025-08-13 DIAGNOSIS — R11.0 NAUSEA: ICD-10-CM

## 2025-08-13 PROBLEM — R79.89 ABNORMAL LIVER FUNCTION TESTS: Status: RESOLVED | Noted: 2024-02-26 | Resolved: 2025-08-13

## 2025-08-13 SDOH — HEALTH STABILITY: PHYSICAL HEALTH: ON AVERAGE, HOW MANY DAYS PER WEEK DO YOU ENGAGE IN MODERATE TO STRENUOUS EXERCISE (LIKE A BRISK WALK)?: 2 DAYS

## 2025-08-13 ASSESSMENT — PAIN SCALES - GENERAL: PAINLEVEL_OUTOF10: NO PAIN (0)

## 2025-08-13 ASSESSMENT — PATIENT HEALTH QUESTIONNAIRE - PHQ9
SUM OF ALL RESPONSES TO PHQ QUESTIONS 1-9: 10
SUM OF ALL RESPONSES TO PHQ QUESTIONS 1-9: 10
10. IF YOU CHECKED OFF ANY PROBLEMS, HOW DIFFICULT HAVE THESE PROBLEMS MADE IT FOR YOU TO DO YOUR WORK, TAKE CARE OF THINGS AT HOME, OR GET ALONG WITH OTHER PEOPLE: NOT DIFFICULT AT ALL

## 2025-08-13 ASSESSMENT — SOCIAL DETERMINANTS OF HEALTH (SDOH): HOW OFTEN DO YOU GET TOGETHER WITH FRIENDS OR RELATIVES?: ONCE A WEEK

## 2025-08-14 LAB
CREAT UR-MCNC: 183 MG/DL
MICROALBUMIN UR-MCNC: 15.2 MG/L
MICROALBUMIN/CREAT UR: 8.31 MG/G CR (ref 0–17)

## 2025-08-15 ENCOUNTER — RESULTS FOLLOW-UP (OUTPATIENT)
Dept: FAMILY MEDICINE | Facility: CLINIC | Age: 75
End: 2025-08-15
Payer: COMMERCIAL

## 2025-08-25 ENCOUNTER — TELEPHONE (OUTPATIENT)
Dept: FAMILY MEDICINE | Facility: CLINIC | Age: 75
End: 2025-08-25
Payer: COMMERCIAL

## 2025-08-25 DIAGNOSIS — K21.9 GASTROESOPHAGEAL REFLUX DISEASE, UNSPECIFIED WHETHER ESOPHAGITIS PRESENT: Primary | ICD-10-CM

## 2025-08-28 RX ORDER — SUCRALFATE 1 G/1
1 TABLET ORAL 4 TIMES DAILY
Qty: 360 TABLET | Refills: 1 | Status: SHIPPED | OUTPATIENT
Start: 2025-08-28

## 2025-09-04 ENCOUNTER — TELEPHONE (OUTPATIENT)
Dept: FAMILY MEDICINE | Facility: CLINIC | Age: 75
End: 2025-09-04
Payer: COMMERCIAL

## (undated) DEVICE — LINEN TOWEL PACK X10 5473

## (undated) DEVICE — ADPT 5 IN 1 360

## (undated) DEVICE — GLOVE PROTEXIS W/NEU-THERA 7.5  2D73TE75

## (undated) DEVICE — ENDO POUCH GOLD 10MM ECATCH 173050G

## (undated) DEVICE — SOL NACL 0.9% IRRIG 3000ML BAG 2B7477

## (undated) DEVICE — DRAIN CHEST TUBE 24FR STR 8024

## (undated) DEVICE — TUBING CONMED AIRSEAL SMOKE EVAC INSUFFLATION ASM-EVAC

## (undated) DEVICE — SUCTION IRR STRYKERFLOW II W/TIP 250-070-520

## (undated) DEVICE — GLOVE ESTEEM POWDER FREE SMT 6.5  2D72PT65

## (undated) DEVICE — SUCTION TIP YANKAUER W/O VENT K86

## (undated) DEVICE — SU VICRYL 3-0 SH 27" J316H

## (undated) DEVICE — LINEN FULL SHEET 5511

## (undated) DEVICE — DRAIN BLAKE 19FR W/TROCAR SIL

## (undated) DEVICE — SYR 10ML SLIP TIP W/O NDL 303134

## (undated) DEVICE — DRAPE MAYO STAND 23X54 8337

## (undated) DEVICE — SYR 10ML LL W/O NDL 302995

## (undated) DEVICE — LINEN TOWEL PACK X6 WHITE 5487

## (undated) DEVICE — KIT ENDO TURNOVER/PROCEDURE W/CLEAN A SCOPE LINERS 103888

## (undated) DEVICE — CATH BALLOON DILATATION UROMAX ULTRA 24FRX4CM M0062251040

## (undated) DEVICE — DAVINCI OBTURATOR 8MM BLADELESS 420023

## (undated) DEVICE — ENDO TROCAR 12MM VERSAONE BLADELESS W/STD FIX CAN NONB12STF

## (undated) DEVICE — STPL ENDO RELOAD GIA 45X3.5MM 030422

## (undated) DEVICE — GLOVE PROTEXIS BLUE W/NEU-THERA 8.0  2D73EB80

## (undated) DEVICE — SUCTION DRY CHEST DRAIN OASIS 3600-100

## (undated) DEVICE — Device

## (undated) DEVICE — ENDO TROCAR FIRST ENTRY KII FIOS Z-THRD 05X100MM CTF03

## (undated) DEVICE — CATH FOLEY COUNCIL 18FR 5ML LUBRICATH LATEX 0196L18

## (undated) DEVICE — LINEN TOWEL PACK X5 5464

## (undated) DEVICE — DRAIN JACKSON PRATT RESERVOIR 100ML SU130-1305

## (undated) DEVICE — SOL WATER IRRIG 1000ML BOTTLE 2F7114

## (undated) DEVICE — SUCTION MANIFOLD NEPTUNE 2 SYS 4 PORT 0702-020-000

## (undated) DEVICE — SYR 30ML LL W/O NDL 302832

## (undated) DEVICE — SU VICRYL 4-0 PS-2 18" UND J496H

## (undated) DEVICE — LUBRICATING JELLY 4.25OZ

## (undated) DEVICE — DECANTER VIAL 2006S

## (undated) DEVICE — SU VICRYL 4-0 P-3 18" UND J494G

## (undated) DEVICE — CLIP ENDO HEMO-LOC PURPLE LG 544240

## (undated) DEVICE — KIT ENDO FIRST STEP DISINFECTANT 200ML W/POUCH EP-4

## (undated) DEVICE — PREP CHLORAPREP 26ML TINTED ORANGE  260815

## (undated) DEVICE — PACK CYSTO CUSTOM RIDGES

## (undated) DEVICE — TUBING SUCTION 10'X3/16" N510

## (undated) DEVICE — CLIP APPLIER ENDO 5MM M/L LIGAMAX EL5ML

## (undated) DEVICE — SOL NACL 0.9% IRRIG 1000ML BOTTLE 2F7124

## (undated) DEVICE — GLOVE BIOGEL PI MICRO SZ 7.5 48575

## (undated) DEVICE — GOWN XLG DISP 9545

## (undated) DEVICE — ENDO VALVE SUCTION ULTRASOUND BRONCH MAJ-1414

## (undated) DEVICE — CATH BALLOON INFLATION OPTILUME 30FR X 5CM 1110-10050B

## (undated) DEVICE — SU MONOCRYL 2-0 UR-6DA 27" D8592

## (undated) DEVICE — DRSG TELFA 3X8" 1238

## (undated) DEVICE — DRAPE C-ARM W/STRAPS 42X72" 07-CA104

## (undated) DEVICE — STPL ENDO HANDLE GIA ULTRA UNIVERSAL STD EGIAUSTND

## (undated) DEVICE — PAD CHUX UNDERPAD 23X24" 7136

## (undated) DEVICE — DAVINCI S NDL DRIVER LARGE 420006

## (undated) DEVICE — SU VICRYL 0 CT-1 27" J340H

## (undated) DEVICE — BLADE CLIPPER SGL USE 9680

## (undated) DEVICE — SYR 50ML CATH TIP W/O NDL 309620

## (undated) DEVICE — DRSG TEGADERM 2 3/8X2 3/4" 1624W

## (undated) DEVICE — GLOVE PROTEXIS MICRO 6.0  2D73PM60

## (undated) DEVICE — ESU ELEC BLADE 6" COATED/INSULATED E1455-6

## (undated) DEVICE — STPL ENDO RELOAD 60MM MEDIUM THICK PURPLE EGIA60AMT

## (undated) DEVICE — DRAPE IOBAN INCISE 23X17" 6650EZ

## (undated) DEVICE — SU TICRON 2 GS-21DA 36" 3146-81

## (undated) DEVICE — ENDO SHEARS EXTRA LONG 5MM 174601

## (undated) DEVICE — SOL WATER IRRIG 500ML BOTTLE 2F7113

## (undated) DEVICE — SU VICRYL 3-0 SH 27" UND J416H

## (undated) DEVICE — CATH FOLEY COUNCIL 18FR 5ML LATEX 0196SI18

## (undated) DEVICE — DAVINCI S MONOPOLAR SCISSORS PRECURVED HOT SHEARS 420179

## (undated) DEVICE — JELLY LUBRICATING SURGILUBE 2OZ TUBE

## (undated) DEVICE — DAVINCI HOT SHEARS TIP COVER  400180

## (undated) DEVICE — ENDO POUCH UNIV RETRIEVAL SYSTEM INZII 10MM CD001

## (undated) DEVICE — LINEN GOWN XLG 5407

## (undated) DEVICE — ENDO TROCAR OPTICAL ACCESS KII Z-THRD 12X100MM C0R85

## (undated) DEVICE — ESU ELEC BLADE 2.75" COATED/INSULATED E1455

## (undated) DEVICE — SU VICRYL 0 UR-6 27" J603H

## (undated) DEVICE — APPLICATOR COTTON TIP 6"X2 STERILE LF 6012

## (undated) DEVICE — ENDO DISSECTOR BLUNT 05MM 3/PK 173019

## (undated) DEVICE — GUIDEWIRE SENSOR DUAL FLEX STR 0.035"X150CM M0066703080

## (undated) DEVICE — SU MONOCRYL 4-0 PS-2 27" UND Y426H

## (undated) DEVICE — CONNECTOR URETERAL CATH 14FRX30CM COOK G14230

## (undated) DEVICE — SOL NACL 0.9% IRRIG 500ML BOTTLE 2F7123

## (undated) DEVICE — BLADE KNIFE SURG 10 371110

## (undated) DEVICE — SU SILK 0 SH 30" K834H

## (undated) DEVICE — ESU LIGASURE MARYLAND JAW OPEN SEALER/DVDR 5MMX37CM LF1737

## (undated) DEVICE — KIT PATIENT POSITIONING PIGAZZI LATEX FREE 40580

## (undated) DEVICE — ENDO VALVE BX EVIS MAJ-210

## (undated) DEVICE — INFLATION DEVICE ENCORE  26 PRESSURE GAUGE M001151050

## (undated) DEVICE — LINEN POUCH DBL 5427

## (undated) DEVICE — ENDO SNARE POLYPECTOMY OVAL 15MM LOOP SD-240U-15

## (undated) DEVICE — SU VICRYL 4-0 RB-1 27" J304

## (undated) DEVICE — BAG CLEAR TRASH 1.3M 39X33" P4040C

## (undated) DEVICE — PAD CHUX UNDERPAD 30X30"

## (undated) DEVICE — BAG URINARY DRAIN 4000ML LF 153509

## (undated) DEVICE — GLOVE BIOGEL PI MICRO SZ 8.0 48580

## (undated) DEVICE — CATH TRAY FOLEY SURESTEP 16FR W/URNE MTR STLK LATEX A303316A

## (undated) DEVICE — TAPE DURAPORE 3" SILK 1538-3

## (undated) DEVICE — SPONGE RAY-TEC 4X8" 7318

## (undated) DEVICE — SU PLEDGET SOFT TFE 13MMX7MMX1.5MM D7044

## (undated) DEVICE — ESU CORD MONOPOLAR 10'  E0510

## (undated) DEVICE — PACK DAVINCI UROLOGY SBA15UDFSG

## (undated) DEVICE — SPONGE TONSIL W/STRING MED 23275-680

## (undated) DEVICE — ESU GROUND PAD ADULT W/CORD E7507

## (undated) DEVICE — TIES BANDING T50R

## (undated) DEVICE — PACK CYSTO CUSTOM ASC

## (undated) DEVICE — ENDO SEAL BX PORT BPS-A

## (undated) DEVICE — TUBING IRRIG TUR Y TYPE 96" LF 6543-01

## (undated) DEVICE — DAVINCI S FCP BIPOLAR FENESTRATED 420205

## (undated) DEVICE — SU MONOCRYL 2-0 UR-6 27" Y605H

## (undated) DEVICE — ENDO TROCAR SLEEVE KII Z-THREADED 05X100MM CTS02

## (undated) DEVICE — PACK CYSTO UMMC CUSTOM

## (undated) DEVICE — SU ETHILON 2-0 PS 18" 585H

## (undated) DEVICE — SU DERMABOND ADVANCED .7ML DNX12

## (undated) DEVICE — SU VICRYL 0 CT-2 CR 8X18" J727D

## (undated) DEVICE — DAVINCI S CANNULA SEAL 8MM 400077

## (undated) DEVICE — CONNECTOR BLAKE DRAIN SGL BCC1

## (undated) DEVICE — GLOVE PROTEXIS POWDER FREE 7.5 ORTHOPEDIC 2D73ET75

## (undated) DEVICE — GLOVE PROTEXIS POWDER FREE 8.5 ORTHOPEDIC 2D73ET85

## (undated) DEVICE — DAVINCI S GRASPER ENDOWRIST PROGRASP 420093

## (undated) DEVICE — LINEN HALF SHEET 5512

## (undated) DEVICE — SOL NACL 0.9% INJ 1000ML BAG 2B1324X

## (undated) DEVICE — DAVINCI DRAPE KIT 4-ARM 420291

## (undated) DEVICE — LIGHT HANDLE X1 31140133

## (undated) DEVICE — SU MONOCRYL 0 CT 36" Y358H

## (undated) DEVICE — PROTECTOR ARM ONE-STEP TRENDELENBURG 40418

## (undated) DEVICE — PREP CHLORAPREP 26ML TINTED HI-LITE ORANGE 930815

## (undated) DEVICE — ESU CORD BIPOLAR GREEN 10-4000

## (undated) DEVICE — ESU PENCIL W/COATED BLADE E2450H

## (undated) DEVICE — CATH URETERAL OPEN END 05FR 70CM 020015

## (undated) DEVICE — ENDO TROCAR CONMED AIRSEAL BLADELESS 12X120MM IAS12-120LP

## (undated) DEVICE — ENDO VALVE SUCTION BRONCH EVIS MAJ-209

## (undated) DEVICE — NDL INSUFFLATION 14GA STEP S100000

## (undated) DEVICE — GLOVE PROTEXIS BLUE W/NEU-THERA 6.5  2D73EB65

## (undated) DEVICE — ESU PENCIL W/HOLSTER E2350H

## (undated) DEVICE — CATH FOLEY 20FR 5ML LUBRICATH LATEX 0165L20

## (undated) DEVICE — CATH URETERAL OPEN END 5FRX70CM M0064002010

## (undated) DEVICE — SUCTION CANISTER MEDIVAC LINER 3000ML W/LID 65651-530

## (undated) DEVICE — ENDO ADPT BRONCH SWIVEL Y A1002

## (undated) RX ORDER — FENTANYL CITRATE 50 UG/ML
INJECTION, SOLUTION INTRAMUSCULAR; INTRAVENOUS
Status: DISPENSED
Start: 2024-03-18

## (undated) RX ORDER — BUPIVACAINE HYDROCHLORIDE 2.5 MG/ML
INJECTION, SOLUTION EPIDURAL; INFILTRATION; INTRACAUDAL
Status: DISPENSED
Start: 2024-03-18

## (undated) RX ORDER — FENTANYL CITRATE 50 UG/ML
INJECTION, SOLUTION INTRAMUSCULAR; INTRAVENOUS
Status: DISPENSED
Start: 2018-03-25

## (undated) RX ORDER — PROPOFOL 10 MG/ML
INJECTION, EMULSION INTRAVENOUS
Status: DISPENSED
Start: 2017-12-01

## (undated) RX ORDER — PROPOFOL 10 MG/ML
INJECTION, EMULSION INTRAVENOUS
Status: DISPENSED
Start: 2022-01-03

## (undated) RX ORDER — SIMETHICONE 40MG/0.6ML
SUSPENSION, DROPS(FINAL DOSAGE FORM)(ML) ORAL
Status: DISPENSED
Start: 2022-01-04

## (undated) RX ORDER — CIPROFLOXACIN 500 MG/1
TABLET, FILM COATED ORAL
Status: DISPENSED
Start: 2017-12-27

## (undated) RX ORDER — ACETAMINOPHEN 325 MG/1
TABLET ORAL
Status: DISPENSED
Start: 2024-03-18

## (undated) RX ORDER — ACETAMINOPHEN 325 MG/1
TABLET ORAL
Status: DISPENSED
Start: 2022-01-03

## (undated) RX ORDER — BUPIVACAINE HYDROCHLORIDE 2.5 MG/ML
INJECTION, SOLUTION EPIDURAL; INFILTRATION; INTRACAUDAL
Status: DISPENSED
Start: 2017-12-01

## (undated) RX ORDER — HYDROMORPHONE HYDROCHLORIDE 1 MG/ML
INJECTION, SOLUTION INTRAMUSCULAR; INTRAVENOUS; SUBCUTANEOUS
Status: DISPENSED
Start: 2017-03-03

## (undated) RX ORDER — KETOROLAC TROMETHAMINE 30 MG/ML
INJECTION, SOLUTION INTRAMUSCULAR; INTRAVENOUS
Status: DISPENSED
Start: 2024-03-18

## (undated) RX ORDER — FENTANYL CITRATE 50 UG/ML
INJECTION, SOLUTION INTRAMUSCULAR; INTRAVENOUS
Status: DISPENSED
Start: 2019-12-11

## (undated) RX ORDER — OXYCODONE HYDROCHLORIDE 5 MG/1
TABLET ORAL
Status: DISPENSED
Start: 2024-07-02

## (undated) RX ORDER — CEFAZOLIN SODIUM 1 G/3ML
INJECTION, POWDER, FOR SOLUTION INTRAMUSCULAR; INTRAVENOUS
Status: DISPENSED
Start: 2022-01-03

## (undated) RX ORDER — ONDANSETRON 2 MG/ML
INJECTION INTRAMUSCULAR; INTRAVENOUS
Status: DISPENSED
Start: 2024-07-02

## (undated) RX ORDER — FENTANYL CITRATE 50 UG/ML
INJECTION, SOLUTION INTRAMUSCULAR; INTRAVENOUS
Status: DISPENSED
Start: 2018-08-14

## (undated) RX ORDER — ONDANSETRON 2 MG/ML
INJECTION INTRAMUSCULAR; INTRAVENOUS
Status: DISPENSED
Start: 2017-12-01

## (undated) RX ORDER — FENTANYL CITRATE 50 UG/ML
INJECTION, SOLUTION INTRAMUSCULAR; INTRAVENOUS
Status: DISPENSED
Start: 2024-07-02

## (undated) RX ORDER — DEXAMETHASONE SODIUM PHOSPHATE 4 MG/ML
INJECTION, SOLUTION INTRA-ARTICULAR; INTRALESIONAL; INTRAMUSCULAR; INTRAVENOUS; SOFT TISSUE
Status: DISPENSED
Start: 2024-07-02

## (undated) RX ORDER — KETAMINE HYDROCHLORIDE 10 MG/ML
INJECTION, SOLUTION INTRAMUSCULAR; INTRAVENOUS
Status: DISPENSED
Start: 2017-12-01

## (undated) RX ORDER — CIPROFLOXACIN 500 MG/1
TABLET, FILM COATED ORAL
Status: DISPENSED
Start: 2017-08-31

## (undated) RX ORDER — LIDOCAINE HYDROCHLORIDE 10 MG/ML
INJECTION, SOLUTION INFILTRATION; PERINEURAL
Status: DISPENSED
Start: 2017-08-31

## (undated) RX ORDER — FENTANYL CITRATE 0.05 MG/ML
INJECTION, SOLUTION INTRAMUSCULAR; INTRAVENOUS
Status: DISPENSED
Start: 2022-01-25

## (undated) RX ORDER — LIDOCAINE HYDROCHLORIDE AND EPINEPHRINE 10; 10 MG/ML; UG/ML
INJECTION, SOLUTION INFILTRATION; PERINEURAL
Status: DISPENSED
Start: 2024-03-18

## (undated) RX ORDER — FENTANYL CITRATE 50 UG/ML
INJECTION, SOLUTION INTRAMUSCULAR; INTRAVENOUS
Status: DISPENSED
Start: 2017-03-03

## (undated) RX ORDER — CEFAZOLIN SODIUM 2 G/100ML
INJECTION, SOLUTION INTRAVENOUS
Status: DISPENSED
Start: 2017-03-03

## (undated) RX ORDER — GLYCOPYRROLATE 0.2 MG/ML
INJECTION, SOLUTION INTRAMUSCULAR; INTRAVENOUS
Status: DISPENSED
Start: 2024-07-02

## (undated) RX ORDER — LIDOCAINE HYDROCHLORIDE 10 MG/ML
INJECTION, SOLUTION EPIDURAL; INFILTRATION; INTRACAUDAL; PERINEURAL
Status: DISPENSED
Start: 2018-08-14

## (undated) RX ORDER — SIMETHICONE 40MG/0.6ML
SUSPENSION, DROPS(FINAL DOSAGE FORM)(ML) ORAL
Status: DISPENSED
Start: 2024-09-23

## (undated) RX ORDER — FENTANYL CITRATE 50 UG/ML
INJECTION, SOLUTION INTRAMUSCULAR; INTRAVENOUS
Status: DISPENSED
Start: 2024-09-23

## (undated) RX ORDER — FENTANYL CITRATE 50 UG/ML
INJECTION, SOLUTION INTRAMUSCULAR; INTRAVENOUS
Status: DISPENSED
Start: 2022-01-03

## (undated) RX ORDER — CEFAZOLIN SODIUM/WATER 2 G/20 ML
SYRINGE (ML) INTRAVENOUS
Status: DISPENSED
Start: 2024-03-18

## (undated) RX ORDER — BUPIVACAINE HYDROCHLORIDE 5 MG/ML
INJECTION, SOLUTION EPIDURAL; INTRACAUDAL
Status: DISPENSED
Start: 2024-07-02

## (undated) RX ORDER — ACETAMINOPHEN 325 MG/1
TABLET ORAL
Status: DISPENSED
Start: 2024-07-02

## (undated) RX ORDER — ONDANSETRON 4 MG/1
TABLET, ORALLY DISINTEGRATING ORAL
Status: DISPENSED
Start: 2024-07-02

## (undated) RX ORDER — CIPROFLOXACIN 500 MG/1
TABLET, FILM COATED ORAL
Status: DISPENSED
Start: 2018-02-28

## (undated) RX ORDER — GABAPENTIN 300 MG/1
CAPSULE ORAL
Status: DISPENSED
Start: 2017-03-03

## (undated) RX ORDER — ONDANSETRON 2 MG/ML
INJECTION INTRAMUSCULAR; INTRAVENOUS
Status: DISPENSED
Start: 2022-01-03

## (undated) RX ORDER — CEFAZOLIN SODIUM/WATER 2 G/20 ML
SYRINGE (ML) INTRAVENOUS
Status: DISPENSED
Start: 2024-07-02

## (undated) RX ORDER — BUPIVACAINE HYDROCHLORIDE 2.5 MG/ML
INJECTION, SOLUTION EPIDURAL; INFILTRATION; INTRACAUDAL
Status: DISPENSED
Start: 2017-03-03

## (undated) RX ORDER — LIDOCAINE HYDROCHLORIDE 10 MG/ML
INJECTION, SOLUTION EPIDURAL; INFILTRATION; INTRACAUDAL; PERINEURAL
Status: DISPENSED
Start: 2024-07-02

## (undated) RX ORDER — IOPAMIDOL 510 MG/ML
INJECTION, SOLUTION INTRAVASCULAR
Status: DISPENSED
Start: 2024-03-18

## (undated) RX ORDER — ACETAMINOPHEN 325 MG/1
TABLET ORAL
Status: DISPENSED
Start: 2017-03-03

## (undated) RX ORDER — SODIUM CHLORIDE 9 MG/ML
INJECTION, SOLUTION INTRAVENOUS
Status: DISPENSED
Start: 2018-08-14

## (undated) RX ORDER — CELECOXIB 200 MG/1
CAPSULE ORAL
Status: DISPENSED
Start: 2017-03-03

## (undated) RX ORDER — FENTANYL CITRATE 50 UG/ML
INJECTION, SOLUTION INTRAMUSCULAR; INTRAVENOUS
Status: DISPENSED
Start: 2017-12-01

## (undated) RX ORDER — LIDOCAINE HYDROCHLORIDE 10 MG/ML
INJECTION, SOLUTION EPIDURAL; INFILTRATION; INTRACAUDAL; PERINEURAL
Status: DISPENSED
Start: 2017-03-03

## (undated) RX ORDER — INDOCYANINE GREEN AND WATER 25 MG
KIT INJECTION
Status: DISPENSED
Start: 2024-07-02

## (undated) RX ORDER — CEFAZOLIN SODIUM 2 G/100ML
INJECTION, SOLUTION INTRAVENOUS
Status: DISPENSED
Start: 2018-08-14

## (undated) RX ORDER — DEXAMETHASONE SODIUM PHOSPHATE 4 MG/ML
INJECTION, SOLUTION INTRA-ARTICULAR; INTRALESIONAL; INTRAMUSCULAR; INTRAVENOUS; SOFT TISSUE
Status: DISPENSED
Start: 2022-01-03

## (undated) RX ORDER — KETOROLAC TROMETHAMINE 30 MG/ML
INJECTION, SOLUTION INTRAMUSCULAR; INTRAVENOUS
Status: DISPENSED
Start: 2022-01-03

## (undated) RX ORDER — CHLORHEXIDINE GLUCONATE ORAL RINSE 1.2 MG/ML
SOLUTION DENTAL
Status: DISPENSED
Start: 2017-03-03

## (undated) RX ORDER — CIPROFLOXACIN 500 MG/1
TABLET, FILM COATED ORAL
Status: DISPENSED
Start: 2018-04-06

## (undated) RX ORDER — PROPOFOL 10 MG/ML
INJECTION, EMULSION INTRAVENOUS
Status: DISPENSED
Start: 2024-07-02

## (undated) RX ORDER — ONDANSETRON 2 MG/ML
INJECTION INTRAMUSCULAR; INTRAVENOUS
Status: DISPENSED
Start: 2017-03-03

## (undated) RX ORDER — GABAPENTIN 100 MG/1
CAPSULE ORAL
Status: DISPENSED
Start: 2022-01-03

## (undated) RX ORDER — NEOSTIGMINE METHYLSULFATE 1 MG/ML
VIAL (ML) INJECTION
Status: DISPENSED
Start: 2024-07-02